# Patient Record
Sex: FEMALE | Race: BLACK OR AFRICAN AMERICAN | NOT HISPANIC OR LATINO | Employment: OTHER | URBAN - METROPOLITAN AREA
[De-identification: names, ages, dates, MRNs, and addresses within clinical notes are randomized per-mention and may not be internally consistent; named-entity substitution may affect disease eponyms.]

---

## 2017-01-03 ENCOUNTER — TELEPHONE (OUTPATIENT)
Dept: OBSTETRICS AND GYNECOLOGY | Facility: CLINIC | Age: 32
End: 2017-01-03

## 2017-01-03 NOTE — TELEPHONE ENCOUNTER
----- Message from Tamica Bledsoe sent at 1/3/2017  3:45 PM CST -----  Contact: Pt.  Was to have surgery on 12/30 and had to cancel because blood count was low.  Was told to call today to speak with Kortney to reschedule.  Needs to be admitted night before to have additional blood work done.  Please call her at [phone] (work). Please call before 5pm.  After 5pm call [phone] (cell).

## 2017-01-03 NOTE — TELEPHONE ENCOUNTER
----- Message from Candis Brown sent at 1/3/2017  9:38 AM CST -----  Would like to speak to nurse about rescheduling procedure. Please call back at [phone]. Thanks//cdb

## 2017-01-03 NOTE — TELEPHONE ENCOUNTER
Appt made for pre-op (to sign consent for blood transfusion), on 1/13/2017 at 8am at [facility] location.  Surgery to be scheduled on 1/17/17, (message sent to Alena).  Pt informed and voiced understanding.  DENNYS, STEPHENIE

## 2017-01-04 ENCOUNTER — TELEPHONE (OUTPATIENT)
Dept: OBSTETRICS AND GYNECOLOGY | Facility: CLINIC | Age: 32
End: 2017-01-04

## 2017-01-04 DIAGNOSIS — R10.2 PELVIC AND PERINEAL PAIN: ICD-10-CM

## 2017-01-04 DIAGNOSIS — N87.1 DYSPLASIA OF CERVIX, HIGH GRADE CIN 2: Primary | ICD-10-CM

## 2017-01-06 ENCOUNTER — HOSPITAL ENCOUNTER (EMERGENCY)
Facility: HOSPITAL | Age: 32
Discharge: HOME | End: 2017-01-06
Attending: EMERGENCY MEDICINE
Payer: MEDICARE

## 2017-01-06 VITALS
OXYGEN SATURATION: 98 % | TEMPERATURE: 98 F | SYSTOLIC BLOOD PRESSURE: 103 MMHG | WEIGHT: 120 LBS | HEART RATE: 82 BPM | DIASTOLIC BLOOD PRESSURE: 67 MMHG | BODY MASS INDEX: 23.56 KG/M2 | RESPIRATION RATE: 18 BRPM | HEIGHT: 60 IN

## 2017-01-06 DIAGNOSIS — D57.00 SICKLE CELL ANEMIA WITH PAIN: Primary | ICD-10-CM

## 2017-01-06 DIAGNOSIS — D64.9 CHRONIC ANEMIA: ICD-10-CM

## 2017-01-06 LAB
ALBUMIN SERPL BCP-MCNC: 3 G/DL
ALP SERPL-CCNC: 63 U/L
ALT SERPL W/O P-5'-P-CCNC: 10 U/L
ANION GAP SERPL CALC-SCNC: 8 MMOL/L
APTT BLDCRRT: 28.8 SEC
AST SERPL-CCNC: 23 U/L
B-HCG UR QL: NEGATIVE
BACTERIA #/AREA URNS HPF: ABNORMAL /HPF
BASOPHILS # BLD AUTO: 0.02 K/UL
BASOPHILS NFR BLD: 0.4 %
BILIRUB SERPL-MCNC: 0.2 MG/DL
BILIRUB UR QL STRIP: NEGATIVE
BUN SERPL-MCNC: 18 MG/DL
CALCIUM SERPL-MCNC: 8.5 MG/DL
CHLORIDE SERPL-SCNC: 109 MMOL/L
CLARITY UR: CLEAR
CO2 SERPL-SCNC: 22 MMOL/L
COLOR UR: YELLOW
CREAT SERPL-MCNC: 0.9 MG/DL
D DIMER PPP IA.FEU-MCNC: 2.42 MG/L FEU
DIFFERENTIAL METHOD: ABNORMAL
EOSINOPHIL # BLD AUTO: 0.1 K/UL
EOSINOPHIL NFR BLD: 1.5 %
ERYTHROCYTE [DISTWIDTH] IN BLOOD BY AUTOMATED COUNT: 13.6 %
EST. GFR  (AFRICAN AMERICAN): >60 ML/MIN/1.73 M^2
EST. GFR  (NON AFRICAN AMERICAN): >60 ML/MIN/1.73 M^2
GLUCOSE SERPL-MCNC: 86 MG/DL
GLUCOSE UR QL STRIP: NEGATIVE
HCT VFR BLD AUTO: 23 %
HGB BLD-MCNC: 7.5 G/DL
HGB UR QL STRIP: ABNORMAL
HYALINE CASTS #/AREA URNS LPF: 1 /LPF
INR PPP: 1
KETONES UR QL STRIP: NEGATIVE
LDH SERPL L TO P-CCNC: 180 U/L
LEUKOCYTE ESTERASE UR QL STRIP: NEGATIVE
LYMPHOCYTES # BLD AUTO: 1.4 K/UL
LYMPHOCYTES NFR BLD: 28.7 %
MAGNESIUM SERPL-MCNC: 2 MG/DL
MCH RBC QN AUTO: 31.6 PG
MCHC RBC AUTO-ENTMCNC: 32.6 %
MCV RBC AUTO: 97 FL
MICROSCOPIC COMMENT: ABNORMAL
MONOCYTES # BLD AUTO: 0.3 K/UL
MONOCYTES NFR BLD: 6.2 %
NEUTROPHILS # BLD AUTO: 3 K/UL
NEUTROPHILS NFR BLD: 63.2 %
NITRITE UR QL STRIP: NEGATIVE
PH UR STRIP: 7 [PH] (ref 5–8)
PLATELET # BLD AUTO: 146 K/UL
PMV BLD AUTO: 10.2 FL
POTASSIUM SERPL-SCNC: 3.6 MMOL/L
PROT SERPL-MCNC: 10.5 G/DL
PROT UR QL STRIP: ABNORMAL
PROTHROMBIN TIME: 10.3 SEC
RBC # BLD AUTO: 2.37 M/UL
RBC #/AREA URNS HPF: 5 /HPF (ref 0–4)
RETICS/RBC NFR AUTO: 1.6 %
SODIUM SERPL-SCNC: 139 MMOL/L
SP GR UR STRIP: 1.02 (ref 1–1.03)
TROPONIN I SERPL DL<=0.01 NG/ML-MCNC: <0.006 NG/ML
URN SPEC COLLECT METH UR: ABNORMAL
UROBILINOGEN UR STRIP-ACNC: NEGATIVE EU/DL
WBC # BLD AUTO: 4.81 K/UL
WBC #/AREA URNS HPF: 3 /HPF (ref 0–5)

## 2017-01-06 PROCEDURE — 63600175 PHARM REV CODE 636 W HCPCS: Performed by: EMERGENCY MEDICINE

## 2017-01-06 PROCEDURE — 81000 URINALYSIS NONAUTO W/SCOPE: CPT

## 2017-01-06 PROCEDURE — 85025 COMPLETE CBC W/AUTO DIFF WBC: CPT

## 2017-01-06 PROCEDURE — 96374 THER/PROPH/DIAG INJ IV PUSH: CPT

## 2017-01-06 PROCEDURE — 80053 COMPREHEN METABOLIC PANEL: CPT

## 2017-01-06 PROCEDURE — 84484 ASSAY OF TROPONIN QUANT: CPT

## 2017-01-06 PROCEDURE — 96361 HYDRATE IV INFUSION ADD-ON: CPT

## 2017-01-06 PROCEDURE — 85730 THROMBOPLASTIN TIME PARTIAL: CPT

## 2017-01-06 PROCEDURE — 85610 PROTHROMBIN TIME: CPT

## 2017-01-06 PROCEDURE — 93010 ELECTROCARDIOGRAM REPORT: CPT | Mod: ,,, | Performed by: INTERNAL MEDICINE

## 2017-01-06 PROCEDURE — 93005 ELECTROCARDIOGRAM TRACING: CPT

## 2017-01-06 PROCEDURE — 96376 TX/PRO/DX INJ SAME DRUG ADON: CPT

## 2017-01-06 PROCEDURE — 96375 TX/PRO/DX INJ NEW DRUG ADDON: CPT

## 2017-01-06 PROCEDURE — 99285 EMERGENCY DEPT VISIT HI MDM: CPT | Mod: 25

## 2017-01-06 PROCEDURE — 83735 ASSAY OF MAGNESIUM: CPT

## 2017-01-06 PROCEDURE — 25000003 PHARM REV CODE 250: Performed by: EMERGENCY MEDICINE

## 2017-01-06 PROCEDURE — 81025 URINE PREGNANCY TEST: CPT

## 2017-01-06 PROCEDURE — 83615 LACTATE (LD) (LDH) ENZYME: CPT

## 2017-01-06 PROCEDURE — 85379 FIBRIN DEGRADATION QUANT: CPT

## 2017-01-06 PROCEDURE — 85045 AUTOMATED RETICULOCYTE COUNT: CPT

## 2017-01-06 RX ORDER — MORPHINE SULFATE 4 MG/ML
4 INJECTION, SOLUTION INTRAMUSCULAR; INTRAVENOUS
Status: COMPLETED | OUTPATIENT
Start: 2017-01-06 | End: 2017-01-06

## 2017-01-06 RX ORDER — DIPHENHYDRAMINE HCL 25 MG
25 CAPSULE ORAL
Status: COMPLETED | OUTPATIENT
Start: 2017-01-06 | End: 2017-01-06

## 2017-01-06 RX ORDER — HYDROCODONE BITARTRATE AND ACETAMINOPHEN 10; 325 MG/1; MG/1
1 TABLET ORAL EVERY 4 HOURS PRN
Qty: 18 TABLET | Refills: 0 | Status: SHIPPED | OUTPATIENT
Start: 2017-01-06 | End: 2017-01-13

## 2017-01-06 RX ORDER — ONDANSETRON 2 MG/ML
4 INJECTION INTRAMUSCULAR; INTRAVENOUS
Status: COMPLETED | OUTPATIENT
Start: 2017-01-06 | End: 2017-01-06

## 2017-01-06 RX ADMIN — ONDANSETRON 4 MG: 2 INJECTION INTRAMUSCULAR; INTRAVENOUS at 03:01

## 2017-01-06 RX ADMIN — MORPHINE SULFATE 4 MG: 4 INJECTION, SOLUTION INTRAMUSCULAR; INTRAVENOUS at 03:01

## 2017-01-06 RX ADMIN — MORPHINE SULFATE 4 MG: 4 INJECTION, SOLUTION INTRAMUSCULAR; INTRAVENOUS at 04:01

## 2017-01-06 RX ADMIN — SODIUM CHLORIDE 1000 ML: 0.9 INJECTION, SOLUTION INTRAVENOUS at 03:01

## 2017-01-06 RX ADMIN — DIPHENHYDRAMINE HYDROCHLORIDE 25 MG: 25 CAPSULE ORAL at 03:01

## 2017-01-06 NOTE — ED NOTES
In bed resting,aaox3,skin warm dry to touch,equal bilateral chest rise and fall,side rails up x 2,bed locked and in low position,C-monitor on and recording,instructed to call with any needs.
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
## 2017-01-06 NOTE — ED AVS SNAPSHOT
[facility]  [address]  [facility] [address]               Wang Kebede   2017  2:22 PM   ED    Description:  Female : 1985   Department:  [facility]           Your Care was Coordinated By:     Provider Role From To    Genny Bryan MD Attending Provider 17 1422 --      Reason for Visit     Sickle Cell Pain Crisis           Diagnoses this Visit        Comments    Sickle cell anemia with pain    -  Primary       ED Disposition     None           To Do List           Follow-up Information     Follow up with Bronson Koenig MD. Schedule an appointment as soon as possible for a visit in 2 days.    Specialty:  Internal Medicine    Why:  Return to the Emergency Room, If symptoms worsen    Contact information:    [address] [facility] [id number] [phone]         These Medications        Disp Refills Start End    hydrocodone-acetaminophen 10-325mg (NORCO)  mg Tab 18 tablet 0 2017     Take 1 tablet by mouth every 4 (four) hours as needed for Pain. - Oral    Pharmacy: [facility] [id number] [place] [address] [id number] [facility] Ph #: [phone]         [facility]     [facility] Nurse Care [facility] -  Assistance  Registered nurses in the [facility] provide clinical advisement, health education, appointment booking, and other advisory services.  Call for this free service at [phone].             Medications           Message regarding Medications     Verify the changes and/or additions to your medication regime listed below are the same as discussed with your clinician today.  If any of these changes or additions are incorrect, please notify your healthcare provider.        START taking these NEW medications        Refills    hydrocodone-acetaminophen 10-325mg (NORCO)  mg Tab 0    Sig: Take 1 tablet by mouth every 4 (four)  hours as needed for Pain.    Class: Print    Route: Oral      These medications were administered today        Dose Freq    sodium chloride 0.9% bolus 1,000 mL 1,000 mL ED 1 Time    Sig: Inject 1,000 mLs into the vein ED 1 Time.    Class: Normal    Route: Intravenous    morphine injection 4 mg 4 mg ED 1 Time    Sig: Inject 1 mL (4 mg total) into the vein ED 1 Time.    Class: Normal    Route: Intravenous    ondansetron injection 4 mg 4 mg ED 1 Time    Sig: Inject 4 mg into the vein ED 1 Time.    Class: Normal    Route: Intravenous    diphenhydrAMINE capsule 25 mg 25 mg ED 1 Time    Sig: Take 1 each (25 mg total) by mouth ED 1 Time.    Class: Normal    Route: Oral    morphine injection 4 mg 4 mg ED 1 Time    Sig: Inject 1 mL (4 mg total) into the vein ED 1 Time.    Class: Normal    Route: Intravenous           Verify that the below list of medications is an accurate representation of the medications you are currently taking.  If none reported, the list may be blank. If incorrect, please contact your healthcare provider. Carry this list with you in case of emergency.           Current Medications     cyproheptadine (PERIACTIN) 4 mg tablet Take 4 mg by mouth 3 (three) times daily as needed.    darunavir-cobicistat (PREZCOBIX) 800-150 mg-mg Tab Take 1 tablet by mouth.    emtricitabine-tenofovir alafen (DESCOVY) 200-25 mg Tab Take by mouth once daily.    hydrocodone-acetaminophen 10-325mg (NORCO)  mg Tab Take 1 tablet by mouth every 4 (four) hours as needed for Pain.           Clinical Reference Information           Your Vitals Were     BP Pulse Temp Resp Height Weight    103/63 87 98.2 °F (36.8 °C) (Oral) 21 5' (1.524 m) 54.4 kg (120 lb)    Last Period SpO2 BMI          10/31/2016 (Approximate) 97% 23.44 kg/m2        Allergies as of 1/6/2017        Reactions    Iodine And Iodide Containing Products Anaphylaxis    Pt states she coded last time she was administered contrast    Bactrim [Sulfamethoxazole-trimethoprim]  Hives      Immunizations Administered on Date of Encounter - 1/6/2017     None      ED Micro, Lab, POCT     Start Ordered       Status Ordering Provider    01/06/17 1440 01/06/17 1439  D dimer, quantitative  STAT      Final result     01/06/17 1439 01/06/17 1438  CBC auto differential  STAT      Final result     01/06/17 1439 01/06/17 1438  Comprehensive metabolic panel  STAT      Final result     01/06/17 1439 01/06/17 1438  Protime-INR  STAT      Final result     01/06/17 1439 01/06/17 1438  APTT  STAT      Final result     01/06/17 1439 01/06/17 1438  Magnesium  STAT      Final result     01/06/17 1439 01/06/17 1438  Lactate dehydrogenase  Once      Final result     01/06/17 1439 01/06/17 1438  Reticulocytes  Once      Final result     01/06/17 1439 01/06/17 1438  Troponin I  STAT      Final result     01/06/17 1439 01/06/17 1438  Urinalysis Clean Catch  STAT      Final result     01/06/17 1439 01/06/17 1438  Pregnancy, urine rapid (UPT)  Once      Final result     01/06/17 1439 01/06/17 1439  Urinalysis Microscopic  Once      Final result       ED Imaging Orders     Start Ordered       Status Ordering Provider    01/06/17 1622 01/06/17 1622  NM Lung Ventilation Perfusion Imaging  1 time imaging      Final result     01/06/17 1600 01/06/17 1559    1 time imaging,   Status:  Canceled      Canceled     01/06/17 1439 01/06/17 1438  X-Ray Chest PA And Lateral  1 time imaging      Final result       Discharge References/Attachments     SICKLE CELL ANEMIA AND SICKLE CELL CRISIS, DISCHARGE INSTRUCTIONS (ENGLISH)      Your Scheduled Appointments     Jan 13, 2017  8:00 AM CST   Established Patient Visit with Emanuel Zamora MD   [facility]    [address] [facility] [address] [phone]              Your Future Surgeries/Procedures     Jan 17, 2017   Surgery with Emanuel Zamora MD   [facility])    [address] [facility]  [address]   [phone]               [facility] complies with applicable Federal civil rights laws and does not discriminate on the basis of race, color, national origin, age, disability, or sex.        Language Assistance Services     ATTENTION: Language assistance services are available, free of charge. Please call [phone].      ATENCIÓN: Si habla español, tiene a bazan disposición servicios gratuitos de asistencia lingüística. Llame al [phone].     CHÚ Ý: N?u b?n nói Ti?ng Vi?t, có các d?ch v? h? tr? ngôn ng? mi?n phí dành cho b?n. G?i s? [phone].

## 2017-01-11 ENCOUNTER — TELEPHONE (OUTPATIENT)
Dept: OBSTETRICS AND GYNECOLOGY | Facility: CLINIC | Age: 32
End: 2017-01-11

## 2017-01-11 NOTE — TELEPHONE ENCOUNTER
Left message informing pt we did not try to call her, may have been appt reminder (informed of 1/13/17 at 8am at [facility] appt).  DENNYS, LPN

## 2017-01-11 NOTE — TELEPHONE ENCOUNTER
----- Message from Mario Elizalde sent at 1/10/2017  1:42 PM CST -----  Contact: pt  She's calling in regard to a missed call please advise, [phone] (home)

## 2017-01-13 ENCOUNTER — OFFICE VISIT (OUTPATIENT)
Dept: OBSTETRICS AND GYNECOLOGY | Facility: CLINIC | Age: 32
End: 2017-01-13
Payer: MEDICARE

## 2017-01-13 VITALS
SYSTOLIC BLOOD PRESSURE: 100 MMHG | DIASTOLIC BLOOD PRESSURE: 62 MMHG | BODY MASS INDEX: 22.42 KG/M2 | WEIGHT: 114.19 LBS | HEIGHT: 60 IN

## 2017-01-13 DIAGNOSIS — D58.9 HEREDITARY HEMOLYTIC ANEMIA: ICD-10-CM

## 2017-01-13 DIAGNOSIS — N92.1 MENORRHAGIA WITH IRREGULAR CYCLE: Primary | ICD-10-CM

## 2017-01-13 DIAGNOSIS — N87.1 DYSPLASIA OF CERVIX, HIGH GRADE CIN 2: Primary | ICD-10-CM

## 2017-01-13 DIAGNOSIS — N87.1 DYSPLASIA OF CERVIX, HIGH GRADE CIN 2: ICD-10-CM

## 2017-01-13 PROCEDURE — 99499 UNLISTED E&M SERVICE: CPT | Mod: S$GLB,,, | Performed by: OBSTETRICS & GYNECOLOGY

## 2017-01-13 PROCEDURE — 99999 PR PBB SHADOW E&M-EST. PATIENT-LVL II: CPT | Mod: PBBFAC,,, | Performed by: OBSTETRICS & GYNECOLOGY

## 2017-01-13 RX ORDER — IBUPROFEN 200 MG
800 TABLET ORAL EVERY 8 HOURS
Status: CANCELLED | OUTPATIENT
Start: 2017-01-14

## 2017-01-13 RX ORDER — KETOROLAC TROMETHAMINE 30 MG/ML
30 INJECTION, SOLUTION INTRAMUSCULAR; INTRAVENOUS EVERY 6 HOURS
Status: CANCELLED | OUTPATIENT
Start: 2017-01-13 | End: 2017-01-14

## 2017-01-13 RX ORDER — ACETAMINOPHEN 10 MG/ML
1000 INJECTION, SOLUTION INTRAVENOUS
Status: CANCELLED | OUTPATIENT
Start: 2017-01-13 | End: 2017-01-13

## 2017-01-13 RX ORDER — SODIUM CHLORIDE, SODIUM LACTATE, POTASSIUM CHLORIDE, CALCIUM CHLORIDE 600; 310; 30; 20 MG/100ML; MG/100ML; MG/100ML; MG/100ML
INJECTION, SOLUTION INTRAVENOUS CONTINUOUS
Status: CANCELLED | OUTPATIENT
Start: 2017-01-13

## 2017-01-13 RX ORDER — ONDANSETRON 4 MG/1
8 TABLET, ORALLY DISINTEGRATING ORAL EVERY 8 HOURS PRN
Status: CANCELLED | OUTPATIENT
Start: 2017-01-13
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
## 2017-01-16 ENCOUNTER — ANESTHESIA EVENT (OUTPATIENT)
Dept: SURGERY | Facility: HOSPITAL | Age: 32
End: 2017-01-16
Payer: MEDICARE

## 2017-01-16 ENCOUNTER — INFUSION (OUTPATIENT)
Dept: INFUSION THERAPY | Facility: HOSPITAL | Age: 32
End: 2017-01-16
Attending: OBSTETRICS & GYNECOLOGY
Payer: MEDICARE

## 2017-01-16 ENCOUNTER — TELEPHONE (OUTPATIENT)
Dept: OBSTETRICS AND GYNECOLOGY | Facility: CLINIC | Age: 32
End: 2017-01-16

## 2017-01-16 DIAGNOSIS — D58.2 OTHER HEMOGLOBINOPATHIES: Primary | ICD-10-CM

## 2017-01-16 DIAGNOSIS — D64.9 ANEMIA: Primary | ICD-10-CM

## 2017-01-16 DIAGNOSIS — Z01.818 PRE-OPERATIVE CLEARANCE: ICD-10-CM

## 2017-01-16 LAB
BLD PROD TYP BPU: NORMAL
BLD PROD TYP BPU: NORMAL
BLOOD UNIT EXPIRATION DATE: NORMAL
BLOOD UNIT EXPIRATION DATE: NORMAL
BLOOD UNIT TYPE CODE: 5100
BLOOD UNIT TYPE CODE: NORMAL
BLOOD UNIT TYPE: NORMAL
BLOOD UNIT TYPE: NORMAL
CODING SYSTEM: NORMAL
CODING SYSTEM: NORMAL
DISPENSE STATUS: NORMAL
DISPENSE STATUS: NORMAL
NUM UNITS TRANS PACKED RBC: NORMAL
NUM UNITS TRANS PACKED RBC: NORMAL

## 2017-01-16 PROCEDURE — P9016 RBC LEUKOCYTES REDUCED: HCPCS

## 2017-01-16 PROCEDURE — 85660 RBC SICKLE CELL TEST: CPT | Mod: 91

## 2017-01-16 PROCEDURE — 36415 COLL VENOUS BLD VENIPUNCTURE: CPT

## 2017-01-16 PROCEDURE — 86920 COMPATIBILITY TEST SPIN: CPT

## 2017-01-16 PROCEDURE — 63600175 PHARM REV CODE 636 W HCPCS: Performed by: OBSTETRICS & GYNECOLOGY

## 2017-01-16 PROCEDURE — 36430 TRANSFUSION BLD/BLD COMPNT: CPT

## 2017-01-16 PROCEDURE — 96376 TX/PRO/DX INJ SAME DRUG ADON: CPT

## 2017-01-16 PROCEDURE — 96374 THER/PROPH/DIAG INJ IV PUSH: CPT

## 2017-01-16 RX ORDER — HYDROCODONE BITARTRATE AND ACETAMINOPHEN 500; 5 MG/1; MG/1
TABLET ORAL
Status: DISCONTINUED | OUTPATIENT
Start: 2017-01-16 | End: 2017-01-17 | Stop reason: HOSPADM

## 2017-01-16 RX ORDER — HYDROCODONE BITARTRATE AND ACETAMINOPHEN 500; 5 MG/1; MG/1
TABLET ORAL ONCE
Status: CANCELLED | OUTPATIENT
Start: 2017-01-16 | End: 2017-01-16

## 2017-01-16 RX ORDER — DIPHENHYDRAMINE HYDROCHLORIDE 50 MG/ML
25 INJECTION INTRAMUSCULAR; INTRAVENOUS
Status: COMPLETED | OUTPATIENT
Start: 2017-01-16 | End: 2017-01-16

## 2017-01-16 RX ORDER — HYDROCODONE BITARTRATE AND ACETAMINOPHEN 500; 5 MG/1; MG/1
TABLET ORAL ONCE
Status: DISCONTINUED | OUTPATIENT
Start: 2017-01-16 | End: 2017-01-17 | Stop reason: HOSPADM

## 2017-01-16 RX ADMIN — DIPHENHYDRAMINE HYDROCHLORIDE 25 MG: 50 INJECTION, SOLUTION INTRAMUSCULAR; INTRAVENOUS at 07:01

## 2017-01-16 RX ADMIN — DIPHENHYDRAMINE HYDROCHLORIDE 25 MG: 50 INJECTION, SOLUTION INTRAMUSCULAR; INTRAVENOUS at 04:01

## 2017-01-16 NOTE — PLAN OF CARE
"Problem: Patient Care Overview  Goal: Plan of Care Review  Outcome: Ongoing (interventions implemented as appropriate)  Patient states "My body just aches all over."      "

## 2017-01-16 NOTE — TELEPHONE ENCOUNTER
----- Message from Bisi Jerome sent at 1/16/2017 12:27 PM CST -----  Contact: pt  Pt is calling nurse staff regarding pt is at the hospital and there are no orders for patient for a blood transfusion. Pt is waiting at the [facility] now. Pt call back asap [phone] thanks

## 2017-01-17 ENCOUNTER — SURGERY (OUTPATIENT)
Age: 32
End: 2017-01-17

## 2017-01-17 ENCOUNTER — ANESTHESIA (OUTPATIENT)
Dept: SURGERY | Facility: HOSPITAL | Age: 32
End: 2017-01-17
Payer: MEDICARE

## 2017-01-17 ENCOUNTER — HOSPITAL ENCOUNTER (OUTPATIENT)
Facility: HOSPITAL | Age: 32
Discharge: HOME | End: 2017-01-17
Attending: OBSTETRICS & GYNECOLOGY | Admitting: OBSTETRICS & GYNECOLOGY
Payer: MEDICARE

## 2017-01-17 VITALS
DIASTOLIC BLOOD PRESSURE: 59 MMHG | RESPIRATION RATE: 19 BRPM | SYSTOLIC BLOOD PRESSURE: 111 MMHG | OXYGEN SATURATION: 96 % | TEMPERATURE: 98 F | HEIGHT: 60 IN | WEIGHT: 112.88 LBS | HEART RATE: 87 BPM | BODY MASS INDEX: 22.16 KG/M2

## 2017-01-17 VITALS
OXYGEN SATURATION: 97 % | DIASTOLIC BLOOD PRESSURE: 80 MMHG | TEMPERATURE: 98 F | RESPIRATION RATE: 16 BRPM | SYSTOLIC BLOOD PRESSURE: 117 MMHG | HEART RATE: 76 BPM

## 2017-01-17 DIAGNOSIS — N87.1 DYSPLASIA OF CERVIX, HIGH GRADE CIN 2: ICD-10-CM

## 2017-01-17 DIAGNOSIS — D58.9 HEREDITARY HEMOLYTIC ANEMIA: ICD-10-CM

## 2017-01-17 LAB
B-HCG UR QL: NEGATIVE
BASOPHILS # BLD AUTO: 0.01 K/UL
BASOPHILS NFR BLD: 0.3 %
CTP QC/QA: YES
DIFFERENTIAL METHOD: ABNORMAL
EOSINOPHIL # BLD AUTO: 0.1 K/UL
EOSINOPHIL NFR BLD: 2.2 %
ERYTHROCYTE [DISTWIDTH] IN BLOOD BY AUTOMATED COUNT: 15.2 %
HCT VFR BLD AUTO: 31.8 %
HGB BLD-MCNC: 10.8 G/DL
LYMPHOCYTES # BLD AUTO: 1.1 K/UL
LYMPHOCYTES NFR BLD: 31.3 %
MCH RBC QN AUTO: 31.3 PG
MCHC RBC AUTO-ENTMCNC: 34 %
MCV RBC AUTO: 92 FL
MONOCYTES # BLD AUTO: 0.3 K/UL
MONOCYTES NFR BLD: 8.4 %
NEUTROPHILS # BLD AUTO: 2.1 K/UL
NEUTROPHILS NFR BLD: 57.8 %
PLATELET # BLD AUTO: 136 K/UL
PMV BLD AUTO: 10.4 FL
RBC # BLD AUTO: 3.45 M/UL
WBC # BLD AUTO: 3.58 K/UL

## 2017-01-17 PROCEDURE — 88307 TISSUE EXAM BY PATHOLOGIST: CPT | Mod: 26,,, | Performed by: PATHOLOGY

## 2017-01-17 PROCEDURE — 25000003 PHARM REV CODE 250: Performed by: NURSE ANESTHETIST, CERTIFIED REGISTERED

## 2017-01-17 PROCEDURE — 37000008 HC ANESTHESIA 1ST 15 MINUTES: Performed by: OBSTETRICS & GYNECOLOGY

## 2017-01-17 PROCEDURE — 11423 EXC H-F-NK-SP B9+MARG 2.1-3: CPT | Mod: 51,,, | Performed by: OBSTETRICS & GYNECOLOGY

## 2017-01-17 PROCEDURE — 63600175 PHARM REV CODE 636 W HCPCS: Performed by: ANESTHESIOLOGY

## 2017-01-17 PROCEDURE — 85025 COMPLETE CBC W/AUTO DIFF WBC: CPT

## 2017-01-17 PROCEDURE — 37000009 HC ANESTHESIA EA ADD 15 MINS: Performed by: OBSTETRICS & GYNECOLOGY

## 2017-01-17 PROCEDURE — 27000221 HC OXYGEN, UP TO 24 HOURS

## 2017-01-17 PROCEDURE — 63600175 PHARM REV CODE 636 W HCPCS: Performed by: OBSTETRICS & GYNECOLOGY

## 2017-01-17 PROCEDURE — 25000242 PHARM REV CODE 250 ALT 637 W/ HCPCS: Performed by: ANESTHESIOLOGY

## 2017-01-17 PROCEDURE — 71000039 HC RECOVERY, EACH ADD'L HOUR: Performed by: OBSTETRICS & GYNECOLOGY

## 2017-01-17 PROCEDURE — 88305 TISSUE EXAM BY PATHOLOGIST: CPT | Performed by: PATHOLOGY

## 2017-01-17 PROCEDURE — 88305 TISSUE EXAM BY PATHOLOGIST: CPT | Mod: 26,,, | Performed by: PATHOLOGY

## 2017-01-17 PROCEDURE — 81025 URINE PREGNANCY TEST: CPT | Performed by: OBSTETRICS & GYNECOLOGY

## 2017-01-17 PROCEDURE — 57520 CONIZATION OF CERVIX: CPT | Mod: ,,, | Performed by: OBSTETRICS & GYNECOLOGY

## 2017-01-17 PROCEDURE — 94640 AIRWAY INHALATION TREATMENT: CPT

## 2017-01-17 PROCEDURE — 71000015 HC POSTOP RECOV 1ST HR: Performed by: OBSTETRICS & GYNECOLOGY

## 2017-01-17 PROCEDURE — 25000003 PHARM REV CODE 250: Performed by: OBSTETRICS & GYNECOLOGY

## 2017-01-17 PROCEDURE — 63600175 PHARM REV CODE 636 W HCPCS: Performed by: NURSE ANESTHETIST, CERTIFIED REGISTERED

## 2017-01-17 PROCEDURE — 71000033 HC RECOVERY, INTIAL HOUR: Performed by: OBSTETRICS & GYNECOLOGY

## 2017-01-17 PROCEDURE — 25000003 PHARM REV CODE 250: Performed by: ANESTHESIOLOGY

## 2017-01-17 PROCEDURE — 99900035 HC TECH TIME PER 15 MIN (STAT)

## 2017-01-17 PROCEDURE — 36000706: Performed by: OBSTETRICS & GYNECOLOGY

## 2017-01-17 PROCEDURE — 36000707: Performed by: OBSTETRICS & GYNECOLOGY

## 2017-01-17 RX ORDER — SODIUM CHLORIDE, SODIUM LACTATE, POTASSIUM CHLORIDE, CALCIUM CHLORIDE 600; 310; 30; 20 MG/100ML; MG/100ML; MG/100ML; MG/100ML
INJECTION, SOLUTION INTRAVENOUS CONTINUOUS
Status: DISCONTINUED | OUTPATIENT
Start: 2017-01-17 | End: 2017-01-17 | Stop reason: HOSPADM

## 2017-01-17 RX ORDER — MIDAZOLAM HYDROCHLORIDE 1 MG/ML
INJECTION, SOLUTION INTRAMUSCULAR; INTRAVENOUS
Status: DISCONTINUED | OUTPATIENT
Start: 2017-01-17 | End: 2017-01-17

## 2017-01-17 RX ORDER — DIPHENHYDRAMINE HYDROCHLORIDE 50 MG/ML
6.25 INJECTION INTRAMUSCULAR; INTRAVENOUS EVERY 4 HOURS PRN
Status: DISCONTINUED | OUTPATIENT
Start: 2017-01-17 | End: 2017-01-17 | Stop reason: HOSPADM

## 2017-01-17 RX ORDER — LIDOCAINE HYDROCHLORIDE 10 MG/ML
1 INJECTION, SOLUTION EPIDURAL; INFILTRATION; INTRACAUDAL; PERINEURAL ONCE
Status: DISCONTINUED | OUTPATIENT
Start: 2017-01-17 | End: 2017-01-17 | Stop reason: HOSPADM

## 2017-01-17 RX ORDER — SODIUM CHLORIDE, SODIUM LACTATE, POTASSIUM CHLORIDE, CALCIUM CHLORIDE 600; 310; 30; 20 MG/100ML; MG/100ML; MG/100ML; MG/100ML
INJECTION, SOLUTION INTRAVENOUS CONTINUOUS PRN
Status: DISCONTINUED | OUTPATIENT
Start: 2017-01-17 | End: 2017-01-17

## 2017-01-17 RX ORDER — HYDROMORPHONE HYDROCHLORIDE 2 MG/ML
0.2 INJECTION, SOLUTION INTRAMUSCULAR; INTRAVENOUS; SUBCUTANEOUS EVERY 5 MIN PRN
Status: DISCONTINUED | OUTPATIENT
Start: 2017-01-17 | End: 2017-01-17 | Stop reason: HOSPADM

## 2017-01-17 RX ORDER — ROCURONIUM BROMIDE 10 MG/ML
INJECTION, SOLUTION INTRAVENOUS
Status: DISCONTINUED | OUTPATIENT
Start: 2017-01-17 | End: 2017-01-17

## 2017-01-17 RX ORDER — MORPHINE SULFATE 10 MG/ML
2 INJECTION INTRAMUSCULAR; INTRAVENOUS; SUBCUTANEOUS EVERY 5 MIN PRN
Status: DISCONTINUED | OUTPATIENT
Start: 2017-01-17 | End: 2017-01-17 | Stop reason: HOSPADM

## 2017-01-17 RX ORDER — HYDROCODONE BITARTRATE AND ACETAMINOPHEN 5; 325 MG/1; MG/1
1 TABLET ORAL
Status: DISCONTINUED | OUTPATIENT
Start: 2017-01-17 | End: 2017-01-17 | Stop reason: HOSPADM

## 2017-01-17 RX ORDER — LIDOCAINE HYDROCHLORIDE 10 MG/ML
INJECTION INFILTRATION; PERINEURAL
Status: DISCONTINUED | OUTPATIENT
Start: 2017-01-17 | End: 2017-01-17

## 2017-01-17 RX ORDER — ACETAMINOPHEN 10 MG/ML
1000 INJECTION, SOLUTION INTRAVENOUS
Status: COMPLETED | OUTPATIENT
Start: 2017-01-17 | End: 2017-01-17

## 2017-01-17 RX ORDER — SUCCINYLCHOLINE CHLORIDE 20 MG/ML
INJECTION INTRAMUSCULAR; INTRAVENOUS
Status: DISCONTINUED | OUTPATIENT
Start: 2017-01-17 | End: 2017-01-17

## 2017-01-17 RX ORDER — SODIUM CHLORIDE 9 MG/ML
3 INJECTION, SOLUTION INTRAMUSCULAR; INTRAVENOUS; SUBCUTANEOUS EVERY 8 HOURS
Status: DISCONTINUED | OUTPATIENT
Start: 2017-01-17 | End: 2017-01-17 | Stop reason: HOSPADM

## 2017-01-17 RX ORDER — FENTANYL CITRATE 50 UG/ML
INJECTION, SOLUTION INTRAMUSCULAR; INTRAVENOUS
Status: DISCONTINUED | OUTPATIENT
Start: 2017-01-17 | End: 2017-01-17

## 2017-01-17 RX ORDER — ETOMIDATE 2 MG/ML
INJECTION INTRAVENOUS
Status: DISCONTINUED | OUTPATIENT
Start: 2017-01-17 | End: 2017-01-17

## 2017-01-17 RX ORDER — ALBUTEROL SULFATE 2.5 MG/.5ML
2.5 SOLUTION RESPIRATORY (INHALATION) EVERY 4 HOURS PRN
Status: DISCONTINUED | OUTPATIENT
Start: 2017-01-17 | End: 2017-01-17 | Stop reason: HOSPADM

## 2017-01-17 RX ORDER — NALBUPHINE HYDROCHLORIDE 10 MG/ML
10 INJECTION, SOLUTION INTRAMUSCULAR; INTRAVENOUS; SUBCUTANEOUS
Status: DISCONTINUED | OUTPATIENT
Start: 2017-01-17 | End: 2017-01-17

## 2017-01-17 RX ORDER — ONDANSETRON 2 MG/ML
INJECTION INTRAMUSCULAR; INTRAVENOUS
Status: DISCONTINUED | OUTPATIENT
Start: 2017-01-17 | End: 2017-01-17

## 2017-01-17 RX ORDER — MORPHINE SULFATE 10 MG/ML
INJECTION, SOLUTION INTRAMUSCULAR; INTRAVENOUS
Status: DISCONTINUED | OUTPATIENT
Start: 2017-01-17 | End: 2017-01-17

## 2017-01-17 RX ORDER — KETOROLAC TROMETHAMINE 30 MG/ML
30 INJECTION, SOLUTION INTRAMUSCULAR; INTRAVENOUS ONCE
Status: COMPLETED | OUTPATIENT
Start: 2017-01-17 | End: 2017-01-17

## 2017-01-17 RX ORDER — SODIUM CHLORIDE 9 MG/ML
3 INJECTION, SOLUTION INTRAMUSCULAR; INTRAVENOUS; SUBCUTANEOUS
Status: DISCONTINUED | OUTPATIENT
Start: 2017-01-17 | End: 2017-01-17 | Stop reason: HOSPADM

## 2017-01-17 RX ORDER — ONDANSETRON 2 MG/ML
4 INJECTION INTRAMUSCULAR; INTRAVENOUS DAILY PRN
Status: DISCONTINUED | OUTPATIENT
Start: 2017-01-17 | End: 2017-01-17 | Stop reason: HOSPADM

## 2017-01-17 RX ADMIN — ACETAMINOPHEN 1000 MG: 10 INJECTION, SOLUTION INTRAVENOUS at 09:01

## 2017-01-17 RX ADMIN — VASOPRESSIN 20 UNITS: 20 INJECTION, SOLUTION INTRAMUSCULAR; SUBCUTANEOUS at 11:01

## 2017-01-17 RX ADMIN — KETOROLAC TROMETHAMINE 30 MG: 30 INJECTION, SOLUTION INTRAMUSCULAR at 03:01

## 2017-01-17 RX ADMIN — MORPHINE SULFATE 5 MG: 10 INJECTION INTRAMUSCULAR; INTRAVENOUS; SUBCUTANEOUS at 11:01

## 2017-01-17 RX ADMIN — ONDANSETRON 4 MG: 2 INJECTION, SOLUTION INTRAMUSCULAR; INTRAVENOUS at 11:01

## 2017-01-17 RX ADMIN — SODIUM CHLORIDE, SODIUM LACTATE, POTASSIUM CHLORIDE, AND CALCIUM CHLORIDE: 600; 310; 30; 20 INJECTION, SOLUTION INTRAVENOUS at 10:01

## 2017-01-17 RX ADMIN — SODIUM CHLORIDE, SODIUM LACTATE, POTASSIUM CHLORIDE, AND CALCIUM CHLORIDE: .6; .31; .03; .02 INJECTION, SOLUTION INTRAVENOUS at 09:01

## 2017-01-17 RX ADMIN — SUCCINYLCHOLINE CHLORIDE 100 MG: 20 INJECTION, SOLUTION INTRAMUSCULAR; INTRAVENOUS at 10:01

## 2017-01-17 RX ADMIN — ETOMIDATE 15 MG: 2 INJECTION, SOLUTION INTRAVENOUS at 10:01

## 2017-01-17 RX ADMIN — ROCURONIUM BROMIDE 5 MG: 10 INJECTION, SOLUTION INTRAVENOUS at 10:01

## 2017-01-17 RX ADMIN — ALBUTEROL SULFATE 2.5 MG: 2.5 SOLUTION RESPIRATORY (INHALATION) at 02:01

## 2017-01-17 RX ADMIN — MIDAZOLAM HYDROCHLORIDE 2 MG: 1 INJECTION, SOLUTION INTRAMUSCULAR; INTRAVENOUS at 10:01

## 2017-01-17 RX ADMIN — FENTANYL CITRATE 50 MCG: 50 INJECTION, SOLUTION INTRAMUSCULAR; INTRAVENOUS at 11:01

## 2017-01-17 RX ADMIN — LIDOCAINE HYDROCHLORIDE 80 MG: 10 INJECTION, SOLUTION INFILTRATION; PERINEURAL at 10:01

## 2017-01-17 RX ADMIN — IODINE SOLUTION STRONG 5% (LUGOL'S) 14 ML: 5 SOLUTION at 11:01

## 2017-01-17 RX ADMIN — GELATIN ABSORBABLE SPONGE SIZE 100 1 APPLICATOR: MISC at 11:01

## 2017-01-17 RX ADMIN — DIPHENHYDRAMINE HYDROCHLORIDE 6.25 MG: 50 INJECTION, SOLUTION INTRAMUSCULAR; INTRAVENOUS at 03:01

## 2017-01-17 RX ADMIN — FENTANYL CITRATE 50 MCG: 50 INJECTION, SOLUTION INTRAMUSCULAR; INTRAVENOUS at 10:01

## 2017-01-17 RX ADMIN — LIDOCAINE HYDROCHLORIDE 20 ML: 10; .005 INJECTION, SOLUTION EPIDURAL; INFILTRATION; INTRACAUDAL; PERINEURAL at 11:01

## 2017-01-17 NOTE — ANESTHESIA PREPROCEDURE EVALUATION
01/17/2017  Wang Kebede is a 31 y.o., female.    [facility] Anesthesia Evaluation    I have reviewed the Patient Summary Reports.    I have reviewed the Nursing Notes.   I have reviewed the Medications.     Review of Systems  Anesthesia Hx:  History of prior surgery of interest to airway management or planning: Denies Family Hx of Anesthesia complications.   Denies Personal Hx of Anesthesia complications.   Social:  Non-Smoker    Hematology/Oncology:        Hematology Comments: Sickle Cell disease  HIV   Cardiovascular:   Pacemaker    Pulmonary:  Pulmonary Normal    Renal/:  Renal/ Normal     Hepatic/GI:  Hepatic/GI Normal    Neurological:  Neurology Normal    Endocrine:   Denies Diabetes.        Physical Exam  General:  Well nourished    Airway/Jaw/Neck:  Airway Findings: Mouth Opening: Normal General Airway Assessment: Adult  Mallampati: II  TM Distance: Normal, at least 6 cm       Chest/Lungs:  Chest/Lungs Findings: Normal Respiratory Rate, Clear to auscultation     Heart/Vascular:  Heart Findings: Rate: Normal  Rhythm: Regular Rhythm  Sounds: Normal             Anesthesia Plan  Type of Anesthesia, risks & benefits discussed:  Anesthesia Type:  general  Patient's Preference:   Intra-op Monitoring Plan:   Intra-op Monitoring Plan Comments:   Post Op Pain Control Plan:   Post Op Pain Control Plan Comments:   Induction:   IV  Beta Blocker:  Patient is not currently on a Beta-Blocker (No further documentation required).       Informed Consent: Patient understands risks and agrees with Anesthesia plan.  Questions answered. Anesthesia consent signed with patient.  ASA Score: 3     Day of Surgery Review of History & Physical:    H&P update referred to the surgeon.         Ready For Surgery From Anesthesia Perspective.

## 2017-01-17 NOTE — NURSING
1905 Second unit of blood started Unit number [id number]. Will monitor closely. No complaints at this time.

## 2017-01-17 NOTE — TRANSFER OF CARE
Anesthesia Transfer of Care Note    Patient: Wang Kebede    Procedure(s) Performed: Procedure(s) (LRB):  CONIZATION-CERVIX (CKC) EXCISION OF VULVA LESION (N/A)  EXCISION-LESION-VAGINA (N/A)    Patient location: PACU    Anesthesia Type: general    Transport from OR: Transported from OR on room air with adequate spontaneous ventilation    Post pain: adequate analgesia    Post assessment: no apparent anesthetic complications    Post vital signs: stable    Level of consciousness: awake    Nausea/Vomiting: no nausea/vomiting    Complications: none          Last vitals:   Visit Vitals    BP (!) 143/90    Pulse 93    Temp 36.8 °C (98.2 °F) (Temporal)    Resp 18    Ht 5' (1.524 m)    Wt 51.2 kg (112 lb 14 oz)    LMP 10/31/2016 (Approximate)    SpO2 100%    Breastfeeding No    BMI 22.04 kg/m2

## 2017-01-17 NOTE — NURSING
2015 Resting quietly at this time; patient aware of need for urine specimen. Specimen container provided.

## 2017-01-17 NOTE — NURSING
1925 Call placed to Dr Dickerson on call md for Dr Zamora; notified of above symptoms;  Orders to administer benadryl 25mg iv, discontinue this unit of blood and have blood bank to prepare another unit. If patient tolerates the second unit of blood, may be discharged home, disregard order for post cbc count. Patient notified of all of these new orders.

## 2017-01-17 NOTE — DISCHARGE SUMMARY
DISCHARGE SUMMARY    DATE OF ADMISSION: 2017    DATE OF DISCHARGE: 2017    ATTENDING PHYSICIAN: Emanuel Zamora MD    ADMISSION DIAGNOSIS: Pelvic and perineal pain [R10.2]  Dysplasia of cervix, high grade KEVON 2 [N87.1]  Dysplasia of cervix, high grade KEVON 2 [N87.1]   Perineal lesions    DISCHARGE DIAGNOSIS: Same    Active Hospital Problems    Diagnosis  POA    *Dysplasia of cervix, high grade KEVON 2 [N87.1]  Yes      Resolved Hospital Problems    Diagnosis Date Resolved POA   No resolved problems to display.         HOSPITAL COURSE:  Wang Kebede is a 31 y.o. female  with KEVON 2 and perineal pain and lesions admitted for conization and excision of vulvar lesions. Pt required early admission for transfusion because of her sickle cell disease and significant anemia.  Her surgery and postoperative course were unremarkable and as she is in stable condition, she is allowed to go home on a regular diet and pelvic rest with limited activity. She will continue any prior home medications. Follow-up visit in 4 weeks. She knows to call for any problem or concern.    Current Discharge Medication List      CONTINUE these medications which have NOT CHANGED    Details   cyproheptadine (PERIACTIN) 4 mg tablet Take 4 mg by mouth 3 (three) times daily as needed.      darunavir-cobicistat (PREZCOBIX) 800-150 mg-mg Tab Take 1 tablet by mouth.      emtricitabine-tenofovir alafen (DESCOVY) 200-25 mg Tab Take by mouth once daily.
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
## 2017-01-17 NOTE — INTERVAL H&P NOTE
The patient has been examined and the H&P has been reviewed:    I concur with the findings and no changes have occurred since H&P was written.    Anesthesia/Surgery risks, benefits and alternative options discussed and understood by patient/family.          Active Hospital Problems    Diagnosis  POA    Dysplasia of cervix, high grade KEVON 2 [N87.1]  Yes      Resolved Hospital Problems    Diagnosis Date Resolved POA   No resolved problems to display.

## 2017-01-17 NOTE — PROGRESS NOTES
Pt tolerated second unit of blood well. Pt requested to stay until procedure and to keep IV in place. Pt's current IV was place by ED, Pt's veins difficult to access. 20g IV site clean, dry and intact and will be ready for procedure. Charge nurse notified.

## 2017-01-17 NOTE — ANESTHESIA POSTPROCEDURE EVALUATION
Anesthesia Post Evaluation    Patient: Wang Kebede    Procedure(s) Performed: Procedure(s) (LRB):  CONIZATION-CERVIX (CKC) EXCISION OF VULVA LESION (N/A)  EXCISION-LESION-VAGINA (N/A)    Final Anesthesia Type: general  Patient location during evaluation: PACU  Patient participation: Yes- Able to Participate  Level of consciousness: awake and alert  Post-procedure vital signs: reviewed and stable  Pain management: adequate  Airway patency: patent  PONV status at discharge: No PONV  Anesthetic complications: no      Cardiovascular status: blood pressure returned to baseline  Respiratory status: unassisted  Hydration status: euvolemic  Follow-up not needed.        Visit Vitals    /74    Pulse 81    Temp 36.8 °C (98.2 °F) (Temporal)    Resp 18    Ht 5' (1.524 m)    Wt 51.2 kg (112 lb 14 oz)    LMP 10/31/2016 (Approximate)    SpO2 (!) 93%    Breastfeeding No    BMI 22.04 kg/m2       Pain/Tano Score: Presence of Pain: non-verbal indicators absent (1/17/2017  2:15 PM)  Pain Rating Prior to Med Admin: 7 (1/17/2017  9:22 AM)  Tano Score: 8 (1/17/2017  2:15 PM)

## 2017-01-17 NOTE — IP AVS SNAPSHOT
[facility]  [id number] [place] [address] [address] [place] [id number]           I have received a copy of my After Visit Summary and discharge instructions from [facility].    INSTRUCTIONS RECEIVED AND UNDERSTOOD BY:                     Patient/Patient Representative: ________________________________________________________________     Date/Time: ________________________________________________________________                     Instructions Given By: ________________________________________________________________     Date/Time: ________________________________________________________________

## 2017-01-17 NOTE — IP AVS SNAPSHOT
[address] [place] [address]           Patient Discharge Instructions     Our goal is to set you up for success. This packet includes information on your condition, medications, and your home care. It will help you to care for yourself so you don't get sicker and need to go back to the hospital.     Please ask your nurse if you have any questions.        There are many details to remember when preparing to leave the hospital. Here is what you will need to do:    1. Take your medicine. If you are prescribed medications, review your Medication List in the following pages. You may have new medications to  at the pharmacy and others that you'll need to stop taking. Review the instructions for how and when to take your medications. Talk with your doctor or nurses if you are unsure of what to do.     2. Go to your follow-up appointments. Specific follow-up information is listed in the following pages. Your may be contacted by a transition nurse or clinical provider about future appointments. Be sure we have all of the phone numbers to reach you, if needed. Please contact your provider's office if you are unable to make an appointment.     3. Watch for warning signs. Your doctor or nurse will give you detailed warning signs to watch for and when to call for assistance. These instructions may also include educational information about your condition. If you experience any of warning signs to your health, call your doctor.               ** Verify the list of medication(s) below is accurate and up to date. Carry this with you in case of emergency. If your medications have changed, please notify your healthcare provider.             Medication List      CONTINUE taking these medications        Additional Info                      cyproheptadine 4 mg tablet   Commonly known as:  PERIACTIN   Refills:  0   Dose:  4 mg    Instructions:  Take 4 mg by mouth 3 (three) times  daily as needed.     Begin Date    AM    Noon    PM    Bedtime       DESCOVY 200-25 mg Tab   Refills:  0   Generic drug:  emtricitabine-tenofovir alafen    Instructions:  Take by mouth once daily.     Begin Date    AM    Noon    PM    Bedtime       PREZCOBIX 800-150 mg-mg Tab   Refills:  0   Dose:  1 tablet   Generic drug:  darunavir-cobicistat    Instructions:  Take 1 tablet by mouth.     Begin Date    AM    Noon    PM    Bedtime                  Please bring to all follow up appointments:    1. A copy of your discharge instructions.  2. All medicines you are currently taking in their original bottles.  3. Identification and insurance card.    Please arrive 15 minutes ahead of scheduled appointment time.    Please call 24 hours in advance if you must reschedule your appointment and/or time.        Your Scheduled Appointments     Feb 15, 2017  9:00 AM CST   Post OP with Emanuel Zamora MD   [facility] ([facility])    [address] [facility] [place] [address]   [phone]                Discharge Instructions     Future Orders    Activity as tolerated     Call MD for:  persistent nausea and vomiting or diarrhea     Call MD for:  redness, tenderness, or signs of infection (pain, swelling, redness, odor or green/yellow discharge around incision site)     Call MD for:  severe uncontrolled pain     Call MD for:  temperature >100.4     Comments:    Call for persistent temp >100.4 twice over 4 hrs    Diet general     Questions:    Total calories:      Fat restriction, if any:      Protein restriction, if any:      Na restriction, if any:      Fluid restriction:      Additional restrictions:      No dressing needed         Discharge Instructions         After a Cone Biopsy     Make sure to keep any follow-up appointments with your healthcare provider.     A cone biopsy is a quick outpatient surgery used to find and treat a problem in the cervix. Your healthcare provider may do a cone biopsy if one or more Pap tests and  a microscope (colposcopy) exam showed abnormal cells on your cervix. A cone biopsy takes less than an hour, and youll be able to go home the same day.  During your recovery  After the surgery has been done, youll rest in the recovery area until youre awake and ready to go home. An adult friend or family member will need to drive you home.  · Plan to rest at home for a day or two.  · You may have some bleeding or discharge and mild cramping for a few days after surgery. Use sanitary pads, not tampons, for at least the first month.  · You may be given medicine to relieve any discomfort  · Do not have sexual intercourse or douche for 4 to 6 weeks after your biopsy. If the cervix has not fully healed, the tissue could be injured and then bleed.  · Follow any other instructions your healthcare provider gives you.  Getting your results  Your healthcare provider will get the biopsy results and discuss them with you in about a week. He or she will see you in 3 to 6 weeks to be sure the tissue is healing well.  Call your healthcare provider if you have any of the following after your cone biopsy:  · Heavy bleeding (more than a pad an hour) or blood clots  · Severe stomach pain  · Chills  · Fever over 100.4°F (38°C)   © 3609-2755 [employer]. [address] [place] [address], [place] [address]. All rights reserved. This information is not intended as a substitute for professional medical care. Always follow your healthcare professional's instructions.      General Information:    1.  Do not drink alcoholic beverages including beer for 24 hours or as long as you are on pain medication..  2.  Do not drive a motor vehicle, operate machinery or power tools, or signs legal papers for 24 hours or as long as you are on pain medication.   3.  You may experience light-headedness, dizziness, and sleepiness following surgery. Please do not stay alone. A responsible adult should be with you for this 24 hour period.  4.  Go home  and rest.    5. Progress slowly to a normal diet unless instructed.  Otherwise, begin with liquids such as soft drinks, then soup and crackers working up to solid foods. Drink plenty of nonalcoholic fluids.  6.  Certain anesthetics and pain medications produce nausea and vomiting in certain       individuals. If nausea becomes a problem at home, call you doctor.    7. A nurse will be calling you sometime after surgery. Do not be alarmed. This is our way of finding out how you are doing.    8. Several times every hour while you are awake, take 2-3 deep breaths and cough. If you had stomach surgery hold a pillow or rolled towel firmly against your stomach before you cough. This will help with any pain the cough might cause.  9. Several times every hour while you are awake, pump and flex your feet 5-6 times and do foot circles. This will help prevent blood clots.    10.Call your doctor for severe pain, bleeding, fever, or signs or symptoms of infection (pain, swelling, redness, foul odor, drainage).    11.You can contact your doctor anytime by [phone] for the [facility] (at [facility]) or [phone] for the [facility] on [facility].   [email or web address] is another way to contact your doctor if you are an active participant online with [facility].            Primary Diagnosis     Your primary diagnosis was:  Abnormal Cells Of Cervix      Admission Information     Date & Time Provider Department CSN    2017  7:59 AM Emanuel Zamora MD [facility] [id number]      Care Providers     Provider Role Specialty Primary office phone    Emanuel Zamora MD Attending Provider Obstetrics [phone]    Emanuel Zamora MD Surgeon  Obstetrics [phone]      Your Vitals Were     BP Pulse Temp Resp Height Weight    150/74 78 98.2 °F (36.8 °C) (Temporal) 17 5' (1.524 m) 51.2 kg (112 lb 14 oz)    Last Period SpO2 BMI          10/31/2016 (Approximate) 95% 22.04 kg/m2        Recent Lab  Values     No lab values to display.      Pending Labs     Order Current Status    Specimen to Pathology - Surgery In process      Allergies as of 1/17/2017        Reactions    Iodine And Iodide Containing Products Anaphylaxis    Pt states she coded last time she was administered contrast    Bactrim [Sulfamethoxazole-trimethoprim] Hives      [facility]     [facility] Nurse Care Line - 24/7 Assistance  Unless otherwise directed by your provider, please contact [facility], our nurse care line that is available for 24/7 assistance.     Registered nurses in the [facility] provide clinical advisement, health education, appointment booking, and other advisory services.  Call for this free service at [phone].        Advance Directives     An advance directive is a document which, in the event you are no longer able to make decisions for yourself, tells your healthcare team what kind of treatment you do or do not want to receive, or who you would like to make those decisions for you.  If you do not currently have an advance directive, [facility] encourages you to create one.  For more information call:  [phone]), [phone] ([phone]),  or log on to [email or web address].        Language Assistance Services     ATTENTION: Language assistance services are available, free of charge. Please call [phone].      ATENCIÓN: Si habla español, tiene a bazan disposición servicios gratuitos de asistencia lingüística. Llame al [phone].     CHÚ Ý: N?u b?n nói Ti?ng Vi?t, có các d?ch v? h? tr? ngôn ng? mi?n phí dành cho b?n. G?i s? [phone].         [facility] -  complies with applicable Federal civil rights laws and does not discriminate on the basis of race, color, national origin, age, disability, or sex.

## 2017-01-17 NOTE — DISCHARGE INSTRUCTIONS
After a Cone Biopsy     Make sure to keep any follow-up appointments with your healthcare provider.     A cone biopsy is a quick outpatient surgery used to find and treat a problem in the cervix. Your healthcare provider may do a cone biopsy if one or more Pap tests and a microscope (colposcopy) exam showed abnormal cells on your cervix. A cone biopsy takes less than an hour, and youll be able to go home the same day.  During your recovery  After the surgery has been done, youll rest in the recovery area until youre awake and ready to go home. An adult friend or family member will need to drive you home.  · Plan to rest at home for a day or two.  · You may have some bleeding or discharge and mild cramping for a few days after surgery. Use sanitary pads, not tampons, for at least the first month.  · You may be given medicine to relieve any discomfort  · Do not have sexual intercourse or douche for 4 to 6 weeks after your biopsy. If the cervix has not fully healed, the tissue could be injured and then bleed.  · Follow any other instructions your healthcare provider gives you.  Getting your results  Your healthcare provider will get the biopsy results and discuss them with you in about a week. He or she will see you in 3 to 6 weeks to be sure the tissue is healing well.  Call your healthcare provider if you have any of the following after your cone biopsy:  · Heavy bleeding (more than a pad an hour) or blood clots  · Severe stomach pain  · Chills  · Fever over 100.4°F (38°C)   © 4357-0271 [employer]. [address] [place] [address], [place] [address]. All rights reserved. This information is not intended as a substitute for professional medical care. Always follow your healthcare professional's instructions.      General Information:    1.  Do not drink alcoholic beverages including beer for 24 hours or as long as you are on pain medication..  2.  Do not drive a motor vehicle, operate machinery or power  tools, or signs legal papers for 24 hours or as long as you are on pain medication.   3.  You may experience light-headedness, dizziness, and sleepiness following surgery. Please do not stay alone. A responsible adult should be with you for this 24 hour period.  4.  Go home and rest.    5. Progress slowly to a normal diet unless instructed.  Otherwise, begin with liquids such as soft drinks, then soup and crackers working up to solid foods. Drink plenty of nonalcoholic fluids.  6.  Certain anesthetics and pain medications produce nausea and vomiting in certain       individuals. If nausea becomes a problem at home, call you doctor.    7. A nurse will be calling you sometime after surgery. Do not be alarmed. This is our way of finding out how you are doing.    8. Several times every hour while you are awake, take 2-3 deep breaths and cough. If you had stomach surgery hold a pillow or rolled towel firmly against your stomach before you cough. This will help with any pain the cough might cause.  9. Several times every hour while you are awake, pump and flex your feet 5-6 times and do foot circles. This will help prevent blood clots.    10.Call your doctor for severe pain, bleeding, fever, or signs or symptoms of infection (pain, swelling, redness, foul odor, drainage).    11.You can contact your doctor anytime by [phone] for the [facility] (at [facility]) or [phone] for the [facility] on [facility].   [email or web address] is another way to contact your doctor if you are an active participant online with [facility].

## 2017-01-17 NOTE — OP NOTE
DATE OF PROCEDURE:  01/17/2017    PREOPERATIVE DIAGNOSIS:  KEVON II, vulvar lesions.    POSTOPERATIVE DIAGNOSIS:  KEVON II, vulvar lesions.    PROCEDURE:  Cold-knife conization and excision of 2 left labial majora lesions.    PROCEDURE IN DETAIL:  The patient brought to the Operating Room, put on table in   supine position.  After achieving adequate level of general anesthesia, the   patient put in Yousuf stirrups.  Perineum and vagina prepped with Hibiclens scrub   and solution.  After a time-out and confirming adequacy of anesthesia, a   weighted speculum was placed in posterior vagina, right angle retractor in the   anterior vagina, tenaculum was placed high in the anterior lip of the cervix and   solution of 20 units of Pitressin and 100 mL of saline was then used to aid in   Hemostasis and 2 mL were injected throughout the cervix.  Then, using an   11-blade, conization was performed excising all areas of visible abnormality and   high into the endocervical canal.  The cone bed was made hemostatic via   electrocautery and a wad of Gelfoam was placed in the cone bed and held by tying   2 stay sutures that were previously placed at 3 and 9 o'clock over the Gelfoam.    Hemostasis was appreciated.  Two annular plaques on the left posterior vulva   were then elliptically excised with the scalpel.  Skin reapproximated with 3-0   chromic interrupteds.  Hemostasis was appreciated.  Procedure terminated.  The   patient awakened and brought to Recovery in stable condition.  No complications.    ESTIMATED BLOOD LOSS:  3 or 4 mL.      BREE/  dd: 01/17/2017 11:58:51 (CST)  td: 01/17/2017 13:14:49 (CST)  Doc ID   #[id number]  Job ID #[id number]    CC:

## 2017-01-17 NOTE — BRIEF OP NOTE
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         PREOP DIAGNOSIS: KEVON 2, Vulvar lesions    POSTOP DIAGNOSIS:  same    PROCEDURES: Cold Knife Conization, excision of 2 L labia majora lesions    ANESTHESIA: general    ESTIMATED BLOOD LOSS: 3 cc    COMPLICATIONS: none    SPECIMEN: cervical cone specimen, (2) L labial lesions

## 2017-01-17 NOTE — NURSING
"1920 Checked 15 minute vital signs; patient states "I am itching a little on my left arm." Blood pressure cuff was on that arm during the entire first unit of blood, so nurse removed it. Patient then starts complaining of itching on rt arm, and left leg, no rash, or whelps noted. Blood stopped, normal saline hung free flowing per clamp . No respiratory distress noted.  "

## 2017-01-17 NOTE — PLAN OF CARE
Patient prepared for surgery. Belongings locked in Locker #5. Boyfriend's cell phone number written in chart, will need to be called upon discharge. Patient states she was admitted yesterday to receive 2 units PRBCs. Blood band and saline lock in place. Patient states she finished her blood transfusion around 3 am and was discharged after that. States she hasn't left the hospital since admission yesterday. Wristband from previous admit removed.

## 2017-01-19 ENCOUNTER — TELEPHONE (OUTPATIENT)
Dept: OBSTETRICS AND GYNECOLOGY | Facility: CLINIC | Age: 32
End: 2017-01-19

## 2017-01-19 DIAGNOSIS — G89.18 POST-OP PAIN: Primary | ICD-10-CM

## 2017-01-19 RX ORDER — OXYCODONE AND ACETAMINOPHEN 5; 325 MG/1; MG/1
1 TABLET ORAL EVERY 6 HOURS PRN
Qty: 15 TABLET | Refills: 0 | Status: SHIPPED | OUTPATIENT
Start: 2017-01-19 | End: 2017-02-15

## 2017-01-19 NOTE — TELEPHONE ENCOUNTER
----- Message from Bisi Jerome sent at 1/19/2017 12:58 PM CST -----  Contact: pt  Pt is calling nurse staff regarding patient surgery and pt has no pain medication.  Pt is requesting a pain medication. Pt call back [phone] to be advise thanks

## 2017-01-19 NOTE — TELEPHONE ENCOUNTER
----- Message from Leighton Myers MA sent at 1/19/2017  4:10 PM CST -----  Contact: mother - Alyson Kebede   Patient had an CKC with Sera, called earlier and stated she was in pain was told to take Tylenol or Aleve, pt mom called back  States it is not working and wants something called in. Please Advise  ----- Message -----     From: Judy Oshea     Sent: 1/19/2017   1:37 PM       To: Sera Castellanos S Staff    Mom is calling because the pt is in horrible pain after her surgery and they were advised to give her tylenol but it is not working at all and they need a script for some pain med sent in.  Please call the pt at the above number to discuss this with her and what her options might be ASAP please.

## 2017-01-20 ENCOUNTER — TELEPHONE (OUTPATIENT)
Dept: OBSTETRICS AND GYNECOLOGY | Facility: CLINIC | Age: 32
End: 2017-01-20

## 2017-01-20 NOTE — TELEPHONE ENCOUNTER
----- Message from Emanuel Zamora MD sent at 1/19/2017  8:44 PM CST -----  Let her know her biopsies returned w precancerous changes and stress importance of keeping f/u visit to discuss tx options

## 2017-01-24 ENCOUNTER — TELEPHONE (OUTPATIENT)
Dept: OBSTETRICS AND GYNECOLOGY | Facility: CLINIC | Age: 32
End: 2017-01-24

## 2017-01-24 NOTE — TELEPHONE ENCOUNTER
----- Message from Cynthia Rooney sent at 1/23/2017  4:20 PM CST -----  Patient returned your call.  Call her at [phone].                                                noel

## 2017-01-24 NOTE — TELEPHONE ENCOUNTER
Patient was informed that bx returned showing precancerous changes and per ene make sure to come to follow-up appt to discuss treatment options

## 2017-02-15 ENCOUNTER — OFFICE VISIT (OUTPATIENT)
Dept: OBSTETRICS AND GYNECOLOGY | Facility: CLINIC | Age: 32
End: 2017-02-15
Payer: MEDICARE

## 2017-02-15 ENCOUNTER — TELEPHONE (OUTPATIENT)
Dept: OBSTETRICS AND GYNECOLOGY | Facility: CLINIC | Age: 32
End: 2017-02-15

## 2017-02-15 VITALS
WEIGHT: 110.69 LBS | DIASTOLIC BLOOD PRESSURE: 58 MMHG | BODY MASS INDEX: 21.73 KG/M2 | HEIGHT: 60 IN | SYSTOLIC BLOOD PRESSURE: 92 MMHG

## 2017-02-15 DIAGNOSIS — D06.9 SEVERE DYSPLASIA OF CERVIX (CIN III): Primary | ICD-10-CM

## 2017-02-15 PROCEDURE — 99999 PR PBB SHADOW E&M-EST. PATIENT-LVL III: CPT | Mod: PBBFAC,,, | Performed by: OBSTETRICS & GYNECOLOGY

## 2017-02-15 PROCEDURE — 99024 POSTOP FOLLOW-UP VISIT: CPT | Mod: S$GLB,,, | Performed by: OBSTETRICS & GYNECOLOGY

## 2017-02-15 NOTE — MR AVS SNAPSHOT
[facility]  [address] [facility] [place] [address]  Phone: [phone]  Fax: [phone]                  Wang Kebede   2/15/2017 10:30 AM   Office Visit    Description:  Female : 1985   Provider:  Emanuel Zamora MD   Department:  [facility]           Reason for Visit     Post-op Evaluation                To Do List           Future Appointments        Provider Department Dept Phone    2017 1:00 PM Emanuel Zamora MD [facility] OB/ [phone]    2017 1:30 PM PREOP, NURSE [place] [facility] -  [phone]    2017 2:00 PM LABORATORY, [address] [facility] [phone]    2017 2:10 PM SPECIMEN, [place] [facility] [phone]      Goals (5 Years of Data)     None      [facility] Call     [facility] Nurse Care Line -  Assistance  Registered nurses in the [facility] provide clinical advisement, health education, appointment booking, and other advisory services.  Call for this free service at [phone].             Medications           Message regarding Medications     Verify the changes and/or additions to your medication regime listed below are the same as discussed with your clinician today.  If any of these changes or additions are incorrect, please notify your healthcare provider.        STOP taking these medications     cyproheptadine (PERIACTIN) 4 mg tablet Take 4 mg by mouth 3 (three) times daily as needed.    oxycodone-acetaminophen (PERCOCET) 5-325 mg per tablet Take 1 tablet by mouth every 6 (six) hours as needed for Pain.           Verify that the below list of medications is an accurate representation of the medications you are currently taking.  If none reported, the list may be blank. If incorrect, please contact your healthcare provider. Carry this list with you in case of emergency.           Current Medications     darunavir-cobicistat (PREZCOBIX) 800-150  mg-mg Tab Take 1 tablet by mouth.    emtricitabine-tenofovir alafen (DESCOVY) 200-25 mg Tab Take by mouth once daily.           Clinical Reference Information           Your Vitals Were     BP Height Weight BMI       92/58 5' (1.524 m) 50.2 kg (110 lb 10.7 oz) 21.61 kg/m2       Blood Pressure          Most Recent Value    BP  (!)  92/58      Allergies as of 2/15/2017     Iodine And Iodide Containing Products    Bactrim [Sulfamethoxazole-trimethoprim]    Morphine      Immunizations Administered on Date of Encounter - 2/15/2017     None      Language Assistance Services     ATTENTION: Language assistance services are available, free of charge. Please call [phone].      ATENCIÓN: Si philla ben, tiene a bazan disposición servicios gratuitos de asistencia lingüística. Llame al [phone].     CHÚ Ý: N?u b?n nói Ti?ng Vi?t, có các d?ch v? h? tr? ngôn ng? mi?n phí dành cho b?n. G?i s? [phone].         [facility] complies with applicable Federal civil rights laws and does not discriminate on the basis of race, color, national origin, age, disability, or sex.

## 2017-02-15 NOTE — TELEPHONE ENCOUNTER
Patient was informed that per Sera she needs to see her cardiologist 3 weeks before her surgery for a surgery clearance, pt voiced understanding and stated that she will schedule an appt.
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
## 2017-03-06 ENCOUNTER — HOSPITAL ENCOUNTER (EMERGENCY)
Facility: HOSPITAL | Age: 32
Discharge: HOME | End: 2017-03-06
Attending: EMERGENCY MEDICINE
Payer: MEDICARE

## 2017-03-06 VITALS
SYSTOLIC BLOOD PRESSURE: 118 MMHG | TEMPERATURE: 98 F | DIASTOLIC BLOOD PRESSURE: 80 MMHG | WEIGHT: 110 LBS | HEIGHT: 60 IN | RESPIRATION RATE: 18 BRPM | BODY MASS INDEX: 21.6 KG/M2 | HEART RATE: 92 BPM | OXYGEN SATURATION: 99 %

## 2017-03-06 DIAGNOSIS — D64.9 MILD ANEMIA: ICD-10-CM

## 2017-03-06 DIAGNOSIS — R10.9 ABDOMINAL PAIN: ICD-10-CM

## 2017-03-06 DIAGNOSIS — R11.10 VOMITING, INTRACTABILITY OF VOMITING NOT SPECIFIED, PRESENCE OF NAUSEA NOT SPECIFIED, UNSPECIFIED VOMITING TYPE: Primary | ICD-10-CM

## 2017-03-06 DIAGNOSIS — R19.7 DIARRHEA, UNSPECIFIED TYPE: ICD-10-CM

## 2017-03-06 LAB
ALBUMIN SERPL BCP-MCNC: 3 G/DL
ALP SERPL-CCNC: 57 U/L
ALT SERPL W/O P-5'-P-CCNC: 8 U/L
ANION GAP SERPL CALC-SCNC: 9 MMOL/L
AST SERPL-CCNC: 22 U/L
B-HCG UR QL: NEGATIVE
BACTERIA #/AREA URNS HPF: ABNORMAL /HPF
BASOPHILS # BLD AUTO: 0.01 K/UL
BASOPHILS NFR BLD: 0.2 %
BILIRUB SERPL-MCNC: 0.3 MG/DL
BILIRUB UR QL STRIP: NEGATIVE
BUN SERPL-MCNC: 11 MG/DL
CALCIUM SERPL-MCNC: 8.6 MG/DL
CHLORIDE SERPL-SCNC: 108 MMOL/L
CLARITY UR: CLEAR
CO2 SERPL-SCNC: 22 MMOL/L
COLOR UR: YELLOW
CREAT SERPL-MCNC: 0.9 MG/DL
DIFFERENTIAL METHOD: ABNORMAL
EOSINOPHIL # BLD AUTO: 0.1 K/UL
EOSINOPHIL NFR BLD: 1.1 %
ERYTHROCYTE [DISTWIDTH] IN BLOOD BY AUTOMATED COUNT: 14.8 %
EST. GFR  (AFRICAN AMERICAN): >60 ML/MIN/1.73 M^2
EST. GFR  (NON AFRICAN AMERICAN): >60 ML/MIN/1.73 M^2
GLUCOSE SERPL-MCNC: 80 MG/DL
GLUCOSE UR QL STRIP: NEGATIVE
HCT VFR BLD AUTO: 26 %
HGB BLD-MCNC: 9 G/DL
HGB UR QL STRIP: ABNORMAL
HYALINE CASTS #/AREA URNS LPF: 2 /LPF
KETONES UR QL STRIP: NEGATIVE
LEUKOCYTE ESTERASE UR QL STRIP: NEGATIVE
LIPASE SERPL-CCNC: 50 U/L
LYMPHOCYTES # BLD AUTO: 1.1 K/UL
LYMPHOCYTES NFR BLD: 25.2 %
MCH RBC QN AUTO: 31.8 PG
MCHC RBC AUTO-ENTMCNC: 34.6 %
MCV RBC AUTO: 92 FL
MICROSCOPIC COMMENT: ABNORMAL
MONOCYTES # BLD AUTO: 0.2 K/UL
MONOCYTES NFR BLD: 5.2 %
NEUTROPHILS # BLD AUTO: 3 K/UL
NEUTROPHILS NFR BLD: 68.3 %
NITRITE UR QL STRIP: NEGATIVE
PH UR STRIP: 7 [PH] (ref 5–8)
PLATELET # BLD AUTO: 172 K/UL
PMV BLD AUTO: 10.2 FL
POTASSIUM SERPL-SCNC: 3.9 MMOL/L
PROT SERPL-MCNC: 11 G/DL
PROT UR QL STRIP: ABNORMAL
RBC # BLD AUTO: 2.83 M/UL
RBC #/AREA URNS HPF: 2 /HPF (ref 0–4)
RETICS/RBC NFR AUTO: 1.5 %
SODIUM SERPL-SCNC: 139 MMOL/L
SP GR UR STRIP: 1.02 (ref 1–1.03)
SQUAMOUS #/AREA URNS HPF: 2 /HPF
URN SPEC COLLECT METH UR: ABNORMAL
UROBILINOGEN UR STRIP-ACNC: NEGATIVE EU/DL
WBC # BLD AUTO: 4.44 K/UL
WBC #/AREA URNS HPF: 2 /HPF (ref 0–5)

## 2017-03-06 PROCEDURE — 81025 URINE PREGNANCY TEST: CPT

## 2017-03-06 PROCEDURE — 85025 COMPLETE CBC W/AUTO DIFF WBC: CPT

## 2017-03-06 PROCEDURE — 85045 AUTOMATED RETICULOCYTE COUNT: CPT

## 2017-03-06 PROCEDURE — 36415 COLL VENOUS BLD VENIPUNCTURE: CPT

## 2017-03-06 PROCEDURE — 99284 EMERGENCY DEPT VISIT MOD MDM: CPT | Mod: 25

## 2017-03-06 PROCEDURE — 81000 URINALYSIS NONAUTO W/SCOPE: CPT

## 2017-03-06 PROCEDURE — 83690 ASSAY OF LIPASE: CPT

## 2017-03-06 PROCEDURE — 96375 TX/PRO/DX INJ NEW DRUG ADDON: CPT

## 2017-03-06 PROCEDURE — 96361 HYDRATE IV INFUSION ADD-ON: CPT

## 2017-03-06 PROCEDURE — 63600175 PHARM REV CODE 636 W HCPCS: Performed by: PHYSICIAN ASSISTANT

## 2017-03-06 PROCEDURE — 80053 COMPREHEN METABOLIC PANEL: CPT

## 2017-03-06 PROCEDURE — 96374 THER/PROPH/DIAG INJ IV PUSH: CPT

## 2017-03-06 PROCEDURE — 25000003 PHARM REV CODE 250: Performed by: PHYSICIAN ASSISTANT

## 2017-03-06 RX ORDER — DIPHENHYDRAMINE HYDROCHLORIDE 50 MG/ML
25 INJECTION INTRAMUSCULAR; INTRAVENOUS
Status: COMPLETED | OUTPATIENT
Start: 2017-03-06 | End: 2017-03-06

## 2017-03-06 RX ORDER — MORPHINE SULFATE 4 MG/ML
4 INJECTION, SOLUTION INTRAMUSCULAR; INTRAVENOUS
Status: COMPLETED | OUTPATIENT
Start: 2017-03-06 | End: 2017-03-06

## 2017-03-06 RX ORDER — LOPERAMIDE HYDROCHLORIDE 2 MG/1
2 CAPSULE ORAL 4 TIMES DAILY PRN
Qty: 12 CAPSULE | Refills: 0 | Status: SHIPPED | OUTPATIENT
Start: 2017-03-06 | End: 2017-03-16

## 2017-03-06 RX ORDER — ONDANSETRON 2 MG/ML
4 INJECTION INTRAMUSCULAR; INTRAVENOUS
Status: COMPLETED | OUTPATIENT
Start: 2017-03-06 | End: 2017-03-06

## 2017-03-06 RX ADMIN — SODIUM CHLORIDE 1000 ML: 0.9 INJECTION, SOLUTION INTRAVENOUS at 09:03

## 2017-03-06 RX ADMIN — DIPHENHYDRAMINE HYDROCHLORIDE 25 MG: 50 INJECTION, SOLUTION INTRAMUSCULAR; INTRAVENOUS at 10:03

## 2017-03-06 RX ADMIN — DIPHENHYDRAMINE HYDROCHLORIDE 25 MG: 50 INJECTION, SOLUTION INTRAMUSCULAR; INTRAVENOUS at 09:03

## 2017-03-06 RX ADMIN — ONDANSETRON 4 MG: 2 INJECTION INTRAMUSCULAR; INTRAVENOUS at 09:03

## 2017-03-06 RX ADMIN — MORPHINE SULFATE 4 MG: 4 INJECTION, SOLUTION INTRAMUSCULAR; INTRAVENOUS at 09:03

## 2017-03-06 NOTE — ED AVS SNAPSHOT
[facility] [address] [place] [address]               Wang Leblanc Delio   3/6/2017  8:18 PM   ED    Description:  Female : 1985   Department:  [facility]           Your Care was Coordinated By:     Provider Role From To    Piero Kebede MD Attending Provider [phone] --    Priti Chavez PA-C Physician Assistant 17 --      Reason for Visit     Diarrhea           Diagnoses this Visit        Comments    Vomiting, intractability of vomiting not specified, presence of nausea not specified, unspecified vomiting type    -  Primary     Abdominal pain         Diarrhea, unspecified type         Mild anemia           ED Disposition     None           To Do List           Follow-up Information     Follow up with Bronson Koenig MD In 3 days.    Specialty:  Internal Medicine    Contact information:    [address] [place] [address] [phone]          Follow up with [facility].    Specialty:  Emergency Medicine    Why:  If symptoms worsen, Or worsening condition or any other major concern    Contact information:    [address] [place] [phone] [phone]       These Medications        Disp Refills Start End    loperamide (IMODIUM) 2 mg capsule 12 capsule 0 3/6/2017 3/16/2017    Take 1 capsule (2 mg total) by mouth 4 (four) times daily as needed for Diarrhea. - Oral    Pharmacy: [facility] [id number] [place] [address] [address] [facility] Ph #: [phone]         [facility]     [facility] -  Assistance  Registered nurses in the [facility] provide clinical advisement, health education, appointment booking, and other advisory services.  Call for this free service at [phone].             Medications           Message regarding Medications     Verify the changes and/or  additions to your medication regime listed below are the same as discussed with your clinician today.  If any of these changes or additions are incorrect, please notify your healthcare provider.        START taking these NEW medications        Refills    loperamide (IMODIUM) 2 mg capsule 0    Sig: Take 1 capsule (2 mg total) by mouth 4 (four) times daily as needed for Diarrhea.    Class: Print    Route: Oral      These medications were administered today        Dose Freq    sodium chloride 0.9% bolus 1,000 mL 1,000 mL ED 1 Time    Sig: Inject 1,000 mLs into the vein ED 1 Time.    Class: Normal    Route: Intravenous    Cosign for Ordering: Accepted by Elias Flor MD on 3/6/2017  8:44 PM    morphine injection 4 mg 4 mg ED 1 Time    Sig: Inject 1 mL (4 mg total) into the vein ED 1 Time.    Class: Normal    Route: Intravenous    Cosign for Ordering: Accepted by Elias Flor MD on 3/6/2017  8:44 PM    diphenhydrAMINE injection 25 mg 25 mg ED 1 Time    Sig: Inject 0.5 mLs (25 mg total) into the vein ED 1 Time.    Class: Normal    Route: Intravenous    Cosign for Ordering: Accepted by Elias Flor MD on 3/6/2017  8:44 PM    ondansetron injection 4 mg 4 mg ED 1 Time    Sig: Inject 4 mg into the vein ED 1 Time.    Class: Normal    Route: Intravenous    Cosign for Ordering: Accepted by Elias Flor MD on 3/6/2017  8:44 PM    diphenhydrAMINE injection 25 mg 25 mg ED 1 Time    Sig: Inject 0.5 mLs (25 mg total) into the vein ED 1 Time.    Class: Normal    Route: Intravenous    Cosign for Ordering: Required by [facility]     sodium chloride 0.9% bolus 500 mL 500 mL ED 1 Time    Sig: Inject 500 mLs into the vein ED 1 Time.    Class: Normal    Route: Intravenous           Verify that the below list of medications is an accurate representation of the medications you are currently taking.  If none reported, the list may be blank. If incorrect, please contact your healthcare provider. Carry this list with you  in case of emergency.           Current Medications     darunavir-cobicistat (PREZCOBIX) 800-150 mg-mg Tab Take 1 tablet by mouth.    emtricitabine-tenofovir alafen (DESCOVY) 200-25 mg Tab Take by mouth once daily.    loperamide (IMODIUM) 2 mg capsule Take 1 capsule (2 mg total) by mouth 4 (four) times daily as needed for Diarrhea.    sodium chloride 0.9% bolus 500 mL Inject 500 mLs into the vein ED 1 Time.           Clinical Reference Information           Your Vitals Were     BP Pulse Temp Resp Height Weight    121/79 (BP Location: Right arm, Patient Position: Sitting) 102 98.3 °F (36.8 °C) (Oral) 20 5' (1.524 m) 49.9 kg (110 lb)    SpO2 BMI             98% 21.48 kg/m2         Allergies as of 3/6/2017        Reactions    Iodine And Iodide Containing Products Anaphylaxis    Pt states she coded last time she was administered contrast    Bactrim [Sulfamethoxazole-trimethoprim] Hives    Morphine Itching      Immunizations Administered on Date of Encounter - 3/6/2017     None      ED Micro, Lab, POCT     Start Ordered       Status Ordering Provider    03/06/17 2030 03/06/17 2030  Reticulocytes  Once      Final result     03/06/17 2030 03/06/17 2030  Urinalysis Microscopic  Once      Final result     03/06/17 2026 03/06/17 2030  Lipase  STAT      Final result     03/06/17 2025 03/06/17 2030  Urinalysis  STAT      Final result     03/06/17 2025 03/06/17 2030  Pregnancy, urine rapid  STAT      Final result     03/06/17 2025 03/06/17 2030  CBC auto differential  STAT      Final result     03/06/17 2025 03/06/17 2030  Comprehensive metabolic panel  STAT      Final result       ED Imaging Orders     Start Ordered       Status Ordering Provider    03/06/17 2030 03/06/17 2030  CT Renal Stone Study ABD Pelvis WO  1 time imaging      Final result         Discharge Instructions         Anemia  Anemia is a condition that occurs when your body does not have enough healthy red blood cells (RBCs). Your RBCs are the parts of your  blood that carry oxygen throughout your body. A protein called hemoglobin allows your RBCs to absorb and release oxygen. Without enough RBCs or hemoglobin, your body doesn't get enough oxygen. Symptoms of anemia may then occur.    Symptoms of anemia  Some people with anemia have no symptoms. But most people have symptoms that range from mild to severe. These can include:  · Tiredness (fatigue)  · Weakness  · Pale skin  · Shortness of breath  · Dizziness or fainting  · Rapid heartbeat  · Trouble doing normal amounts of activity  · Jaundice (yellowing of your eyes, skin, or mouth; dark urine)  Causes of anemia  Anemia can occur when your body:  · Loses too much blood  · Does not make enough RBCs  · Destroys your RBCs at a faster rate than it can replace them  · Does not make a normal amount of hemoglobin in your RBCs  These problems can occur for many reasons, including:  · A condition that you are born with (congenital or inherited). This includes sickle cell disease or thalassemia.  · Heavy bleeding for any reason, including injury, surgery, childbirth, or even heavy menstrual periods.  · Being low in certain nutrients, such as iron, folate, or vitamin B12. This may be due to poor diet. Also, a condition like celiac disease or Crohn's disease can cause poor absorption of these nutrients  · Certain chronic conditions like diabetes, arthritis, or kidney disease.  · Certain chronic infections like tuberculosis or HIV.  · Exposure to certain medications, such as those used for chemotherapy.  There are different types of anemia. Your doctor can tell you more about the type of anemia you have and what may have caused it.  Diagnosing anemia  To diagnose anemia, your doctor gives you blood tests. These can include:  · Complete blood cell count (CBC). This test measures the amounts of the different types of blood cells.  · Blood smear. This test checks the size and shape of your blood cells. To perform the test, your doctor  views a drop of your blood under a microscope. Your doctor uses a stain to make the blood cells easier to see.  · Iron studies. These tests measure the amount of iron in your blood. Your body needs iron to make hemoglobin in your RBCs.  · Vitamin B12 and folate studies. These tests check for some of the components that help give RBCs a normal size and shape.  · Reticulocyte count. This test measures the amount of new RBCs that your bone marrow makes.  · Hemoglobin electrophoresis. This test checks for problems with your hemoglobin in RBCs.  Treating anemia  Treatment for anemia is based on the type of anemia, its cause, and the severity of your symptoms. Treatments may include:  · Diet changes. This involves increasing the amount of certain nutrients in your diet, such as iron, vitamin B12, or folate. Your doctor may also prescribe nutrient supplements.  · Medications. Certain medications treat the cause of your anemia. Others help build new RBCs or relieve symptoms. If a medication is the cause of your anemia, you may need to stop or change it.  · Blood transfusions. Replacing some of your blood can increase the number of healthy RBCs in your body.  · Surgery. In some cases, your doctor can do surgery to treat the underlying cause of anemia. If you need surgery, your doctor will explain the procedure and outline the risks and benefits for you.  Long-term concerns  If you have a certain type of anemia, you can expect a full recovery after treatment. If you have other types of anemia (especially a type you're born with), you will need to manage it for life. Your doctor can tell you more.  Date Last Reviewed: 4/27/2015 © 2000-2016 [employer]. [address] [place] [address], [place] [address]. All rights reserved. This information is not intended as a substitute for professional medical care. Always follow your healthcare professional's instructions.          Treating Diarrhea    Diarrhea happens when you have  loose, watery, or frequent bowel movements. It is a common problem with many causes. Most cases of diarrhea clear up on their own. But certain cases may need treatment. Be sure to see your healthcare provider if your symptoms do not improve within a few days.  Getting relief  Treatment of diarrhea depends on its cause. Diarrhea caused by bacterial or parasite infection is often treated with antibiotics. Diarrhea caused by other factors, such as a stomach virus, often improves with simple home treatment. The tips below may also help relieve your symptoms.  · Drink plenty of fluids. This helps prevent too much fluid loss (dehydration). Water, clear soups, and electrolyte solutions are good choices. Avoid alcohol, coffee, tea, and milk. These can irritate your intestines and make symptoms worse.  · Suck on ice chips if drinking makes you queasy.  · Return to your normal diet slowly. You may want to eat bland foods at first, such as rice and toast. Also, you may need to avoid certain foods for a while, such as dairy products. These can make symptoms worse. Ask your healthcare provider if there are any other foods you should avoid.  · If you were prescribed antibiotics, take them as directed.  · Do not take anti-diarrhea medicines without asking your healthcare provider first.  Call your healthcare provider   Call your healthcare provider if you have any of the following:   · A fever of 100.4°F (38.0°C) or higher, or as directed by your healthcare provider  · Severe pain  · Worsening diarrhea or diarrhea for more than 2 days  · Bloody vomit or stool  · Signs of dehydration (dizziness, dry mouth and tongue, rapid pulse, dark urine)  Date Last Reviewed: 7/1/2016  © 6025-3887 [employer]. [address] [place] [address], [place] [address]. All rights reserved. This information is not intended as a substitute for professional medical care. Always follow your healthcare professional's instructions.          Uncertain  Causes of Diarrhea (Adult)    Diarrhea is when stools are loose and watery. This can be caused by:  · Viral infections  · Bacterial infections  · Food poisoning  · Parasites  · Irritable bowel syndrome (IBS)  · Inflammatory bowel diseases such as ulcerative colitis, Crohn's disease, and celiac disease  · Food intolerance, such as to lactose, the sugar found in milk and milk products  · Reaction to medicines like antibiotics, laxatives, cancer drugs, and antacids  Along with diarrhea, you may also have:  · Abdominal pain and cramping  · Nausea and vomiting  · Loss of bowel control  · Fever and chills  · Bloody stools  In some cases, antibiotics may help to treat diarrhea. You may have a stool sample test. This is done to see what is causing your diarrhea, and if antibiotics will help treat it. The results of a stool sample test may take up to 2 days. The healthcare provider may not give you antibiotics until he or she has the stool test results.  Diarrhea can cause dehydration. This is the loss of too much water and other fluids from the body. When this occurs, body fluid must be replaced. This can be done with oral rehydration solutions. Oral rehydration solutions are available at drugstores and grocery stores without a prescription.  Home care  Follow all instructions given by your healthcare provider. Rest at home for the next 24 hours, or until you feel better. Avoid caffeine, tobacco, and alcohol. These can make diarrhea, cramping, and pain worse.  If taking medicines:  · Dont take over-the-counter diarrhea or nausea medicines unless your healthcare provider tells you to.  · You may use acetaminophen or NSAID medicines like ibuprofen or naproxen to reduce pain and fever. Dont use these if you have chronic liver or kidney disease, or ever had a stomach ulcer or gastrointestinal bleeding. Don't use NSAID medicines if you are already taking one for another condition (like arthritis) or are on daily aspirin  therapy (such as for heart disease or after a stroke). Talk with your healthcare provider first.  · If antibiotics were prescribed, be sure you take them until they are finished. Dont stop taking them even when you feel better. Antibiotics must be taken as a full course.  To prevent the spread of illness:  · Remember that washing with soap and water and using alcohol-based  is the best way to prevent the spread of infection.  · Clean the toilet after each use.  · Wash your hands before eating.  · Wash your hands before and after preparing food. Keep in mind that people with diarrhea or vomiting should not prepare food for others.  · Wash your hands after using cutting boards, countertops, and knives that have been in contact with raw foods.  · Wash and then peel fruits and vegetables.  · Keep uncooked meats away from cooked and ready-to-eat foods.  · Use a food thermometer when cooking. Cook poultry to at least 165°F (74°C). Cook ground meat (beef, veal, pork, lamb) to at least 160°F (71°C). Cook fresh beef, veal, lamb, and pork to at least 145°F (63°C).  · Dont eat raw or undercooked eggs (poached or hilda side up), poultry, meat, or unpasteurized milk and juices.  Food and drinks  The main goal while treating vomiting or diarrhea is to prevent dehydration. This is done by taking small amounts of liquids often.  · Keep in mind that liquids are more important than food right now.  · Drink only small amounts of liquids at a time.  · Dont force yourself to eat, especially if you are having cramping, vomiting, or diarrhea. Dont eat large amounts at a time, even if you are hungry.  · If you eat, avoid fatty, greasy, spicy, or fried foods.  · Dont eat dairy foods or drink milk if you have diarrhea. These can make diarrhea worse.  During the first 24 hours you can try:  · Oral rehydration solutions. Do not use sports drinks. They have too much sugar and not enough electrolytes.  · Soft drinks without  caffeine  · Ginger ale  · Water (plain or flavored)  · Decaf tea or coffee  · Clear broth, consommé, or bouillon  · Gelatin, popsicles, or frozen fruit juice bars  The second 24 hours, if you are feeling better, you can add:  · Hot cereal, plain toast, bread, rolls, or crackers  · Plain noodles, rice, mashed potatoes, chicken noodle soup, or rice soup  · Unsweetened canned fruit (no pineapple)  · Bananas  As you recover:  · Limit fat intake to less than 15 grams per day. Dont eat margarine, butter, oils, mayonnaise, sauces, gravies, fried foods, peanut butter, meat, poultry, or fish.  · Limit fiber. Dont eat raw or cooked vegetables, fresh fruits except bananas, or bran cereals.  · Limit caffeine and chocolate.  · Limit dairy.  · Dont use spices or seasonings except salt.  · Go back to your normal diet over time, as you feel better and your symptoms improve.  · If the symptoms come back, go back to a simple diet or clear liquids.  Follow-up care  Follow up with your healthcare provider, or as advised. If a stool sample was taken or cultures were done, call the healthcare provider for the results as instructed.  Call 911  Call 911 if you have any of these symptoms:  · Trouble breathing  · Confusion  · Extreme drowsiness or trouble walking  · Loss of consciousness  · Rapid heart rate  · Chest pain  · Stiff neck  · Seizure  When to seek medical advice  Call your healthcare provider right away if any of these occur:  · Abdominal pain that gets worse  · Constant lower right abdominal pain  · Continued vomiting and inability to keep liquids down  · Diarrhea more than 5 times a day  · Blood in vomit or stool  · Dark urine or no urine for 8 hours, dry mouth and tongue, tiredness, weakness, or dizziness  · Drowsiness  · New rash  · You dont get better in 2 to 3 days  · Fever of 100.4°F (38°C) or higher that doesnt get lower with medicine  Date Last Reviewed: 1/3/2016  © 3014-8645 [employer]. [address] [place] [address]  [place] [address]. All rights reserved. This information is not intended as a substitute for professional medical care. Always follow your healthcare professional's instructions.          Your Scheduled Appointments     Mar 21, 2017  8:00 AM CDT   Established Patient Visit with KIN Manzo   [facility] - [facility]iology ([place])    [address] [facility]  [facility] [address]   [phone]            Apr 04, 2017  1:00 PM CDT   Established Patient Visit with MD Rafaela Moreno - OB/ GYN ([facility])    [address] [place] [id number] [phone]            Apr 04, 2017  1:30 PM CDT   Pre-Admit Testing Visit with PREOP, NURSE [place] [facility] ([facility])    [address] [place] [phone]            Apr 04, 2017  2:00 PM CDT   Non-Fasting Lab with LABORATORY, RAFAELA BHANDARI   [facility]    [address] [place] [phone]            Apr 04, 2017  2:10 PM CDT   Urine with SPECIMEN, RAFAELA   [facility]    [address] [place] [address] [phone]              Your Future Surgeries/Procedures     Apr 11, 2017   Surgery with Emanuel Zamora MD   [facility] ([place] [facility])    [id number] [place] [phone]               [facility] complies with applicable Federal civil rights laws and does not discriminate on the basis of race, color, national origin, age, disability, or sex.        Language Assistance Services     ATTENTION: Language assistance services are available, free of charge. Please call [phone].      ATENCIÓN: Si habla español, tiene a bazan disposición servicios gratuitos de asistencia lingüística. Llame al [phone].     CHÚ Ý: N?u b?n nói Ti?ng Vi?t, có các d?ch v? h? tr? ngôn ng? mi?n phí dành  pio tsang?ameena JIMENEZ?i s? [phone].
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
## 2017-03-07 NOTE — DISCHARGE INSTRUCTIONS
Anemia  Anemia is a condition that occurs when your body does not have enough healthy red blood cells (RBCs). Your RBCs are the parts of your blood that carry oxygen throughout your body. A protein called hemoglobin allows your RBCs to absorb and release oxygen. Without enough RBCs or hemoglobin, your body doesn't get enough oxygen. Symptoms of anemia may then occur.    Symptoms of anemia  Some people with anemia have no symptoms. But most people have symptoms that range from mild to severe. These can include:  · Tiredness (fatigue)  · Weakness  · Pale skin  · Shortness of breath  · Dizziness or fainting  · Rapid heartbeat  · Trouble doing normal amounts of activity  · Jaundice (yellowing of your eyes, skin, or mouth; dark urine)  Causes of anemia  Anemia can occur when your body:  · Loses too much blood  · Does not make enough RBCs  · Destroys your RBCs at a faster rate than it can replace them  · Does not make a normal amount of hemoglobin in your RBCs  These problems can occur for many reasons, including:  · A condition that you are born with (congenital or inherited). This includes sickle cell disease or thalassemia.  · Heavy bleeding for any reason, including injury, surgery, childbirth, or even heavy menstrual periods.  · Being low in certain nutrients, such as iron, folate, or vitamin B12. This may be due to poor diet. Also, a condition like celiac disease or Crohn's disease can cause poor absorption of these nutrients  · Certain chronic conditions like diabetes, arthritis, or kidney disease.  · Certain chronic infections like tuberculosis or HIV.  · Exposure to certain medications, such as those used for chemotherapy.  There are different types of anemia. Your doctor can tell you more about the type of anemia you have and what may have caused it.  Diagnosing anemia  To diagnose anemia, your doctor gives you blood tests. These can include:  · Complete blood cell count (CBC). This test measures the amounts  of the different types of blood cells.  · Blood smear. This test checks the size and shape of your blood cells. To perform the test, your doctor views a drop of your blood under a microscope. Your doctor uses a stain to make the blood cells easier to see.  · Iron studies. These tests measure the amount of iron in your blood. Your body needs iron to make hemoglobin in your RBCs.  · Vitamin B12 and folate studies. These tests check for some of the components that help give RBCs a normal size and shape.  · Reticulocyte count. This test measures the amount of new RBCs that your bone marrow makes.  · Hemoglobin electrophoresis. This test checks for problems with your hemoglobin in RBCs.  Treating anemia  Treatment for anemia is based on the type of anemia, its cause, and the severity of your symptoms. Treatments may include:  · Diet changes. This involves increasing the amount of certain nutrients in your diet, such as iron, vitamin B12, or folate. Your doctor may also prescribe nutrient supplements.  · Medications. Certain medications treat the cause of your anemia. Others help build new RBCs or relieve symptoms. If a medication is the cause of your anemia, you may need to stop or change it.  · Blood transfusions. Replacing some of your blood can increase the number of healthy RBCs in your body.  · Surgery. In some cases, your doctor can do surgery to treat the underlying cause of anemia. If you need surgery, your doctor will explain the procedure and outline the risks and benefits for you.  Long-term concerns  If you have a certain type of anemia, you can expect a full recovery after treatment. If you have other types of anemia (especially a type you're born with), you will need to manage it for life. Your doctor can tell you more.  Date Last Reviewed: 4/27/2015  © 8381-6493 [employer]. [address] [place] [address], [place] [address]. All rights reserved. This information is not intended as a substitute for  professional medical care. Always follow your healthcare professional's instructions.          Treating Diarrhea    Diarrhea happens when you have loose, watery, or frequent bowel movements. It is a common problem with many causes. Most cases of diarrhea clear up on their own. But certain cases may need treatment. Be sure to see your healthcare provider if your symptoms do not improve within a few days.  Getting relief  Treatment of diarrhea depends on its cause. Diarrhea caused by bacterial or parasite infection is often treated with antibiotics. Diarrhea caused by other factors, such as a stomach virus, often improves with simple home treatment. The tips below may also help relieve your symptoms.  · Drink plenty of fluids. This helps prevent too much fluid loss (dehydration). Water, clear soups, and electrolyte solutions are good choices. Avoid alcohol, coffee, tea, and milk. These can irritate your intestines and make symptoms worse.  · Suck on ice chips if drinking makes you queasy.  · Return to your normal diet slowly. You may want to eat bland foods at first, such as rice and toast. Also, you may need to avoid certain foods for a while, such as dairy products. These can make symptoms worse. Ask your healthcare provider if there are any other foods you should avoid.  · If you were prescribed antibiotics, take them as directed.  · Do not take anti-diarrhea medicines without asking your healthcare provider first.  Call your healthcare provider   Call your healthcare provider if you have any of the following:   · A fever of 100.4°F (38.0°C) or higher, or as directed by your healthcare provider  · Severe pain  · Worsening diarrhea or diarrhea for more than 2 days  · Bloody vomit or stool  · Signs of dehydration (dizziness, dry mouth and tongue, rapid pulse, dark urine)  Date Last Reviewed: 7/1/2016  © 3314-2969 [employer]. [address] [place] [address], [place] [address]. All rights reserved. This  information is not intended as a substitute for professional medical care. Always follow your healthcare professional's instructions.          Uncertain Causes of Diarrhea (Adult)    Diarrhea is when stools are loose and watery. This can be caused by:  · Viral infections  · Bacterial infections  · Food poisoning  · Parasites  · Irritable bowel syndrome (IBS)  · Inflammatory bowel diseases such as ulcerative colitis, Crohn's disease, and celiac disease  · Food intolerance, such as to lactose, the sugar found in milk and milk products  · Reaction to medicines like antibiotics, laxatives, cancer drugs, and antacids  Along with diarrhea, you may also have:  · Abdominal pain and cramping  · Nausea and vomiting  · Loss of bowel control  · Fever and chills  · Bloody stools  In some cases, antibiotics may help to treat diarrhea. You may have a stool sample test. This is done to see what is causing your diarrhea, and if antibiotics will help treat it. The results of a stool sample test may take up to 2 days. The healthcare provider may not give you antibiotics until he or she has the stool test results.  Diarrhea can cause dehydration. This is the loss of too much water and other fluids from the body. When this occurs, body fluid must be replaced. This can be done with oral rehydration solutions. Oral rehydration solutions are available at drugstores and grocery stores without a prescription.  Home care  Follow all instructions given by your healthcare provider. Rest at home for the next 24 hours, or until you feel better. Avoid caffeine, tobacco, and alcohol. These can make diarrhea, cramping, and pain worse.  If taking medicines:  · Dont take over-the-counter diarrhea or nausea medicines unless your healthcare provider tells you to.  · You may use acetaminophen or NSAID medicines like ibuprofen or naproxen to reduce pain and fever. Dont use these if you have chronic liver or kidney disease, or ever had a stomach ulcer or  gastrointestinal bleeding. Don't use NSAID medicines if you are already taking one for another condition (like arthritis) or are on daily aspirin therapy (such as for heart disease or after a stroke). Talk with your healthcare provider first.  · If antibiotics were prescribed, be sure you take them until they are finished. Dont stop taking them even when you feel better. Antibiotics must be taken as a full course.  To prevent the spread of illness:  · Remember that washing with soap and water and using alcohol-based  is the best way to prevent the spread of infection.  · Clean the toilet after each use.  · Wash your hands before eating.  · Wash your hands before and after preparing food. Keep in mind that people with diarrhea or vomiting should not prepare food for others.  · Wash your hands after using cutting boards, countertops, and knives that have been in contact with raw foods.  · Wash and then peel fruits and vegetables.  · Keep uncooked meats away from cooked and ready-to-eat foods.  · Use a food thermometer when cooking. Cook poultry to at least 165°F (74°C). Cook ground meat (beef, veal, pork, lamb) to at least 160°F (71°C). Cook fresh beef, veal, lamb, and pork to at least 145°F (63°C).  · Dont eat raw or undercooked eggs (poached or hilda side up), poultry, meat, or unpasteurized milk and juices.  Food and drinks  The main goal while treating vomiting or diarrhea is to prevent dehydration. This is done by taking small amounts of liquids often.  · Keep in mind that liquids are more important than food right now.  · Drink only small amounts of liquids at a time.  · Dont force yourself to eat, especially if you are having cramping, vomiting, or diarrhea. Dont eat large amounts at a time, even if you are hungry.  · If you eat, avoid fatty, greasy, spicy, or fried foods.  · Dont eat dairy foods or drink milk if you have diarrhea. These can make diarrhea worse.  During the first 24 hours you can  try:  · Oral rehydration solutions. Do not use sports drinks. They have too much sugar and not enough electrolytes.  · Soft drinks without caffeine  · Ginger ale  · Water (plain or flavored)  · Decaf tea or coffee  · Clear broth, consommé, or bouillon  · Gelatin, popsicles, or frozen fruit juice bars  The second 24 hours, if you are feeling better, you can add:  · Hot cereal, plain toast, bread, rolls, or crackers  · Plain noodles, rice, mashed potatoes, chicken noodle soup, or rice soup  · Unsweetened canned fruit (no pineapple)  · Bananas  As you recover:  · Limit fat intake to less than 15 grams per day. Dont eat margarine, butter, oils, mayonnaise, sauces, gravies, fried foods, peanut butter, meat, poultry, or fish.  · Limit fiber. Dont eat raw or cooked vegetables, fresh fruits except bananas, or bran cereals.  · Limit caffeine and chocolate.  · Limit dairy.  · Dont use spices or seasonings except salt.  · Go back to your normal diet over time, as you feel better and your symptoms improve.  · If the symptoms come back, go back to a simple diet or clear liquids.  Follow-up care  Follow up with your healthcare provider, or as advised. If a stool sample was taken or cultures were done, call the healthcare provider for the results as instructed.  Call 911  Call 911 if you have any of these symptoms:  · Trouble breathing  · Confusion  · Extreme drowsiness or trouble walking  · Loss of consciousness  · Rapid heart rate  · Chest pain  · Stiff neck  · Seizure  When to seek medical advice  Call your healthcare provider right away if any of these occur:  · Abdominal pain that gets worse  · Constant lower right abdominal pain  · Continued vomiting and inability to keep liquids down  · Diarrhea more than 5 times a day  · Blood in vomit or stool  · Dark urine or no urine for 8 hours, dry mouth and tongue, tiredness, weakness, or dizziness  · Drowsiness  · New rash  · You dont get better in 2 to 3 days  · Fever of  100.4°F (38°C) or higher that doesnt get lower with medicine  Date Last Reviewed: 1/3/2016  © 1247-7534 [employer]. [address] [place] [address], [place] [address]. All rights reserved. This information is not intended as a substitute for professional medical care. Always follow your healthcare professional's instructions.

## 2017-03-13 ENCOUNTER — TELEPHONE (OUTPATIENT)
Dept: OBSTETRICS AND GYNECOLOGY | Facility: CLINIC | Age: 32
End: 2017-03-13

## 2017-03-13 NOTE — TELEPHONE ENCOUNTER
Patient called in stating that she recently went into [facility] ER and was told she has swollen lymph nodes pt was told she needs to call her pcp.

## 2017-03-13 NOTE — TELEPHONE ENCOUNTER
----- Message from Zaria Segura sent at 3/13/2017 10:05 AM CDT -----  Contact: Pt  Pt request call from nurse regarding having swollen lymph nodes in her stomach and just got out of the hospital and wants to discuss, please contact pt at [phone]

## 2017-03-20 ENCOUNTER — HOSPITAL ENCOUNTER (OUTPATIENT)
Dept: RADIOLOGY | Facility: HOSPITAL | Age: 32
Discharge: HOME | DRG: 811 | End: 2017-03-20
Attending: INTERNAL MEDICINE
Payer: MEDICARE

## 2017-03-20 ENCOUNTER — TELEPHONE (OUTPATIENT)
Dept: INTERNAL MEDICINE | Facility: CLINIC | Age: 32
End: 2017-03-20

## 2017-03-20 ENCOUNTER — OFFICE VISIT (OUTPATIENT)
Dept: INTERNAL MEDICINE | Facility: CLINIC | Age: 32
DRG: 811 | End: 2017-03-20
Payer: MEDICARE

## 2017-03-20 VITALS
HEART RATE: 111 BPM | TEMPERATURE: 98 F | WEIGHT: 104.5 LBS | BODY MASS INDEX: 20.52 KG/M2 | SYSTOLIC BLOOD PRESSURE: 96 MMHG | HEIGHT: 60 IN | OXYGEN SATURATION: 99 % | DIASTOLIC BLOOD PRESSURE: 62 MMHG

## 2017-03-20 DIAGNOSIS — R11.10 VOMITING, INTRACTABILITY OF VOMITING NOT SPECIFIED, PRESENCE OF NAUSEA NOT SPECIFIED, UNSPECIFIED VOMITING TYPE: Primary | ICD-10-CM

## 2017-03-20 DIAGNOSIS — R10.84 GENERALIZED ABDOMINAL PAIN: ICD-10-CM

## 2017-03-20 DIAGNOSIS — D07.1 VULVAR INTRAEPITHELIAL NEOPLASIA (VIN) GRADE 3: ICD-10-CM

## 2017-03-20 DIAGNOSIS — R59.1 LYMPHADENOPATHY: ICD-10-CM

## 2017-03-20 DIAGNOSIS — D06.9 SEVERE DYSPLASIA OF CERVIX (CIN III): ICD-10-CM

## 2017-03-20 DIAGNOSIS — R93.89 ABNORMAL CXR: ICD-10-CM

## 2017-03-20 DIAGNOSIS — I42.9 CARDIOMYOPATHY, UNSPECIFIED TYPE: ICD-10-CM

## 2017-03-20 DIAGNOSIS — D57.1 HB-SS DISEASE WITHOUT CRISIS: ICD-10-CM

## 2017-03-20 DIAGNOSIS — Z95.810 S/P IMPLANTATION OF AUTOMATIC CARDIOVERTER/DEFIBRILLATOR (AICD): ICD-10-CM

## 2017-03-20 DIAGNOSIS — B20 HIV (HUMAN IMMUNODEFICIENCY VIRUS INFECTION): ICD-10-CM

## 2017-03-20 PROCEDURE — 71020 XR CHEST PA AND LATERAL: CPT | Mod: 26,,, | Performed by: RADIOLOGY

## 2017-03-20 PROCEDURE — 1160F RVW MEDS BY RX/DR IN RCRD: CPT | Mod: S$GLB,,, | Performed by: PHYSICIAN ASSISTANT

## 2017-03-20 PROCEDURE — 99499 UNLISTED E&M SERVICE: CPT | Mod: S$GLB,,, | Performed by: PHYSICIAN ASSISTANT

## 2017-03-20 PROCEDURE — 99203 OFFICE O/P NEW LOW 30 MIN: CPT | Mod: S$GLB,,, | Performed by: PHYSICIAN ASSISTANT

## 2017-03-20 PROCEDURE — 99999 PR PBB SHADOW E&M-EST. PATIENT-LVL III: CPT | Mod: PBBFAC,,, | Performed by: PHYSICIAN ASSISTANT

## 2017-03-20 NOTE — MR AVS SNAPSHOT
[facility]  [address] [address]  Phone: [phone]  Fax: [phone]                  Wang Kebede   3/20/2017 8:00 AM   Office Visit    Description:  Female : 1985   Provider:  KIN Gonzalez   Department:  [facility] Medicine           Diagnoses this Visit        Comments    Hb-SS disease without crisis    -  Primary     Severe dysplasia of cervix (KEVON III)         Vulvar intraepithelial neoplasia (KARLY) grade 3         HIV (human immunodeficiency virus infection)         Lymphadenopathy         Abnormal CXR                To Do List           Future Appointments        Provider Department Dept Phone    3/20/2017 9:00 AM SUM[facility] XR2 [facility] [phone]    3/21/2017 8:00 AM KIN Manzo [facility] - Cardiology [phone]    3/23/2017 7:40 AM Jayro Yen MD [facility] - Hemotology Oncology [phone]    2017 1:00 PM Emanuel Zamora MD [facility] - OB/ [phone]    2017 1:30 PM PREOP, NURSE [place] [facility] - [phone]      Your Future Surgeries/Procedures     2017   Surgery with Emanuel Zamora MD   [facility]    [address] [address] [address] [phone]              Goals (5 Years of Data)     None      [facility]     [facility] Nurse Care Line - 24/7 Assistance  Registered nurses in the [facility] provide clinical advisement, health education, appointment booking, and other advisory services.  Call for this free service at [phone].             Medications           Message regarding Medications     Verify the changes and/or additions to your medication regime listed below are the same as discussed with your clinician today.  If any of these changes or additions are incorrect, please notify your healthcare provider.        STOP taking these medications     darunavir-cobicistat  (PREZCOBIX) 800-150 mg-mg Tab Take 1 tablet by mouth.    emtricitabine-tenofovir alafen (DESCOVY) 200-25 mg Tab Take by mouth once daily.           Verify that the below list of medications is an accurate representation of the medications you are currently taking.  If none reported, the list may be blank. If incorrect, please contact your healthcare provider. Carry this list with you in case of emergency.           Current Medications            Clinical Reference Information           Your Vitals Were     BP Pulse Temp Height Weight       96/62 (BP Location: Right arm, Patient Position: Sitting, BP Method: Manual) 111 98.4 °F (36.9 °C) (Tympanic) 5' (1.524 m) 47.4 kg (104 lb 8 oz)     SpO2 BMI             99% 20.41 kg/m2         Blood Pressure          Most Recent Value    BP  96/62      Allergies as of 3/20/2017     Iodine And Iodide Containing Products    Bactrim [Sulfamethoxazole-trimethoprim]    Morphine      Immunizations Administered on Date of Encounter - 3/20/2017     None      Orders Placed During Today's Visit      Normal Orders This Visit    Ambulatory referral to Hematology / Oncology     Future Labs/Procedures Expected by Expires    X-Ray Chest PA And Lateral  3/20/2017 3/20/2018      Language Assistance Services     ATTENTION: Language assistance services are available, free of charge. Please call [phone].      ATENCIÓN: Si minnie contreras, tiene a bazan disposición servicios gratuitos de asistencia lingüística. Llame al [phone].     [facility] Ý: N?u b?n nói Ti?ng Vi?t, có các d?ch v? h? tr? ngôn ng? mi?n phí dành cho b?n. G?i s? [phone].         [facility] complies with applicable Federal civil rights laws and does not discriminate on the basis of race, color, national origin, age, disability, or sex.
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
## 2017-03-21 ENCOUNTER — OFFICE VISIT (OUTPATIENT)
Dept: CARDIOLOGY | Facility: CLINIC | Age: 32
DRG: 811 | End: 2017-03-21
Payer: MEDICARE

## 2017-03-21 ENCOUNTER — CLINICAL SUPPORT (OUTPATIENT)
Dept: CARDIOLOGY | Facility: CLINIC | Age: 32
DRG: 811 | End: 2017-03-21
Payer: MEDICARE

## 2017-03-21 ENCOUNTER — HOSPITAL ENCOUNTER (INPATIENT)
Facility: HOSPITAL | Age: 32
LOS: 2 days | Discharge: HOME | DRG: 811 | End: 2017-03-23
Attending: EMERGENCY MEDICINE | Admitting: INTERNAL MEDICINE
Payer: MEDICARE

## 2017-03-21 VITALS
HEIGHT: 60 IN | HEART RATE: 91 BPM | WEIGHT: 105 LBS | BODY MASS INDEX: 20.62 KG/M2 | SYSTOLIC BLOOD PRESSURE: 100 MMHG | DIASTOLIC BLOOD PRESSURE: 64 MMHG

## 2017-03-21 DIAGNOSIS — I42.9 CARDIOMYOPATHY, UNSPECIFIED TYPE: ICD-10-CM

## 2017-03-21 DIAGNOSIS — R59.0 PELVIC LYMPHADENOPATHY: ICD-10-CM

## 2017-03-21 DIAGNOSIS — D64.9 SYMPTOMATIC ANEMIA: Primary | ICD-10-CM

## 2017-03-21 DIAGNOSIS — Z01.818 PRE-OP EVALUATION: Primary | ICD-10-CM

## 2017-03-21 DIAGNOSIS — R10.9 CHRONIC ABDOMINAL PAIN: ICD-10-CM

## 2017-03-21 DIAGNOSIS — Z01.818 PRE-OP EVALUATION: ICD-10-CM

## 2017-03-21 DIAGNOSIS — R11.2 NAUSEA AND VOMITING, INTRACTABILITY OF VOMITING NOT SPECIFIED, UNSPECIFIED VOMITING TYPE: ICD-10-CM

## 2017-03-21 DIAGNOSIS — B20 HIV (HUMAN IMMUNODEFICIENCY VIRUS INFECTION): ICD-10-CM

## 2017-03-21 DIAGNOSIS — Z95.810 S/P IMPLANTATION OF AUTOMATIC CARDIOVERTER/DEFIBRILLATOR (AICD): ICD-10-CM

## 2017-03-21 DIAGNOSIS — G89.29 CHRONIC ABDOMINAL PAIN: ICD-10-CM

## 2017-03-21 PROBLEM — Z91.199 H/O NONCOMPLIANCE WITH MEDICAL TREATMENT, PRESENTING HAZARDS TO HEALTH: Status: ACTIVE | Noted: 2017-03-21

## 2017-03-21 PROBLEM — E43 SEVERE PROTEIN-CALORIE MALNUTRITION: Status: ACTIVE | Noted: 2017-03-21

## 2017-03-21 LAB
ABO GROUP BLD: NORMAL
ALBUMIN SERPL BCP-MCNC: 2.3 G/DL
ALP SERPL-CCNC: 43 U/L
ALT SERPL W/O P-5'-P-CCNC: 5 U/L
AMYLASE SERPL-CCNC: 101 U/L
ANION GAP SERPL CALC-SCNC: 9 MMOL/L
AST SERPL-CCNC: 13 U/L
B-HCG UR QL: NEGATIVE
BACTERIA #/AREA URNS HPF: ABNORMAL /HPF
BASOPHILS # BLD AUTO: 0.01 K/UL
BASOPHILS NFR BLD: 0.2 %
BILIRUB SERPL-MCNC: 0.2 MG/DL
BILIRUB UR QL STRIP: NEGATIVE
BLD GP AB SCN CELLS X3 SERPL QL: NORMAL
BLD PROD TYP BPU: NORMAL
BLD PROD TYP BPU: NORMAL
BLOOD UNIT EXPIRATION DATE: NORMAL
BLOOD UNIT EXPIRATION DATE: NORMAL
BLOOD UNIT TYPE CODE: 5100
BLOOD UNIT TYPE CODE: 5100
BLOOD UNIT TYPE: NORMAL
BLOOD UNIT TYPE: NORMAL
BNP SERPL-MCNC: 62 PG/ML
BUN SERPL-MCNC: 11 MG/DL
CALCIUM SERPL-MCNC: 8.2 MG/DL
CHLORIDE SERPL-SCNC: 112 MMOL/L
CK SERPL-CCNC: 33 U/L
CLARITY UR: CLEAR
CO2 SERPL-SCNC: 20 MMOL/L
CODING SYSTEM: NORMAL
CODING SYSTEM: NORMAL
COLOR UR: YELLOW
CREAT SERPL-MCNC: 0.8 MG/DL
DIFFERENTIAL METHOD: ABNORMAL
DISPENSE STATUS: NORMAL
DISPENSE STATUS: NORMAL
EOSINOPHIL # BLD AUTO: 0.1 K/UL
EOSINOPHIL NFR BLD: 1.4 %
ERYTHROCYTE [DISTWIDTH] IN BLOOD BY AUTOMATED COUNT: 15.1 %
EST. GFR  (AFRICAN AMERICAN): >60 ML/MIN/1.73 M^2
EST. GFR  (NON AFRICAN AMERICAN): >60 ML/MIN/1.73 M^2
GLUCOSE SERPL-MCNC: 77 MG/DL
GLUCOSE UR QL STRIP: NEGATIVE
HCT VFR BLD AUTO: 15.9 %
HGB BLD-MCNC: 5.2 G/DL
HGB UR QL STRIP: ABNORMAL
HYALINE CASTS #/AREA URNS LPF: 2 /LPF
KETONES UR QL STRIP: NEGATIVE
LACTATE SERPL-SCNC: 0.6 MMOL/L
LEUKOCYTE ESTERASE UR QL STRIP: NEGATIVE
LIPASE SERPL-CCNC: 20 U/L
LYMPHOCYTES # BLD AUTO: 0.8 K/UL
LYMPHOCYTES NFR BLD: 15.9 %
MCH RBC QN AUTO: 30.4 PG
MCHC RBC AUTO-ENTMCNC: 32.7 %
MCV RBC AUTO: 93 FL
MICROSCOPIC COMMENT: ABNORMAL
MONOCYTES # BLD AUTO: 0.4 K/UL
MONOCYTES NFR BLD: 7.3 %
NEUTROPHILS # BLD AUTO: 3.8 K/UL
NEUTROPHILS NFR BLD: 75.2 %
NITRITE UR QL STRIP: NEGATIVE
NUM UNITS TRANS PACKED RBC: NORMAL
NUM UNITS TRANS PACKED RBC: NORMAL
PH UR STRIP: 7 [PH] (ref 5–8)
PLATELET # BLD AUTO: 287 K/UL
PMV BLD AUTO: 9.2 FL
POTASSIUM SERPL-SCNC: 3.5 MMOL/L
PROT SERPL-MCNC: 9.1 G/DL
PROT UR QL STRIP: ABNORMAL
RBC # BLD AUTO: 1.71 M/UL
RBC #/AREA URNS HPF: 2 /HPF (ref 0–4)
RETICS/RBC NFR AUTO: 3.5 %
RH BLD: NORMAL
SODIUM SERPL-SCNC: 141 MMOL/L
SP GR UR STRIP: 1.02 (ref 1–1.03)
SQUAMOUS #/AREA URNS HPF: 5 /HPF
TROPONIN I SERPL DL<=0.01 NG/ML-MCNC: 0.01 NG/ML
URN SPEC COLLECT METH UR: ABNORMAL
UROBILINOGEN UR STRIP-ACNC: NEGATIVE EU/DL
WBC # BLD AUTO: 5.08 K/UL
WBC #/AREA URNS HPF: 2 /HPF (ref 0–5)

## 2017-03-21 PROCEDURE — 96376 TX/PRO/DX INJ SAME DRUG ADON: CPT

## 2017-03-21 PROCEDURE — 96374 THER/PROPH/DIAG INJ IV PUSH: CPT

## 2017-03-21 PROCEDURE — 63600175 PHARM REV CODE 636 W HCPCS: Performed by: EMERGENCY MEDICINE

## 2017-03-21 PROCEDURE — 86900 BLOOD TYPING SEROLOGIC ABO: CPT

## 2017-03-21 PROCEDURE — C9113 INJ PANTOPRAZOLE SODIUM, VIA: HCPCS | Performed by: EMERGENCY MEDICINE

## 2017-03-21 PROCEDURE — 25000003 PHARM REV CODE 250: Performed by: INTERNAL MEDICINE

## 2017-03-21 PROCEDURE — 87536 HIV-1 QUANT&REVRSE TRNSCRPJ: CPT

## 2017-03-21 PROCEDURE — 85045 AUTOMATED RETICULOCYTE COUNT: CPT

## 2017-03-21 PROCEDURE — 81025 URINE PREGNANCY TEST: CPT

## 2017-03-21 PROCEDURE — 86901 BLOOD TYPING SEROLOGIC RH(D): CPT

## 2017-03-21 PROCEDURE — 82550 ASSAY OF CK (CPK): CPT

## 2017-03-21 PROCEDURE — 36410 VNPNXR 3YR/> PHY/QHP DX/THER: CPT

## 2017-03-21 PROCEDURE — 99285 EMERGENCY DEPT VISIT HI MDM: CPT | Mod: 25

## 2017-03-21 PROCEDURE — 36430 TRANSFUSION BLD/BLD COMPNT: CPT

## 2017-03-21 PROCEDURE — 80053 COMPREHEN METABOLIC PANEL: CPT

## 2017-03-21 PROCEDURE — 25000003 PHARM REV CODE 250: Performed by: EMERGENCY MEDICINE

## 2017-03-21 PROCEDURE — 36415 COLL VENOUS BLD VENIPUNCTURE: CPT

## 2017-03-21 PROCEDURE — P9016 RBC LEUKOCYTES REDUCED: HCPCS

## 2017-03-21 PROCEDURE — 83540 ASSAY OF IRON: CPT

## 2017-03-21 PROCEDURE — 83880 ASSAY OF NATRIURETIC PEPTIDE: CPT

## 2017-03-21 PROCEDURE — 81000 URINALYSIS NONAUTO W/SCOPE: CPT

## 2017-03-21 PROCEDURE — 83690 ASSAY OF LIPASE: CPT

## 2017-03-21 PROCEDURE — 99213 OFFICE O/P EST LOW 20 MIN: CPT | Mod: S$GLB,,, | Performed by: PHYSICIAN ASSISTANT

## 2017-03-21 PROCEDURE — 86361 T CELL ABSOLUTE COUNT: CPT

## 2017-03-21 PROCEDURE — 84466 ASSAY OF TRANSFERRIN: CPT

## 2017-03-21 PROCEDURE — 85025 COMPLETE CBC W/AUTO DIFF WBC: CPT

## 2017-03-21 PROCEDURE — 83605 ASSAY OF LACTIC ACID: CPT

## 2017-03-21 PROCEDURE — 99499 UNLISTED E&M SERVICE: CPT | Mod: S$GLB,,, | Performed by: PHYSICIAN ASSISTANT

## 2017-03-21 PROCEDURE — 84484 ASSAY OF TROPONIN QUANT: CPT

## 2017-03-21 PROCEDURE — 93005 ELECTROCARDIOGRAM TRACING: CPT

## 2017-03-21 PROCEDURE — 93000 ELECTROCARDIOGRAM COMPLETE: CPT | Mod: S$GLB,,, | Performed by: INTERNAL MEDICINE

## 2017-03-21 PROCEDURE — 86920 COMPATIBILITY TEST SPIN: CPT

## 2017-03-21 PROCEDURE — 96361 HYDRATE IV INFUSION ADD-ON: CPT

## 2017-03-21 PROCEDURE — 82150 ASSAY OF AMYLASE: CPT

## 2017-03-21 PROCEDURE — 21400001 HC TELEMETRY ROOM

## 2017-03-21 PROCEDURE — 96375 TX/PRO/DX INJ NEW DRUG ADDON: CPT

## 2017-03-21 PROCEDURE — 82746 ASSAY OF FOLIC ACID SERUM: CPT

## 2017-03-21 PROCEDURE — 86850 RBC ANTIBODY SCREEN: CPT

## 2017-03-21 PROCEDURE — 93010 ELECTROCARDIOGRAM REPORT: CPT | Mod: ,,, | Performed by: INTERNAL MEDICINE

## 2017-03-21 PROCEDURE — 1160F RVW MEDS BY RX/DR IN RCRD: CPT | Mod: S$GLB,,, | Performed by: PHYSICIAN ASSISTANT

## 2017-03-21 PROCEDURE — 99999 PR PBB SHADOW E&M-EST. PATIENT-LVL III: CPT | Mod: PBBFAC,,, | Performed by: PHYSICIAN ASSISTANT

## 2017-03-21 RX ORDER — SODIUM CHLORIDE 9 MG/ML
3 INJECTION, SOLUTION INTRAMUSCULAR; INTRAVENOUS; SUBCUTANEOUS
Status: DISCONTINUED | OUTPATIENT
Start: 2017-03-21 | End: 2017-03-23 | Stop reason: HOSPADM

## 2017-03-21 RX ORDER — MORPHINE SULFATE 10 MG/ML
2 INJECTION INTRAMUSCULAR; INTRAVENOUS; SUBCUTANEOUS EVERY 5 MIN PRN
Status: DISCONTINUED | OUTPATIENT
Start: 2017-03-21 | End: 2017-03-21

## 2017-03-21 RX ORDER — PANTOPRAZOLE SODIUM 40 MG/10ML
40 INJECTION, POWDER, LYOPHILIZED, FOR SOLUTION INTRAVENOUS DAILY
Status: DISCONTINUED | OUTPATIENT
Start: 2017-03-22 | End: 2017-03-21 | Stop reason: SDUPTHER

## 2017-03-21 RX ORDER — DIPHENHYDRAMINE HYDROCHLORIDE 50 MG/ML
25 INJECTION INTRAMUSCULAR; INTRAVENOUS EVERY 6 HOURS PRN
Status: DISCONTINUED | OUTPATIENT
Start: 2017-03-22 | End: 2017-03-21

## 2017-03-21 RX ORDER — HYDROCODONE BITARTRATE AND ACETAMINOPHEN 10; 325 MG/1; MG/1
1 TABLET ORAL EVERY 6 HOURS PRN
COMMUNITY
End: 2017-04-04

## 2017-03-21 RX ORDER — HYDROMORPHONE HYDROCHLORIDE 1 MG/ML
1 INJECTION, SOLUTION INTRAMUSCULAR; INTRAVENOUS; SUBCUTANEOUS EVERY 6 HOURS PRN
Status: DISCONTINUED | OUTPATIENT
Start: 2017-03-22 | End: 2017-03-21

## 2017-03-21 RX ORDER — ONDANSETRON 2 MG/ML
4 INJECTION INTRAMUSCULAR; INTRAVENOUS EVERY 8 HOURS PRN
Status: DISCONTINUED | OUTPATIENT
Start: 2017-03-21 | End: 2017-03-23 | Stop reason: HOSPADM

## 2017-03-21 RX ORDER — PROMETHAZINE HYDROCHLORIDE 25 MG/1
25 SUPPOSITORY RECTAL EVERY 6 HOURS PRN
COMMUNITY
End: 2017-04-04

## 2017-03-21 RX ORDER — HYDROMORPHONE HYDROCHLORIDE 1 MG/ML
0.5 INJECTION, SOLUTION INTRAMUSCULAR; INTRAVENOUS; SUBCUTANEOUS EVERY 6 HOURS PRN
Status: DISCONTINUED | OUTPATIENT
Start: 2017-03-22 | End: 2017-03-23 | Stop reason: HOSPADM

## 2017-03-21 RX ORDER — SODIUM CHLORIDE 9 MG/ML
1000 INJECTION, SOLUTION INTRAVENOUS
Status: DISCONTINUED | OUTPATIENT
Start: 2017-03-21 | End: 2017-03-21

## 2017-03-21 RX ORDER — ONDANSETRON 4 MG/1
8 TABLET, ORALLY DISINTEGRATING ORAL
COMMUNITY
End: 2017-04-04

## 2017-03-21 RX ORDER — SODIUM CHLORIDE 9 MG/ML
3 INJECTION, SOLUTION INTRAMUSCULAR; INTRAVENOUS; SUBCUTANEOUS EVERY 8 HOURS
Status: DISCONTINUED | OUTPATIENT
Start: 2017-03-21 | End: 2017-03-23 | Stop reason: HOSPADM

## 2017-03-21 RX ORDER — DIPHENHYDRAMINE HYDROCHLORIDE 50 MG/ML
25 INJECTION INTRAMUSCULAR; INTRAVENOUS
Status: COMPLETED | OUTPATIENT
Start: 2017-03-21 | End: 2017-03-21

## 2017-03-21 RX ORDER — ACETAMINOPHEN 325 MG/1
650 TABLET ORAL EVERY 4 HOURS PRN
Status: DISCONTINUED | OUTPATIENT
Start: 2017-03-21 | End: 2017-03-23 | Stop reason: HOSPADM

## 2017-03-21 RX ORDER — HYDROMORPHONE HYDROCHLORIDE 2 MG/ML
0.5 INJECTION, SOLUTION INTRAMUSCULAR; INTRAVENOUS; SUBCUTANEOUS
Status: COMPLETED | OUTPATIENT
Start: 2017-03-21 | End: 2017-03-21

## 2017-03-21 RX ORDER — HYDROCODONE BITARTRATE AND ACETAMINOPHEN 500; 5 MG/1; MG/1
TABLET ORAL
Status: DISCONTINUED | OUTPATIENT
Start: 2017-03-21 | End: 2017-03-23 | Stop reason: HOSPADM

## 2017-03-21 RX ORDER — MEPERIDINE HYDROCHLORIDE 50 MG/ML
12.5 INJECTION INTRAMUSCULAR; INTRAVENOUS; SUBCUTANEOUS ONCE AS NEEDED
Status: ACTIVE | OUTPATIENT
Start: 2017-03-21 | End: 2017-03-21

## 2017-03-21 RX ORDER — DIPHENHYDRAMINE HYDROCHLORIDE 50 MG/ML
25 INJECTION INTRAMUSCULAR; INTRAVENOUS EVERY 6 HOURS PRN
Status: DISCONTINUED | OUTPATIENT
Start: 2017-03-22 | End: 2017-03-23 | Stop reason: HOSPADM

## 2017-03-21 RX ORDER — PANTOPRAZOLE SODIUM 40 MG/10ML
40 INJECTION, POWDER, LYOPHILIZED, FOR SOLUTION INTRAVENOUS
Status: COMPLETED | OUTPATIENT
Start: 2017-03-21 | End: 2017-03-21

## 2017-03-21 RX ORDER — DIPHENHYDRAMINE HYDROCHLORIDE 50 MG/ML
12.5 INJECTION INTRAMUSCULAR; INTRAVENOUS EVERY 6 HOURS PRN
Status: DISCONTINUED | OUTPATIENT
Start: 2017-03-22 | End: 2017-03-21

## 2017-03-21 RX ORDER — ONDANSETRON 2 MG/ML
4 INJECTION INTRAMUSCULAR; INTRAVENOUS
Status: COMPLETED | OUTPATIENT
Start: 2017-03-21 | End: 2017-03-21

## 2017-03-21 RX ORDER — ACETAMINOPHEN 500 MG
500 TABLET ORAL
Status: COMPLETED | OUTPATIENT
Start: 2017-03-21 | End: 2017-03-21

## 2017-03-21 RX ORDER — PANTOPRAZOLE SODIUM 40 MG/1
40 TABLET, DELAYED RELEASE ORAL DAILY
Status: DISCONTINUED | OUTPATIENT
Start: 2017-03-22 | End: 2017-03-23 | Stop reason: HOSPADM

## 2017-03-21 RX ORDER — RAMELTEON 8 MG/1
8 TABLET ORAL NIGHTLY PRN
Status: DISCONTINUED | OUTPATIENT
Start: 2017-03-21 | End: 2017-03-23 | Stop reason: HOSPADM

## 2017-03-21 RX ADMIN — PANTOPRAZOLE SODIUM 40 MG: 40 INJECTION, POWDER, FOR SOLUTION INTRAVENOUS at 01:03

## 2017-03-21 RX ADMIN — ACETAMINOPHEN 500 MG: 500 TABLET ORAL at 05:03

## 2017-03-21 RX ADMIN — LIDOCAINE HYDROCHLORIDE 50 ML: 20 SOLUTION ORAL; TOPICAL at 01:03

## 2017-03-21 RX ADMIN — SODIUM CHLORIDE: 0.9 INJECTION, SOLUTION INTRAVENOUS at 05:03

## 2017-03-21 RX ADMIN — ONDANSETRON 4 MG: 2 INJECTION INTRAMUSCULAR; INTRAVENOUS at 01:03

## 2017-03-21 RX ADMIN — DIPHENHYDRAMINE HYDROCHLORIDE 25 MG: 50 INJECTION, SOLUTION INTRAMUSCULAR; INTRAVENOUS at 05:03

## 2017-03-21 RX ADMIN — HYDROMORPHONE HYDROCHLORIDE 0.5 MG: 2 INJECTION, SOLUTION INTRAMUSCULAR; INTRAVENOUS; SUBCUTANEOUS at 02:03

## 2017-03-21 RX ADMIN — DIPHENHYDRAMINE HYDROCHLORIDE 25 MG: 50 INJECTION, SOLUTION INTRAMUSCULAR; INTRAVENOUS at 02:03

## 2017-03-21 RX ADMIN — ACETAMINOPHEN 650 MG: 325 TABLET ORAL at 10:03

## 2017-03-21 NOTE — IP AVS SNAPSHOT
[address] [place] [address]           Patient Discharge Instructions     Our goal is to set you up for success. This packet includes information on your condition, medications, and your home care. It will help you to care for yourself so you don't get sicker and need to go back to the hospital.     Please ask your nurse if you have any questions.        There are many details to remember when preparing to leave the hospital. Here is what you will need to do:    1. Take your medicine. If you are prescribed medications, review your Medication List in the following pages. You may have new medications to  at the pharmacy and others that you'll need to stop taking. Review the instructions for how and when to take your medications. Talk with your doctor or nurses if you are unsure of what to do.     2. Go to your follow-up appointments. Specific follow-up information is listed in the following pages. Your may be contacted by a transition nurse or clinical provider about future appointments. Be sure we have all of the phone numbers to reach you, if needed. Please contact your provider's office if you are unable to make an appointment.     3. Watch for warning signs. Your doctor or nurse will give you detailed warning signs to watch for and when to call for assistance. These instructions may also include educational information about your condition. If you experience any of warning signs to your health, call your doctor.               ** Verify the list of medication(s) below is accurate and up to date. Carry this with you in case of emergency. If your medications have changed, please notify your healthcare provider.             Medication List      CONTINUE taking these medications        Additional Info                      DESCOVY 200-25 mg Tab   Refills:  0   Dose:  200 each   Generic drug:  emtricitabine-tenofovir alafen    Instructions:  Take 200 each by mouth  once daily.     Begin Date    AM    Noon    PM    Bedtime       hydrocodone-acetaminophen 10-325mg  mg Tab   Commonly known as:  NORCO   Refills:  0   Dose:  1 tablet    Instructions:  Take 1 tablet by mouth every 6 (six) hours as needed for Pain.     Begin Date    AM    Noon    PM    Bedtime       ondansetron 4 MG Tbdl   Commonly known as:  ZOFRAN-ODT   Refills:  0   Dose:  8 mg    Instructions:  Take 8 mg by mouth every 12 (twelve) hours.     Begin Date    AM    Noon    PM    Bedtime       PREZCOBIX 800-150 mg-mg Tab   Refills:  0   Dose:  800 mg   Generic drug:  darunavir-cobicistat    Instructions:  Take 800 mg by mouth once daily.     Begin Date    AM    Noon    PM    Bedtime       promethazine 25 MG suppository   Commonly known as:  PHENERGAN   Refills:  0   Dose:  25 mg    Instructions:  Place 25 mg rectally every 6 (six) hours as needed for Nausea.     Begin Date    AM    Noon    PM    Bedtime                  Please bring to all follow up appointments:    1. A copy of your discharge instructions.  2. All medicines you are currently taking in their original bottles.  3. Identification and insurance card.    Please arrive 15 minutes ahead of scheduled appointment time.    Please call 24 hours in advance if you must reschedule your appointment and/or time.        Your Scheduled Appointments     Apr 04, 2017  8:20 AM CDT   Established Patient Visit with Virginia Olmedo MD   [facility] - Hemotology Oncology ([facility])    [address] [facility] [address]   [phone]            Apr 04, 2017  1:00 PM CDT   Established Patient Visit with MD Rafaela Moreno - OB/ GYN ([facility])    [address] [place] [id number]   [phone]            Apr 04, 2017  1:30 PM CDT   Pre-Admit Testing Visit with ELIOOP, NURSE [place] [facility]  ([facility])    [address] [place] [address]   [phone]            Apr 04, 2017  2:00 PM  CDT   Non-Fasting Lab with LABORATORY, VAIBHAV THONY   [facility] ([facility])    [address] [facility] [address] [phone]            Apr 04, 2017  2:10 PM CDT   Urine with SPECIMEN, VAIBHAV   [facility] ([facility])    [address] [place] [address] [phone]              Your Future Surgeries/Procedures     Apr 11, 2017   Surgery with Emanuel Zamora MD   [facility]  ([facility])    [address] [place] [address] [phone]              Follow-up Information     Follow up with Bronson Koenig MD In 3 days.    Specialty:  Internal Medicine    Why:  hospital follow up- repeat CBC- may need    Contact information:    [address] [facility] [address] [phone]          Follow up with John Vides MD In 2 weeks.    Specialty:  Infectious Diseases    Contact information:    [address] [place] [address]  [phone]          Follow up with Virginia Olmedo MD In 2 weeks.    Specialty:  Hematology and Oncology    Why:  establish care for continued Sickle Cell Anemia    Contact information:    [address] [place] [address]   [place] [address]  [phone]          Follow up with Emanuel Zamora MD In 1 week.    Specialties:  Obstetrics, Obstetrics and Gynecology    Why:  follow up for hysterectomy    Contact information:    [address]  [place] [id number] [phone]          Discharge Instructions     Future Orders    Activity as tolerated     Call MD for:  difficulty breathing or increased cough     Call MD for:  increased confusion or weakness     Call MD for:  persistent dizziness, light-headedness, or visual disturbances     Call MD for:  persistent nausea and vomiting or diarrhea     Call MD for:  severe uncontrolled pain     Call MD for:  temperature >100.4     Diet general     Questions:    Total calories:      Fat restriction, if any:       Protein restriction, if any:      Na restriction, if any:      Fluid restriction:      Additional restrictions:          Primary Diagnosis     Your primary diagnosis was:  Secondary Anemia      Admission Information     Date & Time Provider Department CSN    3/21/2017 10:25 AM Marcus García MD [facility] [id number]      Care Providers     Provider Role Specialty Primary office phone    Marcus García MD Attending Provider Internal Medicine [phone]    Marcus García MD Team Attending  Internal Medicine [phone]      Your Vitals Were     BP Pulse Temp Resp Height Weight    135/86 94 98.5 °F (36.9 °C) (Oral) 18 5' (1.524 m) 47.6 kg (104 lb 15 oz)    Last Period SpO2 BMI          (LMP Unknown) 95% 20.49 kg/m2        Recent Lab Values     No lab values to display.      Pending Labs     Order Current Status    HIV RNA, quantitative, PCR In process    Prepare RBC 2 Units; symptomatic anemia Preliminary result    Prepare RBC 2 Units; symptomatic anemia Preliminary result      Allergies as of 3/23/2017        Reactions    Iodine And Iodide Containing Products Anaphylaxis    Pt states she coded last time she was administered contrast    Bactrim [Sulfamethoxazole-trimethoprim] Hives    Morphine Itching      [facility]     [facility] Nurse Care Line - 24/7 Assistance  Unless otherwise directed by your provider, please contact [facility], our nurse care line that is available for 24/7 assistance.     Registered nurses in the [facility] provide clinical advisement, health education, appointment booking, and other advisory services.  Call for this free service at [phone].        Advance Directives     An advance directive is a document which, in the event you are no longer able to make decisions for yourself, tells your healthcare team what kind of treatment you do or do not want to receive, or who you would like to make those decisions for you.  If you do not  currently have an advance directive, [facility] encourages you to create one.  For more information call:  [phone]), [phone] ([phone]),  or log on to [email or web address].        Language Assistance Services     ATTENTION: Language assistance services are available, free of charge. Please call [phone].      ATENCIÓN: Si habla ben, tiene a bazan disposición servicios gratuitos de asistencia lingüística. Llame al [phone].     [facility] Ý: N?u b?n nói Ti?ng Vi?t, có các d?ch v? h? tr? ngôn ng? mi?n phí dành cho b?n. G?i s? [phone].        Blood Transfusion Reaction Signs and Symptoms     The blood you have received has been matched for you as carefully as possible. Most patients who receive a blood transfusion do not experience problems. However, there can be a delayed reaction that happens a few weeks after your blood transfusion. Contact your physician immediately if you experience any NEW SYMPTOMS listed below:     Fever greater than 100.4 degrees    Chills   Yellow color to your skin or eyes(Jaundice)   Back pain, chest pain, or pain at the infusion site   Weakness (more than usual)   Discomfort or uneasiness more than usual (Malaise)   Nausea or vomiting   Shortness of breath, wheezing, or coughing   Higher or lower blood pressure than normal   Skin rash, itching, skin redness, or localized swelling (example: hands or feet)   Urinating less than normal   Urine appears reddish or orange and is darker than normal      Remember that some these signs may already exist for you--such as having chronic back pain or high blood pressure. You only need to look for and report to your doctor any new occurrences since your blood transfusion that are of concern.         [facility] complies with applicable Federal civil rights laws and does not discriminate on the basis of race, color, national origin, age, disability, or sex.

## 2017-03-21 NOTE — PROGRESS NOTES
"Subjective:    Patient ID:  Wang Kebede is a 31 y.o. female who presents for follow-up of pre-op clearance    HPI  Ms. Keebde is a 31 year old female patient with a PMHx of HIV, V-tach s/p AICD placement, (unsure of device company, patient states this happened after iodine was administered), ? Cardiomyopathy, and sickle cell anemia who presents today for preo-op clearance. Patient is scheduled to undergo hysterectomy by Dr. Zamora on 4/11/17 for severe cervical dysplasia. She returns today and states she is doing poorly. She complains of generalized abdominal pain that has been an ongoing issue since 3/10/17 with associated nausea and decreased appetite. Reports last time she ate a full meal was approximately 2 weeks. Other associated symptoms include fatigue, chills, weakness, dizziness, and nocturnal palpitations. Patient denies any rob chest pain or tightness. Recent labs and report reviewed from [facility]abdominal CT shows "enlarged inguinal, iliac chain, and retroperitoneal lymph nodes in this patient with history of vulvar cancer, suspicious for metastases. Splenomegaly." Patient states she has been so ill she has been unable to keep her medications down, states she has not been able to take her HIV medications in approximately one month. EKG today shows NSR, normal EKG. CXR ordered yesterday by PCP clear, no infiltrate noted.       Review of Systems   Constitution: Positive for chills, weakness and weight loss. Negative for decreased appetite, fever and malaise/fatigue.   HENT: Negative for congestion, headaches, hoarse voice and sore throat.    Eyes: Negative for blurred vision and discharge.   Cardiovascular: Positive for palpitations. Negative for chest pain, claudication, cyanosis, dyspnea on exertion, irregular heartbeat, leg swelling, near-syncope, orthopnea and paroxysmal nocturnal dyspnea.   Respiratory: Negative for cough, hemoptysis, shortness of breath, snoring, sputum production and " wheezing.    Endocrine: Negative for cold intolerance and heat intolerance.   Hematologic/Lymphatic: Negative for bleeding problem. Does not bruise/bleed easily.   Skin: Negative for rash.   Musculoskeletal: Negative for arthritis, back pain, joint pain, joint swelling, muscle cramps, muscle weakness and myalgias.   Gastrointestinal: Positive for bloating, abdominal pain, nausea and vomiting. Negative for constipation, diarrhea, heartburn and melena.   Genitourinary: Negative for hematuria.   Neurological: Negative for dizziness, focal weakness, light-headedness, loss of balance, numbness, paresthesias and seizures.   Psychiatric/Behavioral: Negative for memory loss. The patient does not have insomnia.    Allergic/Immunologic: Negative for hives.       /64  Pulse 91  Ht 5' (1.524 m)  Wt 47.6 kg (105 lb)  BMI 20.51 kg/m2    Objective:    Physical Exam   Constitutional: She is oriented to person, place, and time. She appears well-developed and well-nourished.   HENT:   Head: Normocephalic and atraumatic.   Eyes: Pupils are equal, round, and reactive to light. Right eye exhibits no discharge. Left eye exhibits no discharge.   Neck: Neck supple. No JVD present. No tracheal deviation present. No thyromegaly present.   Cardiovascular: Normal rate, regular rhythm, S1 normal, S2 normal and normal heart sounds.  PMI is not displaced.  Exam reveals no gallop, no S3, no S4 and no friction rub.    No murmur heard.  Pulmonary/Chest: Effort normal and breath sounds normal. No respiratory distress. She has no wheezes. She has no rales.   AICD site well-healed   Abdominal: Soft. She exhibits distension. There is tenderness (generalized).   Musculoskeletal: She exhibits no edema.   Neurological: She is alert and oriented to person, place, and time.   Skin: Skin is warm and dry. She is not diaphoretic. No erythema.   Scattered lesions BLE     Psychiatric: She has a normal mood and affect. Her behavior is normal. Thought  content normal.   Nursing note and vitals reviewed.        2D Echo CONCLUSIONS     1 - Concentric hypertrophy.     2 - Normal left ventricular systolic function (EF 55-60%).     3 - Normal right ventricular systolic function .     4 - The estimated PA systolic pressure is greater than 30 mmHg.     5 - Mild tricuspid regurgitation.     6 - Trivial pericardial effusion.   Assessment:       1. Pre-op evaluation    2. Cardiomyopathy, unspecified type    3. HIV (human immunodeficiency virus infection)    4. Pelvic lymphadenopathy    5. S/P implantation of automatic cardioverter/defibrillator (AICD)       Patient who presents for pre-op clearance, feels poorly, complains of severe abdominal pain and N/V since 3/10/17. No improvement s/p recent d/c from [facility]. Has been off of HIV medications for approximately one month. In light of symptoms, recommend ED evaluation/workup. ID consult would be beneficial.   Plan:     -To [facility] ED for further evaluation and treatment  -Dr. Bryan notified of patient's pending arrival  -Needs AICD checked, [employer] device    Chart reviewed. Dr. Victor agrees with plan as outlined above.
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
## 2017-03-21 NOTE — ED PROVIDER NOTES
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 19-64 Highest Risk (1 of 3 - PCV13) 10/06/2003      PAP SMEAR 10/06/2005      INFLUENZA VACCINE 09/01/2017      Implants    Implanted Type Area  Device Identifier Expiration Date Model / Serial / Lot  Na Pacemaker Chest Wall          Results  - from Last 3 Months    Table of Contents for Results  XR Chest 2 View PA and Lateral (03/10/2017 9:40 PM)  CT Abdomen Pelvis wo IV Contrast (03/10/2017 1:25 AM)  Lactic acid, venous, whole blood (03/09/2017 10:36 PM)  Influenza A and B Antigens (Differentiat (03/09/2017 8:34 PM)  UA C&S if needed (03/09/2017 5:03 PM)  CBC auto differential (03/09/2017 5:03 PM)  Pregnancy, urine (03/09/2017 5:03 PM)  CBC and differential (03/09/2017 5:03 PM)  Lipase (03/09/2017 5:03 PM)  Comprehensive metabolic panel (03/09/2017 5:03 PM)  XR Abdomen Flat and Erect (01/09/2017 12:50 PM)  Basic metabolic panel (01/09/2017 10:51 AM)       XR Chest 2 View PA and Lateral (03/10/2017 9:40 PM)  XR Chest 2 View PA and Lateral (03/10/2017 9:40 PM)  Impressions  Right lower lobe consolidation, presumably infectious.      XR Chest 2 View PA and Lateral (03/10/2017 9:40 PM)  Narrative  XR CHEST " 2 VIEW PA AND LATERAL        CLINICAL INDICATION:fever,VOMITING,ABDOMINAL PAIN,NAUSEA,DIARRHEA        COMPARISON:None         FINDINGS:Frontal and lateral views of the chest. Right lower lobe consolidation overlying the spine. Left lung is clear. No effusion or pneumothorax. Heart is normal. AICD from the left.          XR Chest 2 View PA and Lateral (03/10/2017 9:40 PM)  Procedure Note  Interface, Rad Results In - 03/10/2017 10:21 PM CST    XR CHEST 2 VIEW PA AND LATERAL    CLINICAL INDICATION:fever,VOMITING,ABDOMINAL PAIN,NAUSEA,DIARRHEA    COMPARISON:None     FINDINGS:Frontal and lateral views of the chest. Right lower lobe consolidation overlying the spine. Left lung is clear. No effusion or pneumothorax. Heart is normal. AICD from the left.    IMPRESSION:  Right lower lobe consolidation, presumably infectious.    Back to top of Results      CT Abdomen Pelvis wo IV Contrast (03/10/2017 1:25 AM)  CT Abdomen Pelvis wo IV Contrast (03/10/2017 1:25 AM)  Impressions  1.  Enlarged inguinal, iliac chain, and retroperitoneal lymph nodes in this patient with history of vulvar cancer, suspicious for metastases.    2.  Splenomegaly          CT Abdomen Pelvis wo IV Contrast (03/10/2017 1:25 AM)  Narrative  EXAM: CT ABDOMEN PELVIS WO IV CONTRAST, 3/10/2017 1:14 AM        COMPARISON: None        HISTORY: n/v/d, lower abd pain, h/o HIV, vulvar CA, sickle cell            TECHNIQUE: Helically acquired axial CT images were obtained from the lung bases through the proximal thighs without IV contrast.        Automated exposure control was used for dose reduction.        FINDINGS:    Consolidation both lung bases.        Spleen is enlarged measuring 13.5 cm in length. The liver, pancreas, adrenal glands, and kidneys are normal unenhanced appearance but are incompletely evaluated without IV contrast. There is an prior cholecystectomy. No dilatation of the large or small     bowel. No free fluid or free air. Urinary bladder, uterus,  and adnexa are unremarkable.        There are prominent retroperitoneal lymph nodes periaortic region. There are also bilateral iliac chain and inguinal lymph nodes, largest in the right inguinal region measures 17 x 15 mm.        No acute bony abnormality seen.        Patient has history of vulvar cancer and there is soft tissue asymmetry within the vulva with skin thickening.          CT Abdomen Pelvis wo IV Contrast (03/10/2017 1:25 AM)  Procedure Note  Interface, Rad Results In - 03/10/2017 1:49 AM CST    EXAM: CT ABDOMEN PELVIS WO IV CONTRAST, 3/10/2017 1:14 AM    COMPARISON: None    HISTORY: n/v/d, lower abd pain, h/o HIV, vulvar CA, sickle cell     TECHNIQUE: Helically acquired axial CT images were obtained from the lung bases through the proximal thighs without IV contrast.    Automated exposure control was used for dose reduction.    FINDINGS:  Consolidation both lung bases.    Spleen is enlarged measuring 13.5 cm in length. The liver, pancreas, adrenal glands, and kidneys are normal unenhanced appearance but are incompletely evaluated without IV contrast. There is an prior cholecystectomy. No dilatation of the large or small   bowel. No free fluid or free air. Urinary bladder, uterus, and adnexa are unremarkable.    There are prominent retroperitoneal lymph nodes periaortic region. There are also bilateral iliac chain and inguinal lymph nodes, largest in the right inguinal region measures 17 x 15 mm.    No acute bony abnormality seen.    Patient has history of vulvar cancer and there is soft tissue asymmetry within the vulva with skin thickening.    IMPRESSION:  1. Enlarged inguinal, iliac chain, and retroperitoneal lymph nodes in this patient with history of vulvar cancer, suspicious for metastases.  2. Splenomegaly      Back to top of Results      Lactic acid, venous, whole blood (03/09/2017 10:36 PM)  Lactic acid, venous, whole blood (03/09/2017 10:36 PM)  Component Value Ref Range  Lactic Acid  Plasma 0.5 0.5 - 2.2 mmol/L    Lactic acid, venous, whole blood (03/09/2017 10:36 PM)  Specimen Performing Laboratory  Blood - Vein [facility] [address] [place] [address]      Lactic acid, venous, whole blood (03/09/2017 10:36 PM)  Narrative      Patients undergoing treatment with N-Acetyl Cysteine (NAC) may have falsely depressed Lactic Acid results.    Back to top of Results      Influenza A and B Antigens (Differentiat (03/09/2017 8:34 PM)  Influenza A and B Antigens (Differentiat (03/09/2017 8:34 PM)  Component Value Ref Range  Influenza A Antigen Negative Negative  Influenza B Antigen Negative Negative    Influenza A and B Antigens (Differentiat (03/09/2017 8:34 PM)  Specimen Performing Laboratory  Nasal Swab - Nasal [facility] [address] [place] [address]      Influenza A and B Antigens (Differentiat (03/09/2017 8:34 PM)  Narrative      This test only detects Influenza A or B antigens.    A negative result does not exclude influenza virus infection nor is it intended to rule-out other non-influenza viral infections.  Testing specimens from adults will often yield lower sensitivity than testing specimens from children due to decreased shed of the Influenza virus.  The clinical performance of the [facility] Influenza A&B ZAMZAM for nasopharyngeal aspirate/wash samples has not been established in patients 22 years of age or older and may not be consistent with the clinical performance obtained with younger patients.        A diagnosis of influenza should be considered based on a patient's clinical presentation and emperic antiviral treatment should be considered, if indicated.        If more conclusive testing is desired, follow-up confirmatory testing with RT-PCR is warranted.        Back to top of Results      UA C&S if needed (03/09/2017 5:03 PM)  UA C&S if needed (03/09/2017 5:03 PM)  Component Value Ref Range  COLOR Yellow Colorless, Light Yellow,  Yellow, Dark Yellow, Straw, Denise  CLARITY Clear Clear  HGB URINE Negative Negative  MICROSCOPIC NEEDED? Yes    WBC UA 0-5 None, 0-5 /hpf  Blood UA 1-5 None Seen, 0-1, 1-5 /hpf  EPITHELIAL URINE 2-3 None Seen, 0-1, 2-3, 3-5  URINE CULTURE INDICATED? No No  MUCUS Present (A) NONE  URINE COLLECTION SOURCE Urine Clean Catch    GLUCOSE UA Negative Negative mg/dL  KETONES UA Negative Negative mg/dL  UROBILINOGEN URINE Negative Negative E.U./dL  PROTEIN   (A) Negative mg/dL  NITRITE UA Negative Negative  LEUKOCYTE ESTERASE UA Negative Negative  PH UA 7  5 - 8  SPECIFIC GRAVITY UA 1.018  1.001 - 1.035  BACTERIA None Seen None Seen /hpf  BILIRUBIN URINE Negative Negative    UA C&S if needed (03/09/2017 5:03 PM)  Specimen Performing Laboratory  Urine Clean Catch - Urine [facility] [address] [place] [address]    Back to top of Results      CBC auto differential (03/09/2017 5:03 PM)  CBC auto differential (03/09/2017 5:03 PM)  Component Value Ref Range  WHITE BLOOD CELL COUNT 5.9 4.0 - 11.0 1000/ul  RED BLOOD CELL COUNT 2.67 (L) 3.80 - 5.30 mill/uL  HEMOGLOBIN 8.5 (L) 12.0 - 16.0 gm/dl  HEMATOCRIT 23.9 (L) 37.0 - 47.0 %  MEAN CORPUSCULAR VOLUME 89 80 - 100 fl  MEAN CORPUSCULAR HEMOGLOBIN CONC 35.8 31.0 - 37.0 gm/dl  RED CELL DISTRIBUTION WIDTH 12.8 12.1 - 14.9 %  PLATELET COUNT 171 150 - 375 1000/ul  MEAN PLATELET VOLUME 8.7 6.5 - 12.0 fl  Neutrophils Abs 4.3 1.5 - 10.0 1000/ul  Lymphocytes Abs 1.3 1.3 - 2.9 1000/ul  Monocytes Abs 0.2 0.1 - 1.0 1000/ul  Eosinophils Abs 0.0 0.0 - 0.7 1000/ul  Basophils Abs 0.0 0.0 - 0.2 1000/ul  Neutrophils % 73 44 - 81 %  Lymphocytes % 22 21 - 47 %  Monocytes % 4 2 - 11 %  Eosinophils % 0 0 - 7 %  Basophils % 0 0 - 2 %    CBC auto differential (03/09/2017 5:03 PM)  Specimen Performing Laboratory  Blood - Vein [facility] [address] [place].    [place] [id number]    Back to top of Results      Pregnancy, urine (03/09/2017 5:03  PM)  Pregnancy, urine (03/09/2017 5:03 PM)  Component Value Ref Range  Pregnancy Test Urine Negative    Verify Pregnancy Test Urine Negative      Pregnancy, urine (03/09/2017 5:03 PM)  Specimen Performing Laboratory  Urine - Urine, Unspecified Source [facility] [place] [address] [facility].    [place] [id number]    Back to top of Results      CBC and differential (03/09/2017 5:03 PM)  Only the most recent of 2 results within the time period is included.    CBC and differential (03/09/2017 5:03 PM)  Specimen Performing Laboratory  Blood - Vein [facility]      CBC and differential (03/09/2017 5:03 PM)  Narrative  The following orders were created for panel order CBC and differential.    Procedure                               Abnormality         Status                       ---------                               -----------         ------                       CBC auto differential([id number])         Abnormal            Final result                     Please view results for these tests on the individual orders.    Back to top of Results      Lipase (03/09/2017 5:03 PM)  Lipase (03/09/2017 5:03 PM)  Component Value Ref Range  Lipase Level 32 8 - 78 U/L    Lipase (03/09/2017 5:03 PM)  Specimen Performing Laboratory  Blood - Vein [facility] [address] [facility].    [place] [id number]    Back to top of Results      Comprehensive metabolic panel (03/09/2017 5:03 PM)  Comprehensive metabolic panel (03/09/2017 5:03 PM)  Component Value Ref Range  Sodium Level 137 136 - 145 mmol/L  Potassium Level 4.3 3.5 - 5.1 mmol/L  Chloride Level 106 100 - 109 mmol/L  CO2 Level 20 (L) 22 - 33 mmol/L  Glucose Level 77 70 - 100 mg/dL  Blood Urea Nitrogen Level 11 5 - 25 mg/dL  Creatinine Level 0.82 0.57 - 1.25 mg/dL  GFR-AA 98 mL/min/1.73mSquared  GFR-KATHY 81 mL/min/1.73mSquared  Calcium Level 8.2 (L) 8.8 - 10.6 mg/dL  Protein Total 10.9 (H) 6.0 - 8.3 g/dL  Albumin Level 2.8 (L) 3.5 - 5.0  g/dL  Bilirubin Total 0.3 0.2 - 1.2 mg/dL  Alkaline Phosphatase Level 51 40 - 150 U/L  SGOT (AST) 19 10 - 58 U/L  SGPT (ALT) 5 5 - 50 U/L  Anion Gap 11 8 - 16 mmol/L    Comprehensive metabolic panel (03/09/2017 5:03 PM)  Specimen Performing Laboratory  Blood - Vein [facility] [address]    [place] [id number]      Comprehensive metabolic panel (03/09/2017 5:03 PM)  Narrative      MDRD Calculated GFR estimate resulted by System based on Creatinine Level.  Adjusted for gender and age.  Results calculated in ml/min/1.73mSquared.  Reference Range: >= 60 ml/min/1.73mSquared.    Back to top of Results      XR Abdomen Flat and Erect (01/09/2017 12:50 PM)  XR Abdomen Flat and Erect (01/09/2017 12:50 PM)  Specimen Performing Laboratory    EXTERNAL RADIOLOGY      XR Abdomen Flat and Erect (01/09/2017 12:50 PM)  Narrative  EXAM:  XR Abdomen Flat Erect including frontal view the chest        CLINICAL HISTORY:  Constipation        FINDINGS:        An AP portable view of the chest is submitted along with supine and upright, AP views of the abdomen.        There is a ICD in place via a left subclavian approach. Heart is normal in size. Lungs are clear. No suspicious mass or pleural effusion is seen.        There are surgical clips in right upper quadrant consistent with prior cholecystectomy. There is gas and feces throughout the large bowel and rectum in a normal pattern. Scattered small bowel gas collections are seen however no significant small bowel distention is appreciated. No obvious mass or free intraperitoneal air is identified.        IMPRESSION:    1. Nonspecific bowel gas pattern. No obvious mass noted.    Was Iodinated IV Contrast Used? (excluding gastroview) No                                                   Transcribed By:  KG    Transcribed Date/Time:  09-JAN-2017 13:26                                               Electronically Signed By:  Shadi Fuller MD                                            Proxied for:  Shadi Fuller MD                                                   Signed Date/Time:  09-JAN-2017 13:28    Back to top of Results      Basic metabolic panel (01/09/2017 10:51 AM)  Basic metabolic panel (01/09/2017 10:51 AM)  Component Value Ref Range  Creatinine, Ser 0.81 0.57 - 1.25 mg/dL    Basic metabolic panel (01/09/2017 10:51 AM)  Specimen Performing Laboratory  Blood [facility]    Back to top of Results  Advance Directives    Patient has advance directives. For more information, please contact:    [facility]    [address] [address]    [place] [phone]    Document Information  Primary Care Provider  John Vides MD (Mar. 10, 2017 - Present)  [phone] (Work)  [phone] (Fax)  [address] [facility] [place] [address]   Other Service Providers  Priti Roa MD  [phone] (Work)  [phone] (Fax)  [address] [facility]  [address]  [place] [address]   Nalini Rodriguez MD  [phone] (Work)  [phone] (Fax)  [address] [place] [address]   Viviana Berry MD  [phone] (Work)  [phone] (Fax)  [address] [facility]  [address]  [place] [address]   Guilherme Lou MD  [phone] (Work)  [phone] (Fax)  [facility]  [facility]  [address]  [place] [address]   Document Coverage Dates  Oct. 06, 1984 - Mar. 21, 2017   Organization  [facility]  [phone] (Work)  [address] [address]  [place] [id number]               Scribe Attestation:   Scribe #1: I performed the above scribed service and the documentation accurately describes the services I performed. I attest to the accuracy of the note.    Attending:   Physician Attestation Statement for Scribe #1: I, Lexii Ernst, , personally performed the services described in this documentation, as scribed by Marlen Crook, in my presence, and it  is both accurate and complete.          Clinical Impression       ICD-10-CM ICD-9-CM   1. Symptomatic anemia D64.9 285.9   2. Chronic abdominal pain R10.9 789.00    G89.29 338.29   3. Cardiomyopathy, unspecified type I42.9 425.4   4. Nausea and vomiting, intractability of vomiting not specified, unspecified vomiting type R11.2 787.01       Disposition:   Disposition: Placed in Observation  Condition: Janina Ernst,   03/21/17 3441

## 2017-03-22 LAB
ALBUMIN SERPL BCP-MCNC: 2.2 G/DL
ALP SERPL-CCNC: 40 U/L
ALT SERPL W/O P-5'-P-CCNC: 5 U/L
ANION GAP SERPL CALC-SCNC: 8 MMOL/L
AST SERPL-CCNC: 13 U/L
BASOPHILS # BLD AUTO: 0.01 K/UL
BASOPHILS # BLD AUTO: 0.01 K/UL
BASOPHILS NFR BLD: 0.2 %
BASOPHILS NFR BLD: 0.2 %
BILIRUB SERPL-MCNC: 0.3 MG/DL
BLD PROD TYP BPU: NORMAL
BLD PROD TYP BPU: NORMAL
BLOOD UNIT EXPIRATION DATE: NORMAL
BLOOD UNIT EXPIRATION DATE: NORMAL
BLOOD UNIT TYPE CODE: 5100
BLOOD UNIT TYPE CODE: 5100
BLOOD UNIT TYPE: NORMAL
BLOOD UNIT TYPE: NORMAL
BUN SERPL-MCNC: 9 MG/DL
CALCIUM SERPL-MCNC: 7.9 MG/DL
CD3+CD4+ CELLS # BLD: 53 CELLS/UL (ref 300–1400)
CD3+CD4+ CELLS NFR BLD: 8.6 % (ref 28–57)
CHLORIDE SERPL-SCNC: 112 MMOL/L
CO2 SERPL-SCNC: 19 MMOL/L
CODING SYSTEM: NORMAL
CODING SYSTEM: NORMAL
CREAT SERPL-MCNC: 0.8 MG/DL
DIFFERENTIAL METHOD: ABNORMAL
DIFFERENTIAL METHOD: ABNORMAL
DISPENSE STATUS: NORMAL
DISPENSE STATUS: NORMAL
EOSINOPHIL # BLD AUTO: 0.1 K/UL
EOSINOPHIL # BLD AUTO: 0.1 K/UL
EOSINOPHIL NFR BLD: 1.3 %
EOSINOPHIL NFR BLD: 1.3 %
ERYTHROCYTE [DISTWIDTH] IN BLOOD BY AUTOMATED COUNT: 19.1 %
ERYTHROCYTE [DISTWIDTH] IN BLOOD BY AUTOMATED COUNT: 19.1 %
EST. GFR  (AFRICAN AMERICAN): >60 ML/MIN/1.73 M^2
EST. GFR  (NON AFRICAN AMERICAN): >60 ML/MIN/1.73 M^2
FOLATE SERPL-MCNC: 9.4 NG/ML
GLUCOSE SERPL-MCNC: 75 MG/DL
HCT VFR BLD AUTO: 23.9 %
HCT VFR BLD AUTO: 23.9 %
HGB BLD-MCNC: 7.9 G/DL
HGB BLD-MCNC: 7.9 G/DL
INR PPP: 1
IRON SERPL-MCNC: 18 UG/DL
LYMPHOCYTES # BLD AUTO: 0.8 K/UL
LYMPHOCYTES # BLD AUTO: 0.8 K/UL
LYMPHOCYTES NFR BLD: 14.7 %
LYMPHOCYTES NFR BLD: 14.7 %
MAGNESIUM SERPL-MCNC: 1.7 MG/DL
MCH RBC QN AUTO: 28.1 PG
MCH RBC QN AUTO: 28.1 PG
MCHC RBC AUTO-ENTMCNC: 33.1 %
MCHC RBC AUTO-ENTMCNC: 33.1 %
MCV RBC AUTO: 85 FL
MCV RBC AUTO: 85 FL
MONOCYTES # BLD AUTO: 0.4 K/UL
MONOCYTES # BLD AUTO: 0.4 K/UL
MONOCYTES NFR BLD: 7.1 %
MONOCYTES NFR BLD: 7.1 %
NEUTROPHILS # BLD AUTO: 4.1 K/UL
NEUTROPHILS # BLD AUTO: 4.1 K/UL
NEUTROPHILS NFR BLD: 77.8 %
NEUTROPHILS NFR BLD: 77.8 %
NUM UNITS TRANS PACKED RBC: NORMAL
NUM UNITS TRANS PACKED RBC: NORMAL
PHOSPHATE SERPL-MCNC: 3.5 MG/DL
PLATELET # BLD AUTO: 225 K/UL
PLATELET # BLD AUTO: 225 K/UL
PMV BLD AUTO: 9.6 FL
PMV BLD AUTO: 9.6 FL
POTASSIUM SERPL-SCNC: 3.4 MMOL/L
PROT SERPL-MCNC: 8.7 G/DL
PROTHROMBIN TIME: 10.7 SEC
RBC # BLD AUTO: 2.81 M/UL
RBC # BLD AUTO: 2.81 M/UL
SATURATED IRON: 12 %
SODIUM SERPL-SCNC: 139 MMOL/L
TOTAL IRON BINDING CAPACITY: 152 UG/DL
TRANSFERRIN SERPL-MCNC: 103 MG/DL
WBC # BLD AUTO: 5.39 K/UL
WBC # BLD AUTO: 5.39 K/UL

## 2017-03-22 PROCEDURE — 25000003 PHARM REV CODE 250: Performed by: ANESTHESIOLOGY

## 2017-03-22 PROCEDURE — 21400001 HC TELEMETRY ROOM

## 2017-03-22 PROCEDURE — 36415 COLL VENOUS BLD VENIPUNCTURE: CPT

## 2017-03-22 PROCEDURE — 25000003 PHARM REV CODE 250: Performed by: INTERNAL MEDICINE

## 2017-03-22 PROCEDURE — 63600175 PHARM REV CODE 636 W HCPCS: Performed by: NURSE PRACTITIONER

## 2017-03-22 PROCEDURE — 80053 COMPREHEN METABOLIC PANEL: CPT

## 2017-03-22 PROCEDURE — 83735 ASSAY OF MAGNESIUM: CPT

## 2017-03-22 PROCEDURE — 84100 ASSAY OF PHOSPHORUS: CPT

## 2017-03-22 PROCEDURE — 85610 PROTHROMBIN TIME: CPT

## 2017-03-22 PROCEDURE — P9016 RBC LEUKOCYTES REDUCED: HCPCS

## 2017-03-22 PROCEDURE — 86920 COMPATIBILITY TEST SPIN: CPT

## 2017-03-22 PROCEDURE — 85025 COMPLETE CBC W/AUTO DIFF WBC: CPT

## 2017-03-22 RX ADMIN — DIPHENHYDRAMINE HYDROCHLORIDE 25 MG: 50 INJECTION, SOLUTION INTRAMUSCULAR; INTRAVENOUS at 06:03

## 2017-03-22 RX ADMIN — SODIUM CHLORIDE, PRESERVATIVE FREE 3 ML: 5 INJECTION INTRAVENOUS at 10:03

## 2017-03-22 RX ADMIN — DIPHENHYDRAMINE HYDROCHLORIDE 25 MG: 50 INJECTION, SOLUTION INTRAMUSCULAR; INTRAVENOUS at 09:03

## 2017-03-22 RX ADMIN — HYDROMORPHONE HYDROCHLORIDE 0.5 MG: 1 INJECTION, SOLUTION INTRAMUSCULAR; INTRAVENOUS; SUBCUTANEOUS at 02:03

## 2017-03-22 RX ADMIN — DIPHENHYDRAMINE HYDROCHLORIDE 25 MG: 50 INJECTION, SOLUTION INTRAMUSCULAR; INTRAVENOUS at 02:03

## 2017-03-22 RX ADMIN — PANTOPRAZOLE SODIUM 40 MG: 40 TABLET, DELAYED RELEASE ORAL at 08:03

## 2017-03-22 RX ADMIN — HYDROMORPHONE HYDROCHLORIDE 0.5 MG: 1 INJECTION, SOLUTION INTRAMUSCULAR; INTRAVENOUS; SUBCUTANEOUS at 09:03

## 2017-03-22 RX ADMIN — HYDROMORPHONE HYDROCHLORIDE 0.5 MG: 1 INJECTION, SOLUTION INTRAMUSCULAR; INTRAVENOUS; SUBCUTANEOUS at 06:03

## 2017-03-22 NOTE — PHYSICIAN QUERY
PT Name: Wang Kebede  MR #: [id number]     Physician Query Form - Documentation Clarification      CDS/: Kylie Foster               Contact information:[phone]    This form is a permanent document in the medical record.     Query Date: March 22, 2017  By submitting this query, we are merely seeking further clarification of documentation. Please utilize your independent clinical judgment when addressing the question(s) below.    (The Medical record reflects the following:)      Supporting Clinical Findings Location in Medical Record   Symptomatic anemia   Transfuse 2 u prbc's   Monitor H/H   Likely due to Heavy Menses     H&H=5.2/15.9 H&P          Lab 3-21                                                                                  Doctor, Please specify diagnosis or diagnoses associated with above clinical findings.    Provider Use Only      Please further specify the Type of Anemia.  Acute Blood Loss Anemia - POA                                                                                                                       [  ] Unable to determine

## 2017-03-22 NOTE — PLAN OF CARE
Problem: Patient Care Overview  Goal: Plan of Care Review  Outcome: Ongoing (interventions implemented as appropriate)  Patient received to units of blood this shift. Left EJ 18 gauge clean dry intact. Vital signs stable. Pain controlled with medication. No s/s of distress. Will continue to monitor.

## 2017-03-22 NOTE — PLAN OF CARE
Problem: Patient Care Overview  Goal: Plan of Care Review  Outcome: Ongoing (interventions implemented as appropriate)  Patient remains free from falls, fall precaution in place. Independent.   VS stable. C/o abdominal pain, cramping. 2 units blood infused. Tolerating diet well.  No other C/O at this time.Call bell and belongings within reach, reminded to call for assistance.

## 2017-03-22 NOTE — SUBJECTIVE & OBJECTIVE
Interval History: Pt better color Improving. States she is hungry.    Review of Systems   Constitutional: Positive for activity change and fatigue. Negative for unexpected weight change.   HENT: Negative.  Negative for trouble swallowing.    Eyes: Negative.    Respiratory: Negative.  Negative for cough, shortness of breath and wheezing.    Cardiovascular: Negative.  Negative for chest pain.   Gastrointestinal: Negative.    Endocrine: Negative.    Genitourinary: Negative.    Musculoskeletal: Negative.    Skin: Negative.    Allergic/Immunologic: Negative.    Neurological: Positive for weakness. Negative for dizziness.   Hematological: Negative.    Psychiatric/Behavioral: Negative.    All other systems reviewed and are negative.    Objective:     Vital Signs (Most Recent):  Temp: 99.5 °F (37.5 °C) (03/22/17 1148)  Pulse: 96 (03/22/17 1148)  Resp: 18 (03/22/17 1148)  BP: 128/76 (03/22/17 1148)  SpO2: 95 % (03/22/17 1148) Vital Signs (24h Range):  Temp:  [98.3 °F (36.8 °C)-100.4 °F (38 °C)] 99.5 °F (37.5 °C)  Pulse:  [] 96  Resp:  [16-18] 18  SpO2:  [92 %-99 %] 95 %  BP: ()/(66-86) 128/76     Weight: 47.6 kg (104 lb 15 oz)  Body mass index is 20.49 kg/(m^2).    Intake/Output Summary (Last 24 hours) at 03/22/17 1259  Last data filed at 03/21/17 1735   Gross per 24 hour   Intake              250 ml   Output                0 ml   Net              250 ml      Physical Exam   Constitutional: She is oriented to person, place, and time. She appears well-developed and well-nourished.   HENT:   Head: Normocephalic and atraumatic.   Eyes: EOM are normal. Pupils are equal, round, and reactive to light.   Neck: Normal range of motion. Neck supple. No JVD present.   Cardiovascular: Normal rate, regular rhythm, normal heart sounds and intact distal pulses.    No murmur heard.  Pulmonary/Chest: Effort normal and breath sounds normal. No respiratory distress. She has no wheezes.   Abdominal: Soft. Bowel sounds are normal.  She exhibits no distension and no mass. There is tenderness. There is no rebound and no guarding.   Musculoskeletal: Normal range of motion. She exhibits no edema or tenderness.   Neurological: She is alert and oriented to person, place, and time. She has normal reflexes. She displays normal reflexes. No cranial nerve deficit. She exhibits normal muscle tone. Coordination normal.   Skin: Skin is warm and dry. Rash noted. No erythema. There is pallor.   Psychiatric: She has a normal mood and affect. Her behavior is normal. Judgment and thought content normal.   Nursing note and vitals reviewed.      Significant Labs:   BMP:   Recent Labs  Lab 03/22/17  0517   GLU 75      K 3.4*   *   CO2 19*   BUN 9   CREATININE 0.8   CALCIUM 7.9*   MG 1.7     CBC:   Recent Labs  Lab 03/21/17  1310 03/22/17  0517   WBC 5.08 5.39  5.39   HGB 5.2* 7.9*  7.9*   HCT 15.9* 23.9*  23.9*    225  225     CMP:   Recent Labs  Lab 03/21/17  1310 03/22/17  0517    139   K 3.5 3.4*   * 112*   CO2 20* 19*   GLU 77 75   BUN 11 9   CREATININE 0.8 0.8   CALCIUM 8.2* 7.9*   PROT 9.1* 8.7*   ALBUMIN 2.3* 2.2*   BILITOT 0.2 0.3   ALKPHOS 43* 40*   AST 13 13   ALT 5* 5*   ANIONGAP 9 8   EGFRNONAA >60 >60     Cardiac Markers:   Recent Labs  Lab 03/21/17  1310   BNP 62     All pertinent labs within the past 24 hours have been reviewed.    Significant Imaging: I have reviewed all pertinent imaging results/findings within the past 24 hours.

## 2017-03-22 NOTE — PROGRESS NOTES
[facility] Hospital Medicine  Progress Note    Patient Name: Wang Kebede  MRN: [id number]  Patient Class: IP- Inpatient   Admission Date: 3/21/2017  Length of Stay: 1 days  Attending Physician: Marcus García MD  Primary Care Provider: Bronson Koenig MD        Subjective:     Principal Problem:Symptomatic anemia    HPI:  History of Present Illness: Wang Kebede is a 31 y.o. female patient with HIV, sickle cell anemia, chronic abdominal pain, narcotic bowel syndrome, who presents to the Emergency Department for generalized abdominal pain and N/V. The pain has been constant and moderate in severity. She c/o having sxs since the beginning of March. Patient has been unable to tolerate PO; she states that she took her HIV medications at 5 PM yesterday and vomited around 7:30 PM. Patient was last able to tolerate her HIV medications, Prezcobix and Descovy, last month. N/V was not relieved with Phenergan tablets. Pt evaluated in ED for diarrhea on 3/6, d/c with Imodium. No diarrhea currently. Pt was evaluated at [facility] 11 days ago but reports that she is still in pain. Pt reports losing 10 lbs within 2 months. No modifying factors reported. She also c/o SOB with exertion. Patient denies fever, chills, sore throat, CP, cough, extremity weakness/numbness, dizziness, syncope, diarrhea, hematemesis, and all other sxs at this time. She reports that she is on her menses most of the time but it is not on currently; she last bled 3 days ago. Patient is unsure of her CD4 count and viral load. She is scheduled for complete hysterectomy with Dr. Zamora (OB/GYN) and presented to Cardiology today for clearance for the procedure. Pt was sent to ED by Cardiology for further evaluation. No further complaints or concerns at this time.     Hospital Course:       Interval History: Pt better color Improving. States she is hungry.    Review of Systems   Constitutional: Positive for activity change  and fatigue. Negative for unexpected weight change.   HENT: Negative.  Negative for trouble swallowing.    Eyes: Negative.    Respiratory: Negative.  Negative for cough, shortness of breath and wheezing.    Cardiovascular: Negative.  Negative for chest pain.   Gastrointestinal: Negative.    Endocrine: Negative.    Genitourinary: Negative.    Musculoskeletal: Negative.    Skin: Negative.    Allergic/Immunologic: Negative.    Neurological: Positive for weakness. Negative for dizziness.   Hematological: Negative.    Psychiatric/Behavioral: Negative.    All other systems reviewed and are negative.    Objective:     Vital Signs (Most Recent):  Temp: 99.5 °F (37.5 °C) (03/22/17 1148)  Pulse: 96 (03/22/17 1148)  Resp: 18 (03/22/17 1148)  BP: 128/76 (03/22/17 1148)  SpO2: 95 % (03/22/17 1148) Vital Signs (24h Range):  Temp:  [98.3 °F (36.8 °C)-100.4 °F (38 °C)] 99.5 °F (37.5 °C)  Pulse:  [] 96  Resp:  [16-18] 18  SpO2:  [92 %-99 %] 95 %  BP: ()/(66-86) 128/76     Weight: 47.6 kg (104 lb 15 oz)  Body mass index is 20.49 kg/(m^2).    Intake/Output Summary (Last 24 hours) at 03/22/17 1259  Last data filed at 03/21/17 1735   Gross per 24 hour   Intake              250 ml   Output                0 ml   Net              250 ml      Physical Exam   Constitutional: She is oriented to person, place, and time. She appears well-developed and well-nourished.   HENT:   Head: Normocephalic and atraumatic.   Eyes: EOM are normal. Pupils are equal, round, and reactive to light.   Neck: Normal range of motion. Neck supple. No JVD present.   Cardiovascular: Normal rate, regular rhythm, normal heart sounds and intact distal pulses.    No murmur heard.  Pulmonary/Chest: Effort normal and breath sounds normal. No respiratory distress. She has no wheezes.   Abdominal: Soft. Bowel sounds are normal. She exhibits no distension and no mass. There is tenderness. There is no rebound and no guarding.   Musculoskeletal: Normal range of  motion. She exhibits no edema or tenderness.   Neurological: She is alert and oriented to person, place, and time. She has normal reflexes. She displays normal reflexes. No cranial nerve deficit. She exhibits normal muscle tone. Coordination normal.   Skin: Skin is warm and dry. Rash noted. No erythema. There is pallor.   Psychiatric: She has a normal mood and affect. Her behavior is normal. Judgment and thought content normal.   Nursing note and vitals reviewed.      Significant Labs:   BMP:   Recent Labs  Lab 03/22/17  0517   GLU 75      K 3.4*   *   CO2 19*   BUN 9   CREATININE 0.8   CALCIUM 7.9*   MG 1.7     CBC:   Recent Labs  Lab 03/21/17  1310 03/22/17  0517   WBC 5.08 5.39  5.39   HGB 5.2* 7.9*  7.9*   HCT 15.9* 23.9*  23.9*    225  225     CMP:   Recent Labs  Lab 03/21/17  1310 03/22/17  0517    139   K 3.5 3.4*   * 112*   CO2 20* 19*   GLU 77 75   BUN 11 9   CREATININE 0.8 0.8   CALCIUM 8.2* 7.9*   PROT 9.1* 8.7*   ALBUMIN 2.3* 2.2*   BILITOT 0.2 0.3   ALKPHOS 43* 40*   AST 13 13   ALT 5* 5*   ANIONGAP 9 8   EGFRNONAA >60 >60     Cardiac Markers:   Recent Labs  Lab 03/21/17  1310   BNP 62     All pertinent labs within the past 24 hours have been reviewed.    Significant Imaging: I have reviewed all pertinent imaging results/findings within the past 24 hours.    Assessment/Plan:      * Symptomatic anemia  Transfuse 2 u prbc's  Monitor H/H  Likely due to Heavy Menses  Plan for hysterectomy as O/P      Narcotic bowel syndrome  Avoid Narcotic Usage        HIV (human immunodeficiency virus infection)  ID on Consult  Follow Up with her ID doc Dr Pope on D/C  Check CD4  Viral Load  Resume HIV Meds      Menorrhagia with irregular cycle  Transfuse as indicated  Followup with Dr Zamora as scheduled      Cardiomyopathy  ICD Interrogated  Monitor on Tele      H/O noncompliance with medical treatment, presenting hazards to health  Counseled on Medical Compliance need      VTE Risk  Mitigation         Ordered     Medium Risk of VTE  Once      03/21/17 2022     Place KALA hose  Until discontinued      03/21/17 2022     Reason for No Pharmacological VTE Prophylaxis  Once      03/21/17 2022     Reason for No Pharmacological VTE Prophylaxis  Once      03/21/17 2022     Place sequential compression device  Until discontinued      03/21/17 2022          Marcus García MD  Department of Hospital Medicine   [facility]

## 2017-03-22 NOTE — PLAN OF CARE
Met with patient at the bedside to assess for discharge needs.  Patient responded minimally verbally, but would nod yes or no.  She also shrugged her shoulders when asked if she had transportation and refused to answer verbally.  CM left contact information for any needs/questions.     03/22/17 1315   Discharge Assessment   Assessment Type Discharge Planning Assessment   Confirmed/corrected address and phone number on facesheet? Yes   Assessment information obtained from? Patient;Medical Record   Expected Length of Stay (days) (TBD)   Communicated expected length of stay with patient/caregiver yes   Prior to hospitilization cognitive status: Alert/Oriented   Prior to hospitalization functional status: Independent   Current cognitive status: Alert/Oriented   Current Functional Status: Independent   Arrived From admitted as an inpatient;home or self-care   Lives With (patient stated she has support at home)   Able to Return to Prior Arrangements yes   Is patient able to care for self after discharge? Yes   How many people do you have in your home that can help with your care after discharge? (patient did not give information)   Who are your caregiver(s) and their phone number(s)? Lyssa Kebede, mother [phone]   Patient's perception of discharge disposition admitted as an inpatient;home or selfcare   Readmission Within The Last 30 Days no previous admission in last 30 days   Patient currently being followed by outpatient case management? No   Patient currently receives home health services? No   Does the patient currently use HME? No   Patient currently receives private duty nursing? No   Patient currently receives any other outside agency services? No   Equipment Currently Used at Home none   Do you have any problems affording any of your prescribed medications? No   Is the patient taking medications as prescribed? yes   Do you have any financial concerns preventing you from receiving the healthcare you need? No    Does the patient have transportation to healthcare appointments? Yes   Transportation Available (Patient shrugged shoulders and would not verbally respond to question)   On Dialysis? No   Does the patient receive services at the [facility]mad[facility] Clinic? No   Are there any open cases? No   Discharge Plan A Home with family   Discharge Plan B Home with family   Patient/Family In Agreement With Plan yes

## 2017-03-23 VITALS
HEIGHT: 60 IN | WEIGHT: 104.94 LBS | TEMPERATURE: 99 F | SYSTOLIC BLOOD PRESSURE: 136 MMHG | HEART RATE: 72 BPM | RESPIRATION RATE: 18 BRPM | OXYGEN SATURATION: 93 % | DIASTOLIC BLOOD PRESSURE: 48 MMHG | BODY MASS INDEX: 20.6 KG/M2

## 2017-03-23 LAB
HIV UQ DATE RECEIVED: ABNORMAL
HIV UQ DATE REPORTED: ABNORMAL
HIV1 RNA # SERPL NAA+PROBE: ABNORMAL COPIES/ML
HIV1 RNA SERPL NAA+PROBE-LOG#: 4.83 LOG (10) COPIES/ML
HIV1 RNA SERPL QL NAA+PROBE: DETECTED

## 2017-03-23 PROCEDURE — 25000003 PHARM REV CODE 250: Performed by: INTERNAL MEDICINE

## 2017-03-23 PROCEDURE — 97802 MEDICAL NUTRITION INDIV IN: CPT

## 2017-03-23 PROCEDURE — 63600175 PHARM REV CODE 636 W HCPCS: Performed by: NURSE PRACTITIONER

## 2017-03-23 RX ADMIN — DIPHENHYDRAMINE HYDROCHLORIDE 25 MG: 50 INJECTION, SOLUTION INTRAMUSCULAR; INTRAVENOUS at 12:03

## 2017-03-23 RX ADMIN — HYDROMORPHONE HYDROCHLORIDE 0.5 MG: 1 INJECTION, SOLUTION INTRAMUSCULAR; INTRAVENOUS; SUBCUTANEOUS at 12:03

## 2017-03-23 RX ADMIN — PANTOPRAZOLE SODIUM 40 MG: 40 TABLET, DELAYED RELEASE ORAL at 08:03

## 2017-03-23 RX ADMIN — DIPHENHYDRAMINE HYDROCHLORIDE 25 MG: 50 INJECTION, SOLUTION INTRAMUSCULAR; INTRAVENOUS at 06:03

## 2017-03-23 RX ADMIN — HYDROMORPHONE HYDROCHLORIDE 0.5 MG: 1 INJECTION, SOLUTION INTRAMUSCULAR; INTRAVENOUS; SUBCUTANEOUS at 06:03

## 2017-03-23 NOTE — PLAN OF CARE
Patient to discharge home self care     03/23/17 1327   Final Note   Assessment Type Final Discharge Note   Discharge Disposition Home   Hospital Follow Up  Appt(s) scheduled? Yes   Offered [facility] -- Bedside Delivery? n/a   Discharge/Hospital Encounter Summary to ([facility]) PCP n/a   Referral to Outpatient Case Management complete? n/a   Referral to / orders for Home Health Complete? n/a   30 day supply of medicines given at discharge, if documented non-compliance / non-adherence? n/a

## 2017-03-23 NOTE — CONSULTS
[facility]   Adult Nutrition  Consult Note    SUMMARY     Recommendations  Recommendation/Intervention: 1. Continue Regular diet with adequate fiber and fluid intake    2. Add Boost Plus Chocolate 1 can BID     3. Encourage smaller meals and more frequent snacks to promote adequate intake without consuming too much at one time.      4. Consider once daily multistrain probiotic      5. Consider once daily multivitamin  Goals: 1. Intake of 75% of meals, snacks, and supplements or better.    2. No further weight loss  Nutrition Goal Status: new    Nutrition Discharge Plan  Home on Regular diet with adequate fiber and fluid intake with oral nutrition supplement as needed and once daily probiotic and multivitamin    Reason for Assessment  Reason for Assessment: physician consult (PCM)  Diagnosis:  (abdominal Pain, Narctoic Bowel, Anemia, HIV)  Relevent Medical History: AICD, Anemia, Sickle Cell, Cardiac Arrest   General Information Comments: Patient reports weight loss over the last few weeks with inability to keep down any food. She was also trying to consume Boost for additional calories/protein but also vomiting this as well.     Nutrition Prescription Ordered  Current Diet Order: Regular     Nutrition Risk Screen  Nutrition Risk Screen: reduced oral intake over the last month    Nutrition/Diet History  Typical Food/Fluid Intake: Poor appetite and intake with N/V and weight loss x3-4 weeks prior to admission  Food Preferences: none reported  Supplemental Drinks or Food Habits:  (was consuming Boost Plus Ch)    Labs/Tests/Procedures/Meds  Pertinent Labs Reviewed: reviewed  Pertinent Labs Comments: K 3.4, Cl 112, Alb 2.2  Pertinent Medications Reviewed: reviewed    Physical Findings  Overall Physical Appearance:  (thin)  Oral/Mouth Cavity: WDL  Skin:  (Giuliano 20)    Anthropometrics  Height (inches): 60 in  Weight Method: Standard Scale  Weight (kg): 47.6 kg  Ideal Body Weight (IBW), Female: 100 lb  %  Ideal Body Weight, Female (lb): 104.94 lb  BMI (kg/m2): 20.49  BMI Grade: 18.5-24.9 - normal  Usual Body Weight (UBW), k.2 kg  % Usual Body Weight: 89.47  Weight Loss: unintentional (10.5% in 1 month)    Estimated/Assessed Needs  Weight Used For Calorie Calculations: 48 kg (105 lb 13.1 oz)   Height (cm): 152.4 cm  Energy Need Method: [place]-St Jeor (x1.3=1442)  30 kcal/kg (kcal): 1440 and 35 kcal/kg (kcal): 1680   Weight Used For Protein Calculations: 48 kg (105 lb 13.1 oz)  1.2 gm Protein (gm): 57.72 and 1.5 gm Protein (gm): 72.15  Fluid Need Method: RDA Method (1ml/caron or as needed)    Monitor and Evaluation  Food and Nutrient Intake: food and beverage intake  Food and Nutrient Adminstration:  (supplements)  Anthropometric Measurements: weight  Biochemical Data, Medical Tests and Procedures: electrolyte and renal panel, glucose/endocrine profile  Nutrition-Focused Physical Findings: overall appearance    Nutrition Risk  Level of Risk:  (F/up 2x weekly)    Nutrition Follow-Up  RD Follow-up?: Yes    Assessment and Plan  Severe protein-calorie malnutrition  Nutrition Problem:   Severe Malnutrition  .  % Intake of Estimated Energy Needs: 0 - 25 % Patient reports consuming less than 25% of her usual intake for greater than 3 weeks  Malnutrition Reason: Acute  Severe Weight Loss Acute Illness/Injury: Other: greater than 10% in 1 month    Etiology/Related to:   Altered GI tract function and decreased appetite    As evidenced by:  Patient reports N/V poor intake and weight loss of 10.5% in 3 weeks.    Treatment Strategy:   1. Regular diet, promote adequate fiber (fruits, vegetables, whole grains) and fluid intake, smaller meals, frequent snacks.  2. Boost Plus Chocolate BID  3. Multi-strain Probiotic  4. Multivitamin     Nutrition Diagnosis Status:   New

## 2017-03-23 NOTE — DISCHARGE SUMMARY
[facility] Hospital Medicine  Discharge Summary      Patient Name: Wang Kebede  MRN: [id number]  Admission Date: 3/21/2017  Hospital Length of Stay: 2 days  Discharge Date and Time: 3/23/2017  1:37 PM  Attending Physician: Dr. Marcus García   Discharging Provider: Renuka Hilton NP  Primary Care Provider: Bronson Koenig MD      HPI:   History of Present Illness: Wang Kebede is a 31 y.o. female patient with HIV, sickle cell anemia, chronic abdominal pain, narcotic bowel syndrome, who presents to the Emergency Department for generalized abdominal pain and N/V. The pain has been constant and moderate in severity. She c/o having sxs since the beginning of March. Patient has been unable to tolerate PO; she states that she took her HIV medications at 5 PM yesterday and vomited around 7:30 PM. Patient was last able to tolerate her HIV medications, Prezcobix and Descovy, last month. N/V was not relieved with Phenergan tablets. Pt evaluated in ED for diarrhea on 3/6, d/c with Imodium. No diarrhea currently. Pt was evaluated at [facility] 11 days ago but reports that she is still in pain. Pt reports losing 10 lbs within 2 months. No modifying factors reported. She also c/o SOB with exertion. Patient denies fever, chills, sore throat, CP, cough, extremity weakness/ numbness, dizziness, syncope, diarrhea, hematemesis, and all other sxs at this time. She reports that she is on her menses most of the time but it is not on currently; she last bled 3 days ago. Patient is unsure of her CD4 count and viral load. She is scheduled for complete hysterectomy with Dr. Zamora (OB/GYN) and presented to Cardiology today for clearance for the procedure. Pt was sent to ED by Cardiology for further evaluation. No further complaints or concerns at this time.     * No surgery found *      Indwelling Lines/Drains at time of discharge:   Lines/Drains/Airways          No matching active lines, drains, or airways         Hospital Course:   Pt admitted to Telemetry Unit with Symptomatic Anemia likely due to menorrhagia with irregular cycle.  H/H stable post transfusion of PRBCs x 4 units.  Nausea and vomiting improved with antiemetics prn.  Oral intake encouraged.  ID consulted and pt encouraged to remain compliant with current antiviral medication regime.  Pt seen and examined on the date of discharge and deemed suitable to discharge to home accompanied by significant other.  Current medications resumed.  Pt advised to remain hydrated and to use narcotics only when needed to decrease incidence of narcotic bowel syndrome.  Pt instructed to follow up with PCP, Dr. Zamora (OB/GYN), Dr. Vides (ID) on 4/26/17, and establish care with Heme/Onc for management of Sickle cell disease.       Consults:   Consults         Status Ordering Provider     IP consult to dietary  Once     Provider:  (Not yet assigned)    REJI Hanna          Significant Diagnostic Studies:   Imaging Results         CT Abdomen Pelvis  Without Contrast (Final result) Result time:  03/21/17 17:37:19    Final result by Ghanshyam Zapata MD (03/21/17 17:37:19)    Impression:             Moderate atelectasis the lung bases.  Remote cholecystectomy.  Possible prior appendectomy with anastomotic staples adjacent to the cecum.  No evidence of air-fluid levels or bowel obstruction.  No definite acute abdominal abnormality.  Splenomegaly.        All CT scans at this facility use dose modulation, iterative reconstruction, and/or weight based dosing when appropriate to reduce radiation dose to as low as reasonably achievable.       Electronically signed by: GHANSHYAM ZAPATA MD  Date:     03/21/17  Time:    17:37     Narrative:    Exam: CT abdomen and pelvis without intravenous contrast.    Technique: Axial CT images performed through the abdomen and pelvis without intravenous contrast. Multiplanar reformats were performed and interpreted.    Clinical History:  abdominal pain since March 10 - repeated vomiting.       Findings:         Mild dependent atelectasis at the lung bases.    No urolithiasis, hydronephrosis, or perinephric stranding.  The liver appears normal.  Splenomegaly.  The adrenal glands and pancreas are within normal limits.   Remote cholecystectomy.  Remote bowel surgery with a roll of anastomotic staples in the right lower quadrant may be related to prior appendiceal surgery.     No evidence of bowel obstruction or air-fluid levels.    Scattered atherosclerotic calcifications of the distal abdominal aorta and proximal iliac vessels.    The urinary bladder is unremarkable.       No significant osseous abnormality is identified.              Pending Diagnostic Studies:     None        Final Active Diagnoses:    Diagnosis Date Noted POA    PRINCIPAL PROBLEM:  Symptomatic anemia [D64.9] 03/21/2017 Yes    H/O noncompliance with medical treatment, presenting hazards to health [Z91.19] 03/21/2017 Not Applicable    Severe protein-calorie malnutrition [E43] 03/21/2017 Yes    Cardiomyopathy [I42.9] 12/29/2016 Yes    Menorrhagia with irregular cycle [N92.1] 11/02/2016 Yes    HIV (human immunodeficiency virus infection) [Z21]  Yes    Narcotic bowel syndrome [K63.89] 06/27/2016 Yes     Chronic      Problems Resolved During this Admission:    Diagnosis Date Noted Date Resolved POA      Discharged Condition: stable    Disposition: Home or Self Care    Follow Up:  Follow-up Information     Follow up with Bronson Koenig MD In 3 days.    Specialty:  Internal Medicine    Why:  hospital follow up- repeat CBC- may need    Contact information:    [address] [facility] [address] [phone]          Follow up with John Vides MD In 2 weeks.    Specialty:  Infectious Diseases    Contact information:    [address] [facility] [address] [phone]          Follow up with Virginia Olmedo MD In 2 weeks.    Specialty:  Hematology and Oncology    Why:   establish care for continued Sickle Cell Anemia    Contact information:    [address] [place] [address]  [phone]          Follow up with Emanuel Zamora MD In 1 week.    Specialties:  Obstetrics, Obstetrics and Gynecology    Why:  follow up for hysterectomy    Contact information:    [address]  [place] [address] [phone]          Patient Instructions:     Diet general     Activity as tolerated     Call MD for:  temperature >100.4     Call MD for:  persistent nausea and vomiting or diarrhea     Call MD for:  severe uncontrolled pain     Call MD for:  increased confusion or weakness     Call MD for:  persistent dizziness, light-headedness, or visual disturbances     Call MD for:  difficulty breathing or increased cough       Medications:  Reconciled Home Medications:   Discharge Medication List as of 3/23/2017 12:56 PM      CONTINUE these medications which have NOT CHANGED    Details   darunavir-cobicistat (PREZCOBIX) 800-150 mg-mg Tab Take 800 mg by mouth once daily., Until Discontinued, Historical Med      emtricitabine-tenofovir alafen (DESCOVY) 200-25 mg Tab Take 200 each by mouth once daily., Until Discontinued, Historical Med      hydrocodone-acetaminophen 10-325mg (NORCO)  mg Tab Take 1 tablet by mouth every 6 (six) hours as needed for Pain., Until Discontinued, Historical Med      ondansetron (ZOFRAN-ODT) 4 MG TbDL Take 8 mg by mouth every 12 (twelve) hours., Until Discontinued, Historical Med      promethazine (PHENERGAN) 25 MG suppository Place 25 mg rectally every 6 (six) hours as needed for Nausea., Until Discontinued, Historical Med           Time spent on the discharge of patient: 40 minutes    Renuka Hilton NP  Department of Hospital Medicine  [facility]

## 2017-03-23 NOTE — ASSESSMENT & PLAN NOTE
Nutrition Problem:   Severe Malnutrition  .  % Intake of Estimated Energy Needs: 0 - 25 % Patient reports consuming less than 25% of her usual intake for greater than 3 weeks  Malnutrition Reason: Acute  Severe Weight Loss Acute Illness/Injury: Other: greater than 10% in 1 month    Etiology/Related to:   Altered GI tract function and decreased appetite    As evidenced by:  Patient reports N/V poor intake and weight loss of 10.5% in 3 weeks.    Treatment Strategy:   1. Regular diet, promote adequate fiber (fruits, vegetables, whole grains) and fluid intake, smaller meals, frequent snacks.  2. Boost Plus Chocolate BID  3. Multi-strain Probiotic  4. Multivitamin     Nutrition Diagnosis Status:   New

## 2017-03-23 NOTE — PLAN OF CARE
Problem: Patient Care Overview  Goal: Plan of Care Review  Outcome: Ongoing (interventions implemented as appropriate)  Patient doing well this shift. Pain controlled with medication. VS stable. POC reviewed. Will continue to monitor.

## 2017-03-23 NOTE — PROGRESS NOTES
D/C IV, catheter in tact.D/C tele monitor.  Pt given prescription information and encouraged to follow up as scheduled.  Pt verbalize understanding of discharge instructions, both written and verbal.

## 2017-03-23 NOTE — PHYSICIAN QUERY
PT Name: Wang Kebede  MR #: [id number]    Physician Query Form - Nutrition Clarification     CDS/: Kylie Foster               Contact information: [phone]    This form is a permanent document in the medical record.     Query Date: March 23, 2017    By submitting this query, we are merely seeking further clarification of documentation.. Please utilize your independent clinical judgment when addressing the question(s) below.    The Medical record contains the following:   Indicators  Supporting Clinical Findings Location in Medical Record   x % of Estimated Energy Intake over a time frame from p.o., TF, or TPN % Intake of Estimated    Energy Needs: 0 - 25 % Patient reports consuming less than 25% of her usual intake for greater than 3 weeks  RD 3-23   x Weight Status over a time frame weight loss of 10.5% in 3 weeks RD 3-23    Subcutaneous Fat and/or Muscle Loss      Fluid Accumulation or Edema      Reduced  Strength     x Wt / BMI / Usual Body Weight Cw=997  BMI=20.5 Anthropometrics 3-21    Delayed Wound Healing / Failure to Thrive     x Acute or Chronic Illness Symptomatic Anemia,HIV,Cancer, Sickle Cell H&P    Medication     x Treatment Treatment Strategy:   1. Regular diet, promote adequate fiber (fruits, vegetables, whole grains) and fluid intake, smaller meals, frequent snacks.   2. Boost Plus Chocolate BID   3. Multi-strain Probiotic   4. Multivitamin  RD 3-23   x Other Severe Malnutrition    Typical Food/Fluid Intake: Poor appetite and intake with N/V and weight loss x3-4 weeks prior to admission  RD 3-23     AND / ASPEN Clinical Characteristics (October 2011)  A minimum of two characteristics is recommended for diagnosing either moderate or severe malnutrition   Mild Malnutrition Moderate Malnutrition Severe Malnutrition   Energy Intake from p.o., TF or TPN. < 75% intake of estimated energy needs for less than 7 days < 75% intake of estimated energy needs for greater than 7 days  < 50% intake of estimated energy needs for > 5 days   Weight Loss 1-2% in 1 month  5% in 3 months  7.5% in 6 months  10% in 1 year 1-2 % in 1 week  5% in 1 month  7.5% in 3 months  10% in 6 months  20% in 1 year > 2% in 1 week  > 5% in 1 month  > 7.5% in 3 months  > 10% in 6 months  > 20% in 1 year   Physical Findings     None *Mild subcutaneous fat and/or muscle loss  *Mild fluid accumulation  *Stage II decubitus  *Surgical wound or non-healing wound *Mod/severe subcutaneous fat and/or muscle loss  *Mod/severe fluid accumulation  *Stage III or IV decubitus  *Non-healing surgical wound     Provider, please specify diagnosis or diagnoses associated with above clinical findings.    [ ] Moderate Protein-Calorie Malnutrition  [x ] Severe Protein-Calorie Malnutrition  [ ] Other Nutritional Diagnosis (please specify): ____________________________________  [ ] Clinically Undetermined    Please document in your progress notes daily for the duration of treatment until resolved and include in your discharge summary.

## 2017-03-23 NOTE — PLAN OF CARE
Problem: Patient Care Overview  Goal: Plan of Care Review  Outcome: Ongoing (interventions implemented as appropriate)  Recommendation/Intervention: 1. Continue Regular diet with adequate fiber and fluid intake 2. Add Boost Plus Chocolate 1 can BID 3. Encourage smaller meals and more frequent snacks to promote adequate intake without consuming too much at one time. 4. Consider once daily multistrain probiotic 5. Consider once daily multivitamin  Goals: 1. Intake of 75% of meals, snacks, and supplements or better. 2. No further weight loss  Nutrition Goal Status: new
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
## 2017-03-24 NOTE — ASSESSMENT & PLAN NOTE
She has AIDs.  PEG tube can be considered to assist with HAART therapy if she cannot tolerate PO regime.  Will follow with her HIV physician -Dr Vides on discharge.

## 2017-03-27 ENCOUNTER — TELEPHONE (OUTPATIENT)
Dept: OBSTETRICS AND GYNECOLOGY | Facility: CLINIC | Age: 32
End: 2017-03-27

## 2017-03-27 NOTE — TELEPHONE ENCOUNTER
----- Message from Gifty Mckenzie sent at 3/27/2017 10:41 AM CDT -----  Contact: pt   Pt calling in reference to a surgery clearance,,, please call pt back at [phone]

## 2017-03-28 NOTE — TELEPHONE ENCOUNTER
Spoke prashant Piña (she is doing fine and no problems currently) re need for cardiac clearance before surgery. She will reschedule cardiology appt asap and call to schedule surgery once cleared.

## 2017-03-29 ENCOUNTER — OFFICE VISIT (OUTPATIENT)
Dept: CARDIOLOGY | Facility: CLINIC | Age: 32
End: 2017-03-29
Payer: MEDICARE

## 2017-03-29 VITALS
HEART RATE: 90 BPM | WEIGHT: 104.5 LBS | HEIGHT: 60 IN | SYSTOLIC BLOOD PRESSURE: 92 MMHG | DIASTOLIC BLOOD PRESSURE: 66 MMHG | BODY MASS INDEX: 20.52 KG/M2

## 2017-03-29 DIAGNOSIS — Z01.818 PRE-OP EVALUATION: Primary | ICD-10-CM

## 2017-03-29 DIAGNOSIS — D64.9 SYMPTOMATIC ANEMIA: ICD-10-CM

## 2017-03-29 DIAGNOSIS — Z95.810 S/P IMPLANTATION OF AUTOMATIC CARDIOVERTER/DEFIBRILLATOR (AICD): ICD-10-CM

## 2017-03-29 PROCEDURE — 99999 PR PBB SHADOW E&M-EST. PATIENT-LVL III: CPT | Mod: PBBFAC,,, | Performed by: PHYSICIAN ASSISTANT

## 2017-03-29 PROCEDURE — 99214 OFFICE O/P EST MOD 30 MIN: CPT | Mod: S$GLB,,, | Performed by: PHYSICIAN ASSISTANT

## 2017-03-29 PROCEDURE — 1160F RVW MEDS BY RX/DR IN RCRD: CPT | Mod: S$GLB,,, | Performed by: PHYSICIAN ASSISTANT

## 2017-03-29 NOTE — MR AVS SNAPSHOT
[facility]  [address]  [place] [address]  Phone: [phone]  Fax: [phone]                  Wang Kebede   3/29/2017 9:30 AM   Office Visit    Description:  Female : 1985   Provider:  KIN Manzo   Department:  [facility]           Diagnoses this Visit        Comments    Pre-op evaluation    -  Primary     Symptomatic anemia         S/P implantation of automatic cardioverter/defibrillator (AICD)                To Do List           Future Appointments        Provider Department Dept Phone    2017 8:20 AM Virginia Olmedo MD [facility] Hemotology Oncology [phone]    2017 1:00 PM Emanuel Zamora MD [facility] OB/ -[phone]    2017 1:30 PM PREOP, NURSE [place] [facility] - [phone]    2017 2:00 PM LABORATORY, [address] [facility] [phone]    2017 2:10 PM SPECIMEN, [address] [facility] [phone]      Your Future Surgeries/Procedures     2017   Surgery with Emanuel Zamora MD   [facility]    [address] [address] [phone]              Goals (5 Years of Data)     None      [facility]     [facility] Care Line - 24/ Assistance  Registered nurses in the [facility] provide clinical advisement, health education, appointment booking, and other advisory services.  Call for this free service at [phone].             Medications           Message regarding Medications     Verify the changes and/or additions to your medication regime listed below are the same as discussed with your clinician today.  If any of these changes or additions are incorrect, please notify your healthcare provider.             Verify that the below list of medications is an accurate representation of the medications you are currently taking.  If none reported, the list may  be blank. If incorrect, please contact your healthcare provider. Carry this list with you in case of emergency.           Current Medications     darunavir-cobicistat (PREZCOBIX) 800-150 mg-mg Tab Take 800 mg by mouth once daily.    emtricitabine-tenofovir alafen (DESCOVY) 200-25 mg Tab Take 200 each by mouth once daily.    hydrocodone-acetaminophen 10-325mg (NORCO)  mg Tab Take 1 tablet by mouth every 6 (six) hours as needed for Pain.    ondansetron (ZOFRAN-ODT) 4 MG TbDL Take 8 mg by mouth every 12 (twelve) hours.    promethazine (PHENERGAN) 25 MG suppository Place 25 mg rectally every 6 (six) hours as needed for Nausea.           Clinical Reference Information           Your Vitals Were     BP Pulse Height Weight Last Period BMI    92/66 90 5' (1.524 m) 47.4 kg (104 lb 8 oz) (LMP Unknown) 20.41 kg/m2      Blood Pressure          Most Recent Value    BP  92/66      Allergies as of 3/29/2017     Iodine And Iodide Containing Products    Bactrim [Sulfamethoxazole-trimethoprim]    Morphine      Immunizations Administered on Date of Encounter - 3/29/2017     None      Language Assistance Services     ATTENTION: Language assistance services are available, free of charge. Please call [phone].      ATENCIÓN: Si minnie contreras, tiene a bazan disposición servicios gratuitos de asistencia lingüística. Llame al [phone].     [facility] Ý: N?u b?n nói Ti?ng Vi?t, có các d?ch v? h? tr? ngôn ng? mi?n phí dành cho b?n. G?i s? [phone].         [facility] complies with applicable Federal civil rights laws and does not discriminate on the basis of race, color, national origin, age, disability, or sex.

## 2017-03-29 NOTE — TELEPHONE ENCOUNTER
Patient came into [facility] office stated, that she had been cleared for surgery on 4/11/2017. Also stated that cardiologist place and clearance note in her chart.

## 2017-03-29 NOTE — PROGRESS NOTES
Subjective:    Patient ID:  Wang Kebede is a 31 y.o. female who presents for follow-up of pre-op evaluation    HPI  Ms. Kebede is a 31 year old female patient with a PMHx of HIV, V-tach s/p AICD placement, ([employer]), ? Cardiomyopathy, and sickle cell anemia who presents today for preo-op clearance. Patient is scheduled to undergo hysterectomy by Dr. Zamora in near future for severe cervical dysplasia. She was previously seen by me last week and sent to ED for further evaluation due to malaise and decreased appetite with nausea/vomiting. She was found to be severely anemic and transfused and then discharged. She returns today and states she is doing ok. She is still complaining of abdominal pain, reports appetite has slightly increased. Cardiac wise, remains stable. Denies any chest pain, SOB, or other anginal signs or symptoms. No palpitations, lightheadedness, dizziness, near syncope, or syncope. She states she is trying to be compliant with her medications. She is scheduled to see hematology next week. EKG from last week reviewed, non-specific T wave abnl. AICD interrogation from hospital was negative for any events. 2D echo 12/16 showed normal EF.      Review of Systems   Constitution: Positive for decreased appetite. Negative for chills, fever, weakness and malaise/fatigue.   HENT: Negative for congestion, headaches, hoarse voice and sore throat.    Eyes: Negative for blurred vision and discharge.   Cardiovascular: Negative for chest pain, claudication, cyanosis, dyspnea on exertion, irregular heartbeat, leg swelling, near-syncope, orthopnea, palpitations and paroxysmal nocturnal dyspnea.   Respiratory: Negative for cough, hemoptysis, shortness of breath, snoring, sputum production and wheezing.    Endocrine: Negative for cold intolerance and heat intolerance.   Hematologic/Lymphatic: Negative for bleeding problem. Does not bruise/bleed easily.   Skin: Negative for rash.   Musculoskeletal:  Negative for arthritis, back pain, joint pain, joint swelling, muscle cramps, muscle weakness and myalgias.   Gastrointestinal: Positive for bloating. Negative for abdominal pain, constipation, diarrhea, heartburn, melena and nausea.   Genitourinary: Negative for hematuria.   Neurological: Negative for dizziness, focal weakness, light-headedness, loss of balance, numbness, paresthesias and seizures.   Psychiatric/Behavioral: Negative for memory loss. The patient does not have insomnia.    Allergic/Immunologic: Negative for hives.       BP 92/66  Pulse 90  Ht 5' (1.524 m)  Wt 47.4 kg (104 lb 8 oz)  LMP  (LMP Unknown)  BMI 20.41 kg/m2    Objective:    Physical Exam   Constitutional: She is oriented to person, place, and time. She appears well-developed and well-nourished. No distress.   HENT:   Head: Normocephalic and atraumatic.   Eyes: Pupils are equal, round, and reactive to light. Right eye exhibits no discharge. Left eye exhibits no discharge.   Neck: Neck supple. No JVD present. No tracheal deviation present. No thyromegaly present.   Cardiovascular: Normal rate, regular rhythm, S1 normal, S2 normal and normal heart sounds.  PMI is not displaced.  Exam reveals no gallop, no S3, no S4 and no friction rub.    No murmur heard.  Pulses:       Radial pulses are 2+ on the right side, and 2+ on the left side.   Pulmonary/Chest: Effort normal and breath sounds normal. No respiratory distress. She has no wheezes. She has no rales.   AICD site well-healed   Abdominal: Soft. She exhibits no distension. There is no tenderness. There is no rebound.   Musculoskeletal: She exhibits no edema.   Neurological: She is alert and oriented to person, place, and time.   Skin: Skin is warm and dry. She is not diaphoretic. No erythema.   Psychiatric: She has a normal mood and affect. Her behavior is normal. Thought content normal.   Nursing note and vitals reviewed.         2D Echo CONCLUSIONS     1 - Concentric hypertrophy.     2  - Normal left ventricular systolic function (EF 55-60%).     3 - Normal right ventricular systolic function .     4 - The estimated PA systolic pressure is greater than 30 mmHg.     5 - Mild tricuspid regurgitation.     6 - Trivial pericardial effusion.   Assessment:       1. Pre-op evaluation    2. Symptomatic anemia    3. S/P implantation of automatic cardioverter/defibrillator (AICD)       Patient doing clinically well. Stable CV wise. Still having some abdominal pain and decreased appetite, but somewhat improved. No contraindications from cardiac standpoint to proceed with surgery, patient at moderate risk of iveth and post op CV complications given multiple co-morbidities. Recommend close monitoring. Needs to be cleared by hematology as well. Will discuss whether or not AICD needs to be temporarily disabled given use of cautery.   Plan:   -Continue current meds  -Needs hematology clearance  -Increased risk of iveth and post op CV complications given multiple co-morbidities  -Will discuss AICD with Dr. Victor as well    Chart reviewed. Dr. Victor agrees with plan as outlined above.     Discussed with Dr. Victor, as cautery is at a distance from AICD site, no adjustments needed.

## 2017-04-04 ENCOUNTER — OFFICE VISIT (OUTPATIENT)
Dept: OBSTETRICS AND GYNECOLOGY | Facility: CLINIC | Age: 32
End: 2017-04-04
Payer: MEDICARE

## 2017-04-04 ENCOUNTER — INITIAL CONSULT (OUTPATIENT)
Dept: HEMATOLOGY/ONCOLOGY | Facility: CLINIC | Age: 32
End: 2017-04-04
Payer: MEDICARE

## 2017-04-04 ENCOUNTER — HOSPITAL ENCOUNTER (OUTPATIENT)
Dept: PREADMISSION TESTING | Facility: HOSPITAL | Age: 32
Discharge: HOME | End: 2017-04-04
Attending: OBSTETRICS & GYNECOLOGY
Payer: MEDICARE

## 2017-04-04 VITALS
BODY MASS INDEX: 20.42 KG/M2 | HEIGHT: 60 IN | SYSTOLIC BLOOD PRESSURE: 104 MMHG | WEIGHT: 104.25 LBS | HEIGHT: 60 IN | DIASTOLIC BLOOD PRESSURE: 66 MMHG | BODY MASS INDEX: 20.47 KG/M2 | WEIGHT: 104 LBS

## 2017-04-04 VITALS
SYSTOLIC BLOOD PRESSURE: 90 MMHG | HEART RATE: 113 BPM | HEIGHT: 60 IN | DIASTOLIC BLOOD PRESSURE: 60 MMHG | BODY MASS INDEX: 20.09 KG/M2 | TEMPERATURE: 97 F | OXYGEN SATURATION: 99 % | WEIGHT: 102.31 LBS | RESPIRATION RATE: 18 BRPM

## 2017-04-04 DIAGNOSIS — N92.1 MENORRHAGIA WITH IRREGULAR CYCLE: ICD-10-CM

## 2017-04-04 DIAGNOSIS — D57.1 HB-SS DISEASE WITHOUT CRISIS: Primary | ICD-10-CM

## 2017-04-04 DIAGNOSIS — D06.9 SEVERE DYSPLASIA OF CERVIX (CIN III): Primary | ICD-10-CM

## 2017-04-04 DIAGNOSIS — N87.1 DYSPLASIA OF CERVIX, HIGH GRADE CIN 2: Primary | ICD-10-CM

## 2017-04-04 DIAGNOSIS — N94.6 DYSMENORRHEA: ICD-10-CM

## 2017-04-04 DIAGNOSIS — D06.9 SEVERE DYSPLASIA OF CERVIX (CIN III): ICD-10-CM

## 2017-04-04 PROCEDURE — 99215 OFFICE O/P EST HI 40 MIN: CPT | Mod: S$GLB,,, | Performed by: INTERNAL MEDICINE

## 2017-04-04 PROCEDURE — 99499 UNLISTED E&M SERVICE: CPT | Mod: S$GLB,,, | Performed by: INTERNAL MEDICINE

## 2017-04-04 PROCEDURE — 1160F RVW MEDS BY RX/DR IN RCRD: CPT | Mod: S$GLB,,, | Performed by: INTERNAL MEDICINE

## 2017-04-04 PROCEDURE — 99213 OFFICE O/P EST LOW 20 MIN: CPT | Mod: 24,S$GLB,, | Performed by: OBSTETRICS & GYNECOLOGY

## 2017-04-04 PROCEDURE — 99999 PR PBB SHADOW E&M-EST. PATIENT-LVL III: CPT | Mod: PBBFAC,,, | Performed by: OBSTETRICS & GYNECOLOGY

## 2017-04-04 PROCEDURE — 1160F RVW MEDS BY RX/DR IN RCRD: CPT | Mod: S$GLB,,, | Performed by: OBSTETRICS & GYNECOLOGY

## 2017-04-04 PROCEDURE — 99999 PR PBB SHADOW E&M-EST. PATIENT-LVL III: CPT | Mod: PBBFAC,,, | Performed by: INTERNAL MEDICINE

## 2017-04-04 RX ORDER — ACETAMINOPHEN 10 MG/ML
1000 INJECTION, SOLUTION INTRAVENOUS
Status: CANCELLED | OUTPATIENT
Start: 2017-04-04 | End: 2017-04-04

## 2017-04-04 RX ORDER — SODIUM CHLORIDE, SODIUM LACTATE, POTASSIUM CHLORIDE, CALCIUM CHLORIDE 600; 310; 30; 20 MG/100ML; MG/100ML; MG/100ML; MG/100ML
INJECTION, SOLUTION INTRAVENOUS CONTINUOUS
Status: CANCELLED | OUTPATIENT
Start: 2017-04-04
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
## 2017-04-04 NOTE — LETTER
April 4, 2017      Sera Suggs MD  [address] [place] [address]           [facility]  [address] [place] [address]  Phone: [phone]  Fax: [phone]          Patient: Wang Kebede   MR Number: [id number]   YOB: 1985   Date of Visit: 4/4/2017       Dear Dr. Sera Suggs:    Thank you for referring Wang Kebede to me for evaluation. Attached you will find relevant portions of my assessment and plan of care.    If you have questions, please do not hesitate to call me. I look forward to following Wang Kebede along with you.    Sincerely,    Virginia Olmedo MD    Enclosure  CC:  No Recipients    If you would like to receive this communication electronically, please contact [email or web address] or [phone] to request more information on [employer] Link access.    For providers and/or their staff who would like to refer a patient to [facility], please contact us through our one-stop-shop provider referral line, [facility], at [phone].    If you feel you have received this communication in error or would no longer like to receive these types of communications, please e-mail [email or web address]

## 2017-04-04 NOTE — IP AVS SNAPSHOT
[facility]  [address] [place] [address] [address] [place] [address]           Patient Discharge Instructions  Our goal is to set you up for success. This packet includes information on your condition, medications, and your home care. It will help you care for yourself to prevent having to return to the hospital.     Please ask your nurse if you have any questions.      There are many details to remember when preparing for your surgery. Here is what you will need to do, please ask your nurse if there are more specific instructions and if you have any questions:    1. Before procedure Do not smoke or drink alcoholic beverages 24 hours prior to your procedure. Do not eat or drink anything 8 hours before your procedure - this includes gum, mints, and candy.     2. Day of procedure Please remove all jewelry for the procedure. If you wear contact lenses, dentures, hearing aids or glasses, bring a container to put them in during your surgery and give to a family member.  If your doctor has scheduled you for an overnight stay, bring a small overnight bag with any personal items that you need.      3. After procedure  Make arrangements in advance for transportation home by a responsible adult. It is not safe to drive a vehicle during the 24 hours following surgery.     PLEASE NOTE: You may be contacted the day before your surgery to confirm your surgery date and arrival time. The Surgery schedule has many variables which may affect the time of your surgery case. Family members should be available if your surgery time changes.           [facility]  Unless otherwise directed by your provider, please   contact [facility], our nurse care line   that is available for 24/7 assistance.     [phone] (toll-free)     Registered nurses in the [facility]   provide: appointment scheduling, clinical advisement, health education, and other advisory services.                  ** Verify the list of  medication(s) below is accurate and up to date. Carry this with you in case of emergency. If your medications have changed, please notify your healthcare provider.             Medication List      TAKE these medications        Additional Info                      DESCOVY 200-25 mg Tab   Refills:  0   Dose:  200 each   Generic drug:  emtricitabine-tenofovir alafen    Instructions:  Take 200 each by mouth every evening.     Begin Date    AM    Noon    PM    Bedtime       PREZCOBIX 800-150 mg-mg Tab   Refills:  0   Dose:  800 mg   Generic drug:  darunavir-cobicistat    Instructions:  Take 800 mg by mouth every evening.     Begin Date    AM    Noon    PM    Bedtime                  Please bring to all follow up appointments:    1. A copy of your discharge instructions.  2. All medicines you are currently taking in their original bottles.  3. Identification and insurance card.    Please arrive 15 minutes ahead of scheduled appointment time.    Please call 24 hours in advance if you must reschedule your appointment and/or time.        Your Scheduled Appointments     Apr 04, 2017  2:10 PM CDT   Urine with SPECIMEN, [facility]   [facility] ([facility]    [address] [address]   [phone]            May 08, 2017  7:40 AM CDT   Established Patient Visit with Virginia Olmedo MD   [facility] ([facility])    [address] [address] [phone]            May 08, 2017  7:50 AM CDT   Non-Fasting Lab with LABORATORY, [facility]    [address] [address] [phone]              Your Future Surgeries/Procedures     Apr 11, 2017   Surgery with Emanuel Zamora MD   [facility])    [address] [id number] [phone]                  Discharge Instructions       To confirm, Your doctor has  instructed you that surgery is scheduled for 4/11/17 at  07:00am.       Please report to [facility], 1st floor, main lobby by 05:30am Pre admit nurse will call day prior to surgery for final arrival time.      INSTRUCTIONS IMPORTANT!!!   Do not eat, drink, or smoke after 12 midnight-including water. OK to brush teeth, no gum, candy or mints!    ¨ Take only these medicines with a small swallow of water-morning of surgery.  None    Pre operative instructions:  Please review the Pre-Operative Instruction booklet that you were given.        Bathing Instructions--See page 6 in the Pre-operative booklet.      Prevention of surgical site infections:     -Keep incisions clean and dry.   -Do not soak/submerge incisions in water until completely healed.   -Do not apply lotions, powders, creams, or deodorants to site.   -Always make sure hands are cleaned with antibacterial soap/ alcohol-based                 prior to touching the surgical site.  (This includes doctors,                 nurses, staff, and yourself.)    Signs and symptoms:   -Redness and pain around the area where you had surgery   -Drainage of cloudy fluid from your surgical wound   -Fever over 100.4       I have read or had read and explained to me, and understand the above information.  Additional comments or instructions:  Received a copy of Pre-operative instructions booklet, FAQ surgical site infection sheet, and packets of hibiclens (if indicated).      Discharge References/Attachments     HYSTERECTOMY (LAPAROSCOPIC), DISCHARGE INSTRUCTIONS (ENGLISH)    HYSTERECTOMY, CARING FOR YOURSELF AFTER  (ENGLISH)    HYSTERECTOMY, RECOVERING (ENGLISH)    HYSTERECTOMY, UNDERSTANDING LAPAROSCOPIC  (ENGLISH)    ANESTHESIA: GENERAL ANESTHESIA (ENGLISH)    POSTSURGICAL DEEP BREATHING, DISCHARGE INSTRUCTIONS (ENGLISH)        Admission Information     Date & Time Provider Department CSN    4/4/2017  1:30 PM Emanuel Zamora MD [facility] [id number]      Care Providers     Provider Role Specialty Primary office phone    Emanuel Zamora MD Attending Provider Obstetrics [phone]      Your Vitals Were     Height Weight Last Period BMI       5' (1.524 m) 47.2 kg (104 lb) (LMP Unknown) 20.31 kg/m2       Recent Lab Values     No lab values to display.      Allergies as of 4/4/2017        Reactions    Iodine And Iodide Containing Products Anaphylaxis    Pt states she coded last time she was administered contrast    Bactrim [Sulfamethoxazole-trimethoprim] Hives    Morphine Itching      Advance Directives     An advance directive is a document which, in the event you are no longer able to make decisions for yourself, tells your healthcare team what kind of treatment you do or do not want to receive, or who you would like to make those decisions for you.  If you do not currently have an advance directive, [facility] encourages you to create one.  For more information call:  [phone]), [phone] ([phone]),  or log on to [email or web address].        Language Assistance Services     ATTENTION: Language assistance services are available, free of charge. Please call [phone].      ATENCIÓN: Si habla español, tiene a bazan disposición servicios gratuitos de asistencia lingüística. Llame al [phone].     CHÚ Ý: N?u b?n nói Ti?ng Vi?t, có các d?ch v? h? tr? ngôn ng? mi?n phí dành cho b?n. G?i s? [phone].         [facility] complies with applicable Federal civil rights laws and does not discriminate on the basis of race, color, national origin, age, disability, or sex.

## 2017-04-04 NOTE — DISCHARGE INSTRUCTIONS
To confirm, Your doctor has instructed you that surgery is scheduled for 4/11/17 at  07:00am.       Please report to [facility], 1st floor, main lobby by 05:30am Pre admit nurse will call day prior to surgery for final arrival time.      INSTRUCTIONS IMPORTANT!!!   Do not eat, drink, or smoke after 12 midnight-including water. OK to brush teeth, no gum, candy or mints!    ¨ Take only these medicines with a small swallow of water-morning of surgery.  None    Pre operative instructions:  Please review the Pre-Operative Instruction booklet that you were given.        Bathing Instructions--See page 6 in the Pre-operative booklet.      Prevention of surgical site infections:     -Keep incisions clean and dry.   -Do not soak/submerge incisions in water until completely healed.   -Do not apply lotions, powders, creams, or deodorants to site.   -Always make sure hands are cleaned with antibacterial soap/ alcohol-based                 prior to touching the surgical site.  (This includes doctors,                 nurses, staff, and yourself.)    Signs and symptoms:   -Redness and pain around the area where you had surgery   -Drainage of cloudy fluid from your surgical wound   -Fever over 100.4       I have read or had read and explained to me, and understand the above information.  Additional comments or instructions:  Received a copy of Pre-operative instructions booklet, FAQ surgical site infection sheet, and packets of hibiclens (if indicated).
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
## 2017-04-10 ENCOUNTER — ANESTHESIA EVENT (OUTPATIENT)
Dept: SURGERY | Facility: HOSPITAL | Age: 32
End: 2017-04-10
Payer: MEDICARE

## 2017-04-11 ENCOUNTER — ANESTHESIA (OUTPATIENT)
Dept: SURGERY | Facility: HOSPITAL | Age: 32
End: 2017-04-11
Payer: MEDICARE

## 2017-04-11 ENCOUNTER — SURGERY (OUTPATIENT)
Age: 32
End: 2017-04-11

## 2017-04-11 ENCOUNTER — HOSPITAL ENCOUNTER (OUTPATIENT)
Facility: HOSPITAL | Age: 32
Discharge: HOME | End: 2017-04-11
Attending: OBSTETRICS & GYNECOLOGY | Admitting: OBSTETRICS & GYNECOLOGY
Payer: MEDICARE

## 2017-04-11 VITALS
SYSTOLIC BLOOD PRESSURE: 104 MMHG | OXYGEN SATURATION: 94 % | DIASTOLIC BLOOD PRESSURE: 70 MMHG | RESPIRATION RATE: 18 BRPM | WEIGHT: 104 LBS | TEMPERATURE: 98 F | HEART RATE: 73 BPM | HEIGHT: 60 IN | BODY MASS INDEX: 20.42 KG/M2

## 2017-04-11 DIAGNOSIS — N92.1 MENORRHAGIA WITH IRREGULAR CYCLE: ICD-10-CM

## 2017-04-11 DIAGNOSIS — R10.2 PELVIC PAIN IN FEMALE: Primary | ICD-10-CM

## 2017-04-11 DIAGNOSIS — N87.1 DYSPLASIA OF CERVIX, HIGH GRADE CIN 2: ICD-10-CM

## 2017-04-11 DIAGNOSIS — N94.6 DYSMENORRHEA: ICD-10-CM

## 2017-04-11 LAB
ABO + RH BLD: NORMAL
B-HCG UR QL: NEGATIVE
BLD GP AB SCN CELLS X3 SERPL QL: NORMAL
CTP QC/QA: YES

## 2017-04-11 PROCEDURE — 27201423 OPTIME MED/SURG SUP & DEVICES STERILE SUPPLY: Performed by: OBSTETRICS & GYNECOLOGY

## 2017-04-11 PROCEDURE — 81025 URINE PREGNANCY TEST: CPT | Performed by: OBSTETRICS & GYNECOLOGY

## 2017-04-11 PROCEDURE — 63600175 PHARM REV CODE 636 W HCPCS: Performed by: OBSTETRICS & GYNECOLOGY

## 2017-04-11 PROCEDURE — 36000713 HC OR TIME LEV V EA ADD 15 MIN: Performed by: OBSTETRICS & GYNECOLOGY

## 2017-04-11 PROCEDURE — 37000008 HC ANESTHESIA 1ST 15 MINUTES: Performed by: OBSTETRICS & GYNECOLOGY

## 2017-04-11 PROCEDURE — 25000003 PHARM REV CODE 250: Performed by: OBSTETRICS & GYNECOLOGY

## 2017-04-11 PROCEDURE — 58570 TLH UTERUS 250 G OR LESS: CPT | Mod: ,,, | Performed by: OBSTETRICS & GYNECOLOGY

## 2017-04-11 PROCEDURE — 88307 TISSUE EXAM BY PATHOLOGIST: CPT | Performed by: PATHOLOGY

## 2017-04-11 PROCEDURE — 86901 BLOOD TYPING SEROLOGIC RH(D): CPT

## 2017-04-11 PROCEDURE — 25000003 PHARM REV CODE 250: Performed by: NURSE ANESTHETIST, CERTIFIED REGISTERED

## 2017-04-11 PROCEDURE — 63600175 PHARM REV CODE 636 W HCPCS: Performed by: NURSE ANESTHETIST, CERTIFIED REGISTERED

## 2017-04-11 PROCEDURE — 71000039 HC RECOVERY, EACH ADD'L HOUR: Performed by: OBSTETRICS & GYNECOLOGY

## 2017-04-11 PROCEDURE — 88307 TISSUE EXAM BY PATHOLOGIST: CPT | Mod: 26,,, | Performed by: PATHOLOGY

## 2017-04-11 PROCEDURE — 37000009 HC ANESTHESIA EA ADD 15 MINS: Performed by: OBSTETRICS & GYNECOLOGY

## 2017-04-11 PROCEDURE — 71000033 HC RECOVERY, INTIAL HOUR: Performed by: OBSTETRICS & GYNECOLOGY

## 2017-04-11 PROCEDURE — C2628 CATHETER, OCCLUSION: HCPCS | Performed by: OBSTETRICS & GYNECOLOGY

## 2017-04-11 PROCEDURE — 86900 BLOOD TYPING SEROLOGIC ABO: CPT

## 2017-04-11 PROCEDURE — 36000712 HC OR TIME LEV V 1ST 15 MIN: Performed by: OBSTETRICS & GYNECOLOGY

## 2017-04-11 PROCEDURE — 63600175 PHARM REV CODE 636 W HCPCS: Performed by: ANESTHESIOLOGY

## 2017-04-11 RX ORDER — IBUPROFEN 800 MG/1
800 TABLET ORAL EVERY 8 HOURS
Status: DISCONTINUED | OUTPATIENT
Start: 2017-04-12 | End: 2017-04-11 | Stop reason: HOSPADM

## 2017-04-11 RX ORDER — HYDROCODONE BITARTRATE AND ACETAMINOPHEN 5; 325 MG/1; MG/1
1 TABLET ORAL EVERY 4 HOURS PRN
Status: DISCONTINUED | OUTPATIENT
Start: 2017-04-11 | End: 2017-04-11 | Stop reason: HOSPADM

## 2017-04-11 RX ORDER — SODIUM CHLORIDE, SODIUM LACTATE, POTASSIUM CHLORIDE, CALCIUM CHLORIDE 600; 310; 30; 20 MG/100ML; MG/100ML; MG/100ML; MG/100ML
INJECTION, SOLUTION INTRAVENOUS CONTINUOUS
Status: DISCONTINUED | OUTPATIENT
Start: 2017-04-11 | End: 2017-04-11 | Stop reason: SDUPTHER

## 2017-04-11 RX ORDER — CEFAZOLIN SODIUM 1 G/3ML
INJECTION, POWDER, FOR SOLUTION INTRAMUSCULAR; INTRAVENOUS
Status: DISCONTINUED | OUTPATIENT
Start: 2017-04-11 | End: 2017-04-11

## 2017-04-11 RX ORDER — HYDROCODONE BITARTRATE AND ACETAMINOPHEN 5; 325 MG/1; MG/1
1 TABLET ORAL EVERY 4 HOURS PRN
Status: CANCELLED | OUTPATIENT
Start: 2017-04-11

## 2017-04-11 RX ORDER — GLYCOPYRROLATE 0.2 MG/ML
INJECTION INTRAMUSCULAR; INTRAVENOUS
Status: DISCONTINUED | OUTPATIENT
Start: 2017-04-11 | End: 2017-04-11

## 2017-04-11 RX ORDER — OXYCODONE HYDROCHLORIDE 5 MG/1
10 TABLET ORAL EVERY 4 HOURS PRN
Status: CANCELLED | OUTPATIENT
Start: 2017-04-11

## 2017-04-11 RX ORDER — SODIUM CHLORIDE, SODIUM LACTATE, POTASSIUM CHLORIDE, CALCIUM CHLORIDE 600; 310; 30; 20 MG/100ML; MG/100ML; MG/100ML; MG/100ML
INJECTION, SOLUTION INTRAVENOUS CONTINUOUS
Status: DISCONTINUED | OUTPATIENT
Start: 2017-04-11 | End: 2017-04-11 | Stop reason: HOSPADM

## 2017-04-11 RX ORDER — DIPHENHYDRAMINE HYDROCHLORIDE 50 MG/ML
25 INJECTION INTRAMUSCULAR; INTRAVENOUS EVERY 4 HOURS PRN
Status: CANCELLED | OUTPATIENT
Start: 2017-04-11

## 2017-04-11 RX ORDER — FENTANYL CITRATE 50 UG/ML
INJECTION, SOLUTION INTRAMUSCULAR; INTRAVENOUS
Status: DISCONTINUED | OUTPATIENT
Start: 2017-04-11 | End: 2017-04-11

## 2017-04-11 RX ORDER — ONDANSETRON 8 MG/1
8 TABLET, ORALLY DISINTEGRATING ORAL EVERY 8 HOURS PRN
Status: DISCONTINUED | OUTPATIENT
Start: 2017-04-11 | End: 2017-04-11 | Stop reason: SDUPTHER

## 2017-04-11 RX ORDER — DIPHENHYDRAMINE HCL 25 MG
25 CAPSULE ORAL EVERY 4 HOURS PRN
Status: CANCELLED | OUTPATIENT
Start: 2017-04-11

## 2017-04-11 RX ORDER — DIPHENHYDRAMINE HYDROCHLORIDE 50 MG/ML
25 INJECTION INTRAMUSCULAR; INTRAVENOUS EVERY 4 HOURS PRN
Status: DISCONTINUED | OUTPATIENT
Start: 2017-04-11 | End: 2017-04-11 | Stop reason: HOSPADM

## 2017-04-11 RX ORDER — KETOROLAC TROMETHAMINE 30 MG/ML
30 INJECTION, SOLUTION INTRAMUSCULAR; INTRAVENOUS EVERY 6 HOURS
Status: CANCELLED | OUTPATIENT
Start: 2017-04-11 | End: 2017-04-12

## 2017-04-11 RX ORDER — HYDROMORPHONE HYDROCHLORIDE 2 MG/ML
0.2 INJECTION, SOLUTION INTRAMUSCULAR; INTRAVENOUS; SUBCUTANEOUS EVERY 5 MIN PRN
Status: DISCONTINUED | OUTPATIENT
Start: 2017-04-11 | End: 2017-04-11 | Stop reason: HOSPADM

## 2017-04-11 RX ORDER — DIPHENHYDRAMINE HCL 25 MG
25 CAPSULE ORAL EVERY 4 HOURS PRN
Status: DISCONTINUED | OUTPATIENT
Start: 2017-04-11 | End: 2017-04-11 | Stop reason: HOSPADM

## 2017-04-11 RX ORDER — SODIUM CHLORIDE 9 MG/ML
3 INJECTION, SOLUTION INTRAMUSCULAR; INTRAVENOUS; SUBCUTANEOUS
Status: DISCONTINUED | OUTPATIENT
Start: 2017-04-11 | End: 2017-04-11 | Stop reason: HOSPADM

## 2017-04-11 RX ORDER — CEFAZOLIN SODIUM 2 G/50ML
2 SOLUTION INTRAVENOUS
Status: DISCONTINUED | OUTPATIENT
Start: 2017-04-11 | End: 2017-04-11 | Stop reason: HOSPADM

## 2017-04-11 RX ORDER — SUCCINYLCHOLINE CHLORIDE 20 MG/ML
INJECTION INTRAMUSCULAR; INTRAVENOUS
Status: DISCONTINUED | OUTPATIENT
Start: 2017-04-11 | End: 2017-04-11

## 2017-04-11 RX ORDER — MEPERIDINE HYDROCHLORIDE 50 MG/ML
12.5 INJECTION INTRAMUSCULAR; INTRAVENOUS; SUBCUTANEOUS ONCE AS NEEDED
Status: DISCONTINUED | OUTPATIENT
Start: 2017-04-11 | End: 2017-04-11 | Stop reason: HOSPADM

## 2017-04-11 RX ORDER — LIDOCAINE HYDROCHLORIDE 10 MG/ML
INJECTION INFILTRATION; PERINEURAL
Status: DISCONTINUED | OUTPATIENT
Start: 2017-04-11 | End: 2017-04-11

## 2017-04-11 RX ORDER — KETOROLAC TROMETHAMINE 30 MG/ML
30 INJECTION, SOLUTION INTRAMUSCULAR; INTRAVENOUS EVERY 6 HOURS
Status: DISCONTINUED | OUTPATIENT
Start: 2017-04-11 | End: 2017-04-11 | Stop reason: HOSPADM

## 2017-04-11 RX ORDER — PROPOFOL 10 MG/ML
VIAL (ML) INTRAVENOUS
Status: DISCONTINUED | OUTPATIENT
Start: 2017-04-11 | End: 2017-04-11

## 2017-04-11 RX ORDER — ONDANSETRON 2 MG/ML
INJECTION INTRAMUSCULAR; INTRAVENOUS
Status: DISCONTINUED | OUTPATIENT
Start: 2017-04-11 | End: 2017-04-11

## 2017-04-11 RX ORDER — DEXAMETHASONE SODIUM PHOSPHATE 4 MG/ML
INJECTION, SOLUTION INTRA-ARTICULAR; INTRALESIONAL; INTRAMUSCULAR; INTRAVENOUS; SOFT TISSUE
Status: DISCONTINUED | OUTPATIENT
Start: 2017-04-11 | End: 2017-04-11

## 2017-04-11 RX ORDER — DIPHENHYDRAMINE HYDROCHLORIDE 50 MG/ML
12.5 INJECTION INTRAMUSCULAR; INTRAVENOUS EVERY 6 HOURS PRN
Status: DISCONTINUED | OUTPATIENT
Start: 2017-04-11 | End: 2017-04-11 | Stop reason: HOSPADM

## 2017-04-11 RX ORDER — ACETAMINOPHEN 10 MG/ML
1000 INJECTION, SOLUTION INTRAVENOUS
Status: COMPLETED | OUTPATIENT
Start: 2017-04-11 | End: 2017-04-11

## 2017-04-11 RX ORDER — SODIUM CHLORIDE, SODIUM LACTATE, POTASSIUM CHLORIDE, CALCIUM CHLORIDE 600; 310; 30; 20 MG/100ML; MG/100ML; MG/100ML; MG/100ML
INJECTION, SOLUTION INTRAVENOUS CONTINUOUS
Status: CANCELLED | OUTPATIENT
Start: 2017-04-11

## 2017-04-11 RX ORDER — LIDOCAINE HYDROCHLORIDE 10 MG/ML
1 INJECTION, SOLUTION EPIDURAL; INFILTRATION; INTRACAUDAL; PERINEURAL ONCE
Status: DISCONTINUED | OUTPATIENT
Start: 2017-04-11 | End: 2017-04-11 | Stop reason: HOSPADM

## 2017-04-11 RX ORDER — ESMOLOL HYDROCHLORIDE 10 MG/ML
INJECTION INTRAVENOUS
Status: DISCONTINUED | OUTPATIENT
Start: 2017-04-11 | End: 2017-04-11

## 2017-04-11 RX ORDER — NEOSTIGMINE METHYLSULFATE 1 MG/ML
INJECTION, SOLUTION INTRAVENOUS
Status: DISCONTINUED | OUTPATIENT
Start: 2017-04-11 | End: 2017-04-11

## 2017-04-11 RX ORDER — ROCURONIUM BROMIDE 10 MG/ML
INJECTION, SOLUTION INTRAVENOUS
Status: DISCONTINUED | OUTPATIENT
Start: 2017-04-11 | End: 2017-04-11

## 2017-04-11 RX ORDER — MIDAZOLAM HYDROCHLORIDE 1 MG/ML
INJECTION, SOLUTION INTRAMUSCULAR; INTRAVENOUS
Status: DISCONTINUED | OUTPATIENT
Start: 2017-04-11 | End: 2017-04-11

## 2017-04-11 RX ORDER — IBUPROFEN 800 MG/1
800 TABLET ORAL EVERY 8 HOURS
Status: CANCELLED | OUTPATIENT
Start: 2017-04-12

## 2017-04-11 RX ORDER — OXYCODONE HYDROCHLORIDE 5 MG/1
10 TABLET ORAL EVERY 4 HOURS PRN
Status: DISCONTINUED | OUTPATIENT
Start: 2017-04-11 | End: 2017-04-11 | Stop reason: HOSPADM

## 2017-04-11 RX ORDER — ONDANSETRON 8 MG/1
8 TABLET, ORALLY DISINTEGRATING ORAL EVERY 8 HOURS PRN
Status: DISCONTINUED | OUTPATIENT
Start: 2017-04-11 | End: 2017-04-11 | Stop reason: HOSPADM

## 2017-04-11 RX ORDER — OXYCODONE AND ACETAMINOPHEN 5; 325 MG/1; MG/1
1 TABLET ORAL EVERY 8 HOURS PRN
Qty: 18 TABLET | Refills: 0 | Status: ON HOLD | OUTPATIENT
Start: 2017-04-11 | End: 2017-04-21

## 2017-04-11 RX ORDER — OXYCODONE HYDROCHLORIDE 5 MG/1
5 TABLET ORAL
Status: DISCONTINUED | OUTPATIENT
Start: 2017-04-11 | End: 2017-04-11 | Stop reason: HOSPADM

## 2017-04-11 RX ADMIN — FENTANYL CITRATE 50 MCG: 50 INJECTION, SOLUTION INTRAMUSCULAR; INTRAVENOUS at 07:04

## 2017-04-11 RX ADMIN — MIDAZOLAM HYDROCHLORIDE 2 MG: 1 INJECTION, SOLUTION INTRAMUSCULAR; INTRAVENOUS at 06:04

## 2017-04-11 RX ADMIN — KETOROLAC TROMETHAMINE 30 MG: 30 INJECTION, SOLUTION INTRAMUSCULAR at 04:04

## 2017-04-11 RX ADMIN — ESMOLOL HYDROCHLORIDE 10 MG: 10 INJECTION, SOLUTION INTRAVENOUS at 08:04

## 2017-04-11 RX ADMIN — FENTANYL CITRATE 25 MCG: 50 INJECTION, SOLUTION INTRAMUSCULAR; INTRAVENOUS at 08:04

## 2017-04-11 RX ADMIN — KETOROLAC TROMETHAMINE 30 MG: 30 INJECTION, SOLUTION INTRAMUSCULAR at 10:04

## 2017-04-11 RX ADMIN — OXYCODONE HYDROCHLORIDE 10 MG: 5 TABLET ORAL at 06:04

## 2017-04-11 RX ADMIN — SODIUM CHLORIDE, SODIUM LACTATE, POTASSIUM CHLORIDE, AND CALCIUM CHLORIDE: 600; 310; 30; 20 INJECTION, SOLUTION INTRAVENOUS at 08:04

## 2017-04-11 RX ADMIN — NEOSTIGMINE METHYLSULFATE 3 MG: 1 INJECTION INTRAVENOUS at 08:04

## 2017-04-11 RX ADMIN — ROCURONIUM BROMIDE 10 MG: 10 INJECTION, SOLUTION INTRAVENOUS at 07:04

## 2017-04-11 RX ADMIN — HYDROMORPHONE HYDROCHLORIDE 0.2 MG: 2 INJECTION, SOLUTION INTRAMUSCULAR; INTRAVENOUS; SUBCUTANEOUS at 10:04

## 2017-04-11 RX ADMIN — DIPHENHYDRAMINE HYDROCHLORIDE 12.5 MG: 50 INJECTION, SOLUTION INTRAMUSCULAR; INTRAVENOUS at 09:04

## 2017-04-11 RX ADMIN — HYDROMORPHONE HYDROCHLORIDE 0.2 MG: 2 INJECTION, SOLUTION INTRAMUSCULAR; INTRAVENOUS; SUBCUTANEOUS at 09:04

## 2017-04-11 RX ADMIN — PROPOFOL 120 MG: 10 INJECTION, EMULSION INTRAVENOUS at 07:04

## 2017-04-11 RX ADMIN — ROCURONIUM BROMIDE 5 MG: 10 INJECTION, SOLUTION INTRAVENOUS at 07:04

## 2017-04-11 RX ADMIN — ONDANSETRON 8 MG: 8 TABLET, ORALLY DISINTEGRATING ORAL at 06:04

## 2017-04-11 RX ADMIN — LIDOCAINE HYDROCHLORIDE 40 MG: 10 INJECTION, SOLUTION INFILTRATION; PERINEURAL at 07:04

## 2017-04-11 RX ADMIN — ESMOLOL HYDROCHLORIDE 10 MG: 10 INJECTION, SOLUTION INTRAVENOUS at 07:04

## 2017-04-11 RX ADMIN — CEFAZOLIN 2 G: 1 INJECTION, POWDER, FOR SOLUTION INTRAMUSCULAR; INTRAVENOUS at 07:04

## 2017-04-11 RX ADMIN — OXYCODONE HYDROCHLORIDE 10 MG: 5 TABLET ORAL at 01:04

## 2017-04-11 RX ADMIN — ONDANSETRON 4 MG: 2 INJECTION, SOLUTION INTRAMUSCULAR; INTRAVENOUS at 08:04

## 2017-04-11 RX ADMIN — SODIUM CHLORIDE, SODIUM LACTATE, POTASSIUM CHLORIDE, AND CALCIUM CHLORIDE: 600; 310; 30; 20 INJECTION, SOLUTION INTRAVENOUS at 10:04

## 2017-04-11 RX ADMIN — GLYCOPYRROLATE 0.4 MG: 0.2 INJECTION, SOLUTION INTRAMUSCULAR; INTRAVENOUS at 08:04

## 2017-04-11 RX ADMIN — DIPHENHYDRAMINE HYDROCHLORIDE 25 MG: 50 INJECTION, SOLUTION INTRAMUSCULAR; INTRAVENOUS at 01:04

## 2017-04-11 RX ADMIN — SUCCINYLCHOLINE CHLORIDE 100 MG: 20 INJECTION, SOLUTION INTRAMUSCULAR; INTRAVENOUS at 07:04

## 2017-04-11 RX ADMIN — FENTANYL CITRATE 25 MCG: 50 INJECTION, SOLUTION INTRAMUSCULAR; INTRAVENOUS at 09:04

## 2017-04-11 RX ADMIN — SODIUM CHLORIDE, SODIUM LACTATE, POTASSIUM CHLORIDE, AND CALCIUM CHLORIDE: 600; 310; 30; 20 INJECTION, SOLUTION INTRAVENOUS at 06:04

## 2017-04-11 RX ADMIN — DIPHENHYDRAMINE HYDROCHLORIDE 25 MG: 25 CAPSULE ORAL at 06:04

## 2017-04-11 RX ADMIN — ACETAMINOPHEN 1000 MG: 10 INJECTION, SOLUTION INTRAVENOUS at 07:04

## 2017-04-11 RX ADMIN — ROCURONIUM BROMIDE 20 MG: 10 INJECTION, SOLUTION INTRAVENOUS at 07:04

## 2017-04-11 RX ADMIN — DEXAMETHASONE SODIUM PHOSPHATE 4 MG: 4 INJECTION, SOLUTION INTRA-ARTICULAR; INTRALESIONAL; INTRAMUSCULAR; INTRAVENOUS; SOFT TISSUE at 07:04

## 2017-04-11 NOTE — ANESTHESIA POSTPROCEDURE EVALUATION
Anesthesia Post Evaluation    Patient: Wang Kebede    Procedure(s) Performed: Procedure(s) (LRB):  XI ROBOTIC ASSISTED LAPAROSCOPIC HYSTERECTOMY (N/A)  XI ROBOTIC ASSISTED LAPAROSCOPIC LYSIS OF ADHESIONS (N/A)    Final Anesthesia Type: general  Patient location during evaluation: PACU  Patient participation: Yes- Able to Participate  Level of consciousness: awake and alert and oriented  Post-procedure vital signs: reviewed and stable  Pain management: adequate  Airway patency: patent  PONV status at discharge: No PONV  Anesthetic complications: no      Cardiovascular status: blood pressure returned to baseline  Respiratory status: unassisted and room air  Hydration status: euvolemic  Follow-up not needed.        Visit Vitals    BP 95/61    Pulse 81    Temp 36.7 °C (98.1 °F) (Oral)    Resp 20    Ht 5' (1.524 m)    Wt 47.2 kg (104 lb)    LMP 04/11/2017    SpO2 (!) 94%    Breastfeeding No    BMI 20.31 kg/m2       Pain/Tano Score: Pain Assessment Performed: Yes (4/11/2017 10:35 AM)  Presence of Pain: complains of pain/discomfort (4/11/2017 10:35 AM)  Pain Rating Prior to Med Admin: 6 (4/11/2017  4:10 PM)  Pain Rating Post Med Admin: 6 (4/11/2017  2:30 PM)  Tano Score: 10 (4/11/2017 10:35 AM)

## 2017-04-11 NOTE — OP NOTE
DATE OF PROCEDURE:4/11/17    PREOPERATIVE DIAGNOSIS: KEVON 2, hypermenorrhea, dysmenorrhea, anemia, HIV, multiple prior surgery     POSTOPERATIVE DIAGNOSES: Same, extensive pelvic/peritoneal adhesions     SURGEON: Emanuel Zamora M.D.     ASSISTANT: Alena Oro     PROCEDURE: Robotically assisted laparoscopic adhesiolysis and hysterectomy    OPERATIVE FINDINGS: extensive omental adhesions to anterior abdominal wall and pelvis, filmy adhesions throughout the pelvis, enlarged myomatous uterus    PROCEDURE IN DETAIL: The patient brought to the Operating Room, put on table in supine position. After achieving adequate level of general anesthesia, the patient put in Yousuf stirrups. Perineum and vagina prepped with Betadine scrub and solution. Exam performed. Weighted speculum placed in the posterior vagina and a uterine manipulator placed. Tinsley catheter was sterilely inserted. Legs lowered, abdomen prepped and draped in the usual fashion.     After a timeout, towel clips were placed periumbilically and a Veress needle inserted through the umbilicus into the peritoneal cavity without difficulty. Correct placement was confirmed with a drop of water test. Peritoneal cavity was then insufflated with carbon dioxide and the Veress needle removed. An 8  mm incision was made in the umbilicus through which an 8 mm trochar was placed without difficulty. Inspection of the upper abdomin was unremarkable except for minimal filmy adhesions, normal liver.  Two 8 mm trocars were then placed lateral to the umbilicus under direct visualization without complication, as was a 5 mm trocar in the left upper quadrant. The patient was then put in Trendelenburg position and docked to the robot.The lower peritoneal cavity was inspected and remarkable for an enlarged myomatous uterus with extensive omental adhesions to anterior abdominal wall and throughout the pelvis. No tubes and ovaries were seen, but clear simple cystic adhesions were noted on  the R side of the CDS.    The L round ligament was coagulated and cut with scissors. The anterior leaf of the broad ligament was then taken down over the lower uterine segment beginning the bladder flap. The same procedure was done on the R side. Adhesions on the posterior aspect of the uterus laterally were taken down via sharp dissection. The uterine vessels were then skeletonized bilaterally.  With the uterus retroflexed the bladder flap was fully developed and an anterior colpotomy was made with unipolar cautery in the midline and extended bilaterally to the level of the vessels. The uterus was then anteflexed and a posterior colpotomy made. The left uterine vessels were then serially coagulated and cut. The remaining vaginal mucosa was incised and hemostasis appreciated. The same procedure performed on the right side, completing the hysterectomy. The specimen was then removed through the vagina.     After noting hemostasis the vaginal cuff was closed with 0 Vicryl in a running fashion,   beginning at each angle and meeting and tying in the midline. Once this was accomplished, both ureters were noted to peristalse normally. Gas was allowed to escape then slowly reinsuflated confirming complete hemostasis. 60 cc of sterile infant formula was then introduced into the bladder via the navarrete confirming bladder integrity and distance from the cuff.     The patient was then undocked from the robot. The skin of all incisions closed with 4-0 Vicryl subcuticular and Steri-Strips.     SPECIMEN: uterus, tubes     No operative complications.     Estimated blood loss 25 cc.

## 2017-04-11 NOTE — PLAN OF CARE
Problem: Patient Care Overview  Goal: Plan of Care Review  Outcome: Ongoing (interventions implemented as appropriate)  Pt progressing fairly at this time. Has not had an appetite and not wanting to eat when offered water,ice, or crackers and denies any n/v. Family at bs. Pt has voided since navarrete removal and walked to bathroom fairly well with stand by assistance. Vss. Working on pain control with po prn meds with benadryl (per pt preference due to itching) and iv toradol. SCDs in place, iv fluids LR at 125ml/hr.
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
## 2017-04-11 NOTE — TRANSFER OF CARE
Anesthesia Transfer of Care Note    Patient: Wang Kebede    Procedure(s) Performed: Procedure(s) (LRB):  XI ROBOTIC ASSISTED LAPAROSCOPIC HYSTERECTOMY (N/A)  XI ROBOTIC ASSISTED LAPAROSCOPIC LYSIS OF ADHESIONS (N/A)    Patient location: PACU    Anesthesia Type: general    Transport from OR: Transported from OR on room air with adequate spontaneous ventilation    Post pain: adequate analgesia    Post assessment: no apparent anesthetic complications    Post vital signs: stable    Level of consciousness: sedated    Nausea/Vomiting: no nausea/vomiting    Complications: none          Last vitals:   Visit Vitals    /73 (BP Location: Right arm, Patient Position: Lying, BP Method: Automatic)    Pulse (!) 125    Temp 37.2 °C (99 °F) (Temporal)    Resp (!) 21    Ht 5' (1.524 m)    Wt 47.2 kg (104 lb)    LMP 04/11/2017    SpO2 100%    Breastfeeding No    BMI 20.31 kg/m2

## 2017-04-11 NOTE — PROGRESS NOTES
Progress Note    Admit Date: 4/11/2017   LOS: 0 days     Active Hospital Problems    Diagnosis  POA    Dysplasia of cervix, high grade KEVON 2 [N87.1]  Yes      Resolved Hospital Problems    Diagnosis Date Resolved POA   No resolved problems to display.           SUBJECTIVE:     Patient has no complaints x some pain around inc's.  Has ambulated in room, voided, and tolerating po.      Scheduled Meds:   ketorolac  30 mg Intravenous Q6H    Followed by    [START ON 4/12/2017] ibuprofen  800 mg Oral Q8H     Continuous Infusions:   lactated ringers 125 mL/hr at 04/11/17 1058     PRN Meds:diphenhydrAMINE, diphenhydrAMINE, hydrocodone-acetaminophen 5-325mg, ondansetron, oxycodone, promethazine (PHENERGAN) IVPB, sodium chloride 0.9%    Review of patient's allergies indicates:   Allergen Reactions    Iodine and iodide containing products Anaphylaxis     Pt states she coded last time she was administered contrast    Bactrim [sulfamethoxazole-trimethoprim] Hives    Morphine Itching         OBJECTIVE:     Vital Signs (Most Recent)  Temp: 98.3 °F (36.8 °C) (04/11/17 1600)  Pulse: 69 (04/11/17 1600)  Resp: 18 (04/11/17 1600)  BP: 106/71 (04/11/17 1600)  SpO2: (!) 94 % (04/11/17 1035)    Vital Signs Range (Last 24H):  Temp:  [97.5 °F (36.4 °C)-99 °F (37.2 °C)]   Pulse:  []   Resp:  [16-24]   BP: ()/(7-85)   SpO2:  [94 %-100 %]     I & O (Last 24H):  Intake/Output Summary (Last 24 hours) at 04/11/17 1732  Last data filed at 04/11/17 1505   Gross per 24 hour   Intake             1500 ml   Output              170 ml   Net             1330 ml       Physical Exam:  General - no acute distress.  Comfortable looking sitting up watching TV  Lungs - normal respiratory effort  Abdomen - soft, non tender and non-distended.  Incisions all look fine  Extremities - non tender, no cords - SCD's on and functionig        ASSESSMENT/PLAN:     Patient stable.    Plan: Discharge home w instructions after dinner if still doing well

## 2017-04-11 NOTE — BRIEF OP NOTE
BRIEF OPERATIVE NOTE       SUMMARY      Surgery Date: 04/11/2017     SURGEON: Emanuel Zamora    ASSISTANT: Alena Oro     Active Hospital Problems    Diagnosis  POA    Dysplasia of cervix, high grade KEVON 2 [N87.1]  Yes      Resolved Hospital Problems    Diagnosis Date Resolved POA   No resolved problems to display.         PREOP DIAGNOSIS: KEVON 2, hypermenorrhea, dysmenorrhea, and anemia. HIV. Multiple prior surgery.    POSTOP DIAGNOSIS:  Same, extensive pelvic/peritoneal adhesions    PROCEDURES: RALS adhesiolysis, RALH    ANESTHESIA: general    FINDINGS/KEY COMPONENTS: extensive omental adhesions to anterior abd wall and pelvis, filmy adhesions throughout the pelvis, enlarged myomatous appearing uterus    ESTIMATED BLOOD LOSS: 25 cc    COMPLICATIONS: none    SPECIMEN: uterus

## 2017-04-11 NOTE — DISCHARGE SUMMARY
DISCHARGE SUMMARY    DATE OF ADMISSION: 2017    DATE OF DISCHARGE: 2017    ATTENDING PHYSICIAN: Emanuel Zamora MD    ADMISSION DIAGNOSIS: KEVON 2, hypermenorrhea, dysmenorrhea, anemia, HIV, multiple prior surgeries    DISCHARGE DIAGNOSIS: Same    Active Hospital Problems    Diagnosis  POA   No active problems to display.      Resolved Hospital Problems    Diagnosis Date Resolved POA    *Dysplasia of cervix, high grade KEVON 2 [N87.1] 2017 Yes         HOSPITAL COURSE:  Wang Kebede is a 31 y.o. female  with KEVON 2, hypermenorrhea, dysmenorrhea, anemia admitted for [facility]. Her surgery and postoperative course were unremarkable and as she is in stable condition, she is allowed to go home on a regular diet and pelvic rest with limited activity. She will continue any prior home medications. Follow-up visit in 4 weeks. She knows to call for any problem or concern.    Current Discharge Medication List      START taking these medications    Details   oxycodone-acetaminophen (ROXICET) 5-325 mg per tablet Take 1 tablet by mouth every 8 (eight) hours as needed for Pain (pain).  Qty: 18 tablet, Refills: 0         CONTINUE these medications which have NOT CHANGED    Details   darunavir-cobicistat (PREZCOBIX) 800-150 mg-mg Tab Take 800 mg by mouth every evening.       emtricitabine-tenofovir alafen (DESCOVY) 200-25 mg Tab Take 200 each by mouth every evening.

## 2017-04-11 NOTE — ANESTHESIA PREPROCEDURE EVALUATION
"                                                                                                             04/11/2017  Wang Kebede is a 31 y.o., female.    [facility] Anesthesia Evaluation    I have reviewed the Patient Summary Reports.    I have reviewed the Nursing Notes.   I have reviewed the Medications.     Review of Systems  Anesthesia Hx:  History of prior surgery of interest to airway management or planning: Denies Family Hx of Anesthesia complications.   Denies Personal Hx of Anesthesia complications.   Hematology/Oncology:         -- Denies Anemia: Hematology Comments: HIV and sickle cell disease. 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     2 - Normal left ventricular systolic function (EF 55-60%).     3 - Normal right ventricular systolic function .     4 - The estimated PA systolic pressure is greater than 30 mmHg.     5 - Mild tricuspid regurgitation.     6 - Trivial pericardial effusion.        OB/GYN/PEDS:  enlarged inguinal, iliac chain, and retroperitoneal lymph nodes in this patient with history of vulvar cancer, suspicious for metastases. Splenomegaly." Patient states she has been so ill she has been unable to keep her medications down.    Today, pt reports that she has been able to take her antivirals.        Physical Exam  General:  Cachexia    Airway/Jaw/Neck:  Airway Findings: Mallampati: I     Dental:  DENTAL FINDINGS: Normal   Chest/Lungs:  Chest/Lungs Findings: Normal Respiratory Rate     Heart/Vascular:  Heart Findings: Rate: Normal  Rhythm: Regular Rhythm             Anesthesia Plan  Type of Anesthesia, risks & benefits discussed:  Anesthesia Type:  general  Patient's Preference:   Intra-op Monitoring Plan:   Intra-op Monitoring Plan Comments:   Post Op Pain Control Plan:   Post Op Pain Control Plan Comments:   Induction:   IV  Beta Blocker:  Patient is not currently on a Beta-Blocker (No further documentation " required).       Informed Consent: Patient understands risks and agrees with Anesthesia plan.  Questions answered.   ASA Score: 3     Day of Surgery Review of History & Physical: I have interviewed and examined the patient. I have reviewed the patient's H&P dated:  There are no significant changes.          Ready For Surgery From Anesthesia Perspective.

## 2017-04-14 ENCOUNTER — HOSPITAL ENCOUNTER (INPATIENT)
Facility: HOSPITAL | Age: 32
LOS: 6 days | Discharge: HOME HEALTH | DRG: 907 | End: 2017-04-21
Attending: EMERGENCY MEDICINE | Admitting: INTERNAL MEDICINE
Payer: MEDICARE

## 2017-04-14 DIAGNOSIS — Z98.890 S/P EXPLORATORY LAPAROTOMY: ICD-10-CM

## 2017-04-14 DIAGNOSIS — A41.9 SEPTIC SHOCK: ICD-10-CM

## 2017-04-14 DIAGNOSIS — Z95.810 S/P IMPLANTATION OF AUTOMATIC CARDIOVERTER/DEFIBRILLATOR (AICD): ICD-10-CM

## 2017-04-14 DIAGNOSIS — T40.601A NARCOTIC BOWEL SYNDROME: Chronic | ICD-10-CM

## 2017-04-14 DIAGNOSIS — D64.9 SYMPTOMATIC ANEMIA: ICD-10-CM

## 2017-04-14 DIAGNOSIS — N17.9 AKI (ACUTE KIDNEY INJURY): ICD-10-CM

## 2017-04-14 DIAGNOSIS — B20: ICD-10-CM

## 2017-04-14 DIAGNOSIS — B20 AIDS (ACQUIRED IMMUNODEFICIENCY SYNDROME), CD4 <=200: ICD-10-CM

## 2017-04-14 DIAGNOSIS — A41.9 SEPSIS: Primary | ICD-10-CM

## 2017-04-14 DIAGNOSIS — R10.2 PELVIC PAIN IN FEMALE: ICD-10-CM

## 2017-04-14 DIAGNOSIS — E43 SEVERE PROTEIN-CALORIE MALNUTRITION: ICD-10-CM

## 2017-04-14 DIAGNOSIS — Z90.710 STATUS POST LAPAROSCOPIC HYSTERECTOMY: ICD-10-CM

## 2017-04-14 DIAGNOSIS — G89.18 POST-OPERATIVE PAIN: ICD-10-CM

## 2017-04-14 DIAGNOSIS — Z91.199 H/O NONCOMPLIANCE WITH MEDICAL TREATMENT, PRESENTING HAZARDS TO HEALTH: ICD-10-CM

## 2017-04-14 DIAGNOSIS — K63.89 NARCOTIC BOWEL SYNDROME: Chronic | ICD-10-CM

## 2017-04-14 DIAGNOSIS — R59.0 ABDOMINAL LYMPHADENOPATHY: ICD-10-CM

## 2017-04-14 DIAGNOSIS — A41.9: ICD-10-CM

## 2017-04-14 DIAGNOSIS — B20 HIV (HUMAN IMMUNODEFICIENCY VIRUS INFECTION): ICD-10-CM

## 2017-04-14 DIAGNOSIS — R65.21 SEPTIC SHOCK: ICD-10-CM

## 2017-04-14 DIAGNOSIS — R10.30 LOWER ABDOMINAL PAIN: Chronic | ICD-10-CM

## 2017-04-14 DIAGNOSIS — I42.9 CARDIOMYOPATHY, UNSPECIFIED TYPE: ICD-10-CM

## 2017-04-14 DIAGNOSIS — R50.9 FEVER, UNSPECIFIED FEVER CAUSE: ICD-10-CM

## 2017-04-14 DIAGNOSIS — E87.20 ACIDOSIS: ICD-10-CM

## 2017-04-14 DIAGNOSIS — A41.9 SEVERE SEPSIS: ICD-10-CM

## 2017-04-14 DIAGNOSIS — R79.89 ABNORMAL LFTS: ICD-10-CM

## 2017-04-14 DIAGNOSIS — D69.6 THROMBOCYTOPENIA: ICD-10-CM

## 2017-04-14 DIAGNOSIS — A41.9 SEPSIS, DUE TO UNSPECIFIED ORGANISM: ICD-10-CM

## 2017-04-14 DIAGNOSIS — R65.20 SEVERE SEPSIS: ICD-10-CM

## 2017-04-14 DIAGNOSIS — R59.0 PELVIC LYMPHADENOPATHY: ICD-10-CM

## 2017-04-14 DIAGNOSIS — D07.1 VULVAR INTRAEPITHELIAL NEOPLASIA (VIN) GRADE 3: ICD-10-CM

## 2017-04-14 DIAGNOSIS — K65.9 PERITONITIS: ICD-10-CM

## 2017-04-14 DIAGNOSIS — R06.02 SOB (SHORTNESS OF BREATH): ICD-10-CM

## 2017-04-14 LAB
ALBUMIN SERPL BCP-MCNC: 2.8 G/DL
ALP SERPL-CCNC: 87 U/L
ALT SERPL W/O P-5'-P-CCNC: 27 U/L
ANION GAP SERPL CALC-SCNC: 13 MMOL/L
AST SERPL-CCNC: 34 U/L
BASOPHILS # BLD AUTO: 0.01 K/UL
BASOPHILS NFR BLD: 0.2 %
BILIRUB SERPL-MCNC: 0.3 MG/DL
BUN SERPL-MCNC: 13 MG/DL
CALCIUM SERPL-MCNC: 8.4 MG/DL
CHLORIDE SERPL-SCNC: 106 MMOL/L
CO2 SERPL-SCNC: 18 MMOL/L
CREAT SERPL-MCNC: 1 MG/DL
DIFFERENTIAL METHOD: ABNORMAL
EOSINOPHIL # BLD AUTO: 0 K/UL
EOSINOPHIL NFR BLD: 0.7 %
ERYTHROCYTE [DISTWIDTH] IN BLOOD BY AUTOMATED COUNT: 17.1 %
EST. GFR  (AFRICAN AMERICAN): >60 ML/MIN/1.73 M^2
EST. GFR  (NON AFRICAN AMERICAN): >60 ML/MIN/1.73 M^2
GLUCOSE SERPL-MCNC: 74 MG/DL
HCT VFR BLD AUTO: 29.2 %
HGB BLD-MCNC: 10 G/DL
LACTATE SERPL-SCNC: 1 MMOL/L
LYMPHOCYTES # BLD AUTO: 1.4 K/UL
LYMPHOCYTES NFR BLD: 31.3 %
MCH RBC QN AUTO: 29.6 PG
MCHC RBC AUTO-ENTMCNC: 34.2 %
MCV RBC AUTO: 86 FL
MONOCYTES # BLD AUTO: 0.3 K/UL
MONOCYTES NFR BLD: 6.1 %
NEUTROPHILS # BLD AUTO: 2.8 K/UL
NEUTROPHILS NFR BLD: 62.1 %
PLATELET # BLD AUTO: 97 K/UL
PMV BLD AUTO: 10.5 FL
POTASSIUM SERPL-SCNC: 3.8 MMOL/L
PROT SERPL-MCNC: 10 G/DL
RBC # BLD AUTO: 3.38 M/UL
SODIUM SERPL-SCNC: 137 MMOL/L
TROPONIN I SERPL DL<=0.01 NG/ML-MCNC: 0.01 NG/ML
WBC # BLD AUTO: 4.6 K/UL

## 2017-04-14 PROCEDURE — 93010 ELECTROCARDIOGRAM REPORT: CPT | Mod: ,,, | Performed by: INTERNAL MEDICINE

## 2017-04-14 PROCEDURE — 25000003 PHARM REV CODE 250: Performed by: EMERGENCY MEDICINE

## 2017-04-14 PROCEDURE — 93005 ELECTROCARDIOGRAM TRACING: CPT

## 2017-04-14 PROCEDURE — 85025 COMPLETE CBC W/AUTO DIFF WBC: CPT

## 2017-04-14 PROCEDURE — 96367 TX/PROPH/DG ADDL SEQ IV INF: CPT

## 2017-04-14 PROCEDURE — 63600175 PHARM REV CODE 636 W HCPCS: Performed by: EMERGENCY MEDICINE

## 2017-04-14 PROCEDURE — 84484 ASSAY OF TROPONIN QUANT: CPT

## 2017-04-14 PROCEDURE — P9612 CATHETERIZE FOR URINE SPEC: HCPCS

## 2017-04-14 PROCEDURE — 36556 INSERT NON-TUNNEL CV CATH: CPT

## 2017-04-14 PROCEDURE — 87040 BLOOD CULTURE FOR BACTERIA: CPT | Mod: 59

## 2017-04-14 PROCEDURE — 96376 TX/PRO/DX INJ SAME DRUG ADON: CPT

## 2017-04-14 PROCEDURE — 80053 COMPREHEN METABOLIC PANEL: CPT

## 2017-04-14 PROCEDURE — 96375 TX/PRO/DX INJ NEW DRUG ADDON: CPT

## 2017-04-14 PROCEDURE — 83605 ASSAY OF LACTIC ACID: CPT

## 2017-04-14 PROCEDURE — 96361 HYDRATE IV INFUSION ADD-ON: CPT

## 2017-04-14 PROCEDURE — 96365 THER/PROPH/DIAG IV INF INIT: CPT

## 2017-04-14 PROCEDURE — 99285 EMERGENCY DEPT VISIT HI MDM: CPT

## 2017-04-14 RX ORDER — DIPHENHYDRAMINE HYDROCHLORIDE 50 MG/ML
25 INJECTION INTRAMUSCULAR; INTRAVENOUS
Status: COMPLETED | OUTPATIENT
Start: 2017-04-14 | End: 2017-04-14

## 2017-04-14 RX ORDER — HYDROMORPHONE HYDROCHLORIDE 2 MG/ML
0.25 INJECTION, SOLUTION INTRAMUSCULAR; INTRAVENOUS; SUBCUTANEOUS
Status: COMPLETED | OUTPATIENT
Start: 2017-04-14 | End: 2017-04-14

## 2017-04-14 RX ORDER — HYDROMORPHONE HYDROCHLORIDE 2 MG/ML
0.5 INJECTION, SOLUTION INTRAMUSCULAR; INTRAVENOUS; SUBCUTANEOUS
Status: COMPLETED | OUTPATIENT
Start: 2017-04-14 | End: 2017-04-14

## 2017-04-14 RX ADMIN — DIPHENHYDRAMINE HYDROCHLORIDE 25 MG: 50 INJECTION, SOLUTION INTRAMUSCULAR; INTRAVENOUS at 08:04

## 2017-04-14 RX ADMIN — HYDROMORPHONE HYDROCHLORIDE 0.5 MG: 2 INJECTION, SOLUTION INTRAMUSCULAR; INTRAVENOUS; SUBCUTANEOUS at 10:04

## 2017-04-14 RX ADMIN — HYDROMORPHONE HYDROCHLORIDE 0.25 MG: 2 INJECTION, SOLUTION INTRAMUSCULAR; INTRAVENOUS; SUBCUTANEOUS at 08:04

## 2017-04-14 RX ADMIN — PIPERACILLIN SODIUM AND TAZOBACTAM SODIUM 4.5 G: 4; .5 INJECTION, POWDER, FOR SOLUTION INTRAVENOUS at 07:04

## 2017-04-14 RX ADMIN — VANCOMYCIN HYDROCHLORIDE 750 MG: 750 INJECTION, POWDER, LYOPHILIZED, FOR SOLUTION INTRAVENOUS at 11:04
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
## 2017-04-14 NOTE — IP AVS SNAPSHOT
[address] [place] [address]           Patient Discharge Instructions   Our goal is to set you up for success. This packet includes information on your condition, medications, and your home care.  It will help you care for yourself to prevent having to return to the hospital.     Please ask your nurse if you have any questions.      There are many details to remember when preparing to leave the hospital. Here is what you will need to do:    1. Take your medicine. If you are prescribed medications, review your Medication List on the following pages. You may have new medications to  at the pharmacy and others that you'll need to stop taking. Review the instructions for how and when to take your medications. Talk with your doctor or nurses if you are unsure of what to do.     2. Go to your follow-up appointments. Specific follow-up information is listed in the following pages. Your may be contacted by a nurse or clinical provider about future appointments. Be sure we have all of the phone numbers to reach you. Please contact your provider's office if you are unable to make an appointment.     3. Watch for warning signs. Your doctor or nurse will give you detailed warning signs to watch for and when to call for assistance. These instructions may also include educational information about your condition. If you experience any of warning signs to your health, call your doctor.               ** Verify the list of medication(s) below is accurate and up to date. Carry this with you in case of emergency. If your medications have changed, please notify your healthcare provider.             Medication List      START taking these medications        Additional Info                      atovaquone 750 mg/5 mL Susp   Commonly known as:  MEPRON   Quantity:  210 mL   Refills:  0   Dose:  1500 mg    Instructions:  Take 10 mLs (1,500 mg total) by mouth daily as needed.      Begin Date    AM    Noon    PM    Bedtime       azithromycin 600 MG Tab   Commonly known as:  ZITHROMAX   Quantity:  10 tablet   Refills:  0   Dose:  1200 mg   Indications:  MAC PROPHYLAXIS    Last time this was given:  1,250 mg on 4/20/2017  5:21 PM   Instructions:  Take 2 tablets (1,200 mg total) by mouth once a week.     Begin Date    AM    Noon    PM    Bedtime       carvedilol 3.125 MG tablet   Commonly known as:  COREG   Quantity:  60 tablet   Refills:  0   Dose:  3.125 mg    Instructions:  Take 1 tablet (3.125 mg total) by mouth 2 (two) times daily.     Begin Date    AM    Noon    PM    Bedtime       levoFLOXacin 500 MG tablet   Commonly known as:  LEVAQUIN   Quantity:  7 tablet   Refills:  0   Dose:  500 mg    Instructions:  Take 1 tablet (500 mg total) by mouth once daily.     Begin Date    AM    Noon    PM    Bedtime         CONTINUE taking these medications        Additional Info                      DESCOVY 200-25 mg Tab   Refills:  0   Dose:  200 each   Generic drug:  emtricitabine-tenofovir alafen    Instructions:  Take 200 each by mouth every evening.     Begin Date    AM    Noon    PM    Bedtime       oxycodone-acetaminophen 5-325 mg per tablet   Commonly known as:  ROXICET   Quantity:  18 tablet   Refills:  0   Dose:  1 tablet    Instructions:  Take 1 tablet by mouth every 8 (eight) hours as needed for Pain (pain).     Begin Date    AM    Noon    PM    Bedtime       PREZCOBIX 800-150 mg-mg Tab   Refills:  0   Dose:  800 mg   Generic drug:  darunavir-cobicistat    Instructions:  Take 800 mg by mouth every evening.     Begin Date    AM    Noon    PM    Bedtime            Where to Get Your Medications      [facility] [address] [facility] [place] [facility] [address] [facility]  [address] [facility] [place] [address]     Phone:  [phone]     atovaquone 750 mg/5 mL Susp    azithromycin 600 MG Tab    carvedilol 3.125 MG tablet     levoFLOXacin 500 MG tablet                  Please bring to all follow up appointments:    1. A copy of your discharge instructions.  2. All medicines you are currently taking in their original bottles.  3. Identification and insurance card.    Please arrive 15 minutes ahead of scheduled appointment time.    Please call 24 hours in advance if you must reschedule your appointment and/or time.        Your Scheduled Appointments     May 08, 2017  7:40 AM CDT   Established Patient Visit with Virginia Olmedo MD   [facility] ([facility])    [address] [place] [id number] [phone]            May 08, 2017  7:50 AM CDT   Non-Fasting Lab with [facility]    [address] [place] [id number] [phone]            May 08, 2017  8:45 AM CDT   Post OP with Emanuel Zamora MD   [facility] OB/ [facility]YN ([facility])    [address]  [place] [id number] [phone]              Follow-up Information     Follow up with [facility].    Specialty:  Home Health Services    Why:  Home Health:  SN, PT, OT    Contact information:    [address]  [place] [address] [phone]          Follow up with [facility]    Specialty:  DME Provider    Why:  DME:  Mary Elizalde    Contact information:    [address]  [facility] [address] [phone]          Follow up with John Vides MD. Schedule an appointment as soon as possible for a visit in 1 week.    Specialty:  Infectious Diseases    Contact information:    [address]  [place] [address] [phone]        Referrals     Future Orders    Ambulatory referral to Home Health     Referral to Home health         Discharge Instructions     Future Orders    Activity as tolerated     Diet general     Questions:    Total calories:      Fat restriction, if any:      Protein restriction, if any:      Na restriction,  "if any:      Fluid restriction:      Additional restrictions:      WALKER FOR HOME USE     Questions:    Type of Walker:  Adult (5'4"-6'6")    With wheels?:  Yes    Height:  5' (1.524 m)    Weight:  49.9 kg (110 lb)    Length of need (1-99 months):  99    Platform attachment:      Accessories/Other:      Assistance needed:      Does patient have medical equipment at home?:  none    Please check all that apply:  Patient's condition impairs ambulation.    Patient is unable to safely ambulate without equipment.    Patient needs help to get in and out of chair.    Walker will be used for gait training.    WALKER FOR HOME USE     Questions:    Type of Walker:  Adult (5'4"-6'6")    With wheels?:  Yes    Height:  5' (1.524 m)    Weight:  49.9 kg (110 lb)    Length of need (1-99 months):  99    Platform attachment:      Accessories/Other:      Assistance needed:      Does patient have medical equipment at home?:  none    Please check all that apply:  Patient is unable to safely ambulate without equipment.      Discharge References/Attachments     CARVEDILOL TABLETS (ENGLISH)    LEVOFLOXACIN TABLETS (ENGLISH)    AZITHROMYCIN TABLETS (ENGLISH)    ATOVAQUONE; PROGUANIL TABLETS (ENGLISH)        Primary Diagnosis     Your primary diagnosis was:  Infection In Bloodstream      Admission Information     Date & Time Provider Department CSN    4/14/2017  6:14 PM Mara Cabello MD [facility] -  [id number]      Care Providers     Provider Role Specialty Primary office phone    Mara Cabello MD Attending Provider Family Medicine [phone]    TINA Mo MD Consulting Physician  Obstetrics [phone]    Hubert Mayo MD Consulting Physician  Infectious Diseases [phone]    Rio Ronquillo MD Consulting Physician  General Surgery  [phone]    Rio Ronquillo MD Surgeon  General Surgery [phone]    Sonam Yanez MD Consulting Physician  Nephrology [phone]    Armani Johnston MD Consulting " Physician  Intensive Care  [phone]    Mara Cabello MD Team Attending  Family Medicine [phone]    Marlon Monterroso MD Consulting Physician  Nephrology  [phone]      Your Vitals Were     BP Pulse Temp Resp Height Weight    141/97 (BP Location: Right arm, Patient Position: Lying, BP Method: Automatic) 83 97.7 °F (36.5 °C) (Oral) 17 5' (1.524 m) 54.4 kg (120 lb)    Last Period SpO2 BMI          (LMP Unknown) 99% 23.44 kg/m2        Recent Lab Values     No lab values to display.      Pending Labs     Order Current Status    AFB Culture & Smear In process    Stool Exam-Ova,Cysts,Parasites In process    Vancomycin, random In process    Vancomycin, random In process    Blood culture Preliminary result    Prepare RBC 2 Units; symptomatic anemia Preliminary result      Allergies as of 4/21/2017        Reactions    Iodine And Iodide Containing Products Anaphylaxis    Pt states she coded last time she was administered contrast    Bactrim [Sulfamethoxazole-trimethoprim] Hives    Morphine Itching      [facility]     [facility] Nurse Care Line - 24/7 Assistance  Unless otherwise directed by your provider, please contact [facility], our nurse care line that is available for 24/7 assistance.     Registered nurses in the [facility] provide clinical advisement, health education, appointment booking, and other advisory services.  Call for this free service at [phone].        Advance Directives     An advance directive is a document which, in the event you are no longer able to make decisions for yourself, tells your healthcare team what kind of treatment you do or do not want to receive, or who you would like to make those decisions for you.  If you do not currently have an advance directive, [facility] encourages you to create one.  For more information call:  [phone]), [phone] ([phone]),  or log on to [email or web address].        Language Assistance  Services     ATTENTION: Language assistance services are available, free of charge. Please call [phone].      ATENCIÓN: Si minnie contreras, tiene a bazan disposición servicios gratuitos de asistencia lingüística. Llcarlos al [phone].     [facility] Ý: N?u b?n nói Ti?ng Vi?t, có các d?ch v? h? tr? ngôn ng? mi?n phí dành cho b?n. G?i s? [phone].        Blood Transfusion Reaction Signs and Symptoms     The blood you have received has been matched for you as carefully as possible. Most patients who receive a blood transfusion do not experience problems. However, there can be a delayed reaction that happens a few weeks after your blood transfusion. Contact your physician immediately if you experience any NEW SYMPTOMS listed below:     Fever greater than 100.4 degrees    Chills   Yellow color to your skin or eyes(Jaundice)   Back pain, chest pain, or pain at the infusion site   Weakness (more than usual)   Discomfort or uneasiness more than usual (Malaise)   Nausea or vomiting   Shortness of breath, wheezing, or coughing   Higher or lower blood pressure than normal   Skin rash, itching, skin redness, or localized swelling (example: hands or feet)   Urinating less than normal   Urine appears reddish or orange and is darker than normal      Remember that some these signs may already exist for you--such as having chronic back pain or high blood pressure. You only need to look for and report to your doctor any new occurrences since your blood transfusion that are of concern.         [facility] complies with applicable Federal civil rights laws and does not discriminate on the basis of race, color, national origin, age, disability, or sex.

## 2017-04-15 ENCOUNTER — SURGERY (OUTPATIENT)
Age: 32
End: 2017-04-15

## 2017-04-15 ENCOUNTER — ANESTHESIA EVENT (OUTPATIENT)
Dept: SURGERY | Facility: HOSPITAL | Age: 32
DRG: 907 | End: 2017-04-15
Payer: MEDICARE

## 2017-04-15 PROBLEM — Z90.710 STATUS POST LAPAROSCOPIC HYSTERECTOMY: Status: ACTIVE | Noted: 2017-04-15

## 2017-04-15 PROBLEM — B20 AIDS (ACQUIRED IMMUNODEFICIENCY SYNDROME), CD4 <=200: Status: ACTIVE | Noted: 2017-04-15

## 2017-04-15 LAB
ALBUMIN SERPL BCP-MCNC: 1.9 G/DL
ALBUMIN SERPL BCP-MCNC: 2 G/DL
ALP SERPL-CCNC: 285 U/L
ALP SERPL-CCNC: 356 U/L
ALT SERPL W/O P-5'-P-CCNC: 39 U/L
ALT SERPL W/O P-5'-P-CCNC: 53 U/L
AMORPH CRY URNS QL MICRO: NORMAL
ANION GAP SERPL CALC-SCNC: 10 MMOL/L
ANION GAP SERPL CALC-SCNC: 9 MMOL/L
AST SERPL-CCNC: 138 U/L
AST SERPL-CCNC: 73 U/L
BACTERIA #/AREA URNS HPF: NORMAL /HPF
BASOPHILS # BLD AUTO: 0.01 K/UL
BASOPHILS # BLD AUTO: 0.01 K/UL
BASOPHILS NFR BLD: 0.1 %
BASOPHILS NFR BLD: 0.1 %
BILIRUB SERPL-MCNC: 0.5 MG/DL
BILIRUB SERPL-MCNC: 0.6 MG/DL
BILIRUB UR QL STRIP: ABNORMAL
BUN SERPL-MCNC: 15 MG/DL
BUN SERPL-MCNC: 18 MG/DL
CALCIUM SERPL-MCNC: 6.3 MG/DL
CALCIUM SERPL-MCNC: 6.8 MG/DL
CHLORIDE SERPL-SCNC: 111 MMOL/L
CHLORIDE SERPL-SCNC: 115 MMOL/L
CLARITY UR: CLEAR
CO2 SERPL-SCNC: 15 MMOL/L
CO2 SERPL-SCNC: 17 MMOL/L
COLOR UR: YELLOW
CREAT SERPL-MCNC: 1.5 MG/DL
CREAT SERPL-MCNC: 2 MG/DL
DIFFERENTIAL METHOD: ABNORMAL
DIFFERENTIAL METHOD: ABNORMAL
EOSINOPHIL # BLD AUTO: 0 K/UL
EOSINOPHIL # BLD AUTO: 0 K/UL
EOSINOPHIL NFR BLD: 0 %
EOSINOPHIL NFR BLD: 0.3 %
ERYTHROCYTE [DISTWIDTH] IN BLOOD BY AUTOMATED COUNT: 17.4 %
ERYTHROCYTE [DISTWIDTH] IN BLOOD BY AUTOMATED COUNT: 17.4 %
EST. GFR  (AFRICAN AMERICAN): 37 ML/MIN/1.73 M^2
EST. GFR  (AFRICAN AMERICAN): 53 ML/MIN/1.73 M^2
EST. GFR  (NON AFRICAN AMERICAN): 32 ML/MIN/1.73 M^2
EST. GFR  (NON AFRICAN AMERICAN): 46 ML/MIN/1.73 M^2
GLUCOSE SERPL-MCNC: 126 MG/DL
GLUCOSE SERPL-MCNC: 97 MG/DL
GLUCOSE UR QL STRIP: NEGATIVE
HCT VFR BLD AUTO: 25 %
HCT VFR BLD AUTO: 25.7 %
HGB BLD-MCNC: 8.2 G/DL
HGB BLD-MCNC: 8.3 G/DL
HGB UR QL STRIP: NEGATIVE
HYALINE CASTS #/AREA URNS LPF: 0 /LPF
INR PPP: 1.2
KETONES UR QL STRIP: ABNORMAL
LACTATE SERPL-SCNC: 0.5 MMOL/L
LACTATE SERPL-SCNC: 1 MMOL/L
LEUKOCYTE ESTERASE UR QL STRIP: NEGATIVE
LYMPHOCYTES # BLD AUTO: 0.3 K/UL
LYMPHOCYTES # BLD AUTO: 0.4 K/UL
LYMPHOCYTES NFR BLD: 3.5 %
LYMPHOCYTES NFR BLD: 5.2 %
MAGNESIUM SERPL-MCNC: 1 MG/DL
MCH RBC QN AUTO: 28.4 PG
MCH RBC QN AUTO: 28.5 PG
MCHC RBC AUTO-ENTMCNC: 32.3 %
MCHC RBC AUTO-ENTMCNC: 32.8 %
MCV RBC AUTO: 87 FL
MCV RBC AUTO: 88 FL
MICROSCOPIC COMMENT: NORMAL
MONOCYTES # BLD AUTO: 0.1 K/UL
MONOCYTES # BLD AUTO: 0.2 K/UL
MONOCYTES NFR BLD: 0.7 %
MONOCYTES NFR BLD: 2.4 %
NEUTROPHILS # BLD AUTO: 6.2 K/UL
NEUTROPHILS # BLD AUTO: 6.9 K/UL
NEUTROPHILS NFR BLD: 92.6 %
NEUTROPHILS NFR BLD: 96 %
NITRITE UR QL STRIP: NEGATIVE
OVALOCYTES BLD QL SMEAR: ABNORMAL
OVALOCYTES BLD QL SMEAR: ABNORMAL
PH UR STRIP: 7 [PH] (ref 5–8)
PLATELET # BLD AUTO: 88 K/UL
PLATELET # BLD AUTO: 98 K/UL
PLATELET BLD QL SMEAR: ABNORMAL
PLATELET BLD QL SMEAR: ABNORMAL
PMV BLD AUTO: 10 FL
PMV BLD AUTO: 9.7 FL
POIKILOCYTOSIS BLD QL SMEAR: SLIGHT
POIKILOCYTOSIS BLD QL SMEAR: SLIGHT
POTASSIUM SERPL-SCNC: 3.4 MMOL/L
POTASSIUM SERPL-SCNC: 3.4 MMOL/L
PROT SERPL-MCNC: 7.3 G/DL
PROT SERPL-MCNC: 7.6 G/DL
PROT UR QL STRIP: ABNORMAL
PROTHROMBIN TIME: 12.1 SEC
RBC # BLD AUTO: 2.88 M/UL
RBC # BLD AUTO: 2.92 M/UL
RBC #/AREA URNS HPF: 0 /HPF (ref 0–4)
SODIUM SERPL-SCNC: 138 MMOL/L
SODIUM SERPL-SCNC: 139 MMOL/L
SP GR UR STRIP: 1.01 (ref 1–1.03)
SQUAMOUS #/AREA URNS HPF: 3 /HPF
URN SPEC COLLECT METH UR: ABNORMAL
UROBILINOGEN UR STRIP-ACNC: 1 EU/DL
WBC # BLD AUTO: 6.76 K/UL
WBC # BLD AUTO: 7.18 K/UL
WBC #/AREA URNS HPF: 0 /HPF (ref 0–5)

## 2017-04-15 PROCEDURE — 25000003 PHARM REV CODE 250: Performed by: INTERNAL MEDICINE

## 2017-04-15 PROCEDURE — 85610 PROTHROMBIN TIME: CPT

## 2017-04-15 PROCEDURE — 63600175 PHARM REV CODE 636 W HCPCS: Performed by: EMERGENCY MEDICINE

## 2017-04-15 PROCEDURE — 99499 UNLISTED E&M SERVICE: CPT | Mod: ,,, | Performed by: OBSTETRICS & GYNECOLOGY

## 2017-04-15 PROCEDURE — 0DBE8ZX EXCISION OF LARGE INTESTINE, VIA NATURAL OR ARTIFICIAL OPENING ENDOSCOPIC, DIAGNOSTIC: ICD-10-PCS | Performed by: INTERNAL MEDICINE

## 2017-04-15 PROCEDURE — 83605 ASSAY OF LACTIC ACID: CPT

## 2017-04-15 PROCEDURE — 99223 1ST HOSP IP/OBS HIGH 75: CPT | Mod: ,,, | Performed by: SURGERY

## 2017-04-15 PROCEDURE — 99222 1ST HOSP IP/OBS MODERATE 55: CPT | Mod: ,,, | Performed by: INTERNAL MEDICINE

## 2017-04-15 PROCEDURE — 20000000 HC ICU ROOM

## 2017-04-15 PROCEDURE — 85025 COMPLETE CBC W/AUTO DIFF WBC: CPT

## 2017-04-15 PROCEDURE — 27201012 HC FORCEPS, HOT/COLD, DISP: Performed by: INTERNAL MEDICINE

## 2017-04-15 PROCEDURE — 88305 TISSUE EXAM BY PATHOLOGIST: CPT | Performed by: PATHOLOGY

## 2017-04-15 PROCEDURE — 63600175 PHARM REV CODE 636 W HCPCS

## 2017-04-15 PROCEDURE — 45380 COLONOSCOPY AND BIOPSY: CPT | Mod: ,,, | Performed by: INTERNAL MEDICINE

## 2017-04-15 PROCEDURE — 83605 ASSAY OF LACTIC ACID: CPT | Mod: 91

## 2017-04-15 PROCEDURE — 45380 COLONOSCOPY AND BIOPSY: CPT | Performed by: INTERNAL MEDICINE

## 2017-04-15 PROCEDURE — 88305 TISSUE EXAM BY PATHOLOGIST: CPT | Mod: 26,,, | Performed by: PATHOLOGY

## 2017-04-15 PROCEDURE — 25000003 PHARM REV CODE 250: Performed by: SURGERY

## 2017-04-15 PROCEDURE — 81000 URINALYSIS NONAUTO W/SCOPE: CPT

## 2017-04-15 PROCEDURE — 63600175 PHARM REV CODE 636 W HCPCS: Performed by: INTERNAL MEDICINE

## 2017-04-15 PROCEDURE — 87449 NOS EACH ORGANISM AG IA: CPT

## 2017-04-15 PROCEDURE — 25000003 PHARM REV CODE 250: Performed by: EMERGENCY MEDICINE

## 2017-04-15 PROCEDURE — 80053 COMPREHEN METABOLIC PANEL: CPT | Mod: 91

## 2017-04-15 PROCEDURE — 36415 COLL VENOUS BLD VENIPUNCTURE: CPT

## 2017-04-15 PROCEDURE — 0DBP8ZX EXCISION OF RECTUM, VIA NATURAL OR ARTIFICIAL OPENING ENDOSCOPIC, DIAGNOSTIC: ICD-10-PCS | Performed by: INTERNAL MEDICINE

## 2017-04-15 PROCEDURE — 83735 ASSAY OF MAGNESIUM: CPT

## 2017-04-15 PROCEDURE — 80053 COMPREHEN METABOLIC PANEL: CPT

## 2017-04-15 RX ORDER — HYDROMORPHONE HYDROCHLORIDE 1 MG/ML
1 INJECTION, SOLUTION INTRAMUSCULAR; INTRAVENOUS; SUBCUTANEOUS EVERY 4 HOURS PRN
Status: DISCONTINUED | OUTPATIENT
Start: 2017-04-15 | End: 2017-04-16

## 2017-04-15 RX ORDER — SODIUM,POTASSIUM PHOSPHATES 280-250MG
1 POWDER IN PACKET (EA) ORAL
Status: DISCONTINUED | OUTPATIENT
Start: 2017-04-15 | End: 2017-04-16

## 2017-04-15 RX ORDER — FENTANYL CITRATE 50 UG/ML
INJECTION, SOLUTION INTRAMUSCULAR; INTRAVENOUS
Status: DISPENSED
Start: 2017-04-15 | End: 2017-04-16

## 2017-04-15 RX ORDER — ONDANSETRON 2 MG/ML
4 INJECTION INTRAMUSCULAR; INTRAVENOUS EVERY 12 HOURS PRN
Status: DISCONTINUED | OUTPATIENT
Start: 2017-04-15 | End: 2017-04-15

## 2017-04-15 RX ORDER — IPRATROPIUM BROMIDE AND ALBUTEROL SULFATE 2.5; .5 MG/3ML; MG/3ML
3 SOLUTION RESPIRATORY (INHALATION) EVERY 4 HOURS PRN
Status: DISCONTINUED | OUTPATIENT
Start: 2017-04-15 | End: 2017-04-16

## 2017-04-15 RX ORDER — ONDANSETRON 2 MG/ML
4 INJECTION INTRAMUSCULAR; INTRAVENOUS EVERY 4 HOURS PRN
Status: DISCONTINUED | OUTPATIENT
Start: 2017-04-15 | End: 2017-04-16

## 2017-04-15 RX ORDER — MIDAZOLAM HYDROCHLORIDE 1 MG/ML
6 INJECTION INTRAMUSCULAR; INTRAVENOUS
Status: DISCONTINUED | OUTPATIENT
Start: 2017-04-15 | End: 2017-04-16

## 2017-04-15 RX ORDER — FENTANYL CITRATE 50 UG/ML
200 INJECTION, SOLUTION INTRAMUSCULAR; INTRAVENOUS
Status: DISCONTINUED | OUTPATIENT
Start: 2017-04-15 | End: 2017-04-16

## 2017-04-15 RX ORDER — METRONIDAZOLE 500 MG/100ML
500 INJECTION, SOLUTION INTRAVENOUS
Status: DISCONTINUED | OUTPATIENT
Start: 2017-04-15 | End: 2017-04-16

## 2017-04-15 RX ORDER — ONDANSETRON 2 MG/ML
4 INJECTION INTRAMUSCULAR; INTRAVENOUS EVERY 12 HOURS PRN
Status: DISCONTINUED | OUTPATIENT
Start: 2017-04-15 | End: 2017-04-16

## 2017-04-15 RX ORDER — DEXTROSE MONOHYDRATE AND SODIUM CHLORIDE 5; .9 G/100ML; G/100ML
INJECTION, SOLUTION INTRAVENOUS CONTINUOUS
Status: DISCONTINUED | OUTPATIENT
Start: 2017-04-15 | End: 2017-04-15

## 2017-04-15 RX ORDER — CIPROFLOXACIN 2 MG/ML
400 INJECTION, SOLUTION INTRAVENOUS
Status: DISCONTINUED | OUTPATIENT
Start: 2017-04-15 | End: 2017-04-16

## 2017-04-15 RX ORDER — SODIUM CHLORIDE 9 MG/ML
1000 INJECTION, SOLUTION INTRAVENOUS
Status: COMPLETED | OUTPATIENT
Start: 2017-04-15 | End: 2017-04-15

## 2017-04-15 RX ORDER — MIDAZOLAM HYDROCHLORIDE 1 MG/ML
INJECTION INTRAMUSCULAR; INTRAVENOUS
Status: COMPLETED
Start: 2017-04-15 | End: 2017-04-15

## 2017-04-15 RX ORDER — HYDROMORPHONE HYDROCHLORIDE 1 MG/ML
0.5 INJECTION, SOLUTION INTRAMUSCULAR; INTRAVENOUS; SUBCUTANEOUS EVERY 4 HOURS PRN
Status: DISCONTINUED | OUTPATIENT
Start: 2017-04-15 | End: 2017-04-16

## 2017-04-15 RX ORDER — SODIUM CHLORIDE 9 MG/ML
500 INJECTION, SOLUTION INTRAVENOUS
Status: COMPLETED | OUTPATIENT
Start: 2017-04-15 | End: 2017-04-15

## 2017-04-15 RX ORDER — MAGNESIUM SULFATE 1 G/100ML
1 INJECTION INTRAVENOUS
Status: COMPLETED | OUTPATIENT
Start: 2017-04-15 | End: 2017-04-16

## 2017-04-15 RX ORDER — SODIUM CHLORIDE 9 MG/ML
INJECTION, SOLUTION INTRAVENOUS CONTINUOUS
Status: DISCONTINUED | OUTPATIENT
Start: 2017-04-15 | End: 2017-04-15

## 2017-04-15 RX ORDER — ACETAMINOPHEN 325 MG/1
650 TABLET ORAL EVERY 6 HOURS PRN
Status: DISCONTINUED | OUTPATIENT
Start: 2017-04-15 | End: 2017-04-16

## 2017-04-15 RX ORDER — ACETAMINOPHEN 500 MG
1000 TABLET ORAL
Status: COMPLETED | OUTPATIENT
Start: 2017-04-15 | End: 2017-04-15

## 2017-04-15 RX ORDER — ACETAMINOPHEN 10 MG/ML
1000 INJECTION, SOLUTION INTRAVENOUS ONCE
Status: COMPLETED | OUTPATIENT
Start: 2017-04-15 | End: 2017-04-15

## 2017-04-15 RX ORDER — SODIUM CHLORIDE 9 MG/ML
INJECTION, SOLUTION INTRAVENOUS CONTINUOUS
Status: DISCONTINUED | OUTPATIENT
Start: 2017-04-15 | End: 2017-04-16

## 2017-04-15 RX ORDER — ONDANSETRON 2 MG/ML
4 INJECTION INTRAMUSCULAR; INTRAVENOUS EVERY 6 HOURS PRN
Status: DISCONTINUED | OUTPATIENT
Start: 2017-04-15 | End: 2017-04-15

## 2017-04-15 RX ORDER — MAGNESIUM SULFATE 1 G/100ML
3 INJECTION INTRAVENOUS ONCE
Status: DISCONTINUED | OUTPATIENT
Start: 2017-04-15 | End: 2017-04-15

## 2017-04-15 RX ORDER — POTASSIUM CHLORIDE 7.45 MG/ML
20 INJECTION INTRAVENOUS ONCE
Status: COMPLETED | OUTPATIENT
Start: 2017-04-15 | End: 2017-04-15

## 2017-04-15 RX ORDER — CEFAZOLIN SODIUM 2 G/50ML
2 SOLUTION INTRAVENOUS
Status: DISCONTINUED | OUTPATIENT
Start: 2017-04-15 | End: 2017-04-16

## 2017-04-15 RX ORDER — DIPHENHYDRAMINE HYDROCHLORIDE 50 MG/ML
12.5 INJECTION INTRAMUSCULAR; INTRAVENOUS EVERY 6 HOURS PRN
Status: DISCONTINUED | OUTPATIENT
Start: 2017-04-15 | End: 2017-04-16

## 2017-04-15 RX ORDER — FENTANYL CITRATE 50 UG/ML
INJECTION, SOLUTION INTRAMUSCULAR; INTRAVENOUS
Status: COMPLETED
Start: 2017-04-15 | End: 2017-04-15

## 2017-04-15 RX ADMIN — ONDANSETRON 4 MG: 2 INJECTION INTRAMUSCULAR; INTRAVENOUS at 05:04

## 2017-04-15 RX ADMIN — ACETAMINOPHEN 650 MG: 325 TABLET ORAL at 11:04

## 2017-04-15 RX ADMIN — ONDANSETRON 4 MG: 2 INJECTION INTRAMUSCULAR; INTRAVENOUS at 08:04

## 2017-04-15 RX ADMIN — MIDAZOLAM HYDROCHLORIDE 2 MG: 1 INJECTION, SOLUTION INTRAMUSCULAR; INTRAVENOUS at 11:04

## 2017-04-15 RX ADMIN — MAGNESIUM SULFATE IN DEXTROSE 1 G: 10 INJECTION, SOLUTION INTRAVENOUS at 07:04

## 2017-04-15 RX ADMIN — SODIUM CHLORIDE 500 ML: 0.9 INJECTION, SOLUTION INTRAVENOUS at 01:04

## 2017-04-15 RX ADMIN — ACETAMINOPHEN 1000 MG: 500 TABLET ORAL at 01:04

## 2017-04-15 RX ADMIN — FENTANYL CITRATE 100 MCG: 50 INJECTION, SOLUTION INTRAMUSCULAR; INTRAVENOUS at 11:04

## 2017-04-15 RX ADMIN — POTASSIUM & SODIUM PHOSPHATES POWDER PACK 280-160-250 MG 1 PACKET: 280-160-250 PACK at 09:04

## 2017-04-15 RX ADMIN — SODIUM CHLORIDE 1000 ML: 0.9 INJECTION, SOLUTION INTRAVENOUS at 02:04

## 2017-04-15 RX ADMIN — CIPROFLOXACIN 400 MG: 2 INJECTION, SOLUTION INTRAVENOUS at 10:04

## 2017-04-15 RX ADMIN — FENTANYL CITRATE 100 MCG: 50 INJECTION INTRAMUSCULAR; INTRAVENOUS at 11:04

## 2017-04-15 RX ADMIN — MAGNESIUM SULFATE IN DEXTROSE 1 G: 10 INJECTION, SOLUTION INTRAVENOUS at 09:04

## 2017-04-15 RX ADMIN — SODIUM CHLORIDE: 0.9 INJECTION, SOLUTION INTRAVENOUS at 08:04

## 2017-04-15 RX ADMIN — SODIUM CHLORIDE 1000 ML: 0.9 INJECTION, SOLUTION INTRAVENOUS at 08:04

## 2017-04-15 RX ADMIN — ACETAMINOPHEN 1000 MG: 10 INJECTION, SOLUTION INTRAVENOUS at 05:04

## 2017-04-15 RX ADMIN — SODIUM CHLORIDE 1000 ML: 0.9 INJECTION, SOLUTION INTRAVENOUS at 12:04

## 2017-04-15 RX ADMIN — SODIUM CHLORIDE: 0.9 INJECTION, SOLUTION INTRAVENOUS at 05:04

## 2017-04-15 RX ADMIN — SODIUM BICARBONATE 150 MEQ: 84 INJECTION, SOLUTION INTRAVENOUS at 10:04

## 2017-04-15 RX ADMIN — METRONIDAZOLE 500 MG: 500 INJECTION, SOLUTION INTRAVENOUS at 11:04

## 2017-04-15 RX ADMIN — PIPERACILLIN SODIUM AND TAZOBACTAM SODIUM 4.5 G: 4; .5 INJECTION, POWDER, LYOPHILIZED, FOR SOLUTION INTRAVENOUS at 03:04

## 2017-04-15 RX ADMIN — DEXTROSE AND SODIUM CHLORIDE 1 ML: 5; .9 INJECTION, SOLUTION INTRAVENOUS at 02:04

## 2017-04-15 RX ADMIN — PIPERACILLIN SODIUM AND TAZOBACTAM SODIUM 4.5 G: 4; .5 INJECTION, POWDER, LYOPHILIZED, FOR SOLUTION INTRAVENOUS at 09:04

## 2017-04-15 RX ADMIN — DIPHENHYDRAMINE HYDROCHLORIDE 12.5 MG: 50 INJECTION, SOLUTION INTRAMUSCULAR; INTRAVENOUS at 05:04

## 2017-04-15 RX ADMIN — HYDROMORPHONE HYDROCHLORIDE 0.5 MG: 1 INJECTION, SOLUTION INTRAMUSCULAR; INTRAVENOUS; SUBCUTANEOUS at 05:04

## 2017-04-15 RX ADMIN — HYDROMORPHONE HYDROCHLORIDE 1 MG: 1 INJECTION, SOLUTION INTRAMUSCULAR; INTRAVENOUS; SUBCUTANEOUS at 10:04

## 2017-04-15 RX ADMIN — VANCOMYCIN HYDROCHLORIDE 750 MG: 750 INJECTION, POWDER, LYOPHILIZED, FOR SOLUTION INTRAVENOUS at 06:04

## 2017-04-15 RX ADMIN — PIPERACILLIN SODIUM AND TAZOBACTAM SODIUM 4.5 G: 4; .5 INJECTION, POWDER, LYOPHILIZED, FOR SOLUTION INTRAVENOUS at 11:04

## 2017-04-15 RX ADMIN — MAGNESIUM SULFATE IN DEXTROSE 1 G: 10 INJECTION, SOLUTION INTRAVENOUS at 06:04

## 2017-04-15 RX ADMIN — POTASSIUM CHLORIDE 20 MEQ: 10 INJECTION, SOLUTION INTRAVENOUS at 11:04
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
## 2017-04-15 NOTE — PROGRESS NOTES
MD notified of pt BP up to 136/56 and .  Pt c/o 10/10 pain to abd and all over, MD okay with giving 0.5 mg dilaudid and monitoring.      Pt had one episode of diarrhea and vomiting at this time.  Zofran and dilaudid given.   MD updated. Will continue to monitor.

## 2017-04-15 NOTE — ED NOTES
Pt to ER bed with assistance per wc. Pt moaning and c/o hurting to abd from hysterectomy 3 days ago. States feels weak and in a lot of pain.Pt tachycardic, c/o feeling SOB - O2 sat 98%, nails pale.
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
## 2017-04-15 NOTE — PROGRESS NOTES
BP at this time 88/35 (MAP 57), MD notified and ordered for 1L bolus and monitor.  Bolus infusing now. Will continue to monitor.

## 2017-04-15 NOTE — ASSESSMENT & PLAN NOTE
- High fever Temp > 102, HR > 100  - Suspect intra-abdominal source from recent hysterectomy  - Unable to obtain CT abdomen/pelvis due to contrast allergy  - Will obtain CT abdomen without contrast  - Empirically started on IV Vanc, Zosyn  - IV fluids  - Follow up on blood cultures  - Consult OBGYN

## 2017-04-15 NOTE — PLAN OF CARE
Problem: Patient Care Overview  Goal: Plan of Care Review  Outcome: Ongoing (interventions implemented as appropriate)  Pt appears drowsy, follows commands and oriented X4. D5NS @ 75 ml/hr maintained, IV antibodies administered. Tmax 103.2, cool compresss to forehead and under arm, recheck 100.6. Voids to bedpan, repositioned self, remained free of falls during shift, bed alarm set, call light in reach, room free of clutter, side rails up X2, pt on telemetry monitor ST, will continue to monitor.

## 2017-04-15 NOTE — CONSULTS
Wang Kebede [id number] is a 31 y.o. female who has been consulted for vancomycin dosing.    The patient has the following labs:     Date Creatinine (mg/dl)    BUN WBC Count   4/15/2017 Estimated Creatinine Clearance: 58 mL/min (based on Cr of 1). Lab Results   Component Value Date    BUN 13 04/14/2017     Lab Results   Component Value Date    WBC 4.60 04/14/2017      which calculates to an Estimated Creatinine Clearance: 58 mL/min (based on Cr of 1)..       Current weight is 45.8 kg (101 lb)    The patient will be started on vancomycin at a dose of 750 mg every 18 hours. Patient will be followed by pharmacy for changes in renal function, toxicity, and efficacy.  The vancomycin trough has been ordered for 4/16 at 10:00.      Thank you for allowing us to participate in this patient's care.     Kleber Ornelas
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
## 2017-04-15 NOTE — PROGRESS NOTES
Pt still c/o nausea, MD notified and ordered to change zofran q4 and give early dose now.  Will give and continue to monitor.
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
## 2017-04-15 NOTE — ED NOTES
Pt states pain med makes her feel itchy.  No hives or rashes noted.  Dr Armaan henry and Glol ordered
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
## 2017-04-15 NOTE — ASSESSMENT & PLAN NOTE
- Suspect intra-abdominal abscess due to recent hysterectomy  - Check CT abdomen/pelvis without contrast

## 2017-04-16 ENCOUNTER — SURGERY (OUTPATIENT)
Age: 32
End: 2017-04-16

## 2017-04-16 ENCOUNTER — ANESTHESIA (OUTPATIENT)
Dept: SURGERY | Facility: HOSPITAL | Age: 32
DRG: 907 | End: 2017-04-16
Payer: MEDICARE

## 2017-04-16 PROBLEM — K62.89 PROCTITIS: Status: ACTIVE | Noted: 2017-04-16

## 2017-04-16 PROBLEM — E87.20 ACIDOSIS: Status: ACTIVE | Noted: 2017-04-16

## 2017-04-16 PROBLEM — R79.89 ABNORMAL LFTS: Status: ACTIVE | Noted: 2017-04-16

## 2017-04-16 PROBLEM — N17.9 AKI (ACUTE KIDNEY INJURY): Status: ACTIVE | Noted: 2017-04-16

## 2017-04-16 LAB
ABO + RH BLD: NORMAL
ALBUMIN SERPL BCP-MCNC: 2 G/DL
ALBUMIN SERPL BCP-MCNC: 2.1 G/DL
ALLENS TEST: ABNORMAL
ALLENS TEST: ABNORMAL
ALP SERPL-CCNC: 144 U/L
ALP SERPL-CCNC: 152 U/L
ALT SERPL W/O P-5'-P-CCNC: 22 U/L
ALT SERPL W/O P-5'-P-CCNC: 24 U/L
ANION GAP SERPL CALC-SCNC: 7 MMOL/L
ANION GAP SERPL CALC-SCNC: 8 MMOL/L
ANION GAP SERPL CALC-SCNC: 8 MMOL/L
AST SERPL-CCNC: 36 U/L
AST SERPL-CCNC: 41 U/L
BASOPHILS # BLD AUTO: 0.01 K/UL
BASOPHILS NFR BLD: 0.1 %
BASOPHILS NFR BLD: 0.1 %
BASOPHILS NFR BLD: 0.2 %
BILIRUB SERPL-MCNC: 0.3 MG/DL
BILIRUB SERPL-MCNC: 0.8 MG/DL
BLD GP AB SCN CELLS X3 SERPL QL: NORMAL
BUN SERPL-MCNC: 19 MG/DL
C DIFF GDH STL QL: NEGATIVE
C DIFF TOX A+B STL QL IA: NEGATIVE
CALCIUM SERPL-MCNC: 5.9 MG/DL
CALCIUM SERPL-MCNC: 5.9 MG/DL
CALCIUM SERPL-MCNC: 6.2 MG/DL
CHLORIDE SERPL-SCNC: 111 MMOL/L
CHLORIDE SERPL-SCNC: 112 MMOL/L
CHLORIDE SERPL-SCNC: 112 MMOL/L
CO2 SERPL-SCNC: 18 MMOL/L
CREAT SERPL-MCNC: 2 MG/DL
DELSYS: ABNORMAL
DELSYS: ABNORMAL
DIFFERENTIAL METHOD: ABNORMAL
EOSINOPHIL # BLD AUTO: 0 K/UL
EOSINOPHIL NFR BLD: 0 %
EOSINOPHIL NFR BLD: 0 %
EOSINOPHIL NFR BLD: 0.3 %
ERYTHROCYTE [DISTWIDTH] IN BLOOD BY AUTOMATED COUNT: 16.7 %
ERYTHROCYTE [DISTWIDTH] IN BLOOD BY AUTOMATED COUNT: 17.2 %
ERYTHROCYTE [DISTWIDTH] IN BLOOD BY AUTOMATED COUNT: 17.2 %
ERYTHROCYTE [SEDIMENTATION RATE] IN BLOOD BY WESTERGREN METHOD: 18 MM/H
EST. GFR  (AFRICAN AMERICAN): 37 ML/MIN/1.73 M^2
EST. GFR  (NON AFRICAN AMERICAN): 32 ML/MIN/1.73 M^2
FIO2: 100
FIO2: 40
GLUCOSE SERPL-MCNC: 139 MG/DL
GLUCOSE SERPL-MCNC: 171 MG/DL
GLUCOSE SERPL-MCNC: 171 MG/DL
HCO3 UR-SCNC: 17.6 MMOL/L (ref 24–28)
HCO3 UR-SCNC: 19.2 MMOL/L (ref 24–28)
HCT VFR BLD AUTO: 21 %
HCT VFR BLD AUTO: 21 %
HCT VFR BLD AUTO: 24.9 %
HGB BLD-MCNC: 7.1 G/DL
HGB BLD-MCNC: 7.1 G/DL
HGB BLD-MCNC: 8.3 G/DL
LYMPHOCYTES # BLD AUTO: 0.3 K/UL
LYMPHOCYTES NFR BLD: 4.1 %
LYMPHOCYTES NFR BLD: 4.1 %
LYMPHOCYTES NFR BLD: 5.1 %
MAGNESIUM SERPL-MCNC: 1.5 MG/DL
MAGNESIUM SERPL-MCNC: 3.3 MG/DL
MCH RBC QN AUTO: 28.3 PG
MCH RBC QN AUTO: 28.6 PG
MCH RBC QN AUTO: 28.6 PG
MCHC RBC AUTO-ENTMCNC: 33.3 %
MCHC RBC AUTO-ENTMCNC: 33.8 %
MCHC RBC AUTO-ENTMCNC: 33.8 %
MCV RBC AUTO: 85 FL
MODE: ABNORMAL
MODE: ABNORMAL
MONOCYTES # BLD AUTO: 0.1 K/UL
MONOCYTES NFR BLD: 0.7 %
MONOCYTES NFR BLD: 0.7 %
MONOCYTES NFR BLD: 1 %
NEUTROPHILS # BLD AUTO: 5.6 K/UL
NEUTROPHILS # BLD AUTO: 6.8 K/UL
NEUTROPHILS # BLD AUTO: 6.8 K/UL
NEUTROPHILS NFR BLD: 93.6 %
NEUTROPHILS NFR BLD: 95.5 %
NEUTROPHILS NFR BLD: 95.5 %
PCO2 BLDA: 29.4 MMHG (ref 35–45)
PCO2 BLDA: 32 MMHG (ref 35–45)
PEEP: 5
PH SMN: 7.35 [PH] (ref 7.35–7.45)
PH SMN: 7.42 [PH] (ref 7.35–7.45)
PHOSPHATE SERPL-MCNC: 2.8 MG/DL
PHOSPHATE SERPL-MCNC: 3.7 MG/DL
PLATELET # BLD AUTO: 62 K/UL
PLATELET # BLD AUTO: 62 K/UL
PLATELET # BLD AUTO: 68 K/UL
PMV BLD AUTO: 10.3 FL
PMV BLD AUTO: 9.2 FL
PMV BLD AUTO: 9.2 FL
PO2 BLDA: 166 MMHG (ref 80–100)
PO2 BLDA: 396 MMHG (ref 80–100)
POC BE: -5 MMOL/L
POC BE: -8 MMOL/L
POC SATURATED O2: 100 % (ref 95–100)
POC SATURATED O2: 100 % (ref 95–100)
POTASSIUM SERPL-SCNC: 2.8 MMOL/L
POTASSIUM SERPL-SCNC: 2.8 MMOL/L
POTASSIUM SERPL-SCNC: 4 MMOL/L
PROT SERPL-MCNC: 5.8 G/DL
PROT SERPL-MCNC: 6 G/DL
RBC # BLD AUTO: 2.48 M/UL
RBC # BLD AUTO: 2.48 M/UL
RBC # BLD AUTO: 2.93 M/UL
SAMPLE: ABNORMAL
SAMPLE: ABNORMAL
SITE: ABNORMAL
SITE: ABNORMAL
SODIUM SERPL-SCNC: 136 MMOL/L
SODIUM SERPL-SCNC: 138 MMOL/L
SODIUM SERPL-SCNC: 138 MMOL/L
VT: 400
WBC # BLD AUTO: 6.03 K/UL
WBC # BLD AUTO: 7.13 K/UL
WBC # BLD AUTO: 7.13 K/UL

## 2017-04-16 PROCEDURE — 27100108

## 2017-04-16 PROCEDURE — 27000221 HC OXYGEN, UP TO 24 HOURS

## 2017-04-16 PROCEDURE — 25000003 PHARM REV CODE 250: Performed by: NURSE PRACTITIONER

## 2017-04-16 PROCEDURE — 0UQG0ZZ REPAIR VAGINA, OPEN APPROACH: ICD-10-PCS | Performed by: SURGERY

## 2017-04-16 PROCEDURE — 80053 COMPREHEN METABOLIC PANEL: CPT | Mod: 91

## 2017-04-16 PROCEDURE — 58999 UNLISTED PX FML GENITAL SYS: CPT | Mod: ,,, | Performed by: SURGERY

## 2017-04-16 PROCEDURE — 86900 BLOOD TYPING SEROLOGIC ABO: CPT

## 2017-04-16 PROCEDURE — 85014 HEMATOCRIT: CPT

## 2017-04-16 PROCEDURE — 86920 COMPATIBILITY TEST SPIN: CPT

## 2017-04-16 PROCEDURE — 84100 ASSAY OF PHOSPHORUS: CPT

## 2017-04-16 PROCEDURE — 80053 COMPREHEN METABOLIC PANEL: CPT

## 2017-04-16 PROCEDURE — 25000003 PHARM REV CODE 250: Performed by: INTERNAL MEDICINE

## 2017-04-16 PROCEDURE — 25000003 PHARM REV CODE 250: Performed by: NURSE ANESTHETIST, CERTIFIED REGISTERED

## 2017-04-16 PROCEDURE — 99900035 HC TECH TIME PER 15 MIN (STAT)

## 2017-04-16 PROCEDURE — 85025 COMPLETE CBC W/AUTO DIFF WBC: CPT | Mod: 91

## 2017-04-16 PROCEDURE — 63600175 PHARM REV CODE 636 W HCPCS: Performed by: NURSE ANESTHETIST, CERTIFIED REGISTERED

## 2017-04-16 PROCEDURE — 99232 SBSQ HOSP IP/OBS MODERATE 35: CPT | Mod: ,,, | Performed by: INTERNAL MEDICINE

## 2017-04-16 PROCEDURE — 94150 VITAL CAPACITY TEST: CPT

## 2017-04-16 PROCEDURE — 94002 VENT MGMT INPAT INIT DAY: CPT

## 2017-04-16 PROCEDURE — 94799 UNLISTED PULMONARY SVC/PX: CPT

## 2017-04-16 PROCEDURE — 84295 ASSAY OF SERUM SODIUM: CPT

## 2017-04-16 PROCEDURE — 36600 WITHDRAWAL OF ARTERIAL BLOOD: CPT

## 2017-04-16 PROCEDURE — 87040 BLOOD CULTURE FOR BACTERIA: CPT

## 2017-04-16 PROCEDURE — 82330 ASSAY OF CALCIUM: CPT

## 2017-04-16 PROCEDURE — 87086 URINE CULTURE/COLONY COUNT: CPT

## 2017-04-16 PROCEDURE — 63600175 PHARM REV CODE 636 W HCPCS: Performed by: SURGERY

## 2017-04-16 PROCEDURE — 27201423 OPTIME MED/SURG SUP & DEVICES STERILE SUPPLY: Performed by: SURGERY

## 2017-04-16 PROCEDURE — 25000003 PHARM REV CODE 250: Performed by: SURGERY

## 2017-04-16 PROCEDURE — 86644 CMV ANTIBODY: CPT

## 2017-04-16 PROCEDURE — 51702 INSERT TEMP BLADDER CATH: CPT

## 2017-04-16 PROCEDURE — 20000000 HC ICU ROOM

## 2017-04-16 PROCEDURE — 82803 BLOOD GASES ANY COMBINATION: CPT

## 2017-04-16 PROCEDURE — 99900026 HC AIRWAY MAINTENANCE (STAT)

## 2017-04-16 PROCEDURE — 99222 1ST HOSP IP/OBS MODERATE 55: CPT | Mod: ,,, | Performed by: INTERNAL MEDICINE

## 2017-04-16 PROCEDURE — 63600175 PHARM REV CODE 636 W HCPCS: Performed by: NURSE PRACTITIONER

## 2017-04-16 PROCEDURE — 63600175 PHARM REV CODE 636 W HCPCS: Performed by: INTERNAL MEDICINE

## 2017-04-16 PROCEDURE — 84132 ASSAY OF SERUM POTASSIUM: CPT

## 2017-04-16 PROCEDURE — 36430 TRANSFUSION BLD/BLD COMPNT: CPT

## 2017-04-16 PROCEDURE — 02HV33Z INSERTION OF INFUSION DEVICE INTO SUPERIOR VENA CAVA, PERCUTANEOUS APPROACH: ICD-10-PCS | Performed by: EMERGENCY MEDICINE

## 2017-04-16 PROCEDURE — 36000706: Performed by: SURGERY

## 2017-04-16 PROCEDURE — 83735 ASSAY OF MAGNESIUM: CPT

## 2017-04-16 PROCEDURE — 99291 CRITICAL CARE FIRST HOUR: CPT | Mod: ,,, | Performed by: NURSE PRACTITIONER

## 2017-04-16 PROCEDURE — 36415 COLL VENOUS BLD VENIPUNCTURE: CPT

## 2017-04-16 PROCEDURE — P9016 RBC LEUKOCYTES REDUCED: HCPCS

## 2017-04-16 PROCEDURE — 37000008 HC ANESTHESIA 1ST 15 MINUTES: Performed by: SURGERY

## 2017-04-16 PROCEDURE — 37000009 HC ANESTHESIA EA ADD 15 MINS: Performed by: SURGERY

## 2017-04-16 PROCEDURE — 36000707: Performed by: SURGERY

## 2017-04-16 PROCEDURE — 87205 SMEAR GRAM STAIN: CPT

## 2017-04-16 PROCEDURE — 36556 INSERT NON-TUNNEL CV CATH: CPT

## 2017-04-16 PROCEDURE — 87070 CULTURE OTHR SPECIMN AEROBIC: CPT

## 2017-04-16 PROCEDURE — C9113 INJ PANTOPRAZOLE SODIUM, VIA: HCPCS | Performed by: SURGERY

## 2017-04-16 PROCEDURE — 49000 EXPLORATION OF ABDOMEN: CPT | Mod: ,,, | Performed by: SURGERY

## 2017-04-16 PROCEDURE — 37799 UNLISTED PX VASCULAR SURGERY: CPT

## 2017-04-16 PROCEDURE — P9045 ALBUMIN (HUMAN), 5%, 250 ML: HCPCS | Performed by: NURSE ANESTHETIST, CERTIFIED REGISTERED

## 2017-04-16 PROCEDURE — 27200966 HC CLOSED SUCTION SYSTEM

## 2017-04-16 PROCEDURE — 86901 BLOOD TYPING SEROLOGIC RH(D): CPT

## 2017-04-16 RX ORDER — BUPIVACAINE HYDROCHLORIDE 2.5 MG/ML
INJECTION, SOLUTION EPIDURAL; INFILTRATION; INTRACAUDAL
Status: DISCONTINUED | OUTPATIENT
Start: 2017-04-16 | End: 2017-04-16 | Stop reason: HOSPADM

## 2017-04-16 RX ORDER — CLONIDINE HYDROCHLORIDE 0.1 MG/1
0.1 TABLET ORAL EVERY 6 HOURS PRN
Status: DISCONTINUED | OUTPATIENT
Start: 2017-04-16 | End: 2017-04-21 | Stop reason: HOSPADM

## 2017-04-16 RX ORDER — DEXMEDETOMIDINE HYDROCHLORIDE 4 UG/ML
0.2 INJECTION, SOLUTION INTRAVENOUS CONTINUOUS
Status: DISCONTINUED | OUTPATIENT
Start: 2017-04-16 | End: 2017-04-16

## 2017-04-16 RX ORDER — ACETAMINOPHEN 325 MG/1
650 TABLET ORAL EVERY 6 HOURS PRN
Status: DISCONTINUED | OUTPATIENT
Start: 2017-04-16 | End: 2017-04-21 | Stop reason: HOSPADM

## 2017-04-16 RX ORDER — RAMELTEON 8 MG/1
8 TABLET ORAL NIGHTLY PRN
Status: DISCONTINUED | OUTPATIENT
Start: 2017-04-16 | End: 2017-04-21 | Stop reason: HOSPADM

## 2017-04-16 RX ORDER — SODIUM CHLORIDE, SODIUM LACTATE, POTASSIUM CHLORIDE, CALCIUM CHLORIDE 600; 310; 30; 20 MG/100ML; MG/100ML; MG/100ML; MG/100ML
INJECTION, SOLUTION INTRAVENOUS CONTINUOUS
Status: DISCONTINUED | OUTPATIENT
Start: 2017-04-16 | End: 2017-04-17

## 2017-04-16 RX ORDER — HYDROMORPHONE HYDROCHLORIDE 1 MG/ML
0.5 INJECTION, SOLUTION INTRAMUSCULAR; INTRAVENOUS; SUBCUTANEOUS
Status: DISCONTINUED | OUTPATIENT
Start: 2017-04-16 | End: 2017-04-19

## 2017-04-16 RX ORDER — POTASSIUM CHLORIDE 29.8 MG/ML
40 INJECTION INTRAVENOUS ONCE
Status: COMPLETED | OUTPATIENT
Start: 2017-04-16 | End: 2017-04-16

## 2017-04-16 RX ORDER — LIDOCAINE HYDROCHLORIDE 10 MG/ML
INJECTION INFILTRATION; PERINEURAL
Status: DISCONTINUED | OUTPATIENT
Start: 2017-04-16 | End: 2017-04-16

## 2017-04-16 RX ORDER — POTASSIUM CHLORIDE 20 MEQ/15ML
40 SOLUTION ORAL ONCE
Status: COMPLETED | OUTPATIENT
Start: 2017-04-16 | End: 2017-04-16

## 2017-04-16 RX ORDER — HYDROCODONE BITARTRATE AND ACETAMINOPHEN 5; 325 MG/1; MG/1
1 TABLET ORAL EVERY 4 HOURS PRN
Status: DISCONTINUED | OUTPATIENT
Start: 2017-04-16 | End: 2017-04-21 | Stop reason: HOSPADM

## 2017-04-16 RX ORDER — ETOMIDATE 2 MG/ML
INJECTION INTRAVENOUS
Status: DISCONTINUED | OUTPATIENT
Start: 2017-04-16 | End: 2017-04-16

## 2017-04-16 RX ORDER — MAGNESIUM SULFATE HEPTAHYDRATE 40 MG/ML
4 INJECTION, SOLUTION INTRAVENOUS ONCE
Status: COMPLETED | OUTPATIENT
Start: 2017-04-16 | End: 2017-04-16

## 2017-04-16 RX ORDER — DIPHENHYDRAMINE HYDROCHLORIDE 50 MG/ML
25 INJECTION INTRAMUSCULAR; INTRAVENOUS EVERY 4 HOURS PRN
Status: DISCONTINUED | OUTPATIENT
Start: 2017-04-16 | End: 2017-04-21 | Stop reason: HOSPADM

## 2017-04-16 RX ORDER — HYDROCODONE BITARTRATE AND ACETAMINOPHEN 500; 5 MG/1; MG/1
TABLET ORAL
Status: DISCONTINUED | OUTPATIENT
Start: 2017-04-16 | End: 2017-04-16

## 2017-04-16 RX ORDER — FLUCONAZOLE 2 MG/ML
400 INJECTION, SOLUTION INTRAVENOUS
Status: DISCONTINUED | OUTPATIENT
Start: 2017-04-16 | End: 2017-04-16

## 2017-04-16 RX ORDER — PANTOPRAZOLE SODIUM 40 MG/10ML
40 INJECTION, POWDER, LYOPHILIZED, FOR SOLUTION INTRAVENOUS DAILY
Status: DISCONTINUED | OUTPATIENT
Start: 2017-04-16 | End: 2017-04-21 | Stop reason: HOSPADM

## 2017-04-16 RX ORDER — AMOXICILLIN 250 MG
1 CAPSULE ORAL 2 TIMES DAILY
Status: DISCONTINUED | OUTPATIENT
Start: 2017-04-16 | End: 2017-04-21 | Stop reason: HOSPADM

## 2017-04-16 RX ORDER — MEROPENEM AND SODIUM CHLORIDE 500 MG/50ML
500 INJECTION, SOLUTION INTRAVENOUS
Status: DISCONTINUED | OUTPATIENT
Start: 2017-04-17 | End: 2017-04-19

## 2017-04-16 RX ORDER — FENTANYL CITRATE 50 UG/ML
INJECTION, SOLUTION INTRAMUSCULAR; INTRAVENOUS
Status: DISCONTINUED | OUTPATIENT
Start: 2017-04-16 | End: 2017-04-16

## 2017-04-16 RX ORDER — ALBUMIN HUMAN 50 G/1000ML
SOLUTION INTRAVENOUS CONTINUOUS PRN
Status: DISCONTINUED | OUTPATIENT
Start: 2017-04-16 | End: 2017-04-16

## 2017-04-16 RX ORDER — MEROPENEM AND SODIUM CHLORIDE 1 G/50ML
1 INJECTION, SOLUTION INTRAVENOUS
Status: DISCONTINUED | OUTPATIENT
Start: 2017-04-16 | End: 2017-04-16

## 2017-04-16 RX ORDER — VECURONIUM BROMIDE FOR INJECTION 1 MG/ML
INJECTION, POWDER, LYOPHILIZED, FOR SOLUTION INTRAVENOUS
Status: DISCONTINUED | OUTPATIENT
Start: 2017-04-16 | End: 2017-04-16

## 2017-04-16 RX ORDER — MIDAZOLAM HYDROCHLORIDE 1 MG/ML
INJECTION, SOLUTION INTRAMUSCULAR; INTRAVENOUS
Status: DISCONTINUED | OUTPATIENT
Start: 2017-04-16 | End: 2017-04-16

## 2017-04-16 RX ORDER — BISACODYL 10 MG
10 SUPPOSITORY, RECTAL RECTAL DAILY PRN
Status: DISCONTINUED | OUTPATIENT
Start: 2017-04-16 | End: 2017-04-21 | Stop reason: HOSPADM

## 2017-04-16 RX ORDER — PHENYLEPHRINE HCL IN 0.9% NACL 40MG/250ML
0.5 PLASTIC BAG, INJECTION (ML) INTRAVENOUS CONTINUOUS
Status: DISCONTINUED | OUTPATIENT
Start: 2017-04-17 | End: 2017-04-17

## 2017-04-16 RX ORDER — SUCCINYLCHOLINE CHLORIDE 20 MG/ML
INJECTION INTRAMUSCULAR; INTRAVENOUS
Status: DISCONTINUED | OUTPATIENT
Start: 2017-04-16 | End: 2017-04-16

## 2017-04-16 RX ORDER — LACTULOSE 10 G/15ML
20 SOLUTION ORAL EVERY 6 HOURS PRN
Status: DISCONTINUED | OUTPATIENT
Start: 2017-04-16 | End: 2017-04-21 | Stop reason: HOSPADM

## 2017-04-16 RX ORDER — ONDANSETRON 2 MG/ML
4 INJECTION INTRAMUSCULAR; INTRAVENOUS EVERY 12 HOURS PRN
Status: DISCONTINUED | OUTPATIENT
Start: 2017-04-16 | End: 2017-04-21 | Stop reason: HOSPADM

## 2017-04-16 RX ORDER — FENTANYL CITRAT/DEXTROSE 5%/PF 100 MCG/10
PATIENT CONTROLLED ANALGESIA SYRINGE INTRAVENOUS CONTINUOUS
Status: DISCONTINUED | OUTPATIENT
Start: 2017-04-16 | End: 2017-04-16

## 2017-04-16 RX ORDER — SODIUM CHLORIDE, SODIUM LACTATE, POTASSIUM CHLORIDE, CALCIUM CHLORIDE 600; 310; 30; 20 MG/100ML; MG/100ML; MG/100ML; MG/100ML
INJECTION, SOLUTION INTRAVENOUS CONTINUOUS PRN
Status: DISCONTINUED | OUTPATIENT
Start: 2017-04-16 | End: 2017-04-16

## 2017-04-16 RX ORDER — SODIUM BICARBONATE 42 MG/ML
INJECTION, SOLUTION INTRAVENOUS
Status: DISCONTINUED | OUTPATIENT
Start: 2017-04-16 | End: 2017-04-16

## 2017-04-16 RX ADMIN — FENTANYL CITRATE 50 MCG/HR: 50 INJECTION, SOLUTION INTRAMUSCULAR; INTRAVENOUS at 04:04

## 2017-04-16 RX ADMIN — FENTANYL CITRATE 150 MCG: 50 INJECTION, SOLUTION INTRAMUSCULAR; INTRAVENOUS at 01:04

## 2017-04-16 RX ADMIN — FENTANYL CITRATE 100 MCG: 50 INJECTION, SOLUTION INTRAMUSCULAR; INTRAVENOUS at 02:04

## 2017-04-16 RX ADMIN — METRONIDAZOLE 500 MG: 500 INJECTION, SOLUTION INTRAVENOUS at 09:04

## 2017-04-16 RX ADMIN — ALBUMIN (HUMAN): 12.5 SOLUTION INTRAVENOUS at 02:04

## 2017-04-16 RX ADMIN — METRONIDAZOLE 500 MG: 500 INJECTION, SOLUTION INTRAVENOUS at 06:04

## 2017-04-16 RX ADMIN — HYDROMORPHONE HYDROCHLORIDE 0.5 MG: 1 INJECTION, SOLUTION INTRAMUSCULAR; INTRAVENOUS; SUBCUTANEOUS at 04:04

## 2017-04-16 RX ADMIN — POTASSIUM CHLORIDE 40 MEQ: 400 INJECTION, SOLUTION INTRAVENOUS at 08:04

## 2017-04-16 RX ADMIN — DIPHENHYDRAMINE HYDROCHLORIDE 25 MG: 50 INJECTION, SOLUTION INTRAMUSCULAR; INTRAVENOUS at 05:04

## 2017-04-16 RX ADMIN — DIPHENHYDRAMINE HYDROCHLORIDE 25 MG: 50 INJECTION, SOLUTION INTRAMUSCULAR; INTRAVENOUS at 09:04

## 2017-04-16 RX ADMIN — MAGNESIUM SULFATE IN WATER 4 G: 40 INJECTION, SOLUTION INTRAVENOUS at 08:04

## 2017-04-16 RX ADMIN — FENTANYL CITRATE 100 MCG: 50 INJECTION, SOLUTION INTRAMUSCULAR; INTRAVENOUS at 01:04

## 2017-04-16 RX ADMIN — CALCIUM GLUCONATE 1000 MG: 94 INJECTION, SOLUTION INTRAVENOUS at 10:04

## 2017-04-16 RX ADMIN — DEXMEDETOMIDINE HYDROCHLORIDE 0.2 MCG/KG/HR: 4 INJECTION, SOLUTION INTRAVENOUS at 07:04

## 2017-04-16 RX ADMIN — MICAFUNGIN SODIUM 100 MG: 20 INJECTION, POWDER, LYOPHILIZED, FOR SOLUTION INTRAVENOUS at 12:04

## 2017-04-16 RX ADMIN — LIDOCAINE HYDROCHLORIDE 100 MG: 10 INJECTION, SOLUTION INFILTRATION; PERINEURAL at 01:04

## 2017-04-16 RX ADMIN — MIDAZOLAM HYDROCHLORIDE 2 MG: 1 INJECTION, SOLUTION INTRAMUSCULAR; INTRAVENOUS at 12:04

## 2017-04-16 RX ADMIN — METRONIDAZOLE 500 MG: 500 INJECTION, SOLUTION INTRAVENOUS at 02:04

## 2017-04-16 RX ADMIN — MIDAZOLAM HYDROCHLORIDE 2 MG: 1 INJECTION, SOLUTION INTRAMUSCULAR; INTRAVENOUS at 01:04

## 2017-04-16 RX ADMIN — ACETAMINOPHEN 650 MG: 325 TABLET ORAL at 05:04

## 2017-04-16 RX ADMIN — POTASSIUM CHLORIDE 40 MEQ: 20 SOLUTION ORAL at 08:04

## 2017-04-16 RX ADMIN — HYDROMORPHONE HYDROCHLORIDE 0.5 MG: 1 INJECTION, SOLUTION INTRAMUSCULAR; INTRAVENOUS; SUBCUTANEOUS at 09:04

## 2017-04-16 RX ADMIN — SODIUM CHLORIDE, SODIUM LACTATE, POTASSIUM CHLORIDE, AND CALCIUM CHLORIDE: 600; 310; 30; 20 INJECTION, SOLUTION INTRAVENOUS at 01:04

## 2017-04-16 RX ADMIN — STANDARDIZED SENNA CONCENTRATE AND DOCUSATE SODIUM 1 TABLET: 8.6; 5 TABLET, FILM COATED ORAL at 09:04

## 2017-04-16 RX ADMIN — SUCCINYLCHOLINE CHLORIDE 120 MG: 20 INJECTION, SOLUTION INTRAMUSCULAR; INTRAVENOUS at 01:04

## 2017-04-16 RX ADMIN — FLUCONAZOLE 400 MG: 2 INJECTION INTRAVENOUS at 04:04

## 2017-04-16 RX ADMIN — CIPROFLOXACIN 400 MG: 2 INJECTION, SOLUTION INTRAVENOUS at 11:04

## 2017-04-16 RX ADMIN — VANCOMYCIN HYDROCHLORIDE 750 MG: 1 INJECTION, POWDER, LYOPHILIZED, FOR SOLUTION INTRAVENOUS at 11:04

## 2017-04-16 RX ADMIN — SODIUM CHLORIDE, SODIUM LACTATE, POTASSIUM CHLORIDE, AND CALCIUM CHLORIDE 100 ML/HR: .6; .31; .03; .02 INJECTION, SOLUTION INTRAVENOUS at 01:04

## 2017-04-16 RX ADMIN — CIPROFLOXACIN 400 MG: 2 INJECTION, SOLUTION INTRAVENOUS at 09:04

## 2017-04-16 RX ADMIN — ETOMIDATE 20 MG: 2 INJECTION, SOLUTION INTRAVENOUS at 01:04

## 2017-04-16 RX ADMIN — VECURONIUM BROMIDE FOR INJECTION 10 MG: 1 INJECTION, POWDER, LYOPHILIZED, FOR SOLUTION INTRAVENOUS at 01:04

## 2017-04-16 RX ADMIN — SODIUM BICARBONATE 50 MEQ: 42 INJECTION, SOLUTION INTRAVENOUS at 01:04

## 2017-04-16 RX ADMIN — VASOPRESSIN 0.04 UNITS/MIN: 20 INJECTION INTRAVENOUS at 08:04

## 2017-04-16 RX ADMIN — SODIUM CHLORIDE, SODIUM LACTATE, POTASSIUM CHLORIDE, AND CALCIUM CHLORIDE: 600; 310; 30; 20 INJECTION, SOLUTION INTRAVENOUS at 02:04

## 2017-04-16 RX ADMIN — BUPIVACAINE 266 MG: 13.3 INJECTION, SUSPENSION, LIPOSOMAL INFILTRATION at 02:04

## 2017-04-16 RX ADMIN — MEROPENEM AND SODIUM CHLORIDE 500 MG: 500 INJECTION, SOLUTION INTRAVENOUS at 11:04

## 2017-04-16 RX ADMIN — PANTOPRAZOLE SODIUM 40 MG: 40 INJECTION, POWDER, FOR SOLUTION INTRAVENOUS at 04:04

## 2017-04-16 RX ADMIN — BUPIVACAINE HYDROCHLORIDE 30 ML: 2.5 INJECTION, SOLUTION EPIDURAL; INFILTRATION; INTRACAUDAL; PERINEURAL at 02:04

## 2017-04-16 NOTE — PROGRESS NOTES
Discussed with .    Labs reveal worsening metabolic acidosis, worsening renal failure (normal lactic acid), worsening anemia, thrombocytopenia, possible peritonitis, s/p hysterectomy 3 days ago. Chart reviewed. Per , will transfer to ICU for higher level of care.     Possible Acute Peritonitis - evaluated by , likely to OR in AM after diarrhea workup done.     Possible Colitis - Stool studies including C.Diff pending. GI consulted. Continue broad-spectrum IV antibiotics.  requesting EGD/Colonoscopy in AM.    HIV/AIDS.    - Fer Wills MD.

## 2017-04-16 NOTE — ANESTHESIA PREPROCEDURE EVALUATION
04/16/2017  Wang Kebede is a 31 y.o., female.    Pre-op Assessment    I have reviewed the Patient Summary Reports.     I have reviewed the Nursing Notes.   I have reviewed the Medications.     Review of Systems  Hematology/Oncology:     Oncology Normal   Hematology Comments: HIV (human immunodeficiency virus infection)   EENT/Dental:EENT/Dental Normal   Cardiovascular:   S/P implantation of automatic cardioverter/defibrillator (AICD)     Cardiomyopathy    EF normal last ECHO    Septic Shock   Pulmonary:  Pulmonary Normal    Renal/:  Renal/ Normal     Hepatic/GI:   Severe sepsis with AIDS    Lower abdominal pain   Narcotic bowel syndrome   Abdominal lymphadenopathy   Pelvic lymphadenopathy     Musculoskeletal:  Musculoskeletal Normal    Neurological:  Neurology Normal    Endocrine:  Endocrine Normal        Physical Exam  General:  Edematous in moderate distress    Airway/Jaw/Neck:  Airway Findings: Mallampati: II      Chest/Lungs:  Chest/Lungs Findings: Clear to auscultation     Heart/Vascular:  Heart Findings: Rate: Normal  Rhythm: Regular Rhythm             Anesthesia Plan  Type of Anesthesia, risks & benefits discussed:  Anesthesia Type:  general  Patient's Preference:   Intra-op Monitoring Plan: arterial line and central line  Intra-op Monitoring Plan Comments:   Post Op Pain Control Plan:   Post Op Pain Control Plan Comments:   Induction:   IV  Beta Blocker:  Patient is not currently on a Beta-Blocker (No further documentation required).       Informed Consent: Patient understands risks and agrees with Anesthesia plan.  Questions answered. Anesthesia consent signed with patient.  ASA Score: 4  emergent   Day of Surgery Review of History & Physical: I have interviewed and examined the patient. I have reviewed the patient's H&P dated:  There are no significant changes.      Anesthesia Plan  Notes: We are going to leave her intubated post op

## 2017-04-16 NOTE — PROGRESS NOTES
[facility]  General Surgery  Progress Note    Subjective:     History of Present Illness:  This is 31 year old  female who has AIDS, and had robotic assisted hysterectomy for cervical dysplasia four days ago. She began to notice worsening abdominal pain on the evening of the surgery. She became so sick and was brought to the ED for further evaluation.    Post-Op Info:  Procedure(s) (LRB):  EXPLORATORY-LAPAROTOMY (N/A)   * Surgery Date in Future *     No new subjective & objective note has been filed under this hospital service since the last note was generated.    Assessment/Plan:     Lower abdominal pain  Peritonitis: Exploration.    After the initial examination and review of all images and labs, I have decided to postpone the exploration until after full evaluation of colon for colitis, either C.diff or CMV. Abdomen is soft but tender instead of rigid and guarding. We will check with colonoscopy and EGD. Possible exploration still high.       Rio Ronquillo MD  General Surgery  [facility]

## 2017-04-16 NOTE — PROGRESS NOTES
[facility]  General Surgery  Progress Note    Subjective:     History of Present Illness:  This is 31 year old  female who has AIDS, and had robotic assisted hysterectomy for cervical dysplasia four days ago. She began to notice worsening abdominal pain on the evening of the surgery. She became so sick and was brought to the ED for further evaluation.    Post-Op Info:  Procedure(s) (LRB):  EXPLORATORY-LAPAROTOMY (N/A)  REPAIR-VAGINAL CUFF (N/A)   Day of Surgery     No new subjective & objective note has been filed under this hospital service since the last note was generated.    Assessment/Plan:     * Septic shock with AIDS  The plan is to continue with the current management with the vasopressor support and respiratory care.  The infectious disease recommendation is expected.      Rio Ronquillo MD  General Surgery  [facility]

## 2017-04-16 NOTE — PROGRESS NOTES
Received from telemetry due to c/o severe abdominal pain, N/V/D, hypotension and tachycardia.  Upon transfer, patient noted to have temperature of >105 orally.  Criticore catheter inserted and temperature noted to be 106F.  Incontinent of liquid stool, specimen sent to lab for C-Diff.  Flexiseal inserted without difficulty. Placed on hypothermia blanket due to being unable to administer acetaminophen (exceeding 3000mg) or IV ibuprofen due to creatine.  ED MD called for CVL placement after multiple unsuccessful attempts to start peripheral line.  Gastrology at bedside, planning to perform colonoscopy.  Refer to flow sheets for full head to toe assessment.

## 2017-04-16 NOTE — BRIEF OP NOTE
[facility] -   Brief Operative Note    SUMMARY     Surgery Date: 4/15/2017     Surgeon(s) and Role:     * Adama Nielsen MD - Primary    Assisting Surgeon: None    Pre-op Diagnosis:  Lower abdominal pain [R10.30]    Post-op Diagnosis:  Post-Op Diagnosis Codes:     * Lower abdominal pain [R10.30]    Procedure(s) (LRB):  COLONOSCOPY (N/A)    Anesthesia: RN IV Sedation    Description of Procedure: Colonoscopy    Description of the findings of the procedure:   Rectal inflammation - likely from rectal tube placement  Colon otherwise normal.   Biopsies were taken from the normal colon and the rectal inflammation.    Recs:  Await bxs  Discussed with Dr. Shima Santiago abx.  Await cx but no evidence of C. Diff or other colitis that would explain her clinical picture.    Estimated Blood Loss: * No values recorded between 4/15/2017 12:00 AM and 4/16/2017 12:00 AM *         Specimens:   Specimen (12h ago through future)    Start     Ordered    04/15/17 9826  Specimen to Pathology - Surgery  Once     Comments:  1.  Random colon biopsy -R/O microscopic colitis  2.  Rectal colon biopsy -Rectal inflammation    04/15/17 3129

## 2017-04-16 NOTE — OP NOTE
Operative Note       SURGERY DATE:  04/16/2017    PRE-OP DIAGNOSIS:  Peritonitis [K65.9]    POST-OP DIAGNOSIS:  Peritonitis [K65.9]   Dehiscence of Vaginal cuff, post hysterectomy    Procedure(s) (LRB):  EXPLORATORY-LAPAROTOMY (N/A)  REPAIR-VAGINAL CUFF (N/A)    Surgeon(s) and Role:     * iRo Ronquillo MD - Primary    ASSISTANTS: None  ANESTHESIA: General    FINDINGS: The small bowel and colon were found to be free of pathology. Moderate amount of reactive fluid was found in the pelvis near the vaginal cuff which was found to be dehisced at the right lateral end. Otherwise, it appeared to free.    ESTIMATED BLOOD LOSS: 30 mL              COMPLICATIONS:  None    SPECIMEN:  None    Implants: None    INDICATION:  Peritonitis    DESCRIPTION OF PROCEDURE:    The patient was taken to the operating room, and underwent general anesthesia with orotracheal intubation per anesthesia.  Once the patient was sleep, an arterial line as well as central line was placed for anesthesia service as well.  A timeout was taken to identify the patient and the cause for the procedure.  A low midline incision was made which was later further extended superiorly and entered the abdominal cavity without any difficulty.  Thorough and meticulous inspection of the peritoneal contents was completed and the above findings were noted.  After the irrigation, the decreased vaginal cuff was then repaired with interrupted 0 Vicryl sutures.  After the identification of integrity of the repair, and perfuse irrigation of the peritoneal cavity, the abdomen was closed in the usual fashion with 2 looped 0 PDS with blunt needles.  Prior to the closure, the incision was injected with 100 mL of a mixture of Exparel and 0.25% Marcaine plain.  The skin was approximated with a skin staple.  The patient tolerated the procedure without any difficulty.  Patient was kept intubated and later transferred to ICU for further management.           CONDITION:  Serious    DISPOSITION: ICU - intubated and critically ill.     Rio Ronquillo

## 2017-04-16 NOTE — SUBJECTIVE & OBJECTIVE
Subjective:     Interval History: After colonoscopy last night pt went to OR and found to have vaginal cuff wound dehiscence. Defect corrected. Returned to ICU intubated. Remains intubated. BP stable. HR improved but still tachycardic.     Review of Systems   Unable to perform ROS: Intubated     Objective:     Vital Signs (Most Recent):  Temp: 100.3 °F (37.9 °C) (04/16/17 0600)  Pulse: (!) 124 (04/16/17 0700)  Resp: 20 (04/16/17 0700)  BP: 99/71 (04/16/17 0700)  SpO2: 100 % (04/16/17 0700) Vital Signs (24h Range):  Temp:  [98.4 °F (36.9 °C)-105.8 °F (41 °C)] 100.3 °F (37.9 °C)  Pulse:  [] 124  Resp:  [19-33] 20  SpO2:  [70 %-100 %] 100 %  BP: ()/(35-98) 99/71     Weight: 45.8 kg (101 lb) (04/14/17 1752)  Body mass index is 19.73 kg/(m^2).      Intake/Output Summary (Last 24 hours) at 04/16/17 0711  Last data filed at 04/16/17 0607   Gross per 24 hour   Intake          7740.09 ml   Output             1402 ml   Net          6338.09 ml       Lines/Drains/Airways     Central Venous Catheter Line                 Percutaneous Central Line Insertion/Assessment - triple lumen  04/15/17 2225 right internal jugular less than 1 day          Drain                 NG/OG Tube 04/16/17 0400 Center mouth less than 1 day         Rectal Tube 04/16/17 0330 fecal management system less than 1 day         Urethral Catheter 04/15/17 2045 less than 1 day          Airway                 Airway - Non-Surgical 04/16/17 0127 Endotracheal Tube less than 1 day          Arterial Line                 Arterial Line 04/16/17 0131 Left Radial less than 1 day                Physical Exam   Constitutional:   Ill appearing   HENT:   ETT in place   Eyes: No scleral icterus.   Pallor noted   Cardiovascular: Regular rhythm and normal heart sounds.    No murmur heard.  Tachycardic   Pulmonary/Chest: Effort normal and breath sounds normal. She has no wheezes. She has no rales.   Abdominal: Soft. She exhibits distension. There is no  hepatosplenomegaly. There is tenderness.   Absent bowel sounds. Tenderness in lower abdomen.   Musculoskeletal: She exhibits no edema.   Skin: Skin is warm. There is erythema.   Psychiatric: She has a normal mood and affect. Her behavior is normal.   Nursing note and vitals reviewed.      Significant Labs:  Blood Culture:   Recent Labs  Lab 04/14/17  1856 04/14/17  1940   LABBLOO No Growth to date  No Growth to date No Growth to date  No Growth to date     CBC:   Recent Labs  Lab 04/15/17  0630 04/15/17  1636 04/16/17  0625   WBC 6.76 7.18 7.13  7.13   HGB 8.3* 8.2* 7.1*  7.1*   HCT 25.7* 25.0* 21.0*  21.0*   PLT 98* 88* 62*  62*     CMP:   Recent Labs  Lab 04/16/17  0625   *  171*   CALCIUM 5.9*  5.9*   ALBUMIN 2.1*   PROT 5.8*     138   K 2.8*  2.8*   CO2 18*  18*   *  112*   BUN 19  19   CREATININE 2.0*  2.0*   ALKPHOS 152*   ALT 24   AST 41*   BILITOT 0.8     Coagulation:   Recent Labs  Lab 04/15/17  1636   INR 1.2     Stool C. diff: No results for input(s): CDIFFICILEAN, CDIFFTOX in the last 48 hours.  Stool Culture: No results for input(s): STOOLCULTURE in the last 48 hours.  Stool Ova/Cysts/Parasites: No results for input(s): STLEXAMOCP in the last 48 hours.  Stool Giardia/Crypto: No results for input(s): GIARDIAANTIG, CRSPAG in the last 48 hours.  Stool WBCs: No results for input(s): STOOLWBC in the last 48 hours.      Significant Imaging:  No new films available

## 2017-04-16 NOTE — ASSESSMENT & PLAN NOTE
The plan is to continue with the current management with the vasopressor support and respiratory care.  The infectious disease recommendation is expected.

## 2017-04-16 NOTE — PROVIDER PROGRESS NOTES - EMERGENCY DEPT.
"Encounter Date: 4/14/2017    ED Physician Progress Notes        Physician Note:         Central Line  Date/Time: 4/15/2017 10:23 PM  Performed by: LOBO LEON JR  Consent Done: Yes  Time out: Immediately prior to procedure a "time out" was called to verify the correct patient, procedure, equipment, support staff and site/side marked as required.  Anesthesia: local infiltration    Anesthesia:  Anesthesia: local infiltration  Local Anesthetic: lidocaine 1% without epinephrine   Preparation: skin prepped with ChloraPrep  Skin prep agent dried: skin prep agent completely dried prior to procedure  Sterile barriers: all five maximum sterile barriers used - cap, mask, sterile gown, sterile gloves, and large sterile sheet  Hand hygiene: hand hygiene performed prior to central venous catheter insertion  Location details: right internal jugular  Catheter type: single lumen  Catheter size: 7 Fr  Ultrasound guidance: yes  Needle advanced into vessel with real time Ultrasound guidance.  Guidewire confirmed in vessel.  Image recorded and saved.  Sterile sheath used.  Number of attempts: 1  Assessment: placement verified by x-ray  Post-procedure: line sutured          "

## 2017-04-16 NOTE — TRANSFER OF CARE
Anesthesia Transfer of Care Note    Patient: Wang Kebede    Procedure(s) Performed: Procedure(s) (LRB):  EXPLORATORY-LAPAROTOMY (N/A)  REPAIR-VAGINAL CUFF (N/A)    Patient location: ICU    Anesthesia Type: general    Transport from OR: Transported from OR intubated on 100% O2 by AMBU with adequate controlled ventilation. Upon arrival to PACU/ICU, patient attached to ventilator and auscultated to confirm bilateral breath sounds and adequate TV. Continuous ECG monitoring in transport. Continuous SpO2 monitoring in transport    Post pain: adequate analgesia    Post assessment: no apparent anesthetic complications    Post vital signs: stable    Level of consciousness: sedated    Complications: none          Last vitals:   148/96; 132; 14; 38.3; 100%
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
## 2017-04-16 NOTE — PLAN OF CARE
Problem: Patient Care Overview  Goal: Plan of Care Review  Outcome: Ongoing (interventions implemented as appropriate)  Patient transferred to ICU this shift due to elevated temperature, N/V/D, and c/o abdominal pain with hypotension and tachycardia.  AAO x4, frightened but compliant with POC.  CVL inserted this shift, placed on cooling blanket due to temp of 106 per critcore and 105.8 temporally. Flexiseal placed due to watery stools and criticore catheter placed to closely monitor temperature.  Bedside colonoscopy done and patient ultimately taken to OR for exploratory laparotomy.  Diagnosed with vaginal cap dehiscence s/p hysterectomy.  Returned to ICU on ventilator, plan is to leave intubated x 48 hours.  Arterial line placed in surgery.  Continuous fentanyl drip started at 50mcg/hr upon return with orders for titratable Precedex to maintain RASS of -1. Will begin once patient more alert.  NaHCO3 infusion (x 1 liter only) infusing at 100mg/hr, will then change to LR per MD orders.  Post-op core temperature ranging 100-101 while on cooling blanket. POC reviewed with patient prior to surgery and with family pre & post op.  Safety maintained.

## 2017-04-16 NOTE — PLAN OF CARE
Problem: Patient Care Overview  Goal: Plan of Care Review  Outcome: Ongoing (interventions implemented as appropriate)  Pt stable. Pt is ST with HR range this shift 130s-150s on the heart monitor.  Pt febrile, max temp 103.1 oral.  Pt hypotensive requiring 2L boluses with improvement to BP.  Pt c/o pain to generalized body and increased pain to abd area (abd firm and tender).  Pt has been NPO all day and current plan is to gave exp lap later tonight, pt and mother aware.  Pt had one episode of vomiting and diarrhea, prn Zofran given.  Plan of care reviewed.  Pt verbalizes understanding.  Pt was free from falls or injuries during duration of shift.  Pt turned and repositioned self.  IJ intact with no redness, swelling or drainage.  Bed low, wheels locked, bed alarm on, call light in reach.  Pt instructed to call for assistance.  Will continue to monitor.

## 2017-04-16 NOTE — PROGRESS NOTES
ABG with Lytes did not cross over  Na 139  K 3.7  Ca 0.93  Glu 113  Hct 47    PH 7.320  PCO2 33.6  PO2 415  Be -9  HCO3 17.3  O2 100%

## 2017-04-16 NOTE — PLAN OF CARE
Patient adequately sedated, time out done and agreed by all staff.   See ICU nurse notes for vitals signs and medications.

## 2017-04-16 NOTE — ANESTHESIA POSTPROCEDURE EVALUATION
Anesthesia Post Evaluation    Patient: Wang Kebede    Procedure(s) Performed: Procedure(s) (LRB):  EXPLORATORY-LAPAROTOMY (N/A)  REPAIR-VAGINAL CUFF (N/A)    Final Anesthesia Type: general  Patient location during evaluation: ICU  Patient participation: No - Unable to Participate, Intubation  Level of consciousness: sedated  Post-procedure vital signs: reviewed and stable  Pain management: adequate  Airway patency: patent  PONV status at discharge: No PONV  Anesthetic complications: no      Cardiovascular status: hemodynamically stable  Respiratory status: ETT and ventilator  Hydration status: hypovolemic  Follow-up needed         148/96; 132; 14; 38.3; 100%    Pain/Tano Score: Pain Assessment Performed: Yes (4/16/2017  1:05 AM)  Presence of Pain: non-verbal indicators absent (remains sedated from colonoscopy) (4/16/2017  1:05 AM)  Pain Rating Prior to Med Admin: 7 (4/15/2017 11:30 PM)  Pain Rating Post Med Admin: 0 (4/16/2017 12:00 AM)

## 2017-04-16 NOTE — ASSESSMENT & PLAN NOTE
Improving. Likely due to sepsis/hypotension. Continue to monitor, but no further evaluation warranted at this time.

## 2017-04-16 NOTE — PROGRESS NOTES
precedex off at present.  pts sbp down to the 80s.  PETROS Espinal notified.  Orders for vasopressin obtained.  Awaiting verification from pharmacy.

## 2017-04-16 NOTE — ANESTHESIA RELEASE NOTE
Anesthesia Release from PACU Note    Patient: Wang Kebede    Procedure(s) Performed: Procedure(s) (LRB):  EXPLORATORY-LAPAROTOMY (N/A)  REPAIR-VAGINAL CUFF (N/A)    Anesthesia type: general    Post pain: Adequate analgesia    Post assessment: no apparent anesthetic complications    Last Vitals:   148/96; 132; 14; 38.3; 100%    Post vital signs: stable    Level of consciousness: sedated    Nausea/Vomiting: no nausea/no vomiting    Complications: none    Airway Patency: patent    Respiratory: ETT    Cardiovascular: stable and blood pressure at baseline    Hydration: hypovolemic

## 2017-04-16 NOTE — PROGRESS NOTES
Dr. Nielsen and GI team at bedside for colonoscopy.  Risks explained to patient per MD. Consent signed per patient.  Procedure initiated after medicated with Versed and Fentanyl per MD. Vital signs checked G6estgium during procedure.  B/P cuff and oxygen saturation probe had to be moved due to issues obtaining readings.

## 2017-04-16 NOTE — PROGRESS NOTES
Pt received 2L boluses this shift and NS at 125 for a rough total volume of 3500mL of NS.  Pt voided 400mL and had one episode of bowel (diarrhea) and bladder incontinence.  Will continue to monitor.

## 2017-04-16 NOTE — PROGRESS NOTES
[facility]  Gastroenterology  Progress Note    Patient Name: Wang Kebede  MRN: [id number]  Admission Date: 4/14/2017  Hospital Length of Stay: 1 days  Code Status: Full Code   Attending Provider: Gerardo Mccauley MD  Consulting Provider: Adama Nielsen MD  Primary Care Physician: Primary Doctor No  Principal Problem: Severe sepsis      Subjective:     Interval History: After colonoscopy last night pt went to OR and found to have vaginal cuff wound dehiscence. Defect corrected. Returned to ICU intubated. Remains intubated. BP stable. HR improved but still tachycardic.     Review of Systems   Unable to perform ROS: Intubated     Objective:     Vital Signs (Most Recent):  Temp: 100.3 °F (37.9 °C) (04/16/17 0600)  Pulse: (!) 124 (04/16/17 0700)  Resp: 20 (04/16/17 0700)  BP: 99/71 (04/16/17 0700)  SpO2: 100 % (04/16/17 0700) Vital Signs (24h Range):  Temp:  [98.4 °F (36.9 °C)-105.8 °F (41 °C)] 100.3 °F (37.9 °C)  Pulse:  [] 124  Resp:  [19-33] 20  SpO2:  [70 %-100 %] 100 %  BP: ()/(35-98) 99/71     Weight: 45.8 kg (101 lb) (04/14/17 1752)  Body mass index is 19.73 kg/(m^2).      Intake/Output Summary (Last 24 hours) at 04/16/17 0711  Last data filed at 04/16/17 0607   Gross per 24 hour   Intake          7740.09 ml   Output             1402 ml   Net          6338.09 ml       Lines/Drains/Airways     Central Venous Catheter Line                 Percutaneous Central Line Insertion/Assessment - triple lumen  04/15/17 2225 right internal jugular less than 1 day          Drain                 NG/OG Tube 04/16/17 0400 Center mouth less than 1 day         Rectal Tube 04/16/17 0330 fecal management system less than 1 day         Urethral Catheter 04/15/17 2045 less than 1 day          Airway                 Airway - Non-Surgical 04/16/17 0127 Endotracheal Tube less than 1 day          Arterial Line                 Arterial Line 04/16/17 0131 Left Radial less than 1 day                 Physical Exam   Constitutional:   Ill appearing   HENT:   ETT in place   Eyes: No scleral icterus.   Pallor noted   Cardiovascular: Regular rhythm and normal heart sounds.    No murmur heard.  Tachycardic   Pulmonary/Chest: Effort normal and breath sounds normal. She has no wheezes. She has no rales.   Abdominal: Soft. She exhibits distension. There is no hepatosplenomegaly. There is tenderness.   Absent bowel sounds. Tenderness in lower abdomen.   Musculoskeletal: She exhibits no edema.   Skin: Skin is warm. There is erythema.   Psychiatric: She has a normal mood and affect. Her behavior is normal.   Nursing note and vitals reviewed.      Significant Labs:  Blood Culture:   Recent Labs  Lab 04/14/17  1856 04/14/17  1940   LABBLOO No Growth to date  No Growth to date No Growth to date  No Growth to date     CBC:   Recent Labs  Lab 04/15/17  0630 04/15/17  1636 04/16/17  0625   WBC 6.76 7.18 7.13  7.13   HGB 8.3* 8.2* 7.1*  7.1*   HCT 25.7* 25.0* 21.0*  21.0*   PLT 98* 88* 62*  62*     CMP:   Recent Labs  Lab 04/16/17  0625   *  171*   CALCIUM 5.9*  5.9*   ALBUMIN 2.1*   PROT 5.8*     138   K 2.8*  2.8*   CO2 18*  18*   *  112*   BUN 19  19   CREATININE 2.0*  2.0*   ALKPHOS 152*   ALT 24   AST 41*   BILITOT 0.8     Coagulation:   Recent Labs  Lab 04/15/17  1636   INR 1.2     Stool C. diff: No results for input(s): CDIFFICILEAN, CDIFFTOX in the last 48 hours.  Stool Culture: No results for input(s): STOOLCULTURE in the last 48 hours.  Stool Ova/Cysts/Parasites: No results for input(s): STLEXAMOCP in the last 48 hours.  Stool Giardia/Crypto: No results for input(s): GIARDIAANTIG, CRSPAG in the last 48 hours.  Stool WBCs: No results for input(s): STOOLWBC in the last 48 hours.      Significant Imaging:  No new films available    Assessment/Plan:     * Severe sepsis with AIDS  Continue care per surgery and hospital medicine.    Lower abdominal pain  Due to peritonitis from wound  dehiscence. Continue management per surgery.    Proctitis  Found during colonoscopy. Suspect this is due to rectal tube. Bxs pending but not likely contributing to current clinical issues.    Abnormal LFTs  Improving. Likely due to sepsis/hypotension. Continue to monitor, but no further evaluation warranted at this time.      Thank you for your consult. I will sign off. Please contact us if you have any additional questions.    Adama Nielsen MD  Gastroenterology  [facility]

## 2017-04-16 NOTE — ASSESSMENT & PLAN NOTE
Pt looks severely ill with an acute abdomen following surgery. CT did not reveal any significant findings and her lactate is normal but she did have an elevation in her liver enzymes and has LELAND after hypotension earlier today. Concern for possible colitis per hospital medicine and surgery due to recent diarrhea. CMV colitis less likely given the association with surgery and CD4 count >50k, but C. Diff is possible. Will add metronidazole to abx regimen. Continue other abx to cover other post-op sources. Check stool studies. Plan for colonoscopy (likely limited) tonight. Notified surgery of plans. If not findings on colonoscopy pt likely to need surgical evaluation.

## 2017-04-16 NOTE — PROGRESS NOTES
Patient's ABG with electrolytes did not cross over...    Na 139  K 3.7  Ca 0.93  Glu 113  Hct 47    PH 7.320  PCO2 33.6  Po2 415  Be _9  HCO3 17.3  O2 100%
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
## 2017-04-16 NOTE — PLAN OF CARE
Problem: Patient Care Overview  Goal: Plan of Care Review  Outcome: Ongoing (interventions implemented as appropriate)  Pt remains orally intubated requiring mechanical ventilation. Pt remains able to maintain adequate VS with ordered/charted settings/oxygen through ventilator.  Pt has tolerated vent support being weaned as documented.  Will continue to collect and monitor blood gas value and pulse oximetry trends to facilitate weaning or adjusting vent support as appropriate.

## 2017-04-16 NOTE — ASSESSMENT & PLAN NOTE
Found during colonoscopy. Suspect this is due to rectal tube. Bxs pending but not likely contributing to current clinical issues.

## 2017-04-16 NOTE — PROGRESS NOTES
Received a call from Dr. Ronquillo.  Updated on pts condition.  Dr. Ronquillo notified of current vital signs, urine output, and condition of abdominal dressing.  Also notified of lab values including H/H.  No new orders at present.

## 2017-04-16 NOTE — PROGRESS NOTES
Returned to ICU 9 from OR. Diagnosis= vaginal cap dehiscence and mild peritinitis.  Intubated, vent settings per MD.  Unresponsive to verbal stimuli at this time.Left radial arterial line in place.  Core temp at this time 100.7. Flexiseal re-inserted due to large liquid bowel incontinence. Refer to flow sheet for full assessment.  Will monitor.
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
## 2017-04-16 NOTE — PROGRESS NOTES
Pt aware of surgery plan for later tonight, pt has no questions regarding surgery at this time.  Will continue to monitor status.

## 2017-04-17 PROBLEM — E87.20 ACIDOSIS: Status: RESOLVED | Noted: 2017-04-16 | Resolved: 2017-04-17

## 2017-04-17 LAB
ALBUMIN SERPL BCP-MCNC: 2.2 G/DL
ALP SERPL-CCNC: 153 U/L
ALT SERPL W/O P-5'-P-CCNC: 22 U/L
ANION GAP SERPL CALC-SCNC: 8 MMOL/L
AST SERPL-CCNC: 48 U/L
BACTERIA UR CULT: NO GROWTH
BASOPHILS # BLD AUTO: ABNORMAL K/UL
BASOPHILS NFR BLD: 0 %
BILIRUB SERPL-MCNC: 0.5 MG/DL
BUN SERPL-MCNC: 21 MG/DL
CALCIUM SERPL-MCNC: 6.8 MG/DL
CHLORIDE SERPL-SCNC: 107 MMOL/L
CO2 SERPL-SCNC: 21 MMOL/L
CREAT SERPL-MCNC: 2 MG/DL
DIFFERENTIAL METHOD: ABNORMAL
EOSINOPHIL # BLD AUTO: ABNORMAL K/UL
EOSINOPHIL NFR BLD: 0 %
ERYTHROCYTE [DISTWIDTH] IN BLOOD BY AUTOMATED COUNT: 16.8 %
EST. GFR  (AFRICAN AMERICAN): 37 ML/MIN/1.73 M^2
EST. GFR  (NON AFRICAN AMERICAN): 32 ML/MIN/1.73 M^2
GLUCOSE SERPL-MCNC: 113 MG/DL (ref 70–110)
GLUCOSE SERPL-MCNC: 90 MG/DL
HCO3 UR-SCNC: 17.3 MMOL/L (ref 24–28)
HCT VFR BLD AUTO: 27.6 %
HCT VFR BLD CALC: 47 %PCV (ref 36–54)
HGB BLD-MCNC: 9.3 G/DL
LYMPHOCYTES # BLD AUTO: ABNORMAL K/UL
LYMPHOCYTES NFR BLD: 1 %
MAGNESIUM SERPL-MCNC: 2.4 MG/DL
MCH RBC QN AUTO: 28.4 PG
MCHC RBC AUTO-ENTMCNC: 33.7 %
MCV RBC AUTO: 84 FL
METAMYELOCYTES NFR BLD MANUAL: 1 %
MONOCYTES # BLD AUTO: ABNORMAL K/UL
MONOCYTES NFR BLD: 1 %
NEUTROPHILS NFR BLD: 89 %
NEUTS BAND NFR BLD MANUAL: 8 %
OVALOCYTES BLD QL SMEAR: ABNORMAL
PCO2 BLDA: 33.6 MMHG (ref 35–45)
PH SMN: 7.32 [PH] (ref 7.35–7.45)
PHOSPHATE SERPL-MCNC: 3.3 MG/DL
PLATELET # BLD AUTO: 67 K/UL
PLATELET BLD QL SMEAR: ABNORMAL
PMV BLD AUTO: 10.2 FL
PO2 BLDA: 415 MMHG (ref 80–100)
POC BE: -9 MMOL/L
POC IONIZED CALCIUM: 0.93 MMOL/L (ref 1.06–1.42)
POC SATURATED O2: 100 % (ref 95–100)
POIKILOCYTOSIS BLD QL SMEAR: SLIGHT
POTASSIUM BLD-SCNC: 3.7 MMOL/L (ref 3.5–5.1)
POTASSIUM SERPL-SCNC: 4.1 MMOL/L
PROT SERPL-MCNC: 6.9 G/DL
RBC # BLD AUTO: 3.27 M/UL
SAMPLE: ABNORMAL
SODIUM BLD-SCNC: 139 MMOL/L (ref 136–145)
SODIUM SERPL-SCNC: 136 MMOL/L
SPHEROCYTES BLD QL SMEAR: ABNORMAL
TARGETS BLD QL SMEAR: ABNORMAL
WBC # BLD AUTO: 6.79 K/UL

## 2017-04-17 PROCEDURE — 63600175 PHARM REV CODE 636 W HCPCS: Performed by: INTERNAL MEDICINE

## 2017-04-17 PROCEDURE — 84100 ASSAY OF PHOSPHORUS: CPT

## 2017-04-17 PROCEDURE — 27000221 HC OXYGEN, UP TO 24 HOURS

## 2017-04-17 PROCEDURE — 83735 ASSAY OF MAGNESIUM: CPT

## 2017-04-17 PROCEDURE — 25000003 PHARM REV CODE 250: Performed by: SURGERY

## 2017-04-17 PROCEDURE — 25000003 PHARM REV CODE 250: Performed by: INTERNAL MEDICINE

## 2017-04-17 PROCEDURE — 85027 COMPLETE CBC AUTOMATED: CPT

## 2017-04-17 PROCEDURE — 25000003 PHARM REV CODE 250: Performed by: NURSE PRACTITIONER

## 2017-04-17 PROCEDURE — 63600175 PHARM REV CODE 636 W HCPCS: Performed by: SURGERY

## 2017-04-17 PROCEDURE — 94761 N-INVAS EAR/PLS OXIMETRY MLT: CPT

## 2017-04-17 PROCEDURE — 21400001 HC TELEMETRY ROOM

## 2017-04-17 PROCEDURE — 99233 SBSQ HOSP IP/OBS HIGH 50: CPT | Mod: ,,, | Performed by: INTERNAL MEDICINE

## 2017-04-17 PROCEDURE — C9113 INJ PANTOPRAZOLE SODIUM, VIA: HCPCS | Performed by: SURGERY

## 2017-04-17 PROCEDURE — 94664 DEMO&/EVAL PT USE INHALER: CPT

## 2017-04-17 PROCEDURE — 99291 CRITICAL CARE FIRST HOUR: CPT | Mod: ,,, | Performed by: INTERNAL MEDICINE

## 2017-04-17 PROCEDURE — 94799 UNLISTED PULMONARY SVC/PX: CPT

## 2017-04-17 PROCEDURE — 99900037 HC PT THERAPY SCREENING (STAT)

## 2017-04-17 PROCEDURE — 80053 COMPREHEN METABOLIC PANEL: CPT

## 2017-04-17 PROCEDURE — 85007 BL SMEAR W/DIFF WBC COUNT: CPT

## 2017-04-17 RX ORDER — FUROSEMIDE 10 MG/ML
40 INJECTION INTRAMUSCULAR; INTRAVENOUS ONCE
Status: COMPLETED | OUTPATIENT
Start: 2017-04-17 | End: 2017-04-17

## 2017-04-17 RX ORDER — LINEZOLID 2 MG/ML
600 INJECTION, SOLUTION INTRAVENOUS
Status: DISCONTINUED | OUTPATIENT
Start: 2017-04-17 | End: 2017-04-17

## 2017-04-17 RX ORDER — ACETAMINOPHEN 10 MG/ML
1000 INJECTION, SOLUTION INTRAVENOUS EVERY 8 HOURS
Status: COMPLETED | OUTPATIENT
Start: 2017-04-17 | End: 2017-04-18

## 2017-04-17 RX ADMIN — MICAFUNGIN SODIUM 100 MG: 20 INJECTION, POWDER, LYOPHILIZED, FOR SOLUTION INTRAVENOUS at 12:04

## 2017-04-17 RX ADMIN — ACETAMINOPHEN 1000 MG: 10 INJECTION, SOLUTION INTRAVENOUS at 11:04

## 2017-04-17 RX ADMIN — DIPHENHYDRAMINE HYDROCHLORIDE 25 MG: 50 INJECTION, SOLUTION INTRAMUSCULAR; INTRAVENOUS at 12:04

## 2017-04-17 RX ADMIN — MEROPENEM AND SODIUM CHLORIDE 500 MG: 500 INJECTION, SOLUTION INTRAVENOUS at 11:04

## 2017-04-17 RX ADMIN — FUROSEMIDE 40 MG: 10 INJECTION, SOLUTION INTRAMUSCULAR; INTRAVENOUS at 05:04

## 2017-04-17 RX ADMIN — HYDROMORPHONE HYDROCHLORIDE 0.5 MG: 1 INJECTION, SOLUTION INTRAMUSCULAR; INTRAVENOUS; SUBCUTANEOUS at 11:04

## 2017-04-17 RX ADMIN — ACETAMINOPHEN 650 MG: 325 TABLET ORAL at 08:04

## 2017-04-17 RX ADMIN — ACETAMINOPHEN 650 MG: 325 TABLET ORAL at 04:04

## 2017-04-17 RX ADMIN — PANTOPRAZOLE SODIUM 40 MG: 40 INJECTION, POWDER, FOR SOLUTION INTRAVENOUS at 09:04

## 2017-04-17 RX ADMIN — FUROSEMIDE 40 MG: 10 INJECTION, SOLUTION INTRAMUSCULAR; INTRAVENOUS at 01:04

## 2017-04-17 RX ADMIN — MEROPENEM AND SODIUM CHLORIDE 500 MG: 500 INJECTION, SOLUTION INTRAVENOUS at 02:04

## 2017-04-17 RX ADMIN — CALCIUM CHLORIDE 1 G: 100 INJECTION, SOLUTION INTRAVENOUS at 05:04

## 2017-04-17 RX ADMIN — STANDARDIZED SENNA CONCENTRATE AND DOCUSATE SODIUM 1 TABLET: 8.6; 5 TABLET, FILM COATED ORAL at 09:04

## 2017-04-17 NOTE — SUBJECTIVE & OBJECTIVE
Interval History:30 yo AAF with history for AIDS admitted for abdominal pain and developed septic shock which has resolved. Currently suspect intra-abdominal source but so for negative. She is c/o sore throat after exutabtion.    Review of Systems   Constitutional: Positive for fever.   HENT: Positive for sore throat.    Respiratory: Negative.    Cardiovascular: Negative.    Gastrointestinal: Negative.    Psychiatric/Behavioral: Negative.      Objective:     Vital Signs (Most Recent):  Temp: (!) 100.8 °F (38.2 °C) (04/17/17 0900)  Pulse: (!) 113 (04/17/17 1000)  Resp: 20 (04/17/17 1000)  BP: 110/71 (04/17/17 1000)  SpO2: 100 % (04/17/17 1000) Vital Signs (24h Range):  Temp:  [98 °F (36.7 °C)-102.9 °F (39.4 °C)] 100.8 °F (38.2 °C)  Pulse:  [] 113  Resp:  [18-31] 20  SpO2:  [70 %-100 %] 100 %  BP: ()/(45-96) 110/71     Weight: 45.8 kg (101 lb)  Body mass index is 19.73 kg/(m^2).    Intake/Output Summary (Last 24 hours) at 04/17/17 1122  Last data filed at 04/17/17 0900   Gross per 24 hour   Intake          2525.88 ml   Output             3530 ml   Net         -1004.12 ml      Physical Exam   Constitutional: She is oriented to person, place, and time.   HENT:   Head: Normocephalic and atraumatic.   Mouth/Throat: Oropharynx is clear and moist.   Thin cachetic female who is comfortable.   Eyes: EOM are normal. Pupils are equal, round, and reactive to light.   Neck: Neck supple.   Cardiovascular: Normal heart sounds.  Tachycardia present.    Pulmonary/Chest: Effort normal and breath sounds normal.   Abdominal: Soft. There is no guarding.   Musculoskeletal: Normal range of motion.   Neurological: She is alert and oriented to person, place, and time.   Skin: Skin is warm and dry.   Psychiatric: She has a normal mood and affect.       Significant Labs:   Blood Culture:   Recent Labs  Lab 04/16/17  1615   LABBLOO No Growth to date       Significant Imaging: I have reviewed and interpreted all pertinent imaging  results/findings within the past 24 hours.

## 2017-04-17 NOTE — EICU
Called to evaluate dyspnea and anxiety.  + volume status (~7 liters) with resolution of shock.  CXR ordered- shows bilateral airspace dz- progressive  Most likely pulmonary edema / volume overload in setting risk of lung injury.  Primary team had already discontinued salt containing infusions.    LELAND noted- will give furosemide 40 mg IV now and observe.    Armani Johnston MD

## 2017-04-17 NOTE — PROGRESS NOTES
[facility] Hospital Medicine  Progress Note    Patient Name: Wang Kebede  MRN: [id number]  Patient Class: IP- Inpatient   Admission Date: 4/14/2017  Length of Stay: 1 days  Attending Physician: Gerardo Mccauley MD  Primary Care Provider: Primary Doctor No        Subjective:     Principal Problem:Septic shock    HPI:  Ms. Kebede is a 32 y/o AA female with h/o HIV, cervical cancer, s/p hysterectomy last week by , presents to the ED last night c/o generalized weakness, abdominal pain and SOB. CXR and VQ scan were unremarkable. CT chest could not be done due to contrast allergy. Patient continues to have high fever upto 102.1, tachycardia. Admitted for sepsis of likely intra-abdominal etiology, however CT abdomen/pelvis not done due to contrast allergy. Patient is very tender in the abdomen to attempt ultrasound. Started empirically on IV antibiotics for sepsis.    Hospital Course:  Presented with acute abdomen approximately 3 days after robotic assisted laparoscopic hysterectomy.  Severe sepsis progressed to septic shock.  CT abdomen/pelvis had to be done without contrast.  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  CT was unrevealing.  OB/GYN and general surgery evaluated the patient and were concerned for post-op complication but the evidence was dubious.  They recommended a consult to gastroenterology for a diagnostic colonoscopy given the uncertainty of her source of abdominal pain and diarrhea.  She developed abdominal pain shortly after the procedure and had watery diarrhea the day before admission.  Colonoscopy did not show evidence of colitis so the surgical team took her to the operating room and found a small segment of dehiscence to the vaginal cuff.  There was very little evidence of inflammation or contamination in the abdomen or pelvis.  She continued to have high fevers but her blood pressure and heart rate  improved partially post-op.  She was successfully extubated 16 April but continued to require Vasopressin for blood pressure support and her labwork showed acute kidney injury.  She had evidence of a metabolic acidosis but did not have an elevated lactic acid on multiple measurements nor did she have a leukocytosis proportionate to the severity of her symptoms.  Optimized broad spectrum antibiotics and added anti-fungal.  Her CD4 count has been in steady decline over the last year while under treatment.  Three weeks ago it was 53.    Interval History:  Post op from repair of vaginal cuff dehiscence.  Continuing to run high fevers.    Review of Systems   Unable to perform ROS: Intubated     Objective:     Vital Signs (Most Recent):  Temp: 100.1 °F (37.8 °C) (04/16/17 2200)  Pulse: (!) 136 (04/16/17 2200)  Resp: (!) 24 (04/16/17 2200)  BP: 117/66 (04/16/17 2200)  SpO2: 100 % (04/16/17 2200) Vital Signs (24h Range):  Temp:  [99.5 °F (37.5 °C)-104.9 °F (40.5 °C)] 100.1 °F (37.8 °C)  Pulse:  [] 136  Resp:  [14-31] 24  SpO2:  [70 %-100 %] 100 %  BP: ()/(39-98) 117/66     Weight: 45.8 kg (101 lb)  Body mass index is 19.73 kg/(m^2).    Intake/Output Summary (Last 24 hours) at 04/16/17 2249  Last data filed at 04/16/17 1900   Gross per 24 hour   Intake          4758.77 ml   Output             1608 ml   Net          3150.77 ml      Physical Exam   Constitutional: No distress.   Thin but not cachectic   HENT:   Head: Normocephalic and atraumatic.   Mouth/Throat: Oropharynx is clear and moist.   Eyes: Conjunctivae and EOM are normal. Pupils are equal, round, and reactive to light.   Neck: Neck supple. No JVD present. No thyromegaly present.   Cardiovascular: Regular rhythm.  Exam reveals no gallop and no friction rub.    No murmur heard.  Tachycardia   Pulmonary/Chest: Effort normal and breath sounds normal. She has no wheezes. She has no rales.   Abdominal: Soft. Bowel sounds are normal. She exhibits no distension.  There is no tenderness. There is no rebound and no guarding.   Surgical sites clean and intact   Musculoskeletal: Normal range of motion. She exhibits no edema or deformity.   Lymphadenopathy:     She has no cervical adenopathy.   Neurological: She has normal reflexes.   Intubated and sedated but exhibits movement of all four extremities.   Skin: Skin is warm and dry. No rash noted.   Nursing note and vitals reviewed.      Significant Labs:   CBC:   Recent Labs  Lab 04/15/17  1636 04/16/17  0625 04/16/17  1500   WBC 7.18 7.13  7.13 6.03   HGB 8.2* 7.1*  7.1* 8.3*   HCT 25.0* 21.0*  21.0* 24.9*   PLT 88* 62*  62* 68*     CMP:   Recent Labs  Lab 04/15/17  1636 04/16/17  0625 04/16/17  1425    138  138 136   K 3.4* 2.8*  2.8* 4.0   * 112*  112* 111*   CO2 15* 18*  18* 18*   GLU 97 171*  171* 139*   BUN 18 19  19 19   CREATININE 2.0* 2.0*  2.0* 2.0*   CALCIUM 6.3* 5.9*  5.9* 6.2*   PROT 7.3 5.8* 6.0   ALBUMIN 1.9* 2.1* 2.0*   BILITOT 0.5 0.8 0.3   ALKPHOS 285* 152* 144*   AST 73* 41* 36   ALT 39 24 22   ANIONGAP 9 8  8 7*   EGFRNONAA 32* 32*  32* 32*       Significant Imaging: I have reviewed all pertinent imaging results/findings within the past 24 hours.    Assessment/Plan:      * Septic shock with AIDS  Tachycardia, hypotension, and fever.  Uncertain source but most likely intra-abdominal.  Probably secondary to vaginal cuff dehiscence but report from the surgical team has doubts.  Her hemodynamics improved partially post-op.  The actual area of dehiscence was very small and the peritoneum and bowels did not appear affected.  Colonoscopy did not show visual evidence of colitis and stool CD toxin was negative.  Biopsies of the distal colon/rectum mucosa were taken.  No suggestion of respiratory or urinary infection.  Discussed over the phone with Infectious Disease, Dr. Mayo, and he recommended changing antibiotics to Vancomycin and Meropenem and continuing Micafungin but stopping  Cipro/Flagyl.  He is familiar with her from taking care of her over the years.      HIV (human immunodeficiency virus infection)  Resume HIV medications when appropriate.  Infectious disease familiar with the patient and aware.    Severe protein-calorie malnutrition  Dietitian consult    Status post laparoscopic hysterectomy  OB/GYN and General surgery following.    LELAND (acute kidney injury)  Probably secondary to hypotension and sepsis.  Adjusting medication doses.  Nephrology following.    VTE Risk Mitigation         Ordered     Medium Risk of VTE  Once      04/16/17 0322     Place sequential compression device  Until discontinued      04/15/17 2052          Gerardo Mccauley MD  Department of Hospital Medicine   [facility]    Critical care time:  45 minutes

## 2017-04-17 NOTE — PROGRESS NOTES
Pt transferred to room 215 via bed by RAGHAV Agustin RN and KAY Sales, PCT.  Pt tolerated well.  Pt VSS.  Pt denies pain at present.

## 2017-04-17 NOTE — ASSESSMENT & PLAN NOTE
Tachycardia, hypotension, and fever.  Uncertain source but most likely intra-abdominal.  Probably secondary to vaginal cuff dehiscence but report from the surgical team has doubts.  Her hemodynamics improved partially post-op.  The actual area of dehiscence was very small and the peritoneum and bowels did not appear affected.  Colonoscopy did not show visual evidence of colitis and stool CD toxin was negative.  Biopsies of the distal colon/rectum mucosa were taken.  No suggestion of respiratory or urinary infection.  Discussed over the phone with Infectious Disease, Dr. Mayo, and he recommended changing antibiotics to Vancomycin and Meropenem and continuing Micafungin but stopping Cipro/Flagyl.  He is familiar with her from taking care of her over the years.      Today she has a bandemia but clinically looks better and now off pressors

## 2017-04-17 NOTE — PLAN OF CARE
Problem: Patient Care Overview  Goal: Plan of Care Review  Outcome: Ongoing (interventions implemented as appropriate)  Patient has been very restless throughout the night.  She had an increasing in shortness of breath during the night and I contacted Dr Johnston and he ordered a chest xray.  Patient received Lasix 40 mg IV twice and IV fluids discontinued, see MAR for times.  She reports this morning an ease in breathing.  Her temperature dropped to 98 last night and I turned the cooling blanket off.  Patients temp was noted to start climbing again and I administered tylenol and turned the cooling blanket back on when she reached 101.2.  Patient also received Calcium 1gm IV this morning for a calcium level of 6.2.

## 2017-04-17 NOTE — NURSING
Pt transferred from ICU to room 215, via hospital bed, in no apparent distress.   Bedside report given by [facility], RN; no further questions at this time.  Assessment and vitals documented.  Rectal tube in place and draining green fluid; navarrete cath in place and draining clear yellow fluid. Midline abd incision with dressing remains C/D/I.   Tele monitor placed in ICU and rhythm verified by tele monitor tech.  Bed in low position, side rails up x2, call light in reach.

## 2017-04-17 NOTE — PROGRESS NOTES
Spoke with Dr Vickie barbosa concerning patients increasing in shortness of breath.  Upon auscultation of lung fields, patient noted to have fine crackles that had developed since initial shift assessment. A CXR was ordered and evaluated.  Lasix 40 mg IV given.  Patient now reports a much more comfortable ease of breathing compared to earlier. See I/O for out put amounts.  Will continue to monitor patient for any changes.

## 2017-04-17 NOTE — PROGRESS NOTES
[facility]  Critical Care Medicine  Progress Note    Patient Name: Wang Kebede  MRN: [id number]  Admission Date: 4/14/2017  Hospital Length of Stay: 2 days  Code Status: Full Code  Attending Provider: Mara Cabello MD  Primary Care Provider: Primary Doctor No   Principal Problem: Sepsis associated with AIDS    SUBJECTIVE:     HPI: 31 year old who  has a past medical history of Abnormal Pap smear of cervix (2016); AICD (automatic cardioverter/defibrillator) present; Anemia; Chronic abdominal pain; Encounter for blood transfusion; History of cardiac arrest; HIV (human immunodeficiency virus infection); Narcotic bowel syndrome; Vulva cancer; and Vulvar cancer, carcinoma admitted with sepsis and peritonism approximately 3 days following robotic assisted laparoscopic hysterectomy for cervical dysplasia KEVON II.    Hospital/ICU Course: Unremarkable CT and colonoscopy evaluation. Subsequently underwent Ex Lap which revealed a dehiscence of the vaginal cuff which was repaired. There was minimal inflammation or fecal contamination of the abdomen or pelvis reported.  She was successfully extubated on 04/16/17. Currently hemodynamically stable. Broad spectrum antibiotic coverage. Fevers trending down.     Interval History/Significant Events: Seen and examined at bedside. Doing well. No acute detrimental interval events.     Review of Systems   Constitutional: Positive for fever.   Respiratory: Positive for shortness of breath. Negative for cough.    Cardiovascular: Negative for chest pain, palpitations and leg swelling.   Gastrointestinal: Positive for abdominal pain. Negative for diarrhea, nausea and vomiting.   Genitourinary: Negative for dysuria.   Skin: Negative for rash.   Neurological: Negative for dizziness, syncope, numbness and headaches.   Psychiatric/Behavioral: Negative for confusion.             OBJECTIVE:       Medications :  Scheduled Meds:   meropenem (MERREM) IVPB  500 mg  Intravenous Q12H    micafungin (MYCAMINE) IVPB  100 mg Intravenous Q24H    pantoprazole  40 mg Intravenous Daily    senna-docusate 8.6-50 mg  1 tablet Oral BID    [START ON 4/18/2017] vancomycin (VANCOCIN) IVPB  15 mg/kg Intravenous Q36H     Continuous Infusions:   PRN Meds:.acetaminophen, bisacodyl, cloNIDine, diphenhydrAMINE, hydrocodone-acetaminophen 5-325mg, HYDROmorphone, lactulose, ondansetron, promethazine (PHENERGAN) IVPB, ramelteon      Review of patient's allergies indicates:   Allergen Reactions    Iodine and iodide containing products Anaphylaxis     Pt states she coded last time she was administered contrast    Bactrim [sulfamethoxazole-trimethoprim] Hives    Morphine Itching       Vital Signs (Most Recent):  Temp: 98.9 °F (37.2 °C) (04/17/17 1134)  Pulse: (!) 121 (04/17/17 1134)  Resp: 20 (04/17/17 1134)  BP: 112/73 (04/17/17 1134)  SpO2: (!) 0 % (04/17/17 1134) Vital Signs (24h Range):  Temp:  [98 °F (36.7 °C)-102.9 °F (39.4 °C)] 98.9 °F (37.2 °C)  Pulse:  [] 121  Resp:  [19-31] 20  SpO2:  [0 %-100 %] 0 %  BP: ()/(55-96) 112/73     Weight: 45.8 kg (101 lb)  Body mass index is 19.73 kg/(m^2).      Intake/Output Summary (Last 24 hours) at 04/17/17 1311  Last data filed at 04/17/17 1000   Gross per 24 hour   Intake          2075.88 ml   Output             3675 ml   Net         -1599.12 ml       Physical Exam   Constitutional: She is oriented to person, place, and time. She appears well-developed and well-nourished. No distress.   HENT:   Head: Normocephalic and atraumatic.   Mouth/Throat: Oropharynx is clear and moist.   Eyes: EOM are normal. No scleral icterus.   Neck: Neck supple.   Cardiovascular: Normal rate and regular rhythm.    Pulmonary/Chest: Effort normal and breath sounds normal. No respiratory distress. She has no decreased breath sounds. She has no wheezes. She has no rhonchi. She has no rales.   Abdominal: Soft. She exhibits no distension. There is generalized tenderness.  There is no rigidity, no rebound, no guarding and no CVA tenderness.   Musculoskeletal: Normal range of motion. She exhibits no edema.   Neurological: She is alert and oriented to person, place, and time. No cranial nerve deficit.   Skin: Skin is warm and dry. No rash noted.   Psychiatric: She has a normal mood and affect. Her speech is normal and behavior is normal. Thought content normal.       Vents:  Vent Mode: Spont (04/16/17 1116)  Ventilator Initiated: Yes (04/16/17 0315)  Set Rate: 0 bmp (04/16/17 1116)  Vt Set: 325 mL (04/16/17 1116)  Pressure Support: 10 cmH20 (04/16/17 1116)  PEEP/CPAP: 5 cmH20 (04/16/17 1116)  Oxygen Concentration (%): 32 (04/17/17 0735)  Peak Airway Pressure: 15 cmH2O (04/16/17 1116)  Total Ve: 6.37 mL (04/16/17 1116)  F/VT Ratio<105 (RSBI): (!) 49.56 (04/16/17 1116)    Lines/Drains/Airways     Central Venous Catheter Line                 Percutaneous Central Line Insertion/Assessment - triple lumen  04/15/17 2225 right internal jugular 1 day          Drain                 Rectal Tube 04/16/17 0330 fecal management system 1 day         Urethral Catheter 04/15/17 2045 1 day                Significant Labs:    CBC/Anemia Profile:    Recent Labs  Lab 04/16/17  0625 04/16/17  1500 04/17/17  0350   WBC 7.13  7.13 6.03 6.79   HGB 7.1*  7.1* 8.3* 9.3*   HCT 21.0*  21.0* 24.9* 27.6*   PLT 62*  62* 68* 67*   MCV 85  85 85 84   RDW 17.2*  17.2* 16.7* 16.8*        Chemistries:    Recent Labs  Lab 04/16/17  0625 04/16/17  1425 04/17/17  0350     138 136 136   K 2.8*  2.8* 4.0 4.1   *  112* 111* 107   CO2 18*  18* 18* 21*   BUN 19  19 19 21*   CREATININE 2.0*  2.0* 2.0* 2.0*   CALCIUM 5.9*  5.9* 6.2* 6.8*   ALBUMIN 2.1* 2.0* 2.2*   PROT 5.8* 6.0 6.9   BILITOT 0.8 0.3 0.5   ALKPHOS 152* 144* 153*   ALT 24 22 22   AST 41* 36 48*   MG 1.5* 3.3* 2.4   PHOS 2.8 3.7 3.3       Microbiology Results (last 7 days)     Procedure Component Value Units Date/Time    Culture, Respiratory  with Gram Stain [[id number]] Collected:  04/16/17 0750    Order Status:  Completed Specimen:  Respiratory from Endotracheal Aspirate Updated:  04/17/17 1054     Respiratory Culture Further report to follow     Gram Stain (Respiratory) Few WBC's     Gram Stain (Respiratory) Rare Gram positive cocci    Blood culture [[id number]] Collected:  04/14/17 1856    Order Status:  Completed Specimen:  Blood from Peripheral, Upper Arm, Left Updated:  04/17/17 0612     Blood Culture, Routine No Growth to date     Blood Culture, Routine No Growth to date     Blood Culture, Routine No Growth to date    Blood Culture #2 **CANNOT BE ORDERED STAT** [[id number]] Collected:  04/14/17 1940    Order Status:  Completed Specimen:  Blood from Peripheral, Upper Arm, Right Updated:  04/17/17 0612     Blood Culture, Routine No Growth to date     Blood Culture, Routine No Growth to date     Blood Culture, Routine No Growth to date    Blood culture [[id number]] Collected:  04/16/17 1615    Order Status:  Completed Specimen:  Blood from Peripheral, Wrist, Right Updated:  04/17/17 0315     Blood Culture, Routine No Growth to date    Blood culture [[id number]]     Order Status:  Canceled Specimen:  Blood     Urine culture [[id number]] Collected:  04/16/17 0728    Order Status:  Sent Specimen:  Urine from Urine, Catheterized Updated:  04/16/17 1637    Clostridium difficile EIA [[id number]] Collected:  04/15/17 2130    Order Status:  Completed Specimen:  Stool from Stool Updated:  04/16/17 1251     C. diff Antigen Negative     C difficile Toxins A+B, EIA Negative      Testing not recommended for children <24 months old.               Significant Imaging:    X-Ray Chest 1 View:  There is a moderate amount of interstitial and alveolar opacities seen in both lungs with curly B-lines visualized bilaterally. This represents an interval worsening of the appearance of the lungs and is characteristic of pulmonary edema. 2. There has been interval removal of an  endotracheal tube. There has been interval removal of an NG tube.    ASSESSMENT/PLAN:       I have reviewed all labs and imaging studies and compared to previous results. I have also discussed labs with all the teams in the medical care of the patient and my plan is outlined below       Active Diagnoses:    Diagnosis Date Noted POA    PRINCIPAL PROBLEM:  Sepsis associated with AIDS [B20, A41.9] 06/26/2016 Yes    AIDS (acquired immunodeficiency syndrome), CD4 <=200 [B20] 04/15/2017 Yes    HIV (human immunodeficiency virus infection) [Z21]  Yes    Abnormal LFTs [R79.89] 04/16/2017 Yes    LELAND (acute kidney injury) [N17.9] 04/16/2017 No    Status post laparoscopic hysterectomy [Z90.710] 04/15/2017 Yes    Severe protein-calorie malnutrition [E43] 03/21/2017 Yes      Problems Resolved During this Admission:    Diagnosis Date Noted Date Resolved POA    Acidosis [E87.2] 04/16/2017 04/17/2017 No    Lower abdominal pain [R10.30] 06/27/2016 04/17/2017 Yes     Chronic        Critical Care Medicine Daily Checklist:    A: Awake: RASS Goal/Actual Goal: RASS Goal: -1-->drowsy  Actual:     B: Spontaneous Breathing Trial Performed? Spon. Breathing Trial Initiated?: Initiated (04/16/17 1131)   C: SAT & SBT Coordinated?  Yes                   D: Delirium: CAM-ICU Overall CAM-ICU: Negative   E: Early Mobility Performed? Yes   F: Feeding Goal:    Status:     Current Diet Order   Procedures    Diet clear liquid      AS: Analgesia/Sedation HYDROMORPHONE   T: Thromboembolic Prophylaxis SEQUENTIAL COMPRESSION DEVICE   H: HOB > 300 Yes   U: Stress Ulcer Prophylaxis (if needed) PANTOPRAZOLE   G: Glucose Control SSI   B: Bowel Function Stool Occurrence: 1   I: Indwelling Catheter (Lines & Tinsley) Necessity yes   D: De-escalation of Antimicrobials/Pharmacotherapies When appropriate    Plan for the day/ETD Continue current. Ok to transfer to telemetry    Code Status:  Family/Goals of Care: Full Code  Home self care         PLAN:    1.  Neuro:   Normal strength and tone. No focal numbness or weakness Sedation not required for patient comfort RASS 0  alert and calm ICU neuro monitoring.    2. Pulmonary:    Stable Oxygen: Supplementation. FIO2 titrate to > 90 - 92 %Bronchodilators per protocol.       3. Cardiac:    Stable  tachycardic . Regular rate and rhythm.    Monitor hemodynamics and  monitor for dysrhythmias MAP goal of  65 mmHg. Clonidine.      4. Renal:    Stable Volume status: Euvolemic. Stable creatinine. Avoid nephrotoxic medications.  daily weights and strict I/O's   Continue current treatment regimen. Tinsley in place.     5. Infectious Disease:   Improving   Monitor fever curve and sepsis surveillance  . Fever curve trending down.  Continue current antibiotic(s)  VANCOMYCIN, MEROPENEM, MICAFUNGIN. ID consulted. Resume HAART.      6. Hematology/Oncology:   Stable Conservative transfusion strategy and monitor for SS of occult or overt bleeding. Monitor for bleeding    7. Endocrine: Stable EUGLYCEMIC.  Blood glucose target 100 - 180 mg/dl    8. Fluids/Electrolytes/Nutrition/GI: Monitor and replete electrolytes. Maintain even fluid balance regular diet     9. Musculoskeletal: Stable  PT and OT     10. Pain Management: Pain control  adequate. Analgesia per ICU protocol.    11. Disposition: Unchanged.      VTE Risk Mitigation         Ordered     Medium Risk of VTE  Once      04/16/17 0322     Place sequential compression device  Until discontinued      04/15/17 2052        Counseling/Consultation: I have discussed the care of this patient in detail with Multidisciplinary team during rounds, bedside nursing staff and Dr. Cabello    Critical Care Time: 60 Minutes    Critical secondary to Patient has a condition that poses threat to life and bodily function: Sepsis secondary to AIDS, Fever of unknown origin.       Critical care was time spent personally by me on the following activities: development of treatment plan with patient or surrogate and  bedside caregivers, discussions with consultants, evaluation of patient's response to treatment, examination of patient, ordering and performing treatments and interventions, ordering and review of laboratory studies, ordering and review of radiographic studies, pulse oximetry, re-evaluation of patient's condition.  This critical care time did not overlap with that of any other provider or involve time for any procedures.     Armani Lopez MD  Critical Care Medicine  [facility]

## 2017-04-17 NOTE — PT/OT/SLP PROGRESS
Physical Therapy      Wang Deana Delio  MRN: [id number]    P.T. ILSAAL INITIATED VIA CHART REVIEW THIS AM, PT VERY LETHARGIC PER NURSE HOLDEN, PT REFUSED DUE TO C/O SEVERE PAIN, NOTIFIED NURSE, WILL ASSESS PT NEXT VISIT    Deana Cole, PT   4/17/2017  4134

## 2017-04-17 NOTE — PROGRESS NOTES
Dr. Ronquillo at bedside.  Dr. Ronquillo aware of pts tachycardia and fever.  Dr. Ronquillo states pt is ok to transfer to floor.  PETROS Hayden notified

## 2017-04-17 NOTE — SUBJECTIVE & OBJECTIVE
Interval History:  Post op from repair of vaginal cuff dehiscence.  Continuing to run high fevers.    Review of Systems   Unable to perform ROS: Intubated     Objective:     Vital Signs (Most Recent):  Temp: 100.1 °F (37.8 °C) (04/16/17 2200)  Pulse: (!) 136 (04/16/17 2200)  Resp: (!) 24 (04/16/17 2200)  BP: 117/66 (04/16/17 2200)  SpO2: 100 % (04/16/17 2200) Vital Signs (24h Range):  Temp:  [99.5 °F (37.5 °C)-104.9 °F (40.5 °C)] 100.1 °F (37.8 °C)  Pulse:  [] 136  Resp:  [14-31] 24  SpO2:  [70 %-100 %] 100 %  BP: ()/(39-98) 117/66     Weight: 45.8 kg (101 lb)  Body mass index is 19.73 kg/(m^2).    Intake/Output Summary (Last 24 hours) at 04/16/17 2249  Last data filed at 04/16/17 1900   Gross per 24 hour   Intake          4758.77 ml   Output             1608 ml   Net          3150.77 ml      Physical Exam   Constitutional: No distress.   Thin but not cachectic   HENT:   Head: Normocephalic and atraumatic.   Mouth/Throat: Oropharynx is clear and moist.   Eyes: Conjunctivae and EOM are normal. Pupils are equal, round, and reactive to light.   Neck: Neck supple. No JVD present. No thyromegaly present.   Cardiovascular: Regular rhythm.  Exam reveals no gallop and no friction rub.    No murmur heard.  Tachycardia   Pulmonary/Chest: Effort normal and breath sounds normal. She has no wheezes. She has no rales.   Abdominal: Soft. Bowel sounds are normal. She exhibits no distension. There is no tenderness. There is no rebound and no guarding.   Surgical sites clean and intact   Musculoskeletal: Normal range of motion. She exhibits no edema or deformity.   Lymphadenopathy:     She has no cervical adenopathy.   Neurological: She has normal reflexes.   Intubated and sedated but exhibits movement of all four extremities.   Skin: Skin is warm and dry. No rash noted.   Nursing note and vitals reviewed.      Significant Labs:   CBC:   Recent Labs  Lab 04/15/17  1636 04/16/17  0625 04/16/17  1500   WBC 7.18 7.13  7.13  6.03   HGB 8.2* 7.1*  7.1* 8.3*   HCT 25.0* 21.0*  21.0* 24.9*   PLT 88* 62*  62* 68*     CMP:   Recent Labs  Lab 04/15/17  1636 04/16/17  0625 04/16/17  1425    138  138 136   K 3.4* 2.8*  2.8* 4.0   * 112*  112* 111*   CO2 15* 18*  18* 18*   GLU 97 171*  171* 139*   BUN 18 19  19 19   CREATININE 2.0* 2.0*  2.0* 2.0*   CALCIUM 6.3* 5.9*  5.9* 6.2*   PROT 7.3 5.8* 6.0   ALBUMIN 1.9* 2.1* 2.0*   BILITOT 0.5 0.8 0.3   ALKPHOS 285* 152* 144*   AST 73* 41* 36   ALT 39 24 22   ANIONGAP 9 8  8 7*   EGFRNONAA 32* 32*  32* 32*       Significant Imaging: I have reviewed all pertinent imaging results/findings within the past 24 hours.

## 2017-04-17 NOTE — PROGRESS NOTES
[facility] Hospital Medicine  Progress Note    Patient Name: Wang Kebede  MRN: [id number]  Patient Class: IP- Inpatient   Admission Date: 4/14/2017  Length of Stay: 2 days  Attending Physician: Mara Cabello MD  Primary Care Provider: Primary Doctor No        Subjective:     Principal Problem:Sepsis associated with AIDS    HPI:  Ms. Kebede is a 30 y/o AA female with h/o HIV, cervical cancer, s/p hysterectomy last week by , presents to the ED last night c/o generalized weakness, abdominal pain and SOB. CXR and VQ scan were unremarkable. CT chest could not be done due to contrast allergy. Patient continues to have high fever upto 102.1, tachycardia. Admitted for sepsis of likely intra-abdominal etiology, however CT abdomen/pelvis not done due to contrast allergy. Patient is very tender in the abdomen to attempt ultrasound. Started empirically on IV antibiotics for sepsis.    Hospital Course:  Presented with acute abdomen approximately 3 days after robotic assisted laparoscopic hysterectomy.  Severe sepsis progressed to septic shock.  CT abdomen/pelvis had to be done without contrast.  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  CT was unrevealing.  OB/GYN and general surgery evaluated the patient and were concerned for post-op complication but the evidence was dubious.  They recommended a consult to gastroenterology for a diagnostic colonoscopy given the uncertainty of her source of abdominal pain and diarrhea.  She developed abdominal pain shortly after the procedure and had watery diarrhea the day before admission.  Colonoscopy did not show evidence of colitis so the surgical team took her to the operating room and found a small segment of dehiscence to the vaginal cuff.  There was very little evidence of inflammation or contamination in the abdomen or pelvis.  She continued to have high fevers but her blood pressure and heart  rate improved partially post-op.  She was successfully extubated 16 April but continued to require Vasopressin for blood pressure support and her labwork showed acute kidney injury.  She had evidence of a metabolic acidosis but did not have an elevated lactic acid on multiple measurements nor did she have a leukocytosis proportionate to the severity of her symptoms.  Optimized broad spectrum antibiotics and added anti-fungal.  Her CD4 count has been in steady decline over the last year while under treatment.  Three weeks ago it was 53.    Interval History:32 yo AAF with history for AIDS admitted for abdominal pain and developed septic shock which has resolved. Currently suspect intra-abdominal source but so for negative. She is c/o sore throat after exutabtion.    Review of Systems   Constitutional: Positive for fever.   HENT: Positive for sore throat.    Respiratory: Negative.    Cardiovascular: Negative.    Gastrointestinal: Negative.    Psychiatric/Behavioral: Negative.      Objective:     Vital Signs (Most Recent):  Temp: (!) 100.8 °F (38.2 °C) (04/17/17 0900)  Pulse: (!) 113 (04/17/17 1000)  Resp: 20 (04/17/17 1000)  BP: 110/71 (04/17/17 1000)  SpO2: 100 % (04/17/17 1000) Vital Signs (24h Range):  Temp:  [98 °F (36.7 °C)-102.9 °F (39.4 °C)] 100.8 °F (38.2 °C)  Pulse:  [] 113  Resp:  [18-31] 20  SpO2:  [70 %-100 %] 100 %  BP: ()/(45-96) 110/71     Weight: 45.8 kg (101 lb)  Body mass index is 19.73 kg/(m^2).    Intake/Output Summary (Last 24 hours) at 04/17/17 1122  Last data filed at 04/17/17 0900   Gross per 24 hour   Intake          2525.88 ml   Output             3530 ml   Net         -1004.12 ml      Physical Exam   Constitutional: She is oriented to person, place, and time.   HENT:   Head: Normocephalic and atraumatic.   Mouth/Throat: Oropharynx is clear and moist.   Thin cachetic female who is comfortable.   Eyes: EOM are normal. Pupils are equal, round, and reactive to light.   Neck: Neck  supple.   Cardiovascular: Normal heart sounds.  Tachycardia present.    Pulmonary/Chest: Effort normal and breath sounds normal.   Abdominal: Soft. There is no guarding.   Musculoskeletal: Normal range of motion.   Neurological: She is alert and oriented to person, place, and time.   Skin: Skin is warm and dry.   Psychiatric: She has a normal mood and affect.       Significant Labs:   Blood Culture:   Recent Labs  Lab 04/16/17  1615   LABBLOO No Growth to date       Significant Imaging: I have reviewed and interpreted all pertinent imaging results/findings within the past 24 hours.    Assessment/Plan:      * Sepsis associated with AIDS  Tachycardia, hypotension, and fever.  Uncertain source but most likely intra-abdominal.  Probably secondary to vaginal cuff dehiscence but report from the surgical team has doubts.  Her hemodynamics improved partially post-op.  The actual area of dehiscence was very small and the peritoneum and bowels did not appear affected.  Colonoscopy did not show visual evidence of colitis and stool CD toxin was negative.  Biopsies of the distal colon/rectum mucosa were taken.  No suggestion of respiratory or urinary infection.  Discussed over the phone with Infectious Disease, Dr. Mayo, and he recommended changing antibiotics to Vancomycin and Meropenem and continuing Micafungin but stopping Cipro/Flagyl.  He is familiar with her from taking care of her over the years.      Today she has a bandemia but clinically looks better and now off pressors    HIV (human immunodeficiency virus infection)  Resume HIV medications when appropriate.  Infectious disease familiar with the patient and aware.    Severe protein-calorie malnutrition  Dietitian consult    Abnormal LFTs  Resolved as this was likely from shock liver      LELAND (acute kidney injury)  Probably secondary to hypotension and sepsis.  Adjusting medication doses.  Nephrology following.    CR is stable today felt likely related to underlying  septic shock, medications have been adjusted.    VTE Risk Mitigation         Ordered     Medium Risk of VTE  Once      04/16/17 0322     Place sequential compression device  Until discontinued      04/15/17 2052          Mara Cabello MD  Department of Hospital Medicine   [facility]

## 2017-04-17 NOTE — ASSESSMENT & PLAN NOTE
Probably secondary to hypotension and sepsis.  Adjusting medication doses.  Nephrology following.    CR is stable today felt likely related to underlying septic shock, medications have been adjusted.

## 2017-04-17 NOTE — ASSESSMENT & PLAN NOTE
Tachycardia, hypotension, and fever.  Uncertain source but most likely intra-abdominal.  Probably secondary to vaginal cuff dehiscence but report from the surgical team has doubts.  Her hemodynamics improved partially post-op.  The actual area of dehiscence was very small and the peritoneum and bowels did not appear affected.  Colonoscopy did not show visual evidence of colitis and stool CD toxin was negative.  Biopsies of the distal colon/rectum mucosa were taken.  No suggestion of respiratory or urinary infection.  Discussed over the phone with Infectious Disease, Dr. Mayo, and he recommended changing antibiotics to Vancomycin and Meropenem and continuing Micafungin but stopping Cipro/Flagyl.  He is familiar with her from taking care of her over the years.

## 2017-04-17 NOTE — PROGRESS NOTES
Nephrology Progress Note    Admit Date: 4/14/2017   LOS: 2 days     SUBJECTIVE:     Follow-up For:  Pt seen and chart reviewed, no acute events, denies complaints ,     Scheduled Meds:   meropenem (MERREM) IVPB  500 mg Intravenous Q12H    micafungin (MYCAMINE) IVPB  100 mg Intravenous Q24H    pantoprazole  40 mg Intravenous Daily    senna-docusate 8.6-50 mg  1 tablet Oral BID    [START ON 4/18/2017] vancomycin (VANCOCIN) IVPB  15 mg/kg Intravenous Q36H     Continuous Infusions:   PRN Meds:acetaminophen, bisacodyl, cloNIDine, diphenhydrAMINE, hydrocodone-acetaminophen 5-325mg, HYDROmorphone, lactulose, ondansetron, promethazine (PHENERGAN) IVPB, ramelteon    Review of patient's allergies indicates:   Allergen Reactions    Iodine and iodide containing products Anaphylaxis     Pt states she coded last time she was administered contrast    Bactrim [sulfamethoxazole-trimethoprim] Hives    Morphine Itching       Review of Systems:    Constitutional: positive for fatigue  Eyes: negative  Ears, nose, mouth, throat, and face: negative  Respiratory: negative  Cardiovascular: negative  Gastrointestinal: negative  Genitourinary:negative  Hematologic/lymphatic: negative  Musculoskeletal:negative  Neurological: negative    OBJECTIVE:     Vital Signs (Most Recent)  Temp: (!) 100.8 °F (38.2 °C) (04/17/17 0900)  Pulse: (!) 113 (04/17/17 1000)  Resp: 20 (04/17/17 1000)  BP: 110/71 (04/17/17 1000)  SpO2: 100 % (04/17/17 1000)    Vital Signs Range (Last 24H):  Temp:  [98 °F (36.7 °C)-102.9 °F (39.4 °C)]   Pulse:  []   Resp:  [17-31]   BP: ()/(45-96)   SpO2:  [70 %-100 %]     I & O (Last 24H):  Intake/Output Summary (Last 24 hours) at 04/17/17 1112  Last data filed at 04/17/17 0900   Gross per 24 hour   Intake          2725.88 ml   Output             3530 ml   Net          -804.12 ml     Physical Exam:    General: well developed, well nourished, fatigued, no distress  Eyes: conjunctivae/corneas clear.   HENT:  Head:normocephalic, atraumatic. Ears:bilateral TM's and external ear canals normal. Nose: no discharge. Throat: no throat erythema.  Neck: supple, symmetrical, trachea midline, no JVD and thyroid not enlarged, symmetric, no tenderness/mass/nodules  Lungs:  clear to auscultation bilaterally and normal respiratory effort  Cardiovascular: Heart: regular rate and rhythm and tachycardic . Chest Wall: no tenderness. Extremities: no cyanosis or edema, or clubbing. Pulses: 2+ and symmetric.  Abdomen/Rectal: Abdomen: soft, non-tender non-distented; bowel sounds normal; no masses,  no organomegaly.  Skin: Skin color, texture, turgor normal. No rashes or lesions  Musculoskeletal:no clubbing, cyanosis  Neurologic: Normal strength and tone. No focal numbness or weakness    Laboratory:    CBC:   Recent Labs  Lab 04/17/17  0350   WBC 6.79   RBC 3.27*   HGB 9.3*   HCT 27.6*   PLT 67*   MCV 84   MCH 28.4   MCHC 33.7     BMP:   Recent Labs  Lab 04/17/17  0350   GLU 90      K 4.1      CO2 21*   BUN 21*   CREATININE 2.0*   CALCIUM 6.8*   MG 2.4     CMP:   Recent Labs  Lab 04/17/17  0350   GLU 90   CALCIUM 6.8*   ALBUMIN 2.2*   PROT 6.9      K 4.1   CO2 21*      BUN 21*   CREATININE 2.0*   ALKPHOS 153*   ALT 22   AST 48*   BILITOT 0.5     LFTs:   Recent Labs  Lab 04/17/17  0350   ALT 22   AST 48*   ALKPHOS 153*   BILITOT 0.5   PROT 6.9   ALBUMIN 2.2*     Lab Results   Component Value Date    CALCIUM 6.8 (LL) 04/17/2017    PHOS 3.3 04/17/2017       Lab Results   Component Value Date    ALBUMIN 2.2 (L) 04/17/2017         Diagnostic Results: reviewed     ASSESSMENT/PLAN:       31 Y Old AA woman seen in f/u for following medical problems,     1. LELAND : likely due to hemodynamic reasons from sepsis and anemia, good UO noted, will follow renal function,     Cont supportive care, avoid nephrotoxic medications,     2. Anemia : s/p pRBC transfusion , follow    3. Hypokalemia : repleted ,    4. Sepsis : unclear source , on  broad spectrum abx ,     5. HIV / AIDS : ID following ,     6. Corrected calcium normal ,     Will follow, Total time spent 40 minutes including time needed to review the records,  patient  evaluation, documentation, face-to-face discussion with the patient, CCM,   more than 50% of the time was spent on coordination of care and counseling.     Marlon Monterroso MD

## 2017-04-18 PROBLEM — R50.9 FEVER: Status: ACTIVE | Noted: 2017-04-18

## 2017-04-18 LAB
ALBUMIN SERPL BCP-MCNC: 2.1 G/DL
ALP SERPL-CCNC: 139 U/L
ALT SERPL W/O P-5'-P-CCNC: 23 U/L
ANION GAP SERPL CALC-SCNC: 10 MMOL/L
ANISOCYTOSIS BLD QL SMEAR: SLIGHT
AST SERPL-CCNC: 55 U/L
BACTERIA SPEC AEROBE CULT: NORMAL
BASOPHILS NFR BLD: 0 %
BILIRUB SERPL-MCNC: 0.5 MG/DL
BUN SERPL-MCNC: 29 MG/DL
CALCIUM SERPL-MCNC: 7.6 MG/DL
CHLORIDE SERPL-SCNC: 106 MMOL/L
CO2 SERPL-SCNC: 22 MMOL/L
CREAT SERPL-MCNC: 2.5 MG/DL
DIFFERENTIAL METHOD: ABNORMAL
EOSINOPHIL NFR BLD: 0 %
ERYTHROCYTE [DISTWIDTH] IN BLOOD BY AUTOMATED COUNT: 17.1 %
EST. GFR  (AFRICAN AMERICAN): 28 ML/MIN/1.73 M^2
EST. GFR  (NON AFRICAN AMERICAN): 25 ML/MIN/1.73 M^2
GLUCOSE SERPL-MCNC: 65 MG/DL
GRAM STN SPEC: NORMAL
GRAM STN SPEC: NORMAL
HCT VFR BLD AUTO: 24.8 %
HGB BLD-MCNC: 8.4 G/DL
HYPOCHROMIA BLD QL SMEAR: ABNORMAL
LYMPHOCYTES NFR BLD: 10 %
MCH RBC QN AUTO: 28.8 PG
MCHC RBC AUTO-ENTMCNC: 33.9 %
MCV RBC AUTO: 85 FL
MONOCYTES NFR BLD: 5 %
NEUTROPHILS NFR BLD: 83 %
NEUTS BAND NFR BLD MANUAL: 2 %
PLATELET # BLD AUTO: 52 K/UL
PLATELET BLD QL SMEAR: ABNORMAL
PMV BLD AUTO: 10.9 FL
POTASSIUM SERPL-SCNC: 3.7 MMOL/L
PROT SERPL-MCNC: 6.9 G/DL
RBC # BLD AUTO: 2.92 M/UL
SODIUM SERPL-SCNC: 138 MMOL/L
SODIUM UR-SCNC: 59 MMOL/L
VANCOMYCIN TROUGH SERPL-MCNC: 12.9 UG/ML
WBC # BLD AUTO: 3.12 K/UL

## 2017-04-18 PROCEDURE — G8978 MOBILITY CURRENT STATUS: HCPCS | Mod: CK

## 2017-04-18 PROCEDURE — 85007 BL SMEAR W/DIFF WBC COUNT: CPT

## 2017-04-18 PROCEDURE — 80053 COMPREHEN METABOLIC PANEL: CPT

## 2017-04-18 PROCEDURE — 94664 DEMO&/EVAL PT USE INHALER: CPT

## 2017-04-18 PROCEDURE — 21400001 HC TELEMETRY ROOM

## 2017-04-18 PROCEDURE — 25000003 PHARM REV CODE 250: Performed by: SURGERY

## 2017-04-18 PROCEDURE — 25000003 PHARM REV CODE 250: Performed by: INTERNAL MEDICINE

## 2017-04-18 PROCEDURE — 97162 PT EVAL MOD COMPLEX 30 MIN: CPT

## 2017-04-18 PROCEDURE — 84300 ASSAY OF URINE SODIUM: CPT

## 2017-04-18 PROCEDURE — 63600175 PHARM REV CODE 636 W HCPCS: Performed by: SURGERY

## 2017-04-18 PROCEDURE — 25000003 PHARM REV CODE 250: Performed by: NURSE PRACTITIONER

## 2017-04-18 PROCEDURE — 63600175 PHARM REV CODE 636 W HCPCS: Performed by: INTERNAL MEDICINE

## 2017-04-18 PROCEDURE — G8979 MOBILITY GOAL STATUS: HCPCS | Mod: CI

## 2017-04-18 PROCEDURE — 99233 SBSQ HOSP IP/OBS HIGH 50: CPT | Mod: ,,, | Performed by: INTERNAL MEDICINE

## 2017-04-18 PROCEDURE — 94799 UNLISTED PULMONARY SVC/PX: CPT

## 2017-04-18 PROCEDURE — 97116 GAIT TRAINING THERAPY: CPT

## 2017-04-18 PROCEDURE — 80202 ASSAY OF VANCOMYCIN: CPT

## 2017-04-18 PROCEDURE — 85027 COMPLETE CBC AUTOMATED: CPT

## 2017-04-18 PROCEDURE — 99232 SBSQ HOSP IP/OBS MODERATE 35: CPT | Mod: ,,, | Performed by: INTERNAL MEDICINE

## 2017-04-18 PROCEDURE — C9113 INJ PANTOPRAZOLE SODIUM, VIA: HCPCS | Performed by: SURGERY

## 2017-04-18 RX ADMIN — ACETAMINOPHEN 1000 MG: 10 INJECTION, SOLUTION INTRAVENOUS at 05:04

## 2017-04-18 RX ADMIN — VANCOMYCIN HYDROCHLORIDE 750 MG: 750 INJECTION, POWDER, LYOPHILIZED, FOR SOLUTION INTRAVENOUS at 02:04

## 2017-04-18 RX ADMIN — MEROPENEM AND SODIUM CHLORIDE 500 MG: 500 INJECTION, SOLUTION INTRAVENOUS at 12:04

## 2017-04-18 RX ADMIN — HYDROMORPHONE HYDROCHLORIDE 0.5 MG: 1 INJECTION, SOLUTION INTRAMUSCULAR; INTRAVENOUS; SUBCUTANEOUS at 09:04

## 2017-04-18 RX ADMIN — PANTOPRAZOLE SODIUM 40 MG: 40 INJECTION, POWDER, FOR SOLUTION INTRAVENOUS at 08:04

## 2017-04-18 RX ADMIN — DIPHENHYDRAMINE HYDROCHLORIDE 25 MG: 50 INJECTION, SOLUTION INTRAMUSCULAR; INTRAVENOUS at 10:04

## 2017-04-18 RX ADMIN — DIPHENHYDRAMINE HYDROCHLORIDE 25 MG: 50 INJECTION, SOLUTION INTRAMUSCULAR; INTRAVENOUS at 06:04

## 2017-04-18 RX ADMIN — STANDARDIZED SENNA CONCENTRATE AND DOCUSATE SODIUM 1 TABLET: 8.6; 5 TABLET, FILM COATED ORAL at 08:04

## 2017-04-18 RX ADMIN — HYDROMORPHONE HYDROCHLORIDE 0.5 MG: 1 INJECTION, SOLUTION INTRAMUSCULAR; INTRAVENOUS; SUBCUTANEOUS at 06:04

## 2017-04-18 RX ADMIN — MICAFUNGIN SODIUM 100 MG: 20 INJECTION, POWDER, LYOPHILIZED, FOR SOLUTION INTRAVENOUS at 12:04

## 2017-04-18 RX ADMIN — DIPHENHYDRAMINE HYDROCHLORIDE 25 MG: 50 INJECTION, SOLUTION INTRAMUSCULAR; INTRAVENOUS at 09:04

## 2017-04-18 RX ADMIN — HYDROMORPHONE HYDROCHLORIDE 0.5 MG: 1 INJECTION, SOLUTION INTRAMUSCULAR; INTRAVENOUS; SUBCUTANEOUS at 10:04

## 2017-04-18 NOTE — PROGRESS NOTES
[facility] Hospital Medicine  Progress Note    Patient Name: Wang Kebede  MRN: [id number]  Patient Class: IP- Inpatient   Admission Date: 4/14/2017  Length of Stay: 3 days  Attending Physician: Mara Cabello MD  Primary Care Provider: Primary Doctor No        Subjective:     Principal Problem:Sepsis associated with AIDS    HPI:  Ms. Kebede is a 32 y/o AA female with h/o HIV, cervical cancer, s/p hysterectomy last week by , presents to the ED last night c/o generalized weakness, abdominal pain and SOB. CXR and VQ scan were unremarkable. CT chest could not be done due to contrast allergy. Patient continues to have high fever upto 102.1, tachycardia. Admitted for sepsis of likely intra-abdominal etiology, however CT abdomen/pelvis not done due to contrast allergy. Patient is very tender in the abdomen to attempt ultrasound. Started empirically on IV antibiotics for sepsis.    Hospital Course:  Presented with acute abdomen approximately 3 days after robotic assisted laparoscopic hysterectomy.  Severe sepsis progressed to septic shock.  CT abdomen/pelvis had to be done without contrast.  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  CT was unrevealing.  OB/GYN and general surgery evaluated the patient and were concerned for post-op complication but the evidence was dubious.  They recommended a consult to gastroenterology for a diagnostic colonoscopy given the uncertainty of her source of abdominal pain and diarrhea.  She developed abdominal pain shortly after the procedure and had watery diarrhea the day before admission.  Colonoscopy did not show evidence of colitis so the surgical team took her to the operating room and found a small segment of dehiscence to the vaginal cuff.  There was very little evidence of inflammation or contamination in the abdomen or pelvis.  She continued to have high fevers but her blood pressure and heart  rate improved partially post-op.  She was successfully extubated 16 April but continued to require Vasopressin for blood pressure support and her labwork showed acute kidney injury.  She had evidence of a metabolic acidosis but did not have an elevated lactic acid on multiple measurements nor did she have a leukocytosis proportionate to the severity of her symptoms.  Optimized broad spectrum antibiotics and added anti-fungal.  Her CD4 count has been in steady decline over the last year while under treatment.  Three weeks ago it was 53.    Interval History: Patient states she is feeling better this morning and her last temp spike was overnight. Still with some abdominal tenderness/soreness.    Review of Systems   HENT: Negative.    Respiratory: Negative.    Cardiovascular: Negative.    Gastrointestinal: Negative.    Skin: Negative.    Neurological: Positive for weakness.     Objective:     Vital Signs (Most Recent):  Temp: 98.1 °F (36.7 °C) (04/18/17 0705)  Pulse: 107 (04/18/17 0705)  Resp: 18 (04/18/17 0705)  BP: 123/82 (04/18/17 0705)  SpO2: 95 % (04/18/17 0800) Vital Signs (24h Range):  Temp:  [98.1 °F (36.7 °C)-103.2 °F (39.6 °C)] 98.1 °F (36.7 °C)  Pulse:  [103-137] 107  Resp:  [18-24] 18  SpO2:  [0 %-100 %] 95 %  BP: (112-130)/(7-92) 123/82     Weight: 45.8 kg (101 lb)  Body mass index is 19.73 kg/(m^2).    Intake/Output Summary (Last 24 hours) at 04/18/17 1019  Last data filed at 04/18/17 0446   Gross per 24 hour   Intake              200 ml   Output             2080 ml   Net            -1880 ml      Physical Exam   Constitutional: She is oriented to person, place, and time. She appears well-developed.   HENT:   Head: Normocephalic and atraumatic.   Nose: Nose normal.   Mouth/Throat: Oropharynx is clear and moist.   Eyes: Conjunctivae and EOM are normal. Pupils are equal, round, and reactive to light.   Neck: Normal range of motion. Neck supple.   Cardiovascular: Normal rate, regular rhythm, normal heart  sounds and intact distal pulses.    Pulmonary/Chest: Effort normal and breath sounds normal.   Abdominal: Soft. There is tenderness.   Musculoskeletal: Normal range of motion.   Neurological: She is alert and oriented to person, place, and time.   Skin: Skin is warm and dry.   Psychiatric: She has a normal mood and affect.       Significant Labs:   BMP:   Recent Labs  Lab 04/17/17  0350 04/18/17  0520   GLU 90 65*    138   K 4.1 3.7    106   CO2 21* 22*   BUN 21* 29*   CREATININE 2.0* 2.5*   CALCIUM 6.8* 7.6*   MG 2.4  --      CBC:   Recent Labs  Lab 04/16/17  1500 04/17/17  0350 04/18/17  0520   WBC 6.03 6.79 3.12*   HGB 8.3* 9.3* 8.4*   HCT 24.9* 27.6* 24.8*   PLT 68* 67* 52*       Significant Imaging: I have reviewed and interpreted all pertinent imaging results/findings within the past 24 hours.    Assessment/Plan:      * Sepsis associated with AIDS  Tachycardia, hypotension, and fever.  Uncertain source but most likely intra-abdominal.  Probably secondary to vaginal cuff dehiscence but report from the surgical team has doubts.  Her hemodynamics improved partially post-op.  The actual area of dehiscence was very small and the peritoneum and bowels did not appear affected.  Colonoscopy did not show visual evidence of colitis and stool CD toxin was negative.  Biopsies of the distal colon/rectum mucosa were taken.  No suggestion of respiratory or urinary infection.  Discussed over the phone with Infectious Disease, Dr. Mayo, and he recommended changing antibiotics to Vancomycin and Meropenem and continuing Micafungin but stopping Cipro/Flagyl.  He is familiar with her from taking care of her over the years.      Today she has a bandemia but clinically looks better and now off pressors    She is improving as no temp since last night, continue with broad spectrum antibiotics    HIV (human immunodeficiency virus infection)  Resume HIV medications when appropriate.  Infectious disease familiar with the  patient and aware.    Status post laparoscopic hysterectomy  OB/GYN and General surgery following.    Remove navarrete    VTE Risk Mitigation         Ordered     Medium Risk of VTE  Once      04/16/17 0322     Place sequential compression device  Until discontinued      04/15/17 2052          Mara Cabello MD  Department of Hospital Medicine   [facility]

## 2017-04-18 NOTE — PROGRESS NOTES
[facility]   General Surgery  Progress Note    Subjective:     History of Present Illness:  This is 31 year old  female who has AIDS, and had robotic assisted hysterectomy for cervical dysplasia four days ago. She began to notice worsening abdominal pain on the evening of the surgery. She became so sick and was brought to the ED for further evaluation.    Post-Op Info:  Procedure(s) (LRB):  EXPLORATORY-LAPAROTOMY (N/A)  REPAIR-VAGINAL CUFF (N/A)   2 Days Post-Op     Interval History: no new complaints    Medications:  Continuous Infusions:   Scheduled Meds:   meropenem (MERREM) IVPB  500 mg Intravenous Q12H    micafungin (MYCAMINE) IVPB  100 mg Intravenous Q24H    pantoprazole  40 mg Intravenous Daily    senna-docusate 8.6-50 mg  1 tablet Oral BID    vancomycin (VANCOCIN) IVPB  15 mg/kg Intravenous Q36H     PRN Meds:acetaminophen, bisacodyl, cloNIDine, diphenhydrAMINE, hydrocodone-acetaminophen 5-325mg, HYDROmorphone, lactulose, ondansetron, promethazine (PHENERGAN) IVPB, ramelteon     Review of patient's allergies indicates:   Allergen Reactions    Iodine and iodide containing products Anaphylaxis     Pt states she coded last time she was administered contrast    Bactrim [sulfamethoxazole-trimethoprim] Hives    Morphine Itching     Objective:     Vital Signs (Most Recent):  Temp: 98.1 °F (36.7 °C) (04/18/17 0705)  Pulse: 107 (04/18/17 0705)  Resp: 18 (04/18/17 0705)  BP: 123/82 (04/18/17 0705)  SpO2: 95 % (04/18/17 0800) Vital Signs (24h Range):  Temp:  [98.1 °F (36.7 °C)-103.2 °F (39.6 °C)] 98.1 °F (36.7 °C)  Pulse:  [103-137] 107  Resp:  [18-21] 18  SpO2:  [0 %-96 %] 95 %  BP: (112-130)/(7-92) 123/82     Weight: 45.8 kg (101 lb)  Body mass index is 19.73 kg/(m^2).    Intake/Output - Last 3 Shifts       04/16 0700 - 04/17 0659 04/17 0700 - 04/18 0659 04/18 0700 - 04/19 0659    P.O.  200     I.V. (mL/kg) 1297.9 (28.3)      Blood 350      IV Piggyback 1200      Total  Intake(mL/kg) 2847.9 (62.2) 200 (4.4)     Urine (mL/kg/hr) 2736 (2.5) 2901 (2.6)     Stool 350 (0.3) 0 (0)     Blood       Total Output 3086 2901      Net -238.1 -2701             Urine Occurrence  2 x     Stool Occurrence  2 x           Physical Exam   Constitutional: She is oriented to person, place, and time. She appears well-developed and well-nourished.   HENT:   Head: Normocephalic and atraumatic.   Eyes: EOM are normal.   Cardiovascular: Normal rate and regular rhythm.    Pulmonary/Chest: Effort normal. No respiratory distress.   Abdominal: Soft. There is tenderness (incisional).   Musculoskeletal: Normal range of motion.   Neurological: She is alert and oriented to person, place, and time.   Drowsy but rousable    Skin: Skin is warm and dry.   Psychiatric: She has a normal mood and affect. Thought content normal.   Vitals reviewed.      Significant Labs:  CBC:   Recent Labs  Lab 04/18/17  0520   WBC 3.12*   RBC 2.92*   HGB 8.4*   HCT 24.8*   PLT 52*   MCV 85   MCH 28.8   MCHC 33.9     CMP:   Recent Labs  Lab 04/18/17  0520   GLU 65*   CALCIUM 7.6*   ALBUMIN 2.1*   PROT 6.9      K 3.7   CO2 22*      BUN 29*   CREATININE 2.5*   ALKPHOS 139*   ALT 23   AST 55*   BILITOT 0.5       Significant Diagnostics:  I have reviewed and interpreted all pertinent imaging results/findings within the past 24 hours.    Assessment/Plan:     * Sepsis associated with AIDS  continue with the current management      Kerry Stevens PA-C  General Surgery  [facility]
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
## 2017-04-18 NOTE — PROGRESS NOTES
Progress Note    Admit Date: 4/14/2017   LOS: 3 days     Active Hospital Problems    Diagnosis  POA    *Blood poisoning [A41.9]  Yes    Fever [R50.9]  Unknown    Abnormal LFTs [R79.89]  Yes    LELAND (acute kidney injury) [N17.9]  Yes    AIDS (acquired immunodeficiency syndrome), CD4 <=200 [B20]  Yes    Status post laparoscopic hysterectomy [Z90.710]  Yes    Severe protein-calorie malnutrition [E43]  Yes     Nutrition on Consult  Diet Supplements        HIV (human immunodeficiency virus infection) [Z21]  Yes      Resolved Hospital Problems    Diagnosis Date Resolved POA    Acidosis [E87.2] 04/17/2017 No    Lower abdominal pain [R10.30] 04/17/2017 Yes     Chronic           SUBJECTIVE:     Patient feeling better but weak and still w abd pain.        Scheduled Meds:   meropenem (MERREM) IVPB  500 mg Intravenous Q12H    micafungin (MYCAMINE) IVPB  100 mg Intravenous Q24H    pantoprazole  40 mg Intravenous Daily    senna-docusate 8.6-50 mg  1 tablet Oral BID    vancomycin (VANCOCIN) IVPB  15 mg/kg Intravenous Q48H     Continuous Infusions:   PRN Meds:acetaminophen, bisacodyl, cloNIDine, diphenhydrAMINE, hydrocodone-acetaminophen 5-325mg, HYDROmorphone, lactulose, ondansetron, promethazine (PHENERGAN) IVPB, ramelteon    Review of patient's allergies indicates:   Allergen Reactions    Iodine and iodide containing products Anaphylaxis     Pt states she coded last time she was administered contrast    Bactrim [sulfamethoxazole-trimethoprim] Hives    Morphine Itching         OBJECTIVE:     Vital Signs (Most Recent)  Temp: 98.1 °F (36.7 °C) (04/18/17 1216)  Pulse: 104 (04/18/17 1216)  Resp: 18 (04/18/17 1216)  BP: 123/77 (04/18/17 1216)  SpO2: 95 % (04/18/17 1216)    Vital Signs Range (Last 24H):  Temp:  [98.1 °F (36.7 °C)-103.2 °F (39.6 °C)]   Pulse:  [103-137]   Resp:  [18-21]   BP: (114-130)/(7-92)   SpO2:  [94 %-95 %]     I & O (Last 24H):  Intake/Output Summary (Last 24 hours) at 04/18/17 1307  Last data  filed at 04/18/17 1000   Gross per 24 hour   Intake              290 ml   Output             2080 ml   Net            -1790 ml       Physical Exam:  General - no acute distress, but appears uncomfortable sitting up in bedside chair  Lungs - normal respiratory effort        ASSESSMENT/PLAN:     Condition stable. Doubt typical post-op infection the primary issue    Plan: following along

## 2017-04-18 NOTE — PROGRESS NOTES
Clinical Pharmacy Progress Note: Vancomycin      Estimated CrCl: 23.2 mL/min   WBC: 3.12  SCr: 2.5, sharp increase from 2.0 yesterday. SCr was 1.0 upon admit on 4/15.      Cultures:   Blood on 4/14- NGTD x 4 days   Stool on 4/15- C diff antigen -, toxin -   Respiratory on 4/16- NGTD      Tmax/24h: 103.2 (!)   RR: 18-27      Trough: 12.9 mcg/mL, was due @ 1030 but drawn @ 1130.   Goal trough: 15-20 mcg/mL   Dx: Severe sepsis associated with AIDS     Spoke to Dr. Monterroso, and he is aware of her renal function.   Because of the sharp decline in renal function, pharmacy will pulse dose this patient's vancomycin per daily AM random levels.   We will continue to monitor renal function, clinical response, temperature curve, culture and susceptibility results and adjust dose according to protocol.     Vancomycin random level due 04/19/17 @ 0430 with AM labs.     Thank you for allowing us to participate in this patient's care.    Letha Thomas, PharmD 4/18/2017 12:54 PM

## 2017-04-18 NOTE — PLAN OF CARE
"Met with patient at bedside for Readmission Questionnaire.  Patient reports that following her recent hysterectomy, she has been feeling weak.  Patient states that her family has been helping her during her recovery.  Patient indicates that she was receiving no HH services or using any HME prior to this admission and reports that she follows up outpatient with Dr. Vides, Infectious Disease MD, for HIV diagnosis.  Patient reports having no needs or concerns at this time.    PLAN:  Will continue to follow        04/18/17 1112   Readmission Questionnaire   At the time of your discharge, did someone talk to you about what your health problems were? Yes   At the time of discharge, did someone talk to you about what to watch out for regarding worsening of your health problem? Yes   At the time of discharge, did someone talk to you about what to do if you experienced worsening of your health problem? Yes   At the time of discharge, did someone talk to you about which medication to take when you left the hospital and which ones to stop taking? Yes   At the time of discharge, did someone talk to you about when and where to follow up with a doctor after you left the hospital? Yes   What do you believe caused you to be sick enough to be re-admitted? "Weakness, short of breath, and stomach pain"    How often do you need to have someone help you when you read instructions, pamphlets, or other written material from your doctor or pharmacy? Sometimes   Do you have problems taking your medications as prescribed? No   Do you have any problems affording any of  your prescribed medications? No   Do you have problems obtaining/receiving your medications? No   Does the patient have transportation to healthcare appointments? Yes   Lives With significant other;child(nalini), dependent  (Resides with boyfriend and children (ages 18, 17, and 13))   Living Arrangements house   Does the patient have family/friends to help with healtcare needs " after discharge? yes   Who are your caregiver(s) and their phone number(s)? Lyssa Kebede, Mother:  [phone]   Does your caregiver provide all the help you need? Yes   Are you currently feeling confused? No   Are you currently having problems thinking? No   Are you currently having memory problems? No   Have you felt down, depressed, or hopeless? 0   Have you felt little interest or pleasure in doing things? 0   In the last 7 days, my sleep quality was: fair

## 2017-04-18 NOTE — PROGRESS NOTES
04/17/17 2565   Vital Signs   Temp (!) 103.2 °F (39.6 °C)   Temp src Oral   Pulse (!) 136   Heart Rate Source Monitor   Resp 20   SpO2 (!) 94 %   Pulse Oximetry Type Intermittent   O2 Device (Oxygen Therapy) room air   BP (!) 130/92     Dr. Wills notified, last dose of Tylenol 650 mg PO given at 2040.  Acetaminophen 1 gm IV given as ordered. Will continue to monitor.

## 2017-04-18 NOTE — PROGRESS NOTES
04/18/17 1000   Wound Care Screen Assessment   Mobility Requires Assistance   Continent of Bowel No  (flexiseal)   Continent of Bladder No  (navarrete)   Contractures No   Feeding Tube No   S/P Flap or Graft Surgery No   Skin Condition intact   Wound(s) Present No   Wound acquired during current hospitalization No   Evaluation by Wound Care Team required? Yes    Wound care assessment due to Giuliano Scale 14 overnight.  Primary nurse GILMAR Parker RN at bedside during assessment. Patient sitting up in chair at this time and states she is unable to stand for me to assess her sacral area. Back and upper buttock intact. Patient denies pain or burning to sacral area. Flexiseal and Navarrete catheter intact and draining to gravity.  Bilateral heels intact. Encouraged primary nurse to assess sacrum when patient transfers back to bed and consult wound care if skin concerns noted.

## 2017-04-18 NOTE — ASSESSMENT & PLAN NOTE
Tachycardia, hypotension, and fever.  Uncertain source but most likely intra-abdominal.  Probably secondary to vaginal cuff dehiscence but report from the surgical team has doubts.  Her hemodynamics improved partially post-op.  The actual area of dehiscence was very small and the peritoneum and bowels did not appear affected.  Colonoscopy did not show visual evidence of colitis and stool CD toxin was negative.  Biopsies of the distal colon/rectum mucosa were taken.  No suggestion of respiratory or urinary infection.  Discussed over the phone with Infectious Disease, Dr. Mayo, and he recommended changing antibiotics to Vancomycin and Meropenem and continuing Micafungin but stopping Cipro/Flagyl.  He is familiar with her from taking care of her over the years.      Today she has a bandemia but clinically looks better and now off pressors    She is improving as no temp since last night, continue with broad spectrum antibiotics

## 2017-04-18 NOTE — PROGRESS NOTES
Progress Note  Pulmonology    Admit Date: 4/14/2017   LOS: 3 days     SUBJECTIVE:     Follow-up For:  Sepsis associated with HIV.    Interval history: Seen and examined at bedside. She reports that she is doing better    Continuous Infusions:  n/a    Scheduled Meds:   meropenem (MERREM) IVPB  500 mg Intravenous Q12H    micafungin (MYCAMINE) IVPB  100 mg Intravenous Q24H    pantoprazole  40 mg Intravenous Daily    senna-docusate 8.6-50 mg  1 tablet Oral BID    vancomycin (VANCOCIN) IVPB  15 mg/kg Intravenous Q36H       Review of Systems:  Constitutional: no fever or chills  Respiratory: no cough or shortness of breath  Cardiovascular: no chest pain or palpitations    OBJECTIVE:     Vital Signs Range (Last 24H):  Temp:  [98.1 °F (36.7 °C)-103.2 °F (39.6 °C)]   Pulse:  [103-137]   Resp:  [18-21]   BP: (114-130)/(7-92)   SpO2:  [94 %-95 %]     I & O (Last 24H):  Intake/Output Summary (Last 24 hours) at 04/18/17 1234  Last data filed at 04/18/17 1000   Gross per 24 hour   Intake              290 ml   Output             2080 ml   Net            -1790 ml     Physical Exam:  General: no distress  Neck: no jugular venous distention  Lungs:  clear to auscultation bilaterally, normal respiratory effort and normal percussion bilaterally  Chest Wall: no tenderness  Heart: regular rate and rhythm and no murmur  Abdomen: soft, non-tender non-distended; bowel sounds normal  Extremities: no cyanosis or edema, or clubbing  Neurologic: alert, oriented, thought content appropriate    Laboratory:  CBC:   Recent Labs  Lab 04/18/17  0520   WBC 3.12*   RBC 2.92*   HGB 8.4*   HCT 24.8*   PLT 52*   MCV 85   MCH 28.8   MCHC 33.9     BMP:   Recent Labs  Lab 04/17/17  0350 04/18/17  0520   GLU 90 65*    138   K 4.1 3.7    106   CO2 21* 22*   BUN 21* 29*   CREATININE 2.0* 2.5*   CALCIUM 6.8* 7.6*   MG 2.4  --      CMP:   Recent Labs  Lab 04/18/17  0520   GLU 65*   CALCIUM 7.6*   ALBUMIN 2.1*   PROT 6.9      K 3.7   CO2 22*       BUN 29*   CREATININE 2.5*   ALKPHOS 139*   ALT 23   AST 55*   BILITOT 0.5     LFTs:   Recent Labs  Lab 04/18/17  0520   ALT 23   AST 55*   ALKPHOS 139*   BILITOT 0.5   PROT 6.9   ALBUMIN 2.1*     Coagulation:   Recent Labs  Lab 04/15/17  1636   INR 1.2     Cardiac markers:   Recent Labs  Lab 04/14/17  1800   TROPONINI 0.012     ABGs:   Recent Labs  Lab 04/16/17  0528   PH 7.421   PCO2 29.4*   PO2 166*   HCO3 19.2*   POCSATURATED 100   BE -5       Chest X-Ray: Comparison was made to a prior examination performed on 4/16/2017. A right internal jugular venous line remains in place. A cardiac pacemaker remains in place. The leads appear to be intact. There has been interval removal of an endotracheal tube. There has been interval removal of an NG tube. The size of the heart is normal. There is a moderate amount of interstitial and alveolar opacities seen in both lungs with curly B-lines visualized bilaterally. There is no pneumothorax.  The costophrenic angles are sharp.    ASSESSMENT/PLAN:     Active Hospital Problems    Diagnosis    *Sepsis associated with AIDS    AIDS (acquired immunodeficiency syndrome), CD4 <=200    HIV (human immunodeficiency virus infection)    Abnormal LFTs    LELAND (acute kidney injury)    Status post laparoscopic hysterectomy    Severe protein-calorie malnutrition     Nutrition on Consult  Diet Supplements           Plan: Continue Current.     Will sign off for now. Please feel free to re consult if further pulmonary issues arise.

## 2017-04-18 NOTE — SUBJECTIVE & OBJECTIVE
Interval History: no new complaints    Medications:  Continuous Infusions:   Scheduled Meds:   meropenem (MERREM) IVPB  500 mg Intravenous Q12H    micafungin (MYCAMINE) IVPB  100 mg Intravenous Q24H    pantoprazole  40 mg Intravenous Daily    senna-docusate 8.6-50 mg  1 tablet Oral BID    vancomycin (VANCOCIN) IVPB  15 mg/kg Intravenous Q36H     PRN Meds:acetaminophen, bisacodyl, cloNIDine, diphenhydrAMINE, hydrocodone-acetaminophen 5-325mg, HYDROmorphone, lactulose, ondansetron, promethazine (PHENERGAN) IVPB, ramelteon     Review of patient's allergies indicates:   Allergen Reactions    Iodine and iodide containing products Anaphylaxis     Pt states she coded last time she was administered contrast    Bactrim [sulfamethoxazole-trimethoprim] Hives    Morphine Itching     Objective:     Vital Signs (Most Recent):  Temp: 98.1 °F (36.7 °C) (04/18/17 0705)  Pulse: 107 (04/18/17 0705)  Resp: 18 (04/18/17 0705)  BP: 123/82 (04/18/17 0705)  SpO2: 95 % (04/18/17 0800) Vital Signs (24h Range):  Temp:  [98.1 °F (36.7 °C)-103.2 °F (39.6 °C)] 98.1 °F (36.7 °C)  Pulse:  [103-137] 107  Resp:  [18-21] 18  SpO2:  [0 %-96 %] 95 %  BP: (112-130)/(7-92) 123/82     Weight: 45.8 kg (101 lb)  Body mass index is 19.73 kg/(m^2).    Intake/Output - Last 3 Shifts       04/16 0700 - 04/17 0659 04/17 0700 - 04/18 0659 04/18 0700 - 04/19 0659    P.O.  200     I.V. (mL/kg) 1297.9 (28.3)      Blood 350      IV Piggyback 1200      Total Intake(mL/kg) 2847.9 (62.2) 200 (4.4)     Urine (mL/kg/hr) 2736 (2.5) 2901 (2.6)     Stool 350 (0.3) 0 (0)     Blood       Total Output 3086 2901      Net -238.1 -2701             Urine Occurrence  2 x     Stool Occurrence  2 x           Physical Exam   Constitutional: She is oriented to person, place, and time. She appears well-developed and well-nourished.   HENT:   Head: Normocephalic and atraumatic.   Eyes: EOM are normal.   Cardiovascular: Normal rate and regular rhythm.    Pulmonary/Chest: Effort  normal. No respiratory distress.   Abdominal: Soft. There is tenderness (incisional).   Musculoskeletal: Normal range of motion.   Neurological: She is alert and oriented to person, place, and time.   Drowsy but rousable    Skin: Skin is warm and dry.   Psychiatric: She has a normal mood and affect. Thought content normal.   Vitals reviewed.      Significant Labs:  CBC:   Recent Labs  Lab 04/18/17  0520   WBC 3.12*   RBC 2.92*   HGB 8.4*   HCT 24.8*   PLT 52*   MCV 85   MCH 28.8   MCHC 33.9     CMP:   Recent Labs  Lab 04/18/17  0520   GLU 65*   CALCIUM 7.6*   ALBUMIN 2.1*   PROT 6.9      K 3.7   CO2 22*      BUN 29*   CREATININE 2.5*   ALKPHOS 139*   ALT 23   AST 55*   BILITOT 0.5       Significant Diagnostics:  I have reviewed and interpreted all pertinent imaging results/findings within the past 24 hours.

## 2017-04-18 NOTE — SUBJECTIVE & OBJECTIVE
Interval History: Patient states she is feeling better this morning and her last temp spike was overnight. Still with some abdominal tenderness/soreness.    Review of Systems   HENT: Negative.    Respiratory: Negative.    Cardiovascular: Negative.    Gastrointestinal: Negative.    Skin: Negative.    Neurological: Positive for weakness.     Objective:     Vital Signs (Most Recent):  Temp: 98.1 °F (36.7 °C) (04/18/17 0705)  Pulse: 107 (04/18/17 0705)  Resp: 18 (04/18/17 0705)  BP: 123/82 (04/18/17 0705)  SpO2: 95 % (04/18/17 0800) Vital Signs (24h Range):  Temp:  [98.1 °F (36.7 °C)-103.2 °F (39.6 °C)] 98.1 °F (36.7 °C)  Pulse:  [103-137] 107  Resp:  [18-24] 18  SpO2:  [0 %-100 %] 95 %  BP: (112-130)/(7-92) 123/82     Weight: 45.8 kg (101 lb)  Body mass index is 19.73 kg/(m^2).    Intake/Output Summary (Last 24 hours) at 04/18/17 1019  Last data filed at 04/18/17 0446   Gross per 24 hour   Intake              200 ml   Output             2080 ml   Net            -1880 ml      Physical Exam   Constitutional: She is oriented to person, place, and time. She appears well-developed.   HENT:   Head: Normocephalic and atraumatic.   Nose: Nose normal.   Mouth/Throat: Oropharynx is clear and moist.   Eyes: Conjunctivae and EOM are normal. Pupils are equal, round, and reactive to light.   Neck: Normal range of motion. Neck supple.   Cardiovascular: Normal rate, regular rhythm, normal heart sounds and intact distal pulses.    Pulmonary/Chest: Effort normal and breath sounds normal.   Abdominal: Soft. There is tenderness.   Musculoskeletal: Normal range of motion.   Neurological: She is alert and oriented to person, place, and time.   Skin: Skin is warm and dry.   Psychiatric: She has a normal mood and affect.       Significant Labs:   BMP:   Recent Labs  Lab 04/17/17  0350 04/18/17  0520   GLU 90 65*    138   K 4.1 3.7    106   CO2 21* 22*   BUN 21* 29*   CREATININE 2.0* 2.5*   CALCIUM 6.8* 7.6*   MG 2.4  --      CBC:    Recent Labs  Lab 04/16/17  1500 04/17/17  0350 04/18/17  0520   WBC 6.03 6.79 3.12*   HGB 8.3* 9.3* 8.4*   HCT 24.9* 27.6* 24.8*   PLT 68* 67* 52*       Significant Imaging: I have reviewed and interpreted all pertinent imaging results/findings within the past 24 hours.

## 2017-04-18 NOTE — PROGRESS NOTES
Nephrology Progress Note    Admit Date: 4/14/2017   LOS: 3 days     SUBJECTIVE:     Follow-up For:  Pt seen and chart reviewed, no acute events, denies complaints ,     Scheduled Meds:   meropenem (MERREM) IVPB  500 mg Intravenous Q12H    micafungin (MYCAMINE) IVPB  100 mg Intravenous Q24H    pantoprazole  40 mg Intravenous Daily    senna-docusate 8.6-50 mg  1 tablet Oral BID    vancomycin (VANCOCIN) IVPB  15 mg/kg Intravenous Q48H     Continuous Infusions:   PRN Meds:acetaminophen, bisacodyl, cloNIDine, diphenhydrAMINE, hydrocodone-acetaminophen 5-325mg, HYDROmorphone, lactulose, ondansetron, promethazine (PHENERGAN) IVPB, ramelteon    Review of patient's allergies indicates:   Allergen Reactions    Iodine and iodide containing products Anaphylaxis     Pt states she coded last time she was administered contrast    Bactrim [sulfamethoxazole-trimethoprim] Hives    Morphine Itching       Review of Systems:    Constitutional: positive for fatigue  Eyes: negative  Ears, nose, mouth, throat, and face: negative  Respiratory: negative  Cardiovascular: negative  Gastrointestinal: negative  Genitourinary:negative  Hematologic/lymphatic: negative  Musculoskeletal:negative  Neurological: negative    OBJECTIVE:     Vital Signs (Most Recent)  Temp: 98.8 °F (37.1 °C) (04/18/17 1755)  Pulse: 100 (04/18/17 1755)  Resp: 18 (04/18/17 1755)  BP: (!) 123/91 (04/18/17 1755)  SpO2: 96 % (04/18/17 1755)    Vital Signs Range (Last 24H):  Temp:  [98.1 °F (36.7 °C)-103.2 °F (39.6 °C)]   Pulse:  [100-137]   Resp:  [18-20]   BP: (115-130)/(7-92)   SpO2:  [94 %-96 %]     I & O (Last 24H):    Intake/Output Summary (Last 24 hours) at 04/18/17 1758  Last data filed at 04/18/17 1700   Gross per 24 hour   Intake              870 ml   Output             2530 ml   Net            -1660 ml     Physical Exam:    General: well developed, well nourished, fatigued, no distress  Eyes: conjunctivae/corneas clear.   HENT: Head:normocephalic,  atraumatic. Ears:bilateral TM's and external ear canals normal. Nose: no discharge. Throat: no throat erythema.  Neck: supple, symmetrical, trachea midline, no JVD and thyroid not enlarged, symmetric, no tenderness/mass/nodules  Lungs:  clear to auscultation bilaterally and normal respiratory effort  Cardiovascular: Heart: regular rate and rhythm and tachycardic . Chest Wall: no tenderness. Extremities: no cyanosis or edema, or clubbing. Pulses: 2+ and symmetric.  Abdomen/Rectal: Abdomen: soft, non-tender non-distented; bowel sounds normal; no masses,  no organomegaly.  Skin: Skin color, texture, turgor normal. No rashes or lesions  Musculoskeletal:no clubbing, cyanosis  Neurologic: Normal strength and tone. No focal numbness or weakness    Laboratory:    CBC:     Recent Labs  Lab 04/18/17  0520   WBC 3.12*   RBC 2.92*   HGB 8.4*   HCT 24.8*   PLT 52*   MCV 85   MCH 28.8   MCHC 33.9     BMP:     Recent Labs  Lab 04/17/17  0350 04/18/17  0520   GLU 90 65*    138   K 4.1 3.7    106   CO2 21* 22*   BUN 21* 29*   CREATININE 2.0* 2.5*   CALCIUM 6.8* 7.6*   MG 2.4  --      CMP:     Recent Labs  Lab 04/18/17  0520   GLU 65*   CALCIUM 7.6*   ALBUMIN 2.1*   PROT 6.9      K 3.7   CO2 22*      BUN 29*   CREATININE 2.5*   ALKPHOS 139*   ALT 23   AST 55*   BILITOT 0.5     LFTs:     Recent Labs  Lab 04/18/17  0520   ALT 23   AST 55*   ALKPHOS 139*   BILITOT 0.5   PROT 6.9   ALBUMIN 2.1*     Lab Results   Component Value Date    CALCIUM 7.6 (L) 04/18/2017    PHOS 3.3 04/17/2017       Lab Results   Component Value Date    ALBUMIN 2.1 (L) 04/18/2017         Diagnostic Results: reviewed     ASSESSMENT/PLAN:       31 Y Old AA woman seen in f/u for following medical problems,     1. LELAND : likely due to hemodynamic reasons from sepsis and anemia, good UO noted, will follow renal function,     Cont supportive care, avoid nephrotoxic medications,     2. Anemia : s/p pRBC transfusion , follow    3. Hypokalemia :  repleted ,    4. Sepsis : unclear source , on broad spectrum abx ,     5. HIV / AIDS : ID following ,     6. Corrected calcium normal ,     Will follow, Total time spent 40 minutes including time needed to review the records,  patient  evaluation, documentation, face-to-face discussion with the patient, primary team ,   more than 50% of the time was spent on coordination of care and counseling.     Marlon Monterroso MD

## 2017-04-19 PROBLEM — D69.6 THROMBOCYTOPENIA: Status: ACTIVE | Noted: 2017-04-19

## 2017-04-19 PROBLEM — Z98.890 S/P EXPLORATORY LAPAROTOMY: Status: ACTIVE | Noted: 2017-04-19

## 2017-04-19 PROBLEM — A41.9 SEPSIS: Status: ACTIVE | Noted: 2017-04-19

## 2017-04-19 LAB
ALBUMIN SERPL BCP-MCNC: 2.1 G/DL
ALP SERPL-CCNC: 206 U/L
ALT SERPL W/O P-5'-P-CCNC: 23 U/L
ANION GAP SERPL CALC-SCNC: 8 MMOL/L
ANISOCYTOSIS BLD QL SMEAR: SLIGHT
AST SERPL-CCNC: 66 U/L
BASOPHILS # BLD AUTO: 0.04 K/UL
BASOPHILS NFR BLD: 1.8 %
BILIRUB SERPL-MCNC: 0.4 MG/DL
BLD PROD TYP BPU: NORMAL
BLD PROD TYP BPU: NORMAL
BLOOD UNIT EXPIRATION DATE: NORMAL
BLOOD UNIT EXPIRATION DATE: NORMAL
BLOOD UNIT TYPE CODE: 5100
BLOOD UNIT TYPE CODE: 5100
BLOOD UNIT TYPE: NORMAL
BLOOD UNIT TYPE: NORMAL
BUN SERPL-MCNC: 31 MG/DL
CALCIUM SERPL-MCNC: 7.6 MG/DL
CHLORIDE SERPL-SCNC: 107 MMOL/L
CO2 SERPL-SCNC: 21 MMOL/L
CODING SYSTEM: NORMAL
CODING SYSTEM: NORMAL
CREAT SERPL-MCNC: 2.3 MG/DL
DACRYOCYTES BLD QL SMEAR: ABNORMAL
DIFFERENTIAL METHOD: ABNORMAL
DISPENSE STATUS: NORMAL
DISPENSE STATUS: NORMAL
EOSINOPHIL # BLD AUTO: 0.1 K/UL
EOSINOPHIL NFR BLD: 3.2 %
ERYTHROCYTE [DISTWIDTH] IN BLOOD BY AUTOMATED COUNT: 17.5 %
EST. GFR  (AFRICAN AMERICAN): 31 ML/MIN/1.73 M^2
EST. GFR  (NON AFRICAN AMERICAN): 27 ML/MIN/1.73 M^2
GGT SERPL-CCNC: 156 U/L
GLUCOSE SERPL-MCNC: 63 MG/DL
HCT VFR BLD AUTO: 24.3 %
HGB BLD-MCNC: 8.1 G/DL
HYPOCHROMIA BLD QL SMEAR: ABNORMAL
LYMPHOCYTES # BLD AUTO: 0.9 K/UL
LYMPHOCYTES NFR BLD: 39 %
MCH RBC QN AUTO: 28.2 PG
MCHC RBC AUTO-ENTMCNC: 33.3 %
MCV RBC AUTO: 85 FL
MONOCYTES # BLD AUTO: 0.2 K/UL
MONOCYTES NFR BLD: 8.7 %
NEUTROPHILS # BLD AUTO: 1 K/UL
NEUTROPHILS NFR BLD: 49.1 %
NUM UNITS TRANS PACKED RBC: NORMAL
NUM UNITS TRANS PACKED RBC: NORMAL
OVALOCYTES BLD QL SMEAR: ABNORMAL
PLATELET # BLD AUTO: 54 K/UL
PLATELET BLD QL SMEAR: ABNORMAL
PMV BLD AUTO: 10.2 FL
POIKILOCYTOSIS BLD QL SMEAR: SLIGHT
POTASSIUM SERPL-SCNC: 3.6 MMOL/L
PROT SERPL-MCNC: 7.1 G/DL
RBC # BLD AUTO: 2.87 M/UL
SODIUM SERPL-SCNC: 136 MMOL/L
SPHEROCYTES BLD QL SMEAR: ABNORMAL
WBC # BLD AUTO: 2.18 K/UL

## 2017-04-19 PROCEDURE — 21400001 HC TELEMETRY ROOM

## 2017-04-19 PROCEDURE — 94799 UNLISTED PULMONARY SVC/PX: CPT

## 2017-04-19 PROCEDURE — C9113 INJ PANTOPRAZOLE SODIUM, VIA: HCPCS | Performed by: SURGERY

## 2017-04-19 PROCEDURE — 87116 MYCOBACTERIA CULTURE: CPT

## 2017-04-19 PROCEDURE — 63600175 PHARM REV CODE 636 W HCPCS: Performed by: INTERNAL MEDICINE

## 2017-04-19 PROCEDURE — 97110 THERAPEUTIC EXERCISES: CPT

## 2017-04-19 PROCEDURE — 25000003 PHARM REV CODE 250: Performed by: FAMILY MEDICINE

## 2017-04-19 PROCEDURE — 36415 COLL VENOUS BLD VENIPUNCTURE: CPT

## 2017-04-19 PROCEDURE — 25000003 PHARM REV CODE 250: Performed by: INTERNAL MEDICINE

## 2017-04-19 PROCEDURE — 87015 SPECIMEN INFECT AGNT CONCNTJ: CPT

## 2017-04-19 PROCEDURE — 80053 COMPREHEN METABOLIC PANEL: CPT

## 2017-04-19 PROCEDURE — 63600175 PHARM REV CODE 636 W HCPCS: Performed by: SURGERY

## 2017-04-19 PROCEDURE — 25000003 PHARM REV CODE 250: Performed by: SURGERY

## 2017-04-19 PROCEDURE — 94668 MNPJ CHEST WALL SBSQ: CPT

## 2017-04-19 PROCEDURE — 36430 TRANSFUSION BLD/BLD COMPNT: CPT

## 2017-04-19 PROCEDURE — 99233 SBSQ HOSP IP/OBS HIGH 50: CPT | Mod: ,,, | Performed by: INTERNAL MEDICINE

## 2017-04-19 PROCEDURE — P9016 RBC LEUKOCYTES REDUCED: HCPCS

## 2017-04-19 PROCEDURE — 82977 ASSAY OF GGT: CPT

## 2017-04-19 PROCEDURE — 85025 COMPLETE CBC W/AUTO DIFF WBC: CPT

## 2017-04-19 PROCEDURE — 97530 THERAPEUTIC ACTIVITIES: CPT

## 2017-04-19 PROCEDURE — 94664 DEMO&/EVAL PT USE INHALER: CPT

## 2017-04-19 RX ORDER — METOPROLOL SUCCINATE 25 MG/1
25 TABLET, EXTENDED RELEASE ORAL DAILY
Status: DISCONTINUED | OUTPATIENT
Start: 2017-04-19 | End: 2017-04-19

## 2017-04-19 RX ORDER — LORAZEPAM 0.5 MG/1
0.5 TABLET ORAL EVERY 6 HOURS PRN
Status: DISCONTINUED | OUTPATIENT
Start: 2017-04-19 | End: 2017-04-21 | Stop reason: HOSPADM

## 2017-04-19 RX ORDER — HYDROCODONE BITARTRATE AND ACETAMINOPHEN 500; 5 MG/1; MG/1
TABLET ORAL
Status: DISCONTINUED | OUTPATIENT
Start: 2017-04-19 | End: 2017-04-21 | Stop reason: HOSPADM

## 2017-04-19 RX ORDER — CYCLOBENZAPRINE HCL 5 MG
5 TABLET ORAL 3 TIMES DAILY PRN
Status: DISCONTINUED | OUTPATIENT
Start: 2017-04-19 | End: 2017-04-21 | Stop reason: HOSPADM

## 2017-04-19 RX ORDER — MOXIFLOXACIN HYDROCHLORIDE 400 MG/250ML
400 INJECTION, SOLUTION INTRAVENOUS
Status: DISCONTINUED | OUTPATIENT
Start: 2017-04-19 | End: 2017-04-20

## 2017-04-19 RX ORDER — HYDROMORPHONE HYDROCHLORIDE 1 MG/ML
0.5 INJECTION, SOLUTION INTRAMUSCULAR; INTRAVENOUS; SUBCUTANEOUS
Status: DISCONTINUED | OUTPATIENT
Start: 2017-04-19 | End: 2017-04-21 | Stop reason: HOSPADM

## 2017-04-19 RX ORDER — CARVEDILOL 3.12 MG/1
3.12 TABLET ORAL 2 TIMES DAILY
Status: DISCONTINUED | OUTPATIENT
Start: 2017-04-19 | End: 2017-04-21 | Stop reason: HOSPADM

## 2017-04-19 RX ADMIN — HYDROMORPHONE HYDROCHLORIDE 0.5 MG: 1 INJECTION, SOLUTION INTRAMUSCULAR; INTRAVENOUS; SUBCUTANEOUS at 04:04

## 2017-04-19 RX ADMIN — STANDARDIZED SENNA CONCENTRATE AND DOCUSATE SODIUM 1 TABLET: 8.6; 5 TABLET, FILM COATED ORAL at 08:04

## 2017-04-19 RX ADMIN — MEROPENEM AND SODIUM CHLORIDE 500 MG: 500 INJECTION, SOLUTION INTRAVENOUS at 12:04

## 2017-04-19 RX ADMIN — PANTOPRAZOLE SODIUM 40 MG: 40 INJECTION, POWDER, FOR SOLUTION INTRAVENOUS at 08:04

## 2017-04-19 RX ADMIN — LORAZEPAM 0.5 MG: 0.5 TABLET ORAL at 03:04

## 2017-04-19 RX ADMIN — DIPHENHYDRAMINE HYDROCHLORIDE 25 MG: 50 INJECTION, SOLUTION INTRAMUSCULAR; INTRAVENOUS at 05:04

## 2017-04-19 RX ADMIN — METOPROLOL SUCCINATE 25 MG: 25 TABLET, EXTENDED RELEASE ORAL at 12:04

## 2017-04-19 RX ADMIN — MOXIFLOXACIN HYDROCHLORIDE 400 MG: 400 INJECTION, SOLUTION INTRAVENOUS at 07:04

## 2017-04-19 RX ADMIN — CYCLOBENZAPRINE HYDROCHLORIDE 5 MG: 5 TABLET, FILM COATED ORAL at 09:04

## 2017-04-19 NOTE — PROGRESS NOTES
[facility] Hospital Medicine  Progress Note    Patient Name: Wang Kebede  MRN: [id number]  Patient Class: IP- Inpatient   Admission Date: 4/14/2017  Length of Stay: 4 days  Attending Physician: Mara Cabello MD  Primary Care Provider: Primary Doctor No        Subjective:     Principal Problem:Sepsis    HPI:  Ms. Kebede is a 30 y/o AA female with h/o HIV, cervical cancer, s/p hysterectomy last week by , presents to the ED last night c/o generalized weakness, abdominal pain and SOB. CXR and VQ scan were unremarkable. CT chest could not be done due to contrast allergy. Patient continues to have high fever upto 102.1, tachycardia. Admitted for sepsis of likely intra-abdominal etiology, however CT abdomen/pelvis not done due to contrast allergy. Patient is very tender in the abdomen to attempt ultrasound. Started empirically on IV antibiotics for sepsis.    Hospital Course:  Presented with acute abdomen approximately 3 days after robotic assisted laparoscopic hysterectomy.  Severe sepsis progressed to septic shock.  CT abdomen/pelvis had to be done without contrast.  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  CT was unrevealing.  OB/GYN and general surgery evaluated the patient and were concerned for post-op complication but the evidence was dubious.  They recommended a consult to gastroenterology for a diagnostic colonoscopy given the uncertainty of her source of abdominal pain and diarrhea.  She developed abdominal pain shortly after the procedure and had watery diarrhea the day before admission.  Colonoscopy did not show evidence of colitis so the surgical team took her to the operating room and found a small segment of dehiscence to the vaginal cuff.  There was very little evidence of inflammation or contamination in the abdomen or pelvis.  She continued to have high fevers but her blood pressure and heart rate improved  partially post-op.  She was successfully extubated 16 April but continued to require Vasopressin for blood pressure support and her labwork showed acute kidney injury.  She had evidence of a metabolic acidosis but did not have an elevated lactic acid on multiple measurements nor did she have a leukocytosis proportionate to the severity of her symptoms.  Optimized broad spectrum antibiotics and added anti-fungal.  Her CD4 count has been in steady decline over the last year while under treatment.  Three weeks ago it was 53.    Interval History: overall patient is feeling better. She is complaining of muscle spasms of her back.    Review of Systems   Constitutional: Positive for fatigue.   HENT: Negative.    Respiratory: Negative.    Gastrointestinal: Negative.    Genitourinary: Negative.    Musculoskeletal: Positive for back pain.   Skin: Negative.      Objective:     Vital Signs (Most Recent):  Temp: 98.7 °F (37.1 °C) (04/19/17 0730)  Pulse: (!) 114 (04/19/17 0730)  Resp: 20 (04/19/17 0730)  BP: (!) 129/94 (04/19/17 0730)  SpO2: (!) 94 % (04/19/17 0730) Vital Signs (24h Range):  Temp:  [98 °F (36.7 °C)-98.8 °F (37.1 °C)] 98.7 °F (37.1 °C)  Pulse:  [100-125] 114  Resp:  [18-20] 20  SpO2:  [94 %-100 %] 94 %  BP: (123-132)/(77-94) 129/94     Weight: 49.9 kg (110 lb)  Body mass index is 21.48 kg/(m^2).    Intake/Output Summary (Last 24 hours) at 04/19/17 0853  Last data filed at 04/19/17 0736   Gross per 24 hour   Intake             1930 ml   Output              750 ml   Net             1180 ml      Physical Exam   HENT:   Head: Normocephalic and atraumatic.   Nose: Nose normal.   Mouth/Throat: Oropharynx is clear and moist.   Eyes: Conjunctivae and EOM are normal. Pupils are equal, round, and reactive to light.   Neck: Normal range of motion. Neck supple.   Cardiovascular: Regular rhythm and normal heart sounds.  Tachycardia present.    Pulmonary/Chest: Effort normal and breath sounds normal.   Abdominal: Soft. Bowel  sounds are normal.   Musculoskeletal: Normal range of motion.   Neurological: She is alert.   Skin: Skin is warm and dry.       Significant Labs:   BMP:   Recent Labs  Lab 04/19/17  0511   GLU 63*      K 3.6      CO2 21*   BUN 31*   CREATININE 2.3*   CALCIUM 7.6*     CBC:   Recent Labs  Lab 04/18/17  0520 04/19/17  0511   WBC 3.12* 2.18*   HGB 8.4* 8.1*   HCT 24.8* 24.3*   PLT 52* 54*       Significant Imaging: I have reviewed and interpreted all pertinent imaging results/findings within the past 24 hours.    Assessment/Plan:      * Sepsis  Resolved, BC are negative, agree with stopping IV abx and oral Avelox      HIV (human immunodeficiency virus infection)  Resume HIV medications when appropriate.  Infectious disease familiar with the patient and aware.    Seen by ID and appreciate his recommendations. Resume her HAART medications    Symptomatic anemia  She is experiencing SANTOS and fatigue. Also she is tachycardia so I will transfuse her 1 unit of PRBC, repeat H/H      Thrombocytopenia  Likely related to her HIV      VTE Risk Mitigation         Ordered     Medium Risk of VTE  Once      04/16/17 0322     Place sequential compression device  Until discontinued      04/15/17 2052          Mara Cabello MD  Department of Hospital Medicine   [facility]

## 2017-04-19 NOTE — PROGRESS NOTES
[facility]   General Surgery  Progress Note    Subjective:     History of Present Illness:  This is 31 year old  female who has AIDS, and had robotic assisted hysterectomy for cervical dysplasia four days ago. She began to notice worsening abdominal pain on the evening of the surgery. She became so sick and was brought to the ED for further evaluation.    Post-Op Info:  Procedure(s) (LRB):  EXPLORATORY-LAPAROTOMY (N/A)  REPAIR-VAGINAL CUFF (N/A)   3 Days Post-Op     Interval History: c/o fatigue, SANTOS    Medications:  Continuous Infusions:   Scheduled Meds:   moxifloxacin  400 mg Intravenous Q24H    pantoprazole  40 mg Intravenous Daily    senna-docusate 8.6-50 mg  1 tablet Oral BID     PRN Meds:sodium chloride, acetaminophen, bisacodyl, cloNIDine, cyclobenzaprine, diphenhydrAMINE, hydrocodone-acetaminophen 5-325mg, HYDROmorphone, lactulose, ondansetron, promethazine (PHENERGAN) IVPB, ramelteon     Review of patient's allergies indicates:   Allergen Reactions    Iodine and iodide containing products Anaphylaxis     Pt states she coded last time she was administered contrast    Bactrim [sulfamethoxazole-trimethoprim] Hives    Morphine Itching     Objective:     Vital Signs (Most Recent):  Temp: 98.7 °F (37.1 °C) (04/19/17 0730)  Pulse: (!) 114 (04/19/17 0730)  Resp: 20 (04/19/17 0730)  BP: (!) 129/94 (04/19/17 0730)  SpO2: (!) 94 % (04/19/17 0730) Vital Signs (24h Range):  Temp:  [98 °F (36.7 °C)-98.8 °F (37.1 °C)] 98.7 °F (37.1 °C)  Pulse:  [100-125] 114  Resp:  [18-20] 20  SpO2:  [94 %-100 %] 94 %  BP: (123-132)/(77-94) 129/94     Weight: 49.9 kg (110 lb)  Body mass index is 21.48 kg/(m^2).    Intake/Output - Last 3 Shifts       04/17 0700 - 04/18 0659 04/18 0700 - 04/19 0659 04/19 0700 - 04/20 0659    P.O. 200 1230 480    I.V. (mL/kg)       Blood       IV Piggyback  450 250    Total Intake(mL/kg) 200 (4.4) 1680 (33.7) 730 (14.6)    Urine (mL/kg/hr) 2901 (2.6) 750 (0.6) 300  (1.6)    Stool 0 (0)  0 (0)    Total Output 2901 750 300    Net -2701 +930 +430           Urine Occurrence 2 x      Stool Occurrence 2 x  0 x          Physical Exam   Constitutional: She appears well-developed and well-nourished.   HENT:   Head: Normocephalic and atraumatic.   Eyes: EOM are normal.   Cardiovascular: Normal rate and regular rhythm.    Pulmonary/Chest: Effort normal. No respiratory distress.   Abdominal: Soft. Bowel sounds are normal. She exhibits distension (softly). There is tenderness (incisional). There is no rebound and no guarding.   Dressing is c/d/i   Musculoskeletal: Normal range of motion.   Skin: Skin is warm and dry.   Psychiatric: She has a normal mood and affect. Thought content normal.   Vitals reviewed.      Significant Labs:  CBC:   Recent Labs  Lab 04/19/17  0511   WBC 2.18*   RBC 2.87*   HGB 8.1*   HCT 24.3*   PLT 54*   MCV 85   MCH 28.2   MCHC 33.3     CMP:   Recent Labs  Lab 04/19/17  0511   GLU 63*   CALCIUM 7.6*   ALBUMIN 2.1*   PROT 7.1      K 3.6   CO2 21*      BUN 31*   CREATININE 2.3*   ALKPHOS 206*   ALT 23   AST 66*   BILITOT 0.4       Significant Diagnostics:  NA    Assessment/Plan:     S/P exploratory laparotomy  S/p ex lap with vaginal cuff repair for peritonitis. Pt is improving. Pain controlled. Continue care per primary team.       Kerry Stevens PA-C  General Surgery  [facility]

## 2017-04-19 NOTE — PLAN OF CARE
Problem: Patient Care Overview  Goal: Plan of Care Review  Outcome: Ongoing (interventions implemented as appropriate)  FAIR TOLERANCE TO P.T. THIS VISIT, VERY WEAK, LOW H/H, PT TO RECEIVE BLOOD TRANSFUSION PER NURSE JM, TF TO CHAIR ONLY PER NURSE

## 2017-04-19 NOTE — ASSESSMENT & PLAN NOTE
S/p ex lap with vaginal cuff repair for peritonitis. Pt is improving. Pain controlled. Continue care per primary team.

## 2017-04-19 NOTE — ASSESSMENT & PLAN NOTE
Now on micafungin, meropenem and vancomycin .  All cultures are negative-will switch to Avelox in am .

## 2017-04-19 NOTE — PROGRESS NOTES
Pharmacy recommended pt supply home HIV meds. Pt informed and stated will have to wait until mom has lunch break at 1400 to see if she can bring them.

## 2017-04-19 NOTE — PLAN OF CARE
Problem: Patient Care Overview  Goal: Plan of Care Review  Outcome: Ongoing (interventions implemented as appropriate)  Pt remains free of falls and free of injury this shift. Has remained calm and cooperative throughout shift. IV antibiotics and pain medications administered as directed. Family and friends at bedside off and on throughout shift - no medical information discussed in front of anyone. Will continue to monitor

## 2017-04-19 NOTE — SUBJECTIVE & OBJECTIVE
Interval History: c/o fatigue, SANTOS    Medications:  Continuous Infusions:   Scheduled Meds:   moxifloxacin  400 mg Intravenous Q24H    pantoprazole  40 mg Intravenous Daily    senna-docusate 8.6-50 mg  1 tablet Oral BID     PRN Meds:sodium chloride, acetaminophen, bisacodyl, cloNIDine, cyclobenzaprine, diphenhydrAMINE, hydrocodone-acetaminophen 5-325mg, HYDROmorphone, lactulose, ondansetron, promethazine (PHENERGAN) IVPB, ramelteon     Review of patient's allergies indicates:   Allergen Reactions    Iodine and iodide containing products Anaphylaxis     Pt states she coded last time she was administered contrast    Bactrim [sulfamethoxazole-trimethoprim] Hives    Morphine Itching     Objective:     Vital Signs (Most Recent):  Temp: 98.7 °F (37.1 °C) (04/19/17 0730)  Pulse: (!) 114 (04/19/17 0730)  Resp: 20 (04/19/17 0730)  BP: (!) 129/94 (04/19/17 0730)  SpO2: (!) 94 % (04/19/17 0730) Vital Signs (24h Range):  Temp:  [98 °F (36.7 °C)-98.8 °F (37.1 °C)] 98.7 °F (37.1 °C)  Pulse:  [100-125] 114  Resp:  [18-20] 20  SpO2:  [94 %-100 %] 94 %  BP: (123-132)/(77-94) 129/94     Weight: 49.9 kg (110 lb)  Body mass index is 21.48 kg/(m^2).    Intake/Output - Last 3 Shifts       04/17 0700 - 04/18 0659 04/18 0700 - 04/19 0659 04/19 0700 - 04/20 0659    P.O. 200 1230 480    I.V. (mL/kg)       Blood       IV Piggyback  450 250    Total Intake(mL/kg) 200 (4.4) 1680 (33.7) 730 (14.6)    Urine (mL/kg/hr) 2901 (2.6) 750 (0.6) 300 (1.6)    Stool 0 (0)  0 (0)    Total Output 2901 750 300    Net -2701 +930 +430           Urine Occurrence 2 x      Stool Occurrence 2 x  0 x          Physical Exam   Constitutional: She appears well-developed and well-nourished.   HENT:   Head: Normocephalic and atraumatic.   Eyes: EOM are normal.   Cardiovascular: Normal rate and regular rhythm.    Pulmonary/Chest: Effort normal. No respiratory distress.   Abdominal: Soft. Bowel sounds are normal. She exhibits distension (softly). There is tenderness  (incisional). There is no rebound and no guarding.   Dressing is c/d/i   Musculoskeletal: Normal range of motion.   Skin: Skin is warm and dry.   Psychiatric: She has a normal mood and affect. Thought content normal.   Vitals reviewed.      Significant Labs:  CBC:   Recent Labs  Lab 04/19/17  0511   WBC 2.18*   RBC 2.87*   HGB 8.1*   HCT 24.3*   PLT 54*   MCV 85   MCH 28.2   MCHC 33.3     CMP:   Recent Labs  Lab 04/19/17  0511   GLU 63*   CALCIUM 7.6*   ALBUMIN 2.1*   PROT 7.1      K 3.6   CO2 21*      BUN 31*   CREATININE 2.3*   ALKPHOS 206*   ALT 23   AST 66*   BILITOT 0.4       Significant Diagnostics:  NA
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
## 2017-04-19 NOTE — ASSESSMENT & PLAN NOTE
She is experiencing SANTOS and fatigue. Also she is tachycardia so I will transfuse her 1 unit of PRBC, repeat H/H

## 2017-04-19 NOTE — PLAN OF CARE
Reviewed PT's recommendation regarding next level of care/post-hospitalization and HME needs for patient.  PT recommending inpatient rehab vs. HH for patient, rolling walker, and transfer tub bench.     Met with patient and patient's cousin in room and discussed above.  Patient stating that she does not want to go inpatient for therapy and elects HH services to be arranged as well as any HME needs to be arranged.  Discussed with patient and family that a rolling walker is covered under insurance and can be arranged for patient; however, patient will need to purchase transfer tub bench as it is not an insurance-covered item.  Informed patient and family where ([facility] or [employer]) transfer tub bench may be obtained.  Patient Preference Form signed reflecting HH preference as [facility] and [facility]; and signed form placed in patient's blue folder.    Requested and obtained from NP for hospitalist order for HH SN, PT, OT and rolling walker.  Contacted Kelly with [facility] who indicates that rolling walker can be pulled from [facility].  Rolling walker to obtained from [facility] closet and delivered to patient's room.  Contacted Eleanor with [facility], informed her of HH services (SN, PT, OT) being ordered and that patient is not being discharged today.  Eleanor indicates that [facility] will be able to accept patient for HH services post-hospitalization.  Faxed patient information to Eleanor via [facility] to initiate HH referral.

## 2017-04-19 NOTE — PLAN OF CARE
"Problem: Patient Care Overview  Goal: Plan of Care Review  Outcome: Ongoing (interventions implemented as appropriate)  POC discussed w/patient, verbalized understanding. ST on monitor. VSS. Voids per BRP. Pt c/o of pain in her back Dilaudid administered by VAUGHN Luther. Pt refused stool softener.  Patient turns independently in bed. Pt states, "she feel weaker than she did before."  Will monitor. Fall precautions in place, bed alarm on.      "

## 2017-04-19 NOTE — ASSESSMENT & PLAN NOTE
Resume HIV medications when appropriate.  Infectious disease familiar with the patient and aware.    Seen by ID and appreciate his recommendations. Resume her HAART medications
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
## 2017-04-19 NOTE — PROGRESS NOTES
Nephrology Progress Note    Admit Date: 4/14/2017   LOS: 4 days     SUBJECTIVE:     Follow-up For:  Pt seen and chart reviewed, no acute events, denies complaints , feels better ,     Scheduled Meds:   moxifloxacin  400 mg Intravenous Q24H    pantoprazole  40 mg Intravenous Daily    senna-docusate 8.6-50 mg  1 tablet Oral BID     Continuous Infusions:   PRN Meds:sodium chloride, acetaminophen, bisacodyl, cloNIDine, cyclobenzaprine, diphenhydrAMINE, hydrocodone-acetaminophen 5-325mg, HYDROmorphone, lactulose, ondansetron, promethazine (PHENERGAN) IVPB, ramelteon    Review of patient's allergies indicates:   Allergen Reactions    Iodine and iodide containing products Anaphylaxis     Pt states she coded last time she was administered contrast    Bactrim [sulfamethoxazole-trimethoprim] Hives    Morphine Itching       Review of Systems:    Constitutional: positive for fatigue  Eyes: negative  Ears, nose, mouth, throat, and face: negative  Respiratory: negative  Cardiovascular: negative  Gastrointestinal: negative  Genitourinary:negative  Hematologic/lymphatic: negative  Musculoskeletal:negative  Neurological: negative    OBJECTIVE:     Vital Signs (Most Recent)  Temp: 98.3 °F (36.8 °C) (04/19/17 1130)  Pulse: (!) 114 (04/19/17 1130)  Resp: 20 (04/19/17 1130)  BP: (!) 129/95 (04/19/17 1130)  SpO2: 96 % (04/19/17 1130)    Vital Signs Range (Last 24H):  Temp:  [98 °F (36.7 °C)-98.8 °F (37.1 °C)]   Pulse:  [100-125]   Resp:  [18-20]   BP: (123-132)/(77-97)   SpO2:  [94 %-100 %]     I & O (Last 24H):    Intake/Output Summary (Last 24 hours) at 04/19/17 1210  Last data filed at 04/19/17 0900   Gross per 24 hour   Intake             2320 ml   Output             1050 ml   Net             1270 ml     Physical Exam:    General: well developed, well nourished, fatigued, no distress  Eyes: conjunctivae/corneas clear.   HENT: Head:normocephalic, atraumatic. Ears:bilateral TM's and external ear canals normal. Nose: no discharge.  Throat: no throat erythema.  Neck: supple, symmetrical, trachea midline, no JVD and thyroid not enlarged, symmetric, no tenderness/mass/nodules  Lungs:  clear to auscultation bilaterally and normal respiratory effort  Cardiovascular: Heart: regular, tachycardia  . Chest Wall: no tenderness. Extremities: no cyanosis or edema, or clubbing. Pulses: 2+ and symmetric.  Abdomen/Rectal: Abdomen: soft, non-tender non-distented; bowel sounds normal; no masses,  no organomegaly.  Skin: Skin color, texture, turgor normal. No rashes or lesions  Musculoskeletal:no clubbing, cyanosis  Neurologic: Normal strength and tone. No focal numbness or weakness    Laboratory:    CBC:     Recent Labs  Lab 04/19/17  0511   WBC 2.18*   RBC 2.87*   HGB 8.1*   HCT 24.3*   PLT 54*   MCV 85   MCH 28.2   MCHC 33.3     BMP:     Recent Labs  Lab 04/17/17  0350  04/19/17  0511   GLU 90  < > 63*     < > 136   K 4.1  < > 3.6     < > 107   CO2 21*  < > 21*   BUN 21*  < > 31*   CREATININE 2.0*  < > 2.3*   CALCIUM 6.8*  < > 7.6*   MG 2.4  --   --    < > = values in this interval not displayed.  CMP:     Recent Labs  Lab 04/19/17  0511   GLU 63*   CALCIUM 7.6*   ALBUMIN 2.1*   PROT 7.1      K 3.6   CO2 21*      BUN 31*   CREATININE 2.3*   ALKPHOS 206*   ALT 23   AST 66*   BILITOT 0.4     LFTs:     Recent Labs  Lab 04/19/17  0511   ALT 23   AST 66*   ALKPHOS 206*   BILITOT 0.4   PROT 7.1   ALBUMIN 2.1*     Lab Results   Component Value Date    CALCIUM 7.6 (L) 04/19/2017    PHOS 3.3 04/17/2017       Lab Results   Component Value Date    ALBUMIN 2.1 (L) 04/19/2017         Diagnostic Results: reviewed     ASSESSMENT/PLAN:       31 Y Old AA woman seen in f/u for following medical problems,     1. LELAND : likely due to hemodynamic reasons from sepsis and anemia, good UO noted, will follow renal function, Cr better at 2.3 today from 2.6 yesterday ( Cr normal a month ago )      Cont supportive care, avoid nephrotoxic medications,     2.  Anemia : s/p pRBC transfusion , follow Hb ,     3. Hypokalemia : repleted ,    4. Sepsis : unclear source , on broad spectrum abx ,     5. HIV / AIDS : ID following ,     6. Corrected calcium normal ,     7. HTN : add Low dose metoprolol ,     Will follow, Total time spent 40 minutes including time needed to review the records,  patient  evaluation, documentation, face-to-face discussion with the patient, primary team ,   more than 50% of the time was spent on coordination of care and counseling.     Marlon Monterroso MD

## 2017-04-19 NOTE — SUBJECTIVE & OBJECTIVE
Interval History: overall patient is feeling better. She is complaining of muscle spasms of her back.    Review of Systems   Constitutional: Positive for fatigue.   HENT: Negative.    Respiratory: Negative.    Gastrointestinal: Negative.    Genitourinary: Negative.    Musculoskeletal: Positive for back pain.   Skin: Negative.      Objective:     Vital Signs (Most Recent):  Temp: 98.7 °F (37.1 °C) (04/19/17 0730)  Pulse: (!) 114 (04/19/17 0730)  Resp: 20 (04/19/17 0730)  BP: (!) 129/94 (04/19/17 0730)  SpO2: (!) 94 % (04/19/17 0730) Vital Signs (24h Range):  Temp:  [98 °F (36.7 °C)-98.8 °F (37.1 °C)] 98.7 °F (37.1 °C)  Pulse:  [100-125] 114  Resp:  [18-20] 20  SpO2:  [94 %-100 %] 94 %  BP: (123-132)/(77-94) 129/94     Weight: 49.9 kg (110 lb)  Body mass index is 21.48 kg/(m^2).    Intake/Output Summary (Last 24 hours) at 04/19/17 0853  Last data filed at 04/19/17 0736   Gross per 24 hour   Intake             1930 ml   Output              750 ml   Net             1180 ml      Physical Exam   HENT:   Head: Normocephalic and atraumatic.   Nose: Nose normal.   Mouth/Throat: Oropharynx is clear and moist.   Eyes: Conjunctivae and EOM are normal. Pupils are equal, round, and reactive to light.   Neck: Normal range of motion. Neck supple.   Cardiovascular: Regular rhythm and normal heart sounds.  Tachycardia present.    Pulmonary/Chest: Effort normal and breath sounds normal.   Abdominal: Soft. Bowel sounds are normal.   Musculoskeletal: Normal range of motion.   Neurological: She is alert.   Skin: Skin is warm and dry.       Significant Labs:   BMP:   Recent Labs  Lab 04/19/17  0511   GLU 63*      K 3.6      CO2 21*   BUN 31*   CREATININE 2.3*   CALCIUM 7.6*     CBC:   Recent Labs  Lab 04/18/17  0520 04/19/17  0511   WBC 3.12* 2.18*   HGB 8.4* 8.1*   HCT 24.8* 24.3*   PLT 52* 54*       Significant Imaging: I have reviewed and interpreted all pertinent imaging results/findings within the past 24 hours.

## 2017-04-19 NOTE — ASSESSMENT & PLAN NOTE
She has poor immunologic and virologic control related to abdominal issues.  Will review her options with her including possible PEG tube placement .          Ref Range & Units 4wk ago   7mo ago   9mo ago      CD4 % [place] T Cell 28 - 57 % 8.6 (L) 12.1 (L) 11.3 (L)    Absolute CD4 300 - 1400 cells/ul 53 (L) 78 (L) (L)CM          Ref Range & Units 4wk ago   7mo ago   9mo ago       HIV-1 RNA Not detected DETECTED (A) DETECTED (A) DETECTED (A)    HIV 1 RNA Ultra <40 Copies/mL 66,896 (H) 3,205 (H) 11,632 (H)    Log HIV Copies/mL <1.60 Log (10) Copies/mL 4.83 (H) 3.51 (H) 4.07 (H)         Her trend of CD4 counts over the last 9 months has consistently been below 200.  Her ID physician is Dr Vides .Will restart meds when able to tolerate . She is on    darunavir-cobicistat (PREZCOBIX) 800-150 mg-mg Tab Take 800 mg by mouth every evening.     emtricitabine-tenofovir alafen (DESCOVY) 200-25 mg Tab Take 200 each by mouth every evening.    Will need HIV genotype testing .    Will need zithromax and bactrim for OI prophylaxis.

## 2017-04-19 NOTE — PT/OT/SLP PROGRESS
Physical Therapy  Treatment    Wang Kebede   MRN: [id number]   Admitting Diagnosis: Sepsis    PT Received On: 17  PT Start Time: 925     PT Stop Time: 50    PT Total Time (min): 25 min       Billable Minutes:  Therapeutic Activity 15 and Therapeutic Exercise 10    Treatment Type: Treatment  PT/PTA: PT         General Precautions: Standard, fall  Orthopedic Precautions: N/A   Braces: N/A    Subjective:  Communicated with NURSE FARRAND prior to session.  Pain Ratin/10  Location: abdomen    Objective:   Patient found with: bowel management system, navarrete catheter, peripheral IV, telemetry    Functional Mobility:  Bed Mobility:   Rolling/Turning to Left: Minimum assistance  Rolling/Turning Right: Minimum assistance  Scooting/Bridging: Minimum Assistance  Supine to Sit: Minimum Assistance    Transfers:  Sit <> Stand Assistance: Minimum Assistance  Sit <> Stand Assistive Device: No Assistive Device  Bed <> Chair Technique: Stand Pivot  Bed <> Chair Assistance: Minimum Assistance  Bed <> Chair Assistive Device: No Assistive Device  Toilet Transfer Technique: Stand Pivot  Toilet Transfer Assistance: Contact Guard Assistance  Toilet Transfer Assistive Device: No Assistive Device    Gait:   Gait Distance: PT AMB 8' X 2 TRIALS WITHOUT AD TO AND FROM BATHROOM  Assistance 1: Minimum assistance  Gait Assistive Device: No device, Hand held assist  Gait Pattern: swing-through gait  Gait Deviation(s): decreased alba, decreased step length    Balance:   Static Sit: FAIR  Dynamic Sit: POOR+  Static Stand: POOR+  Dynamic stand:  POOR+    Therapeutic Activities and Exercises:  PT REQUIRED NO ASSISTANCE FOR CLEANING DURING TOILETING, PT CLEANED HANDS WITH SET UP, PT SET UP FOR BREAKFAST    Patient left up in chair with all lines intact, call button in reach and NURSE notified.    Assessment:  Wang Kebede is a 31 y.o. female with a medical diagnosis of Sepsis   PT WILL BENEFIT FROM CONT. SKILLED  P.T. TO ADDRESS IMPAIRMENTS    Rehab identified problem list/impairments: Rehab identified problem list/impairments: weakness, impaired endurance, impaired functional mobilty, gait instability, impaired balance, decreased safety awareness, impaired coordination, pain    Rehab potential is good.    Activity tolerance: Good    Discharge recommendations: Discharge Facility/Level Of Care Needs: rehabilitation facility, home health PT (DEPENDING ON PROGRESS WITH POC)     Barriers to discharge: Barriers to Discharge: Decreased caregiver support    Equipment recommendations: Equipment Needed After Discharge: bath bench, walker, rolling     GOALS:   Physical Therapy Goals        Problem: Physical Therapy Goal    Goal Priority Disciplines Outcome Goal Variances Interventions   Physical Therapy Goal     PT/OT, PT      Description:  LTG'S TO BE MET IN 7 DAYS (4-25-17)  1. PT WILL BE ANKUR WITH BED MOBILITY  2. PT WILL REQUIRE SPV FOR TF'S  3. PT WILL ' WITH RW AND SPV  4. PT WILL DEMO G DYNAMIC BALANCE DURING GAIT  5. PT WILL TOLERATE BLE THEREX X 20 REPS            PLAN:    Patient to be seen 5 x/week  to address the above listed problems via gait training, therapeutic activities, therapeutic exercises  Plan of Care expires: 04/25/17  Plan of Care reviewed with: patient    PT ENCOURAGED TO CALL FOR ASSISTANCE WITH ALL NEEDS DUE TO FALL RISK STATUS, PT AGREEABLE.  PT ENCOURAGED TO INCREASE TIME OOB IN CHAIR, PT AGREEABLE    Deana Cole, PT  04/19/2017
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
## 2017-04-20 LAB
ALBUMIN SERPL BCP-MCNC: 2.1 G/DL
ALP SERPL-CCNC: 192 U/L
ALT SERPL W/O P-5'-P-CCNC: 19 U/L
ANION GAP SERPL CALC-SCNC: 6 MMOL/L
ARUP MISCELLANEOUS TEST 1: NORMAL
AST SERPL-CCNC: 53 U/L
BACTERIA BLD CULT: NORMAL
BACTERIA BLD CULT: NORMAL
BASOPHILS # BLD AUTO: 0.14 K/UL
BASOPHILS NFR BLD: 4.7 %
BILIRUB SERPL-MCNC: 0.4 MG/DL
BUN SERPL-MCNC: 27 MG/DL
CALCIUM SERPL-MCNC: 7.6 MG/DL
CHLORIDE SERPL-SCNC: 110 MMOL/L
CMV IGG SERPL QL IA: NORMAL
CO2 SERPL-SCNC: 24 MMOL/L
CREAT SERPL-MCNC: 1.9 MG/DL
DIFFERENTIAL METHOD: ABNORMAL
EOSINOPHIL # BLD AUTO: 0.1 K/UL
EOSINOPHIL NFR BLD: 3 %
ERYTHROCYTE [DISTWIDTH] IN BLOOD BY AUTOMATED COUNT: 17.3 %
EST. GFR  (AFRICAN AMERICAN): 40 ML/MIN/1.73 M^2
EST. GFR  (NON AFRICAN AMERICAN): 34 ML/MIN/1.73 M^2
GLUCOSE SERPL-MCNC: 65 MG/DL
HCT VFR BLD AUTO: 26.3 %
HGB BLD-MCNC: 8.8 G/DL
LYMPHOCYTES # BLD AUTO: 1.9 K/UL
LYMPHOCYTES NFR BLD: 64.9 %
MCH RBC QN AUTO: 28.5 PG
MCHC RBC AUTO-ENTMCNC: 33.5 %
MCV RBC AUTO: 85 FL
MONOCYTES # BLD AUTO: 0.3 K/UL
MONOCYTES NFR BLD: 10.8 %
NEUTROPHILS # BLD AUTO: 0.5 K/UL
NEUTROPHILS NFR BLD: 17.3 %
PLATELET # BLD AUTO: 47 K/UL
PLATELET BLD QL SMEAR: ABNORMAL
PMV BLD AUTO: 10.6 FL
POTASSIUM SERPL-SCNC: 3.4 MMOL/L
PROT SERPL-MCNC: 7.2 G/DL
RBC # BLD AUTO: 3.09 M/UL
SODIUM SERPL-SCNC: 140 MMOL/L
WBC # BLD AUTO: 2.96 K/UL

## 2017-04-20 PROCEDURE — 97110 THERAPEUTIC EXERCISES: CPT

## 2017-04-20 PROCEDURE — 99233 SBSQ HOSP IP/OBS HIGH 50: CPT | Mod: ,,, | Performed by: INTERNAL MEDICINE

## 2017-04-20 PROCEDURE — 80053 COMPREHEN METABOLIC PANEL: CPT

## 2017-04-20 PROCEDURE — C9113 INJ PANTOPRAZOLE SODIUM, VIA: HCPCS | Performed by: SURGERY

## 2017-04-20 PROCEDURE — 21400001 HC TELEMETRY ROOM

## 2017-04-20 PROCEDURE — 63600175 PHARM REV CODE 636 W HCPCS: Performed by: SURGERY

## 2017-04-20 PROCEDURE — 25000003 PHARM REV CODE 250: Performed by: SURGERY

## 2017-04-20 PROCEDURE — 25000003 PHARM REV CODE 250: Performed by: INTERNAL MEDICINE

## 2017-04-20 PROCEDURE — 94664 DEMO&/EVAL PT USE INHALER: CPT

## 2017-04-20 PROCEDURE — 63600175 PHARM REV CODE 636 W HCPCS: Performed by: INTERNAL MEDICINE

## 2017-04-20 PROCEDURE — 85025 COMPLETE CBC W/AUTO DIFF WBC: CPT

## 2017-04-20 RX ORDER — MOXIFLOXACIN HYDROCHLORIDE 400 MG/1
400 TABLET ORAL DAILY
Status: DISCONTINUED | OUTPATIENT
Start: 2017-04-21 | End: 2017-04-21 | Stop reason: HOSPADM

## 2017-04-20 RX ORDER — AZITHROMYCIN 250 MG/1
1200 TABLET, FILM COATED ORAL WEEKLY
Status: DISCONTINUED | OUTPATIENT
Start: 2017-04-20 | End: 2017-04-21 | Stop reason: HOSPADM

## 2017-04-20 RX ORDER — POTASSIUM CHLORIDE 20 MEQ/1
40 TABLET, EXTENDED RELEASE ORAL 2 TIMES DAILY
Status: DISCONTINUED | OUTPATIENT
Start: 2017-04-20 | End: 2017-04-21 | Stop reason: HOSPADM

## 2017-04-20 RX ADMIN — MOXIFLOXACIN HYDROCHLORIDE 400 MG: 400 INJECTION, SOLUTION INTRAVENOUS at 08:04

## 2017-04-20 RX ADMIN — PANTOPRAZOLE SODIUM 40 MG: 40 INJECTION, POWDER, FOR SOLUTION INTRAVENOUS at 08:04

## 2017-04-20 RX ADMIN — HYDROCODONE BITARTRATE AND ACETAMINOPHEN 1 TABLET: 5; 325 TABLET ORAL at 10:04

## 2017-04-20 RX ADMIN — AZITHROMYCIN 1250 MG: 250 TABLET, FILM COATED ORAL at 05:04

## 2017-04-20 NOTE — PROGRESS NOTES
Nephrology Progress Note    Admit Date: 4/14/2017   LOS: 5 days     SUBJECTIVE:     Follow-up For:  Pt seen and chart reviewed, no acute events, denies complaints , feels better , low back pain better ,     Scheduled Meds:   carvedilol  3.125 mg Oral BID    moxifloxacin  400 mg Intravenous Q24H    pantoprazole  40 mg Intravenous Daily    senna-docusate 8.6-50 mg  1 tablet Oral BID     Continuous Infusions:   PRN Meds:sodium chloride, acetaminophen, bisacodyl, cloNIDine, cyclobenzaprine, diphenhydrAMINE, hydrocodone-acetaminophen 5-325mg, HYDROmorphone, lactulose, lorazepam, ondansetron, promethazine (PHENERGAN) IVPB, ramelteon    Review of patient's allergies indicates:   Allergen Reactions    Iodine and iodide containing products Anaphylaxis     Pt states she coded last time she was administered contrast    Bactrim [sulfamethoxazole-trimethoprim] Hives    Morphine Itching       Review of Systems:    Constitutional: positive for fatigue  Eyes: negative  Ears, nose, mouth, throat, and face: negative  Respiratory: negative  Cardiovascular: negative  Gastrointestinal: negative  Genitourinary:negative  Hematologic/lymphatic: negative  Musculoskeletal:negative  Neurological: negative    OBJECTIVE:     Vital Signs (Most Recent)  Temp: 98 °F (36.7 °C) (04/20/17 1204)  Pulse: 88 (04/20/17 1204)  Resp: 18 (04/20/17 1204)  BP: (!) 138/97 (04/20/17 1204)  SpO2: 99 % (04/20/17 1204)    Vital Signs Range (Last 24H):  Temp:  [97.7 °F (36.5 °C)-98.6 °F (37 °C)]   Pulse:  []   Resp:  [17-20]   BP: (128-140)/()   SpO2:  [96 %-100 %]     I & O (Last 24H):    Intake/Output Summary (Last 24 hours) at 04/20/17 1608  Last data filed at 04/20/17 0600   Gross per 24 hour   Intake              240 ml   Output              350 ml   Net             -110 ml     Physical Exam:    General: well developed, well nourished, fatigued, no distress  Eyes: conjunctivae/corneas clear.   HENT: Head:normocephalic, atraumatic.  Ears:bilateral TM's and external ear canals normal. Nose: no discharge. Throat: no throat erythema.  Neck: supple, symmetrical, trachea midline, no JVD and thyroid not enlarged, symmetric, no tenderness/mass/nodules  Lungs:  clear to auscultation bilaterally and normal respiratory effort  Cardiovascular: Heart: regular, tachycardia  . Chest Wall: no tenderness. Extremities: no cyanosis or edema, or clubbing. Pulses: 2+ and symmetric.  Abdomen/Rectal: Abdomen: soft, non-tender non-distented; bowel sounds normal; no masses,  no organomegaly. Dressings in place, mid-line incision ,   Skin: Skin color, texture, turgor normal. No rashes or lesions  Musculoskeletal:no clubbing, cyanosis  Neurologic: Normal strength and tone. No focal numbness or weakness    Laboratory:    CBC:     Recent Labs  Lab 04/20/17 0522   WBC 2.96*   RBC 3.09*   HGB 8.8*   HCT 26.3*   PLT 47*   MCV 85   MCH 28.5   MCHC 33.5     BMP:     Recent Labs  Lab 04/17/17  0350  04/20/17 0522   GLU 90  < > 65*     < > 140   K 4.1  < > 3.4*     < > 110   CO2 21*  < > 24   BUN 21*  < > 27*   CREATININE 2.0*  < > 1.9*   CALCIUM 6.8*  < > 7.6*   MG 2.4  --   --    < > = values in this interval not displayed.  CMP:     Recent Labs  Lab 04/20/17 0522   GLU 65*   CALCIUM 7.6*   ALBUMIN 2.1*   PROT 7.2      K 3.4*   CO2 24      BUN 27*   CREATININE 1.9*   ALKPHOS 192*   ALT 19   AST 53*   BILITOT 0.4     LFTs:     Recent Labs  Lab 04/20/17 0522   ALT 19   AST 53*   ALKPHOS 192*   BILITOT 0.4   PROT 7.2   ALBUMIN 2.1*     Lab Results   Component Value Date    CALCIUM 7.6 (L) 04/20/2017    PHOS 3.3 04/17/2017       Lab Results   Component Value Date    ALBUMIN 2.1 (L) 04/20/2017         Diagnostic Results: reviewed     ASSESSMENT/PLAN:       31 Y Old AA woman seen in f/u for following medical problems,     1. LELAND : likely due to hemodynamic reasons from sepsis and anemia, good UO noted, will follow renal function, Cr better at 1.9 today  from 2.3 yesterday ( Cr normal a month ago )      Cont supportive care, avoid nephrotoxic medications,     2. Anemia : s/p pRBC transfusion , follow Hb ,     3. Hypokalemia : will replete,     4. Sepsis : unclear source , on broad spectrum abx ,     5. HIV / AIDS : ID following ,     6. Corrected calcium normal ,     7. HTN : on Coreg , follow,     Will follow, Total time spent 40 minutes including time needed to review the records,  patient  evaluation, documentation, face-to-face discussion with the patient, primary team ,   more than 50% of the time was spent on coordination of care and counseling.     Marlon Monterroso MD

## 2017-04-20 NOTE — PLAN OF CARE
Problem: Patient Care Overview  Goal: Plan of Care Review  Outcome: Ongoing (interventions implemented as appropriate)  Patient has been doing well today with no complaints of pain from abdominal incisions. She has had a poor appetite however states that she dislikes the food. Patient has been out of the bed and up in the chair. She is tolerating activity well. No signs of infection noted at the incision sites.

## 2017-04-20 NOTE — SUBJECTIVE & OBJECTIVE
Interval History: 31 year old woman with history of AIds, s/p exp lap and hysterectomy.  She is afebrile.      Review of Systems   Constitutional: Negative for chills, diaphoresis, fatigue and fever.   HENT: Negative.  Negative for congestion, nosebleeds and sinus pressure.    Eyes: Negative.  Negative for photophobia, redness and visual disturbance.   Respiratory: Negative for cough, chest tightness, shortness of breath and wheezing.    Cardiovascular: Negative.  Negative for chest pain, palpitations and leg swelling.   Gastrointestinal: Negative for abdominal distention, diarrhea, nausea and vomiting.   Endocrine: Negative.  Negative for polydipsia, polyphagia and polyuria.   Genitourinary: Negative.  Negative for dysuria, flank pain, frequency and urgency.   Musculoskeletal: Negative.  Negative for back pain, joint swelling and neck stiffness.   Skin: Negative.  Negative for color change, pallor and rash.   Allergic/Immunologic: Negative.  Negative for environmental allergies, food allergies and immunocompromised state.   Neurological: Negative.  Negative for dizziness, syncope, speech difficulty, numbness and headaches.   Hematological: Negative.  Negative for adenopathy. Does not bruise/bleed easily.   Psychiatric/Behavioral: Negative for confusion, decreased concentration and hallucinations. The patient is not nervous/anxious.    All other systems reviewed and are negative.    Objective:     Vital Signs (Most Recent):  Temp: 98 °F (36.7 °C) (04/20/17 1204)  Pulse: 88 (04/20/17 1204)  Resp: 18 (04/20/17 1204)  BP: (!) 138/97 (04/20/17 1204)  SpO2: 99 % (04/20/17 1204) Vital Signs (24h Range):  Temp:  [97.7 °F (36.5 °C)-98.6 °F (37 °C)] 98 °F (36.7 °C)  Pulse:  [] 88  Resp:  [17-20] 18  SpO2:  [96 %-100 %] 99 %  BP: (128-140)/() 138/97     Weight: 53.1 kg (117 lb)  Body mass index is 22.85 kg/(m^2).    Estimated Creatinine Clearance: 30.5 mL/min (based on Cr of 1.9).    Physical Exam    Constitutional: She is oriented to person, place, and time. Vital signs are normal. She appears well-developed.   HENT:   Head: Normocephalic and atraumatic.   Eyes: Conjunctivae and EOM are normal. Pupils are equal, round, and reactive to light.   Neck: Normal range of motion. Neck supple. No thyroid mass and no thyromegaly present.   Cardiovascular: Normal rate, normal heart sounds and intact distal pulses.    Pulmonary/Chest: Effort normal and breath sounds normal. No accessory muscle usage. No respiratory distress.   Abdominal: Soft. Bowel sounds are normal. She exhibits distension. She exhibits no mass. There is tenderness.   Musculoskeletal: Normal range of motion.   Neurological: She is alert and oriented to person, place, and time.   Skin: Skin is intact. No rash noted.   Psychiatric: She has a normal mood and affect.   Nursing note and vitals reviewed.      Significant Labs:   Blood Culture:   Recent Labs  Lab 11/26/16  1445 11/26/16  1500 04/14/17  1856 04/14/17  1940 04/16/17  1615   LABBLOO No growth after 5 days. No growth after 5 days. No growth after 5 days. No growth after 5 days. No Growth to date  No Growth to date  No Growth to date  No Growth to date     BMP:   Recent Labs  Lab 04/20/17  0522   GLU 65*      K 3.4*      CO2 24   BUN 27*   CREATININE 1.9*   CALCIUM 7.6*     CBC:   Recent Labs  Lab 04/19/17  0511 04/20/17  0522   WBC 2.18* 2.96*   HGB 8.1* 8.8*   HCT 24.3* 26.3*   PLT 54* 47*       Significant Imaging: I have reviewed all pertinent imaging results/findings within the past 24 hours.

## 2017-04-20 NOTE — PLAN OF CARE
Problem: Patient Care Overview  Goal: Plan of Care Review  Outcome: Ongoing (interventions implemented as appropriate)  Patient refused carvedilol, and docusate sodium; /99. Patient has had an uneventful night and is resting quietly. Will continue to monitor.

## 2017-04-20 NOTE — ASSESSMENT & PLAN NOTE
Resolved, BC are negative, agree with stopping IV abx and oral Avelox    Resolved may be able to stop antibiotics as cultures have been negative and afebrile

## 2017-04-20 NOTE — PT/OT/SLP PROGRESS
Physical Therapy      Wang Deana Delio  MRN: [id number]    ATTEMPTED P.T. TX THIS AM, PT ADAMANTLY REFUSED TO C/O NAUSEA, WILL ASSESS PT NEXT VISIT    Deana Cole, PT   4/20/2017  1039

## 2017-04-20 NOTE — ASSESSMENT & PLAN NOTE
She is experiencing SANTOS and fatigue. Also she is tachycardia so I will transfuse her 1 unit of PRBC, repeat H/H    Better after transfusion as she likely has anemia of chronic disease secondary to HIV

## 2017-04-20 NOTE — PROGRESS NOTES
[facility]  Infectious Disease  Progress Note    Patient Name: Wang Kebede  MRN: [id number]  Admission Date: 4/14/2017  Length of Stay: 5 days  Attending Physician: Mara Cabello MD  Primary Care Provider: Primary Doctor No    Isolation Status: No active isolations  Assessment/Plan:      AIDS (acquired immunodeficiency syndrome), CD4 <=200  She has poor immunologic and virologic control related to abdominal issues.        Her trend of CD4 counts over the last 9 months has consistently been below 200.  Her ID physician is Dr Vides .Will restart meds when able to tolerate . She is on    darunavir-cobicistat (PREZCOBIX) 800-150 mg-mg Tab Take 800 mg by mouth every evening.     emtricitabine-tenofovir alafen (DESCOVY) 200-25 mg Tab Take 200 each by mouth every evening.    Will need zithromax and bactrim for OI prophylaxis.    We had an extensive discussion about the need to be compliant with HAART .  She complains of nausea with HAART.    Fever  Fever curve has improved.  On empiric Avelox    Thrombocytopenia  Likely HIV associated -will monitor closely .  Therapy will be HAART      Anticipated Disposition:     Thank you for your consult. I will follow-up with patient. Please contact us if you have any additional questions.    Hubert Mayo MD  Infectious Disease  [facility]    Subjective:     Principal Problem:Sepsis    HPI: 31 year with history of AIDS-cd4-53, HIV viral load -69520 (on 03/21) ,cervical cancer, s/p hysterectomy by  on 04/11 who presented with septic shock. Patient underwent colonoscopy and then exploratory laparotomy -04/16/2017. She was initially managed in the ICU. She developed febrile episodes -with T max of 102.She was unable to have CT scan of the abdomen done due to her history of contrast allergy -she had cardiac arrest in the past after contrast administration.  Lab data showed wbc of 2.18,hemoglobin -8.1. CMP- serum creatinine is  2.3.  Blood cultures are negative till date.        Interval History: 31 year old woman with history of AIds, s/p exp lap and hysterectomy.  She is afebrile.      Review of Systems   Constitutional: Negative for chills, diaphoresis, fatigue and fever.   HENT: Negative.  Negative for congestion, nosebleeds and sinus pressure.    Eyes: Negative.  Negative for photophobia, redness and visual disturbance.   Respiratory: Negative for cough, chest tightness, shortness of breath and wheezing.    Cardiovascular: Negative.  Negative for chest pain, palpitations and leg swelling.   Gastrointestinal: Negative for abdominal distention, diarrhea, nausea and vomiting.   Endocrine: Negative.  Negative for polydipsia, polyphagia and polyuria.   Genitourinary: Negative.  Negative for dysuria, flank pain, frequency and urgency.   Musculoskeletal: Negative.  Negative for back pain, joint swelling and neck stiffness.   Skin: Negative.  Negative for color change, pallor and rash.   Allergic/Immunologic: Negative.  Negative for environmental allergies, food allergies and immunocompromised state.   Neurological: Negative.  Negative for dizziness, syncope, speech difficulty, numbness and headaches.   Hematological: Negative.  Negative for adenopathy. Does not bruise/bleed easily.   Psychiatric/Behavioral: Negative for confusion, decreased concentration and hallucinations. The patient is not nervous/anxious.    All other systems reviewed and are negative.    Objective:     Vital Signs (Most Recent):  Temp: 98 °F (36.7 °C) (04/20/17 1204)  Pulse: 88 (04/20/17 1204)  Resp: 18 (04/20/17 1204)  BP: (!) 138/97 (04/20/17 1204)  SpO2: 99 % (04/20/17 1204) Vital Signs (24h Range):  Temp:  [97.7 °F (36.5 °C)-98.6 °F (37 °C)] 98 °F (36.7 °C)  Pulse:  [] 88  Resp:  [17-20] 18  SpO2:  [96 %-100 %] 99 %  BP: (128-140)/() 138/97     Weight: 53.1 kg (117 lb)  Body mass index is 22.85 kg/(m^2).    Estimated Creatinine Clearance: 30.5 mL/min  (based on Cr of 1.9).    Physical Exam   Constitutional: She is oriented to person, place, and time. Vital signs are normal. She appears well-developed.   HENT:   Head: Normocephalic and atraumatic.   Eyes: Conjunctivae and EOM are normal. Pupils are equal, round, and reactive to light.   Neck: Normal range of motion. Neck supple. No thyroid mass and no thyromegaly present.   Cardiovascular: Normal rate, normal heart sounds and intact distal pulses.    Pulmonary/Chest: Effort normal and breath sounds normal. No accessory muscle usage. No respiratory distress.   Abdominal: Soft. Bowel sounds are normal. She exhibits distension. She exhibits no mass. There is tenderness.   Musculoskeletal: Normal range of motion.   Neurological: She is alert and oriented to person, place, and time.   Skin: Skin is intact. No rash noted.   Psychiatric: She has a normal mood and affect.   Nursing note and vitals reviewed.      Significant Labs:   Blood Culture:   Recent Labs  Lab 11/26/16  1445 11/26/16  1500 04/14/17  1856 04/14/17  1940 04/16/17  1615   LABBLOO No growth after 5 days. No growth after 5 days. No growth after 5 days. No growth after 5 days. No Growth to date  No Growth to date  No Growth to date  No Growth to date     BMP:   Recent Labs  Lab 04/20/17  0522   GLU 65*      K 3.4*      CO2 24   BUN 27*   CREATININE 1.9*   CALCIUM 7.6*     CBC:   Recent Labs  Lab 04/19/17  0511 04/20/17  0522   WBC 2.18* 2.96*   HGB 8.1* 8.8*   HCT 24.3* 26.3*   PLT 54* 47*       Significant Imaging: I have reviewed all pertinent imaging results/findings within the past 24 hours.

## 2017-04-20 NOTE — PT/OT/SLP PROGRESS
Physical Therapy      Wang Kebede  MRN: [id number]    S: PT AGREEABLE TO SEATED BLE THEREX  O: PT FOUND SEATED IN CHAIR UPON ARRIVAL, PT C/O 5/10 ABD. PAIN.  PT EDUCATED IN AND PERFORMED BLE THEREX X 20 REPS AROM WITH REST BREAKS.  PT LEFT SEATED IN CHAIR WITH ALL NEEDS MET. PT ENCOURAGED TO CALL FOR ASSISTANCE WITH ALL NEEDS DUE TO FALL RISK STATUS, PT AGREEABLE.  PT ENCOURAGED TO INCREASE TIME OOB IN CHAIR, ALL MEALS IN CHAIR OOB, PT AGREEABLE  A: FAIR TOLERANCE  P: CONT PER POC    Deana Cole, PT   4/20/2017  [id number]

## 2017-04-20 NOTE — ASSESSMENT & PLAN NOTE
She has poor immunologic and virologic control related to abdominal issues.        Her trend of CD4 counts over the last 9 months has consistently been below 200.  Her ID physician is Dr Vides .Will restart meds when able to tolerate . She is on    darunavir-cobicistat (PREZCOBIX) 800-150 mg-mg Tab Take 800 mg by mouth every evening.     emtricitabine-tenofovir alafen (DESCOVY) 200-25 mg Tab Take 200 each by mouth every evening.    Will need zithromax and bactrim for OI prophylaxis.    We had an extensive discussion about the need to be compliant with HAART .  She complains of nausea with HAART.

## 2017-04-20 NOTE — PLAN OF CARE
Problem: Patient Care Overview  Goal: Plan of Care Review  Outcome: Ongoing (interventions implemented as appropriate)  FAIR TOLERANCE TO P.T. TX TODAY, LIMITED BY NAUSEA AND WEAKNESS

## 2017-04-20 NOTE — SUBJECTIVE & OBJECTIVE
Interval History: patient is feeling better today and she didn't tolerate the metoprolol so I stopped and started low dose Coreg which she is tolerating. She is still weak and is asking about going home.    Review of Systems   HENT: Negative.    Cardiovascular: Negative.    Gastrointestinal: Negative.    Genitourinary: Negative.    Neurological: Positive for weakness.     Objective:     Vital Signs (Most Recent):  Temp: 97.7 °F (36.5 °C) (04/20/17 0737)  Pulse: 86 (04/20/17 0737)  Resp: 17 (04/20/17 0737)  BP: (!) 128/94 (04/20/17 0737)  SpO2: 99 % (04/20/17 0737) Vital Signs (24h Range):  Temp:  [97.4 °F (36.3 °C)-98.7 °F (37.1 °C)] 97.7 °F (36.5 °C)  Pulse:  [] 86  Resp:  [17-20] 17  SpO2:  [95 %-100 %] 99 %  BP: (128-140)/() 128/94     Weight: 53.1 kg (117 lb)  Body mass index is 22.85 kg/(m^2).    Intake/Output Summary (Last 24 hours) at 04/20/17 1008  Last data filed at 04/20/17 0600   Gross per 24 hour   Intake              720 ml   Output              350 ml   Net              370 ml      Physical Exam   Constitutional: Vital signs are normal. She has a sickly appearance. No distress.   HENT:   Head: Normocephalic and atraumatic.   Nose: Nose normal.   Eyes: EOM are normal. Pupils are equal, round, and reactive to light.   Neck: Normal range of motion. Neck supple.   Abdominal: Soft. There is tenderness.   Tenderness at incision site.   Musculoskeletal: Normal range of motion.   Neurological: She is alert.   Skin: Skin is warm and dry.   Psychiatric: She has a normal mood and affect.       Significant Labs:   CBC:   Recent Labs  Lab 04/19/17 0511 04/20/17 0522   WBC 2.18* 2.96*   HGB 8.1* 8.8*   HCT 24.3* 26.3*   PLT 54* 47*     CMP:   Recent Labs  Lab 04/19/17 0511 04/20/17 0522    140   K 3.6 3.4*    110   CO2 21* 24   GLU 63* 65*   BUN 31* 27*   CREATININE 2.3* 1.9*   CALCIUM 7.6* 7.6*   PROT 7.1 7.2   ALBUMIN 2.1* 2.1*   BILITOT 0.4 0.4   ALKPHOS 206* 192*   AST 66* 53*   ALT  23 19   ANIONGAP 8 6*   EGFRNONAA 27* 34*       Significant Imaging: I have reviewed and interpreted all pertinent imaging results/findings within the past 24 hours.

## 2017-04-21 VITALS
WEIGHT: 120 LBS | DIASTOLIC BLOOD PRESSURE: 97 MMHG | TEMPERATURE: 98 F | SYSTOLIC BLOOD PRESSURE: 141 MMHG | OXYGEN SATURATION: 99 % | HEART RATE: 83 BPM | BODY MASS INDEX: 23.56 KG/M2 | HEIGHT: 60 IN | RESPIRATION RATE: 17 BRPM

## 2017-04-21 PROBLEM — D64.9 SYMPTOMATIC ANEMIA: Status: RESOLVED | Noted: 2017-03-21 | Resolved: 2017-04-21

## 2017-04-21 PROBLEM — A41.9 SEPSIS: Status: RESOLVED | Noted: 2017-04-19 | Resolved: 2017-04-21

## 2017-04-21 PROBLEM — R79.89 ABNORMAL LFTS: Status: RESOLVED | Noted: 2017-04-16 | Resolved: 2017-04-21

## 2017-04-21 PROBLEM — R50.9 FEVER: Status: RESOLVED | Noted: 2017-04-18 | Resolved: 2017-04-21

## 2017-04-21 LAB
ALBUMIN SERPL BCP-MCNC: 2 G/DL
ALBUMIN SERPL BCP-MCNC: 2 G/DL
ALP SERPL-CCNC: 188 U/L
ALT SERPL W/O P-5'-P-CCNC: 17 U/L
ANION GAP SERPL CALC-SCNC: 4 MMOL/L
ANION GAP SERPL CALC-SCNC: 4 MMOL/L
AST SERPL-CCNC: 41 U/L
BACTERIA BLD CULT: NORMAL
BASOPHILS # BLD AUTO: 0.25 K/UL
BASOPHILS NFR BLD: 5.6 %
BILIRUB SERPL-MCNC: 0.4 MG/DL
BUN SERPL-MCNC: 20 MG/DL
BUN SERPL-MCNC: 20 MG/DL
CALCIUM SERPL-MCNC: 7.4 MG/DL
CALCIUM SERPL-MCNC: 7.4 MG/DL
CHLORIDE SERPL-SCNC: 112 MMOL/L
CHLORIDE SERPL-SCNC: 112 MMOL/L
CO2 SERPL-SCNC: 24 MMOL/L
CO2 SERPL-SCNC: 24 MMOL/L
CREAT SERPL-MCNC: 1.6 MG/DL
CREAT SERPL-MCNC: 1.6 MG/DL
DIFFERENTIAL METHOD: ABNORMAL
EOSINOPHIL # BLD AUTO: 0.1 K/UL
EOSINOPHIL NFR BLD: 2.4 %
ERYTHROCYTE [DISTWIDTH] IN BLOOD BY AUTOMATED COUNT: 17.3 %
EST. GFR  (AFRICAN AMERICAN): 49 ML/MIN/1.73 M^2
EST. GFR  (AFRICAN AMERICAN): 49 ML/MIN/1.73 M^2
EST. GFR  (NON AFRICAN AMERICAN): 42 ML/MIN/1.73 M^2
EST. GFR  (NON AFRICAN AMERICAN): 42 ML/MIN/1.73 M^2
GLUCOSE SERPL-MCNC: 73 MG/DL
GLUCOSE SERPL-MCNC: 73 MG/DL
HCT VFR BLD AUTO: 26.1 %
HGB BLD-MCNC: 8.6 G/DL
LYMPHOCYTES # BLD AUTO: 2.9 K/UL
LYMPHOCYTES NFR BLD: 64.4 %
MCH RBC QN AUTO: 28.5 PG
MCHC RBC AUTO-ENTMCNC: 33 %
MCV RBC AUTO: 86 FL
MONOCYTES # BLD AUTO: 0.7 K/UL
MONOCYTES NFR BLD: 15.1 %
NEUTROPHILS # BLD AUTO: 0.6 K/UL
NEUTROPHILS NFR BLD: 13.2 %
PHOSPHATE SERPL-MCNC: 1.7 MG/DL
PLATELET # BLD AUTO: 52 K/UL
PMV BLD AUTO: 10.1 FL
POTASSIUM SERPL-SCNC: 3.3 MMOL/L
POTASSIUM SERPL-SCNC: 3.3 MMOL/L
PROT SERPL-MCNC: 7.1 G/DL
RBC # BLD AUTO: 3.02 M/UL
SODIUM SERPL-SCNC: 140 MMOL/L
SODIUM SERPL-SCNC: 140 MMOL/L
VANCOMYCIN SERPL-MCNC: 5.7 UG/ML
WBC # BLD AUTO: 4.49 K/UL

## 2017-04-21 PROCEDURE — 80053 COMPREHEN METABOLIC PANEL: CPT

## 2017-04-21 PROCEDURE — 25000003 PHARM REV CODE 250: Performed by: INTERNAL MEDICINE

## 2017-04-21 PROCEDURE — 94761 N-INVAS EAR/PLS OXIMETRY MLT: CPT

## 2017-04-21 PROCEDURE — 94664 DEMO&/EVAL PT USE INHALER: CPT

## 2017-04-21 PROCEDURE — C9113 INJ PANTOPRAZOLE SODIUM, VIA: HCPCS | Performed by: SURGERY

## 2017-04-21 PROCEDURE — 80069 RENAL FUNCTION PANEL: CPT

## 2017-04-21 PROCEDURE — 63600175 PHARM REV CODE 636 W HCPCS: Performed by: SURGERY

## 2017-04-21 PROCEDURE — 80202 ASSAY OF VANCOMYCIN: CPT

## 2017-04-21 PROCEDURE — 85025 COMPLETE CBC W/AUTO DIFF WBC: CPT

## 2017-04-21 RX ORDER — OXYCODONE AND ACETAMINOPHEN 5; 325 MG/1; MG/1
1 TABLET ORAL EVERY 6 HOURS PRN
Qty: 12 TABLET | Refills: 0 | Status: SHIPPED | OUTPATIENT
Start: 2017-04-21 | End: 2017-08-03 | Stop reason: ALTCHOICE

## 2017-04-21 RX ORDER — LEVOFLOXACIN 500 MG/1
500 TABLET, FILM COATED ORAL DAILY
Qty: 7 TABLET | Refills: 0 | Status: SHIPPED | OUTPATIENT
Start: 2017-04-21 | End: 2017-04-28

## 2017-04-21 RX ORDER — AZITHROMYCIN 600 MG/1
1200 TABLET, FILM COATED ORAL WEEKLY
Qty: 10 TABLET | Refills: 0 | Status: SHIPPED | OUTPATIENT
Start: 2017-04-21 | End: 2017-05-08

## 2017-04-21 RX ORDER — CARVEDILOL 3.12 MG/1
3.12 TABLET ORAL 2 TIMES DAILY
Qty: 60 TABLET | Refills: 0 | Status: SHIPPED | OUTPATIENT
Start: 2017-04-21 | End: 2017-05-08

## 2017-04-21 RX ORDER — ATOVAQUONE 750 MG/5ML
1500 SUSPENSION ORAL DAILY PRN
Qty: 210 ML | Refills: 0 | Status: SHIPPED | OUTPATIENT
Start: 2017-04-21 | End: 2017-05-08

## 2017-04-21 RX ADMIN — POTASSIUM CHLORIDE 40 MEQ: 1500 TABLET, EXTENDED RELEASE ORAL at 09:04

## 2017-04-21 RX ADMIN — PANTOPRAZOLE SODIUM 40 MG: 40 INJECTION, POWDER, FOR SOLUTION INTRAVENOUS at 09:04

## 2017-04-21 RX ADMIN — MOXIFLOXACIN HYDROCHLORIDE 400 MG: 400 TABLET, FILM COATED ORAL at 09:04

## 2017-04-21 NOTE — PT/OT/SLP PROGRESS
"Physical Therapy      Wang Deana Delio  MRN: [id number]    ATTEMPTED P.T. TX. THIS AM, PT ADAMANTLY REFUSED DESPITE ENCOURAGEMENT, "KEEP GOING, I AIN'T DOING IN TODAY" WILL ASSESS PT NEXT VISIT    Deana Cole, PT   4/21/2017  0845    "

## 2017-04-21 NOTE — PROGRESS NOTES
Patient given discharge information, central line removed, and heart monitor removed. Patient educated regarding follow up appointments, signs and symptoms to watch for, new medications, and dressing care for central line insertion site. Patient also educated regarding activity, and diet. No distress noted during discharge. Patient has no questions or concerns regarding discharge information and education. Patient is currently getting ready to go and stated they will notify staff when they are ready to be accompanied to front door via w/c.

## 2017-04-21 NOTE — PLAN OF CARE
Problem: Patient Care Overview  Goal: Plan of Care Review  Outcome: Outcome(s) achieved Date Met:  04/21/17  Patient is resting well and is aware of discharge home.

## 2017-04-21 NOTE — PLAN OF CARE
Problem: Patient Care Overview  Goal: Plan of Care Review  Outcome: Ongoing (interventions implemented as appropriate)  Patient refused all scheduled medications tonight. BP--137/93. Patient states she will take her antivirals louise when feeling better. Pain being controlled with PRN pain medication. Patient is resting quietly, will continue to monitor.

## 2017-04-21 NOTE — DISCHARGE SUMMARY
[facility] Hospital Medicine  Discharge Summary      Patient Name: Wang Kebede  MRN: [id number]  Admission Date: 4/14/2017  Hospital Length of Stay: 6 days  Discharge Date and Time:  04/21/2017 2:23 PM  Attending Physician: Mara Cabello MD   Discharging Provider: Emanuel Ordoñez NP  Primary Care Provider: Primary Doctor No      HPI:   Ms. Kebede is a 32 y/o AA female with h/o HIV, cervical cancer, s/p hysterectomy last week by , presents to the ED last night c/o generalized weakness, abdominal pain and SOB. CXR and VQ scan were unremarkable. CT chest could not be done due to contrast allergy. Patient continues to have high fever upto 102.1, tachycardia. Admitted for sepsis of likely intra-abdominal etiology, however CT abdomen/pelvis not done due to contrast allergy. Patient is very tender in the abdomen to attempt ultrasound. Started empirically on IV antibiotics for sepsis.    Procedure(s) (LRB):  EXPLORATORY-LAPAROTOMY (N/A)  REPAIR-VAGINAL CUFF (N/A)      Indwelling Lines/Drains at time of discharge:   Lines/Drains/Airways          No matching active lines, drains, or airways        Hospital Course:   Presented with acute abdomen approximately 3 days after robotic assisted laparoscopic hysterectomy.  Severe sepsis progressed to septic shock.  CT abdomen/pelvis had to be done without contrast.  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  CT was unrevealing.  OB/GYN and general surgery evaluated the patient and were concerned for post-op complication but the evidence was dubious.  They recommended a consult to gastroenterology for a diagnostic colonoscopy given the uncertainty of her source of abdominal pain and diarrhea.  She developed abdominal pain shortly after the procedure and had watery diarrhea the day before admission.  Colonoscopy did not show evidence of colitis so the surgical team took her to the operating room  and found a small segment of dehiscence to the vaginal cuff.  There was very little evidence of inflammation or contamination in the abdomen or pelvis.  She continued to have high fevers but her blood pressure and heart rate improved partially post-op.  She was successfully extubated 16 April but continued to require Vasopressin for blood pressure support and her labwork showed acute kidney injury.  She had evidence of a metabolic acidosis but did not have an elevated lactic acid on multiple measurements nor did she have a leukocytosis proportionate to the severity of her symptoms.  Optimized broad spectrum antibiotics and added anti-fungal.  Her CD4 count has been in steady decline over the last year while under treatment.  Three weeks ago it was 53. Nephrology was consulted for LELAND and metabolic acidosis and followed the patient through admission. The patients symptoms, as well as her renal function improved with treatment. The patient was seen and examined today and deemed to be suitable for discharge. The patient will continue her HAART and was counseled on compliance. The patient will complete a coarse of Levaquin and will be started on Azithromycin weekly and Mepron daily for OI prophylaxis. The patient will follow up with Dr. Vides (infectious disease).          Consults:   Consults         Status Ordering Provider     Inpatient consult to Gastroenterology  Once     Provider:  Adama Nielsen MD    Completed BRIDGET KIMBLE     Inpatient consult to Infectious Diseases  Once     Provider:  Hubert Mayo MD    Acknowledged BRIDGET KIMBLE     Inpatient consult to Nephrology  Once     Provider:  Sonam Yanez MD    Completed MISSY VILLANUEVA     Inpatient consult to Obstetrics / Gynecology  Once     Provider:  TINA Mo MD    Completed TAMIKA STYLES JR     Inpatient consult to Pulmonology  Once     Provider:  Armani Lopez MD    Completed MISSY VILLANUEVA     Inpatient consult to  Pulmonology  Once     Provider:  Boris Childress MD    Acknowledged CAMERON ANDRES     Inpatient consult to Social Work  Once     Provider:  (Not yet assigned)    Completed URBANO PITTS     Pharmacy to dose Vancomycin consult  Once     Provider:  (Not yet assigned)    Acknowledged BRIDGET KIMBLE          Significant Diagnostic Studies:   Imaging Results         X-Ray Chest 1 View (Final result) Result time:  04/17/17 08:22:56    Final result by SIMÓN Copeland Sr., MD (04/17/17 08:22:56)    Impression:        1. There is a moderate amount of interstitial and alveolar opacities seen in both lungs with curly B-lines visualized bilaterally. This represents an interval worsening of the appearance of the lungs and is characteristic of pulmonary edema.  2. There has been interval removal of an endotracheal tube. There has been interval removal of an NG tube.      Electronically signed by: SIMÓN COPELAND MD  Date:     04/17/17  Time:    08:22     Narrative:    One-view chest x-ray    Clinical History: Dyspnea    Finding: Comparison was made to a prior examination performed on 4/16/2017. A right internal jugular venous line remains in place. A cardiac pacemaker remains in place. The leads appear to be intact. There has been interval removal of an endotracheal tube. There has been interval removal of an NG tube. The size of the heart is normal. There is a moderate amount of interstitial and alveolar opacities seen in both lungs with curly B-lines visualized bilaterally. There is no pneumothorax.  The costophrenic angles are sharp.            X-Ray Chest 1 View (Final result) Result time:  04/16/17 08:19:51    Final result by Lilian Blackman MD (Timothy) (04/16/17 08:19:51)    Impression:     Normal sized heart.Clear lungs. Endotracheal tube, nasogastric tube and right jugular line appear to be in good position.  ICD is in place.  Comparison 04/15/2017.      Electronically signed by: LILIAN BLACKMAN MD  Date:      04/16/17  Time:    08:19     Narrative:    Chest, 1 view.    Clinical History: Fever            X-Ray Chest AP Portable (Final result) Result time:  04/15/17 22:55:32    Final result by Ashutosh Saleem MD (04/15/17 22:55:32)    Impression:     See above            Electronically signed by: ASHUTOSH SALEEM MD  Date:     04/15/17  Time:    22:55     Narrative:    Exam: Portable chest radiograph    Clinical History:   Central line placement.     Comparison: Chest x-ray, 04/14/2017    Findings:.     Exam is limited due to grid artifact overlying the entire chest. There is a right IJ centimeters catheter with its tip at the caval atrial junction or perhaps just within the right atrium. No pneumothorax is seen.            CT Abdomen Pelvis  Without Contrast (Final result) Result time:  04/15/17 08:39:31    Final result by Tayler Irene MD (04/15/17 08:39:31)    Impression:      there is a chronic pattern splenomegaly.  Some atelectasis is present in the lung bases which is minimal.    Within the pelvis, the patient has had prior hysterectomy and there appears to be some fluid layering out in the cul-de-sac.  No discrete abscess is identified though the lack of IV and oral contrast decreases resolution.    All CT scans at this facility use dose modulation, iterative reconstruction and/or weight based dosing when appropriate to reduce radiation dose to as low as reasonably achievable.       Electronically signed by: TAYLER IRENE MD  Date:     04/15/17  Time:    08:39     Narrative:    CT ABDOMEN PELVIS WITHOUT CONTRAST,     Date:  04/15/17 08:04:30    Technique: Standard noncontrast CT scan of the abdomen and pelvis.Coronal and sagittal reformatted images were generated.  Comparison is made with the previous dated 03/21/2017    History:  Sepsis, Abdominal Pain, recent hysterectomy, suspect abscess,     Findings:  Some minimal atelectatic changes present in the left lung base.    A pacemaker is present the heart.      Within the abdomen, there surgical clips from previous cholecystectomy.  The liver appears free of focal abnormality.  The spleen is enlarged measuring 12.5 cm in length.  It appears free of focal abnormality.  No free air is seen within the peritoneal cavity.  The adrenal glands and pancreas are normal in appearance.  The kidneys appear free of calculus or obstruction.  The ureters are normal in configuration.  The bladder appears within normal limits in size with some gas within its lumen presumably from instrumentation.  There surgical clips in the pelvis and the patient has history of previous recent hysterectomy.  No discrete gas-filled abscess cavity is present.  Findings suggest a moderate amount of fluid which layers out dependently in the cul-de-sac surrounding the sigmoid colon.  The colon appears free of any acute obstructive change or inflammation.  Surgical clips are seen about the cecum.  The bones are intact.            NM Lung Ventilation Perfusion Imaging (Final result) Result time:  04/14/17 23:08:28    Final result by Ghanshyam Parada MD (04/14/17 23:08:28)    Impression:        Negative VQ scan. No evidence of pulmonary embolism.      Electronically signed by: GHANSHYAM PARADA MD  Date:     04/14/17  Time:    23:08     Narrative:    NM LUNG VENTILATION AND PERFUSION IMAGING or VQ scan, 04/14/17 21:40:13    History:  pt allergic to iodne here with sob post hysterectomy a few days ago, chest pain    Ventilation study performed with 22.5 mCi technetium 99 M DTPA and perfusion study performed with 4.72 mCi technetium 99 M MAA.    The initial breath is grossly normal.    The perfusion study is normal.            X-Ray Chest PA And Lateral (Final result) Result time:  04/14/17 19:16:10    Final result by Ashutosh Saleem MD (04/14/17 19:16:10)    Impression:         No acute cardiopulmonary process.            Electronically signed by: ASHUTOSH SALEEM MD  Date:     04/14/17  Time:    19:16     Narrative:    Exam:  Two-view chest radiograph    Clinical History: .  Shortness of breath    Comparison: Chest x-ray, 03/20/2017.    Findings:   The lungs are clear. Heart size is normal there is a left-sided dual-lead ICD. No pleural effusion or pneumothorax. The bones are intact.                 Pending Diagnostic Studies:     Procedure Component Value Units Date/Time    Stool Exam-Ova,Cysts,Parasites [[id number]] Collected:  04/15/17 2130    Order Status:  Sent Lab Status:  In process Updated:  04/15/17 2211    Specimen:  Stool from Stool     Vancomycin, random [[id number]] Collected:  04/20/17 0522    Order Status:  Sent Lab Status:  In process Updated:  04/20/17 0522    Specimen:  Blood from Blood     Vancomycin, random [[id number]] Collected:  04/19/17 0511    Order Status:  Sent Lab Status:  In process Updated:  04/19/17 0511    Specimen:  Blood from Blood         Final Active Diagnoses:    Diagnosis Date Noted POA    Thrombocytopenia [D69.6] 04/19/2017 Yes    S/P exploratory laparotomy [Z98.890] 04/19/2017 Not Applicable    LELAND (acute kidney injury) [N17.9] 04/16/2017 Yes    AIDS (acquired immunodeficiency syndrome), CD4 <=200 [B20] 04/15/2017 Yes    Status post laparoscopic hysterectomy [Z90.710] 04/15/2017 Yes    Severe protein-calorie malnutrition [E43] 03/21/2017 Yes    HIV (human immunodeficiency virus infection) [Z21]  Yes      Problems Resolved During this Admission:    Diagnosis Date Noted Date Resolved POA    PRINCIPAL PROBLEM:  Sepsis [A41.9] 04/19/2017 04/21/2017 Yes    Fever [R50.9] 04/18/2017 04/21/2017 Yes    Acidosis [E87.2] 04/16/2017 04/17/2017 No    Abnormal LFTs [R79.89] 04/16/2017 04/21/2017 Yes    Symptomatic anemia [D64.9] 03/21/2017 04/21/2017 Yes    Lower abdominal pain [R10.30] 06/27/2016 04/17/2017 Yes     Chronic    Blood poisoning [A41.9] 06/26/2016 04/21/2017 Yes      No new Assessment & Plan notes have been filed under this hospital service since the last note was generated.  Service:  "Hospital Medicine      Discharged Condition: stable    Disposition: Home-Health Care [facility]    Follow Up:  Follow-up Information     Follow up with [facility]    Specialty:  Home Health Services    Why:  Home Health:  SN, PT, OT    Contact information:    [address], [address]  [place] [address]  [phone]          Follow up with [facility]    Specialty:  DME Provider    Why:  DME:  Rolling Walker    Contact information:    [address]  [address]  [place] [address]  [phone]          Follow up with John Vides MD. Schedule an appointment as soon as possible for a visit in 1 week.    Specialty:  Infectious Diseases    Contact information:    [address]  [place] [id number] [phone]          Patient Instructions:     WALKER FOR HOME USE   Order Specific Question Answer Comments   Type of Walker: Adult (5'4"-6'6")    With wheels? Yes    Height: 5' (1.524 m)    Weight: 49.9 kg (110 lb)    Length of need (1-99 months): 99    Does patient have medical equipment at home? none    Please check all that apply: Patient's condition impairs ambulation.    Please check all that apply: Patient is unable to safely ambulate without equipment.    Please check all that apply: Patient needs help to get in and out of chair.    Please check all that apply: Walker will be used for gait training.      WALKER FOR HOME USE   Order Specific Question Answer Comments   Type of Walker: Adult (5'4"-6'6")    With wheels? Yes    Height: 5' (1.524 m)    Weight: 49.9 kg (110 lb)    Length of need (1-99 months): 99    Does patient have medical equipment at home? none    Please check all that apply: Patient is unable to safely ambulate without equipment.      Referral to Home health   Referral Priority: Routine Referral Type: Home Health   Referral Reason: Specialty Services Required    Requested Specialty: Home Health Services    Number of Visits Requested: 1      Ambulatory referral to " Home Health   Referral Priority: Routine Referral Type: Home Health   Referral Reason: Specialty Services Required    Requested Specialty: Home Health Services    Number of Visits Requested: 1      Diet general     Activity as tolerated       Medications:  Reconciled Home Medications:   Current Discharge Medication List      START taking these medications    Details   atovaquone (MEPRON) 750 mg/5 mL Susp Take 10 mLs (1,500 mg total) by mouth daily as needed.  Qty: 210 mL, Refills: 0      azithromycin (ZITHROMAX) 600 MG Tab Take 2 tablets (1,200 mg total) by mouth once a week.  Qty: 10 tablet, Refills: 0      carvedilol (COREG) 3.125 MG tablet Take 1 tablet (3.125 mg total) by mouth 2 (two) times daily.  Qty: 60 tablet, Refills: 0      levoFLOXacin (LEVAQUIN) 500 MG tablet Take 1 tablet (500 mg total) by mouth once daily.  Qty: 7 tablet, Refills: 0         CONTINUE these medications which have CHANGED    Details   oxycodone-acetaminophen (ROXICET) 5-325 mg per tablet Take 1 tablet by mouth every 6 (six) hours as needed for Pain (pain).  Qty: 12 tablet, Refills: 0         CONTINUE these medications which have NOT CHANGED    Details   darunavir-cobicistat (PREZCOBIX) 800-150 mg-mg Tab Take 800 mg by mouth every evening.       emtricitabine-tenofovir alafen (DESCOVY) 200-25 mg Tab Take 200 each by mouth every evening.            Time spent on the discharge of patient: > 30 minutes    Emanuel Ordoñez NP  Department of Hospital Medicine  [facility]

## 2017-04-25 NOTE — PHYSICIAN QUERY
PT Name: Wang Kebede  MR #: [id number]    Physician Query Form - Cause and Effect Relationship Clarification      CDS/: Machelle Bashir               Contact information: [email or web address]    This form is a permanent document in the medical record.     Query Date: April 25, 2017    By submitting this query, we are merely seeking further clarification of documentation. Please utilize your independent clinical judgment when addressing the question(s) below.    The Medical record contains the following:  Supporting Clinical Findings   Location in record   Sepsis  - High fever Temp > 102, HR > 100  - Suspect intra-abdominal source from recent hysterectomy                                                                        H&P   HIV/AIDS                                                                                                                  4/15/17 Hospital Medicine progress note   Sepsis associated with AIDS  Tachycardia, hypotension, and fever. Uncertain source but most likely intra-abdominal. Probably secondary to vaginal cuff dehiscence but report from the surgical team has doubts. Her hemodynamics improved partially post-op. The actual area of dehiscence was very small and the peritoneum and bowels did not appear affected. Colonoscopy did not show visual evidence of colitis and stool CD toxin was negative. Biopsies of the distal colon/rectum mucosa were taken. No suggestion of respiratory or urinary infection.   4/18/17 Hospital Medicine progress note   Condition stable. Doubt typical post-op infection the primary issue   4/18/17 Obstetrics & Gynecology progress note   S/p ex lap with vaginal cuff repair for peritonitis.   4/19/17 General Surgery progress note   Hospital Course:   Presented with acute abdomen approximately 3 days after robotic assisted laparoscopic hysterectomy. Severe sepsis progressed to septic shock. CT abdomen/pelvis had to be done without contrast. She has a  history of anaphylaxis to contrast dye with cardiac arrest and now has an AICD. CT was unrevealing. OB/GYN and general surgery evaluated the patient and were concerned for post-op complication but the evidence was dubious. They recommended a consult to gastroenterology for a diagnostic colonoscopy given the uncertainty of her source of abdominal pain and diarrhea. She developed abdominal pain shortly after the procedure and had watery diarrhea the day before admission. Colonoscopy did not show evidence of colitis so the surgical team took her to the operating room and found a small segment of dehiscence to the vaginal cuff. There was very little evidence of inflammation or contamination in the abdomen or pelvis. She continued to have high fevers but her blood pressure and heart rate improved partially post-op. She was successfully extubated 16 April but continued to require Vasopressin for blood pressure support and her labwork showed acute kidney injury. She had evidence of a metabolic acidosis but did not have an elevated lactic acid on multiple measurements nor did she have a leukocytosis proportionate to the severity of her symptoms. Optimized broad spectrum antibiotics and added anti-fungal. Her CD4 count has been in steady decline over the last year while under treatment. Three weeks ago it was 53. Nephrology was consulted for LELAND and metabolic acidosis and followed the patient through admission. The patients symptoms, as well as her renal function improved with treatment. The patient was seen and examined today and deemed to be suitable for discharge. The patient will continue her HAART and was counseled on compliance. The patient will complete a coarse of Levaquin and will be started on Azithromycin weekly and Mepron daily for OI prophylaxis. The patient will follow up with Dr. Vides (infectious disease).    D/C Summary         Provider, please clarify if there is any correlation between the HIV/AIDS and the  sepsis with vaginal cuff dehiscence.           Are the conditions:     [  ] Due to or associated with each other     [  ] Unrelated to each other     [  ] Other (Please Specify): _________________________     [  ] Clinically Undetermined

## 2017-04-28 ENCOUNTER — TELEPHONE (OUTPATIENT)
Dept: OBSTETRICS AND GYNECOLOGY | Facility: CLINIC | Age: 32
End: 2017-04-28

## 2017-04-28 NOTE — TELEPHONE ENCOUNTER
----- Message from Diana Corey sent at 4/28/2017  1:53 PM CDT -----  Contact: Gayathri/[facility]   States she had surgery 2 weeks ago and she's bleeding. Please call pt at [phone]. Thank you

## 2017-04-28 NOTE — TELEPHONE ENCOUNTER
Spoke with pt, states that she is having some spotting, notified pt that it is normal to have light bleeding after surgery as long as she does not have bleeding as heavy as a period or heavier. Pt verbalized understanding. Notified pt that if she has any bleeding heavy as cycle she will need to report to the ER. Pt verbalized understanding.

## 2017-05-05 ENCOUNTER — PATIENT MESSAGE (OUTPATIENT)
Dept: HEMATOLOGY/ONCOLOGY | Facility: CLINIC | Age: 32
End: 2017-05-05

## 2017-05-08 ENCOUNTER — OFFICE VISIT (OUTPATIENT)
Dept: HEMATOLOGY/ONCOLOGY | Facility: CLINIC | Age: 32
End: 2017-05-08
Payer: COMMERCIAL

## 2017-05-08 ENCOUNTER — LAB VISIT (OUTPATIENT)
Dept: LAB | Facility: HOSPITAL | Age: 32
End: 2017-05-08
Attending: INTERNAL MEDICINE
Payer: MEDICARE

## 2017-05-08 ENCOUNTER — OFFICE VISIT (OUTPATIENT)
Dept: OBSTETRICS AND GYNECOLOGY | Facility: CLINIC | Age: 32
End: 2017-05-08
Payer: COMMERCIAL

## 2017-05-08 VITALS
SYSTOLIC BLOOD PRESSURE: 100 MMHG | WEIGHT: 98.56 LBS | TEMPERATURE: 98 F | DIASTOLIC BLOOD PRESSURE: 70 MMHG | HEIGHT: 60 IN | RESPIRATION RATE: 18 BRPM | BODY MASS INDEX: 19.35 KG/M2 | OXYGEN SATURATION: 98 % | HEART RATE: 118 BPM

## 2017-05-08 VITALS
SYSTOLIC BLOOD PRESSURE: 96 MMHG | WEIGHT: 97.44 LBS | BODY MASS INDEX: 19.13 KG/M2 | HEIGHT: 60 IN | DIASTOLIC BLOOD PRESSURE: 60 MMHG

## 2017-05-08 DIAGNOSIS — R10.9 ABDOMINAL PAIN, UNSPECIFIED SITE: Primary | ICD-10-CM

## 2017-05-08 DIAGNOSIS — D57.1 HB-SS DISEASE WITHOUT CRISIS: Primary | ICD-10-CM

## 2017-05-08 DIAGNOSIS — D57.1 HB-SS DISEASE WITHOUT CRISIS: ICD-10-CM

## 2017-05-08 LAB
ALBUMIN SERPL BCP-MCNC: 3.1 G/DL
ALP SERPL-CCNC: 96 U/L
ALT SERPL W/O P-5'-P-CCNC: 43 U/L
ANION GAP SERPL CALC-SCNC: 5 MMOL/L
AST SERPL-CCNC: 69 U/L
BASOPHILS # BLD AUTO: 0.02 K/UL
BASOPHILS NFR BLD: 0.7 %
BILIRUB SERPL-MCNC: 0.3 MG/DL
BUN SERPL-MCNC: 14 MG/DL
CALCIUM SERPL-MCNC: 8.8 MG/DL
CHLORIDE SERPL-SCNC: 107 MMOL/L
CO2 SERPL-SCNC: 27 MMOL/L
CREAT SERPL-MCNC: 1.1 MG/DL
DIFFERENTIAL METHOD: ABNORMAL
EOSINOPHIL # BLD AUTO: 0 K/UL
EOSINOPHIL NFR BLD: 0.7 %
ERYTHROCYTE [DISTWIDTH] IN BLOOD BY AUTOMATED COUNT: 17.4 %
EST. GFR  (AFRICAN AMERICAN): >60 ML/MIN/1.73 M^2
EST. GFR  (NON AFRICAN AMERICAN): >60 ML/MIN/1.73 M^2
GLUCOSE SERPL-MCNC: 74 MG/DL
HCT VFR BLD AUTO: 31.3 %
HGB BLD-MCNC: 9.8 G/DL
LDH SERPL L TO P-CCNC: 250 U/L
LYMPHOCYTES # BLD AUTO: 1.2 K/UL
LYMPHOCYTES NFR BLD: 40.4 %
MCH RBC QN AUTO: 28.3 PG
MCHC RBC AUTO-ENTMCNC: 31.3 %
MCV RBC AUTO: 91 FL
MONOCYTES # BLD AUTO: 0.1 K/UL
MONOCYTES NFR BLD: 4.6 %
NEUTROPHILS # BLD AUTO: 1.6 K/UL
NEUTROPHILS NFR BLD: 53.6 %
PLATELET # BLD AUTO: 132 K/UL
PMV BLD AUTO: 11.3 FL
POTASSIUM SERPL-SCNC: 4.9 MMOL/L
PROT SERPL-MCNC: 11.2 G/DL
RBC # BLD AUTO: 3.46 M/UL
RETICS/RBC NFR AUTO: 1.8 %
SODIUM SERPL-SCNC: 139 MMOL/L
WBC # BLD AUTO: 3.02 K/UL

## 2017-05-08 PROCEDURE — 83020 HEMOGLOBIN ELECTROPHORESIS: CPT

## 2017-05-08 PROCEDURE — 83615 LACTATE (LD) (LDH) ENZYME: CPT | Mod: PO

## 2017-05-08 PROCEDURE — 85045 AUTOMATED RETICULOCYTE COUNT: CPT

## 2017-05-08 PROCEDURE — 99499 UNLISTED E&M SERVICE: CPT | Mod: S$PBB,,, | Performed by: INTERNAL MEDICINE

## 2017-05-08 PROCEDURE — 1160F RVW MEDS BY RX/DR IN RCRD: CPT | Mod: S$GLB,,, | Performed by: INTERNAL MEDICINE

## 2017-05-08 PROCEDURE — 80053 COMPREHEN METABOLIC PANEL: CPT | Mod: PO

## 2017-05-08 PROCEDURE — 99999 PR PBB SHADOW E&M-EST. PATIENT-LVL II: CPT | Mod: PBBFAC,,, | Performed by: OBSTETRICS & GYNECOLOGY

## 2017-05-08 PROCEDURE — 85025 COMPLETE CBC W/AUTO DIFF WBC: CPT | Mod: PO

## 2017-05-08 PROCEDURE — 99212 OFFICE O/P EST SF 10 MIN: CPT | Mod: 24,S$GLB,, | Performed by: OBSTETRICS & GYNECOLOGY

## 2017-05-08 PROCEDURE — 99213 OFFICE O/P EST LOW 20 MIN: CPT | Mod: S$GLB,,, | Performed by: INTERNAL MEDICINE

## 2017-05-08 PROCEDURE — 99999 PR PBB SHADOW E&M-EST. PATIENT-LVL III: CPT | Mod: 27,PBBFAC,, | Performed by: INTERNAL MEDICINE

## 2017-05-08 PROCEDURE — 1160F RVW MEDS BY RX/DR IN RCRD: CPT | Mod: S$GLB,,, | Performed by: OBSTETRICS & GYNECOLOGY

## 2017-05-08 PROCEDURE — 36415 COLL VENOUS BLD VENIPUNCTURE: CPT | Mod: PO
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
## 2017-05-11 LAB
HGB A2 MFR BLD HPLC: 2.6 %
HGB FRACT BLD ELPH-IMP: NORMAL
HGB FRACT BLD ELPH-IMP: NORMAL

## 2017-05-15 ENCOUNTER — TELEPHONE (OUTPATIENT)
Dept: OBSTETRICS AND GYNECOLOGY | Facility: CLINIC | Age: 32
End: 2017-05-15

## 2017-05-15 NOTE — TELEPHONE ENCOUNTER
----- Message from Mariam De León sent at 5/15/2017  8:05 AM CDT -----  Contact: Gayathri Ledesma/[facility]  Gayathri is requesting to speak to the nurse regarding pt. Pt is complaining of nausea and pain while urinating. Pls call Gayathri back at [phone].

## 2017-05-19 RX ORDER — CARVEDILOL 3.12 MG/1
TABLET ORAL
Qty: 60 TABLET | Refills: 0 | OUTPATIENT
Start: 2017-05-19

## 2017-05-21 ENCOUNTER — HOSPITAL ENCOUNTER (INPATIENT)
Facility: HOSPITAL | Age: 32
LOS: 7 days | Discharge: HOME HEALTH | DRG: 974 | End: 2017-05-28
Attending: EMERGENCY MEDICINE | Admitting: FAMILY MEDICINE
Payer: COMMERCIAL

## 2017-05-21 DIAGNOSIS — R00.0 TACHYCARDIA: ICD-10-CM

## 2017-05-21 DIAGNOSIS — R50.9 FEVER: ICD-10-CM

## 2017-05-21 DIAGNOSIS — D64.9 ANEMIA, UNSPECIFIED TYPE: ICD-10-CM

## 2017-05-21 DIAGNOSIS — R50.9 FEVER, UNSPECIFIED FEVER CAUSE: Primary | ICD-10-CM

## 2017-05-21 DIAGNOSIS — B20 AIDS (ACQUIRED IMMUNODEFICIENCY SYNDROME), CD4 <=200: ICD-10-CM

## 2017-05-21 DIAGNOSIS — B20 HIV (HUMAN IMMUNODEFICIENCY VIRUS INFECTION): ICD-10-CM

## 2017-05-21 DIAGNOSIS — N17.9 AKI (ACUTE KIDNEY INJURY): ICD-10-CM

## 2017-05-21 DIAGNOSIS — R13.12 DYSPHAGIA, OROPHARYNGEAL: ICD-10-CM

## 2017-05-21 LAB
ABO + RH BLD: NORMAL
ALBUMIN SERPL BCP-MCNC: 2.8 G/DL
ALP SERPL-CCNC: 66 U/L
ALT SERPL W/O P-5'-P-CCNC: 16 U/L
ANION GAP SERPL CALC-SCNC: 8 MMOL/L
APTT BLDCRRT: 36.9 SEC
AST SERPL-CCNC: 31 U/L
BACTERIA #/AREA URNS HPF: NORMAL /HPF
BASOPHILS # BLD AUTO: 0.01 K/UL
BASOPHILS NFR BLD: 0.2 %
BILIRUB SERPL-MCNC: 0.3 MG/DL
BILIRUB UR QL STRIP: NEGATIVE
BLD GP AB SCN CELLS X3 SERPL QL: NORMAL
BNP SERPL-MCNC: 72 PG/ML
BUN SERPL-MCNC: 29 MG/DL
CALCIUM SERPL-MCNC: 8.2 MG/DL
CHLORIDE SERPL-SCNC: 105 MMOL/L
CLARITY UR: CLEAR
CO2 SERPL-SCNC: 18 MMOL/L
COLOR UR: YELLOW
CREAT SERPL-MCNC: 1.9 MG/DL
DIFFERENTIAL METHOD: ABNORMAL
EOSINOPHIL # BLD AUTO: 0 K/UL
EOSINOPHIL NFR BLD: 0 %
ERYTHROCYTE [DISTWIDTH] IN BLOOD BY AUTOMATED COUNT: 17.2 %
EST. GFR  (AFRICAN AMERICAN): 40 ML/MIN/1.73 M^2
EST. GFR  (NON AFRICAN AMERICAN): 34 ML/MIN/1.73 M^2
FLUAV AG SPEC QL IA: NEGATIVE
FLUBV AG SPEC QL IA: NEGATIVE
GLUCOSE SERPL-MCNC: 103 MG/DL
GLUCOSE UR QL STRIP: NEGATIVE
HCT VFR BLD AUTO: 25 %
HGB BLD-MCNC: 8.3 G/DL
HGB UR QL STRIP: ABNORMAL
HYALINE CASTS #/AREA URNS LPF: 0 /LPF
INR PPP: 1.1
KETONES UR QL STRIP: NEGATIVE
LACTATE SERPL-SCNC: 0.6 MMOL/L
LACTATE SERPL-SCNC: 0.7 MMOL/L
LDH SERPL L TO P-CCNC: 319 U/L
LEUKOCYTE ESTERASE UR QL STRIP: NEGATIVE
LIPASE SERPL-CCNC: 89 U/L
LYMPHOCYTES # BLD AUTO: 0.6 K/UL
LYMPHOCYTES NFR BLD: 9.4 %
MAGNESIUM SERPL-MCNC: 1.4 MG/DL
MCH RBC QN AUTO: 28.5 PG
MCHC RBC AUTO-ENTMCNC: 33.2 %
MCV RBC AUTO: 86 FL
MICROSCOPIC COMMENT: NORMAL
MONOCYTES # BLD AUTO: 0.2 K/UL
MONOCYTES NFR BLD: 2.5 %
NEUTROPHILS # BLD AUTO: 5.7 K/UL
NEUTROPHILS NFR BLD: 88.2 %
NITRITE UR QL STRIP: NEGATIVE
PH UR STRIP: 7 [PH] (ref 5–8)
PHOSPHATE SERPL-MCNC: 2.7 MG/DL
PLATELET # BLD AUTO: 126 K/UL
PMV BLD AUTO: 10.7 FL
POTASSIUM SERPL-SCNC: 4.6 MMOL/L
PROT SERPL-MCNC: 10.6 G/DL
PROT UR QL STRIP: ABNORMAL
PROTHROMBIN TIME: 11.3 SEC
RBC # BLD AUTO: 2.91 M/UL
RBC #/AREA URNS HPF: 1 /HPF (ref 0–4)
SODIUM SERPL-SCNC: 131 MMOL/L
SP GR UR STRIP: 1.01 (ref 1–1.03)
SPECIMEN SOURCE: NORMAL
SQUAMOUS #/AREA URNS HPF: 3 /HPF
TROPONIN I SERPL DL<=0.01 NG/ML-MCNC: 0.04 NG/ML
TSH SERPL DL<=0.005 MIU/L-ACNC: 2.92 UIU/ML
URN SPEC COLLECT METH UR: ABNORMAL
UROBILINOGEN UR STRIP-ACNC: NEGATIVE EU/DL
WBC # BLD AUTO: 6.46 K/UL
WBC #/AREA URNS HPF: 1 /HPF (ref 0–5)

## 2017-05-21 PROCEDURE — 83690 ASSAY OF LIPASE: CPT

## 2017-05-21 PROCEDURE — 21400001 HC TELEMETRY ROOM

## 2017-05-21 PROCEDURE — 85730 THROMBOPLASTIN TIME PARTIAL: CPT

## 2017-05-21 PROCEDURE — 86901 BLOOD TYPING SEROLOGIC RH(D): CPT

## 2017-05-21 PROCEDURE — 83880 ASSAY OF NATRIURETIC PEPTIDE: CPT

## 2017-05-21 PROCEDURE — 86900 BLOOD TYPING SEROLOGIC ABO: CPT

## 2017-05-21 PROCEDURE — 86920 COMPATIBILITY TEST SPIN: CPT

## 2017-05-21 PROCEDURE — 99285 EMERGENCY DEPT VISIT HI MDM: CPT | Mod: 25

## 2017-05-21 PROCEDURE — 85610 PROTHROMBIN TIME: CPT

## 2017-05-21 PROCEDURE — 93010 ELECTROCARDIOGRAM REPORT: CPT | Mod: ,,, | Performed by: INTERNAL MEDICINE

## 2017-05-21 PROCEDURE — 96375 TX/PRO/DX INJ NEW DRUG ADDON: CPT

## 2017-05-21 PROCEDURE — 63600175 PHARM REV CODE 636 W HCPCS: Performed by: FAMILY MEDICINE

## 2017-05-21 PROCEDURE — 81000 URINALYSIS NONAUTO W/SCOPE: CPT

## 2017-05-21 PROCEDURE — 87086 URINE CULTURE/COLONY COUNT: CPT

## 2017-05-21 PROCEDURE — 25000003 PHARM REV CODE 250: Performed by: FAMILY MEDICINE

## 2017-05-21 PROCEDURE — 85025 COMPLETE CBC W/AUTO DIFF WBC: CPT

## 2017-05-21 PROCEDURE — 84145 PROCALCITONIN (PCT): CPT

## 2017-05-21 PROCEDURE — 96365 THER/PROPH/DIAG IV INF INIT: CPT

## 2017-05-21 PROCEDURE — 25000003 PHARM REV CODE 250: Performed by: NURSE PRACTITIONER

## 2017-05-21 PROCEDURE — 84443 ASSAY THYROID STIM HORMONE: CPT

## 2017-05-21 PROCEDURE — 25000003 PHARM REV CODE 250: Performed by: EMERGENCY MEDICINE

## 2017-05-21 PROCEDURE — 96361 HYDRATE IV INFUSION ADD-ON: CPT

## 2017-05-21 PROCEDURE — 84100 ASSAY OF PHOSPHORUS: CPT

## 2017-05-21 PROCEDURE — 87040 BLOOD CULTURE FOR BACTERIA: CPT | Mod: 59

## 2017-05-21 PROCEDURE — 83735 ASSAY OF MAGNESIUM: CPT

## 2017-05-21 PROCEDURE — 87400 INFLUENZA A/B EACH AG IA: CPT

## 2017-05-21 PROCEDURE — 63600175 PHARM REV CODE 636 W HCPCS: Performed by: NURSE PRACTITIONER

## 2017-05-21 PROCEDURE — 83605 ASSAY OF LACTIC ACID: CPT

## 2017-05-21 PROCEDURE — 36410 VNPNXR 3YR/> PHY/QHP DX/THER: CPT

## 2017-05-21 PROCEDURE — 80053 COMPREHEN METABOLIC PANEL: CPT

## 2017-05-21 PROCEDURE — 83615 LACTATE (LD) (LDH) ENZYME: CPT

## 2017-05-21 PROCEDURE — 84484 ASSAY OF TROPONIN QUANT: CPT

## 2017-05-21 RX ORDER — DARUNAVIR 800 MG/1
800 TABLET, FILM COATED ORAL
Status: DISCONTINUED | OUTPATIENT
Start: 2017-05-22 | End: 2017-05-24

## 2017-05-21 RX ORDER — SODIUM CHLORIDE 9 MG/ML
INJECTION, SOLUTION INTRAVENOUS CONTINUOUS
Status: DISCONTINUED | OUTPATIENT
Start: 2017-05-21 | End: 2017-05-21

## 2017-05-21 RX ORDER — CEFEPIME HYDROCHLORIDE 1 G/50ML
1 INJECTION, SOLUTION INTRAVENOUS
Status: COMPLETED | OUTPATIENT
Start: 2017-05-21 | End: 2017-05-21

## 2017-05-21 RX ORDER — ACETAMINOPHEN 325 MG/1
650 TABLET ORAL
Status: DISCONTINUED | OUTPATIENT
Start: 2017-05-21 | End: 2017-05-22

## 2017-05-21 RX ORDER — ONDANSETRON 2 MG/ML
4 INJECTION INTRAMUSCULAR; INTRAVENOUS EVERY 6 HOURS PRN
Status: DISCONTINUED | OUTPATIENT
Start: 2017-05-21 | End: 2017-05-28 | Stop reason: HOSPADM

## 2017-05-21 RX ORDER — SODIUM CHLORIDE 9 MG/ML
1000 INJECTION, SOLUTION INTRAVENOUS
Status: COMPLETED | OUTPATIENT
Start: 2017-05-21 | End: 2017-05-21

## 2017-05-21 RX ORDER — ACETAMINOPHEN 650 MG/20.3ML
650 LIQUID ORAL
Status: COMPLETED | OUTPATIENT
Start: 2017-05-21 | End: 2017-05-21

## 2017-05-21 RX ORDER — ACETAMINOPHEN 650 MG/20.3ML
650 LIQUID ORAL EVERY 6 HOURS PRN
Status: DISCONTINUED | OUTPATIENT
Start: 2017-05-21 | End: 2017-05-22

## 2017-05-21 RX ORDER — MEROPENEM AND SODIUM CHLORIDE 500 MG/50ML
500 INJECTION, SOLUTION INTRAVENOUS
Status: DISCONTINUED | OUTPATIENT
Start: 2017-05-21 | End: 2017-05-22

## 2017-05-21 RX ORDER — CEFEPIME HYDROCHLORIDE 1 G/50ML
1 INJECTION, SOLUTION INTRAVENOUS
Status: DISCONTINUED | OUTPATIENT
Start: 2017-05-22 | End: 2017-05-21

## 2017-05-21 RX ORDER — HEPARIN SODIUM 5000 [USP'U]/ML
5000 INJECTION, SOLUTION INTRAVENOUS; SUBCUTANEOUS EVERY 8 HOURS
Status: DISCONTINUED | OUTPATIENT
Start: 2017-05-21 | End: 2017-05-28 | Stop reason: HOSPADM

## 2017-05-21 RX ORDER — IBUPROFEN 400 MG/1
400 TABLET ORAL EVERY 6 HOURS PRN
Status: DISCONTINUED | OUTPATIENT
Start: 2017-05-21 | End: 2017-05-26

## 2017-05-21 RX ADMIN — MEROPENEM AND SODIUM CHLORIDE 500 MG: 500 INJECTION, SOLUTION INTRAVENOUS at 09:05

## 2017-05-21 RX ADMIN — SODIUM CHLORIDE 1362 ML: 0.9 INJECTION, SOLUTION INTRAVENOUS at 02:05

## 2017-05-21 RX ADMIN — CEFEPIME HYDROCHLORIDE 1 G: 1 INJECTION, POWDER, FOR SOLUTION INTRAMUSCULAR; INTRAVENOUS at 04:05

## 2017-05-21 RX ADMIN — HEPARIN SODIUM 5000 UNITS: 5000 INJECTION, SOLUTION INTRAVENOUS; SUBCUTANEOUS at 09:05

## 2017-05-21 RX ADMIN — SODIUM CHLORIDE 1000 ML: 0.9 INJECTION, SOLUTION INTRAVENOUS at 08:05

## 2017-05-21 RX ADMIN — SODIUM CHLORIDE 1000 ML: 0.9 INJECTION, SOLUTION INTRAVENOUS at 04:05

## 2017-05-21 RX ADMIN — SODIUM CHLORIDE 1000 ML: 0.9 INJECTION, SOLUTION INTRAVENOUS at 05:05

## 2017-05-21 RX ADMIN — IBUPROFEN 400 MG: 400 TABLET, FILM COATED ORAL at 06:05

## 2017-05-21 RX ADMIN — ONDANSETRON 4 MG: 2 INJECTION INTRAMUSCULAR; INTRAVENOUS at 06:05

## 2017-05-21 RX ADMIN — VANCOMYCIN HYDROCHLORIDE 1000 MG: 1 INJECTION, POWDER, LYOPHILIZED, FOR SOLUTION INTRAVENOUS at 06:05

## 2017-05-21 RX ADMIN — ACETAMINOPHEN 650 MG: 160 SOLUTION ORAL at 03:05

## 2017-05-21 NOTE — ASSESSMENT & PLAN NOTE
She is tachycardia and hypotensive but I think she tends to run on the lower side with her BP  Check lactic acid, continue with IV fluids will have to hold beta blocker as she was started on during last hospitalization.

## 2017-05-21 NOTE — ED NOTES
Pt resting comfortably.  NS infusing at [facility] for 1L bolus.  Waiting for inpatient bed assignment.Will continue to monitor.
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
## 2017-05-21 NOTE — HPI
Ms. Kebede is a 30 yo AAF with h/o AIDS presented complaining of fever with Tmax 103. She was seen in the ED on yesterday complaining of fever and right ear main. She was given Augmentin and discharged home. She was recently hospitalized for severe sepsis where she was in the ICU on pressors after abdominal procedure. She states she has been compliant with her medications since discharge. She denies any sick contacts. She denies any cough, SOB, abdominal pain or diarrhea.

## 2017-05-21 NOTE — ASSESSMENT & PLAN NOTE
Currently there is no obvious source as she does give history of otalgia but wax of ear prevented complete ear exam.  She has been on Augmentin after being seen in the ED on yesterday.  -Plan is to swab for flu, may need ENT to get good exam to rule otitis media but in the meantime will empirically treat with Cefepime and Vancomycin, follow up on blood cultures and ID consult

## 2017-05-21 NOTE — ASSESSMENT & PLAN NOTE
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 Again I am going to hydrate and monitor I/O's closely as her lactic acid was 0.7 for now but will repeat in a few hours.
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
## 2017-05-22 LAB
ALBUMIN SERPL BCP-MCNC: 2.2 G/DL
ALP SERPL-CCNC: 47 U/L
ALT SERPL W/O P-5'-P-CCNC: 10 U/L
ANION GAP SERPL CALC-SCNC: 7 MMOL/L
AST SERPL-CCNC: 26 U/L
BACTERIA UR CULT: NO GROWTH
BASOPHILS # BLD AUTO: ABNORMAL K/UL
BASOPHILS NFR BLD: 0 %
BILIRUB SERPL-MCNC: 0.3 MG/DL
BUN SERPL-MCNC: 29 MG/DL
CALCIUM SERPL-MCNC: 7 MG/DL
CHLORIDE SERPL-SCNC: 115 MMOL/L
CLARITY CSF: CLEAR
CO2 SERPL-SCNC: 14 MMOL/L
COLOR CSF: COLORLESS
CREAT SERPL-MCNC: 1.8 MG/DL
DIFFERENTIAL METHOD: ABNORMAL
EOSINOPHIL # BLD AUTO: ABNORMAL K/UL
EOSINOPHIL NFR BLD: 1 %
ERYTHROCYTE [DISTWIDTH] IN BLOOD BY AUTOMATED COUNT: 17.5 %
EST. GFR  (AFRICAN AMERICAN): 42 ML/MIN/1.73 M^2
EST. GFR  (NON AFRICAN AMERICAN): 37 ML/MIN/1.73 M^2
GLUCOSE CSF-MCNC: 52 MG/DL
GLUCOSE SERPL-MCNC: 109 MG/DL
HCT VFR BLD AUTO: 21.4 %
HGB BLD-MCNC: 7 G/DL
INDIA INK PREP CSF: NORMAL
LACTATE SERPL-SCNC: 0.6 MMOL/L
LYMPHOCYTES # BLD AUTO: ABNORMAL K/UL
LYMPHOCYTES NFR BLD: 12 %
LYMPHOCYTES NFR CSF MANUAL: 100 %
MAGNESIUM SERPL-MCNC: 1.1 MG/DL
MCH RBC QN AUTO: 28.2 PG
MCHC RBC AUTO-ENTMCNC: 32.7 %
MCV RBC AUTO: 86 FL
MONOCYTES # BLD AUTO: ABNORMAL K/UL
MONOCYTES NFR BLD: 3 %
NEUTROPHILS NFR BLD: 84 %
PHOSPHATE SERPL-MCNC: 2.8 MG/DL
PLATELET # BLD AUTO: 91 K/UL
PMV BLD AUTO: 10.1 FL
POTASSIUM SERPL-SCNC: 3.8 MMOL/L
PROCALCITONIN SERPL IA-MCNC: 13.72 NG/ML
PROT CSF-MCNC: 32 MG/DL
PROT SERPL-MCNC: 8.4 G/DL
RBC # BLD AUTO: 2.48 M/UL
RBC # CSF: 1 /CU MM
SODIUM SERPL-SCNC: 136 MMOL/L
SPECIMEN VOL CSF: 4 ML
WBC # BLD AUTO: 5.06 K/UL
WBC # CSF: 12 /CU MM

## 2017-05-22 PROCEDURE — 87210 SMEAR WET MOUNT SALINE/INK: CPT

## 2017-05-22 PROCEDURE — 83735 ASSAY OF MAGNESIUM: CPT

## 2017-05-22 PROCEDURE — 25000003 PHARM REV CODE 250: Performed by: INTERNAL MEDICINE

## 2017-05-22 PROCEDURE — 83605 ASSAY OF LACTIC ACID: CPT

## 2017-05-22 PROCEDURE — 63600175 PHARM REV CODE 636 W HCPCS: Performed by: INTERNAL MEDICINE

## 2017-05-22 PROCEDURE — 87205 SMEAR GRAM STAIN: CPT

## 2017-05-22 PROCEDURE — 63600175 PHARM REV CODE 636 W HCPCS: Performed by: FAMILY MEDICINE

## 2017-05-22 PROCEDURE — 87070 CULTURE OTHR SPECIMN AEROBIC: CPT

## 2017-05-22 PROCEDURE — 25000003 PHARM REV CODE 250: Performed by: FAMILY MEDICINE

## 2017-05-22 PROCEDURE — 84157 ASSAY OF PROTEIN OTHER: CPT

## 2017-05-22 PROCEDURE — 36569 INSJ PICC 5 YR+ W/O IMAGING: CPT

## 2017-05-22 PROCEDURE — 02HV33Z INSERTION OF INFUSION DEVICE INTO SUPERIOR VENA CAVA, PERCUTANEOUS APPROACH: ICD-10-PCS | Performed by: FAMILY MEDICINE

## 2017-05-22 PROCEDURE — 21400001 HC TELEMETRY ROOM

## 2017-05-22 PROCEDURE — 84100 ASSAY OF PHOSPHORUS: CPT

## 2017-05-22 PROCEDURE — 25000003 PHARM REV CODE 250: Performed by: EMERGENCY MEDICINE

## 2017-05-22 PROCEDURE — 85027 COMPLETE CBC AUTOMATED: CPT

## 2017-05-22 PROCEDURE — 87529 HSV DNA AMP PROBE: CPT

## 2017-05-22 PROCEDURE — 80053 COMPREHEN METABOLIC PANEL: CPT

## 2017-05-22 PROCEDURE — 85007 BL SMEAR W/DIFF WBC COUNT: CPT

## 2017-05-22 PROCEDURE — 009U3ZX DRAINAGE OF SPINAL CANAL, PERCUTANEOUS APPROACH, DIAGNOSTIC: ICD-10-PCS | Performed by: RADIOLOGY

## 2017-05-22 PROCEDURE — 89051 BODY FLUID CELL COUNT: CPT

## 2017-05-22 PROCEDURE — 82945 GLUCOSE OTHER FLUID: CPT

## 2017-05-22 PROCEDURE — 36415 COLL VENOUS BLD VENIPUNCTURE: CPT

## 2017-05-22 RX ORDER — ATOVAQUONE 750 MG/5ML
750 SUSPENSION ORAL EVERY 12 HOURS
Status: DISCONTINUED | OUTPATIENT
Start: 2017-05-22 | End: 2017-05-28 | Stop reason: HOSPADM

## 2017-05-22 RX ORDER — METOPROLOL TARTRATE 25 MG/1
25 TABLET, FILM COATED ORAL 2 TIMES DAILY
Status: DISCONTINUED | OUTPATIENT
Start: 2017-05-22 | End: 2017-05-23

## 2017-05-22 RX ORDER — METOPROLOL TARTRATE 1 MG/ML
5 INJECTION, SOLUTION INTRAVENOUS ONCE
Status: COMPLETED | OUTPATIENT
Start: 2017-05-22 | End: 2017-05-22

## 2017-05-22 RX ORDER — ACETAMINOPHEN 10 MG/ML
1000 INJECTION, SOLUTION INTRAVENOUS EVERY 8 HOURS
Status: COMPLETED | OUTPATIENT
Start: 2017-05-22 | End: 2017-05-22

## 2017-05-22 RX ORDER — DIPHENHYDRAMINE HYDROCHLORIDE 50 MG/ML
50 INJECTION INTRAMUSCULAR; INTRAVENOUS ONCE AS NEEDED
Status: ACTIVE | OUTPATIENT
Start: 2017-05-22 | End: 2017-05-22

## 2017-05-22 RX ORDER — SODIUM CHLORIDE 9 MG/ML
INJECTION, SOLUTION INTRAVENOUS CONTINUOUS
Status: DISCONTINUED | OUTPATIENT
Start: 2017-05-22 | End: 2017-05-24

## 2017-05-22 RX ORDER — MEROPENEM AND SODIUM CHLORIDE 500 MG/50ML
500 INJECTION, SOLUTION INTRAVENOUS
Status: DISCONTINUED | OUTPATIENT
Start: 2017-05-22 | End: 2017-05-24

## 2017-05-22 RX ORDER — AZITHROMYCIN 250 MG/1
1250 TABLET, FILM COATED ORAL WEEKLY
Status: DISCONTINUED | OUTPATIENT
Start: 2017-05-22 | End: 2017-05-28 | Stop reason: HOSPADM

## 2017-05-22 RX ORDER — MEROPENEM AND SODIUM CHLORIDE 500 MG/50ML
500 INJECTION, SOLUTION INTRAVENOUS
Status: DISCONTINUED | OUTPATIENT
Start: 2017-05-22 | End: 2017-05-22

## 2017-05-22 RX ADMIN — CEFTRIAXONE 2 G: 2 INJECTION, SOLUTION INTRAVENOUS at 03:05

## 2017-05-22 RX ADMIN — SODIUM CHLORIDE: 0.9 INJECTION, SOLUTION INTRAVENOUS at 03:05

## 2017-05-22 RX ADMIN — ACETAMINOPHEN 1000 MG: 10 INJECTION, SOLUTION INTRAVENOUS at 06:05

## 2017-05-22 RX ADMIN — METOPROLOL TARTRATE 25 MG: 25 TABLET ORAL at 08:05

## 2017-05-22 RX ADMIN — HEPARIN SODIUM 5000 UNITS: 5000 INJECTION, SOLUTION INTRAVENOUS; SUBCUTANEOUS at 01:05

## 2017-05-22 RX ADMIN — IBUPROFEN 400 MG: 400 TABLET, FILM COATED ORAL at 04:05

## 2017-05-22 RX ADMIN — AZITHROMYCIN 1250 MG: 250 TABLET, FILM COATED ORAL at 05:05

## 2017-05-22 RX ADMIN — MEROPENEM AND SODIUM CHLORIDE 500 MG: 500 INJECTION, SOLUTION INTRAVENOUS at 03:05

## 2017-05-22 RX ADMIN — ATOVAQUONE 750 MG: 750 SUSPENSION ORAL at 05:05

## 2017-05-22 RX ADMIN — HEPARIN SODIUM 5000 UNITS: 5000 INJECTION, SOLUTION INTRAVENOUS; SUBCUTANEOUS at 09:05

## 2017-05-22 RX ADMIN — HEPARIN SODIUM 5000 UNITS: 5000 INJECTION, SOLUTION INTRAVENOUS; SUBCUTANEOUS at 06:05

## 2017-05-22 RX ADMIN — IBUPROFEN 400 MG: 400 TABLET, FILM COATED ORAL at 12:05

## 2017-05-22 RX ADMIN — DARUNAVIR 800 MG: 800 TABLET, FILM COATED ORAL at 12:05

## 2017-05-22 RX ADMIN — MEROPENEM AND SODIUM CHLORIDE 500 MG: 500 INJECTION, SOLUTION INTRAVENOUS at 08:05

## 2017-05-22 RX ADMIN — METOPROLOL TARTRATE 5 MG: 5 INJECTION INTRAVENOUS at 01:05

## 2017-05-22 RX ADMIN — IBUPROFEN 400 MG: 400 TABLET, FILM COATED ORAL at 08:05

## 2017-05-22 RX ADMIN — DEXTROSE 270 MG: 50 INJECTION, SOLUTION INTRAVENOUS at 05:05

## 2017-05-22 RX ADMIN — MEROPENEM AND SODIUM CHLORIDE 500 MG: 500 INJECTION, SOLUTION INTRAVENOUS at 11:05

## 2017-05-22 RX ADMIN — METOPROLOL TARTRATE 25 MG: 25 TABLET ORAL at 11:05

## 2017-05-22 NOTE — OP NOTE
Procedure was performed by the radiologist.  Sterile technique was performed in the lower back, lidocaine was used as a local anesthetic.  OP 18cm of h2o and 11 ccs of clear csf to lab for eval.  Pt tolerated the procedure well without immediate complications.  Please see radiologist report for details. F/u with PCP and/or ordering physician.

## 2017-05-22 NOTE — PROGRESS NOTES
MD Cabello advised that is was okay to have PICC line inserted.  Both Eusebia SPRING And Myself witnessed the consent with the patient.

## 2017-05-22 NOTE — INTERVAL H&P NOTE
The patient has been examined and the H&P has been reviewed:    I concur with the findings and no changes have occurred since H&P was written.        Active Hospital Problems    Diagnosis  POA    Fever [R50.9]  Yes    LELAND (acute kidney injury) [N17.9]  Yes    S/P implantation of automatic cardioverter/defibrillator (AICD) [Z95.810]  Yes    HIV (human immunodeficiency virus infection) [Z21]  Yes      Resolved Hospital Problems    Diagnosis Date Resolved POA   No resolved problems to display.

## 2017-05-22 NOTE — ASSESSMENT & PLAN NOTE
She is tachycardia and hypotensive but I think she tends to run on the lower side with her BP  Check lactic acid, continue with IV fluids will have to hold beta blocker as she was started on during last hospitalization.    5/22/17: she is still tachycardic will attempt to restart her beta blocker if BP tolerates.

## 2017-05-22 NOTE — PLAN OF CARE
"   05/22/17 1320   Readmission Questionnaire   At the time of your discharge, did someone talk to you about what your health problems were? Yes   At the time of discharge, did someone talk to you about what to watch out for regarding worsening of your health problem? Yes   At the time of discharge, did someone talk to you about what to do if you experienced worsening of your health problem? Yes   At the time of discharge, did someone talk to you about which medication to take when you left the hospital and which ones to stop taking? Yes   At the time of discharge, did someone talk to you about when and where to follow up with a doctor after you left the hospital? Yes   What do you believe caused you to be sick enough to be re-admitted? "Fever and an earache"   How often do you need to have someone help you when you read instructions, pamphlets, or other written material from your doctor or pharmacy? Sometimes   Do you have problems taking your medications as prescribed? No   Do you have any problems affording any of  your prescribed medications? No   Do you have problems obtaining/receiving your medications? No   Does the patient have transportation to healthcare appointments? Yes   Lives With significant other;child(nalini), dependent  (Boyfriend and children (ages 18, 17, and 13))   Living Arrangements house   Does the patient have family/friends to help with healtcare needs after discharge? yes   Who are your caregiver(s) and their phone number(s)? Lyssa Kebede, Mother: [phone]   Does your caregiver provide all the help you need? Yes   Are you currently feeling confused? No   Are you currently having problems thinking? No   Are you currently having memory problems? No   Have you felt down, depressed, or hopeless? 1  ("Due to health problems")   Have you felt little interest or pleasure in doing things? 1   In the last 7 days, my sleep quality was: poor     "

## 2017-05-22 NOTE — NURSING
Patient is tachycardic in the 150's-160's. Notified Dr. Wills and he ordered one time dose of 5mg iv Metoprolol. Will administer and continue to monitor.

## 2017-05-22 NOTE — PLAN OF CARE
Met with patient at bedside for Readmission Questionnaire and Discharge Planning.  Patient known to this  from most recent/previous admission (04/14/17-04/21/17) at [facility].  Patient states that she began running a high fever and presented to ER with fever and earache, received an injection, and discharged home.  Patient indicates her fever and symptoms worsened; thus, reason for this admission.  Patient reports that HH services (SN, PT, OT) were working well for her at home and states that she wants them resumed via [facility] upon discharge.  Patient unable to sign Patient Preference Form at this time due to being on a warming blanket and feeling too poorly.      PLAN:  Will continue to follow and need to obtain Patient Preference Form indicating HH preference as [facility]       05/22/17 1321   Discharge Assessment   Assessment Type Discharge Planning Assessment   Confirmed/corrected address and phone number on facesheet? Yes   Assessment information obtained from? Patient;Medical Record   Expected Length of Stay (days) (TBD)   Communicated expected length of stay with patient/caregiver yes   Type of Healthcare Directive Received (Does not have one and not interested in information about AD at this time )   Prior to hospitilization cognitive status: Alert/Oriented   Prior to hospitalization functional status: Assistive Equipment   Current cognitive status: Alert/Oriented   Current Functional Status: Assistive Equipment   Arrived From admitted as an inpatient;home health   Lives With child(nalini), dependent;significant other  (Resides with boyfriend and children (ages 18, 17, and 13))   Able to Return to Prior Arrangements yes   Is patient able to care for self after discharge? Unable to determine at this time (comments)   Does the patient have family/friends to help with healtcare needs after discharge? yes   How many people do you have in your home that can help with your care after discharge?  2   Who are your caregiver(s) and their phone number(s)? Lyssa Kebede, Mother: [phone]   Patient's perception of discharge disposition home health   Readmission Within The Last 30 Days current reason for admission unrelated to previous admission   Patient currently being followed by outpatient case management? No   Patient currently receives home health services? Yes   Patient previously received home health services and would like to resume services if necessary? Yes   If yes, name of home health provider: [facility]   Does the patient currently use HME? Yes   Name and contact number for HME provider: [facility]: [phone]   Patient currently receives private duty nursing? No   Patient currently receives any other outside agency services? No   Equipment Currently Used at Home walker, rolling   Do you have any problems affording any of your prescribed medications? No   Is the patient taking medications as prescribed? yes   Do you have any financial concerns preventing you from receiving the healthcare you need? No   Does the patient have transportation to healthcare appointments? Yes   Transportation Available family or friend will provide;Medicaid transportation   On Dialysis? No   Does the patient receive services at the [facility]? No   Are there any open cases? No   Discharge Plan A Home with family;Home Health   Discharge Plan B Home with family   Patient/Family In Agreement With Plan yes

## 2017-05-22 NOTE — PROGRESS NOTES
Patient has been explained that a picc line will need to be placed.  Patient educated on the purpose of the PICC line.  She stated her understanding.  Both Eusebia SPRING And Myself signed consent.

## 2017-05-22 NOTE — NURSING
Patient's temp 105.0 after receiving Ibuprofen for temp of 101.8 oral. Notified charge nurse and Dr. Wills. Administered acetaminophen 1,000mg IV. Will continue to monitor.

## 2017-05-22 NOTE — PLAN OF CARE
Problem: Patient Care Overview  Goal: Plan of Care Review  Outcome: Ongoing (interventions implemented as appropriate)  Patient resting in bed. Temperature has decrease, sinus tach on the monitor. No s/s of distress. Will continue to monitor.

## 2017-05-22 NOTE — SUBJECTIVE & OBJECTIVE
Interval History: 30 yo AAF with AIDS admitted with febrile illness and currently no obvious source    Review of Systems   Constitutional: Positive for chills and fatigue.   Eyes: Negative.    Respiratory: Negative.    Cardiovascular: Negative.    Gastrointestinal: Negative.    Endocrine: Negative.    Genitourinary: Negative.    Allergic/Immunologic: Positive for immunocompromised state.   Neurological: Negative.    Hematological: Negative.    Psychiatric/Behavioral: Negative.      Objective:     Vital Signs (Most Recent):  Temp: 99 °F (37.2 °C) (05/22/17 0906)  Pulse: (!) 130 (05/22/17 0751)  Resp: 18 (05/22/17 0751)  BP: (!) 85/51 (05/22/17 0752)  SpO2: 95 % (05/22/17 0751) Vital Signs (24h Range):  Temp:  [99 °F (37.2 °C)-105 °F (40.6 °C)] 99 °F (37.2 °C)  Pulse:  [130-164] 130  Resp:  [18-29] 18  SpO2:  [95 %-100 %] 95 %  BP: ()/(40-78) 85/51     Weight: 45.4 kg (100 lb)  Body mass index is 18.89 kg/m².  No intake or output data in the 24 hours ending 05/22/17 1002   Physical Exam   Constitutional: She is oriented to person, place, and time. She appears cachectic. She is sleeping.   HENT:   Head: Normocephalic and atraumatic.   Nose: Nose normal.   Mouth/Throat: No oropharyngeal exudate.   Eyes: EOM are normal. Pupils are equal, round, and reactive to light.   Neck: Normal range of motion. Neck supple.   Cardiovascular: Regular rhythm, S1 normal and S2 normal.  Tachycardia present.    Pulmonary/Chest: Effort normal and breath sounds normal.   Abdominal: Soft. Bowel sounds are normal.   Musculoskeletal: Normal range of motion.   Neurological: She is alert and oriented to person, place, and time.   Skin: Skin is warm and dry.   Nursing note and vitals reviewed.      Significant Labs:   Recent Lab Results       05/22/17  0610 05/21/17  1812 05/21/17  1751 05/21/17  1604 05/21/17  1445      Albumin 2.2(L)         Alkaline Phosphatase 47(L)         ALT 10         Anion Gap 7(L)         Appearance, UA    Clear       aPTT          AST 26         Bacteria, UA    Rare      Baso # CANCELED  Comment:  Result canceled by the ancillary         Basophil% 0.0         Bilirubin (UA)    Negative      Total Bilirubin 0.3  Comment:  For infants and newborns, interpretation of results should be based  on gestational age, weight and in agreement with clinical  observations.  Premature Infant recommended reference ranges:  Up to 24 hours.............<8.0 mg/dL  Up to 48 hours............<12.0 mg/dL  3-5 days..................<15.0 mg/dL  6-29 days.................<15.0 mg/dL           Blood Culture, Routine     No Growth to date[P]     BNP          BUN, Bld 29(H)         Calcium 7.0(L)         Chloride 115(H)         CO2 14(L)         Color, UA    Yellow      Creatinine 1.8(H)         Differential Method Manual  Comment:  Corrected result; previously reported as Automated on 05/22/2017 at   06:49.  [C]         eGFR if  42(A)         eGFR if non  37  Comment:  Calculation used to obtain the estimated glomerular filtration  rate (eGFR) is the CKD-EPI equation. Since race is unknown   in our information system, the eGFR values for   -American and Non--American patients are given   for each creatinine result.  (A)         Eos # CANCELED  Comment:  Result canceled by the ancillary         Eosinophil% 1.0         Flu A & B Source  Nasal Swab        Glucose 109         Glucose, UA    Negative      Gran #          Gran% 84.0(H)         Group & Rh          Hematocrit 21.4(L)         Hemoglobin 7.0(L)         Hyaline Casts, UA    0      INDIRECT LYNDON          Influenza A Ag, EIA  Negative        Influenza B Ag, EIA  Negative        Coumadin Monitoring INR          Ketones, UA    Negative      Lactate, Atul 0.6  Comment:  Falsely low lactic acid results can be found in samples   containing >=13.0 mg/dL total bilirubin and/or >=3.5 mg/dL   direct bilirubin.    0.6  Comment:  Falsely low lactic acid results  can be found in samples   containing >=13.0 mg/dL total bilirubin and/or >=3.5 mg/dL   direct bilirubin.         LD          Leukocytes, UA    Negative      Lipase          Lymph # CANCELED  Comment:  Result canceled by the ancillary         Lymph% 12.0(L)         Magnesium 1.1(L)         MCH 28.2         MCHC 32.7         MCV 86         Microscopic Comment    SEE COMMENT  Comment:  Other formed elements not mentioned in the report are not   present in the microscopic examination.         Mono # CANCELED  Comment:  Result canceled by the ancillary         Mono% 3.0(L)         MPV 10.1         Nitrite, UA    Negative      Occult Blood UA    2+(A)      pH, UA    7.0      Phosphorus 2.8         Platelets 91(L)         Potassium 3.8         Total Protein 8.4         Protein, UA    3+  Comment:  Recommend a 24 hour urine protein or a urine   protein/creatinine ratio if globulin induced proteinuria is  clinically suspected.  (A)      Protime          RBC 2.48(L)         RBC, UA    1      RDW 17.5(H)         Sodium 136         Specific [place], UA    1.015      Specimen UA    Urine, Catheterized      Squam Epithel, UA    3      Troponin I          TSH          Urobilinogen, UA    Negative      WBC, UA    1      WBC 5.06                     05/21/17  1444 05/21/17  1440      Albumin 2.8(L)      Alkaline Phosphatase 66      ALT 16      Anion Gap 8      Appearance, UA       aPTT 36.9  Comment:  aPTT therapeutic range = 39-69 seconds(H)      AST 31      Bacteria, UA       Baso # 0.01      Basophil% 0.2      Bilirubin (UA)       Total Bilirubin 0.3  Comment:  For infants and newborns, interpretation of results should be based  on gestational age, weight and in agreement with clinical  observations.  Premature Infant recommended reference ranges:  Up to 24 hours.............<8.0 mg/dL  Up to 48 hours............<12.0 mg/dL  3-5 days..................<15.0 mg/dL  6-29 days.................<15.0 mg/dL        Blood Culture, Routine   No Growth to date[P]     BNP 72  Comment:  Values of less than 100 pg/ml are consistent with non-CHF populations.      BUN, Bld 29(H)      Calcium 8.2(L)      Chloride 105      CO2 18(L)      Color, UA       Creatinine 1.9(H)      Differential Method Automated      eGFR if  40(A)      eGFR if non  34  Comment:  Calculation used to obtain the estimated glomerular filtration  rate (eGFR) is the CKD-EPI equation. Since race is unknown   in our information system, the eGFR values for   -American and Non--American patients are given   for each creatinine result.  (A)      Eos # 0.0      Eosinophil% 0.0      Flu A & B Source       Glucose 103      Glucose, UA       Gran # 5.7      Gran% 88.2(H)      Group & Rh O POS      Hematocrit 25.0(L)      Hemoglobin 8.3(L)      Hyaline Casts, UA       INDIRECT LYNDON NEG      Influenza A Ag, EIA       Influenza B Ag, EIA       Coumadin Monitoring INR 1.1  Comment:  Coumadin Therapy:  2.0 - 3.0 for INR for all indicators except mechanical heart valves  and antiphospholipid syndromes which should use 2.5 - 3.5.        Ketones, UA       Lactate, Atul 0.7  Comment:  Falsely low lactic acid results can be found in samples   containing >=13.0 mg/dL total bilirubin and/or >=3.5 mg/dL   direct bilirubin.          Comment:  Results are increased in hemolyzed samples.(H)      Leukocytes, UA       Lipase 89(H)      Lymph # 0.6(L)      Lymph% 9.4(L)      Magnesium 1.4(L)      MCH 28.5      MCHC 33.2      MCV 86      Microscopic Comment       Mono # 0.2(L)      Mono% 2.5(L)      MPV 10.7      Nitrite, UA       Occult Blood UA       pH, UA       Phosphorus 2.7      Platelets 126(L)      Potassium 4.6      Total Protein 10.6(H)      Protein, UA       Protime 11.3      RBC 2.91(L)      RBC, UA       RDW 17.2(H)      Sodium 131(L)      Specific [place], UA       Specimen UA       Squam Epithel, UA       Troponin I 0.036  Comment:  The reference  interval for Troponin I represents the 99th percentile   cutoff   for our facility and is consistent with 3rd generation assay   performance.  (H)      TSH 2.920      Urobilinogen, UA       WBC, UA       WBC 6.46            Significant Imaging:   Imaging Results          CT Head Without Contrast (Final result)  Result time 05/22/17 07:59:23    Final result by Hector Mann MD (05/22/17 07:59:23)                 Impression:             No CT evidence of acute intracranial abnormality.        All CT scans at this facility use dose modulation, iterative reconstruction and/or weight based dosing when appropriate to reduce radiation dose to as low as reasonably achievable.       Electronically signed by: HECTOR MANN MD  Date:     05/22/17  Time:    07:59              Narrative:    Exam: CT HEAD WITHOUT CONTRAST    Clinical History:   Acute confusion and altered mental status. Initial encounter.Confusion       Technique: Axial CT imaging was performed through the head without intravenous contrast.     Findings:    No hydrocephalus, midline shift, mass effect, or acute intracranial hemorrhage. Cortical sulcal pattern and gray-white matter differentiation is normal. Basilar cisterns and posterior fossa are normal. The visualized paranasal sinuses and mastoid air cells are clear. The skull is intact.                             CT Chest Without Contrast (Final result)  Result time 05/21/17 17:29:14    Final result by Ghanshyam Haq III, MD (05/21/17 17:29:14)                 Impression:        1. Scattered small mediastinal lobes with a few scattered small axillary nodes with no mass or bulky adenopathy.    2. No consolidation or effusion. Minimal basilar atelectasis or scarring suggested. No prior CT chest scans for comparison.      Electronically signed by: GHANSHYAM HAQ MD  Date:     05/21/17  Time:    17:29              Narrative:    CT chest without contrast.    Clinical indication: Fever.    Multiplanar  imaging submitted. Heart size is normal. There small mediastinal nodes without bulky adenopathy. No definite hilar mass or gross enlargement and no axillary adenopathy. Small nodes in the axillary regions bilaterally.    The lung fields show no consolidation or effusion. Minimal basilar atelectasis or scarring.    Within the very upper portion of the abdomen, no acute abnormality is seen. Borderline splenic size, incompletely visualized.                             CT Renal Stone Study ABD Pelvis WO (Final result)  Result time 05/21/17 17:43:36    Final result by Ghanshyam Haq III, MD (05/21/17 17:43:36)                 Impression:        1 a. Postop changes in the pelvis. Probable ovarian cysts, greater on right. Detail is limited without contrast.    2. Splenomegaly, unchanged.    3. No additional significant findings. If further detail were needed, consider correlation with a repeat scan with oral and IV contrast.       Electronically signed by: GHANSHYAM HAQ MD  Date:     05/21/17  Time:    17:43              Narrative:    No CT of the abdomen and pelvis without contrast.    Clinical indication: Fever. Abdominal pain.    Lack of IV and oral contrast material grossly limited this examination.    The heart size is normal. The extreme lung bases and in show no infiltrate or effusion. There is no free intraperitoneal air. No bowel obstruction. Bony windows show no acute abnormality.    The liver and spleen show no focal abnormality. Spleen is again enlarged and lobular similar to the study from April.    Surgical clips again noted in the gallbladder fossa. There is no solid renal mass or obstruction. No acute abdominal abnormality is suggested.    Within the pelvis there are radiopaque densities in posterior related to patient's prior surgery. Scattered air-fluid levels noted presumably within the opacified bowel loops. Clinical contrast limits differentiation. Probable ovarian cysts, greater on the  right. Suspected right ovarian cyst measuring up to 4 cm in diameter although this is poorly defined.                             X-Ray Chest AP Portable (Final result)  Result time 05/21/17 14:02:42    Final result by Jim Berg MD (05/21/17 14:02:42)                 Impression:         No acute cardiopulmonary process noted.      Electronically signed by: JIM BERG MD  Date:     05/21/17  Time:    14:02              Narrative:    Exam: Chest X-ray, one view.    History: Sepsis    Findings: No infiltrate or effusion. No definite change from prior exam dated 04/17/2017. Infiltrate on the right has completely resolved. Right IJ central catheter has been removed. AICD remains in place.

## 2017-05-22 NOTE — ASSESSMENT & PLAN NOTE
Currently there is no obvious source as she does give history of otalgia but wax of ear prevented complete ear exam.  She has been on Augmentin after being seen in the ED on yesterday.  -Plan is to swab for flu, may need ENT to get good exam to rule otitis media but in the meantime will empirically treat with Cefepime and Vancomycin, follow up on blood cultures and ID consult    5/22/17: still no obvious source as she is still spiking a temp with Tmax 105. CT head done as per overnight staff she had some sluggish speech and didn't seen like herself but she was sleeping this am and a little sluggish. Plan is to do LP since CT was negative to rule out CNS infection with possible Toxoplasmosis and Cryptococcal given negative CXR, CT abdomen and blood cultures so far.

## 2017-05-22 NOTE — CONSULTS
Wang Kebede [id number] is a 31 y.o. female who has been consulted for vancomycin dosing.  Consult ordered by Dr Cabello    Dx: Fever, Empirical tx, Waiting on ID consult results  Possible Otitis Media   Initial Vancomycin trough goal = 10-15 mcg/mL    Current Antimicrobial Tx: Meropenem 500 mg every 8 hrs, Vancomycin 750 mg pulse dosing   Hx: LELAND, Sepsis,   Received Contrast Media today upon admission in addition to her current LELAND status    Tmax: 103.4 F  The patient has the following labs:     Date Creatinine (mg/dl)    BUN WBC Count   5/21/2017 Estimated Creatinine Clearance: 30.5 mL/min (based on Cr of 1.9). Lab Results   Component Value Date    BUN 29 (H) 05/21/2017     Lab Results   Component Value Date    WBC 6.46 05/21/2017      which calculates to an Estimated Creatinine Clearance: 30.5 mL/min (based on Cr of 1.9)..     T 1/2 = 23 hrs   Current weight is 45.4 kg (100 lb)    The patient received an initial dose of Vancomycin 1 G in ED.  The patient will be started on vancomycin at a dose of 750 mg every 48 hours as a placeholder.  Patient will be pulse dosed. The initial random level is scheduled for 24 hrs post 1st dose. @ 1900 on 5/22     Patient will be followed by pharmacy for changes in renal function, toxicity, and efficacy.      Thank you for allowing us to participate in this patient's care.     Ghanshyam Nayak

## 2017-05-22 NOTE — PROGRESS NOTES
[facility] Hospital Medicine  Progress Note    Patient Name: Wang Kebede  MRN: [id number]  Patient Class: IP- Inpatient   Admission Date: 5/21/2017  Length of Stay: 1 days  Attending Physician: Mara Cabello MD  Primary Care Provider: No primary care provider on file.        Subjective:     Principal Problem:<principal problem not specified>    HPI:  Ms. Kebede is a 30 yo AAF with h/o AIDS presented complaining of fever with Tmax 103. She was seen in the ED on yesterday complaining of fever and right ear main. She was given Augmentin and discharged home. She was recently hospitalized for severe sepsis where she was in the ICU on pressors after abdominal procedure. She states she has been compliant with her medications since discharge. She denies any sick contacts. She denies any cough, SOB, abdominal pain or diarrhea.      Hospital Course:  No notes on file    Interval History: 30 yo AAF with AIDS admitted with febrile illness and currently no obvious source    Review of Systems   Constitutional: Positive for chills and fatigue.   Eyes: Negative.    Respiratory: Negative.    Cardiovascular: Negative.    Gastrointestinal: Negative.    Endocrine: Negative.    Genitourinary: Negative.    Allergic/Immunologic: Positive for immunocompromised state.   Neurological: Negative.    Hematological: Negative.    Psychiatric/Behavioral: Negative.      Objective:     Vital Signs (Most Recent):  Temp: 99 °F (37.2 °C) (05/22/17 0906)  Pulse: (!) 130 (05/22/17 0751)  Resp: 18 (05/22/17 0751)  BP: (!) 85/51 (05/22/17 0752)  SpO2: 95 % (05/22/17 0751) Vital Signs (24h Range):  Temp:  [99 °F (37.2 °C)-105 °F (40.6 °C)] 99 °F (37.2 °C)  Pulse:  [130-164] 130  Resp:  [18-29] 18  SpO2:  [95 %-100 %] 95 %  BP: ()/(40-78) 85/51     Weight: 45.4 kg (100 lb)  Body mass index is 18.89 kg/m².  No intake or output data in the 24 hours ending 05/22/17 1002   Physical Exam   Constitutional: She is oriented  to person, place, and time. She appears cachectic. She is sleeping.   HENT:   Head: Normocephalic and atraumatic.   Nose: Nose normal.   Mouth/Throat: No oropharyngeal exudate.   Eyes: EOM are normal. Pupils are equal, round, and reactive to light.   Neck: Normal range of motion. Neck supple.   Cardiovascular: Regular rhythm, S1 normal and S2 normal.  Tachycardia present.    Pulmonary/Chest: Effort normal and breath sounds normal.   Abdominal: Soft. Bowel sounds are normal.   Musculoskeletal: Normal range of motion.   Neurological: She is alert and oriented to person, place, and time.   Skin: Skin is warm and dry.   Nursing note and vitals reviewed.      Significant Labs:   Recent Lab Results       05/22/17  0610 05/21/17  1812 05/21/17  1751 05/21/17  1604 05/21/17  1445      Albumin 2.2(L)         Alkaline Phosphatase 47(L)         ALT 10         Anion Gap 7(L)         Appearance, UA    Clear      aPTT          AST 26         Bacteria, UA    Rare      Baso # CANCELED  Comment:  Result canceled by the ancillary         Basophil% 0.0         Bilirubin (UA)    Negative      Total Bilirubin 0.3  Comment:  For infants and newborns, interpretation of results should be based  on gestational age, weight and in agreement with clinical  observations.  Premature Infant recommended reference ranges:  Up to 24 hours.............<8.0 mg/dL  Up to 48 hours............<12.0 mg/dL  3-5 days..................<15.0 mg/dL  6-29 days.................<15.0 mg/dL           Blood Culture, Routine     No Growth to date[P]     BNP          BUN, Bld 29(H)         Calcium 7.0(L)         Chloride 115(H)         CO2 14(L)         Color, UA    Yellow      Creatinine 1.8(H)         Differential Method Manual  Comment:  Corrected result; previously reported as Automated on 05/22/2017 at   06:49.  [C]         eGFR if  42(A)         eGFR if non  37  Comment:  Calculation used to obtain the estimated glomerular  filtration  rate (eGFR) is the CKD-EPI equation. Since race is unknown   in our information system, the eGFR values for   -American and Non--American patients are given   for each creatinine result.  (A)         Eos # CANCELED  Comment:  Result canceled by the ancillary         Eosinophil% 1.0         Flu A & B Source  Nasal Swab        Glucose 109         Glucose, UA    Negative      Gran #          Gran% 84.0(H)         Group & Rh          Hematocrit 21.4(L)         Hemoglobin 7.0(L)         Hyaline Casts, UA    0      INDIRECT LYNDON          Influenza A Ag, EIA  Negative        Influenza B Ag, EIA  Negative        Coumadin Monitoring INR          Ketones, UA    Negative      Lactate, Atul 0.6  Comment:  Falsely low lactic acid results can be found in samples   containing >=13.0 mg/dL total bilirubin and/or >=3.5 mg/dL   direct bilirubin.    0.6  Comment:  Falsely low lactic acid results can be found in samples   containing >=13.0 mg/dL total bilirubin and/or >=3.5 mg/dL   direct bilirubin.         LD          Leukocytes, UA    Negative      Lipase          Lymph # CANCELED  Comment:  Result canceled by the ancillary         Lymph% 12.0(L)         Magnesium 1.1(L)         MCH 28.2         MCHC 32.7         MCV 86         Microscopic Comment    SEE COMMENT  Comment:  Other formed elements not mentioned in the report are not   present in the microscopic examination.         Mono # CANCELED  Comment:  Result canceled by the ancillary         Mono% 3.0(L)         MPV 10.1         Nitrite, UA    Negative      Occult Blood UA    2+(A)      pH, UA    7.0      Phosphorus 2.8         Platelets 91(L)         Potassium 3.8         Total Protein 8.4         Protein, UA    3+  Comment:  Recommend a 24 hour urine protein or a urine   protein/creatinine ratio if globulin induced proteinuria is  clinically suspected.  (A)      Protime          RBC 2.48(L)         RBC, UA    1      RDW 17.5(H)         Sodium 136          Specific Pep, UA    1.015      Specimen UA    Urine, Catheterized      Squam Epithel, UA    3      Troponin I          TSH          Urobilinogen, UA    Negative      WBC, UA    1      WBC 5.06                     05/21/17  1444 05/21/17  1440      Albumin 2.8(L)      Alkaline Phosphatase 66      ALT 16      Anion Gap 8      Appearance, UA       aPTT 36.9  Comment:  aPTT therapeutic range = 39-69 seconds(H)      AST 31      Bacteria, UA       Baso # 0.01      Basophil% 0.2      Bilirubin (UA)       Total Bilirubin 0.3  Comment:  For infants and newborns, interpretation of results should be based  on gestational age, weight and in agreement with clinical  observations.  Premature Infant recommended reference ranges:  Up to 24 hours.............<8.0 mg/dL  Up to 48 hours............<12.0 mg/dL  3-5 days..................<15.0 mg/dL  6-29 days.................<15.0 mg/dL        Blood Culture, Routine  No Growth to date[P]     BNP 72  Comment:  Values of less than 100 pg/ml are consistent with non-CHF populations.      BUN, Bld 29(H)      Calcium 8.2(L)      Chloride 105      CO2 18(L)      Color, UA       Creatinine 1.9(H)      Differential Method Automated      eGFR if  40(A)      eGFR if non  34  Comment:  Calculation used to obtain the estimated glomerular filtration  rate (eGFR) is the CKD-EPI equation. Since race is unknown   in our information system, the eGFR values for   -American and Non--American patients are given   for each creatinine result.  (A)      Eos # 0.0      Eosinophil% 0.0      Flu A & B Source       Glucose 103      Glucose, UA       Gran # 5.7      Gran% 88.2(H)      Group & Rh O POS      Hematocrit 25.0(L)      Hemoglobin 8.3(L)      Hyaline Casts, UA       INDIRECT LYNDON NEG      Influenza A Ag, EIA       Influenza B Ag, EIA       Coumadin Monitoring INR 1.1  Comment:  Coumadin Therapy:  2.0 - 3.0 for INR for all indicators except mechanical  heart valves  and antiphospholipid syndromes which should use 2.5 - 3.5.        Ketones, UA       Lactate, Atul 0.7  Comment:  Falsely low lactic acid results can be found in samples   containing >=13.0 mg/dL total bilirubin and/or >=3.5 mg/dL   direct bilirubin.          Comment:  Results are increased in hemolyzed samples.(H)      Leukocytes, UA       Lipase 89(H)      Lymph # 0.6(L)      Lymph% 9.4(L)      Magnesium 1.4(L)      MCH 28.5      MCHC 33.2      MCV 86      Microscopic Comment       Mono # 0.2(L)      Mono% 2.5(L)      MPV 10.7      Nitrite, UA       Occult Blood UA       pH, UA       Phosphorus 2.7      Platelets 126(L)      Potassium 4.6      Total Protein 10.6(H)      Protein, UA       Protime 11.3      RBC 2.91(L)      RBC, UA       RDW 17.2(H)      Sodium 131(L)      Specific [place], UA       Specimen UA       Squam Epithel, UA       Troponin I 0.036  Comment:  The reference interval for Troponin I represents the 99th percentile   cutoff   for our facility and is consistent with 3rd generation assay   performance.  (H)      TSH 2.920      Urobilinogen, UA       WBC, UA       WBC 6.46            Significant Imaging:   Imaging Results          CT Head Without Contrast (Final result)  Result time 05/22/17 07:59:23    Final result by Hector Mann MD (05/22/17 07:59:23)                 Impression:             No CT evidence of acute intracranial abnormality.        All CT scans at this facility use dose modulation, iterative reconstruction and/or weight based dosing when appropriate to reduce radiation dose to as low as reasonably achievable.       Electronically signed by: HECTOR MANN MD  Date:     05/22/17  Time:    07:59              Narrative:    Exam: CT HEAD WITHOUT CONTRAST    Clinical History:   Acute confusion and altered mental status. Initial encounter.Confusion       Technique: Axial CT imaging was performed through the head without intravenous contrast.     Findings:    No  hydrocephalus, midline shift, mass effect, or acute intracranial hemorrhage. Cortical sulcal pattern and gray-white matter differentiation is normal. Basilar cisterns and posterior fossa are normal. The visualized paranasal sinuses and mastoid air cells are clear. The skull is intact.                             CT Chest Without Contrast (Final result)  Result time 05/21/17 17:29:14    Final result by Ghanshyam Haq III, MD (05/21/17 17:29:14)                 Impression:        1. Scattered small mediastinal lobes with a few scattered small axillary nodes with no mass or bulky adenopathy.    2. No consolidation or effusion. Minimal basilar atelectasis or scarring suggested. No prior CT chest scans for comparison.      Electronically signed by: GHANSHYAM HAQ MD  Date:     05/21/17  Time:    17:29              Narrative:    CT chest without contrast.    Clinical indication: Fever.    Multiplanar imaging submitted. Heart size is normal. There small mediastinal nodes without bulky adenopathy. No definite hilar mass or gross enlargement and no axillary adenopathy. Small nodes in the axillary regions bilaterally.    The lung fields show no consolidation or effusion. Minimal basilar atelectasis or scarring.    Within the very upper portion of the abdomen, no acute abnormality is seen. Borderline splenic size, incompletely visualized.                             CT Renal Stone Study ABD Pelvis WO (Final result)  Result time 05/21/17 17:43:36    Final result by Ghanshyam Haq III, MD (05/21/17 17:43:36)                 Impression:        1 a. Postop changes in the pelvis. Probable ovarian cysts, greater on right. Detail is limited without contrast.    2. Splenomegaly, unchanged.    3. No additional significant findings. If further detail were needed, consider correlation with a repeat scan with oral and IV contrast.       Electronically signed by: GHANSHYAM HAQ MD  Date:     05/21/17  Time:    17:43               Narrative:    No CT of the abdomen and pelvis without contrast.    Clinical indication: Fever. Abdominal pain.    Lack of IV and oral contrast material grossly limited this examination.    The heart size is normal. The extreme lung bases and in show no infiltrate or effusion. There is no free intraperitoneal air. No bowel obstruction. Bony windows show no acute abnormality.    The liver and spleen show no focal abnormality. Spleen is again enlarged and lobular similar to the study from April.    Surgical clips again noted in the gallbladder fossa. There is no solid renal mass or obstruction. No acute abdominal abnormality is suggested.    Within the pelvis there are radiopaque densities in posterior related to patient's prior surgery. Scattered air-fluid levels noted presumably within the opacified bowel loops. Clinical contrast limits differentiation. Probable ovarian cysts, greater on the right. Suspected right ovarian cyst measuring up to 4 cm in diameter although this is poorly defined.                             X-Ray Chest AP Portable (Final result)  Result time 05/21/17 14:02:42    Final result by Jim Berg MD (05/21/17 14:02:42)                 Impression:         No acute cardiopulmonary process noted.      Electronically signed by: JIM BERG MD  Date:     05/21/17  Time:    14:02              Narrative:    Exam: Chest X-ray, one view.    History: Sepsis    Findings: No infiltrate or effusion. No definite change from prior exam dated 04/17/2017. Infiltrate on the right has completely resolved. Right IJ central catheter has been removed. AICD remains in place.                                Assessment/Plan:      Fever    Currently there is no obvious source as she does give history of otalgia but wax of ear prevented complete ear exam.  She has been on Augmentin after being seen in the ED on yesterday.  -Plan is to swab for flu, may need ENT to get good exam to rule otitis media but in  the meantime will empirically treat with Cefepime and Vancomycin, follow up on blood cultures and ID consult    5/22/17: still no obvious source as she is still spiking a temp with Tmax 105. CT head done as per overnight staff she had some sluggish speech and didn't seen like herself but she was sleeping this am and a little sluggish. Plan is to do LP since CT was negative to rule out CNS infection with possible Toxoplasmosis and Cryptococcal given negative CXR, CT abdomen and blood cultures so far.        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 Again I am going to hydrate and monitor I/O's closely as her lactic acid was 0.7 for now but will repeat in a few hours.          AIDS (acquired immunodeficiency syndrome), CD4 <=200    Last known CD4 count was around 4          S/P implantation of automatic cardioverter/defibrillator (AICD)      She is tachycardia and hypotensive but I think she tends to run on the lower side with her BP  Check lactic acid, continue with IV fluids will have to hold beta blocker as she was started on during last hospitalization.    5/22/17: she is still tachycardic will attempt to restart her beta blocker if BP tolerates.        HIV (human immunodeficiency virus infection)    Resume her meds             VTE Risk Mitigation         Ordered     heparin (porcine) injection 5,000 Units  Every 8 hours     Route:  Subcutaneous        05/21/17 2004     Medium Risk of VTE  Once      05/21/17 2004          Mara Cabello MD  Department of Hospital Medicine   [facility]

## 2017-05-22 NOTE — PROGRESS NOTES
Pt is going down for LP. Pt wearing mask per radiologist tech. Cooling blanket off til pt is back.

## 2017-05-23 PROBLEM — E83.42 HYPOMAGNESEMIA: Status: ACTIVE | Noted: 2017-05-23

## 2017-05-23 LAB
ALBUMIN SERPL BCP-MCNC: 1.7 G/DL
ALP SERPL-CCNC: 37 U/L
ALT SERPL W/O P-5'-P-CCNC: 12 U/L
ANION GAP SERPL CALC-SCNC: 14 MMOL/L
AST SERPL-CCNC: 39 U/L
BASOPHILS # BLD AUTO: 0.01 K/UL
BASOPHILS NFR BLD: 0.2 %
BILIRUB SERPL-MCNC: 0.2 MG/DL
BUN SERPL-MCNC: 40 MG/DL
CALCIUM SERPL-MCNC: 6.2 MG/DL
CHLORIDE SERPL-SCNC: 111 MMOL/L
CO2 SERPL-SCNC: 12 MMOL/L
CREAT SERPL-MCNC: 2.2 MG/DL
DIFFERENTIAL METHOD: ABNORMAL
EOSINOPHIL # BLD AUTO: 0 K/UL
EOSINOPHIL NFR BLD: 0 %
ERYTHROCYTE [DISTWIDTH] IN BLOOD BY AUTOMATED COUNT: 17.6 %
EST. GFR  (AFRICAN AMERICAN): 33 ML/MIN/1.73 M^2
EST. GFR  (NON AFRICAN AMERICAN): 29 ML/MIN/1.73 M^2
GLUCOSE SERPL-MCNC: 221 MG/DL
HCT VFR BLD AUTO: 18.7 %
HGB BLD-MCNC: 6.5 G/DL
HSV1, PCR, CSF: NEGATIVE
HSV2, PCR, CSF: NEGATIVE
LACTATE SERPL-SCNC: 0.5 MMOL/L
LYMPHOCYTES # BLD AUTO: 0.2 K/UL
LYMPHOCYTES NFR BLD: 4.5 %
MCH RBC QN AUTO: 29.3 PG
MCHC RBC AUTO-ENTMCNC: 34.8 %
MCV RBC AUTO: 84 FL
MONOCYTES # BLD AUTO: 0.1 K/UL
MONOCYTES NFR BLD: 1.2 %
NEUTROPHILS # BLD AUTO: 3.9 K/UL
NEUTROPHILS NFR BLD: 94.1 %
OVALOCYTES BLD QL SMEAR: ABNORMAL
PLATELET # BLD AUTO: 81 K/UL
PMV BLD AUTO: 10 FL
POIKILOCYTOSIS BLD QL SMEAR: SLIGHT
POTASSIUM SERPL-SCNC: 3.2 MMOL/L
PROT SERPL-MCNC: 6.7 G/DL
RBC # BLD AUTO: 2.22 M/UL
SODIUM SERPL-SCNC: 137 MMOL/L
VANCOMYCIN SERPL-MCNC: 11.2 UG/ML
WBC # BLD AUTO: 4.19 K/UL

## 2017-05-23 PROCEDURE — 93005 ELECTROCARDIOGRAM TRACING: CPT

## 2017-05-23 PROCEDURE — 25000003 PHARM REV CODE 250: Performed by: FAMILY MEDICINE

## 2017-05-23 PROCEDURE — 63600175 PHARM REV CODE 636 W HCPCS: Performed by: INTERNAL MEDICINE

## 2017-05-23 PROCEDURE — 93010 ELECTROCARDIOGRAM REPORT: CPT | Mod: ,,, | Performed by: INTERNAL MEDICINE

## 2017-05-23 PROCEDURE — 25000003 PHARM REV CODE 250: Performed by: EMERGENCY MEDICINE

## 2017-05-23 PROCEDURE — 63600175 PHARM REV CODE 636 W HCPCS: Performed by: HOSPITALIST

## 2017-05-23 PROCEDURE — 21400001 HC TELEMETRY ROOM

## 2017-05-23 PROCEDURE — 80202 ASSAY OF VANCOMYCIN: CPT

## 2017-05-23 PROCEDURE — 25000003 PHARM REV CODE 250: Performed by: INTERNAL MEDICINE

## 2017-05-23 PROCEDURE — 83605 ASSAY OF LACTIC ACID: CPT

## 2017-05-23 PROCEDURE — 85025 COMPLETE CBC W/AUTO DIFF WBC: CPT

## 2017-05-23 PROCEDURE — 87536 HIV-1 QUANT&REVRSE TRNSCRPJ: CPT

## 2017-05-23 PROCEDURE — 87329 GIARDIA AG IA: CPT

## 2017-05-23 PROCEDURE — 80053 COMPREHEN METABOLIC PANEL: CPT

## 2017-05-23 PROCEDURE — 63700000 PHARM REV CODE 250 ALT 637 W/O HCPCS: Performed by: FAMILY MEDICINE

## 2017-05-23 PROCEDURE — 87103 BLOOD FUNGUS CULTURE: CPT

## 2017-05-23 PROCEDURE — 86361 T CELL ABSOLUTE COUNT: CPT

## 2017-05-23 PROCEDURE — 63600175 PHARM REV CODE 636 W HCPCS: Performed by: FAMILY MEDICINE

## 2017-05-23 RX ORDER — HYDROCODONE BITARTRATE AND ACETAMINOPHEN 500; 5 MG/1; MG/1
TABLET ORAL
Status: DISCONTINUED | OUTPATIENT
Start: 2017-05-23 | End: 2017-05-28 | Stop reason: HOSPADM

## 2017-05-23 RX ORDER — LINEZOLID 2 MG/ML
600 INJECTION, SOLUTION INTRAVENOUS
Status: DISCONTINUED | OUTPATIENT
Start: 2017-05-23 | End: 2017-05-24

## 2017-05-23 RX ORDER — SODIUM BICARBONATE 650 MG/1
650 TABLET ORAL 3 TIMES DAILY
Status: DISCONTINUED | OUTPATIENT
Start: 2017-05-24 | End: 2017-05-25

## 2017-05-23 RX ORDER — ACETAMINOPHEN 10 MG/ML
1000 INJECTION, SOLUTION INTRAVENOUS ONCE
Status: COMPLETED | OUTPATIENT
Start: 2017-05-23 | End: 2017-05-23

## 2017-05-23 RX ORDER — METOPROLOL TARTRATE 1 MG/ML
5 INJECTION, SOLUTION INTRAVENOUS EVERY 6 HOURS
Status: DISCONTINUED | OUTPATIENT
Start: 2017-05-23 | End: 2017-05-27

## 2017-05-23 RX ORDER — VITAMIN B COMPLEX
1 CAPSULE ORAL DAILY
Status: DISCONTINUED | OUTPATIENT
Start: 2017-05-23 | End: 2017-05-28 | Stop reason: HOSPADM

## 2017-05-23 RX ORDER — MAGNESIUM SULFATE HEPTAHYDRATE 40 MG/ML
2 INJECTION, SOLUTION INTRAVENOUS ONCE
Status: COMPLETED | OUTPATIENT
Start: 2017-05-23 | End: 2017-05-24

## 2017-05-23 RX ADMIN — DEXTROSE 180 MG: 50 INJECTION, SOLUTION INTRAVENOUS at 03:05

## 2017-05-23 RX ADMIN — HEPARIN SODIUM 5000 UNITS: 5000 INJECTION, SOLUTION INTRAVENOUS; SUBCUTANEOUS at 05:05

## 2017-05-23 RX ADMIN — SODIUM CHLORIDE: 0.9 INJECTION, SOLUTION INTRAVENOUS at 11:05

## 2017-05-23 RX ADMIN — DARUNAVIR 800 MG: 800 TABLET, FILM COATED ORAL at 07:05

## 2017-05-23 RX ADMIN — MEROPENEM AND SODIUM CHLORIDE 500 MG: 500 INJECTION, SOLUTION INTRAVENOUS at 07:05

## 2017-05-23 RX ADMIN — ATOVAQUONE 750 MG: 750 SUSPENSION ORAL at 09:05

## 2017-05-23 RX ADMIN — VANCOMYCIN HYDROCHLORIDE 750 MG: 1 INJECTION, POWDER, LYOPHILIZED, FOR SOLUTION INTRAVENOUS at 02:05

## 2017-05-23 RX ADMIN — MEROPENEM AND SODIUM CHLORIDE 500 MG: 500 INJECTION, SOLUTION INTRAVENOUS at 11:05

## 2017-05-23 RX ADMIN — DEXTROSE 270 MG: 50 INJECTION, SOLUTION INTRAVENOUS at 05:05

## 2017-05-23 RX ADMIN — LINEZOLID 600 MG: 600 INJECTION, SOLUTION INTRAVENOUS at 02:05

## 2017-05-23 RX ADMIN — ONDANSETRON 4 MG: 2 INJECTION INTRAMUSCULAR; INTRAVENOUS at 03:05

## 2017-05-23 RX ADMIN — SODIUM CHLORIDE 500 ML: 0.9 INJECTION, SOLUTION INTRAVENOUS at 09:05

## 2017-05-23 RX ADMIN — Medication 1 CAPSULE: at 12:05

## 2017-05-23 RX ADMIN — SODIUM CHLORIDE: 0.9 INJECTION, SOLUTION INTRAVENOUS at 02:05

## 2017-05-23 RX ADMIN — HEPARIN SODIUM 5000 UNITS: 5000 INJECTION, SOLUTION INTRAVENOUS; SUBCUTANEOUS at 09:05

## 2017-05-23 RX ADMIN — HEPARIN SODIUM 5000 UNITS: 5000 INJECTION, SOLUTION INTRAVENOUS; SUBCUTANEOUS at 02:05

## 2017-05-23 RX ADMIN — ACETAMINOPHEN 1000 MG: 10 INJECTION, SOLUTION INTRAVENOUS at 03:05

## 2017-05-23 RX ADMIN — MEROPENEM AND SODIUM CHLORIDE 500 MG: 500 INJECTION, SOLUTION INTRAVENOUS at 04:05

## 2017-05-23 NOTE — ASSESSMENT & PLAN NOTE
Resume her meds     5/23/17: we have resumed her medications but she is noncompliant because she has a difficult time swallowing her medications

## 2017-05-23 NOTE — ASSESSMENT & PLAN NOTE
Etiology is unclear.  Will plan to do CT scan of chest /abdomen with contrast when creatinine improves.  HIV in itself can cause Fever.  Will use meropenem, voriconazole , and zyxox  Follow cryptococcal antigen.  Send fungal , blood cultures  Urine histoplasma antigen

## 2017-05-23 NOTE — ASSESSMENT & PLAN NOTE
Currently there is no obvious source as she does give history of otalgia but wax of ear prevented complete ear exam.  She has been on Augmentin after being seen in the ED on yesterday.  -Plan is to swab for flu, may need ENT to get good exam to rule otitis media but in the meantime will empirically treat with Cefepime and Vancomycin, follow up on blood cultures and ID consult    5/22/17: still no obvious source as she is still spiking a temp with Tmax 105. CT head done as per overnight staff she had some sluggish speech and didn't seen like herself but she was sleeping this am and a little sluggish. Plan is to do LP since CT was negative to rule out CNS infection with possible Toxoplasmosis and Cryptococcal given negative CXR, CT abdomen and blood cultures so far.    5/23/17: still no obvious source but HIV can cause fever, continue with Vancomycin, Merrem, VFEND

## 2017-05-23 NOTE — PLAN OF CARE
Problem: Patient Care Overview  Goal: Plan of Care Review  Outcome: Ongoing (interventions implemented as appropriate)  Patient awake, alert and oriented x 4. Patient denies pain or SOB. Patient on telemetry, sinus tach on the monitor. Patient on bedrest. Fall precautions in place. Bed alarm active and audible. Patient free from fall/injury. Plan of care reviewed with patient. Patient has no questions at this time. Will continue to monitor.
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
## 2017-05-23 NOTE — PROGRESS NOTES
[facility] Hospital Medicine  Progress Note    Patient Name: Wang Kebede  MRN: [id number]  Patient Class: IP- Inpatient   Admission Date: 5/21/2017  Length of Stay: 2 days  Attending Physician: Mara Cabello MD  Primary Care Provider: KIN Mendoza        Subjective:     Principal Problem:Fever    HPI:  Ms. Kebede is a 30 yo AAF with h/o AIDS presented complaining of fever with Tmax 103. She was seen in the ED on yesterday complaining of fever and right ear main. She was given Augmentin and discharged home. She was recently hospitalized for severe sepsis where she was in the ICU on pressors after abdominal procedure. She states she has been compliant with her medications since discharge. She denies any sick contacts. She denies any cough, SOB, abdominal pain or diarrhea.      Hospital Course:  No notes on file    Interval History: Patient is feeling better today as her fever is better. She has no new complaints today.    Review of Systems   Constitutional: Positive for fever.   All other systems reviewed and are negative.    Objective:     Vital Signs (Most Recent):  Temp: 98.1 °F (36.7 °C) (05/23/17 0744)  Pulse: 103 (05/23/17 0744)  Resp: 16 (05/23/17 0744)  BP: (!) 80/43 (05/23/17 0744)  SpO2: 99 % (05/23/17 0744) Vital Signs (24h Range):  Temp:  [98.1 °F (36.7 °C)-103.3 °F (39.6 °C)] 98.1 °F (36.7 °C)  Pulse:  [103-182] 103  Resp:  [16-19] 16  SpO2:  [95 %-99 %] 99 %  BP: ()/(43-75) 80/43     Weight: 45.4 kg (100 lb)  Body mass index is 18.89 kg/m².    Intake/Output Summary (Last 24 hours) at 05/23/17 0920  Last data filed at 05/23/17 0411   Gross per 24 hour   Intake          2953.75 ml   Output                0 ml   Net          2953.75 ml      Physical Exam   Constitutional: She appears cachectic. She is cooperative.   HENT:   Head: Normocephalic and atraumatic.   Mouth/Throat: Mucous membranes are dry.   Eyes: Conjunctivae and EOM are normal. Pupils are equal,  round, and reactive to light.   Neck: Normal range of motion. Neck supple.   Cardiovascular: S1 normal, S2 normal, intact distal pulses and normal pulses.  Tachycardia present.    Pulmonary/Chest: Effort normal and breath sounds normal.   Abdominal: Soft. Bowel sounds are normal.   Musculoskeletal: Normal range of motion.   Neurological: She is alert.   Skin: Skin is warm and dry.   Psychiatric: She has a normal mood and affect.   Nursing note and vitals reviewed.      Significant Labs:   Recent Lab Results       05/23/17  0801 05/23/17  0018      Albumin 1.7(L)      Alkaline Phosphatase 37(L)      ALT 12      Anion Gap 14      AST 39      Baso # 0.01      Basophil% 0.2      Total Bilirubin 0.2  Comment:  For infants and newborns, interpretation of results should be based  on gestational age, weight and in agreement with clinical  observations.  Premature Infant recommended reference ranges:  Up to 24 hours.............<8.0 mg/dL  Up to 48 hours............<12.0 mg/dL  3-5 days..................<15.0 mg/dL  6-29 days.................<15.0 mg/dL        BUN, Bld 40(H)      Calcium 6.2  Comment:  CA   critical result(s) called and verbal readback obtained from   Stephani Coughlin RN, 05/23/2017 08:34  (LL)      Chloride 111(H)      CO2 12(L)      Creatinine 2.2(H)      Differential Method Automated      eGFR if  33(A)      eGFR if non  29  Comment:  Calculation used to obtain the estimated glomerular filtration  rate (eGFR) is the CKD-EPI equation. Since race is unknown   in our information system, the eGFR values for   -American and Non--American patients are given   for each creatinine result.  (A)      Eos # 0.0      Eosinophil% 0.0      Glucose 221(H)      Gran # 3.9      Gran% 94.1(H)      Hematocrit 18.7  Comment:  HCT critical result(s) called and verbal readback obtained from Stephani Coughlin, 05/23/2017 08:18  (LL)      Hemoglobin 6.5(L)      Lymph # 0.2(L)      Lymph%  4.5(L)      MCH 29.3      MCHC 34.8      MCV 84      Mono # 0.1(L)      Mono% 1.2(L)      MPV 10.0      Ovalocytes Occasional      Platelets 81(L)      Poik Slight      Potassium 3.2(L)      Total Protein 6.7      RBC 2.22(L)      RDW 17.6(H)      Sodium 137      Vancomycin, Random  11.2     WBC 4.19            Significant Imaging:   Imaging Results          X-Ray Chest 1 View for PICC_Central line (Final result)  Result time 05/22/17 22:52:32    Final result by Tayler Irene MD (05/22/17 22:52:32)                 Impression:      No acute findings.  Right-sided PICC line is positioned with its tip in the superior vena cava.      Electronically signed by: TAYLER IRENE MD  Date:     05/22/17  Time:    22:52              Narrative:    Exam: XR CHEST 1 VIEW,    Date:  05/22/17 22:47:43    History: Evaluate PICC line placement    Comparison:  05/21/2017    Findings: A right-sided PICC line is positioned with its tip in superior vena cava.  Left-sided pacemaker remains in place.  Heart is normal in size.  The lungs are clear.                             FL Lumbar Puncture (xpd) (Final result)  Result time 05/22/17 10:27:50    Final result by Hector Mann MD (05/22/17 10:27:50)                 Impression:      1. Successful fluoroscopically guided lumbar puncture.      Electronically signed by: HECTOR MANN MD  Date:     05/22/17  Time:    10:27              Narrative:    Procedure:  Lumbar puncture    Clinical History:     Meningitis.  Fever.    Technique/findings: After the risks, benefits and options of the planned procedure were explained to the patient in full detail, the patient expressed complete understanding and gave signed informed consent. The left paraspinous space was localized with fluoroscopy. The skin was marked with indelible ink.  The skin overlying this area was prepped and draped in the usual sterile fashion. A timeout was performed. 1% lidocaine was used as a local anesthetic, and a 22 gauge  needle were used to perform a lumbar puncture at L3-L4.  11 cc of clear CSF was obtained and sent to laboratory.  The opening pressure was 18 cm of water.  The needle was then removed.  A sterile dressing was applied.  The patient tolerated the procedure well without complication, departing the fluoro suite  in unchanged condition.     Fluoroscopic time was  0.2 minutes  and  1  image was obtained.                             CT Head Without Contrast (Final result)  Result time 05/22/17 07:59:23    Final result by Hector Mann MD (05/22/17 07:59:23)                 Impression:             No CT evidence of acute intracranial abnormality.        All CT scans at this facility use dose modulation, iterative reconstruction and/or weight based dosing when appropriate to reduce radiation dose to as low as reasonably achievable.       Electronically signed by: HECTOR MANN MD  Date:     05/22/17  Time:    07:59              Narrative:    Exam: CT HEAD WITHOUT CONTRAST    Clinical History:   Acute confusion and altered mental status. Initial encounter.Confusion       Technique: Axial CT imaging was performed through the head without intravenous contrast.     Findings:    No hydrocephalus, midline shift, mass effect, or acute intracranial hemorrhage. Cortical sulcal pattern and gray-white matter differentiation is normal. Basilar cisterns and posterior fossa are normal. The visualized paranasal sinuses and mastoid air cells are clear. The skull is intact.                             CT Chest Without Contrast (Final result)  Result time 05/21/17 17:29:14    Final result by Ghanshyam Haq III, MD (05/21/17 17:29:14)                 Impression:        1. Scattered small mediastinal lobes with a few scattered small axillary nodes with no mass or bulky adenopathy.    2. No consolidation or effusion. Minimal basilar atelectasis or scarring suggested. No prior CT chest scans for comparison.      Electronically signed  by: GHANSHYAM MEDRANO MD  Date:     05/21/17  Time:    17:29              Narrative:    CT chest without contrast.    Clinical indication: Fever.    Multiplanar imaging submitted. Heart size is normal. There small mediastinal nodes without bulky adenopathy. No definite hilar mass or gross enlargement and no axillary adenopathy. Small nodes in the axillary regions bilaterally.    The lung fields show no consolidation or effusion. Minimal basilar atelectasis or scarring.    Within the very upper portion of the abdomen, no acute abnormality is seen. Borderline splenic size, incompletely visualized.                             CT Renal Stone Study ABD Pelvis WO (Final result)  Result time 05/21/17 17:43:36    Final result by Ghanshyam Medrano III, MD (05/21/17 17:43:36)                 Impression:        1 a. Postop changes in the pelvis. Probable ovarian cysts, greater on right. Detail is limited without contrast.    2. Splenomegaly, unchanged.    3. No additional significant findings. If further detail were needed, consider correlation with a repeat scan with oral and IV contrast.       Electronically signed by: GHANSHYAM MEDRANO MD  Date:     05/21/17  Time:    17:43              Narrative:    No CT of the abdomen and pelvis without contrast.    Clinical indication: Fever. Abdominal pain.    Lack of IV and oral contrast material grossly limited this examination.    The heart size is normal. The extreme lung bases and in show no infiltrate or effusion. There is no free intraperitoneal air. No bowel obstruction. Bony windows show no acute abnormality.    The liver and spleen show no focal abnormality. Spleen is again enlarged and lobular similar to the study from April.    Surgical clips again noted in the gallbladder fossa. There is no solid renal mass or obstruction. No acute abdominal abnormality is suggested.    Within the pelvis there are radiopaque densities in posterior related to patient's prior surgery.  Scattered air-fluid levels noted presumably within the opacified bowel loops. Clinical contrast limits differentiation. Probable ovarian cysts, greater on the right. Suspected right ovarian cyst measuring up to 4 cm in diameter although this is poorly defined.                             X-Ray Chest AP Portable (Final result)  Result time 05/21/17 14:02:42    Final result by Jim Berg MD (05/21/17 14:02:42)                 Impression:         No acute cardiopulmonary process noted.      Electronically signed by: JIM BERG MD  Date:     05/21/17  Time:    14:02              Narrative:    Exam: Chest X-ray, one view.    History: Sepsis    Findings: No infiltrate or effusion. No definite change from prior exam dated 04/17/2017. Infiltrate on the right has completely resolved. Right IJ central catheter has been removed. AICD remains in place.                                Assessment/Plan:      LELAND (acute kidney injury)    Previous visit she had some LELAND which improved with IV hydration but I think it was felt as if she could have some HIV nephropathy. Again I am going to hydrate and monitor I/O's closely as her lactic acid was 0.7 for now but will repeat in a few hours.    5/23/17: she has been hypotensive, renal function was improving but Cr slightly up today. IV bolus now and increase her fluid rate.          AIDS (acquired immunodeficiency syndrome), CD4 <=200    Last known CD4 count was around 4          S/P implantation of automatic cardioverter/defibrillator (AICD)      She is tachycardia and hypotensive but I think she tends to run on the lower side with her BP  Check lactic acid, continue with IV fluids will have to hold beta blocker as she was started on during last hospitalization.    5/22/17: she is still tachycardic will attempt to restart her beta blocker if BP tolerates.        HIV (human immunodeficiency virus infection)    Resume her meds     5/23/17: we have resumed her medications but she  is noncompliant because she has a difficult time swallowing her medications          * Fever    Currently there is no obvious source as she does give history of otalgia but wax of ear prevented complete ear exam.  She has been on Augmentin after being seen in the ED on yesterday.  -Plan is to swab for flu, may need ENT to get good exam to rule otitis media but in the meantime will empirically treat with Cefepime and Vancomycin, follow up on blood cultures and ID consult    5/22/17: still no obvious source as she is still spiking a temp with Tmax 105. CT head done as per overnight staff she had some sluggish speech and didn't seen like herself but she was sleeping this am and a little sluggish. Plan is to do LP since CT was negative to rule out CNS infection with possible Toxoplasmosis and Cryptococcal given negative CXR, CT abdomen and blood cultures so far.    5/23/17: still no obvious source but HIV can cause fever, continue with Vancomycin, Merrem, VFEND          VTE Risk Mitigation         Ordered     heparin (porcine) injection 5,000 Units  Every 8 hours     Route:  Subcutaneous        05/21/17 2004     Medium Risk of VTE  Once      05/21/17 2004          Mara Cabello MD  Department of Hospital Medicine   [facility]
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
## 2017-05-23 NOTE — PLAN OF CARE
Problem: Patient Care Overview  Goal: Plan of Care Review  Outcome: Ongoing (interventions implemented as appropriate)  Patient remained stable this shift. Max temp was 102.7 ibuprofen given once and IV Tylenol given once. Pt remains incontinent X2. POC reviewed. Will continue to monitor.

## 2017-05-23 NOTE — PROCEDURES
"Wang Kebede is a 31 y.o. female patient.    Temp: (!) 101.4 °F (38.6 °C) (05/22/17 2151)  Pulse: (!) 135 (05/22/17 2024)  Resp: 19 (05/22/17 2024)  BP: 133/75 (05/22/17 2024)  SpO2: 96 % (05/22/17 2024)  Weight: 45.4 kg (100 lb) (05/21/17 1304)  Height: 5' 1" (154.9 cm) (05/21/17 1304)    PICC  Date/Time: 5/22/2017 10:10 PM  Performed by: ADIN SEO.  Consent Done: Yes  Time out: Immediately prior to procedure a time out was called to verify the correct patient, procedure, equipment, support staff and site/side marked as required  Indications: med administration and vascular access  Anesthesia: local infiltration  Local anesthetic: lidocaine 1% without epinephrine  Anesthetic Total (mL): 2  Preparation: skin prepped with ChloraPrep  Skin prep agent dried: skin prep agent completely dried prior to procedure  Sterile barriers: all five maximum sterile barriers used - cap, mask, sterile gown, sterile gloves, and large sterile sheet  Hand hygiene: hand hygiene performed prior to central venous catheter insertion  Location details: right brachial  Catheter type: double lumen  Catheter size: 5 Fr  Catheter Length: 34cm    Ultrasound guidance: yes  Vessel Caliber: medium, compressibility normal  Vascular Doppler: not done  Needle advanced into vessel with real time Ultrasound guidance.  Guidewire confirmed in vessel.  Sterile sheath used.  no esophageal manometryNumber of attempts: 1  Post-procedure: blood return through all ports, chlorhexidine patch and sterile dressing applied  Specimens: No  Implants: No  Assessment: placement verified by x-ray, free fluid flow, no pneumothorax on x-ray, tip termination and successful placement  Complications: none        Adin Seo  5/22/2017  "

## 2017-05-23 NOTE — HPI
31 year old woman with history of AIDs-last cd4 count 53 on 03/21. She is poorly compliant with HAART. She is well known to me from previous hospital admission. She was recently seen in the ED with fever and ear pain and was discharged on Augumentin . She presented again today with persistent high grade fever-T max 105. There is associated history of loose stools.  Since admission, she was started on cooling blanket, LP showed opening pressure of 18, CSF wbc of 12 .  CMP-bicarbonate 14,,creatinine 1.9,hemoglobin of 7.  Head ct scan did not show any acute lesion .  CT renal stone showed splenomegaly .without any acute lesion.

## 2017-05-23 NOTE — NURSING
Patient's temp 102.4 oral. Pulled ibuprofen from the pyxis and administered but patient said it was nasty and spit it out. Will administer IV tylenol and continue to monitor.

## 2017-05-23 NOTE — SUBJECTIVE & OBJECTIVE
Interval History: Patient is feeling better today as her fever is better. She has no new complaints today.    Review of Systems   Constitutional: Positive for fever.   All other systems reviewed and are negative.    Objective:     Vital Signs (Most Recent):  Temp: 98.1 °F (36.7 °C) (05/23/17 0744)  Pulse: 103 (05/23/17 0744)  Resp: 16 (05/23/17 0744)  BP: (!) 80/43 (05/23/17 0744)  SpO2: 99 % (05/23/17 0744) Vital Signs (24h Range):  Temp:  [98.1 °F (36.7 °C)-103.3 °F (39.6 °C)] 98.1 °F (36.7 °C)  Pulse:  [103-182] 103  Resp:  [16-19] 16  SpO2:  [95 %-99 %] 99 %  BP: ()/(43-75) 80/43     Weight: 45.4 kg (100 lb)  Body mass index is 18.89 kg/m².    Intake/Output Summary (Last 24 hours) at 05/23/17 0943  Last data filed at 05/23/17 0415   Gross per 24 hour   Intake          2953.75 ml   Output                0 ml   Net          2953.75 ml      Physical Exam   Constitutional: She appears cachectic. She is cooperative.   HENT:   Head: Normocephalic and atraumatic.   Mouth/Throat: Mucous membranes are dry.   Eyes: Conjunctivae and EOM are normal. Pupils are equal, round, and reactive to light.   Neck: Normal range of motion. Neck supple.   Cardiovascular: S1 normal, S2 normal, intact distal pulses and normal pulses.  Tachycardia present.    Pulmonary/Chest: Effort normal and breath sounds normal.   Abdominal: Soft. Bowel sounds are normal.   Musculoskeletal: Normal range of motion.   Neurological: She is alert.   Skin: Skin is warm and dry.   Psychiatric: She has a normal mood and affect.   Nursing note and vitals reviewed.      Significant Labs:   Recent Lab Results       05/23/17  0801 05/23/17  0018      Albumin 1.7(L)      Alkaline Phosphatase 37(L)      ALT 12      Anion Gap 14      AST 39      Baso # 0.01      Basophil% 0.2      Total Bilirubin 0.2  Comment:  For infants and newborns, interpretation of results should be based  on gestational age, weight and in agreement with clinical  observations.  Premature  Infant recommended reference ranges:  Up to 24 hours.............<8.0 mg/dL  Up to 48 hours............<12.0 mg/dL  3-5 days..................<15.0 mg/dL  6-29 days.................<15.0 mg/dL        BUN, Bld 40(H)      Calcium 6.2  Comment:  CA   critical result(s) called and verbal readback obtained from   Stephani Coughlin RN, 05/23/2017 08:34  (LL)      Chloride 111(H)      CO2 12(L)      Creatinine 2.2(H)      Differential Method Automated      eGFR if  33(A)      eGFR if non  29  Comment:  Calculation used to obtain the estimated glomerular filtration  rate (eGFR) is the CKD-EPI equation. Since race is unknown   in our information system, the eGFR values for   -American and Non--American patients are given   for each creatinine result.  (A)      Eos # 0.0      Eosinophil% 0.0      Glucose 221(H)      Gran # 3.9      Gran% 94.1(H)      Hematocrit 18.7  Comment:  HCT critical result(s) called and verbal readback obtained from Stephani Coughlin, 05/23/2017 08:18  (LL)      Hemoglobin 6.5(L)      Lymph # 0.2(L)      Lymph% 4.5(L)      MCH 29.3      MCHC 34.8      MCV 84      Mono # 0.1(L)      Mono% 1.2(L)      MPV 10.0      Ovalocytes Occasional      Platelets 81(L)      Poik Slight      Potassium 3.2(L)      Total Protein 6.7      RBC 2.22(L)      RDW 17.6(H)      Sodium 137      Vancomycin, Random  11.2     WBC 4.19            Significant Imaging:   Imaging Results          X-Ray Chest 1 View for PICC_Central line (Final result)  Result time 05/22/17 22:52:32    Final result by Tayler Irene MD (05/22/17 22:52:32)                 Impression:      No acute findings.  Right-sided PICC line is positioned with its tip in the superior vena cava.      Electronically signed by: TAYLER IRENE MD  Date:     05/22/17  Time:    22:52              Narrative:    Exam: XR CHEST 1 VIEW,    Date:  05/22/17 22:47:43    History: Evaluate PICC line placement    Comparison:   05/21/2017    Findings: A right-sided PICC line is positioned with its tip in superior vena cava.  Left-sided pacemaker remains in place.  Heart is normal in size.  The lungs are clear.                             FL Lumbar Puncture (xpd) (Final result)  Result time 05/22/17 10:27:50    Final result by Hector Mann MD (05/22/17 10:27:50)                 Impression:      1. Successful fluoroscopically guided lumbar puncture.      Electronically signed by: HECTOR MANN MD  Date:     05/22/17  Time:    10:27              Narrative:    Procedure:  Lumbar puncture    Clinical History:     Meningitis.  Fever.    Technique/findings: After the risks, benefits and options of the planned procedure were explained to the patient in full detail, the patient expressed complete understanding and gave signed informed consent. The left paraspinous space was localized with fluoroscopy. The skin was marked with indelible ink.  The skin overlying this area was prepped and draped in the usual sterile fashion. A timeout was performed. 1% lidocaine was used as a local anesthetic, and a 22 gauge needle were used to perform a lumbar puncture at L3-L4.  11 cc of clear CSF was obtained and sent to laboratory.  The opening pressure was 18 cm of water.  The needle was then removed.  A sterile dressing was applied.  The patient tolerated the procedure well without complication, departing the fluoro suite  in unchanged condition.     Fluoroscopic time was  0.2 minutes  and  1  image was obtained.                             CT Head Without Contrast (Final result)  Result time 05/22/17 07:59:23    Final result by Hector Mann MD (05/22/17 07:59:23)                 Impression:             No CT evidence of acute intracranial abnormality.        All CT scans at this facility use dose modulation, iterative reconstruction and/or weight based dosing when appropriate to reduce radiation dose to as low as reasonably achievable.       Electronically  signed by: HECTOR DIAZ MD  Date:     05/22/17  Time:    07:59              Narrative:    Exam: CT HEAD WITHOUT CONTRAST    Clinical History:   Acute confusion and altered mental status. Initial encounter.Confusion       Technique: Axial CT imaging was performed through the head without intravenous contrast.     Findings:    No hydrocephalus, midline shift, mass effect, or acute intracranial hemorrhage. Cortical sulcal pattern and gray-white matter differentiation is normal. Basilar cisterns and posterior fossa are normal. The visualized paranasal sinuses and mastoid air cells are clear. The skull is intact.                             CT Chest Without Contrast (Final result)  Result time 05/21/17 17:29:14    Final result by Ghanshyam Haq III, MD (05/21/17 17:29:14)                 Impression:        1. Scattered small mediastinal lobes with a few scattered small axillary nodes with no mass or bulky adenopathy.    2. No consolidation or effusion. Minimal basilar atelectasis or scarring suggested. No prior CT chest scans for comparison.      Electronically signed by: GHANSHYAM HAQ MD  Date:     05/21/17  Time:    17:29              Narrative:    CT chest without contrast.    Clinical indication: Fever.    Multiplanar imaging submitted. Heart size is normal. There small mediastinal nodes without bulky adenopathy. No definite hilar mass or gross enlargement and no axillary adenopathy. Small nodes in the axillary regions bilaterally.    The lung fields show no consolidation or effusion. Minimal basilar atelectasis or scarring.    Within the very upper portion of the abdomen, no acute abnormality is seen. Borderline splenic size, incompletely visualized.                             CT Renal Stone Study ABD Pelvis WO (Final result)  Result time 05/21/17 17:43:36    Final result by Ghanshyam Haq III, MD (05/21/17 17:43:36)                 Impression:        1 a. Postop changes in the pelvis. Probable  ovarian cysts, greater on right. Detail is limited without contrast.    2. Splenomegaly, unchanged.    3. No additional significant findings. If further detail were needed, consider correlation with a repeat scan with oral and IV contrast.       Electronically signed by: ENOC MEDRANO MD  Date:     05/21/17  Time:    17:43              Narrative:    No CT of the abdomen and pelvis without contrast.    Clinical indication: Fever. Abdominal pain.    Lack of IV and oral contrast material grossly limited this examination.    The heart size is normal. The extreme lung bases and in show no infiltrate or effusion. There is no free intraperitoneal air. No bowel obstruction. Bony windows show no acute abnormality.    The liver and spleen show no focal abnormality. Spleen is again enlarged and lobular similar to the study from April.    Surgical clips again noted in the gallbladder fossa. There is no solid renal mass or obstruction. No acute abdominal abnormality is suggested.    Within the pelvis there are radiopaque densities in posterior related to patient's prior surgery. Scattered air-fluid levels noted presumably within the opacified bowel loops. Clinical contrast limits differentiation. Probable ovarian cysts, greater on the right. Suspected right ovarian cyst measuring up to 4 cm in diameter although this is poorly defined.                             X-Ray Chest AP Portable (Final result)  Result time 05/21/17 14:02:42    Final result by Jim Berg MD (05/21/17 14:02:42)                 Impression:         No acute cardiopulmonary process noted.      Electronically signed by: JIM BERG MD  Date:     05/21/17  Time:    14:02              Narrative:    Exam: Chest X-ray, one view.    History: Sepsis    Findings: No infiltrate or effusion. No definite change from prior exam dated 04/17/2017. Infiltrate on the right has completely resolved. Right IJ central catheter has been removed. AICD remains in  place.

## 2017-05-23 NOTE — NURSING
Patient scheduled for CT abdomen/pelvis with contrast at 1030 on 5/23/17. Pt allergic to iodine but was ordered prophylactic therapy. Patient refused prednisone. Notified hospital medicine who went to go see pt and also explain the therapy. Pt still refuses prophylactic therapy. Will notify day nurse at shift change.

## 2017-05-23 NOTE — ASSESSMENT & PLAN NOTE
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 Again I am going to hydrate and monitor I/O's closely as her lactic acid was 0.7 for now but will repeat in a few hours.    5/23/17: she has been hypotensive, renal function was improving but Cr slightly up today. IV bolus now and increase her fluid rate.

## 2017-05-24 PROBLEM — R00.0 SINUS TACHYCARDIA: Status: ACTIVE | Noted: 2017-05-24

## 2017-05-24 LAB
ALBUMIN SERPL BCP-MCNC: 1.6 G/DL
ALLENS TEST: ABNORMAL
ALP SERPL-CCNC: 125 U/L
ALT SERPL W/O P-5'-P-CCNC: 21 U/L
ANION GAP SERPL CALC-SCNC: 9 MMOL/L
AORTIC VALVE REGURGITATION: NORMAL
AST SERPL-CCNC: 83 U/L
BASOPHILS # BLD AUTO: 0.02 K/UL
BASOPHILS NFR BLD: 0.9 %
BILIRUB SERPL-MCNC: 0.6 MG/DL
BLD PROD TYP BPU: NORMAL
BLD PROD TYP BPU: NORMAL
BLOOD UNIT EXPIRATION DATE: NORMAL
BLOOD UNIT EXPIRATION DATE: NORMAL
BLOOD UNIT TYPE CODE: 5100
BLOOD UNIT TYPE CODE: 5100
BLOOD UNIT TYPE: NORMAL
BLOOD UNIT TYPE: NORMAL
BUN SERPL-MCNC: 48 MG/DL
CALCIUM SERPL-MCNC: 6 MG/DL
CHLORIDE SERPL-SCNC: 118 MMOL/L
CO2 SERPL-SCNC: 10 MMOL/L
CODING SYSTEM: NORMAL
CODING SYSTEM: NORMAL
CREAT SERPL-MCNC: 3.4 MG/DL
CRYPTOSP AG STL QL IA: NEGATIVE
DELSYS: ABNORMAL
DIASTOLIC DYSFUNCTION: NO
DIFFERENTIAL METHOD: ABNORMAL
DISPENSE STATUS: NORMAL
DISPENSE STATUS: NORMAL
EOSINOPHIL # BLD AUTO: 0 K/UL
EOSINOPHIL NFR BLD: 0.5 %
ERYTHROCYTE [DISTWIDTH] IN BLOOD BY AUTOMATED COUNT: 18.8 %
EST. GFR  (AFRICAN AMERICAN): 20 ML/MIN/1.73 M^2
EST. GFR  (NON AFRICAN AMERICAN): 17 ML/MIN/1.73 M^2
ESTIMATED PA SYSTOLIC PRESSURE: 34.58
FIO2: 28
FLOW: 2
G LAMBLIA AG STL QL IA: NEGATIVE
GLUCOSE SERPL-MCNC: 85 MG/DL
HCO3 UR-SCNC: 9.5 MMOL/L (ref 24–28)
HCT VFR BLD AUTO: 30.1 %
HGB BLD-MCNC: 10.4 G/DL
LYMPHOCYTES # BLD AUTO: 0.6 K/UL
LYMPHOCYTES NFR BLD: 28.4 %
MAGNESIUM SERPL-MCNC: 1.6 MG/DL
MCH RBC QN AUTO: 27.1 PG
MCHC RBC AUTO-ENTMCNC: 34.6 %
MCV RBC AUTO: 78 FL
MITRAL VALVE REGURGITATION: NORMAL
MODE: ABNORMAL
MONOCYTES # BLD AUTO: 0.2 K/UL
MONOCYTES NFR BLD: 7.4 %
NEUTROPHILS # BLD AUTO: 1.4 K/UL
NEUTROPHILS NFR BLD: 64.2 %
NUM UNITS TRANS PACKED RBC: NORMAL
NUM UNITS TRANS PACKED RBC: NORMAL
PCO2 BLDA: 21.3 MMHG (ref 35–45)
PH SMN: 7.25 [PH] (ref 7.35–7.45)
PLATELET # BLD AUTO: 63 K/UL
PMV BLD AUTO: ABNORMAL FL
PO2 BLDA: 135 MMHG (ref 80–100)
POC BE: -18 MMOL/L
POC SATURATED O2: 99 % (ref 95–100)
POTASSIUM SERPL-SCNC: 3.7 MMOL/L
PROT SERPL-MCNC: 6.8 G/DL
RBC # BLD AUTO: 3.84 M/UL
RETIRED EF AND QEF - SEE NOTES: 55 (ref 55–65)
SAMPLE: ABNORMAL
SITE: ABNORMAL
SODIUM SERPL-SCNC: 137 MMOL/L
T4 FREE SERPL-MCNC: 1 NG/DL
TRICUSPID VALVE REGURGITATION: NORMAL
TSH SERPL DL<=0.005 MIU/L-ACNC: 6.87 UIU/ML
WBC # BLD AUTO: 2.15 K/UL

## 2017-05-24 PROCEDURE — 84439 ASSAY OF FREE THYROXINE: CPT

## 2017-05-24 PROCEDURE — 63600175 PHARM REV CODE 636 W HCPCS: Performed by: FAMILY MEDICINE

## 2017-05-24 PROCEDURE — 63600175 PHARM REV CODE 636 W HCPCS: Performed by: INTERNAL MEDICINE

## 2017-05-24 PROCEDURE — 85025 COMPLETE CBC W/AUTO DIFF WBC: CPT

## 2017-05-24 PROCEDURE — 84443 ASSAY THYROID STIM HORMONE: CPT

## 2017-05-24 PROCEDURE — 93010 ELECTROCARDIOGRAM REPORT: CPT | Mod: ,,, | Performed by: INTERNAL MEDICINE

## 2017-05-24 PROCEDURE — 82803 BLOOD GASES ANY COMBINATION: CPT

## 2017-05-24 PROCEDURE — 99900035 HC TECH TIME PER 15 MIN (STAT)

## 2017-05-24 PROCEDURE — 93005 ELECTROCARDIOGRAM TRACING: CPT

## 2017-05-24 PROCEDURE — 36600 WITHDRAWAL OF ARTERIAL BLOOD: CPT

## 2017-05-24 PROCEDURE — 99223 1ST HOSP IP/OBS HIGH 75: CPT | Mod: ,,, | Performed by: INTERNAL MEDICINE

## 2017-05-24 PROCEDURE — 21400001 HC TELEMETRY ROOM

## 2017-05-24 PROCEDURE — 27000221 HC OXYGEN, UP TO 24 HOURS

## 2017-05-24 PROCEDURE — 25000003 PHARM REV CODE 250: Performed by: INTERNAL MEDICINE

## 2017-05-24 PROCEDURE — 93306 TTE W/DOPPLER COMPLETE: CPT | Mod: 26,,, | Performed by: INTERNAL MEDICINE

## 2017-05-24 PROCEDURE — 36430 TRANSFUSION BLD/BLD COMPNT: CPT

## 2017-05-24 PROCEDURE — 25000003 PHARM REV CODE 250: Performed by: EMERGENCY MEDICINE

## 2017-05-24 PROCEDURE — 94761 N-INVAS EAR/PLS OXIMETRY MLT: CPT

## 2017-05-24 PROCEDURE — 83735 ASSAY OF MAGNESIUM: CPT

## 2017-05-24 PROCEDURE — 25000003 PHARM REV CODE 250: Performed by: FAMILY MEDICINE

## 2017-05-24 PROCEDURE — 93306 TTE W/DOPPLER COMPLETE: CPT

## 2017-05-24 PROCEDURE — 63700000 PHARM REV CODE 250 ALT 637 W/O HCPCS: Performed by: FAMILY MEDICINE

## 2017-05-24 PROCEDURE — P9016 RBC LEUKOCYTES REDUCED: HCPCS

## 2017-05-24 PROCEDURE — 80053 COMPREHEN METABOLIC PANEL: CPT

## 2017-05-24 RX ORDER — METOPROLOL TARTRATE 25 MG/1
12.5 TABLET ORAL 2 TIMES DAILY
Status: DISCONTINUED | OUTPATIENT
Start: 2017-05-24 | End: 2017-05-28 | Stop reason: HOSPADM

## 2017-05-24 RX ORDER — ALPRAZOLAM 0.25 MG/1
0.25 TABLET ORAL ONCE
Status: COMPLETED | OUTPATIENT
Start: 2017-05-24 | End: 2017-05-24

## 2017-05-24 RX ORDER — VORICONAZOLE 200 MG/1
200 TABLET, FILM COATED ORAL 2 TIMES DAILY
Status: COMPLETED | OUTPATIENT
Start: 2017-05-24 | End: 2017-05-27

## 2017-05-24 RX ORDER — RITONAVIR 100 MG/1
100 TABLET ORAL
Status: DISCONTINUED | OUTPATIENT
Start: 2017-05-25 | End: 2017-05-28 | Stop reason: HOSPADM

## 2017-05-24 RX ORDER — DIPHENHYDRAMINE HYDROCHLORIDE 50 MG/ML
25 INJECTION INTRAMUSCULAR; INTRAVENOUS ONCE
Status: COMPLETED | OUTPATIENT
Start: 2017-05-24 | End: 2017-05-24

## 2017-05-24 RX ORDER — MOXIFLOXACIN HYDROCHLORIDE 400 MG/1
400 TABLET ORAL DAILY
Status: DISCONTINUED | OUTPATIENT
Start: 2017-05-25 | End: 2017-05-28 | Stop reason: HOSPADM

## 2017-05-24 RX ORDER — DARUNAVIR 800 MG/1
800 TABLET, FILM COATED ORAL
Status: DISCONTINUED | OUTPATIENT
Start: 2017-05-25 | End: 2017-05-28 | Stop reason: HOSPADM

## 2017-05-24 RX ORDER — METOPROLOL TARTRATE 25 MG/1
25 TABLET, FILM COATED ORAL 2 TIMES DAILY
Status: DISCONTINUED | OUTPATIENT
Start: 2017-05-24 | End: 2017-05-24

## 2017-05-24 RX ORDER — INDOMETHACIN 25 MG/1
50 CAPSULE ORAL ONCE
Status: COMPLETED | OUTPATIENT
Start: 2017-05-24 | End: 2017-05-25

## 2017-05-24 RX ORDER — METOPROLOL TARTRATE 1 MG/ML
5 INJECTION, SOLUTION INTRAVENOUS ONCE
Status: COMPLETED | OUTPATIENT
Start: 2017-05-24 | End: 2017-05-24

## 2017-05-24 RX ORDER — DIGOXIN 0.25 MG/ML
250 INJECTION INTRAMUSCULAR; INTRAVENOUS ONCE
Status: COMPLETED | OUTPATIENT
Start: 2017-05-24 | End: 2017-05-24

## 2017-05-24 RX ADMIN — SODIUM BICARBONATE 650 MG TABLET 650 MG: at 09:05

## 2017-05-24 RX ADMIN — HEPARIN SODIUM 5000 UNITS: 5000 INJECTION, SOLUTION INTRAVENOUS; SUBCUTANEOUS at 09:05

## 2017-05-24 RX ADMIN — SODIUM BICARBONATE 650 MG TABLET 650 MG: at 01:05

## 2017-05-24 RX ADMIN — MAGNESIUM SULFATE IN WATER 2 G: 40 INJECTION, SOLUTION INTRAVENOUS at 12:05

## 2017-05-24 RX ADMIN — VORICONAZOLE 200 MG: 200 TABLET, FILM COATED ORAL at 08:05

## 2017-05-24 RX ADMIN — Medication 1 CAPSULE: at 09:05

## 2017-05-24 RX ADMIN — MEROPENEM AND SODIUM CHLORIDE 500 MG: 500 INJECTION, SOLUTION INTRAVENOUS at 04:05

## 2017-05-24 RX ADMIN — LINEZOLID 600 MG: 600 INJECTION, SOLUTION INTRAVENOUS at 02:05

## 2017-05-24 RX ADMIN — DIGOXIN 250 MCG: 0.25 INJECTION INTRAMUSCULAR; INTRAVENOUS at 10:05

## 2017-05-24 RX ADMIN — HEPARIN SODIUM 5000 UNITS: 5000 INJECTION, SOLUTION INTRAVENOUS; SUBCUTANEOUS at 05:05

## 2017-05-24 RX ADMIN — DEXTROSE 180 MG: 50 INJECTION, SOLUTION INTRAVENOUS at 05:05

## 2017-05-24 RX ADMIN — MEROPENEM AND SODIUM CHLORIDE 500 MG: 500 INJECTION, SOLUTION INTRAVENOUS at 12:05

## 2017-05-24 RX ADMIN — Medication 12.5 MG: at 09:05

## 2017-05-24 RX ADMIN — METOROPROLOL TARTRATE 5 MG: 5 INJECTION, SOLUTION INTRAVENOUS at 09:05

## 2017-05-24 RX ADMIN — ATOVAQUONE 750 MG: 750 SUSPENSION ORAL at 09:05

## 2017-05-24 RX ADMIN — ALPRAZOLAM 0.25 MG: 0.25 TABLET ORAL at 09:05

## 2017-05-24 RX ADMIN — HEPARIN SODIUM 5000 UNITS: 5000 INJECTION, SOLUTION INTRAVENOUS; SUBCUTANEOUS at 01:05

## 2017-05-24 RX ADMIN — MEROPENEM AND SODIUM CHLORIDE 500 MG: 500 INJECTION, SOLUTION INTRAVENOUS at 08:05

## 2017-05-24 RX ADMIN — Medication 12.5 MG: at 12:05

## 2017-05-24 RX ADMIN — ATOVAQUONE 750 MG: 750 SUSPENSION ORAL at 08:05

## 2017-05-24 RX ADMIN — DIPHENHYDRAMINE HYDROCHLORIDE 25 MG: 50 INJECTION, SOLUTION INTRAMUSCULAR; INTRAVENOUS at 08:05

## 2017-05-24 RX ADMIN — DARUNAVIR 800 MG: 800 TABLET, FILM COATED ORAL at 09:05

## 2017-05-24 RX ADMIN — SODIUM BICARBONATE 650 MG TABLET 650 MG: at 05:05

## 2017-05-24 NOTE — ASSESSMENT & PLAN NOTE
Her BP will not tolerate her metoprolol, I did give IV 5 mg of lopressor with some improvement but her BP did decrease. I will try a dose of digoxin.

## 2017-05-24 NOTE — ASSESSMENT & PLAN NOTE
Etiology is unclear.Will use meropenem, voriconazole , and zyxox  Follow cryptococcal antigen.  Send fungal , blood cultures  Urine histoplasma antigen     Will use cultures to adjust therapy

## 2017-05-24 NOTE — PLAN OF CARE
Problem: Patient Care Overview  Goal: Plan of Care Review  Outcome: Ongoing (interventions implemented as appropriate)  Pt alert and oriented. 2L NC initiated. 1 or 2 unit PRBC transfused during shift. BM X1, liquid green with no odor. AM labs not drawn due to blood transfusing, day shift notified of need to collect. LAKSHMI PICC CDI, IV antibodies administered, NS @ 250 ml/hr. Pt refused to turn, pillow placed under bony areas.- pt educated on risk. Pt unable to obtain home viral medication- reinforced pt family to bring. C/o 8/10 generalized pt- BP 90-80/50-40 during shift. Metoprolol held. Pt remained free of falls during shift, bed alarm set, family at bedside, call light in reach, room free of clutter, side rails up X2, pt on telemetry monitor ST, will continue to monitor.

## 2017-05-24 NOTE — SUBJECTIVE & OBJECTIVE
Interval History: 30 yo female with AIDS admitted with fever. Her fever has resolved. This morning she began to feel very anxious and her heart rate was >120's    Review of Systems   Constitutional: Negative.    Cardiovascular: Positive for palpitations.   Psychiatric/Behavioral: The patient is nervous/anxious.    All other systems reviewed and are negative.    Objective:     Vital Signs (Most Recent):  Temp: 98.2 °F (36.8 °C) (05/24/17 0935)  Pulse: (!) 120 (05/24/17 0935)  Resp: 20 (05/24/17 0935)  BP: 94/61 (05/24/17 0935)  SpO2: 100 % (05/24/17 0935) Vital Signs (24h Range):  Temp:  [98 °F (36.7 °C)-99.7 °F (37.6 °C)] 98.2 °F (36.8 °C)  Pulse:  [107-140] 120  Resp:  [16-24] 20  SpO2:  [93 %-100 %] 100 %  BP: ()/(37-69) 94/61     Weight: 45.4 kg (100 lb)  Body mass index is 18.89 kg/m².    Intake/Output Summary (Last 24 hours) at 05/24/17 0951  Last data filed at 05/24/17 0552   Gross per 24 hour   Intake             1785 ml   Output                0 ml   Net             1785 ml      Physical Exam   Constitutional: She is oriented to person, place, and time. She appears cachectic. She is active and cooperative.   HENT:   Head: Atraumatic.   Nose: Nose normal.   Mouth/Throat: Mucous membranes are dry.   Eyes: Conjunctivae and EOM are normal. Pupils are equal, round, and reactive to light.   Neck: Normal range of motion. Neck supple.   Cardiovascular: S2 normal, intact distal pulses and normal pulses.  Tachycardia present.    Pulmonary/Chest: Effort normal and breath sounds normal.   Abdominal: Soft. Bowel sounds are normal.   Neurological: She is alert and oriented to person, place, and time. She has normal reflexes.   Skin: Skin is dry.   Nursing note and vitals reviewed.      Significant Labs:   Recent Lab Results       05/23/17  0958      Lactate, Atul 0.5  Comment:  Falsely low lactic acid results can be found in samples   containing >=13.0 mg/dL total bilirubin and/or >=3.5 mg/dL   direct bilirubin.              Significant Imaging: I have reviewed and interpreted all pertinent imaging results/findings within the past 24 hours.

## 2017-05-24 NOTE — PROGRESS NOTES
Giuliano score accurate,Pt incontinent of stool while assessing. Difficulty in turning but does turn self. Wears diapers by choice barrier creams in use.
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
## 2017-05-24 NOTE — ASSESSMENT & PLAN NOTE
Currently there is no obvious source as she does give history of otalgia but wax of ear prevented complete ear exam.  She has been on Augmentin after being seen in the ED on yesterday.  -Plan is to swab for flu, may need ENT to get good exam to rule otitis media but in the meantime will empirically treat with Cefepime and Vancomycin, follow up on blood cultures and ID consult    5/22/17: still no obvious source as she is still spiking a temp with Tmax 105. CT head done as per overnight staff she had some sluggish speech and didn't seen like herself but she was sleeping this am and a little sluggish. Plan is to do LP since CT was negative to rule out CNS infection with possible Toxoplasmosis and Cryptococcal given negative CXR, CT abdomen and blood cultures so far.    5/24/17: resolved for past 24 hrs unknown if this was related to her underlying HIV, other infectious etiology like an acute viral or could she have an infection with a fungus as the fever seemed to improve once VFEND started. Other cultures that have to go out are still pending.    5/23/17: still no obvious source but HIV can cause fever, continue with Vancomycin, Merrem, VFEND

## 2017-05-24 NOTE — NURSING
"Pt call light answered in person. Pt stating "feels like she is going to die". Pt reports feeling jittery and having all over body pain. VS stable. Pt receiving 2nd unit of blood. Blood stopped, Mara Cabello MD notified, verbal order to hold blood and give benadryl 25mg IV now.   "

## 2017-05-24 NOTE — PROGRESS NOTES
[facility]  Infectious Disease  Progress Note    Patient Name: Wang Kebede  MRN: [id number]  Admission Date: 5/21/2017  Length of Stay: 2 days  Attending Physician: Mara Cabello MD  Primary Care Provider: KIN Mendoza    Isolation Status: Special Contact  Assessment/Plan:      Hypomagnesemia    Will replete and check level in am         Metabolic acidosis    Will add bicarbonate and will consider nephrology consult in am         Anemia    She will likely need transfusion .  Will plan to transfuse 2 units of packed cells         HIV (human immunodeficiency virus infection)    Continue HAART-descovy/darunavir.  Bactrim /zithromax added for OI prophylaxis.        * Fever    Etiology is unclear.Will use meropenem, voriconazole , and zyxox  Follow cryptococcal antigen.  Send fungal , blood cultures  Urine histoplasma antigen     Will use cultures to adjust therapy             Anticipated Disposition:     Thank you for your consult. I will follow-up with patient. Please contact us if you have any additional questions.    Hubert Mayo MD  Infectious Disease  [facility]    Subjective:     Principal Problem:Fever    HPI: 31 year old woman with history of AIDs-last cd4 count 53 on 03/21. She is poorly compliant with HAART. She is well known to me from previous hospital admission. She was recently seen in the ED with fever and ear pain and was discharged on Augumentin . She presented again today with persistent high grade fever-T max 105. There is associated history of loose stools.  Since admission, she was started on cooling blanket, LP showed opening pressure of 18, CSF wbc of 12 .  CMP-bicarbonate 14,,creatinine 1.9,hemoglobin of 7.  Head ct scan did not show any acute lesion .  CT renal stone showed splenomegaly .without any acute lesion.  Interval History: 31 year old woman with AIDS admitted with fever. All cultures  remain negative.  She remains febrile.    Review  of Systems   Constitutional: Positive for activity change, appetite change, chills, fatigue and fever.   HENT: Negative for congestion, ear pain, facial swelling, sinus pressure and sore throat.    Eyes: Negative for pain.   Respiratory: Negative for apnea, chest tightness, shortness of breath and stridor.    Cardiovascular: Negative for chest pain, palpitations and leg swelling.   Gastrointestinal: Negative for abdominal distention, abdominal pain, diarrhea and nausea.   Endocrine: Negative for polydipsia and polyphagia.   Genitourinary: Negative for decreased urine volume, difficulty urinating, frequency and genital sores.   Musculoskeletal: Negative for arthralgias and gait problem.   Neurological: Positive for headaches. Negative for light-headedness.   Hematological: Negative for adenopathy.   Psychiatric/Behavioral: Negative for agitation, confusion and decreased concentration.     Objective:     Vital Signs (Most Recent):  Temp: 98.5 °F (36.9 °C) (05/23/17 2053)  Pulse: (!) 117 (05/23/17 2051)  Resp: 16 (05/23/17 2051)  BP: (!) 95/37 (05/23/17 2051)  SpO2: (!) 93 % (05/23/17 1600) Vital Signs (24h Range):  Temp:  [98 °F (36.7 °C)-102.4 °F (39.1 °C)] 98.5 °F (36.9 °C)  Pulse:  [103-129] 117  Resp:  [16-18] 16  SpO2:  [93 %-99 %] 93 %  BP: ()/(37-59) 95/37     Weight: 45.4 kg (100 lb)  Body mass index is 18.89 kg/m².    Estimated Creatinine Clearance: 26.3 mL/min (based on Cr of 2.2).    Physical Exam   Constitutional: She is oriented to person, place, and time. She appears well-developed and well-nourished.   HENT:   Head: Normocephalic and atraumatic.   Right Ear: There is tenderness. No drainage.   Left Ear: External ear normal.   Nose: Nose normal.   Mouth/Throat: Oropharynx is clear and moist.   .   Eyes: Conjunctivae and EOM are normal. Pupils are equal, round, and reactive to light.   Neck: Normal range of motion. Neck supple.   Cardiovascular: S1 normal and S2 normal.  Tachycardia present.     Pulmonary/Chest: Effort normal and breath sounds normal.   Abdominal: Soft. Bowel sounds are normal. There is tenderness. There is no rebound and no guarding.   Genitourinary:   Genitourinary Comments: deferred   Musculoskeletal: Normal range of motion.   Neurological: She is alert and oriented to person, place, and time.   Skin: Skin is warm and dry. Capillary refill takes less than 2 seconds.   Psychiatric: She has a normal mood and affect.   Nursing note and vitals reviewed.      Significant Labs:   Blood Culture:   Recent Labs  Lab 04/14/17  1856 04/14/17  1940 04/16/17  1615 05/21/17  1440 05/21/17  1445   LABBLOO No growth after 5 days. No growth after 5 days. No growth after 5 days. No Growth to date  No Growth to date  No Growth to date No Growth to date  No Growth to date  No Growth to date     BMP:   Recent Labs  Lab 05/22/17  0610 05/23/17  0801    221*    137   K 3.8 3.2*   * 111*   CO2 14* 12*   BUN 29* 40*   CREATININE 1.8* 2.2*   CALCIUM 7.0* 6.2*   MG 1.1*  --      CBC:   Recent Labs  Lab 05/22/17  0610 05/23/17  0801   WBC 5.06 4.19   HGB 7.0* 6.5*   HCT 21.4* 18.7*   PLT 91* 81*     All pertinent labs within the past 24 hours have been reviewed.    Significant Imaging: I have reviewed all pertinent imaging results/findings within the past 24 hours.

## 2017-05-24 NOTE — PROGRESS NOTES
[facility] Hospital Medicine  Progress Note    Patient Name: Wang Kebede  MRN: [id number]  Patient Class: IP- Inpatient   Admission Date: 5/21/2017  Length of Stay: 3 days  Attending Physician: Mara Cabello MD  Primary Care Provider: KIN Mendoza        Subjective:     Principal Problem:Fever    HPI:  Ms. Kebede is a 32 yo AAF with h/o AIDS presented complaining of fever with Tmax 103. She was seen in the ED on yesterday complaining of fever and right ear main. She was given Augmentin and discharged home. She was recently hospitalized for severe sepsis where she was in the ICU on pressors after abdominal procedure. She states she has been compliant with her medications since discharge. She denies any sick contacts. She denies any cough, SOB, abdominal pain or diarrhea.      Hospital Course:  No notes on file    Interval History: 32 yo female with AIDS admitted with fever. Her fever has resolved. This morning she began to feel very anxious and her heart rate was >120's    Review of Systems   Constitutional: Negative.    Cardiovascular: Positive for palpitations.   Psychiatric/Behavioral: The patient is nervous/anxious.    All other systems reviewed and are negative.    Objective:     Vital Signs (Most Recent):  Temp: 98.2 °F (36.8 °C) (05/24/17 0935)  Pulse: (!) 120 (05/24/17 0935)  Resp: 20 (05/24/17 0935)  BP: 94/61 (05/24/17 0935)  SpO2: 100 % (05/24/17 0935) Vital Signs (24h Range):  Temp:  [98 °F (36.7 °C)-99.7 °F (37.6 °C)] 98.2 °F (36.8 °C)  Pulse:  [107-140] 120  Resp:  [16-24] 20  SpO2:  [93 %-100 %] 100 %  BP: ()/(37-69) 94/61     Weight: 45.4 kg (100 lb)  Body mass index is 18.89 kg/m².    Intake/Output Summary (Last 24 hours) at 05/24/17 0951  Last data filed at 05/24/17 0552   Gross per 24 hour   Intake             1785 ml   Output                0 ml   Net             1785 ml      Physical Exam   Constitutional: She is oriented to person, place, and time. She  appears cachectic. She is active and cooperative.   HENT:   Head: Atraumatic.   Nose: Nose normal.   Mouth/Throat: Mucous membranes are dry.   Eyes: Conjunctivae and EOM are normal. Pupils are equal, round, and reactive to light.   Neck: Normal range of motion. Neck supple.   Cardiovascular: S2 normal, intact distal pulses and normal pulses.  Tachycardia present.    Pulmonary/Chest: Effort normal and breath sounds normal.   Abdominal: Soft. Bowel sounds are normal.   Neurological: She is alert and oriented to person, place, and time. She has normal reflexes.   Skin: Skin is dry.   Nursing note and vitals reviewed.      Significant Labs:   Recent Lab Results       05/23/17  0958      Lactate, Atul 0.5  Comment:  Falsely low lactic acid results can be found in samples   containing >=13.0 mg/dL total bilirubin and/or >=3.5 mg/dL   direct bilirubin.             Significant Imaging: I have reviewed and interpreted all pertinent imaging results/findings within the past 24 hours.    Assessment/Plan:      Sinus tachycardia    Her BP will not tolerate her metoprolol, I did give IV 5 mg of lopressor with some improvement but her BP did decrease. I will try a dose of digoxin.          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 Again I am going to hydrate and monitor I/O's closely as her lactic acid was 0.7 for now but will repeat in a few hours.    5/23/17: she has been hypotensive, renal function was improving but Cr slightly up today. IV bolus now and increase her fluid rate.          AIDS (acquired immunodeficiency syndrome), CD4 <=200    Last known CD4 count was around 4    5/24/17: repeated her CD4 count, explained to her that she must take her medications to improve her immune system will decrease her risk of infections. Discussed option of PEG given she states she cannot tolerate taking pills. She wants  to think about it.          S/P implantation of automatic cardioverter/defibrillator (AICD)      She is tachycardia and hypotensive but I think she tends to run on the lower side with her BP  Check lactic acid, continue with IV fluids will have to hold beta blocker as she was started on during last hospitalization.    5/22/17: she is still tachycardic will attempt to restart her beta blocker if BP tolerates.        HIV (human immunodeficiency virus infection)    Resume her meds     5/23/17: we have resumed her medications but she is noncompliant because she has a difficult time swallowing her medications          * Fever    Currently there is no obvious source as she does give history of otalgia but wax of ear prevented complete ear exam.  She has been on Augmentin after being seen in the ED on yesterday.  -Plan is to swab for flu, may need ENT to get good exam to rule otitis media but in the meantime will empirically treat with Cefepime and Vancomycin, follow up on blood cultures and ID consult    5/22/17: still no obvious source as she is still spiking a temp with Tmax 105. CT head done as per overnight staff she had some sluggish speech and didn't seen like herself but she was sleeping this am and a little sluggish. Plan is to do LP since CT was negative to rule out CNS infection with possible Toxoplasmosis and Cryptococcal given negative CXR, CT abdomen and blood cultures so far.    5/24/17: resolved for past 24 hrs unknown if this was related to her underlying HIV, other infectious etiology like an acute viral or could she have an infection with a fungus as the fever seemed to improve once VFEND started. Other cultures that have to go out are still pending.    5/23/17: still no obvious source but HIV can cause fever, continue with Vancomycin, Merrem, VFEND          VTE Risk Mitigation         Ordered     heparin (porcine) injection 5,000 Units  Every 8 hours     Route:  Subcutaneous        05/21/17 2004      Medium Risk of VTE  Once      05/21/17 2004          Mara Cabello MD  Department of Hospital Medicine   [facility]

## 2017-05-24 NOTE — SUBJECTIVE & OBJECTIVE
Interval History: 31 year old woman with AIDS admitted with fever. All cultures  remain negative.  She remains febrile.    Review of Systems   Constitutional: Positive for activity change, appetite change, chills, fatigue and fever.   HENT: Negative for congestion, ear pain, facial swelling, sinus pressure and sore throat.    Eyes: Negative for pain.   Respiratory: Negative for apnea, chest tightness, shortness of breath and stridor.    Cardiovascular: Negative for chest pain, palpitations and leg swelling.   Gastrointestinal: Negative for abdominal distention, abdominal pain, diarrhea and nausea.   Endocrine: Negative for polydipsia and polyphagia.   Genitourinary: Negative for decreased urine volume, difficulty urinating, frequency and genital sores.   Musculoskeletal: Negative for arthralgias and gait problem.   Neurological: Positive for headaches. Negative for light-headedness.   Hematological: Negative for adenopathy.   Psychiatric/Behavioral: Negative for agitation, confusion and decreased concentration.     Objective:     Vital Signs (Most Recent):  Temp: 98.5 °F (36.9 °C) (05/23/17 2053)  Pulse: (!) 117 (05/23/17 2051)  Resp: 16 (05/23/17 2051)  BP: (!) 95/37 (05/23/17 2051)  SpO2: (!) 93 % (05/23/17 1600) Vital Signs (24h Range):  Temp:  [98 °F (36.7 °C)-102.4 °F (39.1 °C)] 98.5 °F (36.9 °C)  Pulse:  [103-129] 117  Resp:  [16-18] 16  SpO2:  [93 %-99 %] 93 %  BP: ()/(37-59) 95/37     Weight: 45.4 kg (100 lb)  Body mass index is 18.89 kg/m².    Estimated Creatinine Clearance: 26.3 mL/min (based on Cr of 2.2).    Physical Exam   Constitutional: She is oriented to person, place, and time. She appears well-developed and well-nourished.   HENT:   Head: Normocephalic and atraumatic.   Right Ear: There is tenderness. No drainage.   Left Ear: External ear normal.   Nose: Nose normal.   Mouth/Throat: Oropharynx is clear and moist.   .   Eyes: Conjunctivae and EOM are normal. Pupils are equal, round, and  reactive to light.   Neck: Normal range of motion. Neck supple.   Cardiovascular: S1 normal and S2 normal.  Tachycardia present.    Pulmonary/Chest: Effort normal and breath sounds normal.   Abdominal: Soft. Bowel sounds are normal. There is tenderness. There is no rebound and no guarding.   Genitourinary:   Genitourinary Comments: deferred   Musculoskeletal: Normal range of motion.   Neurological: She is alert and oriented to person, place, and time.   Skin: Skin is warm and dry. Capillary refill takes less than 2 seconds.   Psychiatric: She has a normal mood and affect.   Nursing note and vitals reviewed.      Significant Labs:   Blood Culture:   Recent Labs  Lab 04/14/17  1856 04/14/17  1940 04/16/17  1615 05/21/17  1440 05/21/17  1445   LABBLOO No growth after 5 days. No growth after 5 days. No growth after 5 days. No Growth to date  No Growth to date  No Growth to date No Growth to date  No Growth to date  No Growth to date     BMP:   Recent Labs  Lab 05/22/17  0610 05/23/17  0801    221*    137   K 3.8 3.2*   * 111*   CO2 14* 12*   BUN 29* 40*   CREATININE 1.8* 2.2*   CALCIUM 7.0* 6.2*   MG 1.1*  --      CBC:   Recent Labs  Lab 05/22/17  0610 05/23/17  0801   WBC 5.06 4.19   HGB 7.0* 6.5*   HCT 21.4* 18.7*   PLT 91* 81*     All pertinent labs within the past 24 hours have been reviewed.    Significant Imaging: I have reviewed all pertinent imaging results/findings within the past 24 hours.

## 2017-05-24 NOTE — ASSESSMENT & PLAN NOTE
Last known CD4 count was around 4    5/24/17: repeated her CD4 count, explained to her that she must take her medications to improve her immune system will decrease her risk of infections. Discussed option of PEG given she states she cannot tolerate taking pills. She wants to think about it.

## 2017-05-25 PROBLEM — B37.0 ORAL THRUSH: Status: ACTIVE | Noted: 2017-05-25

## 2017-05-25 PROBLEM — E87.6 HYPOKALEMIA: Status: ACTIVE | Noted: 2017-05-25

## 2017-05-25 PROBLEM — F32.A DEPRESSION: Status: ACTIVE | Noted: 2017-05-25

## 2017-05-25 LAB
ANION GAP SERPL CALC-SCNC: 11 MMOL/L
ANISOCYTOSIS BLD QL SMEAR: SLIGHT
BASOPHILS NFR BLD: 0 %
BUN SERPL-MCNC: 47 MG/DL
CALCIUM SERPL-MCNC: 5.9 MG/DL
CD3+CD4+ CELLS # BLD: 4 CELLS/UL (ref 300–1400)
CD3+CD4+ CELLS NFR BLD: 2.4 % (ref 28–57)
CHLORIDE SERPL-SCNC: 114 MMOL/L
CO2 SERPL-SCNC: 13 MMOL/L
CREAT SERPL-MCNC: 3.2 MG/DL
D DIMER PPP IA.FEU-MCNC: 5.37 MG/L FEU
DIFFERENTIAL METHOD: ABNORMAL
EOSINOPHIL NFR BLD: 0 %
ERYTHROCYTE [DISTWIDTH] IN BLOOD BY AUTOMATED COUNT: 18.6 %
EST. GFR  (AFRICAN AMERICAN): 21 ML/MIN/1.73 M^2
EST. GFR  (NON AFRICAN AMERICAN): 18 ML/MIN/1.73 M^2
GLUCOSE SERPL-MCNC: 80 MG/DL
HCT VFR BLD AUTO: 23.1 %
HGB BLD-MCNC: 8 G/DL
LYMPHOCYTES NFR BLD: 57 %
MCH RBC QN AUTO: 27 PG
MCHC RBC AUTO-ENTMCNC: 34.6 %
MCV RBC AUTO: 78 FL
MONOCYTES NFR BLD: 7 %
NEUTROPHILS NFR BLD: 36 %
OVALOCYTES BLD QL SMEAR: ABNORMAL
PLATELET # BLD AUTO: 50 K/UL
PLATELET BLD QL SMEAR: ABNORMAL
PMV BLD AUTO: 10.7 FL
POIKILOCYTOSIS BLD QL SMEAR: SLIGHT
POTASSIUM SERPL-SCNC: 3.1 MMOL/L
RBC # BLD AUTO: 2.96 M/UL
SODIUM SERPL-SCNC: 138 MMOL/L
WBC # BLD AUTO: 1.2 K/UL

## 2017-05-25 PROCEDURE — 25000003 PHARM REV CODE 250: Performed by: INTERNAL MEDICINE

## 2017-05-25 PROCEDURE — 94761 N-INVAS EAR/PLS OXIMETRY MLT: CPT

## 2017-05-25 PROCEDURE — 63700000 PHARM REV CODE 250 ALT 637 W/O HCPCS: Performed by: FAMILY MEDICINE

## 2017-05-25 PROCEDURE — 85007 BL SMEAR W/DIFF WBC COUNT: CPT

## 2017-05-25 PROCEDURE — 99231 SBSQ HOSP IP/OBS SF/LOW 25: CPT | Mod: ,,, | Performed by: INTERNAL MEDICINE

## 2017-05-25 PROCEDURE — 94799 UNLISTED PULMONARY SVC/PX: CPT

## 2017-05-25 PROCEDURE — 99900035 HC TECH TIME PER 15 MIN (STAT)

## 2017-05-25 PROCEDURE — 25000003 PHARM REV CODE 250: Performed by: FAMILY MEDICINE

## 2017-05-25 PROCEDURE — 80048 BASIC METABOLIC PNL TOTAL CA: CPT

## 2017-05-25 PROCEDURE — 21400001 HC TELEMETRY ROOM

## 2017-05-25 PROCEDURE — 85379 FIBRIN DEGRADATION QUANT: CPT

## 2017-05-25 PROCEDURE — 25000003 PHARM REV CODE 250: Performed by: EMERGENCY MEDICINE

## 2017-05-25 PROCEDURE — 99223 1ST HOSP IP/OBS HIGH 75: CPT | Mod: ,,, | Performed by: INTERNAL MEDICINE

## 2017-05-25 PROCEDURE — 85027 COMPLETE CBC AUTOMATED: CPT

## 2017-05-25 PROCEDURE — 27000221 HC OXYGEN, UP TO 24 HOURS

## 2017-05-25 PROCEDURE — 25000003 PHARM REV CODE 250: Performed by: NURSE PRACTITIONER

## 2017-05-25 PROCEDURE — 63600175 PHARM REV CODE 636 W HCPCS: Performed by: FAMILY MEDICINE

## 2017-05-25 RX ORDER — CITALOPRAM 20 MG/1
20 TABLET, FILM COATED ORAL DAILY
Status: DISCONTINUED | OUTPATIENT
Start: 2017-05-25 | End: 2017-05-28 | Stop reason: HOSPADM

## 2017-05-25 RX ORDER — POTASSIUM CHLORIDE 20 MEQ/1
40 TABLET, EXTENDED RELEASE ORAL ONCE
Status: DISCONTINUED | OUTPATIENT
Start: 2017-05-25 | End: 2017-05-26

## 2017-05-25 RX ORDER — SODIUM CHLORIDE 9 MG/ML
INJECTION, SOLUTION INTRAVENOUS CONTINUOUS
Status: DISCONTINUED | OUTPATIENT
Start: 2017-05-25 | End: 2017-05-25

## 2017-05-25 RX ORDER — NYSTATIN 100000 [USP'U]/ML
500000 SUSPENSION ORAL
Status: DISCONTINUED | OUTPATIENT
Start: 2017-05-25 | End: 2017-05-28 | Stop reason: HOSPADM

## 2017-05-25 RX ADMIN — ATOVAQUONE 750 MG: 750 SUSPENSION ORAL at 08:05

## 2017-05-25 RX ADMIN — SODIUM BICARBONATE 650 MG TABLET 650 MG: at 05:05

## 2017-05-25 RX ADMIN — NYSTATIN 500000 UNITS: 500000 SUSPENSION ORAL at 08:05

## 2017-05-25 RX ADMIN — METOPROLOL TARTRATE 5 MG: 5 INJECTION INTRAVENOUS at 11:05

## 2017-05-25 RX ADMIN — SODIUM BICARBONATE 150 MEQ: 84 INJECTION, SOLUTION INTRAVENOUS at 01:05

## 2017-05-25 RX ADMIN — Medication 12.5 MG: at 08:05

## 2017-05-25 RX ADMIN — SODIUM BICARBONATE 50 MEQ: 84 INJECTION, SOLUTION INTRAVENOUS at 12:05

## 2017-05-25 RX ADMIN — ONDANSETRON 4 MG: 2 INJECTION INTRAMUSCULAR; INTRAVENOUS at 08:05

## 2017-05-25 RX ADMIN — HEPARIN SODIUM 5000 UNITS: 5000 INJECTION, SOLUTION INTRAVENOUS; SUBCUTANEOUS at 11:05

## 2017-05-25 RX ADMIN — DARUNAVIR 800 MG: 800 TABLET, FILM COATED ORAL at 08:05

## 2017-05-25 RX ADMIN — NYSTATIN 500000 UNITS: 500000 SUSPENSION ORAL at 04:05

## 2017-05-25 RX ADMIN — Medication 1 CAPSULE: at 08:05

## 2017-05-25 RX ADMIN — RITONAVIR 100 MG: 100 TABLET, FILM COATED ORAL at 08:05

## 2017-05-25 RX ADMIN — SODIUM CHLORIDE: 0.9 INJECTION, SOLUTION INTRAVENOUS at 11:05

## 2017-05-25 RX ADMIN — VORICONAZOLE 200 MG: 200 TABLET, FILM COATED ORAL at 08:05

## 2017-05-25 RX ADMIN — MOXIFLOXACIN HYDROCHLORIDE 400 MG: 400 TABLET, FILM COATED ORAL at 08:05

## 2017-05-25 RX ADMIN — CITALOPRAM HYDROBROMIDE 20 MG: 20 TABLET ORAL at 10:05

## 2017-05-25 RX ADMIN — METOPROLOL TARTRATE 5 MG: 5 INJECTION INTRAVENOUS at 05:05

## 2017-05-25 RX ADMIN — NYSTATIN 500000 UNITS: 500000 SUSPENSION ORAL at 10:05

## 2017-05-25 RX ADMIN — SODIUM BICARBONATE: 84 INJECTION, SOLUTION INTRAVENOUS at 03:05

## 2017-05-25 RX ADMIN — HEPARIN SODIUM 5000 UNITS: 5000 INJECTION, SOLUTION INTRAVENOUS; SUBCUTANEOUS at 05:05

## 2017-05-25 NOTE — PLAN OF CARE
Pt remains free from injury/falls. Fall precautions in place. Bed alarm on and functioning properly. IV fluids infusing as ordered. Meds given as scheduled. Pt on regular diet. Appetite poor. Denies pain. Afebrile at this time.  Remains sinus tach on tele monitor.  Able to verbalize needs/wants. Bed in low, locked position. Call light and personal items within reach pf pt. No acute distress noted. Will cont to monitor.

## 2017-05-25 NOTE — ASSESSMENT & PLAN NOTE
repeat cd4 count  Is 4 -she has profound immunosuppression .Will add ritonavir to daurunavir. Hold descovy due to renal failure  Will do HIV genotype , will consider PEG tube placement to get HAART  Continue zithromax and mepron for OI prophylasix.

## 2017-05-25 NOTE — ASSESSMENT & PLAN NOTE
Her BP will not tolerate her metoprolol, I did give IV 5 mg of lopressor with some improvement but her BP did decrease. I will try a dose of digoxin.    5/25/17: better after her transfusion. TSH elevated but normal free T4, elevation could be related to acute illness and medications as VFEND can cause tachycardia. Cardiology agreed with correcting her anemia and okay to be slightly tachycardia but she is at risk for high output failure. Echo is normal with EF 50-60%.

## 2017-05-25 NOTE — SUBJECTIVE & OBJECTIVE
Interval History: 31 year old woman with AIDS admitted with fever. All cultures  remain negative.  She now has worsening renal failure and acidosis .  Serum creatinine is 3.2 and bicarbonate is 10.    She says she feels she might die if she closed her eyes.  Review of Systems   Constitutional: Positive for activity change, appetite change, chills, fatigue and fever.   HENT: Negative for congestion, ear pain, facial swelling, sinus pressure and sore throat.    Eyes: Negative for pain.   Respiratory: Positive for chest tightness and shortness of breath. Negative for apnea and stridor.    Cardiovascular: Negative for chest pain, palpitations and leg swelling.   Gastrointestinal: Positive for diarrhea. Negative for abdominal distention, abdominal pain and nausea.   Endocrine: Negative for polydipsia and polyphagia.   Genitourinary: Negative for decreased urine volume, difficulty urinating, frequency and genital sores.   Musculoskeletal: Negative for arthralgias and gait problem.   Neurological: Positive for headaches. Negative for light-headedness.   Hematological: Negative for adenopathy.   Psychiatric/Behavioral: Negative for agitation, confusion and decreased concentration.     Objective:     Vital Signs (Most Recent):  Temp: 99.8 °F (37.7 °C) (05/24/17 2024)  Pulse: (!) 121 (05/24/17 2024)  Resp: 18 (05/24/17 2024)  BP: (!) 106/58 (05/24/17 2024)  SpO2: 98 % (05/24/17 2024) Vital Signs (24h Range):  Temp:  [98 °F (36.7 °C)-99.8 °F (37.7 °C)] 99.8 °F (37.7 °C)  Pulse:  [115-140] 121  Resp:  [16-24] 18  SpO2:  [95 %-100 %] 98 %  BP: ()/(40-69) 106/58     Weight: 45.4 kg (100 lb)  Body mass index is 18.89 kg/m².    Estimated Creatinine Clearance: 17 mL/min (based on Cr of 3.4).    Physical Exam   Constitutional: She is oriented to person, place, and time. She appears well-developed and well-nourished.   HENT:   Head: Normocephalic and atraumatic.   Right Ear: There is tenderness. No drainage.   Left Ear: External  ear normal.   Nose: Nose normal.   Mouth/Throat: Oropharynx is clear and moist.   .   Eyes: Conjunctivae and EOM are normal. Pupils are equal, round, and reactive to light.   Neck: Normal range of motion. Neck supple.   Cardiovascular: S1 normal and S2 normal.  Tachycardia present.    Pulmonary/Chest: Effort normal and breath sounds normal.   Abdominal: Soft. Bowel sounds are normal. There is tenderness. There is no rebound and no guarding.   Genitourinary:   Genitourinary Comments: deferred   Musculoskeletal: Normal range of motion.   Neurological: She is alert and oriented to person, place, and time.   Skin: Skin is warm and dry. Capillary refill takes less than 2 seconds.   Psychiatric: She has a normal mood and affect.   Nursing note and vitals reviewed.      Significant Labs:   Blood Culture:     Recent Labs  Lab 04/14/17  1856 04/14/17  1940 04/16/17  1615 05/21/17  1440 05/21/17  1445   LABBLOO No growth after 5 days. No growth after 5 days. No growth after 5 days. No Growth to date  No Growth to date  No Growth to date  No Growth to date No Growth to date  No Growth to date  No Growth to date  No Growth to date     BMP:     Recent Labs  Lab 05/24/17  0519   GLU 85      K 3.7   *   CO2 10*   BUN 48*   CREATININE 3.4*   CALCIUM 6.0*   MG 1.6     CBC:     Recent Labs  Lab 05/23/17  0801 05/24/17  1735   WBC 4.19 2.15*   HGB 6.5* 10.4*   HCT 18.7* 30.1*   PLT 81* 63*     All pertinent labs within the past 24 hours have been reviewed.    Significant Imaging: I have reviewed all pertinent imaging results/findings within the past 24 hours.

## 2017-05-25 NOTE — PLAN OF CARE
Problem: Patient Care Overview  Goal: Plan of Care Review  Patient doing well this shift. Free from falls and injury. Vital signs stable. No s/s of distress. POC reviewed. Patient verbalized understanding. Will continue to monitor.

## 2017-05-25 NOTE — PROGRESS NOTES
[facility]  Infectious Disease  Progress Note    Patient Name: Wang Kebede  MRN: [id number]  Admission Date: 5/21/2017  Length of Stay: 4 days  Attending Physician: Mara Cabello MD  Primary Care Provider: KIN Mendoza    Isolation Status: Special Contact  Assessment/Plan:      Sinus tachycardia    Will do VQ scan to rule out PE        Thrombocytopenia    ,monitor closely off zyvox        Metabolic acidosis    Will add bicarbonate and will consult nephrology  Now on bicarbonate drip.        LELAND (acute kidney injury)    IVF ,closely monitor renal function  nephrology follow up         AIDS (acquired immunodeficiency syndrome), CD4 <=200     repeat cd4 count  Is 4 -she has profound immunosuppression .Will add ritonavir to daurunavir. Hold descovy due to renal failure  Will do HIV genotype , will consider PEG tube placement to get HAART  Continue zithromax and mepron for OI prophylasix.        Depression -add celexa    Anticipated Disposition:     Thank you for your consult. I will follow-up with patient. Please contact us if you have any additional questions.    Hubert Mayo MD  Infectious Disease  [facility]    Subjective:     Principal Problem:Fever    HPI: 31 year old woman with history of AIDs-last cd4 count 53 on 03/21. She is poorly compliant with HAART. She is well known to me from previous hospital admission. She was recently seen in the ED with fever and ear pain and was discharged on Augumentin . She presented again today with persistent high grade fever-T max 105. There is associated history of loose stools.  Since admission, she was started on cooling blanket, LP showed opening pressure of 18, CSF wbc of 12 .  CMP-bicarbonate 14,,creatinine 1.9,hemoglobin of 7.  Head ct scan did not show any acute lesion .  CT renal stone showed splenomegaly .without any acute lesion.  Interval History: 31 year old woman with metabolic acidosis , AIDs, fever ,   Chest  x-ray showed development of bibasilar atelectasis .  She says she feels she is going to die.      Review of Systems   Constitutional: Positive for activity change, appetite change, chills, fatigue and fever.   HENT: Negative for congestion, ear pain, facial swelling, sinus pressure and sore throat.    Eyes: Negative for pain.   Respiratory: Positive for chest tightness and shortness of breath. Negative for apnea and stridor.    Cardiovascular: Negative for chest pain, palpitations and leg swelling.   Gastrointestinal: Positive for diarrhea. Negative for abdominal distention, abdominal pain and nausea.   Endocrine: Negative for polydipsia and polyphagia.   Genitourinary: Negative for decreased urine volume, difficulty urinating, frequency and genital sores.   Musculoskeletal: Negative for arthralgias and gait problem.   Neurological: Positive for headaches. Negative for light-headedness.   Hematological: Negative for adenopathy.   Psychiatric/Behavioral: Negative for agitation, confusion and decreased concentration.     Objective:     Vital Signs (Most Recent):  Temp: 97.8 °F (36.6 °C) (05/25/17 1716)  Pulse: 104 (05/25/17 1802)  Resp: 18 (05/25/17 1802)  BP: 99/64 (05/25/17 1802)  SpO2: 99 % (05/25/17 1802) Vital Signs (24h Range):  Temp:  [97.8 °F (36.6 °C)-100.3 °F (37.9 °C)] 97.8 °F (36.6 °C)  Pulse:  [104-127] 104  Resp:  [18-20] 18  SpO2:  [96 %-100 %] 99 %  BP: ()/(37-83) 99/64     Weight: 45.4 kg (100 lb)  Body mass index is 18.89 kg/m².    Estimated Creatinine Clearance: 18.1 mL/min (based on Cr of 3.2).    Physical Exam   Constitutional: She is oriented to person, place, and time. She appears well-developed and well-nourished.   HENT:   Head: Normocephalic and atraumatic.   Right Ear: There is tenderness. No drainage.   Left Ear: External ear normal.   Nose: Nose normal.   Mouth/Throat: Oropharynx -oral thrush  .   Eyes: Conjunctivae and EOM are normal. Pupils are equal, round, and reactive to light.    Neck: Normal range of motion. Neck supple.   Cardiovascular: S1 normal and S2 normal.  Tachycardia present.    Pulmonary/Chest: Effort normal and breath sounds normal.   Abdominal: Soft. Bowel sounds are normal. There is tenderness. There is no rebound and no guarding.   Genitourinary:   Genitourinary Comments: deferred   Musculoskeletal: Normal range of motion.   Neurological: She is alert and oriented to person, place, and time.   Skin: Skin is warm and dry. Capillary refill takes less than 2 seconds.   Psychiatric: She has a normal mood and affect.   Nursing note and vitals reviewed.      Significant Labs:   Blood Culture:   Recent Labs  Lab 04/14/17  1856 04/14/17  1940 04/16/17  1615 05/21/17  1440 05/21/17  1445   LABBLOO No growth after 5 days. No growth after 5 days. No growth after 5 days. No Growth to date  No Growth to date  No Growth to date  No Growth to date No Growth to date  No Growth to date  No Growth to date  No Growth to date     CBC:   Recent Labs  Lab 05/24/17  1735 05/25/17  0634   WBC 2.15* 1.20*   HGB 10.4* 8.0*   HCT 30.1* 23.1*   PLT 63* 50*     All pertinent labs within the past 24 hours have been reviewed.    Significant Imaging: I have reviewed all pertinent imaging results/findings within the past 24 hours.
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
## 2017-05-25 NOTE — PROGRESS NOTES
[facility] Hospital Medicine  Progress Note    Patient Name: Wang Kebede  MRN: [id number]  Patient Class: IP- Inpatient   Admission Date: 5/21/2017  Length of Stay: 4 days  Attending Physician: Mara Cabello MD  Primary Care Provider: KIN Mendoza        Subjective:     Principal Problem:Fever    HPI:  Ms. Kebede is a 32 yo AAF with h/o AIDS presented complaining of fever with Tmax 103. She was seen in the ED on yesterday complaining of fever and right ear main. She was given Augmentin and discharged home. She was recently hospitalized for severe sepsis where she was in the ICU on pressors after abdominal procedure. She states she has been compliant with her medications since discharge. She denies any sick contacts. She denies any cough, SOB, abdominal pain or diarrhea.      Hospital Course:  No notes on file    Interval History: 32 yo female with AIDS. She appears to be dong better medically as she has still been afebrile and her heart rate is better controlled after get blood. She is very anxious and depressed about her health and quality of life.     Review of Systems   Constitutional: Negative.    HENT: Negative.    Eyes: Negative.    Respiratory: Negative.    Cardiovascular: Negative.    Gastrointestinal: Negative.    Psychiatric/Behavioral: The patient is nervous/anxious.    All other systems reviewed and are negative.    Objective:     Vital Signs (Most Recent):  Temp: 100.3 °F (37.9 °C) (05/25/17 0821)  Pulse: (!) 114 (05/25/17 0821)  Resp: 18 (05/25/17 0821)  BP: (!) 106/57 (05/25/17 0821)  SpO2: 100 % (05/25/17 0821) Vital Signs (24h Range):  Temp:  [98 °F (36.7 °C)-100.3 °F (37.9 °C)] 100.3 °F (37.9 °C)  Pulse:  [110-124] 114  Resp:  [18-22] 18  SpO2:  [98 %-100 %] 100 %  BP: ()/(37-66) 106/57     Weight: 45.4 kg (100 lb)  Body mass index is 18.89 kg/m².    Intake/Output Summary (Last 24 hours) at 05/25/17 1002  Last data filed at 05/24/17 1730   Gross per 24  hour   Intake              520 ml   Output                0 ml   Net              520 ml      Physical Exam   Constitutional: She is oriented to person, place, and time.   malnourshied   HENT:   Head: Normocephalic and atraumatic.   Nose: Nose normal.   Mouth/Throat: Mucous membranes are dry.   Eyes: Conjunctivae and EOM are normal. Pupils are equal, round, and reactive to light.   Neck: Normal range of motion. Neck supple.   Cardiovascular: Intact distal pulses and normal pulses.  Tachycardia present.    Pulmonary/Chest: Effort normal and breath sounds normal.   Abdominal: Soft. Bowel sounds are normal.   Neurological: She is alert and oriented to person, place, and time.   Skin: Skin is warm and dry.   Psychiatric: Judgment normal. Her mood appears anxious. Cognition and memory are normal. She exhibits a depressed mood.   Nursing note and vitals reviewed.      Significant Labs:   Recent Lab Results       05/25/17  0809 05/25/17  0634 05/25/17  0131 05/24/17  2329 05/24/17  1735      Allens Test    N/A      Anion Gap 11         Aniso  Slight        Aortic Valve Regurgitation          Baso #     0.02     Basophil%  0.0   0.9     Site    LB      BUN, Bld 47(H)         Calcium 5.9  Comment:  CALCIUM  critical result(s) called and verbal readback obtained   from ROSSY HINKLE RN, 05/25/2017 09:12  (LL)         Chloride 114(H)         CO2 13(L)         Creatinine 3.2(H)         D-Dimer   5.37  Comment:  The quantitative D-dimer assay should be used as an aid in   the diagnosis of deep vein thrombosis and pulmonary embolism  in patients with the appropriate presentation and clinical  history. Causes of a positive (>0.50 mg/L FEU) D-Dimer test  include, but are not limited to: DVT, PE, DIC, thrombolytic   therapy, anticoagulant therapy, recent surgery, trauma, or   pregnancy, disseminated malignancy, aortic aneurysm, cirrhosis,  and severe infection. False negative results may occur in   patients with distal DVT.  (H)        Diastolic Dysfunction          DelSys    Nasal Can      Differential Method  Manual   Automated     EF          eGFR if  21(A)         eGFR if non  18  Comment:  Calculation used to obtain the estimated glomerular filtration  rate (eGFR) is the CKD-EPI equation. Since race is unknown   in our information system, the eGFR values for   -American and Non--American patients are given   for each creatinine result.  (A)         Eos #     0.0     Eosinophil%  0.0   0.5     FiO2    28      Flow    2      Free T4          Glucose 80         Gran #     1.4(L)     Gran%  36.0(L)   64.2     Hematocrit  23.1(L)   30.1(L)     Hemoglobin  8.0(L)   10.4(L)     Lymph #     0.6(L)     Lymph%  57.0(H)   28.4     MCH  27.0   27.1     MCHC  34.6   34.6     MCV  78(L)   78(L)     Mode    SPONT      Mono #     0.2(L)     Mono%  7.0   7.4     MPV  10.7   SEE COMMENT  Comment:  Result not available.     Mitral Valve Regurgitation          Ovalocytes  Occasional        Est. PA Systolic Pressure          Platelet Estimate  Clumped(A)        Platelets  50(L)   63(L)     POC BE    -18      POC HCO3    9.5(L)      POC PCO2    21.3(LL)      POC PH    7.255(LL)      POC PO2    135(H)      POC SATURATED O2    99      Poik  Slight        Potassium 3.1(L)         RBC  2.96(L)   3.84(L)     RDW  18.6(H)   18.8(H)     Sample    ARTERIAL      Sodium 138         TSH          Tricuspid Valve Regurgitation          WBC  1.20  Comment:  WBC critical result(s) called and verbal readback obtained from   Helene , 05/25/2017 07:47  (LL)   2.15(L)                 05/24/17  1242 05/24/17  1158      Allens Test       Anion Gap       Aniso       Aortic Valve Regurgitation  MILD     Baso #       Basophil%       Site       BUN, Bld       Calcium       Chloride       CO2       Creatinine       D-Dimer       Diastolic Dysfunction  No     DelSys       Differential Method       EF  55     eGFR if         eGFR if non        Eos #       Eosinophil%       FiO2       Flow       Free T4 1.00      Glucose       Gran #       Gran%       Hematocrit       Hemoglobin       Lymph #       Lymph%       MCH       MCHC       MCV       Mode       Mono #       Mono%       MPV       Mitral Valve Regurgitation  TRIVIAL     Ovalocytes       Est. PA Systolic Pressure  34.58     Platelet Estimate       Platelets       POC BE       POC HCO3       POC PCO2       POC PH       POC PO2       POC SATURATED O2       Poik       Potassium       RBC       RDW       Sample       Sodium       TSH 6.865(H)      Tricuspid Valve Regurgitation  TRIVIAL     WBC             Significant Imaging: I have reviewed and interpreted all pertinent imaging results/findings within the past 24 hours.    Assessment/Plan:      Sinus tachycardia    Her BP will not tolerate her metoprolol, I did give IV 5 mg of lopressor with some improvement but her BP did decrease. I will try a dose of digoxin.    5/25/17: better after her transfusion. TSH elevated but normal free T4, elevation could be related to acute illness and medications as VFEND can cause tachycardia. Cardiology agreed with correcting her anemia and okay to be slightly tachycardia but she is at risk for high output failure. Echo is normal with EF 50-60%.        Metabolic acidosis    She is positive fluid balance and worsening renal function. I suspect it is a combination of medications and renal function which has caused symptoms. Medications have been adjusted and given sodium bicabonate.          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 Again I am going to hydrate and monitor I/O's closely as her lactic acid was 0.7 for now but will repeat in a few hours.    5/23/17: she has been hypotensive, renal function was improving but Cr slightly up today. IV bolus now and increase  her fluid rate.    5/25/17: Despite positive fluid she has declining function with an metabolic acidosis which could be from some of her medications and the HIV itself. ID has stopped some medications and nephrology has been consulted.          AIDS (acquired immunodeficiency syndrome), CD4 <=200    Last known CD4 count was around 4    5/24/17: repeated her CD4 count, explained to her that she must take her medications to improve her immune system will decrease her risk of infections. Discussed option of PEG given she states she cannot tolerate taking pills. She wants to think about it.    5/25/17: I had a long discussion with her about being compliant with her medications. If she wants to feel better she will need the medications in order to build her immune system and decrease her risk of infections. I have explained that this will take time but she has to be consistent with her HAART medications. The alternative is to not take the medications and eventually she will become so ill that we will not be able to do anything else.        Anemia    5/25/17: she was symptomatic but this is now better after getting 2 units of PRBC          S/P implantation of automatic cardioverter/defibrillator (AICD)      She is tachycardia and hypotensive but I think she tends to run on the lower side with her BP  Check lactic acid, continue with IV fluids will have to hold beta blocker as she was started on during last hospitalization.    5/22/17: she is still tachycardic will attempt to restart her beta blocker if BP tolerates.        HIV (human immunodeficiency virus infection)    Resume her meds     5/23/17: we have resumed her medications but she is noncompliant because she has a difficult time swallowing her medications          * Fever    Currently there is no obvious source as she does give history of otalgia but wax of ear prevented complete ear exam.  She has been on Augmentin after being seen in the ED on yesterday.  -Plan  is to swab for flu, may need ENT to get good exam to rule otitis media but in the meantime will empirically treat with Cefepime and Vancomycin, follow up on blood cultures and ID consult    5/22/17: still no obvious source as she is still spiking a temp with Tmax 105. CT head done as per overnight staff she had some sluggish speech and didn't seen like herself but she was sleeping this am and a little sluggish. Plan is to do LP since CT was negative to rule out CNS infection with possible Toxoplasmosis and Cryptococcal given negative CXR, CT abdomen and blood cultures so far.    5/24/17: resolved for past 24 hrs unknown if this was related to her underlying HIV, other infectious etiology like an acute viral or could she have an infection with a fungus as the fever seemed to improve once VFEND started. Other cultures that have to go out are still pending.    5/23/17: still no obvious source but HIV can cause fever, continue with Vancomycin, Merrem, VFEND          VTE Risk Mitigation         Ordered     heparin (porcine) injection 5,000 Units  Every 8 hours     Route:  Subcutaneous        05/21/17 2004     Medium Risk of VTE  Once      05/21/17 2004          Mara Cabello MD  Department of Hospital Medicine   [facility]

## 2017-05-25 NOTE — PLAN OF CARE
Problem: Patient Care Overview  Goal: Plan of Care Review  Outcome: Ongoing (interventions implemented as appropriate)  Pt remains free from injury/falls. Fall precautions in place. Bed alarm on and functioning properly. IV fluids infusing as ordered. Meds given as scheduled. Pt on regular diet. Appetite poor. Denies pain. Afebrile at this time.  Remains sinus tach on tele monitor.  Able to verbalize needs/wants. Bed in low, locked position. Call light and personal items within reach pf pt. Family at bedside. No acute distress noted. VSS. Will cont to monitor.

## 2017-05-25 NOTE — ASSESSMENT & PLAN NOTE
She is positive fluid balance and worsening renal function. I suspect it is a combination of medications and renal function which has caused symptoms. Medications have been adjusted and given sodium bicabonate.

## 2017-05-25 NOTE — HPI
31 year old female with h/o HIV/AIDS, anemia, vulvar cancer, s/p AICD presented to [facility] on 5/21/17 with fevers. Patient is followed by ID and source of fever is currently not clear. While in hospital, patient's renal function worsened and her creatinine increased from 1.9 on 5/21/17 to 3.4 on 5/24/17. Nephrology was consulted to assist with patient's renal care while [place] [facility] Chart review revealed multiple episodes of hypotension during 5/22-5/24/17 period with SBP in 80s. Patient has not received any IV contrast during current admission. Medication review showed that patient was on Tenofovir and Vancomycin; these medications have now been discontinued.  I saw and examined patient in her hospital room. Patient reports nausea/vomiting, productive cough, diarrhea. She denies any fever, SOB, pain, dysuria, LE edema, rashes, rectal or gum bleeding. Patient also reports generalized joint pain and generalized weakness.

## 2017-05-25 NOTE — ASSESSMENT & PLAN NOTE
Follow repeat cd4 count .  Will hold descovy due to worsening renal failure.  Will add ritonavir to daurunavir.  Continue zithromax and mepron for OI prophylasix.

## 2017-05-25 NOTE — ASSESSMENT & PLAN NOTE
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 Again I am going to hydrate and monitor I/O's closely as her lactic acid was 0.7 for now but will repeat in a few hours.    5/23/17: she has been hypotensive, renal function was improving but Cr slightly up today. IV bolus now and increase her fluid rate.    5/25/17: Despite positive fluid she has declining function with an metabolic acidosis which could be from some of her medications and the HIV itself. ID has stopped some medications and nephrology has been consulted.

## 2017-05-25 NOTE — PLAN OF CARE
Pt remains free from injury/falls. Fall precautions in place. Bed alarm on and functioning properly. IV fluids infusing as ordered. Meds given as scheduled. Pt on regular diet. Appetite poor. Denies pain. Afebrile at this time.  Remains sinus tach on tele monitor.  Able to verbalize needs/wants. Bed in low, locked position. Call light and personal items within reach pf pt. No acute distress noted. VSS. Will cont to monitor.

## 2017-05-25 NOTE — ASSESSMENT & PLAN NOTE
Last known CD4 count was around 4    5/24/17: repeated her CD4 count, explained to her that she must take her medications to improve her immune system will decrease her risk of infections. Discussed option of PEG given she states she cannot tolerate taking pills. She wants to think about it.    5/25/17: I had a long discussion with her about being compliant with her medications. If she wants to feel better she will need the medications in order to build her immune system and decrease her risk of infections. I have explained that this will take time but she has to be consistent with her HAART medications. The alternative is to not take the medications and eventually she will become so ill that we will not be able to do anything else.

## 2017-05-25 NOTE — SUBJECTIVE & OBJECTIVE
Interval History: 31 year old woman with metabolic acidosis , AIDs, fever ,   Chest x-ray showed development of bibasilar atelectasis .  She says she feels she is going to die.      Review of Systems   Constitutional: Positive for activity change, appetite change, chills, fatigue and fever.   HENT: Negative for congestion, ear pain, facial swelling, sinus pressure and sore throat.         Has oral thrush    Eyes: Negative for pain.   Respiratory: Positive for chest tightness and shortness of breath. Negative for apnea and stridor.    Cardiovascular: Negative for chest pain, palpitations and leg swelling.   Gastrointestinal: Positive for diarrhea. Negative for abdominal distention, abdominal pain and nausea.   Endocrine: Negative for polydipsia and polyphagia.   Genitourinary: Negative for decreased urine volume, difficulty urinating, frequency and genital sores.   Musculoskeletal: Negative for arthralgias and gait problem.   Neurological: Positive for headaches. Negative for light-headedness.   Hematological: Negative for adenopathy.   Psychiatric/Behavioral: Negative for agitation, confusion and decreased concentration.     Objective:     Vital Signs (Most Recent):  Temp: 97.8 °F (36.6 °C) (05/25/17 1716)  Pulse: 104 (05/25/17 1802)  Resp: 18 (05/25/17 1802)  BP: 99/64 (05/25/17 1802)  SpO2: 99 % (05/25/17 1802) Vital Signs (24h Range):  Temp:  [97.8 °F (36.6 °C)-100.3 °F (37.9 °C)] 97.8 °F (36.6 °C)  Pulse:  [104-127] 104  Resp:  [18-20] 18  SpO2:  [96 %-100 %] 99 %  BP: ()/(37-83) 99/64     Weight: 45.4 kg (100 lb)  Body mass index is 18.89 kg/m².    Estimated Creatinine Clearance: 18.1 mL/min (based on Cr of 3.2).    Physical Exam   Constitutional: She is oriented to person, place, and time. She appears well-developed and well-nourished.   HENT:   Head: Normocephalic and atraumatic.   Right Ear: There is tenderness. No drainage.   Left Ear: External ear normal.   Nose: Nose normal.   Mouth/Throat:  Oropharynx is clear and moist.   .   Eyes: Conjunctivae and EOM are normal. Pupils are equal, round, and reactive to light.   Neck: Normal range of motion. Neck supple.   Cardiovascular: S1 normal and S2 normal.  Tachycardia present.    Pulmonary/Chest: Effort normal and breath sounds normal.   Abdominal: Soft. Bowel sounds are normal. There is tenderness. There is no rebound and no guarding.   Genitourinary:   Genitourinary Comments: deferred   Musculoskeletal: Normal range of motion.   Neurological: She is alert and oriented to person, place, and time.   Skin: Skin is warm and dry. Capillary refill takes less than 2 seconds.   Psychiatric: She has a normal mood and affect.   Nursing note and vitals reviewed.      Significant Labs:   Blood Culture:     Recent Labs  Lab 04/14/17  1856 04/14/17  1940 04/16/17  1615 05/21/17  1440 05/21/17  1445   LABBLOO No growth after 5 days. No growth after 5 days. No growth after 5 days. No Growth to date  No Growth to date  No Growth to date  No Growth to date No Growth to date  No Growth to date  No Growth to date  No Growth to date     CBC:     Recent Labs  Lab 05/24/17  1735 05/25/17  0634   WBC 2.15* 1.20*   HGB 10.4* 8.0*   HCT 30.1* 23.1*   PLT 63* 50*     All pertinent labs within the past 24 hours have been reviewed.    Significant Imaging: I have reviewed all pertinent imaging results/findings within the past 24 hours.

## 2017-05-25 NOTE — NURSING
Pt HR in 140s, pt anxious and reports still feeling jittery. VS stable. Dr. Cabello notified. Physician to order metoprolol and xanax for pt. Will continue to monitor.

## 2017-05-25 NOTE — SUBJECTIVE & OBJECTIVE
Interval History: 31 year old woman with metabolic acidosis , AIDs, fever ,   Chest x-ray showed development of bibasilar atelectasis .  She says she feels she is going to die.      Review of Systems   Constitutional: Positive for activity change, appetite change, chills, fatigue and fever.   HENT: Negative for congestion, ear pain, facial swelling, sinus pressure and sore throat.    Eyes: Negative for pain.   Respiratory: Positive for chest tightness and shortness of breath. Negative for apnea and stridor.    Cardiovascular: Negative for chest pain, palpitations and leg swelling.   Gastrointestinal: Positive for diarrhea. Negative for abdominal distention, abdominal pain and nausea.   Endocrine: Negative for polydipsia and polyphagia.   Genitourinary: Negative for decreased urine volume, difficulty urinating, frequency and genital sores.   Musculoskeletal: Negative for arthralgias and gait problem.   Neurological: Positive for headaches. Negative for light-headedness.   Hematological: Negative for adenopathy.   Psychiatric/Behavioral: Negative for agitation, confusion and decreased concentration.     Objective:     Vital Signs (Most Recent):  Temp: 97.8 °F (36.6 °C) (05/25/17 1716)  Pulse: 104 (05/25/17 1802)  Resp: 18 (05/25/17 1802)  BP: 99/64 (05/25/17 1802)  SpO2: 99 % (05/25/17 1802) Vital Signs (24h Range):  Temp:  [97.8 °F (36.6 °C)-100.3 °F (37.9 °C)] 97.8 °F (36.6 °C)  Pulse:  [104-127] 104  Resp:  [18-20] 18  SpO2:  [96 %-100 %] 99 %  BP: ()/(37-83) 99/64     Weight: 45.4 kg (100 lb)  Body mass index is 18.89 kg/m².    Estimated Creatinine Clearance: 18.1 mL/min (based on Cr of 3.2).    Physical Exam   Constitutional: She is oriented to person, place, and time. She appears well-developed and well-nourished.   HENT:   Head: Normocephalic and atraumatic.   Right Ear: There is tenderness. No drainage.   Left Ear: External ear normal.   Nose: Nose normal.   Mouth/Throat: Oropharynx is clear and moist.    .   Eyes: Conjunctivae and EOM are normal. Pupils are equal, round, and reactive to light.   Neck: Normal range of motion. Neck supple.   Cardiovascular: S1 normal and S2 normal.  Tachycardia present.    Pulmonary/Chest: Effort normal and breath sounds normal.   Abdominal: Soft. Bowel sounds are normal. There is tenderness. There is no rebound and no guarding.   Genitourinary:   Genitourinary Comments: deferred   Musculoskeletal: Normal range of motion.   Neurological: She is alert and oriented to person, place, and time.   Skin: Skin is warm and dry. Capillary refill takes less than 2 seconds.   Psychiatric: She has a normal mood and affect.   Nursing note and vitals reviewed.      Significant Labs:   Blood Culture:   Recent Labs  Lab 04/14/17  1856 04/14/17  1940 04/16/17  1615 05/21/17  1440 05/21/17  1445   LABBLOO No growth after 5 days. No growth after 5 days. No growth after 5 days. No Growth to date  No Growth to date  No Growth to date  No Growth to date No Growth to date  No Growth to date  No Growth to date  No Growth to date     CBC:   Recent Labs  Lab 05/24/17  1735 05/25/17  0634   WBC 2.15* 1.20*   HGB 10.4* 8.0*   HCT 30.1* 23.1*   PLT 63* 50*     All pertinent labs within the past 24 hours have been reviewed.    Significant Imaging: I have reviewed all pertinent imaging results/findings within the past 24 hours.

## 2017-05-25 NOTE — ASSESSMENT & PLAN NOTE
Etiology is unclear.could be from advanced HIV   Continue po voriconazole, use po avelox   Follow cultures to guide therapy .

## 2017-05-25 NOTE — ASSESSMENT & PLAN NOTE
Will add bicarbonate and will consult nephrology  Will do ABG-serum bicarbonate is 10  Give one amp of bicarbonate

## 2017-05-25 NOTE — PLAN OF CARE
Problem: Patient Care Overview  Goal: Plan of Care Review  Outcome: Ongoing (interventions implemented as appropriate)  Patient awake, alert and oriented x 4. VS stable. Patient denies pain or SOB. Patient on telemetry, sinus tach on the monitor. Patient ambulates with assistance. Fall precautions in place. Bed alarm active and audible. Patient free from fall/injury. Plan of care reviewed with patient. Patient has no questions at this time. Will continue to monitor.

## 2017-05-25 NOTE — NURSING
Pt call light answered in person. Pt states she is feeling better after receiving the xanax. Will continue to monitor.

## 2017-05-25 NOTE — PROGRESS NOTES
[facility]  Infectious Disease  Progress Note    Patient Name: Wang Kebede  MRN: [id number]  Admission Date: 5/21/2017  Length of Stay: 3 days  Attending Physician: Mara Cabello MD  Primary Care Provider: KIN Mendoza    Isolation Status: Special Contact  Assessment/Plan:      Sinus tachycardia    Cardiology consulted.  Will rule out PE  Do VQ scan  Check D dimer        Hypomagnesemia    Replete -closely monitor level in am         Thrombocytopenia    Will stop zyvox,monitor closely         Metabolic acidosis    Will add bicarbonate and will consult nephrology  Will do ABG-serum bicarbonate is 10  Give one amp of bicarbonate         LELAND (acute kidney injury)    IVF ,closely monitor renal function  Consult nephrology         AIDS (acquired immunodeficiency syndrome), CD4 <=200    Follow repeat cd4 count .  Will hold descovy due to worsening renal failure.  Will add ritonavir to daurunavir.  Continue zithromax and mepron for OI prophylasix.        Anemia    Stable post transfusion   Closely monitor cbc         Vulvar intraepithelial neoplasia (KARLY) grade 3    Out patient follow up with Gynae        * Fever    Etiology is unclear.  Due to worsening renal failure-will change to po voriconazole, use po avelox   Follow cultures to guide therapy .            Anticipated Disposition:     Thank you for your consult. I will follow-up with patient. Please contact us if you have any additional questions.    Hubert Mayo MD  Infectious Disease  [facility]    Subjective:     Principal Problem:Fever    HPI: 31 year old woman with history of AIDs-last cd4 count 53 on 03/21. She is poorly compliant with HAART. She is well known to me from previous hospital admission. She was recently seen in the ED with fever and ear pain and was discharged on Augumentin . She presented again today with persistent high grade fever-T max 105. There is associated history of loose stools.  Since  admission, she was started on cooling blanket, LP showed opening pressure of 18, CSF wbc of 12 .  CMP-bicarbonate 14,,creatinine 1.9,hemoglobin of 7.  Head ct scan did not show any acute lesion .  CT renal stone showed splenomegaly .without any acute lesion.  Interval History: 31 year old woman with AIDS admitted with fever. All cultures  remain negative.  She now has worsening renal failure and acidosis .  Serum creatinine is 3.2 and bicarbonate is 10.    She says she feels she might die if she closed her eyes.  Review of Systems   Constitutional: Positive for activity change, appetite change, chills, fatigue and fever.   HENT: Negative for congestion, ear pain, facial swelling, sinus pressure and sore throat.    Eyes: Negative for pain.   Respiratory: Positive for chest tightness and shortness of breath. Negative for apnea and stridor.    Cardiovascular: Negative for chest pain, palpitations and leg swelling.   Gastrointestinal: Positive for diarrhea. Negative for abdominal distention, abdominal pain and nausea.   Endocrine: Negative for polydipsia and polyphagia.   Genitourinary: Negative for decreased urine volume, difficulty urinating, frequency and genital sores.   Musculoskeletal: Negative for arthralgias and gait problem.   Neurological: Positive for headaches. Negative for light-headedness.   Hematological: Negative for adenopathy.   Psychiatric/Behavioral: Negative for agitation, confusion and decreased concentration.     Objective:     Vital Signs (Most Recent):  Temp: 99.8 °F (37.7 °C) (05/24/17 2024)  Pulse: (!) 121 (05/24/17 2024)  Resp: 18 (05/24/17 2024)  BP: (!) 106/58 (05/24/17 2024)  SpO2: 98 % (05/24/17 2024) Vital Signs (24h Range):  Temp:  [98 °F (36.7 °C)-99.8 °F (37.7 °C)] 99.8 °F (37.7 °C)  Pulse:  [115-140] 121  Resp:  [16-24] 18  SpO2:  [95 %-100 %] 98 %  BP: ()/(40-69) 106/58     Weight: 45.4 kg (100 lb)  Body mass index is 18.89 kg/m².    Estimated Creatinine Clearance: 17 mL/min  (based on Cr of 3.4).    Physical Exam   Constitutional: She is oriented to person, place, and time. She appears well-developed and well-nourished.   HENT:   Head: Normocephalic and atraumatic.   Right Ear: There is tenderness. No drainage.   Left Ear: External ear normal.   Nose: Nose normal.   Mouth/Throat: Oropharynx is clear and moist.   .   Eyes: Conjunctivae and EOM are normal. Pupils are equal, round, and reactive to light.   Neck: Normal range of motion. Neck supple.   Cardiovascular: S1 normal and S2 normal.  Tachycardia present.    Pulmonary/Chest: Effort normal and breath sounds normal.   Abdominal: Soft. Bowel sounds are normal. There is tenderness. There is no rebound and no guarding.   Genitourinary:   Genitourinary Comments: deferred   Musculoskeletal: Normal range of motion.   Neurological: She is alert and oriented to person, place, and time.   Skin: Skin is warm and dry. Capillary refill takes less than 2 seconds.   Psychiatric: She has a normal mood and affect.   Nursing note and vitals reviewed.      Significant Labs:   Blood Culture:     Recent Labs  Lab 04/14/17  1856 04/14/17  1940 04/16/17  1615 05/21/17  1440 05/21/17  1445   LABBLOO No growth after 5 days. No growth after 5 days. No growth after 5 days. No Growth to date  No Growth to date  No Growth to date  No Growth to date No Growth to date  No Growth to date  No Growth to date  No Growth to date     BMP:     Recent Labs  Lab 05/24/17  0519   GLU 85      K 3.7   *   CO2 10*   BUN 48*   CREATININE 3.4*   CALCIUM 6.0*   MG 1.6     CBC:     Recent Labs  Lab 05/23/17  0801 05/24/17  1735   WBC 4.19 2.15*   HGB 6.5* 10.4*   HCT 18.7* 30.1*   PLT 81* 63*     All pertinent labs within the past 24 hours have been reviewed.    Significant Imaging: I have reviewed all pertinent imaging results/findings within the past 24 hours.

## 2017-05-25 NOTE — PROGRESS NOTES
[facility]  Infectious Disease  Progress Note    Patient Name: Wang Kebede  MRN: [id number]  Admission Date: 5/21/2017  Length of Stay: 4 days  Attending Physician: Mara Cabello MD  Primary Care Provider: KIN Mendoza    Isolation Status: Special Contact  Assessment/Plan:      Oral thrush    On voriconazole  Add nystatin         Depression    Add celexa        Sinus tachycardia    Will do VQ scan to rule out PE        Thrombocytopenia    ,monitor closely off zyvox        Metabolic acidosis    Will add bicarbonate and will consult nephrology  Now on bicarbonate drip.        AIDS (acquired immunodeficiency syndrome), CD4 <=200     repeat cd4 count  Is 4 -she has profound immunosuppression .Will add ritonavir to daurunavir. Hold descovy due to renal failure  Will do HIV genotype , will consider PEG tube placement to get HAART  Continue zithromax and mepron for OI prophylasix.        * Fever    Etiology is unclear.could be from advanced HIV   Continue po voriconazole, use po avelox   Follow cultures to guide therapy .            Anticipated Disposition:     Thank you for your consult. I will follow-up with patient. Please contact us if you have any additional questions.    Hubert Mayo MD  Infectious Disease  [facility]    Subjective:     Principal Problem:Fever    HPI: 31 year old woman with history of AIDs-last cd4 count 53 on 03/21. She is poorly compliant with HAART. She is well known to me from previous hospital admission. She was recently seen in the ED with fever and ear pain and was discharged on Augumentin . She presented again today with persistent high grade fever-T max 105. There is associated history of loose stools.  Since admission, she was started on cooling blanket, LP showed opening pressure of 18, CSF wbc of 12 .  CMP-bicarbonate 14,,creatinine 1.9,hemoglobin of 7.  Head ct scan did not show any acute lesion .  CT renal stone showed splenomegaly  .without any acute lesion.  Interval History: 31 year old woman with metabolic acidosis , AIDs, fever ,   Chest x-ray showed development of bibasilar atelectasis .  She says she feels she is going to die.      Review of Systems   Constitutional: Positive for activity change, appetite change, chills, fatigue and fever.   HENT: Negative for congestion, ear pain, facial swelling, sinus pressure and sore throat.         Has oral thrush    Eyes: Negative for pain.   Respiratory: Positive for chest tightness and shortness of breath. Negative for apnea and stridor.    Cardiovascular: Negative for chest pain, palpitations and leg swelling.   Gastrointestinal: Positive for diarrhea. Negative for abdominal distention, abdominal pain and nausea.   Endocrine: Negative for polydipsia and polyphagia.   Genitourinary: Negative for decreased urine volume, difficulty urinating, frequency and genital sores.   Musculoskeletal: Negative for arthralgias and gait problem.   Neurological: Positive for headaches. Negative for light-headedness.   Hematological: Negative for adenopathy.   Psychiatric/Behavioral: Negative for agitation, confusion and decreased concentration.     Objective:     Vital Signs (Most Recent):  Temp: 97.8 °F (36.6 °C) (05/25/17 1716)  Pulse: 104 (05/25/17 1802)  Resp: 18 (05/25/17 1802)  BP: 99/64 (05/25/17 1802)  SpO2: 99 % (05/25/17 1802) Vital Signs (24h Range):  Temp:  [97.8 °F (36.6 °C)-100.3 °F (37.9 °C)] 97.8 °F (36.6 °C)  Pulse:  [104-127] 104  Resp:  [18-20] 18  SpO2:  [96 %-100 %] 99 %  BP: ()/(37-83) 99/64     Weight: 45.4 kg (100 lb)  Body mass index is 18.89 kg/m².    Estimated Creatinine Clearance: 18.1 mL/min (based on Cr of 3.2).    Physical Exam   Constitutional: She is oriented to person, place, and time. She appears well-developed and well-nourished.   HENT:   Head: Normocephalic and atraumatic.   Right Ear: There is tenderness. No drainage.   Left Ear: External ear normal.   Nose: Nose  normal.   Mouth/Throat: Oropharynx is clear and moist.   .   Eyes: Conjunctivae and EOM are normal. Pupils are equal, round, and reactive to light.   Neck: Normal range of motion. Neck supple.   Cardiovascular: S1 normal and S2 normal.  Tachycardia present.    Pulmonary/Chest: Effort normal and breath sounds normal.   Abdominal: Soft. Bowel sounds are normal. There is tenderness. There is no rebound and no guarding.   Genitourinary:   Genitourinary Comments: deferred   Musculoskeletal: Normal range of motion.   Neurological: She is alert and oriented to person, place, and time.   Skin: Skin is warm and dry. Capillary refill takes less than 2 seconds.   Psychiatric: She has a normal mood and affect.   Nursing note and vitals reviewed.      Significant Labs:   Blood Culture:     Recent Labs  Lab 04/14/17  1856 04/14/17  1940 04/16/17  1615 05/21/17  1440 05/21/17  1445   LABBLOO No growth after 5 days. No growth after 5 days. No growth after 5 days. No Growth to date  No Growth to date  No Growth to date  No Growth to date No Growth to date  No Growth to date  No Growth to date  No Growth to date     CBC:     Recent Labs  Lab 05/24/17  1735 05/25/17  0634   WBC 2.15* 1.20*   HGB 10.4* 8.0*   HCT 30.1* 23.1*   PLT 63* 50*     All pertinent labs within the past 24 hours have been reviewed.    Significant Imaging: I have reviewed all pertinent imaging results/findings within the past 24 hours.

## 2017-05-25 NOTE — ASSESSMENT & PLAN NOTE
Etiology is unclear.  Due to worsening renal failure-will change to po voriconazole, use po avelox   Follow cultures to guide therapy .

## 2017-05-25 NOTE — SUBJECTIVE & OBJECTIVE
Interval History: 32 yo female with AIDS. She appears to be dong better medically as she has still been afebrile and her heart rate is better controlled after get blood. She is very anxious and depressed about her health and quality of life.     Review of Systems   Constitutional: Negative.    HENT: Negative.    Eyes: Negative.    Respiratory: Negative.    Cardiovascular: Negative.    Gastrointestinal: Negative.    Psychiatric/Behavioral: The patient is nervous/anxious.    All other systems reviewed and are negative.    Objective:     Vital Signs (Most Recent):  Temp: 100.3 °F (37.9 °C) (05/25/17 0821)  Pulse: (!) 114 (05/25/17 0821)  Resp: 18 (05/25/17 0821)  BP: (!) 106/57 (05/25/17 0821)  SpO2: 100 % (05/25/17 0821) Vital Signs (24h Range):  Temp:  [98 °F (36.7 °C)-100.3 °F (37.9 °C)] 100.3 °F (37.9 °C)  Pulse:  [110-124] 114  Resp:  [18-22] 18  SpO2:  [98 %-100 %] 100 %  BP: ()/(37-66) 106/57     Weight: 45.4 kg (100 lb)  Body mass index is 18.89 kg/m².    Intake/Output Summary (Last 24 hours) at 05/25/17 1002  Last data filed at 05/24/17 1730   Gross per 24 hour   Intake              520 ml   Output                0 ml   Net              520 ml      Physical Exam   Constitutional: She is oriented to person, place, and time.   malnourshied   HENT:   Head: Normocephalic and atraumatic.   Nose: Nose normal.   Mouth/Throat: Mucous membranes are dry.   Eyes: Conjunctivae and EOM are normal. Pupils are equal, round, and reactive to light.   Neck: Normal range of motion. Neck supple.   Cardiovascular: Intact distal pulses and normal pulses.  Tachycardia present.    Pulmonary/Chest: Effort normal and breath sounds normal.   Abdominal: Soft. Bowel sounds are normal.   Neurological: She is alert and oriented to person, place, and time.   Skin: Skin is warm and dry.   Psychiatric: Judgment normal. Her mood appears anxious. Cognition and memory are normal. She exhibits a depressed mood.   Nursing note and vitals  reviewed.      Significant Labs:   Recent Lab Results       05/25/17  0809 05/25/17  0634 05/25/17  0131 05/24/17  2329 05/24/17  1735      Allens Test    N/A      Anion Gap 11         Aniso  Slight        Aortic Valve Regurgitation          Baso #     0.02     Basophil%  0.0   0.9     Site    LB      BUN, Bld 47(H)         Calcium 5.9  Comment:  CALCIUM  critical result(s) called and verbal readback obtained   from ROSSY HINKLE RN, 05/25/2017 09:12  (LL)         Chloride 114(H)         CO2 13(L)         Creatinine 3.2(H)         D-Dimer   5.37  Comment:  The quantitative D-dimer assay should be used as an aid in   the diagnosis of deep vein thrombosis and pulmonary embolism  in patients with the appropriate presentation and clinical  history. Causes of a positive (>0.50 mg/L FEU) D-Dimer test  include, but are not limited to: DVT, PE, DIC, thrombolytic   therapy, anticoagulant therapy, recent surgery, trauma, or   pregnancy, disseminated malignancy, aortic aneurysm, cirrhosis,  and severe infection. False negative results may occur in   patients with distal DVT.  (H)       Diastolic Dysfunction          DelSys    Nasal Can      Differential Method  Manual   Automated     EF          eGFR if  21(A)         eGFR if non  18  Comment:  Calculation used to obtain the estimated glomerular filtration  rate (eGFR) is the CKD-EPI equation. Since race is unknown   in our information system, the eGFR values for   -American and Non--American patients are given   for each creatinine result.  (A)         Eos #     0.0     Eosinophil%  0.0   0.5     FiO2    28      Flow    2      Free T4          Glucose 80         Gran #     1.4(L)     Gran%  36.0(L)   64.2     Hematocrit  23.1(L)   30.1(L)     Hemoglobin  8.0(L)   10.4(L)     Lymph #     0.6(L)     Lymph%  57.0(H)   28.4     MCH  27.0   27.1     MCHC  34.6   34.6     MCV  78(L)   78(L)     Mode    SPONT      Mono #     0.2(L)      Mono%  7.0   7.4     MPV  10.7   SEE COMMENT  Comment:  Result not available.     Mitral Valve Regurgitation          Ovalocytes  Occasional        Est. PA Systolic Pressure          Platelet Estimate  Clumped(A)        Platelets  50(L)   63(L)     POC BE    -18      POC HCO3    9.5(L)      POC PCO2    21.3(LL)      POC PH    7.255(LL)      POC PO2    135(H)      POC SATURATED O2    99      Poik  Slight        Potassium 3.1(L)         RBC  2.96(L)   3.84(L)     RDW  18.6(H)   18.8(H)     Sample    ARTERIAL      Sodium 138         TSH          Tricuspid Valve Regurgitation          WBC  1.20  Comment:  WBC critical result(s) called and verbal readback obtained from   Helene , 05/25/2017 07:47  (LL)   2.15(L)                 05/24/17  1242 05/24/17  1158      Allens Test       Anion Gap       Aniso       Aortic Valve Regurgitation  MILD     Baso #       Basophil%       Site       BUN, Bld       Calcium       Chloride       CO2       Creatinine       D-Dimer       Diastolic Dysfunction  No     DelSys       Differential Method       EF  55     eGFR if        eGFR if non        Eos #       Eosinophil%       FiO2       Flow       Free T4 1.00      Glucose       Gran #       Gran%       Hematocrit       Hemoglobin       Lymph #       Lymph%       MCH       MCHC       MCV       Mode       Mono #       Mono%       MPV       Mitral Valve Regurgitation  TRIVIAL     Ovalocytes       Est. PA Systolic Pressure  34.58     Platelet Estimate       Platelets       POC BE       POC HCO3       POC PCO2       POC PH       POC PO2       POC SATURATED O2       Poik       Potassium       RBC       RDW       Sample       Sodium       TSH 6.865(H)      Tricuspid Valve Regurgitation  TRIVIAL     WBC             Significant Imaging: I have reviewed and interpreted all pertinent imaging results/findings within the past 24 hours.

## 2017-05-25 NOTE — ASSESSMENT & PLAN NOTE
Patient's creatinine has increased from 1.9 on 5/21/17 to 3.4 on 5/24/17. Cause of LELAND is likely multifactorial and related to hypotension (SBP in 80s ion multiple occasions), medications (patient was on tenofovir and vancomycin) and progression of HIV nephropathy. Continue IV fluids. Avoid tenofovir, Vancomycin, NSAIDs, ACE-I/ARBs. No need for HD at present. Will monitor with repeat renal panel.
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
## 2017-05-26 ENCOUNTER — ANESTHESIA EVENT (OUTPATIENT)
Dept: ENDOSCOPY | Facility: HOSPITAL | Age: 32
DRG: 974 | End: 2017-05-26
Payer: COMMERCIAL

## 2017-05-26 ENCOUNTER — ANESTHESIA (OUTPATIENT)
Dept: ENDOSCOPY | Facility: HOSPITAL | Age: 32
DRG: 974 | End: 2017-05-26
Payer: COMMERCIAL

## 2017-05-26 VITALS — RESPIRATION RATE: 24 BRPM

## 2017-05-26 PROBLEM — F32.A DEPRESSION: Status: ACTIVE | Noted: 2017-05-26

## 2017-05-26 PROBLEM — B37.0 ORAL THRUSH: Status: ACTIVE | Noted: 2017-05-26

## 2017-05-26 LAB
ALBUMIN SERPL BCP-MCNC: 1.4 G/DL
ANION GAP SERPL CALC-SCNC: 10 MMOL/L
ANION GAP SERPL CALC-SCNC: 10 MMOL/L
ANION GAP SERPL CALC-SCNC: 11 MMOL/L
BACTERIA BLD CULT: NORMAL
BACTERIA BLD CULT: NORMAL
BASOPHILS # BLD AUTO: 0.11 K/UL
BASOPHILS NFR BLD: 4.6 %
BUN SERPL-MCNC: 48 MG/DL
BUN SERPL-MCNC: 49 MG/DL
BUN SERPL-MCNC: 49 MG/DL
CALCIUM SERPL-MCNC: 6 MG/DL
CALCIUM SERPL-MCNC: 6 MG/DL
CALCIUM SERPL-MCNC: 6.1 MG/DL
CHLORIDE SERPL-SCNC: 104 MMOL/L
CHLORIDE SERPL-SCNC: 108 MMOL/L
CHLORIDE SERPL-SCNC: 108 MMOL/L
CO2 SERPL-SCNC: 21 MMOL/L
CO2 SERPL-SCNC: 21 MMOL/L
CO2 SERPL-SCNC: 23 MMOL/L
CREAT SERPL-MCNC: 2.8 MG/DL
CREAT SERPL-MCNC: 3 MG/DL
CREAT SERPL-MCNC: 3 MG/DL
DIFFERENTIAL METHOD: ABNORMAL
EOSINOPHIL # BLD AUTO: 0 K/UL
EOSINOPHIL NFR BLD: 1.7 %
ERYTHROCYTE [DISTWIDTH] IN BLOOD BY AUTOMATED COUNT: 18.7 %
EST. GFR  (AFRICAN AMERICAN): 23 ML/MIN/1.73 M^2
EST. GFR  (AFRICAN AMERICAN): 23 ML/MIN/1.73 M^2
EST. GFR  (AFRICAN AMERICAN): 25 ML/MIN/1.73 M^2
EST. GFR  (NON AFRICAN AMERICAN): 20 ML/MIN/1.73 M^2
EST. GFR  (NON AFRICAN AMERICAN): 20 ML/MIN/1.73 M^2
EST. GFR  (NON AFRICAN AMERICAN): 22 ML/MIN/1.73 M^2
GLUCOSE SERPL-MCNC: 60 MG/DL
HCT VFR BLD AUTO: 21.6 %
HGB BLD-MCNC: 7.4 G/DL
HIV UQ DATE RECEIVED: ABNORMAL
HIV UQ DATE REPORTED: ABNORMAL
HIV1 RNA # SERPL NAA+PROBE: ABNORMAL COPIES/ML
HIV1 RNA SERPL NAA+PROBE-LOG#: 5.26 LOG (10) COPIES/ML
HIV1 RNA SERPL QL NAA+PROBE: DETECTED
LYMPHOCYTES # BLD AUTO: 0.9 K/UL
LYMPHOCYTES NFR BLD: 38.6 %
MCH RBC QN AUTO: 26.7 PG
MCHC RBC AUTO-ENTMCNC: 34.3 %
MCV RBC AUTO: 78 FL
MONOCYTES # BLD AUTO: 0.3 K/UL
MONOCYTES NFR BLD: 10.8 %
NEUTROPHILS # BLD AUTO: 1.1 K/UL
NEUTROPHILS NFR BLD: 45.1 %
PHOSPHATE SERPL-MCNC: 4.6 MG/DL
PLATELET # BLD AUTO: 54 K/UL
PMV BLD AUTO: 10.5 FL
POTASSIUM SERPL-SCNC: 2.8 MMOL/L
POTASSIUM SERPL-SCNC: 2.8 MMOL/L
POTASSIUM SERPL-SCNC: 3.1 MMOL/L
POTASSIUM SERPL-SCNC: 3.2 MMOL/L
RBC # BLD AUTO: 2.77 M/UL
SODIUM SERPL-SCNC: 138 MMOL/L
SODIUM SERPL-SCNC: 139 MMOL/L
SODIUM SERPL-SCNC: 139 MMOL/L
WBC # BLD AUTO: 2.41 K/UL

## 2017-05-26 PROCEDURE — 43246 EGD PLACE GASTROSTOMY TUBE: CPT | Performed by: INTERNAL MEDICINE

## 2017-05-26 PROCEDURE — 84132 ASSAY OF SERUM POTASSIUM: CPT

## 2017-05-26 PROCEDURE — 87901 NFCT AGT GNTYP ALYS HIV1 REV: CPT

## 2017-05-26 PROCEDURE — 37000009 HC ANESTHESIA EA ADD 15 MINS: Performed by: INTERNAL MEDICINE

## 2017-05-26 PROCEDURE — 88305 TISSUE EXAM BY PATHOLOGIST: CPT | Performed by: PATHOLOGY

## 2017-05-26 PROCEDURE — 63600175 PHARM REV CODE 636 W HCPCS: Performed by: NURSE ANESTHETIST, CERTIFIED REGISTERED

## 2017-05-26 PROCEDURE — 99222 1ST HOSP IP/OBS MODERATE 55: CPT | Mod: 25,ICN,, | Performed by: INTERNAL MEDICINE

## 2017-05-26 PROCEDURE — 0DB98ZX EXCISION OF DUODENUM, VIA NATURAL OR ARTIFICIAL OPENING ENDOSCOPIC, DIAGNOSTIC: ICD-10-PCS | Performed by: INTERNAL MEDICINE

## 2017-05-26 PROCEDURE — 80069 RENAL FUNCTION PANEL: CPT

## 2017-05-26 PROCEDURE — 99233 SBSQ HOSP IP/OBS HIGH 50: CPT | Mod: ,,, | Performed by: INTERNAL MEDICINE

## 2017-05-26 PROCEDURE — 63700000 PHARM REV CODE 250 ALT 637 W/O HCPCS: Performed by: FAMILY MEDICINE

## 2017-05-26 PROCEDURE — 27201012 HC FORCEPS, HOT/COLD, DISP: Performed by: INTERNAL MEDICINE

## 2017-05-26 PROCEDURE — 25000003 PHARM REV CODE 250: Performed by: EMERGENCY MEDICINE

## 2017-05-26 PROCEDURE — 27201018 HC KIT, PEG (ANY): Performed by: INTERNAL MEDICINE

## 2017-05-26 PROCEDURE — 63600175 PHARM REV CODE 636 W HCPCS: Performed by: FAMILY MEDICINE

## 2017-05-26 PROCEDURE — 63600175 PHARM REV CODE 636 W HCPCS: Performed by: INTERNAL MEDICINE

## 2017-05-26 PROCEDURE — 43239 EGD BIOPSY SINGLE/MULTIPLE: CPT | Mod: ,,, | Performed by: INTERNAL MEDICINE

## 2017-05-26 PROCEDURE — 43239 EGD BIOPSY SINGLE/MULTIPLE: CPT | Performed by: INTERNAL MEDICINE

## 2017-05-26 PROCEDURE — 25000003 PHARM REV CODE 250: Performed by: FAMILY MEDICINE

## 2017-05-26 PROCEDURE — 25000003 PHARM REV CODE 250: Performed by: NURSE ANESTHETIST, CERTIFIED REGISTERED

## 2017-05-26 PROCEDURE — 37000008 HC ANESTHESIA 1ST 15 MINUTES: Performed by: INTERNAL MEDICINE

## 2017-05-26 PROCEDURE — 25000003 PHARM REV CODE 250: Performed by: INTERNAL MEDICINE

## 2017-05-26 PROCEDURE — 80048 BASIC METABOLIC PNL TOTAL CA: CPT

## 2017-05-26 PROCEDURE — 43246 EGD PLACE GASTROSTOMY TUBE: CPT | Mod: ,,, | Performed by: INTERNAL MEDICINE

## 2017-05-26 PROCEDURE — 63600175 PHARM REV CODE 636 W HCPCS: Performed by: EMERGENCY MEDICINE

## 2017-05-26 PROCEDURE — 88305 TISSUE EXAM BY PATHOLOGIST: CPT | Mod: 26,,, | Performed by: PATHOLOGY

## 2017-05-26 PROCEDURE — 85025 COMPLETE CBC W/AUTO DIFF WBC: CPT

## 2017-05-26 PROCEDURE — 0DH63UZ INSERTION OF FEEDING DEVICE INTO STOMACH, PERCUTANEOUS APPROACH: ICD-10-PCS | Performed by: INTERNAL MEDICINE

## 2017-05-26 PROCEDURE — 21400001 HC TELEMETRY ROOM

## 2017-05-26 RX ORDER — GLYCOPYRROLATE 0.2 MG/ML
INJECTION INTRAMUSCULAR; INTRAVENOUS
Status: DISCONTINUED | OUTPATIENT
Start: 2017-05-26 | End: 2017-05-26

## 2017-05-26 RX ORDER — TENOFOVIR DISOPROXIL FUMARATE 300 MG/1
300 TABLET, FILM COATED ORAL
Status: DISCONTINUED | OUTPATIENT
Start: 2017-05-26 | End: 2017-05-28 | Stop reason: HOSPADM

## 2017-05-26 RX ORDER — PANTOPRAZOLE SODIUM 40 MG/10ML
40 INJECTION, POWDER, LYOPHILIZED, FOR SOLUTION INTRAVENOUS DAILY
Status: DISCONTINUED | OUTPATIENT
Start: 2017-05-27 | End: 2017-05-28 | Stop reason: HOSPADM

## 2017-05-26 RX ORDER — LORAZEPAM 2 MG/ML
1 INJECTION INTRAMUSCULAR
Status: DISCONTINUED | OUTPATIENT
Start: 2017-05-26 | End: 2017-05-28 | Stop reason: HOSPADM

## 2017-05-26 RX ORDER — SODIUM CHLORIDE, SODIUM LACTATE, POTASSIUM CHLORIDE, CALCIUM CHLORIDE 600; 310; 30; 20 MG/100ML; MG/100ML; MG/100ML; MG/100ML
INJECTION, SOLUTION INTRAVENOUS CONTINUOUS PRN
Status: DISCONTINUED | OUTPATIENT
Start: 2017-05-26 | End: 2017-05-26

## 2017-05-26 RX ORDER — PROPOFOL 10 MG/ML
INJECTION, EMULSION INTRAVENOUS
Status: DISCONTINUED | OUTPATIENT
Start: 2017-05-26 | End: 2017-05-26

## 2017-05-26 RX ORDER — LIDOCAINE HCL/PF 100 MG/5ML
SYRINGE (ML) INTRAVENOUS
Status: DISCONTINUED | OUTPATIENT
Start: 2017-05-26 | End: 2017-05-26

## 2017-05-26 RX ORDER — SODIUM CHLORIDE, SODIUM LACTATE, POTASSIUM CHLORIDE, CALCIUM CHLORIDE 600; 310; 30; 20 MG/100ML; MG/100ML; MG/100ML; MG/100ML
INJECTION, SOLUTION INTRAVENOUS CONTINUOUS
Status: DISCONTINUED | OUTPATIENT
Start: 2017-05-26 | End: 2017-05-26

## 2017-05-26 RX ORDER — LORAZEPAM 2 MG/ML
1 INJECTION INTRAMUSCULAR
Status: DISCONTINUED | OUTPATIENT
Start: 2017-05-27 | End: 2017-05-26

## 2017-05-26 RX ORDER — CEFAZOLIN SODIUM 1 G/50ML
1 SOLUTION INTRAVENOUS ONCE
Status: COMPLETED | OUTPATIENT
Start: 2017-05-26 | End: 2017-05-26

## 2017-05-26 RX ORDER — POTASSIUM CHLORIDE 20 MEQ/15ML
40 SOLUTION ORAL ONCE
Status: COMPLETED | OUTPATIENT
Start: 2017-05-27 | End: 2017-05-27

## 2017-05-26 RX ORDER — CALCIUM CARBONATE 200(500)MG
500 TABLET,CHEWABLE ORAL 3 TIMES DAILY
Status: DISCONTINUED | OUTPATIENT
Start: 2017-05-26 | End: 2017-05-28 | Stop reason: HOSPADM

## 2017-05-26 RX ORDER — EMTRICITABINE 200 MG/1
200 CAPSULE ORAL
Status: DISCONTINUED | OUTPATIENT
Start: 2017-05-26 | End: 2017-05-28 | Stop reason: HOSPADM

## 2017-05-26 RX ADMIN — PROPOFOL 30 MG: 10 INJECTION, EMULSION INTRAVENOUS at 03:05

## 2017-05-26 RX ADMIN — Medication 12.5 MG: at 09:05

## 2017-05-26 RX ADMIN — NYSTATIN 500000 UNITS: 500000 SUSPENSION ORAL at 01:05

## 2017-05-26 RX ADMIN — ONDANSETRON 4 MG: 2 INJECTION INTRAMUSCULAR; INTRAVENOUS at 09:05

## 2017-05-26 RX ADMIN — CEFAZOLIN SODIUM 1 G: 1 SOLUTION INTRAVENOUS at 03:05

## 2017-05-26 RX ADMIN — SODIUM CHLORIDE, SODIUM LACTATE, POTASSIUM CHLORIDE, AND CALCIUM CHLORIDE: .6; .31; .03; .02 INJECTION, SOLUTION INTRAVENOUS at 03:05

## 2017-05-26 RX ADMIN — SODIUM CHLORIDE, SODIUM LACTATE, POTASSIUM CHLORIDE, AND CALCIUM CHLORIDE: 600; 310; 30; 20 INJECTION, SOLUTION INTRAVENOUS at 03:05

## 2017-05-26 RX ADMIN — HEPARIN SODIUM 5000 UNITS: 5000 INJECTION, SOLUTION INTRAVENOUS; SUBCUTANEOUS at 06:05

## 2017-05-26 RX ADMIN — DARUNAVIR 800 MG: 800 TABLET, FILM COATED ORAL at 07:05

## 2017-05-26 RX ADMIN — NYSTATIN 500000 UNITS: 500000 SUSPENSION ORAL at 07:05

## 2017-05-26 RX ADMIN — POTASSIUM CHLORIDE 50 MEQ: 1.88 POWDER, FOR SOLUTION ORAL at 11:05

## 2017-05-26 RX ADMIN — GLYCOPYRROLATE 0.2 MG: 0.2 INJECTION INTRAMUSCULAR; INTRAVENOUS at 03:05

## 2017-05-26 RX ADMIN — POTASSIUM CHLORIDE 50 MEQ: 1.88 POWDER, FOR SOLUTION ORAL at 01:05

## 2017-05-26 RX ADMIN — LORAZEPAM 1 MG: 2 INJECTION INTRAMUSCULAR; INTRAVENOUS at 10:05

## 2017-05-26 RX ADMIN — VORICONAZOLE 200 MG: 200 TABLET, FILM COATED ORAL at 09:05

## 2017-05-26 RX ADMIN — PROPOFOL 60 MG: 10 INJECTION, EMULSION INTRAVENOUS at 03:05

## 2017-05-26 RX ADMIN — METOPROLOL TARTRATE 5 MG: 5 INJECTION INTRAVENOUS at 05:05

## 2017-05-26 RX ADMIN — RITONAVIR 100 MG: 100 TABLET, FILM COATED ORAL at 07:05

## 2017-05-26 RX ADMIN — METOPROLOL TARTRATE 5 MG: 5 INJECTION INTRAVENOUS at 01:05

## 2017-05-26 RX ADMIN — TENOFOVIR DISOPROXIL FUMARATE 300 MG: 300 TABLET, COATED ORAL at 06:05

## 2017-05-26 RX ADMIN — MOXIFLOXACIN HYDROCHLORIDE 400 MG: 400 TABLET, FILM COATED ORAL at 09:05

## 2017-05-26 RX ADMIN — METOPROLOL TARTRATE 5 MG: 5 INJECTION INTRAVENOUS at 06:05

## 2017-05-26 RX ADMIN — EMTRICITABINE 200 MG: 200 CAPSULE ORAL at 06:05

## 2017-05-26 RX ADMIN — Medication 1 CAPSULE: at 09:05

## 2017-05-26 RX ADMIN — CITALOPRAM HYDROBROMIDE 20 MG: 20 TABLET ORAL at 09:05

## 2017-05-26 RX ADMIN — LIDOCAINE HYDROCHLORIDE 50 MG: 20 INJECTION, SOLUTION INTRAVENOUS at 03:05

## 2017-05-26 RX ADMIN — ATOVAQUONE 750 MG: 750 SUSPENSION ORAL at 09:05

## 2017-05-26 NOTE — PROGRESS NOTES
Cardiology Progress Note        SUBJECTIVE:     History of Present Illness:  Sinus rhythm, tachy resolved.  Pain controlled better.       OBJECTIVE:     Vital Signs (Most Recent)  Temp: 97.8 °F (36.6 °C) (05/25/17 1716)  Pulse: 104 (05/25/17 1802)  Resp: 18 (05/25/17 1802)  BP: 99/64 (05/25/17 1802)  SpO2: 99 % (05/25/17 1802)    Vital Signs Range (Last 24H):  Temp:  [97.8 °F (36.6 °C)-100.3 °F (37.9 °C)]   Pulse:  [104-127]   Resp:  [18-20]   BP: ()/(37-83)   SpO2:  [96 %-100 %]     Intake/Output last 3 shifts:  I/O last 3 completed shifts:  In: 1639.6 [P.O.:480; I.V.:431.3; Blood:328.3; IV Piggyback:400]  Out: -     Intake/Output this shift:  No intake/output data recorded.    Review of patient's allergies indicates:   Allergen Reactions    Iodine and iodide containing products Anaphylaxis     Pt states she coded last time she was administered contrast    Bactrim [sulfamethoxazole-trimethoprim] Hives    Morphine Itching       Current Facility-Administered Medications   Medication    0.9%  NaCl infusion (for blood administration)    atovaquone suspension 750 mg    azithromycin tablet 1,250 mg    b complex vitamins capsule 1 capsule    citalopram tablet 20 mg    darunavir tablet 800 mg    And    ritonavir tablet 100 mg    heparin (porcine) injection 5,000 Units    ibuprofen tablet 400 mg    metoprolol injection 5 mg    metoprolol tartrate (LOPRESSOR) split tablet 12.5 mg    moxifloxacin tablet 400 mg    nystatin 100,000 unit/mL suspension 500,000 Units    ondansetron injection 4 mg    potassium chloride SA CR tablet 40 mEq    sterile water 1,000 mL with sodium bicarbonate 1 mEq/mL (8.4 %) 150 mEq infusion    voriconazole tablet 200 mg     No current facility-administered medications on file prior to encounter.      Current Outpatient Prescriptions on File Prior to Encounter   Medication Sig    darunavir-cobicistat (PREZCOBIX) 800-150 mg-mg Tab Take 800 mg by mouth every evening.      emtricitabine-tenofovir alafen (DESCOVY) 200-25 mg Tab Take 200 each by mouth every evening.     oxycodone-acetaminophen (ROXICET) 5-325 mg per tablet Take 1 tablet by mouth every 6 (six) hours as needed for Pain (pain).       Physical Exam:  General appearance: alert, appears stated age and cooperative  Head: Normocephalic, without obvious abnormality, atraumatic  Eyes:  conjunctivae/corneas clear. PERRL, EOM's intact. Fundi benign.  Nose: no discharge  Throat: normal findings: tongue midline and normal  Neck: no adenopathy, no carotid bruit, no JVD, supple, symmetrical, trachea midline and thyroid not enlarged, symmetric, no tenderness/mass/nodules  Back:  no skin lesions, erythema, or scars  Lungs:  clear to auscultation bilaterally  Chest wall: no tenderness  Heart: regular rate and rhythm, S1, S2 normal, no murmur, click, rub or gallop  Abdomen: soft, non-tender; bowel sounds normal; no masses,  no organomegaly  Extremities: extremities normal, atraumatic, no cyanosis or edema  Pulses: 2+ and symmetric  Skin: Skin color, texture, turgor normal. No rashes or lesions  Neurologic: Grossly normal    Laboratory:  Chemistry:   Lab Results   Component Value Date     05/25/2017    K 3.1 (L) 05/25/2017     (H) 05/25/2017    CO2 13 (L) 05/25/2017    BUN 47 (H) 05/25/2017    CREATININE 3.2 (H) 05/25/2017    CALCIUM 5.9 (LL) 05/25/2017     Cardiac Markers:   Lab Results   Component Value Date    TROPONINI 0.036 (H) 05/21/2017     Cardiac Markers (Last 3):   Lab Results   Component Value Date    TROPONINI 0.036 (H) 05/21/2017    TROPONINI 0.012 04/14/2017    TROPONINI 0.010 03/21/2017     CBC:   Lab Results   Component Value Date    WBC 1.20 (LL) 05/25/2017    HGB 8.0 (L) 05/25/2017    HCT 23.1 (L) 05/25/2017    HCT 47 04/16/2017    MCV 78 (L) 05/25/2017    PLT 50 (L) 05/25/2017     Lipids:   Lab Results   Component Value Date    CHOL 106 (L) 09/14/2016    TRIG 178 (H) 09/14/2016    HDL 16 (L) 09/14/2016      Coagulation:   Lab Results   Component Value Date    INR 1.1 05/21/2017    APTT 36.9 (H) 05/21/2017       Diagnostic Results:  ECG: Reviewed  X-Ray: Reviewed  US: Reviewed  CT: Reviewed  Echo: Reviewed      ASSESSMENT/PLAN:     Patient Active Problem List   Diagnosis    Narcotic bowel syndrome    Abdominal lymphadenopathy    Pelvic lymphadenopathy    HIV (human immunodeficiency virus infection)    Pelvic pain in female    Vulvar intraepithelial neoplasia (KARLY) grade 3    S/P implantation of automatic cardioverter/defibrillator (AICD)    Cardiomyopathy    Anemia    H/O noncompliance with medical treatment, presenting hazards to health    Severe protein-calorie malnutrition    Status post laparoscopic hysterectomy    AIDS (acquired immunodeficiency syndrome), CD4 <=200    LELAND (acute kidney injury)    Metabolic acidosis    Thrombocytopenia    S/P exploratory laparotomy    Fever    Hypomagnesemia    Sinus tachycardia    Hypokalemia    Depression    Oral thrush    sinus tachy resolved    Plan: ok to continue BB   Continue supportive care

## 2017-05-26 NOTE — PROGRESS NOTES
[facility]  Infectious Disease  Progress Note    Patient Name: Wang Kebede  MRN: [id number]  Admission Date: 5/21/2017  Length of Stay: 5 days  Attending Physician: Marcus García MD  Primary Care Provider: KIN Mendoza    Isolation Status: Special Contact  Assessment/Plan:      LELAND (acute kidney injury)    IVF ,closely monitor renal function  nephrology follow up         AIDS (acquired immunodeficiency syndrome), CD4 <=200     repeat cd4 count  Is 4 -she has profound immunosuppression . ritonavir to daurunavir.  Will continue  emtriva  and restart tenofovir,Will follow HIV genotype , will consult GI  PEG tube placement to get HAART as she says she cannot tolerate oral HIv meds.  I carefully explained to her the natural history of untreated HIV -Death from severe and untreatable OI. With this degree of profound immunosuppression, she can get KAT virus infection or other infections that modern medicine cannot treat .   Will plan to use PEG tube to give meds for at least 6 months   Continue zithromax and mepron for OI prophylasix.        * Fever    Etiology is unclear.could be from advanced HIV   Continue po voriconazole, use po avelox   Follow cultures to guide therapy .            Anticipated Disposition:   Thank you for your consult. I will follow-up with patient. Please contact us if you have any additional questions.    Hubert Mayo MD  Infectious Disease  [facility]    Subjective:     Principal Problem:Fever    HPI: 31 year old woman with history of AIDs-last cd4 count 53 on 03/21. She is poorly compliant with HAART. She is well known to me from previous hospital admission. She was recently seen in the ED with fever and ear pain and was discharged on Augumentin . She presented again today with persistent high grade fever-T max 105. There is associated history of loose stools.  Since admission, she was started on cooling blanket, LP showed opening pressure of  18, CSF wbc of 12 .  CMP-bicarbonate 14,,creatinine 1.9,hemoglobin of 7.  Head ct scan did not show any acute lesion .  CT renal stone showed splenomegaly .without any acute lesion.  Interval History: 31 year old woman with metabolic acidosis , AIDs, fever ,   Chest x-ray showed development of bibasilar atelectasis .CD4 count is 4    Labs are pending -fever curve has improved   Review of Systems   Constitutional: Positive for activity change, appetite change, chills, fatigue and fever.   HENT: Negative for congestion, ear pain, facial swelling, sinus pressure and sore throat.         Has oral thrush    Eyes: Negative for pain.   Respiratory: Positive for chest tightness and shortness of breath. Negative for apnea and stridor.    Cardiovascular: Negative for chest pain, palpitations and leg swelling.   Gastrointestinal: Positive for diarrhea. Negative for abdominal distention, abdominal pain and nausea.   Endocrine: Negative for polydipsia and polyphagia.   Genitourinary: Negative for decreased urine volume, difficulty urinating, frequency and genital sores.   Musculoskeletal: Negative for arthralgias and gait problem.   Neurological: Positive for headaches. Negative for light-headedness.   Hematological: Negative for adenopathy.   Psychiatric/Behavioral: Negative for agitation, confusion and decreased concentration.     Objective:     Vital Signs (Most Recent):  Temp: 98.2 °F (36.8 °C) (05/25/17 2321)  Pulse: 97 (05/25/17 2321)  Resp: 18 (05/25/17 2321)  BP: 104/67 (05/25/17 2321)  SpO2: 99 % (05/25/17 2321) Vital Signs (24h Range):  Temp:  [97.6 °F (36.4 °C)-100.3 °F (37.9 °C)] 98.2 °F (36.8 °C)  Pulse:  [] 97  Resp:  [18-20] 18  SpO2:  [96 %-100 %] 99 %  BP: ()/(57-83) 104/67     Weight: 45.4 kg (100 lb)  Body mass index is 18.89 kg/m².    Estimated Creatinine Clearance: 18.1 mL/min (based on Cr of 3.2).    Physical Exam   Constitutional: She is oriented to person, place, and time. She appears  well-developed and well-nourished.   HENT:   Head: Normocephalic and atraumatic.   Right Ear: There is tenderness. No drainage.   Left Ear: External ear normal.   Nose: Nose normal.   Mouth/Throat: Oropharynx is clear and moist.   .   Eyes: Conjunctivae and EOM are normal. Pupils are equal, round, and reactive to light.   Neck: Normal range of motion. Neck supple.   Cardiovascular: S1 normal and S2 normal.  Tachycardia present.    Pulmonary/Chest: Effort normal and breath sounds normal.   Abdominal: Soft. Bowel sounds are normal. There is tenderness. There is no rebound and no guarding.   Genitourinary:   Genitourinary Comments: deferred   Musculoskeletal: Normal range of motion.   Neurological: She is alert and oriented to person, place, and time.   Skin: Skin is warm and dry. Capillary refill takes less than 2 seconds.   Psychiatric: She has a normal mood and affect.   Nursing note and vitals reviewed.      Significant Labs:   Blood Culture:     Recent Labs  Lab 04/14/17  1856 04/14/17  1940 04/16/17  1615 05/21/17  1440 05/21/17  1445   LABBLOO No growth after 5 days. No growth after 5 days. No growth after 5 days. No Growth to date  No Growth to date  No Growth to date  No Growth to date  No Growth to date No Growth to date  No Growth to date  No Growth to date  No Growth to date  No Growth to date     CBC:     Recent Labs  Lab 05/24/17  1735 05/25/17  0634   WBC 2.15* 1.20*   HGB 10.4* 8.0*   HCT 30.1* 23.1*   PLT 63* 50*     All pertinent labs within the past 24 hours have been reviewed.    Significant Imaging: I have reviewed all pertinent imaging results/findings within the past 24 hours.

## 2017-05-26 NOTE — HPI
Patient is a 31 year old AA female who has a history of AIDS with a CD4 count of 4. She was admitted on 5/21/17 for fever of 103. Dr. Mayo (ID) following patient and recommends peg tube placement to get HAART. She is unable to tolerate her HIV medications. She has hypokalemia with a potassium of 2.8 this AM. She has received one dose of Potassium 50 mEq. She also complains of diarrhea. This started after hospitalization. She denies any hematochezia or melena. C. Diff pending.

## 2017-05-26 NOTE — ANESTHESIA PREPROCEDURE EVALUATION
05/26/2017  Wang Kebede is a 31 y.o., female.    Anesthesia Evaluation    I have reviewed the Patient Summary Reports.    I have reviewed the Nursing Notes.   I have reviewed the Medications.     Review of Systems  Anesthesia Hx:  No problems with previous Anesthesia  Denies Family Hx of Anesthesia complications.   Denies Personal Hx of Anesthesia complications.   Social:  Non-Smoker    Hematology/Oncology:         -- Anemia: Hematology Comments: See labs  HIV Oncology Comments: Vulva cancer     EENT/Dental:EENT/Dental Normal   Cardiovascular:   Exercise tolerance: poor Pacemaker ECG has been reviewed. History of cardiac arrest   Pulmonary:  Pulmonary Normal    Renal/:   Chronic Renal Disease, CRI    Hepatic/GI:   Narcotic bowel syndrome  Chronic abdominal pain   Musculoskeletal:  Musculoskeletal Normal    Neurological:  Neurology Normal    Endocrine:  Endocrine Normal    Dermatological:  Skin Normal    Psych:   Psychiatric History          Physical Exam  General:  Malnutrition    Airway/Jaw/Neck:  Airway Findings: Mouth Opening: Normal Tongue: Normal  General Airway Assessment: Adult, Average  Mallampati: II  TM Distance: Normal, at least 6 cm      Dental:  Dental Findings: In tact   Chest/Lungs:  Chest/Lungs Findings: Normal Respiratory Rate     Heart/Vascular:  Heart Findings: Rate: Normal  Rhythm: Regular Rhythm  Sounds: Normal        Mental Status:  Mental Status Findings:  Cooperative, Alert and Oriented         Anesthesia Plan  Type of Anesthesia, risks & benefits discussed:  Anesthesia Type:  MAC  Patient's Preference:   Intra-op Monitoring Plan:   Intra-op Monitoring Plan Comments:   Post Op Pain Control Plan:   Post Op Pain Control Plan Comments:   Induction:   IV  Beta Blocker:  Patient is on a Beta-Blocker and has received one dose within the past 24 hours (No further documentation  required).       Informed Consent: Patient understands risks and agrees with Anesthesia plan.  Questions answered. Anesthesia consent signed with patient.  ASA Score: 3     Day of Surgery Review of History & Physical: I have interviewed and examined the patient. I have reviewed the patient's H&P dated: 5/26/17. There are no significant changes.  H&P update referred to the surgeon.         Ready For Surgery From Anesthesia Perspective.

## 2017-05-26 NOTE — ASSESSMENT & PLAN NOTE
repeat cd4 count  Is 4 -she has profound immunosuppression . ritonavir to daurunavir.  Will continue  emtriva  and restart tenofovir,Will follow HIV genotype , will consult GI  PEG tube placement to get HAART as she says she cannot tolerate oral HIv meds.  I carefully explained to her the natural history of untreated HIV -Death from severe and untreatable OI. With this degree of profound immunosuppression, she can get KAT virus infection or other infections that modern medicine cannot treat .   Will plan to use PEG tube to give meds for at least 6 months   Continue zithromax and mepron for OI prophylasix.
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
## 2017-05-26 NOTE — ASSESSMENT & PLAN NOTE
Patient receiving potassium replacement. Will proceed with peg tube placement if potassium improved.

## 2017-05-26 NOTE — PLAN OF CARE
Problem: Patient Care Overview  Goal: Plan of Care Review  Outcome: Ongoing (interventions implemented as appropriate)   05/26/17 0805   Coping/Psychosocial   Plan Of Care Reviewed With patient;family     Pt AAOx4 with flat effect. Is SR/ST . Contact precautions maintained. Pt needs lots of encouragement to remain compliant with medication regiment. C/O nausea with HIV meds. PRN Zofran given. Fall precautions in place. Pt remains incontinent. Encouraged with q2 turns. Perineum to perianal area excoriated but no further breakdown during shift. Able to call for assistance. Call bell and personal items within reach, IVF infusing as ordered. Pt with very poor appetite.

## 2017-05-26 NOTE — ASSESSMENT & PLAN NOTE
Patient is a 31 year old female with HIV that was admitted to the hospital for fever. Consulted for peg tube placement to get HAART. She is unable to tolerate oral HIV medications. Patient with hypokalemia of 2.8. Patient has received 1 dose of Potassium 50 mEq. Will check a STAT K level. Will proceed with peg tube placement today if potassium improves.
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
## 2017-05-26 NOTE — PLAN OF CARE
Spoke with hospitalist who indicates that PEG Tube will be placed today; and it is anticipated that patient will be ready to discharge tomorrow (05/27/17).      Met with patient at bedside and discussed discharge planning options (SNF vs. HH with home infusion services).  Patient opposed to SNF option, stating that she will return home with HH and Home Infusion services.  Patient indicating HH preference as [facility]fusion company preference as [employer] [facility].  Patient Preference Form signed reflecting this and signed form placed in patient's blue folder.      Contacted Akila with [employer] [phone] and notified her that patient is having PEG Tube placed today and patient wanting representative with their company to come to [facility] tomorrow (05/27/17) morning at 10:00 AM to educate her and family member regarding PEG Tube and supplies.  Akila stating that representative with their company will come to [facility] tomorrow to educate patient and family member.     Contacted Eleanor with [facility] and notified her of patient's anticipated discharge tomorrow and of infusion needs and company ([employer]) to provide those needs to patient.  Informed Eleanor that representative with [employer] will be coming to meet with patient and family member here at [facility] tomorrow morning for education of PEG Tube.  Eleanor to notify weekend [facility] regarding patient's discharge tomorrow and indicates they will accept patient for resumption of HH services.  Also, due to patient's depression related to coping with illness, obtained HH SW to be added to HH order.  Eleanor with [facility]  made aware.    NP for hospitalist and [facility] weekend  notified of above as well.    D/C PLAN:  Home with family, with HH SN, PT, OT services via [facility] and home infusion services via [employer]          05/26/17 8201   Discharge Reassessment    Assessment Type Discharge Planning Reassessment   Can the patient answer the patient profile reliably? Yes, cognitively intact   How does the patient rate their overall health at the present time? Fair   Describe the patient's ability to walk at the present time. Minor restrictions or changes   How often would a person be available to care for the patient? Often   During the past month, has the patient often been bothered by feeling down, depressed or hopeless? Yes   During the past month, has the patient often been bothered by little interest or pleasure in doing things? Yes   Discharge plan remains the same: No   Provided patient/caregiver education on the expected discharge date and the discharge plan Yes   Discharge Plan A Home with family;Home Health  (Home infusion services needed for patient post-hospitalization)   Discharge Plan B Home with family;Home Health;Other   Change in patient condition or support system No   Patient choice form signed by patient/caregiver Yes   Explained to the the patient/caregiver why the discharge planned changed: Yes   Involved the patient/caregiver in establishing a new discharge plan: Yes

## 2017-05-26 NOTE — SUBJECTIVE & OBJECTIVE
Interval History:  s/p PEG tube placement        Review of patient's allergies indicates:   Allergen Reactions    Iodine and iodide containing products Anaphylaxis     Pt states she coded last time she was administered contrast    Bactrim [sulfamethoxazole-trimethoprim] Hives    Morphine Itching     Current Facility-Administered Medications   Medication Frequency    0.9%  NaCl infusion (for blood administration) Q24H PRN    atovaquone suspension 750 mg Q12H    azithromycin tablet 1,250 mg Weekly    b complex vitamins capsule 1 capsule Daily    calcium carbonate 200 mg calcium (500 mg) chewable tablet 500 mg TID    citalopram tablet 20 mg Daily    darunavir tablet 800 mg Daily with breakfast    And    ritonavir tablet 100 mg Daily with breakfast    emtricitabine capsule 200 mg Q36H    heparin (porcine) injection 5,000 Units Q8H    metoprolol injection 5 mg Q6H    metoprolol tartrate (LOPRESSOR) split tablet 12.5 mg BID    moxifloxacin tablet 400 mg Daily    nystatin 100,000 unit/mL suspension 500,000 Units QID (WM & HS)    ondansetron injection 4 mg Q6H PRN    [START ON 5/27/2017] pantoprazole injection 40 mg Daily    sterile water 1,000 mL with sodium bicarbonate 1 mEq/mL (8.4 %) 150 mEq infusion Continuous    tenofovir tablet 300 mg Q72H    voriconazole tablet 200 mg BID       Objective:     Vital Signs (Most Recent):  Temp: 97.7 °F (36.5 °C) (05/26/17 1647)  Pulse: 101 (05/26/17 1647)  Resp: 18 (05/26/17 1647)  BP: 117/86 (05/26/17 1647)  SpO2: (!) 94 % (05/26/17 1647)  O2 Device (Oxygen Therapy): room air (05/26/17 1647) Vital Signs (24h Range):  Temp:  [97.5 °F (36.4 °C)-98.5 °F (36.9 °C)] 97.7 °F (36.5 °C)  Pulse:  [] 101  Resp:  [0-35] 18  SpO2:  [88 %-99 %] 94 %  BP: (104-134)/(65-86) 117/86     Weight: 45.4 kg (100 lb) (05/21/17 1304)  Body mass index is 18.89 kg/m².  Body surface area is 1.4 meters squared.    I/O last 3 completed shifts:  In: 1949.6 [P.O.:360; I.V.:1589.6]  Out:  0     Physical Exam   Constitutional: She is oriented to person, place, and time. She appears well-developed and well-nourished. No distress.   HENT:   Head: Normocephalic and atraumatic.   Mouth/Throat: Oropharynx is clear and moist. No oropharyngeal exudate.   Eyes: Conjunctivae and EOM are normal. Pupils are equal, round, and reactive to light.   Neck: Normal range of motion. Neck supple. No JVD present. Carotid bruit is not present. No tracheal deviation present. No thyroid mass and no thyromegaly present.   Cardiovascular: Normal rate, regular rhythm, normal heart sounds and intact distal pulses.  Exam reveals no gallop and no friction rub.    No murmur heard.  Pulmonary/Chest: Effort normal and breath sounds normal. No respiratory distress. She has no wheezes. She has no rales. She exhibits no tenderness.   Abdominal: Soft. Bowel sounds are normal. She exhibits no distension, no abdominal bruit, no ascites and no mass. There is no hepatosplenomegaly. There is no tenderness. There is no rebound, no guarding and no CVA tenderness.   Musculoskeletal: Normal range of motion. She exhibits edema. She exhibits no tenderness.   trace dependant    Lymphadenopathy:     She has no cervical adenopathy.   Neurological: She is alert and oriented to person, place, and time. She has normal reflexes. She displays normal reflexes. No cranial nerve deficit. She exhibits normal muscle tone. Coordination normal.   Skin: Skin is warm and intact. No rash noted. No erythema. No pallor.   Psychiatric: She has a normal mood and affect. Judgment normal.       Significant Labs:    BMP:   Recent Labs  Lab 05/24/17  0519  05/26/17  0628   GLU 85  < > 60*  60*   *  < > 108  108   CO2 10*  < > 21*  21*   BUN 48*  < > 49*  49*   CREATININE 3.4*  < > 3.0*  3.0*   CALCIUM 6.0*  < > 6.0*  6.0*   MG 1.6  --   --    < > = values in this interval not displayed.  CBC:   Recent Labs  Lab 05/26/17  0628   WBC 2.41*   RBC 2.77*   HGB 7.4*    HCT 21.6*   PLT 54*   MCV 78*   MCH 26.7*   MCHC 34.3     CMP:   Recent Labs  Lab 05/24/17  0519  05/26/17  0628 05/26/17  1301   GLU 85  < > 60*  60*  --    CALCIUM 6.0*  < > 6.0*  6.0*  --    ALBUMIN 1.6*  --  1.4*  --    PROT 6.8  --   --   --      < > 139  139  --    K 3.7  < > 2.8*  2.8* 3.1*   CO2 10*  < > 21*  21*  --    *  < > 108  108  --    BUN 48*  < > 49*  49*  --    CREATININE 3.4*  < > 3.0*  3.0*  --    ALKPHOS 125  --   --   --    ALT 21  --   --   --    AST 83*  --   --   --    BILITOT 0.6  --   --   --    < > = values in this interval not displayed.  All labs within the past 24 hours have been reviewed.       Lab Results   Component Value Date    ALBUMIN 1.4 (L) 05/26/2017         Significant Imaging: reviewed

## 2017-05-26 NOTE — ASSESSMENT & PLAN NOTE
Patient's creatinine has increased from 1.9 on 5/21/17 to 3.4 on 5/24/17. 3.0 today , cont gentle hydration ,  Cause of LELAND is likely multifactorial and related to hypotension (SBP in 80s ion multiple occasions), medications (patient was on tenofovir and vancomycin) and progression of HIV nephropathy. Continue IV fluids. Avoid tenofovir, Vancomycin, NSAIDs, ACE-I/ARBs. Will monitor with repeat renal panel.

## 2017-05-26 NOTE — BRIEF OP NOTE
[facility] -   Brief Operative Note    SUMMARY     Surgery Date: 5/26/2017     Surgeon(s) and Role:     * Adama Nielsen MD - Primary    Assisting Surgeon: None    Pre-op Diagnosis:  Dysphagia, oropharyngeal [R13.12]    Post-op Diagnosis:  Post-Op Diagnosis Codes:     * Dysphagia, oropharyngeal [R13.12]    Procedure(s) (LRB):  PEG TUBE PLACEMENT/REPLACEMENT (N/A)    Anesthesia: Monitor Anesthesia Care    Description of Procedure:   EGD with PEG tube placement    Description of the findings of the procedure:   Esophagus normal   Stomach with signs of NG tube trauma in the fundus/cardia/body  Duodenal ulcers in the bulb - biopsied    PEG tube placed without difficulty.    A nasal trumpet was placed during the procedure due to poor ventilation. There was significant nasal bleeding due to this. It appeared to be nearly stopped by the end of the procedure.     Estimated Blood Loss: ~40cc       Specimens:   Duodenal biopsies

## 2017-05-26 NOTE — ANESTHESIA RELEASE NOTE
"Anesthesia Release from PACU Note    Patient: Wang Kebede    Procedure(s) Performed: Procedure(s) (LRB):  PEG TUBE PLACEMENT/REPLACEMENT (N/A)    Anesthesia type: MAC    Post pain: Adequate analgesia    Post assessment: no apparent anesthetic complications, tolerated procedure well and no evidence of recall    Last Vitals:   Visit Vitals  /76 (BP Location: Right arm, Patient Position: Lying, BP Method: Automatic)   Pulse 102   Temp 36.4 °C (97.5 °F) (Oral)   Resp 16   Ht 5' 1" (1.549 m)   Wt 45.4 kg (100 lb)   LMP  (LMP Unknown)   SpO2 95%   Breastfeeding? No   BMI 18.89 kg/m²       Post vital signs: stable    Level of consciousness: responds to stimulation    Nausea/Vomiting: no nausea/no vomiting    Complications: none    Airway Patency: patent    Respiratory: unassisted    Cardiovascular: stable and blood pressure at baseline    Hydration: euvolemic  "

## 2017-05-26 NOTE — PROGRESS NOTES
Culture for C. Diff is still awaiting a clean stool sample. Pt is currently incontinent and unable to provide a sample for lab.

## 2017-05-26 NOTE — SUBJECTIVE & OBJECTIVE
Interval History: 31 year old woman with metabolic acidosis , AIDs, fever ,   Chest x-ray showed development of bibasilar atelectasis .CD4 count is 4    Labs are pending -fever curve has improved   Review of Systems   Constitutional: Positive for activity change, appetite change, chills, fatigue and fever.   HENT: Negative for congestion, ear pain, facial swelling, sinus pressure and sore throat.         Has oral thrush    Eyes: Negative for pain.   Respiratory: Positive for chest tightness and shortness of breath. Negative for apnea and stridor.    Cardiovascular: Negative for chest pain, palpitations and leg swelling.   Gastrointestinal: Positive for diarrhea. Negative for abdominal distention, abdominal pain and nausea.   Endocrine: Negative for polydipsia and polyphagia.   Genitourinary: Negative for decreased urine volume, difficulty urinating, frequency and genital sores.   Musculoskeletal: Negative for arthralgias and gait problem.   Neurological: Positive for headaches. Negative for light-headedness.   Hematological: Negative for adenopathy.   Psychiatric/Behavioral: Negative for agitation, confusion and decreased concentration.     Objective:     Vital Signs (Most Recent):  Temp: 98.2 °F (36.8 °C) (05/25/17 2321)  Pulse: 97 (05/25/17 2321)  Resp: 18 (05/25/17 2321)  BP: 104/67 (05/25/17 2321)  SpO2: 99 % (05/25/17 2321) Vital Signs (24h Range):  Temp:  [97.6 °F (36.4 °C)-100.3 °F (37.9 °C)] 98.2 °F (36.8 °C)  Pulse:  [] 97  Resp:  [18-20] 18  SpO2:  [96 %-100 %] 99 %  BP: ()/(57-83) 104/67     Weight: 45.4 kg (100 lb)  Body mass index is 18.89 kg/m².    Estimated Creatinine Clearance: 18.1 mL/min (based on Cr of 3.2).    Physical Exam   Constitutional: She is oriented to person, place, and time. She appears well-developed and well-nourished.   HENT:   Head: Normocephalic and atraumatic.   Right Ear: There is tenderness. No drainage.   Left Ear: External ear normal.   Nose: Nose normal.    Mouth/Throat: Oropharynx is clear and moist.   .   Eyes: Conjunctivae and EOM are normal. Pupils are equal, round, and reactive to light.   Neck: Normal range of motion. Neck supple.   Cardiovascular: S1 normal and S2 normal.  Tachycardia present.    Pulmonary/Chest: Effort normal and breath sounds normal.   Abdominal: Soft. Bowel sounds are normal. There is tenderness. There is no rebound and no guarding.   Genitourinary:   Genitourinary Comments: deferred   Musculoskeletal: Normal range of motion.   Neurological: She is alert and oriented to person, place, and time.   Skin: Skin is warm and dry. Capillary refill takes less than 2 seconds.   Psychiatric: She has a normal mood and affect.   Nursing note and vitals reviewed.      Significant Labs:   Blood Culture:     Recent Labs  Lab 04/14/17  1856 04/14/17  1940 04/16/17  1615 05/21/17  1440 05/21/17  1445   LABBLOO No growth after 5 days. No growth after 5 days. No growth after 5 days. No Growth to date  No Growth to date  No Growth to date  No Growth to date  No Growth to date No Growth to date  No Growth to date  No Growth to date  No Growth to date  No Growth to date     CBC:     Recent Labs  Lab 05/24/17  1735 05/25/17  0634   WBC 2.15* 1.20*   HGB 10.4* 8.0*   HCT 30.1* 23.1*   PLT 63* 50*     All pertinent labs within the past 24 hours have been reviewed.    Significant Imaging: I have reviewed all pertinent imaging results/findings within the past 24 hours.

## 2017-05-26 NOTE — PLAN OF CARE
05/26/17 1655   Medicare Message   Important Message from Medicare regarding Discharge Appeal Rights (Attempted to meet with patient x 3 and patient not in room; in a procedure)

## 2017-05-26 NOTE — INTERVAL H&P NOTE
The patient has been examined and the H&P has been reviewed:    I concur with the findings and no changes have occurred since H&P was written.    Anesthesia/Surgery risks, benefits and alternative options discussed and understood by patient/family.          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

## 2017-05-26 NOTE — TRANSFER OF CARE
"Anesthesia Transfer of Care Note    Patient: Wang Kebede    Procedure(s) Performed: Procedure(s) (LRB):  PEG TUBE PLACEMENT/REPLACEMENT (N/A)    Patient location: PACU    Anesthesia Type: MAC    Transport from OR: Transported from OR on room air with adequate spontaneous ventilation    Post pain: adequate analgesia    Post assessment: no apparent anesthetic complications    Post vital signs: stable    Level of consciousness: responds to stimulation    Nausea/Vomiting: no nausea/vomiting    Complications: none    Transfer of care protocol was followed      Last vitals:   Visit Vitals  /76 (BP Location: Right arm, Patient Position: Lying, BP Method: Automatic)   Pulse 102   Temp 36.4 °C (97.5 °F) (Oral)   Resp 16   Ht 5' 1" (1.549 m)   Wt 45.4 kg (100 lb)   LMP  (LMP Unknown)   SpO2 95%   Breastfeeding? No   BMI 18.89 kg/m²     "

## 2017-05-27 PROBLEM — E83.42 HYPOMAGNESEMIA: Status: RESOLVED | Noted: 2017-05-23 | Resolved: 2017-05-27

## 2017-05-27 PROBLEM — E87.20 METABOLIC ACIDOSIS: Status: RESOLVED | Noted: 2017-04-16 | Resolved: 2017-05-27

## 2017-05-27 PROBLEM — R50.9 FEVER: Status: RESOLVED | Noted: 2017-05-21 | Resolved: 2017-05-27

## 2017-05-27 PROBLEM — D69.6 THROMBOCYTOPENIA: Status: RESOLVED | Noted: 2017-04-19 | Resolved: 2017-05-27

## 2017-05-27 PROBLEM — B37.0 ORAL THRUSH: Status: RESOLVED | Noted: 2017-05-26 | Resolved: 2017-05-27

## 2017-05-27 PROBLEM — Z93.1 GASTROSTOMY IN PLACE: Status: ACTIVE | Noted: 2017-05-27

## 2017-05-27 PROBLEM — R00.0 SINUS TACHYCARDIA: Status: RESOLVED | Noted: 2017-05-24 | Resolved: 2017-05-27

## 2017-05-27 PROBLEM — E87.6 HYPOKALEMIA: Status: RESOLVED | Noted: 2017-05-25 | Resolved: 2017-05-27

## 2017-05-27 LAB
ALBUMIN SERPL BCP-MCNC: 1.4 G/DL
ANION GAP SERPL CALC-SCNC: 12 MMOL/L
BASOPHILS # BLD AUTO: 0.28 K/UL
BASOPHILS NFR BLD: 4.7 %
BUN SERPL-MCNC: 50 MG/DL
CALCIUM SERPL-MCNC: 6.5 MG/DL
CHLORIDE SERPL-SCNC: 106 MMOL/L
CMV SPEC QL SHELL VIAL CULT: NO GROWTH
CMV SPEC QL SHELL VIAL CULT: NO GROWTH
CO2 SERPL-SCNC: 21 MMOL/L
CREAT SERPL-MCNC: 3 MG/DL
DIFFERENTIAL METHOD: ABNORMAL
EOSINOPHIL # BLD AUTO: 0 K/UL
EOSINOPHIL NFR BLD: 0.7 %
ERYTHROCYTE [DISTWIDTH] IN BLOOD BY AUTOMATED COUNT: 19 %
EST. GFR  (AFRICAN AMERICAN): 23 ML/MIN/1.73 M^2
EST. GFR  (NON AFRICAN AMERICAN): 20 ML/MIN/1.73 M^2
GLUCOSE SERPL-MCNC: 57 MG/DL
GRAM STN SPEC: NORMAL
HCT VFR BLD AUTO: 24.6 %
HGB BLD-MCNC: 8.3 G/DL
LYMPHOCYTES # BLD AUTO: 2.5 K/UL
LYMPHOCYTES NFR BLD: 42.5 %
MAGNESIUM SERPL-MCNC: 1.6 MG/DL
MCH RBC QN AUTO: 26.9 PG
MCHC RBC AUTO-ENTMCNC: 33.7 %
MCV RBC AUTO: 80 FL
MONOCYTES # BLD AUTO: 0.8 K/UL
MONOCYTES NFR BLD: 12.7 %
NEUTROPHILS # BLD AUTO: 2.4 K/UL
NEUTROPHILS NFR BLD: 39.7 %
PHOSPHATE SERPL-MCNC: 4.4 MG/DL
PLATELET # BLD AUTO: 36 K/UL
PLATELET BLD QL SMEAR: ABNORMAL
PMV BLD AUTO: ABNORMAL FL
POCT GLUCOSE: 136 MG/DL (ref 70–110)
POCT GLUCOSE: 58 MG/DL (ref 70–110)
POCT GLUCOSE: 62 MG/DL (ref 70–110)
POCT GLUCOSE: 66 MG/DL (ref 70–110)
POCT GLUCOSE: 88 MG/DL (ref 70–110)
POTASSIUM SERPL-SCNC: 3.9 MMOL/L
RBC # BLD AUTO: 3.09 M/UL
SODIUM SERPL-SCNC: 139 MMOL/L
WBC # BLD AUTO: 5.98 K/UL

## 2017-05-27 PROCEDURE — 25000003 PHARM REV CODE 250: Performed by: FAMILY MEDICINE

## 2017-05-27 PROCEDURE — 27000221 HC OXYGEN, UP TO 24 HOURS

## 2017-05-27 PROCEDURE — C9113 INJ PANTOPRAZOLE SODIUM, VIA: HCPCS | Performed by: INTERNAL MEDICINE

## 2017-05-27 PROCEDURE — 25000003 PHARM REV CODE 250: Performed by: EMERGENCY MEDICINE

## 2017-05-27 PROCEDURE — 99232 SBSQ HOSP IP/OBS MODERATE 35: CPT | Mod: ,,, | Performed by: INTERNAL MEDICINE

## 2017-05-27 PROCEDURE — 25000003 PHARM REV CODE 250: Performed by: INTERNAL MEDICINE

## 2017-05-27 PROCEDURE — 83735 ASSAY OF MAGNESIUM: CPT

## 2017-05-27 PROCEDURE — 80069 RENAL FUNCTION PANEL: CPT

## 2017-05-27 PROCEDURE — 63600175 PHARM REV CODE 636 W HCPCS: Performed by: INTERNAL MEDICINE

## 2017-05-27 PROCEDURE — 63700000 PHARM REV CODE 250 ALT 637 W/O HCPCS: Performed by: FAMILY MEDICINE

## 2017-05-27 PROCEDURE — 63600175 PHARM REV CODE 636 W HCPCS: Performed by: EMERGENCY MEDICINE

## 2017-05-27 PROCEDURE — 85025 COMPLETE CBC W/AUTO DIFF WBC: CPT

## 2017-05-27 PROCEDURE — 21400001 HC TELEMETRY ROOM

## 2017-05-27 PROCEDURE — 99232 SBSQ HOSP IP/OBS MODERATE 35: CPT | Mod: ICN,,, | Performed by: INTERNAL MEDICINE

## 2017-05-27 RX ORDER — VITAMIN B COMPLEX
1 CAPSULE ORAL DAILY
COMMUNITY
Start: 2017-05-27

## 2017-05-27 RX ORDER — AZITHROMYCIN 250 MG/1
1250 TABLET, FILM COATED ORAL WEEKLY
Qty: 20 TABLET | Refills: 1 | Status: SHIPPED | OUTPATIENT
Start: 2017-05-27 | End: 2017-06-14 | Stop reason: ALTCHOICE

## 2017-05-27 RX ORDER — LACOSAMIDE 50 MG/1
100 TABLET ORAL 2 TIMES DAILY
Status: DISCONTINUED | OUTPATIENT
Start: 2017-05-27 | End: 2017-05-28 | Stop reason: HOSPADM

## 2017-05-27 RX ORDER — METOPROLOL TARTRATE 25 MG/1
12.5 TABLET, FILM COATED ORAL 2 TIMES DAILY
Qty: 30 TABLET | Refills: 0 | Status: SHIPPED | OUTPATIENT
Start: 2017-05-27 | End: 2018-03-07

## 2017-05-27 RX ORDER — TENOFOVIR DISOPROXIL FUMARATE 300 MG/1
300 TABLET, FILM COATED ORAL
Qty: 10 TABLET | Refills: 0 | Status: SHIPPED | OUTPATIENT
Start: 2017-05-27 | End: 2017-06-14 | Stop reason: SDUPTHER

## 2017-05-27 RX ORDER — LACOSAMIDE 50 MG/1
100 TABLET ORAL 2 TIMES DAILY
Qty: 120 TABLET | Refills: 0 | Status: SHIPPED | OUTPATIENT
Start: 2017-05-27 | End: 2017-06-14 | Stop reason: SDUPTHER

## 2017-05-27 RX ORDER — ATOVAQUONE 750 MG/5ML
750 SUSPENSION ORAL EVERY 12 HOURS
Qty: 100 ML | Refills: 0 | Status: SHIPPED | OUTPATIENT
Start: 2017-05-27 | End: 2017-06-06

## 2017-05-27 RX ORDER — CALCIUM CARBONATE 200(500)MG
500 TABLET,CHEWABLE ORAL 3 TIMES DAILY
COMMUNITY
Start: 2017-05-27 | End: 2018-06-05

## 2017-05-27 RX ORDER — LEVOFLOXACIN 500 MG/1
500 TABLET, FILM COATED ORAL DAILY
Qty: 7 TABLET | Refills: 0 | Status: SHIPPED | OUTPATIENT
Start: 2017-05-27 | End: 2017-06-03

## 2017-05-27 RX ORDER — CITALOPRAM 20 MG/1
20 TABLET, FILM COATED ORAL DAILY
Qty: 30 TABLET | Refills: 0 | Status: SHIPPED | OUTPATIENT
Start: 2017-05-27 | End: 2017-07-05 | Stop reason: SDUPTHER

## 2017-05-27 RX ORDER — NYSTATIN 100000 [USP'U]/ML
500000 SUSPENSION ORAL
Qty: 200 ML | Refills: 0 | Status: SHIPPED | OUTPATIENT
Start: 2017-05-27 | End: 2017-06-06

## 2017-05-27 RX ADMIN — CALCIUM CARBONATE (ANTACID) CHEW TAB 500 MG 500 MG: 500 CHEW TAB at 01:05

## 2017-05-27 RX ADMIN — METOPROLOL TARTRATE 5 MG: 5 INJECTION INTRAVENOUS at 01:05

## 2017-05-27 RX ADMIN — NYSTATIN 500000 UNITS: 500000 SUSPENSION ORAL at 11:05

## 2017-05-27 RX ADMIN — MOXIFLOXACIN HYDROCHLORIDE 400 MG: 400 TABLET, FILM COATED ORAL at 09:05

## 2017-05-27 RX ADMIN — CALCIUM CARBONATE (ANTACID) CHEW TAB 500 MG 500 MG: 500 CHEW TAB at 05:05

## 2017-05-27 RX ADMIN — Medication 12.5 MG: at 11:05

## 2017-05-27 RX ADMIN — VORICONAZOLE 200 MG: 200 TABLET, FILM COATED ORAL at 01:05

## 2017-05-27 RX ADMIN — LACOSAMIDE 100 MG: 50 TABLET, FILM COATED ORAL at 09:05

## 2017-05-27 RX ADMIN — PANTOPRAZOLE SODIUM 40 MG: 40 INJECTION, POWDER, FOR SOLUTION INTRAVENOUS at 09:05

## 2017-05-27 RX ADMIN — ATOVAQUONE 750 MG: 750 SUSPENSION ORAL at 11:05

## 2017-05-27 RX ADMIN — HEPARIN SODIUM 5000 UNITS: 5000 INJECTION, SOLUTION INTRAVENOUS; SUBCUTANEOUS at 11:05

## 2017-05-27 RX ADMIN — Medication 12.5 MG: at 09:05

## 2017-05-27 RX ADMIN — HEPARIN SODIUM 5000 UNITS: 5000 INJECTION, SOLUTION INTRAVENOUS; SUBCUTANEOUS at 06:05

## 2017-05-27 RX ADMIN — VORICONAZOLE 200 MG: 200 TABLET, FILM COATED ORAL at 09:05

## 2017-05-27 RX ADMIN — HEPARIN SODIUM 5000 UNITS: 5000 INJECTION, SOLUTION INTRAVENOUS; SUBCUTANEOUS at 01:05

## 2017-05-27 RX ADMIN — CITALOPRAM HYDROBROMIDE 20 MG: 20 TABLET ORAL at 09:05

## 2017-05-27 RX ADMIN — LACOSAMIDE 100 MG: 50 TABLET, FILM COATED ORAL at 11:05

## 2017-05-27 RX ADMIN — CALCIUM CARBONATE (ANTACID) CHEW TAB 500 MG 500 MG: 500 CHEW TAB at 11:05

## 2017-05-27 RX ADMIN — RITONAVIR 100 MG: 100 TABLET, FILM COATED ORAL at 09:05

## 2017-05-27 RX ADMIN — ATOVAQUONE 750 MG: 750 SUSPENSION ORAL at 09:05

## 2017-05-27 RX ADMIN — POTASSIUM CHLORIDE 40 MEQ: 20 SOLUTION ORAL at 01:05

## 2017-05-27 RX ADMIN — METOPROLOL TARTRATE 5 MG: 5 INJECTION INTRAVENOUS at 06:05

## 2017-05-27 RX ADMIN — Medication 12.5 MG: at 01:05

## 2017-05-27 RX ADMIN — CALCIUM CARBONATE (ANTACID) CHEW TAB 500 MG 500 MG: 500 CHEW TAB at 06:05

## 2017-05-27 RX ADMIN — LORAZEPAM 1 MG: 2 INJECTION INTRAMUSCULAR; INTRAVENOUS at 09:05

## 2017-05-27 RX ADMIN — ATOVAQUONE 750 MG: 750 SUSPENSION ORAL at 01:05

## 2017-05-27 RX ADMIN — DARUNAVIR 800 MG: 800 TABLET, FILM COATED ORAL at 09:05

## 2017-05-27 RX ADMIN — DEXTROSE MONOHYDRATE 25 G: 25 INJECTION, SOLUTION INTRAVENOUS at 12:05

## 2017-05-27 RX ADMIN — EMTRICITABINE 200 MG: 200 CAPSULE ORAL at 05:05

## 2017-05-27 NOTE — PROGRESS NOTES
"PETROS Castellanos notified of pt blood sugars 66 and waiting on home health to be set up; pt may not be able to go home today.  NP stated "okay and I'll order boost".  Will give boost and monitor blood sugar.  Pt sat up in bed and encouraged to eat dinner.  "

## 2017-05-27 NOTE — PROGRESS NOTES
Call placed to [facility] to advise on- of new order for home O2.  ( Voice message left.   ).  SW f/u with patient with mother at the bedside and nursing present as well.  SW inquired if [facility] had come to complete teaching and if they were going to provide her PEG feedings.  Patient was not verbal and patient's mother stated that she didn't know.  Patient's mother wanted to know if the home health agency could come out more frequently, currently coming out 2x per week.   SW advised mother that we could ask but 2x per week in average.  SW f/u to contact [facility] to advise of referral to resume HH.    SANJEEV f/u with Leighton at [facility] to advise of resumption of Care.  Leighton inquired if family had been trained/education on PEG care. Inquired if SW had orders indicating if feedings were to be bolous or continuous and what type of care needed for PEG.  SW advise Leighton that  orders would be requested .    SANJEEV f/u with Ghanshyam at [facility] to advise of order for O2.  Ghanshyam advised SW that he would deliver O2 to patient's room today.  Asked that SW have patient's paperwork at the  for him. (6:49 p.m.)      Myrna Miller, TAMIKO, ACM-SW, CCM

## 2017-05-27 NOTE — PROGRESS NOTES
[facility] Hospital Medicine  Progress Note    Patient Name: Wang Kebede  MRN: [id number]  Patient Class: IP- Inpatient   Admission Date: 5/21/2017  Length of Stay: 6 days  Attending Physician: Marcus García MD  Primary Care Provider: KIN Mendoza        Subjective:     Principal Problem:Fever    HPI:  Ms. Keebde is a 30 yo AAF with h/o AIDS presented complaining of fever with Tmax 103. She was seen in the ED on yesterday complaining of fever and right ear main. She was given Augmentin and discharged home. She was recently hospitalized for severe sepsis where she was in the ICU on pressors after abdominal procedure. She states she has been compliant with her medications since discharge. She denies any sick contacts. She denies any cough, SOB, abdominal pain or diarrhea.      Hospital Course:  No notes on file    Interval History: Pt stable o/n Plan for PEG placement    Review of Systems   Constitutional: Positive for fatigue. Negative for unexpected weight change.   HENT: Negative.  Negative for trouble swallowing.    Eyes: Negative.    Respiratory: Negative.  Negative for cough, shortness of breath and wheezing.    Cardiovascular: Negative.  Negative for chest pain.   Gastrointestinal: Negative.    Endocrine: Negative.    Genitourinary: Negative.    Musculoskeletal: Negative.    Skin: Negative.    Allergic/Immunologic: Negative.    Neurological: Positive for weakness. Negative for dizziness.   Hematological: Negative.    Psychiatric/Behavioral: The patient is nervous/anxious.    All other systems reviewed and are negative.    Objective:     Vital Signs (Most Recent):  Temp: 99.1 °F (37.3 °C) (05/27/17 0800)  Pulse: 100 (05/27/17 0800)  Resp: 20 (05/27/17 0933)  BP: 127/82 (05/27/17 0933)  SpO2: 95 % (05/27/17 1015) Vital Signs (24h Range):  Temp:  [97.5 °F (36.4 °C)-99.2 °F (37.3 °C)] 99.1 °F (37.3 °C)  Pulse:  [] 100  Resp:  [0-35] 20  SpO2:  [87 %-98 %] 95 %  BP:  (107-155)/() 127/82     Weight: 45.4 kg (100 lb)  Body mass index is 18.89 kg/m².    Intake/Output Summary (Last 24 hours) at 05/27/17 1058  Last data filed at 05/27/17 0600   Gross per 24 hour   Intake              600 ml   Output                0 ml   Net              600 ml      Physical Exam   Constitutional: She is oriented to person, place, and time. She appears well-developed and well-nourished. No distress.   HENT:   Head: Normocephalic and atraumatic.   Eyes: EOM are normal. Pupils are equal, round, and reactive to light.   Neck: Normal range of motion. Neck supple. No JVD present.   Cardiovascular: Normal rate, regular rhythm, normal heart sounds and intact distal pulses.    No murmur heard.  Pulmonary/Chest: Effort normal and breath sounds normal. No respiratory distress. She has no wheezes.   Abdominal: Soft. Bowel sounds are normal. She exhibits no distension and no mass.   Musculoskeletal: Normal range of motion. She exhibits no edema or tenderness.   Neurological: She is alert and oriented to person, place, and time. She has normal reflexes. No cranial nerve deficit.   Skin: Skin is warm and dry. Capillary refill takes less than 2 seconds. She is not diaphoretic. No erythema.   Psychiatric: She has a normal mood and affect. Her behavior is normal. Judgment and thought content normal.   Nursing note and vitals reviewed.      Significant Labs: All pertinent labs within the past 24 hours have been reviewed.    Significant Imaging: I have reviewed all pertinent imaging results/findings within the past 24 hours.    Assessment/Plan:      Anemia    5/25/17: she was symptomatic but this is now better after getting 2 units of PRBC          Sinus tachycardia    Her BP will not tolerate her metoprolol, I did give IV 5 mg of lopressor with some improvement but her BP did decrease. I will try a dose of digoxin.    5/25/17: better after her transfusion. TSH elevated but normal free T4, elevation could be  related to acute illness and medications as VFEND can cause tachycardia. Cardiology agreed with correcting her anemia and okay to be slightly tachycardia but she is at risk for high output failure. Echo is normal with EF 50-60%.        Metabolic acidosis    She is positive fluid balance and worsening renal function. I suspect it is a combination of medications and renal function which has caused symptoms. Medications have been adjusted and given sodium bicabonate.          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 Again I am going to hydrate and monitor I/O's closely as her lactic acid was 0.7 for now but will repeat in a few hours.    5/23/17: she has been hypotensive, renal function was improving but Cr slightly up today. IV bolus now and increase her fluid rate.    5/25/17: Despite positive fluid she has declining function with an metabolic acidosis which could be from some of her medications and the HIV itself. ID has stopped some medications and nephrology has been consulted.          AIDS (acquired immunodeficiency syndrome), CD4 <=200    Last known CD4 count was around 4    5/24/17: repeated her CD4 count, explained to her that she must take her medications to improve her immune system will decrease her risk of infections. Discussed option of PEG given she states she cannot tolerate taking pills. She wants to think about it.    5/25/17: I had a long discussion with her about being compliant with her medications. If she wants to feel better she will need the medications in order to build her immune system and decrease her risk of infections. I have explained that this will take time but she has to be consistent with her HAART medications. The alternative is to not take the medications and eventually she will become so ill that we will not be able to do anything else.        S/P implantation of  automatic cardioverter/defibrillator (AICD)      She is tachycardia and hypotensive but I think she tends to run on the lower side with her BP  Check lactic acid, continue with IV fluids will have to hold beta blocker as she was started on during last hospitalization.    5/22/17: she is still tachycardic will attempt to restart her beta blocker if BP tolerates.        HIV (human immunodeficiency virus infection)    Resume her meds     5/23/17: we have resumed her medications but she is noncompliant because she has a difficult time swallowing her medications          * Fever    Currently there is no obvious source as she does give history of otalgia but wax of ear prevented complete ear exam.  She has been on Augmentin after being seen in the ED on yesterday.  -Plan is to swab for flu, may need ENT to get good exam to rule otitis media but in the meantime will empirically treat with Cefepime and Vancomycin, follow up on blood cultures and ID consult    5/22/17: still no obvious source as she is still spiking a temp with Tmax 105. CT head done as per overnight staff she had some sluggish speech and didn't seen like herself but she was sleeping this am and a little sluggish. Plan is to do LP since CT was negative to rule out CNS infection with possible Toxoplasmosis and Cryptococcal given negative CXR, CT abdomen and blood cultures so far.    5/24/17: resolved for past 24 hrs unknown if this was related to her underlying HIV, other infectious etiology like an acute viral or could she have an infection with a fungus as the fever seemed to improve once VFEND started. Other cultures that have to go out are still pending.    5/23/17: still no obvious source but HIV can cause fever, continue with Vancomycin, Merrem, VFEND          VTE Risk Mitigation         Ordered     heparin (porcine) injection 5,000 Units  Every 8 hours     Route:  Subcutaneous        05/21/17 2004     Medium Risk of VTE  Once      05/21/17 2004         Note placed to record the visit from 5/26/2017. I saw and examined the patient with the above findings.  Marcus García MD  Department of Hospital Medicine   [facility]

## 2017-05-27 NOTE — PROGRESS NOTES
2300: Pt down to CT accompanied by Homa MENDES and Maria M CASIANO at this time.    2314: Pt back to room at this time. Family at bedside. Will continue to monitor.

## 2017-05-27 NOTE — PROGRESS NOTES
Pt significant other educated on how to use PEG tube, attempted to educate pt but pt lethargic did not want to open eyes to be educated.  Pt significant other understands how to use PEG and does not have any further questions at this time.

## 2017-05-27 NOTE — PROGRESS NOTES
2230: Pt family member called to report pt seizing again. This nurse entered the room to the end of seizure-like activity, which seemed to be as before but with shorter duration.   VS at 2232:  98.8F   120bpm   22RR   O2 87%   /103  Pt unresponsive to sternal rub.    VS at 2236: 83bpm   20RR   O2 93%   /80  Pt becoming responsive to tactile stimuli, making eye contact when aroused.    PRN ativan given as ordered.    Dr. Chiu notified, see new orders for STAT head CT. Will continue to monitor.

## 2017-05-27 NOTE — SUBJECTIVE & OBJECTIVE
Interval History: Pt stable o/n Plan for PEG placement    Review of Systems   Constitutional: Positive for fatigue. Negative for unexpected weight change.   HENT: Negative.  Negative for trouble swallowing.    Eyes: Negative.    Respiratory: Negative.  Negative for cough, shortness of breath and wheezing.    Cardiovascular: Negative.  Negative for chest pain.   Gastrointestinal: Negative.    Endocrine: Negative.    Genitourinary: Negative.    Musculoskeletal: Negative.    Skin: Negative.    Allergic/Immunologic: Negative.    Neurological: Positive for weakness. Negative for dizziness.   Hematological: Negative.    Psychiatric/Behavioral: The patient is nervous/anxious.    All other systems reviewed and are negative.    Objective:     Vital Signs (Most Recent):  Temp: 99.1 °F (37.3 °C) (05/27/17 0800)  Pulse: 100 (05/27/17 0800)  Resp: 20 (05/27/17 0933)  BP: 127/82 (05/27/17 0933)  SpO2: 95 % (05/27/17 1015) Vital Signs (24h Range):  Temp:  [97.5 °F (36.4 °C)-99.2 °F (37.3 °C)] 99.1 °F (37.3 °C)  Pulse:  [] 100  Resp:  [0-35] 20  SpO2:  [87 %-98 %] 95 %  BP: (107-155)/() 127/82     Weight: 45.4 kg (100 lb)  Body mass index is 18.89 kg/m².    Intake/Output Summary (Last 24 hours) at 05/27/17 1058  Last data filed at 05/27/17 0600   Gross per 24 hour   Intake              600 ml   Output                0 ml   Net              600 ml      Physical Exam   Constitutional: She is oriented to person, place, and time. She appears well-developed and well-nourished. No distress.   HENT:   Head: Normocephalic and atraumatic.   Eyes: EOM are normal. Pupils are equal, round, and reactive to light.   Neck: Normal range of motion. Neck supple. No JVD present.   Cardiovascular: Normal rate, regular rhythm, normal heart sounds and intact distal pulses.    No murmur heard.  Pulmonary/Chest: Effort normal and breath sounds normal. No respiratory distress. She has no wheezes.   Abdominal: Soft. Bowel sounds are normal. She  exhibits no distension and no mass.   Musculoskeletal: Normal range of motion. She exhibits no edema or tenderness.   Neurological: She is alert and oriented to person, place, and time. She has normal reflexes. No cranial nerve deficit.   Skin: Skin is warm and dry. Capillary refill takes less than 2 seconds. She is not diaphoretic. No erythema.   Psychiatric: She has a normal mood and affect. Her behavior is normal. Judgment and thought content normal.   Nursing note and vitals reviewed.      Significant Labs: All pertinent labs within the past 24 hours have been reviewed.    Significant Imaging: I have reviewed all pertinent imaging results/findings within the past 24 hours.

## 2017-05-27 NOTE — PROGRESS NOTES
[facility] -   Nephrology  Progress Note    Patient Name: Wang Kebede  MRN: [id number]  Admission Date: 5/21/2017  Hospital Length of Stay: 5 days  Attending Provider: Marcus García MD   Primary Care Physician: KIN Mendoza  Principal Problem:Fever    Subjective:     HPI: 31 year old female with h/o HIV/AIDS, anemia, vulvar cancer, s/p AICD presented to [facility] on 5/21/17 with fevers. Patient is followed by ID and source of fever is currently not clear. While in hospital, patient's renal function worsened and her creatinine increased from 1.9 on 5/21/17 to 3.4 on 5/24/17. Nephrology was consulted to assist with patient's renal care while she is admitted at [facility]. Chart review revealed multiple episodes of hypotension during 5/22-5/24/17 period with SBP in 80s. Patient has not received any IV contrast during current admission. Medication review showed that patient was on Tenofovir and Vancomycin; these medications have now been discontinued.  I saw and examined patient in her hospital room. Patient reports nausea/vomiting, productive cough, diarrhea. She denies any fever, SOB, pain, dysuria, LE edema, rashes, rectal or gum bleeding. Patient also reports generalized joint pain and generalized weakness.     Interval History:  s/p PEG tube placement        Review of patient's allergies indicates:   Allergen Reactions    Iodine and iodide containing products Anaphylaxis     Pt states she coded last time she was administered contrast    Bactrim [sulfamethoxazole-trimethoprim] Hives    Morphine Itching     Current Facility-Administered Medications   Medication Frequency    0.9%  NaCl infusion (for blood administration) Q24H PRN    atovaquone suspension 750 mg Q12H    azithromycin tablet 1,250 mg Weekly    b complex vitamins capsule 1 capsule Daily    calcium carbonate 200 mg calcium (500 mg) chewable tablet 500 mg TID    citalopram tablet 20 mg Daily    darunavir tablet 800  mg Daily with breakfast    And    ritonavir tablet 100 mg Daily with breakfast    emtricitabine capsule 200 mg Q36H    heparin (porcine) injection 5,000 Units Q8H    metoprolol injection 5 mg Q6H    metoprolol tartrate (LOPRESSOR) split tablet 12.5 mg BID    moxifloxacin tablet 400 mg Daily    nystatin 100,000 unit/mL suspension 500,000 Units QID (WM & HS)    ondansetron injection 4 mg Q6H PRN    [START ON 5/27/2017] pantoprazole injection 40 mg Daily    sterile water 1,000 mL with sodium bicarbonate 1 mEq/mL (8.4 %) 150 mEq infusion Continuous    tenofovir tablet 300 mg Q72H    voriconazole tablet 200 mg BID       Objective:     Vital Signs (Most Recent):  Temp: 97.7 °F (36.5 °C) (05/26/17 1647)  Pulse: 101 (05/26/17 1647)  Resp: 18 (05/26/17 1647)  BP: 117/86 (05/26/17 1647)  SpO2: (!) 94 % (05/26/17 1647)  O2 Device (Oxygen Therapy): room air (05/26/17 1647) Vital Signs (24h Range):  Temp:  [97.5 °F (36.4 °C)-98.5 °F (36.9 °C)] 97.7 °F (36.5 °C)  Pulse:  [] 101  Resp:  [0-35] 18  SpO2:  [88 %-99 %] 94 %  BP: (104-134)/(65-86) 117/86     Weight: 45.4 kg (100 lb) (05/21/17 1304)  Body mass index is 18.89 kg/m².  Body surface area is 1.4 meters squared.    I/O last 3 completed shifts:  In: 1949.6 [P.O.:360; I.V.:1589.6]  Out: 0     Physical Exam   Constitutional: She is oriented to person, place, and time. She appears well-developed and well-nourished. No distress.   HENT:   Head: Normocephalic and atraumatic.   Mouth/Throat: Oropharynx is clear and moist. No oropharyngeal exudate.   Eyes: Conjunctivae and EOM are normal. Pupils are equal, round, and reactive to light.   Neck: Normal range of motion. Neck supple. No JVD present. Carotid bruit is not present. No tracheal deviation present. No thyroid mass and no thyromegaly present.   Cardiovascular: Normal rate, regular rhythm, normal heart sounds and intact distal pulses.  Exam reveals no gallop and no friction rub.    No murmur  heard.  Pulmonary/Chest: Effort normal and breath sounds normal. No respiratory distress. She has no wheezes. She has no rales. She exhibits no tenderness.   Abdominal: Soft. Bowel sounds are normal. She exhibits no distension, no abdominal bruit, no ascites and no mass. There is no hepatosplenomegaly. There is no tenderness. There is no rebound, no guarding and no CVA tenderness.   Musculoskeletal: Normal range of motion. She exhibits edema. She exhibits no tenderness.   trace dependant    Lymphadenopathy:     She has no cervical adenopathy.   Neurological: She is alert and oriented to person, place, and time. She has normal reflexes. She displays normal reflexes. No cranial nerve deficit. She exhibits normal muscle tone. Coordination normal.   Skin: Skin is warm and intact. No rash noted. No erythema. No pallor.   Psychiatric: She has a normal mood and affect. Judgment normal.       Significant Labs:    BMP:   Recent Labs  Lab 05/24/17 0519 05/26/17  0628   GLU 85  < > 60*  60*   *  < > 108  108   CO2 10*  < > 21*  21*   BUN 48*  < > 49*  49*   CREATININE 3.4*  < > 3.0*  3.0*   CALCIUM 6.0*  < > 6.0*  6.0*   MG 1.6  --   --    < > = values in this interval not displayed.  CBC:   Recent Labs  Lab 05/26/17 0628   WBC 2.41*   RBC 2.77*   HGB 7.4*   HCT 21.6*   PLT 54*   MCV 78*   MCH 26.7*   MCHC 34.3     CMP:   Recent Labs  Lab 05/24/17 0519 05/26/17 0628 05/26/17  1301   GLU 85  < > 60*  60*  --    CALCIUM 6.0*  < > 6.0*  6.0*  --    ALBUMIN 1.6*  --  1.4*  --    PROT 6.8  --   --   --      < > 139  139  --    K 3.7  < > 2.8*  2.8* 3.1*   CO2 10*  < > 21*  21*  --    *  < > 108  108  --    BUN 48*  < > 49*  49*  --    CREATININE 3.4*  < > 3.0*  3.0*  --    ALKPHOS 125  --   --   --    ALT 21  --   --   --    AST 83*  --   --   --    BILITOT 0.6  --   --   --    < > = values in this interval not displayed.  All labs within the past 24 hours have been reviewed.       Lab Results    Component Value Date    ALBUMIN 1.4 (L) 05/26/2017         Significant Imaging: reviewed     Assessment/Plan:     LELAND (acute kidney injury)    Patient's creatinine has increased from 1.9 on 5/21/17 to 3.4 on 5/24/17. 3.0 today , cont gentle hydration ,  Cause of LELAND is likely multifactorial and related to hypotension (SBP in 80s ion multiple occasions), medications (patient was on tenofovir and vancomycin) and progression of HIV nephropathy. Continue IV fluids. Avoid tenofovir, Vancomycin, NSAIDs, ACE-I/ARBs. Will monitor with repeat renal panel.         Metabolic acidosis    Cont  sodium bicarbonate with IV fluids. Can stop when bicarb stable         AIDS (acquired immunodeficiency syndrome), CD4 <=200    ID following         Hypokalemia    On K supplements         Anemia    Multifactorial, pRBC tx when indicated             Thank you for your consult. I will follow-up with patient. Please contact us if you have any additional questions.    Marlon Monterroso MD  Nephrology  [facility] -

## 2017-05-27 NOTE — PROGRESS NOTES
Home Oxygen Evaluation    Date Performed: 5/27/2017    1) Patient's Home O2 Sat on room air, while at rest: 83%        If O2 sats on room air at rest are 88% or below, patient qualifies. No additional testing needed. Document N/A in steps 2 and 3. If 89% or above, complete steps 2.      2) Patient's O2 Sat on room air while exercising:         If O2 sats on room air while exercising remain 89% or above patient does not qualify, no further testing needed Document N/A in step 3. If O2 sats on room air while exercising are 88% or below, continue to step 3.      3) Patient's O2 Sat while exercising on O2:  at  LPM         (Must show improvement from #2 for patients to qualify)    If O2 sats improve on oxygen, patient qualifies for portable oxygen. If not, the patient does not qualify.

## 2017-05-27 NOTE — PLAN OF CARE
Problem: Patient Care Overview  Goal: Plan of Care Review  Outcome: Ongoing (interventions implemented as appropriate)  Pt with 2 seizures this shift. Currently is not cognitive to date, time, or situation. Currently lethargic with occasional responses when questioned. POC discussed w/patient and family regarding sustaining medication regiment and maintaining active, hands on methods for observing patient for S/S of potential seizure. IVF Dc'd per MD orders.Non compliance to q2 turns. Retaught family. No further breakdown noted. NSR on monitor. VSS  Afebrile all shift. Fall precautions in place, Bed locked and in lowest position, bed alarm on and call light within in reach. Cell phone in bed with pt.

## 2017-05-27 NOTE — ASSESSMENT & PLAN NOTE
Patient's creatinine has increased from 1.9 on 5/21/17 to 3.4 on 5/24/17. 3.0 today ,   Cause of LELAND is likely multifactorial and related to hypotension (SBP in 80s ion multiple occasions), medications (patient was on tenofovir and vancomycin) and progression of HIV nephropathy.  Avoid tenofovir, Vancomycin, NSAIDs, ACE-I/ARBs.

## 2017-05-27 NOTE — PROGRESS NOTES
This nurse, pt's mother, and pt's aunt at the bedside when pt had seizure-like activity. Pt was AAOx4 when her body became rigid and she seemed to demonstrate decorticate posturing with body-wide, slow, sharp jerks at this time. Seizure-like activity lasted approximately 20 seconds. As seizure-like activity ended, pt unresponsive and began agonal breathing. Positive for pulse. Beside pulse ox showing O2 sats in 70%s.    Charge nurse VAUGHN Rhodes arrived at beside at this time. Pt's breathing now normal pattern and rhythm. O2 sat slowly climbing. Unresponsive to sternal rub.  VS @ 2115:  98.4F   88bpm   20RR   O2 94%  /106 117  CBG is 62    2118: Pt becoming responsive to pain.    2125: Pt responding to tactile stimuli at this time.     VS @ 2130:   20RR   O2 96%   /87  Dr. Chiu at bedside at this time. Pt more alert, but still lethargic. Able to answer some questions and able to identify family members at bedside. Oriented x 4.    New orders for PRN Ativan. Will continue to monitor.

## 2017-05-27 NOTE — PROGRESS NOTES
[facility] -   Gastroenterology  Progress Note    Patient Name: Wang Kebede  MRN: [id number]  Admission Date: 5/21/2017  Hospital Length of Stay: 6 days  Code Status: Full Code   Attending Provider: Marcus García MD  Consulting Provider: Adama Nielsen MD  Primary Care Physician: KIN Mendoza  Principal Problem: Fever      Subjective:     Interval History: Pt had some seizures overnight. No issues after PEG tube placement. No F/C. Continues to have diarrhea.     Review of Systems   Constitutional: Positive for fatigue. Negative for chills and fever.   Respiratory: Negative for cough, chest tightness and shortness of breath.    Cardiovascular: Negative for chest pain, palpitations and leg swelling.   Gastrointestinal: Positive for diarrhea. Negative for abdominal distention, abdominal pain, blood in stool, constipation, nausea and vomiting.   Genitourinary: Negative for difficulty urinating.   Neurological: Negative for dizziness and headaches.     Objective:     Vital Signs (Most Recent):  Temp: 99.1 °F (37.3 °C) (05/27/17 0800)  Pulse: 100 (05/27/17 0800)  Resp: 20 (05/27/17 0933)  BP: 127/82 (05/27/17 0933)  SpO2: 95 % (05/27/17 1015) Vital Signs (24h Range):  Temp:  [97.5 °F (36.4 °C)-99.2 °F (37.3 °C)] 99.1 °F (37.3 °C)  Pulse:  [] 100  Resp:  [0-35] 20  SpO2:  [87 %-98 %] 95 %  BP: (107-155)/() 127/82     Weight: 45.4 kg (100 lb) (05/21/17 1304)  Body mass index is 18.89 kg/m².      Intake/Output Summary (Last 24 hours) at 05/27/17 1044  Last data filed at 05/27/17 0600   Gross per 24 hour   Intake              600 ml   Output                0 ml   Net              600 ml       Lines/Drains/Airways     Peripherally Inserted Central Catheter Line                 PICC Double Lumen 05/22/17 2210 right brachial 4 days          Drain                 Gastrostomy/Enterostomy 05/26/17 1540 Percutaneous endoscopic gastrostomy (PEG) LUQ less than 1 day                 Physical Exam   Constitutional: She is oriented to person, place, and time.   Ill appearing   Eyes: No scleral icterus.   Cardiovascular: Regular rhythm and normal heart sounds.  Tachycardia present.    No murmur heard.  Pulmonary/Chest: Effort normal and breath sounds normal. She has no wheezes. She has no rales.   Abdominal: Soft. Bowel sounds are normal. She exhibits no distension. There is no hepatosplenomegaly. There is no tenderness.   PEG site with no evidence of active bleeding. PEG turned without difficulty. No tenderness at PEG site.   Musculoskeletal: She exhibits no edema.   Neurological: She is alert and oriented to person, place, and time.   Psychiatric: She has a normal mood and affect. Her behavior is normal.       Significant Labs:  CBC:   Recent Labs  Lab 05/26/17  0628 05/27/17  0633   WBC 2.41* 5.98   HGB 7.4* 8.3*   HCT 21.6* 24.6*   PLT 54* 36*     BMP:   Recent Labs  Lab 05/27/17  0633   GLU 57*      K 3.9      CO2 21*   BUN 50*   CREATININE 3.0*   CALCIUM 6.5*   MG 1.6     Stool C. diff: No results for input(s): CDIFFICILEAN, CDIFFTOX in the last 48 hours.  Stool Culture: No results for input(s): STOOLCULTURE in the last 48 hours.  Stool Ova/Cysts/Parasites: No results for input(s): STLEXAMOCP in the last 48 hours.  Stool Giardia/Crypto: No results for input(s): GIARDIAANTIG, CRSPAG in the last 48 hours.  Stool WBCs: No results for input(s): STOOLWBC in the last 48 hours.      Significant Imaging:  CT: I have reviewed all results within the past 24 hours and my personal findings are:  Head CT negative.     Scheduled Meds:   atovaquone  750 mg Oral Q12H    azithromycin  1,250 mg Oral Weekly    b complex vitamins  1 capsule Oral Daily    calcium carbonate  500 mg Oral TID    citalopram  20 mg Oral Daily    darunavir  800 mg Oral Daily with breakfast    And    ritonavir  100 mg Oral Daily with breakfast    emtricitabine  200 mg Oral Q36H    heparin (porcine)  5,000  Units Subcutaneous Q8H    lacosamide  100 mg Oral BID    metoprolol  5 mg Intravenous Q6H    metoprolol tartrate  12.5 mg Oral BID    moxifloxacin  400 mg Oral Daily    nystatin  500,000 Units Oral QID (WM & HS)    pantoprazole  40 mg Intravenous Daily    tenofovir  300 mg Oral Q72H     Continuous Infusions:   PRN Meds:.sodium chloride, lorazepam, ondansetron      Assessment/Plan:     Gastrostomy in place    No complications after placement. OK to use PEG tube today for meds/water/feedings.        Diarrhea    Stool studies pending. Likely due to HIV, severe immunosuppression. ID is seeing the pt so will defer to their recommendations. If further recommendation/evaluation from GI is needed please contact us.        AIDS (acquired immunodeficiency syndrome), CD4 <=200    Restart ART through PEG.        HIV (human immunodeficiency virus infection)    OK to receive ART through PEG.        Thrombocytopenia    If bleeding occurs recommend plt transfusion.            Thank you for your consult. I will sign off. Please contact us if you have any additional questions.    Adama Nielsen MD  Gastroenterology  [facility] [place]

## 2017-05-27 NOTE — SUBJECTIVE & OBJECTIVE
Subjective:     Interval History: Pt had some seizures overnight. No issues after PEG tube placement. No F/C. Continues to have diarrhea.     Review of Systems   Constitutional: Positive for fatigue. Negative for chills and fever.   Respiratory: Negative for cough, chest tightness and shortness of breath.    Cardiovascular: Negative for chest pain, palpitations and leg swelling.   Gastrointestinal: Positive for diarrhea. Negative for abdominal distention, abdominal pain, blood in stool, constipation, nausea and vomiting.   Genitourinary: Negative for difficulty urinating.   Neurological: Negative for dizziness and headaches.     Objective:     Vital Signs (Most Recent):  Temp: 99.1 °F (37.3 °C) (05/27/17 0800)  Pulse: 100 (05/27/17 0800)  Resp: 20 (05/27/17 0933)  BP: 127/82 (05/27/17 0933)  SpO2: 95 % (05/27/17 1015) Vital Signs (24h Range):  Temp:  [97.5 °F (36.4 °C)-99.2 °F (37.3 °C)] 99.1 °F (37.3 °C)  Pulse:  [] 100  Resp:  [0-35] 20  SpO2:  [87 %-98 %] 95 %  BP: (107-155)/() 127/82     Weight: 45.4 kg (100 lb) (05/21/17 1304)  Body mass index is 18.89 kg/m².      Intake/Output Summary (Last 24 hours) at 05/27/17 1044  Last data filed at 05/27/17 0600   Gross per 24 hour   Intake              600 ml   Output                0 ml   Net              600 ml       Lines/Drains/Airways     Peripherally Inserted Central Catheter Line                 PICC Double Lumen 05/22/17 2210 right brachial 4 days          Drain                 Gastrostomy/Enterostomy 05/26/17 1540 Percutaneous endoscopic gastrostomy (PEG) LUQ less than 1 day                Physical Exam   Constitutional: She is oriented to person, place, and time.   Ill appearing   Eyes: No scleral icterus.   Cardiovascular: Regular rhythm and normal heart sounds.  Tachycardia present.    No murmur heard.  Pulmonary/Chest: Effort normal and breath sounds normal. She has no wheezes. She has no rales.   Abdominal: Soft. Bowel sounds are normal. She  exhibits no distension. There is no hepatosplenomegaly. There is no tenderness.   PEG site with no evidence of active bleeding. PEG turned without difficulty. No tenderness at PEG site.   Musculoskeletal: She exhibits no edema.   Neurological: She is alert and oriented to person, place, and time.   Psychiatric: She has a normal mood and affect. Her behavior is normal.       Significant Labs:  CBC:   Recent Labs  Lab 05/26/17  0628 05/27/17  0633   WBC 2.41* 5.98   HGB 7.4* 8.3*   HCT 21.6* 24.6*   PLT 54* 36*     BMP:   Recent Labs  Lab 05/27/17  0633   GLU 57*      K 3.9      CO2 21*   BUN 50*   CREATININE 3.0*   CALCIUM 6.5*   MG 1.6     Stool C. diff: No results for input(s): CDIFFICILEAN, CDIFFTOX in the last 48 hours.  Stool Culture: No results for input(s): STOOLCULTURE in the last 48 hours.  Stool Ova/Cysts/Parasites: No results for input(s): STLEXAMOCP in the last 48 hours.  Stool Giardia/Crypto: No results for input(s): GIARDIAANTIG, CRSPAG in the last 48 hours.  Stool WBCs: No results for input(s): STOOLWBC in the last 48 hours.      Significant Imaging:  CT: I have reviewed all results within the past 24 hours and my personal findings are:  Head CT negative.     Scheduled Meds:   atovaquone  750 mg Oral Q12H    azithromycin  1,250 mg Oral Weekly    b complex vitamins  1 capsule Oral Daily    calcium carbonate  500 mg Oral TID    citalopram  20 mg Oral Daily    darunavir  800 mg Oral Daily with breakfast    And    ritonavir  100 mg Oral Daily with breakfast    emtricitabine  200 mg Oral Q36H    heparin (porcine)  5,000 Units Subcutaneous Q8H    lacosamide  100 mg Oral BID    metoprolol  5 mg Intravenous Q6H    metoprolol tartrate  12.5 mg Oral BID    moxifloxacin  400 mg Oral Daily    nystatin  500,000 Units Oral QID (WM & HS)    pantoprazole  40 mg Intravenous Daily    tenofovir  300 mg Oral Q72H     Continuous Infusions:   PRN Meds:.sodium chloride, lorazepam, ondansetron

## 2017-05-27 NOTE — SUBJECTIVE & OBJECTIVE
Interval History: no acute events     Review of patient's allergies indicates:   Allergen Reactions    Iodine and iodide containing products Anaphylaxis     Pt states she coded last time she was administered contrast    Bactrim [sulfamethoxazole-trimethoprim] Hives    Morphine Itching     Current Facility-Administered Medications   Medication Frequency    0.9%  NaCl infusion (for blood administration) Q24H PRN    atovaquone suspension 750 mg Q12H    azithromycin tablet 1,250 mg Weekly    b complex vitamins capsule 1 capsule Daily    calcium carbonate 200 mg calcium (500 mg) chewable tablet 500 mg TID    citalopram tablet 20 mg Daily    darunavir tablet 800 mg Daily with breakfast    And    ritonavir tablet 100 mg Daily with breakfast    emtricitabine capsule 200 mg Q36H    heparin (porcine) injection 5,000 Units Q8H    lacosamide tablet 100 mg BID    lorazepam injection 1 mg Q2H PRN    metoprolol tartrate (LOPRESSOR) split tablet 12.5 mg BID    moxifloxacin tablet 400 mg Daily    nystatin 100,000 unit/mL suspension 500,000 Units QID (WM & HS)    ondansetron injection 4 mg Q6H PRN    pantoprazole injection 40 mg Daily    tenofovir tablet 300 mg Q72H       Objective:     Vital Signs (Most Recent):  Temp: 99.4 °F (37.4 °C) (05/27/17 1200)  Pulse: 105 (05/27/17 1300)  Resp: 20 (05/27/17 1200)  BP: 121/68 (05/27/17 1200)  SpO2: 96 % (05/27/17 1200)  O2 Device (Oxygen Therapy): nasal cannula (05/27/17 1015) Vital Signs (24h Range):  Temp:  [98.1 °F (36.7 °C)-99.4 °F (37.4 °C)] 99.4 °F (37.4 °C)  Pulse:  [] 105  Resp:  [18-22] 20  SpO2:  [87 %-98 %] 96 %  BP: (121-155)/() 121/68     Weight: 45.4 kg (100 lb) (05/21/17 1304)  Body mass index is 18.89 kg/m².  Body surface area is 1.4 meters squared.    I/O last 3 completed shifts:  In: 1758.3 [P.O.:200; I.V.:1358.3; NG/GT:200]  Out: 0     Physical Exam   Constitutional: She appears well-developed. No distress.   HENT:   Head: Normocephalic and  atraumatic.   Mouth/Throat: Oropharynx is clear and moist. No oropharyngeal exudate.   Eyes: Conjunctivae and EOM are normal. Pupils are equal, round, and reactive to light.   Neck: Normal range of motion. Neck supple. No JVD present. Carotid bruit is not present. No tracheal deviation present. No thyroid mass and no thyromegaly present.   Cardiovascular: Normal rate, regular rhythm, normal heart sounds and intact distal pulses.  Exam reveals no gallop and no friction rub.    No murmur heard.  Pulmonary/Chest: Effort normal and breath sounds normal. No respiratory distress. She has no wheezes. She has no rales. She exhibits no tenderness.   Abdominal: Soft. Bowel sounds are normal. She exhibits no distension, no abdominal bruit, no ascites and no mass. There is no hepatosplenomegaly. There is no tenderness. There is no rebound, no guarding and no CVA tenderness.   PEG tube in place    Musculoskeletal: She exhibits no edema or tenderness.   Lymphadenopathy:     She has no cervical adenopathy.   Neurological: She is alert. She displays normal reflexes. No cranial nerve deficit. She exhibits normal muscle tone. Coordination normal.   Skin: Skin is warm and intact. No rash noted. No erythema. No pallor.   Psychiatric: Judgment normal.       Significant Labs:    CBC:   Recent Labs  Lab 05/27/17  0633   WBC 5.98   RBC 3.09*   HGB 8.3*   HCT 24.6*   PLT 36*   MCV 80*   MCH 26.9*   MCHC 33.7     CMP:   Recent Labs  Lab 05/24/17  0519  05/27/17  0633   GLU 85  < > 57*   CALCIUM 6.0*  < > 6.5*   ALBUMIN 1.6*  < > 1.4*   PROT 6.8  --   --      < > 139   K 3.7  < > 3.9   CO2 10*  < > 21*   *  < > 106   BUN 48*  < > 50*   CREATININE 3.4*  < > 3.0*   ALKPHOS 125  --   --    ALT 21  --   --    AST 83*  --   --    BILITOT 0.6  --   --    < > = values in this interval not displayed.  All labs within the past 24 hours have been reviewed.       Lab Results   Component Value Date    CALCIUM 6.5 (LL) 05/27/2017    PHOS 4.4  05/27/2017       Lab Results   Component Value Date    ALBUMIN 1.4 (L) 05/27/2017         Significant Imaging: reviewed

## 2017-05-27 NOTE — HOSPITAL COURSE
The patient has a history of HIV ( non-compliant with HAART) admitted for fever, tachycardia, acute renal failure. Infectious disease, Nephrology and Cardiology were consulted. The patient was restarted on HAART per Dr. Mayo. The patient was started on a BB for HR control. The patients fever resolved and her HR improved. GI was consulted for evaluation for PEG placement as the patient reported that she did not take her HAART medications because the pills were too large. The patient had a PEG placed on 5/26/17. The importance of medication compliance was extensively discussed with the patient. Discharge was delayed due to family stating that they were not comfortable taking her home. Pt will be discharged in am. 5/28/17- During her admission the patient had some seizure-like activity, the case was discussed with neurology who felt this was 2/2 her disease process and the patient was started on Vimpat. The patient was seen and examined today and deemed stable for discharge. Discharged was delayed overnight due to family members not being feeling comfortable with home O2 and with PEG tube. The patient and family were re-educated overnight and feel ready for discharged. The patient was again counseled on medication compliance and the importance of proper nutrition. The patient is being discharged home on zithromax and mepron for OI prophylaxis. The patient is being discharged home with . The patient will follow up with her primary infectious disease provider Dr. Vides.

## 2017-05-27 NOTE — PROGRESS NOTES
[facility] -   Nephrology  Progress Note    Patient Name: Wang Kebede  MRN: [id number]  Admission Date: 5/21/2017  Hospital Length of Stay: 6 days  Attending Provider: Marcus García MD   Primary Care Physician: KIN Mendoza  Principal Problem:Fever    Subjective:     HPI: 31 year old female with h/o HIV/AIDS, anemia, vulvar cancer, s/p AICD presented to [facility] on 5/21/17 with fevers. Patient is followed by ID and source of fever is currently not clear. While in hospital, patient's renal function worsened and her creatinine increased from 1.9 on 5/21/17 to 3.4 on 5/24/17. Nephrology was consulted to assist with patient's renal care while she is admitted at [facility]. Chart review revealed multiple episodes of hypotension during 5/22-5/24/17 period with SBP in 80s. Patient has not received any IV contrast during current admission. Medication review showed that patient was on Tenofovir and Vancomycin; these medications have now been discontinued.  I saw and examined patient in her hospital room. Patient reports nausea/vomiting, productive cough, diarrhea. She denies any fever, SOB, pain, dysuria, LE edema, rashes, rectal or gum bleeding. Patient also reports generalized joint pain and generalized weakness.     Interval History: no acute events     Review of patient's allergies indicates:   Allergen Reactions    Iodine and iodide containing products Anaphylaxis     Pt states she coded last time she was administered contrast    Bactrim [sulfamethoxazole-trimethoprim] Hives    Morphine Itching     Current Facility-Administered Medications   Medication Frequency    0.9%  NaCl infusion (for blood administration) Q24H PRN    atovaquone suspension 750 mg Q12H    azithromycin tablet 1,250 mg Weekly    b complex vitamins capsule 1 capsule Daily    calcium carbonate 200 mg calcium (500 mg) chewable tablet 500 mg TID    citalopram tablet 20 mg Daily    darunavir tablet 800 mg Daily  with breakfast    And    ritonavir tablet 100 mg Daily with breakfast    emtricitabine capsule 200 mg Q36H    heparin (porcine) injection 5,000 Units Q8H    lacosamide tablet 100 mg BID    lorazepam injection 1 mg Q2H PRN    metoprolol tartrate (LOPRESSOR) split tablet 12.5 mg BID    moxifloxacin tablet 400 mg Daily    nystatin 100,000 unit/mL suspension 500,000 Units QID (WM & HS)    ondansetron injection 4 mg Q6H PRN    pantoprazole injection 40 mg Daily    tenofovir tablet 300 mg Q72H       Objective:     Vital Signs (Most Recent):  Temp: 99.4 °F (37.4 °C) (05/27/17 1200)  Pulse: 105 (05/27/17 1300)  Resp: 20 (05/27/17 1200)  BP: 121/68 (05/27/17 1200)  SpO2: 96 % (05/27/17 1200)  O2 Device (Oxygen Therapy): nasal cannula (05/27/17 1015) Vital Signs (24h Range):  Temp:  [98.1 °F (36.7 °C)-99.4 °F (37.4 °C)] 99.4 °F (37.4 °C)  Pulse:  [] 105  Resp:  [18-22] 20  SpO2:  [87 %-98 %] 96 %  BP: (121-155)/() 121/68     Weight: 45.4 kg (100 lb) (05/21/17 1304)  Body mass index is 18.89 kg/m².  Body surface area is 1.4 meters squared.    I/O last 3 completed shifts:  In: 1758.3 [P.O.:200; I.V.:1358.3; NG/GT:200]  Out: 0     Physical Exam   Constitutional: She appears well-developed. No distress.   HENT:   Head: Normocephalic and atraumatic.   Mouth/Throat: Oropharynx is clear and moist. No oropharyngeal exudate.   Eyes: Conjunctivae and EOM are normal. Pupils are equal, round, and reactive to light.   Neck: Normal range of motion. Neck supple. No JVD present. Carotid bruit is not present. No tracheal deviation present. No thyroid mass and no thyromegaly present.   Cardiovascular: Normal rate, regular rhythm, normal heart sounds and intact distal pulses.  Exam reveals no gallop and no friction rub.    No murmur heard.  Pulmonary/Chest: Effort normal and breath sounds normal. No respiratory distress. She has no wheezes. She has no rales. She exhibits no tenderness.   Abdominal: Soft. Bowel sounds are  normal. She exhibits no distension, no abdominal bruit, no ascites and no mass. There is no hepatosplenomegaly. There is no tenderness. There is no rebound, no guarding and no CVA tenderness.   PEG tube in place    Musculoskeletal: She exhibits no edema or tenderness.   Lymphadenopathy:     She has no cervical adenopathy.   Neurological: She is alert. She displays normal reflexes. No cranial nerve deficit. She exhibits normal muscle tone. Coordination normal.   Skin: Skin is warm and intact. No rash noted. No erythema. No pallor.   Psychiatric: Judgment normal.       Significant Labs:    CBC:   Recent Labs  Lab 05/27/17  0633   WBC 5.98   RBC 3.09*   HGB 8.3*   HCT 24.6*   PLT 36*   MCV 80*   MCH 26.9*   MCHC 33.7     CMP:   Recent Labs  Lab 05/24/17  0519  05/27/17  0633   GLU 85  < > 57*   CALCIUM 6.0*  < > 6.5*   ALBUMIN 1.6*  < > 1.4*   PROT 6.8  --   --      < > 139   K 3.7  < > 3.9   CO2 10*  < > 21*   *  < > 106   BUN 48*  < > 50*   CREATININE 3.4*  < > 3.0*   ALKPHOS 125  --   --    ALT 21  --   --    AST 83*  --   --    BILITOT 0.6  --   --    < > = values in this interval not displayed.  All labs within the past 24 hours have been reviewed.       Lab Results   Component Value Date    CALCIUM 6.5 (LL) 05/27/2017    PHOS 4.4 05/27/2017       Lab Results   Component Value Date    ALBUMIN 1.4 (L) 05/27/2017         Significant Imaging: reviewed     Assessment/Plan:     LELAND (acute kidney injury)    Patient's creatinine has increased from 1.9 on 5/21/17 to 3.4 on 5/24/17. 3.0 today ,   Cause of LELAND is likely multifactorial and related to hypotension (SBP in 80s ion multiple occasions), medications (patient was on tenofovir and vancomycin) and progression of HIV nephropathy.  Avoid tenofovir, Vancomycin, NSAIDs, ACE-I/ARBs.         AIDS (acquired immunodeficiency syndrome), CD4 <=200    ID following , had PEG tube placed to facilitate meds         Severe protein-calorie malnutrition    Due to AIDS          Anemia    Multifactorial, needs out pt hematology f/u             Thank you for your consult. I will follow-up with patient. Please contact us if you have any additional questions.     Total time spent 30 minutes including time needed to review the records,  patient  evaluation, documentation, face-to-face discussion with the patient,    more than 50% of the time was spent on coordination of care and counseling.       Marlon Monterroso MD  Nephrology  [facility]

## 2017-05-27 NOTE — PROGRESS NOTES
BS checked 1hr after D50 given and POCT result was 88.  Pt encouraged to eat something for lunch.  Pt and family at bedside verbalized understanding.

## 2017-05-27 NOTE — PROGRESS NOTES
MD notified of pt blood sugar <53 per POCT check.  Pt lethargic.  MD ordered to give 1 amp of D50 IVP. Will give and monitor.

## 2017-05-28 VITALS
SYSTOLIC BLOOD PRESSURE: 122 MMHG | HEART RATE: 110 BPM | BODY MASS INDEX: 18.88 KG/M2 | RESPIRATION RATE: 20 BRPM | HEIGHT: 61 IN | DIASTOLIC BLOOD PRESSURE: 84 MMHG | OXYGEN SATURATION: 99 % | TEMPERATURE: 98 F | WEIGHT: 100 LBS

## 2017-05-28 LAB
BASOPHILS # BLD AUTO: 0.26 K/UL
BASOPHILS NFR BLD: 3.1 %
DIFFERENTIAL METHOD: ABNORMAL
EOSINOPHIL # BLD AUTO: 0 K/UL
EOSINOPHIL NFR BLD: 0.4 %
ERYTHROCYTE [DISTWIDTH] IN BLOOD BY AUTOMATED COUNT: 19 %
HCT VFR BLD AUTO: 24.8 %
HGB BLD-MCNC: 8.2 G/DL
LYMPHOCYTES # BLD AUTO: 4 K/UL
LYMPHOCYTES NFR BLD: 48.2 %
MCH RBC QN AUTO: 26.9 PG
MCHC RBC AUTO-ENTMCNC: 33.1 %
MCV RBC AUTO: 81 FL
MONOCYTES # BLD AUTO: 0.8 K/UL
MONOCYTES NFR BLD: 9.5 %
NEUTROPHILS # BLD AUTO: 3.2 K/UL
NEUTROPHILS NFR BLD: 39.4 %
PLATELET # BLD AUTO: 33 K/UL
PMV BLD AUTO: ABNORMAL FL
POCT GLUCOSE: 112 MG/DL (ref 70–110)
RBC # BLD AUTO: 3.05 M/UL
WBC # BLD AUTO: 8.34 K/UL

## 2017-05-28 PROCEDURE — 25000003 PHARM REV CODE 250: Performed by: FAMILY MEDICINE

## 2017-05-28 PROCEDURE — 63700000 PHARM REV CODE 250 ALT 637 W/O HCPCS: Performed by: FAMILY MEDICINE

## 2017-05-28 PROCEDURE — 25000003 PHARM REV CODE 250: Performed by: INTERNAL MEDICINE

## 2017-05-28 PROCEDURE — 27000221 HC OXYGEN, UP TO 24 HOURS

## 2017-05-28 PROCEDURE — 85025 COMPLETE CBC W/AUTO DIFF WBC: CPT

## 2017-05-28 PROCEDURE — 25000003 PHARM REV CODE 250: Performed by: EMERGENCY MEDICINE

## 2017-05-28 RX ADMIN — MOXIFLOXACIN HYDROCHLORIDE 400 MG: 400 TABLET, FILM COATED ORAL at 11:05

## 2017-05-28 RX ADMIN — HEPARIN SODIUM 5000 UNITS: 5000 INJECTION, SOLUTION INTRAVENOUS; SUBCUTANEOUS at 05:05

## 2017-05-28 RX ADMIN — Medication 12.5 MG: at 11:05

## 2017-05-28 RX ADMIN — ATOVAQUONE 750 MG: 750 SUSPENSION ORAL at 11:05

## 2017-05-28 RX ADMIN — Medication 1 CAPSULE: at 11:05

## 2017-05-28 RX ADMIN — CITALOPRAM HYDROBROMIDE 20 MG: 20 TABLET ORAL at 11:05

## 2017-05-28 RX ADMIN — RITONAVIR 100 MG: 100 TABLET, FILM COATED ORAL at 11:05

## 2017-05-28 RX ADMIN — LACOSAMIDE 100 MG: 50 TABLET, FILM COATED ORAL at 11:05

## 2017-05-28 RX ADMIN — CALCIUM CARBONATE (ANTACID) CHEW TAB 500 MG 500 MG: 500 CHEW TAB at 05:05

## 2017-05-28 RX ADMIN — DARUNAVIR 800 MG: 800 TABLET, FILM COATED ORAL at 11:05

## 2017-05-28 NOTE — ASSESSMENT & PLAN NOTE
Resume her meds     5/23/17: we have resumed her medications but she is noncompliant because she has a difficult time swallowing her medications  5/27/17 PEG tube placed yesterday for administering medications and supplemental feedings.

## 2017-05-28 NOTE — DISCHARGE SUMMARY
[facility] Hospital Medicine  Discharge Summary      Patient Name: Wang Kebede  MRN: [id number]  Admission Date: 5/21/2017  Hospital Length of Stay: 7 days  Discharge Date and Time: 5/28/2017 12:39 PM  Attending Physician: No att. providers found   Discharging Provider: Emanuel Ordoñez NP  Primary Care Provider: KIN Mendoza      HPI:   Ms. Kebede is a 30 yo AAF with h/o AIDS presented complaining of fever with Tmax 103. She was seen in the ED on yesterday complaining of fever and right ear main. She was given Augmentin and discharged home. She was recently hospitalized for severe sepsis where she was in the ICU on pressors after abdominal procedure. She states she has been compliant with her medications since discharge. She denies any sick contacts. She denies any cough, SOB, abdominal pain or diarrhea.      Procedure(s) (LRB):  PEG TUBE PLACEMENT/REPLACEMENT (N/A)      Indwelling Lines/Drains at time of discharge:   Lines/Drains/Airways     Peripherally Inserted Central Catheter Line                 PICC Double Lumen 05/22/17 2210 right brachial 5 days          Drain                 Gastrostomy/Enterostomy 05/26/17 1540 Percutaneous endoscopic gastrostomy (PEG) LUQ 1 day              Hospital Course:   The patient has a history of HIV ( non-compliant with HAART) admitted for fever, tachycardia, acute renal failure. Infectious disease, Nephrology and Cardiology were consulted. The patient was restarted on HAART per Dr. Mayo. The patient was started on a BB for HR control. The patients fever resolved and her HR improved. GI was consulted for evaluation for PEG placement as the patient reported that she did not take her HAART medications because the pills were too large. The patient had a PEG placed on 5/26/17. The importance of medication compliance was extensively discussed with the patient. Discharge was delayed due to family stating that they were not comfortable taking her  home. Pt will be discharged in am. 5/28/17- During her admission the patient had some seizure-like activity, the case was discussed with neurology who felt this was 2/2 her disease process and the patient was started on Vimpat. The patient was seen and examined today and deemed stable for discharge. Discharged was delayed overnight due to family members not being feeling comfortable with home O2 and with PEG tube. The patient and family were re-educated overnight and feel ready for discharged. The patient was again counseled on medication compliance and the importance of proper nutrition. The patient is being discharged home on zithromax and mepron for OI prophylaxis. The patient is being discharged home with . The patient will follow up with her primary infectious disease provider Dr. Vides.      Consults:   Consults         Status Ordering Provider     Inpatient consult to Cardiology  Once     Provider:  Nicho Victor MD    Completed GALO ROMAN     Inpatient consult to Gastroenterology  Once     Provider:  Adama Nielsen MD    Completed JEWELL MAYO     Inpatient consult to Hospitalist  Once     Provider:  Lori Davis NP    Acknowledged DONALD HAYDEN     Inpatient consult to Infectious Diseases  Once     Provider:  Jewell Mayo MD    Acknowledged GALO ROMAN     Inpatient consult to Nephrology  Once     Provider:  Jim Alonso MD    Acknowledged JEWELL MAYO     Inpatient consult to PICC team ([facility])  Once     Provider:  (Not yet assigned)    Acknowledged GALO ROMAN     Pharmacy to dose Vancomycin consult  Once     Provider:  (Not yet assigned)    LORI Beebe          Significant Diagnostic Studies:   Imaging Results          CT Head Without Contrast (Final result)  Result time 05/26/17 23:35:03    Final result by Gurpreet Phillips MD (05/26/17 23:35:03)                 Impression:         Negative noncontrast CT scan of the brain.   All CT scans at this facility use  dose modulation, iterative reconstruction, and/or weight based dosing when appropriate to reduce radiation dose to as low as reasonable achievable.      Electronically signed by: TAYLER IRENE MD  Date:     05/26/17  Time:    23:35              Narrative:    CT HEAD WITHOUT CONTRAST    Date: 05/26/17 23:24:43    Clinical indication:  Seizures     Technique:  Standard noncontrast CT scan of the brain. Comparison is made with the previous study of 05/22/2017     Findings:  The ventricles are nondilated. Gray and white matter structures reveal normal attenuation. No hemorrhage, mass effect or edema is identified.     The skull and skull base are unremarkable.                             NM Lung Ventilation Perfusion Imaging (Final result)  Result time 05/26/17 13:15:09    Final result by Larry Ortiz MD (05/26/17 13:15:09)                 Impression:        Negative VQ scan. No evidence of pulmonary embolism.      Electronically signed by: LARRY ROTIZ MD  Date:     05/26/17  Time:    13:15              Narrative:    NM LUNG VENTILATION AND PERFUSION IMAGING or VQ scan, 05/26/17 11:48:10    History:  rule out PE, chest pain    Ventilation study performed with 1 mCi technetium 99 M DTPA and perfusion study performed with 6 mCi technetium 99 M MAA.    The initial breath is grossly normal.    The perfusion study is normal.                             X-Ray Chest AP Portable (Final result)  Result time 05/25/17 08:13:37    Final result by Kimberly Parmar MD (05/25/17 08:13:37)                 Impression:     Development of bibasilar opacity consistent with infiltrate/atelectasis.      Electronically signed by: KIMBERLY PARMAR MD  Date:     05/25/17  Time:    08:13              Narrative:    History: Dyspnea    Normal heart size. Dual chamber cardiac leads. Right arm PICC in the SVC. Since 5/22/17 there is development of bibasilar opacity consistent with infiltrates or atelectasis.                              X-Ray Chest 1 View for PICC_Central line (Final result)  Result time 05/22/17 22:52:32    Final result by Tayler Irene MD (05/22/17 22:52:32)                 Impression:      No acute findings.  Right-sided PICC line is positioned with its tip in the superior vena cava.      Electronically signed by: TAYLER IRENE MD  Date:     05/22/17  Time:    22:52              Narrative:    Exam: XR CHEST 1 VIEW,    Date:  05/22/17 22:47:43    History: Evaluate PICC line placement    Comparison:  05/21/2017    Findings: A right-sided PICC line is positioned with its tip in superior vena cava.  Left-sided pacemaker remains in place.  Heart is normal in size.  The lungs are clear.                             FL Lumbar Puncture (xpd) (Final result)  Result time 05/22/17 10:27:50    Final result by Hector Mann MD (05/22/17 10:27:50)                 Impression:      1. Successful fluoroscopically guided lumbar puncture.      Electronically signed by: HECTOR MANN MD  Date:     05/22/17  Time:    10:27              Narrative:    Procedure:  Lumbar puncture    Clinical History:     Meningitis.  Fever.    Technique/findings: After the risks, benefits and options of the planned procedure were explained to the patient in full detail, the patient expressed complete understanding and gave signed informed consent. The left paraspinous space was localized with fluoroscopy. The skin was marked with indelible ink.  The skin overlying this area was prepped and draped in the usual sterile fashion. A timeout was performed. 1% lidocaine was used as a local anesthetic, and a 22 gauge needle were used to perform a lumbar puncture at L3-L4.  11 cc of clear CSF was obtained and sent to laboratory.  The opening pressure was 18 cm of water.  The needle was then removed.  A sterile dressing was applied.  The patient tolerated the procedure well without complication, departing the fluoro suite  in unchanged condition.     Fluoroscopic time was   0.2 minutes  and  1  image was obtained.                             CT Head Without Contrast (Final result)  Result time 05/22/17 07:59:23    Final result by Hector Mann MD (05/22/17 07:59:23)                 Impression:             No CT evidence of acute intracranial abnormality.        All CT scans at this facility use dose modulation, iterative reconstruction and/or weight based dosing when appropriate to reduce radiation dose to as low as reasonably achievable.       Electronically signed by: HECTOR MANN MD  Date:     05/22/17  Time:    07:59              Narrative:    Exam: CT HEAD WITHOUT CONTRAST    Clinical History:   Acute confusion and altered mental status. Initial encounter.Confusion       Technique: Axial CT imaging was performed through the head without intravenous contrast.     Findings:    No hydrocephalus, midline shift, mass effect, or acute intracranial hemorrhage. Cortical sulcal pattern and gray-white matter differentiation is normal. Basilar cisterns and posterior fossa are normal. The visualized paranasal sinuses and mastoid air cells are clear. The skull is intact.                             CT Chest Without Contrast (Final result)  Result time 05/21/17 17:29:14    Final result by Ghanshyam Haq III, MD (05/21/17 17:29:14)                 Impression:        1. Scattered small mediastinal lobes with a few scattered small axillary nodes with no mass or bulky adenopathy.    2. No consolidation or effusion. Minimal basilar atelectasis or scarring suggested. No prior CT chest scans for comparison.      Electronically signed by: GHANSHYAM HAQ MD  Date:     05/21/17  Time:    17:29              Narrative:    CT chest without contrast.    Clinical indication: Fever.    Multiplanar imaging submitted. Heart size is normal. There small mediastinal nodes without bulky adenopathy. No definite hilar mass or gross enlargement and no axillary adenopathy. Small nodes in the axillary regions  bilaterally.    The lung fields show no consolidation or effusion. Minimal basilar atelectasis or scarring.    Within the very upper portion of the abdomen, no acute abnormality is seen. Borderline splenic size, incompletely visualized.                             CT Renal Stone Study ABD Pelvis WO (Final result)  Result time 05/21/17 17:43:36    Final result by Ghanshyam Haq III, MD (05/21/17 17:43:36)                 Impression:        1 a. Postop changes in the pelvis. Probable ovarian cysts, greater on right. Detail is limited without contrast.    2. Splenomegaly, unchanged.    3. No additional significant findings. If further detail were needed, consider correlation with a repeat scan with oral and IV contrast.       Electronically signed by: GHANSHYAM HAQ MD  Date:     05/21/17  Time:    17:43              Narrative:    No CT of the abdomen and pelvis without contrast.    Clinical indication: Fever. Abdominal pain.    Lack of IV and oral contrast material grossly limited this examination.    The heart size is normal. The extreme lung bases and in show no infiltrate or effusion. There is no free intraperitoneal air. No bowel obstruction. Bony windows show no acute abnormality.    The liver and spleen show no focal abnormality. Spleen is again enlarged and lobular similar to the study from April.    Surgical clips again noted in the gallbladder fossa. There is no solid renal mass or obstruction. No acute abdominal abnormality is suggested.    Within the pelvis there are radiopaque densities in posterior related to patient's prior surgery. Scattered air-fluid levels noted presumably within the opacified bowel loops. Clinical contrast limits differentiation. Probable ovarian cysts, greater on the right. Suspected right ovarian cyst measuring up to 4 cm in diameter although this is poorly defined.                             X-Ray Chest AP Portable (Final result)  Result time 05/21/17 14:02:42    Final  result by Jim Berg MD (05/21/17 14:02:42)                 Impression:         No acute cardiopulmonary process noted.      Electronically signed by: JIM BERG MD  Date:     05/21/17  Time:    14:02              Narrative:    Exam: Chest X-ray, one view.    History: Sepsis    Findings: No infiltrate or effusion. No definite change from prior exam dated 04/17/2017. Infiltrate on the right has completely resolved. Right IJ central catheter has been removed. AICD remains in place.                                 Pending Diagnostic Studies:     Procedure Component Value Units Date/Time    Cortisol [[id number]] Collected:  05/21/17 1444    Order Status:  Sent Lab Status:  In process Updated:  05/21/17 1445    Specimen:  Blood from Blood     HIV-1 GenotypR PLUS [[id number]] Collected:  05/26/17 0710    Order Status:  Sent Lab Status:  In process Updated:  05/26/17 1434    Specimen:  Blood from Blood     Stool Exam-Ova,Cysts,Parasites [[id number]] Collected:  05/23/17 0344    Order Status:  Sent Lab Status:  In process Updated:  05/23/17 0345    Specimen:  Stool from Stool         Final Active Diagnoses:    Diagnosis Date Noted POA    Gastrostomy in place [Z93.1] 05/27/2017 Not Applicable    Depression [F32.9] 05/26/2017 Yes    LELAND (acute kidney injury) [N17.9] 04/16/2017 Yes    AIDS (acquired immunodeficiency syndrome), CD4 <=200 [B20] 04/15/2017 Yes    Anemia [D64.9] 03/21/2017 Yes    Severe protein-calorie malnutrition [E43] 03/21/2017 Yes    S/P implantation of automatic cardioverter/defibrillator (AICD) [Z95.810] 12/29/2016 Yes    HIV (human immunodeficiency virus infection) [Z21]  Yes      Problems Resolved During this Admission:    Diagnosis Date Noted Date Resolved POA    PRINCIPAL PROBLEM:  Fever [R50.9] 05/21/2017 05/27/2017 Yes    Oral thrush [B37.0] 05/26/2017 05/27/2017 Yes    Hypokalemia [E87.6] 05/25/2017 05/27/2017 Yes    Sinus tachycardia [R00.0] 05/24/2017 05/27/2017 Yes     Hypomagnesemia [E83.42] 05/23/2017 05/27/2017 Yes    Thrombocytopenia [D69.6] 04/19/2017 05/27/2017 Yes    Metabolic acidosis [E87.2] 04/16/2017 05/27/2017 Yes    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  Service: Hospital Medicine      Discharged Condition: stable    Disposition: Home or Self Care    Follow Up:  Follow-up Information     [facility] [place].    Specialties:  Pharmacist, DME Provider, IV Infusion  Why:  Infusion:  Home Infusion services for PEG Tube feedings and supplies  Contact information:  [address]  [address]  [place] [address]  [phone]             [facility].    Specialty:  Home Health Services  Why:  Home Health:  SN, PT, OT, SW   Contact information:  [address]  [place] [address]  [phone]             KIN Mendoza. Schedule an appointment as soon as possible for a visit in 3 days.    Specialty:  Internal Medicine  Contact information:  [address]  [place] [address]  [phone]             John Vides MD. Schedule an appointment as soon as possible for a visit in 1 week.    Specialty:  Infectious Diseases  Contact information:  [address]  [place] [address]  [phone]             Marlon Monterroso MD. Schedule an appointment as soon as possible for a visit in 1 week.    Specialty:  Nephrology  Contact information:  [address]  [place] [id number] [phone]                 Patient Instructions:     OXYGEN FOR HOME USE   Order Specific Question Answer Comments   Liter Flow 2    Duration Continuous    Qualifying SpO2: 83    Testing done at: Rest    Route nasal cannula    Portable mode: pulse dose acceptable    Device home concentrator with portable unit    Length of need  "(in months): 99 mos    Patient condition with qualifying saturation other chronic pulmonary condition    Height: 5' 1" (1.549 m)    Weight: 45.4 kg (100 lb)    Does patient have medical equipment at home? walker, rolling    Alternative treatment measures have been tried or considered and deemed clinically ineffective. Yes      Ambulatory referral to Home Health   Referral Priority: Routine Referral Type: Home Health   Referral Reason: Specialty Services Required    Requested Specialty: Home Health Services    Number of Visits Requested: 1      Diet general       Medications:  Reconciled Home Medications:   Discharge Medication List as of 5/28/2017 10:02 AM      START taking these medications    Details   atovaquone (MEPRON) 750 mg/5 mL Susp Take 5 mLs (750 mg total) by mouth every 12 (twelve) hours., Starting Sat 5/27/2017, Until Tue 6/6/2017, Normal      azithromycin (Z-THADDEUS) 250 MG tablet Take 5 tablets (1,250 mg total) by mouth once a week., Starting Sat 5/27/2017, Normal      b complex vitamins capsule Take 1 capsule by mouth once daily., Starting Sat 5/27/2017, OTC      calcium carbonate (TUMS) 200 mg calcium (500 mg) chewable tablet Take 1 tablet (500 mg total) by mouth 3 (three) times daily., Starting Sat 5/27/2017, Until Sun 5/27/2018, OTC      citalopram (CELEXA) 20 MG tablet Take 1 tablet (20 mg total) by mouth once daily., Starting Sat 5/27/2017, Until Sun 5/27/2018, Normal      emtricitabine (EMTRIVA) 10 mg/mL solution Take 20 mLs (200 mg total) by mouth every 72 hours., Starting Sat 5/27/2017, Until Sun 5/27/2018, Normal      lacosamide (VIMPAT) 50 mg Tab Take 2 tablets (100 mg total) by mouth 2 (two) times daily., Starting Sat 5/27/2017, Until Sun 5/27/2018, Print      levoFLOXacin (LEVAQUIN) 500 MG tablet Take 1 tablet (500 mg total) by mouth once daily., Starting Sat 5/27/2017, Until Sat 6/3/2017, Normal      metoprolol tartrate (LOPRESSOR) 25 MG tablet Take 0.5 tablets (12.5 mg total) by mouth 2 " (two) times daily., Starting Sat 5/27/2017, Until Sun 5/27/2018, Normal      nystatin (MYCOSTATIN) 100,000 unit/mL suspension Take 5 mLs (500,000 Units total) by mouth 4 (four) times daily with meals and nightly., Starting Sat 5/27/2017, Until Tue 6/6/2017, Normal      tenofovir (VIREAD) 300 mg Tab Take 1 tablet (300 mg total) by mouth every 72 hours., Starting Sat 5/27/2017, Until Sun 5/27/2018, Normal         CONTINUE these medications which have NOT CHANGED    Details   darunavir-cobicistat (PREZCOBIX) 800-150 mg-mg Tab Take 800 mg by mouth every evening. , Until Discontinued, Historical Med      oxycodone-acetaminophen (ROXICET) 5-325 mg per tablet Take 1 tablet by mouth every 6 (six) hours as needed for Pain (pain)., Starting 4/21/2017, Until Discontinued, Print         STOP taking these medications       amoxicillin-clavulanate 875-125mg (AUGMENTIN) 875-125 mg per tablet Comments:   Reason for Stopping:         emtricitabine-tenofovir alafen (DESCOVY) 200-25 mg Tab Comments:   Reason for Stopping:             Time spent on the discharge of patient: > 30 minutes    Emanuel Ordoñez NP  Department of Hospital Medicine  [facility]

## 2017-05-28 NOTE — PROGRESS NOTES
PICC line discontinued per Bobby Pacheco RN. Pt tolerated well. Catheter intact. Dressing applied.

## 2017-05-28 NOTE — PROGRESS NOTES
[facility] Hospital Medicine  Progress Note    Patient Name: Wang Kebede  MRN: [id number]  Patient Class: IP- Inpatient   Admission Date: 5/21/2017  Length of Stay: 7 days  Attending Physician: Marcus García MD  Primary Care Provider: KIN Mendoza        Subjective:     Principal Problem:Fever    HPI:  Ms. Kebede is a 30 yo AAF with h/o AIDS presented complaining of fever with Tmax 103. She was seen in the ED on yesterday complaining of fever and right ear main. She was given Augmentin and discharged home. She was recently hospitalized for severe sepsis where she was in the ICU on pressors after abdominal procedure. She states she has been compliant with her medications since discharge. She denies any sick contacts. She denies any cough, SOB, abdominal pain or diarrhea.      Hospital Course:  The patient has a history of HIV ( non-compliant with HAART) admitted for fever, tachycardia, acute renal failure. Infectious disease, Nephrology and Cardiology were consulted. The patient was restarted on HAART per Dr. Mayo. The patient was started on a BB for HR control. The patients fever resolved and her HR improved. GI was consulted for evaluation for PEG placement as the patient reported that she did not take her HAART medications because the pills were too large. The patient had a PEG placed on 5/26/17. The importance of medication compliance was extensively discussed with the patient. Discharge was delayed due to family stating that they were not comfortable taking her home. Pt will be discharged in am.    Interval History: Pt     Review of Systems   Constitutional: Positive for fatigue. Negative for unexpected weight change.   HENT: Negative.  Negative for trouble swallowing.    Eyes: Negative.    Respiratory: Negative.  Negative for cough, shortness of breath and wheezing.    Cardiovascular: Negative.  Negative for chest pain.   Gastrointestinal: Negative.    Endocrine:  Negative.    Genitourinary: Negative.    Musculoskeletal: Negative.    Skin: Negative.    Allergic/Immunologic: Negative.    Neurological: Positive for weakness. Negative for dizziness.   Hematological: Negative.    Psychiatric/Behavioral: The patient is nervous/anxious.    All other systems reviewed and are negative.    Objective:     Vital Signs (Most Recent):  Temp: 98.3 °F (36.8 °C) (05/28/17 0338)  Pulse: 103 (05/28/17 0338)  Resp: (!) 22 (05/28/17 0338)  BP: (!) 122/98 (05/28/17 0338)  SpO2: 100 % (05/28/17 0338) Vital Signs (24h Range):  Temp:  [97.8 °F (36.6 °C)-99.4 °F (37.4 °C)] 98.3 °F (36.8 °C)  Pulse:  [] 103  Resp:  [18-22] 22  SpO2:  [95 %-100 %] 100 %  BP: (121-127)/(68-98) 122/98     Weight: 45.4 kg (100 lb)  Body mass index is 18.89 kg/m².    Intake/Output Summary (Last 24 hours) at 05/28/17 0816  Last data filed at 05/28/17 0338   Gross per 24 hour   Intake              660 ml   Output                0 ml   Net              660 ml      Physical Exam   Constitutional: She is oriented to person, place, and time. She appears well-developed and well-nourished. No distress.   HENT:   Head: Normocephalic and atraumatic.   Eyes: EOM are normal. Pupils are equal, round, and reactive to light.   Neck: Normal range of motion. Neck supple. No JVD present.   Cardiovascular: Normal rate, regular rhythm, normal heart sounds and intact distal pulses.    No murmur heard.  Pulmonary/Chest: Effort normal and breath sounds normal. No respiratory distress. She has no wheezes.   Abdominal: Soft. Bowel sounds are normal. She exhibits no distension and no mass.   Musculoskeletal: Normal range of motion. She exhibits no edema or tenderness.   Neurological: She is alert and oriented to person, place, and time. She has normal reflexes. No cranial nerve deficit.   Lethargic     Skin: Skin is warm and dry. Capillary refill takes less than 2 seconds. She is not diaphoretic. No erythema.   Psychiatric: She has a normal  mood and affect. Her behavior is normal. Judgment and thought content normal.   Nursing note and vitals reviewed.      Significant Labs: All pertinent labs within the past 24 hours have been reviewed.    Significant Imaging:   Imaging Results          CT Head Without Contrast (Final result)  Result time 05/26/17 23:35:03    Final result by Tayler Irene MD (05/26/17 23:35:03)                 Impression:         Negative noncontrast CT scan of the brain.   All CT scans at this facility use dose modulation, iterative reconstruction, and/or weight based dosing when appropriate to reduce radiation dose to as low as reasonable achievable.      Electronically signed by: TAYLER IRENE MD  Date:     05/26/17  Time:    23:35              Narrative:    CT HEAD WITHOUT CONTRAST    Date: 05/26/17 23:24:43    Clinical indication:  Seizures     Technique:  Standard noncontrast CT scan of the brain. Comparison is made with the previous study of 05/22/2017     Findings:  The ventricles are nondilated. Gray and white matter structures reveal normal attenuation. No hemorrhage, mass effect or edema is identified.     The skull and skull base are unremarkable.                             NM Lung Ventilation Perfusion Imaging (Final result)  Result time 05/26/17 13:15:09    Final result by Madonna Ortiz MD (05/26/17 13:15:09)                 Impression:        Negative VQ scan. No evidence of pulmonary embolism.      Electronically signed by: MADONNA ORTIZ MD  Date:     05/26/17  Time:    13:15              Narrative:    NM LUNG VENTILATION AND PERFUSION IMAGING or VQ scan, 05/26/17 11:48:10    History:  rule out PE, chest pain    Ventilation study performed with 1 mCi technetium 99 M DTPA and perfusion study performed with 6 mCi technetium 99 M MAA.    The initial breath is grossly normal.    The perfusion study is normal.                             X-Ray Chest AP Portable (Final result)  Result time 05/25/17 08:13:37     Final result by Kimberly Parmar MD (05/25/17 08:13:37)                 Impression:     Development of bibasilar opacity consistent with infiltrate/atelectasis.      Electronically signed by: KIMBERLY PARMAR MD  Date:     05/25/17  Time:    08:13              Narrative:    History: Dyspnea    Normal heart size. Dual chamber cardiac leads. Right arm PICC in the SVC. Since 5/22/17 there is development of bibasilar opacity consistent with infiltrates or atelectasis.                             X-Ray Chest 1 View for PICC_Central line (Final result)  Result time 05/22/17 22:52:32    Final result by Tayler Irene MD (05/22/17 22:52:32)                 Impression:      No acute findings.  Right-sided PICC line is positioned with its tip in the superior vena cava.      Electronically signed by: TAYLER IRENE MD  Date:     05/22/17  Time:    22:52              Narrative:    Exam: XR CHEST 1 VIEW,    Date:  05/22/17 22:47:43    History: Evaluate PICC line placement    Comparison:  05/21/2017    Findings: A right-sided PICC line is positioned with its tip in superior vena cava.  Left-sided pacemaker remains in place.  Heart is normal in size.  The lungs are clear.                             FL Lumbar Puncture (xpd) (Final result)  Result time 05/22/17 10:27:50    Final result by eHctor Diaz MD (05/22/17 10:27:50)                 Impression:      1. Successful fluoroscopically guided lumbar puncture.      Electronically signed by: HECTOR DIAZ MD  Date:     05/22/17  Time:    10:27              Narrative:    Procedure:  Lumbar puncture    Clinical History:     Meningitis.  Fever.    Technique/findings: After the risks, benefits and options of the planned procedure were explained to the patient in full detail, the patient expressed complete understanding and gave signed informed consent. The left paraspinous space was localized with fluoroscopy. The skin was marked with indelible ink.  The skin overlying this area  was prepped and draped in the usual sterile fashion. A timeout was performed. 1% lidocaine was used as a local anesthetic, and a 22 gauge needle were used to perform a lumbar puncture at L3-L4.  11 cc of clear CSF was obtained and sent to laboratory.  The opening pressure was 18 cm of water.  The needle was then removed.  A sterile dressing was applied.  The patient tolerated the procedure well without complication, departing the fluoro suite  in unchanged condition.     Fluoroscopic time was  0.2 minutes  and  1  image was obtained.                             CT Head Without Contrast (Final result)  Result time 05/22/17 07:59:23    Final result by Hector Mann MD (05/22/17 07:59:23)                 Impression:             No CT evidence of acute intracranial abnormality.        All CT scans at this facility use dose modulation, iterative reconstruction and/or weight based dosing when appropriate to reduce radiation dose to as low as reasonably achievable.       Electronically signed by: HECTOR MANN MD  Date:     05/22/17  Time:    07:59              Narrative:    Exam: CT HEAD WITHOUT CONTRAST    Clinical History:   Acute confusion and altered mental status. Initial encounter.Confusion       Technique: Axial CT imaging was performed through the head without intravenous contrast.     Findings:    No hydrocephalus, midline shift, mass effect, or acute intracranial hemorrhage. Cortical sulcal pattern and gray-white matter differentiation is normal. Basilar cisterns and posterior fossa are normal. The visualized paranasal sinuses and mastoid air cells are clear. The skull is intact.                             CT Chest Without Contrast (Final result)  Result time 05/21/17 17:29:14    Final result by Ghanshyam Haq III, MD (05/21/17 17:29:14)                 Impression:        1. Scattered small mediastinal lobes with a few scattered small axillary nodes with no mass or bulky adenopathy.    2. No consolidation  or effusion. Minimal basilar atelectasis or scarring suggested. No prior CT chest scans for comparison.      Electronically signed by: GHANSHYAM MEDRANO MD  Date:     05/21/17  Time:    17:29              Narrative:    CT chest without contrast.    Clinical indication: Fever.    Multiplanar imaging submitted. Heart size is normal. There small mediastinal nodes without bulky adenopathy. No definite hilar mass or gross enlargement and no axillary adenopathy. Small nodes in the axillary regions bilaterally.    The lung fields show no consolidation or effusion. Minimal basilar atelectasis or scarring.    Within the very upper portion of the abdomen, no acute abnormality is seen. Borderline splenic size, incompletely visualized.                             CT Renal Stone Study ABD Pelvis WO (Final result)  Result time 05/21/17 17:43:36    Final result by Ghanshyam Medrano III, MD (05/21/17 17:43:36)                 Impression:        1 a. Postop changes in the pelvis. Probable ovarian cysts, greater on right. Detail is limited without contrast.    2. Splenomegaly, unchanged.    3. No additional significant findings. If further detail were needed, consider correlation with a repeat scan with oral and IV contrast.       Electronically signed by: GHANSHYAM MEDRANO MD  Date:     05/21/17  Time:    17:43              Narrative:    No CT of the abdomen and pelvis without contrast.    Clinical indication: Fever. Abdominal pain.    Lack of IV and oral contrast material grossly limited this examination.    The heart size is normal. The extreme lung bases and in show no infiltrate or effusion. There is no free intraperitoneal air. No bowel obstruction. Bony windows show no acute abnormality.    The liver and spleen show no focal abnormality. Spleen is again enlarged and lobular similar to the study from April.    Surgical clips again noted in the gallbladder fossa. There is no solid renal mass or obstruction. No acute  abdominal abnormality is suggested.    Within the pelvis there are radiopaque densities in posterior related to patient's prior surgery. Scattered air-fluid levels noted presumably within the opacified bowel loops. Clinical contrast limits differentiation. Probable ovarian cysts, greater on the right. Suspected right ovarian cyst measuring up to 4 cm in diameter although this is poorly defined.                             X-Ray Chest AP Portable (Final result)  Result time 05/21/17 14:02:42    Final result by Jim Berg MD (05/21/17 14:02:42)                 Impression:         No acute cardiopulmonary process noted.      Electronically signed by: JIM BERG MD  Date:     05/21/17  Time:    14:02              Narrative:    Exam: Chest X-ray, one view.    History: Sepsis    Findings: No infiltrate or effusion. No definite change from prior exam dated 04/17/2017. Infiltrate on the right has completely resolved. Right IJ central catheter has been removed. AICD remains in place.                                 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 Again I am going to hydrate and monitor I/O's closely as her lactic acid was 0.7 for now but will repeat in a few hours.    5/23/17: she has been hypotensive, renal function was improving but Cr slightly up today. IV bolus now and increase her fluid rate.    5/25/17: Despite positive fluid she has declining function with an metabolic acidosis which could be from some of her medications and the HIV itself. ID has stopped some medications and nephrology has been consulted.          AIDS (acquired immunodeficiency syndrome), CD4 <=200    Last known CD4 count was around 4    5/24/17: repeated her CD4 count, explained to her that she  must take her medications to improve her immune system will decrease her risk of infections. Discussed option of PEG given she states she cannot tolerate taking pills. She wants to think about it.    5/25/17: I had a long discussion with her about being compliant with her medications. If she wants to feel better she will need the medications in order to build her immune system and decrease her risk of infections. I have explained that this will take time but she has to be consistent with her HAART medications. The alternative is to not take the medications and eventually she will become so ill that we will not be able to do anything else.    5/27/17 Thoroughly discussed medication compliance with patient. Will need to follow up with ID after discharge.         Severe protein-calorie malnutrition    - Boost TID          Anemia    5/25/17: she was symptomatic but this is now better after getting 2 units of PRBC  5/27/17 H/H stable           S/P implantation of automatic cardioverter/defibrillator (AICD)      She is tachycardia and hypotensive but I think she tends to run on the lower side with her BP  Check lactic acid, continue with IV fluids will have to hold beta blocker as she was started on during last hospitalization.    5/22/17: she is still tachycardic will attempt to restart her beta blocker if BP tolerates.  5/27/17 HR controlled        HIV (human immunodeficiency virus infection)    Resume her meds     5/23/17: we have resumed her medications but she is noncompliant because she has a difficult time swallowing her medications  5/27/17 PEG tube placed yesterday for administering medications and supplemental feedings.             VTE Risk Mitigation         Ordered     heparin (porcine) injection 5,000 Units  Every 8 hours     Route:  Subcutaneous        05/21/17 2004     Medium Risk of VTE  Once      05/21/17 2004          Emanuel Ordoñez NP  Department of Hospital Medicine   [facility]

## 2017-05-28 NOTE — ASSESSMENT & PLAN NOTE
Last known CD4 count was around 4    5/24/17: repeated her CD4 count, explained to her that she must take her medications to improve her immune system will decrease her risk of infections. Discussed option of PEG given she states she cannot tolerate taking pills. She wants to think about it.    5/25/17: I had a long discussion with her about being compliant with her medications. If she wants to feel better she will need the medications in order to build her immune system and decrease her risk of infections. I have explained that this will take time but she has to be consistent with her HAART medications. The alternative is to not take the medications and eventually she will become so ill that we will not be able to do anything else.    5/27/17 Thoroughly discussed medication compliance with patient. Will need to follow up with ID after discharge.

## 2017-05-28 NOTE — ASSESSMENT & PLAN NOTE
repeat cd4 count  Is 4 -she has profound immunosuppression .Continue  ritonavir / daurunavir.  Will continue  emtriva  and restart tenofovir,Will follow HIV genotype ..    She now has a PEG tube-will follow patient in the clinic and will adjust medications with renal failure.

## 2017-05-28 NOTE — NURSING
Patient home o2 delivered, family given instruction my oxygen delivery serviceman. Upon going to discharge patient, family stated that they did not feel comfortable taking patient home because they can't adequately take care of her in present state. Dr. Alejandre notified and asked to come speak with family at bedside, MD stated to hold discharge for tonight and we will re access in the AM.

## 2017-05-28 NOTE — SUBJECTIVE & OBJECTIVE
Interval History: 31 year old woman with metabolic acidosis , AIDs, fever ,   Chest x-ray showed development of bibasilar atelectasis .CD4 count is 4    Labs are pending -fever curve has improved   Review of Systems   Constitutional: Positive for activity change and appetite change. Negative for fatigue and fever.   HENT: Negative for congestion, ear pain, facial swelling, sinus pressure and sore throat.         Has oral thrush    Eyes: Negative for pain.   Respiratory: Positive for chest tightness and shortness of breath. Negative for apnea and stridor.    Cardiovascular: Negative for chest pain, palpitations and leg swelling.   Gastrointestinal: Negative for abdominal distention, abdominal pain, diarrhea and nausea.   Endocrine: Negative for polydipsia and polyphagia.   Genitourinary: Negative for decreased urine volume, difficulty urinating, frequency and genital sores.   Musculoskeletal: Negative for arthralgias and gait problem.   Neurological: Positive for headaches. Negative for light-headedness.   Hematological: Negative for adenopathy.   Psychiatric/Behavioral: Negative for agitation, confusion and decreased concentration.     Objective:     Vital Signs (Most Recent):  Temp: 98.3 °F (36.8 °C) (05/28/17 0338)  Pulse: 103 (05/28/17 0338)  Resp: (!) 22 (05/28/17 0338)  BP: (!) 122/98 (05/28/17 0338)  SpO2: 100 % (05/28/17 0338) Vital Signs (24h Range):  Temp:  [97.8 °F (36.6 °C)-99.4 °F (37.4 °C)] 98.3 °F (36.8 °C)  Pulse:  [] 103  Resp:  [18-22] 22  SpO2:  [95 %-100 %] 100 %  BP: (121-129)/(68-98) 122/98     Weight: 45.4 kg (100 lb)  Body mass index is 18.89 kg/m².    Estimated Creatinine Clearance: 19.3 mL/min (based on Cr of 3).    Physical Exam   Constitutional: She is oriented to person, place, and time. She appears well-developed and well-nourished.   HENT:   Head: Normocephalic and atraumatic.   Right Ear: There is tenderness. No drainage.   Left Ear: External ear normal.   Nose: Nose normal.    Mouth/Throat: Oropharynx is clear and moist.   .   Eyes: Conjunctivae and EOM are normal. Pupils are equal, round, and reactive to light.   Neck: Normal range of motion. Neck supple.   Cardiovascular: S1 normal and S2 normal.  Tachycardia present.    Pulmonary/Chest: Effort normal and breath sounds normal.   Abdominal: Soft. Bowel sounds are normal. There is tenderness. There is no rebound and no guarding.   Genitourinary:   Genitourinary Comments: deferred   Musculoskeletal: Normal range of motion.   Neurological: She is alert and oriented to person, place, and time.   Skin: Skin is warm and dry. Capillary refill takes less than 2 seconds.   Psychiatric: She has a normal mood and affect.   Nursing note and vitals reviewed.      Significant Labs:   Blood Culture:     Recent Labs  Lab 04/14/17  1856 04/14/17  1940 04/16/17  1615 05/21/17  1440 05/21/17  1445   LABBLOO No growth after 5 days. No growth after 5 days. No growth after 5 days. No growth after 5 days. No growth after 5 days.     CBC:     Recent Labs  Lab 05/26/17  0628 05/27/17  0633   WBC 2.41* 5.98   HGB 7.4* 8.3*   HCT 21.6* 24.6*   PLT 54* 36*     All pertinent labs within the past 24 hours have been reviewed.    Significant Imaging: I have reviewed all pertinent imaging results/findings within the past 24 hours.

## 2017-05-28 NOTE — ASSESSMENT & PLAN NOTE
5/25/17: she was symptomatic but this is now better after getting 2 units of PRBC  5/27/17 H/H stable

## 2017-05-28 NOTE — PROGRESS NOTES
Pt and significant other at bedside informed of needed urine sample; both verbalized understanding. Will wait for specimen.

## 2017-05-28 NOTE — PLAN OF CARE
Problem: Patient Care Overview  Goal: Plan of Care Review  Patient stable. Plan of care reviewed. Family verbalizes understanding. Patient lethargic most of shift.  Bed low, wheels locked, bed alarm on, call light in reach. Patient instructed to call for assistance. Will continue to monitor.

## 2017-05-28 NOTE — SUBJECTIVE & OBJECTIVE
Interval History: Pt     Review of Systems   Constitutional: Positive for fatigue. Negative for unexpected weight change.   HENT: Negative.  Negative for trouble swallowing.    Eyes: Negative.    Respiratory: Negative.  Negative for cough, shortness of breath and wheezing.    Cardiovascular: Negative.  Negative for chest pain.   Gastrointestinal: Negative.    Endocrine: Negative.    Genitourinary: Negative.    Musculoskeletal: Negative.    Skin: Negative.    Allergic/Immunologic: Negative.    Neurological: Positive for weakness. Negative for dizziness.   Hematological: Negative.    Psychiatric/Behavioral: The patient is nervous/anxious.    All other systems reviewed and are negative.    Objective:     Vital Signs (Most Recent):  Temp: 98.3 °F (36.8 °C) (05/28/17 0338)  Pulse: 103 (05/28/17 0338)  Resp: (!) 22 (05/28/17 0338)  BP: (!) 122/98 (05/28/17 0338)  SpO2: 100 % (05/28/17 0338) Vital Signs (24h Range):  Temp:  [97.8 °F (36.6 °C)-99.4 °F (37.4 °C)] 98.3 °F (36.8 °C)  Pulse:  [] 103  Resp:  [18-22] 22  SpO2:  [95 %-100 %] 100 %  BP: (121-127)/(68-98) 122/98     Weight: 45.4 kg (100 lb)  Body mass index is 18.89 kg/m².    Intake/Output Summary (Last 24 hours) at 05/28/17 0816  Last data filed at 05/28/17 0338   Gross per 24 hour   Intake              660 ml   Output                0 ml   Net              660 ml      Physical Exam   Constitutional: She is oriented to person, place, and time. She appears well-developed and well-nourished. No distress.   HENT:   Head: Normocephalic and atraumatic.   Eyes: EOM are normal. Pupils are equal, round, and reactive to light.   Neck: Normal range of motion. Neck supple. No JVD present.   Cardiovascular: Normal rate, regular rhythm, normal heart sounds and intact distal pulses.    No murmur heard.  Pulmonary/Chest: Effort normal and breath sounds normal. No respiratory distress. She has no wheezes.   Abdominal: Soft. Bowel sounds are normal. She exhibits no distension  and no mass.   Musculoskeletal: Normal range of motion. She exhibits no edema or tenderness.   Neurological: She is alert and oriented to person, place, and time. She has normal reflexes. No cranial nerve deficit.   Lethargic     Skin: Skin is warm and dry. Capillary refill takes less than 2 seconds. She is not diaphoretic. No erythema.   Psychiatric: She has a normal mood and affect. Her behavior is normal. Judgment and thought content normal.   Nursing note and vitals reviewed.      Significant Labs: All pertinent labs within the past 24 hours have been reviewed.    Significant Imaging:   Imaging Results          CT Head Without Contrast (Final result)  Result time 05/26/17 23:35:03    Final result by Tayler Irene MD (05/26/17 23:35:03)                 Impression:         Negative noncontrast CT scan of the brain.   All CT scans at this facility use dose modulation, iterative reconstruction, and/or weight based dosing when appropriate to reduce radiation dose to as low as reasonable achievable.      Electronically signed by: TAYLER IRENE MD  Date:     05/26/17  Time:    23:35              Narrative:    CT HEAD WITHOUT CONTRAST    Date: 05/26/17 23:24:43    Clinical indication:  Seizures     Technique:  Standard noncontrast CT scan of the brain. Comparison is made with the previous study of 05/22/2017     Findings:  The ventricles are nondilated. Gray and white matter structures reveal normal attenuation. No hemorrhage, mass effect or edema is identified.     The skull and skull base are unremarkable.                             NM Lung Ventilation Perfusion Imaging (Final result)  Result time 05/26/17 13:15:09    Final result by Madonna Ortiz MD (05/26/17 13:15:09)                 Impression:        Negative VQ scan. No evidence of pulmonary embolism.      Electronically signed by: MADONNA ROTIZ MD  Date:     05/26/17  Time:    13:15              Narrative:    NM LUNG VENTILATION AND PERFUSION  IMAGING or VQ scan, 05/26/17 11:48:10    History:  rule out PE, chest pain    Ventilation study performed with 1 mCi technetium 99 M DTPA and perfusion study performed with 6 mCi technetium 99 M MAA.    The initial breath is grossly normal.    The perfusion study is normal.                             X-Ray Chest AP Portable (Final result)  Result time 05/25/17 08:13:37    Final result by Kimberly Parmar MD (05/25/17 08:13:37)                 Impression:     Development of bibasilar opacity consistent with infiltrate/atelectasis.      Electronically signed by: KIMBERLY PARMAR MD  Date:     05/25/17  Time:    08:13              Narrative:    History: Dyspnea    Normal heart size. Dual chamber cardiac leads. Right arm PICC in the SVC. Since 5/22/17 there is development of bibasilar opacity consistent with infiltrates or atelectasis.                             X-Ray Chest 1 View for PICC_Central line (Final result)  Result time 05/22/17 22:52:32    Final result by Tayler Irene MD (05/22/17 22:52:32)                 Impression:      No acute findings.  Right-sided PICC line is positioned with its tip in the superior vena cava.      Electronically signed by: TAYLER IRENE MD  Date:     05/22/17  Time:    22:52              Narrative:    Exam: XR CHEST 1 VIEW,    Date:  05/22/17 22:47:43    History: Evaluate PICC line placement    Comparison:  05/21/2017    Findings: A right-sided PICC line is positioned with its tip in superior vena cava.  Left-sided pacemaker remains in place.  Heart is normal in size.  The lungs are clear.                             FL Lumbar Puncture (xpd) (Final result)  Result time 05/22/17 10:27:50    Final result by Hector Diaz MD (05/22/17 10:27:50)                 Impression:      1. Successful fluoroscopically guided lumbar puncture.      Electronically signed by: HECTOR DIAZ MD  Date:     05/22/17  Time:    10:27              Narrative:    Procedure:  Lumbar puncture    Clinical  History:     Meningitis.  Fever.    Technique/findings: After the risks, benefits and options of the planned procedure were explained to the patient in full detail, the patient expressed complete understanding and gave signed informed consent. The left paraspinous space was localized with fluoroscopy. The skin was marked with indelible ink.  The skin overlying this area was prepped and draped in the usual sterile fashion. A timeout was performed. 1% lidocaine was used as a local anesthetic, and a 22 gauge needle were used to perform a lumbar puncture at L3-L4.  11 cc of clear CSF was obtained and sent to laboratory.  The opening pressure was 18 cm of water.  The needle was then removed.  A sterile dressing was applied.  The patient tolerated the procedure well without complication, departing the fluoro suite  in unchanged condition.     Fluoroscopic time was  0.2 minutes  and  1  image was obtained.                             CT Head Without Contrast (Final result)  Result time 05/22/17 07:59:23    Final result by Hector Mann MD (05/22/17 07:59:23)                 Impression:             No CT evidence of acute intracranial abnormality.        All CT scans at this facility use dose modulation, iterative reconstruction and/or weight based dosing when appropriate to reduce radiation dose to as low as reasonably achievable.       Electronically signed by: HECTOR MANN MD  Date:     05/22/17  Time:    07:59              Narrative:    Exam: CT HEAD WITHOUT CONTRAST    Clinical History:   Acute confusion and altered mental status. Initial encounter.Confusion       Technique: Axial CT imaging was performed through the head without intravenous contrast.     Findings:    No hydrocephalus, midline shift, mass effect, or acute intracranial hemorrhage. Cortical sulcal pattern and gray-white matter differentiation is normal. Basilar cisterns and posterior fossa are normal. The visualized paranasal sinuses and mastoid air  cells are clear. The skull is intact.                             CT Chest Without Contrast (Final result)  Result time 05/21/17 17:29:14    Final result by Ghanshyam Haq III, MD (05/21/17 17:29:14)                 Impression:        1. Scattered small mediastinal lobes with a few scattered small axillary nodes with no mass or bulky adenopathy.    2. No consolidation or effusion. Minimal basilar atelectasis or scarring suggested. No prior CT chest scans for comparison.      Electronically signed by: GHANSHYAM HAQ MD  Date:     05/21/17  Time:    17:29              Narrative:    CT chest without contrast.    Clinical indication: Fever.    Multiplanar imaging submitted. Heart size is normal. There small mediastinal nodes without bulky adenopathy. No definite hilar mass or gross enlargement and no axillary adenopathy. Small nodes in the axillary regions bilaterally.    The lung fields show no consolidation or effusion. Minimal basilar atelectasis or scarring.    Within the very upper portion of the abdomen, no acute abnormality is seen. Borderline splenic size, incompletely visualized.                             CT Renal Stone Study ABD Pelvis WO (Final result)  Result time 05/21/17 17:43:36    Final result by Ghanshyam Haq III, MD (05/21/17 17:43:36)                 Impression:        1 a. Postop changes in the pelvis. Probable ovarian cysts, greater on right. Detail is limited without contrast.    2. Splenomegaly, unchanged.    3. No additional significant findings. If further detail were needed, consider correlation with a repeat scan with oral and IV contrast.       Electronically signed by: GHANSHYAM HAQ MD  Date:     05/21/17  Time:    17:43              Narrative:    No CT of the abdomen and pelvis without contrast.    Clinical indication: Fever. Abdominal pain.    Lack of IV and oral contrast material grossly limited this examination.    The heart size is normal. The extreme lung bases  and in show no infiltrate or effusion. There is no free intraperitoneal air. No bowel obstruction. Bony windows show no acute abnormality.    The liver and spleen show no focal abnormality. Spleen is again enlarged and lobular similar to the study from April.    Surgical clips again noted in the gallbladder fossa. There is no solid renal mass or obstruction. No acute abdominal abnormality is suggested.    Within the pelvis there are radiopaque densities in posterior related to patient's prior surgery. Scattered air-fluid levels noted presumably within the opacified bowel loops. Clinical contrast limits differentiation. Probable ovarian cysts, greater on the right. Suspected right ovarian cyst measuring up to 4 cm in diameter although this is poorly defined.                             X-Ray Chest AP Portable (Final result)  Result time 05/21/17 14:02:42    Final result by Jim Berg MD (05/21/17 14:02:42)                 Impression:         No acute cardiopulmonary process noted.      Electronically signed by: JIM BERG MD  Date:     05/21/17  Time:    14:02              Narrative:    Exam: Chest X-ray, one view.    History: Sepsis    Findings: No infiltrate or effusion. No definite change from prior exam dated 04/17/2017. Infiltrate on the right has completely resolved. Right IJ central catheter has been removed. AICD remains in place.

## 2017-05-28 NOTE — PLAN OF CARE
"Problem: Patient Care Overview  Goal: Plan of Care Review  Outcome: Ongoing (interventions implemented as appropriate)  Pt stable. Pt is NSR/ST on the heart monitor.  Pt had no complaints of pain this shift.  Pt had no seizure like activity this shift, prn ativan given once at start of shift for pt anxious and stating she "felt like she was about to have a seizure".  Pt blood sugar low d/t pt poor oral intake, pt encouraged to eat throughout the shift by this RN and by family.  Pt given D50 IVP once, pt also started on boost via PEG for added nutrition and glucose support.  Pt to be discharged today, pt currently waiting on home oxygen to be delivered.  PEG tube education provided to pt and significant other, pt not engaged in education; significant other verbalized understanding.  Plan of care reviewed, pt did not want to participate in review.  Pt did not verbalizes understanding but family at bedside did.  Pt was free from falls or injuries during duration of shift.  Pt turned and repositioned self at times, pt refused to be turned by staff.  PICC intact with no redness, swelling or drainage.  Bed low, wheels locked, bed alarm on, call light in reach.  Pt instructed to call for assistance.  Will continue to monitor.        "

## 2017-05-28 NOTE — ASSESSMENT & PLAN NOTE
She is tachycardia and hypotensive but I think she tends to run on the lower side with her BP  Check lactic acid, continue with IV fluids will have to hold beta blocker as she was started on during last hospitalization.    5/22/17: she is still tachycardic will attempt to restart her beta blocker if BP tolerates.  5/27/17 HR controlled

## 2017-05-28 NOTE — PROGRESS NOTES
Discharge instructions given to patient. Reviewed with patient and her boyfriend when to take next medication dose. Pt's boyfriend is able to verbalize understanding of written and verbal instructions given.  Pt's boyfriend  instructed to follow up with PCP within three days. Pt's boyfriend verbalized understanding. Pt given one paper  Prescription for Vimpat, and 8 prescriptions sent electronically to her pharmacy. Instructed to also purchase OTC Calcium carbonate and Vitamin B complex. He states that he understands. Demonstrated to patient's boyfriend how to administer medications via PEG tube, using 60 cc syringe. Telemetry box removed. Pt discharged to home.

## 2017-05-28 NOTE — PROGRESS NOTES
[facility]  Infectious Disease  Progress Note    Patient Name: Wang Kebede  MRN: [id number]  Admission Date: 5/21/2017  Length of Stay: 7 days  Attending Physician: Marcus García MD  Primary Care Provider: KIN Mendoza    Isolation Status: Special Contact  Assessment/Plan:      LELAND (acute kidney injury)    IVF ,closely monitor renal function  nephrology follow up         AIDS (acquired immunodeficiency syndrome), CD4 <=200     repeat cd4 count  Is 4 -she has profound immunosuppression .Continue  ritonavir / daurunavir.  Will continue  emtriva  and restart tenofovir,Will follow HIV genotype ..    She now has a PEG tube-will follow patient in the clinic and will adjust medications with renal failure.        Anemia    Stable post transfusion   Closely monitor cbc             Anticipated Disposition:     Thank you for your consult. I will follow-up with patient. Please contact us if you have any additional questions.    Hubert Mayo MD  Infectious Disease  [facility]    Subjective:     Principal Problem:Fever    HPI: 31 year old woman with history of AIDs-last cd4 count 53 on 03/21. She is poorly compliant with HAART. She is well known to me from previous hospital admission. She was recently seen in the ED with fever and ear pain and was discharged on Augumentin . She presented again today with persistent high grade fever-T max 105. There is associated history of loose stools.  Since admission, she was started on cooling blanket, LP showed opening pressure of 18, CSF wbc of 12 .  CMP-bicarbonate 14,,creatinine 1.9,hemoglobin of 7.  Head ct scan did not show any acute lesion .  CT renal stone showed splenomegaly .without any acute lesion.  Interval History: 31 year old woman with metabolic acidosis , AIDs, fever ,   Chest x-ray showed development of bibasilar atelectasis .CD4 count is 4    Labs are pending -fever curve has improved   Review of Systems    Constitutional: Positive for activity change and appetite change. Negative for fatigue and fever.   HENT: Negative for congestion, ear pain, facial swelling, sinus pressure and sore throat.         Has oral thrush    Eyes: Negative for pain.   Respiratory: Positive for chest tightness and shortness of breath. Negative for apnea and stridor.    Cardiovascular: Negative for chest pain, palpitations and leg swelling.   Gastrointestinal: Negative for abdominal distention, abdominal pain, diarrhea and nausea.   Endocrine: Negative for polydipsia and polyphagia.   Genitourinary: Negative for decreased urine volume, difficulty urinating, frequency and genital sores.   Musculoskeletal: Negative for arthralgias and gait problem.   Neurological: Positive for headaches. Negative for light-headedness.   Hematological: Negative for adenopathy.   Psychiatric/Behavioral: Negative for agitation, confusion and decreased concentration.     Objective:     Vital Signs (Most Recent):  Temp: 98.3 °F (36.8 °C) (05/28/17 0338)  Pulse: 103 (05/28/17 0338)  Resp: (!) 22 (05/28/17 0338)  BP: (!) 122/98 (05/28/17 0338)  SpO2: 100 % (05/28/17 0338) Vital Signs (24h Range):  Temp:  [97.8 °F (36.6 °C)-99.4 °F (37.4 °C)] 98.3 °F (36.8 °C)  Pulse:  [] 103  Resp:  [18-22] 22  SpO2:  [95 %-100 %] 100 %  BP: (121-129)/(68-98) 122/98     Weight: 45.4 kg (100 lb)  Body mass index is 18.89 kg/m².    Estimated Creatinine Clearance: 19.3 mL/min (based on Cr of 3).    Physical Exam   Constitutional: She is oriented to person, place, and time. She appears well-developed and well-nourished.   HENT:   Head: Normocephalic and atraumatic.   Right Ear: There is tenderness. No drainage.   Left Ear: External ear normal.   Nose: Nose normal.   Mouth/Throat: Oropharynx is clear and moist.   .   Eyes: Conjunctivae and EOM are normal. Pupils are equal, round, and reactive to light.   Neck: Normal range of motion. Neck supple.   Cardiovascular: S1 normal and S2  normal.  Tachycardia present.    Pulmonary/Chest: Effort normal and breath sounds normal.   Abdominal: Soft. Bowel sounds are normal. There is tenderness. There is no rebound and no guarding.   Genitourinary:   Genitourinary Comments: deferred   Musculoskeletal: Normal range of motion.   Neurological: She is alert and oriented to person, place, and time.   Skin: Skin is warm and dry. Capillary refill takes less than 2 seconds.   Psychiatric: She has a normal mood and affect.   Nursing note and vitals reviewed.      Significant Labs:   Blood Culture:     Recent Labs  Lab 04/14/17  1856 04/14/17  1940 04/16/17  1615 05/21/17  1440 05/21/17  1445   LABBLOO No growth after 5 days. No growth after 5 days. No growth after 5 days. No growth after 5 days. No growth after 5 days.     CBC:     Recent Labs  Lab 05/26/17  0628 05/27/17  0633   WBC 2.41* 5.98   HGB 7.4* 8.3*   HCT 21.6* 24.6*   PLT 54* 36*     All pertinent labs within the past 24 hours have been reviewed.    Significant Imaging: I have reviewed all pertinent imaging results/findings within the past 24 hours.

## 2017-05-29 LAB — SYMPTOMS P TRANSF RX PATIENT-IMP: NORMAL

## 2017-05-30 NOTE — PLAN OF CARE
12:30: On 05/29/17 was notified by [facility]  that one of patient's medications, Vimpat, was not covered under insurance and is requiring a prior authorization (PA).  Attempted to contact patient's preferred pharmacy, [facility] ([phone]), and patient's insurance company, with both being closed because of it being a holiday.    On 05/30/17 at 8:30am: Contacted patient's preferred pharmacy, [facility], and obtained insurance phone number for PA.  Contacted insurance company for PA ([phone]) and placed hospitalist on phone with insurance rep.  Hospitalist indicates that request for this medication will be submitted for PA and insurance has 24-72 hours for PA.  Contacted patient's boyfriend, Ashutosh Stevens ([phone]) and updated regarding above.      1800:  Contacted [facility] and determined that Vimpat medication had been authorized by insurance and patient's co-pay will be $0.  Contacted patient's boyfriend, Ashutosh Stevens, and notified him.  Mr. Stevens stating that he is going to pharmacy now to  Rx for patient.

## 2017-06-01 NOTE — PHYSICIAN QUERY
PT Name: Wang Kebede  MR #: [id number]     Physician Query Form - Etiology of Condition Clarification      CDS/: Machelle Bashir               Contact information: [email or web address]  This form is a permanent document in the medical record.     Query Date: June 1, 2017    By submitting this query, we are merely seeking further clarification of documentation.  Please utilize your independent clinical judgment when addressing the question(s) below.     The medical record contains the following:    Findings Supporting Clinical Information Location in Medical Record   Ms. Kebede is a 30 yo AAF with h/o AIDS presented complaining of fever with Tmax 103. She was seen in the ED on yesterday complaining of fever and right ear main. She was given Augmentin and discharged home. She was recently hospitalized for severe sepsis where she was in the ICU on pressors after abdominal procedure. She states she has been compliant with her medications since discharge. She denies any sick contacts. She denies any cough, SOB, abdominal pain or diarrhea.    The patient has a history of HIV ( non-compliant with HAART) admitted for fever, tachycardia, acute renal failure. Infectious disease, Nephrology and Cardiology were consulted. The patient was restarted on HAART per Dr. Mayo.    The patient is being discharged home on zithromax and mepron for OI prophylaxis. h/o AIDS presented complaining of fever with Tmax 103 D/C Summary     Please document your best medical opinion regarding the etiology of the fever for which the primary focus of treatment is/was directed.       Patient fevers due to an opportunistic infection in an immunosuppressed AIDS patient. Patient likely Strep Pneumonia poa.                  [    ]  Clinically Undetermined    Please document in your progress notes daily for the duration of treatment, until resolved, and include in your discharge summary.

## 2017-06-02 ENCOUNTER — TELEPHONE (OUTPATIENT)
Dept: HEMATOLOGY/ONCOLOGY | Facility: CLINIC | Age: 32
End: 2017-06-02

## 2017-06-02 NOTE — TELEPHONE ENCOUNTER
Advised Mitzy that Dr Olmedo follows patient for SS. Mitzy requested message sent to madina nicole, which appears to be her PCP.

## 2017-06-02 NOTE — TELEPHONE ENCOUNTER
----- Message from Mitzy Pineda sent at 6/2/2017 11:11 AM CDT -----  [phone]  Requesting order for a suction machine and wheelchair. Please fax order to [phone]. Please call back at [phone]//thank you acc

## 2017-06-05 ENCOUNTER — TELEPHONE (OUTPATIENT)
Dept: INTERNAL MEDICINE | Facility: CLINIC | Age: 32
End: 2017-06-05

## 2017-06-05 ENCOUNTER — TELEPHONE (OUTPATIENT)
Dept: NEPHROLOGY | Facility: CLINIC | Age: 32
End: 2017-06-05

## 2017-06-05 DIAGNOSIS — Z95.810 ICD (IMPLANTABLE CARDIOVERTER-DEFIBRILLATOR), SINGLE, IN SITU: Primary | ICD-10-CM

## 2017-06-05 DIAGNOSIS — I47.20 VT (VENTRICULAR TACHYCARDIA): ICD-10-CM

## 2017-06-05 DIAGNOSIS — I42.9 CARDIOMYOPATHY, UNSPECIFIED TYPE: ICD-10-CM

## 2017-06-05 DIAGNOSIS — N17.9 AKI (ACUTE KIDNEY INJURY): Primary | ICD-10-CM

## 2017-06-05 NOTE — TELEPHONE ENCOUNTER
Called patient multiple times to come in for labs before she see Dr. Alonso. No answer and not able to leave a voicemail

## 2017-06-05 NOTE — TELEPHONE ENCOUNTER
----- Message from Mary Piedra sent at 6/5/2017  1:44 PM CDT -----  Contact: Tawny/[place]  Please call Angélica @ [phone] regarding pt feeding tube.

## 2017-06-05 NOTE — TELEPHONE ENCOUNTER
Angélica notified this is not our patient and information given to contact Dr. Bronson Koenig as listed on medical card

## 2017-06-05 NOTE — TELEPHONE ENCOUNTER
----- Message from Judy Oshea sent at 6/5/2017 11:19 AM CDT -----  Contact: mother - Lyssa  [phone]  Iniki - Ms Omalley is calling back to continue her earlier conversation with you and additional questions she has.  Please call her ASAP [phone]

## 2017-06-05 NOTE — TELEPHONE ENCOUNTER
Returned call to patient's mother. Informed her that I was not the one who called and spoke to her and told her I was calling to get patient scheduled for labs. Scheduled patient. Mother expressed understanding.

## 2017-06-05 NOTE — TELEPHONE ENCOUNTER
----- Message from Ira Orteag LPN sent at 6/2/2017 11:30 AM CDT -----  Contact: mitzy/sven [facility] [phone]      ----- Message -----  From: Mitzy Pineda  Sent: 6/2/2017  11:11 AM  To: [facility] Staff    Requesting order for a suction machine and wheelchair. Please fax order to [phone]. Please call back at [phone]//thank you acc

## 2017-06-06 ENCOUNTER — CLINICAL SUPPORT (OUTPATIENT)
Dept: CARDIOLOGY | Facility: CLINIC | Age: 32
End: 2017-06-06
Payer: MEDICARE

## 2017-06-06 ENCOUNTER — OFFICE VISIT (OUTPATIENT)
Dept: NEPHROLOGY | Facility: CLINIC | Age: 32
End: 2017-06-06
Payer: MEDICARE

## 2017-06-06 ENCOUNTER — LAB VISIT (OUTPATIENT)
Dept: LAB | Facility: HOSPITAL | Age: 32
End: 2017-06-06
Attending: INTERNAL MEDICINE
Payer: MEDICARE

## 2017-06-06 VITALS
HEART RATE: 88 BPM | SYSTOLIC BLOOD PRESSURE: 120 MMHG | HEIGHT: 61 IN | WEIGHT: 87.31 LBS | BODY MASS INDEX: 16.48 KG/M2 | DIASTOLIC BLOOD PRESSURE: 84 MMHG

## 2017-06-06 DIAGNOSIS — N17.9 AKI (ACUTE KIDNEY INJURY): Primary | ICD-10-CM

## 2017-06-06 DIAGNOSIS — Z95.810 ICD (IMPLANTABLE CARDIOVERTER-DEFIBRILLATOR), SINGLE, IN SITU: ICD-10-CM

## 2017-06-06 DIAGNOSIS — N17.9 AKI (ACUTE KIDNEY INJURY): ICD-10-CM

## 2017-06-06 DIAGNOSIS — I42.9 CARDIOMYOPATHY, UNSPECIFIED TYPE: ICD-10-CM

## 2017-06-06 DIAGNOSIS — I47.20 VT (VENTRICULAR TACHYCARDIA): ICD-10-CM

## 2017-06-06 PROCEDURE — 93283 PRGRMG EVAL IMPLANTABLE DFB: CPT | Mod: 26,,, | Performed by: INTERNAL MEDICINE

## 2017-06-06 PROCEDURE — 99999 PR PBB SHADOW E&M-EST. PATIENT-LVL IV: CPT | Mod: PBBFAC,,, | Performed by: INTERNAL MEDICINE

## 2017-06-06 PROCEDURE — 99499 UNLISTED E&M SERVICE: CPT | Mod: S$GLB,,, | Performed by: INTERNAL MEDICINE

## 2017-06-06 PROCEDURE — 99214 OFFICE O/P EST MOD 30 MIN: CPT | Mod: S$GLB,,, | Performed by: INTERNAL MEDICINE
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
## 2017-06-07 LAB — HIV RT+PR MUT TESTED ISLT: NORMAL

## 2017-06-13 ENCOUNTER — TELEPHONE (OUTPATIENT)
Dept: NEPHROLOGY | Facility: CLINIC | Age: 32
End: 2017-06-13

## 2017-06-13 NOTE — TELEPHONE ENCOUNTER
Called Dr. Mayo office @ [phone] to schedule appointment, unable to reach or leave message. Will call again later.

## 2017-06-13 NOTE — TELEPHONE ENCOUNTER
----- Message from Evangelina Owens sent at 6/13/2017 12:22 PM CDT -----  Contact: Patienst boyfriend, Ashutosh  Mr Boykin needs to speak to nurse about an appointment with Dr Gramajo, please call him back at [phone]. Thank you

## 2017-06-14 ENCOUNTER — OFFICE VISIT (OUTPATIENT)
Dept: INTERNAL MEDICINE | Facility: CLINIC | Age: 32
End: 2017-06-14
Payer: MEDICARE

## 2017-06-14 ENCOUNTER — OFFICE VISIT (OUTPATIENT)
Dept: HEMATOLOGY/ONCOLOGY | Facility: CLINIC | Age: 32
End: 2017-06-14
Payer: MEDICARE

## 2017-06-14 ENCOUNTER — LAB VISIT (OUTPATIENT)
Dept: LAB | Facility: HOSPITAL | Age: 32
End: 2017-06-14
Attending: INTERNAL MEDICINE
Payer: MEDICARE

## 2017-06-14 VITALS
HEART RATE: 101 BPM | HEIGHT: 63 IN | RESPIRATION RATE: 18 BRPM | TEMPERATURE: 98 F | WEIGHT: 88 LBS | SYSTOLIC BLOOD PRESSURE: 80 MMHG | OXYGEN SATURATION: 94 % | BODY MASS INDEX: 15.59 KG/M2 | DIASTOLIC BLOOD PRESSURE: 70 MMHG

## 2017-06-14 VITALS
SYSTOLIC BLOOD PRESSURE: 100 MMHG | TEMPERATURE: 97 F | HEIGHT: 63 IN | BODY MASS INDEX: 15.66 KG/M2 | HEART RATE: 88 BPM | DIASTOLIC BLOOD PRESSURE: 70 MMHG | WEIGHT: 88.38 LBS

## 2017-06-14 DIAGNOSIS — N17.9 AKI (ACUTE KIDNEY INJURY): ICD-10-CM

## 2017-06-14 DIAGNOSIS — B20 AIDS (ACQUIRED IMMUNODEFICIENCY SYNDROME), CD4 <=200: ICD-10-CM

## 2017-06-14 DIAGNOSIS — B20 HIV (HUMAN IMMUNODEFICIENCY VIRUS INFECTION): Primary | ICD-10-CM

## 2017-06-14 DIAGNOSIS — D57.1 HB-SS DISEASE WITHOUT CRISIS: ICD-10-CM

## 2017-06-14 DIAGNOSIS — Z91.199 H/O NONCOMPLIANCE WITH MEDICAL TREATMENT, PRESENTING HAZARDS TO HEALTH: ICD-10-CM

## 2017-06-14 DIAGNOSIS — D64.9 ANEMIA, UNSPECIFIED TYPE: ICD-10-CM

## 2017-06-14 DIAGNOSIS — D64.9 NORMOCYTIC ANEMIA, NOT DUE TO BLOOD LOSS: ICD-10-CM

## 2017-06-14 DIAGNOSIS — D64.9 NORMOCYTIC ANEMIA, NOT DUE TO BLOOD LOSS: Primary | ICD-10-CM

## 2017-06-14 LAB
ALBUMIN SERPL BCP-MCNC: 2.7 G/DL
ALP SERPL-CCNC: 65 U/L
ALT SERPL W/O P-5'-P-CCNC: 8 U/L
ANION GAP SERPL CALC-SCNC: 6 MMOL/L
AST SERPL-CCNC: 19 U/L
BASOPHILS # BLD AUTO: 0.05 K/UL
BASOPHILS NFR BLD: 1.2 %
BILIRUB SERPL-MCNC: 0.2 MG/DL
BUN SERPL-MCNC: 16 MG/DL
CALCIUM SERPL-MCNC: 9.3 MG/DL
CHLORIDE SERPL-SCNC: 102 MMOL/L
CO2 SERPL-SCNC: 23 MMOL/L
CREAT SERPL-MCNC: 1.1 MG/DL
DIFFERENTIAL METHOD: ABNORMAL
EOSINOPHIL # BLD AUTO: 0 K/UL
EOSINOPHIL NFR BLD: 0 %
ERYTHROCYTE [DISTWIDTH] IN BLOOD BY AUTOMATED COUNT: 19.4 %
EST. GFR  (AFRICAN AMERICAN): >60 ML/MIN/1.73 M^2
EST. GFR  (NON AFRICAN AMERICAN): >60 ML/MIN/1.73 M^2
GLUCOSE SERPL-MCNC: 82 MG/DL
HCT VFR BLD AUTO: 26.2 %
HGB BLD-MCNC: 8.2 G/DL
LDH SERPL L TO P-CCNC: 341 U/L
LYMPHOCYTES # BLD AUTO: 2.1 K/UL
LYMPHOCYTES NFR BLD: 51.3 %
MCH RBC QN AUTO: 28.2 PG
MCHC RBC AUTO-ENTMCNC: 31.3 %
MCV RBC AUTO: 90 FL
MONOCYTES # BLD AUTO: 0.3 K/UL
MONOCYTES NFR BLD: 6.7 %
NEUTROPHILS # BLD AUTO: 1.7 K/UL
NEUTROPHILS NFR BLD: 40.8 %
PLATELET # BLD AUTO: 241 K/UL
PMV BLD AUTO: 10.5 FL
POTASSIUM SERPL-SCNC: 4.8 MMOL/L
PROT SERPL-MCNC: 12.6 G/DL
RBC # BLD AUTO: 2.91 M/UL
RETICS/RBC NFR AUTO: 2 %
SODIUM SERPL-SCNC: 131 MMOL/L
WBC # BLD AUTO: 4.15 K/UL

## 2017-06-14 PROCEDURE — 99214 OFFICE O/P EST MOD 30 MIN: CPT | Mod: S$GLB,,, | Performed by: INTERNAL MEDICINE

## 2017-06-14 PROCEDURE — 99204 OFFICE O/P NEW MOD 45 MIN: CPT | Mod: S$GLB,,, | Performed by: FAMILY MEDICINE

## 2017-06-14 PROCEDURE — 99499 UNLISTED E&M SERVICE: CPT | Mod: S$GLB,,, | Performed by: FAMILY MEDICINE

## 2017-06-14 PROCEDURE — 99999 PR PBB SHADOW E&M-EST. PATIENT-LVL III: CPT | Mod: PBBFAC,,, | Performed by: FAMILY MEDICINE

## 2017-06-14 PROCEDURE — 99499 UNLISTED E&M SERVICE: CPT | Mod: S$GLB,,, | Performed by: INTERNAL MEDICINE

## 2017-06-14 PROCEDURE — 99999 PR PBB SHADOW E&M-EST. PATIENT-LVL IV: CPT | Mod: PBBFAC,,, | Performed by: INTERNAL MEDICINE

## 2017-06-14 RX ORDER — TENOFOVIR DISOPROXIL FUMARATE 300 MG/1
300 TABLET, FILM COATED ORAL
Qty: 10 TABLET | Refills: 1 | Status: ON HOLD | OUTPATIENT
Start: 2017-06-14 | End: 2017-07-31 | Stop reason: HOSPADM

## 2017-06-14 RX ORDER — ATOVAQUONE 750 MG/5ML
SUSPENSION ORAL EVERY 12 HOURS
COMMUNITY
End: 2017-06-14 | Stop reason: SDUPTHER

## 2017-06-14 RX ORDER — NYSTATIN 100000 [USP'U]/ML
4 SUSPENSION ORAL 4 TIMES DAILY
Qty: 160 ML | Refills: 0 | Status: ON HOLD | OUTPATIENT
Start: 2017-06-14 | End: 2017-07-31

## 2017-06-14 RX ORDER — ATOVAQUONE 750 MG/5ML
750 SUSPENSION ORAL EVERY 12 HOURS
Qty: 140 ML | Refills: 0 | Status: SHIPPED | OUTPATIENT
Start: 2017-06-14 | End: 2017-08-15 | Stop reason: ALTCHOICE

## 2017-06-14 RX ORDER — LACOSAMIDE 50 MG/1
100 TABLET ORAL 2 TIMES DAILY
Qty: 120 TABLET | Refills: 0 | Status: SHIPPED | OUTPATIENT
Start: 2017-06-14 | End: 2017-07-31 | Stop reason: SDUPTHER

## 2017-06-14 RX ORDER — NYSTATIN 100000 [USP'U]/ML
SUSPENSION ORAL 4 TIMES DAILY
COMMUNITY
End: 2017-06-14 | Stop reason: SDUPTHER

## 2017-06-14 NOTE — PROGRESS NOTES
Subjective:       Patient ID: Wang Kebede is a 31 y.o. female.    Chief Complaint: Establish Care and Medication Refill    F/U:       Pt is a 31 year old with HIV/AIDS. Recently hospitalized for fever and acute renal failure. Unclear at this time when she was first diagnosed with HIV. Review of current labs carroll complete normalization of kidney. Per her boyfriend, she has not been taking her antivirals regularly as she should have and is followed up by Dr. holliday (ID). She is being treated for Depression and is on Celexa 20 mg. Pt also has Sickle Cell anemia and is followed by Hematology.      Review of Systems   Constitutional: Positive for fatigue and unexpected weight change. Negative for fever.   HENT: Negative.    Respiratory: Negative.    Cardiovascular: Negative.    Gastrointestinal: Negative.    Genitourinary: Negative.    Musculoskeletal: Positive for arthralgias.   Neurological: Positive for weakness.   Hematological: Negative.    Psychiatric/Behavioral: Negative.        Objective:      Physical Exam   Constitutional: She is oriented to person, place, and time. Vital signs are normal. She appears cachectic. She has a sickly appearance.   HENT:   Head: Normocephalic and atraumatic.   Eyes: Conjunctivae are normal. Pupils are equal, round, and reactive to light.   Neck: Normal range of motion. Neck supple.   Cardiovascular: Normal rate and regular rhythm.    No murmur heard.  Pulmonary/Chest: Effort normal and breath sounds normal. She has no wheezes.   Abdominal: Soft. Bowel sounds are normal. There is no tenderness. There is no guarding.   Neurological: She is alert and oriented to person, place, and time.   Skin: There is pallor.   Psychiatric: She exhibits a depressed mood. She expresses no homicidal and no suicidal ideation. She expresses no suicidal plans and no homicidal plans.       Assessment:       1. HIV (human immunodeficiency virus infection)    2. AIDS (acquired immunodeficiency  syndrome), CD4 <=200    3. LELAND (acute kidney injury)    4. Anemia, unspecified type    5. H/O noncompliance with medical treatment, presenting hazards to health        Plan:       HIV (human immunodeficiency virus infection)  Comments:  Attempting to contact Dr Gramajo for continue treatment of Immune status.   Orders:  -     Ambulatory referral to Infectious Disease    AIDS (acquired immunodeficiency syndrome), CD4 <=200  Comments:  Do not no specifically where her CD4 count  Orders:  -     Ambulatory referral to Infectious Disease    LELAND (acute kidney injury)  Comments:  Creatinine has corrected to normal. Emphasized adequate water and nutrition    Anemia, unspecified type  Comments:  Pt has sickle cell. Continue to see hematology    H/O noncompliance with medical treatment, presenting hazards to health  Comments:  Discussed importance of medication compliance    Other orders  -     nystatin (MYCOSTATIN) 100,000 unit/mL suspension; Take 4 mLs (400,000 Units total) by mouth 4 (four) times daily.  Dispense: 160 mL; Refill: 0  -     atovaquone (MEPRON) 750 mg/5 mL Susp; Take 5 mLs (750 mg total) by mouth every 12 (twelve) hours.  Dispense: 140 mL; Refill: 0  -     tenofovir (VIREAD) 300 mg Tab; Take 1 tablet (300 mg total) by mouth every 72 hours.  Dispense: 10 tablet; Refill: 1  -     lacosamide (VIMPAT) 50 mg Tab; Take 2 tablets (100 mg total) by mouth 2 (two) times daily.  Dispense: 120 tablet; Refill: 0  -     darunavir-cobicistat (PREZCOBIX) 800-150 mg-mg Tab; Take 800 mg by mouth every evening.  Dispense: 30 tablet; Refill: 1
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
## 2017-06-15 LAB
FERRITIN SERPL-MCNC: 3242 NG/ML
IRON SERPL-MCNC: 47 UG/DL
SATURATED IRON: 36 %
TOTAL IRON BINDING CAPACITY: 130 UG/DL
TRANSFERRIN SERPL-MCNC: 88 MG/DL

## 2017-06-22 LAB
ACID FAST MOD KINY STN SPEC: NORMAL
MYCOBACTERIUM SPEC QL CULT: NORMAL

## 2017-06-24 ENCOUNTER — LAB VISIT (OUTPATIENT)
Dept: LAB | Facility: HOSPITAL | Age: 32
End: 2017-06-24
Attending: INTERNAL MEDICINE
Payer: MEDICARE

## 2017-06-24 ENCOUNTER — NURSE TRIAGE (OUTPATIENT)
Dept: ADMINISTRATIVE | Facility: CLINIC | Age: 32
End: 2017-06-24

## 2017-06-24 DIAGNOSIS — N17.9 AKI (ACUTE KIDNEY INJURY): ICD-10-CM

## 2017-06-24 LAB
ALBUMIN SERPL BCP-MCNC: 2.6 G/DL
ANION GAP SERPL CALC-SCNC: 9 MMOL/L
BUN SERPL-MCNC: 23 MG/DL
CALCIUM SERPL-MCNC: 9.5 MG/DL
CHLORIDE SERPL-SCNC: 104 MMOL/L
CO2 SERPL-SCNC: 24 MMOL/L
CREAT SERPL-MCNC: 1.1 MG/DL
EST. GFR  (AFRICAN AMERICAN): >60 ML/MIN/1.73 M^2
EST. GFR  (NON AFRICAN AMERICAN): >60 ML/MIN/1.73 M^2
GLUCOSE SERPL-MCNC: 55 MG/DL
PHOSPHATE SERPL-MCNC: 4 MG/DL
POTASSIUM SERPL-SCNC: 4.2 MMOL/L
SODIUM SERPL-SCNC: 137 MMOL/L

## 2017-06-24 PROCEDURE — 36415 COLL VENOUS BLD VENIPUNCTURE: CPT | Mod: PO

## 2017-06-24 PROCEDURE — 80069 RENAL FUNCTION PANEL: CPT

## 2017-06-25 ENCOUNTER — HOSPITAL ENCOUNTER (EMERGENCY)
Facility: HOSPITAL | Age: 32
Discharge: HOME | End: 2017-06-25
Attending: EMERGENCY MEDICINE
Payer: MEDICARE

## 2017-06-25 VITALS
BODY MASS INDEX: 19.44 KG/M2 | HEIGHT: 60 IN | RESPIRATION RATE: 19 BRPM | HEART RATE: 95 BPM | OXYGEN SATURATION: 100 % | DIASTOLIC BLOOD PRESSURE: 95 MMHG | TEMPERATURE: 99 F | SYSTOLIC BLOOD PRESSURE: 136 MMHG | WEIGHT: 99 LBS

## 2017-06-25 DIAGNOSIS — R07.9 CHEST PAIN, UNSPECIFIED TYPE: Primary | ICD-10-CM

## 2017-06-25 DIAGNOSIS — R07.9 CHEST PAIN: ICD-10-CM

## 2017-06-25 LAB
ALBUMIN SERPL BCP-MCNC: 2.6 G/DL
ALP SERPL-CCNC: 68 U/L
ALT SERPL W/O P-5'-P-CCNC: 9 U/L
ANION GAP SERPL CALC-SCNC: 10 MMOL/L
AST SERPL-CCNC: 23 U/L
BASOPHILS # BLD AUTO: 0.02 K/UL
BASOPHILS NFR BLD: 0.5 %
BILIRUB SERPL-MCNC: 0.3 MG/DL
BUN SERPL-MCNC: 19 MG/DL
CALCIUM SERPL-MCNC: 8.8 MG/DL
CHLORIDE SERPL-SCNC: 105 MMOL/L
CK MB SERPL-MCNC: 0.6 NG/ML
CK MB SERPL-RTO: 3.8 %
CK SERPL-CCNC: 16 U/L
CK SERPL-CCNC: 16 U/L
CO2 SERPL-SCNC: 23 MMOL/L
CREAT SERPL-MCNC: 1 MG/DL
DIFFERENTIAL METHOD: ABNORMAL
EOSINOPHIL # BLD AUTO: 0 K/UL
EOSINOPHIL NFR BLD: 0 %
ERYTHROCYTE [DISTWIDTH] IN BLOOD BY AUTOMATED COUNT: 23.1 %
EST. GFR  (AFRICAN AMERICAN): >60 ML/MIN/1.73 M^2
EST. GFR  (NON AFRICAN AMERICAN): >60 ML/MIN/1.73 M^2
GLUCOSE SERPL-MCNC: 88 MG/DL
HCT VFR BLD AUTO: 22.1 %
HGB BLD-MCNC: 7.1 G/DL
INR PPP: 1
LYMPHOCYTES # BLD AUTO: 1.8 K/UL
LYMPHOCYTES NFR BLD: 44.6 %
MCH RBC QN AUTO: 29.6 PG
MCHC RBC AUTO-ENTMCNC: 32.1 %
MCV RBC AUTO: 92 FL
MONOCYTES # BLD AUTO: 0.5 K/UL
MONOCYTES NFR BLD: 12.5 %
NEUTROPHILS # BLD AUTO: 1.7 K/UL
NEUTROPHILS NFR BLD: 42.4 %
PLATELET # BLD AUTO: 202 K/UL
PMV BLD AUTO: 9.7 FL
POTASSIUM SERPL-SCNC: 4.5 MMOL/L
PROT SERPL-MCNC: 10.4 G/DL
PROTHROMBIN TIME: 10 SEC
RBC # BLD AUTO: 2.4 M/UL
SODIUM SERPL-SCNC: 138 MMOL/L
TROPONIN I SERPL DL<=0.01 NG/ML-MCNC: 0.01 NG/ML
WBC # BLD AUTO: 4.01 K/UL

## 2017-06-25 PROCEDURE — 85610 PROTHROMBIN TIME: CPT

## 2017-06-25 PROCEDURE — 80053 COMPREHEN METABOLIC PANEL: CPT

## 2017-06-25 PROCEDURE — 84484 ASSAY OF TROPONIN QUANT: CPT

## 2017-06-25 PROCEDURE — 99284 EMERGENCY DEPT VISIT MOD MDM: CPT

## 2017-06-25 PROCEDURE — 96360 HYDRATION IV INFUSION INIT: CPT

## 2017-06-25 PROCEDURE — 93005 ELECTROCARDIOGRAM TRACING: CPT

## 2017-06-25 PROCEDURE — 82553 CREATINE MB FRACTION: CPT

## 2017-06-25 PROCEDURE — 25000003 PHARM REV CODE 250: Performed by: EMERGENCY MEDICINE

## 2017-06-25 PROCEDURE — 85025 COMPLETE CBC W/AUTO DIFF WBC: CPT

## 2017-06-25 PROCEDURE — 93010 ELECTROCARDIOGRAM REPORT: CPT | Mod: S$GLB,,, | Performed by: INTERNAL MEDICINE

## 2017-06-25 RX ORDER — CEFUROXIME AXETIL 250 MG/1
250 TABLET ORAL
Status: ON HOLD | COMMUNITY
End: 2017-07-31 | Stop reason: HOSPADM

## 2017-06-25 RX ORDER — PANTOPRAZOLE SODIUM 20 MG/1
20 TABLET, DELAYED RELEASE ORAL DAILY
Qty: 12 TABLET | Refills: 0 | Status: SHIPPED | OUTPATIENT
Start: 2017-06-25 | End: 2017-07-05 | Stop reason: SDUPTHER

## 2017-06-25 RX ORDER — SODIUM CHLORIDE 9 MG/ML
500 INJECTION, SOLUTION INTRAVENOUS
Status: COMPLETED | OUTPATIENT
Start: 2017-06-25 | End: 2017-06-25

## 2017-06-25 RX ORDER — PANTOPRAZOLE SODIUM 20 MG/1
20 TABLET, DELAYED RELEASE ORAL DAILY
Qty: 12 TABLET | Refills: 0 | Status: SHIPPED | OUTPATIENT
Start: 2017-06-25 | End: 2017-06-25

## 2017-06-25 RX ADMIN — SODIUM CHLORIDE 500 ML: 0.9 INJECTION, SOLUTION INTRAVENOUS at 12:06

## 2017-06-25 NOTE — TELEPHONE ENCOUNTER
"    Reason for Disposition   [1] Patient claims chest pain is same as previously diagnosed "heartburn" AND [2] describes burning in chest AND [3] accompanying sour taste in mouth    Answer Assessment - Initial Assessment Questions  Patient calling with heartburn and sour taste in the back of her mouth.  Denies any chest pain or SOB currently.  States she ate spicy sandwich earlier.  Advised any chest pain, SOB would need to be seen in the ER.    Protocols used: ST CHEST PAIN-A-      "
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
## 2017-06-25 NOTE — ED NOTES
Pt denies CP or SOB. NAD noted. AAO x 3. RR e/u, airway open and patent. Will continue with discharge.

## 2017-06-26 ENCOUNTER — NURSE TRIAGE (OUTPATIENT)
Dept: ADMINISTRATIVE | Facility: CLINIC | Age: 32
End: 2017-06-26

## 2017-06-26 ENCOUNTER — HOSPITAL ENCOUNTER (EMERGENCY)
Facility: HOSPITAL | Age: 32
Discharge: HOME | End: 2017-06-27
Attending: EMERGENCY MEDICINE
Payer: MEDICARE

## 2017-06-26 DIAGNOSIS — R51.9 NONINTRACTABLE HEADACHE, UNSPECIFIED CHRONICITY PATTERN, UNSPECIFIED HEADACHE TYPE: Primary | ICD-10-CM

## 2017-06-26 PROCEDURE — 99283 EMERGENCY DEPT VISIT LOW MDM: CPT

## 2017-06-26 NOTE — TELEPHONE ENCOUNTER
"Having a headache but it is not normal.     Reason for Disposition   [1] SEVERE headache (e.g., excruciating) AND [2] "worst headache" of life    Answer Assessment - Initial Assessment Questions  1. LOCATION: "Where does it hurt?"       Onside and back over whole head  2. ONSET: "When did the headache start?" (Minutes, hours or days)       yesterday  3. PATTERN: "Does the pain come and go, or has it been constant since it started?"      Comes and goes  4. SEVERITY: "How bad is the pain?" and "What does it keep you from doing?"  (e.g., Scale 1-10; mild, moderate, or severe)    - MILD (1-3): doesn't interfere with normal activities     - MODERATE (4-7): interferes with normal activities or awakens from sleep     - SEVERE (8-10): excruciating pain, unable to do any normal activities         9  5. RECURRENT SYMPTOM: "Have you ever had headaches before?" If so, ask: "When was the last time?" and "What happened that time?"       no  6. CAUSE: "What do you think is causing the headache?"      No idea  7. MIGRAINE: "Have you been diagnosed with migraine headaches?" If so, ask: "Is this headache similar?"       no  8. HEAD INJURY: "Has there been any recent injury to the head?"       no  9. OTHER SYMPTOMS: "Do you have any other symptoms?" (fever, stiff neck, eye pain, sore throat, cold symptoms)      Vision is blurry with headache and eyes want to close  10. PREGNANCY: "Is there any chance you are pregnant?" "When was your last menstrual period?"        no    Protocols used: ST HEADACHE-A-AH      "

## 2017-06-27 VITALS
TEMPERATURE: 99 F | RESPIRATION RATE: 20 BRPM | HEART RATE: 93 BPM | BODY MASS INDEX: 19.63 KG/M2 | DIASTOLIC BLOOD PRESSURE: 77 MMHG | HEIGHT: 60 IN | SYSTOLIC BLOOD PRESSURE: 118 MMHG | WEIGHT: 100 LBS | OXYGEN SATURATION: 100 %
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
## 2017-06-28 LAB — FUNGUS BLD CULT: NORMAL

## 2017-07-05 ENCOUNTER — OFFICE VISIT (OUTPATIENT)
Dept: NEPHROLOGY | Facility: CLINIC | Age: 32
End: 2017-07-05
Payer: MEDICARE

## 2017-07-05 ENCOUNTER — TELEPHONE (OUTPATIENT)
Dept: HEMATOLOGY/ONCOLOGY | Facility: CLINIC | Age: 32
End: 2017-07-05

## 2017-07-05 ENCOUNTER — LAB VISIT (OUTPATIENT)
Dept: LAB | Facility: HOSPITAL | Age: 32
End: 2017-07-05
Attending: INTERNAL MEDICINE
Payer: MEDICARE

## 2017-07-05 ENCOUNTER — OFFICE VISIT (OUTPATIENT)
Dept: HEMATOLOGY/ONCOLOGY | Facility: CLINIC | Age: 32
End: 2017-07-05
Payer: MEDICARE

## 2017-07-05 VITALS
WEIGHT: 101.44 LBS | SYSTOLIC BLOOD PRESSURE: 102 MMHG | HEART RATE: 88 BPM | DIASTOLIC BLOOD PRESSURE: 80 MMHG | HEIGHT: 63 IN | BODY MASS INDEX: 17.97 KG/M2

## 2017-07-05 VITALS
HEART RATE: 92 BPM | HEIGHT: 60 IN | TEMPERATURE: 98 F | RESPIRATION RATE: 18 BRPM | SYSTOLIC BLOOD PRESSURE: 100 MMHG | DIASTOLIC BLOOD PRESSURE: 60 MMHG | WEIGHT: 100.5 LBS | BODY MASS INDEX: 19.73 KG/M2 | OXYGEN SATURATION: 100 %

## 2017-07-05 DIAGNOSIS — D64.9 NORMOCYTIC ANEMIA, NOT DUE TO BLOOD LOSS: ICD-10-CM

## 2017-07-05 DIAGNOSIS — D64.9 NORMOCYTIC ANEMIA, NOT DUE TO BLOOD LOSS: Primary | ICD-10-CM

## 2017-07-05 DIAGNOSIS — E46 PROTEIN-CALORIE MALNUTRITION: ICD-10-CM

## 2017-07-05 DIAGNOSIS — B20 AIDS (ACQUIRED IMMUNODEFICIENCY SYNDROME), CD4 <=200: ICD-10-CM

## 2017-07-05 DIAGNOSIS — N17.9 AKI (ACUTE KIDNEY INJURY): Primary | ICD-10-CM

## 2017-07-05 LAB
BASOPHILS # BLD AUTO: 0.03 K/UL
BASOPHILS NFR BLD: 0.6 %
DAT IGG-SP REAG RBC-IMP: NORMAL
DIFFERENTIAL METHOD: ABNORMAL
EOSINOPHIL # BLD AUTO: 0 K/UL
EOSINOPHIL NFR BLD: 0 %
ERYTHROCYTE [DISTWIDTH] IN BLOOD BY AUTOMATED COUNT: 23.4 %
FERRITIN SERPL-MCNC: 2026 NG/ML
FOLATE SERPL-MCNC: 3.9 NG/ML
HAPTOGLOB SERPL-MCNC: 174 MG/DL
HCT VFR BLD AUTO: 27 %
HGB BLD-MCNC: 8.5 G/DL
IRON SERPL-MCNC: 57 UG/DL
LDH SERPL L TO P-CCNC: 301 U/L
LYMPHOCYTES # BLD AUTO: 2 K/UL
LYMPHOCYTES NFR BLD: 40 %
MCH RBC QN AUTO: 31 PG
MCHC RBC AUTO-ENTMCNC: 31.5 %
MCV RBC AUTO: 99 FL
MONOCYTES # BLD AUTO: 0.4 K/UL
MONOCYTES NFR BLD: 7.7 %
NEUTROPHILS # BLD AUTO: 2.6 K/UL
NEUTROPHILS NFR BLD: 51.7 %
PLATELET # BLD AUTO: 267 K/UL
PMV BLD AUTO: 9.7 FL
RBC # BLD AUTO: 2.74 M/UL
RETICS/RBC NFR AUTO: 2.5 %
SATURATED IRON: 23 %
TOTAL IRON BINDING CAPACITY: 253 UG/DL
TRANSFERRIN SERPL-MCNC: 171 MG/DL
VIT B12 SERPL-MCNC: 419 PG/ML
WBC # BLD AUTO: 5.07 K/UL

## 2017-07-05 PROCEDURE — 99213 OFFICE O/P EST LOW 20 MIN: CPT | Mod: S$GLB,,, | Performed by: INTERNAL MEDICINE

## 2017-07-05 PROCEDURE — 99499 UNLISTED E&M SERVICE: CPT | Mod: S$GLB,,, | Performed by: INTERNAL MEDICINE

## 2017-07-05 PROCEDURE — 86880 COOMBS TEST DIRECT: CPT

## 2017-07-05 PROCEDURE — 83615 LACTATE (LD) (LDH) ENZYME: CPT | Mod: PO

## 2017-07-05 PROCEDURE — 85045 AUTOMATED RETICULOCYTE COUNT: CPT

## 2017-07-05 PROCEDURE — 83010 ASSAY OF HAPTOGLOBIN QUANT: CPT

## 2017-07-05 PROCEDURE — 82607 VITAMIN B-12: CPT

## 2017-07-05 PROCEDURE — 99999 PR PBB SHADOW E&M-EST. PATIENT-LVL IV: CPT | Mod: PBBFAC,,, | Performed by: INTERNAL MEDICINE

## 2017-07-05 PROCEDURE — 82746 ASSAY OF FOLIC ACID SERUM: CPT

## 2017-07-05 PROCEDURE — 99999 PR PBB SHADOW E&M-EST. PATIENT-LVL III: CPT | Mod: PBBFAC,,, | Performed by: INTERNAL MEDICINE

## 2017-07-05 PROCEDURE — 82728 ASSAY OF FERRITIN: CPT

## 2017-07-05 PROCEDURE — 36415 COLL VENOUS BLD VENIPUNCTURE: CPT | Mod: PO

## 2017-07-05 PROCEDURE — 85025 COMPLETE CBC W/AUTO DIFF WBC: CPT | Mod: PO

## 2017-07-05 PROCEDURE — 83540 ASSAY OF IRON: CPT

## 2017-07-05 PROCEDURE — 99214 OFFICE O/P EST MOD 30 MIN: CPT | Mod: S$GLB,,, | Performed by: INTERNAL MEDICINE

## 2017-07-05 RX ORDER — CITALOPRAM 20 MG/1
20 TABLET, FILM COATED ORAL DAILY
Qty: 30 TABLET | Refills: 0 | Status: SHIPPED | OUTPATIENT
Start: 2017-07-05 | End: 2017-07-31 | Stop reason: SDUPTHER

## 2017-07-05 RX ORDER — PANTOPRAZOLE SODIUM 20 MG/1
20 TABLET, DELAYED RELEASE ORAL DAILY
Qty: 12 TABLET | Refills: 0 | Status: SHIPPED | OUTPATIENT
Start: 2017-07-05 | End: 2017-07-19 | Stop reason: SDUPTHER

## 2017-07-05 NOTE — TELEPHONE ENCOUNTER
----- Message from Jovana Jimenez sent at 7/5/2017 11:56 AM CDT -----  Contact: Pt   Pt is calling in regards to lab results please, pt can be reached at [phone]
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
## 2017-07-06 ENCOUNTER — TELEPHONE (OUTPATIENT)
Dept: HEMATOLOGY/ONCOLOGY | Facility: CLINIC | Age: 32
End: 2017-07-06

## 2017-07-06 DIAGNOSIS — D52.1 DRUG-INDUCED FOLATE DEFICIENCY ANEMIA: Primary | ICD-10-CM

## 2017-07-06 RX ORDER — FOLIC ACID 1 MG/1
1 TABLET ORAL DAILY
Qty: 90 TABLET | Refills: 3 | Status: SHIPPED | OUTPATIENT
Start: 2017-07-06 | End: 2019-12-16

## 2017-07-06 NOTE — TELEPHONE ENCOUNTER
----- Message from Gerri Wolf sent at 7/6/2017  9:09 AM CDT -----  Contact: [phone]  Pt would like to consult with nurse about results. . Please call back at [phone]. Thx. lj

## 2017-07-10 ENCOUNTER — LAB VISIT (OUTPATIENT)
Dept: LAB | Facility: HOSPITAL | Age: 32
End: 2017-07-10
Attending: INTERNAL MEDICINE
Payer: MEDICARE

## 2017-07-10 DIAGNOSIS — B20 HIV (HUMAN IMMUNODEFICIENCY VIRUS INFECTION): Primary | ICD-10-CM

## 2017-07-10 DIAGNOSIS — B20 HIV (HUMAN IMMUNODEFICIENCY VIRUS INFECTION): ICD-10-CM

## 2017-07-10 LAB
BASOPHILS # BLD AUTO: 0.04 K/UL
BASOPHILS NFR BLD: 0.7 %
DIFFERENTIAL METHOD: ABNORMAL
EOSINOPHIL # BLD AUTO: 0 K/UL
EOSINOPHIL NFR BLD: 0.2 %
ERYTHROCYTE [DISTWIDTH] IN BLOOD BY AUTOMATED COUNT: 22.2 %
HCT VFR BLD AUTO: 27 %
HGB BLD-MCNC: 8.7 G/DL
LYMPHOCYTES # BLD AUTO: 2.5 K/UL
LYMPHOCYTES NFR BLD: 44.3 %
MCH RBC QN AUTO: 32 PG
MCHC RBC AUTO-ENTMCNC: 32.2 %
MCV RBC AUTO: 99 FL
MONOCYTES # BLD AUTO: 0.6 K/UL
MONOCYTES NFR BLD: 10 %
NEUTROPHILS # BLD AUTO: 2.5 K/UL
NEUTROPHILS NFR BLD: 44.3 %
PLATELET # BLD AUTO: 290 K/UL
PMV BLD AUTO: 11.5 FL
RBC # BLD AUTO: 2.72 M/UL
WBC # BLD AUTO: 5.62 K/UL

## 2017-07-10 PROCEDURE — 87536 HIV-1 QUANT&REVRSE TRNSCRPJ: CPT

## 2017-07-10 PROCEDURE — 86361 T CELL ABSOLUTE COUNT: CPT

## 2017-07-10 PROCEDURE — 85025 COMPLETE CBC W/AUTO DIFF WBC: CPT

## 2017-07-11 ENCOUNTER — HOSPITAL ENCOUNTER (EMERGENCY)
Facility: HOSPITAL | Age: 32
Discharge: HOME | End: 2017-07-11
Attending: EMERGENCY MEDICINE
Payer: MEDICARE

## 2017-07-11 ENCOUNTER — TREATMENT PLANNING (OUTPATIENT)
Dept: INFECTIOUS DISEASES | Facility: HOSPITAL | Age: 32
End: 2017-07-11

## 2017-07-11 VITALS
TEMPERATURE: 98 F | SYSTOLIC BLOOD PRESSURE: 104 MMHG | HEIGHT: 60 IN | WEIGHT: 100 LBS | RESPIRATION RATE: 16 BRPM | OXYGEN SATURATION: 100 % | DIASTOLIC BLOOD PRESSURE: 62 MMHG | BODY MASS INDEX: 19.63 KG/M2 | HEART RATE: 65 BPM

## 2017-07-11 DIAGNOSIS — B20 AIDS-RELATED COMPLEX: Primary | ICD-10-CM

## 2017-07-11 DIAGNOSIS — E87.5 HYPERKALEMIA: Primary | ICD-10-CM

## 2017-07-11 DIAGNOSIS — R55 NEAR SYNCOPE: ICD-10-CM

## 2017-07-11 LAB
ALBUMIN SERPL BCP-MCNC: 3.2 G/DL
ALP SERPL-CCNC: 75 U/L
ALT SERPL W/O P-5'-P-CCNC: 24 U/L
ANION GAP SERPL CALC-SCNC: 9 MMOL/L
AST SERPL-CCNC: 31 U/L
BASOPHILS # BLD AUTO: 0.02 K/UL
BASOPHILS NFR BLD: 0.4 %
BILIRUB SERPL-MCNC: 0.2 MG/DL
BUN SERPL-MCNC: 33 MG/DL
CALCIUM SERPL-MCNC: 9.6 MG/DL
CD3+CD4+ CELLS # BLD: 245 CELLS/UL (ref 300–1400)
CD3+CD4+ CELLS NFR BLD: 10.6 % (ref 28–57)
CHLORIDE SERPL-SCNC: 105 MMOL/L
CK MB SERPL-MCNC: 0.6 NG/ML
CK MB SERPL-RTO: 3.2 %
CK SERPL-CCNC: 19 U/L
CK SERPL-CCNC: 19 U/L
CO2 SERPL-SCNC: 21 MMOL/L
CREAT SERPL-MCNC: 1.2 MG/DL
DIFFERENTIAL METHOD: ABNORMAL
EOSINOPHIL # BLD AUTO: 0 K/UL
EOSINOPHIL NFR BLD: 0 %
ERYTHROCYTE [DISTWIDTH] IN BLOOD BY AUTOMATED COUNT: 22.4 %
EST. GFR  (AFRICAN AMERICAN): >60 ML/MIN/1.73 M^2
EST. GFR  (NON AFRICAN AMERICAN): 60 ML/MIN/1.73 M^2
GLUCOSE SERPL-MCNC: 79 MG/DL
HCT VFR BLD AUTO: 26.3 %
HGB BLD-MCNC: 8.6 G/DL
INR PPP: 1
LYMPHOCYTES # BLD AUTO: 1.8 K/UL
LYMPHOCYTES NFR BLD: 35.3 %
MCH RBC QN AUTO: 32.1 PG
MCHC RBC AUTO-ENTMCNC: 32.7 %
MCV RBC AUTO: 98 FL
MONOCYTES # BLD AUTO: 0.5 K/UL
MONOCYTES NFR BLD: 9.8 %
NEUTROPHILS # BLD AUTO: 2.7 K/UL
NEUTROPHILS NFR BLD: 55.3 %
PLATELET # BLD AUTO: 253 K/UL
PMV BLD AUTO: 9.6 FL
POTASSIUM SERPL-SCNC: 5.4 MMOL/L
POTASSIUM SERPL-SCNC: 5.5 MMOL/L
POTASSIUM SERPL-SCNC: 6 MMOL/L
PROT SERPL-MCNC: 11.6 G/DL
PROTHROMBIN TIME: 10.7 SEC
RBC # BLD AUTO: 2.68 M/UL
SODIUM SERPL-SCNC: 135 MMOL/L
TROPONIN I SERPL DL<=0.01 NG/ML-MCNC: 0.01 NG/ML
WBC # BLD AUTO: 5.02 K/UL

## 2017-07-11 PROCEDURE — 93010 ELECTROCARDIOGRAM REPORT: CPT | Mod: ,,, | Performed by: INTERNAL MEDICINE

## 2017-07-11 PROCEDURE — 96360 HYDRATION IV INFUSION INIT: CPT

## 2017-07-11 PROCEDURE — 82553 CREATINE MB FRACTION: CPT

## 2017-07-11 PROCEDURE — 93005 ELECTROCARDIOGRAM TRACING: CPT

## 2017-07-11 PROCEDURE — 84132 ASSAY OF SERUM POTASSIUM: CPT

## 2017-07-11 PROCEDURE — 80053 COMPREHEN METABOLIC PANEL: CPT

## 2017-07-11 PROCEDURE — 84484 ASSAY OF TROPONIN QUANT: CPT

## 2017-07-11 PROCEDURE — 25000003 PHARM REV CODE 250: Performed by: EMERGENCY MEDICINE

## 2017-07-11 PROCEDURE — 85610 PROTHROMBIN TIME: CPT

## 2017-07-11 PROCEDURE — 96361 HYDRATE IV INFUSION ADD-ON: CPT

## 2017-07-11 PROCEDURE — 99285 EMERGENCY DEPT VISIT HI MDM: CPT | Mod: 25

## 2017-07-11 PROCEDURE — 85025 COMPLETE CBC W/AUTO DIFF WBC: CPT

## 2017-07-11 RX ORDER — SODIUM CHLORIDE 9 MG/ML
1000 INJECTION, SOLUTION INTRAVENOUS ONCE
Status: COMPLETED | OUTPATIENT
Start: 2017-07-11 | End: 2017-07-11

## 2017-07-11 RX ADMIN — SODIUM CHLORIDE 1000 ML: 0.9 INJECTION, SOLUTION INTRAVENOUS at 01:07

## 2017-07-11 NOTE — PROGRESS NOTES
Patient was advised to come today for us to cross check all her medications to reduce her pill burden.    While I was reviewing her medication-she became dizzy and almost passed out .      Stat glucose-98.    Vitals-109/80, pulse 81.    I called and discussed case with the ED physician-  She will be sent to the ED for evaluation of presyncope.
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
## 2017-07-12 LAB
HIV UQ DATE RECEIVED: ABNORMAL
HIV UQ DATE REPORTED: ABNORMAL
HIV1 RNA # SERPL NAA+PROBE: ABNORMAL COPIES/ML
HIV1 RNA SERPL NAA+PROBE-LOG#: 3.1 LOG (10) COPIES/ML
HIV1 RNA SERPL QL NAA+PROBE: DETECTED

## 2017-07-19 RX ORDER — PANTOPRAZOLE SODIUM 20 MG/1
TABLET, DELAYED RELEASE ORAL
Qty: 12 TABLET | Refills: 0 | Status: SHIPPED | OUTPATIENT
Start: 2017-07-19 | End: 2017-08-15 | Stop reason: ALTCHOICE

## 2017-07-20 ENCOUNTER — HOSPITAL ENCOUNTER (INPATIENT)
Facility: HOSPITAL | Age: 32
LOS: 10 days | Discharge: HOME HEALTH | DRG: 974 | End: 2017-07-31
Attending: EMERGENCY MEDICINE | Admitting: INTERNAL MEDICINE
Payer: MEDICARE

## 2017-07-20 DIAGNOSIS — B20 THROMBOCYTOPENIA ASSOCIATED WITH AIDS: ICD-10-CM

## 2017-07-20 DIAGNOSIS — E87.6 HYPOKALEMIA: ICD-10-CM

## 2017-07-20 DIAGNOSIS — B20 HIV (HUMAN IMMUNODEFICIENCY VIRUS INFECTION): ICD-10-CM

## 2017-07-20 DIAGNOSIS — A41.9 SEPTIC SHOCK: ICD-10-CM

## 2017-07-20 DIAGNOSIS — R00.0 TACHYCARDIA: ICD-10-CM

## 2017-07-20 DIAGNOSIS — R65.20 SEVERE SEPSIS: ICD-10-CM

## 2017-07-20 DIAGNOSIS — N17.9 ACUTE RENAL FAILURE, UNSPECIFIED ACUTE RENAL FAILURE TYPE: ICD-10-CM

## 2017-07-20 DIAGNOSIS — E87.1 HYPONATREMIA: ICD-10-CM

## 2017-07-20 DIAGNOSIS — R65.21 SEPTIC SHOCK: ICD-10-CM

## 2017-07-20 DIAGNOSIS — N17.9 AKI (ACUTE KIDNEY INJURY): ICD-10-CM

## 2017-07-20 DIAGNOSIS — D69.59 THROMBOCYTOPENIA ASSOCIATED WITH AIDS: ICD-10-CM

## 2017-07-20 DIAGNOSIS — I42.9 CARDIOMYOPATHY, UNSPECIFIED TYPE: ICD-10-CM

## 2017-07-20 DIAGNOSIS — J96.01 ACUTE RESPIRATORY FAILURE WITH HYPOXIA AND HYPERCARBIA: ICD-10-CM

## 2017-07-20 DIAGNOSIS — J81.1 PULMONARY EDEMA: ICD-10-CM

## 2017-07-20 DIAGNOSIS — A41.9 SEVERE SEPSIS: ICD-10-CM

## 2017-07-20 DIAGNOSIS — R50.81 FEVER IN OTHER DISEASES: ICD-10-CM

## 2017-07-20 DIAGNOSIS — R06.03 RESPIRATORY DISTRESS: ICD-10-CM

## 2017-07-20 DIAGNOSIS — J96.00 ACUTE RESPIRATORY FAILURE, UNSPECIFIED WHETHER WITH HYPOXIA OR HYPERCAPNIA: ICD-10-CM

## 2017-07-20 DIAGNOSIS — J96.02 ACUTE RESPIRATORY FAILURE WITH HYPOXIA AND HYPERCARBIA: ICD-10-CM

## 2017-07-20 DIAGNOSIS — D64.9 ANEMIA, UNSPECIFIED TYPE: ICD-10-CM

## 2017-07-20 DIAGNOSIS — J96.00 ACUTE RESPIRATORY FAILURE: ICD-10-CM

## 2017-07-20 DIAGNOSIS — R50.9 FEVER, UNSPECIFIED FEVER CAUSE: Primary | ICD-10-CM

## 2017-07-20 DIAGNOSIS — R50.9 FEVER: ICD-10-CM

## 2017-07-20 LAB
ABO + RH BLD: NORMAL
ALBUMIN SERPL BCP-MCNC: 2.8 G/DL
ALP SERPL-CCNC: 76 U/L
ALT SERPL W/O P-5'-P-CCNC: 20 U/L
ANION GAP SERPL CALC-SCNC: 7 MMOL/L
APTT BLDCRRT: 38 SEC
AST SERPL-CCNC: 36 U/L
BACTERIA #/AREA URNS HPF: NORMAL /HPF
BASOPHILS # BLD AUTO: 0.02 K/UL
BASOPHILS NFR BLD: 0.5 %
BILIRUB SERPL-MCNC: 0.3 MG/DL
BILIRUB UR QL STRIP: NEGATIVE
BLD GP AB SCN CELLS X3 SERPL QL: NORMAL
BNP SERPL-MCNC: 12 PG/ML
BUN SERPL-MCNC: 31 MG/DL
CALCIUM SERPL-MCNC: 9.3 MG/DL
CHLORIDE SERPL-SCNC: 101 MMOL/L
CLARITY UR: CLEAR
CO2 SERPL-SCNC: 19 MMOL/L
COLOR UR: YELLOW
CREAT SERPL-MCNC: 1.4 MG/DL
DIFFERENTIAL METHOD: ABNORMAL
EOSINOPHIL # BLD AUTO: 0 K/UL
EOSINOPHIL NFR BLD: 0 %
ERYTHROCYTE [DISTWIDTH] IN BLOOD BY AUTOMATED COUNT: 20.2 %
EST. GFR  (AFRICAN AMERICAN): 57 ML/MIN/1.73 M^2
EST. GFR  (NON AFRICAN AMERICAN): 50 ML/MIN/1.73 M^2
FLUAV AG SPEC QL IA: NEGATIVE
FLUBV AG SPEC QL IA: NEGATIVE
GLUCOSE SERPL-MCNC: 94 MG/DL
GLUCOSE UR QL STRIP: NEGATIVE
HCT VFR BLD AUTO: 26.4 %
HGB BLD-MCNC: 8.8 G/DL
HGB UR QL STRIP: ABNORMAL
HYALINE CASTS #/AREA URNS LPF: 0 /LPF
INR PPP: 1.1
KETONES UR QL STRIP: NEGATIVE
LACTATE SERPL-SCNC: 1 MMOL/L
LACTATE SERPL-SCNC: 1.4 MMOL/L
LEUKOCYTE ESTERASE UR QL STRIP: NEGATIVE
LIPASE SERPL-CCNC: 113 U/L
LYMPHOCYTES # BLD AUTO: 1.5 K/UL
LYMPHOCYTES NFR BLD: 38.2 %
MAGNESIUM SERPL-MCNC: 1.7 MG/DL
MCH RBC QN AUTO: 32.6 PG
MCHC RBC AUTO-ENTMCNC: 33.3 G/DL
MCV RBC AUTO: 98 FL
MICROSCOPIC COMMENT: NORMAL
MONOCYTES # BLD AUTO: 0.3 K/UL
MONOCYTES NFR BLD: 8.4 %
NEUTROPHILS # BLD AUTO: 2 K/UL
NEUTROPHILS NFR BLD: 52.9 %
NITRITE UR QL STRIP: NEGATIVE
PH UR STRIP: 6 [PH] (ref 5–8)
PHOSPHATE SERPL-MCNC: 3.4 MG/DL
PLATELET # BLD AUTO: 204 K/UL
PMV BLD AUTO: 9.8 FL
POTASSIUM SERPL-SCNC: 4.7 MMOL/L
PROT SERPL-MCNC: 11.7 G/DL
PROT UR QL STRIP: ABNORMAL
PROTHROMBIN TIME: 11.2 SEC
RBC # BLD AUTO: 2.7 M/UL
RBC #/AREA URNS HPF: 2 /HPF (ref 0–4)
SODIUM SERPL-SCNC: 127 MMOL/L
SP GR UR STRIP: 1.01 (ref 1–1.03)
SPECIMEN SOURCE: NORMAL
SQUAMOUS #/AREA URNS HPF: 5 /HPF
TROPONIN I SERPL DL<=0.01 NG/ML-MCNC: <0.006 NG/ML
TSH SERPL DL<=0.005 MIU/L-ACNC: 0.94 UIU/ML
URN SPEC COLLECT METH UR: ABNORMAL
UROBILINOGEN UR STRIP-ACNC: NEGATIVE EU/DL
WBC # BLD AUTO: 3.82 K/UL
WBC #/AREA URNS HPF: 1 /HPF (ref 0–5)

## 2017-07-20 PROCEDURE — G0378 HOSPITAL OBSERVATION PER HR: HCPCS

## 2017-07-20 PROCEDURE — 86920 COMPATIBILITY TEST SPIN: CPT

## 2017-07-20 PROCEDURE — 83605 ASSAY OF LACTIC ACID: CPT

## 2017-07-20 PROCEDURE — 83880 ASSAY OF NATRIURETIC PEPTIDE: CPT

## 2017-07-20 PROCEDURE — 86900 BLOOD TYPING SEROLOGIC ABO: CPT

## 2017-07-20 PROCEDURE — 84484 ASSAY OF TROPONIN QUANT: CPT

## 2017-07-20 PROCEDURE — 63600175 PHARM REV CODE 636 W HCPCS: Performed by: EMERGENCY MEDICINE

## 2017-07-20 PROCEDURE — 96365 THER/PROPH/DIAG IV INF INIT: CPT

## 2017-07-20 PROCEDURE — 85610 PROTHROMBIN TIME: CPT

## 2017-07-20 PROCEDURE — 80053 COMPREHEN METABOLIC PANEL: CPT

## 2017-07-20 PROCEDURE — 96361 HYDRATE IV INFUSION ADD-ON: CPT

## 2017-07-20 PROCEDURE — 93010 ELECTROCARDIOGRAM REPORT: CPT | Mod: ,,, | Performed by: INTERNAL MEDICINE

## 2017-07-20 PROCEDURE — 81000 URINALYSIS NONAUTO W/SCOPE: CPT

## 2017-07-20 PROCEDURE — 84443 ASSAY THYROID STIM HORMONE: CPT

## 2017-07-20 PROCEDURE — 84100 ASSAY OF PHOSPHORUS: CPT

## 2017-07-20 PROCEDURE — 87040 BLOOD CULTURE FOR BACTERIA: CPT

## 2017-07-20 PROCEDURE — 85730 THROMBOPLASTIN TIME PARTIAL: CPT

## 2017-07-20 PROCEDURE — 83690 ASSAY OF LIPASE: CPT

## 2017-07-20 PROCEDURE — 87400 INFLUENZA A/B EACH AG IA: CPT | Mod: 59

## 2017-07-20 PROCEDURE — 83605 ASSAY OF LACTIC ACID: CPT | Mod: 91

## 2017-07-20 PROCEDURE — 25000003 PHARM REV CODE 250: Performed by: EMERGENCY MEDICINE

## 2017-07-20 PROCEDURE — 93005 ELECTROCARDIOGRAM TRACING: CPT

## 2017-07-20 PROCEDURE — 85025 COMPLETE CBC W/AUTO DIFF WBC: CPT

## 2017-07-20 PROCEDURE — 96367 TX/PROPH/DG ADDL SEQ IV INF: CPT

## 2017-07-20 PROCEDURE — 86850 RBC ANTIBODY SCREEN: CPT

## 2017-07-20 PROCEDURE — 83735 ASSAY OF MAGNESIUM: CPT

## 2017-07-20 PROCEDURE — 99285 EMERGENCY DEPT VISIT HI MDM: CPT | Mod: 25

## 2017-07-20 PROCEDURE — 87086 URINE CULTURE/COLONY COUNT: CPT

## 2017-07-20 PROCEDURE — 36415 COLL VENOUS BLD VENIPUNCTURE: CPT

## 2017-07-20 RX ORDER — FOLIC ACID 1 MG/1
1 TABLET ORAL DAILY
Status: DISCONTINUED | OUTPATIENT
Start: 2017-07-21 | End: 2017-07-31 | Stop reason: HOSPADM

## 2017-07-20 RX ORDER — ACETAMINOPHEN 325 MG/1
650 TABLET ORAL EVERY 8 HOURS PRN
Status: DISCONTINUED | OUTPATIENT
Start: 2017-07-20 | End: 2017-07-21

## 2017-07-20 RX ORDER — ATOVAQUONE 750 MG/5ML
750 SUSPENSION ORAL EVERY 12 HOURS
Status: DISCONTINUED | OUTPATIENT
Start: 2017-07-21 | End: 2017-07-22

## 2017-07-20 RX ORDER — ACETAMINOPHEN 10 MG/ML
1000 INJECTION, SOLUTION INTRAVENOUS ONCE
Status: COMPLETED | OUTPATIENT
Start: 2017-07-21 | End: 2017-07-21

## 2017-07-20 RX ORDER — CALCIUM CARBONATE 200(500)MG
500 TABLET,CHEWABLE ORAL 3 TIMES DAILY
Status: DISCONTINUED | OUTPATIENT
Start: 2017-07-21 | End: 2017-07-21

## 2017-07-20 RX ORDER — LACOSAMIDE 50 MG/1
100 TABLET ORAL 2 TIMES DAILY
Status: DISCONTINUED | OUTPATIENT
Start: 2017-07-21 | End: 2017-07-22

## 2017-07-20 RX ORDER — OXYCODONE AND ACETAMINOPHEN 5; 325 MG/1; MG/1
1 TABLET ORAL EVERY 6 HOURS PRN
Status: DISCONTINUED | OUTPATIENT
Start: 2017-07-21 | End: 2017-07-23

## 2017-07-20 RX ORDER — TENOFOVIR DISOPROXIL FUMARATE 300 MG/1
300 TABLET, FILM COATED ORAL
Status: DISCONTINUED | OUTPATIENT
Start: 2017-07-21 | End: 2017-07-21

## 2017-07-20 RX ORDER — ACETAMINOPHEN 650 MG/20.3ML
650 LIQUID ORAL
Status: COMPLETED | OUTPATIENT
Start: 2017-07-20 | End: 2017-07-20

## 2017-07-20 RX ORDER — ACETAMINOPHEN 325 MG/1
650 TABLET ORAL
Status: DISPENSED | OUTPATIENT
Start: 2017-07-20 | End: 2017-07-21

## 2017-07-20 RX ORDER — PANTOPRAZOLE SODIUM 40 MG/1
40 TABLET, DELAYED RELEASE ORAL DAILY
Status: DISCONTINUED | OUTPATIENT
Start: 2017-07-21 | End: 2017-07-23

## 2017-07-20 RX ORDER — CITALOPRAM 20 MG/1
20 TABLET, FILM COATED ORAL DAILY
Status: DISCONTINUED | OUTPATIENT
Start: 2017-07-21 | End: 2017-07-22

## 2017-07-20 RX ORDER — ACETAMINOPHEN 10 MG/ML
1000 INJECTION, SOLUTION INTRAVENOUS ONCE
Status: DISCONTINUED | OUTPATIENT
Start: 2017-07-21 | End: 2017-07-20

## 2017-07-20 RX ORDER — SODIUM CHLORIDE 9 MG/ML
INJECTION, SOLUTION INTRAVENOUS CONTINUOUS
Status: DISCONTINUED | OUTPATIENT
Start: 2017-07-20 | End: 2017-07-23

## 2017-07-20 RX ORDER — ONDANSETRON 2 MG/ML
4 INJECTION INTRAMUSCULAR; INTRAVENOUS EVERY 12 HOURS PRN
Status: DISCONTINUED | OUTPATIENT
Start: 2017-07-20 | End: 2017-07-31 | Stop reason: HOSPADM

## 2017-07-20 RX ORDER — METOPROLOL TARTRATE 25 MG/1
12.5 TABLET ORAL 2 TIMES DAILY
Status: DISCONTINUED | OUTPATIENT
Start: 2017-07-21 | End: 2017-07-22

## 2017-07-20 RX ADMIN — ACETAMINOPHEN 650 MG: 650 SOLUTION ORAL at 09:07

## 2017-07-20 RX ADMIN — VANCOMYCIN HYDROCHLORIDE 1000 MG: 1 INJECTION, POWDER, LYOPHILIZED, FOR SOLUTION INTRAVENOUS at 10:07

## 2017-07-20 RX ADMIN — ONDANSETRON 4 MG: 2 INJECTION INTRAMUSCULAR; INTRAVENOUS at 11:07

## 2017-07-20 RX ADMIN — SODIUM CHLORIDE 1443 ML: 0.9 INJECTION, SOLUTION INTRAVENOUS at 08:07

## 2017-07-20 RX ADMIN — SODIUM CHLORIDE: 0.9 INJECTION, SOLUTION INTRAVENOUS at 11:07

## 2017-07-20 RX ADMIN — PIPERACILLIN SODIUM AND TAZOBACTAM SODIUM 4.5 G: 4; .5 INJECTION, POWDER, LYOPHILIZED, FOR SOLUTION INTRAVENOUS at 09:07

## 2017-07-21 PROBLEM — Z93.1 S/P PERCUTANEOUS ENDOSCOPIC GASTROSTOMY (PEG) TUBE PLACEMENT: Status: ACTIVE | Noted: 2017-07-21

## 2017-07-21 PROBLEM — N17.9 ACUTE RENAL FAILURE: Status: ACTIVE | Noted: 2017-07-21

## 2017-07-21 LAB
ANION GAP SERPL CALC-SCNC: 5 MMOL/L
ANISOCYTOSIS BLD QL SMEAR: SLIGHT
BASOPHILS # BLD AUTO: ABNORMAL K/UL
BASOPHILS NFR BLD: 0 %
BASOPHILS NFR BLD: 0 %
BUN SERPL-MCNC: 29 MG/DL
C DIFF GDH STL QL: NEGATIVE
C DIFF TOX A+B STL QL IA: NEGATIVE
CALCIUM SERPL-MCNC: 7.8 MG/DL
CHLORIDE SERPL-SCNC: 108 MMOL/L
CO2 SERPL-SCNC: 17 MMOL/L
CREAT SERPL-MCNC: 1.7 MG/DL
DIFFERENTIAL METHOD: ABNORMAL
DIFFERENTIAL METHOD: ABNORMAL
EOSINOPHIL # BLD AUTO: ABNORMAL K/UL
EOSINOPHIL NFR BLD: 0 %
EOSINOPHIL NFR BLD: 0 %
ERYTHROCYTE [DISTWIDTH] IN BLOOD BY AUTOMATED COUNT: 20 %
ERYTHROCYTE [DISTWIDTH] IN BLOOD BY AUTOMATED COUNT: 20.5 %
EST. GFR  (AFRICAN AMERICAN): 45 ML/MIN/1.73 M^2
EST. GFR  (NON AFRICAN AMERICAN): 39 ML/MIN/1.73 M^2
GLUCOSE SERPL-MCNC: 79 MG/DL
HCT VFR BLD AUTO: 23.1 %
HCT VFR BLD AUTO: 23.7 %
HGB BLD-MCNC: 7.6 G/DL
HGB BLD-MCNC: 7.8 G/DL
LACTATE SERPL-SCNC: 1.2 MMOL/L
LYMPHOCYTES # BLD AUTO: ABNORMAL K/UL
LYMPHOCYTES NFR BLD: 10 %
LYMPHOCYTES NFR BLD: 5 %
MCH RBC QN AUTO: 32.2 PG
MCH RBC QN AUTO: 32.8 PG
MCHC RBC AUTO-ENTMCNC: 32.9 G/DL
MCHC RBC AUTO-ENTMCNC: 32.9 G/DL
MCV RBC AUTO: 100 FL
MCV RBC AUTO: 98 FL
METAMYELOCYTES NFR BLD MANUAL: 1 %
MONOCYTES # BLD AUTO: ABNORMAL K/UL
MONOCYTES NFR BLD: 0 %
MONOCYTES NFR BLD: 3 %
NEUTROPHILS # BLD AUTO: ABNORMAL K/UL
NEUTROPHILS NFR BLD: 17 %
NEUTROPHILS NFR BLD: 76 %
NEUTS BAND NFR BLD MANUAL: 11 %
NEUTS BAND NFR BLD MANUAL: 77 %
OB PNL STL: NEGATIVE
PLATELET # BLD AUTO: 135 K/UL
PLATELET # BLD AUTO: 159 K/UL
PLATELET BLD QL SMEAR: ABNORMAL
PMV BLD AUTO: 10.1 FL
PMV BLD AUTO: 10.5 FL
POTASSIUM SERPL-SCNC: 3.5 MMOL/L
PROCALCITONIN SERPL IA-MCNC: 81.3 NG/ML
RBC # BLD AUTO: 2.32 M/UL
RBC # BLD AUTO: 2.42 M/UL
SODIUM SERPL-SCNC: 130 MMOL/L
WBC # BLD AUTO: 2.93 K/UL
WBC # BLD AUTO: 3.97 K/UL

## 2017-07-21 PROCEDURE — 25000003 PHARM REV CODE 250: Performed by: INTERNAL MEDICINE

## 2017-07-21 PROCEDURE — 85027 COMPLETE CBC AUTOMATED: CPT

## 2017-07-21 PROCEDURE — 21400001 HC TELEMETRY ROOM

## 2017-07-21 PROCEDURE — 80048 BASIC METABOLIC PNL TOTAL CA: CPT

## 2017-07-21 PROCEDURE — 63600175 PHARM REV CODE 636 W HCPCS: Performed by: INTERNAL MEDICINE

## 2017-07-21 PROCEDURE — 85007 BL SMEAR W/DIFF WBC COUNT: CPT

## 2017-07-21 PROCEDURE — 63600175 PHARM REV CODE 636 W HCPCS: Performed by: NURSE PRACTITIONER

## 2017-07-21 PROCEDURE — 84145 PROCALCITONIN (PCT): CPT

## 2017-07-21 PROCEDURE — 25000003 PHARM REV CODE 250: Performed by: NURSE PRACTITIONER

## 2017-07-21 PROCEDURE — 63600175 PHARM REV CODE 636 W HCPCS: Performed by: EMERGENCY MEDICINE

## 2017-07-21 PROCEDURE — 87449 NOS EACH ORGANISM AG IA: CPT

## 2017-07-21 PROCEDURE — 83605 ASSAY OF LACTIC ACID: CPT

## 2017-07-21 PROCEDURE — 94760 N-INVAS EAR/PLS OXIMETRY 1: CPT

## 2017-07-21 PROCEDURE — 82272 OCCULT BLD FECES 1-3 TESTS: CPT

## 2017-07-21 PROCEDURE — 25000003 PHARM REV CODE 250: Performed by: EMERGENCY MEDICINE

## 2017-07-21 PROCEDURE — 36415 COLL VENOUS BLD VENIPUNCTURE: CPT

## 2017-07-21 RX ORDER — CALCIUM CARBONATE 200(500)MG
500 TABLET,CHEWABLE ORAL 3 TIMES DAILY
Status: DISCONTINUED | OUTPATIENT
Start: 2017-07-21 | End: 2017-07-22

## 2017-07-21 RX ORDER — ENOXAPARIN SODIUM 100 MG/ML
40 INJECTION SUBCUTANEOUS EVERY 24 HOURS
Status: DISCONTINUED | OUTPATIENT
Start: 2017-07-21 | End: 2017-07-22

## 2017-07-21 RX ORDER — IBUPROFEN 600 MG/1
600 TABLET ORAL ONCE
Status: COMPLETED | OUTPATIENT
Start: 2017-07-21 | End: 2017-07-21

## 2017-07-21 RX ORDER — ACETAMINOPHEN 325 MG/1
650 TABLET ORAL EVERY 6 HOURS PRN
Status: DISCONTINUED | OUTPATIENT
Start: 2017-07-21 | End: 2017-07-27

## 2017-07-21 RX ADMIN — CALCIUM CARBONATE (ANTACID) CHEW TAB 500 MG 500 MG: 500 CHEW TAB at 02:07

## 2017-07-21 RX ADMIN — IBUPROFEN 600 MG: 600 TABLET ORAL at 04:07

## 2017-07-21 RX ADMIN — ACETAMINOPHEN 650 MG: 325 TABLET ORAL at 02:07

## 2017-07-21 RX ADMIN — FOLIC ACID 1 MG: 1 TABLET ORAL at 09:07

## 2017-07-21 RX ADMIN — CALCIUM CARBONATE (ANTACID) CHEW TAB 500 MG 500 MG: 500 CHEW TAB at 09:07

## 2017-07-21 RX ADMIN — PIPERACILLIN SODIUM AND TAZOBACTAM SODIUM 4.5 G: 4; .5 INJECTION, POWDER, LYOPHILIZED, FOR SOLUTION INTRAVENOUS at 05:07

## 2017-07-21 RX ADMIN — LACOSAMIDE 100 MG: 50 TABLET, FILM COATED ORAL at 10:07

## 2017-07-21 RX ADMIN — ATOVAQUONE 750 MG: 750 SUSPENSION ORAL at 12:07

## 2017-07-21 RX ADMIN — SODIUM CHLORIDE 1000 ML: 0.9 INJECTION, SOLUTION INTRAVENOUS at 04:07

## 2017-07-21 RX ADMIN — SODIUM CHLORIDE 1000 ML: 0.9 INJECTION, SOLUTION INTRAVENOUS at 12:07

## 2017-07-21 RX ADMIN — CITALOPRAM HYDROBROMIDE 20 MG: 20 TABLET ORAL at 09:07

## 2017-07-21 RX ADMIN — Medication 12.5 MG: at 12:07

## 2017-07-21 RX ADMIN — CALCIUM CARBONATE (ANTACID) CHEW TAB 500 MG 500 MG: 500 CHEW TAB at 10:07

## 2017-07-21 RX ADMIN — ACETAMINOPHEN 650 MG: 325 TABLET ORAL at 10:07

## 2017-07-21 RX ADMIN — PIPERACILLIN SODIUM AND TAZOBACTAM SODIUM 4.5 G: 4; .5 INJECTION, POWDER, LYOPHILIZED, FOR SOLUTION INTRAVENOUS at 02:07

## 2017-07-21 RX ADMIN — ATOVAQUONE 750 MG: 750 SUSPENSION ORAL at 10:07

## 2017-07-21 RX ADMIN — SODIUM CHLORIDE 1000 ML: 0.9 INJECTION, SOLUTION INTRAVENOUS at 05:07

## 2017-07-21 RX ADMIN — SODIUM CHLORIDE: 0.9 INJECTION, SOLUTION INTRAVENOUS at 02:07

## 2017-07-21 RX ADMIN — ONDANSETRON 4 MG: 2 INJECTION INTRAMUSCULAR; INTRAVENOUS at 02:07

## 2017-07-21 RX ADMIN — ACETAMINOPHEN 1000 MG: 10 INJECTION, SOLUTION INTRAVENOUS at 12:07

## 2017-07-21 RX ADMIN — Medication 12.5 MG: at 09:07

## 2017-07-21 RX ADMIN — VANCOMYCIN HYDROCHLORIDE 750 MG: 750 INJECTION, POWDER, LYOPHILIZED, FOR SOLUTION INTRAVENOUS at 10:07

## 2017-07-21 RX ADMIN — Medication 12.5 MG: at 10:07

## 2017-07-21 RX ADMIN — ENOXAPARIN SODIUM 40 MG: 100 INJECTION SUBCUTANEOUS at 04:07

## 2017-07-21 RX ADMIN — ACETAMINOPHEN 650 MG: 325 TABLET ORAL at 09:07

## 2017-07-21 RX ADMIN — PANTOPRAZOLE SODIUM 40 MG: 40 TABLET, DELAYED RELEASE ORAL at 09:07

## 2017-07-21 RX ADMIN — LACOSAMIDE 100 MG: 50 TABLET, FILM COATED ORAL at 12:07

## 2017-07-21 NOTE — ASSESSMENT & PLAN NOTE
- Fever of unknown etiology  - CXR and UA negative  - Blood cultures with NGTD  - Urine culture pending  - ID following -- pt has AIDS but her immune system is recovering since she is now compliant with HAART. Her fever may be secondary to immune reconstitution inflammatory syndrome but still need to rule out infectious process  - Continue IV Vancomycin and Zosyn  - Antipyretics prn

## 2017-07-21 NOTE — ASSESSMENT & PLAN NOTE
- ID following  - Per ID has AIDS  - Last CD4 count on 07/10/17 -- 245  - Resume home medications

## 2017-07-21 NOTE — ASSESSMENT & PLAN NOTE
- PEG placed on 05/26/17 for purpose of HAART therapy -- has difficulty swallowing HIV/AIDS medications  - PEG tube site care
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
## 2017-07-21 NOTE — HOSPITAL COURSE
Wang Kebede is a 31 year old female admitted for Fever of unknown etiology. Empiric antibiotics given. Blood cultures upon admission with NGTD, repeat blood cultures with NGTD, and urine culture NGTD. ID consulted for hx of HIV. Per ID, pt has AIDS but her immune system is recovering since she is now compliant with HAART. 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 CXR  and UA negative as source of infection. Indium scan negative. Received PRBCs during hospitalization secondary to anemia, responded appropriately. Subsequently transferred to ICU due to respiratory distress and intubated on 07/23/17. Extubated on 07/26/17. Pt currently sating well on room air, % -- with no c/o chest pain, SOB. PT/OT recommending home health. ARF resolved. Creatinine peaked to 4.0. Creatinine currently 1.2. Nephrology contributed ARF as multifactorial - hypotension, sepsis. Afebrile > 48 hours. PT/OT recommend home health. Pt will follow up with PCP within 3 days after discharge for hospital follow up. Pt will also follow up with Dr. Mayo (ID) within 1 week after discharge for hospital follow up. Pt seen and examined on the date of discharge and determined suitable for discharge.

## 2017-07-21 NOTE — HPI
32 y/o female presents to ED with c/o fever that onset gradually 6 days ago. PMH includes HIV. She went to her PCP on Monday and given abx. Symptoms have been intermediate and moderate. No exacerbating or mitigating factors. She denies chills, N/V/D, Cp, palpitations, hematuria, congestion, cough, dysuria, and all other symptoms. She will be observed for symptom control and ID consult under the care of hospital medicine.

## 2017-07-21 NOTE — PLAN OF CARE
Problem: Patient Care Overview  Goal: Plan of Care Review  Outcome: Ongoing (interventions implemented as appropriate)  Free of falls/injury during shift  Pain managed effectively w/ PRN meds  Temperature trending down  Hypotension treated w/ 2L Bolus  Continuous IVFs infusing  Abx therapy initiated  ST on telemetry  Safety interventions in place

## 2017-07-21 NOTE — ASSESSMENT & PLAN NOTE
Etiology is unclear , will follow blood cultures .  Will do abdominal CT scan .  Send serum procalcitonin .  She is on empiric antibiotics with Vancomycin/zosyn.  Will also send  Urine for histoplasma antigen and send blood AFB cultures .    The fever could be due to IRIS-Immune reconstitution inflammatory syndrome (IRIS).
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
## 2017-07-21 NOTE — ASSESSMENT & PLAN NOTE
- May be secondary to dehydration  - IVF  - Will continue to monitor -- if worsens, will need to discontinue Vancomycin

## 2017-07-21 NOTE — SUBJECTIVE & OBJECTIVE
Interval History: Pt seen and examined. Pt has no complaints at this time. Will continue to monitor.     Review of Systems   Constitutional: Positive for fever.   HENT: Negative.    Eyes: Negative.    Respiratory: Negative for apnea, cough, choking, chest tightness, shortness of breath, wheezing and stridor.    Cardiovascular: Negative for chest pain, palpitations and leg swelling.   Gastrointestinal: Negative for abdominal pain, blood in stool, constipation, diarrhea, nausea and vomiting.   Endocrine: Negative.    Genitourinary: Negative.    Musculoskeletal: Negative.    Skin: Negative.    Allergic/Immunologic: Negative.    Neurological: Positive for weakness. Negative for dizziness, tremors, seizures, syncope, facial asymmetry, speech difficulty, light-headedness, numbness and headaches.   Hematological: Negative.    Psychiatric/Behavioral: Negative.      Objective:     Vital Signs (Most Recent):  Temp: (!) 100.4 °F (38 °C) (07/21/17 1200)  Pulse: (!) 132 (07/21/17 1317)  Resp: 20 (07/21/17 1200)  BP: (!) 96/49 (07/21/17 1200)  SpO2: 100 % (07/21/17 1200) Vital Signs (24h Range):  Temp:  [99.1 °F (37.3 °C)-103 °F (39.4 °C)] 100.4 °F (38 °C)  Pulse:  [110-132] 132  Resp:  [18-24] 20  SpO2:  [97 %-100 %] 100 %  BP: ()/(42-71) 96/49     Weight: 46.9 kg (103 lb 4.8 oz)  Body mass index is 20.17 kg/m².    Intake/Output Summary (Last 24 hours) at 07/21/17 1438  Last data filed at 07/21/17 0933   Gross per 24 hour   Intake                0 ml   Output                0 ml   Net                0 ml      Physical Exam   Constitutional: She is oriented to person, place, and time. She appears well-developed.   HENT:   Head: Normocephalic and atraumatic.   Eyes: Conjunctivae and EOM are normal.   Neck: Normal range of motion. Neck supple.   Cardiovascular: Intact distal pulses.  Tachycardia present.    Defibrillator noted   Pulmonary/Chest: Effort normal and breath sounds normal. No respiratory distress.   Abdominal:  Soft. Bowel sounds are normal. She exhibits no distension. There is no tenderness. There is no rebound and no guarding.   PEG tube noted   Neurological: She is alert and oriented to person, place, and time.   Skin: Skin is warm and dry. Capillary refill takes less than 2 seconds.   Psychiatric: She has a normal mood and affect. Her behavior is normal. Judgment and thought content normal.   Nursing note and vitals reviewed.      Significant Labs:   BMP:   Recent Labs  Lab 07/20/17  0800 07/21/17  0351   GLU 94 79   * 130*   K 4.7 3.5    108   CO2 19* 17*   BUN 31* 29*   CREATININE 1.4 1.7*   CALCIUM 9.3 7.8*   MG 1.7  --      CBC:   Recent Labs  Lab 07/20/17  0800 07/21/17  0351 07/21/17  0942   WBC 3.82* 2.93* 3.97   HGB 8.8* 7.8* 7.6*   HCT 26.4* 23.7* 23.1*    159 135*     Lactic Acid:   Recent Labs  Lab 07/20/17  0800 07/20/17  2333 07/21/17  0351   LACTATE 1.0 1.4 1.2       Significant Imaging:   Imaging Results          CT Abdomen Pelvis  Without Contrast (Final result)  Result time 07/21/17 14:40:49    Final result by Ghanshyam Vieyra MD (07/21/17 14:40:49)                 Impression:        Enlarged edematous left kidney could reflect pyelonephritis. Correlate with urinalysis.    Minimal consolidation or atelectasis is seen at the left lung base.     No evidence of intra-abdominal abscess formation    PEG tube appears satisfactory.     Fluid-filled bowel loops are seen throughout the abdomen which could reflect the patient's diarrhea and be related to infectious process.     Findings discussed with JEWELL PEDERSEN at 14:40:42        Electronically signed by: GHANSHYAM VIEYRA MD  Date:     07/21/17  Time:    14:40              Narrative:    CT of the abdomen and pelvis without contrast.    Clinical indication: Fever. Abdominal pain.    Findings: Lack of IV and oral contrast material grossly limited this examination.    The heart size is normal with pacing wires. Minimal consolidation or atelectasis  is seen at the left lung base. There is no free intraperitoneal air. No bowel obstruction. Bony windows show no acute abnormality.    The liver is unremarkable. Splenomegaly noted adrenal glands and pancreas appear unremarkable on this noncontrast study. Gallbladder surgically absent.    . The right kidney is enlarged and appears edematous with perinephric stranding; pyelonephritis is a differential consideration. No definite hydronephrosis or ureteral calculus is identified.    Within the pelvis there are radiopaque densities in posterior related to patient's prior surgery. Scattered fluid-filled loops of bowel are seen throughout the abdomen which could reflect diarrhea.    Extensive retroperitoneal adenopathy is seen which is similar to the prior study.    Bones demonstrate degenerative changes                             X-Ray Chest AP Portable (Final result)  Result time 07/20/17 20:50:48    Final result by Ghanshyam Haq III, MD (07/20/17 20:50:48)                 Impression:     No acute cardio pulmonary abnormality suggested.      Electronically signed by: GHANSHYAM HAQ MD  Date:     07/20/17  Time:    20:50              Narrative:    One view chest x-ray.    Clinical indication: sepsis. Shortness of breath.    Heart size is normal. The lung fields are clear with minimal chronic changes suggested.. No acute pulmonary infiltrate. Permanent pacing device/defibrillator again noted in position on the left.

## 2017-07-21 NOTE — ED NOTES
BLOOD SPECIMENS COLLECTED AT 20:00 ON 07/20/17.  THE 0800 COLLECTION TIME IS INCORRECT.  Incorrect collection time input by Luana Fox RN erroneously.

## 2017-07-21 NOTE — PROGRESS NOTES
Per manual BP pressure @ 72/42; pt resting quietly in bed; asymptomatic; notified MD Bauman; new orders 1L NS bolus x 2; will admin as ordered

## 2017-07-21 NOTE — ED NOTES
Patient's PEG tube flushed without difficulty with 50 ml water.  Medication given as ordered.  Patient tolerated well.  PEG tube clamped.
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
## 2017-07-21 NOTE — PLAN OF CARE
CM met with patient regarding discharge planning.  The patient plans to return home where she lives with boyfriend and children upon discharge.  The patient reports she has walker at home however she does not use the device.  Patient reports she is receiving home health services but could not recall the name of the agency providing services.  The patient with complaints of being hot.  Thermostat turned down to 60 degrees, ice and ice water provided per tech.  The patient denies any other needs at this time.       07/21/17 1027   Discharge Assessment   Assessment Type Discharge Planning Assessment   Confirmed/corrected address and phone number on facesheet? Yes   Assessment information obtained from? Patient   Expected Length of Stay (days) 3   Communicated expected length of stay with patient/caregiver yes   Prior to hospitilization cognitive status: Alert/Oriented   Prior to hospitalization functional status: Independent   Current cognitive status: Alert/Oriented   Current Functional Status: Independent   Arrived From home or self-care   Lives With child(nalini), adult;significant other   Able to Return to Prior Arrangements yes   Is patient able to care for self after discharge? Yes   How many people do you have in your home that can help with your care after discharge? 1   Who are your caregiver(s) and their phone number(s)? Ashutosh Stevens [phone]   Patient's perception of discharge disposition admitted as an inpatient;home or selfcare   Readmission Within The Last 30 Days no previous admission in last 30 days   Patient currently being followed by outpatient case management? No   Patient currently receives home health services? Yes   Patient previously received home health services and would like to resume services if necessary? Yes   If yes, name of home health provider: Patient cannot recall the name   Does the patient currently use HME? No   Patient currently receives private duty nursing? No   Patient currently  receives any other outside agency services? No   Equipment Currently Used at Home none   Do you have any problems affording any of your prescribed medications? No   Is the patient taking medications as prescribed? yes   Do you have any financial concerns preventing you from receiving the healthcare you need? No   Does the patient have transportation to healthcare appointments? Yes   Transportation Available family or friend will provide   On Dialysis? No   Does the patient receive services at the [facility]mad[facility] Clinic? No   Are there any open cases? No   Discharge Plan A Home   Discharge Plan B Home   Patient/Family In Agreement With Plan yes

## 2017-07-21 NOTE — ASSESSMENT & PLAN NOTE
- Pt sees Dr. Olmedo (hematology/oncology), last clinic visit 07/05/17  - Per Hematology/Oncology on office visit -- she has normocytic anemia and possibly due to HIV/AIDS (hx of noncompliance but now compliant). Pt reports hx of sickle cell disease but hemoglobin electrophoresis was normal on 2 separate occassions. If her anemia does not improve after 2 months of continued HAART therapy, refer back to Hematology  - Will continue to monitor and transfuse as needed

## 2017-07-21 NOTE — ED NOTES
Patient resting quietly in bed.  No further needs voiced at this time.  Patient is stable, denies any further needs at this time.  Will continue to monitor.

## 2017-07-21 NOTE — HPI
31 year old woman with AIDs well known to me . She has long standing history of poor compliance with medications .However during he rlast admission, after much discussion, she agreed to get a PEG tube inserted and to get her HAART therapy through the PEG tube. She had recent lab work done on 07/10 and cd4-245 ,cd%-10.6 ,  Previous lab data-a month ago-cd4 -4, cd4% 2.4  HIV viral load -1271 -decreased from 180,773 a month ago .  She now presented with fever -T max 103 . She denies any history of cough or headache. No abdominal pain .  Since admission, cultures are still pending . Chest x-ray did not show any acute abnormality. UA is normal .
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
## 2017-07-21 NOTE — CONSULTS
Wang Kebede [id number] is a 31 y.o. female who has been consulted for vancomycin dosing.  Dx:Fever, unspecified fever cause  The patient has the following labs:     Date Creatinine (mg/dl)    BUN WBC Count   7/20/2017 Estimated Creatinine Clearance: 41.4 mL/min (based on Cr of 1.4). Lab Results   Component Value Date    BUN 31 (H) 07/20/2017     Lab Results   Component Value Date    WBC 3.82 (L) 07/20/2017      which calculates to an Estimated Creatinine Clearance: 41.4 mL/min (based on Cr of 1.4)..       Current weight is 46.9 kg (103 lb 4.8 oz)  One time dose in ER of 1000mg  The patient will be started on vancomycin at a dose of 750 mg every 24 hours. Patient will be followed by pharmacy for changes in renal function, toxicity, and efficacy.  The vancomycin trough has been ordered for 7/22 at 21:45.      Thank you for allowing us to participate in this patient's care.     Kleber Ornelas

## 2017-07-21 NOTE — PROGRESS NOTES
[facility] Hospital Medicine  Progress Note    Patient Name: Wang Kebede  MRN: [id number]  Patient Class: IP- Inpatient   Admission Date: 7/20/2017  Length of Stay: 0 days  Attending Physician: Ilene Parker MD  Primary Care Provider: Levi Moncada MD        Subjective:     Principal Problem:Fever    HPI:  30 y/o female presents to ED with c/o fever that onset gradually 6 days ago. PMH includes HIV. She went to her PCP on Monday and given abx. Symptoms have been intermediate and moderate. No exacerbating or mitigating factors. She denies chills, N/V/D, Cp, palpitations, hematuria, congestion, cough, dysuria, and all other symptoms. She will be observed for symptom control and ID consult under the care of hospital medicine.    Hospital Course:  Wang Kebede is a 31 year old female admitted for Fever. Pt currently receiving IV Vancomycin and Zosyn. Blood cultures with NGTD, urine culture pending. ID consulted for hx of HIV. Per ID, pt has AIDS but her immune system is recovering since she in now compliant with HAART. Her fever could be secondary to immune reconstitution inflammatory syndrome (IRIS). CXR and UA negative as source of infection. AM labs revealed anemia and ARF. Pt has hx of sickle cell anemia -- pt denies pain/crisis at this time. Will continue to monitor. ARF may be secondary to dehydration, will continue to hydrate and monitor.     Interval History: Pt seen and examined. Pt has no complaints at this time. Will continue to monitor.     Review of Systems   Constitutional: Positive for fever.   HENT: Negative.    Eyes: Negative.    Respiratory: Negative for apnea, cough, choking, chest tightness, shortness of breath, wheezing and stridor.    Cardiovascular: Negative for chest pain, palpitations and leg swelling.   Gastrointestinal: Negative for abdominal pain, blood in stool, constipation, diarrhea, nausea and vomiting.   Endocrine: Negative.     Genitourinary: Negative.    Musculoskeletal: Negative.    Skin: Negative.    Allergic/Immunologic: Negative.    Neurological: Positive for weakness. Negative for dizziness, tremors, seizures, syncope, facial asymmetry, speech difficulty, light-headedness, numbness and headaches.   Hematological: Negative.    Psychiatric/Behavioral: Negative.      Objective:     Vital Signs (Most Recent):  Temp: (!) 100.4 °F (38 °C) (07/21/17 1200)  Pulse: (!) 132 (07/21/17 1317)  Resp: 20 (07/21/17 1200)  BP: (!) 96/49 (07/21/17 1200)  SpO2: 100 % (07/21/17 1200) Vital Signs (24h Range):  Temp:  [99.1 °F (37.3 °C)-103 °F (39.4 °C)] 100.4 °F (38 °C)  Pulse:  [110-132] 132  Resp:  [18-24] 20  SpO2:  [97 %-100 %] 100 %  BP: ()/(42-71) 96/49     Weight: 46.9 kg (103 lb 4.8 oz)  Body mass index is 20.17 kg/m².    Intake/Output Summary (Last 24 hours) at 07/21/17 1438  Last data filed at 07/21/17 0933   Gross per 24 hour   Intake                0 ml   Output                0 ml   Net                0 ml      Physical Exam   Constitutional: She is oriented to person, place, and time. She appears well-developed.   HENT:   Head: Normocephalic and atraumatic.   Eyes: Conjunctivae and EOM are normal.   Neck: Normal range of motion. Neck supple.   Cardiovascular: Intact distal pulses.  Tachycardia present.    Defibrillator noted   Pulmonary/Chest: Effort normal and breath sounds normal. No respiratory distress.   Abdominal: Soft. Bowel sounds are normal. She exhibits no distension. There is no tenderness. There is no rebound and no guarding.   PEG tube noted   Neurological: She is alert and oriented to person, place, and time.   Skin: Skin is warm and dry. Capillary refill takes less than 2 seconds.   Psychiatric: She has a normal mood and affect. Her behavior is normal. Judgment and thought content normal.   Nursing note and vitals reviewed.      Significant Labs:   BMP:   Recent Labs  Lab 07/20/17  0800 07/21/17  0351   GLU 94 79   NA  127* 130*   K 4.7 3.5    108   CO2 19* 17*   BUN 31* 29*   CREATININE 1.4 1.7*   CALCIUM 9.3 7.8*   MG 1.7  --      CBC:   Recent Labs  Lab 07/20/17  0800 07/21/17  0351 07/21/17  0942   WBC 3.82* 2.93* 3.97   HGB 8.8* 7.8* 7.6*   HCT 26.4* 23.7* 23.1*    159 135*     Lactic Acid:   Recent Labs  Lab 07/20/17  0800 07/20/17  2333 07/21/17  0351   LACTATE 1.0 1.4 1.2       Significant Imaging:   Imaging Results          CT Abdomen Pelvis  Without Contrast (Final result)  Result time 07/21/17 14:40:49    Final result by Ghanshyam Vieyra MD (07/21/17 14:40:49)                 Impression:        Enlarged edematous left kidney could reflect pyelonephritis. Correlate with urinalysis.    Minimal consolidation or atelectasis is seen at the left lung base.     No evidence of intra-abdominal abscess formation    PEG tube appears satisfactory.     Fluid-filled bowel loops are seen throughout the abdomen which could reflect the patient's diarrhea and be related to infectious process.     Findings discussed with JEWELL PEDERSEN at 14:40:42        Electronically signed by: GHANSHYAM VIEYRA MD  Date:     07/21/17  Time:    14:40              Narrative:    CT of the abdomen and pelvis without contrast.    Clinical indication: Fever. Abdominal pain.    Findings: Lack of IV and oral contrast material grossly limited this examination.    The heart size is normal with pacing wires. Minimal consolidation or atelectasis is seen at the left lung base. There is no free intraperitoneal air. No bowel obstruction. Bony windows show no acute abnormality.    The liver is unremarkable. Splenomegaly noted adrenal glands and pancreas appear unremarkable on this noncontrast study. Gallbladder surgically absent.    . The right kidney is enlarged and appears edematous with perinephric stranding; pyelonephritis is a differential consideration. No definite hydronephrosis or ureteral calculus is identified.    Within the pelvis there are radiopaque  densities in posterior related to patient's prior surgery. Scattered fluid-filled loops of bowel are seen throughout the abdomen which could reflect diarrhea.    Extensive retroperitoneal adenopathy is seen which is similar to the prior study.    Bones demonstrate degenerative changes                             X-Ray Chest AP Portable (Final result)  Result time 07/20/17 20:50:48    Final result by Ghanshyam Haq III, MD (07/20/17 20:50:48)                 Impression:     No acute cardio pulmonary abnormality suggested.      Electronically signed by: GHANSHYAM HAQ MD  Date:     07/20/17  Time:    20:50              Narrative:    One view chest x-ray.    Clinical indication: sepsis. Shortness of breath.    Heart size is normal. The lung fields are clear with minimal chronic changes suggested.. No acute pulmonary infiltrate. Permanent pacing device/defibrillator again noted in position on the left.                            Assessment/Plan:      * Fever    - Fever of unknown etiology  - CXR and UA negative  - Blood cultures with NGTD  - Urine culture pending  - ID following -- pt has AIDS but her immune system is recovering since she is now compliant with HAART. Her fever may be secondary to immune reconstitution inflammatory syndrome but still need to rule out infectious process  - Continue IV Vancomycin and Zosyn  - Antipyretics prn          HIV (human immunodeficiency virus infection)    - ID following  - Per ID has AIDS  - Last CD4 count on 07/10/17 -- 245  - Resume home medications          Hyponatremia    - Improving -- currently 130 from 127  - Will continue to monitor          Acute renal failure    - May be secondary to dehydration  - IVF  - Will continue to monitor -- if worsens, will need to discontinue Vancomycin          Anemia    - Pt sees Dr. Olmedo (hematology/oncology), last clinic visit 07/05/17  - Per Hematology/Oncology on office visit -- she has normocytic anemia and possibly due to  HIV/AIDS (hx of noncompliance but now compliant). Pt reports hx of sickle cell disease but hemoglobin electrophoresis was normal on 2 separate occassions. If her anemia does not improve after 2 months of continued HAART therapy, refer back to Hematology  - Will continue to monitor and transfuse as needed            S/P percutaneous endoscopic gastrostomy (PEG) tube placement    - PEG placed on 05/26/17 for purpose of HAART therapy -- has difficulty swallowing HIV/AIDS medications  - PEG tube site care          S/P implantation of automatic cardioverter/defibrillator (AICD)              VTE Risk Mitigation         Ordered     enoxaparin injection 40 mg  Daily     Route:  Subcutaneous        07/21/17 0005     Medium Risk of VTE  Once      07/20/17 8937          MICHELLE Ortega  Department of Hospital Medicine   [facility]

## 2017-07-21 NOTE — PLAN OF CARE
Problem: Patient Care Overview  Goal: Plan of Care Review  Outcome: Ongoing (interventions implemented as appropriate)  AAO x 4.   Pt has had temp most of shift. Tylenol and Ibuprofen given per order. Cooling blanket applied.  IV fluids and abx administered per order.   Pt remained free of falls this shift.   Plan of care reviewed. Patient verbalizes understanding.   No complaints of pain this shift.  Abdominal CT done this shift.  Meds and Boost Supplement given through PEG tube per patient request.  Pt moving/turing independently.   Patient ST on monitor.   Bed low, side rails up x 2, wheels locked, call light in reach.   Patient instructed to call for assistance.   Will continue to monitor.

## 2017-07-21 NOTE — ASSESSMENT & PLAN NOTE
She has AIDS but her immune system is recovering since she now started to be compliant with HAART .  This fever could be due to IRIS .Immune reconstitution inflammatory syndrome (IRIS).  However , will need to rule out other acute infectious processes.  She is on darunavir -cobicistat and descovy.    Will closely monitor renal function .

## 2017-07-22 LAB
ALBUMIN SERPL BCP-MCNC: 1.6 G/DL
ALP SERPL-CCNC: 54 U/L
ALT SERPL W/O P-5'-P-CCNC: 27 U/L
ANION GAP SERPL CALC-SCNC: 9 MMOL/L
ANISOCYTOSIS BLD QL SMEAR: ABNORMAL
AST SERPL-CCNC: 47 U/L
BACTERIA UR CULT: NORMAL
BACTERIA UR CULT: NORMAL
BASOPHILS NFR BLD: 1 %
BILIRUB SERPL-MCNC: 0.3 MG/DL
BLD PROD TYP BPU: NORMAL
BLOOD UNIT EXPIRATION DATE: NORMAL
BLOOD UNIT TYPE CODE: 5100
BLOOD UNIT TYPE: NORMAL
BUN SERPL-MCNC: 41 MG/DL
CALCIUM SERPL-MCNC: 7.4 MG/DL
CHLORIDE SERPL-SCNC: 115 MMOL/L
CO2 SERPL-SCNC: 10 MMOL/L
CODING SYSTEM: NORMAL
CREAT SERPL-MCNC: 2.6 MG/DL
DIFFERENTIAL METHOD: ABNORMAL
DISPENSE STATUS: NORMAL
DOHLE BOD BLD QL SMEAR: PRESENT
EOSINOPHIL NFR BLD: 0 %
ERYTHROCYTE [DISTWIDTH] IN BLOOD BY AUTOMATED COUNT: 20.3 %
EST. GFR  (AFRICAN AMERICAN): 27 ML/MIN/1.73 M^2
EST. GFR  (NON AFRICAN AMERICAN): 24 ML/MIN/1.73 M^2
GLUCOSE SERPL-MCNC: 85 MG/DL
HCT VFR BLD AUTO: 19.2 %
HGB BLD-MCNC: 6.6 G/DL
LYMPHOCYTES NFR BLD: 16 %
MCH RBC QN AUTO: 33 PG
MCHC RBC AUTO-ENTMCNC: 34.4 G/DL
MCV RBC AUTO: 96 FL
MONOCYTES NFR BLD: 2 %
NEUTROPHILS NFR BLD: 45 %
NEUTS BAND NFR BLD MANUAL: 36 %
NUM UNITS TRANS PACKED RBC: NORMAL
PLATELET # BLD AUTO: 98 K/UL
PLATELET BLD QL SMEAR: ABNORMAL
PMV BLD AUTO: 9.6 FL
POCT GLUCOSE: 138 MG/DL (ref 70–110)
POTASSIUM SERPL-SCNC: 3.6 MMOL/L
PROT SERPL-MCNC: 7.2 G/DL
RBC # BLD AUTO: 2 M/UL
SODIUM SERPL-SCNC: 134 MMOL/L
SPHEROCYTES BLD QL SMEAR: ABNORMAL
TOXIC GRANULES BLD QL SMEAR: PRESENT
WBC # BLD AUTO: 2.01 K/UL

## 2017-07-22 PROCEDURE — 80053 COMPREHEN METABOLIC PANEL: CPT

## 2017-07-22 PROCEDURE — P9016 RBC LEUKOCYTES REDUCED: HCPCS

## 2017-07-22 PROCEDURE — 85007 BL SMEAR W/DIFF WBC COUNT: CPT

## 2017-07-22 PROCEDURE — 25000003 PHARM REV CODE 250: Performed by: INTERNAL MEDICINE

## 2017-07-22 PROCEDURE — 87385 HISTOPLASMA CAPSUL AG IA: CPT

## 2017-07-22 PROCEDURE — 25000003 PHARM REV CODE 250: Performed by: NURSE PRACTITIONER

## 2017-07-22 PROCEDURE — 87103 BLOOD FUNGUS CULTURE: CPT

## 2017-07-22 PROCEDURE — 25000003 PHARM REV CODE 250: Performed by: EMERGENCY MEDICINE

## 2017-07-22 PROCEDURE — 36415 COLL VENOUS BLD VENIPUNCTURE: CPT

## 2017-07-22 PROCEDURE — 63600175 PHARM REV CODE 636 W HCPCS: Performed by: NURSE PRACTITIONER

## 2017-07-22 PROCEDURE — 63600175 PHARM REV CODE 636 W HCPCS: Performed by: EMERGENCY MEDICINE

## 2017-07-22 PROCEDURE — 63600175 PHARM REV CODE 636 W HCPCS: Performed by: INTERNAL MEDICINE

## 2017-07-22 PROCEDURE — 93010 ELECTROCARDIOGRAM REPORT: CPT | Mod: ,,, | Performed by: INTERNAL MEDICINE

## 2017-07-22 PROCEDURE — 21400001 HC TELEMETRY ROOM

## 2017-07-22 PROCEDURE — 85027 COMPLETE CBC AUTOMATED: CPT

## 2017-07-22 PROCEDURE — 93005 ELECTROCARDIOGRAM TRACING: CPT

## 2017-07-22 RX ORDER — HYDROCODONE BITARTRATE AND ACETAMINOPHEN 500; 5 MG/1; MG/1
TABLET ORAL
Status: DISCONTINUED | OUTPATIENT
Start: 2017-07-22 | End: 2017-07-24

## 2017-07-22 RX ORDER — VORICONAZOLE 200 MG/1
200 TABLET, FILM COATED ORAL 2 TIMES DAILY
Status: DISCONTINUED | OUTPATIENT
Start: 2017-07-22 | End: 2017-07-27

## 2017-07-22 RX ORDER — DIPHENHYDRAMINE HYDROCHLORIDE 50 MG/ML
12.5 INJECTION INTRAMUSCULAR; INTRAVENOUS EVERY 6 HOURS PRN
Status: DISCONTINUED | OUTPATIENT
Start: 2017-07-22 | End: 2017-07-31 | Stop reason: HOSPADM

## 2017-07-22 RX ORDER — LINEZOLID 2 MG/ML
600 INJECTION, SOLUTION INTRAVENOUS
Status: DISCONTINUED | OUTPATIENT
Start: 2017-07-22 | End: 2017-07-23

## 2017-07-22 RX ORDER — CLARITHROMYCIN 500 MG/1
500 TABLET, FILM COATED ORAL EVERY 12 HOURS
Status: DISCONTINUED | OUTPATIENT
Start: 2017-07-22 | End: 2017-07-22

## 2017-07-22 RX ORDER — ATOVAQUONE 750 MG/5ML
750 SUSPENSION ORAL EVERY 12 HOURS
Status: DISCONTINUED | OUTPATIENT
Start: 2017-07-22 | End: 2017-07-31 | Stop reason: HOSPADM

## 2017-07-22 RX ORDER — CLARITHROMYCIN 500 MG/1
500 TABLET, FILM COATED ORAL EVERY 12 HOURS
Status: DISCONTINUED | OUTPATIENT
Start: 2017-07-23 | End: 2017-07-22

## 2017-07-22 RX ORDER — METOPROLOL TARTRATE 25 MG/1
12.5 TABLET ORAL 2 TIMES DAILY
Status: DISCONTINUED | OUTPATIENT
Start: 2017-07-22 | End: 2017-07-31 | Stop reason: HOSPADM

## 2017-07-22 RX ORDER — CALCIUM CARBONATE 200(500)MG
500 TABLET,CHEWABLE ORAL 3 TIMES DAILY
Status: DISCONTINUED | OUTPATIENT
Start: 2017-07-23 | End: 2017-07-22

## 2017-07-22 RX ORDER — TENOFOVIR DISOPROXIL FUMARATE 300 MG/1
300 TABLET, FILM COATED ORAL
Status: DISCONTINUED | OUTPATIENT
Start: 2017-07-22 | End: 2017-07-22

## 2017-07-22 RX ORDER — VORICONAZOLE 40 MG/ML
200 POWDER, FOR SUSPENSION ORAL EVERY 12 HOURS
Status: DISCONTINUED | OUTPATIENT
Start: 2017-07-22 | End: 2017-07-22

## 2017-07-22 RX ORDER — LACOSAMIDE 50 MG/1
100 TABLET ORAL 2 TIMES DAILY
Status: DISCONTINUED | OUTPATIENT
Start: 2017-07-22 | End: 2017-07-31 | Stop reason: HOSPADM

## 2017-07-22 RX ORDER — ALPRAZOLAM 0.25 MG/1
0.25 TABLET ORAL 2 TIMES DAILY PRN
Status: DISCONTINUED | OUTPATIENT
Start: 2017-07-22 | End: 2017-07-23

## 2017-07-22 RX ORDER — ENOXAPARIN SODIUM 100 MG/ML
30 INJECTION SUBCUTANEOUS EVERY 24 HOURS
Status: DISCONTINUED | OUTPATIENT
Start: 2017-07-22 | End: 2017-07-22

## 2017-07-22 RX ORDER — TENOFOVIR DISOPROXIL FUMARATE 300 MG/1
300 TABLET, FILM COATED ORAL
Status: DISCONTINUED | OUTPATIENT
Start: 2017-07-25 | End: 2017-07-23

## 2017-07-22 RX ORDER — ATOVAQUONE 750 MG/5ML
750 SUSPENSION ORAL EVERY 12 HOURS
Status: DISCONTINUED | OUTPATIENT
Start: 2017-07-23 | End: 2017-07-22

## 2017-07-22 RX ORDER — VORICONAZOLE 200 MG/1
200 TABLET, FILM COATED ORAL 2 TIMES DAILY
Status: DISCONTINUED | OUTPATIENT
Start: 2017-07-22 | End: 2017-07-22

## 2017-07-22 RX ORDER — METOPROLOL TARTRATE 25 MG/1
12.5 TABLET ORAL 2 TIMES DAILY
Status: DISCONTINUED | OUTPATIENT
Start: 2017-07-23 | End: 2017-07-22

## 2017-07-22 RX ORDER — ETHAMBUTOL HYDROCHLORIDE 400 MG/1
800 TABLET, FILM COATED ORAL DAILY
Status: DISCONTINUED | OUTPATIENT
Start: 2017-07-22 | End: 2017-07-31 | Stop reason: HOSPADM

## 2017-07-22 RX ORDER — CLARITHROMYCIN 500 MG/1
500 TABLET, FILM COATED ORAL EVERY 12 HOURS
Status: DISCONTINUED | OUTPATIENT
Start: 2017-07-22 | End: 2017-07-31 | Stop reason: HOSPADM

## 2017-07-22 RX ORDER — EMTRICITABINE 200 MG/1
200 CAPSULE ORAL
Status: DISCONTINUED | OUTPATIENT
Start: 2017-07-22 | End: 2017-07-22

## 2017-07-22 RX ORDER — LACOSAMIDE 50 MG/1
100 TABLET ORAL 2 TIMES DAILY
Status: DISCONTINUED | OUTPATIENT
Start: 2017-07-23 | End: 2017-07-22

## 2017-07-22 RX ORDER — METOPROLOL TARTRATE 1 MG/ML
5 INJECTION, SOLUTION INTRAVENOUS ONCE
Status: COMPLETED | OUTPATIENT
Start: 2017-07-22 | End: 2017-07-22

## 2017-07-22 RX ORDER — EMTRICITABINE 200 MG/1
200 CAPSULE ORAL
Status: DISCONTINUED | OUTPATIENT
Start: 2017-07-25 | End: 2017-07-27

## 2017-07-22 RX ORDER — KETOROLAC TROMETHAMINE 30 MG/ML
30 INJECTION, SOLUTION INTRAMUSCULAR; INTRAVENOUS ONCE
Status: COMPLETED | OUTPATIENT
Start: 2017-07-22 | End: 2017-07-22

## 2017-07-22 RX ORDER — MEROPENEM AND SODIUM CHLORIDE 500 MG/50ML
500 INJECTION, SOLUTION INTRAVENOUS
Status: DISCONTINUED | OUTPATIENT
Start: 2017-07-22 | End: 2017-07-23

## 2017-07-22 RX ORDER — CALCIUM CARBONATE 200(500)MG
500 TABLET,CHEWABLE ORAL 3 TIMES DAILY
Status: DISCONTINUED | OUTPATIENT
Start: 2017-07-22 | End: 2017-07-23

## 2017-07-22 RX ORDER — VORICONAZOLE 200 MG/1
200 TABLET, FILM COATED ORAL 2 TIMES DAILY
Status: DISCONTINUED | OUTPATIENT
Start: 2017-07-23 | End: 2017-07-22

## 2017-07-22 RX ADMIN — CALCIUM CARBONATE (ANTACID) CHEW TAB 500 MG 500 MG: 500 CHEW TAB at 06:07

## 2017-07-22 RX ADMIN — METOPROLOL TARTRATE 5 MG: 5 INJECTION INTRAVENOUS at 06:07

## 2017-07-22 RX ADMIN — CITALOPRAM HYDROBROMIDE 20 MG: 20 TABLET ORAL at 08:07

## 2017-07-22 RX ADMIN — LINEZOLID 600 MG: 600 INJECTION, SOLUTION INTRAVENOUS at 03:07

## 2017-07-22 RX ADMIN — CALCIUM CARBONATE (ANTACID) CHEW TAB 500 MG 500 MG: 500 CHEW TAB at 01:07

## 2017-07-22 RX ADMIN — CALCIUM CARBONATE (ANTACID) CHEW TAB 500 MG 500 MG: 500 CHEW TAB at 10:07

## 2017-07-22 RX ADMIN — TENOFOVIR DISOPROXIL FUMARATE 300 MG: 300 TABLET, COATED ORAL at 11:07

## 2017-07-22 RX ADMIN — CLARITHROMYCIN 500 MG: 500 TABLET, FILM COATED ORAL at 10:07

## 2017-07-22 RX ADMIN — LACOSAMIDE 100 MG: 50 TABLET, FILM COATED ORAL at 04:07

## 2017-07-22 RX ADMIN — VORICONAZOLE 200 MG: 200 TABLET, FILM COATED ORAL at 11:07

## 2017-07-22 RX ADMIN — KETOROLAC TROMETHAMINE 30 MG: 30 INJECTION, SOLUTION INTRAMUSCULAR at 02:07

## 2017-07-22 RX ADMIN — DIPHENHYDRAMINE HYDROCHLORIDE 12.5 MG: 50 INJECTION, SOLUTION INTRAMUSCULAR; INTRAVENOUS at 09:07

## 2017-07-22 RX ADMIN — FOLIC ACID 1 MG: 1 TABLET ORAL at 08:07

## 2017-07-22 RX ADMIN — VORICONAZOLE 200 MG: 200 TABLET, FILM COATED ORAL at 10:07

## 2017-07-22 RX ADMIN — LINEZOLID 600 MG: 600 INJECTION, SOLUTION INTRAVENOUS at 10:07

## 2017-07-22 RX ADMIN — OXYCODONE HYDROCHLORIDE AND ACETAMINOPHEN 1 TABLET: 5; 325 TABLET ORAL at 04:07

## 2017-07-22 RX ADMIN — CLARITHROMYCIN 500 MG: 500 TABLET ORAL at 11:07

## 2017-07-22 RX ADMIN — EMTRICITABINE 200 MG: 200 CAPSULE ORAL at 11:07

## 2017-07-22 RX ADMIN — PROMETHAZINE HYDROCHLORIDE 6.25 MG: 25 INJECTION INTRAMUSCULAR; INTRAVENOUS at 05:07

## 2017-07-22 RX ADMIN — ONDANSETRON 4 MG: 2 INJECTION INTRAMUSCULAR; INTRAVENOUS at 06:07

## 2017-07-22 RX ADMIN — PIPERACILLIN AND TAZOBACTAM 4.5 G: 4; .5 INJECTION, POWDER, FOR SOLUTION INTRAVENOUS at 12:07

## 2017-07-22 RX ADMIN — SODIUM CHLORIDE 250 ML: 0.9 INJECTION, SOLUTION INTRAVENOUS at 06:07

## 2017-07-22 RX ADMIN — Medication 12.5 MG: at 10:07

## 2017-07-22 RX ADMIN — PANTOPRAZOLE SODIUM 40 MG: 40 TABLET, DELAYED RELEASE ORAL at 08:07

## 2017-07-22 RX ADMIN — ATOVAQUONE 750 MG: 750 SUSPENSION ORAL at 08:07

## 2017-07-22 RX ADMIN — MEROPENEM AND SODIUM CHLORIDE 500 MG: 500 INJECTION, SOLUTION INTRAVENOUS at 01:07

## 2017-07-22 RX ADMIN — ETHAMBUTOL HYDROCHLORIDE 800 MG: 400 TABLET, FILM COATED ORAL at 11:07

## 2017-07-22 NOTE — ASSESSMENT & PLAN NOTE
- May be secondary to dehydration, hypotension, and/or Vancomycin  - Vancomycin discontinued  - Avoid Nephrotoxic medications include NSAIDS  - Zyvox started  - Continue IVF  - Will continue to monitor -- if creatinine continues to worsen, will consult Nephrology

## 2017-07-22 NOTE — ASSESSMENT & PLAN NOTE
- Hgb/Hct on AM labs 6.6/19.2  - Pt currently being transfused with 1 units of PRBCs  - Supplemental oxygen prn, keep O2 sats > 92%  - Pt sees Dr. Olmedo (hematology/oncology), last clinic visit 07/05/17  - Per Hematology/Oncology on that clinic visit -- she has normocytic anemia and possibly due to HIV/AIDS (hx of noncompliance but now compliant). Pt reports hx of sickle cell disease but hemoglobin electrophoresis was normal on 2 separate occassions. If her anemia does not improve after 2 months of continued HAART therapy, refer back to Hematology  - Daily CBC

## 2017-07-22 NOTE — NURSING
Patient temp 102.6, patient has cooling blanket on. Dr. Bauman notified, order received for Toradol 30 mg once. Will continue to monitor.

## 2017-07-22 NOTE — PROGRESS NOTES
Pt transferred to rm [id number]. Report given to and care assumed by VAUGHN Terry and VAUGHN Balderas at this time.

## 2017-07-22 NOTE — SUBJECTIVE & OBJECTIVE
"Interval History: Pt seen and examined. Pt continues to have high grade fevers. Max temp of 103.1 overnight. Pt currently c/o nausea but denies abdominal pain and diarrhea. Pt also reports "burning" sensation after PEG tube use. Pt states she told her  nurse prior to admission of the PEG tube issue. CT abdomen/pelvis -- PEG appears satisfactory. Will continue to monitor and give PEG tube site care.     Review of Systems   Constitutional: Positive for fever.   HENT: Negative.    Eyes: Negative.    Respiratory: Negative for apnea, cough, choking, chest tightness, shortness of breath, wheezing and stridor.    Cardiovascular: Negative for chest pain, palpitations and leg swelling.   Gastrointestinal: Positive for diarrhea and nausea. Negative for abdominal pain, blood in stool, constipation and vomiting.   Endocrine: Negative.    Genitourinary: Negative.    Musculoskeletal: Negative.    Skin: Negative.    Allergic/Immunologic: Negative.    Neurological: Positive for weakness. Negative for dizziness, tremors, seizures, syncope, facial asymmetry, speech difficulty, light-headedness, numbness and headaches.   Hematological: Negative.    Psychiatric/Behavioral: Negative.      Objective:     Vital Signs (Most Recent):  Temp: 98.1 °F (36.7 °C) (07/22/17 1208)  Pulse: (!) 112 (07/22/17 1208)  Resp: 18 (07/22/17 1208)  BP: (!) 105/55 (07/22/17 1208)  SpO2: 97 % (07/22/17 1208) Vital Signs (24h Range):  Temp:  [97.1 °F (36.2 °C)-104 °F (40 °C)] 98.1 °F (36.7 °C)  Pulse:  [102-141] 112  Resp:  [18-22] 18  SpO2:  [96 %-99 %] 97 %  BP: ()/(44-59) 105/55     Weight: 46.9 kg (103 lb 4.8 oz)  Body mass index is 20.17 kg/m².    Intake/Output Summary (Last 24 hours) at 07/22/17 1223  Last data filed at 07/22/17 0800   Gross per 24 hour   Intake          1293.67 ml   Output                5 ml   Net          1288.67 ml      Physical Exam   Constitutional: She is oriented to person, place, and time. She appears well-developed. "   HENT:   Head: Normocephalic and atraumatic.   Eyes: Conjunctivae and EOM are normal.   Neck: Normal range of motion. Neck supple.   Cardiovascular: Intact distal pulses.  Tachycardia present.    Defibrillator noted   Pulmonary/Chest: Effort normal and breath sounds normal. No respiratory distress.   Abdominal: Soft. Bowel sounds are normal. She exhibits no distension. There is no tenderness. There is no rebound and no guarding.   PEG tube noted   Neurological: She is alert and oriented to person, place, and time.   Skin: Skin is warm and dry. Capillary refill takes less than 2 seconds.   Psychiatric: She has a normal mood and affect. Her behavior is normal. Judgment and thought content normal.   Nursing note and vitals reviewed.      Significant Labs:   CBC:   Recent Labs  Lab 07/21/17 0351 07/21/17  0942 07/22/17  0528   WBC 2.93* 3.97 2.01*   HGB 7.8* 7.6* 6.6*   HCT 23.7* 23.1* 19.2*    135* 98*     CMP:   Recent Labs  Lab 07/21/17 0351 07/22/17  0528   * 134*   K 3.5 3.6    115*   CO2 17* 10*   GLU 79 85   BUN 29* 41*   CREATININE 1.7* 2.6*   CALCIUM 7.8* 7.4*   PROT  --  7.2   ALBUMIN  --  1.6*   BILITOT  --  0.3   ALKPHOS  --  54*   AST  --  47*   ALT  --  27   ANIONGAP 5* 9   EGFRNONAA 39* 24*       Significant Imaging:   Imaging Results          CT Abdomen Pelvis  Without Contrast (Final result)  Result time 07/21/17 14:40:49    Final result by Ghanshyam William MD (07/21/17 14:40:49)                 Impression:        Enlarged edematous left kidney could reflect pyelonephritis. Correlate with urinalysis.    Minimal consolidation or atelectasis is seen at the left lung base.     No evidence of intra-abdominal abscess formation    PEG tube appears satisfactory.     Fluid-filled bowel loops are seen throughout the abdomen which could reflect the patient's diarrhea and be related to infectious process.     Findings discussed with JEWELL PEDERSEN at 14:40:42        Electronically signed  by: GHANSHYAM VIEYRA MD  Date:     07/21/17  Time:    14:40              Narrative:    CT of the abdomen and pelvis without contrast.    Clinical indication: Fever. Abdominal pain.    Findings: Lack of IV and oral contrast material grossly limited this examination.    The heart size is normal with pacing wires. Minimal consolidation or atelectasis is seen at the left lung base. There is no free intraperitoneal air. No bowel obstruction. Bony windows show no acute abnormality.    The liver is unremarkable. Splenomegaly noted adrenal glands and pancreas appear unremarkable on this noncontrast study. Gallbladder surgically absent.    . The right kidney is enlarged and appears edematous with perinephric stranding; pyelonephritis is a differential consideration. No definite hydronephrosis or ureteral calculus is identified.    Within the pelvis there are radiopaque densities in posterior related to patient's prior surgery. Scattered fluid-filled loops of bowel are seen throughout the abdomen which could reflect diarrhea.    Extensive retroperitoneal adenopathy is seen which is similar to the prior study.    Bones demonstrate degenerative changes                             X-Ray Chest AP Portable (Final result)  Result time 07/20/17 20:50:48    Final result by Ghanshyam Haq III, MD (07/20/17 20:50:48)                 Impression:     No acute cardio pulmonary abnormality suggested.      Electronically signed by: GHANSHYAM HAQ MD  Date:     07/20/17  Time:    20:50              Narrative:    One view chest x-ray.    Clinical indication: sepsis. Shortness of breath.    Heart size is normal. The lung fields are clear with minimal chronic changes suggested.. No acute pulmonary infiltrate. Permanent pacing device/defibrillator again noted in position on the left.

## 2017-07-22 NOTE — PLAN OF CARE
Problem: Patient Care Overview  Goal: Plan of Care Review  Outcome: Ongoing (interventions implemented as appropriate)  Pt has had temp most of shift. Tylenol and Toradol given per order. Cooling blanket applied.  IV fluids and abx administered per order. Pt remained free of falls this shift. Plan of care reviewed. Patient verbalizes understanding.   Complaints of pain this shift. Pain was resolved with medications. Meds given through PEG tube per patient request.  Pt moving/turning independently. Patient ST on monitor. Bed low, side rails up x 2, wheels locked, call light in reach.   Patient instructed to call for assistance. Patient had several bouts of diarrhea. Will continue to monitor.

## 2017-07-22 NOTE — NURSING
Pt heart rate is 136. After pain medication and oxygen. Np Kylie and Dr Parker notified. EKG ordered and indicated tachycardia. Notified and showed NP Kylie. Bolus ordered and started.

## 2017-07-22 NOTE — PROGRESS NOTES
"[facility] Hospital Medicine  Progress Note    Patient Name: Wang Kebede  MRN: [id number]  Patient Class: IP- Inpatient   Admission Date: 7/20/2017  Length of Stay: 1 days  Attending Physician: Ilene Parker MD  Primary Care Provider: Levi Moncada MD        Subjective:     Principal Problem:Fever    HPI:  32 y/o female presents to ED with c/o fever that onset gradually 6 days ago. PMH includes HIV. She went to her PCP on Monday and given abx. Symptoms have been intermediate and moderate. No exacerbating or mitigating factors. She denies chills, N/V/D, Cp, palpitations, hematuria, congestion, cough, dysuria, and all other symptoms. She will be observed for symptom control and ID consult under the care of hospital medicine.    Hospital Course:  Wang Kebede is a 31 year old female admitted for Fever. Upon admission pt started on IV Vancomycin and Zosyn. Blood cultures with NGTD, urine culture with multiple isolates. CT abdomen/pelvis -- enlarged edematous left kidney could reflect pyelonephritis and fluid filled bowel loops seen throughout colon, which may infectious process which pt is experiencing diarrhea. Will repeat UA due to continued fever. C-Diff negative. ID consulted for hx of HIV. Per ID, pt has AIDS but her immune system is recovering since she is now compliant with HAART. Her fever could be secondary to immune reconstitution inflammatory syndrome (IRIS). CXR and UA negative as source of infection. AM labs revealed anemia (Hgb/Hct 6.6/19.2) and ARF. Nocturnist ordered to transfuse 1 unit of PRBCs, currently receiving. Nephrotoxic medications have been discontinued including Vancomycin and Zosyn. Pt currently receiving Meropenum and Zyvox.      Interval History: Pt seen and examined. Pt continues to have high grade fevers. Max temp of 103.1 overnight. Pt currently c/o nausea but denies abdominal pain and diarrhea. Pt also reports "burning" sensation after " PEG tube use. Pt states she told her HH nurse prior to admission of the PEG tube issue. CT abdomen/pelvis -- PEG appears satisfactory. Will continue to monitor and give PEG tube site care.     Review of Systems   Constitutional: Positive for fever.   HENT: Negative.    Eyes: Negative.    Respiratory: Negative for apnea, cough, choking, chest tightness, shortness of breath, wheezing and stridor.    Cardiovascular: Negative for chest pain, palpitations and leg swelling.   Gastrointestinal: Positive for diarrhea and nausea. Negative for abdominal pain, blood in stool, constipation and vomiting.   Endocrine: Negative.    Genitourinary: Negative.    Musculoskeletal: Negative.    Skin: Negative.    Allergic/Immunologic: Negative.    Neurological: Positive for weakness. Negative for dizziness, tremors, seizures, syncope, facial asymmetry, speech difficulty, light-headedness, numbness and headaches.   Hematological: Negative.    Psychiatric/Behavioral: Negative.      Objective:     Vital Signs (Most Recent):  Temp: 98.1 °F (36.7 °C) (07/22/17 1208)  Pulse: (!) 112 (07/22/17 1208)  Resp: 18 (07/22/17 1208)  BP: (!) 105/55 (07/22/17 1208)  SpO2: 97 % (07/22/17 1208) Vital Signs (24h Range):  Temp:  [97.1 °F (36.2 °C)-104 °F (40 °C)] 98.1 °F (36.7 °C)  Pulse:  [102-141] 112  Resp:  [18-22] 18  SpO2:  [96 %-99 %] 97 %  BP: ()/(44-59) 105/55     Weight: 46.9 kg (103 lb 4.8 oz)  Body mass index is 20.17 kg/m².    Intake/Output Summary (Last 24 hours) at 07/22/17 1223  Last data filed at 07/22/17 0800   Gross per 24 hour   Intake          1293.67 ml   Output                5 ml   Net          1288.67 ml      Physical Exam   Constitutional: She is oriented to person, place, and time. She appears well-developed.   HENT:   Head: Normocephalic and atraumatic.   Eyes: Conjunctivae and EOM are normal.   Neck: Normal range of motion. Neck supple.   Cardiovascular: Intact distal pulses.  Tachycardia present.    Defibrillator noted    Pulmonary/Chest: Effort normal and breath sounds normal. No respiratory distress.   Abdominal: Soft. Bowel sounds are normal. She exhibits no distension. There is no tenderness. There is no rebound and no guarding.   PEG tube noted   Neurological: She is alert and oriented to person, place, and time.   Skin: Skin is warm and dry. Capillary refill takes less than 2 seconds.   Psychiatric: She has a normal mood and affect. Her behavior is normal. Judgment and thought content normal.   Nursing note and vitals reviewed.      Significant Labs:   CBC:   Recent Labs  Lab 07/21/17 0351 07/21/17  0942 07/22/17  0528   WBC 2.93* 3.97 2.01*   HGB 7.8* 7.6* 6.6*   HCT 23.7* 23.1* 19.2*    135* 98*     CMP:   Recent Labs  Lab 07/21/17 0351 07/22/17 0528   * 134*   K 3.5 3.6    115*   CO2 17* 10*   GLU 79 85   BUN 29* 41*   CREATININE 1.7* 2.6*   CALCIUM 7.8* 7.4*   PROT  --  7.2   ALBUMIN  --  1.6*   BILITOT  --  0.3   ALKPHOS  --  54*   AST  --  47*   ALT  --  27   ANIONGAP 5* 9   EGFRNONAA 39* 24*       Significant Imaging:   Imaging Results          CT Abdomen Pelvis  Without Contrast (Final result)  Result time 07/21/17 14:40:49    Final result by Ghanshyam Vieyra MD (07/21/17 14:40:49)                 Impression:        Enlarged edematous left kidney could reflect pyelonephritis. Correlate with urinalysis.    Minimal consolidation or atelectasis is seen at the left lung base.     No evidence of intra-abdominal abscess formation    PEG tube appears satisfactory.     Fluid-filled bowel loops are seen throughout the abdomen which could reflect the patient's diarrhea and be related to infectious process.     Findings discussed with JEWELL PEDERSEN at 14:40:42        Electronically signed by: GHANSHYAM VIEYRA MD  Date:     07/21/17  Time:    14:40              Narrative:    CT of the abdomen and pelvis without contrast.    Clinical indication: Fever. Abdominal pain.    Findings: Lack of IV and oral contrast  material grossly limited this examination.    The heart size is normal with pacing wires. Minimal consolidation or atelectasis is seen at the left lung base. There is no free intraperitoneal air. No bowel obstruction. Bony windows show no acute abnormality.    The liver is unremarkable. Splenomegaly noted adrenal glands and pancreas appear unremarkable on this noncontrast study. Gallbladder surgically absent.    . The right kidney is enlarged and appears edematous with perinephric stranding; pyelonephritis is a differential consideration. No definite hydronephrosis or ureteral calculus is identified.    Within the pelvis there are radiopaque densities in posterior related to patient's prior surgery. Scattered fluid-filled loops of bowel are seen throughout the abdomen which could reflect diarrhea.    Extensive retroperitoneal adenopathy is seen which is similar to the prior study.    Bones demonstrate degenerative changes                             X-Ray Chest AP Portable (Final result)  Result time 07/20/17 20:50:48    Final result by Ghanshyam Haq III, MD (07/20/17 20:50:48)                 Impression:     No acute cardio pulmonary abnormality suggested.      Electronically signed by: GHANSHYAM HAQ MD  Date:     07/20/17  Time:    20:50              Narrative:    One view chest x-ray.    Clinical indication: sepsis. Shortness of breath.    Heart size is normal. The lung fields are clear with minimal chronic changes suggested.. No acute pulmonary infiltrate. Permanent pacing device/defibrillator again noted in position on the left.                            Assessment/Plan:      * Fever    - Fever of unknown etiology  - CXR and UA negative  - Blood cultures with NGTD  - Repeat UA pending -- CT abdomen/pelvis -- enlarged edematous left kidney could reflect pyelonephritis and fluid filled bowel loops seen throughout colon, which may infectious process which pt is experiencing diarrhea  - C-diff  negative  - ID following -- pt has AIDS but her immune system is recovering since she is now compliant with HAART. Her fever may be secondary to immune reconstitution inflammatory syndrome but still need to rule out infectious process  - Continue IV Meropenum and Zyvox  - Antipyretics prn          HIV (human immunodeficiency virus infection)    - ID following  - Per ID has AIDS  - Last CD4 count on 07/10/17 -- 245  - Resume home medications          Hyponatremia    - Improving -- currently 134   - Will continue to monitor          Acute renal failure    - May be secondary to dehydration, hypotension, and/or Vancomycin  - Vancomycin discontinued  - Avoid Nephrotoxic medications include NSAIDS  - Zyvox started  - Continue IVF  - Will continue to monitor -- if creatinine continues to worsen, will consult Nephrology          Anemia    - Hgb/Hct on AM labs 6.6/19.2  - Pt currently being transfused with 1 units of PRBCs  - Supplemental oxygen prn, keep O2 sats > 92%  - Pt sees Dr. Olmedo (hematology/oncology), last clinic visit 07/05/17  - Per Hematology/Oncology on that clinic visit -- she has normocytic anemia and possibly due to HIV/AIDS (hx of noncompliance but now compliant). Pt reports hx of sickle cell disease but hemoglobin electrophoresis was normal on 2 separate occassions. If her anemia does not improve after 2 months of continued HAART therapy, refer back to Hematology  - Daily CBC        S/P percutaneous endoscopic gastrostomy (PEG) tube placement    - PEG placed on 05/26/17 for purpose of HAART therapy -- has difficulty swallowing HIV/AIDS medications  - PEG tube site care          S/P implantation of automatic cardioverter/defibrillator (AICD)              VTE Risk Mitigation         Ordered     enoxaparin injection 40 mg  Daily     Route:  Subcutaneous        07/21/17 0005     Medium Risk of VTE  Once      07/20/17 7953          MICHELLE Ortega  Department of Hospital Medicine   [facility]

## 2017-07-22 NOTE — PLAN OF CARE
Problem: Patient Care Overview  Goal: Plan of Care Review  Outcome: Ongoing (interventions implemented as appropriate)  Plan of care reviewed with patient. Patient stable. Aaox3. Patient free from falls fall precautions in place. Assist x 1 to the bathroom. Call bell and belongings with in reach reminded to call for assistance. Received on unit of blood per doctors orders. Free of any signs of infection at this time. Meds given through p[eg tube per patients request. Given Antibiotics and iv fluids during shift per doctors orders. Will continue to monitor

## 2017-07-22 NOTE — ASSESSMENT & PLAN NOTE
- Fever of unknown etiology  - CXR and UA negative  - Blood cultures with NGTD  - Repeat UA pending -- CT abdomen/pelvis -- enlarged edematous left kidney could reflect pyelonephritis and fluid filled bowel loops seen throughout colon, which may infectious process which pt is experiencing diarrhea  - C-diff negative  - ID following -- pt has AIDS but her immune system is recovering since she is now compliant with HAART. Her fever may be secondary to immune reconstitution inflammatory syndrome but still need to rule out infectious process  - Continue IV Meropenum and Zyvox  - Antipyretics prn

## 2017-07-23 PROBLEM — R65.20 SEVERE SEPSIS: Status: ACTIVE | Noted: 2017-04-19

## 2017-07-23 PROBLEM — J96.02 ACUTE HYPERCAPNIC RESPIRATORY FAILURE: Status: ACTIVE | Noted: 2017-07-23

## 2017-07-23 PROBLEM — B20 THROMBOCYTOPENIA ASSOCIATED WITH AIDS: Status: ACTIVE | Noted: 2017-07-23

## 2017-07-23 PROBLEM — D69.59 THROMBOCYTOPENIA ASSOCIATED WITH AIDS: Status: ACTIVE | Noted: 2017-07-23

## 2017-07-23 PROBLEM — R06.03 RESPIRATORY DISTRESS: Status: ACTIVE | Noted: 2017-07-23

## 2017-07-23 PROBLEM — J96.01 ACUTE RESPIRATORY FAILURE WITH HYPOXIA AND HYPERCARBIA: Status: ACTIVE | Noted: 2017-07-23

## 2017-07-23 PROBLEM — J18.9 PNEUMONIA DUE TO INFECTIOUS ORGANISM: Status: ACTIVE | Noted: 2017-07-23

## 2017-07-23 PROBLEM — J96.02 ACUTE RESPIRATORY FAILURE WITH HYPOXIA AND HYPERCARBIA: Status: ACTIVE | Noted: 2017-07-23

## 2017-07-23 LAB
ALBUMIN SERPL BCP-MCNC: 1.7 G/DL
ALBUMIN SERPL BCP-MCNC: 1.8 G/DL
ALLENS TEST: ABNORMAL
ALLENS TEST: ABNORMAL
ALP SERPL-CCNC: 103 U/L
ALP SERPL-CCNC: 116 U/L
ALT SERPL W/O P-5'-P-CCNC: 36 U/L
ALT SERPL W/O P-5'-P-CCNC: 37 U/L
ANION GAP SERPL CALC-SCNC: 10 MMOL/L
ANION GAP SERPL CALC-SCNC: 12 MMOL/L
ANISOCYTOSIS BLD QL SMEAR: SLIGHT
AST SERPL-CCNC: 56 U/L
AST SERPL-CCNC: 56 U/L
BACTERIA #/AREA URNS HPF: ABNORMAL /HPF
BASOPHILS # BLD AUTO: ABNORMAL K/UL
BASOPHILS NFR BLD: 0 %
BASOPHILS NFR BLD: 0 %
BILIRUB SERPL-MCNC: 0.6 MG/DL
BILIRUB SERPL-MCNC: 0.7 MG/DL
BILIRUB UR QL STRIP: NEGATIVE
BNP SERPL-MCNC: 934 PG/ML
BUN SERPL-MCNC: 47 MG/DL
BUN SERPL-MCNC: 49 MG/DL
CALCIUM SERPL-MCNC: 7.3 MG/DL
CALCIUM SERPL-MCNC: 7.8 MG/DL
CHLORIDE SERPL-SCNC: 112 MMOL/L
CHLORIDE SERPL-SCNC: 115 MMOL/L
CLARITY UR: CLEAR
CO2 SERPL-SCNC: 7 MMOL/L
CO2 SERPL-SCNC: 9 MMOL/L
COLOR UR: YELLOW
CORTIS SERPL-MCNC: 36.8 UG/DL
CREAT SERPL-MCNC: 3 MG/DL
CREAT SERPL-MCNC: 3.1 MG/DL
DELSYS: ABNORMAL
DELSYS: ABNORMAL
DIFFERENTIAL METHOD: ABNORMAL
DIFFERENTIAL METHOD: ABNORMAL
EOSINOPHIL # BLD AUTO: ABNORMAL K/UL
EOSINOPHIL NFR BLD: 0 %
EOSINOPHIL NFR BLD: 2 %
ERYTHROCYTE [DISTWIDTH] IN BLOOD BY AUTOMATED COUNT: 20.1 %
ERYTHROCYTE [DISTWIDTH] IN BLOOD BY AUTOMATED COUNT: 20.5 %
ERYTHROCYTE [SEDIMENTATION RATE] IN BLOOD BY WESTERGREN METHOD: 24 MM/H
EST. GFR  (AFRICAN AMERICAN): 22 ML/MIN/1.73 M^2
EST. GFR  (AFRICAN AMERICAN): 23 ML/MIN/1.73 M^2
EST. GFR  (NON AFRICAN AMERICAN): 19 ML/MIN/1.73 M^2
EST. GFR  (NON AFRICAN AMERICAN): 20 ML/MIN/1.73 M^2
FIO2: 50
FLOW: 3
GLUCOSE SERPL-MCNC: 77 MG/DL
GLUCOSE SERPL-MCNC: 84 MG/DL
GLUCOSE UR QL STRIP: NEGATIVE
HCO3 UR-SCNC: 10 MMOL/L (ref 24–28)
HCO3 UR-SCNC: 8.8 MMOL/L (ref 24–28)
HCT VFR BLD AUTO: 25.7 %
HCT VFR BLD AUTO: 26.6 %
HGB BLD-MCNC: 8.8 G/DL
HGB BLD-MCNC: 9.1 G/DL
HGB UR QL STRIP: ABNORMAL
HYALINE CASTS #/AREA URNS LPF: 0 /LPF
KETONES UR QL STRIP: NEGATIVE
LACTATE SERPL-SCNC: 1.1 MMOL/L
LEUKOCYTE ESTERASE UR QL STRIP: NEGATIVE
LYMPHOCYTES # BLD AUTO: ABNORMAL K/UL
LYMPHOCYTES NFR BLD: 18 %
LYMPHOCYTES NFR BLD: 30 %
MCH RBC QN AUTO: 31.9 PG
MCH RBC QN AUTO: 32.5 PG
MCHC RBC AUTO-ENTMCNC: 34.2 G/DL
MCHC RBC AUTO-ENTMCNC: 34.2 G/DL
MCV RBC AUTO: 93 FL
MCV RBC AUTO: 95 FL
MICROSCOPIC COMMENT: ABNORMAL
MODE: ABNORMAL
MODE: ABNORMAL
MONOCYTES # BLD AUTO: ABNORMAL K/UL
MONOCYTES NFR BLD: 3 %
MONOCYTES NFR BLD: 9 %
MYELOCYTES NFR BLD MANUAL: 1 %
NEUTROPHILS # BLD AUTO: ABNORMAL K/UL
NEUTROPHILS NFR BLD: 27 %
NEUTROPHILS NFR BLD: 61 %
NEUTS BAND NFR BLD MANUAL: 49 %
NITRITE UR QL STRIP: NEGATIVE
NON-SQ EPI CELLS #/AREA URNS HPF: 12 /HPF
PCO2 BLDA: 19.6 MMHG (ref 35–45)
PCO2 BLDA: 23.1 MMHG (ref 35–45)
PEEP: 5
PH SMN: 7.25 [PH] (ref 7.35–7.45)
PH SMN: 7.26 [PH] (ref 7.35–7.45)
PH UR STRIP: 6 [PH] (ref 5–8)
PLATELET # BLD AUTO: 85 K/UL
PLATELET # BLD AUTO: 89 K/UL
PLATELET BLD QL SMEAR: ABNORMAL
PMV BLD AUTO: 10.6 FL
PMV BLD AUTO: 10.7 FL
PO2 BLDA: 131 MMHG (ref 80–100)
PO2 BLDA: 87 MMHG (ref 80–100)
POC BE: -17 MMOL/L
POC BE: -18 MMOL/L
POC SATURATED O2: 95 % (ref 95–100)
POC SATURATED O2: 99 % (ref 95–100)
POTASSIUM SERPL-SCNC: 4.2 MMOL/L
POTASSIUM SERPL-SCNC: 4.2 MMOL/L
PROT SERPL-MCNC: 7.7 G/DL
PROT SERPL-MCNC: 8.1 G/DL
PROT UR QL STRIP: ABNORMAL
RBC # BLD AUTO: 2.71 M/UL
RBC # BLD AUTO: 2.85 M/UL
RBC #/AREA URNS HPF: 1 /HPF (ref 0–4)
SAMPLE: ABNORMAL
SAMPLE: ABNORMAL
SITE: ABNORMAL
SITE: ABNORMAL
SODIUM SERPL-SCNC: 131 MMOL/L
SODIUM SERPL-SCNC: 134 MMOL/L
SODIUM UR-SCNC: 83 MMOL/L
SP GR UR STRIP: 1.01 (ref 1–1.03)
URN SPEC COLLECT METH UR: ABNORMAL
UROBILINOGEN UR STRIP-ACNC: NEGATIVE EU/DL
VT: 350
WBC # BLD AUTO: 1.73 K/UL
WBC # BLD AUTO: 3.05 K/UL
WBC #/AREA URNS HPF: 3 /HPF (ref 0–5)
YEAST URNS QL MICRO: ABNORMAL

## 2017-07-23 PROCEDURE — 85007 BL SMEAR W/DIFF WBC COUNT: CPT

## 2017-07-23 PROCEDURE — 27000221 HC OXYGEN, UP TO 24 HOURS

## 2017-07-23 PROCEDURE — 88312 SPECIAL STAINS GROUP 1: CPT | Mod: 26,,, | Performed by: PATHOLOGY

## 2017-07-23 PROCEDURE — 02HV33Z INSERTION OF INFUSION DEVICE INTO SUPERIOR VENA CAVA, PERCUTANEOUS APPROACH: ICD-10-PCS | Performed by: INTERNAL MEDICINE

## 2017-07-23 PROCEDURE — 25000003 PHARM REV CODE 250: Performed by: NURSE PRACTITIONER

## 2017-07-23 PROCEDURE — 82803 BLOOD GASES ANY COMBINATION: CPT

## 2017-07-23 PROCEDURE — 63600175 PHARM REV CODE 636 W HCPCS: Performed by: NURSE PRACTITIONER

## 2017-07-23 PROCEDURE — 31500 INSERT EMERGENCY AIRWAY: CPT | Mod: ,,, | Performed by: NURSE PRACTITIONER

## 2017-07-23 PROCEDURE — 20000000 HC ICU ROOM

## 2017-07-23 PROCEDURE — 0BH17EZ INSERTION OF ENDOTRACHEAL AIRWAY INTO TRACHEA, VIA NATURAL OR ARTIFICIAL OPENING: ICD-10-PCS | Performed by: INTERNAL MEDICINE

## 2017-07-23 PROCEDURE — 63600175 PHARM REV CODE 636 W HCPCS: Performed by: INTERNAL MEDICINE

## 2017-07-23 PROCEDURE — 88112 CYTOPATH CELL ENHANCE TECH: CPT | Performed by: PATHOLOGY

## 2017-07-23 PROCEDURE — 63600175 PHARM REV CODE 636 W HCPCS

## 2017-07-23 PROCEDURE — 25000003 PHARM REV CODE 250: Performed by: INTERNAL MEDICINE

## 2017-07-23 PROCEDURE — 94002 VENT MGMT INPAT INIT DAY: CPT

## 2017-07-23 PROCEDURE — C9113 INJ PANTOPRAZOLE SODIUM, VIA: HCPCS | Performed by: EMERGENCY MEDICINE

## 2017-07-23 PROCEDURE — 94660 CPAP INITIATION&MGMT: CPT

## 2017-07-23 PROCEDURE — 99900035 HC TECH TIME PER 15 MIN (STAT)

## 2017-07-23 PROCEDURE — 84300 ASSAY OF URINE SODIUM: CPT

## 2017-07-23 PROCEDURE — 36569 INSJ PICC 5 YR+ W/O IMAGING: CPT

## 2017-07-23 PROCEDURE — 5A1945Z RESPIRATORY VENTILATION, 24-96 CONSECUTIVE HOURS: ICD-10-PCS | Performed by: INTERNAL MEDICINE

## 2017-07-23 PROCEDURE — 87086 URINE CULTURE/COLONY COUNT: CPT

## 2017-07-23 PROCEDURE — 25000003 PHARM REV CODE 250

## 2017-07-23 PROCEDURE — C1751 CATH, INF, PER/CENT/MIDLINE: HCPCS

## 2017-07-23 PROCEDURE — 99223 1ST HOSP IP/OBS HIGH 75: CPT | Mod: ,,, | Performed by: INTERNAL MEDICINE

## 2017-07-23 PROCEDURE — 93306 TTE W/DOPPLER COMPLETE: CPT

## 2017-07-23 PROCEDURE — 93306 TTE W/DOPPLER COMPLETE: CPT | Mod: 26,,, | Performed by: INTERNAL MEDICINE

## 2017-07-23 PROCEDURE — 27000190 HC CPAP FULL FACE MASK W/VALVE

## 2017-07-23 PROCEDURE — 81000 URINALYSIS NONAUTO W/SCOPE: CPT

## 2017-07-23 PROCEDURE — 94761 N-INVAS EAR/PLS OXIMETRY MLT: CPT

## 2017-07-23 PROCEDURE — 36415 COLL VENOUS BLD VENIPUNCTURE: CPT

## 2017-07-23 PROCEDURE — 80053 COMPREHEN METABOLIC PANEL: CPT | Mod: 91

## 2017-07-23 PROCEDURE — 36569 INSJ PICC 5 YR+ W/O IMAGING: CPT | Mod: ,,, | Performed by: NURSE PRACTITIONER

## 2017-07-23 PROCEDURE — 99291 CRITICAL CARE FIRST HOUR: CPT | Mod: ,,, | Performed by: INTERNAL MEDICINE

## 2017-07-23 PROCEDURE — 85027 COMPLETE CBC AUTOMATED: CPT

## 2017-07-23 PROCEDURE — 88112 CYTOPATH CELL ENHANCE TECH: CPT | Mod: 26,,, | Performed by: PATHOLOGY

## 2017-07-23 PROCEDURE — 63600175 PHARM REV CODE 636 W HCPCS: Performed by: EMERGENCY MEDICINE

## 2017-07-23 PROCEDURE — 83605 ASSAY OF LACTIC ACID: CPT

## 2017-07-23 PROCEDURE — 83880 ASSAY OF NATRIURETIC PEPTIDE: CPT

## 2017-07-23 PROCEDURE — 87040 BLOOD CULTURE FOR BACTERIA: CPT | Mod: 59

## 2017-07-23 PROCEDURE — 80053 COMPREHEN METABOLIC PANEL: CPT

## 2017-07-23 PROCEDURE — 82533 TOTAL CORTISOL: CPT

## 2017-07-23 PROCEDURE — 87385 HISTOPLASMA CAPSUL AG IA: CPT | Mod: 91

## 2017-07-23 PROCEDURE — 87385 HISTOPLASMA CAPSUL AG IA: CPT

## 2017-07-23 PROCEDURE — 99291 CRITICAL CARE FIRST HOUR: CPT | Mod: 25,,, | Performed by: INTERNAL MEDICINE

## 2017-07-23 PROCEDURE — 36600 WITHDRAWAL OF ARTERIAL BLOOD: CPT

## 2017-07-23 RX ORDER — NOREPINEPHRINE BITARTRATE/D5W 4MG/250ML
0.02 PLASTIC BAG, INJECTION (ML) INTRAVENOUS CONTINUOUS
Status: DISCONTINUED | OUTPATIENT
Start: 2017-07-24 | End: 2017-07-24

## 2017-07-23 RX ORDER — LORAZEPAM 2 MG/ML
2 INJECTION INTRAMUSCULAR EVERY 4 HOURS PRN
Status: DISCONTINUED | OUTPATIENT
Start: 2017-07-23 | End: 2017-07-26

## 2017-07-23 RX ORDER — MOXIFLOXACIN HYDROCHLORIDE 400 MG/1
400 TABLET ORAL DAILY
Status: DISCONTINUED | OUTPATIENT
Start: 2017-07-23 | End: 2017-07-23

## 2017-07-23 RX ORDER — FUROSEMIDE 10 MG/ML
20 INJECTION INTRAMUSCULAR; INTRAVENOUS ONCE
Status: COMPLETED | OUTPATIENT
Start: 2017-07-23 | End: 2017-07-23

## 2017-07-23 RX ORDER — FENTANYL CITRAT/DEXTROSE 5%/PF 100 MCG/10
PATIENT CONTROLLED ANALGESIA SYRINGE INTRAVENOUS CONTINUOUS
Status: DISCONTINUED | OUTPATIENT
Start: 2017-07-23 | End: 2017-07-26

## 2017-07-23 RX ORDER — FUROSEMIDE 10 MG/ML
40 INJECTION INTRAMUSCULAR; INTRAVENOUS 2 TIMES DAILY
Status: DISCONTINUED | OUTPATIENT
Start: 2017-07-23 | End: 2017-07-23

## 2017-07-23 RX ORDER — FUROSEMIDE 10 MG/ML
80 INJECTION INTRAMUSCULAR; INTRAVENOUS ONCE
Status: DISCONTINUED | OUTPATIENT
Start: 2017-07-23 | End: 2017-07-23

## 2017-07-23 RX ORDER — MEROPENEM AND SODIUM CHLORIDE 500 MG/50ML
500 INJECTION, SOLUTION INTRAVENOUS
Status: DISCONTINUED | OUTPATIENT
Start: 2017-07-23 | End: 2017-07-26

## 2017-07-23 RX ORDER — CHLORHEXIDINE GLUCONATE ORAL RINSE 1.2 MG/ML
15 SOLUTION DENTAL 2 TIMES DAILY
Status: DISCONTINUED | OUTPATIENT
Start: 2017-07-23 | End: 2017-07-26

## 2017-07-23 RX ORDER — ETOMIDATE 2 MG/ML
20 INJECTION INTRAVENOUS ONCE
Status: DISCONTINUED | OUTPATIENT
Start: 2017-07-23 | End: 2017-07-24

## 2017-07-23 RX ORDER — PANTOPRAZOLE SODIUM 40 MG/10ML
40 INJECTION, POWDER, LYOPHILIZED, FOR SOLUTION INTRAVENOUS DAILY
Status: DISCONTINUED | OUTPATIENT
Start: 2017-07-23 | End: 2017-07-27

## 2017-07-23 RX ORDER — LORAZEPAM 2 MG/ML
2 INJECTION INTRAMUSCULAR ONCE
Status: COMPLETED | OUTPATIENT
Start: 2017-07-23 | End: 2017-07-23

## 2017-07-23 RX ORDER — SODIUM BICARBONATE 650 MG/1
1300 TABLET ORAL ONCE
Status: COMPLETED | OUTPATIENT
Start: 2017-07-23 | End: 2017-07-23

## 2017-07-23 RX ORDER — NYSTATIN 100000 [USP'U]/ML
500000 SUSPENSION ORAL 4 TIMES DAILY
Status: DISCONTINUED | OUTPATIENT
Start: 2017-07-23 | End: 2017-07-25

## 2017-07-23 RX ORDER — FUROSEMIDE 10 MG/ML
40 INJECTION INTRAMUSCULAR; INTRAVENOUS ONCE
Status: DISCONTINUED | OUTPATIENT
Start: 2017-07-23 | End: 2017-07-23

## 2017-07-23 RX ORDER — DEXMEDETOMIDINE HYDROCHLORIDE 4 UG/ML
0.2 INJECTION, SOLUTION INTRAVENOUS CONTINUOUS
Status: DISCONTINUED | OUTPATIENT
Start: 2017-07-23 | End: 2017-07-23

## 2017-07-23 RX ORDER — SODIUM BICARBONATE 650 MG/1
1300 TABLET ORAL 3 TIMES DAILY
Status: DISCONTINUED | OUTPATIENT
Start: 2017-07-23 | End: 2017-07-27

## 2017-07-23 RX ORDER — LORAZEPAM 2 MG/ML
0.05 INJECTION INTRAMUSCULAR ONCE
Status: DISCONTINUED | OUTPATIENT
Start: 2017-07-23 | End: 2017-07-23

## 2017-07-23 RX ORDER — FUROSEMIDE 10 MG/ML
40 INJECTION INTRAMUSCULAR; INTRAVENOUS DAILY
Status: DISCONTINUED | OUTPATIENT
Start: 2017-07-23 | End: 2017-07-23

## 2017-07-23 RX ORDER — PROPOFOL 10 MG/ML
40 VIAL (ML) INTRAVENOUS ONCE
Status: DISCONTINUED | OUTPATIENT
Start: 2017-07-23 | End: 2017-07-24

## 2017-07-23 RX ORDER — LORAZEPAM 2 MG/ML
INJECTION INTRAMUSCULAR
Status: DISPENSED
Start: 2017-07-23 | End: 2017-07-24

## 2017-07-23 RX ADMIN — ETHAMBUTOL HYDROCHLORIDE 800 MG: 400 TABLET, FILM COATED ORAL at 09:07

## 2017-07-23 RX ADMIN — ALPRAZOLAM 0.25 MG: 0.25 TABLET ORAL at 12:07

## 2017-07-23 RX ADMIN — LORAZEPAM 2 MG: 2 INJECTION INTRAMUSCULAR; INTRAVENOUS at 05:07

## 2017-07-23 RX ADMIN — VORICONAZOLE 200 MG: 200 TABLET, FILM COATED ORAL at 09:07

## 2017-07-23 RX ADMIN — VANCOMYCIN HYDROCHLORIDE 750 MG: 750 INJECTION, POWDER, LYOPHILIZED, FOR SOLUTION INTRAVENOUS at 10:07

## 2017-07-23 RX ADMIN — SODIUM BICARBONATE 650 MG TABLET 1300 MG: at 10:07

## 2017-07-23 RX ADMIN — DEXMEDETOMIDINE HYDROCHLORIDE 0.2 MCG/KG/HR: 4 INJECTION, SOLUTION INTRAVENOUS at 03:07

## 2017-07-23 RX ADMIN — ACETAMINOPHEN 650 MG: 325 TABLET ORAL at 07:07

## 2017-07-23 RX ADMIN — PANTOPRAZOLE SODIUM 40 MG: 40 INJECTION, POWDER, FOR SOLUTION INTRAVENOUS at 02:07

## 2017-07-23 RX ADMIN — LACOSAMIDE 100 MG: 50 TABLET, FILM COATED ORAL at 12:07

## 2017-07-23 RX ADMIN — FUROSEMIDE 20 MG: 10 INJECTION, SOLUTION INTRAMUSCULAR; INTRAVENOUS at 09:07

## 2017-07-23 RX ADMIN — FOLIC ACID 1 MG: 1 TABLET ORAL at 09:07

## 2017-07-23 RX ADMIN — CLARITHROMYCIN 500 MG: 500 TABLET, FILM COATED ORAL at 09:07

## 2017-07-23 RX ADMIN — SODIUM BICARBONATE 650 MG TABLET 1300 MG: at 12:07

## 2017-07-23 RX ADMIN — PANTOPRAZOLE SODIUM 40 MG: 40 INJECTION, POWDER, FOR SOLUTION INTRAVENOUS at 09:07

## 2017-07-23 RX ADMIN — LACOSAMIDE 100 MG: 50 TABLET, FILM COATED ORAL at 08:07

## 2017-07-23 RX ADMIN — SODIUM BICARBONATE: 84 INJECTION, SOLUTION INTRAVENOUS at 02:07

## 2017-07-23 RX ADMIN — CHLORHEXIDINE GLUCONATE 15 ML: 1.2 RINSE ORAL at 08:07

## 2017-07-23 RX ADMIN — ATOVAQUONE 750 MG: 750 SUSPENSION ORAL at 08:07

## 2017-07-23 RX ADMIN — Medication 12.5 MG: at 09:07

## 2017-07-23 RX ADMIN — MEROPENEM AND SODIUM CHLORIDE 500 MG: 500 INJECTION, SOLUTION INTRAVENOUS at 06:07

## 2017-07-23 RX ADMIN — ATOVAQUONE 750 MG: 750 SUSPENSION ORAL at 09:07

## 2017-07-23 RX ADMIN — SODIUM BICARBONATE 650 MG TABLET 1300 MG: at 08:07

## 2017-07-23 RX ADMIN — FUROSEMIDE 80 MG: 10 INJECTION, SOLUTION INTRAMUSCULAR; INTRAVENOUS at 12:07

## 2017-07-23 RX ADMIN — MEROPENEM AND SODIUM CHLORIDE 500 MG: 500 INJECTION, SOLUTION INTRAVENOUS at 02:07

## 2017-07-23 RX ADMIN — LINEZOLID 600 MG: 600 INJECTION, SOLUTION INTRAVENOUS at 09:07

## 2017-07-23 RX ADMIN — CLARITHROMYCIN 500 MG: 500 TABLET, FILM COATED ORAL at 08:07

## 2017-07-23 RX ADMIN — FUROSEMIDE 40 MG: 10 INJECTION, SOLUTION INTRAMUSCULAR; INTRAVENOUS at 02:07

## 2017-07-23 RX ADMIN — Medication 75 MCG/HR: at 04:07

## 2017-07-23 RX ADMIN — LACOSAMIDE 100 MG: 50 TABLET, FILM COATED ORAL at 09:07

## 2017-07-23 RX ADMIN — MOXIFLOXACIN HYDROCHLORIDE 400 MG: 400 TABLET, FILM COATED ORAL at 10:07

## 2017-07-23 RX ADMIN — NYSTATIN 500000 UNITS: 100000 SUSPENSION ORAL at 11:07

## 2017-07-23 RX ADMIN — VORICONAZOLE 200 MG: 200 TABLET, FILM COATED ORAL at 08:07

## 2017-07-23 RX ADMIN — Medication 12.5 MG: at 08:07

## 2017-07-23 RX ADMIN — ATOVAQUONE 750 MG: 750 SUSPENSION ORAL at 12:07

## 2017-07-23 NOTE — SUBJECTIVE & OBJECTIVE
Review of Systems   Constitutional: Positive for activity change and fever.   HENT: Negative.    Eyes: Negative.    Respiratory: Positive for shortness of breath. Negative for apnea, cough, choking, chest tightness, wheezing and stridor.    Cardiovascular: Negative for chest pain, palpitations and leg swelling.   Gastrointestinal: Positive for nausea. Negative for abdominal pain, blood in stool, constipation, diarrhea and vomiting.   Endocrine: Negative.    Genitourinary: Negative.    Musculoskeletal: Negative.    Skin: Negative.    Allergic/Immunologic: Positive for immunocompromised state.   Neurological: Positive for weakness. Negative for dizziness, tremors, seizures, syncope, facial asymmetry, speech difficulty, light-headedness, numbness and headaches.   Hematological: Negative.    Psychiatric/Behavioral: Negative.      Objective:     Vital Signs (Most Recent):  Temp: 99 °F (37.2 °C) (07/23/17 1500)  Pulse: (!) 112 (07/23/17 1657)  Resp: (!) 51 (07/23/17 1545)  BP: (!) 84/31 (07/23/17 1545)  SpO2: 100 % (07/23/17 1657) Vital Signs (24h Range):  Temp:  [98.3 °F (36.8 °C)-99.9 °F (37.7 °C)] 99 °F (37.2 °C)  Pulse:  [107-135] 112  Resp:  [18-60] 51  SpO2:  [94 %-100 %] 100 %  BP: ()/() 84/31     Weight: 46.9 kg (103 lb 4.8 oz)  Body mass index is 20.17 kg/m².    Intake/Output Summary (Last 24 hours) at 07/23/17 1729  Last data filed at 07/23/17 1500   Gross per 24 hour   Intake             1430 ml   Output              260 ml   Net             1170 ml      Physical Exam   Constitutional: She is oriented to person, place, and time. She appears well-developed.   HENT:   Head: Normocephalic and atraumatic.   Eyes: Conjunctivae and EOM are normal.   Neck: Normal range of motion. Neck supple.   Cardiovascular: Intact distal pulses.  Tachycardia present.    Defibrillator noted   Pulmonary/Chest: Tachypnea noted. She is in respiratory distress. She has rales in the right lower field and the left lower  field.   Abdominal: Soft. Bowel sounds are normal. She exhibits distension. There is no tenderness. There is no rebound and no guarding.   PEG tube noted   Neurological: She is alert and oriented to person, place, and time.   Skin: Skin is warm and dry. Capillary refill takes less than 2 seconds.   Psychiatric: She has a normal mood and affect. Her behavior is normal. Judgment and thought content normal.   Nursing note and vitals reviewed.      Significant Labs:   ABGs:   Recent Labs  Lab 07/23/17  1717   PH 7.246*   PCO2 23.1*   HCO3 10.0*   POCSATURATED 99   BE -17     CBC:   Recent Labs  Lab 07/22/17  0528 07/23/17  0031 07/23/17  0838   WBC 2.01* 1.73* 3.05*   HGB 6.6* 8.8* 9.1*   HCT 19.2* 25.7* 26.6*   PLT 98* 85* 89*     CMP:   Recent Labs  Lab 07/22/17  0528 07/23/17  0031 07/23/17  0534   * 131* 134*   K 3.6 4.2 4.2   * 112* 115*   CO2 10* 9* 7*   GLU 85 84 77   BUN 41* 47* 49*   CREATININE 2.6* 3.0* 3.1*   CALCIUM 7.4* 7.3* 7.8*   PROT 7.2 7.7 8.1   ALBUMIN 1.6* 1.7* 1.8*   BILITOT 0.3 0.6 0.7   ALKPHOS 54* 103 116   AST 47* 56* 56*   ALT 27 36 37   ANIONGAP 9 10 12   EGFRNONAA 24* 20* 19*     Lactic Acid:   Recent Labs  Lab 07/23/17  0838   LACTATE 1.1       Significant Imaging:   Imaging Results          X-Ray Chest 1 View (Final result)  Result time 07/23/17 17:31:14    Final result by Tayler Irene MD (07/23/17 17:31:14)                 Impression:      A PICC line is present via right sided approach with its tip in superior vena cava.  Persistent patchy infiltrates in the right lung.      Electronically signed by: TAYLER IRENE MD  Date:     07/23/17  Time:    17:31              Narrative:    Exam: XR CHEST 1 VIEW,    Date:  07/23/17 17:02:10    History: picc line    Comparison:  07/23/1970    Findings: An endotracheal tube is present its tip level of T3.  A nasogastric tube extends into the stomach.  A right PICC line is positioned with its tip in superior vena cava.  A pacemaker  remains in place.  The heart is at the upper limits of normal in size.  There is patchy infiltrate in the right upper lobe as well as congestion of pulmonary vascularity.                             X-Ray Chest AP Portable (Final result)  Result time 07/23/17 16:08:40    Final result by Jewell Jang MD (07/23/17 16:08:40)                 Impression:     See above.      Electronically signed by: JEWELL JANG MD  Date:     07/23/17  Time:    16:08              Narrative:    Exam: Portable chest xray    History:    Respiratory failure.  Endotracheal tube placement.    Findings:     The endotracheal tip projects 4 cm above the reanna.  NG tube tip below diaphragm.  Patchy increased markings in the lung bases right greater than left.  Comparison made to exam performed earlier today.                             X-Ray Chest 1 View (No Result on File)                X-Ray Chest 1 View (Final result)  Result time 07/23/17 08:23:12    Final result by Tayler Irene MD (07/23/17 08:23:12)                 Impression:      Findings chest changes of developing congestive heart failure and pulmonary edema.      Electronically signed by: TAYLER IRENE MD  Date:     07/23/17  Time:    08:23              Narrative:    Exam: XR CHEST 1 VIEW,    Date:  07/23/17 00:51:38    History: pneumonia vs pulm edema    Comparison:  07/20/2017    Findings: The heart is at the upper limits of normal in size.  A pacemaker remains in place.  There is a new pattern congestion and pulmonary vascularity with small pleural effusions and patchy perihilar and interstitial markings in the lung bases.                             CT Abdomen Pelvis  Without Contrast (Final result)  Result time 07/21/17 14:40:49    Final result by Ghanshyam William MD (07/21/17 14:40:49)                 Impression:        Enlarged edematous left kidney could reflect pyelonephritis. Correlate with urinalysis.    Minimal consolidation or atelectasis is seen at the left lung  base.     No evidence of intra-abdominal abscess formation    PEG tube appears satisfactory.     Fluid-filled bowel loops are seen throughout the abdomen which could reflect the patient's diarrhea and be related to infectious process.     Findings discussed with JEWELL PEDERSEN at 14:40:42        Electronically signed by: GHANSHYAM VIEYRA MD  Date:     07/21/17  Time:    14:40              Narrative:    CT of the abdomen and pelvis without contrast.    Clinical indication: Fever. Abdominal pain.    Findings: Lack of IV and oral contrast material grossly limited this examination.    The heart size is normal with pacing wires. Minimal consolidation or atelectasis is seen at the left lung base. There is no free intraperitoneal air. No bowel obstruction. Bony windows show no acute abnormality.    The liver is unremarkable. Splenomegaly noted adrenal glands and pancreas appear unremarkable on this noncontrast study. Gallbladder surgically absent.    . The right kidney is enlarged and appears edematous with perinephric stranding; pyelonephritis is a differential consideration. No definite hydronephrosis or ureteral calculus is identified.    Within the pelvis there are radiopaque densities in posterior related to patient's prior surgery. Scattered fluid-filled loops of bowel are seen throughout the abdomen which could reflect diarrhea.    Extensive retroperitoneal adenopathy is seen which is similar to the prior study.    Bones demonstrate degenerative changes                             X-Ray Chest AP Portable (Final result)  Result time 07/20/17 20:50:48    Final result by Ghanshyam Haq III, MD (07/20/17 20:50:48)                 Impression:     No acute cardio pulmonary abnormality suggested.      Electronically signed by: GHANSHYAM HAQ MD  Date:     07/20/17  Time:    20:50              Narrative:    One view chest x-ray.    Clinical indication: sepsis. Shortness of breath.    Heart size is normal. The lung fields  are clear with minimal chronic changes suggested.. No acute pulmonary infiltrate. Permanent pacing device/defibrillator again noted in position on the left.
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
## 2017-07-23 NOTE — PROGRESS NOTES
NP at bedside attempting to place PICC post intubation. RT to obtain abg and sputum when pt available.

## 2017-07-23 NOTE — PROGRESS NOTES
ALISSA Hilton, NP notified of patients resp rate being 60 per minute, complaining of sore throat with an oxygen saturation of 94 on 2lnc; orders to change PO medications to per peg

## 2017-07-23 NOTE — ASSESSMENT & PLAN NOTE
1. LELAND : multifactorial, BP dropped in to 70's couple of days ago , can be intravascularly volume depleted , she has been tachypnic to compensate for metabolic acidosis, start gentle hydration,     2. Metabolic acidosis : start bicarb gtt , cont sod bicarb tablets     3. HTN : BP stable, follow    4. Anemia : hematology note reviewed, s/p pRBC tx    5. HIV/AIDs : on HAART    6. Neutropenic fever : on broad spectrum antibiotics     7.  Stable Lytes

## 2017-07-23 NOTE — PROCEDURES
"Wang Kebede is a 31 y.o. female patient.    Temp: 99 °F (37.2 °C) (07/23/17 1500)  Pulse: (!) 112 (07/23/17 1657)  Resp: (!) 51 (07/23/17 1545)  BP: (!) 84/31 (07/23/17 1545)  SpO2: 100 % (07/23/17 1657)  Weight: 46.9 kg (103 lb 4.8 oz) (07/20/17 2324)  Height: 5' (152.4 cm) (07/20/17 2324)       PICC Line Insertion  Date/Time: 7/23/2017 4:00 PM  Location procedure was performed: [facility] INTENSIVE CARE UNIT  Performed by: NOLVIA FLOWERS  Pre-operative Diagnosis: severe sepsis  Post-operative diagnosis: severe sepsis  Consent Done: Yes  Time out: Immediately prior to procedure a "time out" was called to verify the correct patient, procedure, equipment, support staff and site/side marked as required.  Indications: med administration  Anesthesia: local infiltration    Anesthesia:  Local Anesthetic: lidocaine 1% without epinephrine  Anesthetic total: 2 mL  Preparation: skin prepped with chlorhexidine (without alcohol)  Skin prep agent dried: skin prep agent completely dried prior to procedure  Sterile barriers: all five maximum sterile barriers used - cap, mask, sterile gown, sterile gloves, and large sterile sheet  Hand hygiene: hand hygiene performed prior to central venous catheter insertion  Location details: right brachial  Catheter type: double lumen  Catheter size: 5 Fr  Catheter Length: 32cm    Ultrasound guidance: yes  Vessel Caliber: large, compressibility normal  Needle advanced into vessel with real time Ultrasound guidance.  Guidewire confirmed in vessel.  Sterile sheath used.  Manometry: No   Number of attempts: 1  Assessment: placement verified by x-ray and successful placement  Complications: none  Estimated blood loss (mL): 2  Specimens: No  Implants: No  Post-procedure: chlorhexidine patch,  sterile dressing applied and blood return through all ports (secured with stat lock device)  Complications: No          Nolvia Flowers  7/23/2017  "

## 2017-07-23 NOTE — PROCEDURES
Wang Kebede is a 31 y.o. female patient.    Temp: 99 °F (37.2 °C) (07/23/17 1500)  Pulse: (!) 112 (07/23/17 1657)  Resp: (!) 51 (07/23/17 1545)  BP: (!) 84/31 (07/23/17 1545)  SpO2: 100 % (07/23/17 1657)  Weight: 46.9 kg (103 lb 4.8 oz) (07/20/17 2324)  Height: 5' (152.4 cm) (07/20/17 2324)       Intubation  Date/Time: 7/23/2017 3:50 PM  Location procedure was performed: [facility] INTENSIVE CARE UNIT  Performed by: NOLVIA FLOWERS  Authorized by: NOLVIA FLOWERS   Pre-operative diagnosis: respiratory distress  Post-operative diagnosis: respiratory distress  Consent Done: Emergent Situation  Indications: respiratory distress  Intubation method: video-assisted  Patient status: paralyzed (RSI)  Preoxygenation: BVM  Sedatives: etomidate, lorazepam and propofol  Paralytic: succinylcholine  Laryngoscope size: mike storz.  Tube size: 7.5 mm  Tube type: cuffed  Number of attempts: 2  Ventilation between attempts: BVM  Cricoid pressure: no  Cords visualized: yes  Post-procedure assessment: CO2 detector  Breath sounds: equal and absent over the epigastrium  Cuff inflated: yes  ETT to lip: 23 cm  Tube secured with: umbilical tape  Chest x-ray interpreted by me and radiologist.  Chest x-ray findings: endotracheal tube in appropriate position  Patient tolerance: Patient tolerated the procedure well with no immediate complications  Complications: No  Specimens: No  Implants: No  Comments: Propofol 40 mg; ativan 4mg; succinylcholine 100mg; and etomidate 20mg all given during intubation by myself and Dr Riggs for RSI          Nolvia Flowers  7/23/2017
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
## 2017-07-23 NOTE — PROGRESS NOTES
[facility] Hospital Medicine  Progress Note    Patient Name: Wang Kebede  MRN: [id number]  Patient Class: IP- Inpatient   Admission Date: 7/20/2017  Length of Stay: 2 days  Attending Physician: Ilene Parker MD  Primary Care Provider: Levi Moncada MD        Subjective:     Principal Problem:Fever    HPI:  30 y/o female presents to ED with c/o fever that onset gradually 6 days ago. PMH includes HIV. She went to her PCP on Monday and given abx. Symptoms have been intermediate and moderate. No exacerbating or mitigating factors. She denies chills, N/V/D, Cp, palpitations, hematuria, congestion, cough, dysuria, and all other symptoms. She will be observed for symptom control and ID consult under the care of hospital medicine.    Hospital Course:  Wang Kebede is a 31 year old female admitted for Fever. Upon admission pt started on IV Vancomycin and Zosyn. Blood cultures with NGTD, urine culture with multiple isolates. CT abdomen/pelvis -- enlarged edematous left kidney could reflect pyelonephritis and fluid filled bowel loops seen throughout colon, which may infectious process which the diarrhea she was experiencing has resolved. Will repeat UA due to continued fever. C-Diff negative. ID consulted for hx of HIV. Per ID, pt has AIDS but her immune system is recovering since she is now compliant with HAART. Her fever could be secondary to immune reconstitution inflammatory syndrome (IRIS). CXR and UA upon admission negative as source of infection. S/p 1 unit of PRBCs on 07/22/17, responded appropriately. Nephrotoxic medications have been discontinued including Vancomycin and Zosyn. Pt currently receiving IV Meropenum and Zyvox. Nephrology consulted, awaiting input. Overnight, pt tachypneic and tachycardiac. CXR with developing CHF and pulmonary edema. . Pt received diuretics. Discussed case with ID -- recommended discontinuing IV antibiotics and starting PO Avelox  to assist with fluid restriction. Respiratory/Metobolic acidosis noted. Due to increased work of breathing, pt transferred to ICU for closer monitoring.         Intubated due to respiratory distress      Review of Systems   Constitutional: Positive for activity change and fever.   HENT: Negative.    Eyes: Negative.    Respiratory: Positive for shortness of breath. Negative for apnea, cough, choking, chest tightness, wheezing and stridor.    Cardiovascular: Negative for chest pain, palpitations and leg swelling.   Gastrointestinal: Positive for nausea. Negative for abdominal pain, blood in stool, constipation, diarrhea and vomiting.   Endocrine: Negative.    Genitourinary: Negative.    Musculoskeletal: Negative.    Skin: Negative.    Allergic/Immunologic: Positive for immunocompromised state.   Neurological: Positive for weakness. Negative for dizziness, tremors, seizures, syncope, facial asymmetry, speech difficulty, light-headedness, numbness and headaches.   Hematological: Negative.    Psychiatric/Behavioral: Negative.      Objective:     Vital Signs (Most Recent):  Temp: 99 °F (37.2 °C) (07/23/17 1500)  Pulse: (!) 112 (07/23/17 1657)  Resp: (!) 51 (07/23/17 1545)  BP: (!) 84/31 (07/23/17 1545)  SpO2: 100 % (07/23/17 1657) Vital Signs (24h Range):  Temp:  [98.3 °F (36.8 °C)-99.9 °F (37.7 °C)] 99 °F (37.2 °C)  Pulse:  [107-135] 112  Resp:  [18-60] 51  SpO2:  [94 %-100 %] 100 %  BP: ()/() 84/31     Weight: 46.9 kg (103 lb 4.8 oz)  Body mass index is 20.17 kg/m².    Intake/Output Summary (Last 24 hours) at 07/23/17 1729  Last data filed at 07/23/17 1500   Gross per 24 hour   Intake             1430 ml   Output              260 ml   Net             1170 ml      Physical Exam   Constitutional: She is oriented to person, place, and time. She appears well-developed.   HENT:   Head: Normocephalic and atraumatic.   Eyes: Conjunctivae and EOM are normal.   Neck: Normal range of motion. Neck supple.    Cardiovascular: Intact distal pulses.  Tachycardia present.    Defibrillator noted   Pulmonary/Chest: Tachypnea noted. She is in respiratory distress. She has rales in the right lower field and the left lower field.   Abdominal: Soft. Bowel sounds are normal. She exhibits distension. There is no tenderness. There is no rebound and no guarding.   PEG tube noted   Neurological: She is alert and oriented to person, place, and time.   Skin: Skin is warm and dry. Capillary refill takes less than 2 seconds.   Psychiatric: She has a normal mood and affect. Her behavior is normal. Judgment and thought content normal.   Nursing note and vitals reviewed.      Significant Labs:   ABGs:   Recent Labs  Lab 07/23/17  1717   PH 7.246*   PCO2 23.1*   HCO3 10.0*   POCSATURATED 99   BE -17     CBC:   Recent Labs  Lab 07/22/17  0528 07/23/17  0031 07/23/17  0838   WBC 2.01* 1.73* 3.05*   HGB 6.6* 8.8* 9.1*   HCT 19.2* 25.7* 26.6*   PLT 98* 85* 89*     CMP:   Recent Labs  Lab 07/22/17  0528 07/23/17  0031 07/23/17  0534   * 131* 134*   K 3.6 4.2 4.2   * 112* 115*   CO2 10* 9* 7*   GLU 85 84 77   BUN 41* 47* 49*   CREATININE 2.6* 3.0* 3.1*   CALCIUM 7.4* 7.3* 7.8*   PROT 7.2 7.7 8.1   ALBUMIN 1.6* 1.7* 1.8*   BILITOT 0.3 0.6 0.7   ALKPHOS 54* 103 116   AST 47* 56* 56*   ALT 27 36 37   ANIONGAP 9 10 12   EGFRNONAA 24* 20* 19*     Lactic Acid:   Recent Labs  Lab 07/23/17  0838   LACTATE 1.1       Significant Imaging:   Imaging Results          X-Ray Chest 1 View (Final result)  Result time 07/23/17 17:31:14    Final result by Tayler Irene MD (07/23/17 17:31:14)                 Impression:      A PICC line is present via right sided approach with its tip in superior vena cava.  Persistent patchy infiltrates in the right lung.      Electronically signed by: TAYLER IRENE MD  Date:     07/23/17  Time:    17:31              Narrative:    Exam: XR CHEST 1 VIEW,    Date:  07/23/17 17:02:10    History: picc  line    Comparison:  07/23/1970    Findings: An endotracheal tube is present its tip level of T3.  A nasogastric tube extends into the stomach.  A right PICC line is positioned with its tip in superior vena cava.  A pacemaker remains in place.  The heart is at the upper limits of normal in size.  There is patchy infiltrate in the right upper lobe as well as congestion of pulmonary vascularity.                             X-Ray Chest AP Portable (Final result)  Result time 07/23/17 16:08:40    Final result by Jewell Kothari MD (07/23/17 16:08:40)                 Impression:     See above.      Electronically signed by: JEWELL KOTHARI MD  Date:     07/23/17  Time:    16:08              Narrative:    Exam: Portable chest xray    History:    Respiratory failure.  Endotracheal tube placement.    Findings:     The endotracheal tip projects 4 cm above the reanna.  NG tube tip below diaphragm.  Patchy increased markings in the lung bases right greater than left.  Comparison made to exam performed earlier today.                             X-Ray Chest 1 View (No Result on File)                X-Ray Chest 1 View (Final result)  Result time 07/23/17 08:23:12    Final result by Tayler Irene MD (07/23/17 08:23:12)                 Impression:      Findings chest changes of developing congestive heart failure and pulmonary edema.      Electronically signed by: TAYLER IRENE MD  Date:     07/23/17  Time:    08:23              Narrative:    Exam: XR CHEST 1 VIEW,    Date:  07/23/17 00:51:38    History: pneumonia vs pulm edema    Comparison:  07/20/2017    Findings: The heart is at the upper limits of normal in size.  A pacemaker remains in place.  There is a new pattern congestion and pulmonary vascularity with small pleural effusions and patchy perihilar and interstitial markings in the lung bases.                             CT Abdomen Pelvis  Without Contrast (Final result)  Result time 07/21/17 14:40:49    Final result by  Ghanshyam Vieyra MD (07/21/17 14:40:49)                 Impression:        Enlarged edematous left kidney could reflect pyelonephritis. Correlate with urinalysis.    Minimal consolidation or atelectasis is seen at the left lung base.     No evidence of intra-abdominal abscess formation    PEG tube appears satisfactory.     Fluid-filled bowel loops are seen throughout the abdomen which could reflect the patient's diarrhea and be related to infectious process.     Findings discussed with JEWELL PEDERSEN at 14:40:42        Electronically signed by: GHANSHYAM VIEYRA MD  Date:     07/21/17  Time:    14:40              Narrative:    CT of the abdomen and pelvis without contrast.    Clinical indication: Fever. Abdominal pain.    Findings: Lack of IV and oral contrast material grossly limited this examination.    The heart size is normal with pacing wires. Minimal consolidation or atelectasis is seen at the left lung base. There is no free intraperitoneal air. No bowel obstruction. Bony windows show no acute abnormality.    The liver is unremarkable. Splenomegaly noted adrenal glands and pancreas appear unremarkable on this noncontrast study. Gallbladder surgically absent.    . The right kidney is enlarged and appears edematous with perinephric stranding; pyelonephritis is a differential consideration. No definite hydronephrosis or ureteral calculus is identified.    Within the pelvis there are radiopaque densities in posterior related to patient's prior surgery. Scattered fluid-filled loops of bowel are seen throughout the abdomen which could reflect diarrhea.    Extensive retroperitoneal adenopathy is seen which is similar to the prior study.    Bones demonstrate degenerative changes                             X-Ray Chest AP Portable (Final result)  Result time 07/20/17 20:50:48    Final result by Ghanshyam Haq III, MD (07/20/17 20:50:48)                 Impression:     No acute cardio pulmonary abnormality  suggested.      Electronically signed by: ENOC MEDRANO MD  Date:     07/20/17  Time:    20:50              Narrative:    One view chest x-ray.    Clinical indication: sepsis. Shortness of breath.    Heart size is normal. The lung fields are clear with minimal chronic changes suggested.. No acute pulmonary infiltrate. Permanent pacing device/defibrillator again noted in position on the left.                            Assessment/Plan:      Respiratory distress    - Transfer to ICU for closer monitoring  - Tachypneic 40/min -- likely secondary from the metabolic acidosis  - CXR this morning -- findings consistent with CHF and pulmonary edema  - pt received diuretics with no improvement of symptoms  - NIPPV            * Fever    - Fever of unknown etiology  - ID following -- pt has AIDS but her immune system is recovering since she is now compliant with HAART. Her fever may be secondary to immune reconstitution inflammatory syndrome but still need to rule out infectious process  - Differential diagnosis -- 1.  Disseminated MAC  2.  Histoplasmosis  - Blood cultures with NGTD  - Repeat UA pending -- CT abdomen/pelvis -- enlarged edematous left kidney could reflect pyelonephritis and fluid filled bowel loops seen throughout colon, which may infectious process which at the time pt was experiencing diarrhea  - C-diff negative  - Antipyretics prn        HIV (human immunodeficiency virus infection)    - ID following  - Per ID has AIDS  - Last CD4 count on 07/10/17 -- 245  - Neutropenic on AM labs -- Hematology/Oncology consulted          Hyponatremia    - Improving -- currently 134   - Will continue to monitor          Acute renal failure    - May be secondary to dehydration, hypotension, and/or Vancomycin  - Nephrology consulted due to worsening kidney function  - Avoid Nephrotoxic medications         Anemia    - S/p 1 unit of PRBCs, responded appropriately  - Supplemental oxygen prn, keep O2 sats > 92%  - Pt sees   Shara (hematology/oncology), last clinic visit 07/05/17  - Per Hematology/Oncology on that clinic visit -- she has normocytic anemia and possibly due to HIV/AIDS (hx of noncompliance but now compliant). Pt reports hx of sickle cell disease but hemoglobin electrophoresis was normal on 2 separate occassions. If her anemia does not improve after 2 months of continued HAART therapy, refer back to Hematology  - Daily CBC        S/P percutaneous endoscopic gastrostomy (PEG) tube placement    - PEG placed on 05/26/17 for purpose of HAART therapy -- has difficulty swallowing HIV/AIDS medications  - PEG tube site care          S/P implantation of automatic cardioverter/defibrillator (AICD)              VTE Risk Mitigation         Ordered     Medium Risk of VTE  Once      07/20/17 7008          MICHELLE Ortega  Department of Hospital Medicine   [facility]

## 2017-07-23 NOTE — PLAN OF CARE
Problem: Patient Care Overview  Goal: Plan of Care Review  Outcome: Ongoing (interventions implemented as appropriate)  Pts care has transferred to ICU this shift. Increased WOB throughout the day. Pt currently on mechanical ventilator. Pt will be weaned as tolerated.

## 2017-07-23 NOTE — PROCEDURES
"Wang Kebede is a 31 y.o. female patient.    Temp: 99 °F (37.2 °C) (07/23/17 1500)  Pulse: (!) 112 (07/23/17 1657)  Resp: (!) 51 (07/23/17 1545)  BP: (!) 84/31 (07/23/17 1545)  SpO2: 100 % (07/23/17 1657)  Weight: 46.9 kg (103 lb 4.8 oz) (07/20/17 2324)  Height: 5' (152.4 cm) (07/20/17 2324)       PICC Line Insertion  Date/Time: 7/23/2017 3:15 PM  Location procedure was performed: [facility] INTENSIVE CARE UNIT  Performed by: NOLVIA FLOWERS  Pre-operative Diagnosis: sepsis, respiratory failure  Post-operative diagnosis: sepsis, respiratory failure  Consent Done: Yes  Time out: Immediately prior to procedure a "time out" was called to verify the correct patient, procedure, equipment, support staff and site/side marked as required.  Indications: med administration  Anesthesia: local infiltration    Anesthesia:  Local Anesthetic: lidocaine 1% without epinephrine  Anesthetic total: 2 mL  Preparation: skin prepped with chlorhexidine (without alcohol)  Skin prep agent dried: skin prep agent completely dried prior to procedure  Sterile barriers: all five maximum sterile barriers used - cap, mask, sterile gown, sterile gloves, and large sterile sheet  Hand hygiene: hand hygiene performed prior to central venous catheter insertion  Location details: left brachial  Catheter type: double lumen  Catheter size: 5 Fr  Catheter Length: 32cm    Ultrasound guidance: yes  Vessel Caliber: smallNeedle advanced into vessel with real time Ultrasound guidance.  Guidewire confirmed in vessel.  Sterile sheath used.  Manometry: No   Number of attempts: 1  Specimens: No  Implants: No  Post-procedure: blood return through all ports  Complications: No  Comments: Post procedure xray shows catheter looping at AICD; unable to manipulate past AICD; secured at mid line length with biopatch and occlusive dressing for use until new central access obtained          Nolvia Flowers  7/23/2017  "

## 2017-07-23 NOTE — ASSESSMENT & PLAN NOTE
- ID following  - Per ID has AIDS  - Last CD4 count on 07/10/17 -- 245  - Neutropenic on AM labs -- Hematology/Oncology consulted

## 2017-07-23 NOTE — PLAN OF CARE
Problem: Patient Care Overview  Goal: Plan of Care Review  Outcome: Ongoing (interventions implemented as appropriate)  Patient updated on POC, demonstrated teach back  Pain managed with analgesic  Monitor st  VSS not stable tachypenia  Patient turns self;overloaded;diuresing  Fall precautions in place,bed locked,lowest position;call bell within reach;skidproof socks  Bed alarm  Pt remained free from falls; resp.distress noted;pt hyperventelating;criticals called to Dr. Chiu

## 2017-07-23 NOTE — HPI
Wang Kebede is a 31 y.o. AA woman  with history of HIV/AIDS, vulvar cancer, anemia, sickle cell disease, cervical cancer, chronic abdominal pain was admitted to [facility] about 2 days ago for fever , she was started on broad spectrum abx , her serum bicarb dropped from 19 on admission to 7 today , lactate level normal, serum Cr was normal about 2 weeks ago, creatinine increased to 3 today, we were consulted for LELAND , Metabolic acidosis,

## 2017-07-23 NOTE — ASSESSMENT & PLAN NOTE
- May be secondary to dehydration, hypotension, and/or Vancomycin  - Nephrology consulted due to worsening kidney function  - Avoid Nephrotoxic medications

## 2017-07-23 NOTE — ASSESSMENT & PLAN NOTE
- Fever of unknown etiology  - ID following -- pt has AIDS but her immune system is recovering since she is now compliant with HAART. Her fever may be secondary to immune reconstitution inflammatory syndrome but still need to rule out infectious process  - Differential diagnosis -- 1.  Disseminated MAC  2.  Histoplasmosis  - Blood cultures with NGTD  - Repeat UA pending -- CT abdomen/pelvis -- enlarged edematous left kidney could reflect pyelonephritis and fluid filled bowel loops seen throughout colon, which may infectious process which at the time pt was experiencing diarrhea  - C-diff negative  - Antipyretics prn

## 2017-07-23 NOTE — ASSESSMENT & PLAN NOTE
- Transfer to ICU for closer monitoring  - Tachypneic 40/min -- likely secondary from the metabolic acidosis  - CXR this morning -- findings consistent with CHF and pulmonary edema  - pt received diuretics with no improvement of symptoms  - NIPPV

## 2017-07-23 NOTE — PROGRESS NOTES
Patient instructed that a urine sample is requested and accurate input and output; hat provided for measuring as well as specimen cup;pt voiced understanding; patient observed nodding mid conversation;Dr. Parker aware

## 2017-07-23 NOTE — PROGRESS NOTES
Pt showing signs of increasing distress trending over the last 2 checks. NP notified of findings and concerns. Pt then intubated for increased wob 45-50 RR abdominal/ accessory muscle usage. Oneil GIRALDO/ Kylie To Np at bedside. RT at [facility] to assist start to finish endotracheal intubation. RN s at bedside now to better sedate pt to help with resp muscle usage.

## 2017-07-23 NOTE — ASSESSMENT & PLAN NOTE
- S/p 1 unit of PRBCs, responded appropriately  - Supplemental oxygen prn, keep O2 sats > 92%  - Pt sees Dr. Olmedo (hematology/oncology), last clinic visit 07/05/17  - Per Hematology/Oncology on that clinic visit -- she has normocytic anemia and possibly due to HIV/AIDS (hx of noncompliance but now compliant). Pt reports hx of sickle cell disease but hemoglobin electrophoresis was normal on 2 separate occassions. If her anemia does not improve after 2 months of continued HAART therapy, refer back to Hematology  - Daily CBC

## 2017-07-24 PROBLEM — J96.00 ACUTE RESPIRATORY FAILURE: Status: ACTIVE | Noted: 2017-07-23

## 2017-07-24 LAB
ALBUMIN SERPL BCP-MCNC: 1.5 G/DL
ALLENS TEST: ABNORMAL
ALLENS TEST: ABNORMAL
ALP SERPL-CCNC: 142 U/L
ALT SERPL W/O P-5'-P-CCNC: 26 U/L
AMYLASE SERPL-CCNC: 97 U/L
ANION GAP SERPL CALC-SCNC: 10 MMOL/L
ANION GAP SERPL CALC-SCNC: 13 MMOL/L
AST SERPL-CCNC: 47 U/L
BACTERIA UR CULT: NO GROWTH
BASOPHILS # BLD AUTO: ABNORMAL K/UL
BASOPHILS NFR BLD: 0 %
BILIRUB SERPL-MCNC: 0.8 MG/DL
BUN SERPL-MCNC: 65 MG/DL
BUN SERPL-MCNC: 69 MG/DL
CALCIUM SERPL-MCNC: 6.7 MG/DL
CALCIUM SERPL-MCNC: 6.7 MG/DL
CHLORIDE SERPL-SCNC: 107 MMOL/L
CHLORIDE SERPL-SCNC: 107 MMOL/L
CO2 SERPL-SCNC: 14 MMOL/L
CO2 SERPL-SCNC: 14 MMOL/L
CREAT SERPL-MCNC: 3.9 MG/DL
CREAT SERPL-MCNC: 4 MG/DL
DELSYS: ABNORMAL
DELSYS: ABNORMAL
DIASTOLIC DYSFUNCTION: NO
DIFFERENTIAL METHOD: ABNORMAL
EOSINOPHIL # BLD AUTO: ABNORMAL K/UL
EOSINOPHIL NFR BLD: 0 %
ERYTHROCYTE [DISTWIDTH] IN BLOOD BY AUTOMATED COUNT: 20 %
ERYTHROCYTE [SEDIMENTATION RATE] IN BLOOD BY WESTERGREN METHOD: 24 MM/H
ERYTHROCYTE [SEDIMENTATION RATE] IN BLOOD BY WESTERGREN METHOD: 24 MM/H
EST. GFR  (AFRICAN AMERICAN): 16 ML/MIN/1.73 M^2
EST. GFR  (AFRICAN AMERICAN): 17 ML/MIN/1.73 M^2
EST. GFR  (NON AFRICAN AMERICAN): 14 ML/MIN/1.73 M^2
EST. GFR  (NON AFRICAN AMERICAN): 14 ML/MIN/1.73 M^2
ESTIMATED PA SYSTOLIC PRESSURE: 30.25
FIO2: 40
FIO2: 40
GLUCOSE SERPL-MCNC: 100 MG/DL
GLUCOSE SERPL-MCNC: 104 MG/DL (ref 70–110)
GLUCOSE SERPL-MCNC: 205 MG/DL
HCO3 UR-SCNC: 11.6 MMOL/L (ref 24–28)
HCO3 UR-SCNC: 14.4 MMOL/L (ref 24–28)
HCT VFR BLD AUTO: 22.6 %
HCT VFR BLD CALC: 23 %PCV (ref 36–54)
HGB BLD-MCNC: 7.9 G/DL
INR PPP: 1.1
LACTATE SERPL-SCNC: 1.6 MMOL/L
LIPASE SERPL-CCNC: 26 U/L
LYMPHOCYTES # BLD AUTO: ABNORMAL K/UL
LYMPHOCYTES NFR BLD: 33 %
MAGNESIUM SERPL-MCNC: 1.3 MG/DL
MAGNESIUM SERPL-MCNC: 2.4 MG/DL
MCH RBC QN AUTO: 31.6 PG
MCHC RBC AUTO-ENTMCNC: 35 G/DL
MCV RBC AUTO: 90 FL
MODE: ABNORMAL
MODE: ABNORMAL
MONOCYTES # BLD AUTO: ABNORMAL K/UL
MONOCYTES NFR BLD: 11 %
NEUTROPHILS # BLD AUTO: ABNORMAL K/UL
NEUTROPHILS NFR BLD: 21 %
NEUTS BAND NFR BLD MANUAL: 35 %
PCO2 BLDA: 26.2 MMHG (ref 35–45)
PCO2 BLDA: 31.8 MMHG (ref 35–45)
PEEP: 5
PEEP: 5
PH SMN: 7.25 [PH] (ref 7.35–7.45)
PH SMN: 7.26 [PH] (ref 7.35–7.45)
PHOSPHATE SERPL-MCNC: 7.4 MG/DL
PLATELET # BLD AUTO: 103 K/UL
PMV BLD AUTO: 10.2 FL
PO2 BLDA: 87 MMHG (ref 80–100)
PO2 BLDA: 98 MMHG (ref 80–100)
POC BE: -13 MMOL/L
POC BE: -16 MMOL/L
POC IONIZED CALCIUM: 1.01 MMOL/L (ref 1.06–1.42)
POC SATURATED O2: 95 % (ref 95–100)
POC SATURATED O2: 97 % (ref 95–100)
POCT GLUCOSE: 103 MG/DL (ref 70–110)
POCT GLUCOSE: 106 MG/DL (ref 70–110)
POCT GLUCOSE: 110 MG/DL (ref 70–110)
POCT GLUCOSE: 99 MG/DL (ref 70–110)
POTASSIUM BLD-SCNC: 3.9 MMOL/L (ref 3.5–5.1)
POTASSIUM SERPL-SCNC: 3.5 MMOL/L
POTASSIUM SERPL-SCNC: 4 MMOL/L
PROT SERPL-MCNC: 7.2 G/DL
PROTHROMBIN TIME: 11.4 SEC
RBC # BLD AUTO: 2.5 M/UL
RETIRED EF AND QEF - SEE NOTES: 60 (ref 55–65)
SAMPLE: ABNORMAL
SAMPLE: ABNORMAL
SITE: ABNORMAL
SITE: ABNORMAL
SODIUM BLD-SCNC: 133 MMOL/L (ref 136–145)
SODIUM SERPL-SCNC: 131 MMOL/L
SODIUM SERPL-SCNC: 134 MMOL/L
TRICUSPID VALVE REGURGITATION: NORMAL
VT: 350
VT: 350
WBC # BLD AUTO: 1.59 K/UL

## 2017-07-24 PROCEDURE — 85610 PROTHROMBIN TIME: CPT

## 2017-07-24 PROCEDURE — 25000003 PHARM REV CODE 250: Performed by: INTERNAL MEDICINE

## 2017-07-24 PROCEDURE — 84295 ASSAY OF SERUM SODIUM: CPT

## 2017-07-24 PROCEDURE — C9113 INJ PANTOPRAZOLE SODIUM, VIA: HCPCS | Performed by: EMERGENCY MEDICINE

## 2017-07-24 PROCEDURE — 85027 COMPLETE CBC AUTOMATED: CPT

## 2017-07-24 PROCEDURE — 63600175 PHARM REV CODE 636 W HCPCS: Performed by: INTERNAL MEDICINE

## 2017-07-24 PROCEDURE — 63600175 PHARM REV CODE 636 W HCPCS: Performed by: EMERGENCY MEDICINE

## 2017-07-24 PROCEDURE — 82330 ASSAY OF CALCIUM: CPT

## 2017-07-24 PROCEDURE — 83605 ASSAY OF LACTIC ACID: CPT

## 2017-07-24 PROCEDURE — 80053 COMPREHEN METABOLIC PANEL: CPT

## 2017-07-24 PROCEDURE — 25000003 PHARM REV CODE 250: Performed by: NURSE PRACTITIONER

## 2017-07-24 PROCEDURE — 84132 ASSAY OF SERUM POTASSIUM: CPT

## 2017-07-24 PROCEDURE — 20000000 HC ICU ROOM

## 2017-07-24 PROCEDURE — 99900035 HC TECH TIME PER 15 MIN (STAT)

## 2017-07-24 PROCEDURE — 82150 ASSAY OF AMYLASE: CPT

## 2017-07-24 PROCEDURE — 63600175 PHARM REV CODE 636 W HCPCS: Performed by: NURSE PRACTITIONER

## 2017-07-24 PROCEDURE — 80048 BASIC METABOLIC PNL TOTAL CA: CPT

## 2017-07-24 PROCEDURE — 84100 ASSAY OF PHOSPHORUS: CPT

## 2017-07-24 PROCEDURE — 85007 BL SMEAR W/DIFF WBC COUNT: CPT

## 2017-07-24 PROCEDURE — 27200966 HC CLOSED SUCTION SYSTEM

## 2017-07-24 PROCEDURE — 99291 CRITICAL CARE FIRST HOUR: CPT | Mod: ,,, | Performed by: INTERNAL MEDICINE

## 2017-07-24 PROCEDURE — 36415 COLL VENOUS BLD VENIPUNCTURE: CPT

## 2017-07-24 PROCEDURE — 99291 CRITICAL CARE FIRST HOUR: CPT | Mod: ,,, | Performed by: NURSE PRACTITIONER

## 2017-07-24 PROCEDURE — 36600 WITHDRAWAL OF ARTERIAL BLOOD: CPT

## 2017-07-24 PROCEDURE — 83690 ASSAY OF LIPASE: CPT

## 2017-07-24 PROCEDURE — 99233 SBSQ HOSP IP/OBS HIGH 50: CPT | Mod: ,,, | Performed by: INTERNAL MEDICINE

## 2017-07-24 PROCEDURE — 85014 HEMATOCRIT: CPT

## 2017-07-24 PROCEDURE — 99900026 HC AIRWAY MAINTENANCE (STAT)

## 2017-07-24 PROCEDURE — 82803 BLOOD GASES ANY COMBINATION: CPT

## 2017-07-24 PROCEDURE — 97802 MEDICAL NUTRITION INDIV IN: CPT

## 2017-07-24 PROCEDURE — 83735 ASSAY OF MAGNESIUM: CPT | Mod: 91

## 2017-07-24 PROCEDURE — 94003 VENT MGMT INPAT SUBQ DAY: CPT

## 2017-07-24 RX ORDER — NOREPINEPHRINE BITARTRATE/D5W 4MG/250ML
0.02 PLASTIC BAG, INJECTION (ML) INTRAVENOUS CONTINUOUS
Status: DISCONTINUED | OUTPATIENT
Start: 2017-07-24 | End: 2017-07-24

## 2017-07-24 RX ORDER — GLUCAGON 1 MG
1 KIT INJECTION
Status: DISCONTINUED | OUTPATIENT
Start: 2017-07-24 | End: 2017-07-27

## 2017-07-24 RX ORDER — POTASSIUM CHLORIDE 14.9 MG/ML
20 INJECTION INTRAVENOUS ONCE
Status: COMPLETED | OUTPATIENT
Start: 2017-07-24 | End: 2017-07-24

## 2017-07-24 RX ORDER — DOCUSATE SODIUM 50 MG/5ML
100 LIQUID ORAL DAILY
Status: DISCONTINUED | OUTPATIENT
Start: 2017-07-24 | End: 2017-07-31 | Stop reason: HOSPADM

## 2017-07-24 RX ORDER — INSULIN ASPART 100 [IU]/ML
1-10 INJECTION, SOLUTION INTRAVENOUS; SUBCUTANEOUS EVERY 4 HOURS PRN
Status: DISCONTINUED | OUTPATIENT
Start: 2017-07-24 | End: 2017-07-27

## 2017-07-24 RX ORDER — MAGNESIUM SULFATE HEPTAHYDRATE 40 MG/ML
2 INJECTION, SOLUTION INTRAVENOUS ONCE
Status: DISCONTINUED | OUTPATIENT
Start: 2017-07-24 | End: 2017-07-24

## 2017-07-24 RX ORDER — MAGNESIUM SULFATE HEPTAHYDRATE 40 MG/ML
4 INJECTION, SOLUTION INTRAVENOUS ONCE
Status: COMPLETED | OUTPATIENT
Start: 2017-07-24 | End: 2017-07-24

## 2017-07-24 RX ADMIN — Medication 0.3 MCG/KG/MIN: at 05:07

## 2017-07-24 RX ADMIN — SODIUM BICARBONATE 650 MG TABLET 1300 MG: at 01:07

## 2017-07-24 RX ADMIN — DOCUSATE SODIUM 100 MG: 50 LIQUID ORAL at 09:07

## 2017-07-24 RX ADMIN — MEROPENEM AND SODIUM CHLORIDE 500 MG: 500 INJECTION, SOLUTION INTRAVENOUS at 07:07

## 2017-07-24 RX ADMIN — Medication 0.36 MCG/KG/MIN: at 01:07

## 2017-07-24 RX ADMIN — NYSTATIN 500000 UNITS: 100000 SUSPENSION ORAL at 05:07

## 2017-07-24 RX ADMIN — Medication 125 MCG/HR: at 01:07

## 2017-07-24 RX ADMIN — ATOVAQUONE 750 MG: 750 SUSPENSION ORAL at 08:07

## 2017-07-24 RX ADMIN — VORICONAZOLE 200 MG: 200 TABLET, FILM COATED ORAL at 09:07

## 2017-07-24 RX ADMIN — LORAZEPAM 2 MG: 2 INJECTION INTRAMUSCULAR; INTRAVENOUS at 02:07

## 2017-07-24 RX ADMIN — CHLORHEXIDINE GLUCONATE 15 ML: 1.2 RINSE ORAL at 09:07

## 2017-07-24 RX ADMIN — Medication 12.5 MG: at 08:07

## 2017-07-24 RX ADMIN — ACETAMINOPHEN 650 MG: 325 TABLET ORAL at 11:07

## 2017-07-24 RX ADMIN — Medication 0.02 MCG/KG/MIN: at 12:07

## 2017-07-24 RX ADMIN — CLARITHROMYCIN 500 MG: 500 TABLET, FILM COATED ORAL at 09:07

## 2017-07-24 RX ADMIN — MEROPENEM AND SODIUM CHLORIDE 500 MG: 500 INJECTION, SOLUTION INTRAVENOUS at 06:07

## 2017-07-24 RX ADMIN — ETHAMBUTOL HYDROCHLORIDE 800 MG: 400 TABLET, FILM COATED ORAL at 09:07

## 2017-07-24 RX ADMIN — SODIUM BICARBONATE 650 MG TABLET 1300 MG: at 09:07

## 2017-07-24 RX ADMIN — VORICONAZOLE 200 MG: 200 TABLET, FILM COATED ORAL at 08:07

## 2017-07-24 RX ADMIN — FOLIC ACID 1 MG: 1 TABLET ORAL at 09:07

## 2017-07-24 RX ADMIN — NYSTATIN 500000 UNITS: 100000 SUSPENSION ORAL at 11:07

## 2017-07-24 RX ADMIN — Medication 12.5 MG: at 09:07

## 2017-07-24 RX ADMIN — POTASSIUM CHLORIDE 20 MEQ: 200 INJECTION, SOLUTION INTRAVENOUS at 10:07

## 2017-07-24 RX ADMIN — NOREPINEPHRINE BITARTRATE 0.5 MCG/KG/MIN: 1 INJECTION INTRAVENOUS at 11:07

## 2017-07-24 RX ADMIN — CALCIUM GLUCONATE 1 G: 94 INJECTION, SOLUTION INTRAVENOUS at 02:07

## 2017-07-24 RX ADMIN — Medication 0.36 MCG/KG/MIN: at 09:07

## 2017-07-24 RX ADMIN — MAGNESIUM SULFATE IN WATER 4 G: 40 INJECTION, SOLUTION INTRAVENOUS at 10:07

## 2017-07-24 RX ADMIN — CHLORHEXIDINE GLUCONATE 15 ML: 1.2 RINSE ORAL at 08:07

## 2017-07-24 RX ADMIN — DOLUTEGRAVIR SODIUM 50 MG: 50 TABLET, FILM COATED ORAL at 11:07

## 2017-07-24 RX ADMIN — SODIUM ACETATE: 3.28 INJECTION, SOLUTION, CONCENTRATE INTRAVENOUS at 04:07

## 2017-07-24 RX ADMIN — LACOSAMIDE 100 MG: 50 TABLET, FILM COATED ORAL at 09:07

## 2017-07-24 RX ADMIN — LACOSAMIDE 100 MG: 50 TABLET, FILM COATED ORAL at 08:07

## 2017-07-24 RX ADMIN — ATOVAQUONE 750 MG: 750 SUSPENSION ORAL at 09:07

## 2017-07-24 RX ADMIN — Medication 0.32 MCG/KG/MIN: at 06:07

## 2017-07-24 RX ADMIN — SODIUM BICARBONATE 650 MG TABLET 650 MG: at 05:07

## 2017-07-24 RX ADMIN — PANTOPRAZOLE SODIUM 40 MG: 40 INJECTION, POWDER, FOR SOLUTION INTRAVENOUS at 09:07

## 2017-07-24 NOTE — PROGRESS NOTES
[facility] -   Infectious Disease  Progress Note    Patient Name: Wang Kebede  MRN: [id number]  Admission Date: 7/20/2017  Length of Stay: 2 days  Attending Physician: Ilene Parker MD  Primary Care Provider: Levi Moncada MD  Late entry note .  Isolation Status: No active isolations  Assessment/Plan:      Thrombocytopenia associated with AIDS    Continue HAART         Acute renal failure    Nephrology follow up .  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        Anemia    Will monitor closely and transfuse as needed .            HIV (human immunodeficiency virus infection)    She has AIDS but her immune system is recovering since she now started to be compliant with HAART .  This fever could be due to IRIS .Immune reconstitution inflammatory syndrome (IRIS).  However , will need to rule out other acute infectious processes.  She is on darunavir -cobicistat and descovy.      Will stop tenofovir due to renal failure and continue darunavir/cobicistat, FTC .    Will continue Mepron for PCP prophylaxis.        * Fever    Abdominal CT scan showed left kidney edema but initial UA was not markedly abnormal -will continue empiric therapy with vanco/meropenem  Disseminated MAC , and histoplasmosis are in the differentials   Will  follow   urine for histoplasma antigen and blood AFB cultures .    The fever could be due to IRIS-Immune reconstitution inflammatory syndrome (IRIS).  Will use po voriconazole for suspected histoplasmosis   PO biaxim/ethambutol for suspected MAC .    This is the third episode that she has these recurrent fevers and it usually resolves. During her last admit , after extensive workup , AIDS itself was suspected as possible source and the decision was made to use a PEG tube to administer HAART  .She is responding to HAART therapy .            Anticipated Disposition:     Thank you for your consult. I will follow-up with patient. Please contact us if you have any additional questions.    Hubert Mayo MD  Infectious Disease  [facility]    Subjective:     Principal Problem:Fever    HPI:   31 year old woman with AIDs well known to me . She has long standing history of poor compliance with medications .However during he rlast admission, after much discussion, she agreed to get a PEG tube inserted and to get her HAART therapy through the PEG tube. She had recent lab work done on 07/10 and cd4-245 ,cd%-10.6 ,  Previous lab data-a month ago-cd4 -4, cd4% 2.4  HIV viral load -1271 -decreased from 180,773 a month ago .  She now presented with fever -T max 103 . She denies any history of cough or headache. No abdominal pain .  Since admission, cultures are still pending . Chest x-ray did not show any acute abnormality. UA is normal .    Interval History: 31 year old woman with AIDS ,admitted with fever .  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  . She had an episode of dyspnea and now has worsening renal function.  She was seen in the room with her boyfriend.    Review of Systems   Constitutional: Positive for chills and fever. Negative for fatigue.   HENT: Negative for congestion, ear pain, facial swelling, sinus pressure and sore throat.    Eyes: Negative for pain.   Respiratory: Negative for apnea, chest tightness, shortness of breath and stridor.    Cardiovascular: Negative for chest pain, palpitations and leg swelling.   Gastrointestinal: Negative for abdominal distention, abdominal pain, diarrhea and nausea.   Endocrine: Negative for polydipsia and polyphagia.   Genitourinary: Negative for decreased urine volume, difficulty urinating, frequency and genital sores.   Musculoskeletal: Negative for arthralgias and gait problem.   Neurological: Negative for  light-headedness and headaches.   Hematological: Negative for adenopathy.   Psychiatric/Behavioral: Negative for agitation, confusion and decreased concentration.     Objective:     Vital Signs (Most Recent):  Temp: (!) 103 °F (39.4 °C) (07/23/17 1919)  Pulse: 110 (07/23/17 2100)  Resp: (!) 42 (07/23/17 2100)  BP: (!) 84/35 (07/23/17 2100)  SpO2: 100 % (07/23/17 2100) Vital Signs (24h Range):  Temp:  [98.3 °F (36.8 °C)-103 °F (39.4 °C)] 103 °F (39.4 °C)  Pulse:  [107-132] 110  Resp:  [38-60] 42  SpO2:  [94 %-100 %] 100 %  BP: ()/() 84/35     Weight: 46.9 kg (103 lb 4.8 oz)  Body mass index is 20.17 kg/m².    Estimated Creatinine Clearance: 18.7 mL/min (based on Cr of 3.1).    Physical Exam   Constitutional: She is oriented to person, place, and time. Vital signs are normal. She appears well-developed.   HENT:   Head: Normocephalic and atraumatic.   Eyes: Conjunctivae and EOM are normal. Pupils are equal, round, and reactive to light.   Neck: Normal range of motion. Neck supple. No thyroid mass and no thyromegaly present.   Cardiovascular: Normal rate, normal heart sounds and intact distal pulses.    Pulmonary/Chest: Effort normal and breath sounds normal. No accessory muscle usage. No respiratory distress.   Abdominal: Soft. Bowel sounds are normal. She exhibits no mass. There is no tenderness.   Peg TUBE NOTED   Musculoskeletal: Normal range of motion.   Neurological: She is alert and oriented to person, place, and time.   Skin: Skin is intact. No rash noted.   Psychiatric: She has a normal mood and affect.   Nursing note and vitals reviewed.      Significant Labs:   Blood Culture:   Recent Labs  Lab 04/16/17  1615 05/21/17  1440 05/21/17  1445 07/20/17  2000 07/20/17 2027   LABBLOO No growth after 5 days. No growth after 5 days. No growth after 5 days. No Growth to date  No Growth to date  No Growth to date No Growth to date  No Growth to date  No Growth to date     BMP:   Recent Labs  Lab  07/23/17  0534   GLU 77   *   K 4.2   *   CO2 7*   BUN 49*   CREATININE 3.1*   CALCIUM 7.8*     Lactic Acid:   Recent Labs  Lab 07/23/17  0838   LACTATE 1.1     All pertinent labs within the past 24 hours have been reviewed.    Significant Imaging: I have reviewed all pertinent imaging results/findings within the past 24 hours.

## 2017-07-24 NOTE — SUBJECTIVE & OBJECTIVE
Interval History: remains critically ill, on vent support ,     Review of patient's allergies indicates:   Allergen Reactions    Iodine and iodide containing products Anaphylaxis     Pt states she coded last time she was administered contrast    Pneumococcal 23-chitra ps vaccine Anaphylaxis     Potential iodine containing    Bactrim [sulfamethoxazole-trimethoprim] Hives    Morphine Itching     Current Facility-Administered Medications   Medication Frequency    0.9%  NaCl infusion (for blood administration) Q24H PRN    acetaminophen tablet 650 mg Q6H PRN    atovaquone suspension 750 mg Q12H    chlorhexidine 0.12 % solution 15 mL BID    clarithromycin tablet 500 mg Q12H    darunavir-cobicistat 800-150 mg-mg Tab 800 mg QHS    dextrose 5 % 1,000 mL with sodium acetate 150 mEq infusion Continuous    dextrose 5 % 1,000 mL with sodium bicarbonate 1 mEq/mL (8.4 %) 150 mEq infusion Continuous    dextrose 50% injection 12.5 g PRN    diphenhydrAMINE injection 12.5 mg Q6H PRN    docusate 50 mg/5 mL liquid 100 mg Daily    dolutegravir Tab 50 mg Daily    [START ON 7/25/2017] emtricitabine capsule 200 mg Q72H    ethambutol tablet 800 mg Daily    fentaNYL 2500 mcg in D5W 250 mL infusion premix (titrating) (conc: 10 mcg/mL) Continuous    folic acid tablet 1 mg Daily    glucagon (human recombinant) injection 1 mg PRN    insulin aspart pen 1-10 Units Q4H PRN    lacosamide tablet 100 mg BID    lorazepam injection 2 mg Q4H PRN    meropenem-0.9% sodium chloride 500 mg/50 mL IVPB Q12H    metoprolol tartrate (LOPRESSOR) split tablet 12.5 mg BID    norepinephrine 4 mg in dextrose 5% 250 mL infusion (premix) (titrating) Continuous    nystatin 100,000 unit/mL suspension 500,000 Units QID    ondansetron injection 4 mg Q12H PRN    pantoprazole injection 40 mg Daily    promethazine (PHENERGAN) 6.25 mg in dextrose 5 % 50 mL IVPB Q6H PRN    sodium bicarbonate tablet 1,300 mg TID    vancomycin 750 mg in dextrose 5 %  250 mL IVPB (ready to mix system) Q48H    voriconazole tablet 200 mg BID       Objective:     Vital Signs (Most Recent):  Temp: 99.6 °F (37.6 °C) (07/24/17 1102)  Pulse: 108 (07/24/17 1245)  Resp: (!) 27 (07/24/17 1245)  BP: (!) 85/35 (07/24/17 1245)  SpO2: 99 % (07/24/17 1245)  O2 Device (Oxygen Therapy): ventilator (07/24/17 1106) Vital Signs (24h Range):  Temp:  [99 °F (37.2 °C)-103 °F (39.4 °C)] 99.6 °F (37.6 °C)  Pulse:  [106-140] 108  Resp:  [26-51] 27  SpO2:  [99 %-100 %] 99 %  BP: ()/(12-67) 85/35     Weight: 53 kg (116 lb 13.5 oz) (07/24/17 1102)  Body mass index is 22.82 kg/m².  Body surface area is 1.5 meters squared.    I/O last 3 completed shifts:  In: 3111.7 [P.O.:240; I.V.:1121.7; NG/GT:690; IV Piggyback:1060]  Out: 1700 [Urine:1690; Drains:10]    Physical Exam   Constitutional: She appears well-developed and well-nourished. No distress.   HENT:   Head: Normocephalic and atraumatic.   Mouth/Throat: Oropharynx is clear and moist. No oropharyngeal exudate.   ett in place    Eyes: Conjunctivae and EOM are normal.   Neck: Normal range of motion. Neck supple. No JVD present. Carotid bruit is not present. No tracheal deviation present. No thyroid mass and no thyromegaly present.   Cardiovascular: Normal rate, regular rhythm, normal heart sounds and intact distal pulses.  Exam reveals no gallop and no friction rub.    No murmur heard.  Pulmonary/Chest: No respiratory distress. She has no wheezes. She has rales. She exhibits no tenderness.   Abdominal: Soft. Bowel sounds are normal. She exhibits no distension, no abdominal bruit, no ascites and no mass. There is no hepatosplenomegaly. There is no tenderness. There is no rebound, no guarding and no CVA tenderness.   Musculoskeletal: Normal range of motion. She exhibits no edema or tenderness.   Lymphadenopathy:     She has no cervical adenopathy.   Neurological: She displays normal reflexes. No cranial nerve deficit. She exhibits normal muscle tone.  Coordination normal.   Skin: Skin is warm and intact. No rash noted. No erythema. No pallor.   Psychiatric: Judgment normal.       Significant Labs:  CBC:   Recent Labs  Lab 07/24/17  0240   WBC 1.59*   RBC 2.50*   HGB 7.9*   HCT 22.6*   *   MCV 90   MCH 31.6*   MCHC 35.0     CMP:   Recent Labs  Lab 07/24/17  0240   *   CALCIUM 6.7*   ALBUMIN 1.5*   PROT 7.2   *   K 3.5   CO2 14*      BUN 65*   CREATININE 3.9*   ALKPHOS 142*   ALT 26   AST 47*   BILITOT 0.8     LFTs:   Recent Labs  Lab 07/24/17  0240   ALT 26   AST 47*   ALKPHOS 142*   BILITOT 0.8   PROT 7.2   ALBUMIN 1.5*     Lab Results   Component Value Date    CALCIUM 6.7 (LL) 07/24/2017    PHOS 7.4 (H) 07/24/2017       All labs within the past 24 hours have been reviewed.     Significant Imaging: reviewed

## 2017-07-24 NOTE — PROGRESS NOTES
[facility] Hospital Medicine  Progress Note    Patient Name: Wang Kebede  MRN: [id number]  Patient Class: IP- Inpatient   Admission Date: 7/20/2017  Length of Stay: 3 days  Attending Physician: Marcus Robertson MD  Primary Care Provider: Levi Moncada MD        Subjective:     Principal Problem:Fever    HPI:  30 y/o female presents to ED with c/o fever that onset gradually 6 days ago. PMH includes HIV. She went to her PCP on Monday and given abx. Symptoms have been intermediate and moderate. No exacerbating or mitigating factors. She denies chills, N/V/D, Cp, palpitations, hematuria, congestion, cough, dysuria, and all other symptoms. She will be observed for symptom control and ID consult under the care of hospital medicine.    Hospital Course:  Wang Kebede is a 31 year old female admitted for Fever. Upon admission pt started on IV Vancomycin and Zosyn. Blood cultures with NGTD, urine culture with multiple isolates. CT abdomen/pelvis -- enlarged edematous left kidney could reflect pyelonephritis and fluid filled bowel loops seen throughout colon, which may infectious process which the diarrhea she was experiencing has resolved. Will repeat UA due to continued fever. C-Diff negative. ID consulted for hx of HIV. Per ID, pt has AIDS but her immune system is recovering since she is now compliant with HAART. Her fever could be secondary to immune reconstitution inflammatory syndrome (IRIS). CXR and UA upon admission negative as source of infection. S/p 1 unit of PRBCs on 07/22/17, responded appropriately. Nephrotoxic medications have been discontinued including Vancomycin and Zosyn. Pt currently receiving IV Meropenum and Zyvox. Nephrology consulted, awaiting input. Overnight, pt tachypneic and tachycardiac. CXR with developing CHF and pulmonary edema. . Pt received diuretics. Discussed case with ID -- recommended discontinuing IV antibiotics and starting PO Avelox to  assist with fluid restriction. Respiratory/Metobolic acidosis noted. Due to increased work of breathing, pt transferred to ICU for closer monitoring.        No new subjective & objective note has been filed under this hospital service since the last note was generated.    Assessment/Plan:      Thrombocytopenia associated with AIDS      Platelets rebounding somewhat; up to 103K from 89        Acute hypercapnic respiratory failure              Respiratory distress    - Transfer to ICU for closer monitoring  - Tachypneic 40/min -- likely secondary from the metabolic acidosis  - CXR this morning -- findings consistent with CHF and pulmonary edema  - pt received diuretics with no improvement of symptoms  - NIPPV    Remains on the Vent;  Continue to monitor cxr; ABG's            Pneumonia due to infectious organism      Continue on the vent   Setting A/C 24; ; peep 0f 5; FIO2 40%  RR 32   sats at 100%  Coarse breath sounds        Acute renal failure    - May be secondary to dehydration, hypotension, and/or Vancomycin  - Nephrology consulted due to worsening kidney function  - Avoid Nephrotoxic medications (brian dose modifications as appropriate to degree of renal impairment.  -Continue to monitor labs; urine outputs etc        S/P percutaneous endoscopic gastrostomy (PEG) tube placement    - PEG placed on 05/26/17 for purpose of HAART therapy -- has difficulty swallowing HIV/AIDS medications  - PEG tube site care  To continue peg tube care  Not receiving tube feds;  abdomen distended and bowel sounds are absent          Hyponatremia    - Improving -- currently 134   - Will continue to monitor labs on daily basis        Anemia    - S/p 1 unit of PRBCs, responded appropriately  - Supplemental oxygen prn, keep O2 sats > 92%  - Pt sees Dr. Olmedo (hematology/oncology), last clinic visit 07/05/17  - Per Hematology/Oncology on that clinic visit -- she has normocytic anemia and possibly due to HIV/AIDS (hx of noncompliance  but now compliant). Pt reports hx of sickle cell disease but hemoglobin electrophoresis was normal on 2 separate occassions. If her anemia does not improve after 2 months of continued HAART therapy, refer back to Hematology  - Daily CBC   Continue to monitor labs; transfuse as per heme-onc        S/P implantation of automatic cardioverter/defibrillator (AICD)    Tachycardia and hypotensive (at her baseline); continues on pressor support with levophed;  BP 99/38  Serial lactic acids; continue to monitor labs  IVF's with bicarbonate presently at 40cc/hr  Continue metoprolol for heart rate; continues tachy at 127        HIV (human immunodeficiency virus infection)    - ID following; continued ID presence appreciated; adjutsting antibiotic regimen  - Per ID has AIDS  - Last CD4 count on 07/10/17 -- 245  - Neutropenic on AM labs -- Hematology/Oncology consulted          * Fever    - Fever of unknown etiology  - ID following -- pt has AIDS but her immune system is recovering since she is now compliant with HAART. Her fever may be secondary to immune reconstitution inflammatory syndrome but still need to rule out infectious process  - Differential diagnosis -- 1.  Disseminated MAC  2.  Histoplasmosis  - Blood cultures with NGTD  - Repeat UA pending -- CT abdomen/pelvis -- enlarged edematous left kidney could reflect pyelonephritis and fluid filled bowel loops seen throughout colon, which may infectious process which at the time pt was experiencing diarrhea  - C-diff negative  - Antipyretics prn  Continue to monitor labs;  Wbc down to 1.59          VTE Risk Mitigation         Ordered     Medium Risk of VTE  Once      07/20/17 0851          Marcus Kearney MD  Department of Hospital Medicine   [facility]

## 2017-07-24 NOTE — CONSULTS
[facility] -   Adult Nutrition  Consult Note    SUMMARY     Recommendations  Recommendation/Intervention: 1. COnsider tubefeed for nutrition support Novasource Renal at 30ml/hr (1440 calories, 65g protein, 516ml water) flushes per MD or NP for hydration as needed  Goals: Tubefeed for nutrition support until extubated  Nutrition Goal Status: new  Communication of RD Recs: discussed on rounds    Reason for Assessment  Reason for Assessment: nurse/nurse practitioner consult (malnutrition on ventilator)  Diagnosis:  (HIV, Fever)   Past Medical History:   Abnormal Pap smear of cervix    AICD (automatic cardioverter/defibrillator) present    Anemia    Chronic abdominal pain    Encounter for blood transfusion    History of cardiac arrest    HIV (human immunodeficiency virus infection)    Narcotic bowel syndrome    Vulva cancer    Vulvar cancer, carcinoma     Interdisciplinary Rounds: attended  General Information Comments: Patient on vent. No propofol. Distended abdomen ocurring during intubation, patient with OG and PEG.  Nutrition Discharge Planning: too soon to determine    Nutrition Prescription Ordered  Current Diet Order: NPO    Evaluation of Received Nutrients/Fluid Intake  % Intake of Estimated Energy Needs: 0 - 25 %  % Meal Intake: NPO     Nutrition Risk Screen  Nutrition Risk Screen: no indicators present    Nutrition/Diet History    Typical Food/Fluid Intake: unable to obtain  Food Preferences: unable to obtain    Labs/Tests/Procedures/Meds  Pertinent Labs Reviewed: reviewed  Pertinent Labs Comments: Na 134, BUN 65, Cr 3.9, GFR 17, Glouc 205, Phos 7.4, Alb 1.5, AST 47  Pertinent Medications Reviewed: reviewed    Physical Findings  Overall Physical Appearance: on ventilator support  Tubes: orogastric tube, gastrostomy tube  Oral/Mouth Cavity: tooth/teeth missing  Skin:  (Giuliano 14)    Anthropometrics  Temp: 99.6 °F (37.6 °C)  Height: 5' (152.4 cm)  Weight Method: Bed Scale  Weight: 53 kg  (116 lb 13.5 oz)  Ideal Body Weight (IBW), Female: 100 lb  % Ideal Body Weight, Female (lb): 116.84 lb  BMI (Calculated): 22.9  BMI Grade: 18.5-24.9 - normal    Estimated/Assessed Needs  Weight Used For Calorie Calculations: 53 kg (116 lb 13.5 oz)   Energy Need Method: [facility] [address] [facility] (7084-0978 calories (used Temp range 36.6-39.4))  Weight Used For Protein Calculations: 53 kg (116 lb 13.5 oz)  1.2 gm Protein (gm): 63.73  Fluid Need Method: RDA Method (1ml/caron or as needed)  CHO Requirement: 175-215g (41-50% calories from CHO)     Assessment and Plan  Acute renal failure    Nutrition Diagnosis  Altered nutrition related laboratory values    Related to (etiology):   Acute renal failure    Signs and Symptoms (as evidenced by):   Elevated BUN, Cr, and Phos with decreased GFR.     Interventions/Recommendations (treatment strategy):  Therapeutic Nutrition Therapy:   1. Novasource Renal for enteral nutrition  2. Renal diet once extubated    Nutrition Diagnosis Status:   New          * Acute respiratory failure    Nutrition Diagnosis:  Inadequate energy intake    Related to (etiology):   Inability to consume oral diet    Signs and Symptoms (as evidenced by):   Intubated currently NPO with no enteral nutrition     Interventions/Recommendations (treatment strategy):  Enteral nutrition support until extubated    Nutrition Diagnosis Status:   New          Monitor and Evaluation  Food and Nutrient Intake: energy intake  Food and Nutrient Adminstration: diet order, enteral and parenteral nutrition administration  Anthropometric Measurements: weight  Biochemical Data, Medical Tests and Procedures: electrolyte and renal panel, glucose/endocrine profile  Nutrition-Focused Physical Findings: overall appearance    Nutrition Follow-Up  RD Follow-up?: Yes (2x weekly)

## 2017-07-24 NOTE — PROGRESS NOTES
Clinical Pharmacy Progress Note: Vancomycin Dosing     Vancomycin Day #4      Estimated Creatinine Clearance: 14.9 mL/min (based on Cr of 3.9).   WBC: 1.59       SCr: 2-fold increase in 3days from 1.7 on 7/21.   Tmax/24h: 103 (!)      Goal trough: 15-20 mcg/mL  Dx: fever in HIV patient   Cultures: Blood on 7/20 NGTD     Next level due: Random vancomycin level due 07/25/17 @ 0430.  Because of instability of patient's renal function, we will continue to pulse dose her per protocol based on random AM levels.      Thank you for allowing us to participate in this patient's care.      Letha Thomas, PharmD 7/24/2017 8:41 AM

## 2017-07-24 NOTE — ASSESSMENT & PLAN NOTE
- Fever of unknown etiology  - ID following -- pt has AIDS but her immune system is recovering since she is now compliant with HAART. Her fever may be secondary to immune reconstitution inflammatory syndrome but still need to rule out infectious process  - Differential diagnosis -- 1.  Disseminated MAC  2.  Histoplasmosis  - Blood cultures with NGTD  - Repeat UA pending -- CT abdomen/pelvis -- enlarged edematous left kidney could reflect pyelonephritis and fluid filled bowel loops seen throughout colon, which may infectious process which at the time pt was experiencing diarrhea  - C-diff negative  - Antipyretics prn  Continue to monitor labs;  Wbc down to 1.59
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
## 2017-07-24 NOTE — ASSESSMENT & PLAN NOTE
Abdominal CT scan showed left kidney edema but initial UA was not markedly abnormal -will continue empiric therapy with vanco/meropenem  Disseminated MAC , and histoplasmosis are in the differentials   Will  follow   urine for histoplasma antigen and blood AFB cultures .    The fever could be due to IRIS-Immune reconstitution inflammatory syndrome (IRIS).  Will use po voriconazole for suspected histoplasmosis   PO biaxim/ethambutol for suspected MAC .    This is the third episode that she has these recurrent fevers and it usually resolves. During her last admit , after extensive workup , AIDS itself was suspected as possible source and the decision was made to use a PEG tube to administer HAART .She is responding to HAART therapy .

## 2017-07-24 NOTE — ASSESSMENT & PLAN NOTE
Nutrition Diagnosis  Altered nutrition related laboratory values    Related to (etiology):   Acute renal failure    Signs and Symptoms (as evidenced by):   Elevated BUN, Cr, and Phos with decreased GFR.     Interventions/Recommendations (treatment strategy):  Therapeutic Nutrition Therapy:   1. Novasource Renal for enteral nutrition  2. Renal diet once extubated    Nutrition Diagnosis Status:   Improving

## 2017-07-24 NOTE — ASSESSMENT & PLAN NOTE
- ID following; continued ID presence appreciated; adjutsting antibiotic regimen  - Per ID has AIDS  - Last CD4 count on 07/10/17 -- 245  - Neutropenic on AM labs -- Hematology/Oncology consulted

## 2017-07-24 NOTE — ASSESSMENT & PLAN NOTE
Nephrology follow up .  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

## 2017-07-24 NOTE — PLAN OF CARE
Problem: Patient Care Overview  Goal: Plan of Care Review  Outcome: Ongoing (interventions implemented as appropriate)  POC and interventions discussed with SO - VU.  Intubated and sedated. Remains with severe metabolic acidosis - is tachypnic on vent.  HR tachycardic.  Febrile.  On vasopressor for hemodynamic stability.  On NaHCO3 gtt.  Fentanyl gtt.

## 2017-07-24 NOTE — ASSESSMENT & PLAN NOTE
1. LELAND : multifactorial, BP dropped in to 70's couple of days ago , on broad spectrum abx , non-oliguric, no indication for RRT  At this time, will follow,     2. Metabolic acidosis :  cont sod bicarb ,     3. HTN : BP stable, follow    4. Anemia : stable Hb     5. HIV/AIDs : on HAART    6. Corrected calcium normal     6. Neutropenic fever : on broad spectrum antibiotics     7.  Stable Lytes

## 2017-07-24 NOTE — PLAN OF CARE
D/C assessment initiated. Presently the patient is intubated and CM is unabble to plan an optimal d/c at this time. CM to f/u for furthe     07/24/17 1225   Discharge Reassessment   Assessment Type Discharge Planning Reassessment   Can the patient answer the patient profile reliably? (Patient is intubated)   Describe the patient's ability to walk at the present time. Major restrictions/daily assistance from another person  (Patient intubated)   How often would a person be available to care for the patient? Often   Number of comorbid conditions (as recorded on the chart) Four   Discharge Plan A Home with family   Discharge Plan B Home with family;Home Health   Patient choice form signed by patient/caregiver N/A   r assistance.

## 2017-07-24 NOTE — ASSESSMENT & PLAN NOTE
- S/p 1 unit of PRBCs, responded appropriately  - Supplemental oxygen prn, keep O2 sats > 92%  - Pt sees Dr. Olmedo (hematology/oncology), last clinic visit 07/05/17  - Per Hematology/Oncology on that clinic visit -- she has normocytic anemia and possibly due to HIV/AIDS (hx of noncompliance but now compliant). Pt reports hx of sickle cell disease but hemoglobin electrophoresis was normal on 2 separate occassions. If her anemia does not improve after 2 months of continued HAART therapy, refer back to Hematology  - Daily CBC   Continue to monitor labs; transfuse as per heme-onc

## 2017-07-24 NOTE — ASSESSMENT & PLAN NOTE
Tachycardia and hypotensive (at her baseline); continues on pressor support with levophed;  BP 99/38  Serial lactic acids; continue to monitor labs  IVF's with bicarbonate presently at 40cc/hr  Continue metoprolol for heart rate; continues tachy at 127

## 2017-07-24 NOTE — PROGRESS NOTES
Progress Note  Critical Care    Admit Date: 7/20/2017   LOS: 3 days     Follow-up For: Acute Respiratory failure    Interval History: remains intubated with mechanical vent support and continued tachypnea; acidosis mildly improved today with bicarb infusion + enteral bicarb continued   Requiring pressor support after sedation for mech ventilation   t max 103     Source of sepsis remains unclear; discussed with ID, has presented similar in past and eventually resolved although no definitive source ever identified; prior to intubation denied congestion, headache, neck pain, chest pain, nausea    SUBJECTIVE:   ROS:  Unable s/t OETT on vent    Continuous Infusions:   custom IV infusion builder 40 mL/hr at 07/24/17 0600    fentanyl 12.5 mL/hr at 07/24/17 0600    norepinephrine bitartrate-D5W 0.3 mcg/kg/min (07/24/17 0600)     Review of patient's allergies indicates:   Allergen Reactions    Iodine and iodide containing products Anaphylaxis     Pt states she coded last time she was administered contrast    Pneumococcal 23-chitra ps vaccine Anaphylaxis     Potential iodine containing    Bactrim [sulfamethoxazole-trimethoprim] Hives    Morphine Itching       OBJECTIVE:     Vital Signs (Most Recent)  Temp: (!) 100.8 °F (38.2 °C) (07/24/17 0702)  Pulse: (!) 136 (07/24/17 0707)  Resp: (!) 36 (07/24/17 0707)  BP: (!) 102/44 (07/24/17 0702)  SpO2: 100 % (07/24/17 0707)    Vital Signs Range (Last 24H):  Temp:  [99 °F (37.2 °C)-103 °F (39.4 °C)]   Pulse:  [107-140]   Resp:  [32-51]   BP: ()/()   SpO2:  [98 %-100 %]     I & O (Last 24H):  Intake/Output Summary (Last 24 hours) at 07/24/17 0856  Last data filed at 07/24/17 0800   Gross per 24 hour   Intake             1230 ml   Output             1825 ml   Net             -595 ml       Ventilator Data (Last 24H):     Vent Mode: A/C  Oxygen Concentration (%):  [32-50] 40  Resp Rate Total:  [31 br/min-52 br/min] 36 br/min  Vt Set:  [350 mL] 350 mL  PEEP/CPAP:  [5 cmH20]  5 cmH20  Pressure Support:  [0 cmH20] 0 cmH20  Mean Airway Pressure:  [6.2 htC80-25 cmH20] 8.9 cmH20       Physical Exam   Constitutional: She appears ill. She is sedated, intubated and restrained.   HENT:   Head: Normocephalic.   Eyes: Conjunctivae are normal. Pupils are equal, round, and reactive to light.   Neck: No JVD present. No tracheal deviation present.   Cardiovascular: Regular rhythm.   No extrasystoles are present. Tachycardia present.    No murmur heard.  Pulses:       Radial pulses are 2+ on the right side, and 2+ on the left side.        Dorsalis pedis pulses are 2+ on the right side, and 2+ on the left side.   Pulmonary/Chest: She is intubated. She has decreased breath sounds. She has no wheezes. She has rales in the right lower field and the left lower field.   Tachypnea despite full vent support   Abdominal: Soft. Bowel sounds are normal. She exhibits distension (mild). There is no tenderness.       Musculoskeletal: She exhibits no edema.   Neurological: GCS eye subscore is 2. GCS verbal subscore is 1.   Skin: Skin is warm, dry and intact. Capillary refill takes less than 2 seconds. She is not diaphoretic.         Laboratory (Last 24H):  CBC:    Recent Labs  Lab 07/24/17  0240   WBC 1.59*   HGB 7.9*   HCT 22.6*   *     CMP:    Recent Labs  Lab 07/24/17  0240   CALCIUM 6.7*   ALBUMIN 1.5*   PROT 7.2   *   K 3.5   CO2 14*      BUN 65*   CREATININE 3.9*   ALKPHOS 142*   ALT 26   AST 47*   BILITOT 0.8       Labs within the past 24 hours have been reviewed.  ABG    Recent Labs  Lab 07/24/17  0442   PH 7.253*   PO2 98   PCO2 26.2*   HCO3 11.6*   BE -16         Chest X-Ray:      Film and report reviewed    ASSESSMENT/PLAN:     Problem   Acute Respiratory Failure   Acute Renal Failure   Severe Sepsis   HIV (Human Immunodeficiency Virus Infection)   Hyponatremia   Thrombocytopenia Associated With Aids        1. Neuro: sedation RASS goal -2  2. Pulmonary: continue full vent support, not  candidate for SBT s/t severe acidosis; vent settings reviewed and appropriate  3. Cardiac: pressor to keep MAP 60 or greater; hold metoprolol while on pressor  4. Renal: accurate I/O; monitor creatinine; nephrology following  5. Infectious Disease: ID following, continue HAART meds; continue broad spectrum antimicrobials; consider tagged wbc scan  6. Hematology/Oncology: monitor h/h/platelets; resume pharmacologic DVT prophylaxis if platelets continued trend up  7. Endocrine: add glucose monitoring with SSI prn for control and prevention of insulin toxicity  8. Fluids/Electrolytes/Nutrition/GI: continue IV bicarb, enteral bicarb; replace mag and potassium  9. Musculoskeletal: ROM  10. Pain Management: fentanyl infusion for analgesia/sedation  11. Discharge and Palliative Care: anticipate return home to self care on discharge    Preventive Measures:   Nutrition: Goal: Tolerate oral diet and meet caloric needs  Stress Ulcer: PPI  DVT: SCDs; holding pharmacologic anticoagulation s/t thrombocytopenia  BB: metoprolol  Bowel Prophylaxis:   Head of Bed/Reposition: Elevate HOB and turn Q1-2 hours    Physical Therapy: consult once stable.     Sedation Interruption: Q AM   Vent Day: 2  OG Day: 2  PEG present on admission  PICC Line Day: 2  Tinsley Day: 2  IVAB Day: 5  Code Status: full    Counseling/Consultation: I have discussed the care of this patient in detail with nursing staff and Dr. Childress and Dr Mayo. Status and plan of care discussed with team on multidisciplinary rounds.     Critical Care Time 67 minutes  Critical secondary to severe sepsis, respiratory failure, hypotension requiring pressor support  Critical care was time spent personally by me on the following activities: development of treatment plan with patient or surrogate and bedside caregivers, discussions with consultants, evaluation of patient's response to treatment, examination of patient, ordering and performing treatments and interventions, ordering  and review of laboratory studies, ordering and review of radiographic studies, pulse oximetry, re-evaluation of patient's condition.  This critical care time did not overlap with that of any other provider.    Kylie To [facility]-BC  [facility]

## 2017-07-24 NOTE — ASSESSMENT & PLAN NOTE
- Transfer to ICU for closer monitoring  - Tachypneic 40/min -- likely secondary from the metabolic acidosis  - CXR this morning -- findings consistent with CHF and pulmonary edema  - pt received diuretics with no improvement of symptoms  - NIPPV    Remains on the Vent;  Continue to monitor cxr; ABG's

## 2017-07-24 NOTE — SUBJECTIVE & OBJECTIVE
Interval History:     Review of Systems   Constitutional: Positive for fever. Negative for activity change.        Remains on the ventr; more alert; following commands;  Low grade temps; resting tachycardia   HENT: Positive for trouble swallowing.         Gareth tracheal and gareth gastric tubes in place   Eyes: Negative.    Respiratory: Negative.    Cardiovascular: Negative.    Gastrointestinal: Positive for abdominal distention.        Peg tube in place;  Diminished bowel sounds   Endocrine: Negative.    Genitourinary:        Tinsley catheter in place   Musculoskeletal: Negative.    Allergic/Immunologic: Negative.    Neurological: Positive for weakness.   Hematological: Negative.    Psychiatric/Behavioral:        Sedate and on the vent     Objective:     Vital Signs (Most Recent):  Temp: (!) 100.8 °F (38.2 °C) (07/24/17 0702)  Pulse: (!) 125 (07/24/17 0856)  Resp: (!) 30 (07/24/17 0856)  BP: (!) 98/39 (07/24/17 0856)  SpO2: 100 % (07/24/17 0856) Vital Signs (24h Range):  Temp:  [99 °F (37.2 °C)-103 °F (39.4 °C)] 100.8 °F (38.2 °C)  Pulse:  [107-140] 125  Resp:  [30-51] 30  SpO2:  [98 %-100 %] 100 %  BP: ()/() 98/39     Weight: 53 kg (116 lb 13.5 oz)  Body mass index is 22.82 kg/m².    Intake/Output Summary (Last 24 hours) at 07/24/17 0856  Last data filed at 07/24/17 0800   Gross per 24 hour   Intake             1230 ml   Output             1825 ml   Net             -595 ml      Physical Exam   Constitutional: She appears well-developed and well-nourished.   HENT:   Head: Normocephalic and atraumatic.   Eyes: Pupils are equal, round, and reactive to light.   Neck: Normal range of motion.   Cardiovascular:   sinue tach by monitor   Pulmonary/Chest:   Coarse breath sounds   Abdominal: She exhibits distension.   Hypo-active bowel sounds; diminished   Musculoskeletal: Normal range of motion.   Neurological:   Sedated and on the vent   Skin: Skin is warm and dry.   Some desqaumation on sole of right foot        Significant Labs:   BMP:   Recent Labs  Lab 07/24/17  0240   *   *   K 3.5      CO2 14*   BUN 65*   CREATININE 3.9*   CALCIUM 6.7*   MG 1.3*     CBC:   Recent Labs  Lab 07/23/17  0031 07/23/17  0838 07/24/17  0240   WBC 1.73* 3.05* 1.59*   HGB 8.8* 9.1* 7.9*   HCT 25.7* 26.6* 22.6*   PLT 85* 89* 103*     CMP:   Recent Labs  Lab 07/23/17  0031 07/23/17  0534 07/24/17  0240   * 134* 134*   K 4.2 4.2 3.5   * 115* 107   CO2 9* 7* 14*   GLU 84 77 205*   BUN 47* 49* 65*   CREATININE 3.0* 3.1* 3.9*   CALCIUM 7.3* 7.8* 6.7*   PROT 7.7 8.1 7.2   ALBUMIN 1.7* 1.8* 1.5*   BILITOT 0.6 0.7 0.8   ALKPHOS 103 116 142*   AST 56* 56* 47*   ALT 36 37 26   ANIONGAP 10 12 13   EGFRNONAA 20* 19* 14*     Coagulation: No results for input(s): INR, APTT in the last 48 hours.    Invalid input(s): PT  Lactic Acid:   Recent Labs  Lab 07/23/17  0838 07/24/17  0240   LACTATE 1.1 1.6     Magnesium:   Recent Labs  Lab 07/24/17  0240   MG 1.3*

## 2017-07-24 NOTE — ASSESSMENT & PLAN NOTE
Nutrition Diagnosis:  Inadequate energy intake    Related to (etiology):   Inability to consume oral diet    Signs and Symptoms (as evidenced by):   Intubated currently NPO with no enteral nutrition     Interventions/Recommendations (treatment strategy):  Enteral nutrition support until extubated    Nutrition Diagnosis Status:   New

## 2017-07-24 NOTE — ASSESSMENT & PLAN NOTE
- May be secondary to dehydration, hypotension, and/or Vancomycin  - Nephrology consulted due to worsening kidney function  - Avoid Nephrotoxic medications (brian dose modifications as appropriate to degree of renal impairment.  -Continue to monitor labs; urine outputs etc

## 2017-07-24 NOTE — PROGRESS NOTES
Lab called and spoke to RN. There wasn't enough sputum collected from last night to run all of the labs that needs to be done.   Just collected another sputum on patient and sent it to lab. Specimen was collected, labeled, initialed, and sent to lab with requisition sheet. Sputum a small amount of creamy tan like color.

## 2017-07-24 NOTE — PLAN OF CARE
Problem: Patient Care Overview  Goal: Plan of Care Review  Outcome: Ongoing (interventions implemented as appropriate)  Recommendation/Intervention: 1. COnsider tubefeed for nutrition support Novasource Renal at 30ml/hr (1440 calories, 65g protein, 516ml water) flushes per MD or NP for hydration as needed  Goals: Tubefeed for nutrition support until extubated  Nutrition Goal Status: new  Communication of RD Recs: discussed on rounds

## 2017-07-24 NOTE — ASSESSMENT & PLAN NOTE
Continue on the vent   Setting A/C 24; ; peep 0f 5; FIO2 40%  RR 32   sats at 100%  Coarse breath sounds

## 2017-07-24 NOTE — ASSESSMENT & PLAN NOTE
- PEG placed on 05/26/17 for purpose of HAART therapy -- has difficulty swallowing HIV/AIDS medications  - PEG tube site care  To continue peg tube care  Not receiving tube feds;  abdomen distended and bowel sounds are absent

## 2017-07-24 NOTE — PLAN OF CARE
Problem: Patient Care Overview  Goal: Plan of Care Review  Outcome: Ongoing (interventions implemented as appropriate)  Patient sedated with a Rass goal of -2 on Fentanyl drip while on the ventalator. BP stable on Levo drip to maintain a MAP greater than 60. RR range from 28-33 BPM. Tmax is 100.8.  Patient has remained ST on the monitor.  IV abx administered on shift.  Continuous Bicarb infusing.  Potassium, Calcium gluconate, and Magnesium administered on shift. Peg tube and OG tube in place.  OG connected to low intermittent suctioning.  Patients abdomen is distended with hypoactive bowel sounds. Adequate urine output noted hourly. Patient has two DTI to BLE heels.  Keeping  patients heels elevated and repositioning patient Q2H to prevent further skin breakdown.  POC reviewed with family.

## 2017-07-24 NOTE — PLAN OF CARE
Problem: Patient Care Overview  Goal: Plan of Care Review  Outcome: Ongoing (interventions implemented as appropriate)  Pt continues to be on mechanical ventilation. No significant changes to note at this time.

## 2017-07-24 NOTE — PROGRESS NOTES
[facility] -   Nephrology  Progress Note    Patient Name: Wang Kebede  MRN: [id number]  Admission Date: 7/20/2017  Hospital Length of Stay: 3 days  Attending Provider: Marcus Robertson MD   Primary Care Physician: Levi Moncada MD  Principal Problem:Acute respiratory failure    Subjective:     HPI: Wang Kebede is a 31 y.o.  AA woman  with history of HIV/AIDS, vulvar cancer, anemia, sickle cell disease, cervical cancer, chronic abdominal pain was admitted to [facility] about 2 days ago for fever , she was started on broad spectrum abx , her serum bicarb dropped from 19 on admission to 7 today , lactate level normal, serum Cr was normal about 2 weeks ago, creatinine increased to 3 today, we were consulted for LELAND , Metabolic acidosis,         Interval History: remains critically ill, on vent support ,     Review of patient's allergies indicates:   Allergen Reactions    Iodine and iodide containing products Anaphylaxis     Pt states she coded last time she was administered contrast    Pneumococcal 23-chitra ps vaccine Anaphylaxis     Potential iodine containing    Bactrim [sulfamethoxazole-trimethoprim] Hives    Morphine Itching     Current Facility-Administered Medications   Medication Frequency    0.9%  NaCl infusion (for blood administration) Q24H PRN    acetaminophen tablet 650 mg Q6H PRN    atovaquone suspension 750 mg Q12H    chlorhexidine 0.12 % solution 15 mL BID    clarithromycin tablet 500 mg Q12H    darunavir-cobicistat 800-150 mg-mg Tab 800 mg QHS    dextrose 5 % 1,000 mL with sodium acetate 150 mEq infusion Continuous    dextrose 5 % 1,000 mL with sodium bicarbonate 1 mEq/mL (8.4 %) 150 mEq infusion Continuous    dextrose 50% injection 12.5 g PRN    diphenhydrAMINE injection 12.5 mg Q6H PRN    docusate 50 mg/5 mL liquid 100 mg Daily    dolutegravir Tab 50 mg Daily    [START ON 7/25/2017] emtricitabine capsule 200 mg Q72H    ethambutol tablet 800 mg  Daily    fentaNYL 2500 mcg in D5W 250 mL infusion premix (titrating) (conc: 10 mcg/mL) Continuous    folic acid tablet 1 mg Daily    glucagon (human recombinant) injection 1 mg PRN    insulin aspart pen 1-10 Units Q4H PRN    lacosamide tablet 100 mg BID    lorazepam injection 2 mg Q4H PRN    meropenem-0.9% sodium chloride 500 mg/50 mL IVPB Q12H    metoprolol tartrate (LOPRESSOR) split tablet 12.5 mg BID    norepinephrine 4 mg in dextrose 5% 250 mL infusion (premix) (titrating) Continuous    nystatin 100,000 unit/mL suspension 500,000 Units QID    ondansetron injection 4 mg Q12H PRN    pantoprazole injection 40 mg Daily    promethazine (PHENERGAN) 6.25 mg in dextrose 5 % 50 mL IVPB Q6H PRN    sodium bicarbonate tablet 1,300 mg TID    vancomycin 750 mg in dextrose 5 % 250 mL IVPB (ready to mix system) Q48H    voriconazole tablet 200 mg BID       Objective:     Vital Signs (Most Recent):  Temp: 99.6 °F (37.6 °C) (07/24/17 1102)  Pulse: 108 (07/24/17 1245)  Resp: (!) 27 (07/24/17 1245)  BP: (!) 85/35 (07/24/17 1245)  SpO2: 99 % (07/24/17 1245)  O2 Device (Oxygen Therapy): ventilator (07/24/17 1106) Vital Signs (24h Range):  Temp:  [99 °F (37.2 °C)-103 °F (39.4 °C)] 99.6 °F (37.6 °C)  Pulse:  [106-140] 108  Resp:  [26-51] 27  SpO2:  [99 %-100 %] 99 %  BP: ()/(12-67) 85/35     Weight: 53 kg (116 lb 13.5 oz) (07/24/17 1102)  Body mass index is 22.82 kg/m².  Body surface area is 1.5 meters squared.    I/O last 3 completed shifts:  In: 3111.7 [P.O.:240; I.V.:1121.7; NG/GT:690; IV Piggyback:1060]  Out: 1700 [Urine:1690; Drains:10]    Physical Exam   Constitutional: She appears well-developed and well-nourished. No distress.   HENT:   Head: Normocephalic and atraumatic.   Mouth/Throat: Oropharynx is clear and moist. No oropharyngeal exudate.   ett in place    Eyes: Conjunctivae and EOM are normal.   Neck: Normal range of motion. Neck supple. No JVD present. Carotid bruit is not present. No tracheal  deviation present. No thyroid mass and no thyromegaly present.   Cardiovascular: Normal rate, regular rhythm, normal heart sounds and intact distal pulses.  Exam reveals no gallop and no friction rub.    No murmur heard.  Pulmonary/Chest: No respiratory distress. She has no wheezes. She has rales. She exhibits no tenderness.   Abdominal: Soft. Bowel sounds are normal. She exhibits no distension, no abdominal bruit, no ascites and no mass. There is no hepatosplenomegaly. There is no tenderness. There is no rebound, no guarding and no CVA tenderness.   Musculoskeletal: Normal range of motion. She exhibits no edema or tenderness.   Lymphadenopathy:     She has no cervical adenopathy.   Neurological: She displays normal reflexes. No cranial nerve deficit. She exhibits normal muscle tone. Coordination normal.   Skin: Skin is warm and intact. No rash noted. No erythema. No pallor.   Psychiatric: Judgment normal.       Significant Labs:  CBC:   Recent Labs  Lab 07/24/17  0240   WBC 1.59*   RBC 2.50*   HGB 7.9*   HCT 22.6*   *   MCV 90   MCH 31.6*   MCHC 35.0     CMP:   Recent Labs  Lab 07/24/17  0240   *   CALCIUM 6.7*   ALBUMIN 1.5*   PROT 7.2   *   K 3.5   CO2 14*      BUN 65*   CREATININE 3.9*   ALKPHOS 142*   ALT 26   AST 47*   BILITOT 0.8     LFTs:   Recent Labs  Lab 07/24/17  0240   ALT 26   AST 47*   ALKPHOS 142*   BILITOT 0.8   PROT 7.2   ALBUMIN 1.5*     Lab Results   Component Value Date    CALCIUM 6.7 (LL) 07/24/2017    PHOS 7.4 (H) 07/24/2017       All labs within the past 24 hours have been reviewed.     Significant Imaging: reviewed     Assessment/Plan:     Acute renal failure    1. LELAND : multifactorial, BP dropped in to 70's couple of days ago , on broad spectrum abx , non-oliguric, no indication for RRT  At this time, will follow,     2. Metabolic acidosis :  cont sod bicarb ,     3. HTN : BP stable, follow    4. Anemia : stable Hb     5. HIV/AIDs : on HAART    6. Corrected calcium  normal     6. Neutropenic fever : on broad spectrum antibiotics     7.  Stable Lytes                 Thank you for your consult. I will follow-up with patient. Please contact us if you have any additional questions.     Total CC time spent 40 minutes including time needed to review the records,  patient  evaluation, documentation, face-to-face discussion with the CCM ,   more than 50% of the time was spent on coordination of care and counseling.       Marlon Monterroso MD  Nephrology  [facility] [place]

## 2017-07-24 NOTE — ASSESSMENT & PLAN NOTE
She has AIDS but her immune system is recovering since she now started to be compliant with HAART .  This fever could be due to IRIS .Immune reconstitution inflammatory syndrome (IRIS).  However , will need to rule out other acute infectious processes.  She is on darunavir -cobicistat and descovy.      Will stop tenofovir due to renal failure and continue darunavir/cobicistat, FTC .    Will continue Mepron for PCP prophylaxis.

## 2017-07-25 LAB
ALLENS TEST: ABNORMAL
ANION GAP SERPL CALC-SCNC: 9 MMOL/L
ANION GAP SERPL CALC-SCNC: 9 MMOL/L
BASOPHILS # BLD AUTO: ABNORMAL K/UL
BASOPHILS NFR BLD: 1 %
BUN SERPL-MCNC: 68 MG/DL
BUN SERPL-MCNC: 68 MG/DL
CALCIUM SERPL-MCNC: 7.2 MG/DL
CALCIUM SERPL-MCNC: 7.2 MG/DL
CHLORIDE SERPL-SCNC: 104 MMOL/L
CHLORIDE SERPL-SCNC: 104 MMOL/L
CO2 SERPL-SCNC: 18 MMOL/L
CO2 SERPL-SCNC: 18 MMOL/L
CREAT SERPL-MCNC: 3.9 MG/DL
CREAT SERPL-MCNC: 3.9 MG/DL
DELSYS: ABNORMAL
DIFFERENTIAL METHOD: ABNORMAL
EOSINOPHIL # BLD AUTO: ABNORMAL K/UL
EOSINOPHIL NFR BLD: 4 %
ERYTHROCYTE [DISTWIDTH] IN BLOOD BY AUTOMATED COUNT: 20.1 %
ERYTHROCYTE [SEDIMENTATION RATE] IN BLOOD BY WESTERGREN METHOD: 24 MM/H
EST. GFR  (AFRICAN AMERICAN): 17 ML/MIN/1.73 M^2
EST. GFR  (AFRICAN AMERICAN): 17 ML/MIN/1.73 M^2
EST. GFR  (NON AFRICAN AMERICAN): 14 ML/MIN/1.73 M^2
EST. GFR  (NON AFRICAN AMERICAN): 14 ML/MIN/1.73 M^2
FIO2: 40
GLUCOSE SERPL-MCNC: 104 MG/DL (ref 70–110)
GLUCOSE SERPL-MCNC: 110 MG/DL
GLUCOSE SERPL-MCNC: 110 MG/DL
HCO3 UR-SCNC: 18.8 MMOL/L (ref 24–28)
HCT VFR BLD AUTO: 24.6 %
HCT VFR BLD CALC: 24 %PCV (ref 36–54)
HGB BLD-MCNC: 8.7 G/DL
LACTATE SERPL-SCNC: 1 MMOL/L
LYMPHOCYTES # BLD AUTO: ABNORMAL K/UL
LYMPHOCYTES NFR BLD: 55 %
MAGNESIUM SERPL-MCNC: 2.1 MG/DL
MCH RBC QN AUTO: 32 PG
MCHC RBC AUTO-ENTMCNC: 35.4 G/DL
MCV RBC AUTO: 90 FL
MODE: ABNORMAL
MONOCYTES # BLD AUTO: ABNORMAL K/UL
MONOCYTES NFR BLD: 11 %
NEUTROPHILS NFR BLD: 19 %
NEUTS BAND NFR BLD MANUAL: 10 %
PCO2 BLDA: 40.1 MMHG (ref 35–45)
PEEP: 5
PH SMN: 7.28 [PH] (ref 7.35–7.45)
PHOSPHATE SERPL-MCNC: 5.6 MG/DL
PLATELET # BLD AUTO: 77 K/UL
PMV BLD AUTO: 10 FL
PO2 BLDA: 164 MMHG (ref 80–100)
POC BE: -8 MMOL/L
POC IONIZED CALCIUM: 1.08 MMOL/L (ref 1.06–1.42)
POC SATURATED O2: 99 % (ref 95–100)
POCT GLUCOSE: 114 MG/DL (ref 70–110)
POCT GLUCOSE: 118 MG/DL (ref 70–110)
POCT GLUCOSE: 133 MG/DL (ref 70–110)
POCT GLUCOSE: 84 MG/DL (ref 70–110)
POTASSIUM BLD-SCNC: 3.4 MMOL/L (ref 3.5–5.1)
POTASSIUM SERPL-SCNC: 3.4 MMOL/L
POTASSIUM SERPL-SCNC: 3.4 MMOL/L
RBC # BLD AUTO: 2.72 M/UL
SAMPLE: ABNORMAL
SITE: ABNORMAL
SODIUM BLD-SCNC: 135 MMOL/L (ref 136–145)
SODIUM SERPL-SCNC: 131 MMOL/L
SODIUM SERPL-SCNC: 131 MMOL/L
VANCOMYCIN SERPL-MCNC: 20.9 UG/ML
VT: 350
WBC # BLD AUTO: 4.16 K/UL

## 2017-07-25 PROCEDURE — 25000003 PHARM REV CODE 250: Performed by: INTERNAL MEDICINE

## 2017-07-25 PROCEDURE — 99900026 HC AIRWAY MAINTENANCE (STAT)

## 2017-07-25 PROCEDURE — 85027 COMPLETE CBC AUTOMATED: CPT

## 2017-07-25 PROCEDURE — 25000003 PHARM REV CODE 250: Performed by: NURSE PRACTITIONER

## 2017-07-25 PROCEDURE — 82330 ASSAY OF CALCIUM: CPT

## 2017-07-25 PROCEDURE — 82803 BLOOD GASES ANY COMBINATION: CPT

## 2017-07-25 PROCEDURE — 99233 SBSQ HOSP IP/OBS HIGH 50: CPT | Mod: ,,, | Performed by: INTERNAL MEDICINE

## 2017-07-25 PROCEDURE — C9113 INJ PANTOPRAZOLE SODIUM, VIA: HCPCS | Performed by: EMERGENCY MEDICINE

## 2017-07-25 PROCEDURE — 83735 ASSAY OF MAGNESIUM: CPT

## 2017-07-25 PROCEDURE — 63600175 PHARM REV CODE 636 W HCPCS: Performed by: EMERGENCY MEDICINE

## 2017-07-25 PROCEDURE — 80202 ASSAY OF VANCOMYCIN: CPT

## 2017-07-25 PROCEDURE — 87901 NFCT AGT GNTYP ALYS HIV1 REV: CPT

## 2017-07-25 PROCEDURE — 83605 ASSAY OF LACTIC ACID: CPT

## 2017-07-25 PROCEDURE — 80048 BASIC METABOLIC PNL TOTAL CA: CPT

## 2017-07-25 PROCEDURE — 85014 HEMATOCRIT: CPT

## 2017-07-25 PROCEDURE — 99291 CRITICAL CARE FIRST HOUR: CPT | Mod: ,,, | Performed by: NURSE PRACTITIONER

## 2017-07-25 PROCEDURE — 84100 ASSAY OF PHOSPHORUS: CPT

## 2017-07-25 PROCEDURE — 86645 CMV ANTIBODY IGM: CPT

## 2017-07-25 PROCEDURE — 85007 BL SMEAR W/DIFF WBC COUNT: CPT

## 2017-07-25 PROCEDURE — 36600 WITHDRAWAL OF ARTERIAL BLOOD: CPT

## 2017-07-25 PROCEDURE — 99900035 HC TECH TIME PER 15 MIN (STAT)

## 2017-07-25 PROCEDURE — 86644 CMV ANTIBODY: CPT

## 2017-07-25 PROCEDURE — 20000000 HC ICU ROOM

## 2017-07-25 PROCEDURE — 99221 1ST HOSP IP/OBS SF/LOW 40: CPT | Mod: ,,, | Performed by: OPTOMETRIST

## 2017-07-25 PROCEDURE — 84295 ASSAY OF SERUM SODIUM: CPT

## 2017-07-25 PROCEDURE — 84132 ASSAY OF SERUM POTASSIUM: CPT

## 2017-07-25 PROCEDURE — 63600175 PHARM REV CODE 636 W HCPCS: Performed by: NURSE PRACTITIONER

## 2017-07-25 RX ORDER — POTASSIUM CHLORIDE 20 MEQ/15ML
30 SOLUTION ORAL ONCE
Status: COMPLETED | OUTPATIENT
Start: 2017-07-25 | End: 2017-07-25

## 2017-07-25 RX ADMIN — SODIUM BICARBONATE 650 MG TABLET 1300 MG: at 09:07

## 2017-07-25 RX ADMIN — VORICONAZOLE 200 MG: 200 TABLET, FILM COATED ORAL at 08:07

## 2017-07-25 RX ADMIN — Medication 12.5 MG: at 08:07

## 2017-07-25 RX ADMIN — NYSTATIN 500000 UNITS: 100000 SUSPENSION ORAL at 11:07

## 2017-07-25 RX ADMIN — DOLUTEGRAVIR SODIUM 50 MG: 50 TABLET, FILM COATED ORAL at 09:07

## 2017-07-25 RX ADMIN — NYSTATIN 500000 UNITS: 100000 SUSPENSION ORAL at 05:07

## 2017-07-25 RX ADMIN — LACOSAMIDE 100 MG: 50 TABLET, FILM COATED ORAL at 09:07

## 2017-07-25 RX ADMIN — FOLIC ACID 1 MG: 1 TABLET ORAL at 09:07

## 2017-07-25 RX ADMIN — ATOVAQUONE 750 MG: 750 SUSPENSION ORAL at 09:07

## 2017-07-25 RX ADMIN — NOREPINEPHRINE BITARTRATE 0.4 MCG/KG/MIN: 1 INJECTION INTRAVENOUS at 03:07

## 2017-07-25 RX ADMIN — CHLORHEXIDINE GLUCONATE 15 ML: 1.2 RINSE ORAL at 08:07

## 2017-07-25 RX ADMIN — EMTRICITABINE 200 MG: 200 CAPSULE ORAL at 08:07

## 2017-07-25 RX ADMIN — DOCUSATE SODIUM 100 MG: 50 LIQUID ORAL at 09:07

## 2017-07-25 RX ADMIN — MEROPENEM AND SODIUM CHLORIDE 500 MG: 500 INJECTION, SOLUTION INTRAVENOUS at 06:07

## 2017-07-25 RX ADMIN — CLARITHROMYCIN 500 MG: 500 TABLET, FILM COATED ORAL at 09:07

## 2017-07-25 RX ADMIN — Medication 100 MCG: at 08:07

## 2017-07-25 RX ADMIN — CLARITHROMYCIN 500 MG: 500 TABLET, FILM COATED ORAL at 08:07

## 2017-07-25 RX ADMIN — ETHAMBUTOL HYDROCHLORIDE 800 MG: 400 TABLET, FILM COATED ORAL at 09:07

## 2017-07-25 RX ADMIN — VORICONAZOLE 200 MG: 200 TABLET, FILM COATED ORAL at 09:07

## 2017-07-25 RX ADMIN — SODIUM ACETATE: 3.28 INJECTION, SOLUTION, CONCENTRATE INTRAVENOUS at 06:07

## 2017-07-25 RX ADMIN — Medication 12.5 MG: at 09:07

## 2017-07-25 RX ADMIN — NOREPINEPHRINE BITARTRATE 0.36 MCG/KG/MIN: 1 INJECTION INTRAVENOUS at 11:07

## 2017-07-25 RX ADMIN — POTASSIUM CHLORIDE 30 MEQ: 20 SOLUTION ORAL at 11:07

## 2017-07-25 RX ADMIN — NOREPINEPHRINE BITARTRATE 0.3 MCG/KG/MIN: 1 INJECTION INTRAVENOUS at 05:07

## 2017-07-25 RX ADMIN — SODIUM BICARBONATE 650 MG TABLET 1300 MG: at 02:07

## 2017-07-25 RX ADMIN — ATOVAQUONE 750 MG: 750 SUSPENSION ORAL at 08:07

## 2017-07-25 RX ADMIN — CHLORHEXIDINE GLUCONATE 15 ML: 1.2 RINSE ORAL at 09:07

## 2017-07-25 RX ADMIN — SODIUM BICARBONATE 650 MG TABLET 1300 MG: at 05:07

## 2017-07-25 RX ADMIN — PANTOPRAZOLE SODIUM 40 MG: 40 INJECTION, POWDER, FOR SOLUTION INTRAVENOUS at 09:07

## 2017-07-25 NOTE — ASSESSMENT & PLAN NOTE
Abdominal CT scan showed left kidney edema but initial UA was not markedly abnormal -will continue empiric therapy with vanco/meropenem  Disseminated MAC , and histoplasmosis are in the differentials   Will  follow   urine for histoplasma antigen and blood AFB cultures .    Will use po voriconazole for suspected histoplasmosis   PO biaxim/ethambutol for suspected MAC .    This is the third episode that she has these recurrent fevers and it usually resolves. During her last admit , after extensive workup , AIDS itself was suspected as possible source and the decision was made to use a PEG tube to administer HAART .She is responding to HAART therapy .  However ,serum procalcitonin is very high .  Will rule out CMV retinitis and check CMV titers.  CMV is a cause of FUO

## 2017-07-25 NOTE — SUBJECTIVE & OBJECTIVE
Interval History:  Remains on Vent support     Review of patient's allergies indicates:   Allergen Reactions    Iodine and iodide containing products Anaphylaxis     Pt states she coded last time she was administered contrast    Pneumococcal 23-chitra ps vaccine Anaphylaxis     Potential iodine containing    Bactrim [sulfamethoxazole-trimethoprim] Hives    Morphine Itching     Current Facility-Administered Medications   Medication Frequency    acetaminophen tablet 650 mg Q6H PRN    atovaquone suspension 750 mg Q12H    chlorhexidine 0.12 % solution 15 mL BID    clarithromycin tablet 500 mg Q12H    darunavir-cobicistat 800-150 mg-mg Tab 800 mg QHS    dextrose 5 % 1,000 mL with sodium acetate 150 mEq infusion Continuous    dextrose 50% injection 12.5 g PRN    diphenhydrAMINE injection 12.5 mg Q6H PRN    docusate 50 mg/5 mL liquid 100 mg Daily    dolutegravir Tab 50 mg Daily    emtricitabine capsule 200 mg Q72H    ethambutol tablet 800 mg Daily    fentaNYL 2500 mcg in D5W 250 mL infusion premix (titrating) (conc: 10 mcg/mL) Continuous    folic acid tablet 1 mg Daily    glucagon (human recombinant) injection 1 mg PRN    insulin aspart pen 1-10 Units Q4H PRN    lacosamide tablet 100 mg BID    lorazepam injection 2 mg Q4H PRN    meropenem-0.9% sodium chloride 500 mg/50 mL IVPB Q12H    metoprolol tartrate (LOPRESSOR) split tablet 12.5 mg BID    norepinephrine 8 mg in dextrose 5 % 250 mL infusion Continuous    nystatin 100,000 unit/mL suspension 500,000 Units QID    ondansetron injection 4 mg Q12H PRN    pantoprazole injection 40 mg Daily    promethazine (PHENERGAN) 6.25 mg in dextrose 5 % 50 mL IVPB Q6H PRN    sodium bicarbonate tablet 1,300 mg TID    [START ON 7/26/2017] vancomycin 750 mg in dextrose 5 % 250 mL IVPB (ready to mix system) Q48H    voriconazole tablet 200 mg BID       Objective:     Vital Signs (Most Recent):  Temp: 99.7 °F (37.6 °C) (07/25/17 1102)  Pulse: 97 (07/25/17  1115)  Resp: (!) 24 (07/25/17 1115)  BP: (!) 99/56 (07/25/17 1115)  SpO2: 100 % (07/25/17 1115)  O2 Device (Oxygen Therapy): ventilator (07/25/17 1102) Vital Signs (24h Range):  Temp:  [98.6 °F (37 °C)-102 °F (38.9 °C)] 99.7 °F (37.6 °C)  Pulse:  [] 97  Resp:  [23-30] 24  SpO2:  [99 %-100 %] 100 %  BP: ()/(30-65) 99/56     Weight: 53.5 kg (117 lb 15.1 oz) (07/25/17 0325)  Body mass index is 23.03 kg/m².  Body surface area is 1.5 meters squared.    I/O last 3 completed shifts:  In: 4669.4 [I.V.:3689.4; NG/GT:380; IV Piggyback:600]  Out: 3800 [Urine:3390; Drains:410]    Physical Exam   Constitutional: She appears well-developed and well-nourished. No distress.   HENT:   Head: Normocephalic and atraumatic.   Mouth/Throat: Oropharynx is clear and moist. No oropharyngeal exudate.   ett in place    Eyes: Conjunctivae and EOM are normal.   Neck: Normal range of motion. Neck supple. No JVD present. Carotid bruit is not present. No tracheal deviation present. No thyroid mass and no thyromegaly present.   Cardiovascular: Normal rate, regular rhythm, normal heart sounds and intact distal pulses.  Exam reveals no gallop and no friction rub.    No murmur heard.  Pulmonary/Chest: No respiratory distress. She has no wheezes. She has rales. She exhibits no tenderness.   Abdominal: Soft. Bowel sounds are normal. She exhibits no distension, no abdominal bruit, no ascites and no mass. There is no hepatosplenomegaly. There is no tenderness. There is no rebound, no guarding and no CVA tenderness.   Musculoskeletal: Normal range of motion. She exhibits no edema or tenderness.   Lymphadenopathy:     She has no cervical adenopathy.   Neurological: She displays normal reflexes. No cranial nerve deficit. She exhibits normal muscle tone. Coordination normal.   Skin: Skin is warm and intact. No rash noted. No erythema. No pallor.   Psychiatric: Judgment normal.       Significant Labs:  CBC:   Recent Labs  Lab 07/25/17  0300  07/25/17  0414   WBC 4.16  --    RBC 2.72*  --    HGB 8.7*  --    HCT 24.6* 24*   PLT 77*  --    MCV 90  --    MCH 32.0*  --    MCHC 35.4  --      CMP:   Recent Labs  Lab 07/24/17  0240  07/25/17  0300   *  < > 110  110   CALCIUM 6.7*  < > 7.2*  7.2*   ALBUMIN 1.5*  --   --    PROT 7.2  --   --    *  < > 131*  131*   K 3.5  < > 3.4*  3.4*   CO2 14*  < > 18*  18*     < > 104  104   BUN 65*  < > 68*  68*   CREATININE 3.9*  < > 3.9*  3.9*   ALKPHOS 142*  --   --    ALT 26  --   --    AST 47*  --   --    BILITOT 0.8  --   --    < > = values in this interval not displayed.  LFTs:   Recent Labs  Lab 07/24/17  0240   ALT 26   AST 47*   ALKPHOS 142*   BILITOT 0.8   PROT 7.2   ALBUMIN 1.5*     Lab Results   Component Value Date    CALCIUM 7.2 (L) 07/25/2017    CALCIUM 7.2 (L) 07/25/2017    PHOS 5.6 (H) 07/25/2017       All labs within the past 24 hours have been reviewed.     Significant Imaging: reviewed

## 2017-07-25 NOTE — PLAN OF CARE
Problem: Patient Care Overview  Goal: Plan of Care Review  Outcome: Ongoing (interventions implemented as appropriate)  POC and interventions discussed with SO Ashutosh and Mother at visitation - VU.  Discussed Ventilator for breathing, Antibx. Treatment, Sedation, Analgesics, Antivirals, and IVF's for hemodynamic stability.  Arousable, follows commands, GORDILLO. Tylenol for fever - see vitals.  Urine output improving from last night. Heels elevated bilaterally.  Turn q2h

## 2017-07-25 NOTE — PROGRESS NOTES
[facility] -   Nephrology  Progress Note    Patient Name: Wang Kebede  MRN: [id number]  Admission Date: 7/20/2017  Hospital Length of Stay: 4 days  Attending Provider: Marcus Robertson MD   Primary Care Physician: Levi Moncada MD  Principal Problem:Acute respiratory failure    Subjective:     HPI: Wang Kebede is a 31 y.o.  AA woman  with history of HIV/AIDS, vulvar cancer, anemia, sickle cell disease, cervical cancer, chronic abdominal pain was admitted to \[facility]\"" about 2 days ago for fever , she was started on broad spectrum abx , her serum bicarb dropped from 19 on admission to 7 today , lactate level normal, serum Cr was normal about 2 weeks ago, creatinine increased to 3 today, we were consulted for LELAND , Metabolic acidosis,         Interval History:  Remains on Vent support     Review of patient's allergies indicates:   Allergen Reactions    Iodine and iodide containing products Anaphylaxis     Pt states she coded last time she was administered contrast    Pneumococcal 23-chitra ps vaccine Anaphylaxis     Potential iodine containing    Bactrim [sulfamethoxazole-trimethoprim] Hives    Morphine Itching     Current Facility-Administered Medications   Medication Frequency    acetaminophen tablet 650 mg Q6H PRN    atovaquone suspension 750 mg Q12H    chlorhexidine 0.12 % solution 15 mL BID    clarithromycin tablet 500 mg Q12H    darunavir-cobicistat 800-150 mg-mg Tab 800 mg QHS    dextrose 5 % 1,000 mL with sodium acetate 150 mEq infusion Continuous    dextrose 50% injection 12.5 g PRN    diphenhydrAMINE injection 12.5 mg Q6H PRN    docusate 50 mg/5 mL liquid 100 mg Daily    dolutegravir Tab 50 mg Daily    emtricitabine capsule 200 mg Q72H    ethambutol tablet 800 mg Daily    fentaNYL 2500 mcg in D5W 250 mL infusion premix (titrating) (conc: 10 mcg/mL) Continuous    folic acid tablet 1 mg Daily    glucagon (human recombinant) injection 1 mg PRN    insulin  aspart pen 1-10 Units Q4H PRN    lacosamide tablet 100 mg BID    lorazepam injection 2 mg Q4H PRN    meropenem-0.9% sodium chloride 500 mg/50 mL IVPB Q12H    metoprolol tartrate (LOPRESSOR) split tablet 12.5 mg BID    norepinephrine 8 mg in dextrose 5 % 250 mL infusion Continuous    nystatin 100,000 unit/mL suspension 500,000 Units QID    ondansetron injection 4 mg Q12H PRN    pantoprazole injection 40 mg Daily    promethazine (PHENERGAN) 6.25 mg in dextrose 5 % 50 mL IVPB Q6H PRN    sodium bicarbonate tablet 1,300 mg TID    [START ON 7/26/2017] vancomycin 750 mg in dextrose 5 % 250 mL IVPB (ready to mix system) Q48H    voriconazole tablet 200 mg BID       Objective:     Vital Signs (Most Recent):  Temp: 99.7 °F (37.6 °C) (07/25/17 1102)  Pulse: 97 (07/25/17 1115)  Resp: (!) 24 (07/25/17 1115)  BP: (!) 99/56 (07/25/17 1115)  SpO2: 100 % (07/25/17 1115)  O2 Device (Oxygen Therapy): ventilator (07/25/17 1102) Vital Signs (24h Range):  Temp:  [98.6 °F (37 °C)-102 °F (38.9 °C)] 99.7 °F (37.6 °C)  Pulse:  [] 97  Resp:  [23-30] 24  SpO2:  [99 %-100 %] 100 %  BP: ()/(30-65) 99/56     Weight: 53.5 kg (117 lb 15.1 oz) (07/25/17 0325)  Body mass index is 23.03 kg/m².  Body surface area is 1.5 meters squared.    I/O last 3 completed shifts:  In: 4669.4 [I.V.:3689.4; NG/GT:380; IV Piggyback:600]  Out: 3800 [Urine:3390; Drains:410]    Physical Exam   Constitutional: She appears well-developed and well-nourished. No distress.   HENT:   Head: Normocephalic and atraumatic.   Mouth/Throat: Oropharynx is clear and moist. No oropharyngeal exudate.   ett in place    Eyes: Conjunctivae and EOM are normal.   Neck: Normal range of motion. Neck supple. No JVD present. Carotid bruit is not present. No tracheal deviation present. No thyroid mass and no thyromegaly present.   Cardiovascular: Normal rate, regular rhythm, normal heart sounds and intact distal pulses.  Exam reveals no gallop and no friction rub.    No  murmur heard.  Pulmonary/Chest: No respiratory distress. She has no wheezes. She has rales. She exhibits no tenderness.   Abdominal: Soft. Bowel sounds are normal. She exhibits no distension, no abdominal bruit, no ascites and no mass. There is no hepatosplenomegaly. There is no tenderness. There is no rebound, no guarding and no CVA tenderness.   Musculoskeletal: Normal range of motion. She exhibits no edema or tenderness.   Lymphadenopathy:     She has no cervical adenopathy.   Neurological: She displays normal reflexes. No cranial nerve deficit. She exhibits normal muscle tone. Coordination normal.   Skin: Skin is warm and intact. No rash noted. No erythema. No pallor.   Psychiatric: Judgment normal.       Significant Labs:  CBC:   Recent Labs  Lab 07/25/17  0300 07/25/17  0414   WBC 4.16  --    RBC 2.72*  --    HGB 8.7*  --    HCT 24.6* 24*   PLT 77*  --    MCV 90  --    MCH 32.0*  --    MCHC 35.4  --      CMP:   Recent Labs  Lab 07/24/17  0240  07/25/17  0300   *  < > 110  110   CALCIUM 6.7*  < > 7.2*  7.2*   ALBUMIN 1.5*  --   --    PROT 7.2  --   --    *  < > 131*  131*   K 3.5  < > 3.4*  3.4*   CO2 14*  < > 18*  18*     < > 104  104   BUN 65*  < > 68*  68*   CREATININE 3.9*  < > 3.9*  3.9*   ALKPHOS 142*  --   --    ALT 26  --   --    AST 47*  --   --    BILITOT 0.8  --   --    < > = values in this interval not displayed.  LFTs:   Recent Labs  Lab 07/24/17  0240   ALT 26   AST 47*   ALKPHOS 142*   BILITOT 0.8   PROT 7.2   ALBUMIN 1.5*     Lab Results   Component Value Date    CALCIUM 7.2 (L) 07/25/2017    CALCIUM 7.2 (L) 07/25/2017    PHOS 5.6 (H) 07/25/2017       All labs within the past 24 hours have been reviewed.     Significant Imaging: reviewed     Assessment/Plan:     Acute renal failure    1. LELAND : multifactorial, BP dropped in to 70's couple of days ago , on broad spectrum abx , non-oliguric, no indication for RRT  at this time, will follow,     2. Metabolic acidosis :   cont sod bicarb , improving ,     3. HTN : BP stable, follow    4. Anemia : stable Hb     5. HIV/AIDs : on HAART    6. Corrected calcium normal     6. Neutropenic fever : on broad spectrum antibiotics     7.  Stable Lytes     8. Respiratory failure : on Vent support                  I will follow-up with patient. Please contact us if you have any additional questions.     Total time spent 40 minutes including time needed to review the records,  patient  evaluation, documentation, face-to-face discussion with the patient,    more than 50% of the time was spent on coordination of care and counseling.       Marlon Monterroso MD  Nephrology  [facility]

## 2017-07-25 NOTE — PROGRESS NOTES
Clinical Pharmacy Progress Note: Vancomycin Dosing     Vancomycin Day #5       Estimated Creatinine Clearance: 14.9 mL/min (based on Cr of 3.9).   WBC: 4.16 (increase from 1.59 yesterday)         Tmax/24h: 102 (!)      Level:   Vancomycin, random [[id number]] Collected: 07/25/17 0300   Specimen: Blood from Blood Updated: 07/25/17 0337    Vancomycin, Random 20.9 ug/mL      Goal trough: 15-20 mcg/mL   Dx: sepsis/ fever in HIV patient      Cultures:   Blood on 7/23- NGTD x 2 days   Urine on 7/23- NGTD x 2 days   Blood on 7/20- NGTD x 5 days     Plan: Random level drawn reflects approximately 30 hours post-dose. Patient's estimated half-life elimination is 40 hours. Therefore the level of 20.6 is appropriate.     Patient began CRRT yesterday and is being followed by nephrology. Will consult with nephrology to determine whether CRRT is continuing today.   Per protocol, patient will receive 750 mg dose once random level reaches <20 mcg/mL.    Next level due: Vancomycin random level due daily at 0430 while patient is receiving CRRT.      Thank you for allowing us to participate in this patient's care.      Letha Thomas, PharmD 7/25/2017 7:10 AM

## 2017-07-25 NOTE — PROGRESS NOTES
Hematology/Oncology Consult F/U    Reason for consultation: Neutropenia    Chief Complaint:  Acute respiratory failure, fever    HPI:    aWng Kebede is a 31 y.o. female with HIV/AIDS admitted with fevers and developed neutropenia. She was admitted on 7/20/17 with fever 103 and had been having fevers 3 days prior to admission. WBC on admission was 3.82, Hgb 8.8, . The patient has been reportedly compliant with her HIV medications, which she receives through her PEG tube. Upon admission, blood cultures were drawn. She was continued on her home HAART medications and started on Vanc, Zosyn. Since admission her counts have declined on 7/22 to Hgb 6.6. She received 1 Unit of PRBCs. Labs on 7/23/17 showed WBC 1.73, ANC ANC 0.4. Repeat CBC shows WBC 3.05. Last fever was 110.9 at 3am on 7/22.    Interval History:  Patient remains in ICU, intubated. She continues to spike fevers with Tmax 102 at midnight. Her Creatinine has worsened to 3.9 and nephrology has evaluated patient. ID discontinued Tenofovir.     Review of patient's allergies indicates:   Allergen Reactions    Iodine and iodide containing products Anaphylaxis     Pt states she coded last time she was administered contrast    Pneumococcal 23-chitra ps vaccine Anaphylaxis     Potential iodine containing    Bactrim [sulfamethoxazole-trimethoprim] Hives    Morphine Itching       Medications:      Current Facility-Administered Medications   Medication    acetaminophen tablet 650 mg    atovaquone suspension 750 mg    chlorhexidine 0.12 % solution 15 mL    clarithromycin tablet 500 mg    darunavir-cobicistat 800-150 mg-mg Tab 800 mg    dextrose 5 % 1,000 mL with sodium acetate 150 mEq infusion    dextrose 50% injection 12.5 g    diphenhydrAMINE injection 12.5 mg    docusate 50 mg/5 mL liquid 100 mg    dolutegravir Tab 50 mg    emtricitabine capsule 200 mg    ethambutol tablet 800 mg    fentaNYL 2500 mcg in D5W 250 mL infusion premix  (titrating) (conc: 10 mcg/mL)    folic acid tablet 1 mg    glucagon (human recombinant) injection 1 mg    insulin aspart pen 1-10 Units    lacosamide tablet 100 mg    lorazepam injection 2 mg    meropenem-0.9% sodium chloride 500 mg/50 mL IVPB    metoprolol tartrate (LOPRESSOR) split tablet 12.5 mg    norepinephrine 8 mg in dextrose 5 % 250 mL infusion    nystatin 100,000 unit/mL suspension 500,000 Units    ondansetron injection 4 mg    pantoprazole injection 40 mg    potassium chloride 10% solution 30 mEq    promethazine (PHENERGAN) 6.25 mg in dextrose 5 % 50 mL IVPB    sodium bicarbonate tablet 1,300 mg    [START ON 7/26/2017] vancomycin 750 mg in dextrose 5 % 250 mL IVPB (ready to mix system)    voriconazole tablet 200 mg       VITALS (Last 24H):  Temp:  [98.6 °F (37 °C)-102 °F (38.9 °C)]   Pulse:  []   Resp:  [23-34]   BP: ()/(30-67)   SpO2:  [99 %-100 %]     INTAKE & OUTPUT (Last 24H):    Intake/Output Summary (Last 24 hours) at 07/25/17 1043  Last data filed at 07/25/17 1000   Gross per 24 hour   Intake           2932.2 ml   Output             3590 ml   Net           -657.8 ml       REVIEW OF SYSTEMS:        Constitutional: Negative for  chills, weight loss, malaise/fatigue and diaphoresis. +fevers    Unable to obtain remainder of ROS due to sedation/intubation.    PHYSICAL EXAM:    Constitutional: Appears well-developed, cachectic, arouses easily to voice.   HEENT: Normocephalic and atraumatic. No maxillary sinus tenderness. External auditory canals clear and TMs intact without lesions. Nasal and oral mucosal membranes moist. Normal dentition and gums.   Neck: Neck supple no mass.   Cardiovascular: Normal rate and regular rhythm.  S1 S2 appreciated by ascultation  Pulmonary/Chest: Intubated, sedated. Course BS bilaterally. No respiratory distress.   Abdomen: Soft. Non-tender. Distended, PEG in RLQ. Bowel sounds are normal.   Neurological: Moves all extremities.  : Tinsley in place  draining yellow urine  Skin: Warm and dry. No lesions.  Extremities: No clubbing cyanosis or edema.  PICC: Located in R upper arm - Clean, dry, intact, with no signs of infection    Laboratory Data:    Lab Results   Component Value Date    WBC 4.16 07/25/2017    HGB 8.7 (L) 07/25/2017    HCT 24 (L) 07/25/2017    MCV 90 07/25/2017    PLT 77 (L) 07/25/2017         Recent Labs  Lab 07/25/17  0300     110   *  131*   K 3.4*  3.4*     104   CO2 18*  18*   BUN 68*  68*   CREATININE 3.9*  3.9*   CALCIUM 7.2*  7.2*   MG 2.1       Lab Results   Component Value Date    ALT 26 07/24/2017    AST 47 (H) 07/24/2017     (H) 04/19/2017    ALKPHOS 142 (H) 07/24/2017    BILITOT 0.8 07/24/2017       Lab Results   Component Value Date    IRON 57 07/05/2017    TIBC 253 07/05/2017    FERRITIN 2,026 (H) 07/05/2017     Lab Results   Component Value Date    MZHMOPEH70 419 07/05/2017     Lab Results   Component Value Date    FOLATE 3.9 (L) 07/05/2017     Lab Results   Component Value Date    TSH 0.941 07/20/2017     Lab Results   Component Value Date    APTT 38.0 (H) 07/20/2017       Radiology:  Reviewed and noted in plan where applicable. Please see chart for full radiology data.      ASSESSMENT/PLAN:   32 y/o female with HIV/AIDS admitted with fevers and pancytopenia.     1. Pancytopenia: Most likely due to sepsis. No suspicion for malignancy at this time.   -- Will continue to monitor.  -- Will need to ensure patient was compliant with her folic acid at home given recent low folate level.     2. Fevers/sepsis: Negative blood cultures, but abd/pelvis CT showed possible LLL infiltrate. Pt may have aspirated as well. Another reason for fevers could be IRIS / immune reconstitution. CT shows possible pyelonephritis, but UA negative. Urine culture showed multiple organisms. Repeat urine culture negative.   -- Continue broad spectrum antibiotics with Vanc, Meropenem, Voriconazole.     3. Respiratory failure:  Likely due to sepsis, possible pneumonia with or without aspiration as well as pulmonary edema.   -- Intubated in ICU.      4. Pulmonary edema: Possibly due to aggressive IV hydration. Last echo showed EF 55% without diastolic dysfunction.  -- Consider repeat Echo     5. Acute renal failure: Due to intravascular volume depletion, possible ATN from medications and possibly worsened with Lasix.  -- Nephrology following     6. HIV/AIDS: Compliant with HAART and prophylactic antibiotics for the past few months.   -- ID following.     Thank you for allowing us to participate in the care of this patent.      Virginia Olmedo M.D.  Hematology Oncology

## 2017-07-25 NOTE — PROGRESS NOTES
[facility] -   Infectious Disease  Progress Note    Patient Name: Wang Kebede  MRN: [id number]  Admission Date: 7/20/2017  Length of Stay: 3 days  Attending Physician: Marcus Robertson MD  Primary Care Provider: Levi Moncada MD    Isolation Status: No active isolations  Assessment/Plan:      Pneumonia due to infectious organism    Will continue meropenem and vanco         Acute renal failure    Nephrology follow up .  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  Closely monitor serum creatinine.        Fever    Abdominal CT scan showed left kidney edema but initial UA was not markedly abnormal -will continue empiric therapy with vanco/meropenem  Disseminated MAC , and histoplasmosis are in the differentials   Will  follow   urine for histoplasma antigen and blood AFB cultures .    Will use po voriconazole for suspected histoplasmosis   PO biaxim/ethambutol for suspected MAC .    This is the third episode that she has these recurrent fevers and it usually resolves. During her last admit , after extensive workup , AIDS itself was suspected as possible source and the decision was made to use a PEG tube to administer HAART .She is responding to HAART therapy .  However ,serum procalcitonin is very high .  Will rule out CMV retinitis and check CMV titers.  CMV is a cause of FUO        Anemia    Will monitor closely and transfuse as needed .            HIV (human immunodeficiency virus infection)    She has AIDS but her immune system is recovering since she now started to be compliant with HAART .  She is on darunavir -cobicistat and tivicay was added todayb         Will continue Mepron for PCP prophylaxis.        * Acute respiratory failure    Ventilatory support  Will wean off ventilator as tolerated.             Anticipated Disposition:     Thank you for your consult. I will follow-up with patient. Please contact us if you have any additional questions.    Hubert Mayo MD  Infectious Disease  [facility] -     Subjective:     Principal Problem:Acute respiratory failure    HPI:   31 year old woman with AIDs well known to me . She has long standing history of poor compliance with medications .However during he rlast admission, after much discussion, she agreed to get a PEG tube inserted and to get her HAART therapy through the PEG tube. She had recent lab work done on 07/10 and cd4-245 ,cd%-10.6 ,  Previous lab data-a month ago-cd4 -4, cd4% 2.4  HIV viral load -1271 -decreased from 180,773 a month ago .  She now presented with fever -T max 103 . She denies any history of cough or headache. No abdominal pain .  Since admission, cultures are still pending . Chest x-ray did not show any acute abnormality. UA is normal .    Interval History: 31 year old woman with AIDs,persistent fever ,respirtaory failure on ventilatory support.  Serum procalcitonin is 81.  Cultures are pending .  Serum creatinine is 4.0  Review of Systems   Unable to perform ROS: Intubated     Objective:     Vital Signs (Most Recent):  Temp: 98.6 °F (37 °C) (07/24/17 1502)  Pulse: (!) 122 (07/24/17 1929)  Resp: (!) 29 (07/24/17 1929)  BP: (!) 120/52 (07/24/17 1800)  SpO2: 100 % (07/24/17 1929) Vital Signs (24h Range):  Temp:  [98.6 °F (37 °C)-101.3 °F (38.5 °C)] 98.6 °F (37 °C)  Pulse:  [106-140] 122  Resp:  [25-44] 29  SpO2:  [99 %-100 %] 100 %  BP: ()/(17-67) 120/52     Weight: 53 kg (116 lb 13.5 oz)  Body mass index is 22.82 kg/m².    Estimated Creatinine Clearance: 14.5 mL/min (based on Cr of 4).    Physical Exam   Constitutional:   Intubated    HENT:   Head: Normocephalic and atraumatic.   Eyes: Pupils are equal, round, and reactive to light.   Neck: Neck supple.   Cardiovascular: Normal rate, regular rhythm and normal heart  sounds.    Pulmonary/Chest:   Harsh breath sounds bilaterally    Abdominal: Soft. Bowel sounds are normal.   PEG tube insitu   Neurological:   Intubated/sedated   Skin: Skin is warm and dry.   Psychiatric:   Unable to obtain as she is intubated    Nursing note and vitals reviewed.      Significant Labs:   Blood Culture:   Recent Labs  Lab 05/21/17  1445 07/20/17  2000 07/20/17  2027 07/23/17  1600 07/23/17  2238   LABBLOO No growth after 5 days. No Growth to date  No Growth to date  No Growth to date  No Growth to date No Growth to date  No Growth to date  No Growth to date  No Growth to date No Growth to date  No Growth to date No Growth to date     BMP:   Recent Labs  Lab 07/24/17  1342      *   K 4.0      CO2 14*   BUN 69*   CREATININE 4.0*   CALCIUM 6.7*   MG 2.4     CBC:   Recent Labs  Lab 07/23/17  0031 07/23/17  0838 07/24/17  0240 07/24/17  1442   WBC 1.73* 3.05* 1.59*  --    HGB 8.8* 9.1* 7.9*  --    HCT 25.7* 26.6* 22.6* 23*   PLT 85* 89* 103*  --      Procalcitonin: No results for input(s): PROCAL in the last 48 hours.  All pertinent labs within the past 24 hours have been reviewed.    Significant Imaging: I have reviewed all pertinent imaging results/findings within the past 24 hours.

## 2017-07-25 NOTE — ASSESSMENT & PLAN NOTE
7/25 WBC better at 4.16 from 1.59; repeat CXR on for today  Will continue present antibiotics:

## 2017-07-25 NOTE — ASSESSMENT & PLAN NOTE
- PEG placed on 05/26/17 for purpose of HAART therapy -- has difficulty swallowing HIV/AIDS medications  - PEG tube site care  To continue peg tube care  Not receiving tube feds;  abdomen distended and bowel sounds are absent    7/25: continues with peg in place; functioning wwell

## 2017-07-25 NOTE — ASSESSMENT & PLAN NOTE
Nephrology follow up .  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  Closely monitor serum creatinine.

## 2017-07-25 NOTE — PLAN OF CARE
Problem: Patient Care Overview  Goal: Plan of Care Review  Outcome: Ongoing (interventions implemented as appropriate)  Patient remained on vent support with no changes made to settings at this time.

## 2017-07-25 NOTE — ASSESSMENT & PLAN NOTE
She has AIDS but her immune system is recovering since she now started to be compliant with HAART .  She is on darunavir -cobicistat and tivicay was added todayb         Will continue Mepron for PCP prophylaxis.

## 2017-07-25 NOTE — ASSESSMENT & PLAN NOTE
Tachycardia and hypotensive (at her baseline); continues on pressor support with levophed;  BP 99/38  Serial lactic acids; continue to monitor labs  IVF's with bicarbonate presently at 40cc/hr  Continue metoprolol for heart rate; continues tachy at 127    7/25 HR now ~105   BP stable ;  coming down off levophed at present

## 2017-07-25 NOTE — PLAN OF CARE
Problem: Patient Care Overview  Goal: Plan of Care Review  Outcome: Ongoing (interventions implemented as appropriate)  Patient still sedated/intubated.  Levo drip and fentanyl drips infusing. BP has remained stable on levo trying to keep the MAP greater than 60. Patient has been running NSR on the monitor since this afternoon.  Vent settings have remained the same. No signs of respiratory distress.  Good urine output on shift. Tube feeding have been started with a goal of 30cc/hr.  Patient is currently at 20. WBC scan to be done on 7/27 due to injection needed to be ordered.  POC reveiwed with Family

## 2017-07-25 NOTE — ASSESSMENT & PLAN NOTE
- Improving -- currently 134   - Will continue to monitor labs on daily basis    7/25:Wang Kebede #[id number] (CSN: [id number])  (31 y.o. F)  (Adm: 07/20/17)   [facility] ICU-A 11-A 11   Results    Basic metabolic panel (Order [id number])   Basic metabolic panel   Order: [id number]   Status:  Final result   Visible to patient:  No (Not Released) Next appt:  08/15/2017 at 10:20 AM in Lab (LABORATORY, [facility])     Ref Range & Units 03:00  (7/25/17) 03:00  (7/25/17) 1d ago  (7/24/17) 1d ago  (7/24/17) 2d ago  (7/23/17) 2d ago  (7/23/17) 3d ago  (7/22/17)    Sodium 136 - 145 mmol/L 131  131  131  134  134  131  134     Potassium 3.5 - 5.1 mmol/L 3.4  3.4  4.0 3.5 4.2 4.2 3.6    Chloride 95 - 110 mmol/L 104 104 107 107 115  112  115     CO2 23 - 29 mmol/L 18  18  14  14  7CM  9CM  10     Glucose 70 - 110 mg/dL 110 110 100 205  77 84 85    BUN, Bld 6 - 20 mg/dL 68  68  69  65  49  47  41     Creatinine 0.5 - 1.4 mg/dL 3.9  3.9  4.0  3.9  3.1  3.0  2.6     Calcium 8.7 - 10.5 mg/dL 7.2  7.2  6.7CM  6.7CM  7.8  7.3  7.4     Anion Gap 8 - 16 mmol/L 9 9 10 13 12 10 9    eGFR if African American >60 mL/min/1.73 m^2 17  17  16  17  22  23  27     eGFR if non African American >60 mL/min/1.73 m^2 14  14CM  14CM  14CM  19CM  20CM  24CM    Comments: Calculation used to obtain the estimated glomerular filtration   rate (eGFR) is the CKD-EPI equation. Since race is unknown   in our information system, the eGFR values for   -American and Non--American patients are given   for each creatinine result.    Resulting Agency  [facility]   Narrative     Modified to change to unit collect      Specimen Collected: 07/25/17 03:00 Last Resulted: 07/25/17 03:46 Lab Flowsheet Order Details View Encounter Lab and Collection Details Routing Result History      CM=Additional comments              Lab Collection Information     Specimen Type: Blood   Specimen Source: Blood    Collected: 7/25/2017  3:00 AM          Collected By:  [id number]  LEVI CARRERO  [id number]          Encounter     View Encounter        Result Information     Flag: Abnormal Status: Final result (Collected: 7/25/2017 03:00) Provider Status: Ordered      Basic metabolic panel (Order [id number])   Results   Abnormal   Status: Final result (Collected: 7/25/2017 03:00)   View [employer]     Basic metabolic panel (Order #[id number]) on 7/25/17   Basic metabolic panel [LAB15] (Order [id number])   Lab   Date and Time: 7/25/2017  2:42 AM Department: [facility] Rel By: Levi Carrero RN (auto-released) Authorizing: Hubert Mayo MD   Dept Order Information     Date Department   7/25/2017 [facility]   Order Information     Order Date/Time Release Date/Time Start Date/Time End Date/Time   07/25/17 02:42 AM 07/25/17 02:42 AM 07/25/17 06:00 AM 07/25/17 06:00 AM   Order Details     Frequency Duration Priority Order Class   Daily 5  days Routine Unit Collect    Order Details     Parent Order ID Child Order ID   [id number]      Basic metabolic panel [[id number]]     Electronically signed by: Marcus Robertson MD on 07/25/17 0823 Status: Completed   Mode: Ordering in Telephone with readback mode Communicated by: Levi Carrero RN   Comment: Modified to change to unit collect   Ordering user: Levi Carrero RN Ordering provider: Hubert Mayo MD   Authorized by: Hubert Mayo MD Ordering mode: Telephone with readback   Order comments: Modified to change to unit collect   Specimen Information     Source Collected By   Blood Levi Carrero RN 07/25/17 0300   Reprint Order Requisition     Basic metabolic panel (Order #[id number]) on 7/25/17   Lab Collection Information     Specimen Type: Blood   Specimen Source: Blood    Collected: 7/25/2017  3:00 AM      Lab Exception Handling Use Only - Not for Clinical Use     Basic metabolic panel (Order #[id number]) on 7/25/17    Resulting Agency Information      Resulting Agency   [facility]   [address] [place] [id number]                 Epic Interface Status     ORDER SENT    Process Instructions     Please collect in a Green, Lithium Heparin with Inert Gel tube. Physician may require patient to be fasting prior to collection.         Order-Level Documents - 07/20/2017:     Scan on 7/24/2017 8:41 AM          Order Provider Info         Office phone Pager E-mail   Wang Almaraz #[id number] (CSN: [id number])  (31 y.o. F)  (Adm: 07/20/17)   [facility] ICU-A 11-A 11   Results    Basic metabolic panel (Order [id number])   Basic metabolic panel   Order: [id number]   Status:  Final result   Visible to patient:  No (Not Released) Next appt:  08/15/2017 at 10:20 AM in Lab (LABORATORY, [facility])     Ref Range & Units 03:00  (7/25/17) 03:00  (7/25/17) 1d ago  (7/24/17) 1d ago  (7/24/17) 2d ago  (7/23/17) 2d ago  (7/23/17) 3d ago  (7/22/17)    Sodium 136 - 145 mmol/L 131  131  131  134  134  131  134     Potassium 3.5 - 5.1 mmol/L 3.4  3.4  4.0 3.5 4.2 4.2 3.6    Chloride 95 - 110 mmol/L 104 104 107 107 115  112  115     CO2 23 - 29 mmol/L 18  18  14  14  7CM  9CM  10     Glucose 70 - 110 mg/dL 110 110 100 205  77 84 85    BUN, Bld 6 - 20 mg/dL 68  68  69  65  49  47  41     Creatinine 0.5 - 1.4 mg/dL 3.9  3.9  4.0  3.9  3.1  3.0  2.6     Calcium 8.7 - 10.5 mg/dL 7.2  7.2  6.7CM  6.7CM  7.8  7.3  7.4     Anion Gap 8 - 16 mmol/L 9 9 10 13 12 10 9    eGFR if African American >60 mL/min/1.73 m^2 17  17  16  17  22  23  27     eGFR if non African American >60 mL/min/1.73 m^2 14  14CM  14CM  14CM  19CM  20CM  24CM    Comments: Calculation used to obtain the estimated glomerular filtration   rate (eGFR) is the CKD-EPI equation. Since race is unknown   in our information system, the eGFR values for   -American and Non--American patients are given   for each creatinine result.    Resulting Agency  [facility]  [facility]   Narrative     Modified to change to unit collect      Specimen Collected: 07/25/17 03:00 Last Resulted: 07/25/17 03:46 Lab Flowsheet Order Details View Encounter Lab and Collection Details Routing Result History      CM=Additional comments              Lab Collection Information     Specimen Type: Blood   Specimen Source: Blood    Collected: 7/25/2017  3:00 AM       Collected By:  [id number]  LEVI CARRERO  [id number]          Encounter     View Encounter        Result Information     Flag: Abnormal Status: Final result (Collected: 7/25/2017 03:00) Provider Status: Ordered      Basic metabolic panel (Order [id number])   Results   Abnormal   Status: Final result (Collected: 7/25/2017 03:00)   View [employer] [facility]     Basic metabolic panel (Order #[id number]) on 7/25/17   Basic metabolic panel [LAB15] (Order [id number])   Lab   Date and Time: 7/25/2017  2:42 AM Department: [facility] Rel By: Levi Carrero RN (auto-released) Authorizing: Hubert Mayo MD   Dept Order Information     Date Department   7/25/2017 [facility]   Order Information     Order Date/Time Release Date/Time Start Date/Time End Date/Time   07/25/17 02:42 AM 07/25/17 02:42 AM 07/25/17 06:00 AM 07/25/17 06:00 AM   Order Details     Frequency Duration Priority Order Class   Daily 5  days Routine Unit Collect    Order Details     Parent Order ID Child Order ID   [id number]      Basic metabolic panel [[id number]]     Electronically signed by: Marucs Robertson MD on 07/25/17 0823 Status: Completed   Mode: Ordering in Telephone with readback mode Communicated by: Levi Carrero RN   Comment: Modified to change to unit collect   Ordering user: Levi Carrero RN Ordering provider: Hubert Mayo MD   Authorized by: Hubert Mayo MD Ordering mode: Telephone with readback   Order comments: Modified to change to unit collect   Specimen Information     Source Collected By   Blood Levi  SAW Woodruff RN 07/25/17 0300   Reprint Order Requisition     Basic metabolic panel (Order #[id number]) on 7/25/17   Lab Collection Information     Specimen Type: Blood   Specimen Source: Blood    Collected: 7/25/2017  3:00 AM      Lab Exception Handling Use Only - Not for Clinical Use     Basic metabolic panel (Order #[id number]) on 7/25/17    Resulting Agency Information     Resulting Agency   [facility] [address] [place] [address]                 Epic Interface Status     ORDER SENT    Process Instructions     Please collect in a Green, Lithium Heparin with Inert Gel tube. Physician may require patient to be fasting prior to collection.         Order-Level Documents - 07/20/2017:     Scan on 7/24/2017 8:41 AM          Order Provider Info         Office phone Pager E-mail   Ordering User Levi Woodruff RN -- -- [email or web address]   Authorizing Provider Hubert Mayo [phone] -- --   Attending Provider Marcus Robertson [phone] -- --     ng User Levi Woodruff RN -- -- [email or web address]   Authorizing Provider Hubert Mayo [phone] -- --   Attending Provider Marcus Robertson [phone] -- --

## 2017-07-25 NOTE — PLAN OF CARE
CM contacted patient's mother Lyssa Kebede. CM was told she came up here last night. CM asked about family support and mother stated she work and gets off at 6p. She has been coming at the last visitation times. Mother has 7 y/o son with special needs. She just cannot leave him with anyone. She stated she feels she can home home post hospitalization . If there is anything else the doctors suggest , she stated she will agree to whatever they think is best.

## 2017-07-25 NOTE — SUBJECTIVE & OBJECTIVE
Interval History: 31 year old woman with AIDs,persistent fever ,respirtaory failure on ventilatory support.  Serum procalcitonin is 81.  Cultures are pending .  Serum creatinine is 4.0  Review of Systems   Unable to perform ROS: Intubated     Objective:     Vital Signs (Most Recent):  Temp: 98.6 °F (37 °C) (07/24/17 1502)  Pulse: (!) 122 (07/24/17 1929)  Resp: (!) 29 (07/24/17 1929)  BP: (!) 120/52 (07/24/17 1800)  SpO2: 100 % (07/24/17 1929) Vital Signs (24h Range):  Temp:  [98.6 °F (37 °C)-101.3 °F (38.5 °C)] 98.6 °F (37 °C)  Pulse:  [106-140] 122  Resp:  [25-44] 29  SpO2:  [99 %-100 %] 100 %  BP: ()/(17-67) 120/52     Weight: 53 kg (116 lb 13.5 oz)  Body mass index is 22.82 kg/m².    Estimated Creatinine Clearance: 14.5 mL/min (based on Cr of 4).    Physical Exam   Constitutional:   Intubated    HENT:   Head: Normocephalic and atraumatic.   Eyes: Pupils are equal, round, and reactive to light.   Neck: Neck supple.   Cardiovascular: Normal rate, regular rhythm and normal heart sounds.    Pulmonary/Chest:   Harsh breath sounds bilaterally    Abdominal: Soft. Bowel sounds are normal.   PEG tube insitu   Neurological:   Intubated/sedated   Skin: Skin is warm and dry.   Psychiatric:   Unable to obtain as she is intubated    Nursing note and vitals reviewed.      Significant Labs:   Blood Culture:   Recent Labs  Lab 05/21/17  1445 07/20/17  2000 07/20/17  2027 07/23/17  1600 07/23/17  2238   LABBLOO No growth after 5 days. No Growth to date  No Growth to date  No Growth to date  No Growth to date No Growth to date  No Growth to date  No Growth to date  No Growth to date No Growth to date  No Growth to date No Growth to date     BMP:   Recent Labs  Lab 07/24/17  1342      *   K 4.0      CO2 14*   BUN 69*   CREATININE 4.0*   CALCIUM 6.7*   MG 2.4     CBC:   Recent Labs  Lab 07/23/17  0031 07/23/17  0838 07/24/17  0240 07/24/17  1442   WBC 1.73* 3.05* 1.59*  --    HGB 8.8* 9.1* 7.9*  --     HCT 25.7* 26.6* 22.6* 23*   PLT 85* 89* 103*  --      Procalcitonin: No results for input(s): PROCAL in the last 48 hours.  All pertinent labs within the past 24 hours have been reviewed.    Significant Imaging: I have reviewed all pertinent imaging results/findings within the past 24 hours.

## 2017-07-25 NOTE — ASSESSMENT & PLAN NOTE
- ID following; continued ID presence appreciated; adjutsting antibiotic regimen  - Per ID has AIDS  - Last CD4 count on 07/10/17 -- 245  - Neutropenic on AM labs -- Hematology/Oncology consulted    7/25 Continue as per ID consultant  Continue to monitor labs

## 2017-07-25 NOTE — PROGRESS NOTES
[facility] Hospital Medicine  Progress Note    Patient Name: Wang Kebede  MRN: [id number]  Patient Class: IP- Inpatient   Admission Date: 7/20/2017  Length of Stay: 4 days  Attending Physician: Marcus Robertson MD  Primary Care Provider: Levi Moncada MD        Subjective:     Principal Problem:Acute respiratory failure    HPI:  32 y/o female presents to ED with c/o fever that onset gradually 6 days ago. PMH includes HIV. She went to her PCP on Monday and given abx. Symptoms have been intermediate and moderate. No exacerbating or mitigating factors. She denies chills, N/V/D, Cp, palpitations, hematuria, congestion, cough, dysuria, and all other symptoms. She will be observed for symptom control and ID consult under the care of hospital medicine.    Hospital Course:  Wang Kebede is a 31 year old female admitted for Fever. Upon admission pt started on IV Vancomycin and Zosyn. Blood cultures with NGTD, urine culture with multiple isolates. CT abdomen/pelvis -- enlarged edematous left kidney could reflect pyelonephritis and fluid filled bowel loops seen throughout colon, which may infectious process which the diarrhea she was experiencing has resolved. Will repeat UA due to continued fever. C-Diff negative. ID consulted for hx of HIV. Per ID, pt has AIDS but her immune system is recovering since she is now compliant with HAART. Her fever could be secondary to immune reconstitution inflammatory syndrome (IRIS). CXR and UA upon admission negative as source of infection. S/p 1 unit of PRBCs on 07/22/17, responded appropriately. Nephrotoxic medications have been discontinued including Vancomycin and Zosyn. Pt currently receiving IV Meropenum and Zyvox. Nephrology consulted, awaiting input. Overnight, pt tachypneic and tachycardiac. CXR with developing CHF and pulmonary edema. . Pt received diuretics. Discussed case with ID -- recommended discontinuing IV antibiotics and  starting PO Avelox to assist with fluid restriction. Respiratory/Metobolic acidosis noted. Due to increased work of breathing, pt transferred to ICU for closer monitoring.        Interval History:     Review of Systems   Constitutional: Positive for fever. Negative for activity change.        Remains on the ventr; more alert; following commands;  Low grade temps; resting tachycardia   HENT: Positive for trouble swallowing.         Gareth tracheal and gareth gastric tubes in place   Eyes: Negative.    Respiratory: Negative.    Cardiovascular: Negative.    Gastrointestinal: Positive for abdominal distention.        Peg tube in place;  Diminished bowel sounds   Endocrine: Negative.    Genitourinary:        Tinsley catheter in place   Musculoskeletal: Negative.    Allergic/Immunologic: Negative.    Neurological: Positive for weakness.   Hematological: Negative.    Psychiatric/Behavioral:        Sedate and on the vent     Objective:     Vital Signs (Most Recent):  Temp: (!) 100.8 °F (38.2 °C) (07/24/17 0702)  Pulse: (!) 125 (07/24/17 0856)  Resp: (!) 30 (07/24/17 0856)  BP: (!) 98/39 (07/24/17 0856)  SpO2: 100 % (07/24/17 0856) Vital Signs (24h Range):  Temp:  [99 °F (37.2 °C)-103 °F (39.4 °C)] 100.8 °F (38.2 °C)  Pulse:  [107-140] 125  Resp:  [30-51] 30  SpO2:  [98 %-100 %] 100 %  BP: ()/() 98/39     Weight: 53 kg (116 lb 13.5 oz)  Body mass index is 22.82 kg/m².    Intake/Output Summary (Last 24 hours) at 07/24/17 0856  Last data filed at 07/24/17 0800   Gross per 24 hour   Intake             1230 ml   Output             1825 ml   Net             -595 ml      Physical Exam   Constitutional: She appears well-developed and well-nourished.   HENT:   Head: Normocephalic and atraumatic.   Eyes: Pupils are equal, round, and reactive to light.   Neck: Normal range of motion.   Cardiovascular:   sinue tach by monitor   Pulmonary/Chest:   Coarse breath sounds   Abdominal: She exhibits distension.   Hypo-active bowel sounds;  diminished   Musculoskeletal: Normal range of motion.   Neurological:   Sedated and on the vent   Skin: Skin is warm and dry.   Some desqaumation on sole of right foot       Significant Labs:   BMP:   Recent Labs  Lab 07/24/17  0240   *   *   K 3.5      CO2 14*   BUN 65*   CREATININE 3.9*   CALCIUM 6.7*   MG 1.3*     CBC:   Recent Labs  Lab 07/23/17  0031 07/23/17  0838 07/24/17  0240   WBC 1.73* 3.05* 1.59*   HGB 8.8* 9.1* 7.9*   HCT 25.7* 26.6* 22.6*   PLT 85* 89* 103*     CMP:   Recent Labs  Lab 07/23/17  0031 07/23/17  0534 07/24/17  0240   * 134* 134*   K 4.2 4.2 3.5   * 115* 107   CO2 9* 7* 14*   GLU 84 77 205*   BUN 47* 49* 65*   CREATININE 3.0* 3.1* 3.9*   CALCIUM 7.3* 7.8* 6.7*   PROT 7.7 8.1 7.2   ALBUMIN 1.7* 1.8* 1.5*   BILITOT 0.6 0.7 0.8   ALKPHOS 103 116 142*   AST 56* 56* 47*   ALT 36 37 26   ANIONGAP 10 12 13   EGFRNONAA 20* 19* 14*     Coagulation: No results for input(s): INR, APTT in the last 48 hours.    Invalid input(s): PT  Lactic Acid:   Recent Labs  Lab 07/23/17  0838 07/24/17  0240   LACTATE 1.1 1.6     Magnesium:   Recent Labs  Lab 07/24/17  0240   MG 1.3*        Assessment/Plan:      Thrombocytopenia associated with AIDS      Platelets rebounding somewhat; up to 103K from 89    7/25 couns down slightlty        Acute hypercapnic respiratory failure              Respiratory distress    - Transfer to ICU for closer monitoring  - Tachypneic 40/min -- likely secondary from the metabolic acidosis  - CXR this morning -- findings consistent with CHF and pulmonary edema  - pt received diuretics with no improvement of symptoms  - NIPPV    Remains on the Vent;  Continue to monitor cxr; ABG's    7/25 remains on the vent; more responsive; following commands            Pneumonia due to infectious organism      Continue on the vent   Setting A/C 24; ; peep 0f 5; FIO2 40%  RR 32   sats at 100%  Coarse breath sounds        Acute renal failure    - May be secondary to  dehydration, hypotension, and/or Vancomycin  - Nephrology consulted due to worsening kidney function  - Avoid Nephrotoxic medications (brian dose modifications as appropriate to degree of renal impairment.  -Continue to monitor labs; urine outputs etc    7/25 renal function levelling off at 3.9 now;         S/P percutaneous endoscopic gastrostomy (PEG) tube placement    - PEG placed on 05/26/17 for purpose of HAART therapy -- has difficulty swallowing HIV/AIDS medications  - PEG tube site care  To continue peg tube care  Not receiving tube feds;  abdomen distended and bowel sounds are absent    7/25: continues with peg in place; functioning wwell          Fever    - Fever of unknown etiology  - ID following -- pt has AIDS but her immune system is recovering since she is now compliant with HAART. Her fever may be secondary to immune reconstitution inflammatory syndrome but still need to rule out infectious process  - Differential diagnosis -- 1.  Disseminated MAC  2.  Histoplasmosis  - Blood cultures with NGTD  - Repeat UA pending -- CT abdomen/pelvis -- enlarged edematous left kidney could reflect pyelonephritis and fluid filled bowel loops seen throughout colon, which may infectious process which at the time pt was experiencing diarrhea  - C-diff negative  - Antipyretics prn  Continue to monitor labs;  Wbc down to 1.59    7/25 wbc improved; continue to monitor cultures; cultures of blood and urine negative  Continuing to monitor labs        Severe sepsis    7/25 WBC better at 4.16 from 1.59; repeat CXR on for today  Will continue present antibiotics:            Anemia    - S/p 1 unit of PRBCs, responded appropriately  - Supplemental oxygen prn, keep O2 sats > 92%  - Pt sees Dr. Olmedo (hematology/oncology), last clinic visit 07/05/17  - Per Hematology/Oncology on that clinic visit -- she has normocytic anemia and possibly due to HIV/AIDS (hx of noncompliance but now compliant). Pt reports hx of sickle cell disease but  hemoglobin electrophoresis was normal on 2 separate occassions. If her anemia does not improve after 2 months of continued HAART therapy, refer back to Hematology  - Daily CBC   Continue to monitor labs; transfuse as per heme-onc    7/25 Hgb remaining brenna post transfusion        S/P implantation of automatic cardioverter/defibrillator (AICD)    Tachycardia and hypotensive (at her baseline); continues on pressor support with levophed;  BP 99/38  Serial lactic acids; continue to monitor labs  IVF's with bicarbonate presently at 40cc/hr  Continue metoprolol for heart rate; continues tachy at 127    7/25 HR now ~105   BP stable ;  coming down off levophed at present        HIV (human immunodeficiency virus infection)    - ID following; continued ID presence appreciated; adjutsting antibiotic regimen  - Per ID has AIDS  - Last CD4 count on 07/10/17 -- 245  - Neutropenic on AM labs -- Hematology/Oncology consulted    7/25 Continue as per ID consultant  Continue to monitor labs        * Acute respiratory failure      7/25 remains on vent; not over breathing the vent; sats are at 100% on present settings          VTE Risk Mitigation         Ordered     Medium Risk of VTE  Once      07/20/17 3322          Marcus Kearney MD  Department of Hospital Medicine   [facility]

## 2017-07-25 NOTE — ASSESSMENT & PLAN NOTE
- S/p 1 unit of PRBCs, responded appropriately  - Supplemental oxygen prn, keep O2 sats > 92%  - Pt sees Dr. Olmedo (hematology/oncology), last clinic visit 07/05/17  - Per Hematology/Oncology on that clinic visit -- she has normocytic anemia and possibly due to HIV/AIDS (hx of noncompliance but now compliant). Pt reports hx of sickle cell disease but hemoglobin electrophoresis was normal on 2 separate occassions. If her anemia does not improve after 2 months of continued HAART therapy, refer back to Hematology  - Daily CBC   Continue to monitor labs; transfuse as per heme-onc    7/25 Hgb remaining brenna post transfusion

## 2017-07-25 NOTE — ASSESSMENT & PLAN NOTE
- Fever of unknown etiology  - ID following -- pt has AIDS but her immune system is recovering since she is now compliant with HAART. Her fever may be secondary to immune reconstitution inflammatory syndrome but still need to rule out infectious process  - Differential diagnosis -- 1.  Disseminated MAC  2.  Histoplasmosis  - Blood cultures with NGTD  - Repeat UA pending -- CT abdomen/pelvis -- enlarged edematous left kidney could reflect pyelonephritis and fluid filled bowel loops seen throughout colon, which may infectious process which at the time pt was experiencing diarrhea  - C-diff negative  - Antipyretics prn  Continue to monitor labs;  Wbc down to 1.59    7/25 wbc improved; continue to monitor cultures; cultures of blood and urine negative  Continuing to monitor labs

## 2017-07-25 NOTE — ASSESSMENT & PLAN NOTE
- May be secondary to dehydration, hypotension, and/or Vancomycin  - Nephrology consulted due to worsening kidney function  - Avoid Nephrotoxic medications (brian dose modifications as appropriate to degree of renal impairment.  -Continue to monitor labs; urine outputs etc    7/25 renal function levelling off at 3.9 now;

## 2017-07-25 NOTE — ASSESSMENT & PLAN NOTE
1. LELAND : multifactorial, BP dropped in to 70's couple of days ago , on broad spectrum abx , non-oliguric, no indication for RRT  at this time, will follow,     2. Metabolic acidosis :  cont sod bicarb , improving ,     3. HTN : BP stable, follow    4. Anemia : stable Hb     5. HIV/AIDs : on HAART    6. Corrected calcium normal     6. Neutropenic fever : on broad spectrum antibiotics     7.  Stable Lytes     8. Respiratory failure : on Vent support

## 2017-07-25 NOTE — PROGRESS NOTES
Progress Note  Critical Care    Admit Date: 7/20/2017   LOS: 4 days     Follow-up For: Acute Respiratory failure    Interval History: remains intubated with full mechanical vent support; acidosis continues very slow improvement   Remains on enteral bicarb and sodium acetate infusion   Easily aroused on low dose sedation, continues to require low dose pressor support   t max 102   Septic source remains unclear; discussed in rounds, will proceed with tagged wbc scan which could be inconclusive in setting of HIV but given lack of source with evidence of infection a positive scan could be beneficial in de-escalation of broad spectrum abx     SUBJECTIVE:   ROS:  Unable s/t OETT on vent    Continuous Infusions:   custom IV infusion builder (for pharmacist use only) 40 mL/hr at 07/25/17 0700    fentanyl 100 mcg (07/25/17 0858)    norepinephrine bitartrate-D5W 0.3 mcg/kg/min (07/25/17 0700)     Review of patient's allergies indicates:   Allergen Reactions    Iodine and iodide containing products Anaphylaxis     Pt states she coded last time she was administered contrast    Pneumococcal 23-chitra ps vaccine Anaphylaxis     Potential iodine containing    Bactrim [sulfamethoxazole-trimethoprim] Hives    Morphine Itching       OBJECTIVE:     Vital Signs (Most Recent)  Temp: 99.8 °F (37.7 °C) (07/25/17 0702)  Pulse: 105 (07/25/17 0805)  Resp: (!) 23 (07/25/17 0805)  BP: (!) 100/46 (07/25/17 0805)  SpO2: 100 % (07/25/17 0805)    Vital Signs Range (Last 24H):  Temp:  [98.6 °F (37 °C)-102 °F (38.9 °C)]   Pulse:  []   Resp:  [23-34]   BP: ()/(30-67)   SpO2:  [99 %-100 %]     I & O (Last 24H):    Intake/Output Summary (Last 24 hours) at 07/25/17 0959  Last data filed at 07/25/17 0900   Gross per 24 hour   Intake          2896.11 ml   Output             3590 ml   Net          -693.89 ml       Ventilator Data (Last 24H):     Vent Mode: A/C  Oxygen Concentration (%):  [40] 40  Resp Rate Total:  [24 br/min-38 br/min] 26  br/min  Vt Set:  [350 mL] 350 mL  PEEP/CPAP:  [5 cmH20] 5 cmH20  Pressure Support:  [0 cmH20] 0 cmH20  Mean Airway Pressure:  [11 ekU20-02 cmH20] 11 cmH20       Physical Exam   Constitutional: She appears ill. She is sedated, intubated and restrained.   HENT:   Head: Normocephalic.   Eyes: Conjunctivae are normal. Pupils are equal, round, and reactive to light.   Neck: No JVD present. No tracheal deviation present.   Cardiovascular: Regular rhythm.   No extrasystoles are present. Tachycardia present.    No murmur heard.  Pulses:       Radial pulses are 2+ on the right side, and 2+ on the left side.        Dorsalis pedis pulses are 2+ on the right side, and 2+ on the left side.   Pulmonary/Chest: She is intubated. She has decreased breath sounds. She has no wheezes. She has rales in the right lower field and the left lower field.   Abdominal: Soft. Bowel sounds are normal. She exhibits distension (mild). There is no tenderness.       Musculoskeletal: She exhibits no edema.   Neurological: GCS eye subscore is 2. GCS verbal subscore is 1.   Skin: Skin is warm, dry and intact. Capillary refill takes less than 2 seconds. She is not diaphoretic.         Laboratory (Last 24H):  CBC:    Recent Labs  Lab 07/25/17  0300 07/25/17  0414   WBC 4.16  --    HGB 8.7*  --    HCT 24.6* 24*   PLT 77*  --      CMP:    Recent Labs  Lab 07/25/17  0300   CALCIUM 7.2*  7.2*   *  131*   K 3.4*  3.4*   CO2 18*  18*     104   BUN 68*  68*   CREATININE 3.9*  3.9*       Labs within the past 24 hours have been reviewed.  ABG    Recent Labs  Lab 07/25/17  0414   PH 7.279*   PO2 164*   PCO2 40.1   HCO3 18.8*   BE -8         Chest X-Ray:      Film and report reviewed    ASSESSMENT/PLAN:     Problem   Acute Respiratory Failure   Severe Sepsis   Acute Renal Failure   HIV (Human Immunodeficiency Virus Infection)   Thrombocytopenia Associated With Aids     1. Neuro: sedation RASS goal -2 with daily SAT  2. Pulmonary: continue full  vent support, SBT once acidosis resolved; vent settings reviewed and appropriate  3. Cardiac: pressor to keep MAP 60 or greater  4. Renal: accurate I/O; monitor creatinine; nephrology following  5. Infectious Disease: ID following, continue HAART meds; continue broad spectrum antimicrobials; will get tagged wbc scan  6. Hematology/Oncology: monitor h/h/platelets  7. Endocrine: glucose monitoring with SSI prn for control and prevention of insulin toxicity  8. Fluids/Electrolytes/Nutrition/GI: continue acetate infusion, enteral bicarb; replace potassium  9. Musculoskeletal: ROM  10. Pain Management: fentanyl infusion for analgesia/sedation  11. Discharge and Palliative Care: anticipate return home to self care on discharge    Preventive Measures:   Nutrition: Goal: Tolerate oral diet and meet caloric needs  Stress Ulcer: PPI  DVT: SCDs; holding pharmacologic anticoagulation s/t thrombocytopenia  BB: metoprolol  Bowel Prophylaxis: docusate  Head of Bed/Reposition: Elevate HOB and turn Q1-2 hours    Physical Therapy: consult once stable.     Sedation Interruption: Q AM   Vent Day: 3  OG Day: 3  PEG present on admission  PICC Line Day: 3  Tinsley Day: 3  IVAB Day: 6  Code Status: full    Counseling/Consultation: I have discussed the care of this patient in detail with nursing staff and Dr. Childress. Status and plan of care discussed with team on multidisciplinary rounds.     Critical Care Time 61 minutes  Critical secondary to severe sepsis, respiratory failure, hypotension requiring pressor support  Critical care was time spent personally by me on the following activities: development of treatment plan with patient or surrogate and bedside caregivers, discussions with consultants, evaluation of patient's response to treatment, examination of patient, ordering and performing treatments and interventions, ordering and review of laboratory studies, ordering and review of radiographic studies, pulse oximetry, re-evaluation of  patient's condition.  This critical care time did not overlap with that of any other provider.    [facility]ylie To [facility]-BC  [facility]

## 2017-07-25 NOTE — CONSULTS
Eye Consult to rule out CMV Retinitis.    Ms Kebede is intubated and unable to verbally respond. She was able to fix and follow my pen light.     Pupils reacted normally, intraocular pressures were normal.     Dilated fundus exam revealed no CMV Retinitis nor Sickle Cell Retinopathy.    A) Normal ocular exam.    P) Please call Ophthalmology if any concerns.

## 2017-07-25 NOTE — ASSESSMENT & PLAN NOTE
- Transfer to ICU for closer monitoring  - Tachypneic 40/min -- likely secondary from the metabolic acidosis  - CXR this morning -- findings consistent with CHF and pulmonary edema  - pt received diuretics with no improvement of symptoms  - NIPPV    Remains on the Vent;  Continue to monitor cxr; ABG's    7/25 remains on the vent; more responsive; following commands

## 2017-07-26 LAB
ALLENS TEST: ABNORMAL
ANION GAP SERPL CALC-SCNC: 7 MMOL/L
BACTERIA BLD CULT: NORMAL
BACTERIA BLD CULT: NORMAL
BASOPHILS # BLD AUTO: 0.17 K/UL
BASOPHILS NFR BLD: 3.3 %
BUN SERPL-MCNC: 65 MG/DL
CALCIUM SERPL-MCNC: 7.3 MG/DL
CHLORIDE SERPL-SCNC: 104 MMOL/L
CMV DNA SERPL NAA+PROBE-ACNC: NORMAL IU/ML
CO2 SERPL-SCNC: 25 MMOL/L
CREAT SERPL-MCNC: 3.2 MG/DL
DELSYS: ABNORMAL
DIFFERENTIAL METHOD: ABNORMAL
EOSINOPHIL # BLD AUTO: 0.2 K/UL
EOSINOPHIL NFR BLD: 4.2 %
ERYTHROCYTE [DISTWIDTH] IN BLOOD BY AUTOMATED COUNT: 19.7 %
ERYTHROCYTE [SEDIMENTATION RATE] IN BLOOD BY WESTERGREN METHOD: 24 MM/H
EST. GFR  (AFRICAN AMERICAN): 21 ML/MIN/1.73 M^2
EST. GFR  (NON AFRICAN AMERICAN): 18 ML/MIN/1.73 M^2
FIO2: 40
GLUCOSE SERPL-MCNC: 120 MG/DL
HCO3 UR-SCNC: 24.2 MMOL/L (ref 24–28)
HCT VFR BLD AUTO: 24.7 %
HGB BLD-MCNC: 8.5 G/DL
HISTOPLASMA AG VALUE: 0 NG/ML
HISTOPLASMA AG VALUE: 0 NG/ML
HISTOPLASMA ANTIGEN URINE: NEGATIVE
HISTOPLASMA ANTIGEN URINE: NEGATIVE
LACTATE SERPL-SCNC: 1.5 MMOL/L
LYMPHOCYTES # BLD AUTO: 3.1 K/UL
LYMPHOCYTES NFR BLD: 60.2 %
MAGNESIUM SERPL-MCNC: 1.9 MG/DL
MCH RBC QN AUTO: 31.7 PG
MCHC RBC AUTO-ENTMCNC: 34.4 G/DL
MCV RBC AUTO: 92 FL
MODE: ABNORMAL
MONOCYTES # BLD AUTO: 0.8 K/UL
MONOCYTES NFR BLD: 15.7 %
NEUTROPHILS # BLD AUTO: 0.9 K/UL
NEUTROPHILS NFR BLD: 17.9 %
PCO2 BLDA: 37.8 MMHG (ref 35–45)
PEEP: 5
PH SMN: 7.41 [PH] (ref 7.35–7.45)
PHOSPHATE SERPL-MCNC: 3.3 MG/DL
PLATELET # BLD AUTO: 50 K/UL
PMV BLD AUTO: 10.2 FL
PO2 BLDA: 213 MMHG (ref 80–100)
POC BE: 0 MMOL/L
POC SATURATED O2: 100 % (ref 95–100)
POCT GLUCOSE: 113 MG/DL (ref 70–110)
POCT GLUCOSE: 116 MG/DL (ref 70–110)
POCT GLUCOSE: 117 MG/DL (ref 70–110)
POCT GLUCOSE: 122 MG/DL (ref 70–110)
POCT GLUCOSE: 128 MG/DL (ref 70–110)
POTASSIUM SERPL-SCNC: 3.3 MMOL/L
RBC # BLD AUTO: 2.68 M/UL
SAMPLE: ABNORMAL
SITE: ABNORMAL
SODIUM SERPL-SCNC: 136 MMOL/L
VANCOMYCIN SERPL-MCNC: 13.9 UG/ML
VT: 350
WBC # BLD AUTO: 5.22 K/UL

## 2017-07-26 PROCEDURE — 25000003 PHARM REV CODE 250: Performed by: INTERNAL MEDICINE

## 2017-07-26 PROCEDURE — 99233 SBSQ HOSP IP/OBS HIGH 50: CPT | Mod: ,,, | Performed by: INTERNAL MEDICINE

## 2017-07-26 PROCEDURE — 20000000 HC ICU ROOM

## 2017-07-26 PROCEDURE — 80048 BASIC METABOLIC PNL TOTAL CA: CPT

## 2017-07-26 PROCEDURE — 25000003 PHARM REV CODE 250: Performed by: NURSE PRACTITIONER

## 2017-07-26 PROCEDURE — 63600175 PHARM REV CODE 636 W HCPCS: Performed by: EMERGENCY MEDICINE

## 2017-07-26 PROCEDURE — 36600 WITHDRAWAL OF ARTERIAL BLOOD: CPT

## 2017-07-26 PROCEDURE — 80202 ASSAY OF VANCOMYCIN: CPT

## 2017-07-26 PROCEDURE — 83605 ASSAY OF LACTIC ACID: CPT

## 2017-07-26 PROCEDURE — C9113 INJ PANTOPRAZOLE SODIUM, VIA: HCPCS | Performed by: EMERGENCY MEDICINE

## 2017-07-26 PROCEDURE — 85025 COMPLETE CBC W/AUTO DIFF WBC: CPT

## 2017-07-26 PROCEDURE — 63600175 PHARM REV CODE 636 W HCPCS: Performed by: NURSE PRACTITIONER

## 2017-07-26 PROCEDURE — 84100 ASSAY OF PHOSPHORUS: CPT

## 2017-07-26 PROCEDURE — 27000221 HC OXYGEN, UP TO 24 HOURS

## 2017-07-26 PROCEDURE — 83735 ASSAY OF MAGNESIUM: CPT

## 2017-07-26 PROCEDURE — 63600175 PHARM REV CODE 636 W HCPCS: Performed by: INTERNAL MEDICINE

## 2017-07-26 PROCEDURE — 82803 BLOOD GASES ANY COMBINATION: CPT

## 2017-07-26 PROCEDURE — 94003 VENT MGMT INPAT SUBQ DAY: CPT

## 2017-07-26 PROCEDURE — 99900035 HC TECH TIME PER 15 MIN (STAT)

## 2017-07-26 PROCEDURE — 99291 CRITICAL CARE FIRST HOUR: CPT | Mod: ,,, | Performed by: NURSE PRACTITIONER

## 2017-07-26 RX ORDER — MOXIFLOXACIN HYDROCHLORIDE 400 MG/250ML
400 INJECTION, SOLUTION INTRAVENOUS
Status: DISCONTINUED | OUTPATIENT
Start: 2017-07-26 | End: 2017-07-31 | Stop reason: HOSPADM

## 2017-07-26 RX ORDER — POTASSIUM CHLORIDE 20 MEQ/15ML
30 SOLUTION ORAL ONCE
Status: DISCONTINUED | OUTPATIENT
Start: 2017-07-26 | End: 2017-07-26

## 2017-07-26 RX ORDER — POTASSIUM CHLORIDE 20 MEQ/15ML
60 SOLUTION ORAL ONCE
Status: COMPLETED | OUTPATIENT
Start: 2017-07-26 | End: 2017-07-26

## 2017-07-26 RX ORDER — MAGNESIUM SULFATE HEPTAHYDRATE 40 MG/ML
2 INJECTION, SOLUTION INTRAVENOUS ONCE
Status: COMPLETED | OUTPATIENT
Start: 2017-07-26 | End: 2017-07-26

## 2017-07-26 RX ADMIN — MOXIFLOXACIN HYDROCHLORIDE 400 MG: 400 INJECTION, SOLUTION INTRAVENOUS at 03:07

## 2017-07-26 RX ADMIN — VANCOMYCIN HYDROCHLORIDE 750 MG: 750 INJECTION, POWDER, LYOPHILIZED, FOR SOLUTION INTRAVENOUS at 08:07

## 2017-07-26 RX ADMIN — NOREPINEPHRINE BITARTRATE 0.23 MCG/KG/MIN: 1 INJECTION INTRAVENOUS at 03:07

## 2017-07-26 RX ADMIN — SODIUM BICARBONATE 650 MG TABLET 1300 MG: at 10:07

## 2017-07-26 RX ADMIN — LACOSAMIDE 100 MG: 50 TABLET, FILM COATED ORAL at 10:07

## 2017-07-26 RX ADMIN — SODIUM BICARBONATE 650 MG TABLET 1300 MG: at 05:07

## 2017-07-26 RX ADMIN — MEROPENEM AND SODIUM CHLORIDE 500 MG: 500 INJECTION, SOLUTION INTRAVENOUS at 06:07

## 2017-07-26 RX ADMIN — DOLUTEGRAVIR SODIUM 50 MG: 50 TABLET, FILM COATED ORAL at 09:07

## 2017-07-26 RX ADMIN — ATOVAQUONE 750 MG: 750 SUSPENSION ORAL at 09:07

## 2017-07-26 RX ADMIN — CLARITHROMYCIN 500 MG: 500 TABLET, FILM COATED ORAL at 09:07

## 2017-07-26 RX ADMIN — VORICONAZOLE 200 MG: 200 TABLET, FILM COATED ORAL at 09:07

## 2017-07-26 RX ADMIN — Medication 12.5 MG: at 09:07

## 2017-07-26 RX ADMIN — MAGNESIUM SULFATE IN WATER 2 G: 40 INJECTION, SOLUTION INTRAVENOUS at 09:07

## 2017-07-26 RX ADMIN — DOCUSATE SODIUM 100 MG: 50 LIQUID ORAL at 09:07

## 2017-07-26 RX ADMIN — POTASSIUM CHLORIDE 60 MEQ: 20 SOLUTION ORAL at 09:07

## 2017-07-26 RX ADMIN — NOREPINEPHRINE BITARTRATE 0.02 MCG/KG/MIN: 1 INJECTION INTRAVENOUS at 11:07

## 2017-07-26 RX ADMIN — ETHAMBUTOL HYDROCHLORIDE 800 MG: 400 TABLET, FILM COATED ORAL at 09:07

## 2017-07-26 RX ADMIN — ACETAMINOPHEN 650 MG: 325 TABLET ORAL at 02:07

## 2017-07-26 RX ADMIN — SODIUM BICARBONATE 650 MG TABLET 1300 MG: at 02:07

## 2017-07-26 RX ADMIN — FOLIC ACID 1 MG: 1 TABLET ORAL at 09:07

## 2017-07-26 RX ADMIN — PANTOPRAZOLE SODIUM 40 MG: 40 INJECTION, POWDER, FOR SOLUTION INTRAVENOUS at 09:07

## 2017-07-26 NOTE — ASSESSMENT & PLAN NOTE
1. LELAND : multifactorial, BP dropped in to 70's couple of days ago , on broad spectrum abx , non-oliguric, no indication for RRT  at this time, will follow,     2. Metabolic acidosis :  Improved     3. HTN : BP stable,     4. Anemia : stable Hb     5. HIV/AIDs : on HAART    6. Corrected calcium normal     6. Neutropenic fever : on broad spectrum antibiotics     7.  Replete K ,     8. Respiratory failure : on Vent support

## 2017-07-26 NOTE — ASSESSMENT & PLAN NOTE
Ventilatory support  Will wean off ventilator as tolerated.  Imaging tests showed continued improvement

## 2017-07-26 NOTE — ASSESSMENT & PLAN NOTE
7/25 WBC better at 4.16 from 1.59; repeat CXR on for today  Will continue present antibiotics:    7/26:  Continues with temp spikes;  tmax 101.2 overnight; conyinue broad spectrum antibiotics with surveillance of cultures; awaiting viral serologies;  continued input from ID appreciated

## 2017-07-26 NOTE — SUBJECTIVE & OBJECTIVE
Interval History: 31 year old woman with AIDS,metabolic acidosis , respiratory  Failure, .  She has persistent fever.She is scheduled for indium scan today .  Review of Systems   Unable to perform ROS: Intubated     Objective:     Vital Signs (Most Recent):  Temp: 98.9 °F (37.2 °C) (07/26/17 1102)  Pulse: 98 (07/26/17 1415)  Resp: (!) 24 (07/26/17 1415)  BP: (!) 95/47 (07/26/17 1415)  SpO2: 100 % (07/26/17 1415) Vital Signs (24h Range):  Temp:  [98.9 °F (37.2 °C)-101.2 °F (38.4 °C)] 98.9 °F (37.2 °C)  Pulse:  [] 98  Resp:  [18-27] 24  SpO2:  [98 %-100 %] 100 %  BP: ()/(35-78) 95/47     Weight: 53.6 kg (118 lb 2.7 oz)  Body mass index is 23.08 kg/m².    Estimated Creatinine Clearance: 18.1 mL/min (based on Cr of 3.2).    Physical Exam   Constitutional:   Intubated    HENT:   Head: Normocephalic and atraumatic.   Eyes: Pupils are equal, round, and reactive to light.   Neck: Neck supple.   Cardiovascular: Normal rate, regular rhythm and normal heart sounds.    Pulmonary/Chest:   Harsh breath sounds bilaterally    Abdominal: Soft. Bowel sounds are normal.   PEG tube insitu   Neurological:   Intubated/sedated   Skin: Skin is warm and dry.   Psychiatric:   Unable to obtain as she is intubated    Nursing note and vitals reviewed.      Significant Labs:   Blood Culture:   Recent Labs  Lab 05/21/17  1445 07/20/17  2000 07/20/17  2027 07/23/17  1600 07/23/17  2238   LABBLOO No growth after 5 days. No growth after 5 days. No growth after 5 days. No Growth to date  No Growth to date  No Growth to date No Growth to date  No Growth to date  No Growth to date     BMP:   Recent Labs  Lab 07/26/17  0244   *      K 3.3*      CO2 25   BUN 65*   CREATININE 3.2*   CALCIUM 7.3*   MG 1.9     CBC:   Recent Labs  Lab 07/25/17  0300 07/25/17  0414 07/26/17  0244   WBC 4.16  --  5.22   HGB 8.7*  --  8.5*   HCT 24.6* 24* 24.7*   PLT 77*  --  50*     All pertinent labs within the past 24 hours have been  reviewed.    Significant Imaging: I have reviewed all pertinent imaging results/findings within the past 24 hours.

## 2017-07-26 NOTE — PLAN OF CARE
Patient extubated today. Patient throat is very sore and she is still very drowsy from the meds and is unable to effectively communicate with CM Family comes by q PM. CM unable to determine an optimal d/c plan at this time due to medical condition. CM to f/u for effective d/c plan     07/26/17 2115   Discharge Reassessment   Assessment Type Discharge Planning Reassessment   Can the patient answer the patient profile reliably? No, cognitively impaired   How does the patient rate their overall health at the present time? Poor   Describe the patient's ability to walk at the present time. Major restrictions/daily assistance from another person   How often would a person be available to care for the patient? Often   Number of comorbid conditions (as recorded on the chart) Four   Provided patient/caregiver education on the expected discharge date and the discharge plan No   Discharge Plan A Home with family   Discharge Plan B Home with family;Home Health   Change in patient condition or support system No   Patient choice form signed by patient/caregiver N/A

## 2017-07-26 NOTE — ASSESSMENT & PLAN NOTE
- Transfer to ICU for closer monitoring  - Tachypneic 40/min -- likely secondary from the metabolic acidosis  - CXR this morning -- findings consistent with CHF and pulmonary edema  - pt received diuretics with no improvement of symptoms  - NIPPV    Remains on the Vent;  Continue to monitor cxr; ABG's    7/25 remains on the vent; more responsive; following commands    7/26: overall improved;  Will attempt weaning later today

## 2017-07-26 NOTE — ASSESSMENT & PLAN NOTE
- PEG placed on 05/26/17 for purpose of HAART therapy -- has difficulty swallowing HIV/AIDS medications  - PEG tube site care  To continue peg tube care  Not receiving tube feds;  abdomen distended and bowel sounds are absent    7/25: continues with peg in place; functioning well    7/26: no appreciable change;  Functioning well;     RN Break Coverage: Alert and oriented x 4. Pt received from RN Melba in no distress. VSS. Family at bedside.  Will continue to monitor.

## 2017-07-26 NOTE — ASSESSMENT & PLAN NOTE
- May be secondary to dehydration, hypotension, and/or Vancomycin  - Nephrology consulted due to worsening kidney function  - Avoid Nephrotoxic medications (brian dose modifications as appropriate to degree of renal impairment.  -Continue to monitor labs; urine outputs etc    7/25 renal function levelling off at 3.9 now;     7/26:  Urine output remain good;  Creatinine 3.9 to 3.2 today;  Will continue as per nephrology consultant.

## 2017-07-26 NOTE — PROGRESS NOTES
[facility] Hospital Medicine  Progress Note    Patient Name: Wang Kebede  MRN: [id number]  Patient Class: IP- Inpatient   Admission Date: 7/20/2017  Length of Stay: 5 days  Attending Physician: Marcus Robertson MD  Primary Care Provider: Levi Moncada MD        Subjective:     Principal Problem:Acute respiratory failure    HPI:  30 y/o female presents to ED with c/o fever that onset gradually 6 days ago. PMH includes HIV. She went to her PCP on Monday and given abx. Symptoms have been intermediate and moderate. No exacerbating or mitigating factors. She denies chills, N/V/D, Cp, palpitations, hematuria, congestion, cough, dysuria, and all other symptoms. She will be observed for symptom control and ID consult under the care of hospital medicine.    Hospital Course:  Wang Kebede is a 31 year old female admitted for Fever. Upon admission pt started on IV Vancomycin and Zosyn. Blood cultures with NGTD, urine culture with multiple isolates. CT abdomen/pelvis -- enlarged edematous left kidney could reflect pyelonephritis and fluid filled bowel loops seen throughout colon, which may infectious process which the diarrhea she was experiencing has resolved. Will repeat UA due to continued fever. C-Diff negative. ID consulted for hx of HIV. Per ID, pt has AIDS but her immune system is recovering since she is now compliant with HAART. Her fever could be secondary to immune reconstitution inflammatory syndrome (IRIS). CXR and UA upon admission negative as source of infection. S/p 1 unit of PRBCs on 07/22/17, responded appropriately. Nephrotoxic medications have been discontinued including Vancomycin and Zosyn. Pt currently receiving IV Meropenum and Zyvox. Nephrology consulted, awaiting input. Overnight, pt tachypneic and tachycardiac. CXR with developing CHF and pulmonary edema. . Pt received diuretics. Discussed case with ID -- recommended discontinuing IV antibiotics and  starting PO Avelox to assist with fluid restriction. Respiratory/Metobolic acidosis noted. Due to increased work of breathing, pt transferred to ICU for closer monitoring.    7/26:  Continues with temp spikes with Tmaax 101.2 overnight.  Renal function continues to improve and we are dialing down on the vent setting; currently  A/C 24,  FIO2 40%; PEEP of 5; sats at aoo%  Planning on a tagged WBC study today;  bllod cultures continue negative;  Will continue present antibiotic regimen; viral serologies awaited.  Continued input from ID and pulmonary are appreciated.     Interval History: Continues with temp spikes;  T.max 101.2 overnight    Review of Systems   Constitutional: Positive for fever.   HENT:        OT AND ET REMAINS IN PLACE   Eyes: Negative.    Respiratory:        Intubated and on vent   Cardiovascular: Negative.    Gastrointestinal:        Soft; peg tube in place; functional;  Hypoactive bowel sounds   Genitourinary:        Tinsley catheter in place   Skin:        Multiple tatoos, unchanged   Allergic/Immunologic: Negative.    Neurological:        Sedated and on the vent   Hematological: Negative.    Psychiatric/Behavioral:        On vent.     Objective:     Vital Signs (Most Recent):  Temp: 99.9 °F (37.7 °C) (07/26/17 0702)  Pulse: 103 (07/26/17 0745)  Resp: (!) 24 (07/26/17 0745)  BP: 115/70 (07/26/17 0745)  SpO2: 100 % (07/26/17 0745) Vital Signs (24h Range):  Temp:  [99.6 °F (37.6 °C)-101.2 °F (38.4 °C)] 99.9 °F (37.7 °C)  Pulse:  [] 103  Resp:  [23-26] 24  SpO2:  [98 %-100 %] 100 %  BP: ()/(40-78) 115/70     Weight: 53.6 kg (118 lb 2.7 oz)  Body mass index is 23.08 kg/m².    Intake/Output Summary (Last 24 hours) at 07/26/17 0756  Last data filed at 07/26/17 0700   Gross per 24 hour   Intake          2424.33 ml   Output             2710 ml   Net          -285.67 ml      Physical Exam   Constitutional: She appears well-developed and well-nourished.   Chronically ill  appearing;  Follows commands   HENT:   Head: Normocephalic and atraumatic.   Eyes: Pupils are equal, round, and reactive to light.   Pulmonary/Chest: Effort normal and breath sounds normal.   Abdominal: Soft.   Hypoactive bowel sounds   Skin: Skin is warm.   multiple tatoos, unchanged       Significant Labs:   Blood Culture: No results for input(s): LABBLOO in the last 48 hours.  BMP:   Recent Labs  Lab 07/26/17  0244   *      K 3.3*      CO2 25   BUN 65*   CREATININE 3.2*   CALCIUM 7.3*   MG 1.9     CBC:   Recent Labs  Lab 07/25/17  0300 07/25/17  0414 07/26/17  0244   WBC 4.16  --  5.22   HGB 8.7*  --  8.5*   HCT 24.6* 24* 24.7*   PLT 77*  --  50*     Lactic Acid:   Recent Labs  Lab 07/25/17  0300 07/26/17  0244   LACTATE 1.0 1.5     Prealbumin: No results for input(s): PREALBUMIN in the last 48 hours.  Urine Culture: No results for input(s): LABURIN in the last 48 hours.    Significant Imaging: I have reviewed all pertinent imaging results/findings within the past 24 hours.    Assessment/Plan:      Thrombocytopenia associated with AIDS      Platelets rebounding somewhat; up to 103K from 89    7/25 counts down slightlty    7/26: remains down; continue to monitor; no active bleeding noted        Acute hypercapnic respiratory failure              Respiratory distress    - Transfer to ICU for closer monitoring  - Tachypneic 40/min -- likely secondary from the metabolic acidosis  - CXR this morning -- findings consistent with CHF and pulmonary edema  - pt received diuretics with no improvement of symptoms  - NIPPV    Remains on the Vent;  Continue to monitor cxr; ABG's    7/25 remains on the vent; more responsive; following commands    7/26: overall improved;  Will attempt weaning later today          Pneumonia due to infectious organism      Continue on the vent   Setting A/C 24; ; peep 0f 5; FIO2 40%  RR 32   sats at 100%  Coarse breath sounds    7/26: continue antibiotics; monitor cxr         Acute renal failure    - May be secondary to dehydration, hypotension, and/or Vancomycin  - Nephrology consulted due to worsening kidney function  - Avoid Nephrotoxic medications (brian dose modifications as appropriate to degree of renal impairment.  -Continue to monitor labs; urine outputs etc    7/25 renal function levelling off at 3.9 now;     7/26:  Urine output remain good;  Creatinine 3.9 to 3.2 today;  Will continue as per nephrology consultant.        S/P percutaneous endoscopic gastrostomy (PEG) tube placement    - PEG placed on 05/26/17 for purpose of HAART therapy -- has difficulty swallowing HIV/AIDS medications  - PEG tube site care  To continue peg tube care  Not receiving tube feds;  abdomen distended and bowel sounds are absent    7/25: continues with peg in place; functioning well    7/26: no appreciable change;  Functioning well;          Fever    - Fever of unknown etiology  - ID following -- pt has AIDS but her immune system is recovering since she is now compliant with HAART. Her fever may be secondary to immune reconstitution inflammatory syndrome but still need to rule out infectious process  - Differential diagnosis -- 1.  Disseminated MAC  2.  Histoplasmosis  - Blood cultures with NGTD  - Repeat UA pending -- CT abdomen/pelvis -- enlarged edematous left kidney could reflect pyelonephritis and fluid filled bowel loops seen throughout colon, which may infectious process which at the time pt was experiencing diarrhea  - C-diff negative  - Antipyretics prn  Continue to monitor labs;  Wbc down to 1.59    7/25 wbc improved; continue to monitor cultures; cultures of blood and urine negative  Continuing to monitor labs;  7/26;  For tagged WBC study today;  Monitor cultures        Severe sepsis    7/25 WBC better at 4.16 from 1.59; repeat CXR on for today  Will continue present antibiotics:    7/26:  Continues with temp spikes;  tmax 101.2 overnight; conyinue broad spectrum antibiotics with  surveillance of cultures; awaiting viral serologies;  continued input from ID appreciated        Anemia    - S/p 1 unit of PRBCs, responded appropriately  - Supplemental oxygen prn, keep O2 sats > 92%  - Pt sees Dr. Olmedo (hematology/oncology), last clinic visit 07/05/17  - Per Hematology/Oncology on that clinic visit -- she has normocytic anemia and possibly due to HIV/AIDS (hx of noncompliance but now compliant). Pt reports hx of sickle cell disease but hemoglobin electrophoresis was normal on 2 separate occassions. If her anemia does not improve after 2 months of continued HAART therapy, refer back to Hematology  - Daily CBC   Continue to monitor labs; transfuse as per heme-onc    7/25 Hgb remaining brenna post transfusion    7/26:  Hgb remaining stable; continue to monitor labs.            S/P implantation of automatic cardioverter/defibrillator (AICD)    Tachycardia and hypotensive (at her baseline); continues on pressor support with levophed;  BP 99/38  Serial lactic acids; continue to monitor labs  IVF's with bicarbonate presently at 40cc/hr  Continue metoprolol for heart rate; continues tachy at 127    7/25 HR now ~105   BP stable ;  coming down off levophed at present  7/26: No change; rate has improved low 100's now        HIV (human immunodeficiency virus infection)    - ID following; continued ID presence appreciated; adjutsting antibiotic regimen  - Per ID has AIDS  - Last CD4 count on 07/10/17 -- 245  - Neutropenic on AM labs -- Hematology/Oncology consulted    7/25 Continue as per ID consultant  Continue to monitor labs    7/26: WBC count rebounding; continues with temp spikes; cultures remain negative. Viral serologies awaited;  For tagged wbc study today        * Acute respiratory failure      7/25 remains on vent; not over breathing the vent; sats are at 100% on present settings    7/26:  Reducing level of sedation at present;  Will have her assessed for weaning;; can dial down FIO2; continue as  per pulmonologist.          VTE Risk Mitigation         Ordered     Medium Risk of VTE  Once      07/20/17 4543          Marcus Kearney MD  Department of Hospital Medicine   [facility]

## 2017-07-26 NOTE — SUBJECTIVE & OBJECTIVE
Interval History: Continues with temp spikes;  T.max 101.2 overnight    Review of Systems   Constitutional: Positive for fever.   HENT:        OT AND ET REMAINS IN PLACE   Eyes: Negative.    Respiratory:        Intubated and on vent   Cardiovascular: Negative.    Gastrointestinal:        Soft; peg tube in place; functional;  Hypoactive bowel sounds   Genitourinary:        Tinsley catheter in place   Skin:        Multiple tatoos, unchanged   Allergic/Immunologic: Negative.    Neurological:        Sedated and on the vent   Hematological: Negative.    Psychiatric/Behavioral:        On vent.     Objective:     Vital Signs (Most Recent):  Temp: 99.9 °F (37.7 °C) (07/26/17 0702)  Pulse: 103 (07/26/17 0745)  Resp: (!) 24 (07/26/17 0745)  BP: 115/70 (07/26/17 0745)  SpO2: 100 % (07/26/17 0745) Vital Signs (24h Range):  Temp:  [99.6 °F (37.6 °C)-101.2 °F (38.4 °C)] 99.9 °F (37.7 °C)  Pulse:  [] 103  Resp:  [23-26] 24  SpO2:  [98 %-100 %] 100 %  BP: ()/(40-78) 115/70     Weight: 53.6 kg (118 lb 2.7 oz)  Body mass index is 23.08 kg/m².    Intake/Output Summary (Last 24 hours) at 07/26/17 0756  Last data filed at 07/26/17 0700   Gross per 24 hour   Intake          2424.33 ml   Output             2710 ml   Net          -285.67 ml      Physical Exam   Constitutional: She appears well-developed and well-nourished.   Chronically ill appearing;  Follows commands   HENT:   Head: Normocephalic and atraumatic.   Eyes: Pupils are equal, round, and reactive to light.   Pulmonary/Chest: Effort normal and breath sounds normal.   Abdominal: Soft.   Hypoactive bowel sounds   Skin: Skin is warm.   multiple tatoos, unchanged       Significant Labs:   Blood Culture: No results for input(s): LABBLOO in the last 48 hours.  BMP:   Recent Labs  Lab 07/26/17  0244   *      K 3.3*      CO2 25   BUN 65*   CREATININE 3.2*   CALCIUM 7.3*   MG 1.9     CBC:   Recent Labs  Lab 07/25/17  0300 07/25/17  0414 07/26/17  0244   WBC  4.16  --  5.22   HGB 8.7*  --  8.5*   HCT 24.6* 24* 24.7*   PLT 77*  --  50*     Lactic Acid:   Recent Labs  Lab 07/25/17  0300 07/26/17  0244   LACTATE 1.0 1.5     Prealbumin: No results for input(s): PREALBUMIN in the last 48 hours.  Urine Culture: No results for input(s): LABURIN in the last 48 hours.    Significant Imaging: I have reviewed all pertinent imaging results/findings within the past 24 hours.

## 2017-07-26 NOTE — SUBJECTIVE & OBJECTIVE
Interval History:  31 year old woman with history of AIDs, admitted with fever . Hospital course   Complicated by worsening resp failure and acidosis .  She is now intubated .  She continues to have intermittent fevers ,  Review of Systems   Unable to perform ROS: Intubated     Objective:     Vital Signs (Most Recent):  Temp: (!) 100.5 °F (38.1 °C) (07/25/17 1930)  Pulse: 101 (07/25/17 2300)  Resp: (!) 23 (07/25/17 2300)  BP: (!) 100/58 (07/25/17 2300)  SpO2: 98 % (07/25/17 2300) Vital Signs (24h Range):  Temp:  [99.6 °F (37.6 °C)-102 °F (38.9 °C)] 100.5 °F (38.1 °C)  Pulse:  [] 101  Resp:  [23-26] 23  SpO2:  [98 %-100 %] 98 %  BP: ()/(32-78) 100/58     Weight: 53.5 kg (117 lb 15.1 oz)  Body mass index is 23.03 kg/m².    Estimated Creatinine Clearance: 14.9 mL/min (based on Cr of 3.9).    Physical Exam   Constitutional:   Intubated    HENT:   Head: Normocephalic and atraumatic.   Eyes: Pupils are equal, round, and reactive to light.   Neck: Neck supple.   Cardiovascular: Normal rate, regular rhythm and normal heart sounds.    Pulmonary/Chest:   Harsh breath sounds bilaterally    Abdominal: Soft. Bowel sounds are normal.   PEG tube insitu   Neurological:   Intubated/sedated   Skin: Skin is warm and dry.   Psychiatric:   Unable to obtain as she is intubated    Nursing note and vitals reviewed.      Significant Labs:   Blood Culture:   Recent Labs  Lab 05/21/17  1445 07/20/17  2000 07/20/17  2027 07/23/17  1600 07/23/17  2238   LABBLOO No growth after 5 days. No Growth to date  No Growth to date  No Growth to date  No Growth to date  No Growth to date No Growth to date  No Growth to date  No Growth to date  No Growth to date  No Growth to date No Growth to date  No Growth to date  No Growth to date No Growth to date  No Growth to date     BMP:   Recent Labs  Lab 07/25/17  0300     110   *  131*   K 3.4*  3.4*     104   CO2 18*  18*   BUN 68*  68*   CREATININE 3.9*  3.9*    CALCIUM 7.2*  7.2*   MG 2.1     CBC:   Recent Labs  Lab 07/24/17  0240 07/24/17  1442 07/25/17  0300 07/25/17  0414   WBC 1.59*  --  4.16  --    HGB 7.9*  --  8.7*  --    HCT 22.6* 23* 24.6* 24*   *  --  77*  --        Significant Imaging: I have reviewed all pertinent imaging results/findings within the past 24 hours.

## 2017-07-26 NOTE — ASSESSMENT & PLAN NOTE
- Fever of unknown etiology  - ID following -- pt has AIDS but her immune system is recovering since she is now compliant with HAART. Her fever may be secondary to immune reconstitution inflammatory syndrome but still need to rule out infectious process  - Differential diagnosis -- 1.  Disseminated MAC  2.  Histoplasmosis  - Blood cultures with NGTD  - Repeat UA pending -- CT abdomen/pelvis -- enlarged edematous left kidney could reflect pyelonephritis and fluid filled bowel loops seen throughout colon, which may infectious process which at the time pt was experiencing diarrhea  - C-diff negative  - Antipyretics prn  Continue to monitor labs;  Wbc down to 1.59    7/25 wbc improved; continue to monitor cultures; cultures of blood and urine negative  Continuing to monitor labs;  7/26;  For tagged WBC study today;  Monitor cultures

## 2017-07-26 NOTE — PROGRESS NOTES
Hematology/Oncology Consult F/U    Reason for consultation: Neutropenia    Chief Complaint:  Acute respiratory failure, fever    HPI:    Wang Kebede is a 31 y.o. female with HIV/AIDS admitted with fevers and developed neutropenia. She was admitted on 7/20/17 with fever 103 and had been having fevers 3 days prior to admission. WBC on admission was 3.82, Hgb 8.8, . The patient has been reportedly compliant with her HIV medications, which she receives through her PEG tube. Upon admission, blood cultures were drawn. She was continued on her home HAART medications and started on Vanc, Zosyn. Since admission her counts have declined on 7/22 to Hgb 6.6. She received 1 Unit of PRBCs. Labs on 7/23/17 showed WBC 1.73, ANC ANC 0.4. Repeat CBC shows WBC 3.05. Last fever was 110.9 at 3am on 7/22.    Interval History:  Patient was looking better this morning during interview. She got extubated later in the morning. Her Tmax was 101.2 at 2:40am.       Review of patient's allergies indicates:   Allergen Reactions    Iodine and iodide containing products Anaphylaxis     Pt states she coded last time she was administered contrast    Pneumococcal 23-chitra ps vaccine Anaphylaxis     Potential iodine containing    Bactrim [sulfamethoxazole-trimethoprim] Hives    Morphine Itching       Medications:      Current Facility-Administered Medications   Medication    acetaminophen tablet 650 mg    atovaquone suspension 750 mg    clarithromycin tablet 500 mg    darunavir-cobicistat 800-150 mg-mg Tab 800 mg    dextrose 50% injection 12.5 g    diphenhydrAMINE injection 12.5 mg    docusate 50 mg/5 mL liquid 100 mg    dolutegravir Tab 50 mg    emtricitabine capsule 200 mg    ethambutol tablet 800 mg    folic acid tablet 1 mg    glucagon (human recombinant) injection 1 mg    insulin aspart pen 1-10 Units    lacosamide tablet 100 mg    magnesium sulfate 2g in water 50mL IVPB (premix)    meropenem-0.9% sodium  chloride 500 mg/50 mL IVPB    metoprolol tartrate (LOPRESSOR) split tablet 12.5 mg    ondansetron injection 4 mg    pantoprazole injection 40 mg    promethazine (PHENERGAN) 6.25 mg in dextrose 5 % 50 mL IVPB    sodium bicarbonate tablet 1,300 mg    vancomycin 750 mg in dextrose 5 % 250 mL IVPB (ready to mix system)    voriconazole tablet 200 mg       VITALS (Last 24H):  Temp:  [99.6 °F (37.6 °C)-101.2 °F (38.4 °C)]   Pulse:  []   Resp:  [19-26]   BP: ()/(40-78)   SpO2:  [98 %-100 %]     INTAKE & OUTPUT (Last 24H):    Intake/Output Summary (Last 24 hours) at 07/26/17 1018  Last data filed at 07/26/17 0900   Gross per 24 hour   Intake          2448.84 ml   Output             2445 ml   Net             3.84 ml       REVIEW OF SYSTEMS:        Constitutional: Negative for  chills, weight loss, malaise/fatigue and diaphoresis. +fevers    Unable to obtain remainder of ROS due to intubation.    PHYSICAL EXAM:    Constitutional: Appears well-developed, cachectic, arouses easily to voice.   HEENT: Normocephalic and atraumatic. No maxillary sinus tenderness. External auditory canals clear and TMs intact without lesions. Nasal and oral mucosal membranes moist. Normal dentition and gums.   Neck: Neck supple no mass.   Cardiovascular: Normal rate and regular rhythm.  S1 S2 appreciated by ascultation  Pulmonary/Chest: Intubated at time of interview. Course BS bilaterally. No respiratory distress.   Abdomen: Soft. Non-tender. Distended, PEG in RLQ. Bowel sounds are normal.   Neurological: Moves all extremities.  : Tinsley in place draining yellow urine  Skin: Warm and dry. No lesions.  Extremities: No clubbing cyanosis or edema.  PICC: Located in R upper arm - Clean, dry, intact, with no signs of infection    Laboratory Data:    Lab Results   Component Value Date    WBC 5.22 07/26/2017    HGB 8.5 (L) 07/26/2017    HCT 24.7 (L) 07/26/2017    MCV 92 07/26/2017    PLT 50 (L) 07/26/2017         Recent Labs  Lab  07/26/17  0244   *      K 3.3*      CO2 25   BUN 65*   CREATININE 3.2*   CALCIUM 7.3*   MG 1.9       Lab Results   Component Value Date    ALT 26 07/24/2017    AST 47 (H) 07/24/2017     (H) 04/19/2017    ALKPHOS 142 (H) 07/24/2017    BILITOT 0.8 07/24/2017       Lab Results   Component Value Date    IRON 57 07/05/2017    TIBC 253 07/05/2017    FERRITIN 2,026 (H) 07/05/2017     Lab Results   Component Value Date    ADIKTVGK76 419 07/05/2017     Lab Results   Component Value Date    FOLATE 3.9 (L) 07/05/2017     Lab Results   Component Value Date    TSH 0.941 07/20/2017     Lab Results   Component Value Date    APTT 38.0 (H) 07/20/2017       Radiology:  Reviewed and noted in plan where applicable. Please see chart for full radiology data.      ASSESSMENT/PLAN:   30 y/o female with HIV/AIDS admitted with fevers and pancytopenia.     1. Pancytopenia: Most likely due to sepsis. No suspicion for malignancy at this time.   -- No transfusions needed at this time. Will continue to monitor.  -- Will need to ensure patient was compliant with her folic acid at home given recent low folate level.     2. Fevers/sepsis: Negative blood cultures, but abd/pelvis CT showed possible LLL infiltrate. Pt may have aspirated as well. Another reason for fevers could be IRIS / immune reconstitution. CT shows possible pyelonephritis, but UA negative. Urine culture showed multiple organisms. Repeat urine culture negative.   -- Continue broad spectrum antibiotics with Vanc, Meropenem, Voriconazole.     3. Respiratory failure: Likely due to sepsis, possible pneumonia with or without aspiration as well as pulmonary edema.   -- Intubated in ICU.      4. Pulmonary edema: Possibly due to aggressive IV hydration. Last echo showed EF 55% without diastolic dysfunction.  -- Consider repeat Echo     5. Acute renal failure: Due to intravascular volume depletion, possible ATN from medications and possibly worsened with Lasix. Cr  improved to 3.2 today.   -- Nephrology following     6. HIV/AIDS: Compliant with HAART and prophylactic antibiotics for the past few months.   -- ID following.     Thank you for allowing us to participate in the care of this patent.      Virginia Olmedo M.D.  Hematology Oncology

## 2017-07-26 NOTE — ASSESSMENT & PLAN NOTE
Platelets rebounding somewhat; up to 103K from 89    7/25 counts down slightlty    7/26: remains down; continue to monitor; no active bleeding noted

## 2017-07-26 NOTE — ASSESSMENT & PLAN NOTE
Will  follow   urine for histoplasma antigen and blood AFB cultures .    Will use po voriconazole for suspected histoplasmosis   PO biaxim/ethambutol for suspected MAC .    Eye exam was neg for CMV   Will follow indium scan results   Continue empiric vanco and add Avelox

## 2017-07-26 NOTE — PROGRESS NOTES
Notified NP Yousuf about patient BP being 74/35.  She stated to get patient up into the chair and see if it would come up.  Placed patient into the chair.  No change in BP.  She stated that we will restart levophed. Will administer and continue to monitor

## 2017-07-26 NOTE — PLAN OF CARE
Problem: Patient Care Overview  Goal: Plan of Care Review  Outcome: Ongoing (interventions implemented as appropriate)  Patient extubated on shift.  No signs of Respiratory distress.  Patient now on 2L nasal canula Sating 100%.  BP still low.  Levo drip restarted.  Fentanyl drip d/misael. Tube feedings continued.  Patient is able to answer appropriately and follow commands.  She has been very lethargic but sitting up in chair since extubation. No complaints of chest pain.  No signs of nausea or vomiting.  IV abx administered on shift.  Potassium and magnesium replaced.  Adequate urine output noted.  Family updated on plan of care.

## 2017-07-26 NOTE — ASSESSMENT & PLAN NOTE
Tachycardia and hypotensive (at her baseline); continues on pressor support with levophed;  BP 99/38  Serial lactic acids; continue to monitor labs  IVF's with bicarbonate presently at 40cc/hr  Continue metoprolol for heart rate; continues tachy at 127    7/25 HR now ~105   BP stable ;  coming down off levophed at present  7/26: No change; rate has improved low 100's now

## 2017-07-26 NOTE — PROGRESS NOTES
[facility] -   Nephrology  Progress Note    Patient Name: Wang Kebede  MRN: [id number]  Admission Date: 7/20/2017  Hospital Length of Stay: 5 days  Attending Provider: Marcus Robertson MD   Primary Care Physician: Levi Moncada MD  Principal Problem:Acute respiratory failure    Subjective:     HPI: Wang Kebede is a 31 y.o.  AA woman  with history of HIV/AIDS, vulvar cancer, anemia, sickle cell disease, cervical cancer, chronic abdominal pain was admitted to [facility] about 2 days ago for fever , she was started on broad spectrum abx , her serum bicarb dropped from 19 on admission to 7 today , lactate level normal, serum Cr was normal about 2 weeks ago, creatinine increased to 3 today, we were consulted for LELAND , Metabolic acidosis,         Interval History: No acute events , clinically better     Review of patient's allergies indicates:   Allergen Reactions    Iodine and iodide containing products Anaphylaxis     Pt states she coded last time she was administered contrast    Pneumococcal 23-chitra ps vaccine Anaphylaxis     Potential iodine containing    Bactrim [sulfamethoxazole-trimethoprim] Hives    Morphine Itching     Current Facility-Administered Medications   Medication Frequency    acetaminophen tablet 650 mg Q6H PRN    atovaquone suspension 750 mg Q12H    clarithromycin tablet 500 mg Q12H    darunavir-cobicistat 800-150 mg-mg Tab 800 mg QHS    dextrose 50% injection 12.5 g PRN    diphenhydrAMINE injection 12.5 mg Q6H PRN    docusate 50 mg/5 mL liquid 100 mg Daily    dolutegravir Tab 50 mg Daily    emtricitabine capsule 200 mg Q72H    ethambutol tablet 800 mg Daily    folic acid tablet 1 mg Daily    glucagon (human recombinant) injection 1 mg PRN    insulin aspart pen 1-10 Units Q4H PRN    lacosamide tablet 100 mg BID    meropenem-0.9% sodium chloride 500 mg/50 mL IVPB Q12H    metoprolol tartrate (LOPRESSOR) split tablet 12.5 mg BID    ondansetron  injection 4 mg Q12H PRN    pantoprazole injection 40 mg Daily    promethazine (PHENERGAN) 6.25 mg in dextrose 5 % 50 mL IVPB Q6H PRN    sodium bicarbonate tablet 1,300 mg TID    vancomycin 750 mg in dextrose 5 % 250 mL IVPB (ready to mix system) Q48H    voriconazole tablet 200 mg BID       Objective:     Vital Signs (Most Recent):  Temp: 98.9 °F (37.2 °C) (07/26/17 1102)  Pulse: 109 (07/26/17 1045)  Resp: (!) 23 (07/26/17 1045)  BP: (!) 82/42 (07/26/17 1045)  SpO2: 100 % (07/26/17 1045)  O2 Device (Oxygen Therapy): nasal cannula (07/26/17 1045) Vital Signs (24h Range):  Temp:  [98.9 °F (37.2 °C)-101.2 °F (38.4 °C)] 98.9 °F (37.2 °C)  Pulse:  [] 109  Resp:  [19-26] 23  SpO2:  [98 %-100 %] 100 %  BP: ()/(41-78) 82/42     Weight: 53.6 kg (118 lb 2.7 oz) (07/26/17 0507)  Body mass index is 23.08 kg/m².  Body surface area is 1.51 meters squared.    I/O last 3 completed shifts:  In: 3929.6 [I.V.:3019.6; NG/GT:760; IV Piggyback:150]  Out: 4835 [Urine:4535; Drains:300]    Physical Exam   Constitutional: She appears well-developed and well-nourished. No distress.   HENT:   Head: Normocephalic and atraumatic.   Mouth/Throat: Oropharynx is clear and moist. No oropharyngeal exudate.   ett in place    Eyes: Conjunctivae and EOM are normal.   Neck: Normal range of motion. Neck supple. No JVD present. Carotid bruit is not present. No tracheal deviation present. No thyroid mass and no thyromegaly present.   Cardiovascular: Normal rate, regular rhythm, normal heart sounds and intact distal pulses.  Exam reveals no gallop and no friction rub.    No murmur heard.  Pulmonary/Chest: No respiratory distress. She has no wheezes. She has rales. She exhibits no tenderness.   Abdominal: Soft. Bowel sounds are normal. She exhibits no distension, no abdominal bruit, no ascites and no mass. There is no hepatosplenomegaly. There is no tenderness. There is no rebound, no guarding and no CVA tenderness.   Musculoskeletal: Normal  range of motion. She exhibits no edema or tenderness.   Lymphadenopathy:     She has no cervical adenopathy.   Neurological: She displays normal reflexes. No cranial nerve deficit. She exhibits normal muscle tone. Coordination normal.   Skin: Skin is warm and intact. No rash noted. No erythema. No pallor.   Psychiatric: Judgment normal.       Significant Labs:  CBC:   Recent Labs  Lab 07/26/17 0244   WBC 5.22   RBC 2.68*   HGB 8.5*   HCT 24.7*   PLT 50*   MCV 92   MCH 31.7*   MCHC 34.4     CMP:   Recent Labs  Lab 07/24/17  0240 07/26/17  0244   *  < > 120*   CALCIUM 6.7*  < > 7.3*   ALBUMIN 1.5*  --   --    PROT 7.2  --   --    *  < > 136   K 3.5  < > 3.3*   CO2 14*  < > 25     < > 104   BUN 65*  < > 65*   CREATININE 3.9*  < > 3.2*   ALKPHOS 142*  --   --    ALT 26  --   --    AST 47*  --   --    BILITOT 0.8  --   --    < > = values in this interval not displayed.  LFTs:   Recent Labs  Lab 07/24/17  0240   ALT 26   AST 47*   ALKPHOS 142*   BILITOT 0.8   PROT 7.2   ALBUMIN 1.5*     All labs within the past 24 hours have been reviewed.     Significant Imaging: reviewed     Assessment/Plan:     Acute renal failure    1. LELAND : multifactorial, BP dropped in to 70's couple of days ago , on broad spectrum abx , non-oliguric, no indication for RRT  at this time, will follow,     2. Metabolic acidosis :  Improved     3. HTN : BP stable,     4. Anemia : stable Hb     5. HIV/AIDs : on HAART    6. Corrected calcium normal     6. Neutropenic fever : on broad spectrum antibiotics     7.  Replete K ,     8. Respiratory failure : on Vent support                  I will follow-up with patient. Please contact us if you have any additional questions.     Total time spent 40 minutes including time needed to review the records,  patient  evaluation, documentation, face-to-face discussion with the patient,  primary team, CCM , more than 50% of the time was spent on coordination of care and counseling.       Marlon BURTON  MD Loc  Nephrology  [facility]

## 2017-07-26 NOTE — ASSESSMENT & PLAN NOTE
Continue on the vent   Setting A/C 24; ; peep 0f 5; FIO2 40%  RR 32   sats at 100%  Coarse breath sounds    7/26: continue antibiotics; monitor cxr

## 2017-07-26 NOTE — ASSESSMENT & PLAN NOTE
7/25 remains on vent; not over breathing the vent; sats are at 100% on present settings    7/26:  Reducing level of sedation at present;  Will have her assessed for weaning;; can dial down FIO2; continue as per pulmonologist.

## 2017-07-26 NOTE — PROGRESS NOTES
[facility]  Infectious Disease  Progress Note    Patient Name: Wang Kebede  MRN: [id number]  Admission Date: 7/20/2017  Length of Stay: 5 days  Attending Physician: Marcus Robertson MD  Primary Care Provider: Levi Moncada MD    Isolation Status: No active isolations  Assessment/Plan:      Acute renal failure    Serum creatinine is on a downward trend , will closely monitor .  Level is 3.2 today         Fever    Will  follow   urine for histoplasma antigen and blood AFB cultures .    Will use po voriconazole for suspected histoplasmosis   PO biaxim/ethambutol for suspected MAC .    Eye exam was neg for CMV   Will follow indium scan results   Continue empiric vanco and add Avelox        HIV (human immunodeficiency virus infection)    Continue Darunavir -cobicistat and tivicay    Will continue Mepron for PCP prophylaxis.        * Acute respiratory failure    Ventilatory support  Will wean off ventilator as tolerated.  Imaging tests showed continued improvement            Anticipated Disposition:     Thank you for your consult. I will follow-up with patient. Please contact us if you have any additional questions.    Hubert Mayo MD  Infectious Disease  [facility]    Subjective:     Principal Problem:Acute respiratory failure    HPI:   31 year old woman with AIDs well known to me . She has long standing history of poor compliance with medications .However during he rlast admission, after much discussion, she agreed to get a PEG tube inserted and to get her HAART therapy through the PEG tube. She had recent lab work done on 07/10 and cd4-245 ,cd%-10.6 ,  Previous lab data-a month ago-cd4 -4, cd4% 2.4  HIV viral load -1271 -decreased from 180,773 a month ago .  She now presented with fever -T max 103 . She denies any history of cough or headache. No abdominal pain .  Since admission, cultures are still pending . Chest x-ray did not show any acute abnormality. UA is normal  .    Interval History: 31 year old woman with AIDS,metabolic acidosis , respiratory  Failure, .  She has persistent fever.She is scheduled for indium scan today .  Review of Systems   Unable to perform ROS: Intubated     Objective:     Vital Signs (Most Recent):  Temp: 98.9 °F (37.2 °C) (07/26/17 1102)  Pulse: 98 (07/26/17 1415)  Resp: (!) 24 (07/26/17 1415)  BP: (!) 95/47 (07/26/17 1415)  SpO2: 100 % (07/26/17 1415) Vital Signs (24h Range):  Temp:  [98.9 °F (37.2 °C)-101.2 °F (38.4 °C)] 98.9 °F (37.2 °C)  Pulse:  [] 98  Resp:  [18-27] 24  SpO2:  [98 %-100 %] 100 %  BP: ()/(35-78) 95/47     Weight: 53.6 kg (118 lb 2.7 oz)  Body mass index is 23.08 kg/m².    Estimated Creatinine Clearance: 18.1 mL/min (based on Cr of 3.2).    Physical Exam   Constitutional:   Intubated    HENT:   Head: Normocephalic and atraumatic.   Eyes: Pupils are equal, round, and reactive to light.   Neck: Neck supple.   Cardiovascular: Normal rate, regular rhythm and normal heart sounds.    Pulmonary/Chest:   Harsh breath sounds bilaterally    Abdominal: Soft. Bowel sounds are normal.   PEG tube insitu   Neurological:   Intubated/sedated   Skin: Skin is warm and dry.   Psychiatric:   Unable to obtain as she is intubated    Nursing note and vitals reviewed.      Significant Labs:   Blood Culture:   Recent Labs  Lab 05/21/17  1445 07/20/17  2000 07/20/17  2027 07/23/17  1600 07/23/17  2238   LABBLOO No growth after 5 days. No growth after 5 days. No growth after 5 days. No Growth to date  No Growth to date  No Growth to date No Growth to date  No Growth to date  No Growth to date     BMP:   Recent Labs  Lab 07/26/17  0244   *      K 3.3*      CO2 25   BUN 65*   CREATININE 3.2*   CALCIUM 7.3*   MG 1.9     CBC:   Recent Labs  Lab 07/25/17  0300 07/25/17  0414 07/26/17  0244   WBC 4.16  --  5.22   HGB 8.7*  --  8.5*   HCT 24.6* 24* 24.7*   PLT 77*  --  50*     All pertinent labs within the past 24 hours have been  reviewed.    Significant Imaging: I have reviewed all pertinent imaging results/findings within the past 24 hours.

## 2017-07-26 NOTE — ASSESSMENT & PLAN NOTE
Ventilatory support  Will wean off ventilator as tolerated.  Chest x-ray showed interval improvement

## 2017-07-26 NOTE — ASSESSMENT & PLAN NOTE
- ID following; continued ID presence appreciated; adjutsting antibiotic regimen  - Per ID has AIDS  - Last CD4 count on 07/10/17 -- 245  - Neutropenic on AM labs -- Hematology/Oncology consulted    7/25 Continue as per ID consultant  Continue to monitor labs    7/26: WBC count rebounding; continues with temp spikes; cultures remain negative. Viral serologies awaited;  For tagged [email or web address] study today

## 2017-07-26 NOTE — PROGRESS NOTES
[facility]  Infectious Disease  Progress Note    Patient Name: Wang Kebede  MRN: [id number]  Admission Date: 7/20/2017  Length of Stay: 4 days  Attending Physician: Marcus Robertson MD  Primary Care Provider: Levi Moncada MD    Isolation Status: No active isolations  Assessment/Plan:      Pneumonia due to infectious organism    Will continue meropenem and vanco but will switch to zosyn in am          Acute renal failure    Nephrology follow up .Closely monitor serum creatinine.  Serum creatinine is 3.9 today .        Fever    Will  follow   urine for histoplasma antigen and blood AFB cultures .    Will use po voriconazole for suspected histoplasmosis   PO biaxim/ethambutol for suspected MAC .    Eye exam was neg for CMV   Will follow indium scan  Continue empiric vanco and will switch to zosyn in am         Anemia    Will monitor closely and transfuse as needed .            HIV (human immunodeficiency virus infection)    She has AIDS but her immune system is recovering since she now started to be compliant with HAART .  She is on darunavir -cobicistat and tivicay    Will continue Mepron for PCP prophylaxis.        * Acute respiratory failure    Ventilatory support  Will wean off ventilator as tolerated.  Chest x-ray showed interval improvement            Anticipated Disposition:     Thank you for your consult. I will follow-up with patient. Please contact us if you have any additional questions.    Hubert Mayo MD  Infectious Disease  [facility]    Subjective:     Principal Problem:Acute respiratory failure    HPI:   31 year old woman with AIDs well known to me . She has long standing history of poor compliance with medications .However during he rlast admission, after much discussion, she agreed to get a PEG tube inserted and to get her HAART therapy through the PEG tube. She had recent lab work done on 07/10 and cd4-245 ,cd%-10.6 ,  Previous lab data-a month ago-cd4  -4, cd4% 2.4  HIV viral load -1271 -decreased from 180,773 a month ago .  She now presented with fever -T max 103 . She denies any history of cough or headache. No abdominal pain .  Since admission, cultures are still pending . Chest x-ray did not show any acute abnormality. UA is normal .    Interval History:  31 year old woman with history of AIDs, admitted with fever . Hospital course   Complicated by worsening resp failure and acidosis .  She is now intubated .  She continues to have intermittent fevers ,  Review of Systems   Unable to perform ROS: Intubated     Objective:     Vital Signs (Most Recent):  Temp: (!) 100.5 °F (38.1 °C) (07/25/17 1930)  Pulse: 101 (07/25/17 2300)  Resp: (!) 23 (07/25/17 2300)  BP: (!) 100/58 (07/25/17 2300)  SpO2: 98 % (07/25/17 2300) Vital Signs (24h Range):  Temp:  [99.6 °F (37.6 °C)-102 °F (38.9 °C)] 100.5 °F (38.1 °C)  Pulse:  [] 101  Resp:  [23-26] 23  SpO2:  [98 %-100 %] 98 %  BP: ()/(32-78) 100/58     Weight: 53.5 kg (117 lb 15.1 oz)  Body mass index is 23.03 kg/m².    Estimated Creatinine Clearance: 14.9 mL/min (based on Cr of 3.9).    Physical Exam   Constitutional:   Intubated    HENT:   Head: Normocephalic and atraumatic.   Eyes: Pupils are equal, round, and reactive to light.   Neck: Neck supple.   Cardiovascular: Normal rate, regular rhythm and normal heart sounds.    Pulmonary/Chest:   Harsh breath sounds bilaterally    Abdominal: Soft. Bowel sounds are normal.   PEG tube insitu   Neurological:   Intubated/sedated   Skin: Skin is warm and dry.   Psychiatric:   Unable to obtain as she is intubated    Nursing note and vitals reviewed.      Significant Labs:   Blood Culture:   Recent Labs  Lab 05/21/17  1445 07/20/17 2000 07/20/17 2027 07/23/17  1600 07/23/17  2238   LABBLOO No growth after 5 days. No Growth to date  No Growth to date  No Growth to date  No Growth to date  No Growth to date No Growth to date  No Growth to date  No Growth to date  No  Growth to date  No Growth to date No Growth to date  No Growth to date  No Growth to date No Growth to date  No Growth to date     BMP:   Recent Labs  Lab 07/25/17  0300     110   *  131*   K 3.4*  3.4*     104   CO2 18*  18*   BUN 68*  68*   CREATININE 3.9*  3.9*   CALCIUM 7.2*  7.2*   MG 2.1     CBC:   Recent Labs  Lab 07/24/17  0240 07/24/17  1442 07/25/17  0300 07/25/17  0414   WBC 1.59*  --  4.16  --    HGB 7.9*  --  8.7*  --    HCT 22.6* 23* 24.6* 24*   *  --  77*  --        Significant Imaging: I have reviewed all pertinent imaging results/findings within the past 24 hours.

## 2017-07-26 NOTE — ASSESSMENT & PLAN NOTE
- S/p 1 unit of PRBCs, responded appropriately  - Supplemental oxygen prn, keep O2 sats > 92%  - Pt sees Dr. Olmdeo (hematology/oncology), last clinic visit 07/05/17  - Per Hematology/Oncology on that clinic visit -- she has normocytic anemia and possibly due to HIV/AIDS (hx of noncompliance but now compliant). Pt reports hx of sickle cell disease but hemoglobin electrophoresis was normal on 2 separate occassions. If her anemia does not improve after 2 months of continued HAART therapy, refer back to Hematology  - Daily CBC   Continue to monitor labs; transfuse as per heme-onc    7/25 Hgb remaining brenna post transfusion    7/26:  Hgb remaining stable; continue to monitor labs.

## 2017-07-26 NOTE — PROGRESS NOTES
Clinical Pharmacy Progress Note: Vancomycin Dosing     Vancomycin Day #6      Estimated Creatinine Clearance: 18.1 mL/min (based on Cr of 3.2).   WBC: 5.22       SCr: decrease x 1 day     Tmax/24h: 101.2 (!)       Level:   Vancomycin, random [[id number]] Collected: 07/26/17 0244   Specimen: Blood from Blood Updated: 07/26/17 0348    Vancomycin, Random 13.9 ug/mL      Goal trough: 15-20 mcg/mL   Dx: sepsis/ fever in HIV patient     Cultures:   Blood on 7/23- NGTD   Stool: C diff -   Urine: NGTD x 1 day       Plan: Pharmacy will continue current vancomycin pulse dosing until patient's renal function stabilizes. Patient is no longer receiving CRRT and so will be pulse dosed per protocol. Patient received 750 mg IV dose 07/26/2017. Random vancomycin level due 07/27/17 @ 0430.      Next level due: Random vancomycin level due 07/27/17 @ 0430.      Thank you for allowing us to participate in this patient's care.      Letha Thomas, PharmD 7/26/2017 10:43 AM

## 2017-07-26 NOTE — ASSESSMENT & PLAN NOTE
Will  follow   urine for histoplasma antigen and blood AFB cultures .    Will use po voriconazole for suspected histoplasmosis   PO biaxim/ethambutol for suspected MAC .    Eye exam was neg for CMV   Will follow indium scan  Continue empiric vanco and will switch to zosyn in am

## 2017-07-26 NOTE — PROGRESS NOTES
07/26/17 1115   Handoff Report   Given To VAUGHN Castellanos   Pain/Comfort Assessments   Pain Assessment Performed Yes       Number Scale   Presence of Pain non-verbal indicators absent   Skin   Skin WDL ex   Skin Color/Characteristics redness nonblanchable   Skin Temperature warm   Skin Moisture dry   Skin Elasticity quick return to original state   Giuliano Risk Assessment   Sensory Perception 2-->very limited   Moisture 3-->occasionally moist   Activity 2-->chairfast   Mobility 2-->very limited   Nutrition 3-->adequate   Friction and Shear 2-->potential problem   Giuliano Score 14       Pressure Ulcer 07/24/17 0702 Right lower heel suspected deep tissue injury   Date First Assessed/Time First Assessed: 07/24/17 0702   Side: Right  Orientation: lower  Location: heel  Staging: suspected deep tissue injury   Wound Image    Staging suspected deep tissue injury   Healing Pressure Ulcer no   Pressure Ulcer Risk Factors activity;mobility;nutrition;shear/friction;moisture   Dressing Appearance no dressing   Drainage Amount none   Appearance purple   Periwound Area normal skin tone   Wound Edges intact   Wound Length (cm) 2   Wound Width (cm) 1   Interventions barrier applied       Pressure Ulcer 07/24/17 0702 Left lower heel suspected deep tissue injury   Date First Assessed/Time First Assessed: 07/24/17 0702   Side: Left  Orientation: lower  Location: heel  Staging: suspected deep tissue injury   Wound Image    Staging suspected deep tissue injury   Healing Pressure Ulcer no   Pressure Ulcer Risk Factors activity;mobility;nutrition;shear/friction;moisture   Dressing Appearance no dressing   Drainage Amount none   Appearance purple;maroon   Periwound Area normal skin tone   Wound Edges intact   Wound Length (cm) 3   Wound Width (cm) 2   Interventions barrier applied   Skin Interventions   Device Skin Pressure Protection absorbent pad utilized/changed;adhesive use limited;pressure points protected;positioning supports utilized  "  Pressure Reduction Devices foam padding utilized;positioning supports utilized;pressure-redistributing mattress utilized   Pressure Reduction Techniques frequent weight shift encouraged;heels elevated off bed;positioned off wounds;pressure points protected;weight shift assistance provided   Skin Protection incontinence pads utilized;adhesive use limited     Wound Care Screen completed due to documented Giuliano Scale score <=14.  Skin assessment completed at this time. Bilateral heels with sDTI noted. Left heel maroon and purple discoloration measuring 3x2 cm. Painted with cavilon and floated off mattress with pillow. Right heel purple discoloration measuring 2x1 cm. Painted with cavilon and floated off mattress with pillow.   Spoke with primary nurse TAMMIE Aguayo and discussed importance of documenting the indicated / performed skin interventions in EPIC flow sheet (ie: turning q 2 hours with actual position documented, heels floated, skin moisturizer utilized, all incontinence care, mattress surface, incontinence pad utilized, moisture barriers utilized, preventive dressings, etc.). Nurse verbalizes understanding.  Wound care recommends the following for Pressure Injury Prevention:  Skin Care Precautions / Pressure Injury Prevention:  1. Follow "Guidelines for Prevention of Pressure Ulcers in at Risk Patients"  2. Document wound assessment in [facility] using guidelines in Bobby's "Assessment : Wound" procedure  3. Limit the amount of linen/underpad between patient and mattress surface to ONE fitted sheet and ONE covidien underpad - NO draw sheet/briefs.  4. Obtain Easi Cleans Foam Wipes for providing iveth care - avoid the use of wash cloths to areas affected by IAD.  5. Apply Clear Barrier Ointment to perineal / perirectal areas in a thin even layer to clean dry skin BID and after each episode of pericare  6. Apply sween 24 moisturizer cream to all dry skin after daily bath and prn  7. Obtain foam wedge from materials " management to assist with maintaining proper position changes at least q 2hours and document actual position in EPIC q 2hours  8. Please elevate heels off mattress and document in EPIC Flow Sheets every 2 hours.  9. Do NOT elevate HOB greater than 30 degrees unless contraindicated.    Bilateral Heel sDTI:  1. Paint every shift with cavilon  2. Strict pressure offloading

## 2017-07-26 NOTE — PLAN OF CARE
Problem: Patient Care Overview  Goal: Plan of Care Review  Outcome: Ongoing (interventions implemented as appropriate)  POC and interventions discussed with patient and mother, mom VU.  Intubated and sedated.  RR and work of breathing decreased, HR decreased.  Febrile - responded to tylenol.  Tolerating tube feeds.  Good urine output.  Remains on levophed, able to wean slightly.  Denies pain.  Sedated adequately with fentanyl

## 2017-07-26 NOTE — PROGRESS NOTES
Progress Note  Critical Care    Admit Date: 7/20/2017   LOS: 5 days     Follow-up For: Acute Respiratory failure    Interval History: remains intubated with full mechanical vent support; ; tolerating SAT   acidosis resolved overnight on enteral bicarb and sodium acetate infusion   Remains on pressor support to keep MAP > 60   t max 101.2     SUBJECTIVE:   ROS:  Unable s/t OETT on vent    Continuous Infusions:     Review of patient's allergies indicates:   Allergen Reactions    Iodine and iodide containing products Anaphylaxis     Pt states she coded last time she was administered contrast    Pneumococcal 23-chitra ps vaccine Anaphylaxis     Potential iodine containing    Bactrim [sulfamethoxazole-trimethoprim] Hives    Morphine Itching       OBJECTIVE:     Vital Signs (Most Recent)  Temp: 98.9 °F (37.2 °C) (07/26/17 1102)  Pulse: 109 (07/26/17 1045)  Resp: (!) 23 (07/26/17 1045)  BP: (!) 82/42 (07/26/17 1045)  SpO2: 100 % (07/26/17 1045)    Vital Signs Range (Last 24H):  Temp:  [98.9 °F (37.2 °C)-101.2 °F (38.4 °C)]   Pulse:  []   Resp:  [19-26]   BP: ()/(41-78)   SpO2:  [98 %-100 %]     I & O (Last 24H):    Intake/Output Summary (Last 24 hours) at 07/26/17 1109  Last data filed at 07/26/17 1100   Gross per 24 hour   Intake          2391.84 ml   Output             2450 ml   Net           -58.16 ml       Ventilator Data (Last 24H):     Vent Mode: Spont  Oxygen Concentration (%):  [40] 40  Resp Rate Total:  [18 br/min-27 br/min] 21 br/min  Vt Set:  [350 mL] 350 mL  PEEP/CPAP:  [5 cmH20] 5 cmH20  Pressure Support:  [0 cmH20-10 cmH20] 10 cmH20  Mean Airway Pressure:  [8.4 jyE31-07 cmH20] 10 cmH20       Physical Exam   Constitutional: She is easily aroused. She appears ill. She is intubated and restrained.   Follows simple commands appropriately   HENT:   Head: Normocephalic.   Eyes: Conjunctivae are normal. Pupils are equal, round, and reactive to light.   Neck: No JVD present. No tracheal deviation  present.   Cardiovascular: Regular rhythm.   No extrasystoles are present. Tachycardia present.    No murmur heard.  Pulses:       Radial pulses are 2+ on the right side, and 2+ on the left side.        Dorsalis pedis pulses are 2+ on the right side, and 2+ on the left side.   Pulmonary/Chest: She is intubated. She has decreased breath sounds. She has no wheezes. She has rales in the right lower field and the left lower field.   Abdominal: Soft. Bowel sounds are normal. She exhibits distension (mild). There is no tenderness.       Musculoskeletal: She exhibits no edema.   Neurological: She is easily aroused. GCS eye subscore is 4. GCS verbal subscore is 1. GCS motor subscore is 6.   Skin: Skin is warm, dry and intact. Capillary refill takes less than 2 seconds. She is not diaphoretic.         Laboratory (Last 24H):  CBC:    Recent Labs  Lab 07/26/17  0244   WBC 5.22   HGB 8.5*   HCT 24.7*   PLT 50*     CMP:    Recent Labs  Lab 07/26/17  0244   CALCIUM 7.3*      K 3.3*   CO2 25      BUN 65*   CREATININE 3.2*       Labs within the past 24 hours have been reviewed.  ABG    Recent Labs  Lab 07/26/17  0435   PH 7.414   PO2 213*   PCO2 37.8   HCO3 24.2   BE 0         Chest X-Ray:      Film and report reviewed    ASSESSMENT/PLAN:     Problem   Acute Respiratory Failure   Severe Sepsis   Acute Renal Failure   HIV (Human Immunodeficiency Virus Infection)   Thrombocytopenia Associated With Aids       1. Neuro: monitor  2. Pulmonary: extubate; supplemental oxygen to keep sat > 92; mobilize and encourage TCDB and IS  3. Cardiac: wean pressor to keep MAP 60 or greater  4. Renal: accurate I/O; monitor creatinine; nephrology following  5. Infectious Disease: ID following, continue HAART meds; continue broad spectrum antimicrobials; await tagged wbc scan  6. Hematology/Oncology: monitor h/h/platelets  7. Endocrine: glucose monitoring with SSI prn for control and prevention of insulin toxicity  8.  Fluids/Electrolytes/Nutrition/GI: stop acetate infusion; continue enteral nutrition; replace potassium and mag  9. Musculoskeletal: OOB to chair  10. Pain Management: stop fentanyl for extubation  11. Discharge and Palliative Care: anticipate return home to self care on discharge    Preventive Measures:   Nutrition: Goal: Tolerate oral diet and meet caloric needs  Stress Ulcer: PPI  DVT: SCDs; holding pharmacologic anticoagulation s/t thrombocytopenia  BB: metoprolol  Bowel Prophylaxis: docusate  Head of Bed/Reposition: Elevate HOB and turn Q1-2 hours    Physical Therapy: consult once stable.     Sedation Interruption: Q AM   Vent Day: 4  OG Day: 4  PEG present on admission  PICC Line Day: 4  Tinsley Day: 4  IVAB Day: 7  Code Status: full    Counseling/Consultation: I have discussed the care of this patient in detail with nursing staff and Dr. Childress. Status and plan of care discussed with team on multidisciplinary rounds.     Critical Care Time 65 minutes minutes  Critical secondary to severe sepsis, respiratory failure, hypotension requiring pressor support  Critical care was time spent personally by me on the following activities: development of treatment plan with patient or surrogate and bedside caregivers, discussions with consultants, evaluation of patient's response to treatment, examination of patient, ordering and performing treatments and interventions, ordering and review of laboratory studies, ordering and review of radiographic studies, pulse oximetry, re-evaluation of patient's condition.  This critical care time did not overlap with that of any other provider.    Kylie To [facility]  [facility]

## 2017-07-26 NOTE — SUBJECTIVE & OBJECTIVE
Interval History: No acute events , clinically better     Review of patient's allergies indicates:   Allergen Reactions    Iodine and iodide containing products Anaphylaxis     Pt states she coded last time she was administered contrast    Pneumococcal 23-chitra ps vaccine Anaphylaxis     Potential iodine containing    Bactrim [sulfamethoxazole-trimethoprim] Hives    Morphine Itching     Current Facility-Administered Medications   Medication Frequency    acetaminophen tablet 650 mg Q6H PRN    atovaquone suspension 750 mg Q12H    clarithromycin tablet 500 mg Q12H    darunavir-cobicistat 800-150 mg-mg Tab 800 mg QHS    dextrose 50% injection 12.5 g PRN    diphenhydrAMINE injection 12.5 mg Q6H PRN    docusate 50 mg/5 mL liquid 100 mg Daily    dolutegravir Tab 50 mg Daily    emtricitabine capsule 200 mg Q72H    ethambutol tablet 800 mg Daily    folic acid tablet 1 mg Daily    glucagon (human recombinant) injection 1 mg PRN    insulin aspart pen 1-10 Units Q4H PRN    lacosamide tablet 100 mg BID    meropenem-0.9% sodium chloride 500 mg/50 mL IVPB Q12H    metoprolol tartrate (LOPRESSOR) split tablet 12.5 mg BID    ondansetron injection 4 mg Q12H PRN    pantoprazole injection 40 mg Daily    promethazine (PHENERGAN) 6.25 mg in dextrose 5 % 50 mL IVPB Q6H PRN    sodium bicarbonate tablet 1,300 mg TID    vancomycin 750 mg in dextrose 5 % 250 mL IVPB (ready to mix system) Q48H    voriconazole tablet 200 mg BID       Objective:     Vital Signs (Most Recent):  Temp: 98.9 °F (37.2 °C) (07/26/17 1102)  Pulse: 109 (07/26/17 1045)  Resp: (!) 23 (07/26/17 1045)  BP: (!) 82/42 (07/26/17 1045)  SpO2: 100 % (07/26/17 1045)  O2 Device (Oxygen Therapy): nasal cannula (07/26/17 1045) Vital Signs (24h Range):  Temp:  [98.9 °F (37.2 °C)-101.2 °F (38.4 °C)] 98.9 °F (37.2 °C)  Pulse:  [] 109  Resp:  [19-26] 23  SpO2:  [98 %-100 %] 100 %  BP: ()/(41-78) 82/42     Weight: 53.6 kg (118 lb 2.7 oz) (07/26/17  0507)  Body mass index is 23.08 kg/m².  Body surface area is 1.51 meters squared.    I/O last 3 completed shifts:  In: 3929.6 [I.V.:3019.6; NG/GT:760; IV Piggyback:150]  Out: 4835 [Urine:4535; Drains:300]    Physical Exam   Constitutional: She appears well-developed and well-nourished. No distress.   HENT:   Head: Normocephalic and atraumatic.   Mouth/Throat: Oropharynx is clear and moist. No oropharyngeal exudate.   ett in place    Eyes: Conjunctivae and EOM are normal.   Neck: Normal range of motion. Neck supple. No JVD present. Carotid bruit is not present. No tracheal deviation present. No thyroid mass and no thyromegaly present.   Cardiovascular: Normal rate, regular rhythm, normal heart sounds and intact distal pulses.  Exam reveals no gallop and no friction rub.    No murmur heard.  Pulmonary/Chest: No respiratory distress. She has no wheezes. She has rales. She exhibits no tenderness.   Abdominal: Soft. Bowel sounds are normal. She exhibits no distension, no abdominal bruit, no ascites and no mass. There is no hepatosplenomegaly. There is no tenderness. There is no rebound, no guarding and no CVA tenderness.   Musculoskeletal: Normal range of motion. She exhibits no edema or tenderness.   Lymphadenopathy:     She has no cervical adenopathy.   Neurological: She displays normal reflexes. No cranial nerve deficit. She exhibits normal muscle tone. Coordination normal.   Skin: Skin is warm and intact. No rash noted. No erythema. No pallor.   Psychiatric: Judgment normal.       Significant Labs:  CBC:   Recent Labs  Lab 07/26/17 0244   WBC 5.22   RBC 2.68*   HGB 8.5*   HCT 24.7*   PLT 50*   MCV 92   MCH 31.7*   MCHC 34.4     CMP:   Recent Labs  Lab 07/24/17  0240 07/26/17 0244   *  < > 120*   CALCIUM 6.7*  < > 7.3*   ALBUMIN 1.5*  --   --    PROT 7.2  --   --    *  < > 136   K 3.5  < > 3.3*   CO2 14*  < > 25     < > 104   BUN 65*  < > 65*   CREATININE 3.9*  < > 3.2*   ALKPHOS 142*  --   --     ALT 26  --   --    AST 47*  --   --    BILITOT 0.8  --   --    < > = values in this interval not displayed.  LFTs:   Recent Labs  Lab 07/24/17  0240   ALT 26   AST 47*   ALKPHOS 142*   BILITOT 0.8   PROT 7.2   ALBUMIN 1.5*     All labs within the past 24 hours have been reviewed.     Significant Imaging: reviewed

## 2017-07-26 NOTE — ASSESSMENT & PLAN NOTE
She has AIDS but her immune system is recovering since she now started to be compliant with HAART .  She is on darunavir -cobicistat and tivicay    Will continue Mepron for PCP prophylaxis.

## 2017-07-27 PROBLEM — R06.03 RESPIRATORY DISTRESS: Status: RESOLVED | Noted: 2017-07-23 | Resolved: 2017-07-27

## 2017-07-27 PROBLEM — R65.21 SEPTIC SHOCK: Status: ACTIVE | Noted: 2017-04-19

## 2017-07-27 PROBLEM — J96.02 ACUTE HYPERCAPNIC RESPIRATORY FAILURE: Status: RESOLVED | Noted: 2017-07-23 | Resolved: 2017-07-27

## 2017-07-27 PROBLEM — J96.00 ACUTE RESPIRATORY FAILURE: Status: RESOLVED | Noted: 2017-07-23 | Resolved: 2017-07-27

## 2017-07-27 PROBLEM — J18.9 PNEUMONIA DUE TO INFECTIOUS ORGANISM: Status: RESOLVED | Noted: 2017-07-23 | Resolved: 2017-07-27

## 2017-07-27 LAB
ABO + RH BLD: NORMAL
ANION GAP SERPL CALC-SCNC: 6 MMOL/L
BASOPHILS # BLD AUTO: 0.06 K/UL
BASOPHILS NFR BLD: 1 %
BLD GP AB SCN CELLS X3 SERPL QL: NORMAL
BLD PROD TYP BPU: NORMAL
BLD PROD TYP BPU: NORMAL
BLOOD UNIT EXPIRATION DATE: NORMAL
BLOOD UNIT EXPIRATION DATE: NORMAL
BLOOD UNIT TYPE CODE: 5100
BLOOD UNIT TYPE CODE: 5100
BLOOD UNIT TYPE: NORMAL
BLOOD UNIT TYPE: NORMAL
BUN SERPL-MCNC: 59 MG/DL
CALCIUM SERPL-MCNC: 8.2 MG/DL
CHLORIDE SERPL-SCNC: 107 MMOL/L
CMV IGM TITR SERPL: 15.8 U/ML
CO2 SERPL-SCNC: 29 MMOL/L
CODING SYSTEM: NORMAL
CODING SYSTEM: NORMAL
CREAT SERPL-MCNC: 2.1 MG/DL
DIFFERENTIAL METHOD: ABNORMAL
DISPENSE STATUS: NORMAL
DISPENSE STATUS: NORMAL
EOSINOPHIL # BLD AUTO: 0.2 K/UL
EOSINOPHIL NFR BLD: 3.8 %
ERYTHROCYTE [DISTWIDTH] IN BLOOD BY AUTOMATED COUNT: 20 %
EST. GFR  (AFRICAN AMERICAN): 35 ML/MIN/1.73 M^2
EST. GFR  (NON AFRICAN AMERICAN): 30 ML/MIN/1.73 M^2
GLUCOSE SERPL-MCNC: 121 MG/DL
HCT VFR BLD AUTO: 20.1 %
HGB BLD-MCNC: 6.6 G/DL
HISTOPLASMA AG INTERP.: NEGATIVE
HISTOPLASMA RESULT: NORMAL NG/ML
LACTATE SERPL-SCNC: 1.1 MMOL/L
LYMPHOCYTES # BLD AUTO: 4.6 K/UL
LYMPHOCYTES NFR BLD: 75.5 %
MAGNESIUM SERPL-MCNC: 2.1 MG/DL
MCH RBC QN AUTO: 31 PG
MCHC RBC AUTO-ENTMCNC: 32.8 G/DL
MCV RBC AUTO: 94 FL
MONOCYTES # BLD AUTO: 0.4 K/UL
MONOCYTES NFR BLD: 6.1 %
NEUTROPHILS # BLD AUTO: 0.8 K/UL
NEUTROPHILS NFR BLD: 13.8 %
NUM UNITS TRANS PACKED RBC: NORMAL
NUM UNITS TRANS PACKED RBC: NORMAL
PHOSPHATE SERPL-MCNC: 2.2 MG/DL
PLATELET # BLD AUTO: 50 K/UL
PMV BLD AUTO: 10.9 FL
POCT GLUCOSE: 116 MG/DL (ref 70–110)
POCT GLUCOSE: 127 MG/DL (ref 70–110)
POCT GLUCOSE: 138 MG/DL (ref 70–110)
POTASSIUM SERPL-SCNC: 3.5 MMOL/L
RBC # BLD AUTO: 2.13 M/UL
SODIUM SERPL-SCNC: 142 MMOL/L
VANCOMYCIN SERPL-MCNC: 17.5 UG/ML
WBC # BLD AUTO: 6.09 K/UL

## 2017-07-27 PROCEDURE — 86900 BLOOD TYPING SEROLOGIC ABO: CPT

## 2017-07-27 PROCEDURE — 20000000 HC ICU ROOM

## 2017-07-27 PROCEDURE — 86920 COMPATIBILITY TEST SPIN: CPT

## 2017-07-27 PROCEDURE — 97163 PT EVAL HIGH COMPLEX 45 MIN: CPT

## 2017-07-27 PROCEDURE — 97530 THERAPEUTIC ACTIVITIES: CPT

## 2017-07-27 PROCEDURE — 25000003 PHARM REV CODE 250: Performed by: NURSE PRACTITIONER

## 2017-07-27 PROCEDURE — 25000003 PHARM REV CODE 250: Performed by: INTERNAL MEDICINE

## 2017-07-27 PROCEDURE — 80048 BASIC METABOLIC PNL TOTAL CA: CPT

## 2017-07-27 PROCEDURE — 83735 ASSAY OF MAGNESIUM: CPT

## 2017-07-27 PROCEDURE — C9113 INJ PANTOPRAZOLE SODIUM, VIA: HCPCS | Performed by: EMERGENCY MEDICINE

## 2017-07-27 PROCEDURE — 99291 CRITICAL CARE FIRST HOUR: CPT | Mod: ,,, | Performed by: INTERNAL MEDICINE

## 2017-07-27 PROCEDURE — 63600175 PHARM REV CODE 636 W HCPCS: Performed by: INTERNAL MEDICINE

## 2017-07-27 PROCEDURE — 99233 SBSQ HOSP IP/OBS HIGH 50: CPT | Mod: ,,, | Performed by: INTERNAL MEDICINE

## 2017-07-27 PROCEDURE — 87116 MYCOBACTERIA CULTURE: CPT

## 2017-07-27 PROCEDURE — 86850 RBC ANTIBODY SCREEN: CPT

## 2017-07-27 PROCEDURE — 36430 TRANSFUSION BLD/BLD COMPNT: CPT

## 2017-07-27 PROCEDURE — 63600175 PHARM REV CODE 636 W HCPCS: Performed by: EMERGENCY MEDICINE

## 2017-07-27 PROCEDURE — G8978 MOBILITY CURRENT STATUS: HCPCS | Mod: CM

## 2017-07-27 PROCEDURE — 80202 ASSAY OF VANCOMYCIN: CPT

## 2017-07-27 PROCEDURE — 97803 MED NUTRITION INDIV SUBSEQ: CPT

## 2017-07-27 PROCEDURE — 92610 EVALUATE SWALLOWING FUNCTION: CPT

## 2017-07-27 PROCEDURE — 84100 ASSAY OF PHOSPHORUS: CPT

## 2017-07-27 PROCEDURE — P9016 RBC LEUKOCYTES REDUCED: HCPCS

## 2017-07-27 PROCEDURE — 99900038 HC OT GENERIC THERAPY SCREENING (STAT)

## 2017-07-27 PROCEDURE — 85660 RBC SICKLE CELL TEST: CPT

## 2017-07-27 PROCEDURE — 85025 COMPLETE CBC W/AUTO DIFF WBC: CPT

## 2017-07-27 PROCEDURE — 83605 ASSAY OF LACTIC ACID: CPT

## 2017-07-27 PROCEDURE — G8979 MOBILITY GOAL STATUS: HCPCS | Mod: CL

## 2017-07-27 RX ORDER — BISACODYL 10 MG
10 SUPPOSITORY, RECTAL RECTAL DAILY PRN
Status: DISCONTINUED | OUTPATIENT
Start: 2017-07-27 | End: 2017-07-31 | Stop reason: HOSPADM

## 2017-07-27 RX ORDER — NOREPINEPHRINE BITARTRATE/D5W 4MG/250ML
0.02 PLASTIC BAG, INJECTION (ML) INTRAVENOUS CONTINUOUS
Status: DISCONTINUED | OUTPATIENT
Start: 2017-07-27 | End: 2017-07-28

## 2017-07-27 RX ORDER — HYDROCODONE BITARTRATE AND ACETAMINOPHEN 500; 5 MG/1; MG/1
TABLET ORAL
Status: DISCONTINUED | OUTPATIENT
Start: 2017-07-27 | End: 2017-07-28

## 2017-07-27 RX ORDER — POTASSIUM CHLORIDE 20 MEQ/15ML
40 SOLUTION ORAL ONCE
Status: COMPLETED | OUTPATIENT
Start: 2017-07-27 | End: 2017-07-27

## 2017-07-27 RX ORDER — FAMOTIDINE 20 MG/1
20 TABLET, FILM COATED ORAL DAILY
Status: DISCONTINUED | OUTPATIENT
Start: 2017-07-28 | End: 2017-07-31 | Stop reason: HOSPADM

## 2017-07-27 RX ORDER — ACETAMINOPHEN 325 MG/1
325 TABLET ORAL EVERY 6 HOURS PRN
Status: DISCONTINUED | OUTPATIENT
Start: 2017-07-27 | End: 2017-07-31 | Stop reason: HOSPADM

## 2017-07-27 RX ADMIN — DIPHENHYDRAMINE HYDROCHLORIDE 12.5 MG: 50 INJECTION, SOLUTION INTRAMUSCULAR; INTRAVENOUS at 10:07

## 2017-07-27 RX ADMIN — ETHAMBUTOL HYDROCHLORIDE 800 MG: 400 TABLET, FILM COATED ORAL at 08:07

## 2017-07-27 RX ADMIN — PANTOPRAZOLE SODIUM 40 MG: 40 INJECTION, POWDER, FOR SOLUTION INTRAVENOUS at 08:07

## 2017-07-27 RX ADMIN — ATOVAQUONE 750 MG: 750 SUSPENSION ORAL at 09:07

## 2017-07-27 RX ADMIN — Medication 0.02 MCG/KG/MIN: at 11:07

## 2017-07-27 RX ADMIN — LACOSAMIDE 100 MG: 50 TABLET, FILM COATED ORAL at 09:07

## 2017-07-27 RX ADMIN — MOXIFLOXACIN HYDROCHLORIDE 400 MG: 400 INJECTION, SOLUTION INTRAVENOUS at 02:07

## 2017-07-27 RX ADMIN — POTASSIUM CHLORIDE 40 MEQ: 20 SOLUTION ORAL at 08:07

## 2017-07-27 RX ADMIN — DOLUTEGRAVIR SODIUM 50 MG: 50 TABLET, FILM COATED ORAL at 08:07

## 2017-07-27 RX ADMIN — DOCUSATE SODIUM 100 MG: 50 LIQUID ORAL at 08:07

## 2017-07-27 RX ADMIN — Medication 12.5 MG: at 08:07

## 2017-07-27 RX ADMIN — FOLIC ACID 1 MG: 1 TABLET ORAL at 08:07

## 2017-07-27 RX ADMIN — CLARITHROMYCIN 500 MG: 500 TABLET, FILM COATED ORAL at 08:07

## 2017-07-27 RX ADMIN — Medication 12.5 MG: at 09:07

## 2017-07-27 RX ADMIN — SODIUM BICARBONATE 650 MG TABLET 1300 MG: at 05:07

## 2017-07-27 RX ADMIN — LACOSAMIDE 100 MG: 50 TABLET, FILM COATED ORAL at 08:07

## 2017-07-27 RX ADMIN — ATOVAQUONE 750 MG: 750 SUSPENSION ORAL at 08:07

## 2017-07-27 RX ADMIN — CLARITHROMYCIN 500 MG: 500 TABLET, FILM COATED ORAL at 09:07

## 2017-07-27 NOTE — ASSESSMENT & PLAN NOTE
- ID following; continued ID presence appreciated; adjutsting antibiotic regimen  - Per ID has AIDS  - Last CD4 count on 07/10/17 -- 245  - Neutropenic on AM labs -- Hematology/Oncology consulted    7/25 Continue as per ID consultant  Continue to monitor labs    7/26: WBC count rebounding; continues with temp spikes; cultures remain negative. Viral serologies awaited;  For tagged wbc study today  7/27:  WBC improving steadily; HGB  Down , will transfuse 2 units today

## 2017-07-27 NOTE — ASSESSMENT & PLAN NOTE
- May be secondary to dehydration, hypotension, and/or Vancomycin  - Nephrology consulted due to worsening kidney function  - Avoid Nephrotoxic medications (brian dose modifications as appropriate to degree of renal impairment.  -Continue to monitor labs; urine outputs etc    7/25 renal function levelling off at 3.9 now;     7/26:  Urine output remain good;  Creatinine 3.9 to 3.2 today;  Will continue as per nephrology consultant.  7/27: BUN/CR remaining stable; today cr. At 2.1; consider timed urine for clearance once creatinine level

## 2017-07-27 NOTE — PLAN OF CARE
Problem: Patient Care Overview  Goal: Plan of Care Review  PT REQUIRES MAX A X 2 FOR BED MOBILITY AND T/F  TO CHAIR.    Outcome: Ongoing (interventions implemented as appropriate)  Recommendation/Intervention: 1. Advance diet when medically able to Renal with consistency per SLP recommendations    2. Discontinue tubefeed.    3. If intake is less than 50% of meals consider adding Novasource Renal 1 can BID by mouth or PEG.  Goals: Intake of % of 3 meals daily  Nutrition Goal Status: new  Communication of RD Recs: discussed on rounds

## 2017-07-27 NOTE — PLAN OF CARE
Problem: Patient Care Overview  Goal: Plan of Care Review  Outcome: Ongoing (interventions implemented as appropriate)  Patient AOOX4. On room air over night , tolerated well,Spo2-100%, rr -20's  .Patient continues on levophed drip, titratable for SBP> 90mmHg.No complaints of pain voiced over night Passing urine well, no episodes of diarrhea over night. She continues on tube feeds,  Novasource @ 30ml/hr , tolerating wee, minimal residuals seen.Is being observed.

## 2017-07-27 NOTE — ASSESSMENT & PLAN NOTE
1. LELAND : multifactorial, low BP ,  Renal function improved , follow, Cr down to 2.1 today ,     2. Metabolic acidosis :  Improved , stop bicarb supplements ,     3. Hypotension : on pressor support ,     4. Anemia : drop in Hb noticed , receiving pRBC transfusion ,     5. HIV/AIDs : on HAART    6. Corrected calcium normal     6. Neutropenic fever : on broad spectrum antibiotics     7.  Replete K ,

## 2017-07-27 NOTE — ASSESSMENT & PLAN NOTE
7/25 remains on vent; not over breathing the vent; sats are at 100% on present settings    7/26:  Reducing level of sedation at present;  Will have her assessed for weaning;; can dial down FIO2; continue as per pulmonologist.    7/27:  Extubated yesterday; now off the vent and maintaning sats at 100%; RR 28-30. Will observe

## 2017-07-27 NOTE — PROGRESS NOTES
Progress Note  Critical Care    Admit Date: 7/20/2017   LOS: 6 days     Follow-up For: Resp Failure and Shock     SUBJECTIVE:     New over last 24 hours: Barb extubation yesterday.  Awake alert and up in chair this AM.  Still on very low dose Levophed unable to wean off therefore transfusing PRBCs.     ROS:  Patient still mildly confused  Review of Systems   Constitutional: Positive for malaise/fatigue. Negative for chills and fever.   HENT: Negative for congestion.    Eyes: Negative for blurred vision.   Respiratory: Negative for cough, sputum production and shortness of breath.    Cardiovascular: Negative for chest pain and leg swelling.   Gastrointestinal: Negative for abdominal pain, nausea and vomiting.   Genitourinary: Negative for dysuria.   Musculoskeletal: Negative for myalgias.   Skin: Negative for rash.   Neurological: Positive for weakness. Negative for dizziness, focal weakness and headaches.   Endo/Heme/Allergies: Does not bruise/bleed easily.   Psychiatric/Behavioral: The patient is not nervous/anxious.        Continuous Infusions:   norepinephrine bitartrate-D5W 0.02 mcg/kg/min (07/27/17 1107)     Review of patient's allergies indicates:   Allergen Reactions    Iodine and iodide containing products Anaphylaxis     Pt states she coded last time she was administered contrast    Pneumococcal 23-chitra ps vaccine Anaphylaxis     Potential iodine containing    Bactrim [sulfamethoxazole-trimethoprim] Hives    Morphine Itching       OBJECTIVE:     Vital Signs (Most Recent)  Temp: 97.9 °F (36.6 °C) (07/27/17 1035)  Pulse: 97 (07/27/17 1100)  Resp: (!) 28 (07/27/17 1100)  BP: 96/60 (07/27/17 1045)  SpO2: 100 % (07/27/17 1100)    Vital Signs Range (Last 24H):  Temp:  [97.9 °F (36.6 °C)-98.8 °F (37.1 °C)]   Pulse:  []   Resp:  [21-34]   BP: ()/()   SpO2:  [98 %-100 %]     I & O (Last 24H):  Intake/Output Summary (Last 24 hours) at 07/27/17 1116  Last data filed at 07/27/17 1000   Gross per 24  hour   Intake          1224.63 ml   Output             1850 ml   Net          -625.37 ml       Ventilator Data (Last 24H):          Physical Exam:    Physical Exam   Constitutional: She is oriented to person, place, and time. She appears malnourished (mild). She appears not lethargic (mild) and to not be writhing in pain. She appears unhealthy. She appears not cachectic.  Non-toxic appearance. She does not have a sickly appearance. No distress. She is not intubated.   HENT:   Head: Normocephalic and atraumatic.   Mouth/Throat: Oropharynx is clear and moist.   Eyes: EOM are normal. Pupils are equal, round, and reactive to light.   Neck: Normal range of motion. Carotid bruit is not present.   Cardiovascular: Normal rate and regular rhythm.    Pulses:       Radial pulses are 2+ on the right side, and 2+ on the left side.        Dorsalis pedis pulses are 1+ on the right side, and 1+ on the left side.   Pulmonary/Chest: No accessory muscle usage. She is not intubated. No respiratory distress. She has decreased breath sounds.       Abdominal: Soft. Normal appearance. She exhibits no distension. Bowel sounds are hypoactive. There is no tenderness.       Genitourinary:   Genitourinary Comments: Tinsley    Musculoskeletal: Normal range of motion.   No edema.  Generalized weakness   Lymphadenopathy:     She has no cervical adenopathy.   Neurological: She is alert and oriented to person, place, and time. She appears not lethargic (mild).   Skin: Skin is warm, dry and intact. She is not diaphoretic. No cyanosis.        Psychiatric: Memory normal. She exhibits a depressed mood. She exhibits disordered thought content. She has a flat affect.       Laboratory (Last 24H):  CBC:    Recent Labs  Lab 07/27/17  0345   WBC 6.09   HGB 6.6*   HCT 20.1*   PLT 50*     CMP:    Recent Labs  Lab 07/27/17  0345   CALCIUM 8.2*      K 3.5   CO2 29      BUN 59*   CREATININE 2.1*       Microbiology Results (last 7 days)     Procedure  Component Value Units Date/Time    Fungus Culture, Blood or Bone Marrow [[id number]] Collected:  07/22/17 0932    Order Status:  Completed Specimen:  Blood from Blood Updated:  07/27/17 0950     Fungus Cult, blood or BM Culture in progress    AFB Culture & Smear [[id number]] Collected:  07/27/17 0719    Order Status:  Sent Specimen:  Blood from Blood Updated:  07/27/17 0719    Blood culture [[id number]] Collected:  07/23/17 2238    Order Status:  Completed Specimen:  Blood Updated:  07/27/17 0612     Blood Culture, Routine No Growth to date     Blood Culture, Routine No Growth to date     Blood Culture, Routine No Growth to date     Blood Culture, Routine No Growth to date    Narrative:       Collection has been rescheduled by HEW at 7/23/2017 19:38 Reason:   Nurse Draw  Collection has been rescheduled by HEW at 7/23/2017 19:38 Reason:   Nurse Draw    Blood culture [[id number]] Collected:  07/23/17 1600    Order Status:  Completed Specimen:  Blood Updated:  07/26/17 2012     Blood Culture, Routine No Growth to date     Blood Culture, Routine No Growth to date     Blood Culture, Routine No Growth to date     Blood Culture, Routine No Growth to date    Blood culture x two cultures. Draw prior to antibiotics. [[id number]] Collected:  07/20/17 2000    Order Status:  Completed Specimen:  Blood from Peripheral, Antecubital, Right Updated:  07/26/17 0622     Blood Culture, Routine No growth after 5 days.    Narrative:       Aerobic and anaerobic    Blood culture x two cultures. Draw prior to antibiotics. [[id number]] Collected:  07/20/17 2027    Order Status:  Completed Specimen:  Blood from Peripheral, Upper Arm, Right Updated:  07/26/17 0622     Blood Culture, Routine No growth after 5 days.    Narrative:       Aerobic and anaerobic    Urine culture [[id number]] Collected:  07/23/17 1021    Order Status:  Completed Specimen:  Urine from Urine, Clean Catch Updated:  07/24/17 1933     Urine Culture, Routine No growth    Culture,  Respiratory with Gram Stain [[id number]]     Order Status:  Canceled Specimen:  Respiratory     Fungus culture [[id number]] Collected:  07/23/17 1703    Order Status:  Canceled Specimen:  Respiratory from Endotracheal Aspirate Updated:  07/23/17 1703    AFB Culture & Smear [[id number]] Collected:  07/23/17 1702    Order Status:  Canceled Specimen:  Respiratory from Sputum, Expectorated Updated:  07/23/17 1702    Culture, Respiratory with Gram Stain [[id number]] Collected:  07/23/17 1702    Order Status:  Canceled Specimen:  Respiratory from Sputum, Expectorated Updated:  07/23/17 1702    Urine culture [[id number]] Collected:  07/20/17 2045    Order Status:  Completed Specimen:  Urine from Urine, Clean Catch Updated:  07/22/17 0915     Urine Culture, Routine Multiple organisms isolated. None in predominance.  Repeat if     Urine Culture, Routine clinically necessary.    AFB Culture & Smear [[id number]]     Order Status:  Sent Specimen:  Blood from Blood     Clostridium difficile EIA [[id number]] Collected:  07/21/17 1050    Order Status:  Completed Specimen:  Stool from Stool Updated:  07/21/17 1355     C. diff Antigen Negative     C difficile Toxins A+B, EIA Negative     Comment: Testing not recommended for children <24 months old.       AFB Culture & Smear [[id number]]     Order Status:  Sent Specimen:  Blood from Blood           Chest X-Ray:  Film and report reviewed:  No acute pulm process noted      ASSESSMENT/PLAN:     Problem   Septic Shock   Anemia   Thrombocytopenia Associated With Aids   Acute Renal Failure   HIV (Human Immunodeficiency Virus Infection)   Hypokalemia   Severe Protein-Calorie Malnutrition   Acute Respiratory Failure (Resolved)   Pneumonia Due to Infectious Organism (Resolved)   Respiratory Distress (Resolved)   Acute Hypercapnic Respiratory Failure (Resolved)       PLAN:    1. Neuro: neuro monitoring and cont Vimpat    2. Pulmonary: Encourage C&DB and OOB.      3. Cardiac: Cardiac monitoring and  wean Levophed post transfusion of PRBCs.      4. Renal: Tinsley in place, monitor I/Os     5. Infectious Disease: Follow fever curve.  ID following.  Cont Clinda, Avelox and Vanc.  Blood and Urine cultures Neg.  Repeat blood and AFB pending.  Afeb and normal WBC.  ID managing HIV meds cont Atovaquone, Darunavir, Dolutegravir, Etb.  NM Inflamm scan pending.     6. Hematology/Oncology: monitor for bleeding.  Tranfuse 2 units PRBCs given Hct 20 and still on pressor support. Cont Folic Acid.      7. Endocrine:  glucose stable stop SSI    8. Fluids/Electrolytes/Nutrition/GI: cont TF and replace K+    9. Musculoskeletal:  ROM and PT/OT consulted    10. Pain Management: no issues, has prn Tylenol    11. Discharge and Palliative Care: Plan home with family    Preventive Measures and Monitoring:   Stress Ulcer: Pepcid  Nutrition: TF  Glucose control: SSI  Bowel prophylaxis: On Colace and has Dulcolax prn  DVT prophylaxis: SCDs  Hx CAD on B-Blocker: Lopressor  Head of Bed/Reposition: Elevate HOB and turn Q1-2 hours    Early Mobility: OOB  Central Line RUE PICC Day: #5  Tinsley Day: #5  IVAB Day: #8  Code Status: Full    Counseling/Consultation: I have discussed the care of this patient in detail with Multidisciplinary team during rounds, bedside nursing staff and Dr. Childress and Dr. Robertson    Critical Care Time greater than: 55 minutes    Critical care was time spent personally by me on the following activities: development of treatment plan with patient or surrogate and bedside caregivers, discussions with consultants, evaluation of patient's response to treatment, examination of patient, ordering and performing treatments and interventions, ordering and review of laboratory studies, ordering and review of radiographic studies, pulse oximetry, re-evaluation of patient's condition.  This critical care time did not overlap with that of any other provider or involve time for any procedures.    RAGHAV Ramirez [facility]  [facility]  Critical Care / Pulmonary

## 2017-07-27 NOTE — PT/OT/SLP PROGRESS
Occupational Therapy      Wang Kebede  MRN: [id number]  Evaluation initiated via chart review. Pt is currently receiving blood transfusion this pm per Gerard. Will follow-up at next visit. Attempted: 14:00    June Hood OT  7/27/2017

## 2017-07-27 NOTE — SUBJECTIVE & OBJECTIVE
Interval History: Extubated yesterday; in no distress.    Review of Systems   Constitutional: Negative for activity change and fever.        Out of bed to bedside chair yesterday   HENT: Positive for sore throat.         Likely related to OT tube   Eyes: Negative.    Respiratory: Negative.    Cardiovascular:        Resting tachycardia   Gastrointestinal: Negative.    Endocrine: Negative.    Genitourinary: Negative.         Tinsley in place   Musculoskeletal: Negative.    Skin: Negative.    Allergic/Immunologic: Negative.    Neurological:        Somnolent, but arouses easily; appropriate responses   Hematological: Negative.      Objective:     Vital Signs (Most Recent):  Temp: 98.2 °F (36.8 °C) (07/27/17 0730)  Pulse: (!) 115 (07/27/17 0730)  Resp: (!) 31 (07/27/17 0730)  BP: (!) 104/51 (07/27/17 0730)  SpO2: 100 % (07/27/17 0730) Vital Signs (24h Range):  Temp:  [98.2 °F (36.8 °C)-98.9 °F (37.2 °C)] 98.2 °F (36.8 °C)  Pulse:  [] 115  Resp:  [18-31] 31  SpO2:  [98 %-100 %] 100 %  BP: ()/() 104/51     Weight: 51.9 kg (114 lb 6.7 oz)  Body mass index is 22.35 kg/m².    Intake/Output Summary (Last 24 hours) at 07/27/17 0752  Last data filed at 07/27/17 0700   Gross per 24 hour   Intake          1505.98 ml   Output             1940 ml   Net          -434.02 ml      Physical Exam   Constitutional: She is oriented to person, place, and time. She appears well-developed and well-nourished.   HENT:   Head: Normocephalic and atraumatic.   Eyes: EOM are normal. Pupils are equal, round, and reactive to light.   Neck: Normal range of motion. Neck supple.   Cardiovascular:   Resting tachycardia   Pulmonary/Chest: Effort normal.   Diminished at both bases; no accessory muscle usage   Neurological: She is alert and oriented to person, place, and time.   Skin: Skin is warm and dry.   Psychiatric: Her behavior is normal.       Significant Labs:   Blood Culture: No results for input(s): LABBLOO in the last 48  hours.  BMP:   Recent Labs  Lab 07/27/17 0345   *      K 3.5      CO2 29   BUN 59*   CREATININE 2.1*   CALCIUM 8.2*   MG 2.1     CBC:   Recent Labs  Lab 07/26/17 0244 07/27/17 0345   WBC 5.22 6.09   HGB 8.5* 6.6*   HCT 24.7* 20.1*   PLT 50* 50*     CMP:   Recent Labs  Lab 07/26/17 0244 07/27/17 0345    142   K 3.3* 3.5    107   CO2 25 29   * 121*   BUN 65* 59*   CREATININE 3.2* 2.1*   CALCIUM 7.3* 8.2*   ANIONGAP 7* 6*   EGFRNONAA 18* 30*     Magnesium:   Recent Labs  Lab 07/26/17 0244 07/27/17 0345   MG 1.9 2.1     Respiratory Culture: No results for input(s): GSRESP, RESPIRATORYC in the last 48 hours.    Significant Imaging: I have reviewed all pertinent imaging results/findings within the past 24 hours.

## 2017-07-27 NOTE — CONSULTS
[facility] -   Adult Nutrition  Consult Note    SUMMARY     Recommendations  Recommendation/Intervention: 1. Advance diet when medically able to Renal with consistency per SLP recommendations    2. Discontinue tubefeed.    3. If intake is less than 50% of meals consider adding Novasource Renal 1 can BID by mouth or PEG.  Goals: Intake of % of 3 meals daily  Nutrition Goal Status: new  Communication of RD Recs: discussed on rounds    Reason for Assessment  Reason for Assessment: RD follow-up  Diagnosis:  (HIV, Fever)  Interdisciplinary Rounds: attended  General Information Comments: Patient on vent. No propofol. Distended abdomen ocurring during intubation, patient with OG and PEG.  Nutrition Discharge Planning: Home on oral diet with consistency per SLP. Use of PEG to continue for meds and nutrition supplements as needed    Nutrition Prescription Ordered  Current Diet Order: NPO  Current Nutrition Support Formula Ordered: Novasource Renal  Current Nutrition Support Rate Ordered: 30 (ml)  Current Nutrition Support Frequency Ordered: ml/hr    Evaluation of Received Nutrients/Fluid Intake  Enteral Calories (kcal): 1440  Enteral Protein (gm): 65  Enteral (Free Water) Fluid (mL): 516  % Intake of Estimated Energy Needs: 75 - 100 %  % Meal Intake: NPO     Nutrition Risk Screen  Nutrition Risk Screen: no indicators present    Nutrition/Diet History  Typical Food/Fluid Intake: unable to obtain  Food Preferences: unable to obtain    Labs/Tests/Procedures/Meds  Pertinent Labs Reviewed: reviewed  Pertinent Labs Comments: BUN 59, Cr 2.1, GFR 35, Gluc 121, Phos 2.2  Pertinent Medications Reviewed: reviewed    Physical Findings  Overall Physical Appearance:  (extubated)  Tubes: gastrostomy tube  Oral/Mouth Cavity: tooth/teeth missing  Skin:  (DTI to Bilateral heels, Giuliano 14)    Anthropometrics  Height: 5' (152.4 cm)  Weight Method: Bed Scale  Weight: 51.9 kg (114 lb 6.7 oz)  Ideal Body Weight (IBW),  Female: 100 lb  % Ideal Body Weight, Female (lb): 114.42 lb  BMI (Calculated): 22.4  BMI Grade: 18.5-24.9 - normal    Estimated/Assessed Needs  Weight Used For Calorie Calculations: 52 kg (114 lb 10.2 oz)   Energy Need Method: Baldwin-St Jeor (x1.2-1.3 = 1342-8115 calories)  Weight Used For Protein Calculations: 52 kg (114 lb 10.2 oz)  1.2 gm Protein (gm): 62.53  Fluid Need Method: RDA Method (1ml/caron or as needed)    CHO Requirement: 175-215g (41-50% calories from CHO)     Assessment and Plan  Acute renal failure    Nutrition Diagnosis  Altered nutrition related laboratory values    Related to (etiology):   Acute renal failure    Signs and Symptoms (as evidenced by):   Elevated BUN, Cr, and Phos with decreased GFR.     Interventions/Recommendations (treatment strategy):  Therapeutic Nutrition Therapy:   1. Novasource Renal for enteral nutrition  2. Renal diet once extubated    Nutrition Diagnosis Status:   New          Acute respiratory failure-resolved as of 7/27/2017    Nutrition Diagnosis:  Inadequate energy intake    Related to (etiology):   Inability to consume oral diet    Signs and Symptoms (as evidenced by):   Intubated currently NPO with no enteral nutrition     Interventions/Recommendations (treatment strategy):  Enteral nutrition support until extubated    Nutrition Diagnosis Status:   New          Monitor and Evaluation  Food and Nutrient Intake: food and beverage intake  Food and Nutrient Adminstration: diet order  Anthropometric Measurements: weight  Biochemical Data, Medical Tests and Procedures: electrolyte and renal panel, glucose/endocrine profile  Nutrition-Focused Physical Findings: overall appearance    Nutrition Follow-Up  RD Follow-up?: Yes (2x weekly)

## 2017-07-27 NOTE — PLAN OF CARE
Problem: Patient Care Overview  Goal: Plan of Care Review  PT REQUIRES MAX A X 2 FOR BED MOBILITY AND T/F  TO CHAIR.    Outcome: Ongoing (interventions implemented as appropriate)  Pt has flat affect through the day- physicians that know her states this is an improvement from her baseline though.  Pt denies pain or discomfort this shift.  Pt given 2 units PRBC and Levophed titrated off.  Pt sits up in chair for about 2 hours today, works with ST- see note.  Pt taken for indium scan this afternoon- tolerated well.  Mother and father updated on POC at bedside.
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
## 2017-07-27 NOTE — ASSESSMENT & PLAN NOTE
- S/p 1 unit of PRBCs, responded appropriately  - Supplemental oxygen prn, keep O2 sats > 92%  - Pt sees Dr. Olmedo (hematology/oncology), last clinic visit 07/05/17  - Per Hematology/Oncology on that clinic visit -- she has normocytic anemia and possibly due to HIV/AIDS (hx of noncompliance but now compliant). Pt reports hx of sickle cell disease but hemoglobin electrophoresis was normal on 2 separate occassions. If her anemia does not improve after 2 months of continued HAART therapy, refer back to Hematology  - Daily CBC   Continue to monitor labs; transfuse as per heme-onc    7/25 Hgb remaining brenna post transfusion    7/26:  Hgb remaining stable; continue to monitor labs.    7/27 : Hgb 6.6; to receive 2 units of packed cells today

## 2017-07-27 NOTE — ASSESSMENT & PLAN NOTE
Continue on the vent   Setting A/C 24; ; peep 0f 5; FIO2 40%  RR 32   sats at 100%  Coarse breath sounds    7/26: continue antibiotics; monitor cxr    7/27:  Continue present regimen. CXR from earlier today, awaited

## 2017-07-27 NOTE — PROGRESS NOTES
07/27/17 0945   Handoff Report   Given To VAUGHN Zheng   Pain/Comfort Assessments   Pain Assessment Performed Yes       Number Scale   Presence of Pain denies   Skin   Skin WDL ex   Skin Color/Characteristics redness nonblanchable   Skin Temperature warm   Skin Moisture dry   Skin Elasticity quick return to original state   Skin Integrity pressure ulcer(s)   Giuliano Risk Assessment   Sensory Perception 2-->very limited   Moisture 3-->occasionally moist   Activity 2-->chairfast   Mobility 3-->slightly limited   Nutrition 2-->probably inadequate   Friction and Shear 2-->potential problem   Giuliano Score 14       Pressure Ulcer Right lateral other (see comments) suspected deep tissue injury   No Date First Assessed or Time First Assessed found.   Side: Right  Orientation: lateral  Location: (c) other (see comments)  Staging: suspected deep tissue injury   Wound Image    Staging suspected deep tissue injury   Healing Pressure Ulcer no   Pressure Ulcer Risk Factors shear/friction;nutrition   Dressing Appearance no dressing   Drainage Amount none   Appearance purple   Periwound Area normal skin tone   Wound Edges intact   Wound Length (cm) 3   Wound Width (cm) 1   Cleansed W/ sterile normal saline   Interventions barrier applied   Dressing none   Skin Interventions   Device Skin Pressure Protection absorbent pad utilized/changed;adhesive use limited;positioning supports utilized;pressure points protected;skin-to-device areas padded   Pressure Reduction Devices foam padding utilized;positioning supports utilized;pressure-redistributing mattress utilized   Pressure Reduction Techniques frequent weight shift encouraged;heels elevated off bed;positioned off wounds;pressure points protected;weight shift assistance provided;sit time limited to 1 hour   Skin Protection adhesive use limited;incontinence pads utilized;skin sealant/moisture barrier applied       Follow up visit requested by primary nurse GILMAR Molina RN. Patient  has been wearing a spandex skull cap hat from home.  Upon removal, sDTI noted behind right ear.  Wound measures 3x1 cm with purple discoloration, soft tissue indentation also noted to head under elastic edge of cap.  Cleansed with sterile normal saline and patted dry.  Painted with cavilon skin barrier.  Cap removed, and at patient's request, blue surgical bonnet placed on head.  Wound care recommends strict pressure offloading of this area, no tight caps to head, and make sure any headwear is loose and not covering this area.  Recommend painting every shift with cavilon and monitoring area closely.

## 2017-07-27 NOTE — PROGRESS NOTES
[facility] Hospital Medicine  Progress Note    Patient Name: Wang Kebede  MRN: [id number]  Patient Class: IP- Inpatient   Admission Date: 7/20/2017  Length of Stay: 6 days  Attending Physician: Marcus Robertson MD  Primary Care Provider: Levi Moncada MD        Subjective:     Principal Problem:Acute respiratory failure    HPI:  32 y/o female presents to ED with c/o fever that onset gradually 6 days ago. PMH includes HIV. She went to her PCP on Monday and given abx. Symptoms have been intermediate and moderate. No exacerbating or mitigating factors. She denies chills, N/V/D, Cp, palpitations, hematuria, congestion, cough, dysuria, and all other symptoms. She will be observed for symptom control and ID consult under the care of hospital medicine.    Hospital Course:  Wang Kebede is a 31 year old female admitted for Fever. Upon admission pt started on IV Vancomycin and Zosyn. Blood cultures with NGTD, urine culture with multiple isolates. CT abdomen/pelvis -- enlarged edematous left kidney could reflect pyelonephritis and fluid filled bowel loops seen throughout colon, which may infectious process which the diarrhea she was experiencing has resolved. Will repeat UA due to continued fever. C-Diff negative. ID consulted for hx of HIV. Per ID, pt has AIDS but her immune system is recovering since she is now compliant with HAART. Her fever could be secondary to immune reconstitution inflammatory syndrome (IRIS). CXR and UA upon admission negative as source of infection. S/p 1 unit of PRBCs on 07/22/17, responded appropriately. Nephrotoxic medications have been discontinued including Vancomycin and Zosyn. Pt currently receiving IV Meropenum and Zyvox. Nephrology consulted, awaiting input. Overnight, pt tachypneic and tachycardiac. CXR with developing CHF and pulmonary edema. . Pt received diuretics. Discussed case with ID -- recommended discontinuing IV antibiotics and  starting PO Avelox to assist with fluid restriction. Respiratory/Metobolic acidosis noted. Due to increased work of breathing, pt transferred to ICU for closer monitoring.    7/26:  Continues with temp spikes with Tmaax 101.2 overnight.  Renal function continues to improve and we are dialing down on the vent setting; currently  A/C 24,  FIO2 40%; PEEP of 5; sats at aoo%  Planning on a tagged WBC study today;  bllod cultures continue negative;  Will continue present antibiotic regimen; viral serologies awaited.  Continued input from ID and pulmonary are appreciated.     7/27 Successfully extubated yesterday;  Doing well off oxygen at present. RR 28; Sats 100% on RA HR sinus tach at 115; Remains on low dose levophed;  No temp spikes overnight.  Indium scan previously ordered for today by pulmonary    Interval History: Extubated yesterday; in no distress.    Review of Systems   Constitutional: Negative for activity change and fever.        Out of bed to bedside chair yesterday   HENT: Positive for sore throat.         Likely related to OT tube   Eyes: Negative.    Respiratory: Negative.    Cardiovascular:        Resting tachycardia   Gastrointestinal: Negative.    Endocrine: Negative.    Genitourinary: Negative.         Tinsley in place   Musculoskeletal: Negative.    Skin: Negative.    Allergic/Immunologic: Negative.    Neurological:        Somnolent, but arouses easily; appropriate responses   Hematological: Negative.      Objective:     Vital Signs (Most Recent):  Temp: 98.2 °F (36.8 °C) (07/27/17 0730)  Pulse: (!) 115 (07/27/17 0730)  Resp: (!) 31 (07/27/17 0730)  BP: (!) 104/51 (07/27/17 0730)  SpO2: 100 % (07/27/17 0730) Vital Signs (24h Range):  Temp:  [98.2 °F (36.8 °C)-98.9 °F (37.2 °C)] 98.2 °F (36.8 °C)  Pulse:  [] 115  Resp:  [18-31] 31  SpO2:  [98 %-100 %] 100 %  BP: ()/() 104/51     Weight: 51.9 kg (114 lb 6.7 oz)  Body mass index is 22.35 kg/m².    Intake/Output Summary (Last 24  hours) at 07/27/17 0752  Last data filed at 07/27/17 0700   Gross per 24 hour   Intake          1505.98 ml   Output             1940 ml   Net          -434.02 ml      Physical Exam   Constitutional: She is oriented to person, place, and time. She appears well-developed and well-nourished.   HENT:   Head: Normocephalic and atraumatic.   Eyes: EOM are normal. Pupils are equal, round, and reactive to light.   Neck: Normal range of motion. Neck supple.   Cardiovascular:   Resting tachycardia   Pulmonary/Chest: Effort normal.   Diminished at both bases; no accessory muscle usage   Neurological: She is alert and oriented to person, place, and time.   Skin: Skin is warm and dry.   Psychiatric: Her behavior is normal.       Significant Labs:   Blood Culture: No results for input(s): LABBLOO in the last 48 hours.  BMP:   Recent Labs  Lab 07/27/17 0345   *      K 3.5      CO2 29   BUN 59*   CREATININE 2.1*   CALCIUM 8.2*   MG 2.1     CBC:   Recent Labs  Lab 07/26/17 0244 07/27/17 0345   WBC 5.22 6.09   HGB 8.5* 6.6*   HCT 24.7* 20.1*   PLT 50* 50*     CMP:   Recent Labs  Lab 07/26/17  0244 07/27/17  0345    142   K 3.3* 3.5    107   CO2 25 29   * 121*   BUN 65* 59*   CREATININE 3.2* 2.1*   CALCIUM 7.3* 8.2*   ANIONGAP 7* 6*   EGFRNONAA 18* 30*     Magnesium:   Recent Labs  Lab 07/26/17  0244 07/27/17  0345   MG 1.9 2.1     Respiratory Culture: No results for input(s): GSRESP, RESPIRATORYC in the last 48 hours.    Significant Imaging: I have reviewed all pertinent imaging results/findings within the past 24 hours.    Assessment/Plan:      Thrombocytopenia associated with AIDS      Platelets rebounding somewhat; up to 103K from 89    7/25 counts down slightlty    7/26: remains down; continue to monitor; no active bleeding noted        Acute hypercapnic respiratory failure              Respiratory distress    - Transfer to ICU for closer monitoring  - Tachypneic 40/min -- likely secondary  from the metabolic acidosis  - CXR this morning -- findings consistent with CHF and pulmonary edema  - pt received diuretics with no improvement of symptoms  - NIPPV    Remains on the Vent;  Continue to monitor cxr; ABG's    7/25 remains on the vent; more responsive; following commands    7/26: overall improved;  Will attempt weaning later today          Pneumonia due to infectious organism      Continue on the vent   Setting A/C 24; ; peep 0f 5; FIO2 40%  RR 32   sats at 100%  Coarse breath sounds    7/26: continue antibiotics; monitor cxr    7/27:  Continue present regimen. CXR from earlier today, awaited        Acute renal failure    - May be secondary to dehydration, hypotension, and/or Vancomycin  - Nephrology consulted due to worsening kidney function  - Avoid Nephrotoxic medications (brian dose modifications as appropriate to degree of renal impairment.  -Continue to monitor labs; urine outputs etc    7/25 renal function levelling off at 3.9 now;     7/26:  Urine output remain good;  Creatinine 3.9 to 3.2 today;  Will continue as per nephrology consultant.  7/27: BUN/CR remaining stable; today cr. At 2.1; consider timed urine for clearance once creatinine level        S/P percutaneous endoscopic gastrostomy (PEG) tube placement    - PEG placed on 05/26/17 for purpose of HAART therapy -- has difficulty swallowing HIV/AIDS medications  - PEG tube site care  To continue peg tube care  Not receiving tube feds;  abdomen distended and bowel sounds are absent    7/25: continues with peg in place; functioning well    7/26: no appreciable change;  Functioning well;          Fever    - Fever of unknown etiology  - ID following -- pt has AIDS but her immune system is recovering since she is now compliant with HAART. Her fever may be secondary to immune reconstitution inflammatory syndrome but still need to rule out infectious process  - Differential diagnosis -- 1.  Disseminated MAC  2.  Histoplasmosis  - Blood  cultures with NGTD  - Repeat UA pending -- CT abdomen/pelvis -- enlarged edematous left kidney could reflect pyelonephritis and fluid filled bowel loops seen throughout colon, which may infectious process which at the time pt was experiencing diarrhea  - C-diff negative  - Antipyretics prn  Continue to monitor labs;  Wbc down to 1.59    7/25 wbc improved; continue to monitor cultures; cultures of blood and urine negative  Continuing to monitor labs;  7/26;  For tagged WBC study today;  Monitor cultures        Severe sepsis    7/25 WBC better at 4.16 from 1.59; repeat CXR on for today  Will continue present antibiotics:    7/26:  Continues with temp spikes;  tmax 101.2 overnight; conyinue broad spectrum antibiotics with surveillance of cultures; awaiting viral serologies;  continued input from ID appreciated    7/27: Nop temp spikes overnight;  Continue to monitor culture results;  Indium scan planned for today.  Will defer to my consultants        Anemia    - S/p 1 unit of PRBCs, responded appropriately  - Supplemental oxygen prn, keep O2 sats > 92%  - Pt sees Dr. Olmedo (hematology/oncology), last clinic visit 07/05/17  - Per Hematology/Oncology on that clinic visit -- she has normocytic anemia and possibly due to HIV/AIDS (hx of noncompliance but now compliant). Pt reports hx of sickle cell disease but hemoglobin electrophoresis was normal on 2 separate occassions. If her anemia does not improve after 2 months of continued HAART therapy, refer back to Hematology  - Daily CBC   Continue to monitor labs; transfuse as per heme-onc    7/25 Hgb remaining brenna post transfusion    7/26:  Hgb remaining stable; continue to monitor labs.    7/27 : Hgb 6.6; to receive 2 units of packed cells today          S/P implantation of automatic cardioverter/defibrillator (AICD)    - ID following; continued ID presence appreciated; adjutsting antibiotic regimen  - Per ID has AIDS  - Last CD4 count on 07/10/17 -- 245  - Neutropenic  on AM labs -- Hematology/Oncology consulted    7/25 Continue as per ID consultant  Continue to monitor labs    7/26: WBC count rebounding; continues with temp spikes; cultures remain negative. Viral serologies awaited;  For tagged wbc study today  7/27:  WBC improving steadily; HGB  Down , will transfuse 2 units todayTachycardia and hypotensive (at her baseline); continues on pressor support with levophed;  BP 99/38  Serial lactic acids; continue to monitor labs  IVF's with bicarbonate presently at 40cc/hr  Continue metoprolol for heart rate; continues tachy at 127    7/25 HR now ~105   BP stable ;  coming down off levophed at present  7/26: No change; rate has improved low 100's now    7/27: Continues in low 100's;  Weaning down from levophed        HIV (human immunodeficiency virus infection)    - ID following; continued ID presence appreciated; adjutsting antibiotic regimen  - Per ID has AIDS  - Last CD4 count on 07/10/17 -- 245  - Neutropenic on AM labs -- Hematology/Oncology consulted    7/25 Continue as per ID consultant  Continue to monitor labs    7/26: WBC count rebounding; continues with temp spikes; cultures remain negative. Viral serologies awaited;  For tagged wbc study today  7/27:  WBC improving steadily; HGB  Down , will transfuse 2 units today        * Acute respiratory failure      7/25 remains on vent; not over breathing the vent; sats are at 100% on present settings    7/26:  Reducing level of sedation at present;  Will have her assessed for weaning;; can dial down FIO2; continue as per pulmonologist.    7/27:  Extubated yesterday; now off the vent and maintaning sats at 100%; RR 28-30. Will observe          VTE Risk Mitigation         Ordered     Medium Risk of VTE  Once      07/20/17 5312          Marcus Kearney MD  Department of Hospital Medicine   [facility]

## 2017-07-27 NOTE — ASSESSMENT & PLAN NOTE
7/25 WBC better at 4.16 from 1.59; repeat CXR on for today  Will continue present antibiotics:    7/26:  Continues with temp spikes;  tmax 101.2 overnight; conyinue broad spectrum antibiotics with surveillance of cultures; awaiting viral serologies;  continued input from ID appreciated    7/27: Nop temp spikes overnight;  Continue to monitor culture results;  Indium scan planned for today.  Will defer to my consultants

## 2017-07-27 NOTE — PROGRESS NOTES
[facility]   Nephrology  Progress Note    Patient Name: Wang Kebede  MRN: [id number]  Admission Date: 7/20/2017  Hospital Length of Stay: 6 days  Attending Provider: Marcus Robertson MD   Primary Care Physician: Levi Moncada MD  Principal Problem:Acute respiratory failure    Subjective:     HPI: Wang Kebede is a 31 y.o.  AA woman  with history of HIV/AIDS, vulvar cancer, anemia, sickle cell disease, cervical cancer, chronic abdominal pain was admitted to [facility] about 2 days ago for fever , she was started on broad spectrum abx , her serum bicarb dropped from 19 on admission to 7 today , lactate level normal, serum Cr was normal about 2 weeks ago, creatinine increased to 3 today, we were consulted for LELAND , Metabolic acidosis,         Interval History: Pt seen and chart reviewed , no acute events ,     Review of patient's allergies indicates:   Allergen Reactions    Iodine and iodide containing products Anaphylaxis     Pt states she coded last time she was administered contrast    Pneumococcal 23-chitra ps vaccine Anaphylaxis     Potential iodine containing    Bactrim [sulfamethoxazole-trimethoprim] Hives    Morphine Itching     Current Facility-Administered Medications   Medication Frequency    0.9%  NaCl infusion (for blood administration) Q24H PRN    acetaminophen tablet 325 mg Q6H PRN    atovaquone suspension 750 mg Q12H    clarithromycin tablet 500 mg Q12H    darunavir-cobicistat 800-150 mg-mg Tab 800 mg QHS    dextrose 50% injection 12.5 g PRN    diphenhydrAMINE injection 12.5 mg Q6H PRN    docusate 50 mg/5 mL liquid 100 mg Daily    dolutegravir Tab 50 mg Daily    ethambutol tablet 800 mg Daily    [START ON 7/28/2017] famotidine tablet 20 mg Daily    folic acid tablet 1 mg Daily    glucagon (human recombinant) injection 1 mg PRN    insulin aspart pen 1-10 Units Q4H PRN    lacosamide tablet 100 mg BID    metoprolol tartrate (LOPRESSOR) split tablet  12.5 mg BID    moxifloxacin 400 mg/250 mL IVPB 400 mg Q24H    ondansetron injection 4 mg Q12H PRN    promethazine (PHENERGAN) 6.25 mg in dextrose 5 % 50 mL IVPB Q6H PRN    vancomycin 750 mg in dextrose 5 % 250 mL IVPB (ready to mix system) Q48H       Objective:     Vital Signs (Most Recent):  Temp: 98.2 °F (36.8 °C) (07/27/17 1020)  Pulse: 95 (07/27/17 1020)  Resp: (!) 27 (07/27/17 1020)  BP: (!) 91/50 (07/27/17 1020)  SpO2: 100 % (07/27/17 1020)  O2 Device (Oxygen Therapy): room air (07/27/17 0715) Vital Signs (24h Range):  Temp:  [98.2 °F (36.8 °C)-98.9 °F (37.2 °C)] 98.2 °F (36.8 °C)  Pulse:  [] 95  Resp:  [21-34] 27  SpO2:  [98 %-100 %] 100 %  BP: ()/() 91/50     Weight: 51.9 kg (114 lb 6.7 oz) (07/27/17 0527)  Body mass index is 22.35 kg/m².  Body surface area is 1.48 meters squared.    I/O last 3 completed shifts:  In: 2753.9 [I.V.:1113.9; NG/GT:1090; IV Piggyback:550]  Out: 3150 [Urine:3150]    Physical Exam   Constitutional: She appears well-developed and well-nourished. No distress.   HENT:   Head: Normocephalic and atraumatic.   Mouth/Throat: Oropharynx is clear and moist. No oropharyngeal exudate.   Eyes: Conjunctivae and EOM are normal.   Neck: Normal range of motion. Neck supple. No JVD present. Carotid bruit is not present. No tracheal deviation present. No thyroid mass and no thyromegaly present.   Cardiovascular: Normal rate, regular rhythm, normal heart sounds and intact distal pulses.  Exam reveals no gallop and no friction rub.    No murmur heard.  Pulmonary/Chest: No respiratory distress. She has no wheezes. She exhibits no tenderness.   Abdominal: Soft. Bowel sounds are normal. She exhibits no distension, no abdominal bruit, no ascites and no mass. There is no hepatosplenomegaly. There is no tenderness. There is no rebound, no guarding and no CVA tenderness.   Musculoskeletal: Normal range of motion. She exhibits no edema or tenderness.   Lymphadenopathy:     She has no  cervical adenopathy.   Neurological: No cranial nerve deficit.   Skin: Skin is warm and intact. No rash noted. No erythema. No pallor.   Psychiatric: Judgment normal.       Significant Labs:  BMP:   Recent Labs  Lab 07/27/17  0345   *      CO2 29   BUN 59*   CREATININE 2.1*   CALCIUM 8.2*   MG 2.1     CBC:   Recent Labs  Lab 07/27/17 0345   WBC 6.09   RBC 2.13*   HGB 6.6*   HCT 20.1*   PLT 50*   MCV 94   MCH 31.0   MCHC 32.8     CMP:   Recent Labs  Lab 07/24/17  0240  07/27/17  0345   *  < > 121*   CALCIUM 6.7*  < > 8.2*   ALBUMIN 1.5*  --   --    PROT 7.2  --   --    *  < > 142   K 3.5  < > 3.5   CO2 14*  < > 29     < > 107   BUN 65*  < > 59*   CREATININE 3.9*  < > 2.1*   ALKPHOS 142*  --   --    ALT 26  --   --    AST 47*  --   --    BILITOT 0.8  --   --    < > = values in this interval not displayed.  LFTs:   Recent Labs  Lab 07/24/17  0240   ALT 26   AST 47*   ALKPHOS 142*   BILITOT 0.8   PROT 7.2   ALBUMIN 1.5*       Lab Results   Component Value Date    CALCIUM 8.2 (L) 07/27/2017    PHOS 2.2 (L) 07/27/2017     Lab Results   Component Value Date    ALBUMIN 1.5 (L) 07/24/2017         All labs within the past 24 hours have been reviewed.     Significant Imaging: reviewed     Assessment/Plan:     Acute renal failure    1. LELAND : multifactorial, low BP ,  Renal function improved , follow, Cr down to 2.1 today ,     2. Metabolic acidosis :  Improved , stop bicarb supplements ,     3. Hypotension : on pressor support ,     4. Anemia : drop in Hb noticed , receiving pRBC transfusion ,     5. HIV/AIDs : on HAART    6. Corrected calcium normal     6. Neutropenic fever : on broad spectrum antibiotics     7.  Replete K ,                      I will follow-up with patient. Please contact us if you have any additional questions.     Total time spent 40 minutes including time needed to review the records,  patient  evaluation, documentation, face-to-face discussion with the patient, primary  team, CCM,   more than 50% of the time was spent on coordination of care and counseling.       Marlon Monterroso MD  Nephrology  [facility]

## 2017-07-27 NOTE — PROGRESS NOTES
Clinical Pharmacy Progress Note: Vancomycin Dosing     Vancomycin Day #7      Estimated Creatinine Clearance: 27.6 mL/min (based on Cr of 2.1).   WBC: 6.09    SCr: (decrease x 2 days)    Tmax/24h: 98.9      Level:   Vancomycin, random [[id number]] Collected: 07/27/17 0345   Specimen: Blood from Blood Updated: 07/27/17 0500    Vancomycin, Random 17.5 ug/mL      Goal trough: 15-20 mcg/mL   Dx: sepsis/ fever in HIV patient       Cultures:   Blood on 7/23- NGTD   Stool: C diff -   Urine: NGTD x 2 days     Plan: Pharmacy will continue current vancomycin pulse dosing per protocol due to continuous change in renal function. Patient will receive another vancomycin dose once her trough < 15 mcg/mL.     Next level due: Random vancomycin level due 07/28/17 @ 0430.      Thank you for allowing us to participate in this patient's care.      Letha Thomas, PharmD 7/27/2017 8:23 AM

## 2017-07-27 NOTE — PROGRESS NOTES
Hematology/Oncology Consult F/U    Reason for consultation: Neutropenia    Chief Complaint:  Acute respiratory failure, fever    HPI:    Wang Kebede is a 31 y.o. female with HIV/AIDS admitted with fevers and developed neutropenia. She was admitted on 7/20/17 with fever 103 and had been having fevers 3 days prior to admission. WBC on admission was 3.82, Hgb 8.8, . The patient has been reportedly compliant with her HIV medications, which she receives through her PEG tube. Upon admission, blood cultures were drawn. She was continued on her home HAART medications and started on Vanc, Zosyn. Since admission her counts have declined on 7/22 to Hgb 6.6. She received 1 Unit of PRBCs. Labs on 7/23/17 showed WBC 1.73, ANC ANC 0.4. Repeat CBC shows WBC 3.05. Last fever was 110.9 at 3am on 7/22.    Interval History:  Patient is more awake and alert today. She is afebrile. She is receiving blood transfusion.    Review of patient's allergies indicates:   Allergen Reactions    Iodine and iodide containing products Anaphylaxis     Pt states she coded last time she was administered contrast    Pneumococcal 23-chitra ps vaccine Anaphylaxis     Potential iodine containing    Bactrim [sulfamethoxazole-trimethoprim] Hives    Morphine Itching       Medications:      Current Facility-Administered Medications   Medication    0.9%  NaCl infusion (for blood administration)    acetaminophen tablet 650 mg    atovaquone suspension 750 mg    clarithromycin tablet 500 mg    darunavir-cobicistat 800-150 mg-mg Tab 800 mg    dextrose 50% injection 12.5 g    diphenhydrAMINE injection 12.5 mg    docusate 50 mg/5 mL liquid 100 mg    dolutegravir Tab 50 mg    ethambutol tablet 800 mg    folic acid tablet 1 mg    glucagon (human recombinant) injection 1 mg    insulin aspart pen 1-10 Units    lacosamide tablet 100 mg    metoprolol tartrate (LOPRESSOR) split tablet 12.5 mg    moxifloxacin 400 mg/250 mL IVPB 400 mg     norepinephrine 16 mg in dextrose 5 % 250 mL infusion    ondansetron injection 4 mg    pantoprazole injection 40 mg    promethazine (PHENERGAN) 6.25 mg in dextrose 5 % 50 mL IVPB    sodium bicarbonate tablet 1,300 mg    vancomycin 750 mg in dextrose 5 % 250 mL IVPB (ready to mix system)       VITALS (Last 24H):  Temp:  [98.2 °F (36.8 °C)-98.9 °F (37.2 °C)]   Pulse:  []   Resp:  [18-31]   BP: ()/()   SpO2:  [98 %-100 %]     INTAKE & OUTPUT (Last 24H):    Intake/Output Summary (Last 24 hours) at 07/27/17 0820  Last data filed at 07/27/17 0700   Gross per 24 hour   Intake          1197.08 ml   Output             1790 ml   Net          -592.92 ml       REVIEW OF SYSTEMS:        Review of Systems   Constitutional: Negative.    HENT: Negative.    Eyes: Negative.    Respiratory: Negative.    Cardiovascular: Negative.    Gastrointestinal: Negative.    Genitourinary: Negative.    Musculoskeletal: Negative.    Skin: Negative.    Neurological: Negative.    Endo/Heme/Allergies: Negative.    Psychiatric/Behavioral: Negative.      +generalized weakness    PHYSICAL EXAM:    Constitutional: Appears well-developed, cachectic, more alert today.   HEENT: Normocephalic and atraumatic. No maxillary sinus tenderness. External auditory canals clear and TMs intact without lesions. Nasal and oral mucosal membranes moist. Normal dentition and gums.   Neck: Neck supple no mass.   Cardiovascular: Normal rate and regular rhythm.  S1 S2 appreciated by ascultation  Pulmonary/Chest: BCTA. No respiratory distress.   Abdomen: Soft. Non-tender. Distended, PEG in RLQ. Bowel sounds are normal.   Neurological: Moves all extremities.  : Tinsley in place draining yellow urine  Skin: Warm and dry. No lesions.  Extremities: No clubbing cyanosis or edema.  PICC: Located in R upper arm - Clean, dry, intact, with no signs of infection    Laboratory Data:    Lab Results   Component Value Date    WBC 6.09 07/27/2017    HGB 6.6 (L)  07/27/2017    HCT 20.1 (L) 07/27/2017    MCV 94 07/27/2017    PLT 50 (L) 07/27/2017         Recent Labs  Lab 07/27/17  0345   *      K 3.5      CO2 29   BUN 59*   CREATININE 2.1*   CALCIUM 8.2*   MG 2.1       Lab Results   Component Value Date    ALT 26 07/24/2017    AST 47 (H) 07/24/2017     (H) 04/19/2017    ALKPHOS 142 (H) 07/24/2017    BILITOT 0.8 07/24/2017       Lab Results   Component Value Date    IRON 57 07/05/2017    TIBC 253 07/05/2017    FERRITIN 2,026 (H) 07/05/2017     Lab Results   Component Value Date    [id number] 419 07/05/2017     Lab Results   Component Value Date    FOLATE 3.9 (L) 07/05/2017     Lab Results   Component Value Date    TSH 0.941 07/20/2017     Lab Results   Component Value Date    APTT 38.0 (H) 07/20/2017       Radiology:  Reviewed and noted in plan where applicable. Please see chart for full radiology data.      ASSESSMENT/PLAN:   30 y/o female with HIV/AIDS admitted with fevers and pancytopenia.     1. Pancytopenia: Most likely due to sepsis. No suspicion for malignancy at this time.   -- No transfusions needed at this time. Will continue to monitor.  -- Will need to ensure patient was compliant with her folic acid at home given recent low folate level.     2. Fevers/sepsis: Negative blood cultures, but abd/pelvis CT showed possible LLL infiltrate. Pt may have aspirated as well. Another reason for fevers could be IRIS / immune reconstitution. CT shows possible pyelonephritis, but UA negative. Urine culture showed multiple organisms. Repeat urine culture negative.   -- Antibiotics have been narrowed to Vanc and Moxifloxacin.      3. Respiratory failure: Was due to sepsis, possible pneumonia with or without aspiration as well as pulmonary edema. Extubated and clinically improving.     4. Acute renal failure: Due to intravascular volume depletion, possible ATN from medications and possibly worsened with Lasix. Cr improved to 2.1 today.   -- Nephrology  following     5. HIV/AIDS: Compliant with HAART and prophylactic antibiotics for the past few months.   -- ID following.     Thank you for allowing us to participate in the care of this patent.      Virginia Olmedo M.D.  Hematology Oncology
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
## 2017-07-27 NOTE — ASSESSMENT & PLAN NOTE
- ID following; continued ID presence appreciated; adjutsting antibiotic regimen  - Per ID has AIDS  - Last CD4 count on 07/10/17 -- 245  - Neutropenic on AM labs -- Hematology/Oncology consulted    7/25 Continue as per ID consultant  Continue to monitor labs    7/26: WBC count rebounding; continues with temp spikes; cultures remain negative. Viral serologies awaited;  For tagged wbc study today  7/27:  WBC improving steadily; HGB  Down , will transfuse 2 units todayTachycardia and hypotensive (at her baseline); continues on pressor support with levophed;  BP 99/38  Serial lactic acids; continue to monitor labs  IVF's with bicarbonate presently at 40cc/hr  Continue metoprolol for heart rate; continues tachy at 127    7/25 HR now ~105   BP stable ;  coming down off levophed at present  7/26: No change; rate has improved low 100's now    7/27: Continues in low 100's;  Weaning down from levophed

## 2017-07-27 NOTE — SUBJECTIVE & OBJECTIVE
Interval History: Pt seen and chart reviewed , no acute events ,     Review of patient's allergies indicates:   Allergen Reactions    Iodine and iodide containing products Anaphylaxis     Pt states she coded last time she was administered contrast    Pneumococcal 23-chitra ps vaccine Anaphylaxis     Potential iodine containing    Bactrim [sulfamethoxazole-trimethoprim] Hives    Morphine Itching     Current Facility-Administered Medications   Medication Frequency    0.9%  NaCl infusion (for blood administration) Q24H PRN    acetaminophen tablet 325 mg Q6H PRN    atovaquone suspension 750 mg Q12H    clarithromycin tablet 500 mg Q12H    darunavir-cobicistat 800-150 mg-mg Tab 800 mg QHS    dextrose 50% injection 12.5 g PRN    diphenhydrAMINE injection 12.5 mg Q6H PRN    docusate 50 mg/5 mL liquid 100 mg Daily    dolutegravir Tab 50 mg Daily    ethambutol tablet 800 mg Daily    [START ON 7/28/2017] famotidine tablet 20 mg Daily    folic acid tablet 1 mg Daily    glucagon (human recombinant) injection 1 mg PRN    insulin aspart pen 1-10 Units Q4H PRN    lacosamide tablet 100 mg BID    metoprolol tartrate (LOPRESSOR) split tablet 12.5 mg BID    moxifloxacin 400 mg/250 mL IVPB 400 mg Q24H    ondansetron injection 4 mg Q12H PRN    promethazine (PHENERGAN) 6.25 mg in dextrose 5 % 50 mL IVPB Q6H PRN    vancomycin 750 mg in dextrose 5 % 250 mL IVPB (ready to mix system) Q48H       Objective:     Vital Signs (Most Recent):  Temp: 98.2 °F (36.8 °C) (07/27/17 1020)  Pulse: 95 (07/27/17 1020)  Resp: (!) 27 (07/27/17 1020)  BP: (!) 91/50 (07/27/17 1020)  SpO2: 100 % (07/27/17 1020)  O2 Device (Oxygen Therapy): room air (07/27/17 0715) Vital Signs (24h Range):  Temp:  [98.2 °F (36.8 °C)-98.9 °F (37.2 °C)] 98.2 °F (36.8 °C)  Pulse:  [] 95  Resp:  [21-34] 27  SpO2:  [98 %-100 %] 100 %  BP: ()/() 91/50     Weight: 51.9 kg (114 lb 6.7 oz) (07/27/17 0527)  Body mass index is 22.35 kg/m².  Body  surface area is 1.48 meters squared.    I/O last 3 completed shifts:  In: 2753.9 [I.V.:1113.9; NG/GT:1090; IV Piggyback:550]  Out: 3150 [Urine:3150]    Physical Exam   Constitutional: She appears well-developed and well-nourished. No distress.   HENT:   Head: Normocephalic and atraumatic.   Mouth/Throat: Oropharynx is clear and moist. No oropharyngeal exudate.   Eyes: Conjunctivae and EOM are normal.   Neck: Normal range of motion. Neck supple. No JVD present. Carotid bruit is not present. No tracheal deviation present. No thyroid mass and no thyromegaly present.   Cardiovascular: Normal rate, regular rhythm, normal heart sounds and intact distal pulses.  Exam reveals no gallop and no friction rub.    No murmur heard.  Pulmonary/Chest: No respiratory distress. She has no wheezes. She exhibits no tenderness.   Abdominal: Soft. Bowel sounds are normal. She exhibits no distension, no abdominal bruit, no ascites and no mass. There is no hepatosplenomegaly. There is no tenderness. There is no rebound, no guarding and no CVA tenderness.   Musculoskeletal: Normal range of motion. She exhibits no edema or tenderness.   Lymphadenopathy:     She has no cervical adenopathy.   Neurological: No cranial nerve deficit.   Skin: Skin is warm and intact. No rash noted. No erythema. No pallor.   Psychiatric: Judgment normal.       Significant Labs:  BMP:   Recent Labs  Lab 07/27/17  0345   *      CO2 29   BUN 59*   CREATININE 2.1*   CALCIUM 8.2*   MG 2.1     CBC:   Recent Labs  Lab 07/27/17 0345   WBC 6.09   RBC 2.13*   HGB 6.6*   HCT 20.1*   PLT 50*   MCV 94   MCH 31.0   MCHC 32.8     CMP:   Recent Labs  Lab 07/24/17  0240  07/27/17  0345   *  < > 121*   CALCIUM 6.7*  < > 8.2*   ALBUMIN 1.5*  --   --    PROT 7.2  --   --    *  < > 142   K 3.5  < > 3.5   CO2 14*  < > 29     < > 107   BUN 65*  < > 59*   CREATININE 3.9*  < > 2.1*   ALKPHOS 142*  --   --    ALT 26  --   --    AST 47*  --   --    BILITOT  0.8  --   --    < > = values in this interval not displayed.  LFTs:   Recent Labs  Lab 07/24/17  0240   ALT 26   AST 47*   ALKPHOS 142*   BILITOT 0.8   PROT 7.2   ALBUMIN 1.5*       Lab Results   Component Value Date    CALCIUM 8.2 (L) 07/27/2017    PHOS 2.2 (L) 07/27/2017     Lab Results   Component Value Date    ALBUMIN 1.5 (L) 07/24/2017         All labs within the past 24 hours have been reviewed.     Significant Imaging: reviewed

## 2017-07-28 PROBLEM — R65.21 SEPTIC SHOCK: Status: RESOLVED | Noted: 2017-04-19 | Resolved: 2017-07-28

## 2017-07-28 PROBLEM — E87.6 HYPOKALEMIA: Status: RESOLVED | Noted: 2017-05-25 | Resolved: 2017-07-28

## 2017-07-28 PROBLEM — R50.9 FEVER: Status: ACTIVE | Noted: 2017-07-28

## 2017-07-28 PROBLEM — A41.9 SEPTIC SHOCK: Status: RESOLVED | Noted: 2017-04-19 | Resolved: 2017-07-28

## 2017-07-28 PROBLEM — R50.9 FEVER: Status: RESOLVED | Noted: 2017-07-20 | Resolved: 2017-07-28

## 2017-07-28 PROBLEM — E87.1 HYPONATREMIA: Status: ACTIVE | Noted: 2017-07-28

## 2017-07-28 LAB
ANION GAP SERPL CALC-SCNC: 4 MMOL/L
BACTERIA BLD CULT: NORMAL
BASOPHILS # BLD AUTO: ABNORMAL K/UL
BASOPHILS NFR BLD: 0 %
BUN SERPL-MCNC: 44 MG/DL
CALCIUM SERPL-MCNC: 8.4 MG/DL
CHLORIDE SERPL-SCNC: 111 MMOL/L
CMV IGG SERPL QL IA: REACTIVE
CO2 SERPL-SCNC: 28 MMOL/L
CREAT SERPL-MCNC: 1.4 MG/DL
DIFFERENTIAL METHOD: ABNORMAL
EOSINOPHIL # BLD AUTO: ABNORMAL K/UL
EOSINOPHIL NFR BLD: 3 %
ERYTHROCYTE [DISTWIDTH] IN BLOOD BY AUTOMATED COUNT: 20.5 %
EST. GFR  (AFRICAN AMERICAN): 57 ML/MIN/1.73 M^2
EST. GFR  (NON AFRICAN AMERICAN): 50 ML/MIN/1.73 M^2
GLUCOSE SERPL-MCNC: 97 MG/DL
HCT VFR BLD AUTO: 30 %
HGB BLD-MCNC: 10.1 G/DL
LYMPHOCYTES # BLD AUTO: ABNORMAL K/UL
LYMPHOCYTES NFR BLD: 75 %
MAGNESIUM SERPL-MCNC: 2 MG/DL
MCH RBC QN AUTO: 30.8 PG
MCHC RBC AUTO-ENTMCNC: 33.7 G/DL
MCV RBC AUTO: 92 FL
MONOCYTES # BLD AUTO: ABNORMAL K/UL
MONOCYTES NFR BLD: 11 %
NEUTROPHILS # BLD AUTO: ABNORMAL K/UL
NEUTROPHILS NFR BLD: 10 %
NEUTS BAND NFR BLD MANUAL: 1 %
PLATELET # BLD AUTO: 70 K/UL
PLATELET BLD QL SMEAR: ABNORMAL
PMV BLD AUTO: 11 FL
POTASSIUM SERPL-SCNC: 3.8 MMOL/L
RBC # BLD AUTO: 3.28 M/UL
SMUDGE CELLS BLD QL SMEAR: PRESENT
SODIUM SERPL-SCNC: 143 MMOL/L
WBC # BLD AUTO: 6.7 K/UL

## 2017-07-28 PROCEDURE — 25000003 PHARM REV CODE 250: Performed by: NURSE PRACTITIONER

## 2017-07-28 PROCEDURE — 25000003 PHARM REV CODE 250: Performed by: INTERNAL MEDICINE

## 2017-07-28 PROCEDURE — 80048 BASIC METABOLIC PNL TOTAL CA: CPT

## 2017-07-28 PROCEDURE — 83735 ASSAY OF MAGNESIUM: CPT

## 2017-07-28 PROCEDURE — 99233 SBSQ HOSP IP/OBS HIGH 50: CPT | Mod: ,,, | Performed by: INTERNAL MEDICINE

## 2017-07-28 PROCEDURE — 97116 GAIT TRAINING THERAPY: CPT

## 2017-07-28 PROCEDURE — 97530 THERAPEUTIC ACTIVITIES: CPT

## 2017-07-28 PROCEDURE — 99291 CRITICAL CARE FIRST HOUR: CPT | Mod: ,,, | Performed by: INTERNAL MEDICINE

## 2017-07-28 PROCEDURE — 85027 COMPLETE CBC AUTOMATED: CPT

## 2017-07-28 PROCEDURE — 92526 ORAL FUNCTION THERAPY: CPT

## 2017-07-28 PROCEDURE — 21400001 HC TELEMETRY ROOM

## 2017-07-28 PROCEDURE — 25000003 PHARM REV CODE 250: Performed by: PHYSICIAN ASSISTANT

## 2017-07-28 PROCEDURE — 63600175 PHARM REV CODE 636 W HCPCS: Performed by: INTERNAL MEDICINE

## 2017-07-28 PROCEDURE — 97165 OT EVAL LOW COMPLEX 30 MIN: CPT

## 2017-07-28 PROCEDURE — 97110 THERAPEUTIC EXERCISES: CPT

## 2017-07-28 PROCEDURE — 85007 BL SMEAR W/DIFF WBC COUNT: CPT

## 2017-07-28 RX ORDER — OXYCODONE AND ACETAMINOPHEN 5; 325 MG/1; MG/1
1 TABLET ORAL EVERY 6 HOURS PRN
Status: DISCONTINUED | OUTPATIENT
Start: 2017-07-28 | End: 2017-07-31 | Stop reason: HOSPADM

## 2017-07-28 RX ADMIN — MOXIFLOXACIN HYDROCHLORIDE 400 MG: 400 INJECTION, SOLUTION INTRAVENOUS at 02:07

## 2017-07-28 RX ADMIN — CLARITHROMYCIN 500 MG: 500 TABLET, FILM COATED ORAL at 09:07

## 2017-07-28 RX ADMIN — DOLUTEGRAVIR SODIUM 50 MG: 50 TABLET, FILM COATED ORAL at 09:07

## 2017-07-28 RX ADMIN — LACOSAMIDE 100 MG: 50 TABLET, FILM COATED ORAL at 09:07

## 2017-07-28 RX ADMIN — ETHAMBUTOL HYDROCHLORIDE 800 MG: 400 TABLET, FILM COATED ORAL at 09:07

## 2017-07-28 RX ADMIN — FOLIC ACID 1 MG: 1 TABLET ORAL at 09:07

## 2017-07-28 RX ADMIN — FAMOTIDINE 20 MG: 20 TABLET ORAL at 09:07

## 2017-07-28 RX ADMIN — Medication 12.5 MG: at 09:07

## 2017-07-28 RX ADMIN — VANCOMYCIN HYDROCHLORIDE 750 MG: 750 INJECTION, POWDER, LYOPHILIZED, FOR SOLUTION INTRAVENOUS at 04:07

## 2017-07-28 RX ADMIN — ATOVAQUONE 750 MG: 750 SUSPENSION ORAL at 09:07

## 2017-07-28 RX ADMIN — OXYCODONE HYDROCHLORIDE AND ACETAMINOPHEN 1 TABLET: 5; 325 TABLET ORAL at 09:07

## 2017-07-28 RX ADMIN — DOCUSATE SODIUM 100 MG: 50 LIQUID ORAL at 09:07

## 2017-07-28 NOTE — ASSESSMENT & PLAN NOTE
- ID following; continued ID presence appreciated; adjutsting antibiotic regimen  - Per ID has AIDS  - Last CD4 count on 07/10/17 -- 245  - Neutropenic on AM labs -- Hematology/Oncology consulted    7/25 Continue as per ID consultant  Continue to monitor labs    7/26: WBC count rebounding; continues with temp spikes; cultures remain negative. Viral serologies awaited;  For tagged wbc study today  7/27:  WBC improving steadily; HGB  Down , will transfuse 2 units today  7/28:  Received 2 units PRBC's yesterday; will continue to monitor  Continue anti-retroviral therapy as per ID consultant.  Possible move to med-surg floor today.

## 2017-07-28 NOTE — PLAN OF CARE
Patient has been up in the [facility]     07/28/17 1220   Discharge Reassessment   Assessment Type Discharge Planning Reassessment   Can the patient answer the patient profile reliably? Yes, cognitively intact   How does the patient rate their overall health at the present time? Fair   Describe the patient's ability to walk at the present time. Walks with the help of equipment   How often would a person be available to care for the patient? Often   Number of comorbid conditions (as recorded on the chart) Four   Provided patient/caregiver education on the expected discharge date and the discharge plan No   Discharge Plan A Home with family;Home Health   Discharge Plan B Home with family;Long-term acute care facility (LTAC)   Change in patient condition or support system No   Patient choice form signed by patient/caregiver N/A   Involved the patient/caregiver in establishing a new discharge plan: Yes   r . Extubated x 2 days. She is very alert and has been exercising and walking with PT. Patient able to speak today. She stated she wants to go home. She does not want to go anywhere else. She stated she has 30 hr per week with the [employer] program and the company is [employer]. She has [facility] for  . Patient expressed she want to resume those services. Patient signed choice form for [employer] . CM place original in the blue folder and copy given to the patient. CM spoke with [employer]. She verified the patient being with the agency an updated on care was given. D./C dated still unknown but patient is being transferred to the floor today.

## 2017-07-28 NOTE — SUBJECTIVE & OBJECTIVE
Interval History: stable clinically; desires home.    Review of Systems   Constitutional: Positive for fatigue. Negative for fever.   HENT: Positive for sore throat.    Eyes: Negative.    Respiratory: Negative.    Cardiovascular: Negative.    Gastrointestinal: Negative.    Endocrine: Negative.    Genitourinary:        Tinsley in place   Musculoskeletal: Negative.    Skin: Positive for color change.        Macular rash arms; palmar redness; some desquamation rt. Foot.   Allergic/Immunologic: Negative.    Neurological: Positive for weakness.   Hematological: Negative.    Psychiatric/Behavioral: Positive for agitation.     Objective:     Vital Signs (Most Recent):  Temp: 98.1 °F (36.7 °C) (07/28/17 0715)  Pulse: 99 (07/28/17 0700)  Resp: (!) 25 (07/28/17 0700)  BP: 131/86 (07/28/17 0700)  SpO2: 98 % (07/28/17 0700) Vital Signs (24h Range):  Temp:  [97.7 °F (36.5 °C)-98.6 °F (37 °C)] 98.1 °F (36.7 °C)  Pulse:  [] 99  Resp:  [22-37] 25  SpO2:  [93 %-100 %] 98 %  BP: ()/(36-97) 131/86     Weight: 51.9 kg (114 lb 6.7 oz)  Body mass index is 22.35 kg/m².    Intake/Output Summary (Last 24 hours) at 07/28/17 0804  Last data filed at 07/28/17 0715   Gross per 24 hour   Intake          2150.09 ml   Output             1995 ml   Net           155.09 ml      Physical Exam   Constitutional: She is oriented to person, place, and time. She appears well-developed and well-nourished.   HENT:   Head: Normocephalic and atraumatic.   Eyes: EOM are normal. Pupils are equal, round, and reactive to light.   Neck: Normal range of motion. Neck supple.   Cardiovascular: Normal rate, regular rhythm and intact distal pulses.    Pulmonary/Chest: Effort normal and breath sounds normal.   Abdominal: Soft. Bowel sounds are normal.   Genitourinary:   Genitourinary Comments: Deferred.   Musculoskeletal: Normal range of motion.   Neurological: She is alert and oriented to person, place, and time.   Skin: There is erythema.   Macular rash,arms;  palmar erythema, desquamation sole of right foot.   Psychiatric:   Depressed mood       Significant Labs:   Blood Culture: No results for input(s): LABBLOO in the last 48 hours.  BMP:   Recent Labs  Lab 07/28/17  0405   GLU 97      K 3.8   *   CO2 28   BUN 44*   CREATININE 1.4   CALCIUM 8.4*   MG 2.0     CBC:   Recent Labs  Lab 07/27/17  0345 07/28/17  0405   WBC 6.09 6.70   HGB 6.6* 10.1*   HCT 20.1* 30.0*   PLT 50* 70*     Lactic Acid:   Recent Labs  Lab 07/27/17  0345   LACTATE 1.1     Magnesium:   Recent Labs  Lab 07/27/17  0345 07/28/17  0405   MG 2.1 2.0     Respiratory Culture: No results for input(s): GSRESP, RESPIRATORYC in the last 48 hours.  Significant Imaging:   Imaging Results          NM Inflammitory Localization WB Indium (Final result)  Result time 07/27/17 15:52:32    Final result by Madonna Ortiz MD (07/27/17 15:52:32)                 Impression:      Negative whole-body white blood cell scan.      Electronically signed by: MADONNA ORTIZ MD  Date:     07/27/17  Time:    15:52              Narrative:    Exam: NM INFLAMMATORY WHOLE BODY INDIUM,    Date:  07/26/17 08:23:41    History: severe sepsis with unidentified source    Comparison:  No prior relevant studies available    Findings: Whole body indium-111 labeled white blood cell scan performed with 360 uCi indium-111 labeled white blood cells.  24 hour planar images were obtained.    Normal hepatosplenic uptake.  Normal bone marrow uptake.  Otherwise negative.                             X-Ray Chest 1 View (Final result)  Result time 07/27/17 08:11:03    Final result by Madonna Ortiz MD (07/27/17 08:11:03)                 Impression:      Stable exam.      Electronically signed by: MADONNA ORTIZ MD  Date:     07/27/17  Time:    08:11              Narrative:    Exam: XR CHEST 1 VIEW,    Date:  07/27/17 05:10:00    History: respiratory failure    Comparison:  07/26/2017    Findings: Left AICD.  Right PICC line.   Heart size is normal.  Overall lung fields appear stable with slightly increased bibasilar interstitial markings.                             X-Ray Chest 1 View (Final result)  Result time 07/26/17 08:13:58    Final result by Larry Ortiz MD (07/26/17 08:13:58)                 Impression:      Continued interval improvement.      Electronically signed by: LARRY ORTIZ MD  Date:     07/26/17  Time:    08:13              Narrative:    Exam: XR CHEST 1 VIEW,    Date:  07/26/17 05:16:00    History: respiratory failure    Comparison:  07/25/2017.    Findings: Multiple wire leads overlie the chest.  A left pacemaker/defibrillator is present.    Endotracheal tube, nasogastric tube and right PICC line are present.    The heart size is normal.  Lung fields appear improved with probable improving or resolving edema/CHF.                             X-Ray Chest 1 View (Final result)  Result time 07/25/17 09:38:06    Final result by Enoc Medrano III, MD (07/25/17 09:38:06)                 Impression:     Improving chest x-ray.      Electronically signed by: ENOC MEDRANO MD  Date:     07/25/17  Time:    09:38              Narrative:    Chest x-ray, single view.    Clinical indication: Respiratory failure.    Compared to July 24.    ET tube, NG tube, right-sided PICC line, and pacing device/defibrillator again noted with normal heart size. The lungs show improvement with better aeration bilaterally. Decreasing bilateral infiltrates. No new abnormalities.                             US Abdomen Limited (Final result)  Result time 07/24/17 13:20:32    Final result by Reji Parra III, MD (07/24/17 13:20:32)                 Impression:        Small right pleural effusion.  Prior cholecystectomy.  Chronic right renal cortical thinning and increased echogenicity suggesting chronic medical renal disease.  Otherwise unremarkable exam.         Electronically signed by: REJI PARRA MD  Date:      07/24/17  Time:    13:20              Narrative:    US ABDOMEN LIMITED    History: elevated lipase.  Fever of unknown origin.    Findings: The liver measures 17.4 cm in length. The liver is normal in appearance without focal hepatic mass identified. The gallbladder is surgically absent. There is no biliary ductal dilation.  The right kidney measures 10.9 cm in length. There is cortical thinning and increased cortical echogenicity of the right kidney suggesting chronic medical renal disease. The right kidney is otherwise unremarkable without focal mass, hydronephrosis, or shadowing stones. The visualized portions of the pancreas are normal.  The visualized aspects of the aorta and IVC are normal in caliber.  Color and spectral Doppler evaluation demonstrated normal antegrade flow and normal waveforms in the portal vein. No ascites identified.  A small right pleural effusion is noted.                             X-Ray Chest 1 View (Final result)  Result time 07/24/17 07:29:34    Final result by Ghanshyam Vieyra MD (07/24/17 07:29:34)                 Impression:     No adverse interval change.      Electronically signed by: GHANSHYAM VIEYRA MD  Date:     07/24/17  Time:    07:29              Narrative:    History: Respiratory failure    Comparison:  7/23/17    Result: Single view of the chest.      Tubes and lines are stable.  In comparison to the prior study, there is no adverse interval changes.                             X-Ray Chest 1 View (Final result)  Result time 07/23/17 17:31:14    Final result by Tayler Phillips MD (07/23/17 17:31:14)                 Impression:      A PICC line is present via right sided approach with its tip in superior vena cava.  Persistent patchy infiltrates in the right lung.      Electronically signed by: TAYLER PHILLIPS MD  Date:     07/23/17  Time:    17:31              Narrative:    Exam: XR CHEST 1 VIEW,    Date:  07/23/17 17:02:10    History: picc line    Comparison:   07/23/1970    Findings: An endotracheal tube is present its tip level of T3.  A nasogastric tube extends into the stomach.  A right PICC line is positioned with its tip in superior vena cava.  A pacemaker remains in place.  The heart is at the upper limits of normal in size.  There is patchy infiltrate in the right upper lobe as well as congestion of pulmonary vascularity.                             X-Ray Chest AP Portable (Final result)  Result time 07/23/17 16:08:40    Final result by Jewell Jang MD (07/23/17 16:08:40)                 Impression:     See above.      Electronically signed by: JEWELL JANG MD  Date:     07/23/17  Time:    16:08              Narrative:    Exam: Portable chest xray    History:    Respiratory failure.  Endotracheal tube placement.    Findings:     The endotracheal tip projects 4 cm above the reanna.  NG tube tip below diaphragm.  Patchy increased markings in the lung bases right greater than left.  Comparison made to exam performed earlier today.                             X-Ray Chest 1 View (Final result)  Result time 07/23/17 08:23:12    Final result by Tayler Irene MD (07/23/17 08:23:12)                 Impression:      Findings chest changes of developing congestive heart failure and pulmonary edema.      Electronically signed by: TAYLER IRENE MD  Date:     07/23/17  Time:    08:23              Narrative:    Exam: XR CHEST 1 VIEW,    Date:  07/23/17 00:51:38    History: pneumonia vs pulm edema    Comparison:  07/20/2017    Findings: The heart is at the upper limits of normal in size.  A pacemaker remains in place.  There is a new pattern congestion and pulmonary vascularity with small pleural effusions and patchy perihilar and interstitial markings in the lung bases.                             CT Abdomen Pelvis  Without Contrast (Final result)  Result time 07/21/17 14:40:49    Final result by Ghanshyam William MD (07/21/17 14:40:49)                 Impression:         Enlarged edematous left kidney could reflect pyelonephritis. Correlate with urinalysis.    Minimal consolidation or atelectasis is seen at the left lung base.     No evidence of intra-abdominal abscess formation    PEG tube appears satisfactory.     Fluid-filled bowel loops are seen throughout the abdomen which could reflect the patient's diarrhea and be related to infectious process.     Findings discussed with JEWELL PEDERSEN at 14:40:42        Electronically signed by: GHANSHYAM VIEYRA MD  Date:     07/21/17  Time:    14:40              Narrative:    CT of the abdomen and pelvis without contrast.    Clinical indication: Fever. Abdominal pain.    Findings: Lack of IV and oral contrast material grossly limited this examination.    The heart size is normal with pacing wires. Minimal consolidation or atelectasis is seen at the left lung base. There is no free intraperitoneal air. No bowel obstruction. Bony windows show no acute abnormality.    The liver is unremarkable. Splenomegaly noted adrenal glands and pancreas appear unremarkable on this noncontrast study. Gallbladder surgically absent.    . The right kidney is enlarged and appears edematous with perinephric stranding; pyelonephritis is a differential consideration. No definite hydronephrosis or ureteral calculus is identified.    Within the pelvis there are radiopaque densities in posterior related to patient's prior surgery. Scattered fluid-filled loops of bowel are seen throughout the abdomen which could reflect diarrhea.    Extensive retroperitoneal adenopathy is seen which is similar to the prior study.    Bones demonstrate degenerative changes                             X-Ray Chest AP Portable (Final result)  Result time 07/20/17 20:50:48    Final result by Ghanshyam Haq III, MD (07/20/17 20:50:48)                 Impression:     No acute cardio pulmonary abnormality suggested.      Electronically signed by: GHANSHYAM HAQ MD  Date:      07/20/17  Time:    20:50              Narrative:    One view chest x-ray.    Clinical indication: sepsis. Shortness of breath.    Heart size is normal. The lung fields are clear with minimal chronic changes suggested.. No acute pulmonary infiltrate. Permanent pacing device/defibrillator again noted in position on the left.                             RADIOLOGY REPORT (Final result)  Result time 07/26/17 14:15:34

## 2017-07-28 NOTE — PLAN OF CARE
Problem: Patient Care Overview  Goal: Plan of Care Review  PT REQUIRES MAX A X 2 FOR BED MOBILITY AND T/F  TO CHAIR.    Outcome: Ongoing (interventions implemented as appropriate)  Pt ate 90% of dinner tray. Up to bathroom with assist. Pt stating improvement. NSR on monitor. FPP in place. NADN. VSS. Plan of care reviewed with pt. Will continue to monitor until report is given.

## 2017-07-28 NOTE — PLAN OF CARE
Problem: Patient Care Overview  Goal: Plan of Care Review  PT REQUIRES MAX A X 2 FOR BED MOBILITY AND T/F  TO CHAIR.    Outcome: Ongoing (interventions implemented as appropriate)  PT PROGRESSING WITH GT X 25' WITH RW AND MOD A WITH CHAIR IN TOW.

## 2017-07-28 NOTE — ASSESSMENT & PLAN NOTE
ID: h/o of HIV  Presented with fever, leukopenia  Sepsis suspected  Blood and urine cx's negative  On empiric abx  Agree with current mgmt

## 2017-07-28 NOTE — ASSESSMENT & PLAN NOTE
- S/p 1 unit of PRBCs, responded appropriately  - Supplemental oxygen prn, keep O2 sats > 92%  - Pt sees Dr. Olmedo (hematology/oncology), last clinic visit 07/05/17  - Per Hematology/Oncology on that clinic visit -- she has normocytic anemia and possibly due to HIV/AIDS (hx of noncompliance but now compliant). Pt reports hx of sickle cell disease but hemoglobin electrophoresis was normal on 2 separate occassions. If her anemia does not improve after 2 months of continued HAART therapy, refer back to Hematology  - Daily CBC   Continue to monitor labs; transfuse as per heme-onc    7/25 Hgb remaining brenna post transfusion    7/26:  Hgb remaining stable; continue to monitor labs.    7/27 : Hgb 6.6; to receive 2 units of packed cells today    7/28;  Tolerated transfusion yesterday: repeat Hgb 10.1 today; will continue to monitor

## 2017-07-28 NOTE — SUBJECTIVE & OBJECTIVE
Interval History: Pt was seen and examined in ICU today. Discussed with ICU team.  H/o was reviewed. Pt had no new c/o's this am, remained stable last pm, no new issues. No fever, no SOB, no discomfort. Mgmt, meds, labs reviewed.    Review of patient's allergies indicates:   Allergen Reactions    Iodine and iodide containing products Anaphylaxis     Pt states she coded last time she was administered contrast    Pneumococcal 23-chitra ps vaccine Anaphylaxis     Potential iodine containing    Bactrim [sulfamethoxazole-trimethoprim] Hives    Morphine Itching     Current Facility-Administered Medications   Medication Frequency    acetaminophen tablet 325 mg Q6H PRN    atovaquone suspension 750 mg Q12H    bisacodyl suppository 10 mg Daily PRN    clarithromycin tablet 500 mg Q12H    darunavir-cobicistat 800-150 mg-mg Tab 800 mg QHS    diphenhydrAMINE injection 12.5 mg Q6H PRN    docusate 50 mg/5 mL liquid 100 mg Daily    dolutegravir Tab 50 mg Daily    ethambutol tablet 800 mg Daily    famotidine tablet 20 mg Daily    folic acid tablet 1 mg Daily    lacosamide tablet 100 mg BID    metoprolol tartrate (LOPRESSOR) split tablet 12.5 mg BID    moxifloxacin 400 mg/250 mL IVPB 400 mg Q24H    ondansetron injection 4 mg Q12H PRN    promethazine (PHENERGAN) 6.25 mg in dextrose 5 % 50 mL IVPB Q6H PRN       Objective:     Vital Signs (Most Recent):  Temp: 98.5 °F (36.9 °C) (07/28/17 1145)  Pulse: 87 (07/28/17 1100)  Resp: (!) 26 (07/28/17 1100)  BP: 122/84 (07/28/17 1100)  SpO2: 99 % (07/28/17 0900)  O2 Device (Oxygen Therapy): room air (07/28/17 0715) Vital Signs (24h Range):  Temp:  [97.7 °F (36.5 °C)-98.6 °F (37 °C)] 98.5 °F (36.9 °C)  Pulse:  [] 87  Resp:  [22-37] 26  SpO2:  [93 %-100 %] 99 %  BP: ()/(36-97) 122/84     Weight: 51.9 kg (114 lb 6.7 oz) (07/27/17 1200)  Body mass index is 22.35 kg/m².  Body surface area is 1.48 meters squared.    I/O last 3 completed shifts:  In: 2647.1 [I.V.:66.3;  Blood:720.8; NG/GT:1360; IV Piggyback:500]  Out: 3275 [Urine:3275]    Physical Exam   Constitutional: She appears well-developed and well-nourished. No distress.   HENT:   Head: Normocephalic and atraumatic.   Mouth/Throat: Oropharynx is clear and moist. No oropharyngeal exudate.   Eyes: Conjunctivae and EOM are normal.   Neck: Normal range of motion. Neck supple. No JVD present. Carotid bruit is not present. No tracheal deviation present. No thyroid mass and no thyromegaly present.   Cardiovascular: Normal rate, regular rhythm, normal heart sounds and intact distal pulses.  Exam reveals no gallop and no friction rub.    No murmur heard.  Pulmonary/Chest: No respiratory distress. She has no wheezes. She exhibits no tenderness.   Abdominal: Soft. Bowel sounds are normal. She exhibits no distension, no abdominal bruit, no ascites and no mass. There is no hepatosplenomegaly. There is no tenderness. There is no rebound, no guarding and no CVA tenderness.   Musculoskeletal: Normal range of motion. She exhibits no edema or tenderness.   Lymphadenopathy:     She has no cervical adenopathy.   Neurological: No cranial nerve deficit.   Skin: Skin is warm and intact. No rash noted. No erythema. No pallor.   Psychiatric: Judgment normal.       Significant Labs:  BMP  Lab Results   Component Value Date     07/28/2017    K 3.8 07/28/2017     (H) 07/28/2017    CO2 28 07/28/2017    BUN 44 (H) 07/28/2017    CREATININE 1.4 07/28/2017    CALCIUM 8.4 (L) 07/28/2017    ANIONGAP 4 (L) 07/28/2017    ESTGFRAFRICA 57 (A) 07/28/2017    EGFRNONAA 50 (A) 07/28/2017     Lab Results   Component Value Date    WBC 6.70 07/28/2017    HGB 10.1 (L) 07/28/2017    HCT 30.0 (L) 07/28/2017    MCV 92 07/28/2017    PLT 70 (L) 07/28/2017     Significant Imaging:  CXR's reviewed

## 2017-07-28 NOTE — PROGRESS NOTES
[facility]  Infectious Disease  Progress Note    Patient Name: Wang Kebede  MRN: [id number]  Admission Date: 7/20/2017  Length of Stay: 7 days  Attending Physician: Marcus Robertson MD  Primary Care Provider: Levi Moncada MD    Isolation Status: No active isolations  Assessment/Plan:      Thrombocytopenia associated with AIDS    Continue HAART ,will continue to monitor         Acute renal failure    Serum creatinine continues to improve. Will continue to monitor closely .        HIV (human immunodeficiency virus infection)    Continue Darunavir -cobicistat and tivicay    Will continue Mepron for PCP prophylaxis.    She is tolerating medication         * Septic shock    Now off vasopressors, will continue Avelox, stop vancomycin in am             Anticipated Disposition:     Thank you for your consult. I will follow-up with patient. Please contact us if you have any additional questions.    Hubert Mayo MD  Infectious Disease  [facility]    Subjective:     Principal Problem:Septic shock    HPI:   31 year old woman with AIDs well known to me . She has long standing history of poor compliance with medications .However during he rlast admission, after much discussion, she agreed to get a PEG tube inserted and to get her HAART therapy through the PEG tube. She had recent lab work done on 07/10 and cd4-245 ,cd%-10.6 ,  Previous lab data-a month ago-cd4 -4, cd4% 2.4  HIV viral load -1271 -decreased from 180,773 a month ago .  She now presented with fever -T max 103 . She denies any history of cough or headache. No abdominal pain .  Since admission, cultures are still pending . Chest x-ray did not show any acute abnormality. UA is normal .    Interval History: 31 year old woman with AIDS, respiratory failure and metabolic acidosis. She is now extubated.  She feels better and wants to drink water .    Review of Systems   Constitutional: Negative for chills, fatigue and fever.    HENT: Negative for congestion, ear pain, facial swelling, sinus pressure and sore throat.    Eyes: Negative for pain.   Respiratory: Negative for apnea, chest tightness, shortness of breath and stridor.    Cardiovascular: Negative for chest pain, palpitations and leg swelling.   Gastrointestinal: Negative for abdominal distention, abdominal pain, diarrhea and nausea.   Endocrine: Negative for polydipsia and polyphagia.   Genitourinary: Negative for decreased urine volume, difficulty urinating, frequency and genital sores.   Musculoskeletal: Negative for arthralgias and gait problem.   Neurological: Negative for light-headedness and headaches.   Hematological: Negative for adenopathy.   Psychiatric/Behavioral: Negative for agitation, confusion and decreased concentration.     Objective:     Vital Signs (Most Recent):  Temp: 98.5 °F (36.9 °C) (07/27/17 2315)  Pulse: 107 (07/28/17 0130)  Resp: (!) 26 (07/28/17 0130)  BP: 104/61 (07/28/17 0130)  SpO2: 96 % (07/28/17 0130) Vital Signs (24h Range):  Temp:  [97.8 °F (36.6 °C)-98.6 °F (37 °C)] 98.5 °F (36.9 °C)  Pulse:  [] 107  Resp:  [23-37] 26  SpO2:  [95 %-100 %] 96 %  BP: ()/() 104/61     Weight: 51.9 kg (114 lb 6.7 oz)  Body mass index is 22.35 kg/m².    Estimated Creatinine Clearance: 27.6 mL/min (based on Cr of 2.1).    Physical Exam   Constitutional: She is oriented to person, place, and time. Vital signs are normal. She appears well-developed.   HENT:   Head: Normocephalic and atraumatic.   Eyes: Conjunctivae and EOM are normal. Pupils are equal, round, and reactive to light.   Neck: Normal range of motion. Neck supple. No thyroid mass and no thyromegaly present.   Cardiovascular: Normal rate, normal heart sounds and intact distal pulses.    Pulmonary/Chest: Effort normal and breath sounds normal. No accessory muscle usage. No respiratory distress.   Abdominal: Soft. Bowel sounds are normal. She exhibits no mass. There is no tenderness.   Peg  TUBE NOTED   Musculoskeletal: Normal range of motion.   Neurological: She is alert and oriented to person, place, and time.   Skin: Skin is intact. No rash noted.   Psychiatric: She has a normal mood and affect.   Nursing note and vitals reviewed.      Significant Labs:   Blood Culture:   Recent Labs  Lab 05/21/17  1445 07/20/17  2000 07/20/17 2027 07/23/17  1600 07/23/17  2238   LABBLOO No growth after 5 days. No growth after 5 days. No growth after 5 days. No Growth to date  No Growth to date  No Growth to date  No Growth to date  No Growth to date No Growth to date  No Growth to date  No Growth to date  No Growth to date     BMP:   Recent Labs  Lab 07/27/17  0345   *      K 3.5      CO2 29   BUN 59*   CREATININE 2.1*   CALCIUM 8.2*   MG 2.1     CBC:   Recent Labs  Lab 07/27/17  0345   WBC 6.09   HGB 6.6*   HCT 20.1*   PLT 50*     All pertinent labs within the past 24 hours have been reviewed.    Significant Imaging: I have reviewed all pertinent imaging results/findings within the past 24 hours.

## 2017-07-28 NOTE — PT/OT/SLP PROGRESS
Physical Therapy  Treatment    Wang Kebede   MRN: [id number]   Admitting Diagnosis: Severe sepsis    PT Received On: 07/28/17  PT Start Time: 0921     PT Stop Time: 0945    PT Total Time (min): 24 min       Billable Minutes:  Gait [id number] and Therapeutic Exercise 10    Treatment Type: Treatment  PT/PTA: PT             General Precautions: Standard, fall  Orthopedic Precautions:     Braces:           Subjective:  Communicated with NURSE LISBETH  AND EPIC CHART REVIEW  prior to session.   PT AGREED TO TX     Pain/Comfort  Pain Rating 1: 0/10  Pain Rating Post-Intervention 1: 0/10    Objective:   Patient found with: telemetry, peripheral IV, blood pressure cuff    Functional Mobility:  Bed Mobility:        Transfers:  Sit <> Stand Assistance: Moderate Assistance  Sit <> Stand Assistive Device: Rolling Walker  Bed <> Chair Technique: Stand Pivot  Bed <> Chair Assistance: Moderate Assistance  Bed <> Chair Assistive Device: Rolling Walker    Gait:   Gait Distance: PT GT TRAINED X 25' WITH RW AND MOD A WITH CHAIR IN TOW  Assistance 1: Moderate assistance  Gait Assistive Device: Rolling walker  Gait Pattern: swing-to gait  Gait Deviation(s): decreased alba, decreased weight-shifting ability      Therapeutic Activities and Exercises:  PT GT TRAINED AND RETURNED SEATED IN CHAIR . PT SEATED FOR B LE TE 2X10 REPS FOR STRENGTHENING OF AP, TKE, AND MIP. PT LEFT SEATED WITH ALL NEEDS MET      AM-PAC 6 CLICK MOBILITY  How much help from another person does this patient currently need?   1 = Unable, Total/Dependent Assistance  2 = A lot, Maximum/Moderate Assistance  3 = A little, Minimum/Contact Guard/Supervision  4 = None, Modified [place]/Independent    Turning over in bed (including adjusting bedclothes, sheets and blankets)?: 1  Sitting down on and standing up from a chair with arms (e.g., wheelchair, bedside commode, etc.): 2  Moving from lying on back to sitting on the side of the bed?: 1  Moving to and  from a bed to a chair (including a wheelchair)?: 2  Need to walk in hospital room?: 2  Climbing 3-5 steps with a railing?: 1  Total Score: 9    AM-PAC Raw Score CMS G-Code Modifier Level of Impairment Assistance   6 % Total / Unable   7 - 9 CM 80 - 100% Maximal Assist   10 - 14 CL 60 - 80% Moderate Assist   15 - 19 CK 40 - 60% Moderate Assist   20 - 22 CJ 20 - 40% Minimal Assist   23 CI 1-20% SBA / CGA   24 CH 0% Independent/ Mod I     Patient left up in chair with call button in reach.    Assessment:  PT PROGRESSING WITH GT TRAINING TODAY AND INC ENDURANCE.     Rehab identified problem list/impairments: Rehab identified problem list/impairments: weakness, impaired endurance, impaired functional mobilty, gait instability, impaired balance, decreased ROM, decreased lower extremity function, decreased upper extremity function, decreased safety awareness    Rehab potential is good.    Activity tolerance: Fair    Discharge recommendations: Discharge Facility/Level Of Care Needs: nursing facility, skilled     Barriers to discharge: Barriers to Discharge: None    Equipment recommendations: Equipment Needed After Discharge: none     GOALS:    Physical Therapy Goals        Problem: Physical Therapy Goal    Goal Priority Disciplines Outcome Goal Variances Interventions   Physical Therapy Goal     PT/OT, PT      Description:  PT WILL BE SEEN FOR P.T. FOR A MIN OF 5 OUT OF 7 DAYS A WEEK  LT/3/17  1. PT WILL COMPLETE BED MOBILITY WITH MIN A.   2. PT WILL T/F TO CHAIR WITH MOD A.  3. PT WILL STAND WITH RW WITH MOD A.  4. PT WILL GT TRAIN X 20' WITH RW AND MAX A  5. PT WILL COMPLETE TE X 20 REPS FOR STRENGTHENING.                    PLAN:    Patient to be seen    to address the above listed problems via gait training, therapeutic activities, therapeutic exercises  Plan of Care expires: 17  Plan of Care reviewed with: patient         Criss Alonso, PT  2017

## 2017-07-28 NOTE — SUBJECTIVE & OBJECTIVE
Interval History: 31 year old woman with AIDS, respiratory failure and metabolic acidosis. She is now extubated.  She feels better and wants to drink water .    Review of Systems   Constitutional: Negative for chills, fatigue and fever.   HENT: Negative for congestion, ear pain, facial swelling, sinus pressure and sore throat.    Eyes: Negative for pain.   Respiratory: Negative for apnea, chest tightness, shortness of breath and stridor.    Cardiovascular: Negative for chest pain, palpitations and leg swelling.   Gastrointestinal: Negative for abdominal distention, abdominal pain, diarrhea and nausea.   Endocrine: Negative for polydipsia and polyphagia.   Genitourinary: Negative for decreased urine volume, difficulty urinating, frequency and genital sores.   Musculoskeletal: Negative for arthralgias and gait problem.   Neurological: Negative for light-headedness and headaches.   Hematological: Negative for adenopathy.   Psychiatric/Behavioral: Negative for agitation, confusion and decreased concentration.     Objective:     Vital Signs (Most Recent):  Temp: 98.5 °F (36.9 °C) (07/27/17 2315)  Pulse: 107 (07/28/17 0130)  Resp: (!) 26 (07/28/17 0130)  BP: 104/61 (07/28/17 0130)  SpO2: 96 % (07/28/17 0130) Vital Signs (24h Range):  Temp:  [97.8 °F (36.6 °C)-98.6 °F (37 °C)] 98.5 °F (36.9 °C)  Pulse:  [] 107  Resp:  [23-37] 26  SpO2:  [95 %-100 %] 96 %  BP: ()/() 104/61     Weight: 51.9 kg (114 lb 6.7 oz)  Body mass index is 22.35 kg/m².    Estimated Creatinine Clearance: 27.6 mL/min (based on Cr of 2.1).    Physical Exam   Constitutional: She is oriented to person, place, and time. Vital signs are normal. She appears well-developed.   HENT:   Head: Normocephalic and atraumatic.   Eyes: Conjunctivae and EOM are normal. Pupils are equal, round, and reactive to light.   Neck: Normal range of motion. Neck supple. No thyroid mass and no thyromegaly present.   Cardiovascular: Normal rate, normal heart sounds  and intact distal pulses.    Pulmonary/Chest: Effort normal and breath sounds normal. No accessory muscle usage. No respiratory distress.   Abdominal: Soft. Bowel sounds are normal. She exhibits no mass. There is no tenderness.   Peg TUBE NOTED   Musculoskeletal: Normal range of motion.   Neurological: She is alert and oriented to person, place, and time.   Skin: Skin is intact. No rash noted.   Psychiatric: She has a normal mood and affect.   Nursing note and vitals reviewed.      Significant Labs:   Blood Culture:   Recent Labs  Lab 05/21/17  1445 07/20/17  2000 07/20/17  2027 07/23/17  1600 07/23/17  2238   LABBLOO No growth after 5 days. No growth after 5 days. No growth after 5 days. No Growth to date  No Growth to date  No Growth to date  No Growth to date  No Growth to date No Growth to date  No Growth to date  No Growth to date  No Growth to date     BMP:   Recent Labs  Lab 07/27/17  0345   *      K 3.5      CO2 29   BUN 59*   CREATININE 2.1*   CALCIUM 8.2*   MG 2.1     CBC:   Recent Labs  Lab 07/27/17  0345   WBC 6.09   HGB 6.6*   HCT 20.1*   PLT 50*     All pertinent labs within the past 24 hours have been reviewed.    Significant Imaging: I have reviewed all pertinent imaging results/findings within the past 24 hours.

## 2017-07-28 NOTE — PT/OT/SLP PROGRESS
Speech Language Pathology  Treatment    Wang Kebede   MRN: [id number]   Admitting Diagnosis: Severe sepsis    Diet recommendations: Solid Diet Level: Crushed meat (mech soft, meats in gravy, crushed)  Liquid Diet Level: Thin Feed only when awake/alert, HOB to 90 degrees, Small bites/sips, Alternating bites/sips and 1 bite/sip at a time    SLP Treatment Date: 07/28/17  Speech Start Time: 1010     Speech Stop Time: 1033     Speech Total (min): 23 min       TREATMENT BILLABLE MINUTES:  Treatment Swallowing Dysfunction 23    Has the patient been evaluated by SLP for swallowing? : Yes  Keep patient NPO?: No   General Precautions: Standard, fall          Subjective:  Pt seen sitting up in chair. She stated that she had not received dinner or breakfast since swallow eval. Nsg reported that diet order had not been changed yet and that she would follow up.     Pain/Comfort  Pain Rating 1: 0/10  Pain Rating Post-Intervention 2: 0/10    Objective:   Patient found with: telemetry  Pt c/o of dry,sticky mouth and lips. Water was provided and she drank it without difficulty. She refused cracker to re-assess swallow safety. Pt educated on dysphagia and purpose of exercises. She completed lingual ex x 10 each with mod cues to improve swallow function.   FIM:                                 Assessment:  Wang Kebede is a 31 y.o. female with a medical diagnosis of Severe sepsis and presents with dysphagia.    Discharge recommendations:       Goals:    SLP Goals        Problem: SLP Goal    Goal Priority Disciplines Outcome   SLP Goal     SLP Ongoing (interventions implemented as appropriate)   Description:  1. Pt will tolerate least restrictive diet without overt s/s of aspiration or difficulty.  2. Pt will complete oral motor ex x 15 each with mod cues to improve swallow function.                     Plan:   Patient to be seen Therapy Frequency: 3 x/week   Plan of Care expires: 08/03/17  Plan of Care reviewed  with: patient  SLP Follow-up?: Yes              Sari Sylvester CCC-SLP  07/28/2017

## 2017-07-28 NOTE — ASSESSMENT & PLAN NOTE
- PEG placed on 05/26/17 for purpose of HAART therapy -- has difficulty swallowing HIV/AIDS medications  - PEG tube site care  To continue peg tube care  Not receiving tube feds;  abdomen distended and bowel sounds are absent    7/25: continues with peg in place; functioning well    7/26: no appreciable change;  Functioning well;    7/28: facilitates her anti-retroviral therapy;
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
## 2017-07-28 NOTE — PLAN OF CARE
Problem: Patient Care Overview  Goal: Plan of Care Review  PT REQUIRES MAX A X 2 FOR BED MOBILITY AND T/F  TO CHAIR.    Outcome: Ongoing (interventions implemented as appropriate)  Patient remains AOOX4, she continues on room air SPO- 97% and rr-27bpm.Nil complaints of pain voiced over night.SBP- 109 - 119.Is being observd.

## 2017-07-28 NOTE — ASSESSMENT & PLAN NOTE
- ID following; continued ID presence appreciated; adjutsting antibiotic regimen  - Per ID has AIDS  - Last CD4 count on 07/10/17 -- 245  - Neutropenic on AM labs -- Hematology/Oncology consulted    7/25 Continue as per ID consultant  Continue to monitor labs    7/26: WBC count rebounding; continues with temp spikes; cultures remain negative. Viral serologies awaited;  For tagged wbc study today  7/27:  WBC improving steadily; HGB  Down , will transfuse 2 units todayTachycardia and hypotensive (at her baseline); continues on pressor support with levophed;  BP 99/38  Serial lactic acids; continue to monitor labs  IVF's with bicarbonate presently at 40cc/hr  Continue metoprolol for heart rate; continues tachy at 127    7/25 HR now ~105   BP stable ;  coming down off levophed at present  7/26: No change; rate has improved low 100's now    7/27: Continues in low 100's;  Weaning down from levophed    7/28;  Off levophed today; to move to med-surg today.

## 2017-07-28 NOTE — ASSESSMENT & PLAN NOTE
Continue Darunavir -cobicistat and tivicay    Will continue Mepron for PCP prophylaxis.    She is tolerating medication

## 2017-07-28 NOTE — PROGRESS NOTES
[facility] Hospital Medicine  Progress Note    Patient Name: Wang Kebede  MRN: [id number]  Patient Class: IP- Inpatient   Admission Date: 7/20/2017  Length of Stay: 7 days  Attending Physician: Marcus Robertson MD  Primary Care Provider: Levi Moncada MD        Subjective:     Principal Problem:Septic shock    HPI:  30 y/o female presents to ED with c/o fever that onset gradually 6 days ago. PMH includes HIV. She went to her PCP on Monday and given abx. Symptoms have been intermediate and moderate. No exacerbating or mitigating factors. She denies chills, N/V/D, Cp, palpitations, hematuria, congestion, cough, dysuria, and all other symptoms. She will be observed for symptom control and ID consult under the care of hospital medicine.    Hospital Course:  Wang Kebede is a 31 year old female admitted for Fever. Upon admission pt started on IV Vancomycin and Zosyn. Blood cultures with NGTD, urine culture with multiple isolates. CT abdomen/pelvis -- enlarged edematous left kidney could reflect pyelonephritis and fluid filled bowel loops seen throughout colon, which may infectious process which the diarrhea she was experiencing has resolved. Will repeat UA due to continued fever. C-Diff negative. ID consulted for hx of HIV. Per ID, pt has AIDS but her immune system is recovering since she is now compliant with HAART. Her fever could be secondary to immune reconstitution inflammatory syndrome (IRIS). CXR and UA upon admission negative as source of infection. S/p 1 unit of PRBCs on 07/22/17, responded appropriately. Nephrotoxic medications have been discontinued including Vancomycin and Zosyn. Pt currently receiving IV Meropenum and Zyvox. Nephrology consulted, awaiting input. Overnight, pt tachypneic and tachycardiac. CXR with developing CHF and pulmonary edema. . Pt received diuretics. Discussed case with ID -- recommended discontinuing IV antibiotics and starting PO  Avelox to assist with fluid restriction. Respiratory/Metobolic acidosis noted. Due to increased work of breathing, pt transferred to ICU for closer monitoring.    7/26:  Continues with temp spikes with Tmaax 101.2 overnight.  Renal function continues to improve and we are dialing down on the vent setting; currently  A/C 24,  FIO2 40%; PEEP of 5; sats at aoo%  Planning on a tagged WBC study today;  bllod cultures continue negative;  Will continue present antibiotic regimen; viral serologies awaited.  Continued input from ID and pulmonary are appreciated.     7/27 Successfully extubated yesterday;  Doing well off oxygen at present. RR 28; Sats 100% on RA HR sinus tach at 115; Remains on low dose levophed;  No temp spikes overnight.  Indium scan previously ordered for today by pulmonary    7/28 Looks and feels much better; desirous of going home; remains afebrile;  No respiratory difficulty off the vent.  Continues with navarrete in place with good urine output and apparent recovery in renal function with creatinine  Of 1.4 today;  Indium scan done yesterday was negative. Stable lung exam by chest XRAY    Interval History: stable clinically; desires home.    Review of Systems   Constitutional: Positive for fatigue. Negative for fever.   HENT: Positive for sore throat.    Eyes: Negative.    Respiratory: Negative.    Cardiovascular: Negative.    Gastrointestinal: Negative.    Endocrine: Negative.    Genitourinary:        Navarrete in place   Musculoskeletal: Negative.    Skin: Positive for color change.        Macular rash arms; palmar redness; some desquamation rt. Foot.   Allergic/Immunologic: Negative.    Neurological: Positive for weakness.   Hematological: Negative.    Psychiatric/Behavioral: Positive for agitation.     Objective:     Vital Signs (Most Recent):  Temp: 98.1 °F (36.7 °C) (07/28/17 0715)  Pulse: 99 (07/28/17 0700)  Resp: (!) 25 (07/28/17 0700)  BP: 131/86 (07/28/17 0700)  SpO2: 98 % (07/28/17 0700) Vital  Signs (24h Range):  Temp:  [97.7 °F (36.5 °C)-98.6 °F (37 °C)] 98.1 °F (36.7 °C)  Pulse:  [] 99  Resp:  [22-37] 25  SpO2:  [93 %-100 %] 98 %  BP: ()/(36-97) 131/86     Weight: 51.9 kg (114 lb 6.7 oz)  Body mass index is 22.35 kg/m².    Intake/Output Summary (Last 24 hours) at 07/28/17 0804  Last data filed at 07/28/17 0715   Gross per 24 hour   Intake          2150.09 ml   Output             1995 ml   Net           155.09 ml      Physical Exam   Constitutional: She is oriented to person, place, and time. She appears well-developed and well-nourished.   HENT:   Head: Normocephalic and atraumatic.   Eyes: EOM are normal. Pupils are equal, round, and reactive to light.   Neck: Normal range of motion. Neck supple.   Cardiovascular: Normal rate, regular rhythm and intact distal pulses.    Pulmonary/Chest: Effort normal and breath sounds normal.   Abdominal: Soft. Bowel sounds are normal.   Genitourinary:   Genitourinary Comments: Deferred.   Musculoskeletal: Normal range of motion.   Neurological: She is alert and oriented to person, place, and time.   Skin: There is erythema.   Macular rash,arms; palmar erythema, desquamation sole of right foot.   Psychiatric:   Depressed mood       Significant Labs:   Blood Culture: No results for input(s): LABBLOO in the last 48 hours.  BMP:   Recent Labs  Lab 07/28/17  0405   GLU 97      K 3.8   *   CO2 28   BUN 44*   CREATININE 1.4   CALCIUM 8.4*   MG 2.0     CBC:   Recent Labs  Lab 07/27/17  0345 07/28/17  0405   WBC 6.09 6.70   HGB 6.6* 10.1*   HCT 20.1* 30.0*   PLT 50* 70*     Lactic Acid:   Recent Labs  Lab 07/27/17  0345   LACTATE 1.1     Magnesium:   Recent Labs  Lab 07/27/17  0345 07/28/17  0405   MG 2.1 2.0     Respiratory Culture: No results for input(s): GSRESP, RESPIRATORYC in the last 48 hours.  Significant Imaging:   Imaging Results          NM Inflammitory Localization WB Indium (Final result)  Result time 07/27/17 15:52:32    Final result by  Madonna Ortiz MD (07/27/17 15:52:32)                 Impression:      Negative whole-body white blood cell scan.      Electronically signed by: MADONNA ORTIZ MD  Date:     07/27/17  Time:    15:52              Narrative:    Exam: NM INFLAMMATORY WHOLE BODY INDIUM,    Date:  07/26/17 08:23:41    History: severe sepsis with unidentified source    Comparison:  No prior relevant studies available    Findings: Whole body indium-111 labeled white blood cell scan performed with 360 uCi indium-111 labeled white blood cells.  24 hour planar images were obtained.    Normal hepatosplenic uptake.  Normal bone marrow uptake.  Otherwise negative.                             X-Ray Chest 1 View (Final result)  Result time 07/27/17 08:11:03    Final result by Madonna Ortiz MD (07/27/17 08:11:03)                 Impression:      Stable exam.      Electronically signed by: MADONNA ORTIZ MD  Date:     07/27/17  Time:    08:11              Narrative:    Exam: XR CHEST 1 VIEW,    Date:  07/27/17 05:10:00    History: respiratory failure    Comparison:  07/26/2017    Findings: Left AICD.  Right PICC line.  Heart size is normal.  Overall lung fields appear stable with slightly increased bibasilar interstitial markings.                             X-Ray Chest 1 View (Final result)  Result time 07/26/17 08:13:58    Final result by Madonna Ortiz MD (07/26/17 08:13:58)                 Impression:      Continued interval improvement.      Electronically signed by: MADONNA ORTIZ MD  Date:     07/26/17  Time:    08:13              Narrative:    Exam: XR CHEST 1 VIEW,    Date:  07/26/17 05:16:00    History: respiratory failure    Comparison:  07/25/2017.    Findings: Multiple wire leads overlie the chest.  A left pacemaker/defibrillator is present.    Endotracheal tube, nasogastric tube and right PICC line are present.    The heart size is normal.  Lung fields appear improved with probable improving or resolving  edema/CHF.                             X-Ray Chest 1 View (Final result)  Result time 07/25/17 09:38:06    Final result by Ghanshyam Haq III, MD (07/25/17 09:38:06)                 Impression:     Improving chest x-ray.      Electronically signed by: GHANSHYAM HAQ MD  Date:     07/25/17  Time:    09:38              Narrative:    Chest x-ray, single view.    Clinical indication: Respiratory failure.    Compared to July 24.    ET tube, NG tube, right-sided PICC line, and pacing device/defibrillator again noted with normal heart size. The lungs show improvement with better aeration bilaterally. Decreasing bilateral infiltrates. No new abnormalities.                             US Abdomen Limited (Final result)  Result time 07/24/17 13:20:32    Final result by Reji Brown III, MD (07/24/17 13:20:32)                 Impression:        Small right pleural effusion.  Prior cholecystectomy.  Chronic right renal cortical thinning and increased echogenicity suggesting chronic medical renal disease.  Otherwise unremarkable exam.         Electronically signed by: REJI BROWN MD  Date:     07/24/17  Time:    13:20              Narrative:    US ABDOMEN LIMITED    History: elevated lipase.  Fever of unknown origin.    Findings: The liver measures 17.4 cm in length. The liver is normal in appearance without focal hepatic mass identified. The gallbladder is surgically absent. There is no biliary ductal dilation.  The right kidney measures 10.9 cm in length. There is cortical thinning and increased cortical echogenicity of the right kidney suggesting chronic medical renal disease. The right kidney is otherwise unremarkable without focal mass, hydronephrosis, or shadowing stones. The visualized portions of the pancreas are normal.  The visualized aspects of the aorta and IVC are normal in caliber.  Color and spectral Doppler evaluation demonstrated normal antegrade flow and normal waveforms in the portal vein. No  ascites identified.  A small right pleural effusion is noted.                             X-Ray Chest 1 View (Final result)  Result time 07/24/17 07:29:34    Final result by Enoc William MD (07/24/17 07:29:34)                 Impression:     No adverse interval change.      Electronically signed by: ENOC WILLIAM MD  Date:     07/24/17  Time:    07:29              Narrative:    History: Respiratory failure    Comparison:  7/23/17    Result: Single view of the chest.      Tubes and lines are stable.  In comparison to the prior study, there is no adverse interval changes.                             X-Ray Chest 1 View (Final result)  Result time 07/23/17 17:31:14    Final result by Tayler Irene MD (07/23/17 17:31:14)                 Impression:      A PICC line is present via right sided approach with its tip in superior vena cava.  Persistent patchy infiltrates in the right lung.      Electronically signed by: TAYLER IRENE MD  Date:     07/23/17  Time:    17:31              Narrative:    Exam: XR CHEST 1 VIEW,    Date:  07/23/17 17:02:10    History: picc line    Comparison:  07/23/1970    Findings: An endotracheal tube is present its tip level of T3.  A nasogastric tube extends into the stomach.  A right PICC line is positioned with its tip in superior vena cava.  A pacemaker remains in place.  The heart is at the upper limits of normal in size.  There is patchy infiltrate in the right upper lobe as well as congestion of pulmonary vascularity.                             X-Ray Chest AP Portable (Final result)  Result time 07/23/17 16:08:40    Final result by Jewell Jang MD (07/23/17 16:08:40)                 Impression:     See above.      Electronically signed by: JEWELL JANG MD  Date:     07/23/17  Time:    16:08              Narrative:    Exam: Portable chest xray    History:    Respiratory failure.  Endotracheal tube placement.    Findings:     The endotracheal tip projects 4 cm above the reanna.  NG  tube tip below diaphragm.  Patchy increased markings in the lung bases right greater than left.  Comparison made to exam performed earlier today.                             X-Ray Chest 1 View (Final result)  Result time 07/23/17 08:23:12    Final result by Tayler Irene MD (07/23/17 08:23:12)                 Impression:      Findings chest changes of developing congestive heart failure and pulmonary edema.      Electronically signed by: TAYLER IRENE MD  Date:     07/23/17  Time:    08:23              Narrative:    Exam: XR CHEST 1 VIEW,    Date:  07/23/17 00:51:38    History: pneumonia vs pulm edema    Comparison:  07/20/2017    Findings: The heart is at the upper limits of normal in size.  A pacemaker remains in place.  There is a new pattern congestion and pulmonary vascularity with small pleural effusions and patchy perihilar and interstitial markings in the lung bases.                             CT Abdomen Pelvis  Without Contrast (Final result)  Result time 07/21/17 14:40:49    Final result by Enoc William MD (07/21/17 14:40:49)                 Impression:        Enlarged edematous left kidney could reflect pyelonephritis. Correlate with urinalysis.    Minimal consolidation or atelectasis is seen at the left lung base.     No evidence of intra-abdominal abscess formation    PEG tube appears satisfactory.     Fluid-filled bowel loops are seen throughout the abdomen which could reflect the patient's diarrhea and be related to infectious process.     Findings discussed with JEWELL PEDERSEN at 14:40:42        Electronically signed by: ENOC WILLIAM MD  Date:     07/21/17  Time:    14:40              Narrative:    CT of the abdomen and pelvis without contrast.    Clinical indication: Fever. Abdominal pain.    Findings: Lack of IV and oral contrast material grossly limited this examination.    The heart size is normal with pacing wires. Minimal consolidation or atelectasis is seen at the left lung base. There  is no free intraperitoneal air. No bowel obstruction. Bony windows show no acute abnormality.    The liver is unremarkable. Splenomegaly noted adrenal glands and pancreas appear unremarkable on this noncontrast study. Gallbladder surgically absent.    . The right kidney is enlarged and appears edematous with perinephric stranding; pyelonephritis is a differential consideration. No definite hydronephrosis or ureteral calculus is identified.    Within the pelvis there are radiopaque densities in posterior related to patient's prior surgery. Scattered fluid-filled loops of bowel are seen throughout the abdomen which could reflect diarrhea.    Extensive retroperitoneal adenopathy is seen which is similar to the prior study.    Bones demonstrate degenerative changes                             X-Ray Chest AP Portable (Final result)  Result time 07/20/17 20:50:48    Final result by Ghanshyam Haq III, MD (07/20/17 20:50:48)                 Impression:     No acute cardio pulmonary abnormality suggested.      Electronically signed by: GHANSHYAM HAQ MD  Date:     07/20/17  Time:    20:50              Narrative:    One view chest x-ray.    Clinical indication: sepsis. Shortness of breath.    Heart size is normal. The lung fields are clear with minimal chronic changes suggested.. No acute pulmonary infiltrate. Permanent pacing device/defibrillator again noted in position on the left.                             RADIOLOGY REPORT (Final result)  Result time 07/26/17 14:15:34              Assessment/Plan:      Thrombocytopenia associated with AIDS      Platelets rebounding somewhat; up to 103K from 89    7/25 counts down slightlty    7/26: remains down; continue to monitor; no active bleeding noted    7/28:  Continues low at 70 K ; will trend.        Acute renal failure    - May be secondary to dehydration, hypotension, and/or Vancomycin  - Nephrology consulted due to worsening kidney function  - Avoid Nephrotoxic  medications (brian dose modifications as appropriate to degree of renal impairment.  -Continue to monitor labs; urine outputs etc    7/25 renal function levelling off at 3.9 now;     7/26:  Urine output remain good;  Creatinine 3.9 to 3.2 today;  Will continue as per nephrology consultant.  7/27: BUN/CR remaining stable; today cr. At 2.1; consider timed urine for clearance once creatinine level    7/28: recovery of renal function with cr. 1.4        S/P percutaneous endoscopic gastrostomy (PEG) tube placement    - PEG placed on 05/26/17 for purpose of HAART therapy -- has difficulty swallowing HIV/AIDS medications  - PEG tube site care  To continue peg tube care  Not receiving tube feds;  abdomen distended and bowel sounds are absent    7/25: continues with peg in place; functioning well    7/26: no appreciable change;  Functioning well;    7/28: facilitates her anti-retroviral therapy;         Fever    - Fever of unknown etiology  - ID following -- pt has AIDS but her immune system is recovering since she is now compliant with HAART. Her fever may be secondary to immune reconstitution inflammatory syndrome but still need to rule out infectious process  - Differential diagnosis -- 1.  Disseminated MAC  2.  Histoplasmosis  - Blood cultures with NGTD  - Repeat UA pending -- CT abdomen/pelvis -- enlarged edematous left kidney could reflect pyelonephritis and fluid filled bowel loops seen throughout colon, which may infectious process which at the time pt was experiencing diarrhea  - C-diff negative  - Antipyretics prn  Continue to monitor labs;  Wbc down to 1.59    7/25 wbc improved; continue to monitor cultures; cultures of blood and urine negative  Continuing to monitor labs;  7/27;  For tagged WBC study today;  Monitor cultures  7/28; Tagged study done yesterday was negative; afebrilr; remains on antibiotics            Anemia    - S/p 1 unit of PRBCs, responded appropriately  - Supplemental oxygen prn, keep O2 sats >  92%  - Pt sees Dr. Olmedo (hematology/oncology), last clinic visit 07/05/17  - Per Hematology/Oncology on that clinic visit -- she has normocytic anemia and possibly due to HIV/AIDS (hx of noncompliance but now compliant). Pt reports hx of sickle cell disease but hemoglobin electrophoresis was normal on 2 separate occassions. If her anemia does not improve after 2 months of continued HAART therapy, refer back to Hematology  - Daily CBC   Continue to monitor labs; transfuse as per heme-onc    7/25 Hgb remaining brenna post transfusion    7/26:  Hgb remaining stable; continue to monitor labs.    7/27 : Hgb 6.6; to receive 2 units of packed cells today    7/28;  Tolerated transfusion yesterday: repeat Hgb 10.1 today; will continue to monitor          S/P implantation of automatic cardioverter/defibrillator (AICD)    - ID following; continued ID presence appreciated; adjutsting antibiotic regimen  - Per ID has AIDS  - Last CD4 count on 07/10/17 -- 245  - Neutropenic on AM labs -- Hematology/Oncology consulted    7/25 Continue as per ID consultant  Continue to monitor labs    7/26: WBC count rebounding; continues with temp spikes; cultures remain negative. Viral serologies awaited;  For tagged wbc study today  7/27:  WBC improving steadily; HGB  Down , will transfuse 2 units todayTachycardia and hypotensive (at her baseline); continues on pressor support with levophed;  BP 99/38  Serial lactic acids; continue to monitor labs  IVF's with bicarbonate presently at 40cc/hr  Continue metoprolol for heart rate; continues tachy at 127    7/25 HR now ~105   BP stable ;  coming down off levophed at present  7/26: No change; rate has improved low 100's now    7/27: Continues in low 100's;  Weaning down from levophed    7/28;  Off levophed today; to move to med-surg today.        HIV (human immunodeficiency virus infection)    - ID following; continued ID presence appreciated; adjutsting antibiotic regimen  - Per ID has AIDS  -  Last CD4 count on 07/10/17 -- 245  - Neutropenic on AM labs -- Hematology/Oncology consulted    7/25 Continue as per ID consultant  Continue to monitor labs    7/26: WBC count rebounding; continues with temp spikes; cultures remain negative. Viral serologies awaited;  For tagged wbc study today  7/27:  WBC improving steadily; HGB  Down , will transfuse 2 units today  7/28:  Received 2 units PRBC's yesterday; will continue to monitor  Continue anti-retroviral therapy as per ID consultant.  Possible move to med-surg floor today.        * Septic shock    7/25 WBC better at 4.16 from 1.59; repeat CXR on for today  Will continue present antibiotics:    7/26:  Continues with temp spikes;  tmax 101.2 overnight; conyinue broad spectrum antibiotics with surveillance of cultures; awaiting viral serologies;  continued input from ID appreciated    7/27: Nop temp spikes overnight;  Continue to monitor culture results;  Indium scan planned for today.  Will defer to my consultants  7/28 Stable and improved; continue to monitor cultures; Continue antibiotics as per ID consultant          VTE Risk Mitigation         Ordered     Place sequential compression device  Until discontinued      07/27/17 1125     Medium Risk of VTE  Once      07/20/17 2322          Marcus Kearney MD  Department of Hospital Medicine   [facility]

## 2017-07-28 NOTE — PLAN OF CARE
Problem: SLP Goal  Goal: SLP Goal  1. Pt will tolerate least restrictive diet without overt s/s of aspiration or difficulty.  2. Pt will complete oral motor ex x 15 each with mod cues to improve swallow function.   Outcome: Ongoing (interventions implemented as appropriate)  Pt tolerating thin liquids without difficulty.

## 2017-07-28 NOTE — PROGRESS NOTES
Progress Note  Critical Care    Admit Date: 7/20/2017   LOS: 7 days     Follow-up For: Shock     SUBJECTIVE:   Change over last 24 hours: Transfused 2 units PRBCs yesterday and since has been off Levophed infusion.  OOB in chair this AM improved strength.      ROS:  Review of Systems   Constitutional: Positive for malaise/fatigue. Negative for chills and fever.   HENT: Negative for congestion.    Eyes: Negative for blurred vision.   Respiratory: Negative for cough, sputum production and shortness of breath.    Cardiovascular: Negative for chest pain and leg swelling.   Gastrointestinal: Negative for abdominal pain, nausea and vomiting.   Genitourinary: Negative for dysuria.   Musculoskeletal: Negative for myalgias.   Skin: Negative for rash.   Neurological: Positive for weakness. Negative for dizziness, focal weakness and headaches.   Endo/Heme/Allergies: Does not bruise/bleed easily.   Psychiatric/Behavioral: The patient is not nervous/anxious.        Continuous Infusions:   norepinephrine bitartrate-D5W Stopped (07/27/17 1322)     Review of patient's allergies indicates:   Allergen Reactions    Iodine and iodide containing products Anaphylaxis     Pt states she coded last time she was administered contrast    Pneumococcal 23-chitra ps vaccine Anaphylaxis     Potential iodine containing    Bactrim [sulfamethoxazole-trimethoprim] Hives    Morphine Itching       OBJECTIVE:     Vital Signs (Most Recent)  Temp: 98.1 °F (36.7 °C) (07/28/17 0715)  Pulse: 99 (07/28/17 0700)  Resp: (!) 25 (07/28/17 0700)  BP: 131/86 (07/28/17 0700)  SpO2: 98 % (07/28/17 0700)    Vital Signs Range (Last 24H):  Temp:  [97.7 °F (36.5 °C)-98.6 °F (37 °C)]   Pulse:  []   Resp:  [22-37]   BP: ()/(36-97)   SpO2:  [93 %-100 %]     I & O (Last 24H):  Intake/Output Summary (Last 24 hours) at 07/28/17 0848  Last data filed at 07/28/17 0715   Gross per 24 hour   Intake          1999.61 ml   Output             1995 ml   Net              4.61 ml       Ventilator Data (Last 24H):          Physical Exam:  Physical Exam   Constitutional: She is oriented to person, place, and time. Vital signs are normal. She appears malnourished (mild). She appears not lethargic (mild) and to not be writhing in pain. She appears unhealthy. She appears not cachectic.  Non-toxic appearance. She does not have a sickly appearance. No distress. She is not intubated.   HENT:   Head: Normocephalic and atraumatic.   Mouth/Throat: Oropharynx is clear and moist.   Eyes: EOM and lids are normal. Pupils are equal, round, and reactive to light.   Neck: Normal range of motion. Carotid bruit is not present.   Cardiovascular: Normal rate and regular rhythm.    Pulses:       Radial pulses are 2+ on the right side, and 2+ on the left side.        Dorsalis pedis pulses are 2+ on the right side, and 2+ on the left side.   Pulmonary/Chest: No accessory muscle usage. She is not intubated. No respiratory distress. She has decreased breath sounds in the right lower field and the left lower field.       Abdominal: Soft. Normal appearance and bowel sounds are normal. She exhibits no distension. There is no tenderness.       Genitourinary:   Genitourinary Comments: Tinsley    Musculoskeletal: Normal range of motion.   No edema.  Generalized weakness   Lymphadenopathy:     She has no cervical adenopathy.   Neurological: She is alert and oriented to person, place, and time. She appears not lethargic (mild).   Skin: Skin is warm, dry and intact. She is not diaphoretic. No cyanosis.        Psychiatric: Memory normal. She exhibits a depressed mood. She exhibits disordered thought content. She has a flat affect.       Laboratory (Last 24H):  CBC:    Recent Labs  Lab 07/28/17  0405   WBC 6.70   HGB 10.1*   HCT 30.0*   PLT 70*     CMP:    Recent Labs  Lab 07/28/17  0405   CALCIUM 8.4*      K 3.8   CO2 28   *   BUN 44*   CREATININE 1.4       Microbiology Results (last 7 days)     Procedure  Component Value Units Date/Time    Blood culture [[id number]] Collected:  07/23/17 2238    Order Status:  Completed Specimen:  Blood Updated:  07/28/17 0612     Blood Culture, Routine No Growth to date     Blood Culture, Routine No Growth to date     Blood Culture, Routine No Growth to date     Blood Culture, Routine No Growth to date     Blood Culture, Routine No Growth to date    Narrative:       Collection has been rescheduled by HEW at 7/23/2017 19:38 Reason:   Nurse Draw  Collection has been rescheduled by HEW at 7/23/2017 19:38 Reason:   Nurse Draw    Blood culture [[id number]] Collected:  07/23/17 1600    Order Status:  Completed Specimen:  Blood Updated:  07/27/17 2012     Blood Culture, Routine No Growth to date     Blood Culture, Routine No Growth to date     Blood Culture, Routine No Growth to date     Blood Culture, Routine No Growth to date     Blood Culture, Routine No Growth to date    AFB Culture & Smear [[id number]] Collected:  07/27/17 0719    Order Status:  Sent Specimen:  Blood from Blood Updated:  07/27/17 1444    Fungus Culture, Blood or Bone Marrow [[id number]] Collected:  07/22/17 0932    Order Status:  Completed Specimen:  Blood from Blood Updated:  07/27/17 0950     Fungus Cult, blood or BM Culture in progress    Blood culture x two cultures. Draw prior to antibiotics. [[id number]] Collected:  07/20/17 2000    Order Status:  Completed Specimen:  Blood from Peripheral, Antecubital, Right Updated:  07/26/17 0622     Blood Culture, Routine No growth after 5 days.    Narrative:       Aerobic and anaerobic    Blood culture x two cultures. Draw prior to antibiotics. [[id number]] Collected:  07/20/17 2027    Order Status:  Completed Specimen:  Blood from Peripheral, Upper Arm, Right Updated:  07/26/17 0622     Blood Culture, Routine No growth after 5 days.    Narrative:       Aerobic and anaerobic    Urine culture [[id number]] Collected:  07/23/17 1021    Order Status:  Completed Specimen:  Urine from  Urine, Clean Catch Updated:  07/24/17 1933     Urine Culture, Routine No growth    Culture, Respiratory with Gram Stain [[id number]]     Order Status:  Canceled Specimen:  Respiratory     Fungus culture [[id number]] Collected:  07/23/17 1703    Order Status:  Canceled Specimen:  Respiratory from Endotracheal Aspirate Updated:  07/23/17 1703    AFB Culture & Smear [[id number]] Collected:  07/23/17 1702    Order Status:  Canceled Specimen:  Respiratory from Sputum, Expectorated Updated:  07/23/17 1702    Culture, Respiratory with Gram Stain [[id number]] Collected:  07/23/17 1702    Order Status:  Canceled Specimen:  Respiratory from Sputum, Expectorated Updated:  07/23/17 1702    Urine culture [[id number]] Collected:  07/20/17 2045    Order Status:  Completed Specimen:  Urine from Urine, Clean Catch Updated:  07/22/17 0915     Urine Culture, Routine Multiple organisms isolated. None in predominance.  Repeat if     Urine Culture, Routine clinically necessary.    AFB Culture & Smear [[id number]]     Order Status:  Sent Specimen:  Blood from Blood     Clostridium difficile EIA [[id number]] Collected:  07/21/17 1050    Order Status:  Completed Specimen:  Stool from Stool Updated:  07/21/17 1355     C. diff Antigen Negative     C difficile Toxins A+B, EIA Negative     Comment: Testing not recommended for children <24 months old.       AFB Culture & Smear [[id number]]     Order Status:  Sent Specimen:  Blood from Blood           Chest X-Ray:         No recent film last 24 hours     ASSESSMENT/PLAN:     Problem   Severe Sepsis   Thrombocytopenia Associated With Aids   Anemia   HIV (Human Immunodeficiency Virus Infection)   Acute Renal Failure   Severe Protein-Calorie Malnutrition   Septic Shock (Resolved)       PLAN:    1. Neuro: neuro monitoring and cont Vimpat     2. Pulmonary: Encourage C&DB and OOB.       3. Cardiac: Cardiac monitoring.  Off Levophed infusion > 12 hours     4. Renal: Tinsley in place will remove, monitor I/Os       5. Infectious Disease: Follow fever curve.  ID following.  Cont Clinda, Avelox and Vanc.  Blood and Urine cultures Neg.  Repeat blood and AFB pending.  Afeb and normal WBC.  ID managing HIV meds cont Atovaquone, Darunavir, Dolutegravir, Etb.  NM Inflamm scan unremarkable.  Recommend de-escalating Antb but defer to ID.      6. Hematology/Oncology: monitor for bleeding.  Tranfused 2 units PRBCs yesterday. Cont Folic Acid.       7. Endocrine:  glucose stable      8. Fluids/Electrolytes/Nutrition/GI: cont TF and start diet if pass ST eval.  ST still following.      9. Musculoskeletal:  ROM and PT/OT consulted     10. Pain Management: no issues, has prn Tylenol     11. Discharge and Palliative Care: Plan home with family     Preventive Measures and Monitoring:   Stress Ulcer: Pepcid  Nutrition: TF  Glucose control: SSI  Bowel prophylaxis: On Colace and has Dulcolax prn  DVT prophylaxis: SCDs  Hx CAD on B-Blocker: Lopressor  Head of Bed/Reposition: Elevate HOB and turn Q1-2 hours    Early Mobility: OOB  Central Line RUE PICC Day: #6  Tinsley Day: #6  IVAB Day: #9  Code Status: Full    Counseling/Consultation: I have discussed the care of this patient in detail with multidisciplinary team during rounds, bedside nursing staff and Dr. Childress and Dr. Robertson    Will transfer to Tele and Dr. Robertson agrees.  Will sign off.     RAGHAV Ramirez ACNP-BC [facility]

## 2017-07-28 NOTE — ASSESSMENT & PLAN NOTE
7/25 WBC better at 4.16 from 1.59; repeat CXR on for today  Will continue present antibiotics:    7/26:  Continues with temp spikes;  tmax 101.2 overnight; conyinue broad spectrum antibiotics with surveillance of cultures; awaiting viral serologies;  continued input from ID appreciated    7/27: Nop temp spikes overnight;  Continue to monitor culture results;  Indium scan planned for today.  Will defer to my consultants  7/28 Stable and improved; continue to monitor cultures; Continue antibiotics as per ID consultant

## 2017-07-28 NOTE — ASSESSMENT & PLAN NOTE
- Fever of unknown etiology  - ID following -- pt has AIDS but her immune system is recovering since she is now compliant with HAART. Her fever may be secondary to immune reconstitution inflammatory syndrome but still need to rule out infectious process  - Differential diagnosis -- 1.  Disseminated MAC  2.  Histoplasmosis  - Blood cultures with NGTD  - Repeat UA pending -- CT abdomen/pelvis -- enlarged edematous left kidney could reflect pyelonephritis and fluid filled bowel loops seen throughout colon, which may infectious process which at the time pt was experiencing diarrhea  - C-diff negative  - Antipyretics prn  Continue to monitor labs;  Wbc down to 1.59    7/25 wbc improved; continue to monitor cultures; cultures of blood and urine negative  Continuing to monitor labs;  7/27;  For tagged WBC study today;  Monitor cultures  7/28; Tagged study done yesterday was negative; afebrilr; remains on antibiotics

## 2017-07-28 NOTE — ASSESSMENT & PLAN NOTE
- May be secondary to dehydration, hypotension, and/or Vancomycin  - Nephrology consulted due to worsening kidney function  - Avoid Nephrotoxic medications (brian dose modifications as appropriate to degree of renal impairment.  -Continue to monitor labs; urine outputs etc    7/25 renal function levelling off at 3.9 now;     7/26:  Urine output remain good;  Creatinine 3.9 to 3.2 today;  Will continue as per nephrology consultant.  7/27: BUN/CR remaining stable; today cr. At 2.1; consider timed urine for clearance once creatinine level    7/28: recovery of renal function with cr. 1.4

## 2017-07-28 NOTE — PROGRESS NOTES
[facility] -   Nephrology  Progress Note    Patient Name: Wang Kebede  MRN: [id number]  Admission Date: 7/20/2017  Hospital Length of Stay: 7 days  Attending Provider: Marcus Robertson MD   Primary Care Physician: Levi Moncada MD  Principal Problem:Severe sepsis    Subjective:     HPI: Wang Kebede is a 31 y.o.  AA woman  with history of HIV/AIDS, vulvar cancer, anemia, sickle cell disease, cervical cancer, chronic abdominal pain was admitted to [facility] about 2 days ago for fever , she was started on broad spectrum abx , her serum bicarb dropped from 19 on admission to 7 today , lactate level normal, serum Cr was normal about 2 weeks ago, creatinine increased to 3 today, we were consulted for LELAND , Metabolic acidosis,         Interval History: Pt was seen and examined in ICU today. Discussed with ICU team.  H/o was reviewed. Pt had no new c/o's this am, remained stable last pm, no new issues. No fever, no SOB, no discomfort. Mgmt, meds, labs reviewed.    Review of patient's allergies indicates:   Allergen Reactions    Iodine and iodide containing products Anaphylaxis     Pt states she coded last time she was administered contrast    Pneumococcal 23-chitra ps vaccine Anaphylaxis     Potential iodine containing    Bactrim [sulfamethoxazole-trimethoprim] Hives    Morphine Itching     Current Facility-Administered Medications   Medication Frequency    acetaminophen tablet 325 mg Q6H PRN    atovaquone suspension 750 mg Q12H    bisacodyl suppository 10 mg Daily PRN    clarithromycin tablet 500 mg Q12H    darunavir-cobicistat 800-150 mg-mg Tab 800 mg QHS    diphenhydrAMINE injection 12.5 mg Q6H PRN    docusate 50 mg/5 mL liquid 100 mg Daily    dolutegravir Tab 50 mg Daily    ethambutol tablet 800 mg Daily    famotidine tablet 20 mg Daily    folic acid tablet 1 mg Daily    lacosamide tablet 100 mg BID    metoprolol tartrate (LOPRESSOR) split tablet 12.5 mg BID     moxifloxacin 400 mg/250 mL IVPB 400 mg Q24H    ondansetron injection 4 mg Q12H PRN    promethazine (PHENERGAN) 6.25 mg in dextrose 5 % 50 mL IVPB Q6H PRN        Objective:     Vital Signs (Most Recent):  Temp: 98.5 °F (36.9 °C) (07/28/17 1145)  Pulse: 87 (07/28/17 1100)  Resp: (!) 26 (07/28/17 1100)  BP: 122/84 (07/28/17 1100)  SpO2: 99 % (07/28/17 0900)  O2 Device (Oxygen Therapy): room air (07/28/17 0715) Vital Signs (24h Range):  Temp:  [97.7 °F (36.5 °C)-98.6 °F (37 °C)] 98.5 °F (36.9 °C)  Pulse:  [] 87  Resp:  [22-37] 26  SpO2:  [93 %-100 %] 99 %  BP: ()/(36-97) 122/84     Weight: 51.9 kg (114 lb 6.7 oz) (07/27/17 1200)  Body mass index is 22.35 kg/m².  Body surface area is 1.48 meters squared.    I/O last 3 completed shifts:  In: 2647.1 [I.V.:66.3; Blood:720.8; NG/GT:1360; IV Piggyback:500]  Out: 3275 [Urine:3275]    Physical Exam   Constitutional: She appears well-developed and well-nourished. No distress.   HENT:   Head: Normocephalic and atraumatic.   Mouth/Throat: Oropharynx is clear and moist. No oropharyngeal exudate.   Eyes: Conjunctivae and EOM are normal.   Neck: Normal range of motion. Neck supple. No JVD present. Carotid bruit is not present. No tracheal deviation present. No thyroid mass and no thyromegaly present.   Cardiovascular: Normal rate, regular rhythm, normal heart sounds and intact distal pulses.  Exam reveals no gallop and no friction rub.    No murmur heard.  Pulmonary/Chest: No respiratory distress. She has no wheezes. She exhibits no tenderness.   Abdominal: Soft. Bowel sounds are normal. She exhibits no distension, no abdominal bruit, no ascites and no mass. There is no hepatosplenomegaly. There is no tenderness. There is no rebound, no guarding and no CVA tenderness.   Musculoskeletal: Normal range of motion. She exhibits no edema or tenderness.   Lymphadenopathy:     She has no cervical adenopathy.   Neurological: No cranial nerve deficit.   Skin: Skin is warm and  intact. No rash noted. No erythema. No pallor.   Psychiatric: Judgment normal.       Significant Labs:  BMP  Lab Results   Component Value Date     07/28/2017    K 3.8 07/28/2017     (H) 07/28/2017    CO2 28 07/28/2017    BUN 44 (H) 07/28/2017    CREATININE 1.4 07/28/2017    CALCIUM 8.4 (L) 07/28/2017    ANIONGAP 4 (L) 07/28/2017    ESTGFRAFRICA 57 (A) 07/28/2017    EGFRNONAA 50 (A) 07/28/2017     Lab Results   Component Value Date    WBC 6.70 07/28/2017    HGB 10.1 (L) 07/28/2017    HCT 30.0 (L) 07/28/2017    MCV 92 07/28/2017    PLT 70 (L) 07/28/2017     Significant Imaging:  CXR's reviewed    Assessment/Plan:     32 y/o female with LELAND:    Acute renal failure      1. LELAND : Noted s Cr has increased to 4.0  LELAND now resolving  LELAND likely was multifactorial: due to hypotension, sepsis     2. Metabolic acidosis :  Improved , stopped bicarb supplements ,      3. Hypotension : was on pressor support , stopped  Doing remarkable better clinically     4. Anemia : drop in Hb noticed , received pRBC transfusion ,    h/o of sickle cell disease    5. Corrected calcium normal: no need to replace      6.  Replete K , when low        HIV (human immunodeficiency virus infection)    ID: h/o of HIV  Presented with fever, leukopenia  Sepsis suspected  Blood and urine cx's negative  On empiric abx  Agree with current mgmt     Anemia: due to sickle cell disease  S/p PRBC     Plans and recommendations:  As detailed above  Will f/u closely discussed with ICU team  Total critical care time spent 45 minutes including time needed to review the records, the   patient evaluation, documentation, face-to-face discussion with the patient,   more than 50% of the time was spent on coordination of care and counseling.      Allyson Brady MD  Nephrology  [facility] -

## 2017-07-28 NOTE — ASSESSMENT & PLAN NOTE
Platelets rebounding somewhat; up to 103K from 89    7/25 counts down slightlty    7/26: remains down; continue to monitor; no active bleeding noted    7/28:  Continues low at 70 K ; will trend.

## 2017-07-29 PROBLEM — E87.1 HYPONATREMIA: Status: RESOLVED | Noted: 2017-07-28 | Resolved: 2017-07-29

## 2017-07-29 PROBLEM — R50.9 FEVER: Status: RESOLVED | Noted: 2017-07-28 | Resolved: 2017-07-29

## 2017-07-29 PROBLEM — N17.9 ACUTE RENAL FAILURE: Status: RESOLVED | Noted: 2017-07-21 | Resolved: 2017-07-29

## 2017-07-29 LAB
ALBUMIN SERPL BCP-MCNC: 1.8 G/DL
ALP SERPL-CCNC: 615 U/L
ALT SERPL W/O P-5'-P-CCNC: 70 U/L
ANION GAP SERPL CALC-SCNC: 6 MMOL/L
ANISOCYTOSIS BLD QL SMEAR: ABNORMAL
AST SERPL-CCNC: 131 U/L
BACTERIA BLD CULT: NORMAL
BASOPHILS NFR BLD: 0 %
BILIRUB SERPL-MCNC: 1 MG/DL
BUN SERPL-MCNC: 29 MG/DL
CALCIUM SERPL-MCNC: 8.5 MG/DL
CHLORIDE SERPL-SCNC: 107 MMOL/L
CO2 SERPL-SCNC: 26 MMOL/L
CREAT SERPL-MCNC: 1.2 MG/DL
DIFFERENTIAL METHOD: ABNORMAL
EOSINOPHIL NFR BLD: 0 %
ERYTHROCYTE [DISTWIDTH] IN BLOOD BY AUTOMATED COUNT: 20.3 %
EST. GFR  (AFRICAN AMERICAN): >60 ML/MIN/1.73 M^2
EST. GFR  (NON AFRICAN AMERICAN): 60 ML/MIN/1.73 M^2
GLUCOSE SERPL-MCNC: 81 MG/DL
HCT VFR BLD AUTO: 30.5 %
HGB BLD-MCNC: 10.3 G/DL
HYPOCHROMIA BLD QL SMEAR: ABNORMAL
LYMPHOCYTES NFR BLD: 82 %
MAGNESIUM SERPL-MCNC: 1.6 MG/DL
MCH RBC QN AUTO: 30.8 PG
MCHC RBC AUTO-ENTMCNC: 33.8 G/DL
MCV RBC AUTO: 91 FL
MONOCYTES NFR BLD: 6 %
NEUTROPHILS NFR BLD: 12 %
PLATELET # BLD AUTO: 118 K/UL
PLATELET BLD QL SMEAR: ABNORMAL
PMV BLD AUTO: 10.8 FL
POTASSIUM SERPL-SCNC: 4 MMOL/L
PROT SERPL-MCNC: 8.4 G/DL
RBC # BLD AUTO: 3.34 M/UL
SODIUM SERPL-SCNC: 139 MMOL/L
WBC # BLD AUTO: 6.57 K/UL

## 2017-07-29 PROCEDURE — 80053 COMPREHEN METABOLIC PANEL: CPT

## 2017-07-29 PROCEDURE — 21400001 HC TELEMETRY ROOM

## 2017-07-29 PROCEDURE — 25000003 PHARM REV CODE 250: Performed by: NURSE PRACTITIONER

## 2017-07-29 PROCEDURE — 83735 ASSAY OF MAGNESIUM: CPT

## 2017-07-29 PROCEDURE — 99233 SBSQ HOSP IP/OBS HIGH 50: CPT | Mod: ,,, | Performed by: INTERNAL MEDICINE

## 2017-07-29 PROCEDURE — 97116 GAIT TRAINING THERAPY: CPT

## 2017-07-29 PROCEDURE — 94761 N-INVAS EAR/PLS OXIMETRY MLT: CPT

## 2017-07-29 PROCEDURE — 85027 COMPLETE CBC AUTOMATED: CPT

## 2017-07-29 PROCEDURE — 25000003 PHARM REV CODE 250: Performed by: INTERNAL MEDICINE

## 2017-07-29 PROCEDURE — 97110 THERAPEUTIC EXERCISES: CPT

## 2017-07-29 PROCEDURE — 85007 BL SMEAR W/DIFF WBC COUNT: CPT

## 2017-07-29 PROCEDURE — 63600175 PHARM REV CODE 636 W HCPCS: Performed by: EMERGENCY MEDICINE

## 2017-07-29 PROCEDURE — 63600175 PHARM REV CODE 636 W HCPCS: Performed by: INTERNAL MEDICINE

## 2017-07-29 RX ADMIN — DOCUSATE SODIUM 100 MG: 50 LIQUID ORAL at 09:07

## 2017-07-29 RX ADMIN — ONDANSETRON 4 MG: 2 INJECTION INTRAMUSCULAR; INTRAVENOUS at 09:07

## 2017-07-29 RX ADMIN — ACETAMINOPHEN 325 MG: 325 TABLET ORAL at 09:07

## 2017-07-29 RX ADMIN — MOXIFLOXACIN HYDROCHLORIDE 400 MG: 400 INJECTION, SOLUTION INTRAVENOUS at 03:07

## 2017-07-29 RX ADMIN — Medication 12.5 MG: at 09:07

## 2017-07-29 RX ADMIN — CLARITHROMYCIN 500 MG: 500 TABLET, FILM COATED ORAL at 09:07

## 2017-07-29 RX ADMIN — ATOVAQUONE 750 MG: 750 SUSPENSION ORAL at 09:07

## 2017-07-29 RX ADMIN — FAMOTIDINE 20 MG: 20 TABLET ORAL at 09:07

## 2017-07-29 RX ADMIN — ETHAMBUTOL HYDROCHLORIDE 800 MG: 400 TABLET, FILM COATED ORAL at 09:07

## 2017-07-29 RX ADMIN — LACOSAMIDE 100 MG: 50 TABLET, FILM COATED ORAL at 09:07

## 2017-07-29 RX ADMIN — FOLIC ACID 1 MG: 1 TABLET ORAL at 09:07

## 2017-07-29 RX ADMIN — DOLUTEGRAVIR SODIUM 50 MG: 50 TABLET, FILM COATED ORAL at 09:07

## 2017-07-29 NOTE — SUBJECTIVE & OBJECTIVE
"Interval History: Pt was seen and examined this am. Was transferred out of the ICU yesterday. Pt feels "OK", no new c/o's, no fever, no SOB. Reviewed PCP note.    Review of patient's allergies indicates:   Allergen Reactions    Iodine and iodide containing products Anaphylaxis     Pt states she coded last time she was administered contrast    Pneumococcal 23-chitra ps vaccine Anaphylaxis     Potential iodine containing    Bactrim [sulfamethoxazole-trimethoprim] Hives    Morphine Itching     Current Facility-Administered Medications   Medication Frequency    acetaminophen tablet 325 mg Q6H PRN    atovaquone suspension 750 mg Q12H    bisacodyl suppository 10 mg Daily PRN    clarithromycin tablet 500 mg Q12H    darunavir-cobicistat 800-150 mg-mg Tab 800 mg QHS    diphenhydrAMINE injection 12.5 mg Q6H PRN    docusate 50 mg/5 mL liquid 100 mg Daily    dolutegravir Tab 50 mg Daily    ethambutol tablet 800 mg Daily    famotidine tablet 20 mg Daily    folic acid tablet 1 mg Daily    lacosamide tablet 100 mg BID    metoprolol tartrate (LOPRESSOR) split tablet 12.5 mg BID    moxifloxacin 400 mg/250 mL IVPB 400 mg Q24H    ondansetron injection 4 mg Q12H PRN    oxycodone-acetaminophen 5-325 mg per tablet 1 tablet Q6H PRN    promethazine (PHENERGAN) 6.25 mg in dextrose 5 % 50 mL IVPB Q6H PRN       Objective:     Vital Signs (Most Recent):  Temp: 98 °F (36.7 °C) (07/29/17 1200)  Pulse: (!) 113 (07/29/17 1200)  Resp: 16 (07/29/17 1200)  BP: (!) 148/90 (07/29/17 1200)  SpO2: 98 % (07/29/17 1200)  O2 Device (Oxygen Therapy): room air (07/29/17 1200) Vital Signs (24h Range):  Temp:  [97.1 °F (36.2 °C)-98.1 °F (36.7 °C)] 98 °F (36.7 °C)  Pulse:  [] 113  Resp:  [16-18] 16  SpO2:  [96 %-98 %] 98 %  BP: (127-148)/(75-90) 148/90     Weight: 51.9 kg (114 lb 6.7 oz) (07/27/17 1200)  Body mass index is 22.35 kg/m².  Body surface area is 1.48 meters squared.    I/O last 3 completed shifts:  In: 1535 [P.O.:240; " NG/GT:795; IV Piggyback:500]  Out: 1485 [Urine:1485]    Physical Exam   Constitutional: She appears well-developed and well-nourished. No distress.   HENT:   Head: Normocephalic and atraumatic.   Mouth/Throat: Oropharynx is clear and moist. No oropharyngeal exudate.   Eyes: Conjunctivae and EOM are normal.   Neck: Normal range of motion. Neck supple. No JVD present. Carotid bruit is not present. No tracheal deviation present. No thyroid mass and no thyromegaly present.   Cardiovascular: Normal rate, regular rhythm, normal heart sounds and intact distal pulses.  Exam reveals no gallop and no friction rub.    No murmur heard.  Pulmonary/Chest: No respiratory distress. She has no wheezes. She exhibits no tenderness.   Abdominal: Soft. Bowel sounds are normal. She exhibits no distension, no abdominal bruit, no ascites and no mass. There is no hepatosplenomegaly. There is no tenderness. There is no rebound, no guarding and no CVA tenderness.   Musculoskeletal: Normal range of motion. She exhibits no edema or tenderness.   Lymphadenopathy:     She has no cervical adenopathy.   Neurological: No cranial nerve deficit.   Skin: Skin is warm and intact. No rash noted. No erythema. No pallor.   Psychiatric: Judgment normal.       Significant Labs:  BMP  Lab Results   Component Value Date     07/29/2017    K 4.0 07/29/2017     07/29/2017    CO2 26 07/29/2017    BUN 29 (H) 07/29/2017    CREATININE 1.2 07/29/2017    CALCIUM 8.5 (L) 07/29/2017    ANIONGAP 6 (L) 07/29/2017    ESTGFRAFRICA >60 07/29/2017    EGFRNONAA 60 07/29/2017     Lab Results   Component Value Date    WBC 6.57 07/29/2017    HGB 10.3 (L) 07/29/2017    HCT 30.5 (L) 07/29/2017    MCV 91 07/29/2017     (L) 07/29/2017       Significant Imaging: reviewed

## 2017-07-29 NOTE — PT/OT/SLP PROGRESS
Physical Therapy  Treatment    Wang Kebede   MRN: [id number]   Admitting Diagnosis: Severe sepsis    PT Received On: 07/29/17  PT Start Time: 0815     PT Stop Time: 0839    PT Total Time (min): 24 min       Billable Minutes:  Gait [id number], Therapeutic Activity 5 and Therapeutic Exercise 9    Treatment Type: Treatment  PT/PTA: PTA     PTA Visit Number: 1       General Precautions: Standard, fall  Orthopedic Precautions:     Braces:           Subjective:  Communicated with guerrero and Ricarda prior to session.      Pain/Comfort  Pain Rating 1: 0/10  Pain Rating Post-Intervention 1: 0/10    Objective:   Patient found with: peripheral IV, telemetry    Functional Mobility:  Bed Mobility:   Rolling/Turning to Left: Contact guard assistance  Rolling/Turning Right: Contact guard assistance  Scooting/Bridging: Contact Guard Assistance  Supine to Sit: Contact Guard Assistance  Sit to Supine: Contact Guard Assistance    Transfers:  Sit <> Stand Assistance: Contact Guard Assistance  Sit <> Stand Assistive Device: Rolling Walker  Toilet Transfer Technique: Stand Pivot  Toilet Transfer Assistance: Contact Guard Assistance  Toilet Transfer Assistive Device: Rolling Walker    Gait:   Gait Distance: Pt amb 80' x2 trials with RW  Assistance 1: Contact Guard Assistance  Gait Assistive Device: Rolling walker  Gait Pattern: swing-to gait  Gait Deviation(s): decreased alba, decreased step length, decreased stride length    Stairs:      Balance:   Static Sit: FAIR-: Maintains without assist but inconsistent   Dynamic Sit: FAIR+: Maintains balance through MINIMAL excursions of active trunk motion  Static Stand: FAIR: Maintains without assist but unable to take challenges  Dynamic stand: FAIR: Needs CONTACT GUARD during gait     Therapeutic Activities and Exercises:  Pt performed seated exercises. Pt received gait training on level surfaces for 80 feet x2 trials.      AM-PAC 6 CLICK MOBILITY  How much help from another person  does this patient currently need?   1 = Unable, Total/Dependent Assistance  2 = A lot, Maximum/Moderate Assistance  3 = A little, Minimum/Contact Guard/Supervision  4 = None, Modified Virgil/Independent    Turning over in bed (including adjusting bedclothes, sheets and blankets)?: 4  Sitting down on and standing up from a chair with arms (e.g., wheelchair, bedside commode, etc.): 4  Moving from lying on back to sitting on the side of the bed?: 3  Moving to and from a bed to a chair (including a wheelchair)?: 3  Need to walk in hospital room?: 3  Climbing 3-5 steps with a railing?: 1 (NT)  Total Score: 18    AM-PAC Raw Score CMS G-Code Modifier Level of Impairment Assistance   6 % Total / Unable   7 - 9 CM 80 - 100% Maximal Assist   10 - 14 CL 60 - 80% Moderate Assist   15 - 19 CK 40 - 60% Moderate Assist   20 - 22 CJ 20 - 40% Minimal Assist   23 CI 1-20% SBA / CGA   24 CH 0% Independent/ Mod I     Patient left supine with all lines intact, call button in reach and bed alarm on.        Rehab identified problem list/impairments: Rehab identified problem list/impairments: impaired endurance, weakness, impaired functional mobilty, gait instability, impaired balance, decreased safety awareness    Rehab potential is good.    Activity tolerance: Good    Discharge recommendations: Discharge Facility/Level Of Care Needs: nursing facility, skilled     Barriers to discharge:      Equipment recommendations:       GOALS:    Physical Therapy Goals        Problem: Physical Therapy Goal    Goal Priority Disciplines Outcome Goal Variances Interventions   Physical Therapy Goal     PT/OT, PT Ongoing (interventions implemented as appropriate)     Description:  PT WILL BE SEEN FOR P.T. FOR A MIN OF 5 OUT OF 7 DAYS A WEEK  LT/3/17  1. PT WILL COMPLETE BED MOBILITY WITH MIN A.   2. PT WILL T/F TO CHAIR WITH MOD A.  3. PT WILL STAND WITH RW WITH MOD A.  4. PT WILL GT TRAIN X 20' WITH RW AND MAX A  5. PT WILL COMPLETE TE X  20 REPS FOR STRENGTHENING.                    PLAN:    Patient to be seen 5 x/week  to address the above listed problems via therapeutic exercises, therapeutic activities, gait training  Plan of Care expires: 08/03/17  Plan of Care reviewed with: patient         Ghanshyam Navarro, PT  07/29/2017

## 2017-07-29 NOTE — PLAN OF CARE
Problem: Patient Care Overview  Goal: Plan of Care Review  PT REQUIRES MAX A X 2 FOR BED MOBILITY AND T/F  TO CHAIR.    Outcome: Ongoing (interventions implemented as appropriate)  Pt awake, alert, no distress noted, medications given via PEG tube, patent, intact, no drainage noted, no complications, assist x one with ADLs, IV antibiotics, normal sinus rhythm, PICC line remain in place to right brachial, standard precautions continued, vomiting episodes x 1 this a.m. After med administration, Zofran given for episodes, no episode since, pt tolerating diet well, pt and family readily participating in care.

## 2017-07-29 NOTE — SUBJECTIVE & OBJECTIVE
Interval History: stable and improved    Review of Systems   Constitutional:        Poor appetite;    HENT: Positive for sore throat.         Getting better post orotracheal tube.   Eyes: Negative.    Respiratory: Negative.    Cardiovascular: Negative.    Gastrointestinal: Negative.         Peg in place.   Endocrine: Negative.    Genitourinary: Negative.         Tinsley catheter out; no voiding difficulty.   Musculoskeletal: Negative.    Skin: Negative.         Redness of palms faint macular eruption to forearms; no itching reported   Allergic/Immunologic: Negative.    Neurological: Positive for weakness.   Hematological: Negative.    Psychiatric/Behavioral: Negative.      Objective:     Vital Signs (Most Recent):  Temp: 97.1 °F (36.2 °C) (07/29/17 0805)  Pulse: 93 (07/29/17 0805)  Resp: 16 (07/29/17 0805)  BP: 127/75 (07/29/17 0805)  SpO2: 98 % (07/29/17 0908) Vital Signs (24h Range):  Temp:  [97.1 °F (36.2 °C)-98.6 °F (37 °C)] 97.1 °F (36.2 °C)  Pulse:  [84-94] 93  Resp:  [16-25] 16  SpO2:  [96 %-99 %] 98 %  BP: (125-138)/(75-92) 127/75     Weight: 51.9 kg (114 lb 6.7 oz)  Body mass index is 22.35 kg/m².    Intake/Output Summary (Last 24 hours) at 07/29/17 1140  Last data filed at 07/29/17 0951   Gross per 24 hour   Intake              458 ml   Output                0 ml   Net              458 ml      Physical Exam    Significant Labs:   Blood Culture: No results for input(s): LABBLOO in the last 48 hours.  BMP:   Recent Labs  Lab 07/29/17  0808   GLU 81      K 4.0      CO2 26   BUN 29*   CREATININE 1.2   CALCIUM 8.5*   MG 1.6     CBC:   Recent Labs  Lab 07/28/17  0405 07/29/17  0808   WBC 6.70 6.57   HGB 10.1* 10.3*   HCT 30.0* 30.5*   PLT 70* 118*     Urine Culture: No results for input(s): LABURIN in the last 48 hours.    Significant Imaging: I have reviewed all pertinent imaging results/findings within the past 24 hours.

## 2017-07-29 NOTE — ASSESSMENT & PLAN NOTE
- PEG placed on 05/26/17 for purpose of HAART therapy -- has difficulty swallowing HIV/AIDS medications  - PEG tube site care  To continue peg tube care  Not receiving tube feds;  abdomen distended and bowel sounds are absent    7/25: continues with peg in place; functioning well    7/26: no appreciable change;  Functioning well;      7/28: facilitates her anti-retroviral therapy;     7/29 : No change in regimen;

## 2017-07-29 NOTE — ASSESSMENT & PLAN NOTE
Platelets rebounding somewhat; up to 103K from 89    7/25 counts down slightlty    7/26: remains down; continue to monitor; no active bleeding noted    7/28:  Continues low at 70 K ; will trend.    7/29: up to 118K!

## 2017-07-29 NOTE — ASSESSMENT & PLAN NOTE
30 y/o female with LELAND:         Acute renal failure        1. LELAND : Noted s Cr has increased to 4.0  LELAND now resolving  LELAND likely was multifactorial: due to hypotension, sepsis     2. Metabolic acidosis :  Improved , stopped bicarb supplements ,      3. Hypotension : was on pressor support , stopped  Doing remarkable better clinically     4. Anemia : drop in Hb noticed , received pRBC transfusion ,    h/o of sickle cell disease     5. Corrected calcium normal: no need to replace      6.  Replete K , when low       HIV (human immunodeficiency virus infection)     ID: h/o of HIV  Presented with fever, leukopenia  Sepsis suspected  Blood and urine cx's negative  On empiric abx  Agree with current mgmt   Anemia: due to sickle cell disease  S/p PRBC      Plans and recommendations:  As detailed above  Will f/u closely discussed with ICU team  Total critical care time spent 45 minutes

## 2017-07-29 NOTE — PROGRESS NOTES
[facility] Hospital Medicine  Progress Note    Patient Name: Wang Kebede  MRN: [id number]  Patient Class: IP- Inpatient   Admission Date: 7/20/2017  Length of Stay: 8 days  Attending Physician: Marcus Robertson MD  Primary Care Provider: Levi Moncada MD        Subjective:     Principal Problem:Severe sepsis    HPI:  32 y/o female presents to ED with c/o fever that onset gradually 6 days ago. PMH includes HIV. She went to her PCP on Monday and given abx. Symptoms have been intermediate and moderate. No exacerbating or mitigating factors. She denies chills, N/V/D, Cp, palpitations, hematuria, congestion, cough, dysuria, and all other symptoms. She will be observed for symptom control and ID consult under the care of hospital medicine.    Hospital Course:  Wang Kebede is a 31 year old female admitted for Fever. Upon admission pt started on IV Vancomycin and Zosyn. Blood cultures with NGTD, urine culture with multiple isolates. CT abdomen/pelvis -- enlarged edematous left kidney could reflect pyelonephritis and fluid filled bowel loops seen throughout colon, which may infectious process which the diarrhea she was experiencing has resolved. Will repeat UA due to continued fever. C-Diff negative. ID consulted for hx of HIV. Per ID, pt has AIDS but her immune system is recovering since she is now compliant with HAART. Her fever could be secondary to immune reconstitution inflammatory syndrome (IRIS). CXR and UA upon admission negative as source of infection. S/p 1 unit of PRBCs on 07/22/17, responded appropriately. Nephrotoxic medications have been discontinued including Vancomycin and Zosyn. Pt currently receiving IV Meropenum and Zyvox. Nephrology consulted, awaiting input. Overnight, pt tachypneic and tachycardiac. CXR with developing CHF and pulmonary edema. . Pt received diuretics. Discussed case with ID -- recommended discontinuing IV antibiotics and starting PO  Avelox to assist with fluid restriction. Respiratory/Metobolic acidosis noted. Due to increased work of breathing, pt transferred to ICU for closer monitoring.    7/26:  Continues with temp spikes with Tmaax 101.2 overnight.  Renal function continues to improve and we are dialing down on the vent setting; currently  A/C 24,  FIO2 40%; PEEP of 5; sats at aoo%  Planning on a tagged WBC study today;  bllod cultures continue negative;  Will continue present antibiotic regimen; viral serologies awaited.  Continued input from ID and pulmonary are appreciated.     7/27 Successfully extubated yesterday;  Doing well off oxygen at present. RR 28; Sats 100% on RA HR sinus tach at 115; Remains on low dose levophed;  No temp spikes overnight.  Indium scan previously ordered for today by pulmonary    7/28 Looks and feels much better; desirous of going home; remains afebrile;  No respiratory difficulty off the vent.  Continues with navarrete in place with good urine output and apparent recovery in renal function with creatinine  Of 1.4 today;  Indium scan done yesterday was negative. Stable lung exam by chest XRAY;    7/29 Successful move to monitored bed; ambulating length of hallway with assistance of Pt; not eating all of tray; States trays are just dropped off with no assistance offered by nursing staff; also night nurse seemed harsh to her.Remaining afebrile; no respiratory difficulty;  Labs are improved as well with recovery of renal function; hemogram and platelets.    Interval History: stable and improved    Review of Systems   Constitutional:        Poor appetite;    HENT: Positive for sore throat.         Getting better post orotracheal tube.   Eyes: Negative.    Respiratory: Negative.    Cardiovascular: Negative.    Gastrointestinal: Negative.         Peg in place.   Endocrine: Negative.    Genitourinary: Negative.         Navarrete catheter out; no voiding difficulty.   Musculoskeletal: Negative.    Skin: Negative.          Redness of palms faint macular eruption to forearms; no itching reported   Allergic/Immunologic: Negative.    Neurological: Positive for weakness.   Hematological: Negative.    Psychiatric/Behavioral: Negative.      Objective:     Vital Signs (Most Recent):  Temp: 97.1 °F (36.2 °C) (07/29/17 0805)  Pulse: 93 (07/29/17 0805)  Resp: 16 (07/29/17 0805)  BP: 127/75 (07/29/17 0805)  SpO2: 98 % (07/29/17 0908) Vital Signs (24h Range):  Temp:  [97.1 °F (36.2 °C)-98.6 °F (37 °C)] 97.1 °F (36.2 °C)  Pulse:  [84-94] 93  Resp:  [16-25] 16  SpO2:  [96 %-99 %] 98 %  BP: (125-138)/(75-92) 127/75     Weight: 51.9 kg (114 lb 6.7 oz)  Body mass index is 22.35 kg/m².    Intake/Output Summary (Last 24 hours) at 07/29/17 1140  Last data filed at 07/29/17 0951   Gross per 24 hour   Intake              458 ml   Output                0 ml   Net              458 ml      Physical Exam    Significant Labs:   Blood Culture: No results for input(s): LABBLOO in the last 48 hours.  BMP:   Recent Labs  Lab 07/29/17  0808   GLU 81      K 4.0      CO2 26   BUN 29*   CREATININE 1.2   CALCIUM 8.5*   MG 1.6     CBC:   Recent Labs  Lab 07/28/17  0405 07/29/17  0808   WBC 6.70 6.57   HGB 10.1* 10.3*   HCT 30.0* 30.5*   PLT 70* 118*     Urine Culture: No results for input(s): LABURIN in the last 48 hours.    Significant Imaging: I have reviewed all pertinent imaging results/findings within the past 24 hours.    Assessment/Plan:      Thrombocytopenia associated with AIDS      Platelets rebounding somewhat; up to 103K from 89    7/25 counts down slightlty    7/26: remains down; continue to monitor; no active bleeding noted    7/28:  Continues low at 70 K ; will trend.    7/29: up to 118K!        S/P percutaneous endoscopic gastrostomy (PEG) tube placement    - PEG placed on 05/26/17 for purpose of HAART therapy -- has difficulty swallowing HIV/AIDS medications  - PEG tube site care  To continue peg tube care  Not receiving tube feds;  abdomen  distended and bowel sounds are absent    7/25: continues with peg in place; functioning well    7/26: no appreciable change;  Functioning well;      7/28: facilitates her anti-retroviral therapy;     7/29 : No change in regimen;        Anemia    - S/p 1 unit of PRBCs, responded appropriately  - Supplemental oxygen prn, keep O2 sats > 92%  - Pt sees Dr. Olmedo (hematology/oncology), last clinic visit 07/05/17  - Per Hematology/Oncology on that clinic visit -- she has normocytic anemia and possibly due to HIV/AIDS (hx of noncompliance but now compliant). Pt reports hx of sickle cell disease but hemoglobin electrophoresis was normal on 2 separate occassions. If her anemia does not improve after 2 months of continued HAART therapy, refer back to Hematology  - Daily CBC   Continue to monitor labs; transfuse as per heme-onc    7/25 Hgb remaining brenna post transfusion    7/26:  Hgb remaining stable; continue to monitor labs.    7/27 : Hgb 6.6; to receive 2 units of packed cells today    7/28;  Tolerated transfusion yesterday: repeat Hgb 10.1 today; will continue to monitor    7/29 Hgb remains stable post transfusion; continue to monitor        S/P implantation of automatic cardioverter/defibrillator (AICD)    - ID following; continued ID presence appreciated; adjutsting antibiotic regimen  - Per ID has AIDS  - Last CD4 count on 07/10/17 -- 245  - Neutropenic on AM labs -- Hematology/Oncology consulted    7/25 Continue as per ID consultant  Continue to monitor labs    7/26: WBC count rebounding; continues with temp spikes; cultures remain negative. Viral serologies awaited;  For tagged wbc study today  7/27:  WBC improving steadily; HGB  Down , will transfuse 2 units todayTachycardia and hypotensive (at her baseline); continues on pressor support with levophed;  BP 99/38  Serial lactic acids; continue to monitor labs  IVF's with bicarbonate presently at 40cc/hr  Continue metoprolol for heart rate; continues tachy at  127    7/25 HR now ~105   BP stable ;  coming down off levophed at present  7/26: No change; rate has improved low 100's now    7/27: Continues in low 100's;  Weaning down from levophed    7/28;  Off levophed today; to move to med-surg today.  7/29 Continue to monitor; HR in the 90's now;        HIV (human immunodeficiency virus infection)    - ID following; continued ID presence appreciated; adjutsting antibiotic regimen  - Per ID has AIDS  - Last CD4 count on 07/10/17 -- 245  - Neutropenic on AM labs -- Hematology/Oncology consulted    7/25 Continue as per ID consultant  Continue to monitor labs    7/26: WBC count rebounding; continues with temp spikes; cultures remain negative. Viral serologies awaited;  For tagged wbc study today  7/27:  WBC improving steadily; HGB  Down , will transfuse 2 units today  7/28:  Received 2 units PRBC's yesterday; will continue to monitor  Continue anti-retroviral therapy as per ID consultant.  Possible move to med-surg floor today.  7/29 Continue HAART therapy and observe as per ID consultant.        * Severe sepsis    7/29 Signs of recovery; afebrile now,  Continue to monitor hemogram; cultures etc.            VTE Risk Mitigation         Ordered     Place sequential compression device  Until discontinued      07/27/17 1125     Medium Risk of VTE  Once      07/20/17 1989          Marcus Kearney MD  Department of Hospital Medicine   [facility]

## 2017-07-29 NOTE — PLAN OF CARE
Problem: Physical Therapy Goal  Goal: Physical Therapy Goal  PT WILL BE SEEN FOR P.T. FOR A MIN OF 5 OUT OF 7 DAYS A WEEK  LT/3/17  1. PT WILL COMPLETE BED MOBILITY WITH MIN A.   2. PT WILL T/F TO CHAIR WITH MOD A.  3. PT WILL STAND WITH RW WITH MOD A.  4. PT WILL GT TRAIN X 20' WITH RW AND MAX A  5. PT WILL COMPLETE TE X 20 REPS FOR STRENGTHENING.   Outcome: Ongoing (interventions implemented as appropriate)  Pt ambulated in garza for 80 feet x2 trials with RW and CGA. Pt performed seated exercises then transferred to bathroom commode. Pt was left in bed with all needs with in reach.

## 2017-07-29 NOTE — PROGRESS NOTES
"[facility]   Nephrology  Progress Note    Patient Name: Wang Kebede  MRN: [id number]  Admission Date: 7/20/2017  Hospital Length of Stay: 8 days  Attending Provider: Marcus Robertson MD   Primary Care Physician: Levi Moncada MD  Principal Problem:Severe sepsis and LELAND    Subjective:     HPI: Wang Kebede is a 31 y.o.  AA woman  with history of HIV/AIDS, vulvar cancer, anemia, sickle cell disease, cervical cancer, chronic abdominal pain was admitted to [facility] about 2 days ago for fever , she was started on broad spectrum abx , her serum bicarb dropped from 19 on admission to 7 today , lactate level normal, serum Cr was normal about 2 weeks ago, creatinine increased to 3 today, we were consulted for LELAND , Metabolic acidosis,       Interval History: Pt was seen and examined this am. Was transferred out of the ICU yesterday. Pt feels "OK", no new c/o's, no fever, no SOB. Reviewed PCP note.    Review of patient's allergies indicates:   Allergen Reactions    Iodine and iodide containing products Anaphylaxis     Pt states she coded last time she was administered contrast    Pneumococcal 23-chitra ps vaccine Anaphylaxis     Potential iodine containing    Bactrim [sulfamethoxazole-trimethoprim] Hives    Morphine Itching     Current Facility-Administered Medications   Medication Frequency    acetaminophen tablet 325 mg Q6H PRN    atovaquone suspension 750 mg Q12H    bisacodyl suppository 10 mg Daily PRN    clarithromycin tablet 500 mg Q12H    darunavir-cobicistat 800-150 mg-mg Tab 800 mg QHS    diphenhydrAMINE injection 12.5 mg Q6H PRN    docusate 50 mg/5 mL liquid 100 mg Daily    dolutegravir Tab 50 mg Daily    ethambutol tablet 800 mg Daily    famotidine tablet 20 mg Daily    folic acid tablet 1 mg Daily    lacosamide tablet 100 mg BID    metoprolol tartrate (LOPRESSOR) split tablet 12.5 mg BID    moxifloxacin 400 mg/250 mL IVPB 400 mg Q24H    ondansetron " injection 4 mg Q12H PRN    oxycodone-acetaminophen 5-325 mg per tablet 1 tablet Q6H PRN    promethazine (PHENERGAN) 6.25 mg in dextrose 5 % 50 mL IVPB Q6H PRN       Objective:     Vital Signs (Most Recent):  Temp: 98 °F (36.7 °C) (07/29/17 1200)  Pulse: (!) 113 (07/29/17 1200)  Resp: 16 (07/29/17 1200)  BP: (!) 148/90 (07/29/17 1200)  SpO2: 98 % (07/29/17 1200)  O2 Device (Oxygen Therapy): room air (07/29/17 1200) Vital Signs (24h Range):  Temp:  [97.1 °F (36.2 °C)-98.1 °F (36.7 °C)] 98 °F (36.7 °C)  Pulse:  [] 113  Resp:  [16-18] 16  SpO2:  [96 %-98 %] 98 %  BP: (127-148)/(75-90) 148/90     Weight: 51.9 kg (114 lb 6.7 oz) (07/27/17 1200)  Body mass index is 22.35 kg/m².  Body surface area is 1.48 meters squared.    I/O last 3 completed shifts:  In: 1535 [P.O.:240; NG/GT:795; IV Piggyback:500]  Out: 1485 [Urine:1485]    Physical Exam   Constitutional: She appears well-developed and well-nourished. No distress.   HENT:   Head: Normocephalic and atraumatic.   Mouth/Throat: Oropharynx is clear and moist. No oropharyngeal exudate.   Eyes: Conjunctivae and EOM are normal.   Neck: Normal range of motion. Neck supple. No JVD present. Carotid bruit is not present. No tracheal deviation present. No thyroid mass and no thyromegaly present.   Cardiovascular: Normal rate, regular rhythm, normal heart sounds and intact distal pulses.  Exam reveals no gallop and no friction rub.    No murmur heard.  Pulmonary/Chest: No respiratory distress. She has no wheezes. She exhibits no tenderness.   Abdominal: Soft. Bowel sounds are normal. She exhibits no distension, no abdominal bruit, no ascites and no mass. There is no hepatosplenomegaly. There is no tenderness. There is no rebound, no guarding and no CVA tenderness.   Musculoskeletal: Normal range of motion. She exhibits no edema or tenderness.   Lymphadenopathy:     She has no cervical adenopathy.   Neurological: No cranial nerve deficit.   Skin: Skin is warm and intact. No  rash noted. No erythema. No pallor.   Psychiatric: Judgment normal.       Significant Labs:  BMP  Lab Results   Component Value Date     07/29/2017    K 4.0 07/29/2017     07/29/2017    CO2 26 07/29/2017    BUN 29 (H) 07/29/2017    CREATININE 1.2 07/29/2017    CALCIUM 8.5 (L) 07/29/2017    ANIONGAP 6 (L) 07/29/2017    ESTGFRAFRICA >60 07/29/2017    EGFRNONAA 60 07/29/2017     Lab Results   Component Value Date    WBC 6.57 07/29/2017    HGB 10.3 (L) 07/29/2017    HCT 30.5 (L) 07/29/2017    MCV 91 07/29/2017     (L) 07/29/2017       Significant Imaging: reviewed    Assessment/Plan:         30 y/o female with LELAND and sepsis      Acute renal failure        1. LELAND : marked improvement in renal function.  LELAND now resolving (Cr 4.0 to 1.2)  LELAND likely was multifactorial: due to hypotension, sepsis  Doing much better clinically, stable     2. Metabolic acidosis :  Improved , stopped bicarb supplements ,   s Bicarbonate now normal range     3. Hypotension : resolved. BP not low at this point, stable  Was on pressor support in ICU  Doing better clinically     4. Anemia : s/p PRBC transfusion ,    h/o of sickle cell disease  H/H is stable     5. Corrected calcium normal: no need to replace      6.  Replete K , when low       HIV (human immunodeficiency virus infection)     ID: h/o of HIV  Presented with fever, leukopenia  Sepsis suspected  Blood and urine cx's negative  On empiric abx  Agree with current mgmt     Anemia: due to sickle cell disease  S/p PRBC  H/H stable      Plans and recommendations:  As detailed above  Will f/u closely discussed.        Allyson Brady MD  Nephrology  [facility]

## 2017-07-29 NOTE — ASSESSMENT & PLAN NOTE
- ID following; continued ID presence appreciated; adjutsting antibiotic regimen  - Per ID has AIDS  - Last CD4 count on 07/10/17 -- 245  - Neutropenic on AM labs -- Hematology/Oncology consulted    7/25 Continue as per ID consultant  Continue to monitor labs    7/26: WBC count rebounding; continues with temp spikes; cultures remain negative. Viral serologies awaited;  For tagged wbc study today  7/27:  WBC improving steadily; HGB  Down , will transfuse 2 units todayTachycardia and hypotensive (at her baseline); continues on pressor support with levophed;  BP 99/38  Serial lactic acids; continue to monitor labs  IVF's with bicarbonate presently at 40cc/hr  Continue metoprolol for heart rate; continues tachy at 127    7/25 HR now ~105   BP stable ;  coming down off levophed at present  7/26: No change; rate has improved low 100's now    7/27: Continues in low 100's;  Weaning down from levophed    7/28;  Off levophed today; to move to med-surg today.  7/29 Continue to monitor; HR in the 90's now;

## 2017-07-29 NOTE — PLAN OF CARE
Problem: Patient Care Overview  Goal: Plan of Care Review  PT REQUIRES MAX A X 2 FOR BED MOBILITY AND T/F  TO CHAIR.    Outcome: Ongoing (interventions implemented as appropriate)  The patient is sinus rhythm on the monitor. Pt has PICC to R brachial. Pt refused medications. Pt tolerating soft renal diet. Pt has had an uneventful night and is resting quietly, will continue to monitor.

## 2017-07-29 NOTE — ASSESSMENT & PLAN NOTE
- ID following; continued ID presence appreciated; adjutsting antibiotic regimen  - Per ID has AIDS  - Last CD4 count on 07/10/17 -- 245  - Neutropenic on AM labs -- Hematology/Oncology consulted    7/25 Continue as per ID consultant  Continue to monitor labs    7/26: WBC count rebounding; continues with temp spikes; cultures remain negative. Viral serologies awaited;  For tagged wbc study today  7/27:  WBC improving steadily; HGB  Down , will transfuse 2 units today  7/28:  Received 2 units PRBC's yesterday; will continue to monitor  Continue anti-retroviral therapy as per ID consultant.  Possible move to med-surg floor today.  7/29 Continue HAART therapy and observe as per ID consultant.

## 2017-07-30 LAB
ANISOCYTOSIS BLD QL SMEAR: ABNORMAL
BASOPHILS # BLD AUTO: 0.02 K/UL
BASOPHILS NFR BLD: 0.4 %
DIFFERENTIAL METHOD: ABNORMAL
EOSINOPHIL # BLD AUTO: 0.1 K/UL
EOSINOPHIL NFR BLD: 1 %
ERYTHROCYTE [DISTWIDTH] IN BLOOD BY AUTOMATED COUNT: 19.9 %
HCT VFR BLD AUTO: 28.7 %
HGB BLD-MCNC: 9.6 G/DL
HYPOCHROMIA BLD QL SMEAR: ABNORMAL
LYMPHOCYTES # BLD AUTO: 4.2 K/UL
LYMPHOCYTES NFR BLD: 79.1 %
MAGNESIUM SERPL-MCNC: 1.8 MG/DL
MCH RBC QN AUTO: 30.6 PG
MCHC RBC AUTO-ENTMCNC: 33.4 G/DL
MCV RBC AUTO: 91 FL
MONOCYTES # BLD AUTO: 0.5 K/UL
MONOCYTES NFR BLD: 8.6 %
NEUTROPHILS # BLD AUTO: 0.6 K/UL
NEUTROPHILS NFR BLD: 11.1 %
PLATELET # BLD AUTO: 162 K/UL
PLATELET BLD QL SMEAR: ABNORMAL
PMV BLD AUTO: 10 FL
RBC # BLD AUTO: 3.14 M/UL
SPHEROCYTES BLD QL SMEAR: ABNORMAL
WBC # BLD AUTO: 5.26 K/UL

## 2017-07-30 PROCEDURE — 63600175 PHARM REV CODE 636 W HCPCS: Performed by: EMERGENCY MEDICINE

## 2017-07-30 PROCEDURE — 25000003 PHARM REV CODE 250: Performed by: PHYSICIAN ASSISTANT

## 2017-07-30 PROCEDURE — 97116 GAIT TRAINING THERAPY: CPT

## 2017-07-30 PROCEDURE — 63600175 PHARM REV CODE 636 W HCPCS: Performed by: INTERNAL MEDICINE

## 2017-07-30 PROCEDURE — 25000003 PHARM REV CODE 250: Performed by: NURSE PRACTITIONER

## 2017-07-30 PROCEDURE — 94761 N-INVAS EAR/PLS OXIMETRY MLT: CPT

## 2017-07-30 PROCEDURE — 83735 ASSAY OF MAGNESIUM: CPT

## 2017-07-30 PROCEDURE — 25000003 PHARM REV CODE 250: Performed by: INTERNAL MEDICINE

## 2017-07-30 PROCEDURE — 85025 COMPLETE CBC W/AUTO DIFF WBC: CPT

## 2017-07-30 PROCEDURE — 99233 SBSQ HOSP IP/OBS HIGH 50: CPT | Mod: ,,, | Performed by: INTERNAL MEDICINE

## 2017-07-30 PROCEDURE — 21400001 HC TELEMETRY ROOM

## 2017-07-30 RX ADMIN — LACOSAMIDE 100 MG: 50 TABLET, FILM COATED ORAL at 08:07

## 2017-07-30 RX ADMIN — Medication 12.5 MG: at 11:07

## 2017-07-30 RX ADMIN — OXYCODONE HYDROCHLORIDE AND ACETAMINOPHEN 1 TABLET: 5; 325 TABLET ORAL at 07:07

## 2017-07-30 RX ADMIN — ETHAMBUTOL HYDROCHLORIDE 800 MG: 400 TABLET, FILM COATED ORAL at 11:07

## 2017-07-30 RX ADMIN — CLARITHROMYCIN 500 MG: 500 TABLET, FILM COATED ORAL at 08:07

## 2017-07-30 RX ADMIN — MOXIFLOXACIN HYDROCHLORIDE 400 MG: 400 INJECTION, SOLUTION INTRAVENOUS at 02:07

## 2017-07-30 RX ADMIN — Medication 12.5 MG: at 08:07

## 2017-07-30 RX ADMIN — ONDANSETRON 4 MG: 2 INJECTION INTRAMUSCULAR; INTRAVENOUS at 10:07

## 2017-07-30 RX ADMIN — FOLIC ACID 1 MG: 1 TABLET ORAL at 11:07

## 2017-07-30 RX ADMIN — LACOSAMIDE 100 MG: 50 TABLET, FILM COATED ORAL at 11:07

## 2017-07-30 RX ADMIN — ATOVAQUONE 750 MG: 750 SUSPENSION ORAL at 11:07

## 2017-07-30 RX ADMIN — DOLUTEGRAVIR SODIUM 50 MG: 50 TABLET, FILM COATED ORAL at 11:07

## 2017-07-30 RX ADMIN — ONDANSETRON 4 MG: 2 INJECTION INTRAMUSCULAR; INTRAVENOUS at 09:07

## 2017-07-30 RX ADMIN — CLARITHROMYCIN 500 MG: 500 TABLET, FILM COATED ORAL at 11:07

## 2017-07-30 RX ADMIN — FAMOTIDINE 20 MG: 20 TABLET ORAL at 11:07

## 2017-07-30 RX ADMIN — ATOVAQUONE 750 MG: 750 SUSPENSION ORAL at 08:07

## 2017-07-30 NOTE — ASSESSMENT & PLAN NOTE
32 y/o female with LELAND and sepsis        Acute renal failure        1. LELAND : marked improvement in renal function.  LELAND now resolving (Cr 4.0 to 1.2)  LELAND likely was multifactorial: due to hypotension, sepsis  Doing much better clinically, stable     2. Metabolic acidosis :  Improved , stopped bicarb supplements ,   s Bicarbonate now normal range     3. Hypotension : resolved. BP not low at this point, stable  Was on pressor support in ICU  Doing better clinically     4. Anemia : s/p PRBC transfusion ,    h/o of sickle cell disease  H/H is stable     5. Corrected calcium normal: no need to replace      6.  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

## 2017-07-30 NOTE — PT/OT/SLP PROGRESS
Physical Therapy  Treatment    Wang Kebede   MRN: [id number]   Admitting Diagnosis: Severe sepsis       PT Start Time: 1130     PT Stop Time: 1155    PT Total Time (min): 25 min       Billable Minutes:  Gait [id number]    Treatment Type: Treatment  PT/PTA: PTA     PTA Visit Number: 2       General Precautions: Standard, fall  Orthopedic Precautions: N/A   Braces:           Subjective:  Communicated with nsg prior to session.      Pain/Comfort  Pain Rating 1: 0/10  Pain Rating Post-Intervention 1: 0/10    Objective:   Patient found with: peripheral IV, telemetry    Functional Mobility:  Bed Mobility:   Rolling/Turning to Left: Supervision  Rolling/Turning Right: Supervision  Scooting/Bridging: Supervision    Transfers:  Sit <> Stand Assistance: Stand By Assistance  Sit <> Stand Assistive Device: No Assistive Device    Gait:   Gait Distance: 100'x2  Assistance 1: Contact Guard Assistance  Gait Assistive Device: Rolling walker  Gait Deviation(s): decreased alba    Stairs:      Balance:   Static Sit: GOOD+: Takes MAXIMAL challenges from all directions.    Dynamic Sit: GOOD+: Maintains balance through MAXIMAL excursions of active trunk motion  Static Stand: FAIR+: Takes MINIMAL challenges from all directions  Dynamic stand: FAIR+: Needs CLOSE SUPERVISION during gait and is able to right self with minor LOB     Therapeutic Activities and Exercises:       AM-PAC 6 CLICK MOBILITY  How much help from another person does this patient currently need?   1 = Unable, Total/Dependent Assistance  2 = A lot, Maximum/Moderate Assistance  3 = A little, Minimum/Contact Guard/Supervision  4 = None, Modified [place]/Independent         AM-PAC Raw Score CMS G-Code Modifier Level of Impairment Assistance   6 % Total / Unable   7 - 9 CM 80 - 100% Maximal Assist   10 - 14 CL 60 - 80% Moderate Assist   15 - 19 CK 40 - 60% Moderate Assist   20 - 22 CJ 20 - 40% Minimal Assist   23 CI 1-20% SBA / CGA   24 CH 0%  Independent/ Mod I     Patient left up in chair with all lines intact and call button in reach.    Assessment:  Wang Kebede is a 31 y.o. female with a medical diagnosis of Severe sepsis and presents with .    Rehab identified problem list/impairments: Rehab identified problem list/impairments: weakness    Rehab potential is good.    Activity tolerance: Good    Discharge recommendations: Discharge Facility/Level Of Care Needs: home health PT     Barriers to discharge: Barriers to Discharge: None    Equipment recommendations: Equipment Needed After Discharge: walker, rolling     GOALS:    Physical Therapy Goals        Problem: Physical Therapy Goal    Goal Priority Disciplines Outcome Goal Variances Interventions   Physical Therapy Goal     PT/OT, PT Ongoing (interventions implemented as appropriate)     Description:  PT WILL BE SEEN FOR P.T. FOR A MIN OF 5 OUT OF 7 DAYS A WEEK  LT/3/17  1. PT WILL COMPLETE BED MOBILITY WITH MIN A.   2. PT WILL T/F TO CHAIR WITH MOD A.  3. PT WILL STAND WITH RW WITH MOD A.  4. PT WILL GT TRAIN X 20' WITH RW AND MAX A  5. PT WILL COMPLETE TE X 20 REPS FOR STRENGTHENING.                    PLAN:    Patient to be seen 5 x/week  to address the above listed problems via gait training, therapeutic activities, therapeutic exercises  Plan of Care expires: 17  Plan of Care reviewed with: patient, spouse         Emanuel Steinberg, PTA  2017

## 2017-07-30 NOTE — PROGRESS NOTES
[facility] Hospital Medicine  Progress Note    Patient Name: Wang Kebede  MRN: [id number]  Patient Class: IP- Inpatient   Admission Date: 7/20/2017  Length of Stay: 9 days  Attending Physician: Marcus Robertson MD  Primary Care Provider: Levi Moncada MD        Subjective:     Principal Problem:Severe sepsis    HPI:  30 y/o female presents to ED with c/o fever that onset gradually 6 days ago. PMH includes HIV. She went to her PCP on Monday and given abx. Symptoms have been intermediate and moderate. No exacerbating or mitigating factors. She denies chills, N/V/D, Cp, palpitations, hematuria, congestion, cough, dysuria, and all other symptoms. She will be observed for symptom control and ID consult under the care of hospital medicine.    Hospital Course:  Wang Kebede is a 31 year old female admitted for Fever. Upon admission pt started on IV Vancomycin and Zosyn. Blood cultures with NGTD, urine culture with multiple isolates. CT abdomen/pelvis -- enlarged edematous left kidney could reflect pyelonephritis and fluid filled bowel loops seen throughout colon, which may infectious process which the diarrhea she was experiencing has resolved. Will repeat UA due to continued fever. C-Diff negative. ID consulted for hx of HIV. Per ID, pt has AIDS but her immune system is recovering since she is now compliant with HAART. Her fever could be secondary to immune reconstitution inflammatory syndrome (IRIS). CXR and UA upon admission negative as source of infection. S/p 1 unit of PRBCs on 07/22/17, responded appropriately. Nephrotoxic medications have been discontinued including Vancomycin and Zosyn. Pt currently receiving IV Meropenum and Zyvox. Nephrology consulted, awaiting input. Overnight, pt tachypneic and tachycardiac. CXR with developing CHF and pulmonary edema. . Pt received diuretics. Discussed case with ID -- recommended discontinuing IV antibiotics and starting PO  Avelox to assist with fluid restriction. Respiratory/Metobolic acidosis noted. Due to increased work of breathing, pt transferred to ICU for closer monitoring.    7/26:  Continues with temp spikes with Tmaax 101.2 overnight.  Renal function continues to improve and we are dialing down on the vent setting; currently  A/C 24,  FIO2 40%; PEEP of 5; sats at aoo%  Planning on a tagged WBC study today;  bllod cultures continue negative;  Will continue present antibiotic regimen; viral serologies awaited.  Continued input from ID and pulmonary are appreciated.     7/27 Successfully extubated yesterday;  Doing well off oxygen at present. RR 28; Sats 100% on RA HR sinus tach at 115; Remains on low dose levophed;  No temp spikes overnight.  Indium scan previously ordered for today by pulmonary    7/28 Looks and feels much better; desirous of going home; remains afebrile;  No respiratory difficulty off the vent.  Continues with navarrete in place with good urine output and apparent recovery in renal function with creatinine  Of 1.4 today;  Indium scan done yesterday was negative. Stable lung exam by chest XRAY;    7/29 Successful move to monitored bed; ambulating length of hallway with assistance of Pt; not eating all of tray; States trays are just dropped off with no assistance offered by nursing staff; also night nurse seemed harsh to her.Remaining afebrile; no respiratory difficulty;  Labs are improved as well with recovery of renal function; hemogram and platelets.    7/20 had episode of vomiting this morning; however no nausea or abdominal pain reported; exam benign as no pain or tenderness on palpation;  Peg tube looks fine. Navarrete remains in place.    Interval History: Reports episode of vomiting this morning; no abdominal pain or nausea reported;  Tolerating her diet; complains of feeling hungry.    Review of Systems   Constitutional: Positive for fatigue.   HENT: Negative for sore throat.    Eyes: Negative.     Respiratory: Negative.    Cardiovascular: Negative.    Gastrointestinal: Positive for vomiting.   Endocrine: Negative.    Genitourinary:        To remove navarrete today and observe for voiding difficulty.   Musculoskeletal: Negative.    Skin: Negative for rash.   Allergic/Immunologic: Negative.    Neurological: Positive for weakness.   Hematological: Negative.    Psychiatric/Behavioral: Negative.      Objective:     Vital Signs (Most Recent):  Temp: 97.6 °F (36.4 °C) (07/30/17 1200)  Pulse: 76 (07/30/17 1200)  Resp: 18 (07/30/17 1200)  BP: 131/82 (07/30/17 1200)  SpO2: 97 % (07/30/17 1334) Vital Signs (24h Range):  Temp:  [96.7 °F (35.9 °C)-98.7 °F (37.1 °C)] 97.6 °F (36.4 °C)  Pulse:  [74-78] 76  Resp:  [16-18] 18  SpO2:  [93 %-100 %] 97 %  BP: (131-160)/(82-95) 131/82     Weight: 51.9 kg (114 lb 6.7 oz)  Body mass index is 22.35 kg/m².    Intake/Output Summary (Last 24 hours) at 07/30/17 1415  Last data filed at 07/30/17 0000   Gross per 24 hour   Intake              850 ml   Output                0 ml   Net              850 ml      Physical Exam    Significant Labs:   Blood Culture: No results for input(s): LABBLOO in the last 48 hours.  BMP:   Recent Labs  Lab 07/29/17  0808 07/30/17  0804   GLU 81  --      --    K 4.0  --      --    CO2 26  --    BUN 29*  --    CREATININE 1.2  --    CALCIUM 8.5*  --    MG 1.6 1.8     CBC:   Recent Labs  Lab 07/29/17  0808 07/30/17  0804   WBC 6.57 5.26   HGB 10.3* 9.6*   HCT 30.5* 28.7*   * 162     Urine Culture: No results for input(s): LABURIN in the last 48 hours.    Significant Imaging: I have reviewed all pertinent imaging results/findings within the past 24 hours.    Assessment/Plan:      Thrombocytopenia associated with AIDS      Platelets rebounding somewhat; up to 103K from 89    7/25 counts down slightlty    7/26: remains down; continue to monitor; no active bleeding noted    7/28:  Continues low at 70 K ; will trend.    7/29: up to 118K!    7/30:  Resolved.        S/P percutaneous endoscopic gastrostomy (PEG) tube placement    - PEG placed on 05/26/17 for purpose of HAART therapy -- has difficulty swallowing HIV/AIDS medications  - PEG tube site care  To continue peg tube care  Not receiving tube feds;  abdomen distended and bowel sounds are absent    7/25: continues with peg in place; functioning well    7/26: no appreciable change;  Functioning well;      7/28: facilitates her anti-retroviral therapy;     7/29 : No change in regimen;  7/30; continue present regimen.  May need a swallow study; will defer to Dr. Gramajo of ID service.  Peg has facilitated compliance with her HAART therapy.        LELAND (acute kidney injury)    Resolved.          Anemia    - S/p 1 unit of PRBCs, responded appropriately  - Supplemental oxygen prn, keep O2 sats > 92%  - Pt sees Dr. Olmedo (hematology/oncology), last clinic visit 07/05/17  - Per Hematology/Oncology on that clinic visit -- she has normocytic anemia and possibly due to HIV/AIDS (hx of noncompliance but now compliant). Pt reports hx of sickle cell disease but hemoglobin electrophoresis was normal on 2 separate occassions. If her anemia does not improve after 2 months of continued HAART therapy, refer back to Hematology  - Daily CBC   Continue to monitor labs; transfuse as per heme-onc    7/25 Hgb remaining brenna post transfusion    7/26:  Hgb remaining stable; continue to monitor labs.    7/27 : Hgb 6.6; to receive 2 units of packed cells today    7/28;  Tolerated transfusion yesterday: repeat Hgb 10.1 today; will continue to monitor    7/29 Hgb remains stable post transfusion; continue to monitor    7/30 Hgb remain stable. Continue to monitor        S/P implantation of automatic cardioverter/defibrillator (AICD)    - ID following; continued ID presence appreciated; adjutsting antibiotic regimen  - Per ID has AIDS  - Last CD4 count on 07/10/17 -- 245  - Neutropenic on AM labs -- Hematology/Oncology consulted    7/25  Continue as per ID consultant  Continue to monitor labs    7/26: WBC count rebounding; continues with temp spikes; cultures remain negative. Viral serologies awaited;  For tagged wbc study today  7/27:  WBC improving steadily; HGB  Down , will transfuse 2 units todayTachycardia and hypotensive (at her baseline); continues on pressor support with levophed;  BP 99/38  Serial lactic acids; continue to monitor labs  IVF's with bicarbonate presently at 40cc/hr  Continue metoprolol for heart rate; continues tachy at 127    7/25 HR now ~105   BP stable ;  coming down off levophed at present  7/26: No change; rate has improved low 100's now    7/27: Continues in low 100's;  Weaning down from levophed    7/28;  Off levophed today; to move to med-surg today.  7/29 Continue to monitor; HR in the 90's now;  7/30 stable clinically.        HIV (human immunodeficiency virus infection)    - ID following; continued ID presence appreciated; adjutsting antibiotic regimen  - Per ID has AIDS  - Last CD4 count on 07/10/17 -- 245  - Neutropenic on AM labs -- Hematology/Oncology consulted    7/25 Continue as per ID consultant  Continue to monitor labs    7/26: WBC count rebounding; continues with temp spikes; cultures remain negative. Viral serologies awaited;  For tagged wbc study today  7/27:  WBC improving steadily; HGB  Down , will transfuse 2 units today  7/28:  Received 2 units PRBC's yesterday; will continue to monitor  Continue anti-retroviral therapy as per ID consultant.  Possible move to med-surg floor today.  7/29 Continue HAART therapy and observe as per ID consultant.  7/30 To continue present regimen; continue as per ID consultant.        * Severe sepsis    7/29 Signs of recovery; afebrile now,  Continue to monitor hemogram; cultures etc.    7/30 afebrile now; WBC have rebounded, has recovered renal function;  and cultures are negative.          VTE Risk Mitigation         Ordered     Place sequential compression device   Until discontinued      07/27/17 1125     Medium Risk of VTE  Once      07/20/17 5134          Marcus Kearney MD  Department of Hospital Medicine   [facility]

## 2017-07-30 NOTE — ASSESSMENT & PLAN NOTE
- PEG placed on 05/26/17 for purpose of HAART therapy -- has difficulty swallowing HIV/AIDS medications  - PEG tube site care  To continue peg tube care  Not receiving tube feds;  abdomen distended and bowel sounds are absent    7/25: continues with peg in place; functioning well    7/26: no appreciable change;  Functioning well;      7/28: facilitates her anti-retroviral therapy;     7/29 : No change in regimen;  7/30; continue present regimen.  May need a swallow study; will defer to Dr. Gramajo of ID service.  Peg has facilitated compliance with her HAART therapy.

## 2017-07-30 NOTE — SUBJECTIVE & OBJECTIVE
Interval History: Reports episode of vomiting this morning; no abdominal pain or nausea reported;  Tolerating her diet; complains of feeling hungry.    Review of Systems   Constitutional: Positive for fatigue.   HENT: Negative for sore throat.    Eyes: Negative.    Respiratory: Negative.    Cardiovascular: Negative.    Gastrointestinal: Positive for vomiting.   Endocrine: Negative.    Genitourinary:        To remove navarrete today and observe for voiding difficulty.   Musculoskeletal: Negative.    Skin: Negative for rash.   Allergic/Immunologic: Negative.    Neurological: Positive for weakness.   Hematological: Negative.    Psychiatric/Behavioral: Negative.      Objective:     Vital Signs (Most Recent):  Temp: 97.6 °F (36.4 °C) (07/30/17 1200)  Pulse: 76 (07/30/17 1200)  Resp: 18 (07/30/17 1200)  BP: 131/82 (07/30/17 1200)  SpO2: 97 % (07/30/17 1334) Vital Signs (24h Range):  Temp:  [96.7 °F (35.9 °C)-98.7 °F (37.1 °C)] 97.6 °F (36.4 °C)  Pulse:  [74-78] 76  Resp:  [16-18] 18  SpO2:  [93 %-100 %] 97 %  BP: (131-160)/(82-95) 131/82     Weight: 51.9 kg (114 lb 6.7 oz)  Body mass index is 22.35 kg/m².    Intake/Output Summary (Last 24 hours) at 07/30/17 1415  Last data filed at 07/30/17 0000   Gross per 24 hour   Intake              850 ml   Output                0 ml   Net              850 ml      Physical Exam    Significant Labs:   Blood Culture: No results for input(s): LABBLOO in the last 48 hours.  BMP:   Recent Labs  Lab 07/29/17  0808 07/30/17  0804   GLU 81  --      --    K 4.0  --      --    CO2 26  --    BUN 29*  --    CREATININE 1.2  --    CALCIUM 8.5*  --    MG 1.6 1.8     CBC:   Recent Labs  Lab 07/29/17  0808 07/30/17  0804   WBC 6.57 5.26   HGB 10.3* 9.6*   HCT 30.5* 28.7*   * 162     Urine Culture: No results for input(s): LABURIN in the last 48 hours.    Significant Imaging: I have reviewed all pertinent imaging results/findings within the past 24 hours.

## 2017-07-30 NOTE — PROGRESS NOTES
[facility] -   Nephrology  Progress Note    Patient Name: Wang Kebede  MRN: [id number]  Admission Date: 7/20/2017  Hospital Length of Stay: 9 days  Attending Provider: Marcus Robertson MD   Primary Care Physician: Levi Moncada MD  Principal Problem:Severe sepsis and LELAND    Subjective:     HPI: Wang Kebede is a 31 y.o.  AA woman  with history of HIV/AIDS, vulvar cancer, anemia, sickle cell disease, cervical cancer, chronic abdominal pain was admitted to [facility] [place] about 2 days ago for fever , she was started on broad spectrum abx , her serum bicarb dropped from 19 on admission to 7 today , lactate level normal, serum Cr was normal about 2 weeks ago, creatinine increased to 3 today, we were consulted for LELAND , Metabolic acidosis,         Interval History: Pt was seen and examined this am. No new c/o's, stable last pm.     Review of patient's allergies indicates:   Allergen Reactions    Iodine and iodide containing products Anaphylaxis     Pt states she coded last time she was administered contrast    Pneumococcal 23-chitra ps vaccine Anaphylaxis     Potential iodine containing    Bactrim [sulfamethoxazole-trimethoprim] Hives    Morphine Itching     Current Facility-Administered Medications   Medication Frequency    acetaminophen tablet 325 mg Q6H PRN    atovaquone suspension 750 mg Q12H    bisacodyl suppository 10 mg Daily PRN    clarithromycin tablet 500 mg Q12H    darunavir-cobicistat 800-150 mg-mg Tab 800 mg QHS    diphenhydrAMINE injection 12.5 mg Q6H PRN    docusate 50 mg/5 mL liquid 100 mg Daily    dolutegravir Tab 50 mg Daily    ethambutol tablet 800 mg Daily    famotidine tablet 20 mg Daily    folic acid tablet 1 mg Daily    lacosamide tablet 100 mg BID    metoprolol tartrate (LOPRESSOR) split tablet 12.5 mg BID    moxifloxacin 400 mg/250 mL IVPB 400 mg Q24H    ondansetron injection 4 mg Q12H PRN    oxycodone-acetaminophen 5-325 mg per tablet 1 tablet  Q6H PRN    promethazine (PHENERGAN) 6.25 mg in dextrose 5 % 50 mL IVPB Q6H PRN       Objective:     Vital Signs (Most Recent):  Temp: 97.6 °F (36.4 °C) (07/30/17 1200)  Pulse: 76 (07/30/17 1200)  Resp: 18 (07/30/17 1200)  BP: 131/82 (07/30/17 1200)  SpO2: 97 % (07/30/17 1334)  O2 Device (Oxygen Therapy): room air (07/30/17 1334) Vital Signs (24h Range):  Temp:  [96.7 °F (35.9 °C)-98.7 °F (37.1 °C)] 97.6 °F (36.4 °C)  Pulse:  [75-78] 76  Resp:  [18] 18  SpO2:  [93 %-100 %] 97 %  BP: (131-150)/(82-91) 131/82     Weight: 51.9 kg (114 lb 6.7 oz) (07/27/17 1200)  Body mass index is 22.35 kg/m².  Body surface area is 1.48 meters squared.    I/O last 3 completed shifts:  In: 1208 [P.O.:958; IV Piggyback:250]  Out: -     Physical Exam   Constitutional: She appears well-developed and well-nourished. No distress.   HENT:   Head: Normocephalic and atraumatic.   Mouth/Throat: Oropharynx is clear and moist. No oropharyngeal exudate.   Eyes: Conjunctivae and EOM are normal.   Neck: Normal range of motion. Neck supple. No JVD present. Carotid bruit is not present. No tracheal deviation present. No thyroid mass and no thyromegaly present.   Cardiovascular: Normal rate, regular rhythm, normal heart sounds and intact distal pulses.  Exam reveals no gallop and no friction rub.    No murmur heard.  Pulmonary/Chest: No respiratory distress. She has no wheezes. She exhibits no tenderness.   Abdominal: Soft. Bowel sounds are normal. She exhibits no distension, no abdominal bruit, no ascites and no mass. There is no hepatosplenomegaly. There is no tenderness. There is no rebound, no guarding and no CVA tenderness.   Musculoskeletal: Normal range of motion. She exhibits no edema or tenderness.   Lymphadenopathy:     She has no cervical adenopathy.   Neurological: No cranial nerve deficit.   Skin: Skin is warm and intact. No rash noted. No erythema. No pallor.   Psychiatric: Judgment normal.       Significant Labs:  BMP  Lab Results    Component Value Date     07/29/2017    K 4.0 07/29/2017     07/29/2017    CO2 26 07/29/2017    BUN 29 (H) 07/29/2017    CREATININE 1.2 07/29/2017    CALCIUM 8.5 (L) 07/29/2017    ANIONGAP 6 (L) 07/29/2017    ESTGFRAFRICA >60 07/29/2017    EGFRNONAA 60 07/29/2017         Assessment/Plan:         30 y/o female with LELAND and sepsis        Acute renal failure        1. LELAND : stable s Cr, improved renal function.  LELAND resolving  LELAND likely was multifactorial: due to hypotension, sepsis  Doing much better clinically, stable     2. Metabolic acidosis : resolved, off bicarbonate     3. Hypotension : resolved. BP normal, no longer low  Was on pressor support when in ICU previously  Doing better clinically     4. Anemia : s/p PRBC transfusion ,    h/o of sickle cell disease  H/H is stable     5. Corrected calcium normal: no need to replace      6.  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            Allyson Brady MD  Nephrology  [facility] -

## 2017-07-30 NOTE — SUBJECTIVE & OBJECTIVE
Interval History: Pt was seen and examined this am. No new c/o's, stable last pm.     Review of patient's allergies indicates:   Allergen Reactions    Iodine and iodide containing products Anaphylaxis     Pt states she coded last time she was administered contrast    Pneumococcal 23-chitra ps vaccine Anaphylaxis     Potential iodine containing    Bactrim [sulfamethoxazole-trimethoprim] Hives    Morphine Itching     Current Facility-Administered Medications   Medication Frequency    acetaminophen tablet 325 mg Q6H PRN    atovaquone suspension 750 mg Q12H    bisacodyl suppository 10 mg Daily PRN    clarithromycin tablet 500 mg Q12H    darunavir-cobicistat 800-150 mg-mg Tab 800 mg QHS    diphenhydrAMINE injection 12.5 mg Q6H PRN    docusate 50 mg/5 mL liquid 100 mg Daily    dolutegravir Tab 50 mg Daily    ethambutol tablet 800 mg Daily    famotidine tablet 20 mg Daily    folic acid tablet 1 mg Daily    lacosamide tablet 100 mg BID    metoprolol tartrate (LOPRESSOR) split tablet 12.5 mg BID    moxifloxacin 400 mg/250 mL IVPB 400 mg Q24H    ondansetron injection 4 mg Q12H PRN    oxycodone-acetaminophen 5-325 mg per tablet 1 tablet Q6H PRN    promethazine (PHENERGAN) 6.25 mg in dextrose 5 % 50 mL IVPB Q6H PRN       Objective:     Vital Signs (Most Recent):  Temp: 97.6 °F (36.4 °C) (07/30/17 1200)  Pulse: 76 (07/30/17 1200)  Resp: 18 (07/30/17 1200)  BP: 131/82 (07/30/17 1200)  SpO2: 97 % (07/30/17 1334)  O2 Device (Oxygen Therapy): room air (07/30/17 1334) Vital Signs (24h Range):  Temp:  [96.7 °F (35.9 °C)-98.7 °F (37.1 °C)] 97.6 °F (36.4 °C)  Pulse:  [75-78] 76  Resp:  [18] 18  SpO2:  [93 %-100 %] 97 %  BP: (131-150)/(82-91) 131/82     Weight: 51.9 kg (114 lb 6.7 oz) (07/27/17 1200)  Body mass index is 22.35 kg/m².  Body surface area is 1.48 meters squared.    I/O last 3 completed shifts:  In: 1208 [P.O.:958; IV Piggyback:250]  Out: -     Physical Exam   Constitutional: She appears well-developed and  well-nourished. No distress.   HENT:   Head: Normocephalic and atraumatic.   Mouth/Throat: Oropharynx is clear and moist. No oropharyngeal exudate.   Eyes: Conjunctivae and EOM are normal.   Neck: Normal range of motion. Neck supple. No JVD present. Carotid bruit is not present. No tracheal deviation present. No thyroid mass and no thyromegaly present.   Cardiovascular: Normal rate, regular rhythm, normal heart sounds and intact distal pulses.  Exam reveals no gallop and no friction rub.    No murmur heard.  Pulmonary/Chest: No respiratory distress. She has no wheezes. She exhibits no tenderness.   Abdominal: Soft. Bowel sounds are normal. She exhibits no distension, no abdominal bruit, no ascites and no mass. There is no hepatosplenomegaly. There is no tenderness. There is no rebound, no guarding and no CVA tenderness.   Musculoskeletal: Normal range of motion. She exhibits no edema or tenderness.   Lymphadenopathy:     She has no cervical adenopathy.   Neurological: No cranial nerve deficit.   Skin: Skin is warm and intact. No rash noted. No erythema. No pallor.   Psychiatric: Judgment normal.       Significant Labs:  BMP  Lab Results   Component Value Date     07/29/2017    K 4.0 07/29/2017     07/29/2017    CO2 26 07/29/2017    BUN 29 (H) 07/29/2017    CREATININE 1.2 07/29/2017    CALCIUM 8.5 (L) 07/29/2017    ANIONGAP 6 (L) 07/29/2017    ESTGFRAFRICA >60 07/29/2017    EGFRNONAA 60 07/29/2017

## 2017-07-30 NOTE — PLAN OF CARE
Problem: Patient Care Overview  Goal: Plan of Care Review  PT REQUIRES MAX A X 2 FOR BED MOBILITY AND T/F  TO CHAIR.    Outcome: Ongoing (interventions implemented as appropriate)  Pt awake, alert, oriented, no distress noted, no c/o pain, nausea intermittent, Zofran given for s/s, relief noted after administering, pt encouraged to intake food, fluids as tolerated, normal sinus rhythm 70s-80s, pt and family participating in POC.

## 2017-07-30 NOTE — ASSESSMENT & PLAN NOTE
Platelets rebounding somewhat; up to 103K from 89    7/25 counts down slightlty    7/26: remains down; continue to monitor; no active bleeding noted    7/28:  Continues low at 70 K ; will trend.    7/29: up to 118K!    7/30: Resolved.

## 2017-07-30 NOTE — ASSESSMENT & PLAN NOTE
- ID following; continued ID presence appreciated; adjutsting antibiotic regimen  - Per ID has AIDS  - Last CD4 count on 07/10/17 -- 245  - Neutropenic on AM labs -- Hematology/Oncology consulted    7/25 Continue as per ID consultant  Continue to monitor labs    7/26: WBC count rebounding; continues with temp spikes; cultures remain negative. Viral serologies awaited;  For tagged wbc study today  7/27:  WBC improving steadily; HGB  Down , will transfuse 2 units today  7/28:  Received 2 units PRBC's yesterday; will continue to monitor  Continue anti-retroviral therapy as per ID consultant.  Possible move to med-surg floor today.  7/29 Continue HAART therapy and observe as per ID consultant.  7/30 To continue present regimen; continue as per ID consultant.

## 2017-07-30 NOTE — PLAN OF CARE
Problem: Physical Therapy Goal  Goal: Physical Therapy Goal  PT WILL BE SEEN FOR P.T. FOR A MIN OF 5 OUT OF 7 DAYS A WEEK  LT/3/17  1. PT WILL COMPLETE BED MOBILITY WITH MIN A.   2. PT WILL T/F TO CHAIR WITH MOD A.  3. PT WILL STAND WITH RW WITH MOD A.  4. PT WILL GT TRAIN X 20' WITH RW AND MAX A  5. PT WILL COMPLETE TE X 20 REPS FOR STRENGTHENING.   Outcome: Ongoing (interventions implemented as appropriate)  Making progress with ambulation endurance but unwilling to ambulate without ad. Supportive family.

## 2017-07-30 NOTE — ASSESSMENT & PLAN NOTE
7/29 Signs of recovery; afebrile now,  Continue to monitor hemogram; cultures etc.    7/30 afebrile now; WBC have rebounded, has recovered renal function;  and cultures are negative.

## 2017-07-30 NOTE — PT/OT/SLP PROGRESS
Occupational Therapy      Wang Deana Kebede  MRN: [id number]    Patient not seen today secondary to RECEIVING BATH FROM STAFF  . Will follow-up NEXT VISIT.    Sierra Slater OT  7/30/2017

## 2017-07-30 NOTE — ASSESSMENT & PLAN NOTE
- ID following; continued ID presence appreciated; adjutsting antibiotic regimen  - Per ID has AIDS  - Last CD4 count on 07/10/17 -- 245  - Neutropenic on AM labs -- Hematology/Oncology consulted    7/25 Continue as per ID consultant  Continue to monitor labs    7/26: WBC count rebounding; continues with temp spikes; cultures remain negative. Viral serologies awaited;  For tagged wbc study today  7/27:  WBC improving steadily; HGB  Down , will transfuse 2 units todayTachycardia and hypotensive (at her baseline); continues on pressor support with levophed;  BP 99/38  Serial lactic acids; continue to monitor labs  IVF's with bicarbonate presently at 40cc/hr  Continue metoprolol for heart rate; continues tachy at 127    7/25 HR now ~105   BP stable ;  coming down off levophed at present  7/26: No change; rate has improved low 100's now    7/27: Continues in low 100's;  Weaning down from levophed    7/28;  Off levophed today; to move to med-surg today.  7/29 Continue to monitor; HR in the 90's now;  7/30 stable clinically.

## 2017-07-30 NOTE — ASSESSMENT & PLAN NOTE
- S/p 1 unit of PRBCs, responded appropriately  - Supplemental oxygen prn, keep O2 sats > 92%  - Pt sees Dr. Olmedo (hematology/oncology), last clinic visit 07/05/17  - Per Hematology/Oncology on that clinic visit -- she has normocytic anemia and possibly due to HIV/AIDS (hx of noncompliance but now compliant). Pt reports hx of sickle cell disease but hemoglobin electrophoresis was normal on 2 separate occassions. If her anemia does not improve after 2 months of continued HAART therapy, refer back to Hematology  - Daily CBC   Continue to monitor labs; transfuse as per heme-onc    7/25 Hgb remaining brenna post transfusion    7/26:  Hgb remaining stable; continue to monitor labs.    7/27 : Hgb 6.6; to receive 2 units of packed cells today    7/28;  Tolerated transfusion yesterday: repeat Hgb 10.1 today; will continue to monitor    7/29 Hgb remains stable post transfusion; continue to monitor    7/30 Hgb remain stable. Continue to monitor

## 2017-07-31 ENCOUNTER — OUTPATIENT CASE MANAGEMENT (OUTPATIENT)
Dept: ADMINISTRATIVE | Facility: OTHER | Age: 32
End: 2017-07-31

## 2017-07-31 VITALS
SYSTOLIC BLOOD PRESSURE: 155 MMHG | RESPIRATION RATE: 18 BRPM | HEART RATE: 53 BPM | TEMPERATURE: 97 F | DIASTOLIC BLOOD PRESSURE: 94 MMHG | OXYGEN SATURATION: 92 % | WEIGHT: 111.81 LBS | HEIGHT: 60 IN | BODY MASS INDEX: 21.95 KG/M2

## 2017-07-31 LAB
BASOPHILS # BLD AUTO: 0.02 K/UL
BASOPHILS NFR BLD: 0.4 %
DIFFERENTIAL METHOD: ABNORMAL
EOSINOPHIL # BLD AUTO: 0 K/UL
EOSINOPHIL NFR BLD: 0.2 %
ERYTHROCYTE [DISTWIDTH] IN BLOOD BY AUTOMATED COUNT: 19.6 %
HCT VFR BLD AUTO: 29 %
HGB BLD-MCNC: 9.6 G/DL
LYMPHOCYTES # BLD AUTO: 3.4 K/UL
LYMPHOCYTES NFR BLD: 73 %
MAGNESIUM SERPL-MCNC: 1.5 MG/DL
MCH RBC QN AUTO: 30.4 PG
MCHC RBC AUTO-ENTMCNC: 33.1 G/DL
MCV RBC AUTO: 92 FL
MONOCYTES # BLD AUTO: 0.5 K/UL
MONOCYTES NFR BLD: 10.5 %
NEUTROPHILS # BLD AUTO: 0.7 K/UL
NEUTROPHILS NFR BLD: 15.9 %
PLATELET # BLD AUTO: 186 K/UL
PLATELET BLD QL SMEAR: ABNORMAL
PMV BLD AUTO: 10.1 FL
POCT GLUCOSE: 88 MG/DL (ref 70–110)
RBC # BLD AUTO: 3.16 M/UL
WBC # BLD AUTO: 4.66 K/UL

## 2017-07-31 PROCEDURE — 25000003 PHARM REV CODE 250: Performed by: NURSE PRACTITIONER

## 2017-07-31 PROCEDURE — 63600175 PHARM REV CODE 636 W HCPCS: Performed by: EMERGENCY MEDICINE

## 2017-07-31 PROCEDURE — 99233 SBSQ HOSP IP/OBS HIGH 50: CPT | Mod: ,,, | Performed by: INTERNAL MEDICINE

## 2017-07-31 PROCEDURE — 83735 ASSAY OF MAGNESIUM: CPT

## 2017-07-31 PROCEDURE — 25000003 PHARM REV CODE 250: Performed by: INTERNAL MEDICINE

## 2017-07-31 PROCEDURE — 85025 COMPLETE CBC W/AUTO DIFF WBC: CPT

## 2017-07-31 PROCEDURE — 97110 THERAPEUTIC EXERCISES: CPT

## 2017-07-31 PROCEDURE — 94761 N-INVAS EAR/PLS OXIMETRY MLT: CPT

## 2017-07-31 PROCEDURE — 97116 GAIT TRAINING THERAPY: CPT

## 2017-07-31 RX ORDER — LEVOFLOXACIN 500 MG/1
500 TABLET, FILM COATED ORAL DAILY
Qty: 5 TABLET | Refills: 0 | Status: SHIPPED | OUTPATIENT
Start: 2017-07-31 | End: 2017-08-15 | Stop reason: ALTCHOICE

## 2017-07-31 RX ORDER — NYSTATIN 100000 [USP'U]/ML
4 SUSPENSION ORAL 4 TIMES DAILY PRN
Qty: 160 ML | Refills: 0 | Status: SHIPPED | OUTPATIENT
Start: 2017-07-31 | End: 2017-08-15 | Stop reason: ALTCHOICE

## 2017-07-31 RX ADMIN — LACOSAMIDE 100 MG: 50 TABLET, FILM COATED ORAL at 09:07

## 2017-07-31 RX ADMIN — ONDANSETRON 4 MG: 2 INJECTION INTRAMUSCULAR; INTRAVENOUS at 10:07

## 2017-07-31 RX ADMIN — CLARITHROMYCIN 500 MG: 500 TABLET, FILM COATED ORAL at 09:07

## 2017-07-31 RX ADMIN — ATOVAQUONE 750 MG: 750 SUSPENSION ORAL at 09:07

## 2017-07-31 RX ADMIN — FOLIC ACID 1 MG: 1 TABLET ORAL at 09:07

## 2017-07-31 RX ADMIN — Medication 12.5 MG: at 09:07

## 2017-07-31 RX ADMIN — DOCUSATE SODIUM 100 MG: 50 LIQUID ORAL at 09:07

## 2017-07-31 RX ADMIN — ETHAMBUTOL HYDROCHLORIDE 800 MG: 400 TABLET, FILM COATED ORAL at 09:07

## 2017-07-31 RX ADMIN — FAMOTIDINE 20 MG: 20 TABLET ORAL at 09:07

## 2017-07-31 RX ADMIN — DOLUTEGRAVIR SODIUM 50 MG: 50 TABLET, FILM COATED ORAL at 09:07

## 2017-07-31 NOTE — PROGRESS NOTES
[facility] -   Nephrology  Progress Note    Patient Name: Wang Kebede  MRN: [id number]  Admission Date: 7/20/2017  Hospital Length of Stay: 10 days  Attending Provider: No att. providers found   Primary Care Physician: Levi Moncada MD  Principal Problem:Severe sepsis and LELAND    Subjective:     HPI: Wang Kebede is a 31 y.o.  AA woman  with history of HIV/AIDS, vulvar cancer, anemia, sickle cell disease, cervical cancer, chronic abdominal pain was admitted to [facility] about 2 days ago for fever , she was started on broad spectrum abx , her serum bicarb dropped from 19 on admission to 7 today , lactate level normal, serum Cr was normal about 2 weeks ago, creatinine increased to 3 today, we were consulted for LELAND , Metabolic acidosis,         Interval History: Pt was seen and examined before her d/c home today. Pt has no new c/o's, no discomfort, no fever, no CP, no SOB, felt ready to be d/misael.    Review of patient's allergies indicates:   Allergen Reactions    Iodine and iodide containing products Anaphylaxis     Pt states she coded last time she was administered contrast    Pneumococcal 23-chitra ps vaccine Anaphylaxis     Potential iodine containing    Bactrim [sulfamethoxazole-trimethoprim] Hives    Morphine Itching     No current facility-administered medications for this encounter.      Current Outpatient Prescriptions   Medication    atovaquone (MEPRON) 750 mg/5 mL Susp    b complex vitamins capsule    calcium carbonate (TUMS) 200 mg calcium (500 mg) chewable tablet    citalopram (CELEXA) 20 MG tablet    darunavir-cobicistat (PREZCOBIX) 800-150 mg-mg Tab    dolutegravir 50 mg Tab    folic acid (FOLVITE) 1 MG tablet    lacosamide (VIMPAT) 50 mg Tab    levoFLOXacin (LEVAQUIN) 500 MG tablet    metoprolol tartrate (LOPRESSOR) 25 MG tablet    nystatin (MYCOSTATIN) 100,000 unit/mL suspension    oxycodone-acetaminophen (ROXICET) 5-325 mg per tablet    pantoprazole  (PROTONIX) 20 MG tablet       Objective:     Vital Signs (Most Recent):  Temp: 97.4 °F (36.3 °C) (07/31/17 1200)  Pulse: (!) 53 (07/31/17 1330)  Resp: 18 (07/31/17 1200)  BP: (!) 155/94 (07/31/17 1200)  SpO2: (!) 92 % (07/31/17 1330)  O2 Device (Oxygen Therapy): room air (07/31/17 1330) Vital Signs (24h Range):  Temp:  [97.4 °F (36.3 °C)-98.3 °F (36.8 °C)] 97.4 °F (36.3 °C)  Pulse:  [53-84] 53  Resp:  [18-20] 18  SpO2:  [92 %-99 %] 92 %  BP: (122-155)/(75-94) 155/94     Weight: 50.7 kg (111 lb 12.8 oz) (07/31/17 0118)  Body mass index is 21.83 kg/m².  Body surface area is 1.47 meters squared.    I/O last 3 completed shifts:  In: 520 [P.O.:520]  Out: -     Physical Exam   Constitutional: She appears well-developed and well-nourished. No distress.   HENT:   Head: Normocephalic and atraumatic.   Mouth/Throat: Oropharynx is clear and moist. No oropharyngeal exudate.   Eyes: Conjunctivae and EOM are normal.   Neck: Normal range of motion. Neck supple. No JVD present. Carotid bruit is not present. No tracheal deviation present. No thyroid mass and no thyromegaly present.   Cardiovascular: Normal rate, regular rhythm, normal heart sounds and intact distal pulses.  Exam reveals no gallop and no friction rub.    No murmur heard.  Pulmonary/Chest: No respiratory distress. She has no wheezes. She exhibits no tenderness.   Abdominal: Soft. Bowel sounds are normal. She exhibits no distension, no abdominal bruit, no ascites and no mass. There is no hepatosplenomegaly. There is no tenderness. There is no rebound, no guarding and no CVA tenderness.   Musculoskeletal: Normal range of motion. She exhibits no edema or tenderness.   Lymphadenopathy:     She has no cervical adenopathy.   Neurological: No cranial nerve deficit.   Skin: Skin is warm and intact. No rash noted. No erythema. No pallor.   Psychiatric: Judgment normal.       Significant Labs:   BMP  Lab Results   Component Value Date     07/29/2017    K 4.0 07/29/2017      07/29/2017    CO2 26 07/29/2017    BUN 29 (H) 07/29/2017    CREATININE 1.2 07/29/2017    CALCIUM 8.5 (L) 07/29/2017    ANIONGAP 6 (L) 07/29/2017    ESTGFRAFRICA >60 07/29/2017    EGFRNONAA 60 07/29/2017     Lab Results   Component Value Date    WBC 4.66 07/31/2017    HGB 9.6 (L) 07/31/2017    HCT 29.0 (L) 07/31/2017    MCV 92 07/31/2017     07/31/2017         Assessment/Plan:         30 y/o female with LELAND and sepsis        Acute renal failure        1. LELAND : stable s Cr, improved renal function.  LELAND resolved  Doing well clinically  K normal  Acid base stable, normal  OK to be d/misael.  LELAND was likely multifactorial: due to hypotension, sepsis  Doing much better clinically, stable     2. Metabolic acidosis : resolved, off bicarbonate     3. Hypotension : resolved.      4. Anemia : s/p PRBC transfusion ,    h/o of sickle cell disease  H/H is stable     5. Corrected calcium normal: no need to replace      6.  Replete K , when low       HIV (human immunodeficiency virus infection)     ID: h/o of HIV  Presented with fever, leukopenia  Sepsis suspected  Blood and urine cx's negative  On empiric abx  Agree with current mgmt      Anemia: due to sickle cell disease  S/p PRBC  H/H stable      Plans and recommendations:  As detailed above  Agree with current mgmt            Allyson Brady MD  Nephrology  [facility] -

## 2017-07-31 NOTE — PROGRESS NOTES
Pt d/c'd, discharge instructions provided, PICC line removed, pt and family present at bedside, pt wheeled with no distress noted, no c/o pain, heart monitor removed and returned.

## 2017-07-31 NOTE — PLAN OF CARE
Problem: Patient Care Overview  Goal: Plan of Care Review  PT IS MODIFIED INDEPENDENT WITH BED MOBILITY/  CGA FOR TRANSFERS AND AMBULATION APPROX 200 FEET WITHOUT AD.  UNSTEADY/IMPAIRED STANDING BALANCE.     Outcome: Ongoing (interventions implemented as appropriate)  Pt on room air tolerating well. No distress noted at this time.

## 2017-07-31 NOTE — PLAN OF CARE
07/31/17 1510   Discharge Reassessment   Assessment Type Discharge Planning Reassessment   Can the patient answer the patient profile reliably? Yes, cognitively intact   How does the patient rate their overall health at the present time? Fair   Describe the patient's ability to walk at the present time. Walks with the help of equipment   How often would a person be available to care for the patient? Often   Number of comorbid conditions (as recorded on the chart) Four   Discharge plan remains the same: Yes   Provided patient/caregiver education on the expected discharge date and the discharge plan Yes   Discharge Plan A Home;Home with family;Home Health   Change in patient condition or support system No   Patient choice form signed by patient/caregiver N/A   Explained to the the patient/caregiver why the discharge planned changed: Yes   Involved the patient/caregiver in establishing a new discharge plan: Yes

## 2017-07-31 NOTE — PT/OT/SLP PROGRESS
"Occupational Therapy  Treatment    Wang Kebede   MRN: [id number]   Admitting Diagnosis: Severe sepsis    OT Date of Treatment: 07/31/17   OT Start Time: 1153  OT Stop Time: 1213  OT Total Time (min): 20 min    Billable Minutes:  Therapeutic Exercise  x 20 min    General Precautions: Standard, fall  Orthopedic Precautions: N/A  Braces: N/A         Subjective:  Communicated with pt, and sharron Chowdary prior to session.         Objective:  Patient found with: telemetry, peripheral IV     Functional Mobility:  Bed Mobility:       Transfers:        Functional Ambulation:     Activities of Daily Living:     Feeding adaptive equipment:      UE adaptive equipment:      LE adaptive equipment:                     Bathing adaptive equipment:     Balance:   Static Sit: NORMAL: No deviations seen in posture held statically  Dynamic Sit: GOOD: Maintains balance through MODERATE excursions of active trunk movement  Static Stand:   Dynamic stand:     Therapeutic Activities and Exercises:  Pt agreed to perform therapeutic ex to BUE's via red theraband to all available planes to increase fx strength to impact self care performance. Pt tolerated tx well, cooperative.     AM-PAC 6 CLICK ADL   How much help from another person does this patient currently need?   1 = Unable, Total/Dependent Assistance  2 = A lot, Maximum/Moderate Assistance  3 = A little, Minimum/Contact Guard/Supervision  4 = None, Modified [place]/Independent    Putting on and taking off regular lower body clothing? : 3  Bathing (including washing, rinsing, drying)?: 1 (not tested)  Toileting, which includes using toilet, bedpan, or urinal? : 3  Putting on and taking off regular upper body clothing?: 3  Taking care of personal grooming such as brushing teeth?: 4  Eating meals?: 4  Total Score: 18     AM-PAC Raw Score CMS "G-Code Modifier Level of Impairment Assistance   6 % Total / Unable   7 - 8 CM 80 - 100% Maximal Assist   9-13 CL 60 - 80% " Moderate Assist   14 - 19 CK 40 - 60% Moderate Assist   20 - 22 CJ 20 - 40% Minimal Assist   23 CI 1-20% SBA / CGA   24 CH 0% Independent/ Mod I       Patient left supine with all lines intact and call button in reach    ASSESSMENT:  Wang Kebede is a 31 y.o. female with a medical diagnosis of Severe sepsis and presents with impaired self care skills and fx mobility.    Rehab identified problem list/impairments: Rehab identified problem list/impairments: weakness, impaired endurance, impaired functional mobilty, gait instability, impaired self care skills, impaired balance, decreased coordination    Rehab potential is good.    Activity tolerance: Good    Discharge recommendations: Discharge Facility/Level Of Care Needs: home health OT     Barriers to discharge: Barriers to Discharge: None    Equipment recommendations: walker, rolling     GOALS:    Occupational Therapy Goals        Problem: Occupational Therapy Goal    Goal Priority Disciplines Outcome Interventions   Occupational Therapy Goal     OT, PT/OT Ongoing (interventions implemented as appropriate)    Description:  Goals to be met by: 8/4/17     Patient will increase functional independence with ADLs by performing:    UE Dressing with Minimal Assistance.  LE Dressing with Moderate Assistance.  Toileting from toilet with Moderate Assistance for hygiene and clothing management.   Toilet transfer to toilet with Minimal Assistance.  Upper extremity exercise program x10 reps per handout, with supervision for minimal resistive ex to BUE.                      Plan:  Patient to be seen 3 x/week to address the above listed problems via self-care/home management, therapeutic activities, therapeutic exercises  Plan of Care expires: 08/04/17  Plan of Care reviewed with: patient         June Sana, OT  07/31/2017

## 2017-07-31 NOTE — PLAN OF CARE
07/31/17 1511   Final Note   Assessment Type Final Discharge Note   Discharge Disposition Home-Health   Referral to / orders for Home Health Complete? Yes   30 day supply of medicines given at discharge, if documented non-compliance / non-adherence? No   Any social issues identified prior to discharge? Yes   Did you assess the readiness or willingness of the family or caregiver to support self management of care? Yes   Right Care Referral Info   Post Acute Recommendation Home-care   Facility Name [facility] and pca waivers

## 2017-07-31 NOTE — ASSESSMENT & PLAN NOTE
30 y/o female with LELAND and sepsis        Acute renal failure        1. LELAND : stable s Cr, improved renal function.  LELAND resolving  LELAND likely was multifactorial: due to hypotension, sepsis  Doing much better clinically, stable     2. Metabolic acidosis : resolved, off bicarbonate     3. Hypotension : resolved. BP normal, no longer low  Was on pressor support when in ICU previously  Doing better clinically     4. Anemia : s/p PRBC transfusion ,    h/o of sickle cell disease  H/H is stable     5. Corrected calcium normal: no need to replace      6.  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

## 2017-07-31 NOTE — SUBJECTIVE & OBJECTIVE
Interval History: 31 year old woman with AIDS, respiratory failure and metabolic acidosis. She feels better and wants to know about discharge.  Review of Systems   Constitutional: Negative for chills, fatigue and fever.   HENT: Negative for congestion, ear pain, facial swelling, sinus pressure and sore throat.    Eyes: Negative for pain.   Respiratory: Negative for apnea, chest tightness, shortness of breath and stridor.    Cardiovascular: Negative for chest pain, palpitations and leg swelling.   Gastrointestinal: Negative for abdominal distention, abdominal pain, diarrhea and nausea.   Endocrine: Negative for polydipsia and polyphagia.   Genitourinary: Negative for decreased urine volume, difficulty urinating, frequency and genital sores.   Musculoskeletal: Negative for arthralgias and gait problem.   Neurological: Negative for light-headedness and headaches.   Hematological: Negative for adenopathy.   Psychiatric/Behavioral: Negative for agitation, confusion and decreased concentration.     Objective:     Vital Signs (Most Recent):  Temp: 98.3 °F (36.8 °C) (07/31/17 0745)  Pulse: 78 (07/31/17 0745)  Resp: 18 (07/31/17 0745)  BP: (!) 145/93 (07/31/17 0745)  SpO2: 99 % (07/31/17 0745) Vital Signs (24h Range):  Temp:  [97.6 °F (36.4 °C)-98.3 °F (36.8 °C)] 98.3 °F (36.8 °C)  Pulse:  [72-84] 78  Resp:  [18-20] 18  SpO2:  [97 %-100 %] 99 %  BP: (122-152)/(75-93) 145/93     Weight: 50.7 kg (111 lb 12.8 oz)  Body mass index is 21.83 kg/m².    Estimated Creatinine Clearance: 48.3 mL/min (based on Cr of 1.2).    Physical Exam   Constitutional: She is oriented to person, place, and time. Vital signs are normal. She appears well-developed.   HENT:   Head: Normocephalic and atraumatic.   Eyes: Conjunctivae and EOM are normal. Pupils are equal, round, and reactive to light.   Neck: Normal range of motion. Neck supple. No thyroid mass and no thyromegaly present.   Cardiovascular: Normal rate, normal heart sounds and intact  distal pulses.    Pulmonary/Chest: Effort normal and breath sounds normal. No accessory muscle usage. No respiratory distress.   Abdominal: Soft. Bowel sounds are normal. She exhibits no mass. There is no tenderness.   Peg TUBE NOTED   Musculoskeletal: Normal range of motion.   Neurological: She is alert and oriented to person, place, and time.   Skin: Skin is intact. No rash noted.   Psychiatric: She has a normal mood and affect.   Nursing note and vitals reviewed.      Significant Labs:   Blood Culture:     Recent Labs  Lab 05/21/17  1445 07/20/17  2000 07/20/17  2027 07/23/17  1600 07/23/17  2238   LABBLOO No growth after 5 days. No growth after 5 days. No growth after 5 days. No growth after 5 days. No growth after 5 days.     BMP:     Recent Labs  Lab 07/31/17  0720   MG 1.5*     CBC:     Recent Labs  Lab 07/30/17  0804 07/31/17  0720   WBC 5.26 4.66   HGB 9.6* 9.6*   HCT 28.7* 29.0*    186     All pertinent labs within the past 24 hours have been reviewed.    Significant Imaging: I have reviewed all pertinent imaging results/findings within the past 24 hours.

## 2017-07-31 NOTE — DISCHARGE SUMMARY
[facility] Hospital Medicine  Discharge Summary      Patient Name: Wang Kebede  MRN: [id number]  Admission Date: 7/20/2017  Hospital Length of Stay: 10 days  Discharge Date and Time:  07/31/2017 12:31 PM  Attending Physician: Marcus García MD   Discharging Provider: MICHELLE Ortega  Primary Care Provider: Levi Moncada MD      HPI:   30 y/o female presents to ED with c/o fever that onset gradually 6 days ago. PMH includes HIV. She went to her PCP on Monday and given abx. Symptoms have been intermediate and moderate. No exacerbating or mitigating factors. She denies chills, N/V/D, Cp, palpitations, hematuria, congestion, cough, dysuria, and all other symptoms. She will be observed for symptom control and ID consult under the care of [facility] medicine.    * No surgery found *      Indwelling Lines/Drains at time of discharge:   Lines/Drains/Airways     Peripherally Inserted Central Catheter Line                 PICC Double Lumen 07/23/17 1645 right brachial 7 days          Drain                 Gastrostomy/Enterostomy 05/26/17 1540 Percutaneous endoscopic gastrostomy (PEG) LUQ 65 days          Pressure Ulcer                 Pressure Ulcer Right lateral other (see comments) suspected deep tissue injury 80464 days         Pressure Ulcer 07/24/17 0702 Left lower heel suspected deep tissue injury 7 days         Pressure Ulcer 07/24/17 0702 Right lower heel suspected deep tissue injury 7 days              Hospital Course:   Wang Kebede is a 31 year old female admitted for Fever of unknown etiology. Empiric antibiotics given. Blood cultures upon admission with NGTD, repeat blood cultures with NGTD, and urine culture NGTD. ID consulted for hx of HIV. Per ID, pt has AIDS but her immune system is recovering since she is now compliant with HAART. 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 CXR  and UA negative as source of infection. Indium scan negative.  Received PRBCs during hospitalization secondary to anemia, responded appropriately. Subsequently transferred to ICU due to respiratory distress and intubated on 07/23/17. Extubated on 07/26/17. Pt currently sating well on room air, % -- with no c/o chest pain, SOB. PT/OT recommending home health. ARF resolved. Creatinine peaked to 4.0. Creatinine currently 1.2. Nephrology contributed ARF as multifactorial - hypotension, sepsis. Afebrile > 48 hours. PT/OT recommend home health. Pt will follow up with PCP within 3 days after discharge for hospital follow up. Pt will also follow up with Dr. Mayo (ID) within 1 week after discharge for hospital follow up. Pt seen and examined on the date of discharge and determined suitable for discharge.      Consults:   Consults         Status Ordering Provider     Inpatient consult to Hematology/Oncology  Once     Provider:  Virginia Olmedo MD    Completed NOLVIA AUGUSTE     Inpatient consult to Infectious Diseases  Once     Provider:  Jewell Mayo MD    Acknowledged SHAINA GUILLEN     Inpatient consult to Nephrology  Once     Provider:  Marlon Monterroso MD    Completed YVETTE ROCK     Inpatient consult to Ophthalmology  Once     Provider:  Armaan Andrews OD    Acknowledged JEWELL MAYO     Inpatient consult to Pulmonology  Once     Provider:  Nolvia To NP    Completed PRABHJOT COOPER     IP consult to dietary  Once     Provider:  (Not yet assigned)    Completed GEORGE FARMER     Pharmacy to dose Vancomycin consult  Once     Provider:  (Not yet assigned)    Acknowledged JEWELL MAYO          Significant Diagnostic Studies: Labs:   CMP No results for input(s): NA, K, CL, CO2, GLU, BUN, CREATININE, CALCIUM, PROT, ALBUMIN, BILITOT, ALKPHOS, AST, ALT, ANIONGAP, ESTGFRAFRICA, EGFRNONAA in the last 48 hours. and CBC     Recent Labs  Lab 07/30/17  0804 07/31/17  0720   WBC 5.26 4.66   HGB 9.6* 9.6*   HCT 28.7* 29.0*    186     Microbiology:   Blood  Culture   Lab Results   Component Value Date    LABBLOO No growth after 5 days. 07/23/2017    and Urine Culture    Lab Results   Component Value Date    LABURIN No growth 07/23/2017     Radiology:   Imaging Results          NM Inflammitory Localization WB Indium (Final result)  Result time 07/27/17 15:52:32    Final result by Madonna Ortiz MD (07/27/17 15:52:32)                 Impression:      Negative whole-body white blood cell scan.      Electronically signed by: MADONNA ORTIZ MD  Date:     07/27/17  Time:    15:52              Narrative:    Exam: NM INFLAMMATORY WHOLE BODY INDIUM,    Date:  07/26/17 08:23:41    History: severe sepsis with unidentified source    Comparison:  No prior relevant studies available    Findings: Whole body indium-111 labeled white blood cell scan performed with 360 uCi indium-111 labeled white blood cells.  24 hour planar images were obtained.    Normal hepatosplenic uptake.  Normal bone marrow uptake.  Otherwise negative.                             X-Ray Chest 1 View (Final result)  Result time 07/27/17 08:11:03    Final result by Madonna Ortiz MD (07/27/17 08:11:03)                 Impression:      Stable exam.      Electronically signed by: MADONNA ORTIZ MD  Date:     07/27/17  Time:    08:11              Narrative:    Exam: XR CHEST 1 VIEW,    Date:  07/27/17 05:10:00    History: respiratory failure    Comparison:  07/26/2017    Findings: Left AICD.  Right PICC line.  Heart size is normal.  Overall lung fields appear stable with slightly increased bibasilar interstitial markings.                             X-Ray Chest 1 View (Final result)  Result time 07/26/17 08:13:58    Final result by Madonna Ortiz MD (07/26/17 08:13:58)                 Impression:      Continued interval improvement.      Electronically signed by: MADONNA ORTIZ MD  Date:     07/26/17  Time:    08:13              Narrative:    Exam: XR CHEST 1 VIEW,    Date:  07/26/17  05:16:00    History: respiratory failure    Comparison:  07/25/2017.    Findings: Multiple wire leads overlie the chest.  A left pacemaker/defibrillator is present.    Endotracheal tube, nasogastric tube and right PICC line are present.    The heart size is normal.  Lung fields appear improved with probable improving or resolving edema/CHF.                             X-Ray Chest 1 View (Final result)  Result time 07/25/17 09:38:06    Final result by Ghanshyam Haq III, MD (07/25/17 09:38:06)                 Impression:     Improving chest x-ray.      Electronically signed by: GHANSHYAM HAQ MD  Date:     07/25/17  Time:    09:38              Narrative:    Chest x-ray, single view.    Clinical indication: Respiratory failure.    Compared to July 24.    ET tube, NG tube, right-sided PICC line, and pacing device/defibrillator again noted with normal heart size. The lungs show improvement with better aeration bilaterally. Decreasing bilateral infiltrates. No new abnormalities.                             US Abdomen Limited (Final result)  Result time 07/24/17 13:20:32    Final result by Reji Brown III, MD (07/24/17 13:20:32)                 Impression:        Small right pleural effusion.  Prior cholecystectomy.  Chronic right renal cortical thinning and increased echogenicity suggesting chronic medical renal disease.  Otherwise unremarkable exam.         Electronically signed by: REJI BROWN MD  Date:     07/24/17  Time:    13:20              Narrative:    US ABDOMEN LIMITED    History: elevated lipase.  Fever of unknown origin.    Findings: The liver measures 17.4 cm in length. The liver is normal in appearance without focal hepatic mass identified. The gallbladder is surgically absent. There is no biliary ductal dilation.  The right kidney measures 10.9 cm in length. There is cortical thinning and increased cortical echogenicity of the right kidney suggesting chronic medical renal disease. The right  kidney is otherwise unremarkable without focal mass, hydronephrosis, or shadowing stones. The visualized portions of the pancreas are normal.  The visualized aspects of the aorta and IVC are normal in caliber.  Color and spectral Doppler evaluation demonstrated normal antegrade flow and normal waveforms in the portal vein. No ascites identified.  A small right pleural effusion is noted.                             X-Ray Chest 1 View (Final result)  Result time 07/24/17 07:29:34    Final result by Enoc William MD (07/24/17 07:29:34)                 Impression:     No adverse interval change.      Electronically signed by: ENOC WILLIAM MD  Date:     07/24/17  Time:    07:29              Narrative:    History: Respiratory failure    Comparison:  7/23/17    Result: Single view of the chest.      Tubes and lines are stable.  In comparison to the prior study, there is no adverse interval changes.                             X-Ray Chest 1 View (Final result)  Result time 07/23/17 17:31:14    Final result by Tayler Irene MD (07/23/17 17:31:14)                 Impression:      A PICC line is present via right sided approach with its tip in superior vena cava.  Persistent patchy infiltrates in the right lung.      Electronically signed by: TAYLER IRENE MD  Date:     07/23/17  Time:    17:31              Narrative:    Exam: XR CHEST 1 VIEW,    Date:  07/23/17 17:02:10    History: picc line    Comparison:  07/23/1970    Findings: An endotracheal tube is present its tip level of T3.  A nasogastric tube extends into the stomach.  A right PICC line is positioned with its tip in superior vena cava.  A pacemaker remains in place.  The heart is at the upper limits of normal in size.  There is patchy infiltrate in the right upper lobe as well as congestion of pulmonary vascularity.                             X-Ray Chest AP Portable (Final result)  Result time 07/23/17 16:08:40    Final result by Hubert Jang MD (07/23/17  16:08:40)                 Impression:     See above.      Electronically signed by: JEWELL KOTHARI MD  Date:     07/23/17  Time:    16:08              Narrative:    Exam: Portable chest xray    History:    Respiratory failure.  Endotracheal tube placement.    Findings:     The endotracheal tip projects 4 cm above the reanna.  NG tube tip below diaphragm.  Patchy increased markings in the lung bases right greater than left.  Comparison made to exam performed earlier today.                             X-Ray Chest 1 View (Final result)  Result time 07/23/17 08:23:12    Final result by Tayler Irene MD (07/23/17 08:23:12)                 Impression:      Findings chest changes of developing congestive heart failure and pulmonary edema.      Electronically signed by: TAYLER IRENE MD  Date:     07/23/17  Time:    08:23              Narrative:    Exam: XR CHEST 1 VIEW,    Date:  07/23/17 00:51:38    History: pneumonia vs pulm edema    Comparison:  07/20/2017    Findings: The heart is at the upper limits of normal in size.  A pacemaker remains in place.  There is a new pattern congestion and pulmonary vascularity with small pleural effusions and patchy perihilar and interstitial markings in the lung bases.                             CT Abdomen Pelvis  Without Contrast (Final result)  Result time 07/21/17 14:40:49    Final result by Enoc William MD (07/21/17 14:40:49)                 Impression:        Enlarged edematous left kidney could reflect pyelonephritis. Correlate with urinalysis.    Minimal consolidation or atelectasis is seen at the left lung base.     No evidence of intra-abdominal abscess formation    PEG tube appears satisfactory.     Fluid-filled bowel loops are seen throughout the abdomen which could reflect the patient's diarrhea and be related to infectious process.     Findings discussed with JEWELL PEDERSEN at 14:40:42        Electronically signed by: ENOC WILLIAM MD  Date:     07/21/17  Time:    14:40               Narrative:    CT of the abdomen and pelvis without contrast.    Clinical indication: Fever. Abdominal pain.    Findings: Lack of IV and oral contrast material grossly limited this examination.    The heart size is normal with pacing wires. Minimal consolidation or atelectasis is seen at the left lung base. There is no free intraperitoneal air. No bowel obstruction. Bony windows show no acute abnormality.    The liver is unremarkable. Splenomegaly noted adrenal glands and pancreas appear unremarkable on this noncontrast study. Gallbladder surgically absent.    . The right kidney is enlarged and appears edematous with perinephric stranding; pyelonephritis is a differential consideration. No definite hydronephrosis or ureteral calculus is identified.    Within the pelvis there are radiopaque densities in posterior related to patient's prior surgery. Scattered fluid-filled loops of bowel are seen throughout the abdomen which could reflect diarrhea.    Extensive retroperitoneal adenopathy is seen which is similar to the prior study.    Bones demonstrate degenerative changes                             X-Ray Chest AP Portable (Final result)  Result time 07/20/17 20:50:48    Final result by Ghanshyam Haq III, MD (07/20/17 20:50:48)                 Impression:     No acute cardio pulmonary abnormality suggested.      Electronically signed by: GHANSHYAM HAQ MD  Date:     07/20/17  Time:    20:50              Narrative:    One view chest x-ray.    Clinical indication: sepsis. Shortness of breath.    Heart size is normal. The lung fields are clear with minimal chronic changes suggested.. No acute pulmonary infiltrate. Permanent pacing device/defibrillator again noted in position on the left.                             RADIOLOGY REPORT (Final result)  Result time 07/26/17 14:15:34                Pending Diagnostic Studies:     Procedure Component Value Units Date/Time    HIV-1 GenotypR PLUS [[id number]]  Collected:  07/25/17 0300    Order Status:  Sent Lab Status:  In process Updated:  07/25/17 1747    Specimen:  Blood from Blood     Vancomycin, random [[id number]] Collected:  07/28/17 0400    Order Status:  Sent Lab Status:  No result     Specimen:  Blood from Blood     Vancomycin, random [[id number]] Collected:  07/26/17 0244    Order Status:  Sent Lab Status:  In process Updated:  07/26/17 0244    Specimen:  Blood from Blood         Final Active Diagnoses:    Diagnosis Date Noted POA    PRINCIPAL PROBLEM:  Severe sepsis [A41.9, R65.20] 06/26/2016 Yes    HIV (human immunodeficiency virus infection) [Z21]  Yes     Chronic    Anemia [D64.9] 03/21/2017 Yes    S/P percutaneous endoscopic gastrostomy (PEG) tube placement [Z93.1] 07/21/2017 Not Applicable    S/P implantation of automatic cardioverter/defibrillator (AICD) [Z95.810] 12/29/2016 Yes    Thrombocytopenia associated with AIDS [B20, D69.59] 07/23/2017 Yes    LELAND (acute kidney injury) [N17.9] 04/16/2017 Yes      Problems Resolved During this Admission:    Diagnosis Date Noted Date Resolved POA    Respiratory distress [R06.00] 07/23/2017 07/27/2017 No    Fever [R50.9] 07/20/2017 07/28/2017 Yes    Acute renal failure [N17.9] 07/21/2017 07/29/2017 Yes    Fever [R50.9] 07/28/2017 07/29/2017 Yes    Hyponatremia [E87.1] 07/28/2017 07/29/2017 Yes    Acute respiratory failure [J96.00] 07/23/2017 07/27/2017 Yes    Pneumonia due to infectious organism [J18.9] 07/23/2017 07/27/2017 Yes    Hypokalemia [E87.6] 05/25/2017 07/28/2017 No    Septic shock [A41.9, R65.21] 04/19/2017 07/28/2017 Yes        Discharged Condition: stable    Disposition: Home-Health Care [facility]    Follow Up:  Follow-up Information     Levi Moncada MD. Schedule an appointment as soon as possible for a visit in 3 days.    Specialty:  Family Medicine  Why:  hospital follow up  Contact information:  [address]  [place] [address]  [phone]             Hubert Mayo MD. Schedule an  appointment as soon as possible for a visit in 1 week.    Specialty:  Infectious Diseases  Why:  hospital follow up  Contact information:  [address] [facility] [address] [place] [address]  [phone]             Virginia Olmedo MD. Schedule an appointment as soon as possible for a visit in 1 week.    Specialty:  Hematology and Oncology  Why:  hospital follow up  Contact information:  [address] [place] [address]  [phone]                 Patient Instructions:     Ambulatory referral to Outpatient Case Management   Referral Priority: Routine Referral Type: Consultation   Referral Reason: Specialty Services Required    Number of Visits Requested: 1      Ambulatory referral to Home Health   Referral Priority: Routine Referral Type: Home Health   Referral Reason: Specialty Services Required    Requested Specialty: Home Health Services    Number of Visits Requested: 1      Diet general     Activity as tolerated     Call MD for:  increased confusion or weakness     Call MD for:  persistent dizziness, light-headedness, or visual disturbances     Call MD for:  difficulty breathing or increased cough       Medications:  Reconciled Home Medications:   Current Discharge Medication List      START taking these medications    Details   dolutegravir 50 mg Tab Take 1 tablet (50 mg total) by mouth once daily.  Qty: 30 tablet, Refills: 4      levoFLOXacin (LEVAQUIN) 500 MG tablet Take 1 tablet (500 mg total) by mouth once daily.  Qty: 5 tablet, Refills: 0         CONTINUE these medications which have CHANGED    Details   nystatin (MYCOSTATIN) 100,000 unit/mL suspension Take 4 mLs (400,000 Units total) by mouth 4 (four) times daily as needed.  Qty: 160 mL, Refills: 0         CONTINUE these medications which have NOT CHANGED    Details   atovaquone (MEPRON) 750 mg/5 mL Susp Take 5 mLs (750 mg total) by mouth every 12 (twelve) hours.  Qty: 140 mL, Refills: 0      b complex vitamins capsule Take 1 capsule by  mouth once daily.      calcium carbonate (TUMS) 200 mg calcium (500 mg) chewable tablet Take 1 tablet (500 mg total) by mouth 3 (three) times daily.      citalopram (CELEXA) 20 MG tablet Take 1 tablet (20 mg total) by mouth once daily.  Qty: 30 tablet, Refills: 0      darunavir-cobicistat (PREZCOBIX) 800-150 mg-mg Tab Take 800 mg by mouth every evening.  Qty: 30 tablet, Refills: 1      folic acid (FOLVITE) 1 MG tablet Take 1 tablet (1 mg total) by mouth once daily.  Qty: 90 tablet, Refills: 3    Associated Diagnoses: Drug-induced folate deficiency anemia      lacosamide (VIMPAT) 50 mg Tab Take 2 tablets (100 mg total) by mouth 2 (two) times daily.  Qty: 120 tablet, Refills: 0      metoprolol tartrate (LOPRESSOR) 25 MG tablet Take 0.5 tablets (12.5 mg total) by mouth 2 (two) times daily.  Qty: 30 tablet, Refills: 0      oxycodone-acetaminophen (ROXICET) 5-325 mg per tablet Take 1 tablet by mouth every 6 (six) hours as needed for Pain (pain).  Qty: 12 tablet, Refills: 0      pantoprazole (PROTONIX) 20 MG tablet TAKE 1 TABLET(20 MG) BY MOUTH EVERY DAY  Qty: 12 tablet, Refills: 0         STOP taking these medications       cefUROXime (CEFTIN) 250 MG tablet Comments:   Reason for Stopping:         emtricitabine (EMTRIVA) 10 mg/mL solution Comments:   Reason for Stopping:         sodium polystyrene (KAYEXALATE) 15 gram/60 mL Susp Comments:   Reason for Stopping:         tenofovir (VIREAD) 300 mg Tab Comments:   Reason for Stopping:             Time spent on the discharge of patient: 35 minutes    MICHELLE Ortega  Department of Hospital Medicine  [facility]

## 2017-07-31 NOTE — PROGRESS NOTES
Thank you for the referral.   For your information:    The following patient has been assigned to Lexii Bryan RN with Outpatient Complex Care Management for high risk screening.    Reason:   HIV (human immunodeficiency virus infection)  Severe sepsis  Anemia, unspecified type  Cardiomyopathy, unspecified type  LELAND (acute kidney injury)    Please contact [facility] at [phone] with any questions.    Thank you,  Stephani Costa

## 2017-07-31 NOTE — PROGRESS NOTES
1830 Pt significant other called requesting medication refill, per Kylie Banegas NP medication called in to pt pharmacy, significant other notified of refill of medication and medication already sent over to pharmacy.

## 2017-07-31 NOTE — SUBJECTIVE & OBJECTIVE
Interval History: Pt was seen and examined before her d/c home today. Pt has no new c/o's, no discomfort, no fever, no CP, no SOB, felt ready to be d/misael.    Review of patient's allergies indicates:   Allergen Reactions    Iodine and iodide containing products Anaphylaxis     Pt states she coded last time she was administered contrast    Pneumococcal 23-chitra ps vaccine Anaphylaxis     Potential iodine containing    Bactrim [sulfamethoxazole-trimethoprim] Hives    Morphine Itching     No current facility-administered medications for this encounter.      Current Outpatient Prescriptions   Medication    atovaquone (MEPRON) 750 mg/5 mL Susp    b complex vitamins capsule    calcium carbonate (TUMS) 200 mg calcium (500 mg) chewable tablet    citalopram (CELEXA) 20 MG tablet    darunavir-cobicistat (PREZCOBIX) 800-150 mg-mg Tab    dolutegravir 50 mg Tab    folic acid (FOLVITE) 1 MG tablet    lacosamide (VIMPAT) 50 mg Tab    levoFLOXacin (LEVAQUIN) 500 MG tablet    metoprolol tartrate (LOPRESSOR) 25 MG tablet    nystatin (MYCOSTATIN) 100,000 unit/mL suspension    oxycodone-acetaminophen (ROXICET) 5-325 mg per tablet    pantoprazole (PROTONIX) 20 MG tablet       Objective:     Vital Signs (Most Recent):  Temp: 97.4 °F (36.3 °C) (07/31/17 1200)  Pulse: (!) 53 (07/31/17 1330)  Resp: 18 (07/31/17 1200)  BP: (!) 155/94 (07/31/17 1200)  SpO2: (!) 92 % (07/31/17 1330)  O2 Device (Oxygen Therapy): room air (07/31/17 1330) Vital Signs (24h Range):  Temp:  [97.4 °F (36.3 °C)-98.3 °F (36.8 °C)] 97.4 °F (36.3 °C)  Pulse:  [53-84] 53  Resp:  [18-20] 18  SpO2:  [92 %-99 %] 92 %  BP: (122-155)/(75-94) 155/94     Weight: 50.7 kg (111 lb 12.8 oz) (07/31/17 0118)  Body mass index is 21.83 kg/m².  Body surface area is 1.47 meters squared.    I/O last 3 completed shifts:  In: 520 [P.O.:520]  Out: -     Physical Exam   Constitutional: She appears well-developed and well-nourished. No distress.   HENT:   Head: Normocephalic and  atraumatic.   Mouth/Throat: Oropharynx is clear and moist. No oropharyngeal exudate.   Eyes: Conjunctivae and EOM are normal.   Neck: Normal range of motion. Neck supple. No JVD present. Carotid bruit is not present. No tracheal deviation present. No thyroid mass and no thyromegaly present.   Cardiovascular: Normal rate, regular rhythm, normal heart sounds and intact distal pulses.  Exam reveals no gallop and no friction rub.    No murmur heard.  Pulmonary/Chest: No respiratory distress. She has no wheezes. She exhibits no tenderness.   Abdominal: Soft. Bowel sounds are normal. She exhibits no distension, no abdominal bruit, no ascites and no mass. There is no hepatosplenomegaly. There is no tenderness. There is no rebound, no guarding and no CVA tenderness.   Musculoskeletal: Normal range of motion. She exhibits no edema or tenderness.   Lymphadenopathy:     She has no cervical adenopathy.   Neurological: No cranial nerve deficit.   Skin: Skin is warm and intact. No rash noted. No erythema. No pallor.   Psychiatric: Judgment normal.       Significant Labs:   BMP  Lab Results   Component Value Date     07/29/2017    K 4.0 07/29/2017     07/29/2017    CO2 26 07/29/2017    BUN 29 (H) 07/29/2017    CREATININE 1.2 07/29/2017    CALCIUM 8.5 (L) 07/29/2017    ANIONGAP 6 (L) 07/29/2017    ESTGFRAFRICA >60 07/29/2017    EGFRNONAA 60 07/29/2017     Lab Results   Component Value Date    WBC 4.66 07/31/2017    HGB 9.6 (L) 07/31/2017    HCT 29.0 (L) 07/31/2017    MCV 92 07/31/2017     07/31/2017

## 2017-07-31 NOTE — PROGRESS NOTES
[facility]  Infectious Disease  Progress Note    Patient Name: Wang Kebede  MRN: [id number]  Admission Date: 7/20/2017  Length of Stay: 10 days  Attending Physician: Marcus García MD  Primary Care Provider: Levi Moncada MD    Isolation Status: No active isolations  Assessment/Plan:      Thrombocytopenia associated with AIDS    Continue HAART ,will continue to monitor         HIV (human immunodeficiency virus infection)    Continue Darunavir -cobicistat and tivicay    Will continue Mepron for PCP prophylaxis.    She is tolerating medication   Please stop descovy .  I will suggest writing down the names of her new medications and giving it to her and the boyfriend to avoid confusion.  Descovy was stopped due to worsening renal failure and tenofovir could be one of the culprits.  She can be safely discharged from ID perspective.            Anticipated Disposition:     Thank you for your consult. I will follow-up with patient. Please contact us if you have any additional questions.    Hubert Mayo MD  Infectious Disease  [facility]    Subjective:     Principal Problem:Severe sepsis    HPI:   31 year old woman with AIDs well known to me . She has long standing history of poor compliance with medications .However during he rlast admission, after much discussion, she agreed to get a PEG tube inserted and to get her HAART therapy through the PEG tube. She had recent lab work done on 07/10 and cd4-245 ,cd%-10.6 ,  Previous lab data-a month ago-cd4 -4, cd4% 2.4  HIV viral load -1271 -decreased from 180,773 a month ago .  She now presented with fever -T max 103 . She denies any history of cough or headache. No abdominal pain .  Since admission, cultures are still pending . Chest x-ray did not show any acute abnormality. UA is normal .    Interval History: 31 year old woman with AIDS, respiratory failure and metabolic acidosis. She feels better and wants to know about  discharge.  Review of Systems   Constitutional: Negative for chills, fatigue and fever.   HENT: Negative for congestion, ear pain, facial swelling, sinus pressure and sore throat.    Eyes: Negative for pain.   Respiratory: Negative for apnea, chest tightness, shortness of breath and stridor.    Cardiovascular: Negative for chest pain, palpitations and leg swelling.   Gastrointestinal: Negative for abdominal distention, abdominal pain, diarrhea and nausea.   Endocrine: Negative for polydipsia and polyphagia.   Genitourinary: Negative for decreased urine volume, difficulty urinating, frequency and genital sores.   Musculoskeletal: Negative for arthralgias and gait problem.   Neurological: Negative for light-headedness and headaches.   Hematological: Negative for adenopathy.   Psychiatric/Behavioral: Negative for agitation, confusion and decreased concentration.     Objective:     Vital Signs (Most Recent):  Temp: 98.3 °F (36.8 °C) (07/31/17 0745)  Pulse: 78 (07/31/17 0745)  Resp: 18 (07/31/17 0745)  BP: (!) 145/93 (07/31/17 0745)  SpO2: 99 % (07/31/17 0745) Vital Signs (24h Range):  Temp:  [97.6 °F (36.4 °C)-98.3 °F (36.8 °C)] 98.3 °F (36.8 °C)  Pulse:  [72-84] 78  Resp:  [18-20] 18  SpO2:  [97 %-100 %] 99 %  BP: (122-152)/(75-93) 145/93     Weight: 50.7 kg (111 lb 12.8 oz)  Body mass index is 21.83 kg/m².    Estimated Creatinine Clearance: 48.3 mL/min (based on Cr of 1.2).    Physical Exam   Constitutional: She is oriented to person, place, and time. Vital signs are normal. She appears well-developed.   HENT:   Head: Normocephalic and atraumatic.   Eyes: Conjunctivae and EOM are normal. Pupils are equal, round, and reactive to light.   Neck: Normal range of motion. Neck supple. No thyroid mass and no thyromegaly present.   Cardiovascular: Normal rate, normal heart sounds and intact distal pulses.    Pulmonary/Chest: Effort normal and breath sounds normal. No accessory muscle usage. No respiratory distress.    Abdominal: Soft. Bowel sounds are normal. She exhibits no mass. There is no tenderness.   Peg TUBE NOTED   Musculoskeletal: Normal range of motion.   Neurological: She is alert and oriented to person, place, and time.   Skin: Skin is intact. No rash noted.   Psychiatric: She has a normal mood and affect.   Nursing note and vitals reviewed.      Significant Labs:   Blood Culture:     Recent Labs  Lab 05/21/17  1445 07/20/17  2000 07/20/17  2027 07/23/17  1600 07/23/17  2238   LABBLOO No growth after 5 days. No growth after 5 days. No growth after 5 days. No growth after 5 days. No growth after 5 days.     BMP:     Recent Labs  Lab 07/31/17  0720   MG 1.5*     CBC:     Recent Labs  Lab 07/30/17  0804 07/31/17  0720   WBC 5.26 4.66   HGB 9.6* 9.6*   HCT 28.7* 29.0*    186     All pertinent labs within the past 24 hours have been reviewed.    Significant Imaging: I have reviewed all pertinent imaging results/findings within the past 24 hours.

## 2017-07-31 NOTE — PLAN OF CARE
07/31/17 1215   Medicare Message   Important Message from Medicare regarding Discharge Appeal Rights Given to patient/caregiver;Signed/date by patient/caregiver;Explained to patient/caregiver   Date IMM was signed 07/31/17   Time IMM was signed 1219

## 2017-07-31 NOTE — ASSESSMENT & PLAN NOTE
Continue Darunavir -cobicistat and tivicay    Will continue Mepron for PCP prophylaxis.    She is tolerating medication   Please stop descovy .  I will suggest writing down the names of her new medications and giving it to her and the boyfriend to avoid confusion.  Descovy was stopped due to worsening renal failure and tenofovir could be one of the culprits.  She can be safely discharged from ID perspective.

## 2017-07-31 NOTE — PT/OT/SLP PROGRESS
Physical Therapy  Treatment    Wang Kebede   MRN: [id number]   Admitting Diagnosis: Severe sepsis    PT Received On: 07/31/17  PT Start Time: 1015     PT Stop Time: 1030    PT Total Time (min): 15 min       Billable Minutes:  Gait [id number] MINS    Treatment Type: Treatment  PT/PTA: PT     PTA Visit Number: 2       General Precautions: Standard, fall  Orthopedic Precautions: N/A   Braces: N/A         Subjective:  Communicated with EPIC and nurse (Ricarda) prior to session.  Patient agreeable to ambulating with therapy.    Pain/Comfort  Pain Rating 1: 0/10    Objective:   Patient found with: peripheral IV, telemetry    Functional Mobility:  Bed Mobility:   Rolling/Turning to Left: Modified independent  Scooting/Bridging: Modified Independent  Supine to Sit: Modified Independent    Transfers:  Sit <> Stand Assistance: Stand By Assistance  Sit <> Stand Assistive Device: No Assistive Device  Bed <> Chair Technique: Stand Pivot  Bed <> Chair Assistance: Contact Guard Assistance  Bed <> Chair Assistive Device: No Assistive Device    Gait:   Gait Distance: Patient amb 200' without AD with CGA; unsteady, losing balance with environmental distractions and head turns.  Assistance 1: Contact Guard Assistance  Gait Assistive Device: No device  Gait Pattern: swing-through gait  Gait Deviation(s): decreased alba, other (see comments) (unsteady)    Balance:   Static Sit: GOOD: Takes MODERATE challenges from all directions  Dynamic Sit: GOOD-: Maintains balance through MODERATE excursions of active trunk movement,     Static Stand: FAIR+: Takes MINIMAL challenges from all directions  Dynamic stand: FAIR: Needs CONTACT GUARD during gait     Therapeutic Activities and Exercises:  Patient donned socks while sitting edge of bed.  Downed second gown as robe in static standing.     AM-PAC 6 CLICK MOBILITY  How much help from another person does this patient currently need?   1 = Unable, Total/Dependent Assistance  2 = A  lot, Maximum/Moderate Assistance  3 = A little, Minimum/Contact Guard/Supervision  4 = None, Modified [place]/Independent    Turning over in bed (including adjusting bedclothes, sheets and blankets)?: 4  Sitting down on and standing up from a chair with arms (e.g., wheelchair, bedside commode, etc.): 4  Moving from lying on back to sitting on the side of the bed?: 4  Moving to and from a bed to a chair (including a wheelchair)?: 3  Need to walk in hospital room?: 3  Climbing 3-5 steps with a railing?: 1  Total Score: 19    AM-PAC Raw Score CMS G-Code Modifier Level of Impairment Assistance   6 % Total / Unable   7 - 9 CM 80 - 100% Maximal Assist   10 - 14 CL 60 - 80% Moderate Assist   15 - 19 CK 40 - 60% Moderate Assist   20 - 22 CJ 20 - 40% Minimal Assist   23 CI 1-20% SBA / CGA   24 CH 0% Independent/ Mod I     Patient left up in chair with all lines intact, call button in reach, nurse (Ricarda) notified and friend present.  Educated patient on fall risk and need to call nurse's station for assistance when wanting to get up in room.    Assessment:  Patient participated in gait training without AD today.  Unsteady.  Several LOBs, especially when distracted or attempting to turn head or body to look at things in environment.     Rehab identified problem list/impairments: Rehab identified problem list/impairments: weakness, gait instability, impaired balance    Rehab potential is good.    Activity tolerance: Good    Discharge recommendations: Discharge Facility/Level Of Care Needs: home health PT     Barriers to discharge: Barriers to Discharge: None    Equipment recommendations: Equipment Needed After Discharge: walker, rolling     GOALS:    Physical Therapy Goals        Problem: Physical Therapy Goal    Goal Priority Disciplines Outcome Goal Variances Interventions   Physical Therapy Goal     PT/OT, PT Ongoing (interventions implemented as appropriate)     Description:  PT WILL BE SEEN FOR P.T. FOR A  MIN OF 5 OUT OF 7 DAYS A WEEK  LT/3/17  1. PT WILL COMPLETE BED MOBILITY WITH MIN A.   2. PT WILL T/F TO CHAIR WITH MOD A.  3. PT WILL STAND WITH RW WITH MOD A.  4. PT WILL GT TRAIN X 20' WITH RW AND MAX A  5. PT WILL COMPLETE TE X 20 REPS FOR STRENGTHENING.                    PLAN:    Patient to be seen 5 x/week  to address the above listed problems via gait training, therapeutic activities, therapeutic exercises  Plan of Care expires: 17  Plan of Care reviewed with: patient, spouse         Vernell Asael, PT, DPT  2017

## 2017-08-01 ENCOUNTER — TELEPHONE (OUTPATIENT)
Dept: INTERNAL MEDICINE | Facility: CLINIC | Age: 32
End: 2017-08-01

## 2017-08-01 RX ORDER — LACOSAMIDE 50 MG/1
TABLET, FILM COATED ORAL
Qty: 120 TABLET | Refills: 0 | Status: ON HOLD | OUTPATIENT
Start: 2017-08-01 | End: 2018-12-31 | Stop reason: HOSPADM

## 2017-08-01 RX ORDER — ONDANSETRON 4 MG/1
4 TABLET, ORALLY DISINTEGRATING ORAL EVERY 8 HOURS PRN
Qty: 21 TABLET | Refills: 0 | Status: SHIPPED | OUTPATIENT
Start: 2017-08-01 | End: 2017-08-15 | Stop reason: ALTCHOICE

## 2017-08-01 RX ORDER — CITALOPRAM 20 MG/1
TABLET, FILM COATED ORAL
Qty: 30 TABLET | Refills: 0 | Status: SHIPPED | OUTPATIENT
Start: 2017-08-01 | End: 2017-08-15 | Stop reason: ALTCHOICE

## 2017-08-01 NOTE — TELEPHONE ENCOUNTER
Patient was just discharged from the hospital. She is very nauseated today and would like something called into [employer].

## 2017-08-02 ENCOUNTER — PATIENT OUTREACH (OUTPATIENT)
Dept: ADMINISTRATIVE | Facility: CLINIC | Age: 32
End: 2017-08-02

## 2017-08-02 NOTE — PROGRESS NOTES
Lexii Luis RN attempted to contact patient. The following occurred:   C3 nurse attempted to contact Wang Kebede for a TCC post hospital discharge follow up call. The patient is unable to conduct the call @ this time. The patient requested a callback.    The patient has a scheduled HEM/ONC FU appointment with Dr. Yen on 8/15/17 @ [id number]. Message not sent to Physician staff.

## 2017-08-03 NOTE — PATIENT INSTRUCTIONS
Sepsis  Sepsis happens when your body responds with widespread inflammation to a bad infection or bacteremia--the presence of bacteria in your bloodstream. Sepsis can be deadly. Blood pressure may drop and the lungs and kidneys may start to fail. Emergency care for sepsis is crucial.    Risk factors  Those most at risk for sepsis are:  · Infants or older adults  · People who have an illness that weakens their immune system, such as cancer, AIDS, or diabetes  · People being treated with chemotherapy medicines or radiation, which weakens the immune system  · People who have had a transplant  · People with a very severe infection such as pneumonia, meningitis, or a urinary tract infection  When to go to the emergency department (ED)  Sepsis is an emergency. Go to the nearest ED if you have a fever with any of these symptoms:  · Chills and shaking  · Rapid heartbeat and breathing  · Trouble breathing  · Severe nausea or uncontrolled vomiting  · Confusion, disorientation, drowsiness, or dizziness  · Decreased urination  · Severe pain, including in the back or joints   What to expect in the ED  · Blood and urine tests are done to look for bacteria. They also check for organ failure.  · Blood, urine, or sputum cultures may be taken. The samples are sent to a lab. They are placed in a special container. Any bacteria should grow in 24 hours.  · X-rays or other imaging tests may be done.  A person with sepsis will be admitted to the hospital and treated with antibiotics. Treatment may also include oxygen and intravenous fluids.  Date Last Reviewed: 10/1/2016  © 0815-4827 [employer] [address] [place] [address], [place] [address]. All rights reserved. This information is not intended as a substitute for professional medical care. Always follow your healthcare professional's instructions.

## 2017-08-04 LAB — HIV RT+PR MUT TESTED ISLT: NORMAL

## 2017-08-07 NOTE — PHYSICIAN QUERY
PT Name: Wang Kebede  MR #: [id number]    Physician Query Form -Present on Admission (POA) Diagnosis Clarification     CDS/: Loni Ayon RN CCDS          Contact information:  [phone]    This form is a permanent document in the medical record.     Query Date: August 7, 2017    By submitting this query, we are merely seeking further clarification of documentation. Please utilize your independent clinical judgment when addressing the question(s) below.       The Medical record contains the following:    Diagnosis      Supporting Clinical Information   Location in Medical Record   DTI's noted to bilateral heels                      DTI noted behind right ear.  Wound measures 3x1 cm with purple discoloration, soft tissue indentation also noted to head under elastic edge of cap    Risk factors:activity;mobility;nutrition;shear/friction;moisture    Skin is warm and dry. Capillary refill takes less than 2 seconds. No rash noted. She is not diaphoretic. No erythema. No pallor.     Patient has been wearing a spandex skull cap hat from home.  Upon removal, sDTI noted behind right ear.  Wound measures 3x1 cm with purple discoloration, soft tissue indentation also noted to head under elastic edge of cap.   Wound care consult note on 7/26 and per nursing on 7/24      H&P 7/21              Wound care note on 7/27           Doctor, Please specify Present On Admission (POA) status of ________DTI's to bilateral heels and DTI to right ear___.    [x  ] Present on Admission   [  ] Not Present on Admission  [  ] Clinically undetermined

## 2017-08-14 ENCOUNTER — TELEPHONE (OUTPATIENT)
Dept: INTERNAL MEDICINE | Facility: CLINIC | Age: 32
End: 2017-08-14

## 2017-08-14 ENCOUNTER — OUTPATIENT CASE MANAGEMENT (OUTPATIENT)
Dept: ADMINISTRATIVE | Facility: OTHER | Age: 32
End: 2017-08-14

## 2017-08-14 NOTE — TELEPHONE ENCOUNTER
----- Message from Lexii Bryan RN sent at 8/14/2017  2:46 PM CDT -----  Dr. Moncada,     Pt is requested a referral to a nutritionist. She has an appt with you tomorrow. Please refer if you feel appropriate.     Thanks,     Lexii Bryan, RN, [facility]  Outpatient Case Management  [facility]

## 2017-08-14 NOTE — PROGRESS NOTES
Assessment completed with pt. Upcoming appts reviewed. Pt has appt with PCP tomorrow. Pt reports she would like to see a nutritionist. Pt has a peg tube and is receiving Boost via PEG when she cannot eat. Pt has been noncompliant with medications. Pt had recent hospitalization for pneumonia.     Interventions performed:   Message PCP about nutrition referral  Mail education materials.  Med rec  Plan:   Follow up on education materials.

## 2017-08-14 NOTE — PATIENT INSTRUCTIONS
Pneumonia (Adult)  Pneumonia is an infection deep within the lungs. It is in the small air sacs (alveoli). Pneumonia may be caused by a virus or bacteria. Pneumonia caused by bacteria is usually treated with an antibiotic. Severe cases may need to be treated in the hospital. Milder cases can be treated at home. Symptoms usually start to get better during the first 2 days of treatment.    Home care  Follow these guidelines when caring for yourself at home:  · Rest at home for the first 2 to 3 days, or until you feel stronger. Dont let yourself get overly tired when you go back to your activities.  · Stay away from cigarette smoke - yours or other peoples.  · You may use acetaminophen or ibuprofen to control fever or pain, unless another medicine was prescribed. If you have chronic liver or kidney disease, talk with your health care provider before using these medicines. Also talk with your provider if youve had a stomach ulcer or GI bleeding. Dont give aspirin to anyone younger than 18 years of age who is ill with a fever. It may cause severe liver damage.  · Your appetite may be poor, so a light diet is fine.  · Drink 6 to 8 glasses of fluids every day to make sure you are getting enough fluids. Beverages can include water, sport drinks, sodas without caffeine, juices, tea, or soup. Fluids will help loosen secretions in the lung. This will make it easier for you to cough up the phlegm (sputum). If you also have heart or kidney disease, check with your health care provider before you drink extra fluids.  · Take antibiotic medicine prescribed until it is all gone, even if you are feeling better after a few days.  Follow-up care  Follow up with your health care provider in the next 2 to 3 days, or as advised. This is to be sure the medicine is helping you get better.  If you are 65 or older, you should get a pneumococcal vaccine and a yearly flu (influenza) shot. You should also get these vaccines if you have  chronic lung disease like asthma, emphysema, or COPD. Ask your provider about this.  When to seek medical advice  Call your health care provider right away if any of these occur:  · You dont get better within the first 48 hours of treatment  · Shortness of breath gets worse  · Rapid breathing (more than 25 breaths per minute)  · Coughing up blood  · Chest pain gets worse with breathing  · Fever of 102°F (38°C) or higher that doesnt get better with fever medicine  · Weakness, dizziness, or fainting that gets worse  · Thirst or dry mouth that gets worse  · Sinus pain, headache, or a stiff neck  · Chest pain not caused by coughing  Date Last Reviewed: 12/23/2014  © 4458-2297 [employer]. [address] [place] [address], [place] [address]. All rights reserved. This information is not intended as a substitute for professional medical care. Always follow your healthcare professional's instructions.

## 2017-08-14 NOTE — LETTER
August 14, 2017    Wang Kebede  [address] [place] [address]             [facility] [address] [place] [address] Dear Ms. Kebede:    I have enclosed the information we discussed during our recent phone conversation.       If you have any questions or concerns, please don't hesitate to call. I can be reached at [phone].    Sincerely,        Lexii Bryan RN

## 2017-08-15 ENCOUNTER — LAB VISIT (OUTPATIENT)
Dept: LAB | Facility: HOSPITAL | Age: 32
End: 2017-08-15
Attending: FAMILY MEDICINE
Payer: MEDICARE

## 2017-08-15 ENCOUNTER — OFFICE VISIT (OUTPATIENT)
Dept: HEMATOLOGY/ONCOLOGY | Facility: CLINIC | Age: 32
End: 2017-08-15
Payer: MEDICARE

## 2017-08-15 ENCOUNTER — OFFICE VISIT (OUTPATIENT)
Dept: INTERNAL MEDICINE | Facility: CLINIC | Age: 32
End: 2017-08-15
Payer: MEDICARE

## 2017-08-15 ENCOUNTER — TELEPHONE (OUTPATIENT)
Dept: INTERNAL MEDICINE | Facility: CLINIC | Age: 32
End: 2017-08-15

## 2017-08-15 VITALS
OXYGEN SATURATION: 100 % | TEMPERATURE: 97 F | BODY MASS INDEX: 20.12 KG/M2 | HEIGHT: 60 IN | WEIGHT: 102.5 LBS | DIASTOLIC BLOOD PRESSURE: 66 MMHG | HEART RATE: 90 BPM | SYSTOLIC BLOOD PRESSURE: 96 MMHG | RESPIRATION RATE: 16 BRPM

## 2017-08-15 VITALS
SYSTOLIC BLOOD PRESSURE: 102 MMHG | TEMPERATURE: 97 F | WEIGHT: 103.19 LBS | DIASTOLIC BLOOD PRESSURE: 74 MMHG | BODY MASS INDEX: 20.26 KG/M2 | HEIGHT: 60 IN | HEART RATE: 97 BPM

## 2017-08-15 DIAGNOSIS — B20 HIV (HUMAN IMMUNODEFICIENCY VIRUS INFECTION): Primary | Chronic | ICD-10-CM

## 2017-08-15 DIAGNOSIS — B20 AIDS (ACQUIRED IMMUNODEFICIENCY SYNDROME), CD4 <=200: ICD-10-CM

## 2017-08-15 DIAGNOSIS — R65.20 SEVERE SEPSIS: Primary | ICD-10-CM

## 2017-08-15 DIAGNOSIS — Z93.1 S/P PERCUTANEOUS ENDOSCOPIC GASTROSTOMY (PEG) TUBE PLACEMENT: ICD-10-CM

## 2017-08-15 DIAGNOSIS — R64 CACHECTIC: ICD-10-CM

## 2017-08-15 DIAGNOSIS — D63.8 ANEMIA OF OTHER CHRONIC DISEASE: ICD-10-CM

## 2017-08-15 DIAGNOSIS — B20 HIV (HUMAN IMMUNODEFICIENCY VIRUS INFECTION): Chronic | ICD-10-CM

## 2017-08-15 DIAGNOSIS — D64.9 NORMOCYTIC ANEMIA, NOT DUE TO BLOOD LOSS: ICD-10-CM

## 2017-08-15 DIAGNOSIS — A41.9 SEVERE SEPSIS: Primary | ICD-10-CM

## 2017-08-15 LAB
BASOPHILS # BLD AUTO: 0.04 K/UL
BASOPHILS NFR BLD: 0.6 %
DIFFERENTIAL METHOD: ABNORMAL
EOSINOPHIL # BLD AUTO: 0 K/UL
EOSINOPHIL NFR BLD: 0.1 %
ERYTHROCYTE [DISTWIDTH] IN BLOOD BY AUTOMATED COUNT: 17.7 %
HCT VFR BLD AUTO: 38.5 %
HGB BLD-MCNC: 12.3 G/DL
LYMPHOCYTES # BLD AUTO: 2.9 K/UL
LYMPHOCYTES NFR BLD: 42.2 %
MCH RBC QN AUTO: 31.4 PG
MCHC RBC AUTO-ENTMCNC: 31.9 G/DL
MCV RBC AUTO: 98 FL
MONOCYTES # BLD AUTO: 0.6 K/UL
MONOCYTES NFR BLD: 9.5 %
NEUTROPHILS # BLD AUTO: 3.2 K/UL
NEUTROPHILS NFR BLD: 47.6 %
PLATELET # BLD AUTO: 206 K/UL
PMV BLD AUTO: 10.9 FL
RBC # BLD AUTO: 3.92 M/UL
WBC # BLD AUTO: 6.75 K/UL

## 2017-08-15 PROCEDURE — 99999 PR PBB SHADOW E&M-EST. PATIENT-LVL IV: CPT | Mod: PBBFAC,,, | Performed by: INTERNAL MEDICINE

## 2017-08-15 PROCEDURE — 3008F BODY MASS INDEX DOCD: CPT | Mod: S$GLB,,, | Performed by: FAMILY MEDICINE

## 2017-08-15 PROCEDURE — 99214 OFFICE O/P EST MOD 30 MIN: CPT | Mod: S$GLB,,, | Performed by: INTERNAL MEDICINE

## 2017-08-15 PROCEDURE — 99214 OFFICE O/P EST MOD 30 MIN: CPT | Mod: S$GLB,,, | Performed by: FAMILY MEDICINE

## 2017-08-15 PROCEDURE — 99499 UNLISTED E&M SERVICE: CPT | Mod: S$GLB,,, | Performed by: FAMILY MEDICINE

## 2017-08-15 PROCEDURE — 3008F BODY MASS INDEX DOCD: CPT | Mod: S$GLB,,, | Performed by: INTERNAL MEDICINE

## 2017-08-15 PROCEDURE — 99999 PR PBB SHADOW E&M-EST. PATIENT-LVL III: CPT | Mod: PBBFAC,,, | Performed by: FAMILY MEDICINE

## 2017-08-15 NOTE — PROGRESS NOTES
Subjective:       Patient ID: Wang Kebede is a 31 y.o. female.    Chief Complaint: Follow-up    HPI Wang Kebede is a 31 y.o. female former patietn of dr Virginia Olmedo whom I am seeign today for the first time. She comes for follow up of her chrnic anemia felt to be multifactorial  with HIV/AIDS being the most likely cause.  She was  admitted with fevers and developed neutropenia. She was admitted  7/20/17 with fever 103 and had been having fevers 3 days prior to admission.  She stayed in the ICU, was treated for sepsis. She recived a total of 3 unis of blood during that admission     I         Review of patient's allergies indicates:   Allergen Reactions    Iodine and iodide containing products Anaphylaxis       Pt states she coded last time she was administered contrast    Pneumococcal 23-chitra ps vaccine Anaphylaxis       Potential iodine containing    Bactrim [sulfamethoxazole-trimethoprim] Hives    Morphine Itching       PAST MEDICAL HISTORY:   1.  HIV/AIDS  2.  Vulvar cancer currently on surveillance with follow-up with gynecologic oncology  3.  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  Anxiety/depression  5.  Sickle cell disease  6.  Condyloma acuminatum  7.  AIN grade 3     SURGICAL HISTORY:   1.  Bilateral salpingo-oophorectomy with the resultant excision of symptomatic left ovarian cyst  2.  Vulvectomy  3.  AICD placement for sudden death   4-PEG tube placement  FAMILY HISTORY: She reports that her mother was treated for cervical cancer in her 40s.  Her mother has a sickle cell trait.  Her brother has sickle cell disease.     SOCIAL HISTORY: She has never smoked cigarettes. She does not drink alcohol.  She has never used any recreational drugs.  She works as a manager at [employer].  She has 3 children.  She lives in [place].     ALLERGIES: Reviewed on medication card.     Medications:       See med card   Review of Systems   Constitutional: Negative.     HENT: Negative.    Eyes: Negative.    Respiratory: Negative.  Negative for cough and wheezing.    Cardiovascular: Negative.  Negative for chest pain.   Gastrointestinal: Negative.    Genitourinary: Negative.    Neurological: Negative.    Psychiatric/Behavioral: Negative.        Objective:      Physical Exam   Constitutional: She is oriented to person, place, and time. She appears well-developed. No distress.   HENT:   Head: Normocephalic.   Right Ear: Tympanic membrane, external ear and ear canal normal.   Left Ear: Tympanic membrane, external ear and ear canal normal.   Nose: Nose normal. Right sinus exhibits no maxillary sinus tenderness and no frontal sinus tenderness. Left sinus exhibits no maxillary sinus tenderness and no frontal sinus tenderness.   Mouth/Throat: Oropharynx is clear and moist and mucous membranes are normal.   Teeth normal.  Gums normal.   Eyes: Conjunctivae and lids are normal. Pupils are equal, round, and reactive to light.   Neck: Normal carotid pulses, no hepatojugular reflux and no JVD present. Carotid bruit is not present. No tracheal deviation present. No thyroid mass and no thyromegaly present.   Cardiovascular: Normal rate, regular rhythm, S1 normal, S2 normal, normal heart sounds and intact distal pulses.  Exam reveals no gallop and no friction rub.    No murmur heard.  Carotid exam normal   Pulmonary/Chest: Effort normal and breath sounds normal. No accessory muscle usage. No respiratory distress. She has no wheezes. She has no rales. She exhibits no tenderness.   Abdominal: Soft. Normal appearance. She exhibits no distension and no mass. There is no splenomegaly or hepatomegaly. There is no tenderness. There is no rebound and no guarding.   Musculoskeletal: Normal range of motion. She exhibits no edema or tenderness.        Right hand: Normal.        Left hand: Normal.       Lymphadenopathy:     She has no cervical adenopathy.     She has no axillary adenopathy.        Right: No  inguinal and no supraclavicular adenopathy present.        Left: No inguinal and no supraclavicular adenopathy present.   Neurological: She is alert and oriented to person, place, and time. She has normal strength. No cranial nerve deficit. Coordination normal.   Skin: Skin is warm and dry. No rash noted. She is not diaphoretic. No cyanosis or erythema. No pallor. Nails show no clubbing.   Psychiatric: She has a normal mood and affect. Her behavior is normal. Judgment and thought content normal.       Wt Readings from Last 3 Encounters:   08/15/17 46.5 kg (102 lb 8.2 oz)   08/15/17 46.8 kg (103 lb 2.8 oz)   07/31/17 50.7 kg (111 lb 12.8 oz)     Temp Readings from Last 3 Encounters:   08/15/17 97.4 °F (36.3 °C) (Oral)   08/15/17 96.5 °F (35.8 °C) (Tympanic)   07/31/17 97.4 °F (36.3 °C) (Oral)     BP Readings from Last 3 Encounters:   08/15/17 96/66   08/15/17 102/74   07/31/17 (!) 155/94     Pulse Readings from Last 3 Encounters:   08/15/17 90   08/15/17 97   07/31/17 (!) 53       Assessment:       1. HIV (human immunodeficiency virus infection)    2. Anemia of other chronic disease        Plan:       Lab Results   Component Value Date    WBC 6.75 08/15/2017    HGB 12.3 08/15/2017    HCT 38.5 08/15/2017    MCV 98 08/15/2017     08/15/2017     Lab Results   Component Value Date    CREATININE 1.2 07/29/2017     Lab Results   Component Value Date    ALT 70 (H) 07/29/2017     (H) 07/29/2017     (H) 04/19/2017    ALKPHOS 615 (H) 07/29/2017    BILITOT 1.0 07/29/2017       Her hemoglobin has gone up after recent transfusions.  contineu care with dr patel of ID  See me in 2 months with a cbc and a cmp

## 2017-08-15 NOTE — TELEPHONE ENCOUNTER
----- Message from Mariam De León sent at 8/15/2017 12:35 PM CDT -----  Contact: Pt  Pt is requesting to speak to the nurse regarding her nutrition referral. Pls call pt back at [phone].

## 2017-08-15 NOTE — PROGRESS NOTES
Subjective:       Patient ID: Wang Kebede is a 31 y.o. female.    Chief Complaint: Follow-up    F/U;       Pt is a 31 year old who was recently in the hospital for severe sepsis and pt was also found to be in LELAND due to dehydration. Pt has Sickle Cell and is follow-up with Hematology today. Pt is weak and has headache post infection. Pt is also complaining of sinus congestion and pressure. Pt has  PEG tube but only use when not eating.       Review of Systems   Constitutional: Positive for appetite change, fatigue and unexpected weight change.   HENT: Positive for postnasal drip, rhinorrhea and sinus pressure.    Respiratory: Negative.  Negative for cough.    Cardiovascular: Negative.    Genitourinary: Negative.    Musculoskeletal: Negative.    Neurological: Negative for dizziness, tremors, seizures, syncope, speech difficulty and light-headedness.       Objective:      Physical Exam   Constitutional: She is oriented to person, place, and time. She appears well-developed and well-nourished.   HENT:   Right Ear: Tympanic membrane is erythematous. No middle ear effusion.   Left Ear: Tympanic membrane is erythematous.  No middle ear effusion.   Nose: Mucosal edema and rhinorrhea present.   Mouth/Throat: Posterior oropharyngeal erythema present.   Cardiovascular: Normal rate and regular rhythm.    Pulmonary/Chest: Effort normal and breath sounds normal.   Abdominal: Soft. Bowel sounds are normal.   Neurological: She is alert and oriented to person, place, and time.       Assessment:       1. Severe sepsis    2. S/P percutaneous endoscopic gastrostomy (PEG) tube placement    3. Cachectic    4. AIDS (acquired immunodeficiency syndrome), CD4 <=200    5. HIV (human immunodeficiency virus infection)        Plan:       Severe sepsis  Comments:  Pt no fever today. Continue to watch for fever and signs of inection  Orders:  -     Comprehensive metabolic panel; Future; Expected date: 08/15/2017  -     Magnesium;  Future; Expected date: 08/15/2017    S/P percutaneous endoscopic gastrostomy (PEG) tube placement  Comments:  I agree with current plan that PEG tube stay in until at least stablized of 6 month in low 100 teens (110-115)    Cachectic  Comments:  Will refer to Nutrionist for help with calorie intake  Orders:  -     Ambulatory referral to Nutrition Services    AIDS (acquired immunodeficiency syndrome), CD4 <=200  Comments:  last CD was 245. Continue to see ID  Orders:  -     Ambulatory referral to Nutrition Services    HIV (human immunodeficiency virus infection)  Comments:  Continue to see ID  Orders:  -     Ambulatory referral to Nutrition Services

## 2017-08-17 ENCOUNTER — TELEPHONE (OUTPATIENT)
Dept: INTERNAL MEDICINE | Facility: CLINIC | Age: 32
End: 2017-08-17

## 2017-08-17 NOTE — TELEPHONE ENCOUNTER
----- Message from Jessica Osorio sent at 8/17/2017  9:01 AM CDT -----  Contact: patient  Calling to get explanation of lab results. Please call patient ASAP today @ [phone]. Thanks, garth.

## 2017-08-18 DIAGNOSIS — R74.8 ELEVATED LIVER ENZYMES: Primary | ICD-10-CM

## 2017-08-18 NOTE — TELEPHONE ENCOUNTER
----- Message from Jayro Yen MD sent at 8/17/2017  4:39 PM CDT -----   pt 2 days ago. Labs show very similar picture to Hospital. Her alkaline phosphatase if extremely high with elevated liver enzymes. I am trying to follow the progression of work up in the hospital with so far: normal liver, no gallbladder and does not seem to have any bone involvement. Your thoughts? I dont know if GI needs to be involved. (Routing comment)       I see her LFts are all up, including transaminases, so the elevation is most likely due to   The liver fraction.  A Ct did not sow any lesions   She has not  had   hepatitis tests and that is something  to start with.  I think it would be reasonable to that and repeat the LFts in 2 weeks. If still high, ask Gi to see  DR YEN

## 2017-08-21 ENCOUNTER — OUTPATIENT CASE MANAGEMENT (OUTPATIENT)
Dept: ADMINISTRATIVE | Facility: OTHER | Age: 32
End: 2017-08-21

## 2017-08-21 RX ORDER — ATOVAQUONE 750 MG/5ML
SUSPENSION ORAL
Qty: 140 ML | Refills: 0 | OUTPATIENT
Start: 2017-08-21

## 2017-08-23 LAB — FUNGUS BLD CULT: NORMAL

## 2017-08-25 ENCOUNTER — OUTPATIENT CASE MANAGEMENT (OUTPATIENT)
Dept: ADMINISTRATIVE | Facility: OTHER | Age: 32
End: 2017-08-25

## 2017-08-28 RX ORDER — CITALOPRAM 20 MG/1
TABLET, FILM COATED ORAL
Qty: 30 TABLET | Refills: 0 | Status: SHIPPED | OUTPATIENT
Start: 2017-08-28 | End: 2017-08-29 | Stop reason: SDUPTHER

## 2017-08-29 ENCOUNTER — OFFICE VISIT (OUTPATIENT)
Dept: INTERNAL MEDICINE | Facility: CLINIC | Age: 32
End: 2017-08-29
Payer: MEDICARE

## 2017-08-29 ENCOUNTER — LAB VISIT (OUTPATIENT)
Dept: LAB | Facility: HOSPITAL | Age: 32
End: 2017-08-29
Attending: FAMILY MEDICINE
Payer: MEDICARE

## 2017-08-29 ENCOUNTER — CLINICAL SUPPORT (OUTPATIENT)
Dept: DIABETES | Facility: CLINIC | Age: 32
End: 2017-08-29
Payer: MEDICARE

## 2017-08-29 VITALS
WEIGHT: 105.81 LBS | TEMPERATURE: 96 F | HEART RATE: 95 BPM | SYSTOLIC BLOOD PRESSURE: 98 MMHG | DIASTOLIC BLOOD PRESSURE: 64 MMHG | HEIGHT: 60 IN | BODY MASS INDEX: 20.77 KG/M2

## 2017-08-29 VITALS — BODY MASS INDEX: 20.77 KG/M2 | HEIGHT: 60 IN | WEIGHT: 105.81 LBS

## 2017-08-29 DIAGNOSIS — N30.00 ACUTE CYSTITIS WITHOUT HEMATURIA: Primary | ICD-10-CM

## 2017-08-29 DIAGNOSIS — Z93.1 GASTROSTOMY IN PLACE: ICD-10-CM

## 2017-08-29 DIAGNOSIS — F32.A DEPRESSION, UNSPECIFIED DEPRESSION TYPE: ICD-10-CM

## 2017-08-29 DIAGNOSIS — Z21 ASYMPTOMATIC HUMAN IMMUNODEFICIENCY VIRUS (HIV) INFECTION STATUS: ICD-10-CM

## 2017-08-29 DIAGNOSIS — N17.9 AKI (ACUTE KIDNEY INJURY): ICD-10-CM

## 2017-08-29 DIAGNOSIS — B20 HUMAN IMMUNODEFICIENCY VIRUS (HIV) DISEASE: ICD-10-CM

## 2017-08-29 DIAGNOSIS — R64 CACHEXIA: Primary | ICD-10-CM

## 2017-08-29 DIAGNOSIS — B20 AIDS (ACQUIRED IMMUNODEFICIENCY SYNDROME), CD4 <=200: ICD-10-CM

## 2017-08-29 DIAGNOSIS — R74.8 ELEVATED LIVER ENZYMES: ICD-10-CM

## 2017-08-29 DIAGNOSIS — B20 HIV (HUMAN IMMUNODEFICIENCY VIRUS INFECTION): Chronic | ICD-10-CM

## 2017-08-29 LAB
ALT SERPL W/O P-5'-P-CCNC: 98 U/L
AST SERPL-CCNC: 83 U/L
GGT SERPL-CCNC: 396 U/L

## 2017-08-29 PROCEDURE — 84460 ALANINE AMINO (ALT) (SGPT): CPT

## 2017-08-29 PROCEDURE — 99999 PR PBB SHADOW E&M-EST. PATIENT-LVL III: CPT | Mod: PBBFAC,,, | Performed by: FAMILY MEDICINE

## 2017-08-29 PROCEDURE — 99214 OFFICE O/P EST MOD 30 MIN: CPT | Mod: S$GLB,,, | Performed by: FAMILY MEDICINE

## 2017-08-29 PROCEDURE — 97802 MEDICAL NUTRITION INDIV IN: CPT | Mod: S$GLB,,, | Performed by: DIETITIAN, REGISTERED

## 2017-08-29 PROCEDURE — 84450 TRANSFERASE (AST) (SGOT): CPT

## 2017-08-29 PROCEDURE — 99499 UNLISTED E&M SERVICE: CPT | Mod: S$GLB,,, | Performed by: FAMILY MEDICINE

## 2017-08-29 PROCEDURE — 99999 PR PBB SHADOW E&M-EST. PATIENT-LVL III: CPT | Mod: PBBFAC,,, | Performed by: DIETITIAN, REGISTERED

## 2017-08-29 PROCEDURE — 36415 COLL VENOUS BLD VENIPUNCTURE: CPT | Mod: PO

## 2017-08-29 PROCEDURE — 80074 ACUTE HEPATITIS PANEL: CPT

## 2017-08-29 PROCEDURE — 82977 ASSAY OF GGT: CPT

## 2017-08-29 PROCEDURE — 3008F BODY MASS INDEX DOCD: CPT | Mod: S$GLB,,, | Performed by: FAMILY MEDICINE

## 2017-08-29 RX ORDER — ATOVAQUONE 750 MG/5ML
5 SUSPENSION ORAL EVERY 12 HOURS
COMMUNITY
End: 2017-10-23

## 2017-08-29 RX ORDER — CITALOPRAM 20 MG/1
TABLET, FILM COATED ORAL
Qty: 90 TABLET | Refills: 3 | Status: SHIPPED | OUTPATIENT
Start: 2017-08-29 | End: 2017-10-23

## 2017-08-29 NOTE — LETTER
August 29, 2017        Levi Moncada MD  [address] [facility] [place] [address]             [facility] - Diabetes Management  [address] [facility] [place] [address]  Phone: [phone]  Fax: [phone]   Patient: Wang Kebede   MR Number: [id number]   YOB: 1985   Date of Visit: 8/29/2017       Dear Dr. Moncada:    Thank you for referring Wang Kebede to me for evaluation. Below are the relevant portions of my assessment and plan of care.    If you have questions, please do not hesitate to call me. I look forward to following Wang along with you.    Sincerely,      Luz Tracy, ELEUTERIO, CDE           CC  No Recipients

## 2017-08-29 NOTE — PROGRESS NOTES
Subjective:       Patient ID: Wang Kebede is a 31 y.o. female.    Chief Complaint: Follow-up and Medication Refill    F/U:      Pt is a 31 year old who has several complicated medical issues. Pt was recently hospitalized and is being followed post hospitalization. Pt has had some irregular blood work to include elevated liver enzymes and Alk Phos (no gallbladder). Pt is being actively treated for HIV. Pt has a PEG tube due to malnutrition. Pt is seeing Nephrology, ID and Hematology.     UTI:      Pt reports x 2 weeks urinary discomfort without frequency or hesitancy. Pt does reports some increase urgency.       Medication Refill   This is a chronic problem. The current episode started more than 1 year ago. The problem occurs constantly. The problem has been unchanged. Associated symptoms include urinary symptoms. Pertinent negatives include no abdominal pain, anorexia, fever, headaches, joint swelling or myalgias. She has tried nothing for the symptoms. The treatment provided mild relief.     Review of Systems   Constitutional: Negative.  Negative for fever.   Respiratory: Negative.    Cardiovascular: Negative.    Gastrointestinal: Negative for abdominal pain and anorexia.   Musculoskeletal: Negative for joint swelling and myalgias.   Neurological: Negative for headaches.       Objective:      Physical Exam   Constitutional: She is oriented to person, place, and time. She appears well-developed and well-nourished.   Cardiovascular: Normal rate and regular rhythm.    Pulmonary/Chest: Effort normal and breath sounds normal.   Abdominal: Soft. Normal appearance and bowel sounds are normal. There is no splenomegaly or hepatomegaly. There is tenderness in the suprapubic area. There is CVA tenderness. Hernia confirmed negative in the ventral area and confirmed negative in the right inguinal area.   Neurological: She is alert and oriented to person, place, and time.   Skin: Skin is warm and dry.    Psychiatric: She has a normal mood and affect. Her behavior is normal.       Assessment:       1. Acute cystitis without hematuria    2. Elevated liver enzymes    3. Gastrostomy in place    4. Depression, unspecified depression type    5. LELAND (acute kidney injury)        Plan:       Acute cystitis without hematuria  Comments:  Will get a urine and Urine cultures  Orders:  -     Urinalysis; Future; Expected date: 08/29/2017  -     Urine culture; Future; Expected date: 08/29/2017    Elevated liver enzymes  Comments:  Repeating her AST/ALT and GGT today with acute hep panel.     Gastrostomy in place  Comments:  pt has a peg but only using it as needed    Depression, unspecified depression type  Comments:  Will continue pt on Celexa 20 mg    LELAND (acute kidney injury)  Comments:  Seems to be correcting at this point. Recheck BMP in 2-4 weeks    Other orders  -     citalopram (CELEXA) 20 MG tablet; TAKE 1 TABLET(20 MG) BY MOUTH EVERY DAY  Dispense: 90 tablet; Refill: 3

## 2017-08-29 NOTE — PROGRESS NOTES
"PCP: Levi Moncada MD   REFERRING PROVIDER: Levi Moncada MD     HISTORY OF PRESENT ILLNESS: 31 y.o. female patient is in clinic today for cachexia, HIV (R64, B20). Currently has peg tube, using 2 boost suppplements daily (providing 720 cals, 28 grm pro). Pt reports intolerance to boost orally and receives these from cancer services.    VITAL SIGNS:  Height: 5' (152.4 cm)   Weight: 48 kg (105 lb 13.1 oz)   IBW: 100 lbs +/-10%    ALLERGIES & MEDICATIONS: Reviewed and Reconciled  MEDICAL/SURGICAL & FAMILY HISTORY: Reviewed and Reconciled    LABORATORY: Reviewed Diabetes Management Flowsheet and Results Review.    SELF-MONITORING: Food - none are avail    ACTIVITY LEVEL: Aerobic - none. Resistance - none.    NUTRITION INTAKE: Meal patterns irregular with intake ~1200cals/d, inadequate oral intake. Snacking pattern is grazing and may be influencing ability for pt to complete meals or maintain appetite.   B - orange juice (20oz), eggs (2), toast (1) OR egg, rice   S - none  L - humphrey cakes, chips (coke)   S - none   D - white beans 1/3 c, bkd chix 1 leg, rice 2/3c, green beans, cornbread 2" sq -(water)  S - white beans 1/3 c, bkd chix 1 leg, rice 2/3c, green beans, cornbread 2" sq -(water)    PSYCHOSOCIAL: Stage of change - preparation  Barriers to change - none.    MNT ASSESSMENT:   1800 calories, 80-90 grams protein daily  45-75 grams carb/meal, 15-30 grams carb/snack  150 min physical activity per week, moderate intensity, as tolerated    PLAN:   Reviewed MNT guidelines for cachexia, HIV (R64, B20).   Encouraged daily self-monitoring of food and activity patterns, return records to clinic.   Provided meal-planning instruction via foodlists, plate method, food models, food labels and/or ADA booklet. Reviewed micro/macronutrient effect on health management. Reviewed principles of energy metabolism to assist weight and health management. Discussed food safety precautions related to reduced immune system. "    Discussed importance of daily physical activity with review of benefits, methods, guidelines and precautions.   Discussed behavioral strategies for improving social and environmental support of lifestyle changes.    GOALS: Self-monitoring: Daily food & activity journal. Meal Plan: 90% Accuracy. Activity:150 min/wk.    Visit Time Spent:  60 minutes  Thank you for the opportunity to work with your patient.

## 2017-08-30 LAB
HAV IGM SERPL QL IA: NEGATIVE
HBV CORE IGM SERPL QL IA: NEGATIVE
HBV SURFACE AG SERPL QL IA: NEGATIVE
HCV AB SERPL QL IA: NEGATIVE

## 2017-08-31 ENCOUNTER — OUTPATIENT CASE MANAGEMENT (OUTPATIENT)
Dept: ADMINISTRATIVE | Facility: OTHER | Age: 32
End: 2017-08-31

## 2017-09-01 DIAGNOSIS — R74.8 ELEVATED LIVER ENZYMES: Primary | ICD-10-CM

## 2017-09-01 DIAGNOSIS — R74.8 ELEVATED ALKALINE PHOSPHATASE LEVEL: ICD-10-CM

## 2017-09-01 NOTE — PROGRESS NOTES
Attempted to contact pt for follow up. Family member states pt not able to come to the phone and requests call back next week.

## 2017-09-08 ENCOUNTER — OUTPATIENT CASE MANAGEMENT (OUTPATIENT)
Dept: ADMINISTRATIVE | Facility: OTHER | Age: 32
End: 2017-09-08

## 2017-09-12 DIAGNOSIS — B20 HIV (HUMAN IMMUNODEFICIENCY VIRUS INFECTION): Primary | ICD-10-CM

## 2017-09-18 ENCOUNTER — LAB VISIT (OUTPATIENT)
Dept: LAB | Facility: HOSPITAL | Age: 32
End: 2017-09-18
Attending: INTERNAL MEDICINE
Payer: MEDICARE

## 2017-09-18 DIAGNOSIS — B20 HIV (HUMAN IMMUNODEFICIENCY VIRUS INFECTION): ICD-10-CM

## 2017-09-18 LAB
ALBUMIN SERPL BCP-MCNC: 2.9 G/DL
ALP SERPL-CCNC: 200 U/L
ALT SERPL W/O P-5'-P-CCNC: 180 U/L
ANION GAP SERPL CALC-SCNC: 8 MMOL/L
AST SERPL-CCNC: 139 U/L
BASOPHILS # BLD AUTO: 0.02 K/UL
BASOPHILS NFR BLD: 0.5 %
BILIRUB SERPL-MCNC: 0.3 MG/DL
BUN SERPL-MCNC: 27 MG/DL
CALCIUM SERPL-MCNC: 9.1 MG/DL
CHLORIDE SERPL-SCNC: 107 MMOL/L
CO2 SERPL-SCNC: 22 MMOL/L
CREAT SERPL-MCNC: 1.2 MG/DL
DIFFERENTIAL METHOD: ABNORMAL
EOSINOPHIL # BLD AUTO: 0.1 K/UL
EOSINOPHIL NFR BLD: 2.7 %
ERYTHROCYTE [DISTWIDTH] IN BLOOD BY AUTOMATED COUNT: 15.5 %
EST. GFR  (AFRICAN AMERICAN): >60 ML/MIN/1.73 M^2
EST. GFR  (NON AFRICAN AMERICAN): 59.9 ML/MIN/1.73 M^2
GLUCOSE SERPL-MCNC: 60 MG/DL
HCT VFR BLD AUTO: 28.2 %
HGB BLD-MCNC: 9.3 G/DL
LYMPHOCYTES # BLD AUTO: 1.5 K/UL
LYMPHOCYTES NFR BLD: 37.8 %
MCH RBC QN AUTO: 31.3 PG
MCHC RBC AUTO-ENTMCNC: 33 G/DL
MCV RBC AUTO: 95 FL
MONOCYTES # BLD AUTO: 0.4 K/UL
MONOCYTES NFR BLD: 10.2 %
NEUTROPHILS # BLD AUTO: 2 K/UL
NEUTROPHILS NFR BLD: 48.6 %
PLATELET # BLD AUTO: 183 K/UL
PMV BLD AUTO: 11.9 FL
POTASSIUM SERPL-SCNC: 3.7 MMOL/L
PROT SERPL-MCNC: 9.6 G/DL
RBC # BLD AUTO: 2.97 M/UL
SODIUM SERPL-SCNC: 137 MMOL/L
WBC # BLD AUTO: 4.02 K/UL

## 2017-09-18 PROCEDURE — 86361 T CELL ABSOLUTE COUNT: CPT

## 2017-09-18 PROCEDURE — 85025 COMPLETE CBC W/AUTO DIFF WBC: CPT

## 2017-09-18 PROCEDURE — 36415 COLL VENOUS BLD VENIPUNCTURE: CPT

## 2017-09-18 PROCEDURE — 87536 HIV-1 QUANT&REVRSE TRNSCRPJ: CPT

## 2017-09-18 PROCEDURE — 80053 COMPREHEN METABOLIC PANEL: CPT

## 2017-09-19 ENCOUNTER — CLINICAL SUPPORT (OUTPATIENT)
Dept: CARDIOLOGY | Facility: CLINIC | Age: 32
End: 2017-09-19
Payer: MEDICARE

## 2017-09-19 ENCOUNTER — NUTRITION (OUTPATIENT)
Dept: DIABETES | Facility: CLINIC | Age: 32
End: 2017-09-19
Payer: MEDICARE

## 2017-09-19 VITALS — BODY MASS INDEX: 21.9 KG/M2 | WEIGHT: 111.56 LBS | HEIGHT: 60 IN

## 2017-09-19 DIAGNOSIS — I42.9 CARDIOMYOPATHY, UNSPECIFIED TYPE: ICD-10-CM

## 2017-09-19 DIAGNOSIS — R64 CACHEXIA: Primary | ICD-10-CM

## 2017-09-19 DIAGNOSIS — I47.20 VT (VENTRICULAR TACHYCARDIA): ICD-10-CM

## 2017-09-19 DIAGNOSIS — B20 AIDS (ACQUIRED IMMUNODEFICIENCY SYNDROME), CD4 <=200: ICD-10-CM

## 2017-09-19 DIAGNOSIS — Z95.810 ICD (IMPLANTABLE CARDIOVERTER-DEFIBRILLATOR), SINGLE, IN SITU: ICD-10-CM

## 2017-09-19 DIAGNOSIS — B20 HUMAN IMMUNODEFICIENCY VIRUS (HIV) DISEASE: ICD-10-CM

## 2017-09-19 PROCEDURE — 99999 PR PBB SHADOW E&M-EST. PATIENT-LVL III: CPT | Mod: PBBFAC,,, | Performed by: DIETITIAN, REGISTERED

## 2017-09-19 PROCEDURE — 93290 INTERROG DEV EVAL ICPMS IP: CPT | Mod: S$GLB,,, | Performed by: INTERNAL MEDICINE

## 2017-09-19 PROCEDURE — 93283 PRGRMG EVAL IMPLANTABLE DFB: CPT | Mod: S$GLB,,, | Performed by: INTERNAL MEDICINE

## 2017-09-19 PROCEDURE — 97803 MED NUTRITION INDIV SUBSEQ: CPT | Mod: S$GLB,,, | Performed by: DIETITIAN, REGISTERED

## 2017-09-19 NOTE — PROGRESS NOTES
PCP: Levi Moncada MD   REFERRING PROVIDER: Levi Moncada MD     HISTORY OF PRESENT ILLNESS: 31 y.o. female patient is in clinic today w/ brother for fu cachexia, HIV (R64, B20). Currently has peg tube but is no longer using nutritional supplements; however, she does continue taking one of her medications via peg.    VITAL SIGNS:  Height: 5' (152.4 cm)   Weight: 50.6 kg (111 lb 8.8 oz)   IBW: 100 lbs +/-10%    ALLERGIES & MEDICATIONS: Reviewed and Reconciled  MEDICAL/SURGICAL & FAMILY HISTORY: Reviewed and Reconciled    LABORATORY: Reviewed Diabetes Management Flowsheet and Results Review.    SELF-MONITORING: Food - none are avail    ACTIVITY LEVEL: Aerobic - none. Resistance - none.    NUTRITION INTAKE: Meal patterns irregular with intake ~1800-2200cals/d, adequate oral intake.   B - egg, rice OR grits, egg, garnica - orange juice (20oz)   L - 2 pc chix, rice, okra, biscuit, pepper - coke  D - sandwich, bologna   S - whopper - coke  S - chips, snickers    PSYCHOSOCIAL: Stage of change - action; Barriers to change - none.    MNT ASSESSMENT:   1800 calories, 80-90 grams protein daily  45-75 grams carb/meal, 15-30 grams carb/snack  150 min physical activity per week, moderate intensity, as tolerated    PLAN:   Reviewed MNT guidelines for cachexia, HIV (R64, B20).   Encouraged daily self-monitoring of food and activity patterns, return records to clinic.   Provided meal-planning instruction via foodlists, plate method, food models, food labels and/or ADA booklet. Reviewed micro/macronutrient effect on health management. Reviewed principles of energy metabolism to assist weight and health management. Discussed food safety precautions related to reduced immune system.    Discussed importance of daily physical activity with review of benefits, methods, guidelines and precautions.   Discussed behavioral strategies for improving social and environmental support of lifestyle changes.    GOALS: Self-monitoring: Daily  food & activity journal. Meal Plan: 90% Accuracy. Activity:150 min/wk.    FU: Will fu once more to determine consistency in progress. Pt is encouraged by increase appetite and wants PEG removed.  Visit Time Spent:  30 min  Thank you for the opportunity to work with your patient.

## 2017-09-19 NOTE — LETTER
September 19, 2017        Levi Moncada MD  [address] [facility] [place] [address]             [facility] - Diabetes Management  [address] [facility] [place] [address]  Phone: [phone]  Fax: [phone]   Patient: Wang Kebede   MR Number: [id number]   YOB: 1985   Date of Visit: 9/19/2017       Dear Dr. Moncada:    Thank you for referring Wang Kebede to me for evaluation. Below are the relevant portions of my assessment and plan of care.    If you have questions, please do not hesitate to call me. I look forward to following Wang along with you.    Sincerely,      Luz Tracy, ELEUTERIO, CDE           CC  No Recipients

## 2017-09-20 ENCOUNTER — OUTPATIENT CASE MANAGEMENT (OUTPATIENT)
Dept: ADMINISTRATIVE | Facility: OTHER | Age: 32
End: 2017-09-20

## 2017-09-20 LAB
CD3+CD4+ CELLS # BLD: 245 CELLS/UL (ref 300–1400)
CD3+CD4+ CELLS NFR BLD: 16.3 % (ref 28–57)
HIV UQ DATE RECEIVED: ABNORMAL
HIV UQ DATE REPORTED: ABNORMAL
HIV1 RNA # SERPL NAA+PROBE: 56 COPIES/ML
HIV1 RNA SERPL NAA+PROBE-LOG#: 1.75 LOG (10) COPIES/ML
HIV1 RNA SERPL QL NAA+PROBE: DETECTED

## 2017-09-20 RX ORDER — ATOVAQUONE 750 MG/5ML
5 SUSPENSION ORAL EVERY 12 HOURS
Status: CANCELLED | OUTPATIENT
Start: 2017-09-20

## 2017-09-20 RX ORDER — ATOVAQUONE 750 MG/5ML
5 SUSPENSION ORAL EVERY 12 HOURS
OUTPATIENT
Start: 2017-09-20

## 2017-09-20 NOTE — PROGRESS NOTES
Please note this patient was mailed an Outpatient Case Management Patient Satisfaction Discharge Survey on 9/20/2017.    Please contact [facility] at ext. [phone] with any questions.    Thank you,  Stephani Costa

## 2017-09-22 DIAGNOSIS — B20 HIV (HUMAN IMMUNODEFICIENCY VIRUS INFECTION): Primary | ICD-10-CM

## 2017-09-26 ENCOUNTER — OFFICE VISIT (OUTPATIENT)
Dept: INTERNAL MEDICINE | Facility: CLINIC | Age: 32
End: 2017-09-26
Payer: MEDICARE

## 2017-09-26 VITALS
HEART RATE: 110 BPM | DIASTOLIC BLOOD PRESSURE: 68 MMHG | TEMPERATURE: 98 F | SYSTOLIC BLOOD PRESSURE: 102 MMHG | HEIGHT: 60 IN | WEIGHT: 112.44 LBS | BODY MASS INDEX: 22.07 KG/M2

## 2017-09-26 DIAGNOSIS — R74.8 ELEVATED LIVER ENZYMES: ICD-10-CM

## 2017-09-26 DIAGNOSIS — B20 HIV (HUMAN IMMUNODEFICIENCY VIRUS INFECTION): Primary | Chronic | ICD-10-CM

## 2017-09-26 DIAGNOSIS — Z93.1 S/P PERCUTANEOUS ENDOSCOPIC GASTROSTOMY (PEG) TUBE PLACEMENT: ICD-10-CM

## 2017-09-26 PROCEDURE — 99499 UNLISTED E&M SERVICE: CPT | Mod: S$GLB,,, | Performed by: FAMILY MEDICINE

## 2017-09-26 PROCEDURE — 99213 OFFICE O/P EST LOW 20 MIN: CPT | Mod: S$GLB,,, | Performed by: FAMILY MEDICINE

## 2017-09-26 PROCEDURE — 99999 PR PBB SHADOW E&M-EST. PATIENT-LVL III: CPT | Mod: PBBFAC,,, | Performed by: FAMILY MEDICINE

## 2017-09-26 PROCEDURE — 3008F BODY MASS INDEX DOCD: CPT | Mod: S$GLB,,, | Performed by: FAMILY MEDICINE

## 2017-09-26 NOTE — PROGRESS NOTES
Subjective:       Patient ID: Wang Kebede is a 31 y.o. female.    Chief Complaint: Follow-up    Follow-up:      Pt is a 31 year old with chronic medical issues of HIV pos, Cardiovascular disease. Pt is feeling better with improvement in weight. Liver enzymes have been an issue with CT scan normal, U/S normal but elevated AST/ALT and GGT. Pt will see GI in 1 week.      Review of Systems   Constitutional: Negative.    Respiratory: Negative.    Cardiovascular: Negative.    Genitourinary: Negative.    Neurological: Negative.    Hematological: Negative.    Psychiatric/Behavioral: Negative.        Objective:      Physical Exam   Constitutional: She appears well-developed and well-nourished.   Cardiovascular: Normal rate and regular rhythm.    Pulmonary/Chest: Effort normal and breath sounds normal.   Abdominal: Soft. Bowel sounds are normal.   Skin: Skin is warm and dry.   Psychiatric: She has a normal mood and affect. Her behavior is normal.       Assessment:       1. HIV (human immunodeficiency virus infection)    2. Elevated liver enzymes    3. S/P percutaneous endoscopic gastrostomy (PEG) tube placement        Plan:       HIV (human immunodeficiency virus infection)  Comments:  last CD4 was 245. Pt continues to see Dr. Gramajo    Elevated liver enzymes  Comments:  Likely second to medication. Would still recommend see GI    S/P percutaneous endoscopic gastrostomy (PEG) tube placement  Comments:  Pt gained about 5 lbs and eating well. Ideal body weight 220's. Like for pt to be stable weight for 3-6 months than could consider pulling the PEG

## 2017-09-28 LAB — MYCOBACTERIUM SPEC QL CULT: NORMAL

## 2017-09-29 ENCOUNTER — OFFICE VISIT (OUTPATIENT)
Dept: GASTROENTEROLOGY | Facility: CLINIC | Age: 32
End: 2017-09-29
Payer: MEDICARE

## 2017-09-29 ENCOUNTER — LAB VISIT (OUTPATIENT)
Dept: LAB | Facility: HOSPITAL | Age: 32
End: 2017-09-29
Attending: INTERNAL MEDICINE
Payer: MEDICARE

## 2017-09-29 VITALS
HEART RATE: 80 BPM | HEIGHT: 60 IN | WEIGHT: 110.25 LBS | SYSTOLIC BLOOD PRESSURE: 100 MMHG | DIASTOLIC BLOOD PRESSURE: 68 MMHG | BODY MASS INDEX: 21.65 KG/M2

## 2017-09-29 DIAGNOSIS — R79.89 ABNORMAL LFTS: ICD-10-CM

## 2017-09-29 DIAGNOSIS — R79.89 ABNORMAL LFTS: Primary | ICD-10-CM

## 2017-09-29 PROBLEM — N17.9 AKI (ACUTE KIDNEY INJURY): Status: RESOLVED | Noted: 2017-04-16 | Resolved: 2017-09-29

## 2017-09-29 LAB
A1AT SERPL-MCNC: 136 MG/DL
CERULOPLASMIN SERPL-MCNC: 43 MG/DL
FERRITIN SERPL-MCNC: 2407 NG/ML
IRON SERPL-MCNC: 42 UG/DL
SATURATED IRON: 16 %
TOTAL IRON BINDING CAPACITY: 256 UG/DL
TRANSFERRIN SERPL-MCNC: 173 MG/DL

## 2017-09-29 PROCEDURE — 3008F BODY MASS INDEX DOCD: CPT | Mod: S$GLB,,, | Performed by: INTERNAL MEDICINE

## 2017-09-29 PROCEDURE — 36415 COLL VENOUS BLD VENIPUNCTURE: CPT | Mod: PO

## 2017-09-29 PROCEDURE — 82103 ALPHA-1-ANTITRYPSIN TOTAL: CPT

## 2017-09-29 PROCEDURE — 86694 HERPES SIMPLEX NES ANTBDY: CPT

## 2017-09-29 PROCEDURE — 99214 OFFICE O/P EST MOD 30 MIN: CPT | Mod: S$GLB,,, | Performed by: INTERNAL MEDICINE

## 2017-09-29 PROCEDURE — 86038 ANTINUCLEAR ANTIBODIES: CPT

## 2017-09-29 PROCEDURE — 83540 ASSAY OF IRON: CPT

## 2017-09-29 PROCEDURE — 82784 ASSAY IGA/IGD/IGG/IGM EACH: CPT

## 2017-09-29 PROCEDURE — 82728 ASSAY OF FERRITIN: CPT

## 2017-09-29 PROCEDURE — 82390 ASSAY OF CERULOPLASMIN: CPT

## 2017-09-29 PROCEDURE — 87799 DETECT AGENT NOS DNA QUANT: CPT

## 2017-09-29 PROCEDURE — 86256 FLUORESCENT ANTIBODY TITER: CPT | Mod: 91

## 2017-09-29 PROCEDURE — 99499 UNLISTED E&M SERVICE: CPT | Mod: S$GLB,,, | Performed by: INTERNAL MEDICINE

## 2017-09-29 PROCEDURE — 86235 NUCLEAR ANTIGEN ANTIBODY: CPT

## 2017-09-29 PROCEDURE — 99999 PR PBB SHADOW E&M-EST. PATIENT-LVL III: CPT | Mod: PBBFAC,,, | Performed by: INTERNAL MEDICINE

## 2017-09-29 NOTE — PROGRESS NOTES
Subjective:     Wang Kebede is here for evaluation of Abnormal LFT (referred by Dr. Moncada)      HPI  Wang Kebede has HIV/AIDS with most recent CD4 count of around 250.  Viral load is low but still present.  She had a recent admission in July 4 sepsis.  Since July she has also had increases in her liver function tests.  Initially ALT was 70 now is up to 180.  The alkaline phosphatase was higher at 473 and is now down to 200.  During that time she was given antibiotics for the sepsis but those have been off.  The only new medication she reports around that time was the dolutegravir.  She denies any known history of liver disease.    She does have a PEG tube in place although she reports not using it for the last 2-3 months.  She reports she is eating well and tolerating her pills by mouth.  She does have a history of seizures but none recently.  She reports compliance with her medications.    Overall she has been feeling well.  Only that she has notices some slight left lower quadrant abdominal pain at the end of urination.  She recently had a urinary analysis which was unremarkable.  She reports she will be seeing her OB/GYN next month.  She denies any issues with constipation.    Review of Systems   Constitutional: Negative.    HENT: Negative.    Eyes: Negative.    Respiratory: Negative.    Cardiovascular: Negative.    Gastrointestinal: Positive for abdominal pain (llq episodic).   Genitourinary: Negative.    Musculoskeletal: Negative.    Skin: Negative.    Neurological: Negative.    Psychiatric/Behavioral: Negative.        Objective:     Physical Exam   Constitutional: She is oriented to person, place, and time. She appears well-developed and well-nourished. No distress.   HENT:   Head: Normocephalic and atraumatic.   Mouth/Throat: Oropharynx is clear and moist. No oropharyngeal exudate.   Eyes: Conjunctivae are normal. Pupils are equal, round, and reactive to light. Right eye exhibits  no discharge. Left eye exhibits no discharge. No scleral icterus.   Pulmonary/Chest: Effort normal and breath sounds normal. No respiratory distress. She has no wheezes.   Abdominal: Soft. She exhibits no distension. There is no tenderness.   PEG in left upper quadrant without significant erythema or discharge   Musculoskeletal: She exhibits no edema.   Neurological: She is alert and oriented to person, place, and time.   Psychiatric: She has a normal mood and affect. Her behavior is normal.   Vitals reviewed.      US 7/2017   Small right pleural effusion.  Prior cholecystectomy.  Chronic right renal cortical thinning and increased echogenicity suggesting chronic medical renal disease.  Otherwise unremarkable exam.      MELD-Na score: 25 at 7/25/2017  3:00 AM  MELD score: 21 at 7/25/2017  3:00 AM  Calculated from:  Serum Creatinine: 3.9 mg/dL at 7/25/2017  3:00 AM  Serum Sodium: 131 mmol/L at 7/25/2017  3:00 AM  Total Bilirubin: 0.8 mg/dL (Rounded to 1) at 7/24/2017  2:40 AM  INR(ratio): 1.1 at 7/24/2017  9:57 AM  Age: 31 years    WBC   Date Value Ref Range Status   09/18/2017 4.02 3.90 - 12.70 K/uL Final     Hemoglobin   Date Value Ref Range Status   09/18/2017 9.3 (L) 12.0 - 16.0 g/dL Final     POC Hematocrit   Date Value Ref Range Status   07/25/2017 24 (L) 36 - 54 %PCV Final     Hematocrit   Date Value Ref Range Status   09/18/2017 28.2 (L) 37.0 - 48.5 % Final     Platelets   Date Value Ref Range Status   09/18/2017 183 150 - 350 K/uL Final     BUN, Bld   Date Value Ref Range Status   09/18/2017 27 (H) 6 - 20 mg/dL Final     Creatinine   Date Value Ref Range Status   09/18/2017 1.2 0.5 - 1.4 mg/dL Final     Glucose   Date Value Ref Range Status   09/18/2017 60 (L) 70 - 110 mg/dL Final     Calcium   Date Value Ref Range Status   09/18/2017 9.1 8.7 - 10.5 mg/dL Final     Sodium   Date Value Ref Range Status   09/18/2017 137 136 - 145 mmol/L Final     Potassium   Date Value Ref Range Status   09/18/2017 3.7 3.5 -  5.1 mmol/L Final     Chloride   Date Value Ref Range Status   09/18/2017 107 95 - 110 mmol/L Final     Magnesium   Date Value Ref Range Status   08/15/2017 1.7 1.6 - 2.6 mg/dL Final     AST   Date Value Ref Range Status   09/18/2017 139 (H) 10 - 40 U/L Final     ALT   Date Value Ref Range Status   09/18/2017 180 (H) 10 - 44 U/L Final     Alkaline Phosphatase   Date Value Ref Range Status   09/18/2017 200 (H) 55 - 135 U/L Final     Total Bilirubin   Date Value Ref Range Status   09/18/2017 0.3 0.1 - 1.0 mg/dL Final     Comment:     For infants and newborns, interpretation of results should be based  on gestational age, weight and in agreement with clinical  observations.  Premature Infant recommended reference ranges:  Up to 24 hours.............<8.0 mg/dL  Up to 48 hours............<12.0 mg/dL  3-5 days..................<15.0 mg/dL  6-29 days.................<15.0 mg/dL       Albumin   Date Value Ref Range Status   09/18/2017 2.9 (L) 3.5 - 5.2 g/dL Final     INR   Date Value Ref Range Status   07/24/2017 1.1 0.8 - 1.2 Final     Comment:     Coumadin Therapy:  2.0 - 3.0 for INR for all indicators except mechanical heart valves  and antiphospholipid syndromes which should use 2.5 - 3.5.           Assessment/Plan:     1. Abnormal LFTs      Wang Kebede is a 31 y.o. female withAbnormal LFT (referred by Dr. Moncada)    Abnormal LFTs-exact etiology of abnormal liver function tests is unclear at this point.  Will perform evaluation for abnormal LFTs.  Consideration has to be given to medications.  I did look at her medications at livertox.nih.gov and the medicine that could most likely cause a significant LFT abnormality is the Prezcobix even though the patient reports being on this for a while before the labs became abnormal. The other meds have a lower likelihood of causing liver injury.  Before switching meds would complete lab evaluation and may also have to consider liver biopsy based on labs trend.  -      CMV DNA, quantitative, PCR; Future; Expected date: 09/29/2017  -     Diamante-Barr virus DNA, quantitative; Future; Expected date: 09/29/2017  -     Anti-smooth muscle antibody; Future; Expected date: 09/29/2017  -     KAYLA; Future; Expected date: 09/29/2017  -     Iron and TIBC; Future; Expected date: 09/29/2017  -     Ferritin; Future; Expected date: 09/29/2017  -     Antimitochondrial antibody; Future; Expected date: 09/29/2017  -     Alpha-1-antitrypsin; Future; Expected date: 09/29/2017  -     Immunoglobulins (IgG, IgA, IgM) Quantitative; Future; Expected date: 09/29/2017  -     Ceruloplasmin; Future; Expected date: 09/29/2017  -     Herpes simplex type 1 & 2 IgM,Herpes IgM; Future; Expected date: 09/29/2017    RTC in 3 months      Nevaeh Armando MD

## 2017-09-30 LAB
IGA SERPL-MCNC: 734 MG/DL
IGG SERPL-MCNC: 3561 MG/DL
IGM SERPL-MCNC: 347 MG/DL

## 2017-10-02 LAB
ANA SER QL IF: NORMAL
CMV DNA SERPL NAA+PROBE-ACNC: NORMAL IU/ML
EBV DNA # BLD NAA+PROBE: <2000 COPIES/ML
EBV DNA BY PCR: DETECTED
HSV AB, IGM BY EIA: NEGATIVE
LOWER 95% CONFIDENCE INTERVAL: ABNORMAL
MITOCHONDRIA AB TITR SER IF: NORMAL {TITER}
SMOOTH MUSCLE AB TITR SER IF: NORMAL {TITER}
UPPER 95% CONFIDENCE INTERVAL: ABNORMAL

## 2017-10-09 ENCOUNTER — DOCUMENTATION ONLY (OUTPATIENT)
Dept: GASTROENTEROLOGY | Facility: CLINIC | Age: 32
End: 2017-10-09

## 2017-10-09 DIAGNOSIS — B20 HIV (HUMAN IMMUNODEFICIENCY VIRUS INFECTION): Primary | ICD-10-CM

## 2017-10-09 NOTE — PROGRESS NOTES
Dr. Mayo would like to have this patient see one of the GI docs for PEG tube removal. It was placed by our department in May.

## 2017-10-10 ENCOUNTER — OFFICE VISIT (OUTPATIENT)
Dept: GASTROENTEROLOGY | Facility: CLINIC | Age: 32
End: 2017-10-10
Payer: MEDICARE

## 2017-10-10 VITALS
DIASTOLIC BLOOD PRESSURE: 62 MMHG | WEIGHT: 116.88 LBS | SYSTOLIC BLOOD PRESSURE: 108 MMHG | HEIGHT: 60 IN | HEART RATE: 78 BPM | BODY MASS INDEX: 22.95 KG/M2

## 2017-10-10 DIAGNOSIS — R13.10 DYSPHAGIA, UNSPECIFIED TYPE: Primary | ICD-10-CM

## 2017-10-10 DIAGNOSIS — B20 HIV (HUMAN IMMUNODEFICIENCY VIRUS INFECTION): ICD-10-CM

## 2017-10-10 DIAGNOSIS — R79.89 ABNORMAL LFTS: ICD-10-CM

## 2017-10-10 DIAGNOSIS — Z43.1 PEG (PERCUTANEOUS ENDOSCOPIC GASTROSTOMY) ADJUSTMENT/REPLACEMENT/REMOVAL: ICD-10-CM

## 2017-10-10 PROCEDURE — 99212 OFFICE O/P EST SF 10 MIN: CPT | Mod: S$GLB,,, | Performed by: INTERNAL MEDICINE

## 2017-10-10 PROCEDURE — 99999 PR PBB SHADOW E&M-EST. PATIENT-LVL III: CPT | Mod: PBBFAC,,, | Performed by: INTERNAL MEDICINE

## 2017-10-10 PROCEDURE — 99499 UNLISTED E&M SERVICE: CPT | Mod: S$GLB,,, | Performed by: INTERNAL MEDICINE

## 2017-10-10 NOTE — PROGRESS NOTES
Subjective:       Patient ID: Wang Kebede is a 32 y.o. female.    Chief Complaint: Other (Peg tube removal )    The patient is a 32-year-old female who is known to our service from previous encounters.  She is an HIV patient who had a sudden severe episode of exacerbation with sepsis in May of this year and associated dysphagia.  Because of this, and her inability to tolerate her meds orally for HIV therapy, a PEG tubes placed.  She is here today to consider removal of her PEG tube.    The patient was recently seen by our hepatologist on September 28, and it was documented at that time that the patient had not been using her PEG due to for some 2-3 months.  She is apparently tolerating meals and medications well.  There is no reason for her the continued presence of the PEG tube.  As documented at the time of her hepatology visit, the PEG tube is still without evidence of inflammation including no redness, no warmth, no edema, and no discharge.  She meets criteria for PEG tube removal today.      Review of Systems   Constitutional: Negative for activity change, appetite change, chills, diaphoresis, fatigue, fever and unexpected weight change.   HENT: Negative for congestion, ear discharge, ear pain, hearing loss, nosebleeds, postnasal drip and tinnitus.    Eyes: Negative for photophobia and visual disturbance.   Respiratory: Negative for apnea, cough, choking, chest tightness, shortness of breath and wheezing.    Cardiovascular: Negative for chest pain, palpitations and leg swelling.   Gastrointestinal: Negative for abdominal distention, abdominal pain, anal bleeding, blood in stool, constipation, diarrhea, nausea, rectal pain and vomiting.   Genitourinary: Negative for difficulty urinating, dyspareunia, dysuria, flank pain, frequency, hematuria, menstrual problem, pelvic pain, urgency, vaginal bleeding and vaginal discharge.   Musculoskeletal: Negative for arthralgias, back pain, gait problem, joint  swelling, myalgias and neck stiffness.   Skin: Negative for pallor and rash.   Neurological: Negative for dizziness, tremors, seizures, syncope, speech difficulty, weakness, numbness and headaches.   Hematological: Negative for adenopathy.   Psychiatric/Behavioral: Negative for agitation, confusion, hallucinations, sleep disturbance and suicidal ideas.       Objective:      Physical Exam   Abdominal: Soft. Bowel sounds are normal.   PEG tube in left upper quadrant of the epigastrium as described in history of present illness.       Assessment:     Dysphagia          How resolved    PEG tube in place    Abnormal LFTs           Addressed by hepatologist    HIV  No diagnosis found.    Plan:     The patient was placed in the supine position, and the PEG was grasped by the tubing.  With a ron and steady tug of the tubing the sialic rubber button was pulled through the mucous fistula out of the abdominal cavity.  This was accomplished with minimal discomfort to the patient.  There was slight oozing of blood at the site which stopped promptly with gauze pressure for brief period.  The site was then cleaned with peroxide and bandaged.  Instructions were given to keep this bandaged for the next 72 hours which would be long enough to allow closure of the mucous fistula and discontinuance of drainage.  It is okay to change the bandaged daily and clean the site with warm soap and water.

## 2017-10-23 ENCOUNTER — OFFICE VISIT (OUTPATIENT)
Dept: HEMATOLOGY/ONCOLOGY | Facility: CLINIC | Age: 32
End: 2017-10-23
Payer: MEDICARE

## 2017-10-23 ENCOUNTER — LAB VISIT (OUTPATIENT)
Dept: LAB | Facility: HOSPITAL | Age: 32
End: 2017-10-23
Attending: INTERNAL MEDICINE
Payer: MEDICARE

## 2017-10-23 VITALS
SYSTOLIC BLOOD PRESSURE: 110 MMHG | HEART RATE: 93 BPM | HEIGHT: 60 IN | DIASTOLIC BLOOD PRESSURE: 64 MMHG | WEIGHT: 117.5 LBS | BODY MASS INDEX: 23.07 KG/M2 | TEMPERATURE: 98 F | OXYGEN SATURATION: 97 %

## 2017-10-23 DIAGNOSIS — D63.8 ANEMIA OF CHRONIC DISEASE: ICD-10-CM

## 2017-10-23 DIAGNOSIS — B20 AIDS (ACQUIRED IMMUNODEFICIENCY SYNDROME), CD4 <=200: Primary | ICD-10-CM

## 2017-10-23 DIAGNOSIS — D53.9 MACROCYTIC ANEMIA: ICD-10-CM

## 2017-10-23 DIAGNOSIS — B20 HIV (HUMAN IMMUNODEFICIENCY VIRUS INFECTION): Chronic | ICD-10-CM

## 2017-10-23 LAB
ALBUMIN SERPL BCP-MCNC: 3.3 G/DL
ALP SERPL-CCNC: 210 U/L
ALT SERPL W/O P-5'-P-CCNC: 84 U/L
ANION GAP SERPL CALC-SCNC: 7 MMOL/L
AST SERPL-CCNC: 72 U/L
BASOPHILS # BLD AUTO: 0.02 K/UL
BASOPHILS NFR BLD: 0.5 %
BILIRUB SERPL-MCNC: 0.3 MG/DL
BUN SERPL-MCNC: 35 MG/DL
CALCIUM SERPL-MCNC: 9.6 MG/DL
CHLORIDE SERPL-SCNC: 109 MMOL/L
CO2 SERPL-SCNC: 23 MMOL/L
CREAT SERPL-MCNC: 1.5 MG/DL
DIFFERENTIAL METHOD: ABNORMAL
EOSINOPHIL # BLD AUTO: 0.1 K/UL
EOSINOPHIL NFR BLD: 3.3 %
ERYTHROCYTE [DISTWIDTH] IN BLOOD BY AUTOMATED COUNT: 13.9 %
EST. GFR  (AFRICAN AMERICAN): 52 ML/MIN/1.73 M^2
EST. GFR  (NON AFRICAN AMERICAN): 45 ML/MIN/1.73 M^2
GLUCOSE SERPL-MCNC: 74 MG/DL
HCT VFR BLD AUTO: 28.6 %
HGB BLD-MCNC: 9.4 G/DL
LYMPHOCYTES # BLD AUTO: 1.5 K/UL
LYMPHOCYTES NFR BLD: 35 %
MCH RBC QN AUTO: 32.8 PG
MCHC RBC AUTO-ENTMCNC: 32.9 G/DL
MCV RBC AUTO: 100 FL
MONOCYTES # BLD AUTO: 0.2 K/UL
MONOCYTES NFR BLD: 5.6 %
NEUTROPHILS # BLD AUTO: 2.4 K/UL
NEUTROPHILS NFR BLD: 55.6 %
PLATELET # BLD AUTO: 251 K/UL
PMV BLD AUTO: 10.4 FL
POTASSIUM SERPL-SCNC: 3.8 MMOL/L
PROT SERPL-MCNC: 9.9 G/DL
RBC # BLD AUTO: 2.87 M/UL
SODIUM SERPL-SCNC: 139 MMOL/L
WBC # BLD AUTO: 4.28 K/UL

## 2017-10-23 PROCEDURE — 99999 PR PBB SHADOW E&M-EST. PATIENT-LVL III: CPT | Mod: PBBFAC,,, | Performed by: INTERNAL MEDICINE

## 2017-10-23 PROCEDURE — 36415 COLL VENOUS BLD VENIPUNCTURE: CPT | Mod: PO

## 2017-10-23 PROCEDURE — 99499 UNLISTED E&M SERVICE: CPT | Mod: S$GLB,,, | Performed by: INTERNAL MEDICINE

## 2017-10-23 PROCEDURE — 80053 COMPREHEN METABOLIC PANEL: CPT | Mod: PO

## 2017-10-23 PROCEDURE — 99214 OFFICE O/P EST MOD 30 MIN: CPT | Mod: S$GLB,,, | Performed by: INTERNAL MEDICINE

## 2017-10-23 PROCEDURE — 85025 COMPLETE CBC W/AUTO DIFF WBC: CPT | Mod: PO

## 2017-10-23 NOTE — PROGRESS NOTES
Subjective:       Patient ID: Wang Kebede is a 32 y.o. female.    Chief Complaint: Follow-up    HPI  Wang Kebede is a 32 y.o. female former patient of dr Virginia Olmedo whom I am seeign today for the first time. She comes for follow up of her chrnic anemia felt to be multifactorial  with HIV/AIDS being the most likely cause.   . She was admitted  7/20/17 with fever 103 and had been having fevers 3 days prior to admission.  She stayed in the ICU, was treated for sepsis. She recived a total of 3 unis of blood during that admission  She is now back top baseline. Dr Mayo follows her HIV  Sa y  she has  genealized aches and pains     I             Review of patient's allergies indicates:   Allergen Reactions    Iodine and iodide containing products Anaphylaxis       Pt states she coded last time she was administered contrast    Pneumococcal 23-chitra ps vaccine Anaphylaxis       Potential iodine containing    Bactrim [sulfamethoxazole-trimethoprim] Hives    Morphine Itching       PAST MEDICAL HISTORY:   1.  HIV/AIDS  2.  Vulvar cancer currently on surveillance with follow-up with gynecologic oncology  3.  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  Anxiety/depression  5.  Sickle cell disease  6.  Condyloma acuminatum  7.  AIN grade 3     SURGICAL HISTORY:   1.  Bilateral salpingo-oophorectomy with the resultant excision of symptomatic left ovarian cyst  2.  Vulvectomy  3.  AICD placement for sudden death   4-PEG tube placement  FAMILY HISTORY: She reports that her mother was treated for cervical cancer in her 40s.  Her mother has a sickle cell trait.  Her brother has sickle cell disease.     SOCIAL HISTORY: She has never smoked cigarettes. She does not drink alcohol.  She has never used any recreational drugs.  She works as a manager at [employer].  She has 3 children.  She lives in [place].     ALLERGIES: Reviewed on medication card.     Medications:        Review of  Systems   Constitutional: Negative.    HENT: Negative.    Eyes: Negative.    Respiratory: Negative.  Negative for cough and wheezing.    Cardiovascular: Negative.  Negative for chest pain.   Gastrointestinal: Negative.    Genitourinary: Negative.    Musculoskeletal:        Generalized aches   Neurological: Negative.    Psychiatric/Behavioral: Negative.        Objective:      Physical Exam   Constitutional: She is oriented to person, place, and time. She appears well-developed. No distress.   HENT:   Head: Normocephalic.   Right Ear: Tympanic membrane, external ear and ear canal normal.   Left Ear: Tympanic membrane, external ear and ear canal normal.   Nose: Nose normal. Right sinus exhibits no maxillary sinus tenderness and no frontal sinus tenderness. Left sinus exhibits no maxillary sinus tenderness and no frontal sinus tenderness.   Mouth/Throat: Oropharynx is clear and moist and mucous membranes are normal.   Teeth normal.  Gums normal.   Eyes: Conjunctivae and lids are normal. Pupils are equal, round, and reactive to light.   Neck: Normal carotid pulses, no hepatojugular reflux and no JVD present. Carotid bruit is not present. No tracheal deviation present. No thyroid mass and no thyromegaly present.   Cardiovascular: Normal rate, regular rhythm, S1 normal, S2 normal, normal heart sounds and intact distal pulses.  Exam reveals no gallop and no friction rub.    No murmur heard.  Carotid exam normal   Pulmonary/Chest: Effort normal and breath sounds normal. No accessory muscle usage. No respiratory distress. She has no wheezes. She has no rales. She exhibits no tenderness.   Abdominal: Soft. Normal appearance. She exhibits no distension and no mass. There is no splenomegaly or hepatomegaly. There is no tenderness. There is no rebound and no guarding.   Musculoskeletal: Normal range of motion. She exhibits no edema or tenderness.        Right hand: Normal.        Left hand: Normal.       Lymphadenopathy:     She  has no cervical adenopathy.     She has no axillary adenopathy.        Right: No inguinal and no supraclavicular adenopathy present.        Left: No inguinal and no supraclavicular adenopathy present.   Neurological: She is alert and oriented to person, place, and time. She has normal strength. No cranial nerve deficit. Coordination normal.   Skin: Skin is warm and dry. No rash noted. She is not diaphoretic. No cyanosis or erythema. No pallor. Nails show no clubbing.   Psychiatric: She has a normal mood and affect. Her behavior is normal. Judgment and thought content normal.       Wt Readings from Last 3 Encounters:   10/23/17 53.3 kg (117 lb 8.1 oz)   10/10/17 53 kg (116 lb 13.5 oz)   09/29/17 50 kg (110 lb 3.7 oz)     Temp Readings from Last 3 Encounters:   10/23/17 97.6 °F (36.4 °C) (Oral)   09/26/17 97.9 °F (36.6 °C) (Tympanic)   08/29/17 96.2 °F (35.7 °C) (Tympanic)     BP Readings from Last 3 Encounters:   10/23/17 110/64   10/10/17 108/62   09/29/17 100/68     Pulse Readings from Last 3 Encounters:   10/23/17 93   10/10/17 78   09/29/17 80   \    Assessment:       1. AIDS (acquired immunodeficiency syndrome), CD4 <=200    2. Macrocytic anemia        Plan:       Lab Results   Component Value Date    WBC 4.28 10/23/2017    HGB 9.4 (L) 10/23/2017    HCT 28.6 (L) 10/23/2017     (H) 10/23/2017     10/23/2017       Counts are about the same. I will draw labs today for HGB lectrophoresis, ferritin, tibc, folate and b12.  See me in 2 months with repeat CBC  Continue follow up with dr patel regarding her HIV

## 2017-10-24 ENCOUNTER — TELEPHONE (OUTPATIENT)
Dept: INTERNAL MEDICINE | Facility: CLINIC | Age: 32
End: 2017-10-24

## 2017-10-24 ENCOUNTER — LAB VISIT (OUTPATIENT)
Dept: LAB | Facility: HOSPITAL | Age: 32
End: 2017-10-24
Attending: INTERNAL MEDICINE
Payer: MEDICARE

## 2017-10-24 ENCOUNTER — OFFICE VISIT (OUTPATIENT)
Dept: INTERNAL MEDICINE | Facility: CLINIC | Age: 32
End: 2017-10-24
Payer: MEDICARE

## 2017-10-24 VITALS
BODY MASS INDEX: 22.9 KG/M2 | SYSTOLIC BLOOD PRESSURE: 104 MMHG | WEIGHT: 116.63 LBS | HEART RATE: 91 BPM | HEIGHT: 60 IN | TEMPERATURE: 98 F | DIASTOLIC BLOOD PRESSURE: 70 MMHG

## 2017-10-24 DIAGNOSIS — M79.10 GENERALIZED MUSCLE ACHE: Primary | ICD-10-CM

## 2017-10-24 DIAGNOSIS — R10.2 SUPRAPUBIC PAIN: ICD-10-CM

## 2017-10-24 DIAGNOSIS — B20 AIDS (ACQUIRED IMMUNODEFICIENCY SYNDROME), CD4 <=200: ICD-10-CM

## 2017-10-24 DIAGNOSIS — D53.9 MACROCYTIC ANEMIA: ICD-10-CM

## 2017-10-24 LAB
ALBUMIN SERPL BCP-MCNC: 3.4 G/DL
ALP SERPL-CCNC: 224 U/L
ALT SERPL W/O P-5'-P-CCNC: 82 U/L
ANION GAP SERPL CALC-SCNC: 9 MMOL/L
AST SERPL-CCNC: 57 U/L
BILIRUB SERPL-MCNC: 0.3 MG/DL
BUN SERPL-MCNC: 25 MG/DL
CALCIUM SERPL-MCNC: 10 MG/DL
CHLORIDE SERPL-SCNC: 109 MMOL/L
CO2 SERPL-SCNC: 21 MMOL/L
CREAT SERPL-MCNC: 1.2 MG/DL
EST. GFR  (AFRICAN AMERICAN): >60 ML/MIN/1.73 M^2
EST. GFR  (NON AFRICAN AMERICAN): 59.5 ML/MIN/1.73 M^2
FLUAV AG SPEC QL IA: NEGATIVE
FLUBV AG SPEC QL IA: NEGATIVE
FOLATE SERPL-MCNC: 15.3 NG/ML
GLUCOSE SERPL-MCNC: 64 MG/DL
IRON SERPL-MCNC: 47 UG/DL
POTASSIUM SERPL-SCNC: 3.6 MMOL/L
PROT SERPL-MCNC: 10.3 G/DL
SATURATED IRON: 17 %
SODIUM SERPL-SCNC: 139 MMOL/L
SPECIMEN SOURCE: NORMAL
TOTAL IRON BINDING CAPACITY: 278 UG/DL
TRANSFERRIN SERPL-MCNC: 188 MG/DL
VIT B12 SERPL-MCNC: 402 PG/ML

## 2017-10-24 PROCEDURE — 83021 HEMOGLOBIN CHROMOTOGRAPHY: CPT

## 2017-10-24 PROCEDURE — 83540 ASSAY OF IRON: CPT

## 2017-10-24 PROCEDURE — 99999 PR PBB SHADOW E&M-EST. PATIENT-LVL III: CPT | Mod: PBBFAC,,, | Performed by: FAMILY MEDICINE

## 2017-10-24 PROCEDURE — 82746 ASSAY OF FOLIC ACID SERUM: CPT

## 2017-10-24 PROCEDURE — 82728 ASSAY OF FERRITIN: CPT

## 2017-10-24 PROCEDURE — 87400 INFLUENZA A/B EACH AG IA: CPT | Mod: 59,PO

## 2017-10-24 PROCEDURE — 80053 COMPREHEN METABOLIC PANEL: CPT

## 2017-10-24 PROCEDURE — 82607 VITAMIN B-12: CPT

## 2017-10-24 PROCEDURE — 99214 OFFICE O/P EST MOD 30 MIN: CPT | Mod: S$GLB,,, | Performed by: FAMILY MEDICINE

## 2017-10-24 PROCEDURE — 83020 HEMOGLOBIN ELECTROPHORESIS: CPT

## 2017-10-24 RX ORDER — AMOXICILLIN AND CLAVULANATE POTASSIUM 875; 125 MG/1; MG/1
1 TABLET, FILM COATED ORAL EVERY 12 HOURS
Qty: 20 TABLET | Refills: 0 | Status: SHIPPED | OUTPATIENT
Start: 2017-10-24 | End: 2017-12-04

## 2017-10-24 NOTE — PROGRESS NOTES
Subjective:       Patient ID: Wang Kebede is a 32 y.o. female.    Chief Complaint: Follow-up    Aches:      Pt reports for about 1 week some general aches. Pt has not had any cough but some sinus pressure with no drainage. She is also complaining of some suprapubic pain without urinary frequency or urgency. No vaginal discharger and no dysuria.       Review of Systems   Constitutional: Positive for fatigue. Negative for chills.   HENT: Positive for sinus pressure.    Respiratory: Negative.    Cardiovascular: Negative.    Gastrointestinal: Negative.    Genitourinary: Positive for flank pain. Negative for difficulty urinating, dysuria, menstrual problem and pelvic pain.       Objective:      Physical Exam   Constitutional: She is oriented to person, place, and time. She appears well-developed and well-nourished.   HENT:   Right Ear: Tympanic membrane is erythematous. No middle ear effusion.   Left Ear: Tympanic membrane is erythematous.  No middle ear effusion.   Nose: Mucosal edema and rhinorrhea present. Right sinus exhibits frontal sinus tenderness. Right sinus exhibits no maxillary sinus tenderness. Left sinus exhibits frontal sinus tenderness. Left sinus exhibits no maxillary sinus tenderness.   Mouth/Throat: Posterior oropharyngeal erythema present.   Cardiovascular: Normal rate and regular rhythm.  Exam reveals no friction rub.    No murmur heard.  Pulmonary/Chest: Effort normal and breath sounds normal. She has no wheezes. She has no rales.   Abdominal: Soft. Bowel sounds are normal. She exhibits no mass. There is tenderness in the suprapubic area. There is no rebound.   Neurological: She is alert and oriented to person, place, and time.   Psychiatric: She has a normal mood and affect.       Assessment:       1. Generalized muscle ache    2. Suprapubic pain        Plan:       Generalized muscle ache  Comments:  Flu was negative  Orders:  -     Influenza antigen Nasopharyngeal Swab    Suprapubic  pain  Comments:  Urine had WBC. Will treat with Augmentin and see what cultures show  Orders:  -     Urine culture; Future; Expected date: 10/24/2017  -     Urinalysis; Future; Expected date: 10/24/2017

## 2017-10-25 LAB
FERRITIN SERPL-MCNC: 1753 NG/ML
HGB A2 MFR BLD HPLC: 2.8 %
HGB FRACT BLD ELPH-IMP: NORMAL
HGB FRACT BLD ELPH-IMP: NORMAL

## 2017-12-04 ENCOUNTER — LAB VISIT (OUTPATIENT)
Dept: LAB | Facility: HOSPITAL | Age: 32
End: 2017-12-04
Attending: INTERNAL MEDICINE
Payer: MEDICARE

## 2017-12-04 ENCOUNTER — CLINICAL SUPPORT (OUTPATIENT)
Dept: CARDIOLOGY | Facility: CLINIC | Age: 32
End: 2017-12-04
Payer: MEDICARE

## 2017-12-04 ENCOUNTER — OFFICE VISIT (OUTPATIENT)
Dept: HEMATOLOGY/ONCOLOGY | Facility: CLINIC | Age: 32
End: 2017-12-04
Payer: MEDICARE

## 2017-12-04 VITALS
DIASTOLIC BLOOD PRESSURE: 72 MMHG | HEART RATE: 75 BPM | SYSTOLIC BLOOD PRESSURE: 110 MMHG | WEIGHT: 127 LBS | RESPIRATION RATE: 20 BRPM | OXYGEN SATURATION: 99 % | TEMPERATURE: 99 F | HEIGHT: 60 IN | BODY MASS INDEX: 24.94 KG/M2

## 2017-12-04 DIAGNOSIS — R79.89 ELEVATED LFTS: ICD-10-CM

## 2017-12-04 DIAGNOSIS — R59.9 ENLARGED LYMPH NODES: ICD-10-CM

## 2017-12-04 DIAGNOSIS — D53.9 MACROCYTIC ANEMIA: ICD-10-CM

## 2017-12-04 DIAGNOSIS — N17.9 AKI (ACUTE KIDNEY INJURY): ICD-10-CM

## 2017-12-04 LAB
ALBUMIN SERPL BCP-MCNC: 3.2 G/DL
ANION GAP SERPL CALC-SCNC: 4 MMOL/L
BASOPHILS # BLD AUTO: 0.01 K/UL
BASOPHILS NFR BLD: 0.2 %
BUN SERPL-MCNC: 16 MG/DL
CALCIUM SERPL-MCNC: 9.2 MG/DL
CHLORIDE SERPL-SCNC: 105 MMOL/L
CO2 SERPL-SCNC: 25 MMOL/L
CREAT SERPL-MCNC: 1.3 MG/DL
DIFFERENTIAL METHOD: ABNORMAL
EOSINOPHIL # BLD AUTO: 0.1 K/UL
EOSINOPHIL NFR BLD: 1.2 %
ERYTHROCYTE [DISTWIDTH] IN BLOOD BY AUTOMATED COUNT: 12.2 %
EST. GFR  (AFRICAN AMERICAN): >60 ML/MIN/1.73 M^2
EST. GFR  (NON AFRICAN AMERICAN): 54 ML/MIN/1.73 M^2
GLUCOSE SERPL-MCNC: 76 MG/DL
HCT VFR BLD AUTO: 31 %
HGB BLD-MCNC: 10.3 G/DL
LYMPHOCYTES # BLD AUTO: 1.3 K/UL
LYMPHOCYTES NFR BLD: 25.8 %
MCH RBC QN AUTO: 32.4 PG
MCHC RBC AUTO-ENTMCNC: 33.2 G/DL
MCV RBC AUTO: 98 FL
MONOCYTES # BLD AUTO: 0.5 K/UL
MONOCYTES NFR BLD: 9.4 %
NEUTROPHILS # BLD AUTO: 3.3 K/UL
NEUTROPHILS NFR BLD: 63.4 %
PHOSPHATE SERPL-MCNC: 2.8 MG/DL
PLATELET # BLD AUTO: 205 K/UL
PMV BLD AUTO: 10.6 FL
POTASSIUM SERPL-SCNC: 4.4 MMOL/L
RBC # BLD AUTO: 3.18 M/UL
SODIUM SERPL-SCNC: 134 MMOL/L
WBC # BLD AUTO: 5.2 K/UL

## 2017-12-04 PROCEDURE — 80069 RENAL FUNCTION PANEL: CPT

## 2017-12-04 PROCEDURE — 85025 COMPLETE CBC W/AUTO DIFF WBC: CPT | Mod: PO

## 2017-12-04 PROCEDURE — 36415 COLL VENOUS BLD VENIPUNCTURE: CPT | Mod: PO

## 2017-12-04 PROCEDURE — 99215 OFFICE O/P EST HI 40 MIN: CPT | Mod: S$GLB,,, | Performed by: INTERNAL MEDICINE

## 2017-12-04 PROCEDURE — 99999 PR PBB SHADOW E&M-EST. PATIENT-LVL III: CPT | Mod: PBBFAC,,, | Performed by: INTERNAL MEDICINE

## 2017-12-04 NOTE — PROGRESS NOTES
Home monitor paired today, initial transmission completed.  Begin remote monitoring January 2018.

## 2017-12-04 NOTE — PROGRESS NOTES
Subjective:       Patient ID: Wang Kebede is a 32 y.o. female.    Chief Complaint: No chief complaint on file.    HPI Wang Kebede is a 32 y.o. female former patient of dr Virginia Olmedo who  comes for follow up of her chrnic anemia felt to be multifactorial  with HIV/AIDS being the most likely cause.   . She was admitted  7/20/17 with fever 103 and had been having fevers 3 days prior to admission.  She stayed in the ICU, was treated for sepsis. She recived a total of 3 unis of blood during that admission  Review of the information showed that a CT of the abdomen/pelvisdone 7/21/2017 showed intra-abdominal adenopathies.  She comes for follow up. Says she has been having some abdominal discomfort.  She is now back top baseline. Dr Mayo follows her HIV    She ahs elevated LFts with negative Hep/C serology  Sa y  she has  genealized aches and pains     I             Review of patient's allergies indicates:   Allergen Reactions    Iodine and iodide containing products Anaphylaxis       Pt states she coded last time she was administered contrast    Pneumococcal 23-chitra ps vaccine Anaphylaxis       Potential iodine containing    Bactrim [sulfamethoxazole-trimethoprim] Hives    Morphine Itching       PAST MEDICAL HISTORY:   1.  HIV/AIDS  2.  Vulvar cancer currently on surveillance with follow-up with gynecologic oncology  3.  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  Anxiety/depression  5.  Sickle cell disease  6.  Condyloma acuminatum  7.  AIN grade 3     SURGICAL HISTORY:   1.  Bilateral salpingo-oophorectomy with the resultant excision of symptomatic left ovarian cyst  2.  Vulvectomy  3.  AICD placement for sudden death   4-PEG tube placement  FAMILY HISTORY: She reports that her mother was treated for cervical cancer in her 40s.  Her mother has a sickle cell trait.  Her brother has sickle cell disease.     SOCIAL HISTORY: She has never smoked cigarettes. She does  not drink alcohol.  She has never used any recreational drugs.  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  She lives in [place].       Review of Systems   Constitutional: Negative.    HENT: Negative.    Eyes: Negative.    Respiratory: Negative.  Negative for cough and wheezing.    Cardiovascular: Negative.  Negative for chest pain.   Gastrointestinal: Negative.    Genitourinary: Negative.    Neurological: Negative.    Psychiatric/Behavioral: Negative.        Objective:      Physical Exam   Constitutional: She is oriented to person, place, and time. She appears well-developed. No distress.   HENT:   Head: Normocephalic.   Right Ear: Tympanic membrane, external ear and ear canal normal.   Left Ear: Tympanic membrane, external ear and ear canal normal.   Nose: Nose normal. Right sinus exhibits no maxillary sinus tenderness and no frontal sinus tenderness. Left sinus exhibits no maxillary sinus tenderness and no frontal sinus tenderness.   Mouth/Throat: Oropharynx is clear and moist and mucous membranes are normal.   Teeth normal.  Gums normal.   Eyes: Conjunctivae and lids are normal. Pupils are equal, round, and reactive to light.   Neck: Normal carotid pulses, no hepatojugular reflux and no JVD present. Carotid bruit is not present. No tracheal deviation present. No thyroid mass and no thyromegaly present.   Cardiovascular: Normal rate, regular rhythm, S1 normal, S2 normal, normal heart sounds and intact distal pulses.  Exam reveals no gallop and no friction rub.    No murmur heard.  Carotid exam normal   Pulmonary/Chest: Effort normal and breath sounds normal. No accessory muscle usage. No respiratory distress. She has no wheezes. She has no rales. She exhibits no tenderness.   Abdominal: Soft. Normal appearance. She exhibits no distension and no mass. There is no splenomegaly or hepatomegaly. There is no tenderness. There is no rebound and no guarding.   Musculoskeletal: Normal range of motion.  She exhibits no edema or tenderness.        Right hand: Normal.        Left hand: Normal.       Lymphadenopathy:     She has no cervical adenopathy.     She has no axillary adenopathy.        Right: No inguinal and no supraclavicular adenopathy present.        Left: No inguinal and no supraclavicular adenopathy present.   Neurological: She is alert and oriented to person, place, and time. She has normal strength. No cranial nerve deficit. Coordination normal.   Skin: Skin is warm and dry. No rash noted. She is not diaphoretic. No cyanosis or erythema. No pallor. Nails show no clubbing.   Psychiatric: She has a normal mood and affect. Her behavior is normal. Judgment and thought content normal.       Wt Readings from Last 3 Encounters:   12/04/17 57.6 kg (126 lb 15.8 oz)   11/01/17 54.8 kg (120 lb 13 oz)   10/24/17 52.9 kg (116 lb 10 oz)     Temp Readings from Last 3 Encounters:   12/04/17 98.8 °F (37.1 °C) (Oral)   10/24/17 97.5 °F (36.4 °C) (Tympanic)   10/23/17 97.6 °F (36.4 °C) (Oral)     BP Readings from Last 3 Encounters:   12/04/17 110/72   11/01/17 118/78   10/24/17 104/70     Pulse Readings from Last 3 Encounters:   12/04/17 75   11/01/17 80   10/24/17 91       Assessment:       1. Elevated LFTs    2. Enlarged lymph nodes        Plan:       Lab Results   Component Value Date    WBC 5.20 12/04/2017    HGB 10.3 (L) 12/04/2017    HCT 31.0 (L) 12/04/2017    MCV 98 12/04/2017     12/04/2017       Lab Results   Component Value Date    CREATININE 1.2 10/24/2017    CREATININE 1.3 10/24/2017     Lab Results   Component Value Date    IRON 47 10/24/2017    TIBC 278 10/24/2017    FERRITIN 1,753 (H) 10/24/2017       Lab Results   Component Value Date    ALT 82 (H) 10/24/2017    AST 57 (H) 10/24/2017     (H) 08/29/2017    ALKPHOS 224 (H) 10/24/2017    BILITOT 0.3 10/24/2017          we will order a Ct of the abdomen/pelvis without IV contrast to check on the previous finding of enalrhed abdominal nodes and  ehr elevated LFts  See me after the tests

## 2017-12-05 ENCOUNTER — LAB VISIT (OUTPATIENT)
Dept: LAB | Facility: HOSPITAL | Age: 32
End: 2017-12-05
Attending: INTERNAL MEDICINE
Payer: MEDICARE

## 2017-12-05 DIAGNOSIS — B20 HIV (HUMAN IMMUNODEFICIENCY VIRUS INFECTION): ICD-10-CM

## 2017-12-05 LAB
ALBUMIN SERPL BCP-MCNC: 3.5 G/DL
ALP SERPL-CCNC: 118 U/L
ALT SERPL W/O P-5'-P-CCNC: 23 U/L
ANION GAP SERPL CALC-SCNC: 7 MMOL/L
AST SERPL-CCNC: 25 U/L
BASOPHILS # BLD AUTO: 0.02 K/UL
BASOPHILS NFR BLD: 0.4 %
BILIRUB SERPL-MCNC: 0.2 MG/DL
BUN SERPL-MCNC: 16 MG/DL
CALCIUM SERPL-MCNC: 9.9 MG/DL
CHLORIDE SERPL-SCNC: 104 MMOL/L
CO2 SERPL-SCNC: 25 MMOL/L
CREAT SERPL-MCNC: 1.3 MG/DL
DIFFERENTIAL METHOD: ABNORMAL
EOSINOPHIL # BLD AUTO: 0.1 K/UL
EOSINOPHIL NFR BLD: 1.1 %
ERYTHROCYTE [DISTWIDTH] IN BLOOD BY AUTOMATED COUNT: 12.2 %
EST. GFR  (AFRICAN AMERICAN): >60 ML/MIN/1.73 M^2
EST. GFR  (NON AFRICAN AMERICAN): 54 ML/MIN/1.73 M^2
GLUCOSE SERPL-MCNC: 88 MG/DL
HCT VFR BLD AUTO: 34.9 %
HGB BLD-MCNC: 11.2 G/DL
IMM GRANULOCYTES # BLD AUTO: 0.01 K/UL
IMM GRANULOCYTES NFR BLD AUTO: 0.2 %
LYMPHOCYTES # BLD AUTO: 0.8 K/UL
LYMPHOCYTES NFR BLD: 15.3 %
MCH RBC QN AUTO: 31.8 PG
MCHC RBC AUTO-ENTMCNC: 32.1 G/DL
MCV RBC AUTO: 99 FL
MONOCYTES # BLD AUTO: 0.4 K/UL
MONOCYTES NFR BLD: 7.4 %
NEUTROPHILS # BLD AUTO: 4.1 K/UL
NEUTROPHILS NFR BLD: 75.6 %
NRBC BLD-RTO: 0 /100 WBC
PLATELET # BLD AUTO: 187 K/UL
PMV BLD AUTO: 11.4 FL
POTASSIUM SERPL-SCNC: 4.2 MMOL/L
PROT SERPL-MCNC: 10.3 G/DL
RBC # BLD AUTO: 3.52 M/UL
SODIUM SERPL-SCNC: 136 MMOL/L
WBC # BLD AUTO: 5.41 K/UL

## 2017-12-05 PROCEDURE — 85025 COMPLETE CBC W/AUTO DIFF WBC: CPT

## 2017-12-05 PROCEDURE — 87536 HIV-1 QUANT&REVRSE TRNSCRPJ: CPT

## 2017-12-05 PROCEDURE — 80053 COMPREHEN METABOLIC PANEL: CPT

## 2017-12-05 PROCEDURE — 86361 T CELL ABSOLUTE COUNT: CPT

## 2017-12-05 PROCEDURE — 36415 COLL VENOUS BLD VENIPUNCTURE: CPT

## 2017-12-06 ENCOUNTER — TELEPHONE (OUTPATIENT)
Dept: RADIOLOGY | Facility: HOSPITAL | Age: 32
End: 2017-12-06

## 2017-12-06 LAB
CD3+CD4+ CELLS # BLD: 85 CELLS/UL (ref 300–1400)
CD3+CD4+ CELLS NFR BLD: 11.3 % (ref 28–57)

## 2017-12-08 LAB
HIV UQ DATE RECEIVED: ABNORMAL
HIV UQ DATE REPORTED: ABNORMAL
HIV1 RNA # SERPL NAA+PROBE: 142 COPIES/ML
HIV1 RNA SERPL NAA+PROBE-LOG#: 2.15 LOG (10) COPIES/ML
HIV1 RNA SERPL QL NAA+PROBE: DETECTED

## 2017-12-12 ENCOUNTER — TELEPHONE (OUTPATIENT)
Dept: RADIOLOGY | Facility: HOSPITAL | Age: 32
End: 2017-12-12

## 2017-12-13 ENCOUNTER — HOSPITAL ENCOUNTER (OUTPATIENT)
Dept: RADIOLOGY | Facility: HOSPITAL | Age: 32
Discharge: HOME | End: 2017-12-13
Attending: INTERNAL MEDICINE
Payer: MEDICARE

## 2017-12-13 DIAGNOSIS — R79.89 ELEVATED LFTS: ICD-10-CM

## 2017-12-13 DIAGNOSIS — R59.9 ENLARGED LYMPH NODES: ICD-10-CM

## 2017-12-13 PROCEDURE — 74176 CT ABD & PELVIS W/O CONTRAST: CPT | Mod: 26,,, | Performed by: RADIOLOGY

## 2017-12-13 PROCEDURE — 74176 CT ABD & PELVIS W/O CONTRAST: CPT | Mod: TC,PO

## 2017-12-14 ENCOUNTER — TELEPHONE (OUTPATIENT)
Dept: HEMATOLOGY/ONCOLOGY | Facility: CLINIC | Age: 32
End: 2017-12-14

## 2017-12-15 DIAGNOSIS — Z95.810 ICD (IMPLANTABLE CARDIOVERTER-DEFIBRILLATOR) IN PLACE: Primary | ICD-10-CM

## 2017-12-15 DIAGNOSIS — I42.9 CARDIOMYOPATHY, UNSPECIFIED TYPE: ICD-10-CM

## 2017-12-19 DIAGNOSIS — B20 HIV (HUMAN IMMUNODEFICIENCY VIRUS INFECTION): Primary | ICD-10-CM

## 2018-01-02 ENCOUNTER — OFFICE VISIT (OUTPATIENT)
Dept: OBSTETRICS AND GYNECOLOGY | Facility: CLINIC | Age: 33
End: 2018-01-02
Payer: MEDICARE

## 2018-01-02 VITALS
HEIGHT: 60 IN | BODY MASS INDEX: 26.75 KG/M2 | WEIGHT: 136.25 LBS | SYSTOLIC BLOOD PRESSURE: 120 MMHG | DIASTOLIC BLOOD PRESSURE: 78 MMHG

## 2018-01-02 DIAGNOSIS — A63.0 CONDYLOMA ACUMINATA: Primary | ICD-10-CM

## 2018-01-02 PROCEDURE — 99999 PR PBB SHADOW E&M-EST. PATIENT-LVL III: CPT | Mod: PBBFAC,,, | Performed by: NURSE PRACTITIONER

## 2018-01-02 PROCEDURE — 99213 OFFICE O/P EST LOW 20 MIN: CPT | Mod: S$GLB,,, | Performed by: NURSE PRACTITIONER
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
## 2018-01-03 PROBLEM — E43 SEVERE PROTEIN-CALORIE MALNUTRITION: Status: RESOLVED | Noted: 2017-03-21 | Resolved: 2018-01-03

## 2018-01-03 PROBLEM — Z93.1 S/P PERCUTANEOUS ENDOSCOPIC GASTROSTOMY (PEG) TUBE PLACEMENT: Status: RESOLVED | Noted: 2017-07-21 | Resolved: 2018-01-03

## 2018-01-03 PROBLEM — F32.A DEPRESSION: Chronic | Status: ACTIVE | Noted: 2017-05-26

## 2018-01-03 PROBLEM — Z86.79 HISTORY OF VENTRICULAR FIBRILLATION: Status: ACTIVE | Noted: 2018-01-03

## 2018-01-03 PROBLEM — A63.0 CONDYLOMA ACUMINATA: Status: ACTIVE | Noted: 2018-01-03

## 2018-01-03 PROBLEM — Z93.1 S/P PERCUTANEOUS ENDOSCOPIC GASTROSTOMY (PEG) TUBE PLACEMENT: Chronic | Status: ACTIVE | Noted: 2017-07-21

## 2018-01-03 PROBLEM — B20 AIDS (ACQUIRED IMMUNODEFICIENCY SYNDROME), CD4 <=200: Chronic | Status: ACTIVE | Noted: 2017-04-15

## 2018-01-03 PROBLEM — D69.59 THROMBOCYTOPENIA ASSOCIATED WITH AIDS: Status: RESOLVED | Noted: 2017-07-23 | Resolved: 2018-01-03

## 2018-01-03 PROBLEM — D63.8 ANEMIA OF OTHER CHRONIC DISEASE: Chronic | Status: ACTIVE | Noted: 2017-08-15

## 2018-01-03 PROBLEM — B20 THROMBOCYTOPENIA ASSOCIATED WITH AIDS: Status: RESOLVED | Noted: 2017-07-23 | Resolved: 2018-01-03

## 2018-01-05 ENCOUNTER — OFFICE VISIT (OUTPATIENT)
Dept: OBSTETRICS AND GYNECOLOGY | Facility: CLINIC | Age: 33
End: 2018-01-05
Payer: MEDICARE

## 2018-01-05 VITALS
SYSTOLIC BLOOD PRESSURE: 114 MMHG | DIASTOLIC BLOOD PRESSURE: 70 MMHG | HEIGHT: 60 IN | WEIGHT: 134.5 LBS | BODY MASS INDEX: 26.41 KG/M2

## 2018-01-05 DIAGNOSIS — A63.0 CONDYLOMA ACUMINATA: ICD-10-CM

## 2018-01-05 DIAGNOSIS — B20 HIV (HUMAN IMMUNODEFICIENCY VIRUS INFECTION): Primary | Chronic | ICD-10-CM

## 2018-01-05 PROBLEM — Z91.199 H/O NONCOMPLIANCE WITH MEDICAL TREATMENT, PRESENTING HAZARDS TO HEALTH: Status: RESOLVED | Noted: 2017-03-21 | Resolved: 2018-01-05

## 2018-01-05 PROBLEM — Z90.710 STATUS POST LAPAROSCOPIC HYSTERECTOMY: Status: RESOLVED | Noted: 2017-04-15 | Resolved: 2018-01-05

## 2018-01-05 PROBLEM — Z98.890 S/P EXPLORATORY LAPAROTOMY: Status: RESOLVED | Noted: 2017-04-19 | Resolved: 2018-01-05

## 2018-01-05 PROBLEM — N30.00 ACUTE CYSTITIS WITHOUT HEMATURIA: Status: RESOLVED | Noted: 2017-08-29 | Resolved: 2018-01-05

## 2018-01-05 PROBLEM — R79.89 ELEVATED LFTS: Status: RESOLVED | Noted: 2017-12-04 | Resolved: 2018-01-05

## 2018-01-05 PROCEDURE — 99213 OFFICE O/P EST LOW 20 MIN: CPT | Mod: S$GLB,,, | Performed by: OBSTETRICS & GYNECOLOGY

## 2018-01-05 PROCEDURE — 99999 PR PBB SHADOW E&M-EST. PATIENT-LVL III: CPT | Mod: PBBFAC,,, | Performed by: OBSTETRICS & GYNECOLOGY

## 2018-01-05 PROCEDURE — 99499 UNLISTED E&M SERVICE: CPT | Mod: S$GLB,,, | Performed by: OBSTETRICS & GYNECOLOGY

## 2018-01-05 NOTE — PROGRESS NOTES
Subjective:       Patient ID: Wang Kebede is a 32 y.o. female.    Chief Complaint:  Vaginal Itching  Lesions on the vulva      History of Present Illness  HPI  Referred by NP in Gyn for recurrent vulva HPV  Review of history is significant for known AIDS, on antiviral medication and followed by ID.   Hysterectomy in recent past for KEVON 3 .   Vulva resection in the past with Dr Lou for KARLY.   Has had HPV lesions in the past.   No longer followed by Dr Lou.     GYN & OB History  Patient's last menstrual period was 2017.   Date of Last Pap: 2016    OB History    Para Term  AB Living   4 3 3   1 3   SAB TAB Ectopic Multiple Live Births   1              # Outcome Date GA Lbr Reynaldo/2nd Weight Sex Delivery Anes PTL Lv   4 SAB            3 Term      Vag-Spont      2 Term      Vag-Spont      1 Term      Vag-Spont             Review of Systems  Review of Systems        Objective:    Physical Exam:               Genitourinary: There is no lesion on the right labia. There is no lesion on the left labia. Labial bartholins normal.  Genitourinary Comments: Multiple small and large condyloma across right and left labia, above the clitoris, perianal   Lesion noted at the vaginal cuff.   Scarring of the left side of the vaginal entrance with hypopigmentation .                           Assessment:        1. HIV (human immunodeficiency virus infection)    2. Condyloma acuminata       History of KARLY with resection  History of KEVON          Plan:      Refer to Gyn Oncology for evaluation and plan

## 2018-01-10 ENCOUNTER — CLINICAL SUPPORT (OUTPATIENT)
Dept: CARDIOLOGY | Facility: CLINIC | Age: 33
End: 2018-01-10
Attending: INTERNAL MEDICINE
Payer: MEDICARE

## 2018-01-10 DIAGNOSIS — I42.9 CARDIOMYOPATHY, UNSPECIFIED TYPE: ICD-10-CM

## 2018-01-10 DIAGNOSIS — Z95.810 ICD (IMPLANTABLE CARDIOVERTER-DEFIBRILLATOR) IN PLACE: ICD-10-CM

## 2018-01-10 PROCEDURE — 93297 REM INTERROG DEV EVAL ICPMS: CPT | Mod: S$GLB,,, | Performed by: INTERNAL MEDICINE

## 2018-01-10 PROCEDURE — 93296 REM INTERROG EVL PM/IDS: CPT | Mod: S$GLB,,, | Performed by: INTERNAL MEDICINE

## 2018-01-10 PROCEDURE — 93295 DEV INTERROG REMOTE 1/2/MLT: CPT | Mod: S$GLB,,, | Performed by: INTERNAL MEDICINE

## 2018-02-06 ENCOUNTER — PATIENT OUTREACH (OUTPATIENT)
Dept: ADMINISTRATIVE | Facility: HOSPITAL | Age: 33
End: 2018-02-06

## 2018-02-06 NOTE — PROGRESS NOTES
Last documented 2017 DOS 06/14/2017.  Medication reconciliation Post d/c has been sent to [facility] as attestation documentation for MEDICATION RECONCILITION. Measure has been met.

## 2018-02-15 ENCOUNTER — LAB VISIT (OUTPATIENT)
Dept: LAB | Facility: HOSPITAL | Age: 33
End: 2018-02-15
Attending: INTERNAL MEDICINE
Payer: MEDICARE

## 2018-02-15 DIAGNOSIS — B20 HIV (HUMAN IMMUNODEFICIENCY VIRUS INFECTION): ICD-10-CM

## 2018-02-15 LAB
ALBUMIN SERPL BCP-MCNC: 3.3 G/DL
ALP SERPL-CCNC: 120 U/L
ALT SERPL W/O P-5'-P-CCNC: 21 U/L
ANION GAP SERPL CALC-SCNC: 9 MMOL/L
AST SERPL-CCNC: 22 U/L
BASOPHILS # BLD AUTO: 0.05 K/UL
BASOPHILS NFR BLD: 1.3 %
BILIRUB SERPL-MCNC: 0.3 MG/DL
BUN SERPL-MCNC: 23 MG/DL
CALCIUM SERPL-MCNC: 9.4 MG/DL
CHLORIDE SERPL-SCNC: 109 MMOL/L
CO2 SERPL-SCNC: 21 MMOL/L
CREAT SERPL-MCNC: 1.5 MG/DL
DIFFERENTIAL METHOD: ABNORMAL
EOSINOPHIL # BLD AUTO: 0.2 K/UL
EOSINOPHIL NFR BLD: 4.8 %
ERYTHROCYTE [DISTWIDTH] IN BLOOD BY AUTOMATED COUNT: 12.6 %
EST. GFR  (AFRICAN AMERICAN): 52.4 ML/MIN/1.73 M^2
EST. GFR  (NON AFRICAN AMERICAN): 45.4 ML/MIN/1.73 M^2
GLUCOSE SERPL-MCNC: 82 MG/DL
HCT VFR BLD AUTO: 30.1 %
HGB BLD-MCNC: 9.7 G/DL
IMM GRANULOCYTES # BLD AUTO: 0.01 K/UL
IMM GRANULOCYTES NFR BLD AUTO: 0.3 %
LYMPHOCYTES # BLD AUTO: 1.1 K/UL
LYMPHOCYTES NFR BLD: 28.6 %
MCH RBC QN AUTO: 31.5 PG
MCHC RBC AUTO-ENTMCNC: 32.2 G/DL
MCV RBC AUTO: 98 FL
MONOCYTES # BLD AUTO: 0.3 K/UL
MONOCYTES NFR BLD: 8.6 %
NEUTROPHILS # BLD AUTO: 2.2 K/UL
NEUTROPHILS NFR BLD: 56.4 %
NRBC BLD-RTO: 0 /100 WBC
PLATELET # BLD AUTO: 183 K/UL
PMV BLD AUTO: 11.4 FL
POTASSIUM SERPL-SCNC: 4.3 MMOL/L
PROT SERPL-MCNC: 9 G/DL
RBC # BLD AUTO: 3.08 M/UL
SODIUM SERPL-SCNC: 139 MMOL/L
WBC # BLD AUTO: 3.95 K/UL

## 2018-02-15 PROCEDURE — 80053 COMPREHEN METABOLIC PANEL: CPT

## 2018-02-15 PROCEDURE — 85025 COMPLETE CBC W/AUTO DIFF WBC: CPT

## 2018-02-15 PROCEDURE — 86361 T CELL ABSOLUTE COUNT: CPT

## 2018-02-15 PROCEDURE — 36415 COLL VENOUS BLD VENIPUNCTURE: CPT

## 2018-02-15 PROCEDURE — 87536 HIV-1 QUANT&REVRSE TRNSCRPJ: CPT

## 2018-02-16 LAB
CD3+CD4+ CELLS # BLD: 135 CELLS/UL (ref 300–1400)
CD3+CD4+ CELLS NFR BLD: 11.2 % (ref 28–57)

## 2018-02-19 DIAGNOSIS — B20 HIV (HUMAN IMMUNODEFICIENCY VIRUS INFECTION): Primary | ICD-10-CM

## 2018-02-19 LAB
HIV UQ DATE RECEIVED: ABNORMAL
HIV UQ DATE REPORTED: ABNORMAL
HIV1 RNA # SERPL NAA+PROBE: <40 COPIES/ML
HIV1 RNA SERPL NAA+PROBE-LOG#: <1.6 LOG (10) COPIES/ML
HIV1 RNA SERPL QL NAA+PROBE: DETECTED

## 2018-03-06 DIAGNOSIS — D53.9 MACROCYTIC ANEMIA: Primary | ICD-10-CM

## 2018-03-07 ENCOUNTER — OFFICE VISIT (OUTPATIENT)
Dept: GYNECOLOGIC ONCOLOGY | Facility: CLINIC | Age: 33
End: 2018-03-07
Payer: MEDICARE

## 2018-03-07 VITALS
BODY MASS INDEX: 25.79 KG/M2 | DIASTOLIC BLOOD PRESSURE: 83 MMHG | SYSTOLIC BLOOD PRESSURE: 121 MMHG | WEIGHT: 131.38 LBS | HEIGHT: 60 IN | HEART RATE: 74 BPM

## 2018-03-07 DIAGNOSIS — C51.9 VULVAR CANCER, CARCINOMA: Primary | ICD-10-CM

## 2018-03-07 DIAGNOSIS — B20 AIDS (ACQUIRED IMMUNODEFICIENCY SYNDROME), CD4 <=200: Chronic | ICD-10-CM

## 2018-03-07 DIAGNOSIS — A63.0 CONDYLOMA ACUMINATA: ICD-10-CM

## 2018-03-07 PROCEDURE — 99999 PR PBB SHADOW E&M-EST. PATIENT-LVL III: CPT | Mod: PBBFAC,,, | Performed by: OBSTETRICS & GYNECOLOGY

## 2018-03-07 PROCEDURE — 99205 OFFICE O/P NEW HI 60 MIN: CPT | Mod: S$GLB,,, | Performed by: OBSTETRICS & GYNECOLOGY

## 2018-03-07 PROCEDURE — 99499 UNLISTED E&M SERVICE: CPT | Mod: S$GLB,,, | Performed by: OBSTETRICS & GYNECOLOGY

## 2018-03-07 NOTE — LETTER
March 9, 2018      TINA Mo MD  [address]  [facility] [address]           [place] - GYN Oncology  [address] [place] [id number]  Phone: [phone]  Fax: [phone]          Patient: Wang Kebede   MR Number: [id number]   YOB: 1985   Date of Visit: 3/7/2018       Dear Dr. TINA Mo:    Thank you for referring Wang Kebede to me for evaluation. Attached you will find relevant portions of my assessment and plan of care.    If you have questions, please do not hesitate to call me. I look forward to following Wang Kebede along with you.    Sincerely,    Delano Bo MD    Enclosure  CC:  No Recipients    If you would like to receive this communication electronically, please contact [email or web address] or [phone] to request more information on [facility] access.    For providers and/or their staff who would like to refer a patient to [facility], please contact us through our one-stop-shop provider referral line, [facility], at [phone].    If you feel you have received this communication in error or would no longer like to receive these types of communications, please e-mail [email or web address]
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
## 2018-03-20 ENCOUNTER — ANESTHESIA EVENT (OUTPATIENT)
Dept: SURGERY | Facility: HOSPITAL | Age: 33
End: 2018-03-20
Payer: MEDICARE

## 2018-03-20 RX ORDER — DIPHENHYDRAMINE HCL 25 MG
25 CAPSULE ORAL EVERY 6 HOURS PRN
COMMUNITY
End: 2018-06-05

## 2018-03-20 NOTE — PRE ADMISSION SCREENING
Pre op instructions reviewed with patient's boyfriend, Ashutosh, per phone:  Pt does not have a working cell phone    To confirm, Your surgeon has instructed you:  Surgery is scheduled 03/21/18 at 0900.      [facility] main lobby by 0730.   Pre admit office to call later today only if arrival time changes      INSTRUCTIONS IMPORTANT!!!  ¨ Do not eat, drink, or smoke after 12 midnight-including water. OK to brush teeth, no gum, candy or mints!    ¨ Take only these medicines with a small swallow of water-morning of surgery.  N/A        ____  Do not wear makeup, including mascara.  ____  No powder, lotions or creams to surgical area.  ____  Please remove all jewelry, including piercings and leave at home.  ____  No money or valuables needed. Please leave at home.  ____  Please bring identification and insurance information to hospital.  ____  If going home the same day, arrange for a ride home. You will not be able to   drive if Anesthesia was used.  ____  Children, under 12 years old, must remain in the waiting room with an adult.  They are not allowed in patient areas.  ____  Wear loose fitting clothing. Allow for dressings, bandages.  ____  Stop Aspirin, Ibuprofen, Motrin and Aleve at least 5-7 days before surgery, unless otherwise instructed by your doctor, or the nurse.   You MAY use Tylenol/acetaminophen until day of surgery.  ____  If you take diabetic medication, do not take am of surgery unless instructed by   Doctor.  ____ Stop taking any Fish Oil supplement or any Vitamins that contain Vitamin E at least 5 days prior to surgery.          Bathing Instructions-- The night before surgery and the morning prior to coming to the hospital:   -Do not shave the surgical area.   -Shower and wash your hair and body as usual with anti-bacterial  soap and shampoo.   -Rinse your hair and body completely.   -Use one packet of hibiclens to wash the surgical site (using your  hand) gently for 5 minutes.  Do not scrub you skin too hard.   -Do not use hibiclens on your head, face, or genitals.   -Do not wash with anti-bacterial soap after you use the hibiclens.   -Rinse your body thoroughly.   -Dry with clean, soft towel.  Do not use lotion, cream, deodorant, or powders on   the surgical site.    Use antibacterial soap in place of hibiclens if your surgery is on the head, face or genitals.         Surgical Site Infection    Prevention of surgical site infections:     -Keep incisions clean and dry.   -Do not soak/submerge incisions in water until completely healed.   -Do not apply lotions, powders, creams, or deodorants to site.   -Always make sure hands are cleaned with antibacterial soap/ alcohol-based   prior to touching the surgical site.  (This includes doctors, nurses, staff, and yourself.)    Signs and symptoms:   -Redness and pain around the area where you had surgery   -Drainage of cloudy fluid from your surgical wound   -Fever over 100.4  I have read or had read and explained to me, and understand the above information.

## 2018-03-21 ENCOUNTER — HOSPITAL ENCOUNTER (OUTPATIENT)
Facility: HOSPITAL | Age: 33
LOS: 1 days | Discharge: HOME | End: 2018-03-21
Attending: OBSTETRICS & GYNECOLOGY | Admitting: OBSTETRICS & GYNECOLOGY
Payer: MEDICARE

## 2018-03-21 ENCOUNTER — ANESTHESIA (OUTPATIENT)
Dept: SURGERY | Facility: HOSPITAL | Age: 33
End: 2018-03-21
Payer: MEDICARE

## 2018-03-21 DIAGNOSIS — C51.9 VULVAR CANCER, CARCINOMA: Primary | ICD-10-CM

## 2018-03-21 DIAGNOSIS — D07.1 SEVERE VULVAR DYSPLASIA: ICD-10-CM

## 2018-03-21 DIAGNOSIS — R11.2 NAUSEA WITH VOMITING: ICD-10-CM

## 2018-03-21 PROCEDURE — 25000003 PHARM REV CODE 250: Performed by: OBSTETRICS & GYNECOLOGY

## 2018-03-21 PROCEDURE — 88305 TISSUE EXAM BY PATHOLOGIST: CPT | Performed by: PATHOLOGY

## 2018-03-21 PROCEDURE — 88309 TISSUE EXAM BY PATHOLOGIST: CPT | Mod: 26,,, | Performed by: PATHOLOGY

## 2018-03-21 PROCEDURE — 25000003 PHARM REV CODE 250: Performed by: ANESTHESIOLOGY

## 2018-03-21 PROCEDURE — 71000015 HC POSTOP RECOV 1ST HR: Performed by: OBSTETRICS & GYNECOLOGY

## 2018-03-21 PROCEDURE — 63600175 PHARM REV CODE 636 W HCPCS: Performed by: NURSE ANESTHETIST, CERTIFIED REGISTERED

## 2018-03-21 PROCEDURE — 37000009 HC ANESTHESIA EA ADD 15 MINS: Performed by: OBSTETRICS & GYNECOLOGY

## 2018-03-21 PROCEDURE — 56515 DESTROY VULVA LESION/S COMPL: CPT | Mod: 51,,, | Performed by: OBSTETRICS & GYNECOLOGY

## 2018-03-21 PROCEDURE — 63600175 PHARM REV CODE 636 W HCPCS: Performed by: OBSTETRICS & GYNECOLOGY

## 2018-03-21 PROCEDURE — 37000008 HC ANESTHESIA 1ST 15 MINUTES: Performed by: OBSTETRICS & GYNECOLOGY

## 2018-03-21 PROCEDURE — 25000003 PHARM REV CODE 250: Performed by: NURSE ANESTHETIST, CERTIFIED REGISTERED

## 2018-03-21 PROCEDURE — 63600175 PHARM REV CODE 636 W HCPCS: Performed by: ANESTHESIOLOGY

## 2018-03-21 PROCEDURE — 36000706: Performed by: OBSTETRICS & GYNECOLOGY

## 2018-03-21 PROCEDURE — 71000039 HC RECOVERY, EACH ADD'L HOUR: Performed by: OBSTETRICS & GYNECOLOGY

## 2018-03-21 PROCEDURE — 88305 TISSUE EXAM BY PATHOLOGIST: CPT | Mod: 26,,, | Performed by: PATHOLOGY

## 2018-03-21 PROCEDURE — 56620 VULVECTOMY SIMPLE PARTIAL: CPT | Mod: ,,, | Performed by: OBSTETRICS & GYNECOLOGY

## 2018-03-21 PROCEDURE — 46924 DESTRUCTION ANAL LESION(S): CPT | Mod: 51,,, | Performed by: OBSTETRICS & GYNECOLOGY

## 2018-03-21 PROCEDURE — 71000033 HC RECOVERY, INTIAL HOUR: Performed by: OBSTETRICS & GYNECOLOGY

## 2018-03-21 PROCEDURE — 36000707: Performed by: OBSTETRICS & GYNECOLOGY

## 2018-03-21 RX ORDER — HYDROCODONE BITARTRATE AND ACETAMINOPHEN 5; 325 MG/1; MG/1
1 TABLET ORAL EVERY 4 HOURS PRN
Status: DISCONTINUED | OUTPATIENT
Start: 2018-03-21 | End: 2018-03-21 | Stop reason: HOSPADM

## 2018-03-21 RX ORDER — OXYCODONE HYDROCHLORIDE 5 MG/1
10 TABLET ORAL ONCE AS NEEDED
Status: COMPLETED | OUTPATIENT
Start: 2018-03-21 | End: 2018-03-21

## 2018-03-21 RX ORDER — MIDAZOLAM HYDROCHLORIDE 1 MG/ML
INJECTION, SOLUTION INTRAMUSCULAR; INTRAVENOUS
Status: DISCONTINUED | OUTPATIENT
Start: 2018-03-21 | End: 2018-03-21

## 2018-03-21 RX ORDER — DIPHENHYDRAMINE HCL 25 MG
25 CAPSULE ORAL EVERY 4 HOURS PRN
Status: DISCONTINUED | OUTPATIENT
Start: 2018-03-21 | End: 2018-03-21 | Stop reason: HOSPADM

## 2018-03-21 RX ORDER — ONDANSETRON 2 MG/ML
INJECTION INTRAMUSCULAR; INTRAVENOUS
Status: DISCONTINUED | OUTPATIENT
Start: 2018-03-21 | End: 2018-03-21

## 2018-03-21 RX ORDER — CHLORHEXIDINE GLUCONATE ORAL RINSE 1.2 MG/ML
10 SOLUTION DENTAL
Status: DISCONTINUED | OUTPATIENT
Start: 2018-03-21 | End: 2018-03-21 | Stop reason: HOSPADM

## 2018-03-21 RX ORDER — CEFAZOLIN SODIUM 2 G/50ML
2 SOLUTION INTRAVENOUS
Status: COMPLETED | OUTPATIENT
Start: 2018-03-21 | End: 2018-03-21

## 2018-03-21 RX ORDER — ROCURONIUM BROMIDE 10 MG/ML
INJECTION, SOLUTION INTRAVENOUS
Status: DISCONTINUED | OUTPATIENT
Start: 2018-03-21 | End: 2018-03-21

## 2018-03-21 RX ORDER — SODIUM CHLORIDE, SODIUM LACTATE, POTASSIUM CHLORIDE, CALCIUM CHLORIDE 600; 310; 30; 20 MG/100ML; MG/100ML; MG/100ML; MG/100ML
INJECTION, SOLUTION INTRAVENOUS CONTINUOUS
Status: DISCONTINUED | OUTPATIENT
Start: 2018-03-21 | End: 2018-03-21 | Stop reason: HOSPADM

## 2018-03-21 RX ORDER — NEOSTIGMINE METHYLSULFATE 1 MG/ML
INJECTION, SOLUTION INTRAVENOUS
Status: DISCONTINUED | OUTPATIENT
Start: 2018-03-21 | End: 2018-03-21

## 2018-03-21 RX ORDER — FENTANYL CITRATE 50 UG/ML
INJECTION, SOLUTION INTRAMUSCULAR; INTRAVENOUS
Status: DISCONTINUED | OUTPATIENT
Start: 2018-03-21 | End: 2018-03-21

## 2018-03-21 RX ORDER — LIDOCAINE HYDROCHLORIDE 10 MG/ML
1 INJECTION, SOLUTION EPIDURAL; INFILTRATION; INTRACAUDAL; PERINEURAL ONCE
Status: DISCONTINUED | OUTPATIENT
Start: 2018-03-21 | End: 2018-03-21

## 2018-03-21 RX ORDER — LIDOCAINE HYDROCHLORIDE 20 MG/ML
INJECTION, SOLUTION EPIDURAL; INFILTRATION; INTRACAUDAL; PERINEURAL
Status: DISCONTINUED | OUTPATIENT
Start: 2018-03-21 | End: 2018-03-21

## 2018-03-21 RX ORDER — LIDOCAINE HYDROCHLORIDE 10 MG/ML
1 INJECTION, SOLUTION EPIDURAL; INFILTRATION; INTRACAUDAL; PERINEURAL ONCE
Status: DISCONTINUED | OUTPATIENT
Start: 2018-03-21 | End: 2018-03-21 | Stop reason: HOSPADM

## 2018-03-21 RX ORDER — GLYCOPYRROLATE 0.2 MG/ML
INJECTION INTRAMUSCULAR; INTRAVENOUS
Status: DISCONTINUED | OUTPATIENT
Start: 2018-03-21 | End: 2018-03-21

## 2018-03-21 RX ORDER — FENTANYL CITRATE 50 UG/ML
25 INJECTION, SOLUTION INTRAMUSCULAR; INTRAVENOUS EVERY 5 MIN PRN
Status: DISCONTINUED | OUTPATIENT
Start: 2018-03-21 | End: 2018-03-21 | Stop reason: HOSPADM

## 2018-03-21 RX ORDER — DIPHENHYDRAMINE HYDROCHLORIDE 50 MG/ML
25 INJECTION INTRAMUSCULAR; INTRAVENOUS EVERY 4 HOURS PRN
Status: DISCONTINUED | OUTPATIENT
Start: 2018-03-21 | End: 2018-03-21 | Stop reason: HOSPADM

## 2018-03-21 RX ORDER — ACETAMINOPHEN 10 MG/ML
1000 INJECTION, SOLUTION INTRAVENOUS ONCE
Status: COMPLETED | OUTPATIENT
Start: 2018-03-21 | End: 2018-03-21

## 2018-03-21 RX ORDER — SILVER SULFADIAZINE 10 G/1000G
CREAM TOPICAL DAILY
Status: DISCONTINUED | OUTPATIENT
Start: 2018-03-21 | End: 2018-03-21 | Stop reason: HOSPADM

## 2018-03-21 RX ORDER — HYDROCODONE BITARTRATE AND ACETAMINOPHEN 5; 325 MG/1; MG/1
1 TABLET ORAL EVERY 4 HOURS PRN
Qty: 20 TABLET | Refills: 0 | Status: SHIPPED | OUTPATIENT
Start: 2018-03-21 | End: 2018-03-26 | Stop reason: DRUGHIGH

## 2018-03-21 RX ORDER — LIDOCAINE HYDROCHLORIDE 20 MG/ML
JELLY TOPICAL
Status: DISCONTINUED | OUTPATIENT
Start: 2018-03-21 | End: 2018-03-21

## 2018-03-21 RX ORDER — ONDANSETRON 8 MG/1
8 TABLET, ORALLY DISINTEGRATING ORAL EVERY 8 HOURS PRN
Status: DISCONTINUED | OUTPATIENT
Start: 2018-03-21 | End: 2018-03-21 | Stop reason: HOSPADM

## 2018-03-21 RX ORDER — PROPOFOL 10 MG/ML
VIAL (ML) INTRAVENOUS
Status: DISCONTINUED | OUTPATIENT
Start: 2018-03-21 | End: 2018-03-21

## 2018-03-21 RX ORDER — MEPERIDINE HYDROCHLORIDE 50 MG/ML
12.5 INJECTION INTRAMUSCULAR; INTRAVENOUS; SUBCUTANEOUS ONCE AS NEEDED
Status: DISCONTINUED | OUTPATIENT
Start: 2018-03-21 | End: 2018-03-21 | Stop reason: HOSPADM

## 2018-03-21 RX ORDER — ONDANSETRON 2 MG/ML
4 INJECTION INTRAMUSCULAR; INTRAVENOUS DAILY PRN
Status: DISCONTINUED | OUTPATIENT
Start: 2018-03-21 | End: 2018-03-21 | Stop reason: HOSPADM

## 2018-03-21 RX ADMIN — FENTANYL CITRATE 50 MCG: 50 INJECTION, SOLUTION INTRAMUSCULAR; INTRAVENOUS at 09:03

## 2018-03-21 RX ADMIN — ACETAMINOPHEN 1000 MG: 10 INJECTION, SOLUTION INTRAVENOUS at 11:03

## 2018-03-21 RX ADMIN — NEOSTIGMINE METHYLSULFATE 3 MG: 1 INJECTION INTRAVENOUS at 10:03

## 2018-03-21 RX ADMIN — SODIUM CHLORIDE, SODIUM LACTATE, POTASSIUM CHLORIDE, AND CALCIUM CHLORIDE: 600; 310; 30; 20 INJECTION, SOLUTION INTRAVENOUS at 10:03

## 2018-03-21 RX ADMIN — ROCURONIUM BROMIDE 35 MG: 10 INJECTION, SOLUTION INTRAVENOUS at 09:03

## 2018-03-21 RX ADMIN — DIPHENHYDRAMINE HYDROCHLORIDE 25 MG: 50 INJECTION, SOLUTION INTRAMUSCULAR; INTRAVENOUS at 11:03

## 2018-03-21 RX ADMIN — SODIUM CHLORIDE, SODIUM LACTATE, POTASSIUM CHLORIDE, AND CALCIUM CHLORIDE: 600; 310; 30; 20 INJECTION, SOLUTION INTRAVENOUS at 09:03

## 2018-03-21 RX ADMIN — LIDOCAINE HYDROCHLORIDE 1 EACH: 20 JELLY TOPICAL at 09:03

## 2018-03-21 RX ADMIN — ROBINUL 0.4 MG: 0.2 INJECTION INTRAMUSCULAR; INTRAVENOUS at 10:03

## 2018-03-21 RX ADMIN — CEFAZOLIN SODIUM 2 G: 2 SOLUTION INTRAVENOUS at 09:03

## 2018-03-21 RX ADMIN — FENTANYL CITRATE 50 MCG: 50 INJECTION, SOLUTION INTRAMUSCULAR; INTRAVENOUS at 10:03

## 2018-03-21 RX ADMIN — MIDAZOLAM 2 MG: 1 INJECTION INTRAMUSCULAR; INTRAVENOUS at 09:03

## 2018-03-21 RX ADMIN — ONDANSETRON 4 MG: 2 INJECTION, SOLUTION INTRAMUSCULAR; INTRAVENOUS at 10:03

## 2018-03-21 RX ADMIN — LIDOCAINE HYDROCHLORIDE 40 MG: 20 INJECTION, SOLUTION EPIDURAL; INFILTRATION; INTRACAUDAL; PERINEURAL at 09:03

## 2018-03-21 RX ADMIN — PROPOFOL 150 MG: 10 INJECTION, EMULSION INTRAVENOUS at 09:03

## 2018-03-21 RX ADMIN — OXYCODONE HYDROCHLORIDE 10 MG: 5 TABLET ORAL at 11:03

## 2018-03-21 RX ADMIN — FENTANYL CITRATE 25 MCG: 50 INJECTION, SOLUTION INTRAMUSCULAR; INTRAVENOUS at 11:03

## 2018-03-21 NOTE — INTERVAL H&P NOTE
The patient has been examined and the H&P has been reviewed:    I concur with the findings and no changes have occurred since H&P was written.    Anesthesia/Surgery risks, benefits and alternative options discussed and understood by patient/family.          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

## 2018-03-21 NOTE — OP NOTE
DATE OF PROCEDURE:  03/21/2018    SURGEON:  Delano Bo M.D.    ASSISTANT:  None.    PREOPERATIVE DIAGNOSES:  1.  History of vulvar cancer, status post right radical hemivulvectomy in the   past by Dr. Ortega.  2.  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  3.  Age.    POSTOPERATIVE DIAGNOSES:  1.  History of vulvar cancer, status post right radical hemivulvectomy in the   past by Dr. Ortega.  2.  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  3.  Age.    OPERATIVE PROCEDURE:  Left vulvectomy, excision of multiple periclitoral,   labial, perineal, and anal condyloma, and extensive laser to vulva, clitoris and   perineum and anus.    COMPLICATIONS:  None.    ESTIMATED BLOOD LOSS:  Less than 10 mL.    ANESTHESIA:  General.    INTRAOPERATIVE FINDINGS:  Included condylomas of the clitoral koch as well as   multiple condylomas of the anus.  The patient had condylomas of the left labia   and multiple flat dysplastic hyperpigmented areas.  At the conclusion of the   procedure, all abnormal areas have been excised.    PROCEDURE IN DETAIL:  After informed consent was obtained, the patient was taken   to the Operating Suite.  General anesthesia was administered.  Once it was felt   to be adequate, she was placed in dorsal lithotomy position.  Perineum and   vagina were prepped and draped in the usual sterile fashion.  In and out   catheterization of the bladder was performed.  The above stated findings were   noted.  Attention was first turned to the anal condyloma.  These were elevated   and excised using monopolar cautery.  The defects were made hemostatic with   monopolar cautery.  The periclitoral and clitoral koch lesions were also   elevated as these were on a stalk and completely excised.  There were multiple   other pedunculated condyloma that were also removed on the left labia.  The   dysplastic  hyperpigmented left labia was then seen and an elliptical incision   was made so as to perform removal of the entire labia minora and approximately   50% of the left labia majora.  This was all accomplished using monopolar cautery   and taken down to the subcutaneous tissues.  These were made hemostatic with   electrocautery.  All defects were then closed with interrupted 2-0 Vicryl   sutures.  Once the anatomical defects had been closed, the drapes were removed   and moist towels were placed on the perineum.  Extensive laser ablation of   approximately 70% of the vulva, perineum and anus were then performed using the   CO2 laser set at 10 lopez continuous setting.  Once all abnormal areas had been   completely ablated, Silvadene cream was applied and the patient was awoken and   taken to Recovery Room in stable condition.  Of note, I was present for and   performed all key aspects of the procedure.      KALLI  dd: 03/21/2018 10:46:34 (CDT)  td: 03/21/2018 13:23:09 (CDT)  Doc ID   #[id number]  Job ID #[id number]    CC:

## 2018-03-21 NOTE — OP NOTE
This note has been moved to another encounter. If you have any questions, please contact HIM Chart Correction at [phone].

## 2018-03-21 NOTE — ANESTHESIA POSTPROCEDURE EVALUATION
"Anesthesia Post Evaluation    Patient: Wang Kebede    Procedure(s) Performed: Procedure(s) (LRB):  EXAM UNDER ANESTHESIA (Left)  VULVECTOMY (Left)  LASER OF VAGINAL CONDYLOMA (N/A)  RESECTION (N/A)    Final Anesthesia Type: general  Patient location during evaluation: PACU  Patient participation: Yes- Able to Participate  Level of consciousness: awake and alert  Post-procedure vital signs: reviewed and stable  Airway patency: patent  PONV status at discharge: No PONV  Anesthetic complications: no      Cardiovascular status: stable  Respiratory status: unassisted  Hydration status: euvolemic  Follow-up not needed.        Visit Vitals  /60   Pulse 83   Temp 36.7 °C (98.1 °F) (Temporal)   Resp 17   Ht 5' 2" (1.575 m)   Wt 62.1 kg (136 lb 12.7 oz)   LMP 04/11/2017   SpO2 99%   Breastfeeding? No   BMI 25.02 kg/m²       Pain/Tano Score: Pain Assessment Performed: Yes (3/21/2018 12:20 PM)  Presence of Pain: complains of pain/discomfort (3/21/2018 12:20 PM)  Pain Rating Prior to Med Admin: 6 (3/21/2018 11:50 AM)  Tano Score: 10 (3/21/2018 12:20 PM)      "

## 2018-03-21 NOTE — DISCHARGE INSTRUCTIONS
General Information:    1. Do not drink alcoholic beverages including beer for 24 hours or as long as you are on pain medication..  2. Do not drive a motor vehicle, operate machinery or power tools, or signs legal papers for 24 hours or as long as you are on pain medication.   3. You may experience light-headedness, dizziness, and sleepiness following surgery. Please do not stay alone. A responsible adult should be with you for this 24 hour period.  4. Go home and rest.  5. Progress slowly to a normal diet unless instructed.  Otherwise, begin with liquids such as soft drinks, then soup and crackers working up to solid foods. Drink plenty of nonalcoholic fluids.  6. Certain anesthetics and pain medications produce nausea and vomiting in certain individuals. If nausea becomes a problem at home, call you doctor.  7. A nurse will be calling you sometime after surgery. Do not be alarmed. This is our way of finding out how you are doing.  8. Several times every hour while you are awake, take 2-3 deep breaths and cough. If you had stomach surgery hold a pillow or rolled towel firmly against your stomach before you cough. This will help with any pain the cough might cause.  9. Several times every hour while you are awake, pump and flex your feet 5-6 times and do foot circles. This will help prevent blood clots.  10. Call your doctor for severe pain, bleeding, fever, or signs or symptoms of infection (pain, swelling, redness, foul odor, drainage).  11. You can contact your doctor anytime by [phone] for the [facility] (at [facility]) or [phone] for the [facility] on [facility].   [email or web address] is another way to contact your doctor if you are an active participant online with [facility].  12. Continue Nozin provided at discharge twice daily for 7 days or until the incision is healed.  See pamphlet or box for instructions.

## 2018-03-21 NOTE — TRANSFER OF CARE
"Anesthesia Transfer of Care Note    Patient: Wang Kebede    Procedure(s) Performed: Procedure(s) (LRB):  EXAM UNDER ANESTHESIA (Left)  VULVECTOMY (Left)  LASER OF VAGINAL CONDYLOMA (N/A)  RESECTION (N/A)    Patient location: PACU    Anesthesia Type: general    Transport from OR: Transported from OR on room air with adequate spontaneous ventilation    Post pain: adequate analgesia    Post assessment: no apparent anesthetic complications    Post vital signs: stable    Level of consciousness: awake, alert and oriented    Nausea/Vomiting: no nausea/vomiting    Complications: none    Transfer of care protocol was followed      Last vitals:   Visit Vitals  BP (!) 119/57 (BP Location: Right arm, Patient Position: Sitting)   Pulse 67   Temp 37.2 °C (99 °F) (Temporal)   Resp 18   Ht 5' 2" (1.575 m)   Wt 62.1 kg (136 lb 12.7 oz)   LMP 04/11/2017   SpO2 100%   Breastfeeding? No   BMI 25.02 kg/m²     "

## 2018-03-21 NOTE — BRIEF OP NOTE
[facility] -   Brief Operative Note    SUMMARY     Surgery Date: 3/21/2018     Surgeon(s) and Role:     * Delano Bo MD - Primary    Assisting Surgeon: None    Pre-op Diagnosis:  Condyloma acuminata [A63.0]  Vulvar cancer, carcinoma [C51.9]  AIDS (acquired immunodeficiency syndrome), CD4 <=200 [B20]    Post-op Diagnosis:  Post-Op Diagnosis Codes:     * Condyloma acuminata [A63.0]     * Vulvar cancer, carcinoma [C51.9]     * AIDS (acquired immunodeficiency syndrome), CD4 <=200 [B20]    Procedure(s) (LRB):  EXAM UNDER ANESTHESIA (Left)  VULVECTOMY (Left)  LASER OF VAGINAL CONDYLOMA (N/A)  RESECTION (N/A)    Anesthesia: General    Description of Procedure: Left vulvectomy, excision of vulvar and anal condyloma, extensive laser to vulva, perineum and anus     Description of the findings of the procedure: Condyloma of clitoris, labia, perineum, and anus    Estimated Blood Loss: * No values recorded between 3/21/2018  9:45 AM and 3/21/2018 10:41 AM *         Specimens:   Specimen (12h ago through future)    Start     Ordered    03/21/18 0959  Specimen to Pathology - Surgery  Once     Comments:  1.  Anal condylomas2.  Vulvar condylomas3.  Left vulvaDx.  Vulvar cancer, condyloma      03/21/18 1000
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
## 2018-03-21 NOTE — DISCHARGE SUMMARY
[facility] -   Brief Operative Note     SUMMARY     Discharge Note    SUMMARY     Admit Date: 3/21/2018    Discharge Date and Time:  03/21/2018 10:40 AM    Hospital Course (synopsis of major diagnoses, care, treatment, and services provided during the course of the hospital stay): Patient admitted for surgery and tolerated well.  Was discharged home.     Final Diagnosis: Post-Op Diagnosis Codes:     * Condyloma acuminata [A63.0]     * Vulvar cancer, carcinoma [C51.9]     * AIDS (acquired immunodeficiency syndrome), CD4 <=200 [B20]    Disposition: Home or Self Care    Follow Up/Patient Instructions:     Medications:  Reconciled Home Medications:   Current Discharge Medication List      CONTINUE these medications which have NOT CHANGED    Details   b complex vitamins capsule Take 1 capsule by mouth once daily.      darunavir-cobicistat (PREZCOBIX) 800-150 mg-mg Tab Take 800 mg by mouth every evening.  Qty: 30 tablet, Refills: 1      diphenhydrAMINE (BENADRYL) 25 mg capsule Take 25 mg by mouth every 6 (six) hours as needed for Itching.      dolutegravir 50 mg Tab Take 1 tablet (50 mg total) by mouth once daily.  Qty: 30 tablet, Refills: 4      folic acid (FOLVITE) 1 MG tablet Take 1 tablet (1 mg total) by mouth once daily.  Qty: 90 tablet, Refills: 3    Associated Diagnoses: Drug-induced folate deficiency anemia      calcium carbonate (TUMS) 200 mg calcium (500 mg) chewable tablet Take 1 tablet (500 mg total) by mouth 3 (three) times daily.      hydrocodone-acetaminophen 5-325mg (NORCO) 5-325 mg per tablet Take 1 tablet by mouth every 4 (four) hours as needed.  Qty: 20 tablet, Refills: 0    Associated Diagnoses: Vulvar cancer, carcinoma      VIMPAT 50 mg Tab TAKE 2 TABLETS BY MOUTH TWICE DAILY  Qty: 120 tablet, Refills: 0             Discharge Procedure Orders  Diet general     Activity as tolerated     Call MD for:  temperature >100.4     Call MD for:  persistent nausea and vomiting     Call MD for:   severe uncontrolled pain     Call MD for:  difficulty breathing, headache or visual disturbances     Call MD for:  redness, tenderness, or signs of infection (pain, swelling, redness, odor or green/yellow discharge around incision site)     Call MD for:  hives     Call MD for:  persistent dizziness or light-headedness     Call MD for:  extreme fatigue     No dressing needed       Follow-up Information     Delano Bo MD In 1 month.    Specialties:  Obstetrics, Gynecology, Gynecologic Oncology  Why:  For wound re-check  Contact information:  [address]  [facility] [id number] [phone]

## 2018-03-22 ENCOUNTER — TELEPHONE (OUTPATIENT)
Dept: HEMATOLOGY/ONCOLOGY | Facility: CLINIC | Age: 33
End: 2018-03-22

## 2018-03-22 NOTE — TELEPHONE ENCOUNTER
Patient needs a work excuse to return to work on Monday.  Cream was given at the hospital at time of discharge.

## 2018-03-22 NOTE — TELEPHONE ENCOUNTER
----- Message from Delano Bo MD sent at 3/21/2018  9:31 PM CDT -----  Contact: pt  She should have been sent home with the cream from the hospital.  I am fine for her to be off until Monday  ----- Message -----  From: Isela Benavides LPN  Sent: 3/21/2018   2:02 PM  To: Delano Bo MD        ----- Message -----  From: Eric Acosta MA  Sent: 3/21/2018   1:55 PM  To: Isela Benavides LPN        ----- Message -----  From: Marcella Sands  Sent: 3/21/2018   1:40 PM  To: Anupam Wick Staff    She's calling stating that she was supposed to have a cream sent to the pharmacy along with her pain medication but she doesn't have it, also stated that she needed a doctors excuse for work, please advise [phone] (home) [phone] (work)

## 2018-03-26 ENCOUNTER — TELEPHONE (OUTPATIENT)
Dept: GYNECOLOGIC ONCOLOGY | Facility: CLINIC | Age: 33
End: 2018-03-26

## 2018-03-26 ENCOUNTER — HOSPITAL ENCOUNTER (EMERGENCY)
Facility: HOSPITAL | Age: 33
Discharge: HOME | End: 2018-03-26
Attending: EMERGENCY MEDICINE
Payer: MEDICARE

## 2018-03-26 VITALS
DIASTOLIC BLOOD PRESSURE: 57 MMHG | HEIGHT: 60 IN | HEART RATE: 78 BPM | OXYGEN SATURATION: 100 % | RESPIRATION RATE: 18 BRPM | BODY MASS INDEX: 26.51 KG/M2 | WEIGHT: 135.06 LBS | TEMPERATURE: 98 F | SYSTOLIC BLOOD PRESSURE: 106 MMHG

## 2018-03-26 DIAGNOSIS — K59.00 CONSTIPATION, UNSPECIFIED CONSTIPATION TYPE: ICD-10-CM

## 2018-03-26 DIAGNOSIS — G89.18 POSTOPERATIVE PAIN: ICD-10-CM

## 2018-03-26 DIAGNOSIS — R10.9 ABDOMINAL PAIN, UNSPECIFIED ABDOMINAL LOCATION: Primary | ICD-10-CM

## 2018-03-26 LAB
ALBUMIN SERPL BCP-MCNC: 3.5 G/DL
ALP SERPL-CCNC: 134 U/L
ALT SERPL W/O P-5'-P-CCNC: 34 U/L
ANION GAP SERPL CALC-SCNC: 8 MMOL/L
AST SERPL-CCNC: 27 U/L
BACTERIA #/AREA URNS HPF: ABNORMAL /HPF
BASOPHILS # BLD AUTO: 0.02 K/UL
BASOPHILS NFR BLD: 0.3 %
BILIRUB SERPL-MCNC: 0.3 MG/DL
BILIRUB UR QL STRIP: NEGATIVE
BUN SERPL-MCNC: 20 MG/DL
CALCIUM SERPL-MCNC: 9.6 MG/DL
CHLORIDE SERPL-SCNC: 104 MMOL/L
CLARITY UR: CLEAR
CO2 SERPL-SCNC: 24 MMOL/L
COLOR UR: YELLOW
CREAT SERPL-MCNC: 1.2 MG/DL
DIFFERENTIAL METHOD: ABNORMAL
EOSINOPHIL # BLD AUTO: 0.4 K/UL
EOSINOPHIL NFR BLD: 5 %
ERYTHROCYTE [DISTWIDTH] IN BLOOD BY AUTOMATED COUNT: 13.1 %
EST. GFR  (AFRICAN AMERICAN): >60 ML/MIN/1.73 M^2
EST. GFR  (NON AFRICAN AMERICAN): 60 ML/MIN/1.73 M^2
GLUCOSE SERPL-MCNC: 89 MG/DL
GLUCOSE UR QL STRIP: NEGATIVE
HCT VFR BLD AUTO: 31.1 %
HGB BLD-MCNC: 10.2 G/DL
HGB UR QL STRIP: ABNORMAL
HYALINE CASTS #/AREA URNS LPF: 0 /LPF
KETONES UR QL STRIP: NEGATIVE
LEUKOCYTE ESTERASE UR QL STRIP: NEGATIVE
LYMPHOCYTES # BLD AUTO: 1.3 K/UL
LYMPHOCYTES NFR BLD: 17.9 %
MCH RBC QN AUTO: 31.8 PG
MCHC RBC AUTO-ENTMCNC: 32.8 G/DL
MCV RBC AUTO: 97 FL
MICROSCOPIC COMMENT: ABNORMAL
MONOCYTES # BLD AUTO: 0.4 K/UL
MONOCYTES NFR BLD: 5.1 %
NEUTROPHILS # BLD AUTO: 5.3 K/UL
NEUTROPHILS NFR BLD: 71.7 %
NITRITE UR QL STRIP: NEGATIVE
PH UR STRIP: 7 [PH] (ref 5–8)
PLATELET # BLD AUTO: 278 K/UL
PMV BLD AUTO: 10.4 FL
POTASSIUM SERPL-SCNC: 4.2 MMOL/L
PROCALCITONIN SERPL IA-MCNC: 0.03 NG/ML
PROT SERPL-MCNC: 9.7 G/DL
PROT UR QL STRIP: ABNORMAL
RBC # BLD AUTO: 3.21 M/UL
RBC #/AREA URNS HPF: 4 /HPF (ref 0–4)
SODIUM SERPL-SCNC: 136 MMOL/L
SP GR UR STRIP: 1.02 (ref 1–1.03)
URN SPEC COLLECT METH UR: ABNORMAL
UROBILINOGEN UR STRIP-ACNC: NEGATIVE EU/DL
WBC # BLD AUTO: 7.41 K/UL
WBC #/AREA URNS HPF: 12 /HPF (ref 0–5)
YEAST URNS QL MICRO: ABNORMAL

## 2018-03-26 PROCEDURE — 81000 URINALYSIS NONAUTO W/SCOPE: CPT

## 2018-03-26 PROCEDURE — 25000003 PHARM REV CODE 250: Performed by: EMERGENCY MEDICINE

## 2018-03-26 PROCEDURE — 36415 COLL VENOUS BLD VENIPUNCTURE: CPT

## 2018-03-26 PROCEDURE — 96374 THER/PROPH/DIAG INJ IV PUSH: CPT

## 2018-03-26 PROCEDURE — 85025 COMPLETE CBC W/AUTO DIFF WBC: CPT

## 2018-03-26 PROCEDURE — 84145 PROCALCITONIN (PCT): CPT

## 2018-03-26 PROCEDURE — 80053 COMPREHEN METABOLIC PANEL: CPT

## 2018-03-26 PROCEDURE — 99285 EMERGENCY DEPT VISIT HI MDM: CPT | Mod: 25

## 2018-03-26 PROCEDURE — 63600175 PHARM REV CODE 636 W HCPCS: Performed by: EMERGENCY MEDICINE

## 2018-03-26 RX ORDER — DOCUSATE SODIUM 100 MG/1
100 CAPSULE, LIQUID FILLED ORAL 2 TIMES DAILY
Qty: 60 CAPSULE | Refills: 0 | Status: SHIPPED | OUTPATIENT
Start: 2018-03-26 | End: 2018-06-05

## 2018-03-26 RX ORDER — CLINDAMYCIN HYDROCHLORIDE 150 MG/1
300 CAPSULE ORAL
Status: COMPLETED | OUTPATIENT
Start: 2018-03-26 | End: 2018-03-26

## 2018-03-26 RX ORDER — HYDROCODONE BITARTRATE AND ACETAMINOPHEN 5; 325 MG/1; MG/1
1 TABLET ORAL
Status: DISCONTINUED | OUTPATIENT
Start: 2018-03-26 | End: 2018-03-26 | Stop reason: HOSPADM

## 2018-03-26 RX ORDER — HYDROCODONE BITARTRATE AND ACETAMINOPHEN 5; 325 MG/1; MG/1
1 TABLET ORAL EVERY 6 HOURS PRN
Qty: 12 TABLET | Refills: 0 | Status: SHIPPED | OUTPATIENT
Start: 2018-03-26 | End: 2018-06-05

## 2018-03-26 RX ORDER — CLINDAMYCIN HYDROCHLORIDE 300 MG/1
300 CAPSULE ORAL EVERY 8 HOURS
Qty: 21 CAPSULE | Refills: 0 | Status: SHIPPED | OUTPATIENT
Start: 2018-03-26 | End: 2018-06-05

## 2018-03-26 RX ORDER — ONDANSETRON 2 MG/ML
4 INJECTION INTRAMUSCULAR; INTRAVENOUS ONCE
Status: COMPLETED | OUTPATIENT
Start: 2018-03-26 | End: 2018-03-26

## 2018-03-26 RX ORDER — ONDANSETRON 4 MG/1
4 TABLET, ORALLY DISINTEGRATING ORAL EVERY 6 HOURS PRN
Qty: 12 TABLET | Refills: 1 | Status: SHIPPED | OUTPATIENT
Start: 2018-03-26 | End: 2018-06-05

## 2018-03-26 RX ADMIN — CLINDAMYCIN HYDROCHLORIDE 300 MG: 150 CAPSULE ORAL at 07:03

## 2018-03-26 RX ADMIN — ONDANSETRON 4 MG: 2 INJECTION INTRAMUSCULAR; INTRAVENOUS at 05:03

## 2018-03-26 NOTE — TELEPHONE ENCOUNTER
----- Message from Lisa London sent at 3/26/2018 11:31 AM CDT -----  Ms Wang Kebede is in pain/pain meds are not helping/pt can be reached at          [facility]# [phone]

## 2018-03-26 NOTE — ED NOTES
Pt refused Lortab stating it would make her nauseous - explained she had nausea medication, 2. She had not eaten - I would bring her something to eat - 3. I'll just wait until I get home. -- Lortab returned.

## 2018-03-26 NOTE — TELEPHONE ENCOUNTER
----- Message from Mary Piedra sent at 3/26/2018  7:03 AM CDT -----  Contact: pt  Please call pt @ [phone], pt states she had surgery on 3/21, pt believe stitches has burst, states pain medication is working, pt need to know what to do, pt is concerned.
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
## 2018-03-26 NOTE — TELEPHONE ENCOUNTER
03/26/18 rec'd pc from pt with c/o severe vulvar pain from surg. Pt is unable to walk and surgery area is swollen. Advised pt per Dr. Crook, go to Ed as she may need to be evaluated. Pt verbalized understanding. REYNALDO/MA

## 2018-03-27 NOTE — ED NOTES
Instructions / teaching concerning 1. Constipation prevention, 2. Care of surgical site, 3. Medications, 4. Pain mng. Pt verbalized understanding and ambulated from Er. Work excuse given.

## 2018-04-02 ENCOUNTER — TELEPHONE (OUTPATIENT)
Dept: GYNECOLOGIC ONCOLOGY | Facility: CLINIC | Age: 33
End: 2018-04-02

## 2018-04-02 VITALS
DIASTOLIC BLOOD PRESSURE: 60 MMHG | WEIGHT: 136.81 LBS | OXYGEN SATURATION: 99 % | HEIGHT: 62 IN | HEART RATE: 83 BPM | SYSTOLIC BLOOD PRESSURE: 128 MMHG | BODY MASS INDEX: 25.18 KG/M2 | RESPIRATION RATE: 17 BRPM | TEMPERATURE: 98 F

## 2018-04-02 NOTE — TELEPHONE ENCOUNTER
Received call from pre-op nurse,Awais, in [place] stating pt was complaining of bleeding, pain and had questions regarding post op directions. Contacted pt and pt informed me she was saturating one pad per hour on yesterday, but currently reports it is now taking her >5 hrs to saturate a pad. Pt reports pain 7.5/10.  Dr. Bo notified and waiting to receive call back regarding pt need to be seen earlier or keep scheduled post op appt on 5/2/18. Pt informed to go to ED if she experiences heavy bleeding that saturates a pad in 1-2 hrs or has redness, swelling, foul discharge near surgical site, fever or chills. Pt currently denies all these and expressed verbal understanding. Pt also informed to remain on pelvic rest until post op eval, limit lifting to </= to 10 lbs.Pt provided office contact info, if she has non-urgent questions. Informed pt I would contact her regarding further information after I receive a reply from Dr. Bo.

## 2018-04-03 ENCOUNTER — TELEPHONE (OUTPATIENT)
Dept: GYNECOLOGIC ONCOLOGY | Facility: CLINIC | Age: 33
End: 2018-04-03

## 2018-04-04 ENCOUNTER — OFFICE VISIT (OUTPATIENT)
Dept: GYNECOLOGIC ONCOLOGY | Facility: CLINIC | Age: 33
End: 2018-04-04
Payer: MEDICARE

## 2018-04-04 VITALS
HEIGHT: 60 IN | BODY MASS INDEX: 26.32 KG/M2 | WEIGHT: 134.06 LBS | HEART RATE: 84 BPM | DIASTOLIC BLOOD PRESSURE: 71 MMHG | SYSTOLIC BLOOD PRESSURE: 119 MMHG

## 2018-04-04 DIAGNOSIS — B20 AIDS (ACQUIRED IMMUNODEFICIENCY SYNDROME), CD4 <=200: Chronic | ICD-10-CM

## 2018-04-04 DIAGNOSIS — D07.1 SEVERE VULVAR DYSPLASIA: ICD-10-CM

## 2018-04-04 DIAGNOSIS — A63.0 CONDYLOMA ACUMINATA: ICD-10-CM

## 2018-04-04 DIAGNOSIS — C51.9 VULVAR CANCER, CARCINOMA: Primary | ICD-10-CM

## 2018-04-04 PROCEDURE — 99024 POSTOP FOLLOW-UP VISIT: CPT | Mod: S$GLB,,, | Performed by: OBSTETRICS & GYNECOLOGY

## 2018-04-04 PROCEDURE — 99999 PR PBB SHADOW E&M-EST. PATIENT-LVL II: CPT | Mod: PBBFAC,,, | Performed by: OBSTETRICS & GYNECOLOGY

## 2018-04-04 PROCEDURE — 99499 UNLISTED E&M SERVICE: CPT | Mod: S$GLB,,, | Performed by: OBSTETRICS & GYNECOLOGY

## 2018-04-04 NOTE — PROGRESS NOTES
Subjective:      Patient ID: Wang Kebede is a 32 y.o. female.    Chief Complaint: Vulvar Cancer      HPI  Here today for post-op check.  Underwent Left vulvectomy, excision of multiple periclitoral, labial, perineal, and anal condyloma, and extensive laser to vulva, clitoris and perineum and anus on 3/21/18. Has had some bleeding at her surgical sites.  Was seen in ED for dehydration symptoms and wound looked good.  Says she soaked through her clothes on Easter day.  Still using Silvadene  Review of Systems   Constitutional: Negative for activity change, appetite change, chills, fatigue and fever.   HENT: Negative for hearing loss, mouth sores, nosebleeds, sore throat and tinnitus.    Eyes: Negative for visual disturbance.   Respiratory: Negative for cough, chest tightness, shortness of breath and wheezing.    Cardiovascular: Negative for chest pain and leg swelling.   Gastrointestinal: Negative for abdominal distention, abdominal pain, blood in stool, constipation, diarrhea, nausea and vomiting.   Genitourinary: Positive for pelvic pain and vaginal bleeding. Negative for dysuria, flank pain, frequency, hematuria, vaginal discharge and vaginal pain.   Musculoskeletal: Negative for arthralgias and back pain.   Skin: Negative for rash.   Neurological: Negative for dizziness, seizures, syncope, weakness and numbness.   Hematological: Does not bruise/bleed easily.   Psychiatric/Behavioral: Negative for confusion and sleep disturbance. The patient is not nervous/anxious.        Objective:   Physical Exam:   Constitutional: She appears well-developed and well-nourished. No distress.    HENT:   Head: Normocephalic and atraumatic.    Eyes: No scleral icterus.    Neck: Normal range of motion. Neck supple.    Cardiovascular: Normal rate and intact distal pulses.  Exam reveals no cyanosis and no edema.     Pulmonary/Chest: Effort normal. No respiratory distress. She exhibits no tenderness.        Abdominal: Soft.  She exhibits no distension, no fluid wave, no ascites and no mass. There is no tenderness. There is no rebound and no guarding. No hernia.     Genitourinary: Rectum normal and vagina normal.       Pelvic exam was performed with patient supine. There is no rash, tenderness or lesion on the right labia. There is tenderness on the left labia. There is no rash or lesion on the left labia. Uterus is absent. There is an absent adnexa. Right adnexum displays no mass, no tenderness and no fullness. Left adnexum displays no mass, no tenderness and no fullness. No bleeding or unspecified prolapse of vaginal walls in the vagina. No vaginal discharge found. Vaginal cuff normal.Labial bartholins normal.Cervix exhibits absence.   Genitourinary Comments: Partial dehiscence of resection bed, no infection or erythema              Lymphadenopathy:     She has no cervical adenopathy.        Right: No inguinal adenopathy present.        Left: No inguinal adenopathy present.     Skin: No cyanosis.        Assessment:     1. Vulvar cancer, carcinoma    2. Severe vulvar dysplasia    3. AIDS (acquired immunodeficiency syndrome), CD4 <=200    4. Condyloma acuminata        Plan:       Healing well with slight wound separation and no s/o infection.  Reassurance given.  Continue wound care.  RTC as scheduled.

## 2018-04-06 ENCOUNTER — TELEPHONE (OUTPATIENT)
Dept: HEMATOLOGY/ONCOLOGY | Facility: CLINIC | Age: 33
End: 2018-04-06

## 2018-04-17 ENCOUNTER — TELEPHONE (OUTPATIENT)
Dept: CARDIOLOGY | Facility: CLINIC | Age: 33
End: 2018-04-17

## 2018-04-17 NOTE — TELEPHONE ENCOUNTER
I have attempted without success to contact this patient by phone to discuss her [employer] home monitor.  Pt was supposed to send manual transmission on Monday, April 16th.  Home monitor currently disconnected.  No answer, unable to leave message.

## 2018-04-17 NOTE — TELEPHONE ENCOUNTER
Pt states her [employer] monitor has not worked since she moved to new [facility].  Scheduled clinic check for Wed, Apr 25th, 1:30pm at '[facility].  Requested pt bring home monitor with her so it can be reset.  Pt verbalized understanding, written slip mailed.

## 2018-04-17 NOTE — TELEPHONE ENCOUNTER
----- Message from Lolly Mckenzie sent at 4/17/2018  3:41 PM CDT -----  Contact: pt  Calling about home monitor and please advise [phone] ([place])

## 2018-04-25 ENCOUNTER — TELEPHONE (OUTPATIENT)
Dept: CARDIOLOGY | Facility: CLINIC | Age: 33
End: 2018-04-25

## 2018-04-25 ENCOUNTER — CLINICAL SUPPORT (OUTPATIENT)
Dept: CARDIOLOGY | Facility: CLINIC | Age: 33
End: 2018-04-25
Attending: INTERNAL MEDICINE
Payer: MEDICARE

## 2018-04-25 DIAGNOSIS — Z95.810 ICD (IMPLANTABLE CARDIOVERTER-DEFIBRILLATOR), SINGLE, IN SITU: ICD-10-CM

## 2018-04-25 DIAGNOSIS — I42.9 CARDIOMYOPATHY, UNSPECIFIED TYPE: ICD-10-CM

## 2018-04-25 DIAGNOSIS — I47.20 VT (VENTRICULAR TACHYCARDIA): ICD-10-CM

## 2018-04-25 PROCEDURE — 93283 PRGRMG EVAL IMPLANTABLE DFB: CPT | Mod: S$GLB,,, | Performed by: INTERNAL MEDICINE

## 2018-04-25 PROCEDURE — 93290 INTERROG DEV EVAL ICPMS IP: CPT | Mod: S$GLB,,, | Performed by: INTERNAL MEDICINE

## 2018-04-25 NOTE — TELEPHONE ENCOUNTER
Rec'd message that pt had called to reschedule her routine [employer] defibrillator check.  Returned call, no answer, left message for pt to return call to [phone].

## 2018-04-25 NOTE — TELEPHONE ENCOUNTER
Pt called back, will keep appt today if it can be moved to late morning.  Was able to work patient in for 11:30am today at [facility].

## 2018-04-26 ENCOUNTER — TELEPHONE (OUTPATIENT)
Dept: CARDIOLOGY | Facility: CLINIC | Age: 33
End: 2018-04-26

## 2018-04-26 NOTE — TELEPHONE ENCOUNTER
----- Message from Mario Elizalde sent at 4/26/2018  8:42 AM CDT -----  Contact: pt  She's calling in regards to a missed call, [phone] (home)

## 2018-04-26 NOTE — TELEPHONE ENCOUNTER
----- Message from Eric Acosta MA sent at 4/26/2018  2:28 PM CDT -----  Contact: pt      ----- Message -----  From: Mario Elizalde  Sent: 4/26/2018   8:42 AM  To: Valente Torre Staff, Anupam Wick Staff    She's calling in regards to a missed call, [phone] (home)

## 2018-05-24 ENCOUNTER — HOSPITAL ENCOUNTER (EMERGENCY)
Facility: HOSPITAL | Age: 33
Discharge: HOME | End: 2018-05-24
Attending: EMERGENCY MEDICINE
Payer: MEDICARE

## 2018-05-24 VITALS
TEMPERATURE: 99 F | RESPIRATION RATE: 19 BRPM | OXYGEN SATURATION: 99 % | HEIGHT: 60 IN | HEART RATE: 91 BPM | DIASTOLIC BLOOD PRESSURE: 67 MMHG | SYSTOLIC BLOOD PRESSURE: 122 MMHG | BODY MASS INDEX: 26.53 KG/M2 | WEIGHT: 135.13 LBS

## 2018-05-24 DIAGNOSIS — L30.9 DERMATITIS: Primary | ICD-10-CM

## 2018-05-24 PROCEDURE — 99283 EMERGENCY DEPT VISIT LOW MDM: CPT

## 2018-05-24 RX ORDER — HYDROXYZINE PAMOATE 50 MG/1
50 CAPSULE ORAL EVERY 6 HOURS PRN
Qty: 30 CAPSULE | Refills: 0 | Status: SHIPPED | OUTPATIENT
Start: 2018-05-24 | End: 2018-06-05

## 2018-05-24 RX ORDER — FLUOCINONIDE 0.5 MG/G
CREAM TOPICAL 2 TIMES DAILY
Qty: 60 G | Refills: 0 | Status: SHIPPED | OUTPATIENT
Start: 2018-05-24 | End: 2018-07-09
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
## 2018-05-25 ENCOUNTER — OUTPATIENT CASE MANAGEMENT (OUTPATIENT)
Dept: ADMINISTRATIVE | Facility: OTHER | Age: 33
End: 2018-05-25

## 2018-05-25 NOTE — PROGRESS NOTES
Thank you for the referral. The following patient has been assigned to Viviana Vazquez RN with Outpatient Complex Care Management for high risk screening.    Reason for referral: : Recently discharged    Please contact [facility] at [phone] with any questions.    Thank you,    Tori Lujan, [facility]  [facility] Mg[facility]  Ext [phone]

## 2018-06-01 ENCOUNTER — OUTPATIENT CASE MANAGEMENT (OUTPATIENT)
Dept: ADMINISTRATIVE | Facility: OTHER | Age: 33
End: 2018-06-01

## 2018-06-01 NOTE — PROGRESS NOTES
6/1/18 - 1st Attempt made today to complete Initial Screen for OPCM with no answer and no voice mail available. CM will attempt to contact again at a later time. Viviana Vazquez RN

## 2018-06-04 ENCOUNTER — HOSPITAL ENCOUNTER (EMERGENCY)
Facility: HOSPITAL | Age: 33
Discharge: HOME | End: 2018-06-04
Payer: MEDICARE

## 2018-06-04 ENCOUNTER — OUTPATIENT CASE MANAGEMENT (OUTPATIENT)
Dept: ADMINISTRATIVE | Facility: OTHER | Age: 33
End: 2018-06-04

## 2018-06-04 VITALS
RESPIRATION RATE: 18 BRPM | HEART RATE: 79 BPM | OXYGEN SATURATION: 98 % | SYSTOLIC BLOOD PRESSURE: 120 MMHG | DIASTOLIC BLOOD PRESSURE: 67 MMHG | HEIGHT: 60 IN | WEIGHT: 134.06 LBS | BODY MASS INDEX: 26.32 KG/M2 | TEMPERATURE: 98 F

## 2018-06-04 DIAGNOSIS — N89.8 VAGINAL DISCHARGE: ICD-10-CM

## 2018-06-04 DIAGNOSIS — R30.0 DYSURIA: ICD-10-CM

## 2018-06-04 DIAGNOSIS — Z91.89 AT HIGH RISK FOR ELOPEMENT: Primary | ICD-10-CM

## 2018-06-04 LAB
B-HCG UR QL: NEGATIVE
BACTERIA #/AREA URNS HPF: ABNORMAL /HPF
BILIRUB UR QL STRIP: NEGATIVE
CLARITY UR: CLEAR
COLOR UR: YELLOW
GLUCOSE UR QL STRIP: NEGATIVE
HGB UR QL STRIP: NEGATIVE
HYALINE CASTS #/AREA URNS LPF: 1 /LPF
KETONES UR QL STRIP: NEGATIVE
LEUKOCYTE ESTERASE UR QL STRIP: ABNORMAL
MICROSCOPIC COMMENT: ABNORMAL
NITRITE UR QL STRIP: NEGATIVE
PH UR STRIP: 6 [PH] (ref 5–8)
PROT UR QL STRIP: ABNORMAL
RBC #/AREA URNS HPF: 1 /HPF (ref 0–4)
SP GR UR STRIP: 1.02 (ref 1–1.03)
SQUAMOUS #/AREA URNS HPF: 10 /HPF
URN SPEC COLLECT METH UR: ABNORMAL
UROBILINOGEN UR STRIP-ACNC: NEGATIVE EU/DL
WBC #/AREA URNS HPF: 25 /HPF (ref 0–5)

## 2018-06-04 PROCEDURE — 81000 URINALYSIS NONAUTO W/SCOPE: CPT

## 2018-06-04 PROCEDURE — 81025 URINE PREGNANCY TEST: CPT

## 2018-06-04 PROCEDURE — 99283 EMERGENCY DEPT VISIT LOW MDM: CPT

## 2018-06-04 NOTE — PROGRESS NOTES
6/4/18 - 2nd Attempt to complete Initial Screen for Outpatient Care Management with no answer and no available voice mail on either number on chart. Will attempt to contact again at a later time. Viviana Vazquez RN

## 2018-06-05 ENCOUNTER — LAB VISIT (OUTPATIENT)
Dept: LAB | Facility: HOSPITAL | Age: 33
End: 2018-06-05
Attending: NURSE PRACTITIONER
Payer: MEDICARE

## 2018-06-05 ENCOUNTER — OFFICE VISIT (OUTPATIENT)
Dept: OBSTETRICS AND GYNECOLOGY | Facility: CLINIC | Age: 33
End: 2018-06-05
Payer: MEDICARE

## 2018-06-05 ENCOUNTER — OUTPATIENT CASE MANAGEMENT (OUTPATIENT)
Dept: ADMINISTRATIVE | Facility: OTHER | Age: 33
End: 2018-06-05

## 2018-06-05 VITALS
HEIGHT: 60 IN | WEIGHT: 133.63 LBS | BODY MASS INDEX: 26.23 KG/M2 | DIASTOLIC BLOOD PRESSURE: 64 MMHG | SYSTOLIC BLOOD PRESSURE: 110 MMHG

## 2018-06-05 DIAGNOSIS — R30.0 DYSURIA: Primary | ICD-10-CM

## 2018-06-05 DIAGNOSIS — Z11.3 SCREENING EXAMINATION FOR STD (SEXUALLY TRANSMITTED DISEASE): ICD-10-CM

## 2018-06-05 DIAGNOSIS — N34.1 NONSPECIFIC URETHRITIS: ICD-10-CM

## 2018-06-05 PROCEDURE — 86696 HERPES SIMPLEX TYPE 2 TEST: CPT

## 2018-06-05 PROCEDURE — 86694 HERPES SIMPLEX NES ANTBDY: CPT

## 2018-06-05 PROCEDURE — 87510 GARDNER VAG DNA DIR PROBE: CPT

## 2018-06-05 PROCEDURE — 3008F BODY MASS INDEX DOCD: CPT | Mod: CPTII,S$GLB,, | Performed by: NURSE PRACTITIONER

## 2018-06-05 PROCEDURE — 87480 CANDIDA DNA DIR PROBE: CPT

## 2018-06-05 PROCEDURE — 87491 CHLMYD TRACH DNA AMP PROBE: CPT

## 2018-06-05 PROCEDURE — 86592 SYPHILIS TEST NON-TREP QUAL: CPT

## 2018-06-05 PROCEDURE — 99999 PR PBB SHADOW E&M-EST. PATIENT-LVL III: CPT | Mod: PBBFAC,,, | Performed by: NURSE PRACTITIONER

## 2018-06-05 PROCEDURE — 87086 URINE CULTURE/COLONY COUNT: CPT

## 2018-06-05 PROCEDURE — 99213 OFFICE O/P EST LOW 20 MIN: CPT | Mod: 25,S$GLB,, | Performed by: NURSE PRACTITIONER

## 2018-06-05 RX ORDER — NITROFURANTOIN 25; 75 MG/1; MG/1
100 CAPSULE ORAL 2 TIMES DAILY
Qty: 14 CAPSULE | Refills: 0 | Status: SHIPPED | OUTPATIENT
Start: 2018-06-05 | End: 2018-06-12
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
## 2018-06-05 NOTE — LETTER
June 5, 2018    Wang Kebede  [address]  [place] [address]             [facility] [address] [place] [address] Dear Deana,     I work with [facility]'s Outpatient Case Management Department. We received a referral to call you to discuss your medical history.These services are free of charge and are offered to [facility] patients who have recently been discharged from any of our facilities or who have complex medical conditions that may require the skill of a nurse to assist with management.             I am a Registered Nurse who specializes in connecting patients with available medical and financial resources as well as addressing any educational needs that may be indicated.      I attempted to reach you by telephone, but I was unsuccessful. Please call our department so that we can go over some questions with you regarding your health.    The Outpatient Case Management Department can be reached at [phone] from 8:00AM to 4:30 PM on Monday thru Friday. [facility] also has a program where a nurse is available 24/7 to answer questions or provide medical advice. Their number is [phone].      Thank you,      Viviana Ball RN  Outpatient Case Management  [phone]

## 2018-06-05 NOTE — PROGRESS NOTES
6/5/18 - 3rd Attempt to complete Initial Assessment for Outpatient Case Management with no answer and no voice mail available. CM will mail a letter with my contact information and will make no further attempts to contact pt by phone. Viviana Vazquez RN

## 2018-06-06 LAB
C TRACH DNA SPEC QL NAA+PROBE: NOT DETECTED
CANDIDA RRNA VAG QL PROBE: NEGATIVE
G VAGINALIS RRNA GENITAL QL PROBE: POSITIVE
N GONORRHOEA DNA SPEC QL NAA+PROBE: NOT DETECTED
RPR SER QL: NORMAL
T VAGINALIS RRNA GENITAL QL PROBE: NEGATIVE

## 2018-06-06 RX ORDER — METRONIDAZOLE 7.5 MG/G
1 GEL VAGINAL NIGHTLY
Qty: 70 G | Refills: 0 | Status: SHIPPED | OUTPATIENT
Start: 2018-06-06 | End: 2018-06-11

## 2018-06-07 LAB
BACTERIA UR CULT: NORMAL
BACTERIA UR CULT: NORMAL
HSV AB, IGM BY EIA: NEGATIVE

## 2018-06-08 LAB
HSV1 IGG SERPL QL IA: POSITIVE
HSV2 IGG SERPL QL IA: POSITIVE

## 2018-06-12 ENCOUNTER — TELEPHONE (OUTPATIENT)
Dept: OBSTETRICS AND GYNECOLOGY | Facility: CLINIC | Age: 33
End: 2018-06-12

## 2018-06-12 NOTE — TELEPHONE ENCOUNTER
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 She can be treated with daily meds. She did not have lesions at the time of exam.

## 2018-06-12 NOTE — PROGRESS NOTES
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 She can be treated with daily meds. She did not have lesions at the time of exam.

## 2018-06-14 ENCOUNTER — TELEPHONE (OUTPATIENT)
Dept: OBSTETRICS AND GYNECOLOGY | Facility: CLINIC | Age: 33
End: 2018-06-14

## 2018-07-09 ENCOUNTER — OFFICE VISIT (OUTPATIENT)
Dept: OBSTETRICS AND GYNECOLOGY | Facility: CLINIC | Age: 33
End: 2018-07-09
Payer: MEDICARE

## 2018-07-09 VITALS
SYSTOLIC BLOOD PRESSURE: 110 MMHG | DIASTOLIC BLOOD PRESSURE: 62 MMHG | WEIGHT: 135.38 LBS | HEIGHT: 60 IN | BODY MASS INDEX: 26.58 KG/M2

## 2018-07-09 DIAGNOSIS — Z32.00 POSSIBLE PREGNANCY, NOT CONFIRMED: Primary | ICD-10-CM

## 2018-07-09 DIAGNOSIS — Z11.3 SCREEN FOR STD (SEXUALLY TRANSMITTED DISEASE): ICD-10-CM

## 2018-07-09 DIAGNOSIS — N34.1 NONSPECIFIC URETHRITIS: ICD-10-CM

## 2018-07-09 LAB
B-HCG UR QL: NEGATIVE
CTP QC/QA: YES

## 2018-07-09 PROCEDURE — 87088 URINE BACTERIA CULTURE: CPT

## 2018-07-09 PROCEDURE — 87480 CANDIDA DNA DIR PROBE: CPT

## 2018-07-09 PROCEDURE — 99213 OFFICE O/P EST LOW 20 MIN: CPT | Mod: S$GLB,,, | Performed by: NURSE PRACTITIONER

## 2018-07-09 PROCEDURE — 99999 PR PBB SHADOW E&M-EST. PATIENT-LVL III: CPT | Mod: PBBFAC,,, | Performed by: NURSE PRACTITIONER

## 2018-07-09 PROCEDURE — 81025 URINE PREGNANCY TEST: CPT | Mod: S$GLB,,, | Performed by: NURSE PRACTITIONER

## 2018-07-09 PROCEDURE — 87510 GARDNER VAG DNA DIR PROBE: CPT

## 2018-07-09 PROCEDURE — 87077 CULTURE AEROBIC IDENTIFY: CPT

## 2018-07-09 PROCEDURE — 3008F BODY MASS INDEX DOCD: CPT | Mod: CPTII,S$GLB,, | Performed by: NURSE PRACTITIONER

## 2018-07-09 PROCEDURE — 87086 URINE CULTURE/COLONY COUNT: CPT

## 2018-07-09 PROCEDURE — 87491 CHLMYD TRACH DNA AMP PROBE: CPT

## 2018-07-09 PROCEDURE — 87186 SC STD MICRODIL/AGAR DIL: CPT
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
## 2018-07-10 ENCOUNTER — TELEPHONE (OUTPATIENT)
Dept: OBSTETRICS AND GYNECOLOGY | Facility: CLINIC | Age: 33
End: 2018-07-10

## 2018-07-10 DIAGNOSIS — B20 HIV (HUMAN IMMUNODEFICIENCY VIRUS INFECTION): Primary | ICD-10-CM

## 2018-07-10 LAB
C TRACH DNA SPEC QL NAA+PROBE: NOT DETECTED
CANDIDA RRNA VAG QL PROBE: NEGATIVE
G VAGINALIS RRNA GENITAL QL PROBE: POSITIVE
N GONORRHOEA DNA SPEC QL NAA+PROBE: NOT DETECTED
T VAGINALIS RRNA GENITAL QL PROBE: NEGATIVE

## 2018-07-10 RX ORDER — METRONIDAZOLE 7.5 MG/G
1 GEL VAGINAL NIGHTLY
Qty: 70 G | Refills: 0 | Status: SHIPPED | OUTPATIENT
Start: 2018-07-10 | End: 2018-07-15

## 2018-07-10 NOTE — TELEPHONE ENCOUNTER
----- Message from Lexii James NP sent at 7/10/2018  8:01 AM CDT -----  Please call patient and inform her that cx indicated BV. I called in Metrogel.

## 2018-07-10 NOTE — TELEPHONE ENCOUNTER
Spoke with pt and informed that vag cx detected bv, and metrogel has been called in to pharmacy. Informed that michelle/chlam cx was negative, and urine cx was still in process.

## 2018-07-12 ENCOUNTER — TELEPHONE (OUTPATIENT)
Dept: OBSTETRICS AND GYNECOLOGY | Facility: CLINIC | Age: 33
End: 2018-07-12

## 2018-07-12 LAB — BACTERIA UR CULT: NORMAL

## 2018-07-12 RX ORDER — NITROFURANTOIN 25; 75 MG/1; MG/1
100 CAPSULE ORAL 2 TIMES DAILY
Qty: 14 CAPSULE | Refills: 0 | Status: SHIPPED | OUTPATIENT
Start: 2018-07-12 | End: 2018-07-19

## 2018-07-12 NOTE — TELEPHONE ENCOUNTER
----- Message from Lexii James NP sent at 7/12/2018 12:58 PM CDT -----  Please call patient and inform her that urine cx indicated e-coli. I called in Macrobid.

## 2018-07-13 NOTE — TELEPHONE ENCOUNTER
Spoke with pt and attempted to give urine cx results. Pt stated that we have been calling her 3 days in a row to give results. Apologized and informed pt that I still had the msg in my box,and I wanted to make sure  That she still got the message. Pt voiced understanding.

## 2018-07-24 NOTE — PROGRESS NOTES
[facility] Hospital Medicine  Progress Note    Patient Name: Wang Kebede  MRN: [id number]  Patient Class: IP- Inpatient   Admission Date: 4/14/2017  Length of Stay: 5 days  Attending Physician: Mara Cabello MD  Primary Care Provider: Primary Doctor No        Subjective:     Principal Problem:Sepsis    HPI:  Ms. Kebede is a 30 y/o AA female with h/o HIV, cervical cancer, s/p hysterectomy last week by , presents to the ED last night c/o generalized weakness, abdominal pain and SOB. CXR and VQ scan were unremarkable. CT chest could not be done due to contrast allergy. Patient continues to have high fever upto 102.1, tachycardia. Admitted for sepsis of likely intra-abdominal etiology, however CT abdomen/pelvis not done due to contrast allergy. Patient is very tender in the abdomen to attempt ultrasound. Started empirically on IV antibiotics for sepsis.    Hospital Course:  Presented with acute abdomen approximately 3 days after robotic assisted laparoscopic hysterectomy.  Severe sepsis progressed to septic shock.  CT abdomen/pelvis had to be done without contrast.  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  CT was unrevealing.  OB/GYN and general surgery evaluated the patient and were concerned for post-op complication but the evidence was dubious.  They recommended a consult to gastroenterology for a diagnostic colonoscopy given the uncertainty of her source of abdominal pain and diarrhea.  She developed abdominal pain shortly after the procedure and had watery diarrhea the day before admission.  Colonoscopy did not show evidence of colitis so the surgical team took her to the operating room and found a small segment of dehiscence to the vaginal cuff.  There was very little evidence of inflammation or contamination in the abdomen or pelvis.  She continued to have high fevers but her blood pressure and heart rate improved  partially post-op.  She was successfully extubated 16 April but continued to require Vasopressin for blood pressure support and her labwork showed acute kidney injury.  She had evidence of a metabolic acidosis but did not have an elevated lactic acid on multiple measurements nor did she have a leukocytosis proportionate to the severity of her symptoms.  Optimized broad spectrum antibiotics and added anti-fungal.  Her CD4 count has been in steady decline over the last year while under treatment.  Three weeks ago it was 53.    Interval History: patient is feeling better today and she didn't tolerate the metoprolol so I stopped and started low dose Coreg which she is tolerating. She is still weak and is asking about going home.    Review of Systems   HENT: Negative.    Cardiovascular: Negative.    Gastrointestinal: Negative.    Genitourinary: Negative.    Neurological: Positive for weakness.     Objective:     Vital Signs (Most Recent):  Temp: 97.7 °F (36.5 °C) (04/20/17 0737)  Pulse: 86 (04/20/17 0737)  Resp: 17 (04/20/17 0737)  BP: (!) 128/94 (04/20/17 0737)  SpO2: 99 % (04/20/17 0737) Vital Signs (24h Range):  Temp:  [97.4 °F (36.3 °C)-98.7 °F (37.1 °C)] 97.7 °F (36.5 °C)  Pulse:  [] 86  Resp:  [17-20] 17  SpO2:  [95 %-100 %] 99 %  BP: (128-140)/() 128/94     Weight: 53.1 kg (117 lb)  Body mass index is 22.85 kg/(m^2).    Intake/Output Summary (Last 24 hours) at 04/20/17 1008  Last data filed at 04/20/17 0600   Gross per 24 hour   Intake              720 ml   Output              350 ml   Net              370 ml      Physical Exam   Constitutional: Vital signs are normal. She has a sickly appearance. No distress.   HENT:   Head: Normocephalic and atraumatic.   Nose: Nose normal.   Eyes: EOM are normal. Pupils are equal, round, and reactive to light.   Neck: Normal range of motion. Neck supple.   Abdominal: Soft. There is tenderness.   Tenderness at incision site.   Musculoskeletal: Normal range of motion.    Neurological: She is alert.   Skin: Skin is warm and dry.   Psychiatric: She has a normal mood and affect.       Significant Labs:   CBC:   Recent Labs  Lab 04/19/17  0511 04/20/17  0522   WBC 2.18* 2.96*   HGB 8.1* 8.8*   HCT 24.3* 26.3*   PLT 54* 47*     CMP:   Recent Labs  Lab 04/19/17  0511 04/20/17  0522    140   K 3.6 3.4*    110   CO2 21* 24   GLU 63* 65*   BUN 31* 27*   CREATININE 2.3* 1.9*   CALCIUM 7.6* 7.6*   PROT 7.1 7.2   ALBUMIN 2.1* 2.1*   BILITOT 0.4 0.4   ALKPHOS 206* 192*   AST 66* 53*   ALT 23 19   ANIONGAP 8 6*   EGFRNONAA 27* 34*       Significant Imaging: I have reviewed and interpreted all pertinent imaging results/findings within the past 24 hours.    Assessment/Plan:      * Sepsis  Resolved, BC are negative, agree with stopping IV abx and oral Avelox    Resolved may be able to stop antibiotics as cultures have been negative and afebrile      HIV (human immunodeficiency virus infection)  Resume HIV medications when appropriate.  Infectious disease familiar with the patient and aware.    Seen by ID and appreciate his recommendations. Resume her HAART medications    Symptomatic anemia  She is experiencing SANTOS and fatigue. Also she is tachycardia so I will transfuse her 1 unit of PRBC, repeat H/H    Better after transfusion as she likely has anemia of chronic disease secondary to HIV    VTE Risk Mitigation         Ordered     Medium Risk of VTE  Once      04/16/17 0322     Place sequential compression device  Until discontinued      04/15/17 2052          Mara Cabello MD  Department of Hospital Medicine   [facility]   no concerns

## 2018-07-25 ENCOUNTER — CLINICAL SUPPORT (OUTPATIENT)
Dept: CARDIOLOGY | Facility: CLINIC | Age: 33
End: 2018-07-25
Attending: INTERNAL MEDICINE
Payer: MEDICARE

## 2018-07-25 ENCOUNTER — CLINICAL SUPPORT (OUTPATIENT)
Dept: CARDIOLOGY | Facility: CLINIC | Age: 33
End: 2018-07-25
Payer: MEDICARE

## 2018-07-25 DIAGNOSIS — I42.9 CARDIOMYOPATHY, UNSPECIFIED TYPE: Primary | ICD-10-CM

## 2018-07-25 DIAGNOSIS — Z45.02 ICD (IMPLANTABLE CARDIOVERTER-DEFIBRILLATOR) BATTERY DEPLETION: ICD-10-CM

## 2018-07-25 DIAGNOSIS — Z45.02 ENCOUNTER FOR SERVICING OF IMPLANTABLE CARDIOVERTER-DEFIBRILLATOR (ICD) AT END OF BATTERY LIFE: ICD-10-CM

## 2018-07-25 DIAGNOSIS — R06.02 SHORTNESS OF BREATH: ICD-10-CM

## 2018-07-25 DIAGNOSIS — Z86.79 HISTORY OF VENTRICULAR FIBRILLATION: ICD-10-CM

## 2018-07-25 DIAGNOSIS — Z95.810 ICD (IMPLANTABLE CARDIOVERTER-DEFIBRILLATOR), SINGLE, IN SITU: ICD-10-CM

## 2018-07-25 DIAGNOSIS — I47.20 VT (VENTRICULAR TACHYCARDIA): ICD-10-CM

## 2018-07-25 DIAGNOSIS — Z01.818 PREOP TESTING: ICD-10-CM

## 2018-07-25 DIAGNOSIS — Z45.02 ENCOUNTER FOR SERVICING OF IMPLANTABLE CARDIOVERTER-DEFIBRILLATOR (ICD) AT END OF BATTERY LIFE: Primary | ICD-10-CM

## 2018-07-25 DIAGNOSIS — I42.9 CARDIOMYOPATHY, UNSPECIFIED TYPE: ICD-10-CM

## 2018-07-25 DIAGNOSIS — Z01.818 PREOPERATIVE TESTING: ICD-10-CM

## 2018-07-25 DIAGNOSIS — D64.9 ANEMIA, UNSPECIFIED TYPE: ICD-10-CM

## 2018-07-25 PROCEDURE — 93283 PRGRMG EVAL IMPLANTABLE DFB: CPT | Mod: ,,, | Performed by: INTERNAL MEDICINE

## 2018-07-25 PROCEDURE — 93290 INTERROG DEV EVAL ICPMS IP: CPT | Mod: ,,, | Performed by: INTERNAL MEDICINE

## 2018-07-25 PROCEDURE — 93000 ELECTROCARDIOGRAM COMPLETE: CPT | Mod: S$GLB,,, | Performed by: INTERNAL MEDICINE

## 2018-07-30 ENCOUNTER — PATIENT OUTREACH (OUTPATIENT)
Dept: ADMINISTRATIVE | Facility: HOSPITAL | Age: 33
End: 2018-07-30

## 2018-07-30 ENCOUNTER — TELEPHONE (OUTPATIENT)
Dept: OBSTETRICS AND GYNECOLOGY | Facility: CLINIC | Age: 33
End: 2018-07-30

## 2018-07-30 NOTE — TELEPHONE ENCOUNTER
----- Message from Lexii James NP sent at 7/30/2018  8:04 AM CDT -----  Contact: pt  Did this patient receive her results?  ----- Message -----  From: Pietro Garcia RN  Sent: 7/26/2018   2:14 PM  To: Lexii James NP    Message sent by error to cardiology.Routing to OB/GYN.Needs call back about test results.  Thanks,  Melina Garcia RN  ----- Message -----  From: Mario Elizalde  Sent: 7/26/2018   2:04 PM  To: Federico SCHAFER Staff    She's calling in regards to her STD, pls advise, [phone] (home)

## 2018-07-30 NOTE — TELEPHONE ENCOUNTER
Spoke with pt states she has already results. Also pt has completed Macrobid prescription. Notified pt that message was received for Cardiology. Patient verbalized understanding

## 2018-08-02 ENCOUNTER — TELEPHONE (OUTPATIENT)
Dept: CARDIOLOGY | Facility: CLINIC | Age: 33
End: 2018-08-02

## 2018-08-02 ENCOUNTER — PATIENT MESSAGE (OUTPATIENT)
Dept: CARDIOLOGY | Facility: CLINIC | Age: 33
End: 2018-08-02

## 2018-08-02 NOTE — TELEPHONE ENCOUNTER
----- Message from Edmund Caballero MD sent at 8/2/2018 11:57 AM CDT -----  Please postpone AICD generator change,she needs to see EP first because she may not need AICD anymore,evrything normalize, EF is normal

## 2018-08-02 NOTE — TELEPHONE ENCOUNTER
Telephoned patient to advise of postponing ICD generator change until after she sees Dr. Shetty on Thursday 8/9/18 for 3PM  Spoke with patient's shalini' and given direct call back number

## 2018-08-08 ENCOUNTER — HOSPITAL ENCOUNTER (EMERGENCY)
Facility: HOSPITAL | Age: 33
Discharge: HOME | End: 2018-08-09
Attending: EMERGENCY MEDICINE
Payer: MEDICARE

## 2018-08-08 DIAGNOSIS — D84.9 IMMUNOCOMPROMISED PATIENT: ICD-10-CM

## 2018-08-08 DIAGNOSIS — R05.9 COUGH: ICD-10-CM

## 2018-08-08 DIAGNOSIS — J20.8 ACUTE BACTERIAL BRONCHITIS: Primary | ICD-10-CM

## 2018-08-08 DIAGNOSIS — B96.89 ACUTE BACTERIAL BRONCHITIS: Primary | ICD-10-CM

## 2018-08-08 PROCEDURE — 99284 EMERGENCY DEPT VISIT MOD MDM: CPT | Mod: 25

## 2018-08-08 RX ORDER — METHYLPREDNISOLONE 4 MG/1
TABLET ORAL
Qty: 1 PACKAGE | Refills: 0 | Status: SHIPPED | OUTPATIENT
Start: 2018-08-08 | End: 2018-08-29

## 2018-08-08 RX ORDER — PROMETHAZINE HYDROCHLORIDE AND DEXTROMETHORPHAN HYDROBROMIDE 6.25; 15 MG/5ML; MG/5ML
5 SYRUP ORAL 3 TIMES DAILY PRN
Qty: 120 ML | Refills: 0 | Status: SHIPPED | OUTPATIENT
Start: 2018-08-08 | End: 2018-08-18

## 2018-08-08 RX ORDER — DOXYCYCLINE 100 MG/1
100 CAPSULE ORAL 2 TIMES DAILY
Qty: 14 CAPSULE | Refills: 0 | Status: SHIPPED | OUTPATIENT
Start: 2018-08-08 | End: 2018-08-15

## 2018-08-08 RX ORDER — PROMETHAZINE HYDROCHLORIDE AND CODEINE PHOSPHATE 6.25; 1 MG/5ML; MG/5ML
5 SOLUTION ORAL
Status: COMPLETED | OUTPATIENT
Start: 2018-08-08 | End: 2018-08-09

## 2018-08-09 ENCOUNTER — INITIAL CONSULT (OUTPATIENT)
Dept: CARDIOLOGY | Facility: CLINIC | Age: 33
End: 2018-08-09
Payer: MEDICARE

## 2018-08-09 VITALS
HEIGHT: 60 IN | BODY MASS INDEX: 26.7 KG/M2 | SYSTOLIC BLOOD PRESSURE: 110 MMHG | DIASTOLIC BLOOD PRESSURE: 80 MMHG | WEIGHT: 136 LBS | HEART RATE: 80 BPM | RESPIRATION RATE: 20 BRPM

## 2018-08-09 VITALS
HEART RATE: 73 BPM | BODY MASS INDEX: 21.93 KG/M2 | HEIGHT: 66 IN | RESPIRATION RATE: 16 BRPM | DIASTOLIC BLOOD PRESSURE: 62 MMHG | SYSTOLIC BLOOD PRESSURE: 133 MMHG | TEMPERATURE: 99 F | WEIGHT: 136.44 LBS | OXYGEN SATURATION: 99 %

## 2018-08-09 DIAGNOSIS — Z95.810 S/P IMPLANTATION OF AUTOMATIC CARDIOVERTER/DEFIBRILLATOR (AICD): Primary | Chronic | ICD-10-CM

## 2018-08-09 DIAGNOSIS — Z86.79 HISTORY OF VENTRICULAR FIBRILLATION: ICD-10-CM

## 2018-08-09 PROCEDURE — 25000003 PHARM REV CODE 250: Performed by: NURSE PRACTITIONER

## 2018-08-09 PROCEDURE — 99205 OFFICE O/P NEW HI 60 MIN: CPT | Mod: S$GLB,,, | Performed by: INTERNAL MEDICINE

## 2018-08-09 PROCEDURE — 99999 PR PBB SHADOW E&M-EST. PATIENT-LVL III: CPT | Mod: PBBFAC,,, | Performed by: INTERNAL MEDICINE

## 2018-08-09 PROCEDURE — 3008F BODY MASS INDEX DOCD: CPT | Mod: CPTII,S$GLB,, | Performed by: INTERNAL MEDICINE

## 2018-08-09 RX ADMIN — PROMETHAZINE HYDROCHLORIDE AND CODEINE PHOSPHATE 5 ML: 6.25; 1 SYRUP ORAL at 12:08
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
## 2018-08-09 NOTE — LETTER
August 9, 2018      Edmund Caballero MD  [address]  [place] [address]           [facility]  [id number] [place] [address] , [address]  [place] [address]  Phone: [phone]  Fax: [phone]          Patient: Wang Kebede   MR Number: [id number]   YOB: 1985   Date of Visit: 8/9/2018       Dear Dr. Edmund Caballero:    Thank you for referring Wang Kebede to me for evaluation. Attached you will find relevant portions of my assessment and plan of care.    If you have questions, please do not hesitate to call me. I look forward to following Wang Kebede along with you.    Sincerely,    Emanuel Shetty MD    Enclosure  CC:  No Recipients    If you would like to receive this communication electronically, please contact [email or web address] or [phone] to request more information on [employer] Link access.    For providers and/or their staff who would like to refer a patient to [facility], please contact us through our one-stop-shop provider referral line, [facility], at [phone].    If you feel you have received this communication in error or would no longer like to receive these types of communications, please e-mail [email or web address]

## 2018-08-10 PROBLEM — E78.1 HIGH TRIGLYCERIDES: Status: ACTIVE | Noted: 2018-08-10

## 2018-08-13 ENCOUNTER — TELEPHONE (OUTPATIENT)
Dept: OBSTETRICS AND GYNECOLOGY | Facility: CLINIC | Age: 33
End: 2018-08-13

## 2018-08-13 ENCOUNTER — TELEPHONE (OUTPATIENT)
Dept: CARDIOLOGY | Facility: CLINIC | Age: 33
End: 2018-08-13

## 2018-08-13 RX ORDER — METRONIDAZOLE 500 MG/1
500 TABLET ORAL EVERY 12 HOURS
Qty: 14 TABLET | Refills: 0 | Status: SHIPPED | OUTPATIENT
Start: 2018-08-13 | End: 2018-08-20

## 2018-08-13 NOTE — TELEPHONE ENCOUNTER
Spoke with pt  Notified of new prescription. Medication directions given. Patient verbalized understanding

## 2018-08-13 NOTE — TELEPHONE ENCOUNTER
Telephoned patient to review instructions and time of arrival on Friday.  Npo status and 11am arrival time

## 2018-08-13 NOTE — TELEPHONE ENCOUNTER
----- Message from Evangelina [place] sent at 8/13/2018  6:46 AM CDT -----  Contact: Patient  Patients states that she is having a vaginal outbreak and would like to have something called in, please call her back at  [phone]. Thank you

## 2018-08-13 NOTE — TELEPHONE ENCOUNTER
Spoke with pt regarding experiencing a vaginal breakout, and burning sensation. She would like to have a prescription called in to the pharmacy. Please advise.

## 2018-08-13 NOTE — TELEPHONE ENCOUNTER
----- Message from Edmund Caballero MD sent at 8/10/2018 11:19 AM CDT -----  Next Friday      ----- Message -----  From: Emanuel Shetty MD  Sent: 8/9/2018   6:12 PM  To: Sandy Reyna LPN    Her ICD was implanted as secondary prevention. Generator change is indicated. MB

## 2018-08-17 ENCOUNTER — HOSPITAL ENCOUNTER (OUTPATIENT)
Facility: HOSPITAL | Age: 33
Discharge: HOME | End: 2018-08-17
Attending: INTERNAL MEDICINE | Admitting: INTERNAL MEDICINE
Payer: MEDICARE

## 2018-08-17 VITALS
HEART RATE: 94 BPM | DIASTOLIC BLOOD PRESSURE: 70 MMHG | HEIGHT: 60 IN | BODY MASS INDEX: 26.5 KG/M2 | RESPIRATION RATE: 17 BRPM | WEIGHT: 135 LBS | OXYGEN SATURATION: 99 % | SYSTOLIC BLOOD PRESSURE: 120 MMHG | TEMPERATURE: 98 F

## 2018-08-17 DIAGNOSIS — Z45.02 IMPLANTABLE CARDIOVERTER-DEFIBRILLATOR (ICD) GENERATOR END OF LIFE: ICD-10-CM

## 2018-08-17 DIAGNOSIS — I49.01 VENTRICULAR FIBRILLATION: ICD-10-CM

## 2018-08-17 DIAGNOSIS — Z45.02 AICD AT END OF BATTERY LIFE: Primary | ICD-10-CM

## 2018-08-17 LAB
ALBUMIN SERPL BCP-MCNC: 3.6 G/DL
ALP SERPL-CCNC: 99 U/L
ALT SERPL W/O P-5'-P-CCNC: 17 U/L
ANION GAP SERPL CALC-SCNC: 7 MMOL/L
APTT BLDCRRT: 28 SEC
AST SERPL-CCNC: 25 U/L
BASOPHILS # BLD AUTO: 0.03 K/UL
BASOPHILS NFR BLD: 0.5 %
BILIRUB SERPL-MCNC: 0.4 MG/DL
BUN SERPL-MCNC: 15 MG/DL
CALCIUM SERPL-MCNC: 9.3 MG/DL
CHLORIDE SERPL-SCNC: 107 MMOL/L
CO2 SERPL-SCNC: 23 MMOL/L
CREAT SERPL-MCNC: 0.9 MG/DL
DIFFERENTIAL METHOD: ABNORMAL
EOSINOPHIL # BLD AUTO: 0.4 K/UL
EOSINOPHIL NFR BLD: 5.9 %
ERYTHROCYTE [DISTWIDTH] IN BLOOD BY AUTOMATED COUNT: 13.5 %
EST. GFR  (AFRICAN AMERICAN): >60 ML/MIN/1.73 M^2
EST. GFR  (NON AFRICAN AMERICAN): >60 ML/MIN/1.73 M^2
GLUCOSE SERPL-MCNC: 75 MG/DL
HCT VFR BLD AUTO: 31.6 %
HGB BLD-MCNC: 10.5 G/DL
INR PPP: 1
LYMPHOCYTES # BLD AUTO: 3.2 K/UL
LYMPHOCYTES NFR BLD: 52.1 %
MCH RBC QN AUTO: 31.5 PG
MCHC RBC AUTO-ENTMCNC: 33.2 G/DL
MCV RBC AUTO: 95 FL
MONOCYTES # BLD AUTO: 0.5 K/UL
MONOCYTES NFR BLD: 7.7 %
NEUTROPHILS # BLD AUTO: 2.1 K/UL
NEUTROPHILS NFR BLD: 33.8 %
PLATELET # BLD AUTO: 244 K/UL
PMV BLD AUTO: 9.8 FL
POTASSIUM SERPL-SCNC: 4.3 MMOL/L
PROT SERPL-MCNC: 9.9 G/DL
PROTHROMBIN TIME: 10.5 SEC
RBC # BLD AUTO: 3.33 M/UL
SODIUM SERPL-SCNC: 137 MMOL/L
WBC # BLD AUTO: 6.14 K/UL

## 2018-08-17 PROCEDURE — 63600175 PHARM REV CODE 636 W HCPCS

## 2018-08-17 PROCEDURE — 25000003 PHARM REV CODE 250

## 2018-08-17 PROCEDURE — 25000003 PHARM REV CODE 250: Performed by: INTERNAL MEDICINE

## 2018-08-17 PROCEDURE — 80053 COMPREHEN METABOLIC PANEL: CPT

## 2018-08-17 PROCEDURE — 85610 PROTHROMBIN TIME: CPT

## 2018-08-17 PROCEDURE — 93005 ELECTROCARDIOGRAM TRACING: CPT | Mod: 59

## 2018-08-17 PROCEDURE — 85025 COMPLETE CBC W/AUTO DIFF WBC: CPT

## 2018-08-17 PROCEDURE — C1721 AICD, DUAL CHAMBER: HCPCS

## 2018-08-17 PROCEDURE — 85730 THROMBOPLASTIN TIME PARTIAL: CPT

## 2018-08-17 PROCEDURE — 33263 RMVL & RPLCMT DFB GEN 2 LEAD: CPT | Mod: ,,, | Performed by: INTERNAL MEDICINE

## 2018-08-17 PROCEDURE — 93010 ELECTROCARDIOGRAM REPORT: CPT | Mod: ,,, | Performed by: INTERNAL MEDICINE

## 2018-08-17 PROCEDURE — 63600175 PHARM REV CODE 636 W HCPCS: Performed by: INTERNAL MEDICINE

## 2018-08-17 PROCEDURE — 99152 MOD SED SAME PHYS/QHP 5/>YRS: CPT | Mod: ,,, | Performed by: INTERNAL MEDICINE

## 2018-08-17 RX ORDER — CEFAZOLIN SODIUM 1 G/50ML
1 SOLUTION INTRAVENOUS ONCE
Status: DISCONTINUED | OUTPATIENT
Start: 2018-08-17 | End: 2018-08-17

## 2018-08-17 RX ORDER — CEPHALEXIN 500 MG/1
500 CAPSULE ORAL ONCE
Status: COMPLETED | OUTPATIENT
Start: 2018-08-17 | End: 2018-08-17

## 2018-08-17 RX ORDER — CEFAZOLIN SODIUM 2 G/50ML
2 SOLUTION INTRAVENOUS
Status: COMPLETED | OUTPATIENT
Start: 2018-08-17 | End: 2018-08-17

## 2018-08-17 RX ORDER — DIPHENHYDRAMINE HYDROCHLORIDE 50 MG/ML
25 INJECTION INTRAMUSCULAR; INTRAVENOUS ONCE
Status: COMPLETED | OUTPATIENT
Start: 2018-08-17 | End: 2018-08-17

## 2018-08-17 RX ORDER — DIPHENHYDRAMINE HCL 25 MG
25 CAPSULE ORAL ONCE
Status: COMPLETED | OUTPATIENT
Start: 2018-08-17 | End: 2018-08-17

## 2018-08-17 RX ORDER — CEPHALEXIN 500 MG/1
500 CAPSULE ORAL EVERY 12 HOURS
Qty: 10 CAPSULE | Refills: 0 | Status: SHIPPED | OUTPATIENT
Start: 2018-08-17 | End: 2018-08-22

## 2018-08-17 RX ORDER — ACETAMINOPHEN 325 MG/1
650 TABLET ORAL EVERY 4 HOURS PRN
Status: DISCONTINUED | OUTPATIENT
Start: 2018-08-17 | End: 2018-08-17 | Stop reason: HOSPADM

## 2018-08-17 RX ADMIN — CEFAZOLIN SODIUM 2 G: 2 SOLUTION INTRAVENOUS at 01:08

## 2018-08-17 RX ADMIN — DIPHENHYDRAMINE HYDROCHLORIDE 25 MG: 50 INJECTION INTRAMUSCULAR; INTRAVENOUS at 03:08

## 2018-08-17 RX ADMIN — CEPHALEXIN 500 MG: 500 CAPSULE ORAL at 04:08

## 2018-08-17 RX ADMIN — Medication 25 MG: at 04:08
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
## 2018-08-17 NOTE — NURSING
L WRIST IV NONPATENT, PT SCRATCHING AND RESTLESS, IV REMOVED. ORDER FOR ANCEF 1 GM IVP POST OP CHANGED TO KEFLEX 500 MG PO.  1630 BENADRYL 25 MG PO GIVEN, KEFLEX 500 MG PO GIVEN AFTER IV NONPATENT.

## 2018-08-17 NOTE — DISCHARGE INSTRUCTIONS
ACTIVITY: Avoid heavy lifting or straining for 4 weeks          You may shower after 2 days          No tub bath until incision site is healed                     No reaching movements above the affected shoulder for 7 days                     Wear sling and swathe for ___5_ days.                      WOUND CARE: It is not unusual to have tenderness at the incision site. Remove the dressing as instructed by your physician    DISCOMFORT: For general discomfort at the incision site, ou may take over the counter acetaminophen as directed on the bottle.    SIGNS AND SYMPTOMS TO REPORT: Call your physician for the following:          Fever greater than 101          Pain not relieved by medication          Swelling, drainage, warmth, or redness at incision site          Shortness of breath                     Hiccoughs within the first 48 hours that are persistent                     Increased fatigue with normal activity          Drowsiness that doesn't get better                 Weakness or dizziness that doesn't get better                 Repeated vomiting      WHEN TO SEEK EMERGENCY ASSISTANCE                      AFTER ICD CALL 911 FOR THE FOLLOWING:                     If you still have symptoms after shock                     If you have 3 or more shocks in a row                     If you have symptoms, but don't feel a shock, or if you feel faint or dizzy or                      Pass out, someone should call 911    IF YOU RECEIVED SEDATION TODAY, PLEASE FOLLOW THE  INSTRUCTIONS BELOW:     · For the next 8 hours, you should be watched by a responsible adult. This person should make sure your condition is not getting worse.  · Don't drink any alcohol for the next 24 hours.  · Don't drive, operate dangerous machinery, or make important business or personal decisions during the next 24 hours.  · Your healthcare provider may tell you not to take any medicine by mouth for pain or sleep in the next 4 hours. These  medicines may react with the medicines you were given in the hospital. This could cause a much stronger response than usual.

## 2018-08-17 NOTE — BRIEF OP NOTE
<[facility]  Surgery Department  Operative Note    SUMMARY     Date of Procedure: 8/17/2018     Procedure: Procedure(s) (LRB):  REPLACEMENT, ICD GENERATOR (Left)     Surgeon(s) and Role:     * Edmund Caballero MD - Primary    Assisting Surgeon: None    Pre-Operative Diagnosis: Implantable cardioverter-defibrillator (ICD) generator end of life [Z45.02]  VF (ventricular fibrillation) [I49.01]    Post-Operative Diagnosis: Post-Op Diagnosis Codes:     * Implantable cardioverter-defibrillator (ICD) generator end of life [Z45.02]     * VF (ventricular fibrillation) [I49.01]    Anesthesia: RN IV Sedation    Technical Procedures Used: AICD GENERATOR CHANGE    Description of the Findings of the Procedure: AICD GENERATOR L SUBCLAVIAN    Significant Surgical Tasks Conducted by the Assistant(s), if Applicable: NONE    Complications: No    Estimated Blood Loss (EBL): < 50 cc           Implants: * No implants in log *    Specimens:   Specimen (12h ago, onward)    None                  Condition: Good    Disposition: PACU - hemodynamically stable.    Attestation: I was present and scrubbed for the entire procedure.

## 2018-08-20 RX ORDER — TRAMADOL HYDROCHLORIDE 50 MG/1
50 TABLET ORAL EVERY 6 HOURS PRN
Qty: 15 TABLET | Refills: 0 | Status: SHIPPED | OUTPATIENT
Start: 2018-08-20 | End: 2018-08-30

## 2018-08-20 NOTE — TELEPHONE ENCOUNTER
Per Dr Caballero may have trazadone 50 every 6 hours as needed. Rx sent for his signature. Pt notified.   Spoke with pt and she tells me she did not get instructions on how to take care of there  Site. Told her need to change with sterile guaze and tape it in place. She tells me she can not do that, does not have it and she can not  her arm because it hurts. Tells me she was sent home on an antibiotic and something to use inside her nose. Needs something for pain, she has tried advil, tylenol, extra strength tylenol and benadryl Nothing is working. Told her to leave dressing in place and keep her appt on Wed for site check. She agreed. Will rtc once rev'd with Dr Caballero. Cm  ----- Message from Evangelina Evans sent at 8/20/2018 10:25 AM CDT -----  Contact: Patient  Patient states she was not given any instructions on changing her dressing and also inquiring about pain medication, please call her back at [phone]. Thank you

## 2018-08-20 NOTE — DISCHARGE SUMMARY
[facility] -   Cardiology  Discharge Summary      Patient Name: Wang Kebede  MRN: [id number]  Admission Date: 8/17/2018  Hospital Length of Stay: 0 days  Discharge Date and Time: 8/17/2018  5:10 PM  Attending Physician: No att. providers found  Discharging Provider: Edmund Caballero MD  Primary Care Physician: Levi Moncada MD    HPI: Pt presents for AICD generator change after POWER on inerogation.    Procedure(s) (LRB):  REPLACEMENT, ICD GENERATOR (Left)     Indwelling Lines/Drains at time of discharge:  Lines/Drains/Airways     Drain                 Gastrostomy/Enterostomy 05/26/17 1540 Percutaneous endoscopic gastrostomy (PEG)  days          Pressure Ulcer                 Pressure Ulcer Right lateral other (see comments) suspected deep tissue injury -- days         Pressure Ulcer 07/24/17 0702 Left lower heel suspected deep tissue injury 392 days         Pressure Ulcer 07/24/17 0702 Right lower heel suspected deep tissue injury 392 days                Hospital Course (synopsis of major diagnoses, care, treatment, and services provided during the course of the hospital stay): AICD generator change via L subclavian pocket without complications.[employer] device.    Consults:     Significant Diagnostic Studies: Labs: All labs within the past 24 hours have been reviewed    Pending Diagnostic Studies:     None          Final Active Diagnoses:    Diagnosis Date Noted POA    AICD at end of battery life [Z45.02] 08/17/2018 Not Applicable    S/P implantation of automatic cardioverter/defibrillator (AICD) [Z95.810] 12/29/2016 Yes     Chronic      Problems Resolved During this Admission:       Discharged Condition: good    Follow Up:  Follow-up Information     Follow up In 1 week.    Why:  For wound re-check               Patient Instructions:   No discharge procedures on file.  Medications:  Reconciled Home Medications:      Medication List      START taking these medications     cephALEXin 500 MG capsule  Commonly known as:  KEFLEX  Take 1 capsule (500 mg total) by mouth every 12 (twelve) hours. for 5 days        CHANGE how you take these medications    dolutegravir 50 mg Tab  Commonly known as:  TIVICAY  Take 1 tablet (50 mg total) by mouth once daily.  What changed:  when to take this        CONTINUE taking these medications    b complex vitamins capsule  Take 1 capsule by mouth once daily.     darunavir-cobicistat 800-150 mg-mg Tab  Commonly known as:  PREZCOBIX  Take 800 mg by mouth every evening.     folic acid 1 MG tablet  Commonly known as:  FOLVITE  Take 1 tablet (1 mg total) by mouth once daily.     methylPREDNISolone 4 mg tablet  Commonly known as:  MEDROL DOSEPACK  As directed     metroNIDAZOLE 500 MG tablet  Commonly known as:  FLAGYL  Take 1 tablet (500 mg total) by mouth every 12 (twelve) hours. for 7 days     VIMPAT 50 mg Tab  Generic drug:  lacosamide  TAKE 2 TABLETS BY MOUTH TWICE DAILY        ASK your doctor about these medications    promethazine-dextromethorphan 6.25-15 mg/5 mL Syrp  Commonly known as:  PROMETHAZINE-DM  Take 5 mLs by mouth 3 (three) times daily as needed.  Ask about: Should I take this medication?            Time spent on the discharge of patient: 30 minutes    Edmund Caballero MD  Cardiology  [facility]

## 2018-08-21 ENCOUNTER — TELEPHONE (OUTPATIENT)
Dept: CARDIOLOGY | Facility: CLINIC | Age: 33
End: 2018-08-21

## 2018-08-21 ENCOUNTER — TELEPHONE (OUTPATIENT)
Dept: OBSTETRICS AND GYNECOLOGY | Facility: CLINIC | Age: 33
End: 2018-08-21

## 2018-08-21 RX ORDER — CLINDAMYCIN HYDROCHLORIDE 150 MG/1
150 CAPSULE ORAL EVERY 8 HOURS
Qty: 21 CAPSULE | Refills: 0 | Status: SHIPPED | OUTPATIENT
Start: 2018-08-21 | End: 2018-08-28

## 2018-08-21 NOTE — TELEPHONE ENCOUNTER
Spoke with pt, informed that prescription for Clindamycin has been called in to pharmacy. Verified pharmacy with pt, pt voiced understanding.

## 2018-08-21 NOTE — TELEPHONE ENCOUNTER
I rtc and left v/m that I would ask him and then call her back.   ----- Message from Yuri Jane sent at 8/21/2018  8:23 AM CDT -----  Contact: [phone]   Pt would like to consult with the nurse about Pain, Pt states that the tramadol does not work, pt states that percet is the only pain medication that works for her.  Please call back at [phone].  x-           Pt Uses:  .  [id number] - [place] - [address] AT [facility]  [address]  [place] [address]  Phone: [phone] Fax: [phone]

## 2018-08-21 NOTE — TELEPHONE ENCOUNTER
Spoke with pt, pt stated that the flagyl pills are not working. She is experiencing burning from the front to the back, and irritation. Pt would like a prescription called in to pharmacy. Please advise.

## 2018-08-21 NOTE — TELEPHONE ENCOUNTER
----- Message from Kika Tee sent at 8/21/2018 12:49 PM CDT -----  Contact: pt  The pt states her Metranidazole meds are not working, the pt wants to be advised and can be reached at [phone]///[id number]

## 2018-08-23 ENCOUNTER — CLINICAL SUPPORT (OUTPATIENT)
Dept: CARDIOLOGY | Facility: CLINIC | Age: 33
End: 2018-08-23
Payer: MEDICARE

## 2018-08-23 DIAGNOSIS — I47.20 VT (VENTRICULAR TACHYCARDIA): ICD-10-CM

## 2018-08-23 DIAGNOSIS — Z95.810 ICD (IMPLANTABLE CARDIOVERTER-DEFIBRILLATOR), SINGLE, IN SITU: ICD-10-CM

## 2018-08-23 DIAGNOSIS — I42.9 CARDIOMYOPATHY, UNSPECIFIED TYPE: ICD-10-CM

## 2018-08-23 PROCEDURE — 93290 INTERROG DEV EVAL ICPMS IP: CPT | Mod: S$GLB,,, | Performed by: INTERNAL MEDICINE

## 2018-08-23 PROCEDURE — 93283 PRGRMG EVAL IMPLANTABLE DFB: CPT | Mod: S$GLB,,, | Performed by: INTERNAL MEDICINE

## 2018-08-24 ENCOUNTER — TELEPHONE (OUTPATIENT)
Dept: INTERNAL MEDICINE | Facility: CLINIC | Age: 33
End: 2018-08-24

## 2018-08-24 NOTE — TELEPHONE ENCOUNTER
----- Message from Praveen Gibson sent at 8/23/2018  3:47 PM CDT -----  Contact: self [phone]  Would like to consult with nurse regarding pain management referral.   Please call back at [phone]. Md Aquilino

## 2018-10-11 NOTE — PROGRESS NOTES
Hematology/Oncology Consult F/U    Reason for consultation: Neutropenia    Chief Complaint:  Septic shock, fever    HPI:    Wang Kebede is a 31 y.o. female with HIV/AIDS admitted with fevers and developed neutropenia. She was admitted on 7/20/17 with fever 103 and had been having fevers 3 days prior to admission. WBC on admission was 3.82, Hgb 8.8, . The patient has been reportedly compliant with her HIV medications, which she receives through her PEG tube. Upon admission, blood cultures were drawn. She was continued on her home HAART medications and started on Vanc, Zosyn. Since admission her counts have declined on 7/22 to Hgb 6.6. She received 1 Unit of PRBCs. Labs on 7/23/17 showed WBC 1.73, ANC ANC 0.4. Repeat CBC shows WBC 3.05. Last fever was 110.9 at 3am on 7/22.    Interval History:  Patient is alert and awake this morning, speaking clearly. She is afebrile and off of vasopressors. She denies any cough, fevers/chills, n/v/d, abdom pain.    Review of patient's allergies indicates:   Allergen Reactions    Iodine and iodide containing products Anaphylaxis     Pt states she coded last time she was administered contrast    Pneumococcal 23-chitra ps vaccine Anaphylaxis     Potential iodine containing    Bactrim [sulfamethoxazole-trimethoprim] Hives    Morphine Itching       Medications:      Current Facility-Administered Medications   Medication    0.9%  NaCl infusion (for blood administration)    acetaminophen tablet 325 mg    atovaquone suspension 750 mg    bisacodyl suppository 10 mg    clarithromycin tablet 500 mg    darunavir-cobicistat 800-150 mg-mg Tab 800 mg    diphenhydrAMINE injection 12.5 mg    docusate 50 mg/5 mL liquid 100 mg    dolutegravir Tab 50 mg    ethambutol tablet 800 mg    famotidine tablet 20 mg    folic acid tablet 1 mg    lacosamide tablet 100 mg    metoprolol tartrate (LOPRESSOR) split tablet 12.5 mg    moxifloxacin 400 mg/250 mL IVPB 400 mg     norepinephrine 4 mg in dextrose 5% 250 mL infusion (premix) (titrating)    ondansetron injection 4 mg    promethazine (PHENERGAN) 6.25 mg in dextrose 5 % 50 mL IVPB       VITALS (Last 24H):  Temp:  [97.7 °F (36.5 °C)-98.6 °F (37 °C)]   Pulse:  []   Resp:  [22-37]   BP: ()/(36-97)   SpO2:  [93 %-100 %]     INTAKE & OUTPUT (Last 24H):    Intake/Output Summary (Last 24 hours) at 07/28/17 0714  Last data filed at 07/28/17 0305   Gross per 24 hour   Intake          1791.09 ml   Output             1905 ml   Net          -113.91 ml       REVIEW OF SYSTEMS:        Review of Systems   Constitutional: Negative.    HENT: Negative.    Eyes: Negative.    Respiratory: Negative.    Cardiovascular: Negative.    Gastrointestinal: Negative.    Genitourinary: Negative.    Musculoskeletal: Negative.    Skin: Negative.    Neurological: Negative.    Endo/Heme/Allergies: Negative.    Psychiatric/Behavioral: Negative.      +generalized weakness    PHYSICAL EXAM:    Constitutional: Appears well-developed, cachectic, alert today.   HEENT: Normocephalic and atraumatic. No maxillary sinus tenderness. External auditory canals clear and TMs intact without lesions. Nasal and oral mucosal membranes moist. Normal dentition and gums.   Neck: Neck supple no mass.   Cardiovascular: Normal rate and regular rhythm.  S1 S2 appreciated by ascultation  Pulmonary/Chest: BCTA. No respiratory distress.   Abdomen: Soft. Non-tender, nondistended, PEG in RLQ. Bowel sounds are normal.   Neurological: Moves all extremities.  : Tinsley in place draining yellow urine  Skin: Warm and dry. No lesions.  Extremities: No clubbing cyanosis or edema.  PICC: Located in R upper arm - Clean, dry, intact, with no signs of infection    Laboratory Data:    Lab Results   Component Value Date    WBC 6.70 07/28/2017    HGB 10.1 (L) 07/28/2017    HCT 30.0 (L) 07/28/2017    MCV 92 07/28/2017    PLT 70 (L) 07/28/2017         Recent Labs  Lab 07/28/17  0405   GLU 97   NA  143   K 3.8   *   CO2 28   BUN 44*   CREATININE 1.4   CALCIUM 8.4*   MG 2.0       Lab Results   Component Value Date    ALT 26 07/24/2017    AST 47 (H) 07/24/2017     (H) 04/19/2017    ALKPHOS 142 (H) 07/24/2017    BILITOT 0.8 07/24/2017       Lab Results   Component Value Date    IRON 57 07/05/2017    TIBC 253 07/05/2017    FERRITIN 2,026 (H) 07/05/2017     Lab Results   Component Value Date    WUVQSQJK15 419 07/05/2017     Lab Results   Component Value Date    FOLATE 3.9 (L) 07/05/2017     Lab Results   Component Value Date    TSH 0.941 07/20/2017     Lab Results   Component Value Date    APTT 38.0 (H) 07/20/2017       Radiology:  Reviewed and noted in plan where applicable. Please see chart for full radiology data.      ASSESSMENT/PLAN:   30 y/o female with HIV/AIDS admitted with fevers and pancytopenia.     1. Pancytopenia: Most likely due to sepsis. No suspicion for malignancy at this time.   -- No transfusions needed at this time. Will continue to monitor.  -- Will need to ensure patient was compliant with her folic acid at home given recent low folate level.  -- We will sign off. [facility] not [facility].     2. Fevers/sepsis: Negative blood cultures, but abd/pelvis CT showed possible LLL infiltrate. Pt may have aspirated as well. Another reason for fevers could be IRIS / immune reconstitution. CT shows possible pyelonephritis, but UA negative. Urine culture showed multiple organisms. Repeat urine culture negative.   -- On Vanc and Moxifloxacin.      3. Respiratory failure: Was due to sepsis, possible pneumonia with or without aspiration as well as pulmonary edema. Extubated and clinically improving.     4. Acute renal failure: Due to intravascular volume depletion, possible ATN from medications and possibly worsened with Lasix. Cr improved to 1.4 today.   -- Nephrology following     5. HIV/AIDS: Compliant with HAART and prophylactic antibiotics for the  past few months.   -- ID following.     Thank you for allowing us to participate in the care of this patent.      Virginia Olmedo M.D.  Hematology Oncology   Private car

## 2018-11-21 PROBLEM — Z45.02 AICD AT END OF BATTERY LIFE: Status: RESOLVED | Noted: 2018-08-17 | Resolved: 2018-11-21

## 2018-11-21 PROBLEM — E78.1 HIGH TRIGLYCERIDES: Status: RESOLVED | Noted: 2018-08-10 | Resolved: 2018-11-21

## 2018-12-24 ENCOUNTER — HOSPITAL ENCOUNTER (INPATIENT)
Facility: HOSPITAL | Age: 33
LOS: 7 days | Discharge: HOME | DRG: 974 | End: 2018-12-31
Attending: FAMILY MEDICINE | Admitting: FAMILY MEDICINE
Payer: MEDICARE

## 2018-12-24 DIAGNOSIS — E87.8 ELECTROLYTE DISTURBANCE: ICD-10-CM

## 2018-12-24 DIAGNOSIS — B20 AIDS (ACQUIRED IMMUNODEFICIENCY SYNDROME), CD4 <=200: Chronic | ICD-10-CM

## 2018-12-24 DIAGNOSIS — A41.9 SEPSIS: ICD-10-CM

## 2018-12-24 DIAGNOSIS — F32.A DEPRESSION, UNSPECIFIED DEPRESSION TYPE: Chronic | ICD-10-CM

## 2018-12-24 DIAGNOSIS — M62.82 NON-TRAUMATIC RHABDOMYOLYSIS: ICD-10-CM

## 2018-12-24 DIAGNOSIS — B20 HIV (HUMAN IMMUNODEFICIENCY VIRUS INFECTION): Chronic | ICD-10-CM

## 2018-12-24 DIAGNOSIS — I95.9 HYPOTENSION, UNSPECIFIED HYPOTENSION TYPE: ICD-10-CM

## 2018-12-24 DIAGNOSIS — G40.909 SEIZURE DISORDER: Chronic | ICD-10-CM

## 2018-12-24 DIAGNOSIS — Z95.810 S/P IMPLANTATION OF AUTOMATIC CARDIOVERTER/DEFIBRILLATOR (AICD): Chronic | ICD-10-CM

## 2018-12-24 DIAGNOSIS — H93.299: ICD-10-CM

## 2018-12-24 DIAGNOSIS — N17.9 AKI (ACUTE KIDNEY INJURY): ICD-10-CM

## 2018-12-24 DIAGNOSIS — D64.9 ANEMIA, UNSPECIFIED TYPE: Chronic | ICD-10-CM

## 2018-12-24 DIAGNOSIS — N17.9 ACUTE RENAL FAILURE, UNSPECIFIED ACUTE RENAL FAILURE TYPE: ICD-10-CM

## 2018-12-24 DIAGNOSIS — R91.8 PULMONARY INFILTRATE PRESENT ON COMPUTED TOMOGRAPHY: ICD-10-CM

## 2018-12-24 DIAGNOSIS — R57.9 SHOCK CIRCULATORY: Primary | ICD-10-CM

## 2018-12-24 DIAGNOSIS — D69.6 THROMBOCYTOPENIA: Chronic | ICD-10-CM

## 2018-12-24 PROCEDURE — 96367 TX/PROPH/DG ADDL SEQ IV INF: CPT | Mod: HCNC

## 2018-12-24 PROCEDURE — 96368 THER/DIAG CONCURRENT INF: CPT | Mod: HCNC

## 2018-12-24 PROCEDURE — 96361 HYDRATE IV INFUSION ADD-ON: CPT | Mod: HCNC

## 2018-12-24 PROCEDURE — 93005 ELECTROCARDIOGRAM TRACING: CPT | Mod: HCNC

## 2018-12-24 PROCEDURE — 96366 THER/PROPH/DIAG IV INF ADDON: CPT | Mod: HCNC

## 2018-12-24 PROCEDURE — 11000001 HC ACUTE MED/SURG PRIVATE ROOM: Mod: HCNC

## 2018-12-24 PROCEDURE — 99291 CRITICAL CARE FIRST HOUR: CPT | Mod: 25,HCNC

## 2018-12-24 PROCEDURE — 36000 PLACE NEEDLE IN VEIN: CPT | Mod: HCNC

## 2018-12-24 PROCEDURE — 96365 THER/PROPH/DIAG IV INF INIT: CPT | Mod: HCNC

## 2018-12-25 PROBLEM — E87.8 ELECTROLYTE DISTURBANCE: Status: ACTIVE | Noted: 2018-12-25

## 2018-12-25 PROBLEM — A41.9 SEPSIS: Status: ACTIVE | Noted: 2018-12-25

## 2018-12-25 PROBLEM — N17.9 AKI (ACUTE KIDNEY INJURY): Status: ACTIVE | Noted: 2018-12-25

## 2018-12-25 PROBLEM — G93.40 ACUTE ENCEPHALOPATHY: Status: ACTIVE | Noted: 2018-12-25

## 2018-12-25 PROBLEM — D69.6 THROMBOCYTOPENIA: Chronic | Status: ACTIVE | Noted: 2018-12-25

## 2018-12-25 PROBLEM — G40.909 SEIZURE DISORDER: Chronic | Status: ACTIVE | Noted: 2018-12-25

## 2018-12-25 PROBLEM — R65.10 SIRS (SYSTEMIC INFLAMMATORY RESPONSE SYNDROME): Status: ACTIVE | Noted: 2018-12-25

## 2018-12-25 PROBLEM — M62.82 NON-TRAUMATIC RHABDOMYOLYSIS: Status: ACTIVE | Noted: 2018-12-25

## 2018-12-25 PROBLEM — E87.1 HYPONATREMIA: Status: ACTIVE | Noted: 2018-12-25

## 2018-12-25 PROBLEM — R57.9 SHOCK CIRCULATORY: Status: ACTIVE | Noted: 2018-12-25

## 2018-12-25 PROBLEM — R74.8 ELEVATED LIVER ENZYMES: Chronic | Status: ACTIVE | Noted: 2017-08-29

## 2018-12-25 PROBLEM — E87.20 METABOLIC ACIDOSIS: Status: ACTIVE | Noted: 2018-12-25

## 2018-12-25 LAB
ABO + RH BLD: NORMAL
ALBUMIN SERPL BCP-MCNC: 1.8 G/DL
ALBUMIN SERPL BCP-MCNC: 1.8 G/DL
ALBUMIN SERPL BCP-MCNC: 2.2 G/DL
ALBUMIN SERPL BCP-MCNC: 3.1 G/DL
ALLENS TEST: ABNORMAL
ALP SERPL-CCNC: 105 U/L
ALP SERPL-CCNC: 162 U/L
ALP SERPL-CCNC: 81 U/L
ALT SERPL W/O P-5'-P-CCNC: 110 U/L
ALT SERPL W/O P-5'-P-CCNC: 174 U/L
ALT SERPL W/O P-5'-P-CCNC: 84 U/L
AMORPH CRY URNS QL MICRO: NORMAL
ANION GAP SERPL CALC-SCNC: 16 MMOL/L
ANION GAP SERPL CALC-SCNC: 7 MMOL/L
ANION GAP SERPL CALC-SCNC: 9 MMOL/L
ANION GAP SERPL CALC-SCNC: 9 MMOL/L
APAP SERPL-MCNC: <3 UG/ML
APTT BLDCRRT: 27.4 SEC
AST SERPL-CCNC: 131 U/L
AST SERPL-CCNC: 137 U/L
AST SERPL-CCNC: 214 U/L
B-HCG UR QL: NEGATIVE
BACTERIA #/AREA URNS HPF: NORMAL /HPF
BASOPHILS # BLD AUTO: 0 K/UL
BASOPHILS # BLD AUTO: 0.01 K/UL
BASOPHILS NFR BLD: 0 %
BASOPHILS NFR BLD: 0.1 %
BILIRUB SERPL-MCNC: 0.5 MG/DL
BILIRUB SERPL-MCNC: 0.6 MG/DL
BILIRUB SERPL-MCNC: 0.8 MG/DL
BILIRUB UR QL STRIP: NEGATIVE
BLD GP AB SCN CELLS X3 SERPL QL: NORMAL
BNP SERPL-MCNC: 66 PG/ML
BUN SERPL-MCNC: 49 MG/DL
BUN SERPL-MCNC: 52 MG/DL
BUN SERPL-MCNC: 54 MG/DL
BUN SERPL-MCNC: 54 MG/DL
C DIFF GDH STL QL: NEGATIVE
C DIFF TOX A+B STL QL IA: NEGATIVE
CALCIUM SERPL-MCNC: 6.1 MG/DL
CALCIUM SERPL-MCNC: 6.6 MG/DL
CALCIUM SERPL-MCNC: 6.8 MG/DL
CALCIUM SERPL-MCNC: 8.6 MG/DL
CHLORIDE SERPL-SCNC: 100 MMOL/L
CHLORIDE SERPL-SCNC: 108 MMOL/L
CHLORIDE SERPL-SCNC: 108 MMOL/L
CHLORIDE SERPL-SCNC: 111 MMOL/L
CK SERPL-CCNC: 2172 U/L
CK SERPL-CCNC: 3608 U/L
CK SERPL-CCNC: 5672 U/L
CLARITY UR: ABNORMAL
CO2 SERPL-SCNC: 13 MMOL/L
CO2 SERPL-SCNC: 14 MMOL/L
CO2 SERPL-SCNC: 14 MMOL/L
CO2 SERPL-SCNC: 15 MMOL/L
COLOR UR: YELLOW
CORTIS SERPL-MCNC: 32.3 UG/DL
CREAT SERPL-MCNC: 3 MG/DL
CREAT SERPL-MCNC: 3.7 MG/DL
CREAT SERPL-MCNC: 4.4 MG/DL
CREAT SERPL-MCNC: 5.2 MG/DL
DELSYS: ABNORMAL
DIFFERENTIAL METHOD: ABNORMAL
DIFFERENTIAL METHOD: ABNORMAL
EOSINOPHIL # BLD AUTO: 0 K/UL
EOSINOPHIL # BLD AUTO: 0 K/UL
EOSINOPHIL NFR BLD: 0 %
EOSINOPHIL NFR BLD: 0.1 %
ERYTHROCYTE [DISTWIDTH] IN BLOOD BY AUTOMATED COUNT: 13.3 %
ERYTHROCYTE [DISTWIDTH] IN BLOOD BY AUTOMATED COUNT: 13.4 %
EST. GFR  (AFRICAN AMERICAN): 12 ML/MIN/1.73 M^2
EST. GFR  (AFRICAN AMERICAN): 14 ML/MIN/1.73 M^2
EST. GFR  (AFRICAN AMERICAN): 17 ML/MIN/1.73 M^2
EST. GFR  (AFRICAN AMERICAN): 23 ML/MIN/1.73 M^2
EST. GFR  (NON AFRICAN AMERICAN): 10 ML/MIN/1.73 M^2
EST. GFR  (NON AFRICAN AMERICAN): 12 ML/MIN/1.73 M^2
EST. GFR  (NON AFRICAN AMERICAN): 15 ML/MIN/1.73 M^2
EST. GFR  (NON AFRICAN AMERICAN): 20 ML/MIN/1.73 M^2
ETHANOL SERPL-MCNC: <10 MG/DL
FIO2: 21
FIO2: 21
GLUCOSE SERPL-MCNC: 109 MG/DL
GLUCOSE SERPL-MCNC: 112 MG/DL (ref 70–110)
GLUCOSE SERPL-MCNC: 113 MG/DL
GLUCOSE SERPL-MCNC: 117 MG/DL
GLUCOSE SERPL-MCNC: 128 MG/DL (ref 70–110)
GLUCOSE SERPL-MCNC: 153 MG/DL
GLUCOSE UR QL STRIP: NEGATIVE
HCO3 UR-SCNC: 12.7 MMOL/L (ref 24–28)
HCO3 UR-SCNC: 13.1 MMOL/L (ref 24–28)
HCO3 UR-SCNC: 16.4 MMOL/L (ref 24–28)
HCT VFR BLD AUTO: 27.9 %
HCT VFR BLD AUTO: 36.3 %
HCT VFR BLD CALC: 24 %PCV (ref 36–54)
HCT VFR BLD CALC: 25 %PCV (ref 36–54)
HGB BLD-MCNC: 12.1 G/DL
HGB BLD-MCNC: 9.4 G/DL
HGB UR QL STRIP: ABNORMAL
HYALINE CASTS #/AREA URNS LPF: 0 /LPF
INR PPP: 1.2
KETONES UR QL STRIP: ABNORMAL
LACTATE SERPL-SCNC: 1.3 MMOL/L
LACTATE SERPL-SCNC: 2 MMOL/L
LEUKOCYTE ESTERASE UR QL STRIP: NEGATIVE
LIPASE SERPL-CCNC: 23 U/L
LYMPHOCYTES # BLD AUTO: 0.7 K/UL
LYMPHOCYTES # BLD AUTO: 0.8 K/UL
LYMPHOCYTES NFR BLD: 7.4 %
LYMPHOCYTES NFR BLD: 9.5 %
MAGNESIUM SERPL-MCNC: 1.3 MG/DL
MAGNESIUM SERPL-MCNC: 1.7 MG/DL
MCH RBC QN AUTO: 31.2 PG
MCH RBC QN AUTO: 31.3 PG
MCHC RBC AUTO-ENTMCNC: 33.3 G/DL
MCHC RBC AUTO-ENTMCNC: 33.7 G/DL
MCV RBC AUTO: 93 FL
MCV RBC AUTO: 94 FL
MICROSCOPIC COMMENT: NORMAL
MODE: ABNORMAL
MODE: ABNORMAL
MONOCYTES # BLD AUTO: 0.1 K/UL
MONOCYTES # BLD AUTO: 0.3 K/UL
MONOCYTES NFR BLD: 0.8 %
MONOCYTES NFR BLD: 3.6 %
NEUTROPHILS # BLD AUTO: 7.4 K/UL
NEUTROPHILS # BLD AUTO: 8.1 K/UL
NEUTROPHILS NFR BLD: 86.7 %
NEUTROPHILS NFR BLD: 92.4 %
NITRITE UR QL STRIP: NEGATIVE
NON-SQ EPI CELLS #/AREA URNS HPF: 0 /HPF
PCO2 BLDA: 22.4 MMHG (ref 35–45)
PCO2 BLDA: 25.2 MMHG (ref 35–45)
PCO2 BLDA: 32.2 MMHG (ref 35–45)
PH SMN: 7.31 [PH] (ref 7.35–7.45)
PH SMN: 7.33 [PH] (ref 7.35–7.45)
PH SMN: 7.36 [PH] (ref 7.35–7.45)
PH UR STRIP: 5 [PH] (ref 5–8)
PHOSPHATE SERPL-MCNC: 1.1 MG/DL
PHOSPHATE SERPL-MCNC: 1.8 MG/DL
PHOSPHATE SERPL-MCNC: 2.9 MG/DL
PLATELET # BLD AUTO: 115 K/UL
PLATELET # BLD AUTO: 89 K/UL
PLATELET BLD QL SMEAR: ABNORMAL
PMV BLD AUTO: 10.5 FL
PMV BLD AUTO: 11.1 FL
PO2 BLDA: 14 MMHG (ref 40–60)
PO2 BLDA: 70 MMHG (ref 80–100)
PO2 BLDA: 82 MMHG (ref 80–100)
POC BE: -10 MMOL/L
POC BE: -13 MMOL/L
POC BE: -13 MMOL/L
POC IONIZED CALCIUM: 0.94 MMOL/L (ref 1.06–1.42)
POC IONIZED CALCIUM: 1.09 MMOL/L (ref 1.06–1.42)
POC SATURATED O2: 15 % (ref 95–100)
POC SATURATED O2: 94 % (ref 95–100)
POC SATURATED O2: 95 % (ref 95–100)
POTASSIUM BLD-SCNC: 2.8 MMOL/L (ref 3.5–5.1)
POTASSIUM BLD-SCNC: 3.1 MMOL/L (ref 3.5–5.1)
POTASSIUM SERPL-SCNC: 3 MMOL/L
POTASSIUM SERPL-SCNC: 3 MMOL/L
POTASSIUM SERPL-SCNC: 3.7 MMOL/L
POTASSIUM SERPL-SCNC: 5.2 MMOL/L
PROCALCITONIN SERPL IA-MCNC: 81.57 NG/ML
PROLACTIN SERPL IA-MCNC: 10.1 NG/ML
PROT SERPL-MCNC: 10.4 G/DL
PROT SERPL-MCNC: 5.8 G/DL
PROT SERPL-MCNC: 7.3 G/DL
PROT UR QL STRIP: ABNORMAL
PROTHROMBIN TIME: 12.5 SEC
RBC # BLD AUTO: 3.01 M/UL
RBC # BLD AUTO: 3.86 M/UL
RBC #/AREA URNS HPF: 0 /HPF (ref 0–4)
SALICYLATES SERPL-MCNC: <5 MG/DL
SAMPLE: ABNORMAL
SITE: ABNORMAL
SODIUM BLD-SCNC: 136 MMOL/L (ref 136–145)
SODIUM BLD-SCNC: 136 MMOL/L (ref 136–145)
SODIUM SERPL-SCNC: 130 MMOL/L
SODIUM SERPL-SCNC: 131 MMOL/L
SODIUM SERPL-SCNC: 131 MMOL/L
SODIUM SERPL-SCNC: 132 MMOL/L
SP GR UR STRIP: 1.02 (ref 1–1.03)
SQUAMOUS #/AREA URNS HPF: 9 /HPF
TROPONIN I SERPL DL<=0.01 NG/ML-MCNC: 0.03 NG/ML
URN SPEC COLLECT METH UR: ABNORMAL
UROBILINOGEN UR STRIP-ACNC: NEGATIVE EU/DL
WBC # BLD AUTO: 8.57 K/UL
WBC # BLD AUTO: 8.79 K/UL
WBC #/AREA STL HPF: NORMAL /[HPF]
WBC #/AREA URNS HPF: 1 /HPF (ref 0–5)

## 2018-12-25 PROCEDURE — 80320 DRUG SCREEN QUANTALCOHOLS: CPT | Mod: HCNC

## 2018-12-25 PROCEDURE — 87040 BLOOD CULTURE FOR BACTERIA: CPT | Mod: 59,HCNC

## 2018-12-25 PROCEDURE — 93010 ELECTROCARDIOGRAM REPORT: CPT | Mod: HCNC,,, | Performed by: INTERNAL MEDICINE

## 2018-12-25 PROCEDURE — 83605 ASSAY OF LACTIC ACID: CPT | Mod: HCNC

## 2018-12-25 PROCEDURE — 86901 BLOOD TYPING SEROLOGIC RH(D): CPT | Mod: HCNC

## 2018-12-25 PROCEDURE — 84295 ASSAY OF SERUM SODIUM: CPT | Mod: HCNC

## 2018-12-25 PROCEDURE — C1751 CATH, INF, PER/CENT/MIDLINE: HCPCS | Mod: HCNC

## 2018-12-25 PROCEDURE — 25000003 PHARM REV CODE 250: Mod: HCNC | Performed by: INTERNAL MEDICINE

## 2018-12-25 PROCEDURE — 51702 INSERT TEMP BLADDER CATH: CPT | Mod: HCNC

## 2018-12-25 PROCEDURE — 63600175 PHARM REV CODE 636 W HCPCS: Mod: HCNC | Performed by: NURSE PRACTITIONER

## 2018-12-25 PROCEDURE — 36569 PR INSERT PICC W/O SUB-Q PORT >5Y/O: ICD-10-PCS | Mod: HCNC,,, | Performed by: NURSE PRACTITIONER

## 2018-12-25 PROCEDURE — 25000003 PHARM REV CODE 250: Mod: HCNC | Performed by: NURSE PRACTITIONER

## 2018-12-25 PROCEDURE — 99291 CRITICAL CARE FIRST HOUR: CPT | Mod: 25,HCNC,, | Performed by: NURSE PRACTITIONER

## 2018-12-25 PROCEDURE — 82550 ASSAY OF CK (CPK): CPT | Mod: 91,HCNC

## 2018-12-25 PROCEDURE — 36569 INSJ PICC 5 YR+ W/O IMAGING: CPT | Mod: HCNC

## 2018-12-25 PROCEDURE — 25000003 PHARM REV CODE 250: Mod: HCNC | Performed by: FAMILY MEDICINE

## 2018-12-25 PROCEDURE — 80053 COMPREHEN METABOLIC PANEL: CPT | Mod: 91,HCNC

## 2018-12-25 PROCEDURE — 82085 ASSAY OF ALDOLASE: CPT | Mod: HCNC

## 2018-12-25 PROCEDURE — 83735 ASSAY OF MAGNESIUM: CPT | Mod: HCNC

## 2018-12-25 PROCEDURE — 84146 ASSAY OF PROLACTIN: CPT | Mod: HCNC

## 2018-12-25 PROCEDURE — 36569 INSJ PICC 5 YR+ W/O IMAGING: CPT | Mod: HCNC,,, | Performed by: NURSE PRACTITIONER

## 2018-12-25 PROCEDURE — 85014 HEMATOCRIT: CPT | Mod: HCNC

## 2018-12-25 PROCEDURE — 84100 ASSAY OF PHOSPHORUS: CPT | Mod: 91,HCNC

## 2018-12-25 PROCEDURE — 83690 ASSAY OF LIPASE: CPT | Mod: HCNC

## 2018-12-25 PROCEDURE — 80053 COMPREHEN METABOLIC PANEL: CPT | Mod: HCNC

## 2018-12-25 PROCEDURE — 87046 STOOL CULTR AEROBIC BACT EA: CPT | Mod: 59,HCNC

## 2018-12-25 PROCEDURE — 63600175 PHARM REV CODE 636 W HCPCS: Mod: HCNC | Performed by: INTERNAL MEDICINE

## 2018-12-25 PROCEDURE — 99223 1ST HOSP IP/OBS HIGH 75: CPT | Mod: HCNC,,, | Performed by: INTERNAL MEDICINE

## 2018-12-25 PROCEDURE — 80307 DRUG TEST PRSMV CHEM ANLYZR: CPT | Mod: HCNC

## 2018-12-25 PROCEDURE — 27000221 HC OXYGEN, UP TO 24 HOURS: Mod: HCNC

## 2018-12-25 PROCEDURE — 36620 INSERTION CATHETER ARTERY: CPT | Mod: HCNC

## 2018-12-25 PROCEDURE — 84132 ASSAY OF SERUM POTASSIUM: CPT | Mod: HCNC

## 2018-12-25 PROCEDURE — S0028 INJECTION, FAMOTIDINE, 20 MG: HCPCS | Mod: HCNC | Performed by: FAMILY MEDICINE

## 2018-12-25 PROCEDURE — 20000000 HC ICU ROOM: Mod: HCNC

## 2018-12-25 PROCEDURE — 82550 ASSAY OF CK (CPK): CPT | Mod: HCNC

## 2018-12-25 PROCEDURE — 87536 HIV-1 QUANT&REVRSE TRNSCRPJ: CPT | Mod: HCNC

## 2018-12-25 PROCEDURE — 87427 SHIGA-LIKE TOXIN AG IA: CPT | Mod: 59,HCNC

## 2018-12-25 PROCEDURE — 85730 THROMBOPLASTIN TIME PARTIAL: CPT | Mod: HCNC

## 2018-12-25 PROCEDURE — 63600175 PHARM REV CODE 636 W HCPCS: Mod: HCNC | Performed by: FAMILY MEDICINE

## 2018-12-25 PROCEDURE — 89055 LEUKOCYTE ASSESSMENT FECAL: CPT | Mod: HCNC

## 2018-12-25 PROCEDURE — 99292 PR CRITICAL CARE, ADDL 30 MIN: ICD-10-PCS | Mod: 25,HCNC,, | Performed by: NURSE PRACTITIONER

## 2018-12-25 PROCEDURE — 84100 ASSAY OF PHOSPHORUS: CPT | Mod: HCNC

## 2018-12-25 PROCEDURE — 36620 ARTERIAL LINE: ICD-10-PCS | Mod: 59,HCNC,, | Performed by: NURSE PRACTITIONER

## 2018-12-25 PROCEDURE — 37799 UNLISTED PX VASCULAR SURGERY: CPT | Mod: HCNC

## 2018-12-25 PROCEDURE — 36620 INSERTION CATHETER ARTERY: CPT | Mod: 59,HCNC,, | Performed by: NURSE PRACTITIONER

## 2018-12-25 PROCEDURE — 99292 CRITICAL CARE ADDL 30 MIN: CPT | Mod: 25,HCNC,, | Performed by: NURSE PRACTITIONER

## 2018-12-25 PROCEDURE — 84145 PROCALCITONIN (PCT): CPT | Mod: HCNC

## 2018-12-25 PROCEDURE — 87209 SMEAR COMPLEX STAIN: CPT | Mod: HCNC

## 2018-12-25 PROCEDURE — 87329 GIARDIA AG IA: CPT | Mod: HCNC

## 2018-12-25 PROCEDURE — 93010 EKG 12-LEAD: ICD-10-PCS | Mod: HCNC,,, | Performed by: INTERNAL MEDICINE

## 2018-12-25 PROCEDURE — 85025 COMPLETE CBC W/AUTO DIFF WBC: CPT | Mod: 91,HCNC

## 2018-12-25 PROCEDURE — 87449 NOS EACH ORGANISM AG IA: CPT | Mod: HCNC

## 2018-12-25 PROCEDURE — 81025 URINE PREGNANCY TEST: CPT | Mod: HCNC

## 2018-12-25 PROCEDURE — 83735 ASSAY OF MAGNESIUM: CPT | Mod: 91,HCNC

## 2018-12-25 PROCEDURE — 99900035 HC TECH TIME PER 15 MIN (STAT): Mod: HCNC

## 2018-12-25 PROCEDURE — 84484 ASSAY OF TROPONIN QUANT: CPT | Mod: HCNC

## 2018-12-25 PROCEDURE — 99223 PR INITIAL HOSPITAL CARE,LEVL III: ICD-10-PCS | Mod: HCNC,,, | Performed by: INTERNAL MEDICINE

## 2018-12-25 PROCEDURE — 82330 ASSAY OF CALCIUM: CPT | Mod: HCNC

## 2018-12-25 PROCEDURE — 99291 PR CRITICAL CARE, E/M 30-74 MINUTES: ICD-10-PCS | Mod: 25,HCNC,, | Performed by: NURSE PRACTITIONER

## 2018-12-25 PROCEDURE — 82533 TOTAL CORTISOL: CPT | Mod: HCNC

## 2018-12-25 PROCEDURE — 87045 FECES CULTURE AEROBIC BACT: CPT | Mod: HCNC

## 2018-12-25 PROCEDURE — 82803 BLOOD GASES ANY COMBINATION: CPT | Mod: HCNC

## 2018-12-25 PROCEDURE — 81000 URINALYSIS NONAUTO W/SCOPE: CPT | Mod: HCNC

## 2018-12-25 PROCEDURE — 85610 PROTHROMBIN TIME: CPT | Mod: HCNC

## 2018-12-25 PROCEDURE — 86361 T CELL ABSOLUTE COUNT: CPT | Mod: HCNC

## 2018-12-25 PROCEDURE — 80074 ACUTE HEPATITIS PANEL: CPT | Mod: HCNC

## 2018-12-25 PROCEDURE — 83880 ASSAY OF NATRIURETIC PEPTIDE: CPT | Mod: HCNC

## 2018-12-25 PROCEDURE — 80329 ANALGESICS NON-OPIOID 1 OR 2: CPT | Mod: HCNC

## 2018-12-25 PROCEDURE — 80069 RENAL FUNCTION PANEL: CPT | Mod: HCNC

## 2018-12-25 PROCEDURE — 36415 COLL VENOUS BLD VENIPUNCTURE: CPT | Mod: HCNC

## 2018-12-25 RX ORDER — NOREPINEPHRINE BITARTRATE/D5W 4MG/250ML
0.02 PLASTIC BAG, INJECTION (ML) INTRAVENOUS CONTINUOUS
Status: DISCONTINUED | OUTPATIENT
Start: 2018-12-25 | End: 2018-12-26

## 2018-12-25 RX ORDER — FAMOTIDINE 20 MG/50ML
20 INJECTION, SOLUTION INTRAVENOUS DAILY
Status: DISCONTINUED | OUTPATIENT
Start: 2018-12-25 | End: 2018-12-25

## 2018-12-25 RX ORDER — LEVETIRACETAM 5 MG/ML
500 INJECTION INTRAVASCULAR EVERY 12 HOURS
Status: DISCONTINUED | OUTPATIENT
Start: 2018-12-25 | End: 2018-12-27

## 2018-12-25 RX ORDER — NOREPINEPHRINE BITARTRATE/D5W 4MG/250ML
0.02 PLASTIC BAG, INJECTION (ML) INTRAVENOUS CONTINUOUS
Status: DISCONTINUED | OUTPATIENT
Start: 2018-12-25 | End: 2018-12-25

## 2018-12-25 RX ORDER — LEVALBUTEROL INHALATION SOLUTION 0.63 MG/3ML
0.63 SOLUTION RESPIRATORY (INHALATION) EVERY 6 HOURS PRN
Status: DISCONTINUED | OUTPATIENT
Start: 2018-12-25 | End: 2018-12-31 | Stop reason: HOSPADM

## 2018-12-25 RX ORDER — SODIUM CHLORIDE 9 MG/ML
INJECTION, SOLUTION INTRAVENOUS CONTINUOUS
Status: DISCONTINUED | OUTPATIENT
Start: 2018-12-25 | End: 2018-12-25

## 2018-12-25 RX ORDER — ACETAMINOPHEN 650 MG/1
650 SUPPOSITORY RECTAL EVERY 8 HOURS PRN
Status: DISCONTINUED | OUTPATIENT
Start: 2018-12-25 | End: 2018-12-31 | Stop reason: HOSPADM

## 2018-12-25 RX ORDER — LIDOCAINE HYDROCHLORIDE 10 MG/ML
2 INJECTION, SOLUTION EPIDURAL; INFILTRATION; INTRACAUDAL; PERINEURAL ONCE
Status: COMPLETED | OUTPATIENT
Start: 2018-12-25 | End: 2018-12-25

## 2018-12-25 RX ORDER — POTASSIUM CHLORIDE 29.8 MG/ML
40 INJECTION INTRAVENOUS ONCE
Status: COMPLETED | OUTPATIENT
Start: 2018-12-25 | End: 2018-12-25

## 2018-12-25 RX ORDER — CALCIUM CHLORIDE IN 0.9 % NACL 1 G/100 ML
1 INTRAVENOUS SOLUTION, PIGGYBACK (ML) INTRAVENOUS ONCE
Status: COMPLETED | OUTPATIENT
Start: 2018-12-25 | End: 2018-12-25

## 2018-12-25 RX ORDER — FAMOTIDINE 10 MG/ML
20 INJECTION INTRAVENOUS DAILY
Status: DISCONTINUED | OUTPATIENT
Start: 2018-12-25 | End: 2018-12-27

## 2018-12-25 RX ORDER — MAGNESIUM SULFATE HEPTAHYDRATE 40 MG/ML
4 INJECTION, SOLUTION INTRAVENOUS ONCE
Status: COMPLETED | OUTPATIENT
Start: 2018-12-25 | End: 2018-12-25

## 2018-12-25 RX ORDER — VANCOMYCIN HCL IN 5 % DEXTROSE 1G/250ML
1000 PLASTIC BAG, INJECTION (ML) INTRAVENOUS
Status: DISCONTINUED | OUTPATIENT
Start: 2018-12-30 | End: 2018-12-28

## 2018-12-25 RX ORDER — POTASSIUM CHLORIDE 7.45 MG/ML
20 INJECTION INTRAVENOUS ONCE
Status: COMPLETED | OUTPATIENT
Start: 2018-12-25 | End: 2018-12-25

## 2018-12-25 RX ORDER — CALCIUM GLUCONATE 98 MG/ML
1 INJECTION, SOLUTION INTRAVENOUS ONCE
Status: DISCONTINUED | OUTPATIENT
Start: 2018-12-25 | End: 2018-12-25 | Stop reason: RX

## 2018-12-25 RX ORDER — ONDANSETRON 2 MG/ML
4 INJECTION INTRAMUSCULAR; INTRAVENOUS EVERY 6 HOURS PRN
Status: DISCONTINUED | OUTPATIENT
Start: 2018-12-25 | End: 2018-12-31 | Stop reason: HOSPADM

## 2018-12-25 RX ORDER — POTASSIUM CHLORIDE 20 MEQ/1
20 TABLET, EXTENDED RELEASE ORAL 3 TIMES DAILY
Status: DISCONTINUED | OUTPATIENT
Start: 2018-12-25 | End: 2018-12-26

## 2018-12-25 RX ORDER — CEFEPIME HYDROCHLORIDE 2 G/50ML
2 INJECTION, SOLUTION INTRAVENOUS
Status: COMPLETED | OUTPATIENT
Start: 2018-12-25 | End: 2018-12-25

## 2018-12-25 RX ORDER — HEPARIN SODIUM 5000 [USP'U]/ML
5000 INJECTION, SOLUTION INTRAVENOUS; SUBCUTANEOUS EVERY 8 HOURS
Status: DISCONTINUED | OUTPATIENT
Start: 2018-12-25 | End: 2018-12-26

## 2018-12-25 RX ORDER — ACETAMINOPHEN 325 MG/1
650 TABLET ORAL
Status: COMPLETED | OUTPATIENT
Start: 2018-12-25 | End: 2018-12-25

## 2018-12-25 RX ADMIN — POTASSIUM PHOSPHATE, MONOBASIC AND POTASSIUM PHOSPHATE, DIBASIC 30 MMOL: 224; 236 INJECTION, SOLUTION, CONCENTRATE INTRAVENOUS at 03:12

## 2018-12-25 RX ADMIN — CEFEPIME HYDROCHLORIDE 2 G: 2 INJECTION, SOLUTION INTRAVENOUS at 01:12

## 2018-12-25 RX ADMIN — ACETAMINOPHEN 650 MG: 325 TABLET ORAL at 04:12

## 2018-12-25 RX ADMIN — CALCIUM GLUCONATE 1 G: 94 INJECTION, SOLUTION INTRAVENOUS at 08:12

## 2018-12-25 RX ADMIN — Medication 0.02 MCG/KG/MIN: at 08:12

## 2018-12-25 RX ADMIN — LIDOCAINE HYDROCHLORIDE 20 MG: 10 INJECTION, SOLUTION EPIDURAL; INFILTRATION; INTRACAUDAL; PERINEURAL at 08:12

## 2018-12-25 RX ADMIN — SODIUM BICARBONATE: 84 INJECTION, SOLUTION INTRAVENOUS at 12:12

## 2018-12-25 RX ADMIN — SODIUM CHLORIDE 1770 ML: 0.9 INJECTION, SOLUTION INTRAVENOUS at 12:12

## 2018-12-25 RX ADMIN — POTASSIUM CHLORIDE 40 MEQ: 29.8 INJECTION, SOLUTION INTRAVENOUS at 11:12

## 2018-12-25 RX ADMIN — POTASSIUM CHLORIDE 20 MEQ: 1500 TABLET, EXTENDED RELEASE ORAL at 08:12

## 2018-12-25 RX ADMIN — HEPARIN SODIUM 5000 UNITS: 5000 INJECTION, SOLUTION INTRAVENOUS; SUBCUTANEOUS at 11:12

## 2018-12-25 RX ADMIN — LEVETIRACETAM INJECTION 500 MG: 5 INJECTION INTRAVENOUS at 10:12

## 2018-12-25 RX ADMIN — SODIUM CHLORIDE, SODIUM LACTATE, POTASSIUM CHLORIDE, AND CALCIUM CHLORIDE 1000 ML: .6; .31; .03; .02 INJECTION, SOLUTION INTRAVENOUS at 10:12

## 2018-12-25 RX ADMIN — VANCOMYCIN HYDROCHLORIDE 1250 MG: 10 INJECTION, POWDER, LYOPHILIZED, FOR SOLUTION INTRAVENOUS at 01:12

## 2018-12-25 RX ADMIN — ACETAMINOPHEN 650 MG: 650 SUPPOSITORY RECTAL at 01:12

## 2018-12-25 RX ADMIN — POTASSIUM CHLORIDE 20 MEQ: 7.46 INJECTION, SOLUTION INTRAVENOUS at 05:12

## 2018-12-25 RX ADMIN — FAMOTIDINE 20 MG: 10 INJECTION INTRAVENOUS at 10:12

## 2018-12-25 RX ADMIN — SODIUM CHLORIDE, SODIUM LACTATE, POTASSIUM CHLORIDE, AND CALCIUM CHLORIDE 1000 ML: .6; .31; .03; .02 INJECTION, SOLUTION INTRAVENOUS at 05:12

## 2018-12-25 RX ADMIN — LEVETIRACETAM INJECTION 500 MG: 5 INJECTION INTRAVENOUS at 08:12

## 2018-12-25 RX ADMIN — SODIUM BICARBONATE: 84 INJECTION, SOLUTION INTRAVENOUS at 08:12

## 2018-12-25 RX ADMIN — SODIUM CHLORIDE 1000 ML: 0.9 INJECTION, SOLUTION INTRAVENOUS at 07:12

## 2018-12-25 RX ADMIN — MAGNESIUM SULFATE HEPTAHYDRATE 4 G: 40 INJECTION, SOLUTION INTRAVENOUS at 11:12

## 2018-12-25 RX ADMIN — CALCIUM CHLORIDE 1 G: 100 INJECTION, SOLUTION INTRAVENOUS at 11:12

## 2018-12-25 RX ADMIN — ACETAMINOPHEN 650 MG: 650 SUPPOSITORY RECTAL at 09:12

## 2018-12-25 RX ADMIN — SODIUM CHLORIDE, SODIUM LACTATE, POTASSIUM CHLORIDE, AND CALCIUM CHLORIDE 1000 ML: .6; .31; .03; .02 INJECTION, SOLUTION INTRAVENOUS at 08:12

## 2018-12-25 RX ADMIN — CEFTRIAXONE 1 G: 1 INJECTION, SOLUTION INTRAVENOUS at 08:12

## 2018-12-25 RX ADMIN — ACETAMINOPHEN 650 MG: 650 SUPPOSITORY RECTAL at 04:12

## 2018-12-25 NOTE — ASSESSMENT & PLAN NOTE
- On HAART, unknown if compliant at present.  Patient with long-standing h/o noncompliance.  - Will check CD4, viral load.  - Hold HAART for now.  Will consult ID for recs.

## 2018-12-25 NOTE — ED PROVIDER NOTES
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 the vital signs and test results, which are outlined above.     ED Discussion     12:39 AM: Re-evaluated pt. Pt is resting comfortably and is in no acute distress. Boyfriend at bedside. Patient reports being 6 weeks pregnant.    1:10 AM: Re-evaluated pt. Pt is resting comfortably and is in no acute distress. Patient's BP and HR are improving.     1:45 AM: Re-evaluated pt. Pt is resting comfortably and is in no acute distress. Patient has no IV access, will place EJ.    2:05 AM: Patient's UPT is negative.    2:20 AM: Dr. Meneses discussed the pt's case with Nataliia Medina NP (Hospital Medicine) who recommends interrogate ICD.    3:30 AM: Discussed case with Dr. Cabello ([facility]) who agrees with current care and management of pt and accepts admission.   Admitting Service: Hospital medicine   Admitting Physician: Dr. Cabello  Admit to: ICU    3:42 AM: Re-evaluated pt. I have discussed test results, shared treatment plan, and the need for admission with patient and family at bedside. Pt and family express understanding at this time and agree with all information. All questions answered. Pt and family have no further questions or concerns at this time. Pt is ready for admit.      ED Medication(s):  Medications   acetaminophen suppository 650 mg (650 mg Rectal Given 12/25/18 2145)   ondansetron injection 4 mg (not administered)   heparin (porcine) injection 5,000  Units (5,000 Units Subcutaneous Given 12/25/18 2300)   lorazepam (ATIVAN) injection 2 mg (not administered)   levalbuterol nebulizer solution 0.63 mg (not administered)   famotidine (PF) injection 20 mg (20 mg Intravenous Given 12/25/18 1040)   vancomycin 1gm/D5W 250ml PLACEHOLDERE DOSE (1,000 mg Intravenous Random Level Due 12/26/18 0430)   cefTRIAXone (ROCEPHIN) 1 g in dextrose 5 % 50 mL IVPB (1 g Intravenous New Bag 12/25/18 0835)   levETIRAcetam in NaCl (iso-os) IVPB 500 mg (500 mg Intravenous New Bag 12/25/18 2033)   norepinephrine 4 mg in dextrose 5% 250 mL infusion (premix) (titrating) (0 mcg/kg/min × 59 kg Intravenous Stopped 12/25/18 2000)   sodium chloride 0.45% 1,000 mL with sodium bicarbonate 1 mEq/mL (8.4 %) 75 mEq infusion ( Intravenous Verify Only 12/26/18 0200)   potassium chloride SA CR tablet 20 mEq (20 mEq Oral Given 12/25/18 2042)   sodium chloride 0.9% bolus 1,770 mL (0 mL/kg × 59 kg Intravenous Stopped 12/25/18 0331)   ceFEPIme in dextrose 5% 2 gram/50 mL IVPB 2 g (0 g Intravenous Stopped 12/25/18 0310)   vancomycin (VANCOCIN) 1,250 mg in dextrose 5 % 250 mL IVPB (0 mg/kg × 59 kg Intravenous Stopped 12/25/18 0310)   potassium phosphate 30 mmol in dextrose 5 % 500 mL infusion (30 mmol Intravenous New Bag 12/25/18 0313)   acetaminophen tablet 650 mg (650 mg Oral Given 12/25/18 0409)   sodium chloride 0.9% bolus 1,000 mL (1,000 mLs Intravenous New Bag 12/25/18 0730)   lidocaine (PF) 10 mg/ml (1%) injection 20 mg (20 mg Other Given by Other 12/25/18 0830)   lactated ringers bolus 1,000 mL (1,000 mLs Intravenous New Bag 12/25/18 0850)   lactated ringers bolus 1,000 mL (1,000 mLs Intravenous New Bag 12/25/18 1029)   potassium chloride 40 mEq in 100 mL IVPB (FOR CENTRAL LINE ADMINISTRATION ONLY) (40 mEq Intravenous New Bag 12/25/18 1154)   magnesium sulfate 2g in water 50mL IVPB (premix) (4 g Intravenous New Bag 12/25/18 1154)   calcium chloride 1g in sodium chloride 0.9% 100mL (ready to mix system)  (1 g Intravenous Given 12/25/18 1154)   potassium chloride 10 mEq in 100 mL IVPB (20 mEq Intravenous New Bag 12/25/18 1731)   lactated ringers bolus 1,000 mL (1,000 mLs Intravenous New Bag 12/25/18 1732)   calcium gluconate 1g in dextrose 5% 100mL (ready to mix system) (1 g Intravenous New Bag 12/25/18 2042)   potassium chloride 40 mEq in 100 mL IVPB (FOR CENTRAL LINE ADMINISTRATION ONLY) (40 mEq Intravenous New Bag 12/26/18 0051)                  Medical Decision Making     Medical Decision Making:   Clinical Tests:   Lab Tests: Ordered and Reviewed  Radiological Study: Ordered and Reviewed  Medical Tests: Ordered and Reviewed  ED Management:  This is a 33-year-old  female who presented to the emergency department via EMS due to altered mental status and hypotension.  The patient was found by family member at home sitting in her own feces and patient was not alert or oriented. It is reported that patient had URI type symptoms for the past week.  Patient was started on fluids per EMS as her blood pressure was around 50 systolic and tachycardic in the 170s.  In the emergency department patient was started on a septic workup.  She had responded to fluids and eventually normalized her blood pressure.  Broad-spectrum antibiotics were started.  As per my workup patient did not meet certain criteria for sepsis and the source at this time seemed more to be of GI etiology.  However the differential in this clinical scenario is broad.  Patient was stable for admission to the ICU.              Scribe Attestation:   Scribe #1: I performed the above scribed service and the documentation accurately describes the services I performed. I attest to the accuracy of the note.     Attending:   Physician Attestation Statement for Scribe #1: I, Genet Meneses MD, personally performed the services described in this documentation, as scribed by Vidya Day, in my presence, and it is both accurate and complete.            Clinical Impression       ICD-10-CM ICD-9-CM   1. Circulatory Shock R57.9 785.50   2. Altered auditory perception H93.299 388.40   3. Sepsis A41.9 038.9     995.91   4. Acute renal failure, unspecified acute renal failure type N17.9 584.9   5. Hypotension, unspecified hypotension type I95.9 458.9   6. AIDS (acquired immunodeficiency syndrome), CD4 <=200 B20 042   7. LELAND (acute kidney injury) N17.9 584.9   8. Electrolyte disturbance E87.8 276.9   9. Non-traumatic rhabdomyolysis M62.82 728.88   10. Seizure disorder G40.909 345.90       Disposition:   Disposition: Admitted (ICU)  Condition: Serious         Genet Meneses MD  12/26/18 2271

## 2018-12-25 NOTE — ASSESSMENT & PLAN NOTE
- No clear source of infection identified.  CXR and UA unremarkable.  WBC 8.79.  Patient became febrile in ED (TMax 103.3).  - Blood, urine, and stool cultures pending.  - Will get STAT CT of ABD/Pelvis to r/o intraabdominal etiology.  - Empiric IV vanc and Rocephin.  Pharmacy consult for vanc dosing.  - Continue IV hydration.  - Patient had similar presentation on previous admission in 7/2017.  After an extensive workup AIDS itself was suspected to be the source, HAART was initiated and patient responded well.    - Will consult ID for assistance.

## 2018-12-25 NOTE — ASSESSMENT & PLAN NOTE
Initial labs indicate severe dehydration; also with markers for sepsis x no lactate elevation  Continue aggressive IV hydration  Trend BUN/Creatinine  Renally dose medications  No indication for acute RRT

## 2018-12-25 NOTE — NURSING
0450 report received from Bret MENDES in ER, nurse stated pt has , I questioned treatment for elevated HR and he could not answer question. Could not report uop or proper pt history., Pt received into icu at 0500 with temp 103.3, lfa22g sl iv in place and rej 18g sl in lplace, no blood return from either, right ej flushes with difficulty, left fa flushes well, k phos connected to left fa, pt with brief in place, pt unable to answer anything other than her name and skyler correctly, unable to verbalize she is in the hospital. 0515 eicu md contacted for orders regarding navarrete, bp and hr - no orders received. 0530 Pt boyfriend at bs requesting info regarding pt upt results - [facility] [place] to update him, 0545 Pt bp continues to decrease, temp 102.8, no orders received from [facility] or eicu, both teams again contacted. 0600 requested MD at BS for eval of pt, still with no orders for bp, hr, ivf orders for ns at 125 received, requested tlc, eicu called with orders for renal consult and renal us, 0640 Dr. Cabello at bs to eval pt, awaiting ER to insert TLC, pt more alert at this time, pt able to answer more questions and give more infor regarding pt history. 0650 ER MD at bs attempting tlc insertion.

## 2018-12-25 NOTE — HPI
Wang Kebede is a 33 y.o.  AA woman  with history of HIV/AIDS, vulvar cancer, anemia, sickle cell disease, cervical cancer, chronic abdominal pain was admitted to hospital yesterday   for fever , malaise, blood pressure was significantly low on presentation, cultures were ordered and pending, she started on broad-spectrum antibiotics, about 2 days ago she was seen in the emergency room with similar complaints, she was diagnosed with bronchitis and was discharged home on oral antibiotics, her serum creatinine on presentation  was about 5 mg/dL yesterday, creatinine was normal about 4 months ago, patient had episodes of acute kidney injury in the past with similar presentations, improved with IV fluids,

## 2018-12-25 NOTE — HOSPITAL COURSE
12/25 - Admitted to ICU after BP responded to IVF bolus in ED; after arrival BP falling, required placement of CL for pressor; remains AAOx3 without helpful insight to presenting problem, only reports malaise, myalgias, diarrhea  12/26 - weaned off pressor after aggressive IVF resuscitation and HR trend down, now 110-120, creatinine trends down, CPK continues rising, now 62443; afebrile since 11pm; complains of increased generalized weakness today  12/27 - Shock and Encephalopathy resolved.  Awake, alert and responsive upright in chair this AM still weak but VSS.

## 2018-12-25 NOTE — HOSPITAL COURSE
Admitted to ICU for evaluation and treatment acute encephalopathy and circulatory shock presumably from sepsis.  She is also at risk for opportunistic infections due to AIDS.  Serial lactic acid measurements normal but procalcitonin highly elevated at 80 with hypotension.  Empirically started Vancomycin and Ceftriaxone.  She also has a history of seizure and medication non-compliance and may have had a seizure.  Intermittent fevers up to 104 which responded to [place] Acetaminophen.  Procalcitonin decreased to 28 but CPK is trending up as high as 14,000.  Successfully weaned Levophed.  Remains lethargic some of the time but alert and oriented intermittently.  CPK continues to rise to 29,000 with renal function and urine output improving.  Remained hemodynamically stable and afebrile.  Transferred to telemetry.  Infectious Disease following.  12/29/18 Pt was seen and examined at bedside . The kidney function  Is back to normal . The Pt was informed  That her boyfriend need to be informed  About her HIV status  . She states  Will let him know today . She was informed  The Infectious disease department  will informed the appropriate state department   To follow his boyfriend . 12/30/18 - Advised need for informing boyfriend of HIV status. Pt verbalized understanding. She is ambulatory and states she was ready for discharge. Diflucan started for vaginal yeast. CPK trending down. Rocephin in progress. 12/31/18 - CPK has continued to trend down. Pt was seen and examined and determined to be safe and stable for discharge. She was prescribed Levaquin and fluconazole. Pt was advised to call Dr. Mayo for follow up appointment in 2 weeks.

## 2018-12-25 NOTE — HPI
"Unable to obtain history from patient due to mental status and clinical condition.  HPI obtained from boyfriend at bedside, ER records, and past medical record.  Ms. 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  He reports finding the patient covered in her own feces and confused.  He reports "She is really out of it and I don't know what's going on".  Associated generalized myalgias, cough, N/V/D for the past 1 week.  She was seen in ED on 12/23 with c/o myalgias and N/V/D, and was diagnosed with acute bronchitis.  She was sent home on Ceftin and brompheniramine-pseudoeph-DM.  In ED, patient oriented to person, but confused and unable to tell the date, where she is, or what occurred this afternoon.  She has a h/o seizures, and has been unable to keep any of her medications down for the past 2-3 days.  Work-up in ED resulted WBC 8.57, plt 115, Na 131, bicarb 15, BUN 54, cr. 5.2, , , lipase 23, CPK 2172, lactic 2, procalcitonin 81.6.  VBG with pH 7.314.  UA negative for infectious process.  CXR unremarkable.  CT head negative for acute process.  Upon arrival to ED, patient significantly hypotensive with BP 55/32, , Temp 98.2.  IV fluid resuscitation given with adequate response in BP (111/62).  Blood/urine cultures drawn, and empiric IV abx given.  Hospital Medicine was called for admission.  Patient remains confused, hemodynamically stable.  "

## 2018-12-25 NOTE — ASSESSMENT & PLAN NOTE
- Possibly secondary to above +/- questionable seizure.  - CT head negative for acute process.  - Neuro checks.  - Fall, aspiration, and seizure precautions.

## 2018-12-25 NOTE — ASSESSMENT & PLAN NOTE
- Possibly shock liver secondary to above +/- HAART therapy.  - Avoid nephrotoxic agents.  - Follow serial labs.  Will complete further w/u if no improvement in LFTs with above interventions.

## 2018-12-25 NOTE — CONSULTS
Wang Kebede [id number] is a 33 y.o. female who has been consulted for vancomycin dosing.  Dx: oppurtunistic infection prophylaxis / goal trough 10-15  The patient has the following labs:     Date Creatinine (mg/dl)    BUN WBC Count   12/25/2018 Estimated Creatinine Clearance: 13.2 mL/min (A) (based on SCr of 5.2 mg/dL (H)). Lab Results   Component Value Date    BUN 54 (H) 12/25/2018     Lab Results   Component Value Date    WBC 8.57 12/25/2018      which calculates to an Estimated Creatinine Clearance: 13.2 mL/min (A) (based on SCr of 5.2 mg/dL (H))..       Current weight is 59 kg (130 lb 1.1 oz)  Initial dose in ER of 1500mg  The patient will be started on vancomycin at a dose of 1000 mg pulse dosed. Patient will be followed by pharmacy for changes in renal function, toxicity, and efficacy.  The vancomycin random has been ordered with am labs at 04:30     Thank you for allowing us to participate in this patient's care.     Kleber Ornelas

## 2018-12-25 NOTE — ASSESSMENT & PLAN NOTE
Possibly secondary to sepsis vs. IRIS.  Admit to ICU.  BP initially responded well to IV fluid resuscitation in ED.  Currently, BP progressively decreasing despite further IVF bolus.  Will place central line and start pressors to keep MAP >60.  Supportive care.

## 2018-12-25 NOTE — ASSESSMENT & PLAN NOTE
On vimpat  Was alone then found altered and having had incontinence  Elevated CPK, Cannot rule out seizure with AMS being post ictal  Continue vimpat  Check prolactin

## 2018-12-25 NOTE — ASSESSMENT & PLAN NOTE
- Possibly secondary to sepsis vs. IRIS.  Admit to ICU.  - BP initially responded well to IV fluid resuscitation in ED.  Currently, BP progressively decreasing despite further IVF bolus.  Will place central line and start pressors to keep MAP >60.  - Supportive care.

## 2018-12-25 NOTE — ASSESSMENT & PLAN NOTE
Very high suspicion for sepsis but lactate normal and no clear source  with unreliable historian, extensive differential, and non specific markers + symptoms we will provide broad spectrum coverage and aggressive supportive care while exploring for source vs alternative diagnoses  Check stool culture, wbc, c diff; check prolactin; trend lactate, wbc, procalcitonin; follow temp and culture data  Continue aggressive hydration

## 2018-12-25 NOTE — HPI
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
## 2018-12-25 NOTE — ED NOTES
In bed resting,aaox2 patient reoriented at this time,skin warm dry to touch patient appearing flushed in face,equal bilateral chest rise and fall, RR at 24,side rails up x 2,bed locked and in low position,C-monitor on and recording 145,instructed to call with any needs.

## 2018-12-25 NOTE — ASSESSMENT & PLAN NOTE
1. LELAND :  Acute kidney injury likely a combination of dehydration from diarrhea, low blood pressure.  Will monitor urine output, no urgent indication for hemodialysis, continue IV fluids, pressor support,     Mild elevation in CK total noted, likely not the cause of acute kidney injury,    2.  Hypotension - blood pressure improved with IV fluids and pressors,    3.  Chronic diarrhea - replete lytes and fluids    4.  Metabolic acidosis - replete bicarb,    5.  Replete potassium and phosphorus , hyponatremia due to acute kidney injury,    6. HIV / AIDS - per primary team     7. ? Sepsis - on broad-spectrum antibiotics, cultures ordered and pending,

## 2018-12-25 NOTE — ED NOTES
In bed resting,aaox2,skin warm dry to touch,equal bilateral chest rise and fall,side rails up x 2,bed locked and in low position,C-monitor on and recording 160 ,instructed to call with any needs.
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
## 2018-12-25 NOTE — PROCEDURES
"Wang Kebede is a 33 y.o. female patient.    Temp: (!) 103.3 °F (39.6 °C) (12/25/18 0455)  Pulse: (!) 140 (12/25/18 0723)  Resp: (!) 37 (12/25/18 0723)  BP: (!) 62/24 (12/25/18 0600)  SpO2: 100 % (12/25/18 0723)  Weight: 59 kg (130 lb 1.1 oz) (12/24/18 5537)  Height: 5' 4" (162.6 cm) (12/24/18 2357)       Arterial Line  Date/Time: 12/25/2018 10:24 AM  Location procedure was performed: [facility] INTENSIVE CARE UNIT  Performed by: Kylie To NP  Authorized by: Kylie To NP   Pre-op Diagnosis: shock  Post-operative diagnosis: shock  Consent Done: Not Needed  Preparation: Patient was prepped and draped in the usual sterile fashion.  Indications: hemodynamic monitoring  Location: right radial  Anesthesia: local infiltration    Anesthesia:  Local Anesthetic: lidocaine 1% without epinephrine  Anesthetic total: 1 mL  Patient sedated: no  Yousuf's test normal: yes  Needle gauge: 20  Seldinger technique: Seldinger technique used  Number of attempts: 1  Complications: No  Specimens: No  Implants: No  Post-procedure: dressing applied (secured with steri strips, mastisol; biopatch and clear occlusive dressing applied)  Post-procedure CMS: normal and unchanged  Patient tolerance: Patient tolerated the procedure well with no immediate complications          Kylie To  12/25/2018  "

## 2018-12-25 NOTE — PROGRESS NOTES
"[facility] Hospital Medicine  Progress Note    Patient Name: Wang Kebede  MRN: [id number]  Patient Class: IP- Inpatient   Admission Date: 12/24/2018  Length of Stay: 0 days  Attending Physician: Gerardo Mccauley MD  Primary Care Provider: Levi Moncada MD        Subjective:     Principal Problem:Shock circulatory    HPI:  Unable to obtain history from patient due to mental status and clinical condition.  HPI obtained from boyfriend at bedside, ER records, and past medical record.  Ms. 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  He reports finding the patient covered in her own feces and confused.  He reports "She is really out of it and I don't know what's going on".  Associated generalized myalgias, cough, N/V/D for the past 1 week.  She was seen in ED on 12/23 with c/o myalgias and N/V/D, and was diagnosed with acute bronchitis.  She was sent home on Ceftin and brompheniramine-pseudoeph-DM.  In ED, patient oriented to person, but confused and unable to tell the date, where she is, or what occurred this afternoon.  She has a h/o seizures, and has been unable to keep any of her medications down for the past 2-3 days.  Work-up in ED resulted WBC 8.57, plt 115, Na 131, bicarb 15, BUN 54, cr. 5.2, , , lipase 23, CPK 2172, lactic 2, procalcitonin 81.6.  VBG with pH 7.314.  UA negative for infectious process.  CXR unremarkable.  CT head negative for acute process.  Upon arrival to ED, patient significantly hypotensive with BP 55/32, , Temp 98.2.  IV fluid resuscitation given with adequate response in BP (111/62).  Blood/urine cultures drawn, and empiric IV abx given.  Hospital Medicine was called " for admission.  Patient remains confused, hemodynamically stable.    Hospital Course:  Admitted to ICU for evaluation and treatment acute encephalopathy and circulatory shock presumably from sepsis.  She is also at risk for opportunistic infections due to AIDS.  Serial lactic acid measurements normal but procalcitonin highly elevated at 80 with hypotension.  Empirically started Vancomycin and Ceftriaxone.  She also has a history of seizure and medication non-compliance and may have had a seizure.    Interval History:  More alert.  Recently had problem with diarrhea but no abdominal pain or vomiting.    Review of Systems   Constitutional: Positive for fatigue. Negative for chills and fever.   HENT: Negative for congestion and sore throat.    Eyes: Negative for visual disturbance.   Respiratory: Negative for cough, shortness of breath and wheezing.    Cardiovascular: Negative for chest pain, palpitations and leg swelling.   Gastrointestinal: Positive for diarrhea. Negative for abdominal pain, blood in stool, constipation, nausea and vomiting.   Genitourinary: Negative for dysuria and hematuria.   Musculoskeletal: Negative for arthralgias and back pain.   Skin: Negative for rash and wound.   Neurological: Positive for weakness. Negative for dizziness, light-headedness and numbness.   Hematological: Negative for adenopathy.     Objective:     Vital Signs (Most Recent):  Temp: 98.5 °F (36.9 °C) (12/25/18 1130)  Pulse: (!) 149 (12/25/18 1230)  Resp: (!) 35 (12/25/18 1230)  BP: (!) 103/36 (12/25/18 1215)  SpO2: 99 % (12/25/18 1230) Vital Signs (24h Range):  Temp:  [98.2 °F (36.8 °C)-103.3 °F (39.6 °C)] 98.5 °F (36.9 °C)  Pulse:  [134-157] 149  Resp:  [22-37] 35  SpO2:  [67 %-100 %] 99 %  BP: ()/() 103/36     Weight: 59 kg (130 lb 1.1 oz)  Body mass index is 22.33 kg/m².  No intake or output data in the 24 hours ending 12/25/18 1234   Physical Exam   Constitutional: She is oriented to person, place, and time.  She appears well-developed and well-nourished. No distress.   HENT:   Head: Normocephalic and atraumatic.   Mouth/Throat: Oropharynx is clear and moist.   Eyes: Conjunctivae and EOM are normal. Pupils are equal, round, and reactive to light.   Neck: Neck supple. No JVD present. No thyromegaly present.   Cardiovascular: Normal rate and regular rhythm. Exam reveals no gallop and no friction rub.   No murmur heard.  Pulmonary/Chest: Effort normal and breath sounds normal. She has no wheezes. She has no rales.   Abdominal: Soft. Bowel sounds are normal. She exhibits distension. There is no tenderness. There is no rebound and no guarding.   Distended but soft without guarding.   Musculoskeletal: Normal range of motion. She exhibits no edema or deformity.   Lymphadenopathy:     She has no cervical adenopathy.   Neurological: She is alert and oriented to person, place, and time. She has normal reflexes.   Somnolent but interacting appropriately.   Skin: Skin is warm and dry. No rash noted.   Psychiatric: She has a normal mood and affect. Her behavior is normal. Judgment and thought content normal.   Nursing note and vitals reviewed.      Significant Labs: All pertinent labs within the past 24 hours have been reviewed.    Significant Imaging: I have reviewed all pertinent imaging results/findings within the past 24 hours.    Assessment/Plan:      * Circulatory Shock    Possibly secondary to sepsis vs. IRIS.  Admit to ICU.  BP initially responded well to IV fluid resuscitation in ED.  Currently, BP progressively decreasing despite further IVF bolus.  Will place central line and start pressors to keep MAP >60.  Supportive care.     HIV (human immunodeficiency virus infection)    - On HAART, unknown if compliant at present.  Patient with long-standing h/o noncompliance.  - Will check CD4, viral load.  - Hold HAART for now.  Will consult ID for recs.     History of VT/VF s/p implantation of automatic cardioverter/defibrillator  (AICD)    - Will interrogate device to r/o recent arrhythmias or shocks.     Seizure disorder    - Patient with questionable seizure PTA.  - Will use IV Keppra Q 12 hours until patient is able to tolerate home Vimpat.   - Seizure precautions.  IV Ativan PRN.     Acute encephalopathy    - Possibly secondary to above +/- questionable seizure.  - CT head negative for acute process.  - Neuro checks.  - Fall, aspiration, and seizure precautions.     Thrombocytopenia    - Initial plt 115.  No evidence of active bleeding.  - Follow CBC and transfuse if needed.     LELAND (acute kidney injury)    - Initial cr. 5.2, likely secondary to above.  Potassium stable.  - Continue IV fluid resuscitation.  - Strict I&O's.  - Avoid nephrotoxic agents.  - Follow labs.  - Nephrology consult.     SIRS (systemic inflammatory response syndrome)    - No clear source of infection identified.  CXR and UA unremarkable.  WBC 8.79.  Patient became febrile in ED (TMax 103.3).  - Blood, urine, and stool cultures pending.  - Will get STAT CT of ABD/Pelvis to r/o intraabdominal etiology.  - Empiric IV vanc and Rocephin.  Pharmacy consult for vanc dosing.  - Continue IV hydration.  - Patient had similar presentation on previous admission in 7/2017.  After an extensive workup AIDS itself was suspected to be the source, HAART was initiated and patient responded well.    - Will consult ID for assistance.      Elevated liver enzymes    - Possibly shock liver secondary to above +/- HAART therapy.  - Avoid nephrotoxic agents.  - Follow serial labs.  Will complete further w/u if no improvement in LFTs with above interventions.        VTE Risk Mitigation (From admission, onward)        Ordered     heparin (porcine) injection 5,000 Units  Every 8 hours      12/25/18 0546     Place sequential compression device  Until discontinued      12/25/18 0546          Critical care time spent on the evaluation and treatment of severe organ dysfunction, review of pertinent  labs and imaging studies, discussions with consulting providers and discussions with patient/family: 30 minutes.    Gerardo Mccauley MD  Department of Hospital Medicine   [facility]
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
## 2018-12-25 NOTE — EICU
eICU Note : New Admit :notified by the [facility]:    Brief HPI: 32 y/o F admitted with gradual onset of generalised weakness and Myalgia found to have a temp of 103 F     Vital Signs :  Vitals:    12/25/18 0455   BP: (!) 103/42   Pulse: (!) 155   Resp: (!) 25   Temp: (!) 103.3 °F (39.6 °C)     Camera Assessment : Pt lying in bed with fever     Data: WBC 8.57, hemoglobin 12.1, Hematocrit 36.3, Platelets 115,   Sodium 131, potassium 3.7, chloride 100, CO2 15, BUN 54, creatinine 5.2, Glucose 117,calcium 8.6, phosphorus 1.1, magnesium 1.7, alkaline phosphatase 162, total protein 10.4, albumin 3.1, , , lipase 23, CPK 2172, Lactate 2.0 , Pro calcitonin 81.57   Urine Tox screen : Negative , Last CD4 count 02/15/2018  Impression and recommendations:   1. Fever with Body aches and Myalgia , CPK 2172 , Viral Myositis vs other causes   2. H/o HIV since age 17: Check CD4 count last done in Feb 2018  3. LELAND : Creat 5.2 , Bicarb 15 , Na 131 , K 3.7 , chloride 100 , Phos 1.1 , likely drug induced , Nephrology consult , renal sono, drug induced Fanconi , hypophosphatemia vs other causes with Rhabdomyolysis   4.  Metabolic Acidosis , Bicarb 15 .  5. Increased LFT : / ? Secondary to Rhabdo  6. PUD, DVT prophylaxis       Ashley Regan MULTANI  eICU Physician
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
## 2018-12-25 NOTE — ASSESSMENT & PLAN NOTE
Ionized calcium consistent with hypocalcemia, replace  Severe hypokalemia post fluid resuscitation, replace and monitor for response  Replace magnesium
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
## 2018-12-25 NOTE — SUBJECTIVE & OBJECTIVE
Interval History:  More alert.  Recently had problem with diarrhea but no abdominal pain or vomiting.    Review of Systems   Constitutional: Positive for fatigue. Negative for chills and fever.   HENT: Negative for congestion and sore throat.    Eyes: Negative for visual disturbance.   Respiratory: Negative for cough, shortness of breath and wheezing.    Cardiovascular: Negative for chest pain, palpitations and leg swelling.   Gastrointestinal: Positive for diarrhea. Negative for abdominal pain, blood in stool, constipation, nausea and vomiting.   Genitourinary: Negative for dysuria and hematuria.   Musculoskeletal: Negative for arthralgias and back pain.   Skin: Negative for rash and wound.   Neurological: Positive for weakness. Negative for dizziness, light-headedness and numbness.   Hematological: Negative for adenopathy.     Objective:     Vital Signs (Most Recent):  Temp: 98.5 °F (36.9 °C) (12/25/18 1130)  Pulse: (!) 149 (12/25/18 1230)  Resp: (!) 35 (12/25/18 1230)  BP: (!) 103/36 (12/25/18 1215)  SpO2: 99 % (12/25/18 1230) Vital Signs (24h Range):  Temp:  [98.2 °F (36.8 °C)-103.3 °F (39.6 °C)] 98.5 °F (36.9 °C)  Pulse:  [134-157] 149  Resp:  [22-37] 35  SpO2:  [67 %-100 %] 99 %  BP: ()/() 103/36     Weight: 59 kg (130 lb 1.1 oz)  Body mass index is 22.33 kg/m².  No intake or output data in the 24 hours ending 12/25/18 1234   Physical Exam   Constitutional: She is oriented to person, place, and time. She appears well-developed and well-nourished. No distress.   HENT:   Head: Normocephalic and atraumatic.   Mouth/Throat: Oropharynx is clear and moist.   Eyes: Conjunctivae and EOM are normal. Pupils are equal, round, and reactive to light.   Neck: Neck supple. No JVD present. No thyromegaly present.   Cardiovascular: Normal rate and regular rhythm. Exam reveals no gallop and no friction rub.   No murmur heard.  Pulmonary/Chest: Effort normal and breath sounds normal. She has no wheezes. She has no  rales.   Abdominal: Soft. Bowel sounds are normal. She exhibits distension. There is no tenderness. There is no rebound and no guarding.   Distended but soft without guarding.   Musculoskeletal: Normal range of motion. She exhibits no edema or deformity.   Lymphadenopathy:     She has no cervical adenopathy.   Neurological: She is alert and oriented to person, place, and time. She has normal reflexes.   Somnolent but interacting appropriately.   Skin: Skin is warm and dry. No rash noted.   Psychiatric: She has a normal mood and affect. Her behavior is normal. Judgment and thought content normal.   Nursing note and vitals reviewed.      Significant Labs: All pertinent labs within the past 24 hours have been reviewed.    Significant Imaging: I have reviewed all pertinent imaging results/findings within the past 24 hours.

## 2018-12-25 NOTE — ED NOTES
In bed resting,aaox3,skin warm dry to touch,equal bilateral chest rise and fall with respirations at 24,side rails up x 2,bed locked and in low position,C-monitor on and recording heart  Rate 140,instructed to call with any needs.

## 2018-12-25 NOTE — H&P
Tissue Perfusion Assessment        Vital signs reviewed. A focused perfusion assessment was completed.      Nataliia Medina

## 2018-12-25 NOTE — ASSESSMENT & PLAN NOTE
Elevated CPK without known trauma or prolonged downtime raises suspicion for seizure, check prolactin  Continue aggressive hydration  Trend CPK, creatinine

## 2018-12-25 NOTE — PROVIDER PROGRESS NOTES - EMERGENCY DEPT.
"6:16 AM: Pt is hypotensive in ICU after being admitted for sepsis secondary to acute kidney injury and AMS. Requesting central line be placed.     Central Line  Date/Time: 12/25/2018 7:05 AM  Performed by: Sonam Yanez MD  Consent Done: Emergent Situation  Time out: Immediately prior to procedure a "time out" was called to verify the correct patient, procedure, equipment, support staff and site/side marked as required.  Indications: med administration and vascular access  Anesthesia: local infiltration    Anesthesia:  Local Anesthetic: lidocaine 1% with epinephrine  Preparation: skin prepped with ChloraPrep  Skin prep agent dried: skin prep agent completely dried prior to procedure  Sterile barriers: all five maximum sterile barriers used - cap, mask, sterile gown, sterile gloves, and large sterile sheet  Hand hygiene: hand hygiene performed prior to central venous catheter insertion  Location details: left internal jugular  Catheter type: triple lumen  Catheter size: 7.5 Fr  Ultrasound guidance: yes  Vessel Caliber: medium, patent, compressibility normal  Vascular Doppler: not done  Needle advanced into vessel with real time Ultrasound guidance.  Sterile sheath used.  Manometry: No   Number of attempts: 5 or more  Complications: Yes  Comments: Unable to guide wire secondary to pacemaker wiring in the same area. Kylie Banegas NP (Hospital Medicine) at bedside states she will order a PICC line be placed.               "

## 2018-12-25 NOTE — PROCEDURES
"Wang Kebede is a 33 y.o. female patient.    Temp: (!) 103.3 °F (39.6 °C) (12/25/18 0455)  Pulse: (!) 140 (12/25/18 0723)  Resp: (!) 37 (12/25/18 0723)  BP: (!) 62/24 (12/25/18 0600)  SpO2: 100 % (12/25/18 0723)  Weight: 59 kg (130 lb 1.1 oz) (12/24/18 3507)  Height: 5' 4" (162.6 cm) (12/24/18 2557)       PICC Line Insertion  Date/Time: 12/25/2018 8:00 AM  Location procedure was performed: [facility] INTENSIVE CARE UNIT  Performed by: Kylie To NP  Pre-operative Diagnosis: shock  Post-operative diagnosis: shock  Consent Done: Yes  Time out: Immediately prior to procedure a "time out" was called to verify the correct patient, procedure, equipment, support staff and site/side marked as required.  Indications: med administration  Anesthesia: local infiltration    Anesthesia:  Local Anesthetic: lidocaine 1% without epinephrine  Anesthetic total: 3 mL  Preparation: skin prepped with ChloraPrep  Skin prep agent dried: skin prep agent completely dried prior to procedure  Sterile barriers: all five maximum sterile barriers used - cap, mask, sterile gown, sterile gloves, and large sterile sheet  Hand hygiene: hand hygiene performed prior to central venous catheter insertion  Location details: right basilic  Catheter type: double lumen  Catheter size: 5 Fr  Catheter Length: 32cm    Ultrasound guidance: yes  Vessel Caliber: medium, compressibility normal  Needle advanced into vessel with real time Ultrasound guidance.  Guidewire confirmed in vessel.  Sterile sheath used.  Manometry: Yes  Number of attempts: 1  Assessment: placement verified by x-ray,  no pneumothorax on x-ray and successful placement  Complications: none  Specimens: No  Implants: No  Post-procedure: chlorhexidine patch,  sterile dressing applied and blood return through all ports (secured with stat lock device)  Complications: No          Kylie To  12/25/2018  "

## 2018-12-25 NOTE — ASSESSMENT & PLAN NOTE
From severe dehydration vs sepsis; unclear  Continue aggressive hydration  Pressor to keep MAP > 65

## 2018-12-25 NOTE — ASSESSMENT & PLAN NOTE
-Await current CD4 and viral load; last 135/<40 respectively  -Reports taking antiretrovirals but very unreliable source and cannot verify as to out knowledge her SO is unaware of her status  -Makes differential for septic source extensive

## 2018-12-25 NOTE — PROGRESS NOTES
Pharmacist Renal Dose Adjustment Note    Wang Kebede is a 33 y.o. female being treated with the medication famotidine    Patient Data:    Vital Signs (Most Recent):  Temp: (!) 103.3 °F (39.6 °C) (12/25/18 0455)  Pulse: (!) 155 (12/25/18 0455)  Resp: (!) 25 (12/25/18 0455)  BP: (!) 103/42 (12/25/18 0455)  SpO2: 100 % (12/25/18 0455)   Vital Signs (72h Range):  Temp:  [98.2 °F (36.8 °C)-103.3 °F (39.6 °C)]   Pulse:  []   Resp:  [14-26]   BP: ()/(32-76)   SpO2:  [67 %-100 %]      Recent Labs   Lab 12/25/18 0017   CREATININE 5.2*     Serum creatinine: 5.2 mg/dL (H) 12/25/18 0017  Estimated creatinine clearance: 13.2 mL/min (A)    Medication: famotidine dose: 20mg frequency : twice a day will be changed to medication: famotidine dose: 20mg frequency: daily    Pharmacist's Name: Kleber Ornelas  Pharmacist's Extension: [phone]
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
## 2018-12-25 NOTE — ASSESSMENT & PLAN NOTE
- Patient with questionable seizure PTA.  - Will use IV Keppra Q 12 hours until patient is able to tolerate home Vimpat.   - Seizure precautions.  IV Ativan PRN.

## 2018-12-26 LAB
ALBUMIN SERPL BCP-MCNC: 1.7 G/DL
ALDOLASE SERPL-CCNC: 22.5 U/L
ALP SERPL-CCNC: 71 U/L
ALP SERPL-CCNC: 74 U/L
ALP SERPL-CCNC: 77 U/L
ALP SERPL-CCNC: 78 U/L
ALT SERPL W/O P-5'-P-CCNC: 83 U/L
ALT SERPL W/O P-5'-P-CCNC: 86 U/L
ALT SERPL W/O P-5'-P-CCNC: 88 U/L
ALT SERPL W/O P-5'-P-CCNC: 94 U/L
ANION GAP SERPL CALC-SCNC: 6 MMOL/L
ANION GAP SERPL CALC-SCNC: 8 MMOL/L
ANION GAP SERPL CALC-SCNC: 8 MMOL/L
ANION GAP SERPL CALC-SCNC: 9 MMOL/L
ANION GAP SERPL CALC-SCNC: 9 MMOL/L
AST SERPL-CCNC: 161 U/L
AST SERPL-CCNC: 220 U/L
AST SERPL-CCNC: 229 U/L
AST SERPL-CCNC: 304 U/L
BASOPHILS # BLD AUTO: 0.02 K/UL
BASOPHILS NFR BLD: 0.3 %
BILIRUB SERPL-MCNC: 0.3 MG/DL
BILIRUB SERPL-MCNC: 0.4 MG/DL
BILIRUB SERPL-MCNC: 0.5 MG/DL
BILIRUB SERPL-MCNC: 0.5 MG/DL
BUN SERPL-MCNC: 33 MG/DL
BUN SERPL-MCNC: 36 MG/DL
BUN SERPL-MCNC: 38 MG/DL
BUN SERPL-MCNC: 42 MG/DL
BUN SERPL-MCNC: 45 MG/DL
CALCIUM SERPL-MCNC: 6.1 MG/DL
CALCIUM SERPL-MCNC: 6.4 MG/DL
CALCIUM SERPL-MCNC: 6.5 MG/DL
CALCIUM SERPL-MCNC: 6.6 MG/DL
CALCIUM SERPL-MCNC: 6.9 MG/DL
CD3+CD4+ CELLS # BLD: 29 CELLS/UL (ref 300–1400)
CD3+CD4+ CELLS NFR BLD: 11.8 % (ref 28–57)
CHLORIDE SERPL-SCNC: 106 MMOL/L
CHLORIDE SERPL-SCNC: 112 MMOL/L
CHLORIDE SERPL-SCNC: 113 MMOL/L
CK SERPL-CCNC: 8629 U/L
CK SERPL-CCNC: ABNORMAL U/L
CO2 SERPL-SCNC: 15 MMOL/L
CO2 SERPL-SCNC: 16 MMOL/L
CO2 SERPL-SCNC: 16 MMOL/L
CO2 SERPL-SCNC: 17 MMOL/L
CO2 SERPL-SCNC: 20 MMOL/L
CREAT SERPL-MCNC: 1.6 MG/DL
CREAT SERPL-MCNC: 2 MG/DL
CREAT SERPL-MCNC: 2.1 MG/DL
CREAT SERPL-MCNC: 2.3 MG/DL
CREAT SERPL-MCNC: 2.7 MG/DL
CRYPTOSP AG STL QL IA: NEGATIVE
DIFFERENTIAL METHOD: ABNORMAL
E COLI SXT1 STL QL IA: NEGATIVE
E COLI SXT2 STL QL IA: NEGATIVE
EOSINOPHIL # BLD AUTO: 0 K/UL
EOSINOPHIL NFR BLD: 0.5 %
ERYTHROCYTE [DISTWIDTH] IN BLOOD BY AUTOMATED COUNT: 13.6 %
EST. GFR  (AFRICAN AMERICAN): 26 ML/MIN/1.73 M^2
EST. GFR  (AFRICAN AMERICAN): 31 ML/MIN/1.73 M^2
EST. GFR  (AFRICAN AMERICAN): 35 ML/MIN/1.73 M^2
EST. GFR  (AFRICAN AMERICAN): 37 ML/MIN/1.73 M^2
EST. GFR  (AFRICAN AMERICAN): 48 ML/MIN/1.73 M^2
EST. GFR  (NON AFRICAN AMERICAN): 22 ML/MIN/1.73 M^2
EST. GFR  (NON AFRICAN AMERICAN): 27 ML/MIN/1.73 M^2
EST. GFR  (NON AFRICAN AMERICAN): 30 ML/MIN/1.73 M^2
EST. GFR  (NON AFRICAN AMERICAN): 32 ML/MIN/1.73 M^2
EST. GFR  (NON AFRICAN AMERICAN): 42 ML/MIN/1.73 M^2
G LAMBLIA AG STL QL IA: NEGATIVE
GLUCOSE SERPL-MCNC: 271 MG/DL
GLUCOSE SERPL-MCNC: 78 MG/DL
GLUCOSE SERPL-MCNC: 81 MG/DL
GLUCOSE SERPL-MCNC: 89 MG/DL
GLUCOSE SERPL-MCNC: 98 MG/DL
HAV IGM SERPL QL IA: NEGATIVE
HBV CORE IGM SERPL QL IA: NEGATIVE
HBV SURFACE AG SERPL QL IA: NEGATIVE
HCT VFR BLD AUTO: 28.4 %
HCV AB SERPL QL IA: NEGATIVE
HGB BLD-MCNC: 9.8 G/DL
LDH SERPL L TO P-CCNC: 767 U/L
LYMPHOCYTES # BLD AUTO: 0.9 K/UL
LYMPHOCYTES NFR BLD: 14.8 %
MAGNESIUM SERPL-MCNC: 1.4 MG/DL
MAGNESIUM SERPL-MCNC: 2.1 MG/DL
MCH RBC QN AUTO: 31.3 PG
MCHC RBC AUTO-ENTMCNC: 34.5 G/DL
MCV RBC AUTO: 91 FL
MONOCYTES # BLD AUTO: 0.1 K/UL
MONOCYTES NFR BLD: 1.7 %
NEUTROPHILS # BLD AUTO: 4.7 K/UL
NEUTROPHILS NFR BLD: 82.7 %
O+P STL TRI STN: NORMAL
PHOSPHATE SERPL-MCNC: 3.5 MG/DL
PLATELET # BLD AUTO: 76 K/UL
PMV BLD AUTO: 11.1 FL
POTASSIUM SERPL-SCNC: 3 MMOL/L
POTASSIUM SERPL-SCNC: 3.3 MMOL/L
POTASSIUM SERPL-SCNC: 3.7 MMOL/L
POTASSIUM SERPL-SCNC: 3.8 MMOL/L
POTASSIUM SERPL-SCNC: 3.9 MMOL/L
PROCALCITONIN SERPL IA-MCNC: 28.18 NG/ML
PROT SERPL-MCNC: 5.2 G/DL
PROT SERPL-MCNC: 5.3 G/DL
PROT SERPL-MCNC: 5.7 G/DL
PROT SERPL-MCNC: 5.7 G/DL
RBC # BLD AUTO: 3.13 M/UL
SODIUM SERPL-SCNC: 129 MMOL/L
SODIUM SERPL-SCNC: 131 MMOL/L
SODIUM SERPL-SCNC: 135 MMOL/L
SODIUM SERPL-SCNC: 135 MMOL/L
SODIUM SERPL-SCNC: 137 MMOL/L
VANCOMYCIN SERPL-MCNC: 13.2 UG/ML
WBC # BLD AUTO: 5.74 K/UL

## 2018-12-26 PROCEDURE — 83615 LACTATE (LD) (LDH) ENZYME: CPT | Mod: HCNC

## 2018-12-26 PROCEDURE — 25000003 PHARM REV CODE 250: Mod: HCNC | Performed by: NURSE PRACTITIONER

## 2018-12-26 PROCEDURE — 99233 PR SUBSEQUENT HOSPITAL CARE,LEVL III: ICD-10-PCS | Mod: HCNC,,, | Performed by: INTERNAL MEDICINE

## 2018-12-26 PROCEDURE — 83874 ASSAY OF MYOGLOBIN: CPT | Mod: HCNC

## 2018-12-26 PROCEDURE — 87070 CULTURE OTHR SPECIMN AEROBIC: CPT | Mod: HCNC

## 2018-12-26 PROCEDURE — 80053 COMPREHEN METABOLIC PANEL: CPT | Mod: HCNC

## 2018-12-26 PROCEDURE — 80048 BASIC METABOLIC PNL TOTAL CA: CPT | Mod: HCNC

## 2018-12-26 PROCEDURE — 25000003 PHARM REV CODE 250: Mod: HCNC | Performed by: FAMILY MEDICINE

## 2018-12-26 PROCEDURE — 84145 PROCALCITONIN (PCT): CPT | Mod: HCNC

## 2018-12-26 PROCEDURE — 87081 CULTURE SCREEN ONLY: CPT | Mod: HCNC

## 2018-12-26 PROCEDURE — 82550 ASSAY OF CK (CPK): CPT | Mod: HCNC

## 2018-12-26 PROCEDURE — S0028 INJECTION, FAMOTIDINE, 20 MG: HCPCS | Mod: HCNC | Performed by: FAMILY MEDICINE

## 2018-12-26 PROCEDURE — 87205 SMEAR GRAM STAIN: CPT | Mod: HCNC

## 2018-12-26 PROCEDURE — 99292 PR CRITICAL CARE, ADDL 30 MIN: ICD-10-PCS | Mod: HCNC,,, | Performed by: NURSE PRACTITIONER

## 2018-12-26 PROCEDURE — 63600175 PHARM REV CODE 636 W HCPCS: Mod: HCNC | Performed by: NURSE PRACTITIONER

## 2018-12-26 PROCEDURE — 25000003 PHARM REV CODE 250: Mod: HCNC | Performed by: INTERNAL MEDICINE

## 2018-12-26 PROCEDURE — 83735 ASSAY OF MAGNESIUM: CPT | Mod: 91,HCNC

## 2018-12-26 PROCEDURE — 99291 PR CRITICAL CARE, E/M 30-74 MINUTES: ICD-10-PCS | Mod: HCNC,,, | Performed by: NURSE PRACTITIONER

## 2018-12-26 PROCEDURE — 99900038 HC OT GENERIC THERAPY SCREENING (STAT): Mod: HCNC

## 2018-12-26 PROCEDURE — 20000000 HC ICU ROOM: Mod: HCNC

## 2018-12-26 PROCEDURE — 80202 ASSAY OF VANCOMYCIN: CPT | Mod: HCNC

## 2018-12-26 PROCEDURE — 87102 FUNGUS ISOLATION CULTURE: CPT | Mod: HCNC

## 2018-12-26 PROCEDURE — 85025 COMPLETE CBC W/AUTO DIFF WBC: CPT | Mod: HCNC

## 2018-12-26 PROCEDURE — 63600175 PHARM REV CODE 636 W HCPCS: Mod: HCNC | Performed by: INTERNAL MEDICINE

## 2018-12-26 PROCEDURE — 83516 IMMUNOASSAY NONANTIBODY: CPT | Mod: 59,HCNC

## 2018-12-26 PROCEDURE — 86038 ANTINUCLEAR ANTIBODIES: CPT | Mod: HCNC

## 2018-12-26 PROCEDURE — 99233 SBSQ HOSP IP/OBS HIGH 50: CPT | Mod: HCNC,,, | Performed by: INTERNAL MEDICINE

## 2018-12-26 PROCEDURE — 84100 ASSAY OF PHOSPHORUS: CPT | Mod: HCNC

## 2018-12-26 PROCEDURE — 99900037 HC PT THERAPY SCREENING (STAT): Mod: HCNC

## 2018-12-26 PROCEDURE — 99291 CRITICAL CARE FIRST HOUR: CPT | Mod: HCNC,,, | Performed by: NURSE PRACTITIONER

## 2018-12-26 PROCEDURE — 63600175 PHARM REV CODE 636 W HCPCS: Mod: HCNC | Performed by: FAMILY MEDICINE

## 2018-12-26 PROCEDURE — 99292 CRITICAL CARE ADDL 30 MIN: CPT | Mod: HCNC,,, | Performed by: NURSE PRACTITIONER

## 2018-12-26 RX ORDER — MAGNESIUM SULFATE HEPTAHYDRATE 40 MG/ML
2 INJECTION, SOLUTION INTRAVENOUS ONCE
Status: COMPLETED | OUTPATIENT
Start: 2018-12-26 | End: 2018-12-26

## 2018-12-26 RX ORDER — POTASSIUM CHLORIDE 29.8 MG/ML
40 INJECTION INTRAVENOUS ONCE
Status: COMPLETED | OUTPATIENT
Start: 2018-12-26 | End: 2018-12-26

## 2018-12-26 RX ORDER — HYDROCODONE BITARTRATE AND ACETAMINOPHEN 7.5; 325 MG/15ML; MG/15ML
5 SOLUTION ORAL EVERY 6 HOURS PRN
Status: DISCONTINUED | OUTPATIENT
Start: 2018-12-26 | End: 2018-12-28

## 2018-12-26 RX ORDER — SODIUM CHLORIDE, SODIUM LACTATE, POTASSIUM CHLORIDE, CALCIUM CHLORIDE 600; 310; 30; 20 MG/100ML; MG/100ML; MG/100ML; MG/100ML
INJECTION, SOLUTION INTRAVENOUS CONTINUOUS
Status: DISCONTINUED | OUTPATIENT
Start: 2018-12-26 | End: 2018-12-30

## 2018-12-26 RX ORDER — POTASSIUM CHLORIDE 20 MEQ/15ML
40 SOLUTION ORAL ONCE
Status: COMPLETED | OUTPATIENT
Start: 2018-12-26 | End: 2018-12-26

## 2018-12-26 RX ORDER — VANCOMYCIN HCL IN 5 % DEXTROSE 1G/250ML
1000 PLASTIC BAG, INJECTION (ML) INTRAVENOUS ONCE
Status: COMPLETED | OUTPATIENT
Start: 2018-12-26 | End: 2018-12-26

## 2018-12-26 RX ORDER — MAGNESIUM SULFATE 1 G/100ML
1 INJECTION INTRAVENOUS ONCE
Status: COMPLETED | OUTPATIENT
Start: 2018-12-26 | End: 2018-12-26

## 2018-12-26 RX ADMIN — HYDROCODONE BITARTRATE AND ACETAMINOPHEN 5 ML: 7.5; 325 SOLUTION ORAL at 07:12

## 2018-12-26 RX ADMIN — FAMOTIDINE 20 MG: 10 INJECTION INTRAVENOUS at 08:12

## 2018-12-26 RX ADMIN — SODIUM BICARBONATE: 84 INJECTION, SOLUTION INTRAVENOUS at 03:12

## 2018-12-26 RX ADMIN — LEVETIRACETAM INJECTION 500 MG: 5 INJECTION INTRAVENOUS at 09:12

## 2018-12-26 RX ADMIN — MAGNESIUM SULFATE HEPTAHYDRATE 2 G: 40 INJECTION, SOLUTION INTRAVENOUS at 05:12

## 2018-12-26 RX ADMIN — LEVETIRACETAM INJECTION 500 MG: 5 INJECTION INTRAVENOUS at 08:12

## 2018-12-26 RX ADMIN — SODIUM CHLORIDE, SODIUM LACTATE, POTASSIUM CHLORIDE, AND CALCIUM CHLORIDE: .6; .31; .03; .02 INJECTION, SOLUTION INTRAVENOUS at 05:12

## 2018-12-26 RX ADMIN — HEPARIN SODIUM 5000 UNITS: 5000 INJECTION, SOLUTION INTRAVENOUS; SUBCUTANEOUS at 05:12

## 2018-12-26 RX ADMIN — VANCOMYCIN 1000 MG: 1 INJECTION, SOLUTION INTRAVENOUS at 11:12

## 2018-12-26 RX ADMIN — MAGNESIUM SULFATE HEPTAHYDRATE 1 G: 1 INJECTION, SOLUTION INTRAVENOUS at 09:12

## 2018-12-26 RX ADMIN — SODIUM BICARBONATE: 84 INJECTION, SOLUTION INTRAVENOUS at 05:12

## 2018-12-26 RX ADMIN — POTASSIUM CHLORIDE 40 MEQ: 29.8 INJECTION, SOLUTION INTRAVENOUS at 12:12

## 2018-12-26 RX ADMIN — CALCIUM GLUCONATE 1 G: 94 INJECTION, SOLUTION INTRAVENOUS at 09:12

## 2018-12-26 RX ADMIN — POTASSIUM CHLORIDE 20 MEQ: 1500 TABLET, EXTENDED RELEASE ORAL at 08:12

## 2018-12-26 RX ADMIN — CEFTRIAXONE 1 G: 1 INJECTION, SOLUTION INTRAVENOUS at 08:12

## 2018-12-26 RX ADMIN — SODIUM CHLORIDE, SODIUM LACTATE, POTASSIUM CHLORIDE, AND CALCIUM CHLORIDE 1000 ML: .6; .31; .03; .02 INJECTION, SOLUTION INTRAVENOUS at 08:12

## 2018-12-26 RX ADMIN — POTASSIUM CHLORIDE 40 MEQ: 20 SOLUTION ORAL at 01:12

## 2018-12-26 NOTE — PT/OT/SLP PROGRESS
Physical Therapy      Patient Name:  Wang Kebede   MRN:  [id number]    Eval initiated per chart review in Epic and conversation in RN. RN requested to hold PT eval until tomorrow secondary to patient just got back in bed and is tired. Will follow-up on next session.    Chun Colbert, PT

## 2018-12-26 NOTE — NURSING
[facility] contacted, critical lab of calcium 6.9, last drawn 6.6. Calcium replacement this shift. K of 3.0. Potassium replacement this shift. CPK stready rising. MD notified. Will replace Potassium per orders. Patient Sinus tach on monitor. Cont fluids per orders. Will cont to monitor

## 2018-12-26 NOTE — PROGRESS NOTES
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; last CD4 135, viral load < 40 in 2/2018; reports sickle cell disease but labs do not confirm, also reports pregnancy despite having had total hysterectomy  Presented to ED after found by SO with confusion, stool incontinence on 12/24; of note had come in to ED on 12/23 with complaints of myalgias, vomiting, generalized illness x 1 week and given rocephin and decadron injections for bronchitis with negative flu swab  ED eval on 12/24 revealed dehydration, LELAND, hypotension, elevated CPK, procalcitonin 81.6 with clear CXR and no overt septic source    Hospital/ICU Course:  12/25 - Admitted to ICU after BP responded to IVF bolus in ED; after arrival BP falling, required placement of CL for pressor; remains AAOx3 without helpful insight to presenting problem, only reports malaise, myalgias, diarrhea  12/26 - weaned off pressor after aggressive IVF resuscitation and HR trend down, now 110-120, creatinine trends down, CPK continues rising, now 01830; afebrile since 11pm; complains of increased generalized weakness today    Review of Systems   Constitutional: Positive for malaise/fatigue. Negative for fever.   Respiratory: Positive for cough (dry intermittent).    Cardiovascular: Negative for chest pain and palpitations.   Gastrointestinal: Positive for diarrhea. Negative for nausea and vomiting.    Musculoskeletal: Positive for myalgias.   Neurological: Positive for weakness. Negative for focal weakness.       Objective:     Vital Signs (Most Recent):  Temp: 98.6 °F (37 °C) (12/26/18 0715)  Pulse: 110 (12/26/18 0945)  Resp: (!) 23 (12/26/18 0945)  BP: (!) 110/51 (12/26/18 0900)  SpO2: 97 % (12/26/18 0945) Vital Signs (24h Range):  Temp:  [98.5 °F (36.9 °C)-103.7 °F (39.8 °C)] 98.6 °F (37 °C)  Pulse:  [] 110  Resp:  [17-37] 23  SpO2:  [64 %-100 %] 97 %  BP: ()/(32-77) 110/51     Weight: 59 kg (130 lb 1.1 oz)  Body mass index is 22.33 kg/m².      Intake/Output Summary (Last 24 hours) at 12/26/2018 1129  Last data filed at 12/26/2018 0600  Gross per 24 hour   Intake 4286.3 ml   Output 2080 ml   Net 2206.3 ml       Physical Exam   Constitutional: She is oriented to person, place, and time. She appears well-nourished. She is cooperative. She appears ill.   Ill appearing, NAD   HENT:   Head: Atraumatic.   Eyes: Conjunctivae and EOM are normal. Pupils are equal, round, and reactive to light.   Neck: No JVD present. No tracheal deviation present.   Cardiovascular: Regular rhythm. Tachycardia present.   No murmur heard.  Pulses:       Radial pulses are 1+ on the right side, and 1+ on the left side.        Dorsalis pedis pulses are 1+ on the right side, and 1+ on the left side.   Pulmonary/Chest: Effort normal and breath sounds normal. No tachypnea. No respiratory distress.   Abdominal: Soft. She exhibits no distension. There is tenderness in the left lower quadrant. There is no rigidity and no guarding.   Musculoskeletal: She exhibits no edema (generalized non pitting edema).   Generalized weakness, noted more to large muscles on physical exam as hand /ankle flexion remain strong  Increased generalized tenderness on exam today most noted to thighs   Lymphadenopathy:     She has no cervical adenopathy.   Neurological: She is alert and oriented to person, place, and time. No cranial nerve deficit. GCS  eye subscore is 4. GCS verbal subscore is 5. GCS motor subscore is 6.   Skin: Skin is warm and dry. Capillary refill takes less than 2 seconds. No cyanosis.   Psychiatric: Her affect is blunt. She is withdrawn. She is inattentive.       Vents:  Oxygen Concentration (%): 28 (12/25/18 0723)    Lines/Drains/Airways     Peripherally Inserted Central Catheter Line                 PICC Double Lumen 12/25/18 0800 right brachial 1 day          Drain                 Gastrostomy/Enterostomy 05/26/17 1540 Percutaneous endoscopic gastrostomy (PEG)  days         Urethral Catheter 12/25/18 1000 Latex 1 day          Arterial Line                 Arterial Line 12/25/18 1010 Right Radial 1 day          Pressure Ulcer                 Pressure Ulcer Right lateral other (see comments) suspected deep tissue injury -- days         Pressure Ulcer 07/24/17 0702 Left lower heel suspected deep tissue injury 520 days         Pressure Ulcer 07/24/17 0702 Right lower heel suspected deep tissue injury 520 days          Peripheral Intravenous Line                 Peripheral IV - Single Lumen 12/25/18 0150 Right Other 1 day                Significant Labs:    CBC/Anemia Profile:  Recent Labs   Lab 12/25/18  0017 12/25/18  0637 12/25/18  1051 12/25/18  1450 12/26/18  0404   WBC 8.57 8.79  --   --  5.74   HGB 12.1 9.4*  --   --  9.8*   HCT 36.3* 27.9* 25* 24* 28.4*   * 89*  --   --  76*   MCV 94 93  --   --  91   RDW 13.4 13.3  --   --  13.6        Chemistries:  Recent Labs   Lab 12/25/18  0017 12/25/18  0633  12/25/18  1056  12/25/18  2317 12/26/18  0404 12/26/18  0552 12/26/18  0755 12/26/18  1003   *  --    < > 130*   < > 135*  --  137 129* 131*   K 3.7  --    < > 3.0*   < > 3.0*  --  3.8 3.9 3.3*     --    < > 108   < > 112*  --  113* 106 106   CO2 15*  --    < > 13*   < > 15*  --  16* 17* 16*   BUN 54*  --    < > 52*   < > 45*  --  42* 38* 36*   CREATININE 5.2*  --    < > 3.7*   < > 2.7*  --  2.3* 2.1* 2.0*   CALCIUM  8.6*  --    < > 6.1*   < > 6.9*  --  6.6* 6.4* 6.5*   ALBUMIN 3.1*  --    < > 1.8*   < > 1.7*  --  1.7* 1.7*  --    PROT 10.4*  --    < >  --    < > 5.7*  --  5.7* 5.3*  --    BILITOT 0.8  --    < >  --    < > 0.5  --  0.5 0.4  --    ALKPHOS 162*  --    < >  --    < > 78  --  77 74  --    *  --    < >  --    < > 83*  --  88* 86*  --    *  --    < >  --    < > 161*  --  220* 229*  --    MG 1.7 1.3*  --   --   --   --  1.4*  --   --   --    PHOS 1.1* 1.8*  --  2.9  --   --  3.5  --   --   --     < > = values in this interval not displayed.     All pertinent labs within the past 24 hours have been reviewed.    Significant Imaging:  I have reviewed all pertinent imaging results/findings within the past 24 hours.        ABG  Recent Labs   Lab 12/25/18  1450   PH 7.362   PO2 70*   PCO2 22.4*   HCO3 12.7*   BE -13     Assessment/Plan:     Neuro   Seizure disorder    On vimpat  Was alone then found altered and having had incontinence  Elevated CPK, Cannot rule out seizure with AMS being post ictal -- prolactin nl on admit making seizure less likely  Continue vimpat     Acute encephalopathy    Suspect s/t sepsis; resolved     Renal/   Electrolyte disturbance    Calcium improved with replacement, monitor  Continue potassium replacement  Replace magnesium  Monitor phos     LELAND (acute kidney injury)    Continue aggressive IV hydration  Trend BUN/Creatinine  Renally dose medications  Nephrology following  12/26 creatinine trending down with hydration     ID   SIRS (systemic inflammatory response syndrome)    Very high suspicion for sepsis but lactate normal; CT chest shows small bilateral lower lobe infiltrates but without significant pulmonary symptomology this finding seems out of proportion to the degree of dehydration/LELAND; low threshold for bronchoscopy per pulmonologist given AIDS status  with unreliable historian, extensive differential, and non specific markers + symptoms we will provide broad spectrum  coverage and aggressive supportive care  Await stool studies  Rising CPK and increased muscle weakness/tenderness along with elevated LDH, aldolase -- would not rule polymyositis/infectious myositis out; send KAYLA, Myositis AssessR Plus Anastasia-1  Await stool studies, check urine myoglobin  Sputum obtained and sent for culture along with MRSA, throat swab, and fungal culture     AIDS (acquired immunodeficiency syndrome), CD4 <=200    -Await current CD4 and viral load; last 135/<40 respectively  -Reports taking antiretrovirals but very unreliable source   -Makes differential for septic source extensive     Hematology   Thrombocytopenia    No signs of bleeding  Will hold prophylaxis anticoagulation  Conservative transfusion protocol  Monitor      Orthopedic   Non-traumatic rhabdomyolysis    Elevated CPK without known trauma or prolonged downtime raises suspicion for seizure but nl prolactin  Continued rise in CPK along with significant LDH and aldolase elevation; increase concern for a myositis, autoimmune or infectious  Continue aggressive hydration     Other   * Circulatory Shock    Resolved  Continue ICU hemodynamic monitoring and IV hydration        Critical Care Daily Checklist:    A: Awake: RASS Goal/Actual Goal:    Actual: Suarez Agitation Sedation Scale (RASS): Alert and calm   B: Spontaneous Breathing Trial Performed?     C: SAT & SBT Coordinated?  n/a                      D: Delirium: CAM-ICU Overall CAM-ICU: Negative   E: Early Mobility Performed? Yes   F: Feeding Goal:    Status:     Current Diet Order   Procedures    Diet clear liquid      AS: Analgesia/Sedation prn   T: Thromboembolic Prophylaxis Hold heparin for thrombocytopenia; SCDs   H: HOB > 300 Yes   U: Stress Ulcer Prophylaxis (if needed) pepcid   G: Glucose Control monitoring   B: Bowel Function Stool Occurrence: 1   I: Indwelling Catheter (Lines & Tinsley) Necessity reviewed   D: De-escalation of Antimicrobials/Pharmacotherapies reviewed    Plan for  the day/ETD As above    Code Status:  Family/Goals of Care: Full Code  Home on discharge   I have discussed case and plan of care in detail with Dr Alvarado and Dr Mccauley; Status and plan of care were discussed with team on multidisciplinary rounds.    Critical Care Time: 96 minutes; Critical care was time spent personally by me on the following activities: development of treatment plan with patient or surrogate and bedside caregivers, discussions with consultants, evaluation of patient's response to treatment, examination of patient, ordering and performing treatments and interventions, ordering and review of laboratory studies, ordering and review of radiographic studies, pulse oximetry, re-evaluation of patient's condition. This critical care time did not overlap with that of any other provider or involve time for any procedures.     Kylie To NP  Critical Care Medicine  [facility]

## 2018-12-26 NOTE — PROGRESS NOTES
[facility]  Nephrology  Progress Note    Patient Name: Wang Kebede  MRN: [id number]  Admission Date: 12/24/2018  Hospital Length of Stay: 1 days  Attending Provider: Gerardo Mccauley MD   Primary Care Physician: Levi Moncada MD  Principal Problem:Shock circulatory    Subjective:     HPI: Wang Kebede is a 33 y.o.  AA woman  with history of HIV/AIDS, vulvar cancer, anemia, sickle cell disease, cervical cancer, chronic abdominal pain was admitted to hospital yesterday   for fever , malaise, blood pressure was significantly low on presentation, cultures were ordered and pending, she started on broad-spectrum antibiotics, about 2 days ago she was seen in the emergency room with similar complaints, she was diagnosed with bronchitis and was discharged home on oral antibiotics, her serum creatinine on presentation  was about 5 mg/dL yesterday, creatinine was normal about 4 months ago, patient had episodes of acute kidney injury in the past with similar presentations, improved with IV fluids,          Interval History:     12/26/18 - no acute events , more alert today but still weak,     Review of patient's allergies indicates:   Allergen Reactions    Iodine and iodide containing products Anaphylaxis     Pt states she coded last time she was administered contrast    Pneumococcal 23-chitra ps vaccine Anaphylaxis     Potential iodine containing    Dilaudid [hydromorphone] Hives and Itching    Bactrim [sulfamethoxazole-trimethoprim] Hives    Morphine Itching     Tolerates norco 2018     Current Facility-Administered Medications   Medication Frequency    acetaminophen suppository 650 mg Q8H PRN    cefTRIAXone (ROCEPHIN) 1 g in dextrose 5 % 50 mL IVPB Q24H    famotidine (PF) injection 20 mg Daily    heparin (porcine) injection 5,000 Units Q8H    hydrocodone-apap 7.5-325 MG/15 ML oral solution 5 mL Q6H PRN    levalbuterol nebulizer solution 0.63 mg Q6H PRN    levETIRAcetam  in NaCl (iso-os) IVPB 500 mg Q12H    lorazepam (ATIVAN) injection 2 mg Q4H PRN    magnesium sulfate in dextrose IVPB (premix) 1 g Once    ondansetron injection 4 mg Q6H PRN    potassium chloride SA CR tablet 20 mEq TID    sodium chloride 0.45% 1,000 mL with sodium bicarbonate 1 mEq/mL (8.4 %) 75 mEq infusion Continuous    vancomycin 1 g in dextrose 5 % 250 mL IVPB (ready to mix system) Once    [START ON 12/28/2018] vancomycin 1gm/D5W 250ml PLACEHOLDERE DOSE Q72H       Objective:     Vital Signs (Most Recent):  Temp: 98.6 °F (37 °C) (12/26/18 0715)  Pulse: 110 (12/26/18 0945)  Resp: (!) 23 (12/26/18 0945)  BP: (!) 110/51 (12/26/18 0900)  SpO2: 97 % (12/26/18 0945)  O2 Device (Oxygen Therapy): room air (12/26/18 0900) Vital Signs (24h Range):  Temp:  [98.5 °F (36.9 °C)-103.7 °F (39.8 °C)] 98.6 °F (37 °C)  Pulse:  [] 110  Resp:  [17-37] 23  SpO2:  [64 %-100 %] 97 %  BP: ()/() 110/51     Weight: 59 kg (130 lb 1.1 oz) (12/24/18 2357)  Body mass index is 22.33 kg/m².  Body surface area is 1.63 meters squared.    I/O last 3 completed shifts:  In: 4286.3 [I.V.:2686.3; IV Piggyback:1600]  Out: 2445 [Urine:2445]    Physical Exam   Constitutional: She is oriented to person, place, and time. She appears well-developed. No distress.   Frail looking    HENT:   Head: Normocephalic and atraumatic.   Mouth/Throat: Oropharynx is clear and moist. No oropharyngeal exudate.   Eyes: Conjunctivae and EOM are normal. Pupils are equal, round, and reactive to light.   Neck: Normal range of motion. Neck supple. No JVD present. Carotid bruit is not present. No tracheal deviation present. No thyroid mass and no thyromegaly present.   Cardiovascular: Regular rhythm, normal heart sounds and intact distal pulses. Exam reveals no gallop and no friction rub.   No murmur heard.  tachycardia    Pulmonary/Chest: No respiratory distress. She has wheezes. She has rales. She exhibits no tenderness.   Abdominal: Soft. Bowel sounds  are normal. She exhibits no distension, no abdominal bruit, no ascites and no mass. There is no hepatosplenomegaly. There is no tenderness. There is no rebound, no guarding and no CVA tenderness.   Musculoskeletal: She exhibits no edema or tenderness.   Lymphadenopathy:     She has no cervical adenopathy.   Neurological: She is alert and oriented to person, place, and time. No cranial nerve deficit. She exhibits normal muscle tone. Coordination normal.   Skin: Skin is warm and intact. No rash noted. No erythema. No pallor.   Psychiatric: She has a normal mood and affect. Her behavior is normal.       Significant Labs:    Cardiac Markers: No results for input(s): CKMB, TROPONINT, MYOGLOBIN in the last 168 hours.  CBC:   Recent Labs   Lab 12/26/18  0404   WBC 5.74   RBC 3.13*   HGB 9.8*   HCT 28.4*   PLT 76*   MCV 91   MCH 31.3*   MCHC 34.5     CMP:   Recent Labs   Lab 12/26/18  0755   GLU 78   CALCIUM 6.4*   ALBUMIN 1.7*   PROT 5.3*   *   K 3.9   CO2 17*      BUN 38*   CREATININE 2.1*   ALKPHOS 74   ALT 86*   *   BILITOT 0.4     Coagulation:   Recent Labs   Lab 12/25/18  0017   INR 1.2   APTT 27.4     All labs within the past 24 hours have been reviewed.     Significant Imaging:  Reviewed    Lab Results   Component Value Date    ALBUMIN 1.7 (L) 12/26/2018       Lab Results   Component Value Date    CALCIUM 6.4 (LL) 12/26/2018    PHOS 3.5 12/26/2018         Assessment/Plan:     LELAND (acute kidney injury)     1. LELAND :  Acute kidney injury likely a combination of dehydration and rhabdomyolysis ,  low blood pressure on presentation .      Renal function improving , continue IV fluids, good UO ,       elevation in CK total noted, likely not the cause of acute kidney injury,       2.  Hypotension - blood pressure improved with IV fluids and pressors,     3.  Chronic diarrhea - replete lytes and fluids     4.  Metabolic acidosis - replete bicarb,     5.  Replete potassium and phosphorus , hyponatremia due  to acute kidney injury,     6. HIV / AIDS - per primary team      7. PNA / Sepsis - on broad-spectrum antibiotics, cultures ordered and pending,     8. Rhabdomyolysis : cont IV fluids     9. Hyponatremia - follow sodium , repeat labs     10. Metabolic acidosis , non-gap, likely due to diarrhea, cont bicarb with IV fluids till her serum bicarb is 20 ,             I will follow-up with patient. Please contact us if you have any additional questions.     Total time spent 40 minutes including time needed to review the records,  patient  evaluation, documentation, face-to-face discussion with the patient,  primary team,   more than 50% of the time was spent on coordination of care and counseling.       Marlon Monterroso MD  Nephrology  [facility]

## 2018-12-26 NOTE — ASSESSMENT & PLAN NOTE
Elevated CPK without known trauma or prolonged downtime raises suspicion for seizure but nl prolactin  Continued rise in CPK along with significant LDH and aldolase elevation; increase concern for a myositis, autoimmune or infectious  Continue aggressive hydration

## 2018-12-26 NOTE — PLAN OF CARE
"Assessment completed.  Met with the patient/ family. CM explained and left info in blue transition of care folder regarding Advance Directives, Living Will ( FULL CODE) ,  Pamphlet on D/C planning on admission and Pharmacy bedside delivery.  The role of CM explained for ICU transitions of care/ discharge planning. Per EMR,   HPI: Unable to obtain history from patient due to mental status and clinical condition.  HPI obtained from boyfriend at bedside, ER records, and past medical record.  Ms. Kebede is a 32 yo female with  a PMHx of HIV/AIDS, anemia, chronically elevated LFTs, and h/o VT/VF after receiving iodine containing contrast s/p ICD implantation, seizure disorder, and h/o vulvar CA, who was brought to the ED by her boyfriend with c/o AMS that he noticed after coming home from work this evening.  He reports finding the patient covered in her own feces and confused.  He reports "She is really out of it and I don't know what's going on".  Associated generalized myalgias, cough, N/V/D for the past 1 week.  She was seen in ED on 12/23 with c/o myalgias and N/V/D, and was diagnosed with acute bronchitis.  She was sent home on Ceftin and brompheniramine-pseudoeph-DM.  In ED, patient oriented to person, but confused and unable to tell the date, where she is, or what occurred this afternoon.  She has a h/o seizures, and has been unable to keep any of her medications down for the past 2-3 days. Upon arrival to ED, patient significantly hypotensive with BP 55/32, , Temp 98.2.  IV fluid resuscitation given with adequate response in BP (111/62).  Patient has family support of her kids and family. .  Patient is not compliant with meds or clinic appointments.  Patient has [employer] insurance. Patient has no needs at this time. CM to f/u for safe transition    Levi Moncada MD       [id number] - [place] - [address] AT [facility]  [address]  [place] " [id number]  Phone: [phone] Fax: [phone]         12/26/18 1401   Discharge Assessment   Assessment Type Discharge Planning Assessment   Confirmed/corrected address and phone number on facesheet? Yes   Assessment information obtained from? Patient;Medical Record   Expected Length of Stay (days) (TBD)   Communicated expected length of stay with patient/caregiver no   Prior to hospitilization cognitive status: Alert/Oriented   Prior to hospitalization functional status: Assistive Equipment   Current cognitive status: Alert/Oriented   Current Functional Status: Needs Assistance;Assistive Equipment   Lives With child(nalini), adult;child(nalini), dependent   Able to Return to Prior Arrangements yes   Is patient able to care for self after discharge? Yes   Patient's perception of discharge disposition home or selfcare   Readmission Within the Last 30 Days no previous admission in last 30 days   Patient currently being followed by outpatient case management? No   Patient currently receives any other outside agency services? No   Equipment Currently Used at Home walker, rolling;cane, straight   Do you have any problems affording any of your prescribed medications? No   Is the patient taking medications as prescribed? yes   Does the patient have transportation home? Yes   Transportation Anticipated family or friend will provide;car, drives self   Does the patient receive services at the [facility]? No   Discharge Plan A Home with family;Home Health   Discharge Plan B Home with family   Patient/Family in Agreement with Plan yes

## 2018-12-26 NOTE — ASSESSMENT & PLAN NOTE
-Await current CD4 and viral load; last 135/<40 respectively  -Reports taking antiretrovirals but very unreliable source   -Makes differential for septic source extensive

## 2018-12-26 NOTE — ASSESSMENT & PLAN NOTE
1. LELAND :  Acute kidney injury likely a combination of dehydration and rhabdomyolysis ,  low blood pressure on presentation .      Renal function improving , continue IV fluids, good UO ,       elevation in CK total noted, likely not the cause of acute kidney injury,       2.  Hypotension - blood pressure improved with IV fluids and pressors,     3.  Chronic diarrhea - replete lytes and fluids     4.  Metabolic acidosis - replete bicarb,     5.  Replete potassium and phosphorus , hyponatremia due to acute kidney injury,     6. HIV / AIDS - per primary team      7. PNA / Sepsis - on broad-spectrum antibiotics, cultures ordered and pending,     8. Rhabdomyolysis : cont IV fluids     9. Hyponatremia - follow sodium , repeat labs     10. Metabolic acidosis , non-gap, likely due to diarrhea, cont bicarb with IV fluids till her serum bicarb is 20 ,

## 2018-12-26 NOTE — SUBJECTIVE & OBJECTIVE
Review of Systems   Constitutional: Positive for malaise/fatigue. Negative for fever.   Respiratory: Positive for cough (dry intermittent).    Cardiovascular: Negative for chest pain and palpitations.   Gastrointestinal: Positive for diarrhea. Negative for nausea and vomiting.   Musculoskeletal: Positive for myalgias.   Neurological: Positive for weakness. Negative for focal weakness.       Objective:     Vital Signs (Most Recent):  Temp: 98.6 °F (37 °C) (12/26/18 0715)  Pulse: 110 (12/26/18 0945)  Resp: (!) 23 (12/26/18 0945)  BP: (!) 110/51 (12/26/18 0900)  SpO2: 97 % (12/26/18 0945) Vital Signs (24h Range):  Temp:  [98.5 °F (36.9 °C)-103.7 °F (39.8 °C)] 98.6 °F (37 °C)  Pulse:  [] 110  Resp:  [17-37] 23  SpO2:  [64 %-100 %] 97 %  BP: ()/(32-77) 110/51     Weight: 59 kg (130 lb 1.1 oz)  Body mass index is 22.33 kg/m².      Intake/Output Summary (Last 24 hours) at 12/26/2018 1129  Last data filed at 12/26/2018 0600  Gross per 24 hour   Intake 4286.3 ml   Output 2080 ml   Net 2206.3 ml       Physical Exam   Constitutional: She is oriented to person, place, and time. She appears well-nourished. She is cooperative. She appears ill.   Ill appearing, NAD   HENT:   Head: Atraumatic.   Eyes: Conjunctivae and EOM are normal. Pupils are equal, round, and reactive to light.   Neck: No JVD present. No tracheal deviation present.   Cardiovascular: Regular rhythm. Tachycardia present.   No murmur heard.  Pulses:       Radial pulses are 1+ on the right side, and 1+ on the left side.        Dorsalis pedis pulses are 1+ on the right side, and 1+ on the left side.   Pulmonary/Chest: Effort normal and breath sounds normal. No tachypnea. No respiratory distress.   Abdominal: Soft. She exhibits no distension. There is tenderness in the left lower quadrant. There is no rigidity and no guarding.   Musculoskeletal: She exhibits no edema (generalized non pitting edema).   Generalized weakness, noted more to large muscles on  physical exam as hand /ankle flexion remain strong  Increased generalized tenderness on exam today most noted to thighs   Lymphadenopathy:     She has no cervical adenopathy.   Neurological: She is alert and oriented to person, place, and time. No cranial nerve deficit. GCS eye subscore is 4. GCS verbal subscore is 5. GCS motor subscore is 6.   Skin: Skin is warm and dry. Capillary refill takes less than 2 seconds. No cyanosis.   Psychiatric: Her affect is blunt. She is withdrawn. She is inattentive.       Vents:  Oxygen Concentration (%): 28 (12/25/18 0723)    Lines/Drains/Airways     Peripherally Inserted Central Catheter Line                 PICC Double Lumen 12/25/18 0800 right brachial 1 day          Drain                 Gastrostomy/Enterostomy 05/26/17 1540 Percutaneous endoscopic gastrostomy (PEG)  days         Urethral Catheter 12/25/18 1000 Latex 1 day          Arterial Line                 Arterial Line 12/25/18 1010 Right Radial 1 day          Pressure Ulcer                 Pressure Ulcer Right lateral other (see comments) suspected deep tissue injury -- days         Pressure Ulcer 07/24/17 0702 Left lower heel suspected deep tissue injury 520 days         Pressure Ulcer 07/24/17 0702 Right lower heel suspected deep tissue injury 520 days          Peripheral Intravenous Line                 Peripheral IV - Single Lumen 12/25/18 0150 Right Other 1 day                Significant Labs:    CBC/Anemia Profile:  Recent Labs   Lab 12/25/18  0017 12/25/18  0637 12/25/18  1051 12/25/18  1450 12/26/18  0404   WBC 8.57 8.79  --   --  5.74   HGB 12.1 9.4*  --   --  9.8*   HCT 36.3* 27.9* 25* 24* 28.4*   * 89*  --   --  76*   MCV 94 93  --   --  91   RDW 13.4 13.3  --   --  13.6        Chemistries:  Recent Labs   Lab 12/25/18  0017 12/25/18  0633  12/25/18  1056  12/25/18  2317 12/26/18  0404 12/26/18  0552 12/26/18  0755 12/26/18  1003   *  --    < > 130*   < > 135*  --  137 129* 131*   K  3.7  --    < > 3.0*   < > 3.0*  --  3.8 3.9 3.3*     --    < > 108   < > 112*  --  113* 106 106   CO2 15*  --    < > 13*   < > 15*  --  16* 17* 16*   BUN 54*  --    < > 52*   < > 45*  --  42* 38* 36*   CREATININE 5.2*  --    < > 3.7*   < > 2.7*  --  2.3* 2.1* 2.0*   CALCIUM 8.6*  --    < > 6.1*   < > 6.9*  --  6.6* 6.4* 6.5*   ALBUMIN 3.1*  --    < > 1.8*   < > 1.7*  --  1.7* 1.7*  --    PROT 10.4*  --    < >  --    < > 5.7*  --  5.7* 5.3*  --    BILITOT 0.8  --    < >  --    < > 0.5  --  0.5 0.4  --    ALKPHOS 162*  --    < >  --    < > 78  --  77 74  --    *  --    < >  --    < > 83*  --  88* 86*  --    *  --    < >  --    < > 161*  --  220* 229*  --    MG 1.7 1.3*  --   --   --   --  1.4*  --   --   --    PHOS 1.1* 1.8*  --  2.9  --   --  3.5  --   --   --     < > = values in this interval not displayed.     All pertinent labs within the past 24 hours have been reviewed.    Significant Imaging:  I have reviewed all pertinent imaging results/findings within the past 24 hours.

## 2018-12-26 NOTE — ASSESSMENT & PLAN NOTE
On vimpat  Was alone then found altered and having had incontinence  Elevated CPK, Cannot rule out seizure with AMS being post ictal -- prolactin nl on admit making seizure less likely  Continue vimpat

## 2018-12-26 NOTE — ASSESSMENT & PLAN NOTE
Continue aggressive IV hydration  Trend BUN/Creatinine  Renally dose medications  Nephrology following  12/26 creatinine trending down with hydration

## 2018-12-26 NOTE — SUBJECTIVE & OBJECTIVE
Interval History:     12/26/18 - no acute events , more alert today but still weak,     Review of patient's allergies indicates:   Allergen Reactions    Iodine and iodide containing products Anaphylaxis     Pt states she coded last time she was administered contrast    Pneumococcal 23-chitra ps vaccine Anaphylaxis     Potential iodine containing    Dilaudid [hydromorphone] Hives and Itching    Bactrim [sulfamethoxazole-trimethoprim] Hives    Morphine Itching     Tolerates norco 2018     Current Facility-Administered Medications   Medication Frequency    acetaminophen suppository 650 mg Q8H PRN    cefTRIAXone (ROCEPHIN) 1 g in dextrose 5 % 50 mL IVPB Q24H    famotidine (PF) injection 20 mg Daily    heparin (porcine) injection 5,000 Units Q8H    hydrocodone-apap 7.5-325 MG/15 ML oral solution 5 mL Q6H PRN    levalbuterol nebulizer solution 0.63 mg Q6H PRN    levETIRAcetam in NaCl (iso-os) IVPB 500 mg Q12H    lorazepam (ATIVAN) injection 2 mg Q4H PRN    magnesium sulfate in dextrose IVPB (premix) 1 g Once    ondansetron injection 4 mg Q6H PRN    potassium chloride SA CR tablet 20 mEq TID    sodium chloride 0.45% 1,000 mL with sodium bicarbonate 1 mEq/mL (8.4 %) 75 mEq infusion Continuous    vancomycin 1 g in dextrose 5 % 250 mL IVPB (ready to mix system) Once    [START ON 12/28/2018] vancomycin 1gm/D5W 250ml PLACEHOLDERE DOSE Q72H       Objective:     Vital Signs (Most Recent):  Temp: 98.6 °F (37 °C) (12/26/18 0715)  Pulse: 110 (12/26/18 0945)  Resp: (!) 23 (12/26/18 0945)  BP: (!) 110/51 (12/26/18 0900)  SpO2: 97 % (12/26/18 0945)  O2 Device (Oxygen Therapy): room air (12/26/18 0900) Vital Signs (24h Range):  Temp:  [98.5 °F (36.9 °C)-103.7 °F (39.8 °C)] 98.6 °F (37 °C)  Pulse:  [] 110  Resp:  [17-37] 23  SpO2:  [64 %-100 %] 97 %  BP: ()/() 110/51     Weight: 59 kg (130 lb 1.1 oz) (12/24/18 2367)  Body mass index is 22.33 kg/m².  Body surface area is 1.63 meters squared.    I/O last 3  completed shifts:  In: 4286.3 [I.V.:2686.3; IV Piggyback:1600]  Out: 2445 [Urine:2445]    Physical Exam   Constitutional: She is oriented to person, place, and time. She appears well-developed. No distress.   Frail looking    HENT:   Head: Normocephalic and atraumatic.   Mouth/Throat: Oropharynx is clear and moist. No oropharyngeal exudate.   Eyes: Conjunctivae and EOM are normal. Pupils are equal, round, and reactive to light.   Neck: Normal range of motion. Neck supple. No JVD present. Carotid bruit is not present. No tracheal deviation present. No thyroid mass and no thyromegaly present.   Cardiovascular: Regular rhythm, normal heart sounds and intact distal pulses. Exam reveals no gallop and no friction rub.   No murmur heard.  tachycardia    Pulmonary/Chest: No respiratory distress. She has wheezes. She has rales. She exhibits no tenderness.   Abdominal: Soft. Bowel sounds are normal. She exhibits no distension, no abdominal bruit, no ascites and no mass. There is no hepatosplenomegaly. There is no tenderness. There is no rebound, no guarding and no CVA tenderness.   Musculoskeletal: She exhibits no edema or tenderness.   Lymphadenopathy:     She has no cervical adenopathy.   Neurological: She is alert and oriented to person, place, and time. No cranial nerve deficit. She exhibits normal muscle tone. Coordination normal.   Skin: Skin is warm and intact. No rash noted. No erythema. No pallor.   Psychiatric: She has a normal mood and affect. Her behavior is normal.       Significant Labs:    Cardiac Markers: No results for input(s): CKMB, TROPONINT, MYOGLOBIN in the last 168 hours.  CBC:   Recent Labs   Lab 12/26/18  0404   WBC 5.74   RBC 3.13*   HGB 9.8*   HCT 28.4*   PLT 76*   MCV 91   MCH 31.3*   MCHC 34.5     CMP:   Recent Labs   Lab 12/26/18  0755   GLU 78   CALCIUM 6.4*   ALBUMIN 1.7*   PROT 5.3*   *   K 3.9   CO2 17*      BUN 38*   CREATININE 2.1*   ALKPHOS 74   ALT 86*   *   BILITOT 0.4      Coagulation:   Recent Labs   Lab 12/25/18  0017   INR 1.2   APTT 27.4     All labs within the past 24 hours have been reviewed.     Significant Imaging:  Reviewed    Lab Results   Component Value Date    ALBUMIN 1.7 (L) 12/26/2018       Lab Results   Component Value Date    CALCIUM 6.4 (LL) 12/26/2018    PHOS 3.5 12/26/2018

## 2018-12-26 NOTE — EICU
eICU Note :    Called by the [facility]:    Problem: K + 3.0     Pertinent History and labs reviewed :  Problem List:  2018-12: SIRS (systemic inflammatory response syndrome)  2018-12: Circulatory Shock  2018-12: LELAND (acute kidney injury)  2018-12: Thrombocytopenia  2018-12: Acute encephalopathy  2018-12: Seizure disorder  2018-12: Non-traumatic rhabdomyolysis  2018-12: Electrolyte disturbance      Treatment /Intervention given: KCL 40 meq ivMaikel Spears M.D  eICU Physician

## 2018-12-26 NOTE — NURSING
Pt brought to CT. No signs of distress. No complaints of pain. VSS. Weaned off of levophed. Calcium replacement. Criticore in place. Febrile. PRN tylenol given. Will cont to monitor

## 2018-12-26 NOTE — PLAN OF CARE
Problem: Adult Inpatient Plan of Care  Goal: Plan of Care Review  Outcome: Ongoing (interventions implemented as appropriate)  Patient remained stable throughout the shift. PICC line and Art line placed at bedside by PETROS Espinal. Patient has required pressor therapy and fluid resuscitation during the shift. Pressors was able to be weaned down to 0.03 before the end of my shift. All vitals and urine has been adequate. Patient spiked a temperature during the shift, tylenol suppository administered. Patient encouraged to turn q2.

## 2018-12-26 NOTE — ASSESSMENT & PLAN NOTE
Very high suspicion for sepsis but lactate normal; CT chest shows small bilateral lower lobe infiltrates but without significant pulmonary symptomology this finding seems out of proportion to the degree of dehydration/LELAND; low threshold for bronchoscopy per pulmonologist given AIDS status  with unreliable historian, extensive differential, and non specific markers + symptoms we will provide broad spectrum coverage and aggressive supportive care  Await stool studies  Rising CPK and increased muscle weakness/tenderness along with elevated LDH, aldolase -- would not rule polymyositis/infectious myositis out; send KAYLA, Myositis AssessR Plus Anastasia-1  Await stool studies, check urine myoglobin  Sputum obtained and sent for culture along with MRSA, throat swab, and fungal culture

## 2018-12-26 NOTE — PLAN OF CARE
Problem: Adult Inpatient Plan of Care  Goal: Plan of Care Review  Outcome: Ongoing (interventions implemented as appropriate)  Patient remained stable throughout the shift with stable vitals signs without pressor support, adequate urine output and remained afebrile. Patient weaker today than yesterday requiring assistance with turning and BADL's. Diet started patient tolerated without nausea. PT/OT orders placed. Electrolytes replaced with repeat levels. ART, PICC and Tinsley still in place.

## 2018-12-26 NOTE — ASSESSMENT & PLAN NOTE
Calcium improved with replacement, monitor  Continue potassium replacement  Replace magnesium  Monitor phos

## 2018-12-26 NOTE — ASSESSMENT & PLAN NOTE
No signs of bleeding  Will hold prophylaxis anticoagulation  Conservative transfusion protocol  Monitor

## 2018-12-26 NOTE — PLAN OF CARE
12/26/18 1417   Medicare Message   Important Message from Medicare regarding Discharge Appeal Rights Given to patient/caregiver;Explained to patient/caregiver;Signed/date by patient/caregiver   Date IMM was signed 12/26/18   Time IMM was signed [id number]

## 2018-12-26 NOTE — PT/OT/SLP PROGRESS
Occupational Therapy      Patient Name:  Wang Kebede   MRN:  [id number]  Eval initiated per chart review in Epic and conversation in RN. RN requested to hold OT eval until tomorrow secondary to patient just got back in bed and is tired. Will follow-up on next session.    Jennifer Onofre OT  12/26/2018   2899

## 2018-12-26 NOTE — PROGRESS NOTES
Vancomycin Consult Follow-up Note:  Indication: empiric for HIV patient with possible infiltrate; Goal 10-15 (closer to 15)  Other antimicrobials: Ceftriaxone (day 2)  Blood cultures: NGTD  Sputum cultures: ordered, if available to produce  LELAND (pt dehydrated upon admit SCr 4.4) -SCr 2.1 this morning (Resolving)  Pulse dosing  Only received Vancomycin 1250mg x1 12/25 @ 0150  Vancomycin random level this AM = 13.2 @0402 (~ 26 hrs post dose)    Plan:  1. Continue vancomycin empirically and try to obatin sputum cultures  2. Give 1gram dose now   3. Random level due tomorrow with AM labs  4. Continue to monitor LELAND resolution and necessity for antibiotics     Thank you for allowing us to participate in this patient's care!    Teri Crowder, PharmD  [phone]

## 2018-12-26 NOTE — PLAN OF CARE
Problem: Adult Inpatient Plan of Care  Goal: Plan of Care Review  Outcome: Ongoing (interventions implemented as appropriate)  Plan of care reviewed with patient. AAOx3. Delayed responses. Flat affect. Follow commands. Sinus tach on monitor 110-120s. Room air oxygen saturations  percent. R Radial art line in place. Dressing clean dry and intact. R brachial picc dressing clean dry and intact. Cont fluids per orders. Levophed weaned off to maintain MAP above 65. Weaned per art line pressure. Tinsley to gravity. Febrile Tmax 101.7. PRN Tylenol given. IV antibiotics. Fecal incontinence. Brief in place. Pt brought to CT per orders. Potassium replacement. Calcium replacement. Turn every two hours to prevent skin breakdown. Call light within reach. Will cont to monitor.

## 2018-12-27 PROBLEM — R91.8 PULMONARY INFILTRATE PRESENT ON COMPUTED TOMOGRAPHY: Status: ACTIVE | Noted: 2018-12-27

## 2018-12-27 PROBLEM — R57.9 SHOCK CIRCULATORY: Status: RESOLVED | Noted: 2018-12-25 | Resolved: 2018-12-27

## 2018-12-27 PROBLEM — B20 THROMBOCYTOPENIA ASSOCIATED WITH AIDS: Chronic | Status: ACTIVE | Noted: 2017-07-23

## 2018-12-27 PROBLEM — B20 AIDS: Chronic | Status: ACTIVE | Noted: 2017-08-15

## 2018-12-27 PROBLEM — D69.59 THROMBOCYTOPENIA ASSOCIATED WITH AIDS: Chronic | Status: ACTIVE | Noted: 2017-07-23

## 2018-12-27 PROBLEM — D64.9 ANEMIA, UNSPECIFIED: Chronic | Status: ACTIVE | Noted: 2017-08-15

## 2018-12-27 LAB
ALBUMIN SERPL BCP-MCNC: 1.7 G/DL
ALP SERPL-CCNC: 70 U/L
ALP SERPL-CCNC: 73 U/L
ALP SERPL-CCNC: 75 U/L
ALT SERPL W/O P-5'-P-CCNC: 101 U/L
ALT SERPL W/O P-5'-P-CCNC: 107 U/L
ALT SERPL W/O P-5'-P-CCNC: 118 U/L
ANA SER QL IF: NORMAL
ANION GAP SERPL CALC-SCNC: 6 MMOL/L
ANION GAP SERPL CALC-SCNC: 7 MMOL/L
ANION GAP SERPL CALC-SCNC: 7 MMOL/L
AST SERPL-CCNC: 376 U/L
AST SERPL-CCNC: 443 U/L
AST SERPL-CCNC: 491 U/L
BASOPHILS # BLD AUTO: 0.08 K/UL
BASOPHILS NFR BLD: 1.1 %
BILIRUB SERPL-MCNC: 0.4 MG/DL
BILIRUB SERPL-MCNC: 0.4 MG/DL
BILIRUB SERPL-MCNC: 0.5 MG/DL
BUN SERPL-MCNC: 28 MG/DL
BUN SERPL-MCNC: 31 MG/DL
BUN SERPL-MCNC: 32 MG/DL
CALCIUM SERPL-MCNC: 6.1 MG/DL
CALCIUM SERPL-MCNC: 6.3 MG/DL
CALCIUM SERPL-MCNC: 6.4 MG/DL
CHLORIDE SERPL-SCNC: 109 MMOL/L
CHLORIDE SERPL-SCNC: 109 MMOL/L
CHLORIDE SERPL-SCNC: 110 MMOL/L
CK SERPL-CCNC: ABNORMAL U/L
CK SERPL-CCNC: ABNORMAL U/L
CO2 SERPL-SCNC: 20 MMOL/L
CO2 SERPL-SCNC: 20 MMOL/L
CO2 SERPL-SCNC: 22 MMOL/L
CREAT SERPL-MCNC: 1.3 MG/DL
CREAT SERPL-MCNC: 1.4 MG/DL
CREAT SERPL-MCNC: 1.6 MG/DL
DIFFERENTIAL METHOD: ABNORMAL
EOSINOPHIL # BLD AUTO: 0.3 K/UL
EOSINOPHIL NFR BLD: 4.5 %
ERYTHROCYTE [DISTWIDTH] IN BLOOD BY AUTOMATED COUNT: 13.6 %
EST. GFR  (AFRICAN AMERICAN): 48 ML/MIN/1.73 M^2
EST. GFR  (AFRICAN AMERICAN): 57 ML/MIN/1.73 M^2
EST. GFR  (AFRICAN AMERICAN): >60 ML/MIN/1.73 M^2
EST. GFR  (NON AFRICAN AMERICAN): 42 ML/MIN/1.73 M^2
EST. GFR  (NON AFRICAN AMERICAN): 49 ML/MIN/1.73 M^2
EST. GFR  (NON AFRICAN AMERICAN): 54 ML/MIN/1.73 M^2
GLUCOSE SERPL-MCNC: 77 MG/DL
GLUCOSE SERPL-MCNC: 82 MG/DL
GLUCOSE SERPL-MCNC: 90 MG/DL
HCT VFR BLD AUTO: 27.4 %
HGB BLD-MCNC: 9.2 G/DL
LYMPHOCYTES # BLD AUTO: 1.4 K/UL
LYMPHOCYTES NFR BLD: 19.6 %
MAGNESIUM SERPL-MCNC: 2.2 MG/DL
MCH RBC QN AUTO: 30.4 PG
MCHC RBC AUTO-ENTMCNC: 33.6 G/DL
MCV RBC AUTO: 90 FL
MONOCYTES # BLD AUTO: 0.3 K/UL
MONOCYTES NFR BLD: 4.3 %
NEUTROPHILS # BLD AUTO: 5 K/UL
NEUTROPHILS NFR BLD: 72.1 %
PHOSPHATE SERPL-MCNC: 3 MG/DL
PLATELET # BLD AUTO: 55 K/UL
PLATELET BLD QL SMEAR: ABNORMAL
PMV BLD AUTO: 11.4 FL
POCT GLUCOSE: 90 MG/DL (ref 70–110)
POTASSIUM SERPL-SCNC: 4 MMOL/L
POTASSIUM SERPL-SCNC: 4.1 MMOL/L
POTASSIUM SERPL-SCNC: 4.1 MMOL/L
PROCALCITONIN SERPL IA-MCNC: 11.46 NG/ML
PROT SERPL-MCNC: 5.2 G/DL
PROT SERPL-MCNC: 5.4 G/DL
PROT SERPL-MCNC: 5.5 G/DL
RBC # BLD AUTO: 3.03 M/UL
SODIUM SERPL-SCNC: 136 MMOL/L
SODIUM SERPL-SCNC: 136 MMOL/L
SODIUM SERPL-SCNC: 138 MMOL/L
URATE SERPL-MCNC: 6.3 MG/DL
VANCOMYCIN SERPL-MCNC: 17.2 UG/ML
WBC # BLD AUTO: 7.04 K/UL

## 2018-12-27 PROCEDURE — 85025 COMPLETE CBC W/AUTO DIFF WBC: CPT | Mod: HCNC

## 2018-12-27 PROCEDURE — 25000003 PHARM REV CODE 250: Mod: HCNC | Performed by: NURSE PRACTITIONER

## 2018-12-27 PROCEDURE — 99291 CRITICAL CARE FIRST HOUR: CPT | Mod: HCNC,,, | Performed by: NURSE PRACTITIONER

## 2018-12-27 PROCEDURE — 25000003 PHARM REV CODE 250: Mod: HCNC | Performed by: INTERNAL MEDICINE

## 2018-12-27 PROCEDURE — 97530 THERAPEUTIC ACTIVITIES: CPT | Mod: HCNC

## 2018-12-27 PROCEDURE — G8978 MOBILITY CURRENT STATUS: HCPCS | Mod: CM,HCNC

## 2018-12-27 PROCEDURE — 99233 SBSQ HOSP IP/OBS HIGH 50: CPT | Mod: HCNC,,, | Performed by: INTERNAL MEDICINE

## 2018-12-27 PROCEDURE — 99233 PR SUBSEQUENT HOSPITAL CARE,LEVL III: ICD-10-PCS | Mod: HCNC,,, | Performed by: INTERNAL MEDICINE

## 2018-12-27 PROCEDURE — 99291 PR CRITICAL CARE, E/M 30-74 MINUTES: ICD-10-PCS | Mod: HCNC,,, | Performed by: NURSE PRACTITIONER

## 2018-12-27 PROCEDURE — 84100 ASSAY OF PHOSPHORUS: CPT | Mod: HCNC

## 2018-12-27 PROCEDURE — S0028 INJECTION, FAMOTIDINE, 20 MG: HCPCS | Mod: HCNC | Performed by: FAMILY MEDICINE

## 2018-12-27 PROCEDURE — G8979 MOBILITY GOAL STATUS: HCPCS | Mod: CK,HCNC

## 2018-12-27 PROCEDURE — 20000000 HC ICU ROOM: Mod: HCNC

## 2018-12-27 PROCEDURE — 63600175 PHARM REV CODE 636 W HCPCS: Mod: HCNC | Performed by: NURSE PRACTITIONER

## 2018-12-27 PROCEDURE — 97163 PT EVAL HIGH COMPLEX 45 MIN: CPT | Mod: HCNC

## 2018-12-27 PROCEDURE — 84550 ASSAY OF BLOOD/URIC ACID: CPT | Mod: HCNC

## 2018-12-27 PROCEDURE — 84145 PROCALCITONIN (PCT): CPT | Mod: HCNC

## 2018-12-27 PROCEDURE — 80053 COMPREHEN METABOLIC PANEL: CPT | Mod: 91,HCNC

## 2018-12-27 PROCEDURE — 82550 ASSAY OF CK (CPK): CPT | Mod: HCNC

## 2018-12-27 PROCEDURE — 83735 ASSAY OF MAGNESIUM: CPT | Mod: HCNC

## 2018-12-27 PROCEDURE — 25000003 PHARM REV CODE 250: Mod: HCNC | Performed by: FAMILY MEDICINE

## 2018-12-27 PROCEDURE — 97167 OT EVAL HIGH COMPLEX 60 MIN: CPT | Mod: HCNC

## 2018-12-27 PROCEDURE — 63600175 PHARM REV CODE 636 W HCPCS: Mod: HCNC | Performed by: INTERNAL MEDICINE

## 2018-12-27 PROCEDURE — 82550 ASSAY OF CK (CPK): CPT | Mod: 91,HCNC

## 2018-12-27 PROCEDURE — 80202 ASSAY OF VANCOMYCIN: CPT | Mod: HCNC

## 2018-12-27 RX ORDER — FAMOTIDINE 20 MG/1
20 TABLET, FILM COATED ORAL 2 TIMES DAILY
Status: DISCONTINUED | OUTPATIENT
Start: 2018-12-27 | End: 2018-12-31 | Stop reason: HOSPADM

## 2018-12-27 RX ORDER — BISACODYL 10 MG
10 SUPPOSITORY, RECTAL RECTAL DAILY PRN
Status: DISCONTINUED | OUTPATIENT
Start: 2018-12-27 | End: 2018-12-31 | Stop reason: HOSPADM

## 2018-12-27 RX ORDER — DOCUSATE SODIUM 100 MG/1
100 CAPSULE, LIQUID FILLED ORAL DAILY
Status: DISCONTINUED | OUTPATIENT
Start: 2018-12-27 | End: 2018-12-31 | Stop reason: HOSPADM

## 2018-12-27 RX ORDER — LEVETIRACETAM 500 MG/1
500 TABLET ORAL 2 TIMES DAILY
Status: DISCONTINUED | OUTPATIENT
Start: 2018-12-27 | End: 2018-12-31 | Stop reason: HOSPADM

## 2018-12-27 RX ORDER — VANCOMYCIN HCL IN 5 % DEXTROSE 1G/250ML
1000 PLASTIC BAG, INJECTION (ML) INTRAVENOUS ONCE
Status: COMPLETED | OUTPATIENT
Start: 2018-12-27 | End: 2018-12-27

## 2018-12-27 RX ORDER — ATOVAQUONE 750 MG/5ML
1500 SUSPENSION ORAL
Status: DISCONTINUED | OUTPATIENT
Start: 2018-12-27 | End: 2018-12-31 | Stop reason: HOSPADM

## 2018-12-27 RX ADMIN — LEVETIRACETAM 500 MG: 500 TABLET, FILM COATED ORAL at 08:12

## 2018-12-27 RX ADMIN — SODIUM CHLORIDE, SODIUM LACTATE, POTASSIUM CHLORIDE, AND CALCIUM CHLORIDE: .6; .31; .03; .02 INJECTION, SOLUTION INTRAVENOUS at 01:12

## 2018-12-27 RX ADMIN — CEFTRIAXONE 1 G: 1 INJECTION, SOLUTION INTRAVENOUS at 06:12

## 2018-12-27 RX ADMIN — FAMOTIDINE 20 MG: 10 INJECTION INTRAVENOUS at 08:12

## 2018-12-27 RX ADMIN — FAMOTIDINE 20 MG: 20 TABLET ORAL at 08:12

## 2018-12-27 RX ADMIN — SODIUM CHLORIDE, SODIUM LACTATE, POTASSIUM CHLORIDE, AND CALCIUM CHLORIDE: .6; .31; .03; .02 INJECTION, SOLUTION INTRAVENOUS at 08:12

## 2018-12-27 RX ADMIN — ATOVAQUONE 1500 MG: 750 SUSPENSION ORAL at 12:12

## 2018-12-27 RX ADMIN — SODIUM BICARBONATE: 84 INJECTION, SOLUTION INTRAVENOUS at 06:12

## 2018-12-27 RX ADMIN — LEVETIRACETAM INJECTION 500 MG: 5 INJECTION INTRAVENOUS at 08:12

## 2018-12-27 RX ADMIN — SODIUM CHLORIDE, SODIUM LACTATE, POTASSIUM CHLORIDE, AND CALCIUM CHLORIDE: .6; .31; .03; .02 INJECTION, SOLUTION INTRAVENOUS at 03:12

## 2018-12-27 RX ADMIN — VANCOMYCIN 1000 MG: 1 INJECTION, SOLUTION INTRAVENOUS at 08:12

## 2018-12-27 NOTE — ASSESSMENT & PLAN NOTE
Afeb with normal WBC  Given sputum + GNRs and GPC with AIDS  Will cont Atovaquone, Vanc and Rocephin  Encourage OOB, D&DB  CXR in AM
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
## 2018-12-27 NOTE — ASSESSMENT & PLAN NOTE
1. LELAND :  Acute kidney injury from a combination of dehydration and rhabdomyolysis ,  low blood pressure on presentation .      Renal function improving , continue IV fluids, good UO ,       elevation in CK total noted, still not peaked yet ,      2.  Hypotension - blood pressure improved with IV fluids and pressors,      3.  Chronic diarrhea - replete lytes and fluids     4.  Metabolic acidosis - replete bicarb, better ,      5.  Replete potassium and phosphorus , hyponatremia due to acute kidney injury,     6. HIV / AIDS - per primary team      7. PNA / Sepsis - on broad-spectrum antibiotics, cultures ordered and pending,     8. Rhabdomyolysis : cont IV fluids     9. Hyponatremia - follow sodium , repeat labs     10. Metabolic acidosis , non-gap, likely due to diarrhea, cont bicarb with IV fluids till her serum bicarb is 20 ,     11. HIV / AIDs per medicine team,

## 2018-12-27 NOTE — CONSULTS
Pharmacy Vancomycin Consult Note    WBC=7.04  Tmax=99.1  CrCl=52.7ml/min Scr=1.3 (improved from 2.1)    Cultures: respiratory- gram (-) rods, gram (+) cocci  Dx. Empiric for HIV patient    Patient is currently being pulse dosed. We can aim for a lower goal of 10-15. Patient was given a  Vancomycin 1 gram dose this am. I will draw a vancomycin random 24 hour post dose and use that to schedule patient's vancomycin tomorrow if antibiotics are continued.   Vancomycin random level due 12/28 @ 0800  Vancomycin 1 gram Q72 is a placeholder dose only and should not be given.    Thank you for allowing us to participate in this patient's care.  Lucrecia Frausto, Pharm D 12/27/2018 11:52 AM
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
## 2018-12-27 NOTE — ASSESSMENT & PLAN NOTE
CD-4 29  Admits to not taking meds since incarcerated 3 months ago and did not resume when released 3 weeks ago.  ID consulted  Viral load pending

## 2018-12-27 NOTE — SUBJECTIVE & OBJECTIVE
Interval History:     12/26/18 - no acute events , more alert today but still weak,     12/27/18 - no acute events, denies complaints, feels better, sitting up and eating lunch ,     Review of patient's allergies indicates:   Allergen Reactions    Iodine and iodide containing products Anaphylaxis     Pt states she coded last time she was administered contrast    Pneumococcal 23-chitra ps vaccine Anaphylaxis     Potential iodine containing    Dilaudid [hydromorphone] Hives and Itching    Bactrim [sulfamethoxazole-trimethoprim] Hives    Morphine Itching     Tolerates norco 2018     Current Facility-Administered Medications   Medication Frequency    acetaminophen suppository 650 mg Q8H PRN    atovaquone suspension 1,500 mg with lunch    cefTRIAXone (ROCEPHIN) 1 g in dextrose 5 % 50 mL IVPB Q24H    famotidine (PF) injection 20 mg Daily    hydrocodone-apap 7.5-325 MG/15 ML oral solution 5 mL Q6H PRN    lactated ringers infusion Continuous    levalbuterol nebulizer solution 0.63 mg Q6H PRN    levETIRAcetam in NaCl (iso-os) IVPB 500 mg Q12H    lorazepam (ATIVAN) injection 2 mg Q4H PRN    ondansetron injection 4 mg Q6H PRN    [START ON 12/30/2018] vancomycin 1gm/D5W 250ml PLACEHOLDERE DOSE Q72H       Objective:     Vital Signs (Most Recent):  Temp: 99.1 °F (37.3 °C) (12/27/18 0305)  Pulse: 98 (12/27/18 0754)  Resp: (!) 22 (12/27/18 0754)  BP: 119/83 (12/27/18 0600)  SpO2: 99 % (12/27/18 0754)  O2 Device (Oxygen Therapy): room air (12/27/18 0754) Vital Signs (24h Range):  Temp:  [98.8 °F (37.1 °C)-99.1 °F (37.3 °C)] 99.1 °F (37.3 °C)  Pulse:  [] 98  Resp:  [20-34] 22  SpO2:  [88 %-100 %] 99 %  BP: ()/(37-83) 119/83     Weight: 59 kg (130 lb 1.1 oz) (12/24/18 2357)  Body mass index is 22.33 kg/m².  Body surface area is 1.63 meters squared.    I/O last 3 completed shifts:  In: 5969.7 [I.V.:5019.7; IV Piggyback:950]  Out: 3821 [Urine:3821]    Physical Exam   Constitutional: She is oriented to person,  place, and time. She appears well-developed. No distress.   Frail looking    HENT:   Head: Normocephalic and atraumatic.   Mouth/Throat: Oropharynx is clear and moist. No oropharyngeal exudate.   Eyes: Conjunctivae and EOM are normal. Pupils are equal, round, and reactive to light.   Neck: Normal range of motion. Neck supple. No JVD present. Carotid bruit is not present. No tracheal deviation present. No thyroid mass and no thyromegaly present.   Cardiovascular: Regular rhythm, normal heart sounds and intact distal pulses. Exam reveals no gallop and no friction rub.   No murmur heard.  tachycardia    Pulmonary/Chest: No respiratory distress. She has wheezes. She has rales. She exhibits no tenderness.   Abdominal: Soft. Bowel sounds are normal. She exhibits no distension, no abdominal bruit, no ascites and no mass. There is no hepatosplenomegaly. There is no tenderness. There is no rebound, no guarding and no CVA tenderness.   Musculoskeletal: She exhibits no edema or tenderness.   Lymphadenopathy:     She has no cervical adenopathy.   Neurological: She is alert and oriented to person, place, and time. No cranial nerve deficit. She exhibits normal muscle tone. Coordination normal.   Skin: Skin is warm and intact. No rash noted. No erythema. No pallor.   Psychiatric: She has a normal mood and affect. Her behavior is normal.       Significant Labs:    Cardiac Markers: No results for input(s): CKMB, TROPONINT, MYOGLOBIN in the last 168 hours.  CBC:   Recent Labs   Lab 12/27/18  0343   WBC 7.04   RBC 3.03*   HGB 9.2*   HCT 27.4*   PLT 55*   MCV 90   MCH 30.4   MCHC 33.6     CMP:   Recent Labs   Lab 12/27/18  0829   GLU 77   CALCIUM 6.4*   ALBUMIN 1.7*   PROT 5.5*      K 4.1   CO2 22*      BUN 28*   CREATININE 1.3   ALKPHOS 75   *   *   BILITOT 0.4     Coagulation:   Recent Labs   Lab 12/25/18  0017   INR 1.2   APTT 27.4     All labs within the past 24 hours have been reviewed.     Significant  Imaging:  Reviewed    Lab Results   Component Value Date    ALBUMIN 1.7 (L) 12/27/2018       Lab Results   Component Value Date    CALCIUM 6.4 (LL) 12/27/2018    PHOS 3.0 12/27/2018

## 2018-12-27 NOTE — PROGRESS NOTES
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; last CD4 135, viral load < 40 in 2/2018; reports sickle cell disease but labs do not confirm, also reports pregnancy despite having had total hysterectomy  Presented to ED after found by SO with confusion, stool incontinence on 12/24; of note had come in to ED on 12/23 with complaints of myalgias, vomiting, generalized illness x 1 week and given rocephin and decadron injections for bronchitis with negative flu swab  ED eval on 12/24 revealed dehydration, LELAND, hypotension, elevated CPK, procalcitonin 81.6 with clear CXR and no overt septic source    Hospital/ICU Course:  12/25 - Admitted to ICU after BP responded to IVF bolus in ED; after arrival BP falling, required placement of CL for pressor; remains AAOx3 without helpful insight to presenting problem, only reports malaise, myalgias, diarrhea  12/26 - weaned off pressor after aggressive IVF resuscitation and HR trend down, now 110-120, creatinine trends down, CPK continues rising, now [id number]; afebrile since 11pm; complains of increased generalized weakness today  12/27 - Shock and Encephalopathy resolved.  Awake, alert and responsive upright in chair this AM still weak but VSS.      Review of Systems   Constitutional: Positive for malaise/fatigue and weight loss. Negative for chills and fever.   HENT: Negative for congestion.    Eyes:  Negative for blurred vision.   Respiratory: Positive for cough. Negative for sputum production and shortness of breath.    Cardiovascular: Positive for leg swelling. Negative for chest pain.   Gastrointestinal: Negative for abdominal pain, nausea and vomiting.   Genitourinary: Negative for dysuria.   Musculoskeletal: Negative for myalgias.   Skin: Negative for rash.   Neurological: Positive for weakness. Negative for dizziness, focal weakness and headaches.   Endo/Heme/Allergies: Does not bruise/bleed easily.   Psychiatric/Behavioral: Positive for depression. The patient is not nervous/anxious.        Objective:     Vital Signs (Most Recent):  Temp: 99.1 °F (37.3 °C) (12/27/18 0305)  Pulse: 98 (12/27/18 0754)  Resp: (!) 22 (12/27/18 0754)  BP: 119/83 (12/27/18 0600)  SpO2: 99 % (12/27/18 0754) Vital Signs (24h Range):  Temp:  [98.8 °F (37.1 °C)-99.1 °F (37.3 °C)] 99.1 °F (37.3 °C)  Pulse:  [] 98  Resp:  [20-34] 22  SpO2:  [88 %-100 %] 99 %  BP: ()/(37-83) 119/83     Weight: 59 kg (130 lb 1.1 oz)  Body mass index is 22.33 kg/m².      Intake/Output Summary (Last 24 hours) at 12/27/2018 1236  Last data filed at 12/27/2018 0622  Gross per 24 hour   Intake 3467.5 ml   Output 2230 ml   Net 1237.5 ml       Physical Exam   Constitutional: She is oriented to person, place, and time. Vital signs are normal. She appears well-developed. She is cooperative. She has a sickly appearance. She appears ill.   Ill appearing, NAD   HENT:   Head: Atraumatic.   Mouth/Throat: Oropharynx is clear and moist and mucous membranes are normal.   Eyes: Conjunctivae and EOM are normal. Pupils are equal, round, and reactive to light.   Neck: Full passive range of motion without pain. No JVD present. No tracheal deviation present.   Cardiovascular: Regular rhythm. Tachycardia present.   No murmur heard.  Pulses:       Radial pulses are 2+ on the right side, and 2+ on the left side.        Dorsalis pedis pulses are 1+ on the right side,  and 1+ on the left side.   Pulmonary/Chest: Effort normal. No accessory muscle usage. No tachypnea. No respiratory distress. She has decreased breath sounds in the right lower field and the left lower field.   Abdominal: Soft. She exhibits no distension. Bowel sounds are decreased. There is no tenderness.   Genitourinary:   Genitourinary Comments: Tinsley in place   Musculoskeletal: Normal range of motion. She exhibits no edema (generalized non pitting edema).   Generalized weakness, noted more to large muscles on physical exam as hand /ankle flexion remain strong.   Lymphadenopathy:     She has no cervical adenopathy.   Neurological: She is alert and oriented to person, place, and time.   Skin: Skin is warm and dry. Capillary refill takes less than 2 seconds. No cyanosis.        Psychiatric: Her speech is normal. Judgment and thought content normal. She is slowed and withdrawn. Cognition and memory are normal. She exhibits a depressed mood.       Vents:  Oxygen Concentration (%): 28 (12/25/18 0723)    Lines/Drains/Airways     Peripherally Inserted Central Catheter Line                 PICC Double Lumen 12/25/18 0800 right brachial 2 days          Drain             579 days         Urethral Catheter 12/25/18 1000 Latex 2 days          Arterial Line                 Arterial Line 12/25/18 1010 Right Radial 2 days          Peripheral Intravenous Line                 Peripheral IV - Single Lumen 12/25/18 0150 Right Other 2 days                Significant Labs:    CBC/Anemia Profile:  Recent Labs   Lab 12/25/18  1450 12/26/18  0404 12/27/18  0343   WBC  --  5.74 7.04   HGB  --  9.8* 9.2*   HCT 24* 28.4* 27.4*   PLT  --  76* 55*   MCV  --  91 90   RDW  --  13.6 13.6        Chemistries:  Recent Labs   Lab 12/26/18  0404  12/26/18  1702 12/26/18  2300 12/27/18  0343 12/27/18  0829   NA  --    < >  --  136 136 138   K  --    < >  --  4.0 4.1 4.1   CL  --    < >  --  109 110 109   CO2  --    < >  --  20* 20* 22*   BUN  --     < >  --  32* 31* 28*   CREATININE  --    < >  --  1.6* 1.4 1.3   CALCIUM  --    < >  --  6.1* 6.3* 6.4*   ALBUMIN  --    < >  --  1.7* 1.7* 1.7*   PROT  --    < >  --  5.2* 5.4* 5.5*   BILITOT  --    < >  --  0.4 0.5 0.4   ALKPHOS  --    < >  --  70 73 75   ALT  --    < >  --  101* 107* 118*   AST  --    < >  --  376* 443* 491*   MG 1.4*  --  2.1  --  2.2  --    PHOS 3.5  --   --   --  3.0  --     < > = values in this interval not displayed.       All pertinent labs within the past 24 hours have been reviewed.  CPK 29,424    Significant Imaging:  CT chest: I have reviewed all pertinent results/findings within the past 24 hours and my personal findings are:  bibasal infiltrates with small pleural effusions        ABG  Recent Labs   Lab 12/25/18  1450   PH 7.362   PO2 70*   PCO2 22.4*   HCO3 12.7*   BE -13     Assessment/Plan:     Neuro          Seizure disorder    Cont Keppra  Was alone then found altered and having had incontinence  Elevated CPK, Cannot rule out seizure with AMS being post ictal -- prolactin nl on admit making seizure less likely.  No seizure activity since admit     Pulmonary   Pulmonary infiltrate present on computed tomography    Afeb with normal WBC  Given sputum + GNRs and GPC with AIDS  Will cont Atovaquone, Vanc and Rocephin  Encourage OOB, D&DB  CXR in AM     Cardiac/Vascular   History of VT/VF s/p implantation of automatic cardioverter/defibrillator (AICD)    Cont ICU cardiac monitoring     Renal/          LELAND (acute kidney injury)    Nephrology following  Improving daily with IVFs - I>>O  BMP in AM  Daily weights and accurate I/Os     ID          AIDS (acquired immunodeficiency syndrome), CD4 <=200    CD-4 29  Admits to not taking meds since incarcerated 3 months ago and did not resume when released 3 weeks ago.  ID consulted  Viral load pending       Hematology   Thrombocytopenia associated with AIDS    No signs of bleeding  Will hold prophylaxis anticoagulation  Conservative  transfusion protocol  Monitor CBC     Oncology   Anemia, unspecified    No active bleeding   Conservative transfusion protocol  CBC daily     Orthopedic   * Non-traumatic rhabdomyolysis    Elevated CPK without known trauma or prolonged downtime raises suspicion for seizure but nl prolactin  Continued rise in CPK along with significant LDH and aldolase elevation; increase concern for a myositis, autoimmune or infectious  Continue aggressive hydration       Preventive Measures and Monitoring:   Stress Ulcer: Pepcid  Nutrition: regular diet  Glucose control: stable  Bowel prophylaxis: Colace and PRN Dulcolax  DVT prophylaxis: SCDs  Hx CAD on B-Blocker: no hx CAD  Head of Bed/Reposition: Elevate HOB and turn Q1-2 hours   Early Mobility: OOB in chair today  Central Line RUE PICC Day: #3  Tinsley Day: #3  Right Radial Arterial Line Day: #3  IVAB Day: #3  Code Status: Full  Pneumonia Vaccine: defer to ID  Flu Vaccine: defer to ID    Counseling/Consultation:I have discussed the care of this patient in detail with the bedside nursing staff and Dr. Mtz and Dr. Mccauley    Critical Care Time: 46 minutes  Critical secondary to Patient has a condition that poses threat to life and bodily function: Acute Renal Failure      Critical care was time spent personally by me on the following activities: development of treatment plan with patient or surrogate and bedside caregivers, discussions with consultants, evaluation of patient's response to treatment, examination of patient, ordering and performing treatments and interventions, ordering and review of laboratory studies, ordering and review of radiographic studies, pulse oximetry, re-evaluation of patient's condition. This critical care time did not overlap with that of any other provider or involve time for any procedures.     Hubert Ramirez NP  Critical Care Medicine  [facility] -

## 2018-12-27 NOTE — ASSESSMENT & PLAN NOTE
On HAART, unknown if compliant at present.  Patient with long-standing h/o noncompliance.  Absolute CD4 count 29 and viral load pending.  Hold HAART for now.  Will consult ID for recs.

## 2018-12-27 NOTE — SUBJECTIVE & OBJECTIVE
Interval History:  Intermittent fevers up to 104 which responded to [place] Acetaminophen.  Procalcitonin decreased to 28 but CPK is trending up as high as 14,000.  Successfully weaned Levophed.  Remains lethargic some of the time but alert and oriented intermittently.    Review of Systems   Constitutional: Positive for fatigue. Negative for chills and fever.   HENT: Negative for congestion and sore throat.    Eyes: Negative for visual disturbance.   Respiratory: Negative for cough, shortness of breath and wheezing.    Cardiovascular: Negative for chest pain, palpitations and leg swelling.   Gastrointestinal: Positive for diarrhea. Negative for abdominal pain, blood in stool, constipation, nausea and vomiting.   Genitourinary: Negative for dysuria and hematuria.   Musculoskeletal: Negative for arthralgias and back pain.   Skin: Negative for rash and wound.   Neurological: Positive for weakness. Negative for dizziness, light-headedness and numbness.   Hematological: Negative for adenopathy.     Objective:     Vital Signs (Most Recent):  Temp: 98.8 °F (37.1 °C) (12/26/18 1530)  Pulse: 109 (12/26/18 1815)  Resp: (!) 21 (12/26/18 1815)  BP: (!) 91/49 (12/26/18 1800)  SpO2: 100 % (12/26/18 1815) Vital Signs (24h Range):  Temp:  [98.4 °F (36.9 °C)-101.7 °F (38.7 °C)] 98.8 °F (37.1 °C)  Pulse:  [] 109  Resp:  [10-37] 21  SpO2:  [74 %-100 %] 100 %  BP: ()/(34-82) 91/49     Weight: 59 kg (130 lb 1.1 oz)  Body mass index is 22.33 kg/m².    Intake/Output Summary (Last 24 hours) at 12/26/2018 1859  Last data filed at 12/26/2018 1800  Gross per 24 hour   Intake 4936.3 ml   Output 2369 ml   Net 2567.3 ml      Physical Exam   Constitutional: She is oriented to person, place, and time. She appears well-developed and well-nourished. No distress.   HENT:   Head: Normocephalic and atraumatic.   Mouth/Throat: Oropharynx is clear and moist.   Eyes: Conjunctivae and EOM are normal. Pupils are equal, round, and reactive to light.    Neck: Neck supple. No JVD present. No thyromegaly present.   Cardiovascular: Normal rate and regular rhythm. Exam reveals no gallop and no friction rub.   No murmur heard.  Pulmonary/Chest: Effort normal and breath sounds normal. She has no wheezes. She has no rales.   Abdominal: Soft. Bowel sounds are normal. She exhibits distension. There is no tenderness. There is no rebound and no guarding.   Distended but soft without guarding.   Musculoskeletal: Normal range of motion. She exhibits no edema or deformity.   Lymphadenopathy:     She has no cervical adenopathy.   Neurological: She is alert and oriented to person, place, and time. She has normal reflexes.   Somnolent but interacting appropriately.   Skin: Skin is warm and dry. No rash noted.   Psychiatric: She has a normal mood and affect. Her behavior is normal. Judgment and thought content normal.   Nursing note and vitals reviewed.      Significant Labs: All pertinent labs within the past 24 hours have been reviewed.    Significant Imaging: I have reviewed all pertinent imaging results/findings within the past 24 hours.

## 2018-12-27 NOTE — PROGRESS NOTES
"[facility] Hospital Medicine  Progress Note    Patient Name: Wang Kebede  MRN: [id number]  Patient Class: IP- Inpatient   Admission Date: 12/24/2018  Length of Stay: 2 days  Attending Physician: Gerardo Mccauley MD  Primary Care Provider: Levi Moncada MD        Subjective:     Principal Problem:Non-traumatic rhabdomyolysis    HPI:  Unable to obtain history from patient due to mental status and clinical condition.  HPI obtained from boyfriend at bedside, ER records, and past medical record.  Ms. Kebede is a 34 yo female with  a PMHx of HIV/AIDS, anemia, chronically elevated LFTs, and h/o VT/VF after receiving iodine containing contrast s/p ICD implantation, seizure disorder, and h/o vulvar CA, who was brought to the ED by her boyfriend with c/o AMS that he noticed after coming home from work this evening.  He reports finding the patient covered in her own feces and confused.  He reports "She is really out of it and I don't know what's going on".  Associated generalized myalgias, cough, N/V/D for the past 1 week.  She was seen in ED on 12/23 with c/o myalgias and N/V/D, and was diagnosed with acute bronchitis.  She was sent home on Ceftin and brompheniramine-pseudoeph-DM.  In ED, patient oriented to person, but confused and unable to tell the date, where she is, or what occurred this afternoon.  She has a h/o seizures, and has been unable to keep any of her medications down for the past 2-3 days.  Work-up in ED resulted WBC 8.57, plt 115, Na 131, bicarb 15, BUN 54, cr. 5.2, , , lipase 23, CPK 2172, lactic 2, procalcitonin 81.6.  VBG with pH 7.314.  UA negative for infectious process.  CXR unremarkable.  CT head negative for acute process.  Upon arrival to ED, patient significantly hypotensive with BP 55/32, , Temp 98.2.  IV fluid resuscitation given with adequate response in BP (111/62).  Blood/urine cultures drawn, and empiric IV abx given.  Hospital Medicine " was called for admission.  Patient remains confused, hemodynamically stable.    Hospital Course:  Admitted to ICU for evaluation and treatment acute encephalopathy and circulatory shock presumably from sepsis.  She is also at risk for opportunistic infections due to AIDS.  Serial lactic acid measurements normal but procalcitonin highly elevated at 80 with hypotension.  Empirically started Vancomycin and Ceftriaxone.  She also has a history of seizure and medication non-compliance and may have had a seizure.  Intermittent fevers up to 104 which responded to [place] Acetaminophen.  Procalcitonin decreased to 28 but CPK is trending up as high as 14,000.  Successfully weaned Levophed.  Remains lethargic some of the time but alert and oriented intermittently.  CPK continues to rise to 29,000 with renal function and urine output improving.    Interval History:  Intermittent fevers up to 104 which responded to IA Acetaminophen.  Procalcitonin decreased to 28 but CPK is trending up as high as 29,000.  More alert and aware.    Review of Systems   Constitutional: Positive for fatigue. Negative for chills and fever.   HENT: Negative for congestion and sore throat.    Eyes: Negative for visual disturbance.   Respiratory: Negative for cough, shortness of breath and wheezing.    Cardiovascular: Negative for chest pain, palpitations and leg swelling.   Gastrointestinal: Positive for diarrhea. Negative for abdominal pain, blood in stool, constipation, nausea and vomiting.   Genitourinary: Negative for dysuria and hematuria.   Musculoskeletal: Negative for arthralgias and back pain.   Skin: Negative for rash and wound.   Neurological: Positive for weakness. Negative for dizziness, light-headedness and numbness.   Hematological: Negative for adenopathy.     Objective:     Vital Signs (Most Recent):  Temp: 99.1 °F (37.3 °C) (12/27/18 0305)  Pulse: 98 (12/27/18 0754)  Resp: (!) 22 (12/27/18 0754)  BP: 119/83 (12/27/18 0600)  SpO2: 99 %  (12/27/18 0754) Vital Signs (24h Range):  Temp:  [98.8 °F (37.1 °C)-99.1 °F (37.3 °C)] 99.1 °F (37.3 °C)  Pulse:  [] 98  Resp:  [20-34] 22  SpO2:  [88 %-100 %] 99 %  BP: ()/(37-83) 119/83     Weight: 59 kg (130 lb 1.1 oz)  Body mass index is 22.33 kg/m².    Intake/Output Summary (Last 24 hours) at 12/27/2018 1416  Last data filed at 12/27/2018 0622  Gross per 24 hour   Intake 3467.5 ml   Output 1980 ml   Net 1487.5 ml      Physical Exam   Constitutional: She is oriented to person, place, and time. She appears well-developed and well-nourished. No distress.   HENT:   Head: Normocephalic and atraumatic.   Mouth/Throat: Oropharynx is clear and moist.   Eyes: Conjunctivae and EOM are normal. Pupils are equal, round, and reactive to light.   Neck: Neck supple. No JVD present. No thyromegaly present.   Cardiovascular: Normal rate and regular rhythm. Exam reveals no gallop and no friction rub.   No murmur heard.  Pulmonary/Chest: Effort normal and breath sounds normal. She has no wheezes. She has no rales.   Abdominal: Soft. Bowel sounds are normal. She exhibits distension. There is no tenderness. There is no rebound and no guarding.   Distended but soft without guarding.   Musculoskeletal: Normal range of motion. She exhibits no edema or deformity.   Lymphadenopathy:     She has no cervical adenopathy.   Neurological: She is alert and oriented to person, place, and time. She has normal reflexes.   Somnolent but interacting appropriately.   Skin: Skin is warm and dry. No rash noted.   Psychiatric: She has a normal mood and affect. Her behavior is normal. Judgment and thought content normal.   Nursing note and vitals reviewed.      Significant Labs: All pertinent labs within the past 24 hours have been reviewed.    Significant Imaging: I have reviewed all pertinent imaging results/findings within the past 24 hours.    Assessment/Plan:      * Non-traumatic rhabdomyolysis    Uncertain etiology but she was down for  an unspecified time, may have seized, and some of her anti-retrovirals can cause myositis.  Continue to trend.     HIV (human immunodeficiency virus infection)    On HAART, unknown if compliant at present.  Patient with long-standing h/o noncompliance.  Absolute CD4 count 29 and viral load pending.  Hold HAART for now.  Will consult ID for recs.     AIDS (acquired immunodeficiency syndrome), CD4 <=200    Absolute CD4 count 29.  Consider opportunistic infections.     History of VT/VF s/p implantation of automatic cardioverter/defibrillator (AICD)    - Will interrogate device to r/o recent arrhythmias or shocks.     Seizure disorder    - Patient with questionable seizure PTA.  - Will use IV Keppra Q 12 hours until patient is able to tolerate home Vimpat.   - Seizure precautions.  IV Ativan PRN.     Acute encephalopathy    - Possibly secondary to above +/- questionable seizure.  - CT head negative for acute process.  - Neuro checks.  - Fall, aspiration, and seizure precautions.     Thrombocytopenia associated with AIDS    - Initial plt 115.  No evidence of active bleeding.  - Follow CBC and transfuse if needed.     LELAND (acute kidney injury)    - Initial cr. 5.2, likely secondary to above.  Potassium stable.  - Continue IV fluid resuscitation.  - Strict I&O's.  - Avoid nephrotoxic agents.  - Follow labs.  - Nephrology consult.     SIRS (systemic inflammatory response syndrome)    - No clear source of infection identified.  CXR and UA unremarkable.  WBC 8.79.  Patient became febrile in ED (TMax 103.3).  - Blood, urine, and stool cultures pending.  - Will get STAT CT of ABD/Pelvis to r/o intraabdominal etiology.  - Empiric IV vanc and Rocephin.  Pharmacy consult for vanc dosing.  - Continue IV hydration.  - Patient had similar presentation on previous admission in 7/2017.  After an extensive workup AIDS itself was suspected to be the source, HAART was initiated and patient responded well.    - Will consult ID for  assistance.      Elevated liver enzymes    - Possibly shock liver secondary to above +/- HAART therapy.  - Avoid nephrotoxic agents.  - Follow serial labs.  Will complete further w/u if no improvement in LFTs with above interventions.        VTE Risk Mitigation (From admission, onward)        Ordered     IP VTE HIGH RISK PATIENT  Once      12/25/18 1817     Place sequential compression device  Until discontinued      12/25/18 1817     Place sequential compression device  Until discontinued      12/25/18 0546          Critical care time spent on the evaluation and treatment of severe organ dysfunction, review of pertinent labs and imaging studies, discussions with consulting providers and discussions with patient/family: 30 minutes.    Gerardo Mccauley MD  Department of Hospital Medicine   [facility] -

## 2018-12-27 NOTE — ASSESSMENT & PLAN NOTE
No signs of bleeding  Will hold prophylaxis anticoagulation  Conservative transfusion protocol  Monitor CBC

## 2018-12-27 NOTE — ASSESSMENT & PLAN NOTE
Cont Keppra  Was alone then found altered and having had incontinence  Elevated CPK, Cannot rule out seizure with AMS being post ictal -- prolactin nl on admit making seizure less likely.  No seizure activity since admit

## 2018-12-27 NOTE — EICU
Bed side RN discussed about:  Worsening non traumatic Rhabdo with improving creatinine.  UOP ok.  VS stable.  Off of bicarb from 25 th.  CPK steadily going up. Nephro and Pulmonary notes, labs, meds seen.  K ok.      Plan:  - Start Bicarb in D5 water ( Bg 84) at 50 ml/hr for now.  On LR at 150 ml/hr.  Watch for fluid overload  - Notify Nephrologist about elevated cPK.

## 2018-12-27 NOTE — ASSESSMENT & PLAN NOTE
Uncertain etiology but she was down for an unspecified time, may have seized, and some of her anti-retrovirals can cause myositis.  Continue to trend.
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
## 2018-12-27 NOTE — ASSESSMENT & PLAN NOTE
Nephrology following  Improving daily with IVFs - I>>O  BMP in AM  Daily weights and accurate I/Os

## 2018-12-27 NOTE — PROGRESS NOTES
"[facility] Hospital Medicine  Progress Note    Patient Name: Wang Kebede  MRN: [id number]  Patient Class: IP- Inpatient   Admission Date: 12/24/2018  Length of Stay: 1 days  Attending Physician: Gerardo Mccauley MD  Primary Care Provider: Levi Moncada MD        Subjective:     Principal Problem:Shock circulatory    HPI:  Unable to obtain history from patient due to mental status and clinical condition.  HPI obtained from boyfriend at bedside, ER records, and past medical record.  Ms. 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  He reports finding the patient covered in her own feces and confused.  He reports "She is really out of it and I don't know what's going on".  Associated generalized myalgias, cough, N/V/D for the past 1 week.  She was seen in ED on 12/23 with c/o myalgias and N/V/D, and was diagnosed with acute bronchitis.  She was sent home on Ceftin and brompheniramine-pseudoeph-DM.  In ED, patient oriented to person, but confused and unable to tell the date, where she is, or what occurred this afternoon.  She has a h/o seizures, and has been unable to keep any of her medications down for the past 2-3 days.  Work-up in ED resulted WBC 8.57, plt 115, Na 131, bicarb 15, BUN 54, cr. 5.2, , , lipase 23, CPK 2172, lactic 2, procalcitonin 81.6.  VBG with pH 7.314.  UA negative for infectious process.  CXR unremarkable.  CT head negative for acute process.  Upon arrival to ED, patient significantly hypotensive with BP 55/32, , Temp 98.2.  IV fluid resuscitation given with adequate response in BP (111/62).  Blood/urine cultures drawn, and empiric IV abx given.  Hospital Medicine was called " for admission.  Patient remains confused, hemodynamically stable.    Hospital Course:  Admitted to ICU for evaluation and treatment acute encephalopathy and circulatory shock presumably from sepsis.  She is also at risk for opportunistic infections due to AIDS.  Serial lactic acid measurements normal but procalcitonin highly elevated at 80 with hypotension.  Empirically started Vancomycin and Ceftriaxone.  She also has a history of seizure and medication non-compliance and may have had a seizure.  Intermittent fevers up to 104 which responded to NH Acetaminophen.  Procalcitonin decreased to 28 but CPK is trending up as high as 14,000.  Successfully weaned Levophed.  Remains lethargic some of the time but alert and oriented intermittently.    Interval History:  Intermittent fevers up to 104 which responded to NH Acetaminophen.  Procalcitonin decreased to 28 but CPK is trending up as high as 14,000.  Successfully weaned Levophed.  Remains lethargic some of the time but alert and oriented intermittently.    Review of Systems   Constitutional: Positive for fatigue. Negative for chills and fever.   HENT: Negative for congestion and sore throat.    Eyes: Negative for visual disturbance.   Respiratory: Negative for cough, shortness of breath and wheezing.    Cardiovascular: Negative for chest pain, palpitations and leg swelling.   Gastrointestinal: Positive for diarrhea. Negative for abdominal pain, blood in stool, constipation, nausea and vomiting.   Genitourinary: Negative for dysuria and hematuria.   Musculoskeletal: Negative for arthralgias and back pain.   Skin: Negative for rash and wound.   Neurological: Positive for weakness. Negative for dizziness, light-headedness and numbness.   Hematological: Negative for adenopathy.     Objective:     Vital Signs (Most Recent):  Temp: 98.8 °F (37.1 °C) (12/26/18 1530)  Pulse: 109 (12/26/18 1815)  Resp: (!) 21 (12/26/18 1815)  BP: (!) 91/49 (12/26/18 1800)  SpO2: 100 %  (12/26/18 1815) Vital Signs (24h Range):  Temp:  [98.4 °F (36.9 °C)-101.7 °F (38.7 °C)] 98.8 °F (37.1 °C)  Pulse:  [] 109  Resp:  [10-37] 21  SpO2:  [74 %-100 %] 100 %  BP: ()/(34-82) 91/49     Weight: 59 kg (130 lb 1.1 oz)  Body mass index is 22.33 kg/m².    Intake/Output Summary (Last 24 hours) at 12/26/2018 1859  Last data filed at 12/26/2018 1800  Gross per 24 hour   Intake 4936.3 ml   Output 2369 ml   Net 2567.3 ml      Physical Exam   Constitutional: She is oriented to person, place, and time. She appears well-developed and well-nourished. No distress.   HENT:   Head: Normocephalic and atraumatic.   Mouth/Throat: Oropharynx is clear and moist.   Eyes: Conjunctivae and EOM are normal. Pupils are equal, round, and reactive to light.   Neck: Neck supple. No JVD present. No thyromegaly present.   Cardiovascular: Normal rate and regular rhythm. Exam reveals no gallop and no friction rub.   No murmur heard.  Pulmonary/Chest: Effort normal and breath sounds normal. She has no wheezes. She has no rales.   Abdominal: Soft. Bowel sounds are normal. She exhibits distension. There is no tenderness. There is no rebound and no guarding.   Distended but soft without guarding.   Musculoskeletal: Normal range of motion. She exhibits no edema or deformity.   Lymphadenopathy:     She has no cervical adenopathy.   Neurological: She is alert and oriented to person, place, and time. She has normal reflexes.   Somnolent but interacting appropriately.   Skin: Skin is warm and dry. No rash noted.   Psychiatric: She has a normal mood and affect. Her behavior is normal. Judgment and thought content normal.   Nursing note and vitals reviewed.      Significant Labs: All pertinent labs within the past 24 hours have been reviewed.    Significant Imaging: I have reviewed all pertinent imaging results/findings within the past 24 hours.    Assessment/Plan:      * Circulatory Shock    Possibly secondary to sepsis vs. IRIS.  Admit to  ICU.  BP initially responded well to IV fluid resuscitation in ED.  Currently, BP progressively decreasing despite further IVF bolus.  Placed central line and started pressors to keep MAP >60.  Successfully weaned pressors and tachycardia improved.     HIV (human immunodeficiency virus infection)    On HAART, unknown if compliant at present.  Patient with long-standing h/o noncompliance.  Absolute CD4 count 29 and viral load pending.  Hold HAART for now.  Will consult ID for recs.     AIDS (acquired immunodeficiency syndrome), CD4 <=200    Absolute CD4 count 29.  Consider opportunistic infections.     History of VT/VF s/p implantation of automatic cardioverter/defibrillator (AICD)    - Will interrogate device to r/o recent arrhythmias or shocks.     Non-traumatic rhabdomyolysis    Uncertain etiology but she was down for an unspecified time, may have seized, and some of her anti-retrovirals can cause myositis.  Continue to trend.     Seizure disorder    - Patient with questionable seizure PTA.  - Will use IV Keppra Q 12 hours until patient is able to tolerate home Vimpat.   - Seizure precautions.  IV Ativan PRN.     Acute encephalopathy    - Possibly secondary to above +/- questionable seizure.  - CT head negative for acute process.  - Neuro checks.  - Fall, aspiration, and seizure precautions.     Thrombocytopenia    - Initial plt 115.  No evidence of active bleeding.  - Follow CBC and transfuse if needed.     LELAND (acute kidney injury)    - Initial cr. 5.2, likely secondary to above.  Potassium stable.  - Continue IV fluid resuscitation.  - Strict I&O's.  - Avoid nephrotoxic agents.  - Follow labs.  - Nephrology consult.     SIRS (systemic inflammatory response syndrome)    - No clear source of infection identified.  CXR and UA unremarkable.  WBC 8.79.  Patient became febrile in ED (TMax 103.3).  - Blood, urine, and stool cultures pending.  - Will get STAT CT of ABD/Pelvis to r/o intraabdominal etiology.  - Empiric  IV vanc and Rocephin.  Pharmacy consult for vanc dosing.  - Continue IV hydration.  - Patient had similar presentation on previous admission in 7/2017.  After an extensive workup AIDS itself was suspected to be the source, HAART was initiated and patient responded well.    - Will consult ID for assistance.      Elevated liver enzymes    - Possibly shock liver secondary to above +/- HAART therapy.  - Avoid nephrotoxic agents.  - Follow serial labs.  Will complete further w/u if no improvement in LFTs with above interventions.        VTE Risk Mitigation (From admission, onward)        Ordered     IP VTE HIGH RISK PATIENT  Once      12/25/18 1817     Place sequential compression device  Until discontinued      12/25/18 1817     Place sequential compression device  Until discontinued      12/25/18 0546          Critical care time spent on the evaluation and treatment of severe organ dysfunction, review of pertinent labs and imaging studies, discussions with consulting providers and discussions with patient/family: 30 minutes.    Gerardo Mccauley MD  Department of Hospital Medicine   [facility] -

## 2018-12-27 NOTE — PROGRESS NOTES
[facility] -   Nephrology  Progress Note    Patient Name: Wang Kebede  MRN: [id number]  Admission Date: 12/24/2018  Hospital Length of Stay: 2 days  Attending Provider: Gerardo Mccauley MD   Primary Care Physician: Levi Moncada MD  Principal Problem:Non-traumatic rhabdomyolysis    Subjective:     HPI: Wang Kebede is a 33 y.o.  AA woman  with history of HIV/AIDS, vulvar cancer, anemia, sickle cell disease, cervical cancer, chronic abdominal pain was admitted to hospital yesterday   for fever , malaise, blood pressure was significantly low on presentation, cultures were ordered and pending, she started on broad-spectrum antibiotics, about 2 days ago she was seen in the emergency room with similar complaints, she was diagnosed with bronchitis and was discharged home on oral antibiotics, her serum creatinine on presentation  was about 5 mg/dL yesterday, creatinine was normal about 4 months ago, patient had episodes of acute kidney injury in the past with similar presentations, improved with IV fluids,          Interval History:     12/26/18 - no acute events , more alert today but still weak,     12/27/18 - no acute events, denies complaints, feels better, sitting up and eating lunch ,     Review of patient's allergies indicates:   Allergen Reactions    Iodine and iodide containing products Anaphylaxis     Pt states she coded last time she was administered contrast    Pneumococcal 23-chitra ps vaccine Anaphylaxis     Potential iodine containing    Dilaudid [hydromorphone] Hives and Itching    Bactrim [sulfamethoxazole-trimethoprim] Hives    Morphine Itching     Tolerates norco 2018     Current Facility-Administered Medications   Medication Frequency    acetaminophen suppository 650 mg Q8H PRN    atovaquone suspension 1,500 mg with lunch    cefTRIAXone (ROCEPHIN) 1 g in dextrose 5 % 50 mL IVPB Q24H    famotidine (PF) injection 20 mg Daily    hydrocodone-apap 7.5-325  MG/15 ML oral solution 5 mL Q6H PRN    lactated ringers infusion Continuous    levalbuterol nebulizer solution 0.63 mg Q6H PRN    levETIRAcetam in NaCl (iso-os) IVPB 500 mg Q12H    lorazepam (ATIVAN) injection 2 mg Q4H PRN    ondansetron injection 4 mg Q6H PRN    [START ON 12/30/2018] vancomycin 1gm/D5W 250ml PLACEHOLDERE DOSE Q72H       Objective:     Vital Signs (Most Recent):  Temp: 99.1 °F (37.3 °C) (12/27/18 0305)  Pulse: 98 (12/27/18 0754)  Resp: (!) 22 (12/27/18 0754)  BP: 119/83 (12/27/18 0600)  SpO2: 99 % (12/27/18 0754)  O2 Device (Oxygen Therapy): room air (12/27/18 0754) Vital Signs (24h Range):  Temp:  [98.8 °F (37.1 °C)-99.1 °F (37.3 °C)] 99.1 °F (37.3 °C)  Pulse:  [] 98  Resp:  [20-34] 22  SpO2:  [88 %-100 %] 99 %  BP: ()/(37-83) 119/83     Weight: 59 kg (130 lb 1.1 oz) (12/24/18 2357)  Body mass index is 22.33 kg/m².  Body surface area is 1.63 meters squared.    I/O last 3 completed shifts:  In: 5969.7 [I.V.:5019.7; IV Piggyback:950]  Out: 3821 [Urine:3821]    Physical Exam   Constitutional: She is oriented to person, place, and time. She appears well-developed. No distress.   Frail looking    HENT:   Head: Normocephalic and atraumatic.   Mouth/Throat: Oropharynx is clear and moist. No oropharyngeal exudate.   Eyes: Conjunctivae and EOM are normal. Pupils are equal, round, and reactive to light.   Neck: Normal range of motion. Neck supple. No JVD present. Carotid bruit is not present. No tracheal deviation present. No thyroid mass and no thyromegaly present.   Cardiovascular: Regular rhythm, normal heart sounds and intact distal pulses. Exam reveals no gallop and no friction rub.   No murmur heard.  tachycardia    Pulmonary/Chest: No respiratory distress. She has wheezes. She has rales. She exhibits no tenderness.   Abdominal: Soft. Bowel sounds are normal. She exhibits no distension, no abdominal bruit, no ascites and no mass. There is no hepatosplenomegaly. There is no tenderness.  There is no rebound, no guarding and no CVA tenderness.   Musculoskeletal: She exhibits no edema or tenderness.   Lymphadenopathy:     She has no cervical adenopathy.   Neurological: She is alert and oriented to person, place, and time. No cranial nerve deficit. She exhibits normal muscle tone. Coordination normal.   Skin: Skin is warm and intact. No rash noted. No erythema. No pallor.   Psychiatric: She has a normal mood and affect. Her behavior is normal.       Significant Labs:    Cardiac Markers: No results for input(s): CKMB, TROPONINT, MYOGLOBIN in the last 168 hours.  CBC:   Recent Labs   Lab 12/27/18  0343   WBC 7.04   RBC 3.03*   HGB 9.2*   HCT 27.4*   PLT 55*   MCV 90   MCH 30.4   MCHC 33.6     CMP:   Recent Labs   Lab 12/27/18  0829   GLU 77   CALCIUM 6.4*   ALBUMIN 1.7*   PROT 5.5*      K 4.1   CO2 22*      BUN 28*   CREATININE 1.3   ALKPHOS 75   *   *   BILITOT 0.4     Coagulation:   Recent Labs   Lab 12/25/18  0017   INR 1.2   APTT 27.4     All labs within the past 24 hours have been reviewed.     Significant Imaging:  Reviewed    Lab Results   Component Value Date    ALBUMIN 1.7 (L) 12/27/2018       Lab Results   Component Value Date    CALCIUM 6.4 (LL) 12/27/2018    PHOS 3.0 12/27/2018         Assessment/Plan:     LELAND (acute kidney injury)     1. LELAND :  Acute kidney injury from a combination of dehydration and rhabdomyolysis ,  low blood pressure on presentation .      Renal function improving , continue IV fluids, good UO ,       elevation in CK total noted, still not peaked yet ,      2.  Hypotension - blood pressure improved with IV fluids and pressors,      3.  Chronic diarrhea - replete lytes and fluids     4.  Metabolic acidosis - replete bicarb, better ,      5.  Replete potassium and phosphorus , hyponatremia due to acute kidney injury,     6. HIV / AIDS - per primary team      7. PNA / Sepsis - on broad-spectrum antibiotics, cultures ordered and pending,     8.  Rhabdomyolysis : cont IV fluids     9. Hyponatremia - follow sodium , repeat labs     10. Metabolic acidosis , non-gap, likely due to diarrhea, cont bicarb with IV fluids till her serum bicarb is 20 ,     11. HIV / AIDs per medicine team,             I will follow-up with patient. Please contact us if you have any additional questions.     Total time spent 40 minutes including time needed to review the records,  patient  evaluation, documentation, face-to-face discussion with the patient, family, primary team,    more than 50% of the time was spent on coordination of care and counseling.       Marlon Monterroso MD  Nephrology  [facility] -

## 2018-12-27 NOTE — NURSING
EICU notified of worsening CPK from 58278 to 53647. LR at 150 ml/hr. Adequate UOP. Nephrology notified. Will cont to monitor.

## 2018-12-27 NOTE — SUBJECTIVE & OBJECTIVE
Review of Systems   Constitutional: Positive for malaise/fatigue and weight loss. Negative for chills and fever.   HENT: Negative for congestion.    Eyes: Negative for blurred vision.   Respiratory: Positive for cough. Negative for sputum production and shortness of breath.    Cardiovascular: Positive for leg swelling. Negative for chest pain.   Gastrointestinal: Negative for abdominal pain, nausea and vomiting.   Genitourinary: Negative for dysuria.   Musculoskeletal: Negative for myalgias.   Skin: Negative for rash.   Neurological: Positive for weakness. Negative for dizziness, focal weakness and headaches.   Endo/Heme/Allergies: Does not bruise/bleed easily.   Psychiatric/Behavioral: Positive for depression. The patient is not nervous/anxious.        Objective:     Vital Signs (Most Recent):  Temp: 99.1 °F (37.3 °C) (12/27/18 0305)  Pulse: 98 (12/27/18 0754)  Resp: (!) 22 (12/27/18 0754)  BP: 119/83 (12/27/18 0600)  SpO2: 99 % (12/27/18 0754) Vital Signs (24h Range):  Temp:  [98.8 °F (37.1 °C)-99.1 °F (37.3 °C)] 99.1 °F (37.3 °C)  Pulse:  [] 98  Resp:  [20-34] 22  SpO2:  [88 %-100 %] 99 %  BP: ()/(37-83) 119/83     Weight: 59 kg (130 lb 1.1 oz)  Body mass index is 22.33 kg/m².      Intake/Output Summary (Last 24 hours) at 12/27/2018 1236  Last data filed at 12/27/2018 0622  Gross per 24 hour   Intake 3467.5 ml   Output 2230 ml   Net 1237.5 ml       Physical Exam   Constitutional: She is oriented to person, place, and time. Vital signs are normal. She appears well-developed. She is cooperative. She has a sickly appearance. She appears ill.   Ill appearing, NAD   HENT:   Head: Atraumatic.   Mouth/Throat: Oropharynx is clear and moist and mucous membranes are normal.   Eyes: Conjunctivae and EOM are normal. Pupils are equal, round, and reactive to light.   Neck: Full passive range of motion without pain. No JVD present. No tracheal deviation present.   Cardiovascular: Regular rhythm. Tachycardia present.    No murmur heard.  Pulses:       Radial pulses are 2+ on the right side, and 2+ on the left side.        Dorsalis pedis pulses are 1+ on the right side, and 1+ on the left side.   Pulmonary/Chest: Effort normal. No accessory muscle usage. No tachypnea. No respiratory distress. She has decreased breath sounds in the right lower field and the left lower field.   Abdominal: Soft. She exhibits no distension. Bowel sounds are decreased. There is no tenderness.   Genitourinary:   Genitourinary Comments: Tinsley in place   Musculoskeletal: Normal range of motion. She exhibits no edema (generalized non pitting edema).   Generalized weakness, noted more to large muscles on physical exam as hand /ankle flexion remain strong.   Lymphadenopathy:     She has no cervical adenopathy.   Neurological: She is alert and oriented to person, place, and time.   Skin: Skin is warm and dry. Capillary refill takes less than 2 seconds. No cyanosis.        Psychiatric: Her speech is normal. Judgment and thought content normal. She is slowed and withdrawn. Cognition and memory are normal. She exhibits a depressed mood.       Vents:  Oxygen Concentration (%): 28 (12/25/18 0723)    Lines/Drains/Airways     Peripherally Inserted Central Catheter Line                 PICC Double Lumen 12/25/18 0800 right brachial 2 days          Drain                 Gastrostomy/Enterostomy 05/26/17 1540 Percutaneous endoscopic gastrostomy (PEG)  days         Urethral Catheter 12/25/18 1000 Latex 2 days          Arterial Line                 Arterial Line 12/25/18 1010 Right Radial 2 days          Peripheral Intravenous Line                 Peripheral IV - Single Lumen 12/25/18 0150 Right Other 2 days                Significant Labs:    CBC/Anemia Profile:  Recent Labs   Lab 12/25/18  1450 12/26/18  0404 12/27/18  0343   WBC  --  5.74 7.04   HGB  --  9.8* 9.2*   HCT 24* 28.4* 27.4*   PLT  --  76* 55*   MCV  --  91 90   RDW  --  13.6 13.6         Chemistries:  Recent Labs   Lab 12/26/18  0404  12/26/18  1702 12/26/18  2300 12/27/18  0343 12/27/18  0829   NA  --    < >  --  136 136 138   K  --    < >  --  4.0 4.1 4.1   CL  --    < >  --  109 110 109   CO2  --    < >  --  20* 20* 22*   BUN  --    < >  --  32* 31* 28*   CREATININE  --    < >  --  1.6* 1.4 1.3   CALCIUM  --    < >  --  6.1* 6.3* 6.4*   ALBUMIN  --    < >  --  1.7* 1.7* 1.7*   PROT  --    < >  --  5.2* 5.4* 5.5*   BILITOT  --    < >  --  0.4 0.5 0.4   ALKPHOS  --    < >  --  70 73 75   ALT  --    < >  --  101* 107* 118*   AST  --    < >  --  376* 443* 491*   MG 1.4*  --  2.1  --  2.2  --    PHOS 3.5  --   --   --  3.0  --     < > = values in this interval not displayed.       All pertinent labs within the past 24 hours have been reviewed.  CPK 29,424    Significant Imaging:  CT chest: I have reviewed all pertinent results/findings within the past 24 hours and my personal findings are:  bibasal infiltrates with small pleural effusions

## 2018-12-27 NOTE — PLAN OF CARE
Problem: Adult Inpatient Plan of Care  Goal: Plan of Care Review  Outcome: Ongoing (interventions implemented as appropriate)  Plan of care reviewed with patient. AAOx3. Delayed responses. Flat affect. Follow commands. Sinus tach on monitor 100-110  Room air oxygen saturations  percent. General Weakness.  R Radial art line in place. Dressing clean dry and intact. R brachial picc dressing clean dry and intact. Cont fluids per orders. Tinsley to gravity, with adequate UOP. Afebrile. IV antibiotics. Fecal incontinence. Brief in place.Turn every two hours to prevent skin breakdown. Call light within reach. Will cont to monitor.

## 2018-12-27 NOTE — PROGRESS NOTES
"   12/27/18 0942   Wound Care Screen Assessment   Mobility Requires Assistance   Continent of Bowel No   Continent of Bladder No   Contractures No   Previous History of pressure ulcers Yes   Sensory Perception 3-->slightly limited   Moisture 3-->occasionally moist   Activity 2-->chairfast   Mobility 3-->slightly limited   Nutrition 2-->probably inadequate   Friction and Shear 2-->potential problem   Giuliano Score 15   Feeding Tube No   S/P Flap or Graft Surgery No   Skin Condition scar tissue, IAD, intact   Wound(s) Present No   Wound acquired during current hospitalization No   Evaluation by Wound Care Team required? Yes     Wound Care Screen completed due to documented Giuliano Scale score <=15.  Skin assessment completed at this time. Patient know to wound care service, on past hospitalization, she had multiple DTIs. She is awake and alert, but unwilling to reposition, move self. She has generalized edema. Bilateral heels assessed with pink scat tissue noted. Painted with cavilon and floated with pillows.  She may need heel boots if unwilling to keep heels offloaded on pillows.  Patient then turned to right side with max assist.  Brief in place, removed to reveal mild iveth rectal IAD. Painted with cavilon. Recommend barrier paste BID and prn to site. Discussed findings with primary nurse VAUGHN Hernandez.  On last admission, patient had DTIs to head/behind ears caused by "skull cap" that she was wearing. She is not currently wearing this, and has full hair noted.  Unsure of condition of skin underneath hair at this time.    Spoke with primary nurse and discussed importance of documenting the indicated / performed skin interventions in EPIC flow sheet (ie: turning q 2 hours with actual position documented, heels floated, skin moisturizer utilized, all incontinence care, mattress surface, incontinence pad utilized, moisture barriers utilized, preventive dressings, etc.). Nurse verbalizes understanding.  Wound care " "recommends the following for Pressure Injury Prevention:  Skin Care Precautions / Pressure Injury Prevention:  1. Follow "Guidelines for Prevention of Pressure Ulcers in At Risk Patients"  These guidelines can be found on the [facility] Intranet by searching "Wound Care / Ostomy Resources"  2. Document wound assessment in [facility] using guidelines in Bobby's "Assessment : Wound" procedure  3. Limit the amount of linen/underpad between patient and mattress surface to ONE fitted sheet and ONE covidien underpad - NO draw sheet/briefs.  4. Obtain Easi Cleans Foam Wipes for providing iveth care - avoid the use of wash cloths to areas affected by IAD.  5. Apply Clear Barrier Ointment to perineal / perirectal areas in a thin even layer to clean dry skin BID and after each episode of pericare  6. Apply sween 24 moisturizer cream to all dry skin after daily bath and prn  7. Obtain foam wedge from materials management to assist with maintaining proper position changes at least q 2hours and document actual position in EPIC q 2hours  8. Elevate heels off mattress on 2 separate pillows placed lengthwise under each leg supporting the leg from knee to ankle.  Document in EPIC flow sheet every 2 hours.  9. .Do NOT elevate HOB greater than 30 degrees unless contraindicated.  10. Remove SCD/Plexi Pulses/KALA's every 12 hours for 30 minutes and assess skin underneath these devices for breakdown              "

## 2018-12-28 PROBLEM — E44.0 MODERATE MALNUTRITION: Status: ACTIVE | Noted: 2017-03-21

## 2018-12-28 PROBLEM — H93.299: Status: ACTIVE | Noted: 2018-12-28

## 2018-12-28 LAB
ALBUMIN SERPL BCP-MCNC: 1.7 G/DL
ALP SERPL-CCNC: 78 U/L
ALT SERPL W/O P-5'-P-CCNC: 135 U/L
ANION GAP SERPL CALC-SCNC: 6 MMOL/L
AST SERPL-CCNC: 601 U/L
BACTERIA SPEC AEROBE CULT: NORMAL
BACTERIA STL CULT: NORMAL
BASOPHILS # BLD AUTO: 0.08 K/UL
BASOPHILS NFR BLD: 1.3 %
BILIRUB SERPL-MCNC: 0.6 MG/DL
BUN SERPL-MCNC: 21 MG/DL
CALCIUM SERPL-MCNC: 6.6 MG/DL
CHLORIDE SERPL-SCNC: 108 MMOL/L
CK SERPL-CCNC: ABNORMAL U/L
CO2 SERPL-SCNC: 24 MMOL/L
CREAT SERPL-MCNC: 1.1 MG/DL
DIFFERENTIAL METHOD: ABNORMAL
EOSINOPHIL # BLD AUTO: 0.4 K/UL
EOSINOPHIL NFR BLD: 6.7 %
ERYTHROCYTE [DISTWIDTH] IN BLOOD BY AUTOMATED COUNT: 13.7 %
EST. GFR  (AFRICAN AMERICAN): >60 ML/MIN/1.73 M^2
EST. GFR  (NON AFRICAN AMERICAN): >60 ML/MIN/1.73 M^2
GLUCOSE SERPL-MCNC: 77 MG/DL
GRAM STN SPEC: NORMAL
HCT VFR BLD AUTO: 27.6 %
HGB BLD-MCNC: 9.2 G/DL
HIV UQ DATE RECEIVED: ABNORMAL
HIV UQ DATE REPORTED: ABNORMAL
HIV1 RNA # SERPL NAA+PROBE: ABNORMAL COPIES/ML
HIV1 RNA SERPL NAA+PROBE-LOG#: 5.32 LOG (10) COPIES/ML
HIV1 RNA SERPL QL NAA+PROBE: DETECTED
LYMPHOCYTES # BLD AUTO: 1.9 K/UL
LYMPHOCYTES NFR BLD: 30.2 %
MAGNESIUM SERPL-MCNC: 1.8 MG/DL
MCH RBC QN AUTO: 30.7 PG
MCHC RBC AUTO-ENTMCNC: 33.3 G/DL
MCV RBC AUTO: 92 FL
MONOCYTES # BLD AUTO: 0.3 K/UL
MONOCYTES NFR BLD: 5.4 %
MRSA SPEC QL CULT: NORMAL
MYOGLOBIN 24H UR-MRATE: >5000 MCG/L
NEUTROPHILS # BLD AUTO: 3.6 K/UL
NEUTROPHILS NFR BLD: 57.8 %
PHOSPHATE SERPL-MCNC: 2.6 MG/DL
PLATELET # BLD AUTO: 38 K/UL
PLATELET BLD QL SMEAR: ABNORMAL
PMV BLD AUTO: 11.1 FL
POTASSIUM SERPL-SCNC: 4.3 MMOL/L
PROCALCITONIN SERPL IA-MCNC: 5 NG/ML
PROT SERPL-MCNC: 5.6 G/DL
RBC # BLD AUTO: 3 M/UL
SODIUM SERPL-SCNC: 138 MMOL/L
VANCOMYCIN SERPL-MCNC: 12.4 UG/ML
WBC # BLD AUTO: 6.3 K/UL

## 2018-12-28 PROCEDURE — 97530 THERAPEUTIC ACTIVITIES: CPT | Mod: HCNC

## 2018-12-28 PROCEDURE — 25000003 PHARM REV CODE 250: Mod: HCNC | Performed by: NURSE PRACTITIONER

## 2018-12-28 PROCEDURE — 21400001 HC TELEMETRY ROOM: Mod: HCNC

## 2018-12-28 PROCEDURE — 87901 NFCT AGT GNTYP ALYS HIV1 REV: CPT | Mod: HCNC

## 2018-12-28 PROCEDURE — 63600175 PHARM REV CODE 636 W HCPCS: Mod: HCNC | Performed by: NURSE PRACTITIONER

## 2018-12-28 PROCEDURE — 85025 COMPLETE CBC W/AUTO DIFF WBC: CPT | Mod: HCNC

## 2018-12-28 PROCEDURE — 99291 CRITICAL CARE FIRST HOUR: CPT | Mod: HCNC,,, | Performed by: INTERNAL MEDICINE

## 2018-12-28 PROCEDURE — 25000003 PHARM REV CODE 250: Mod: HCNC | Performed by: INTERNAL MEDICINE

## 2018-12-28 PROCEDURE — 83735 ASSAY OF MAGNESIUM: CPT | Mod: HCNC

## 2018-12-28 PROCEDURE — 97802 MEDICAL NUTRITION INDIV IN: CPT | Mod: HCNC

## 2018-12-28 PROCEDURE — 99291 PR CRITICAL CARE, E/M 30-74 MINUTES: ICD-10-PCS | Mod: HCNC,,, | Performed by: INTERNAL MEDICINE

## 2018-12-28 PROCEDURE — 80053 COMPREHEN METABOLIC PANEL: CPT | Mod: HCNC

## 2018-12-28 PROCEDURE — 84145 PROCALCITONIN (PCT): CPT | Mod: HCNC

## 2018-12-28 PROCEDURE — 94760 N-INVAS EAR/PLS OXIMETRY 1: CPT | Mod: HCNC

## 2018-12-28 PROCEDURE — 82550 ASSAY OF CK (CPK): CPT | Mod: HCNC

## 2018-12-28 PROCEDURE — 97116 GAIT TRAINING THERAPY: CPT | Mod: HCNC

## 2018-12-28 PROCEDURE — 84100 ASSAY OF PHOSPHORUS: CPT | Mod: HCNC

## 2018-12-28 PROCEDURE — 80202 ASSAY OF VANCOMYCIN: CPT | Mod: HCNC

## 2018-12-28 RX ORDER — OXYCODONE HYDROCHLORIDE 5 MG/1
5 TABLET ORAL EVERY 6 HOURS PRN
Status: DISCONTINUED | OUTPATIENT
Start: 2018-12-28 | End: 2018-12-31 | Stop reason: HOSPADM

## 2018-12-28 RX ORDER — DIPHENHYDRAMINE HCL 25 MG
25 CAPSULE ORAL EVERY 6 HOURS PRN
Status: DISCONTINUED | OUTPATIENT
Start: 2018-12-28 | End: 2018-12-29

## 2018-12-28 RX ORDER — DIPHENHYDRAMINE HCL 25 MG
25 CAPSULE ORAL ONCE
Status: COMPLETED | OUTPATIENT
Start: 2018-12-28 | End: 2018-12-28

## 2018-12-28 RX ORDER — AZITHROMYCIN 250 MG/1
500 TABLET, FILM COATED ORAL DAILY
Status: COMPLETED | OUTPATIENT
Start: 2018-12-28 | End: 2018-12-30

## 2018-12-28 RX ADMIN — ONDANSETRON 4 MG: 2 INJECTION, SOLUTION INTRAMUSCULAR; INTRAVENOUS at 01:12

## 2018-12-28 RX ADMIN — FAMOTIDINE 20 MG: 20 TABLET ORAL at 08:12

## 2018-12-28 RX ADMIN — DIPHENHYDRAMINE HYDROCHLORIDE 25 MG: 25 CAPSULE ORAL at 02:12

## 2018-12-28 RX ADMIN — ATOVAQUONE 1500 MG: 750 SUSPENSION ORAL at 10:12

## 2018-12-28 RX ADMIN — CEFTRIAXONE 1 G: 1 INJECTION, SOLUTION INTRAVENOUS at 06:12

## 2018-12-28 RX ADMIN — DOCUSATE SODIUM 100 MG: 100 CAPSULE, LIQUID FILLED ORAL at 09:12

## 2018-12-28 RX ADMIN — AZITHROMYCIN 500 MG: 250 TABLET, FILM COATED ORAL at 01:12

## 2018-12-28 RX ADMIN — HYDROCODONE BITARTRATE AND ACETAMINOPHEN 5 ML: 7.5; 325 SOLUTION ORAL at 12:12

## 2018-12-28 RX ADMIN — FAMOTIDINE 20 MG: 20 TABLET ORAL at 09:12

## 2018-12-28 RX ADMIN — LEVETIRACETAM 500 MG: 500 TABLET, FILM COATED ORAL at 08:12

## 2018-12-28 RX ADMIN — LEVETIRACETAM 500 MG: 500 TABLET, FILM COATED ORAL at 09:12
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
## 2018-12-28 NOTE — PLAN OF CARE
Problem: Physical Therapy Goal  Goal: Physical Therapy Goal  LTG'S TO BE MET IN 7 DAYS (1-3-19)  1. PT WILL REQUIRE MODA FOR BED MOBILITY  2. PT WILL REQUIRE MODA FOR TF'S  3. PT WILL ' WITH RW AND MODA  4. PT WILL DEMO P+ DYNAMIC BALANCE DURING GAIT   Outcome: Ongoing (interventions implemented as appropriate)  PT WITH IMPROVED FUNCTIONAL MOBILITY, TI FOR BED MOBILITY AND TF'S

## 2018-12-28 NOTE — PROGRESS NOTES
"[facility] Hospital Medicine  Progress Note    Patient Name: Wang Kebede  MRN: [id number]  Patient Class: IP- Inpatient   Admission Date: 12/24/2018  Length of Stay: 3 days  Attending Physician: Gerardo Mccauley MD  Primary Care Provider: Levi Moncada MD        Subjective:     Principal Problem:Non-traumatic rhabdomyolysis    HPI:  Unable to obtain history from patient due to mental status and clinical condition.  HPI obtained from boyfriend at bedside, ER records, and past medical record.  Ms. 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  He reports finding the patient covered in her own feces and confused.  He reports "She is really out of it and I don't know what's going on".  Associated generalized myalgias, cough, N/V/D for the past 1 week.  She was seen in ED on 12/23 with c/o myalgias and N/V/D, and was diagnosed with acute bronchitis.  She was sent home on Ceftin and brompheniramine-pseudoeph-DM.  In ED, patient oriented to person, but confused and unable to tell the date, where she is, or what occurred this afternoon.  She has a h/o seizures, and has been unable to keep any of her medications down for the past 2-3 days.  Work-up in ED resulted WBC 8.57, plt 115, Na 131, bicarb 15, BUN 54, cr. 5.2, , , lipase 23, CPK 2172, lactic 2, procalcitonin 81.6.  VBG with pH 7.314.  UA negative for infectious process.  CXR unremarkable.  CT head negative for acute process.  Upon arrival to ED, patient significantly hypotensive with BP 55/32, , Temp 98.2.  IV fluid resuscitation given with adequate response in BP (111/62).  Blood/urine cultures drawn, and empiric IV abx given.  Hospital Medicine " was called for admission.  Patient remains confused, hemodynamically stable.    Hospital Course:  Admitted to ICU for evaluation and treatment acute encephalopathy and circulatory shock presumably from sepsis.  She is also at risk for opportunistic infections due to AIDS.  Serial lactic acid measurements normal but procalcitonin highly elevated at 80 with hypotension.  Empirically started Vancomycin and Ceftriaxone.  She also has a history of seizure and medication non-compliance and may have had a seizure.  Intermittent fevers up to 104 which responded to MS Acetaminophen.  Procalcitonin decreased to 28 but CPK is trending up as high as 14,000.  Successfully weaned Levophed.  Remains lethargic some of the time but alert and oriented intermittently.  CPK continues to rise to 29,000 with renal function and urine output improving.  Remained hemodynamically stable and afebrile.  Transferred to telemetry.  Infectious Disease following.    Interval History:  Remained afebrile last 24 hours and myalgias improving.  Procalcitonin continues to trend toward normal.  Hemodynamically stable off vasopressors.  Transfer to floor.    Review of Systems   Constitutional: Positive for fatigue. Negative for chills and fever.   HENT: Negative for congestion and sore throat.    Eyes: Negative for visual disturbance.   Respiratory: Negative for cough, shortness of breath and wheezing.    Cardiovascular: Negative for chest pain, palpitations and leg swelling.   Gastrointestinal: Positive for diarrhea. Negative for abdominal pain, blood in stool, constipation, nausea and vomiting.   Genitourinary: Negative for dysuria and hematuria.   Musculoskeletal: Negative for arthralgias and back pain.   Skin: Negative for rash and wound.   Neurological: Positive for weakness. Negative for dizziness, light-headedness and numbness.   Hematological: Negative for adenopathy.     Objective:     Vital Signs (Most Recent):  Temp: 98.5 °F (36.9 °C)  (12/28/18 1527)  Pulse: 107 (12/28/18 1527)  Resp: 18 (12/28/18 1527)  BP: 121/61 (12/28/18 1527)  SpO2: 97 % (12/28/18 1527) Vital Signs (24h Range):  Temp:  [97.8 °F (36.6 °C)-98.9 °F (37.2 °C)] 98.5 °F (36.9 °C)  Pulse:  [] 107  Resp:  [16-31] 18  SpO2:  [95 %-100 %] 97 %  BP: (111-132)/(61-81) 121/61     Weight: 65.9 kg (145 lb 4.5 oz)  Body mass index is 28.37 kg/m².    Intake/Output Summary (Last 24 hours) at 12/28/2018 1723  Last data filed at 12/28/2018 1500  Gross per 24 hour   Intake 2917.5 ml   Output 4625 ml   Net -1707.5 ml      Physical Exam   Constitutional: She is oriented to person, place, and time. She appears well-developed and well-nourished. No distress.   HENT:   Head: Normocephalic and atraumatic.   Mouth/Throat: Oropharynx is clear and moist.   Eyes: Conjunctivae and EOM are normal. Pupils are equal, round, and reactive to light.   Neck: Neck supple. No JVD present. No thyromegaly present.   Cardiovascular: Normal rate and regular rhythm. Exam reveals no gallop and no friction rub.   No murmur heard.  Pulmonary/Chest: Effort normal and breath sounds normal. She has no wheezes. She has no rales.   Abdominal: Soft. Bowel sounds are normal. She exhibits distension. There is no tenderness. There is no rebound and no guarding.   Distended but soft without guarding.   Musculoskeletal: Normal range of motion. She exhibits no edema or deformity.   Lymphadenopathy:     She has no cervical adenopathy.   Neurological: She is alert and oriented to person, place, and time. She has normal reflexes.   Somnolent but interacting appropriately.   Skin: Skin is warm and dry. No rash noted.   Psychiatric: She has a normal mood and affect. Her behavior is normal. Judgment and thought content normal.   Nursing note and vitals reviewed.      Significant Labs: All pertinent labs within the past 24 hours have been reviewed.    Significant Imaging: I have reviewed all pertinent imaging results/findings within  the past 24 hours.    Assessment/Plan:      * Non-traumatic rhabdomyolysis    Uncertain etiology but she was down for an unspecified time, may have seized, and some of her anti-retrovirals can cause myositis.  Peak level 31,000 and now decreasing.  Continue to trend.     SIRS (systemic inflammatory response syndrome)    - No clear source of infection identified.  CXR and UA unremarkable.  WBC 8.79.  Patient became febrile in ED (TMax 103.3).  - Blood, urine, and stool cultures pending.  - Will get STAT CT of ABD/Pelvis to r/o intraabdominal etiology.  - Empiric IV vanc and Rocephin.  Pharmacy consult for vanc dosing.  - Continue IV hydration.  - Patient had similar presentation on previous admission in 7/2017.  After an extensive workup AIDS itself was suspected to be the source, HAART was initiated and patient responded well.    Infectious Disease following.  On Ceftriaxone and Azithromycin.  Respiratory cultures pending.     HIV (human immunodeficiency virus infection)    On HAART, unknown if compliant at present.  Patient with long-standing h/o noncompliance.  Absolute CD4 count 29 and viral load pending.  Hold HAART for now.  Infectious Disease following.     AIDS (acquired immunodeficiency syndrome), CD4 <=200    Absolute CD4 count 29.  Consider opportunistic infections.     History of VT/VF s/p implantation of automatic cardioverter/defibrillator (AICD)    - Will interrogate device to r/o recent arrhythmias or shocks.     Seizure disorder    - Patient with questionable seizure PTA.  - Will use IV Keppra Q 12 hours until patient is able to tolerate home Vimpat.   - Seizure precautions.  IV Ativan PRN.     Acute encephalopathy    - Possibly secondary to above +/- questionable seizure.  - CT head negative for acute process.  - Neuro checks.  - Fall, aspiration, and seizure precautions.     Thrombocytopenia associated with AIDS    - Initial plt 115.  No evidence of active bleeding.  - Follow CBC and transfuse if  needed.     LELAND (acute kidney injury)    - Initial cr. 5.2, likely secondary to above.  Potassium stable.  - Continue IV fluid resuscitation.  - Strict I&O's.  - Avoid nephrotoxic agents.  - Follow labs.  - Nephrology consult.     Elevated liver enzymes    - Possibly shock liver secondary to above +/- HAART therapy.  - Avoid nephrotoxic agents.  - Follow serial labs.  Will complete further w/u if no improvement in LFTs with above interventions.        VTE Risk Mitigation (From admission, onward)        Ordered     IP VTE HIGH RISK PATIENT  Once      12/25/18 1817     Place sequential compression device  Until discontinued      12/25/18 1817     Place sequential compression device  Until discontinued      12/25/18 7319              Gerardo Mccauley MD  Department of Hospital Medicine   [facility]

## 2018-12-28 NOTE — SUBJECTIVE & OBJECTIVE
Interval History:  Remained afebrile last 24 hours and myalgias improving.  Procalcitonin continues to trend toward normal.  Hemodynamically stable off vasopressors.  Transfer to floor.    Review of Systems   Constitutional: Positive for fatigue. Negative for chills and fever.   HENT: Negative for congestion and sore throat.    Eyes: Negative for visual disturbance.   Respiratory: Negative for cough, shortness of breath and wheezing.    Cardiovascular: Negative for chest pain, palpitations and leg swelling.   Gastrointestinal: Positive for diarrhea. Negative for abdominal pain, blood in stool, constipation, nausea and vomiting.   Genitourinary: Negative for dysuria and hematuria.   Musculoskeletal: Negative for arthralgias and back pain.   Skin: Negative for rash and wound.   Neurological: Positive for weakness. Negative for dizziness, light-headedness and numbness.   Hematological: Negative for adenopathy.     Objective:     Vital Signs (Most Recent):  Temp: 98.5 °F (36.9 °C) (12/28/18 1527)  Pulse: 107 (12/28/18 1527)  Resp: 18 (12/28/18 1527)  BP: 121/61 (12/28/18 1527)  SpO2: 97 % (12/28/18 1527) Vital Signs (24h Range):  Temp:  [97.8 °F (36.6 °C)-98.9 °F (37.2 °C)] 98.5 °F (36.9 °C)  Pulse:  [] 107  Resp:  [16-31] 18  SpO2:  [95 %-100 %] 97 %  BP: (111-132)/(61-81) 121/61     Weight: 65.9 kg (145 lb 4.5 oz)  Body mass index is 28.37 kg/m².    Intake/Output Summary (Last 24 hours) at 12/28/2018 1723  Last data filed at 12/28/2018 1500  Gross per 24 hour   Intake 2917.5 ml   Output 4625 ml   Net -1707.5 ml      Physical Exam   Constitutional: She is oriented to person, place, and time. She appears well-developed and well-nourished. No distress.   HENT:   Head: Normocephalic and atraumatic.   Mouth/Throat: Oropharynx is clear and moist.   Eyes: Conjunctivae and EOM are normal. Pupils are equal, round, and reactive to light.   Neck: Neck supple. No JVD present. No thyromegaly present.   Cardiovascular: Normal  rate and regular rhythm. Exam reveals no gallop and no friction rub.   No murmur heard.  Pulmonary/Chest: Effort normal and breath sounds normal. She has no wheezes. She has no rales.   Abdominal: Soft. Bowel sounds are normal. She exhibits distension. There is no tenderness. There is no rebound and no guarding.   Distended but soft without guarding.   Musculoskeletal: Normal range of motion. She exhibits no edema or deformity.   Lymphadenopathy:     She has no cervical adenopathy.   Neurological: She is alert and oriented to person, place, and time. She has normal reflexes.   Somnolent but interacting appropriately.   Skin: Skin is warm and dry. No rash noted.   Psychiatric: She has a normal mood and affect. Her behavior is normal. Judgment and thought content normal.   Nursing note and vitals reviewed.      Significant Labs: All pertinent labs within the past 24 hours have been reviewed.    Significant Imaging: I have reviewed all pertinent imaging results/findings within the past 24 hours.

## 2018-12-28 NOTE — PT/OT/SLP PROGRESS
Occupational Therapy  Treatment    Wang Kebede   MRN: [id number]   Admitting Diagnosis: Non-traumatic rhabdomyolysis    OT Date of Treatment: 12/28/18   OT Start Time: 0920  OT Stop Time: 0945  OT Total Time (min): 25 min    Billable Minutes:  Therapeutic Activity 25 minutes    General Precautions: Standard, fall, seizure  Orthopedic Precautions: N/A  Braces: N/A         Subjective:  Communicated with nurse and epic chart review prior to session.    Pain/Comfort  Pain Rating 1: 5/10  Location - Side 1: Bilateral  Location - Orientation 1: generalized    Objective:  Patient found with: arterial line, blood pressure cuff, pulse ox (continuous), oxygen, telemetry, peripheral IV     Functional Mobility:  Bed Mobility:  Min a     Transfers:   min a  Functional Ambulation: pt ambulated 5 feet forward and backward x 3 trials hand held assist/ min a     Activities of Daily Living:     Feeding adaptive equipment: na     UE adaptive equipment: min a     LE adaptive equipment: na                    Bathing adaptive equipment: na    Balance:   Static Sit: FAIR+: Able to take MINIMAL challenges from all directions  Dynamic Sit: FAIR+: Maintains balance through MINIMAL excursions of active trunk motion  Static Stand: POOR+: Needs MINIMAL assist to maintain  Dynamic stand: POOR+: Needs MIN (minimal ) assist during gait    Therapeutic Activities and Exercises:  pt seen in room supine in bed with hob elevated. Pt req min a with rolling l>r;  And min a with supine>sit as well as forward scooting. Pt req min a with sit<>stand t/f's and min a bed> bed side chair. Pt educated on hep. Pt verbalized understanding. Pt continues left sitting in bed side chair with all needs met and call button in reach.    AM-PAC 6 CLICK ADL   How much help from another person does this patient currently need?   1 = Unable, Total/Dependent Assistance  2 = A lot, Maximum/Moderate Assistance  3 = A little, Minimum/Contact Guard/Supervision  4 =  "None, Modified [place]/Independent    Putting on and taking off regular lower body clothing? : 2  Bathing (including washing, rinsing, drying)?: 2  Toileting, which includes using toilet, bedpan, or urinal? : 2  Putting on and taking off regular upper body clothing?: 2  Taking care of personal grooming such as brushing teeth?: 2  Eating meals?: 2  Daily Activity Total Score: 12     AM-PAC Raw Score CMS "G-Code Modifier Level of Impairment Assistance   6 % Total / Unable   7 - 8 CM 80 - 100% Maximal Assist   9-13 CL 60 - 80% Moderate Assist   14 - 19 CK 40 - 60% Moderate Assist   20 - 22 CJ 20 - 40% Minimal Assist   23 CI 1-20% SBA / CGA   24 CH 0% Independent/ Mod I       Patient left up in chair with all lines intact, call button in reach, nurse notified and boyfriend present    ASSESSMENT:  Wang Kebede is a 33 y.o. female with a medical diagnosis of Non-traumatic rhabdomyolysis and presents with debility and generalized weakness. Pt will continue to benefit from skilled O.T.    Rehab identified problem list/impairments: Rehab identified problem list/impairments: weakness, impaired self care skills, impaired balance, decreased safety awareness, impaired endurance, impaired functional mobilty, decreased upper extremity function, gait instability, abnormal tone    Rehab potential is good.    Activity tolerance: Good    Discharge recommendations: Discharge Facility/Level of Care Needs: rehabilitation facility, nursing facility, skilled     Barriers to discharge: Barriers to Discharge: None    Equipment recommendations: none     GOALS:   Multidisciplinary Problems     Occupational Therapy Goals        Problem: Occupational Therapy Goal    Goal Priority Disciplines Outcome Interventions   Occupational Therapy Goal     OT, PT/OT Ongoing (interventions implemented as appropriate)    Description:  OT GOALS TO BE MET BY 1-3-18  MIN A WITH UE DRESSING  MIN A WITH LE DRESSING  PT WILL TOLERATE 1 SET " X 10 REPS B UE ROM EXERCISE  MNIN A WITH TOILET T/F'S                    Plan:  Patient to be seen 3 x/week to address the above listed problems via self-care/home management, therapeutic activities, therapeutic exercises  Plan of Care expires: 01/03/19  Plan of Care reviewed with: patient         Jennifer Astorgazier, OT  12/28/2018
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
## 2018-12-28 NOTE — HPI
"  33 year old woman with history of HIV/AIDS with poor compliance to therapy. She reports history of recent incarceration for 4 months and has not been seen in clinic for sometime.  Her latest HIV labs showed-cd4 count -29 ( was 135 -10 months ago).  She was admitted at this time with history of AMS after he returned home from work .  He reports finding the patient covered in her own feces and confused.  He reports "She is really out of it and I don't know what's going on".  Associated generalized myalgias, cough, N/V/D for the past 1 week.  She was seen in ED on 12/23 with c/o myalgias and N/V/D, and was diagnosed with acute bronchitis.  She was sent home on Ceftin and brompheniramine-pseudoeph-DM.    Work-up in ED resulted WBC 8.57, plt 115, Na 131, bicarb 15, BUN 54, cr. 5.2, , , lipase 23, CPK 2172, lactic 2, procalcitonin 81.6.  VBG with pH 7.314.   Since admission, she as found to be hypotensive and was transferred to the ICU . Her CPK has been steadily increasing and is [address] today .  CT scan of the abdomen,pelvis and chest showed-  Small bilateral pleural effusions. Consolidation posterior lung bases.    Fluid noted in the colon rectum suggesting impending diarrheal illness.   Abdominal retroperitoneal lymphadenopathy-left periaortic node measuring 1.6 x 1.2 cm .  T max is 100.4  "

## 2018-12-28 NOTE — SUBJECTIVE & OBJECTIVE
"Interval History: Pt was seen and examined this am. H/o was reviewed. Pt is a 32 y/o female with h/o of HIV/AIDS who was admitted with change in MS. Pt had acute kidney injury with s Cr 5.2 [place] 14073. Exact source of rhabdomyolysis not clear. Outpt meds do not include statins or colchicine. Pt had "fallen asleep" in her bed, not on a hard wood floor. Denies drug use (cocaine can cause rhabdo). Noted drug tox screen was ordered twice by ER on admit, but was not nacho or did not result. Pt currently id feeling "better", no new c/o's, no CP, no abd pain, no fever. Feels thirsty.    Review of patient's allergies indicates:   Allergen Reactions    Iodine and iodide containing products Anaphylaxis     Pt states she coded last time she was administered contrast    Pneumococcal 23-chitra ps vaccine Anaphylaxis     Potential iodine containing    Dilaudid [hydromorphone] Hives and Itching    Bactrim [sulfamethoxazole-trimethoprim] Hives    Morphine Itching     Tolerates norco 2018     Current Facility-Administered Medications   Medication Frequency    acetaminophen suppository 650 mg Q8H PRN    atovaquone suspension 1,500 mg with lunch    azithromycin tablet 500 mg Daily    bisacodyl suppository 10 mg Daily PRN    cefTRIAXone (ROCEPHIN) 1 g in dextrose 5 % 50 mL IVPB Q24H    docusate sodium capsule 100 mg Daily    famotidine tablet 20 mg BID    hydrocodone-apap 7.5-325 MG/15 ML oral solution 5 mL Q6H PRN    lactated ringers infusion Continuous    levalbuterol nebulizer solution 0.63 mg Q6H PRN    levETIRAcetam tablet 500 mg BID    lorazepam (ATIVAN) injection 2 mg Q4H PRN    ondansetron injection 4 mg Q6H PRN       Objective:     Vital Signs (Most Recent):  Temp: 98.9 °F (37.2 °C) (12/28/18 1051)  Pulse: 91 (12/28/18 1051)  Resp: 16 (12/28/18 1051)  BP: 120/69 (12/28/18 1051)  SpO2: 97 % (12/28/18 1051)  O2 Device (Oxygen Therapy): room air (12/28/18 1051) Vital Signs (24h Range):  Temp:  [97.8 °F (36.6 " °C)-98.9 °F (37.2 °C)] 98.9 °F (37.2 °C)  Pulse:  [] 91  Resp:  [16-31] 16  SpO2:  [95 %-100 %] 97 %  BP: ()/(57-92) 120/69     Weight: 65.9 kg (145 lb 4.5 oz) (12/28/18 0548)  Body mass index is 24.94 kg/m².  Body surface area is 1.73 meters squared.    I/O last 3 completed shifts:  In: 6302.5 [P.O.:420; I.V.:5582.5; IV Piggyback:300]  Out: 7050 [Urine:7050]    Physical Exam   Constitutional: She is oriented to person, place, and time. She appears well-nourished. No distress.   Neck: No JVD present.   Cardiovascular: Normal rate, regular rhythm and normal heart sounds. Exam reveals no friction rub.   Pulmonary/Chest: Effort normal and breath sounds normal.   Abdominal: Soft.   Musculoskeletal: She exhibits no edema.   Neurological: She is alert and oriented to person, place, and time.   Skin: Skin is warm and dry.   Psychiatric: She has a normal mood and affect. Her behavior is normal.   Nursing note reviewed.      Significant Labs:   Reviewed  BMP  Lab Results   Component Value Date     12/28/2018    K 4.3 12/28/2018     12/28/2018    CO2 24 12/28/2018    BUN 21 (H) 12/28/2018    CREATININE 1.1 12/28/2018    CALCIUM 6.6 (LL) 12/28/2018    ANIONGAP 6 (L) 12/28/2018    ESTGFRAFRICA >60 12/28/2018    EGFRNONAA >60 12/28/2018     Lab Results   Component Value Date    WBC 6.30 12/28/2018    HGB 9.2 (L) 12/28/2018    HCT 27.6 (L) 12/28/2018    MCV 92 12/28/2018    PLT 38 (LL) 12/28/2018         Significant Imaging: reviewed

## 2018-12-28 NOTE — PT/OT/SLP PROGRESS
Physical Therapy  Treatment    Wang Kebede   MRN: [id number]   Admitting Diagnosis: Non-traumatic rhabdomyolysis    PT Received On: 12/28/18  PT Start Time: 0915     PT Stop Time: 0940    PT Total Time (min): 25 min       Billable Minutes:  Gait Training 10 and Therapeutic Activity 15    Treatment Type: Treatment  PT/PTA: PT        General Precautions: Standard, fall, seizure(HIV/AIDS)  Orthopedic Precautions: N/A   Braces: N/A    Subjective:  Communicated with NURSE DODGE prior to session.  Pain/Comfort  Pain Rating 1: 0/10    Objective:   Patient found with: arterial line, pulse ox (continuous), telemetry, peripheral IV, blood pressure cuff    Functional Mobility:  Therapeutic Activities and Exercises:  PT FOUND SUPINE IN BED UPON ARRIVAL, TI FOR SUP>SIT TF AND SEATED SCOOT TOWARD EOB, PT PRACTICE SIT<>STAND TF 2 TRIALS WITH TI/HHA, PT AMB 5' FWD AND BWD X 3 TRIALS WITH MIN/HHA, LIMITED BY ART. LINE, PT MILDLY UNSTEADY BUT NO GROSS LOB, CUES FOR UPRIGHT POSTURE AND DEEP BREATHING, PT TF TO BEDSIDE CHAIR WITH TI, REVIEW BLE THEREX TO PERFORM WHILE SEATED IN CHAIR    AM-PAC 6 CLICK MOBILITY  How much help from another person does this patient currently need?   1 = Unable, Total/Dependent Assistance  2 = A lot, Maximum/Moderate Assistance  3 = A little, Minimum/Contact Guard/Supervision  4 = None, Modified [place]/Independent    Turning over in bed (including adjusting bedclothes, sheets and blankets)?: 3  Sitting down on and standing up from a chair with arms (e.g., wheelchair, bedside commode, etc.): 3  Moving from lying on back to sitting on the side of the bed?: 3  Moving to and from a bed to a chair (including a wheelchair)?: 3  Need to walk in hospital room?: 3  Climbing 3-5 steps with a railing?: 1  Basic Mobility Total Score: 16    AM-PAC Raw Score CMS G-Code Modifier Level of Impairment Assistance   6 % Total / Unable   7 - 9 CM 80 - 100% Maximal Assist   10 - 14 CL 60 - 80%  Moderate Assist   15 - 19 CK 40 - 60% Moderate Assist   20 - 22 CJ 20 - 40% Minimal Assist   23 CI 1-20% SBA / CGA   24 CH 0% Independent/ Mod I     Patient left up in chair with all lines intact, call button in reach, NURSE notified and SO present.    Assessment:  Wang Kebede is a 33 y.o. female with a medical diagnosis of Non-traumatic rhabdomyolysis and presents with IMPAIRED FUNCTIONAL MOBILITY. PT WILL BENEFIT FROM CONT. SKILLED P.T. TO ADDRESS IMPAIRMENTS    Rehab identified problem list/impairments: Rehab identified problem list/impairments: weakness, impaired endurance, impaired functional mobilty, gait instability, impaired balance, decreased coordination, decreased safety awareness    Rehab potential is good.    Activity tolerance: Good    Discharge recommendations: Discharge Facility/Level of Care Needs: rehabilitation facility, other (see comments)(REHAB VS HHPT DEPENDING ON PROGRESS WITH POC)     Barriers to discharge:      Equipment recommendations: Equipment Needed After Discharge: none     GOALS:   Multidisciplinary Problems     Physical Therapy Goals        Problem: Physical Therapy Goal    Goal Priority Disciplines Outcome Goal Variances Interventions   Physical Therapy Goal     PT, PT/OT Ongoing (interventions implemented as appropriate)     Description:  LTG'S TO BE MET IN 7 DAYS (1-4-19)  1. PT WILL REQUIRE CGA FOR BED MOBILITY  2. PT WILL REQUIRE CGA FOR TF'S  3. PT WILL ' WITH RW AND CGA  4. PT WILL DEMO F+ DYNAMIC BALANCE DURING GAIT                     PLAN:    Patient to be seen 5 x/week  to address the above listed problems via gait training, therapeutic activities, therapeutic exercises  Plan of Care expires: 01/04/19  Plan of Care reviewed with: patient, significant other    PT ENCOURAGED TO CALL FOR ASSISTANCE WITH ALL NEEDS DUE TO FALL RISK STATUS, PT AGREEABLE    Deana Cole, PT  12/28/2018

## 2018-12-28 NOTE — NURSING
Pt up in recliner; able to ambulate with asst of walker to wheelchair; pt transferred to room 226 via wheelchair with personal belongings, including cell phone and ; bedside report given to Kerry Ludwig RN; pt able to ambulate from wheelchair to bed x few steps; VSS; ANN.

## 2018-12-28 NOTE — PROGRESS NOTES
"[facility]  Nephrology  Progress Note    Patient Name: Wang Kebede  MRN: [id number]  Admission Date: 12/24/2018  Hospital Length of Stay: 3 days  Attending Provider: Gerardo Mccauley MD   Primary Care Physician: Levi Moncada MD  Principal Problem:Non-traumatic rhabdomyolysis    Subjective:     HPI: Wang Kebede is a 33 y.o.  AA woman  with history of HIV/AIDS, vulvar cancer, anemia, sickle cell disease, cervical cancer, chronic abdominal pain was admitted to hospital yesterday   for fever , malaise, blood pressure was significantly low on presentation, cultures were ordered and pending, she started on broad-spectrum antibiotics, about 2 days ago she was seen in the emergency room with similar complaints, she was diagnosed with bronchitis and was discharged home on oral antibiotics, her serum creatinine on presentation  was about 5 mg/dL yesterday, creatinine was normal about 4 months ago, patient had episodes of acute kidney injury in the past with similar presentations, improved with IV fluids,          Interval History: Pt was seen and examined this am. H/o was reviewed. Pt is a 32 y/o female with h/o of HIV/AIDS who was admitted with change in MS. Pt had acute kidney injury [place] 5.2 [place] [address]. Exact source of rhabdomyolysis not clear. Outpt meds do not include statins or colchicine. Pt had "fallen asleep" in her bed, not on a hard wood floor. Denies drug use (cocaine can cause rhabdo). Noted drug tox screen was ordered twice by ER on admit, but was not nacho or did not result. Pt currently id feeling "better", no new c/o's, no CP, no abd pain, no fever. Feels thirsty.    Review of patient's allergies indicates:   Allergen Reactions    Iodine and iodide containing products Anaphylaxis     Pt states she coded last time she was administered contrast    Pneumococcal 23-chitra ps vaccine Anaphylaxis     Potential iodine containing    Dilaudid [hydromorphone] " Hives and Itching    Bactrim [sulfamethoxazole-trimethoprim] Hives    Morphine Itching     Tolerates norco 2018     Current Facility-Administered Medications   Medication Frequency    acetaminophen suppository 650 mg Q8H PRN    atovaquone suspension 1,500 mg with lunch    azithromycin tablet 500 mg Daily    bisacodyl suppository 10 mg Daily PRN    cefTRIAXone (ROCEPHIN) 1 g in dextrose 5 % 50 mL IVPB Q24H    docusate sodium capsule 100 mg Daily    famotidine tablet 20 mg BID    hydrocodone-apap 7.5-325 MG/15 ML oral solution 5 mL Q6H PRN    lactated ringers infusion Continuous    levalbuterol nebulizer solution 0.63 mg Q6H PRN    levETIRAcetam tablet 500 mg BID    lorazepam (ATIVAN) injection 2 mg Q4H PRN    ondansetron injection 4 mg Q6H PRN       Objective:     Vital Signs (Most Recent):  Temp: 98.9 °F (37.2 °C) (12/28/18 1051)  Pulse: 91 (12/28/18 1051)  Resp: 16 (12/28/18 1051)  BP: 120/69 (12/28/18 1051)  SpO2: 97 % (12/28/18 1051)  O2 Device (Oxygen Therapy): room air (12/28/18 1051) Vital Signs (24h Range):  Temp:  [97.8 °F (36.6 °C)-98.9 °F (37.2 °C)] 98.9 °F (37.2 °C)  Pulse:  [] 91  Resp:  [16-31] 16  SpO2:  [95 %-100 %] 97 %  BP: ()/(57-92) 120/69     Weight: 65.9 kg (145 lb 4.5 oz) (12/28/18 0548)  Body mass index is 24.94 kg/m².  Body surface area is 1.73 meters squared.    I/O last 3 completed shifts:  In: 6302.5 [P.O.:420; I.V.:5582.5; IV Piggyback:300]  Out: 7050 [Urine:7050]    Physical Exam   Constitutional: She is oriented to person, place, and time. She appears well-nourished. No distress.   Neck: No JVD present.   Cardiovascular: Normal rate, regular rhythm and normal heart sounds. Exam reveals no friction rub.   Pulmonary/Chest: Effort normal and breath sounds normal.   Abdominal: Soft.   Musculoskeletal: She exhibits no edema.   Neurological: She is alert and oriented to person, place, and time.   Skin: Skin is warm and dry.   Psychiatric: She has a normal mood and  affect. Her behavior is normal.   Nursing note reviewed.      Significant Labs:   Reviewed  BMP  Lab Results   Component Value Date     12/28/2018    K 4.3 12/28/2018     12/28/2018    CO2 24 12/28/2018    BUN 21 (H) 12/28/2018    CREATININE 1.1 12/28/2018    CALCIUM 6.6 (LL) 12/28/2018    ANIONGAP 6 (L) 12/28/2018    ESTGFRAFRICA >60 12/28/2018    EGFRNONAA >60 12/28/2018     Lab Results   Component Value Date    WBC 6.30 12/28/2018    HGB 9.2 (L) 12/28/2018    HCT 27.6 (L) 12/28/2018    MCV 92 12/28/2018    PLT 38 (LL) 12/28/2018         Significant Imaging: reviewed    Assessment/Plan:   32 y/o female presented with LELAND secondary to rhabdomyolysis:    LELAND (acute kidney injury)    LELAND secondary to rhabdomyolysis  Has now recovered from acute injury  s Cr normal now  Did not require dialysis  K normal  Normal acid base  O2 sat is good  BP controlled    Cause of rhabdomyolysis remains unclear  Not on statins of colchicine  Had not passed out on a hard wood floor  Drug tox was not done     AIDS (acquired immunodeficiency syndrome), CD4 <=200    ID: HIV/AIDS  Low CD4 count  ID note was reviewed  Will defer mgmt.         Plans and recommendations:  As discussed above  Total critical care time spent 40 minutes including time needed to review the records, the   patient evaluation, documentation, face-to-face discussion with the patient,   more than 50% of the time was spent on coordination of care and counseling.    Level V visit.    Allyson Brady MD  Nephrology  [facility]

## 2018-12-28 NOTE — ASSESSMENT & PLAN NOTE
LELAND secondary to rhabdomyolysis  Has now recovered from acute injury  s Cr normal now  Did not require dialysis  K normal    Cause of rhabdomyolysis remains unclear  Not on statins of colchicine  Had not passed out on a hard wood floor  Drug tox was not done

## 2018-12-28 NOTE — PLAN OF CARE
Problem: Adult Inpatient Plan of Care  Goal: Plan of Care Review  Outcome: Ongoing (interventions implemented as appropriate)  Pt OOB to chair with PT/OT. Assist x2 back to bed. Tolerated well. VSS. NAD noted. CPK level continues to rise. IVFs infusing. Diet advanced per pt request. Encouraged po intake throughout shift. Family also encouraged. Attempts to minimally assist with repositioning. Call light in reach. Bed control and phone with in reach. Will continue to monitor.

## 2018-12-28 NOTE — ASSESSMENT & PLAN NOTE
On HAART, unknown if compliant at present.  Patient with long-standing h/o noncompliance.  Absolute CD4 count 29 and viral load pending.  Hold HAART for now.  Infectious Disease following.

## 2018-12-28 NOTE — ASSESSMENT & PLAN NOTE
12/27- will check HIV genotype prior to restarting therapy .    Her last clinic visit was -02/2018.  She is on Tivicay and Prezcoxib    The fever can also be from HIV itself.  Will plan to do LP if she developed headache as she is at risk of oppurtunistic infections including cryptococcus with her degree of immunosuppression.

## 2018-12-28 NOTE — ASSESSMENT & PLAN NOTE
Uncertain etiology but she was down for an unspecified time, may have seized, and some of her anti-retrovirals can cause myositis.  Peak level 31,000 and now decreasing.  Continue to trend.

## 2018-12-28 NOTE — PLAN OF CARE
Problem: Adult Inpatient Plan of Care  Goal: Plan of Care Review  Outcome: Ongoing (interventions implemented as appropriate)  AAOx4.  PRN pain medicine administered. One time dose of benadryl administered for itching. No complaints of CP or SOB.  BP stable. NSR on monitor.  PRN Zofran administered for nausea.  No episodes of diarrhea.   Adequate UOP noted to navarrete.  IV fluids continued. Repositioning frequently in bed to prevent skin breakdown.  Bed alarm activated.  No falls on shift.  Boyle encouraged.  POC reviewed with patient.

## 2018-12-28 NOTE — PROGRESS NOTES
Notified MD Solis of critical platelet count of 38.  No new orders received.  Will continue to monitor

## 2018-12-28 NOTE — PLAN OF CARE
Problem: Adult Inpatient Plan of Care  Goal: Plan of Care Review  Outcome: Ongoing (interventions implemented as appropriate)  Recommendations     Recommendation/Intervention: 1. Continue current diet & ONS. 2. Will continue to monitor.   Intervention: ONS  Goals: Meet > 85 % EEN/EPN while admitted  Nutrition Goal Status: new  Communication of RD Recs: Reviewed w/ NP

## 2018-12-28 NOTE — ASSESSMENT & PLAN NOTE
Malnutrition in the context of Chronic Illness/Injury    Related to (etiology):  Inadequate energy intake      Signs and Symptoms (as evidenced by):  Energy Intake: <50% of estimated energy requirement for 1 week  Body Fat Depletion: mild depletion of orbitals   Muscle Mass Depletion: mild depletion of temples and clavicle region     Interventions/Recommendations (treatment strategy):  See above     Nutrition Diagnosis Status:  New

## 2018-12-28 NOTE — ASSESSMENT & PLAN NOTE
- No clear source of infection identified.  CXR and UA unremarkable.  WBC 8.79.  Patient became febrile in ED (TMax 103.3).  - Blood, urine, and stool cultures pending.  - Will get STAT CT of ABD/Pelvis to r/o intraabdominal etiology.  - Empiric IV vanc and Rocephin.  Pharmacy consult for vanc dosing.  - Continue IV hydration.  - Patient had similar presentation on previous admission in 7/2017.  After an extensive workup AIDS itself was suspected to be the source, HAART was initiated and patient responded well.    Infectious Disease following.  On Ceftriaxone and Azithromycin.  Respiratory cultures pending.

## 2018-12-28 NOTE — EICU
Thrombocytopenia in HIV patient, slowly coming down.  No active bleeding. Hg stable. Multifactorial reasons.     Continue care  Not on Heparin sq or LMWH.  Consider hematology eval.

## 2018-12-29 LAB
ALBUMIN SERPL BCP-MCNC: 1.6 G/DL
ALP SERPL-CCNC: 77 U/L
ALT SERPL W/O P-5'-P-CCNC: 146 U/L
ANION GAP SERPL CALC-SCNC: 4 MMOL/L
AST SERPL-CCNC: 593 U/L
BASOPHILS # BLD AUTO: 0.1 K/UL
BASOPHILS NFR BLD: 1.4 %
BILIRUB SERPL-MCNC: 0.4 MG/DL
BUN SERPL-MCNC: 13 MG/DL
CALCIUM SERPL-MCNC: 6.7 MG/DL
CHLORIDE SERPL-SCNC: 110 MMOL/L
CK SERPL-CCNC: ABNORMAL U/L
CO2 SERPL-SCNC: 25 MMOL/L
CREAT SERPL-MCNC: 0.8 MG/DL
DIFFERENTIAL METHOD: ABNORMAL
EOSINOPHIL # BLD AUTO: 0.3 K/UL
EOSINOPHIL NFR BLD: 4.6 %
ERYTHROCYTE [DISTWIDTH] IN BLOOD BY AUTOMATED COUNT: 13.5 %
EST. GFR  (AFRICAN AMERICAN): >60 ML/MIN/1.73 M^2
EST. GFR  (NON AFRICAN AMERICAN): >60 ML/MIN/1.73 M^2
GLUCOSE SERPL-MCNC: 72 MG/DL
HCT VFR BLD AUTO: 24.2 %
HGB BLD-MCNC: 8 G/DL
LYMPHOCYTES # BLD AUTO: 3.5 K/UL
LYMPHOCYTES NFR BLD: 49 %
MAGNESIUM SERPL-MCNC: 1.4 MG/DL
MCH RBC QN AUTO: 33.1 PG
MCHC RBC AUTO-ENTMCNC: 33.1 G/DL
MCV RBC AUTO: 100 FL
MONOCYTES # BLD AUTO: 0.1 K/UL
MONOCYTES NFR BLD: 1.8 %
NEUTROPHILS # BLD AUTO: 3.1 K/UL
NEUTROPHILS NFR BLD: 43.6 %
PHOSPHATE SERPL-MCNC: 1.8 MG/DL
PLATELET # BLD AUTO: 40 K/UL
PLATELET BLD QL SMEAR: ABNORMAL
PMV BLD AUTO: 12 FL
POTASSIUM SERPL-SCNC: 4 MMOL/L
PROT SERPL-MCNC: 5.2 G/DL
RBC # BLD AUTO: 2.42 M/UL
SODIUM SERPL-SCNC: 139 MMOL/L
WBC # BLD AUTO: 7.18 K/UL

## 2018-12-29 PROCEDURE — 25000003 PHARM REV CODE 250: Mod: HCNC | Performed by: INTERNAL MEDICINE

## 2018-12-29 PROCEDURE — 25000003 PHARM REV CODE 250: Mod: HCNC | Performed by: NURSE PRACTITIONER

## 2018-12-29 PROCEDURE — 63600175 PHARM REV CODE 636 W HCPCS: Mod: HCNC | Performed by: NURSE PRACTITIONER

## 2018-12-29 PROCEDURE — 21400001 HC TELEMETRY ROOM: Mod: HCNC

## 2018-12-29 PROCEDURE — 99233 PR SUBSEQUENT HOSPITAL CARE,LEVL III: ICD-10-PCS | Mod: HCNC,,, | Performed by: INTERNAL MEDICINE

## 2018-12-29 PROCEDURE — 63600175 PHARM REV CODE 636 W HCPCS: Mod: HCNC | Performed by: HOSPITALIST

## 2018-12-29 PROCEDURE — 83735 ASSAY OF MAGNESIUM: CPT | Mod: HCNC

## 2018-12-29 PROCEDURE — 99233 SBSQ HOSP IP/OBS HIGH 50: CPT | Mod: HCNC,,, | Performed by: INTERNAL MEDICINE

## 2018-12-29 PROCEDURE — 94761 N-INVAS EAR/PLS OXIMETRY MLT: CPT | Mod: HCNC

## 2018-12-29 PROCEDURE — 97116 GAIT TRAINING THERAPY: CPT | Mod: HCNC

## 2018-12-29 PROCEDURE — 97530 THERAPEUTIC ACTIVITIES: CPT | Mod: HCNC

## 2018-12-29 PROCEDURE — 85025 COMPLETE CBC W/AUTO DIFF WBC: CPT | Mod: HCNC

## 2018-12-29 PROCEDURE — G8988 SELF CARE GOAL STATUS: HCPCS | Mod: CK,HCNC

## 2018-12-29 PROCEDURE — 80053 COMPREHEN METABOLIC PANEL: CPT | Mod: HCNC

## 2018-12-29 PROCEDURE — 84100 ASSAY OF PHOSPHORUS: CPT | Mod: HCNC

## 2018-12-29 PROCEDURE — 82550 ASSAY OF CK (CPK): CPT | Mod: HCNC

## 2018-12-29 PROCEDURE — G8987 SELF CARE CURRENT STATUS: HCPCS | Mod: CL,HCNC

## 2018-12-29 RX ORDER — LACOSAMIDE 50 MG/1
2 TABLET ORAL 2 TIMES DAILY
Status: DISCONTINUED | OUTPATIENT
Start: 2018-12-29 | End: 2018-12-31 | Stop reason: HOSPADM

## 2018-12-29 RX ORDER — DIPHENHYDRAMINE HYDROCHLORIDE 50 MG/ML
25 INJECTION INTRAMUSCULAR; INTRAVENOUS EVERY 6 HOURS PRN
Status: DISCONTINUED | OUTPATIENT
Start: 2018-12-29 | End: 2018-12-31 | Stop reason: HOSPADM

## 2018-12-29 RX ORDER — FLUCONAZOLE 100 MG/1
100 TABLET ORAL DAILY
Status: DISCONTINUED | OUTPATIENT
Start: 2018-12-29 | End: 2018-12-31 | Stop reason: HOSPADM

## 2018-12-29 RX ADMIN — DIPHENHYDRAMINE HYDROCHLORIDE 25 MG: 25 CAPSULE ORAL at 06:12

## 2018-12-29 RX ADMIN — DOCUSATE SODIUM 100 MG: 100 CAPSULE, LIQUID FILLED ORAL at 09:12

## 2018-12-29 RX ADMIN — LEVETIRACETAM 500 MG: 500 TABLET, FILM COATED ORAL at 09:12

## 2018-12-29 RX ADMIN — AZITHROMYCIN 500 MG: 250 TABLET, FILM COATED ORAL at 09:12

## 2018-12-29 RX ADMIN — ATOVAQUONE 1500 MG: 750 SUSPENSION ORAL at 11:12

## 2018-12-29 RX ADMIN — FAMOTIDINE 20 MG: 20 TABLET ORAL at 10:12

## 2018-12-29 RX ADMIN — LEVETIRACETAM 500 MG: 500 TABLET, FILM COATED ORAL at 10:12

## 2018-12-29 RX ADMIN — FAMOTIDINE 20 MG: 20 TABLET ORAL at 09:12

## 2018-12-29 RX ADMIN — SODIUM CHLORIDE, SODIUM LACTATE, POTASSIUM CHLORIDE, AND CALCIUM CHLORIDE: .6; .31; .03; .02 INJECTION, SOLUTION INTRAVENOUS at 02:12

## 2018-12-29 RX ADMIN — LACOSAMIDE 100 MG: 50 TABLET, FILM COATED ORAL at 09:12

## 2018-12-29 RX ADMIN — FLUCONAZOLE 100 MG: 100 TABLET ORAL at 06:12

## 2018-12-29 RX ADMIN — DIPHENHYDRAMINE HYDROCHLORIDE 25 MG: 50 INJECTION, SOLUTION INTRAMUSCULAR; INTRAVENOUS at 11:12

## 2018-12-29 RX ADMIN — CEFTRIAXONE 1 G: 1 INJECTION, SOLUTION INTRAVENOUS at 09:12

## 2018-12-29 NOTE — PLAN OF CARE
Pt in bed AAOx4.   Plan of Care reviewed, Verbalized understanding.  Patient remained free from falls, fall precautions in place.   Pt is NSR-ST on monitor. VSS.   PIV CDI with LR running at 150ml/hr.   Call bell and personal belongings within reach.   Hourly rounding complete. Reminded to call for assistance.   No complaints at this time.   Will continue to monitor.

## 2018-12-29 NOTE — PT/OT/SLP PROGRESS
Physical Therapy  Treatment    Wang Kebede   MRN: [id number]   Admitting Diagnosis: Non-traumatic rhabdomyolysis    PT Received On: 12/29/18  PT Start Time: 0951     PT Stop Time: 1006    PT Total Time (min): 15 min       Billable Minutes:  Gait Training 15 minutes    Treatment Type: Treatment  PT/PTA: PTA     PTA Visit Number: 1       General Precautions: Standard, fall  Orthopedic Precautions: N/A   Braces:           Subjective:  Communicated with epic and nurse Kerry prior to session.      Pain/Comfort  Pain Rating 1: 0/10    Objective:   Patient found with: telemetry, peripheral IV, navarrete catheter    Functional Mobility:  Bed Mobility: min assist       Transfers:min assist       Gait: min assist with RW       Stairs:n/a          Balance:   Static Sit: GOOD-: Takes MODERATE challenges from all directions but inconsistently  Dynamic Sit: FAIR+: Maintains balance through MINIMAL excursions of active trunk motion  Static Stand: FAIR+: Takes MINIMAL challenges from all directions  Dynamic stand: POOR+: Needs MIN (minimal ) assist during gait     Therapeutic Activities and Exercises:  Min assist with transfer from sit to stand. Gait training received with RW, min assist. Ambulated ~ 30 feet in room with slow alba and decreased B step length noted.     AM-PAC 6 CLICK MOBILITY  How much help from another person does this patient currently need?   1 = Unable, Total/Dependent Assistance  2 = A lot, Maximum/Moderate Assistance  3 = A little, Minimum/Contact Guard/Supervision  4 = None, Modified [place]/Independent    Sitting down on and standing up from a chair with arms (e.g., wheelchair, bedside commode, etc.): 3  Moving to and from a bed to a chair (including a wheelchair)?: 3  Need to walk in hospital room?: 3  Climbing 3-5 steps with a railing?: 1    AM-PAC Raw Score CMS G-Code Modifier Level of Impairment Assistance   6 % Total / Unable   7 - 9 CM 80 - 100% Maximal Assist   10 - 14 CL 60  - 80% Moderate Assist   15 - 19 CK 40 - 60% Moderate Assist   20 - 22 CJ 20 - 40% Minimal Assist   23 CI 1-20% SBA / CGA   24 CH 0% Independent/ Mod I     Patient left up in chair with all lines intact, call button in reach and nurse present.    Assessment:  Wang Kebede is a 33 y.o. female with a medical diagnosis of Non-traumatic rhabdomyolysis.    Rehab identified problem list/impairments: Rehab identified problem list/impairments: weakness, impaired endurance, decreased safety awareness, decreased lower extremity function, impaired balance    Rehab potential is fair.    Activity tolerance: Good    Discharge recommendations: Discharge Facility/Level of Care Needs: other (see comments), rehabilitation facility     Barriers to discharge:      Equipment recommendations:       GOALS:   Multidisciplinary Problems     Physical Therapy Goals        Problem: Physical Therapy Goal    Goal Priority Disciplines Outcome Goal Variances Interventions   Physical Therapy Goal     PT, PT/OT Ongoing (interventions implemented as appropriate)     Description:  LTG'S TO BE MET IN 7 DAYS (1-4-19)  1. PT WILL REQUIRE CGA FOR BED MOBILITY  2. PT WILL REQUIRE CGA FOR TF'S  3. PT WILL ' WITH RW AND CGA  4. PT WILL DEMO F+ DYNAMIC BALANCE DURING GAIT                     PLAN:    Patient to be seen 5 x/week  to address the above listed problems via gait training, therapeutic activities, therapeutic exercises  Plan of Care expires: (HHPT depending on progress)  Plan of Care reviewed with: patient         Jairo Gonzales, PTA  12/29/2018

## 2018-12-29 NOTE — PT/OT/SLP PROGRESS
"Occupational Therapy  Treatment    Wang Kebede   MRN: [id number]   Admitting Diagnosis: Non-traumatic rhabdomyolysis    OT Date of Treatment: 12/29/18   OT Start Time: 1000  OT Stop Time: 1015  OT Total Time (min): 15 min    Billable Minutes:  Therapeutic Activity 15    General Precautions: Standard, fall, seizure(HIV/AIDS)  Orthopedic Precautions: N/A  Braces: N/A         Subjective:  Communicated with NURSE GAMA prior to session.    Pain/Comfort  Pain Rating 1: 0/10  Pain Rating Post-Intervention 1: 0/10    Objective:  Patient found with: telemetry, peripheral IV, navarrete catheter     Functional Mobility:  Bed Mobility:       Transfers:        Functional Ambulation: AMBULATED TO DOOR AND BACK X2 TRIALS WITH RW, MIN A     Balance:   Static Sit: GOOD: Takes MODERATE challenges from all directions  Dynamic Sit: GOOD-: Incosistently Maintains balance through MODERATE excursions of active trunk movement,     Static Stand: POOR+: Needs MINIMAL assist to maintain  Dynamic stand: POOR+: Needs MIN (minimal ) assist during gait    Therapeutic Activities and Exercises:  PT SEATED EOB AT START OF SESSION. PT REPORTS INCREASED FATIGUE FOLLOWING SPONGE BATH BY HOSPITAL STAFF. PT STOOD MIN A WITH RW. AMBULATED TO DOOR AND BACK X2 TRIALS WITH RW, MIN A AND VERBAL CUES FOR SAFETY. PT TRANSFERRED BACK TO BEDSIDE CHAIR WITH MIN A. ALL LINES INTACT. PT DECLINED FURTHER TREATMENT DUE TO REPORTS OF INCREASED FATIGUE, STATING "IT FEELS LIKE I JUST WALKED DOWN THE ENTIRE HALLWAY." NURSING PRESENT.    AM-PAC 6 CLICK ADL   How much help from another person does this patient currently need?   1 = Unable, Total/Dependent Assistance  2 = A lot, Maximum/Moderate Assistance  3 = A little, Minimum/Contact Guard/Supervision  4 = None, Modified [place]/Independent    Putting on and taking off regular lower body clothing? : 2  Bathing (including washing, rinsing, drying)?: 2  Toileting, which includes using toilet, bedpan, or " "urinal? : 2  Putting on and taking off regular upper body clothing?: 3  Taking care of personal grooming such as brushing teeth?: 3  Eating meals?: 3  Daily Activity Total Score: 15     AM-PAC Raw Score CMS "G-Code Modifier Level of Impairment Assistance   6 % Total / Unable   7 - 8 CM 80 - 100% Maximal Assist   9-13 CL 60 - 80% Moderate Assist   14 - 19 CK 40 - 60% Moderate Assist   20 - 22 CJ 20 - 40% Minimal Assist   23 CI 1-20% SBA / CGA   24 CH 0% Independent/ Mod I       Patient left up in chair with all lines intact, call button in reach and NURSING present    ASSESSMENT:  Wang Kebede is a 33 y.o. female with a medical diagnosis of Non-traumatic rhabdomyolysis and presents with DEBILITY, IMPAIRED BALANCE, ADLS AND SAFETY AWARENESS. PT WILL BENEFIT FROM CONTINUED SKILLED OT SERVICES TO INCREASE FUNCTIONAL INDEPENDENCE AND SAFETY AWARENESS.    Rehab identified problem list/impairments: Rehab identified problem list/impairments: weakness, impaired endurance, impaired self care skills, impaired functional mobilty, decreased coordination, impaired balance, gait instability, decreased safety awareness    Rehab potential is good.    Activity tolerance: Fair    Discharge recommendations: Discharge Facility/Level of Care Needs: rehabilitation facility(VS  DEPENDING ON PROGRESS DURING HOSPITAL STAY)     Barriers to discharge: Barriers to Discharge: None    Equipment recommendations: walker, rolling, shower chair     GOALS:   Multidisciplinary Problems     Occupational Therapy Goals        Problem: Occupational Therapy Goal    Goal Priority Disciplines Outcome Interventions   Occupational Therapy Goal     OT, PT/OT Ongoing (interventions implemented as appropriate)    Description:  OT GOALS TO BE MET BY 1-3-18  MIN A WITH UE DRESSING  MIN A WITH LE DRESSING  PT WILL TOLERATE 1 SET X 10 REPS B UE ROM EXERCISE  MNIN A WITH TOILET T/F'S                    Plan:  Patient to be seen 3 x/week to address " the above listed problems via self-care/home management, therapeutic activities, therapeutic exercises  Plan of Care expires: 01/03/19  Plan of Care reviewed with: patient, significant other    OT G-codes  Functional Assessment Tool Used: [place] AM-PAC  Score: 15  Functional Limitation: Self care  Self Care Current Status (): CL  Self Care Goal Status (): DIANE Slater OT  12/29/2018

## 2018-12-29 NOTE — PROGRESS NOTES
"[facility]  Infectious Disease  Progress Note    Patient Name: Wang Kebede  MRN: [id number]  Admission Date: 12/24/2018  Length of Stay: 4 days  Attending Physician: Gerardo Mccauley MD  Primary Care Provider: Levi Moncada MD    Isolation Status: No active isolations  Assessment/Plan:      * Non-traumatic rhabdomyolysis    12/27- nephrology follow up, will continue to monitor closely and trend CPK  12/28- will continue IVF, closely monitor CPK      Pulmonary infiltrate present on computed tomography    Will continue Rocephin/zithromax    12/28- discussed with pharmacy , will complete 5 days of therapy      Acute encephalopathy    12/28- now resolved     Thrombocytopenia associated with AIDS    Related to AIDS-no evidence of bleeding -should improve with HAART  12/28- will monitor for bleeding            Anticipated Disposition:     Thank you for your consult. I will follow-up with patient. Please contact us if you have any additional questions.    Hubert Mayo MD  Infectious Disease  [facility]    Subjective:     Principal Problem:Non-traumatic rhabdomyolysis    HPI:   33 year old woman with history of HIV/AIDS with poor compliance to therapy. She reports history of recent incarceration for 4 months and has not been seen in clinic for sometime.  Her latest HIV labs showed-cd4 count -29 ( was 135 -10 months ago).  She was admitted at this time with history of AMS after he returned home from work .  He reports finding the patient covered in her own feces and confused.  He reports "She is really out of it and I don't know what's going on".  Associated generalized myalgias, cough, N/V/D for the past 1 week.  She was seen in ED on 12/23 with c/o myalgias and N/V/D, and was diagnosed with acute bronchitis.  She was sent home on Ceftin and brompheniramine-pseudoeph-DM.    Work-up in ED resulted WBC 8.57, plt 115, Na 131, bicarb 15, BUN 54, cr. 5.2, , , " lipase 23, CPK 2172, lactic 2, procalcitonin 81.6.  VBG with pH 7.314.   Since admission, she as found to be hypotensive and was transferred to the ICU . Her CPK has been steadily increasing and is [address]70 today .  CT scan of the abdomen,pelvis and chest showed-  Small bilateral pleural effusions. Consolidation posterior lung bases.    Fluid noted in the colon rectum suggesting impending diarrheal illness.   Abdominal retroperitoneal lymphadenopathy-left periaortic node measuring 1.6 x 1.2 cm .  T max is 100.4  Interval History:  33 year old woman with AIDS -poorly compliant with therapy admitted with AMS and rhabdomyolysis   BP has remained stable ,all cultures are negative   [place] is [address]46  Review of Systems   Constitutional: Positive for fatigue. Negative for chills and fever.   HENT: Negative for congestion and sore throat.    Eyes: Negative for visual disturbance.   Respiratory: Negative for cough, shortness of breath and wheezing.    Cardiovascular: Negative for chest pain, palpitations and leg swelling.   Gastrointestinal: Positive for diarrhea. Negative for abdominal pain, blood in stool, constipation, nausea and vomiting.   Genitourinary: Negative for dysuria and hematuria.   Musculoskeletal: Negative for arthralgias and back pain.   Skin: Negative for rash and wound.   Neurological: Positive for weakness. Negative for dizziness, light-headedness and numbness.   Hematological: Negative for adenopathy.     Objective:     Vital Signs (Most Recent):  Temp: 98.1 °F (36.7 °C) (12/29/18 0357)  Pulse: 95 (12/29/18 0517)  Resp: 18 (12/29/18 0357)  BP: (!) 107/59 (12/29/18 0357)  SpO2: (!) 94 % (12/29/18 0357) Vital Signs (24h Range):  Temp:  [97.8 °F (36.6 °C)-98.9 °F (37.2 °C)] 98.1 °F (36.7 °C)  Pulse:  [] 95  Resp:  [16-26] 18  SpO2:  [94 %-100 %] 94 %  BP: (107-121)/(58-73) 107/59     Weight: 83 kg (182 lb 15.7 oz)  Body mass index is 35.74 kg/m².    Estimated Creatinine Clearance: 68.8 mL/min (based on SCr of  1.1 mg/dL).    Physical Exam   Constitutional: She is oriented to person, place, and time. She appears well-developed and well-nourished. No distress.   HENT:   Head: Normocephalic and atraumatic.   Mouth/Throat: Oropharynx is clear and moist.   Eyes: Conjunctivae and EOM are normal. Pupils are equal, round, and reactive to light.   Neck: Neck supple. No JVD present. No thyromegaly present.   Cardiovascular: Normal rate and regular rhythm. Exam reveals no gallop and no friction rub.   No murmur heard.  Pulmonary/Chest: Effort normal and breath sounds normal. She has no wheezes. She has no rales.   Abdominal: Soft. Bowel sounds are normal. She exhibits distension. There is no tenderness. There is no rebound and no guarding.   Distended but soft without guarding.   Musculoskeletal: Normal range of motion. She exhibits no edema or deformity.   Lymphadenopathy:     She has no cervical adenopathy.   Neurological: She is alert and oriented to person, place, and time. She has normal reflexes.   -   Skin: Skin is warm and dry. No rash noted.   Psychiatric: She has a normal mood and affect. Her behavior is normal. Judgment and thought content normal.   Nursing note and vitals reviewed.      Significant Labs:   BMP:   Recent Labs   Lab 12/28/18  0340   GLU 77      K 4.3      CO2 24   BUN 21*   CREATININE 1.1   CALCIUM 6.6*   MG 1.8     CBC:   Recent Labs   Lab 12/28/18  0340   WBC 6.30   HGB 9.2*   HCT 27.6*   PLT 38*     CMP:   Recent Labs   Lab 12/27/18  0829 12/28/18  0340    138   K 4.1 4.3    108   CO2 22* 24   GLU 77 77   BUN 28* 21*   CREATININE 1.3 1.1   CALCIUM 6.4* 6.6*   PROT 5.5* 5.6*   ALBUMIN 1.7* 1.7*   BILITOT 0.4 0.6   ALKPHOS 75 78   * 601*   * 135*   ANIONGAP 7* 6*   EGFRNONAA 54* >60     All pertinent labs within the past 24 hours have been reviewed.    Significant Imaging: I have reviewed all pertinent imaging results/findings within the past 24 hours.

## 2018-12-29 NOTE — NURSING
Pt called nurse's station c/o her navarrete coming out. Upon entering room, navarrete was still in place. Pt had some moderate moisture present on gown and sheet. Checked navarrete at vaginal area, appears to still be intact. Assessed front and back (vaginal area to sacral area) to determine if any anasarca present. There was none to be seen. I did visualize what appear to be thick white discharge from vagina to anus. Informed PETROS Webster.

## 2018-12-29 NOTE — PROGRESS NOTES
"[facility] Hospital Medicine  Progress Note    Patient Name: Wang Kebede  MRN: [id number]  Patient Class: IP- Inpatient   Admission Date: 12/24/2018  Length of Stay: 4 days  Attending Physician: Eddie Payne, *  Primary Care Provider: Levi Moncada MD        Subjective:     Principal Problem:Non-traumatic rhabdomyolysis    HPI:  Unable to obtain history from patient due to mental status and clinical condition.  HPI obtained from boyfriend at bedside, ER records, and past medical record.  Ms. 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  He reports finding the patient covered in her own feces and confused.  He reports "She is really out of it and I don't know what's going on".  Associated generalized myalgias, cough, N/V/D for the past 1 week.  She was seen in ED on 12/23 with c/o myalgias and N/V/D, and was diagnosed with acute bronchitis.  She was sent home on Ceftin and brompheniramine-pseudoeph-DM.  In ED, patient oriented to person, but confused and unable to tell the date, where she is, or what occurred this afternoon.  She has a h/o seizures, and has been unable to keep any of her medications down for the past 2-3 days.  Work-up in ED resulted WBC 8.57, plt 115, Na 131, bicarb 15, BUN 54, cr. 5.2, , , lipase 23, CPK 2172, lactic 2, procalcitonin 81.6.  VBG with pH 7.314.  UA negative for infectious process.  CXR unremarkable.  CT head negative for acute process.  Upon arrival to ED, patient significantly hypotensive with BP 55/32, , Temp 98.2.  IV fluid resuscitation given with adequate response in BP (111/62).  Blood/urine cultures drawn, and empiric IV abx given.  Hospital " Medicine was called for admission.  Patient remains confused, hemodynamically stable.    Hospital Course:  Admitted to ICU for evaluation and treatment acute encephalopathy and circulatory shock presumably from sepsis.  She is also at risk for opportunistic infections due to AIDS.  Serial lactic acid measurements normal but procalcitonin highly elevated at 80 with hypotension.  Empirically started Vancomycin and Ceftriaxone.  She also has a history of seizure and medication non-compliance and may have had a seizure.  Intermittent fevers up to 104 which responded to MD Acetaminophen.  Procalcitonin decreased to 28 but CPK is trending up as high as 14,000.  Successfully weaned Levophed.  Remains lethargic some of the time but alert and oriented intermittently.  CPK continues to rise to 29,000 with renal function and urine output improving.  Remained hemodynamically stable and afebrile.  Transferred to telemetry.  Infectious Disease following.  12/29/18 Pt was seen and examined at bedside . The kidney function  Is back to normal . The Pt was informed  That her boyfriend need to be informed  About her HIV status  . She states  Will let him know today . She was informed  The Infectious disease department  will informed the appropriate state department   To follow his boyfriend .    Interval History:     Review of Systems   Constitutional: Positive for fatigue. Negative for chills and fever.   HENT: Negative for congestion and sore throat.    Eyes: Negative for visual disturbance.   Respiratory: Negative for cough, shortness of breath and wheezing.    Cardiovascular: Negative for chest pain, palpitations and leg swelling.   Gastrointestinal: Positive for diarrhea. Negative for abdominal pain, blood in stool, constipation, nausea and vomiting.   Genitourinary: Negative for dysuria and hematuria.   Musculoskeletal: Negative for arthralgias and back pain.   Skin: Negative for rash and wound.   Neurological: Positive for  weakness. Negative for dizziness, light-headedness and numbness.   Hematological: Negative for adenopathy.     Objective:     Vital Signs (Most Recent):  Temp: 98.4 °F (36.9 °C) (12/29/18 1211)  Pulse: 107 (12/29/18 1513)  Resp: 18 (12/29/18 1211)  BP: 111/60 (12/29/18 1211)  SpO2: 96 % (12/29/18 1211) Vital Signs (24h Range):  Temp:  [98.1 °F (36.7 °C)-98.8 °F (37.1 °C)] 98.4 °F (36.9 °C)  Pulse:  [] 107  Resp:  [18] 18  SpO2:  [94 %-99 %] 96 %  BP: (107-121)/(58-73) 111/60     Weight: 83 kg (182 lb 15.7 oz)  Body mass index is 35.74 kg/m².    Intake/Output Summary (Last 24 hours) at 12/29/2018 1525  Last data filed at 12/29/2018 1515  Gross per 24 hour   Intake 3850 ml   Output 2500 ml   Net 1350 ml      Physical Exam   Constitutional: She is oriented to person, place, and time. She appears well-nourished. No distress.   Neck: No JVD present.   Cardiovascular: Normal rate, regular rhythm and normal heart sounds. Exam reveals no friction rub.   Pulmonary/Chest: Effort normal and breath sounds normal.   Abdominal: Soft.   Musculoskeletal: She exhibits no edema.   Neurological: She is alert and oriented to person, place, and time.   Skin: Skin is warm and dry.   Psychiatric: She has a normal mood and affect. Her behavior is normal.   Nursing note reviewed.      Significant Labs: All pertinent labs within the past 24 hours have been reviewed.    Significant Imaging: I have reviewed all pertinent imaging results/findings within the past 24 hours.    Assessment/Plan:      * Non-traumatic rhabdomyolysis    Uncertain etiology but she was down for an unspecified time, may have seized, and some of her anti-retrovirals can cause myositis.  Peak level 31,000 and now decreasing.  Continue to trend.  Improving      Seizure disorder    - Patient with questionable seizure PTA.  - Cont PO Keppra Q 12 hours  And resume home Vimpat.   - Seizure precautions.  IV Ativan PRN.     Acute encephalopathy    - Possibly secondary to  above +/- questionable seizure.  - CT head negative for acute process.  - Neuro checks.  - Fall, aspiration, and seizure precautions.  -Resolved      Thrombocytopenia associated with AIDS    - No evidence of active bleeding.  - Follow CBC and transfuse if needed.     LELAND (acute kidney injury)    - Initial cr. 5.2, likely secondary to above.  Potassium stable.  - Continue IV fluid resuscitation.  - Strict I&O's.  - Avoid nephrotoxic agents.  - Follow labs.  - Nephrology consult.  -Resolved      SIRS (systemic inflammatory response syndrome)    - No clear source of infection identified.  CXR and UA unremarkable.  WBC 8.79.  Patient became febrile in ED (TMax 103.3).  - Blood, urine, and stool cultures pending.  - Will get STAT CT of ABD/Pelvis to r/o intraabdominal etiology.  - Empiric IV vanc and Rocephin.  Pharmacy consult for vanc dosing.  - Continue IV hydration.  - Patient had similar presentation on previous admission in 7/2017.  After an extensive workup AIDS itself was suspected to be the source, HAART was initiated and patient responded well.    Infectious Disease following.  On Ceftriaxone and Azithromycin.  Respiratory cultures pending.     Elevated liver enzymes    - Improving\  -2/2 to rhabdomyolysis       Anemia, unspecified    2/2 to chronic ds  No sign of active bleeding   Cont monitor        Depression    No SI/VA/AH       AIDS (acquired immunodeficiency syndrome), CD4 <=200    Absolute CD4 count 29.    ID on board      History of VT/VF s/p implantation of automatic cardioverter/defibrillator (AICD)    -  Cont current tx      HIV (human immunodeficiency virus infection)    -On HAART, unknown if compliant at present.  Patient with long-standing h/o noncompliance.  -Absolute CD4 count 29 and viral load  208 k   -Infectious Disease following.       VTE Risk Mitigation (From admission, onward)        Ordered     IP VTE HIGH RISK PATIENT  Once      12/25/18 1817     Place sequential compression device   Until discontinued      12/25/18 1817     Place sequential compression device  Until discontinued      12/25/18 0546              Eddie Payne MD  Department of Hospital Medicine   [facility]

## 2018-12-29 NOTE — ASSESSMENT & PLAN NOTE
- Patient with questionable seizure PTA.  - Cont PO Keppra Q 12 hours  And resume home Vimpat.   - Seizure precautions.  IV Ativan PRN.

## 2018-12-29 NOTE — SUBJECTIVE & OBJECTIVE
Interval History:  33 year old woman with AIDS -poorly compliant with therapy admitted with AMS and rhabdomyolysis   BP has remained stable ,all cultures are negative   [place] is [address]  Review of Systems   Constitutional: Positive for fatigue. Negative for chills and fever.   HENT: Negative for congestion and sore throat.    Eyes: Negative for visual disturbance.   Respiratory: Negative for cough, shortness of breath and wheezing.    Cardiovascular: Negative for chest pain, palpitations and leg swelling.   Gastrointestinal: Positive for diarrhea. Negative for abdominal pain, blood in stool, constipation, nausea and vomiting.   Genitourinary: Negative for dysuria and hematuria.   Musculoskeletal: Negative for arthralgias and back pain.   Skin: Negative for rash and wound.   Neurological: Positive for weakness. Negative for dizziness, light-headedness and numbness.   Hematological: Negative for adenopathy.     Objective:     Vital Signs (Most Recent):  Temp: 98.1 °F (36.7 °C) (12/29/18 0357)  Pulse: 95 (12/29/18 0517)  Resp: 18 (12/29/18 0357)  BP: (!) 107/59 (12/29/18 0357)  SpO2: (!) 94 % (12/29/18 0357) Vital Signs (24h Range):  Temp:  [97.8 °F (36.6 °C)-98.9 °F (37.2 °C)] 98.1 °F (36.7 °C)  Pulse:  [] 95  Resp:  [16-26] 18  SpO2:  [94 %-100 %] 94 %  BP: (107-121)/(58-73) 107/59     Weight: 83 kg (182 lb 15.7 oz)  Body mass index is 35.74 kg/m².    Estimated Creatinine Clearance: 68.8 mL/min (based on SCr of 1.1 mg/dL).    Physical Exam   Constitutional: She is oriented to person, place, and time. She appears well-developed and well-nourished. No distress.   HENT:   Head: Normocephalic and atraumatic.   Mouth/Throat: Oropharynx is clear and moist.   Eyes: Conjunctivae and EOM are normal. Pupils are equal, round, and reactive to light.   Neck: Neck supple. No JVD present. No thyromegaly present.   Cardiovascular: Normal rate and regular rhythm. Exam reveals no gallop and no friction rub.   No murmur  heard.  Pulmonary/Chest: Effort normal and breath sounds normal. She has no wheezes. She has no rales.   Abdominal: Soft. Bowel sounds are normal. She exhibits distension. There is no tenderness. There is no rebound and no guarding.   Distended but soft without guarding.   Musculoskeletal: Normal range of motion. She exhibits no edema or deformity.   Lymphadenopathy:     She has no cervical adenopathy.   Neurological: She is alert and oriented to person, place, and time. She has normal reflexes.   -   Skin: Skin is warm and dry. No rash noted.   Psychiatric: She has a normal mood and affect. Her behavior is normal. Judgment and thought content normal.   Nursing note and vitals reviewed.      Significant Labs:   BMP:   Recent Labs   Lab 12/28/18  0340   GLU 77      K 4.3      CO2 24   BUN 21*   CREATININE 1.1   CALCIUM 6.6*   MG 1.8     CBC:   Recent Labs   Lab 12/28/18  0340   WBC 6.30   HGB 9.2*   HCT 27.6*   PLT 38*     CMP:   Recent Labs   Lab 12/27/18  0829 12/28/18  0340    138   K 4.1 4.3    108   CO2 22* 24   GLU 77 77   BUN 28* 21*   CREATININE 1.3 1.1   CALCIUM 6.4* 6.6*   PROT 5.5* 5.6*   ALBUMIN 1.7* 1.7*   BILITOT 0.4 0.6   ALKPHOS 75 78   * 601*   * 135*   ANIONGAP 7* 6*   EGFRNONAA 54* >60     All pertinent labs within the past 24 hours have been reviewed.    Significant Imaging: I have reviewed all pertinent imaging results/findings within the past 24 hours.

## 2018-12-29 NOTE — ASSESSMENT & PLAN NOTE
Will continue Rocephin/zithromax    12/28- discussed with pharmacy , will complete 5 days of therapy

## 2018-12-29 NOTE — SUBJECTIVE & OBJECTIVE
Interval History: Pt was seen and examined. No new c/o's, no SOB, no discomfort.    Review of patient's allergies indicates:   Allergen Reactions    Iodine and iodide containing products Anaphylaxis     Pt states she coded last time she was administered contrast    Pneumococcal 23-chitra ps vaccine Anaphylaxis     Potential iodine containing    Dilaudid [hydromorphone] Hives and Itching    Bactrim [sulfamethoxazole-trimethoprim] Hives    Morphine Itching     Tolerates norco 2018     Current Facility-Administered Medications   Medication Frequency    acetaminophen suppository 650 mg Q8H PRN    atovaquone suspension 1,500 mg with lunch    azithromycin tablet 500 mg Daily    bisacodyl suppository 10 mg Daily PRN    cefTRIAXone (ROCEPHIN) 1 g in dextrose 5 % 50 mL IVPB Q24H    diphenhydrAMINE capsule 25 mg Q6H PRN    docusate sodium capsule 100 mg Daily    famotidine tablet 20 mg BID    lactated ringers infusion Continuous    levalbuterol nebulizer solution 0.63 mg Q6H PRN    levETIRAcetam tablet 500 mg BID    lorazepam (ATIVAN) injection 2 mg Q4H PRN    ondansetron injection 4 mg Q6H PRN    oxyCODONE immediate release tablet 5 mg Q6H PRN       Objective:     Vital Signs (Most Recent):  Temp: 98.4 °F (36.9 °C) (12/29/18 1211)  Pulse: 106 (12/29/18 1257)  Resp: 18 (12/29/18 1211)  BP: 111/60 (12/29/18 1211)  SpO2: 96 % (12/29/18 1211)  O2 Device (Oxygen Therapy): room air (12/29/18 0734) Vital Signs (24h Range):  Temp:  [98.1 °F (36.7 °C)-98.8 °F (37.1 °C)] 98.4 °F (36.9 °C)  Pulse:  [] 106  Resp:  [18] 18  SpO2:  [94 %-99 %] 96 %  BP: (107-121)/(58-73) 111/60     Weight: 83 kg (182 lb 15.7 oz) (12/29/18 0357)  Body mass index is 35.74 kg/m².  Body surface area is 1.87 meters squared.    I/O last 3 completed shifts:  In: 6060 [P.O.:760; I.V.:5250; IV Piggyback:50]  Out: 5175 [Urine:5175]    Physical Exam   Constitutional: She is oriented to person, place, and time. She appears well-nourished. No  distress.   Neck: No JVD present.   Cardiovascular: Normal rate, regular rhythm and normal heart sounds. Exam reveals no friction rub.   Pulmonary/Chest: Effort normal and breath sounds normal.   Abdominal: Soft.   Musculoskeletal: She exhibits no edema.   Neurological: She is alert and oriented to person, place, and time.   Skin: Skin is warm and dry.   Psychiatric: She has a normal mood and affect. Her behavior is normal.   Nursing note reviewed.      Significant Labs: reviewed  BMP  Lab Results   Component Value Date     12/29/2018    K 4.0 12/29/2018     12/29/2018    CO2 25 12/29/2018    BUN 13 12/29/2018    CREATININE 0.8 12/29/2018    CALCIUM 6.7 (LL) 12/29/2018    ANIONGAP 4 (L) 12/29/2018    ESTGFRAFRICA >60 12/29/2018    EGFRNONAA >60 12/29/2018     Lab Results   Component Value Date    WBC 7.18 12/29/2018    HGB 8.0 (L) 12/29/2018    HCT 24.2 (L) 12/29/2018     (H) 12/29/2018    PLT 40 (L) 12/29/2018     CK 15257    Significant Imaging: reviewed

## 2018-12-29 NOTE — ASSESSMENT & PLAN NOTE
-On HAART, unknown if compliant at present.  Patient with long-standing h/o noncompliance.  -Absolute CD4 count 29 and viral load  208 k   -Infectious Disease following.

## 2018-12-29 NOTE — PLAN OF CARE
"Problem: Occupational Therapy Goal  Goal: Occupational Therapy Goal  OT GOALS TO BE MET BY 1-3-18  MIN A WITH UE DRESSING  MIN A WITH LE DRESSING  PT WILL TOLERATE 1 SET X 10 REPS B UE ROM EXERCISE  MNIN A WITH TOILET T/F'S   Outcome: Ongoing (interventions implemented as appropriate)  PT SEATED EOB AT START OF SESSION. PT REPORTS INCREASED FATIGUE FOLLOWING SPONGE BATH BY HOSPITAL STAFF. PT STOOD MIN A WITH RW. AMBULATED TO DOOR AND BACK X2 TRIALS WITH RW, MIN A AND VERBAL CUES FOR SAFETY. PT TRANSFERRED BACK TO BEDSIDE CHAIR WITH MIN A. ALL LINES INTACT. PT DECLINED FURTHER TREATMENT DUE TO REPORTS OF INCREASED FATIGUE, STATING "IT FEELS LIKE I JUST WALKED DOWN THE ENTIRE HALLWAY." NURSING PRESENT.      "

## 2018-12-29 NOTE — ASSESSMENT & PLAN NOTE
Related to AIDS-no evidence of bleeding -should improve with HAART  12/28- will monitor for bleeding

## 2018-12-29 NOTE — PROGRESS NOTES
[facility] -   Nephrology  Progress Note    Patient Name: Wang Kebede  MRN: [id number]  Admission Date: 12/24/2018  Hospital Length of Stay: 4 days  Attending Provider: Eddie Payne, *   Primary Care Physician: Levi Moncada MD  Principal Problem:Non-traumatic rhabdomyolysis    Subjective:     HPI: Wang Kebede is a 33 y.o.  AA woman  with history of HIV/AIDS, vulvar cancer, anemia, sickle cell disease, cervical cancer, chronic abdominal pain was admitted to hospital yesterday   for fever , malaise, blood pressure was significantly low on presentation, cultures were ordered and pending, she started on broad-spectrum antibiotics, about 2 days ago she was seen in the emergency room with similar complaints, she was diagnosed with bronchitis and was discharged home on oral antibiotics, her serum creatinine on presentation  was about 5 mg/dL yesterday, creatinine was normal about 4 months ago, patient had episodes of acute kidney injury in the past with similar presentations, improved with IV fluids,          Interval History: Pt was seen and examined. No new c/o's, no SOB, no discomfort.    Review of patient's allergies indicates:   Allergen Reactions    Iodine and iodide containing products Anaphylaxis     Pt states she coded last time she was administered contrast    Pneumococcal 23-chitra ps vaccine Anaphylaxis     Potential iodine containing    Dilaudid [hydromorphone] Hives and Itching    Bactrim [sulfamethoxazole-trimethoprim] Hives    Morphine Itching     Tolerates norco 2018     Current Facility-Administered Medications   Medication Frequency    acetaminophen suppository 650 mg Q8H PRN    atovaquone suspension 1,500 mg with lunch    azithromycin tablet 500 mg Daily    bisacodyl suppository 10 mg Daily PRN    cefTRIAXone (ROCEPHIN) 1 g in dextrose 5 % 50 mL IVPB Q24H    diphenhydrAMINE capsule 25 mg Q6H PRN    docusate sodium capsule 100 mg Daily     famotidine tablet 20 mg BID    lactated ringers infusion Continuous    levalbuterol nebulizer solution 0.63 mg Q6H PRN    levETIRAcetam tablet 500 mg BID    lorazepam (ATIVAN) injection 2 mg Q4H PRN    ondansetron injection 4 mg Q6H PRN    oxyCODONE immediate release tablet 5 mg Q6H PRN       Objective:     Vital Signs (Most Recent):  Temp: 98.4 °F (36.9 °C) (12/29/18 1211)  Pulse: 106 (12/29/18 1257)  Resp: 18 (12/29/18 1211)  BP: 111/60 (12/29/18 1211)  SpO2: 96 % (12/29/18 1211)  O2 Device (Oxygen Therapy): room air (12/29/18 0734) Vital Signs (24h Range):  Temp:  [98.1 °F (36.7 °C)-98.8 °F (37.1 °C)] 98.4 °F (36.9 °C)  Pulse:  [] 106  Resp:  [18] 18  SpO2:  [94 %-99 %] 96 %  BP: (107-121)/(58-73) 111/60     Weight: 83 kg (182 lb 15.7 oz) (12/29/18 0357)  Body mass index is 35.74 kg/m².  Body surface area is 1.87 meters squared.    I/O last 3 completed shifts:  In: 6060 [P.O.:760; I.V.:5250; IV Piggyback:50]  Out: 5175 [Urine:5175]    Physical Exam   Constitutional: She is oriented to person, place, and time. She appears well-nourished. No distress.   Neck: No JVD present.   Cardiovascular: Normal rate, regular rhythm and normal heart sounds. Exam reveals no friction rub.   Pulmonary/Chest: Effort normal and breath sounds normal.   Abdominal: Soft.   Musculoskeletal: She exhibits no edema.   Neurological: She is alert and oriented to person, place, and time.   Skin: Skin is warm and dry.   Psychiatric: She has a normal mood and affect. Her behavior is normal.   Nursing note reviewed.      Significant Labs: reviewed  BMP  Lab Results   Component Value Date     12/29/2018    K 4.0 12/29/2018     12/29/2018    CO2 25 12/29/2018    BUN 13 12/29/2018    CREATININE 0.8 12/29/2018    CALCIUM 6.7 (LL) 12/29/2018    ANIONGAP 4 (L) 12/29/2018    ESTGFRAFRICA >60 12/29/2018    EGFRNONAA >60 12/29/2018     Lab Results   Component Value Date    WBC 7.18 12/29/2018    HGB 8.0 (L) 12/29/2018    HCT 24.2  (L) 12/29/2018     (H) 12/29/2018    PLT 40 (L) 12/29/2018     CK 68903    Significant Imaging: reviewed    Assessment/Plan:         32 y/o female presented with LELAND secondary to rhabdomyolysis:         LELAND (acute kidney injury)     LELAND resolved, s Cr further improved  Secondary to rhabdomyolysis  Did not require dialysis  K normal  Normal acid base  O2 sat is good  BP controlled     Cause of rhabdomyolysis remains unclear  Not on statins of colchicine  Had not passed out on a hard wood floor  Drug tox was not done  Denies cocaine      AIDS (acquired immunodeficiency syndrome), CD4 <=200     ID: HIV/AIDS  Low CD4 count  ID note was reviewed  Will defer mgmt.            Plans and recommendations:  As discussed above  Information and education provided  Reduce LR IVF rate to 75 ml/hr             Allyson Brady MD  Nephrology  [facility]

## 2018-12-29 NOTE — SUBJECTIVE & OBJECTIVE
Interval History:     Review of Systems   Constitutional: Positive for fatigue. Negative for chills and fever.   HENT: Negative for congestion and sore throat.    Eyes: Negative for visual disturbance.   Respiratory: Negative for cough, shortness of breath and wheezing.    Cardiovascular: Negative for chest pain, palpitations and leg swelling.   Gastrointestinal: Positive for diarrhea. Negative for abdominal pain, blood in stool, constipation, nausea and vomiting.   Genitourinary: Negative for dysuria and hematuria.   Musculoskeletal: Negative for arthralgias and back pain.   Skin: Negative for rash and wound.   Neurological: Positive for weakness. Negative for dizziness, light-headedness and numbness.   Hematological: Negative for adenopathy.     Objective:     Vital Signs (Most Recent):  Temp: 98.4 °F (36.9 °C) (12/29/18 1211)  Pulse: 107 (12/29/18 1513)  Resp: 18 (12/29/18 1211)  BP: 111/60 (12/29/18 1211)  SpO2: 96 % (12/29/18 1211) Vital Signs (24h Range):  Temp:  [98.1 °F (36.7 °C)-98.8 °F (37.1 °C)] 98.4 °F (36.9 °C)  Pulse:  [] 107  Resp:  [18] 18  SpO2:  [94 %-99 %] 96 %  BP: (107-121)/(58-73) 111/60     Weight: 83 kg (182 lb 15.7 oz)  Body mass index is 35.74 kg/m².    Intake/Output Summary (Last 24 hours) at 12/29/2018 1525  Last data filed at 12/29/2018 1515  Gross per 24 hour   Intake 3850 ml   Output 2500 ml   Net 1350 ml      Physical Exam   Constitutional: She is oriented to person, place, and time. She appears well-nourished. No distress.   Neck: No JVD present.   Cardiovascular: Normal rate, regular rhythm and normal heart sounds. Exam reveals no friction rub.   Pulmonary/Chest: Effort normal and breath sounds normal.   Abdominal: Soft.   Musculoskeletal: She exhibits no edema.   Neurological: She is alert and oriented to person, place, and time.   Skin: Skin is warm and dry.   Psychiatric: She has a normal mood and affect. Her behavior is normal.   Nursing note reviewed.      Significant  Labs: All pertinent labs within the past 24 hours have been reviewed.    Significant Imaging: I have reviewed all pertinent imaging results/findings within the past 24 hours.

## 2018-12-29 NOTE — PLAN OF CARE
Problem: Physical Therapy Goal  Goal: Physical Therapy Goal  LTG'S TO BE MET IN 7 DAYS (1-4-19)  1. PT WILL REQUIRE CGA FOR BED MOBILITY  2. PT WILL REQUIRE CGA FOR TF'S  3. PT WILL ' WITH RW AND CGA  4. PT WILL DEMO F+ DYNAMIC BALANCE DURING GAIT    Outcome: Ongoing (interventions implemented as appropriate)  Gait training with RW, min assist ambulating ~ 30 feet with slow alba and decreased step length.

## 2018-12-29 NOTE — ASSESSMENT & PLAN NOTE
12/27- nephrology follow up, will continue to monitor closely and trend CPK  12/28- will continue IVF, closely monitor CPK

## 2018-12-29 NOTE — ASSESSMENT & PLAN NOTE
Uncertain etiology but she was down for an unspecified time, may have seized, and some of her anti-retrovirals can cause myositis.  Peak level 31,000 and now decreasing.  Continue to trend.  Improving

## 2018-12-29 NOTE — ASSESSMENT & PLAN NOTE
- Initial cr. 5.2, likely secondary to above.  Potassium stable.  - Continue IV fluid resuscitation.  - Strict I&O's.  - Avoid nephrotoxic agents.  - Follow labs.  - Nephrology consult.  -Resolved

## 2018-12-29 NOTE — PLAN OF CARE
Problem: Adult Inpatient Plan of Care  Goal: Plan of Care Review  Outcome: Ongoing (interventions implemented as appropriate)  POC reviewed with pt, verbalized understanding. Pt remained free from falls, fall precautions in place. Pt is nsr-tachy on monitor, 90-100s. VS monitored. Pt on specialty bed. Ambulated with pt/ot. Sat up in chair for almost an hour today.  Pt IV intact, infusing LR. Antibiotics given. PRN pain medication refused. No other c/o at this time. Call bell and personal belongings within reach. Hourly rounding complete. Reminded to call for assistance. Will continue to monitor.

## 2018-12-29 NOTE — ASSESSMENT & PLAN NOTE
32 y/o female presented with LELAND secondary to rhabdomyolysis:         LELAND (acute kidney injury)     LELAND secondary to rhabdomyolysis  Has now recovered from acute injury  s Cr normal now  Did not require dialysis  K normal  Normal acid base  O2 sat is good  BP controlled     Cause of rhabdomyolysis remains unclear  Not on statins of colchicine  Had not passed out on a hard wood floor  Drug tox was not done      AIDS (acquired immunodeficiency syndrome), CD4 <=200     ID: HIV/AIDS  Low CD4 count  ID note was reviewed  Will defer mgmt.            Plans and recommendations:  As discussed above

## 2018-12-30 PROBLEM — G93.40 ACUTE ENCEPHALOPATHY: Status: RESOLVED | Noted: 2018-12-25 | Resolved: 2018-12-30

## 2018-12-30 PROBLEM — N17.9 AKI (ACUTE KIDNEY INJURY): Status: RESOLVED | Noted: 2018-12-25 | Resolved: 2018-12-30

## 2018-12-30 LAB
ALBUMIN SERPL BCP-MCNC: 1.6 G/DL
ALP SERPL-CCNC: 75 U/L
ALT SERPL W/O P-5'-P-CCNC: 156 U/L
ANION GAP SERPL CALC-SCNC: 3 MMOL/L
AST SERPL-CCNC: 478 U/L
BACTERIA BLD CULT: NORMAL
BACTERIA BLD CULT: NORMAL
BASOPHILS # BLD AUTO: 0.07 K/UL
BASOPHILS NFR BLD: 0.7 %
BILIRUB SERPL-MCNC: 0.3 MG/DL
BUN SERPL-MCNC: 15 MG/DL
CALCIUM SERPL-MCNC: 7.1 MG/DL
CHLORIDE SERPL-SCNC: 106 MMOL/L
CK SERPL-CCNC: ABNORMAL U/L
CO2 SERPL-SCNC: 28 MMOL/L
CREAT SERPL-MCNC: 0.9 MG/DL
DIFFERENTIAL METHOD: ABNORMAL
EOSINOPHIL # BLD AUTO: 0.5 K/UL
EOSINOPHIL NFR BLD: 4.3 %
ERYTHROCYTE [DISTWIDTH] IN BLOOD BY AUTOMATED COUNT: 13.7 %
EST. GFR  (AFRICAN AMERICAN): >60 ML/MIN/1.73 M^2
EST. GFR  (NON AFRICAN AMERICAN): >60 ML/MIN/1.73 M^2
GLUCOSE SERPL-MCNC: 74 MG/DL
HCT VFR BLD AUTO: 24 %
HGB BLD-MCNC: 8 G/DL
LYMPHOCYTES # BLD AUTO: 5.1 K/UL
LYMPHOCYTES NFR BLD: 47.5 %
MAGNESIUM SERPL-MCNC: 1.4 MG/DL
MCH RBC QN AUTO: 32.7 PG
MCHC RBC AUTO-ENTMCNC: 33.3 G/DL
MCV RBC AUTO: 98 FL
MONOCYTES # BLD AUTO: 0.2 K/UL
MONOCYTES NFR BLD: 1.6 %
NEUTROPHILS # BLD AUTO: 4.9 K/UL
NEUTROPHILS NFR BLD: 46.7 %
PHOSPHATE SERPL-MCNC: 1.7 MG/DL
PLATELET # BLD AUTO: 55 K/UL
PLATELET BLD QL SMEAR: ABNORMAL
PMV BLD AUTO: 11.4 FL
POTASSIUM SERPL-SCNC: 4.1 MMOL/L
PROT SERPL-MCNC: 5.6 G/DL
RBC # BLD AUTO: 2.45 M/UL
SODIUM SERPL-SCNC: 137 MMOL/L
STOMATOCYTES BLD QL SMEAR: PRESENT
WBC # BLD AUTO: 10.63 K/UL

## 2018-12-30 PROCEDURE — 94761 N-INVAS EAR/PLS OXIMETRY MLT: CPT | Mod: HCNC

## 2018-12-30 PROCEDURE — 25000003 PHARM REV CODE 250: Mod: HCNC | Performed by: NURSE PRACTITIONER

## 2018-12-30 PROCEDURE — 83735 ASSAY OF MAGNESIUM: CPT | Mod: HCNC

## 2018-12-30 PROCEDURE — 21400001 HC TELEMETRY ROOM: Mod: HCNC

## 2018-12-30 PROCEDURE — 99233 PR SUBSEQUENT HOSPITAL CARE,LEVL III: ICD-10-PCS | Mod: HCNC,,, | Performed by: INTERNAL MEDICINE

## 2018-12-30 PROCEDURE — 97116 GAIT TRAINING THERAPY: CPT | Mod: HCNC

## 2018-12-30 PROCEDURE — 99233 SBSQ HOSP IP/OBS HIGH 50: CPT | Mod: HCNC,,, | Performed by: INTERNAL MEDICINE

## 2018-12-30 PROCEDURE — 25000003 PHARM REV CODE 250: Mod: HCNC | Performed by: INTERNAL MEDICINE

## 2018-12-30 PROCEDURE — 84100 ASSAY OF PHOSPHORUS: CPT | Mod: HCNC

## 2018-12-30 PROCEDURE — 85025 COMPLETE CBC W/AUTO DIFF WBC: CPT | Mod: HCNC

## 2018-12-30 PROCEDURE — 63600175 PHARM REV CODE 636 W HCPCS: Mod: HCNC | Performed by: HOSPITALIST

## 2018-12-30 PROCEDURE — 82550 ASSAY OF CK (CPK): CPT | Mod: HCNC

## 2018-12-30 PROCEDURE — 80053 COMPREHEN METABOLIC PANEL: CPT | Mod: HCNC

## 2018-12-30 PROCEDURE — 63600175 PHARM REV CODE 636 W HCPCS: Mod: HCNC | Performed by: NURSE PRACTITIONER

## 2018-12-30 RX ORDER — LANOLIN ALCOHOL/MO/W.PET/CERES
400 CREAM (GRAM) TOPICAL 2 TIMES DAILY
Status: DISCONTINUED | OUTPATIENT
Start: 2018-12-30 | End: 2018-12-31 | Stop reason: HOSPADM

## 2018-12-30 RX ORDER — SODIUM,POTASSIUM PHOSPHATES 280-250MG
2 POWDER IN PACKET (EA) ORAL
Status: DISCONTINUED | OUTPATIENT
Start: 2018-12-30 | End: 2018-12-31 | Stop reason: HOSPADM

## 2018-12-30 RX ADMIN — AZITHROMYCIN 500 MG: 250 TABLET, FILM COATED ORAL at 08:12

## 2018-12-30 RX ADMIN — LACOSAMIDE 100 MG: 50 TABLET, FILM COATED ORAL at 08:12

## 2018-12-30 RX ADMIN — CEFTRIAXONE 1 G: 1 INJECTION, SOLUTION INTRAVENOUS at 08:12

## 2018-12-30 RX ADMIN — POTASSIUM & SODIUM PHOSPHATES POWDER PACK 280-160-250 MG 2 PACKET: 280-160-250 PACK at 05:12

## 2018-12-30 RX ADMIN — ATOVAQUONE 1500 MG: 750 SUSPENSION ORAL at 12:12

## 2018-12-30 RX ADMIN — MAGNESIUM OXIDE TAB 400 MG (241.3 MG ELEMENTAL MG) 400 MG: 400 (241.3 MG) TAB at 10:12

## 2018-12-30 RX ADMIN — MAGNESIUM OXIDE TAB 400 MG (241.3 MG ELEMENTAL MG) 400 MG: 400 (241.3 MG) TAB at 08:12

## 2018-12-30 RX ADMIN — FAMOTIDINE 20 MG: 20 TABLET ORAL at 08:12

## 2018-12-30 RX ADMIN — DOCUSATE SODIUM 100 MG: 100 CAPSULE, LIQUID FILLED ORAL at 08:12

## 2018-12-30 RX ADMIN — DIPHENHYDRAMINE HYDROCHLORIDE 25 MG: 50 INJECTION, SOLUTION INTRAMUSCULAR; INTRAVENOUS at 12:12

## 2018-12-30 RX ADMIN — LEVETIRACETAM 500 MG: 500 TABLET, FILM COATED ORAL at 08:12

## 2018-12-30 RX ADMIN — DIPHENHYDRAMINE HYDROCHLORIDE 25 MG: 50 INJECTION, SOLUTION INTRAMUSCULAR; INTRAVENOUS at 08:12

## 2018-12-30 RX ADMIN — FLUCONAZOLE 100 MG: 100 TABLET ORAL at 08:12

## 2018-12-30 NOTE — ASSESSMENT & PLAN NOTE
- No clear source of infection identified.  CXR and UA unremarkable.  WBC 8.79.  Patient became febrile in ED (TMax 103.3).  - Blood, urine, and stool cultures pending.  - Will get STAT CT of ABD/Pelvis to r/o intraabdominal etiology.  - Empiric IV vanc and Rocephin.  Pharmacy consult for vanc dosing.  - Continue IV hydration.  - Patient had similar presentation on previous admission in 7/2017.  After an extensive workup AIDS itself was suspected to be the source, HAART was initiated and patient responded well.    Infectious Disease following.  On Ceftriaxone and Azithromycin.  Respiratory cultures - normal christophe

## 2018-12-30 NOTE — PROGRESS NOTES
[facility] -   Nephrology  Progress Note    Patient Name: Wang Kebede  MRN: [id number]  Admission Date: 12/24/2018  Hospital Length of Stay: 5 days  Attending Provider: Eddie Payne, *   Primary Care Physician: Levi Moncada MD  Principal Problem:Non-traumatic rhabdomyolysis    Subjective:     HPI: Wang Kebede is a 33 y.o.  AA woman  with history of HIV/AIDS, vulvar cancer, anemia, sickle cell disease, cervical cancer, chronic abdominal pain was admitted to hospital yesterday   for fever , malaise, blood pressure was significantly low on presentation, cultures were ordered and pending, she started on broad-spectrum antibiotics, about 2 days ago she was seen in the emergency room with similar complaints, she was diagnosed with bronchitis and was discharged home on oral antibiotics, her serum creatinine on presentation  was about 5 mg/dL yesterday, creatinine was normal about 4 months ago, patient had episodes of acute kidney injury in the past with similar presentations, improved with IV fluids,          Interval History: Pt was seen and examined. No new c/o's, no SOB. Labs reviewed with pt. Questions answered.    Review of patient's allergies indicates:   Allergen Reactions    Iodine and iodide containing products Anaphylaxis     Pt states she coded last time she was administered contrast    Pneumococcal 23-chitra ps vaccine Anaphylaxis     Potential iodine containing    Dilaudid [hydromorphone] Hives and Itching    Bactrim [sulfamethoxazole-trimethoprim] Hives    Morphine Itching     Tolerates norco 2018     Current Facility-Administered Medications   Medication Frequency    acetaminophen suppository 650 mg Q8H PRN    atovaquone suspension 1,500 mg with lunch    bisacodyl suppository 10 mg Daily PRN    cefTRIAXone (ROCEPHIN) 1 g in dextrose 5 % 50 mL IVPB Q24H    diphenhydrAMINE injection 25 mg Q6H PRN    docusate sodium capsule 100 mg Daily    famotidine  tablet 20 mg BID    fluconazole tablet 100 mg Daily    lacosamide tablet 100 mg BID    lactated ringers infusion Continuous    levalbuterol nebulizer solution 0.63 mg Q6H PRN    levETIRAcetam tablet 500 mg BID    lorazepam (ATIVAN) injection 2 mg Q4H PRN    magnesium oxide tablet 400 mg BID    ondansetron injection 4 mg Q6H PRN    oxyCODONE immediate release tablet 5 mg Q6H PRN    potassium, sodium phosphates 280-160-250 mg packet 2 packet QID (AC & HS)       Objective:     Vital Signs (Most Recent):  Temp: 99.3 °F (37.4 °C) (12/30/18 1205)  Pulse: 98 (12/30/18 1205)  Resp: 18 (12/30/18 1205)  BP: 115/60 (12/30/18 1205)  SpO2: 100 % (12/30/18 1205)  O2 Device (Oxygen Therapy): room air (12/30/18 1205) Vital Signs (24h Range):  Temp:  [97.3 °F (36.3 °C)-99.3 °F (37.4 °C)] 99.3 °F (37.4 °C)  Pulse:  [] 98  Resp:  [16-18] 18  SpO2:  [96 %-100 %] 100 %  BP: (100-124)/(56-70) 115/60     Weight: 84 kg (185 lb 3 oz) (12/30/18 0408)  Body mass index is 36.17 kg/m².  Body surface area is 1.89 meters squared.    I/O last 3 completed shifts:  In: 4850 [P.O.:2000; I.V.:2850]  Out: 3200 [Urine:3200]    Physical Exam   Constitutional: She is oriented to person, place, and time. She appears well-nourished. No distress.   Neck: No JVD present.   Cardiovascular: Normal rate, regular rhythm and normal heart sounds. Exam reveals no friction rub.   Pulmonary/Chest: Effort normal and breath sounds normal.   Abdominal: Soft.   Musculoskeletal: She exhibits no edema.   Neurological: She is alert and oriented to person, place, and time.   Skin: Skin is warm and dry.   Psychiatric: She has a normal mood and affect. Her behavior is normal.   Nursing note reviewed.      Significant Labs: reviewed  BMP  Lab Results   Component Value Date     12/30/2018    K 4.1 12/30/2018     12/30/2018    CO2 28 12/30/2018    BUN 15 12/30/2018    CREATININE 0.9 12/30/2018    CALCIUM 7.1 (L) 12/30/2018    ANIONGAP 3 (L) 12/30/2018     ESTGFRAFRICA >60 12/30/2018    EGFRNONAA >60 12/30/2018     CK 06057      Assessment/Plan:         34 y/o female presented with LELAND secondary to rhabdomyolysis:           LELAND (acute kidney injury)     LELAND remains resolved, s Cr normal now  Secondary to rhabdomyolysis  CK improving  Did not require dialysis  K normal  Normal acid base  O2 sat is good  BP controlled     Cause of rhabdomyolysis remains unclear  Not on statins of colchicine  Had not passed out on a hard wood floor  Drug tox was not done  Denies cocaine      AIDS (acquired immunodeficiency syndrome), CD4 <=200     ID: HIV/AIDS  Low CD4 count  ID note was reviewed  Will defer mgmt.            Plans and recommendations:  As discussed above  Information and education provided  Reduced rate of LR IVF's to 75 ml/hr yesterday. Pt has adequate PO intake. May d/c LR IVF's.               Allyson Brady MD  Nephrology  [facility]

## 2018-12-30 NOTE — ASSESSMENT & PLAN NOTE
Uncertain etiology but she was down for an unspecified time, may have seized, and some of her anti-retrovirals can cause myositis.  Peak level 31,000 and now decreasing.  Continue to trend.  Improving   CK 10,000 12/30/18 - likely discharge 12/31/18

## 2018-12-30 NOTE — SUBJECTIVE & OBJECTIVE
Interval History: Pt was seen and examined. No new c/o's, no SOB. Labs reviewed with pt. Questions answered.    Review of patient's allergies indicates:   Allergen Reactions    Iodine and iodide containing products Anaphylaxis     Pt states she coded last time she was administered contrast    Pneumococcal 23-chitra ps vaccine Anaphylaxis     Potential iodine containing    Dilaudid [hydromorphone] Hives and Itching    Bactrim [sulfamethoxazole-trimethoprim] Hives    Morphine Itching     Tolerates norco 2018     Current Facility-Administered Medications   Medication Frequency    acetaminophen suppository 650 mg Q8H PRN    atovaquone suspension 1,500 mg with lunch    bisacodyl suppository 10 mg Daily PRN    cefTRIAXone (ROCEPHIN) 1 g in dextrose 5 % 50 mL IVPB Q24H    diphenhydrAMINE injection 25 mg Q6H PRN    docusate sodium capsule 100 mg Daily    famotidine tablet 20 mg BID    fluconazole tablet 100 mg Daily    lacosamide tablet 100 mg BID    lactated ringers infusion Continuous    levalbuterol nebulizer solution 0.63 mg Q6H PRN    levETIRAcetam tablet 500 mg BID    lorazepam (ATIVAN) injection 2 mg Q4H PRN    magnesium oxide tablet 400 mg BID    ondansetron injection 4 mg Q6H PRN    oxyCODONE immediate release tablet 5 mg Q6H PRN    potassium, sodium phosphates 280-160-250 mg packet 2 packet QID (AC & HS)       Objective:     Vital Signs (Most Recent):  Temp: 99.3 °F (37.4 °C) (12/30/18 1205)  Pulse: 98 (12/30/18 1205)  Resp: 18 (12/30/18 1205)  BP: 115/60 (12/30/18 1205)  SpO2: 100 % (12/30/18 1205)  O2 Device (Oxygen Therapy): room air (12/30/18 1205) Vital Signs (24h Range):  Temp:  [97.3 °F (36.3 °C)-99.3 °F (37.4 °C)] 99.3 °F (37.4 °C)  Pulse:  [] 98  Resp:  [16-18] 18  SpO2:  [96 %-100 %] 100 %  BP: (100-124)/(56-70) 115/60     Weight: 84 kg (185 lb 3 oz) (12/30/18 0408)  Body mass index is 36.17 kg/m².  Body surface area is 1.89 meters squared.    I/O last 3 completed shifts:  In:  4850 [P.O.:2000; I.V.:2850]  Out: 3200 [Urine:3200]    Physical Exam   Constitutional: She is oriented to person, place, and time. She appears well-nourished. No distress.   Neck: No JVD present.   Cardiovascular: Normal rate, regular rhythm and normal heart sounds. Exam reveals no friction rub.   Pulmonary/Chest: Effort normal and breath sounds normal.   Abdominal: Soft.   Musculoskeletal: She exhibits no edema.   Neurological: She is alert and oriented to person, place, and time.   Skin: Skin is warm and dry.   Psychiatric: She has a normal mood and affect. Her behavior is normal.   Nursing note reviewed.      Significant Labs: reviewed  BMP  Lab Results   Component Value Date     12/30/2018    K 4.1 12/30/2018     12/30/2018    CO2 28 12/30/2018    BUN 15 12/30/2018    CREATININE 0.9 12/30/2018    CALCIUM 7.1 (L) 12/30/2018    ANIONGAP 3 (L) 12/30/2018    ESTGFRAFRICA >60 12/30/2018    EGFRNONAA >60 12/30/2018     CK 65834

## 2018-12-30 NOTE — PLAN OF CARE
Problem: Adult Inpatient Plan of Care  Goal: Plan of Care Review  Outcome: Ongoing (interventions implemented as appropriate)  Pt free of falls during shift. VSS. Peripheral IV intact. Fluids infusing. Tele ordered pt on the monitor. Pt on RA no shortness of breath noted. Pain controlled. Call light in reach. Hourly rounds done. No Family present at bedside. Will continue to monitor

## 2018-12-30 NOTE — SUBJECTIVE & OBJECTIVE
Interval History: Sitting in chair at bedside.  No complaints, states she is ready for discharge. Discussed plan for discharge if CK continues to decline. Urinating well.     Review of Systems   Constitutional: Positive for fatigue. Negative for chills and fever.   HENT: Negative for congestion and sore throat.    Eyes: Negative for visual disturbance.   Respiratory: Negative for cough, shortness of breath and wheezing.    Cardiovascular: Negative for chest pain, palpitations and leg swelling.   Gastrointestinal: Positive for diarrhea. Negative for abdominal pain, blood in stool, constipation, nausea and vomiting.   Genitourinary: Negative for dysuria and hematuria.   Musculoskeletal: Negative for arthralgias and back pain.   Skin: Negative for rash and wound.   Neurological: Positive for weakness. Negative for dizziness, light-headedness and numbness.   Hematological: Negative for adenopathy.     Objective:     Vital Signs (Most Recent):  Temp: 99.3 °F (37.4 °C) (12/30/18 1205)  Pulse: 98 (12/30/18 1205)  Resp: 18 (12/30/18 1205)  BP: 115/60 (12/30/18 1205)  SpO2: 100 % (12/30/18 1205) Vital Signs (24h Range):  Temp:  [97.3 °F (36.3 °C)-99.3 °F (37.4 °C)] 99.3 °F (37.4 °C)  Pulse:  [] 98  Resp:  [16-18] 18  SpO2:  [96 %-100 %] 100 %  BP: (100-124)/(56-70) 115/60     Weight: 84 kg (185 lb 3 oz)  Body mass index is 36.17 kg/m².    Intake/Output Summary (Last 24 hours) at 12/30/2018 0410  Last data filed at 12/30/2018 1200  Gross per 24 hour   Intake 1600 ml   Output 1250 ml   Net 350 ml      Physical Exam   Constitutional: She is oriented to person, place, and time. She appears well-developed and well-nourished. No distress.   HENT:   Head: Normocephalic and atraumatic.   Eyes: Conjunctivae are normal.   Neck: Neck supple. No JVD present.   Cardiovascular: Normal rate, regular rhythm and normal heart sounds. Exam reveals no friction rub.   Pulmonary/Chest: Effort normal and breath sounds normal.   Abdominal:  Soft. Bowel sounds are normal.   Musculoskeletal: Normal range of motion. She exhibits no edema.   Neurological: She is alert and oriented to person, place, and time.   Skin: Skin is warm and dry.   Psychiatric: She has a normal mood and affect. Her behavior is normal.   Nursing note and vitals reviewed.      Significant Labs:   CBC:   Recent Labs   Lab 12/29/18  0612 12/30/18  0530   WBC 7.18 10.63   HGB 8.0* 8.0*   HCT 24.2* 24.0*   PLT 40* 55*     CMP:   Recent Labs   Lab 12/29/18  0612 12/30/18  0530    137   K 4.0 4.1    106   CO2 25 28   GLU 72 74   BUN 13 15   CREATININE 0.8 0.9   CALCIUM 6.7* 7.1*   PROT 5.2* 5.6*   ALBUMIN 1.6* 1.6*   BILITOT 0.4 0.3   ALKPHOS 77 75   * 478*   * 156*   ANIONGAP 4* 3*   EGFRNONAA >60 >60     All pertinent labs within the past 24 hours have been reviewed.    Significant Imaging: I have reviewed all pertinent imaging results/findings within the past 24 hours.

## 2018-12-30 NOTE — PROGRESS NOTES
"[facility] Hospital Medicine  Progress Note    Patient Name: Wang Kebede  MRN: [id number]  Patient Class: IP- Inpatient   Admission Date: 12/24/2018  Length of Stay: 5 days  Attending Physician: Eddie Payne, *  Primary Care Provider: Levi Moncada MD        Subjective:     Principal Problem:Non-traumatic rhabdomyolysis    HPI:  Unable to obtain history from patient due to mental status and clinical condition.  HPI obtained from boyfriend at bedside, ER records, and past medical record.  Ms. 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  He reports finding the patient covered in her own feces and confused.  He reports "She is really out of it and I don't know what's going on".  Associated generalized myalgias, cough, N/V/D for the past 1 week.  She was seen in ED on 12/23 with c/o myalgias and N/V/D, and was diagnosed with acute bronchitis.  She was sent home on Ceftin and brompheniramine-pseudoeph-DM.  In ED, patient oriented to person, but confused and unable to tell the date, where she is, or what occurred this afternoon.  She has a h/o seizures, and has been unable to keep any of her medications down for the past 2-3 days.  Work-up in ED resulted WBC 8.57, plt 115, Na 131, bicarb 15, BUN 54, cr. 5.2, , , lipase 23, CPK 2172, lactic 2, procalcitonin 81.6.  VBG with pH 7.314.  UA negative for infectious process.  CXR unremarkable.  CT head negative for acute process.  Upon arrival to ED, patient significantly hypotensive with BP 55/32, , Temp 98.2.  IV fluid resuscitation given with adequate response in BP (111/62).  Blood/urine cultures drawn, and empiric IV abx given.  Hospital " Medicine was called for admission.  Patient remains confused, hemodynamically stable.    Hospital Course:  Admitted to ICU for evaluation and treatment acute encephalopathy and circulatory shock presumably from sepsis.  She is also at risk for opportunistic infections due to AIDS.  Serial lactic acid measurements normal but procalcitonin highly elevated at 80 with hypotension.  Empirically started Vancomycin and Ceftriaxone.  She also has a history of seizure and medication non-compliance and may have had a seizure.  Intermittent fevers up to 104 which responded to ND Acetaminophen.  Procalcitonin decreased to 28 but CPK is trending up as high as 14,000.  Successfully weaned Levophed.  Remains lethargic some of the time but alert and oriented intermittently.  CPK continues to rise to 29,000 with renal function and urine output improving.  Remained hemodynamically stable and afebrile.  Transferred to telemetry.  Infectious Disease following.  12/29/18 Pt was seen and examined at bedside . The kidney function  Is back to normal . The Pt was informed  That her boyfriend need to be informed  About her HIV status  . She states  Will let him know today . She was informed  The Infectious disease department  will informed the appropriate state department   To follow his boyfriend . 12/30/18 - Advised need for informing boyfriend of HIV status. Pt verbalized understanding. She is ambulatory and states she is ready for discharge. Diflucan started for vaginal yeast. CPK trending down. Rocephin in progress.     Interval History: Sitting in chair at bedside.  No complaints, states she is ready for discharge. Discussed plan for discharge if CK continues to decline. Urinating well.     Review of Systems   Constitutional: Positive for fatigue. Negative for chills and fever.   HENT: Negative for congestion and sore throat.    Eyes: Negative for visual disturbance.   Respiratory: Negative for cough, shortness of breath and wheezing.     Cardiovascular: Negative for chest pain, palpitations and leg swelling.   Gastrointestinal: Positive for diarrhea. Negative for abdominal pain, blood in stool, constipation, nausea and vomiting.   Genitourinary: Negative for dysuria and hematuria.   Musculoskeletal: Negative for arthralgias and back pain.   Skin: Negative for rash and wound.   Neurological: Positive for weakness. Negative for dizziness, light-headedness and numbness.   Hematological: Negative for adenopathy.     Objective:     Vital Signs (Most Recent):  Temp: 99.3 °F (37.4 °C) (12/30/18 1205)  Pulse: 98 (12/30/18 1205)  Resp: 18 (12/30/18 1205)  BP: 115/60 (12/30/18 1205)  SpO2: 100 % (12/30/18 1205) Vital Signs (24h Range):  Temp:  [97.3 °F (36.3 °C)-99.3 °F (37.4 °C)] 99.3 °F (37.4 °C)  Pulse:  [] 98  Resp:  [16-18] 18  SpO2:  [96 %-100 %] 100 %  BP: (100-124)/(56-70) 115/60     Weight: 84 kg (185 lb 3 oz)  Body mass index is 36.17 kg/m².    Intake/Output Summary (Last 24 hours) at 12/30/2018 1455  Last data filed at 12/30/2018 1200  Gross per 24 hour   Intake 1600 ml   Output 1250 ml   Net 350 ml      Physical Exam   Constitutional: She is oriented to person, place, and time. She appears well-developed and well-nourished. No distress.   HENT:   Head: Normocephalic and atraumatic.   Eyes: Conjunctivae are normal.   Neck: Neck supple. No JVD present.   Cardiovascular: Normal rate, regular rhythm and normal heart sounds. Exam reveals no friction rub.   Pulmonary/Chest: Effort normal and breath sounds normal.   Abdominal: Soft. Bowel sounds are normal.   Musculoskeletal: Normal range of motion. She exhibits no edema.   Neurological: She is alert and oriented to person, place, and time.   Skin: Skin is warm and dry.   Psychiatric: She has a normal mood and affect. Her behavior is normal.   Nursing note and vitals reviewed.      Significant Labs:   CBC:   Recent Labs   Lab 12/29/18  0612 12/30/18  0530   WBC 7.18 10.63   HGB 8.0* 8.0*   HCT  24.2* 24.0*   PLT 40* 55*     CMP:   Recent Labs   Lab 12/29/18  0612 12/30/18  0530    137   K 4.0 4.1    106   CO2 25 28   GLU 72 74   BUN 13 15   CREATININE 0.8 0.9   CALCIUM 6.7* 7.1*   PROT 5.2* 5.6*   ALBUMIN 1.6* 1.6*   BILITOT 0.4 0.3   ALKPHOS 77 75   * 478*   * 156*   ANIONGAP 4* 3*   EGFRNONAA >60 >60     All pertinent labs within the past 24 hours have been reviewed.    Significant Imaging: I have reviewed all pertinent imaging results/findings within the past 24 hours.    Assessment/Plan:      * Non-traumatic rhabdomyolysis    Uncertain etiology but she was down for an unspecified time, may have seized, and some of her anti-retrovirals can cause myositis.  Peak level 31,000 and now decreasing.  Continue to trend.  Improving   CK 10,000 12/30/18 - likely discharge 12/31/18     Pulmonary infiltrate present on computed tomography           Electrolyte disturbance    Replete magnesium and phosphorous       Seizure disorder    - Patient with questionable seizure PTA.  - Cont PO Keppra Q 12 hours  And resume home Vimpat.   - Seizure precautions.  IV Ativan PRN.     Thrombocytopenia associated with AIDS    - No evidence of active bleeding.  - Follow CBC and transfuse if needed.     SIRS (systemic inflammatory response syndrome)    - No clear source of infection identified.  CXR and UA unremarkable.  WBC 8.79.  Patient became febrile in ED (TMax 103.3).  - Blood, urine, and stool cultures pending.  - Will get STAT CT of ABD/Pelvis to r/o intraabdominal etiology.  - Empiric IV vanc and Rocephin.  Pharmacy consult for vanc dosing.  - Continue IV hydration.  - Patient had similar presentation on previous admission in 7/2017.  After an extensive workup AIDS itself was suspected to be the source, HAART was initiated and patient responded well.    Infectious Disease following.  On Ceftriaxone and Azithromycin.  Respiratory cultures - normal christophe     Elevated liver enzymes    -  Improving\  -2/2 to rhabdomyolysis       Anemia, unspecified    2/2 to chronic ds  No sign of active bleeding   Cont monitor        Depression    No SI/VA/AH       AIDS (acquired immunodeficiency syndrome), CD4 <=200    Absolute CD4 count 29.    ID on board      Moderate malnutrition           History of VT/VF s/p implantation of automatic cardioverter/defibrillator (AICD)    -  Cont current tx      HIV (human immunodeficiency virus infection)    -On HAART, unknown if compliant at present.  Patient with long-standing h/o noncompliance.  -Absolute CD4 count 29 and viral load  208 k   -Infectious Disease following.       VTE Risk Mitigation (From admission, onward)        Ordered     IP VTE HIGH RISK PATIENT  Once      12/25/18 1817     Place sequential compression device  Until discontinued      12/25/18 1817     Place sequential compression device  Until discontinued      12/25/18 0546              Eleanor Willis NP  Department of Hospital Medicine   [facility]

## 2018-12-30 NOTE — PLAN OF CARE
Problem: Adult Inpatient Plan of Care  Goal: Plan of Care Review  Outcome: Ongoing (interventions implemented as appropriate)  POC reviewed, verbalized understanding. Pt remained free from falls, fall precautions in place. Pt is sinus rhythm on monitor. VSS. No other c/o at this time. PIV intact. Call bell and personal belongings within reach. Hourly rounding complete. Reminded to call for assistance. Will continue to monitor.

## 2018-12-30 NOTE — ASSESSMENT & PLAN NOTE
34 y/o female presented with LELAND secondary to rhabdomyolysis:           LELAND (acute kidney injury)     LELAND resolved, s Cr further improved  Secondary to rhabdomyolysis  Did not require dialysis  K normal  Normal acid base  O2 sat is good  BP controlled     Cause of rhabdomyolysis remains unclear  Not on statins of colchicine  Had not passed out on a hard wood floor  Drug tox was not done  Denies cocaine      AIDS (acquired immunodeficiency syndrome), CD4 <=200     ID: HIV/AIDS  Low CD4 count  ID note was reviewed  Will defer mgmt.            Plans and recommendations:  As discussed above  Information and education provided  Reduce LR IVF rate to 75 ml/hr

## 2018-12-30 NOTE — PLAN OF CARE
Problem: Physical Therapy Goal  Goal: Physical Therapy Goal  LTG'S TO BE MET IN 7 DAYS (1-4-19)  1. PT WILL REQUIRE CGA FOR BED MOBILITY  2. PT WILL REQUIRE CGA FOR TF'S  3. PT WILL ' WITH RW AND CGA  4. PT WILL DEMO F+ DYNAMIC BALANCE DURING GAIT    Outcome: Ongoing (interventions implemented as appropriate)  Increased ambulating distance to 60 feet x 2 trials with no rest break in between. HHA and patient used arm rail instead of AD.

## 2018-12-30 NOTE — PT/OT/SLP PROGRESS
Physical Therapy  Treatment    Wang Kebede   MRN: [id number]   Admitting Diagnosis: Non-traumatic rhabdomyolysis    PT Received On: 12/30/18  PT Start Time: 1049     PT Stop Time: 1104    PT Total Time (min): 15 min       Billable Minutes:  Gait Training 15 minutes    Treatment Type: Treatment  PT/PTA: PTA     PTA Visit Number: 2       General Precautions: Standard, fall  Orthopedic Precautions: N/A   Braces:           Subjective:  Communicated with epic and nurse Collado prior to session.      Pain/Comfort  Pain Rating 1: 0/10    Objective:   Patient found with: telemetry, peripheral IV    Functional Mobility:  Bed Mobility: SBA       Transfers:min-mod assist       Gait: min assist, HHA       Stairs:n/a          Balance:   Static Sit: GOOD: Takes MODERATE challenges from all directions  Dynamic Sit: GOOD-: Incosistently Maintains balance through MODERATE excursions of active trunk movement,     Static Stand: FAIR+: Takes MINIMAL challenges from all directions  Dynamic stand: POOR+: Needs MIN (minimal ) assist during gait     Therapeutic Activities and Exercises:  Gait trianing with HHA/min-mod assist. Patient used hand rail while ambulating ~ 60 feet x 2 with slow alba and decreased step length.     AM-PAC 6 CLICK MOBILITY  How much help from another person does this patient currently need?   1 = Unable, Total/Dependent Assistance  2 = A lot, Maximum/Moderate Assistance  3 = A little, Minimum/Contact Guard/Supervision  4 = None, Modified [place]/Independent    Turning over in bed (including adjusting bedclothes, sheets and blankets)?: 4  Sitting down on and standing up from a chair with arms (e.g., wheelchair, bedside commode, etc.): 2  Moving from lying on back to sitting on the side of the bed?: 3  Moving to and from a bed to a chair (including a wheelchair)?: 3  Need to walk in hospital room?: 2  Climbing 3-5 steps with a railing?: 1  Basic Mobility Total Score: 15    AM-PAC Raw Score CMS  G-Code Modifier Level of Impairment Assistance   6 % Total / Unable   7 - 9 CM 80 - 100% Maximal Assist   10 - 14 CL 60 - 80% Moderate Assist   15 - 19 CK 40 - 60% Moderate Assist   20 - 22 CJ 20 - 40% Minimal Assist   23 CI 1-20% SBA / CGA   24 CH 0% Independent/ Mod I     Patient left supine with all lines intact, call button in reach and nursing notified.    Assessment:  Wang Kebede is a 33 y.o. female with a medical diagnosis of Non-traumatic rhabdomyolysis.    Rehab identified problem list/impairments: Rehab identified problem list/impairments: weakness, impaired endurance    Rehab potential is fair.    Activity tolerance: Good    Discharge recommendations: Discharge Facility/Level of Care Needs: other (see comments)(HHPT)     Barriers to discharge:      Equipment recommendations:       GOALS:   Multidisciplinary Problems     Physical Therapy Goals        Problem: Physical Therapy Goal    Goal Priority Disciplines Outcome Goal Variances Interventions   Physical Therapy Goal     PT, PT/OT Ongoing (interventions implemented as appropriate)     Description:  LTG'S TO BE MET IN 7 DAYS (1-4-19)  1. PT WILL REQUIRE CGA FOR BED MOBILITY  2. PT WILL REQUIRE CGA FOR TF'S  3. PT WILL ' WITH RW AND CGA  4. PT WILL DEMO F+ DYNAMIC BALANCE DURING GAIT                     PLAN:    Patient to be seen 5 x/week  to address the above listed problems via therapeutic activities, therapeutic exercises, gait training  Plan of Care expires: (HHPT depending on progress)  Plan of Care reviewed with: patient         Jairo Gonzales, PTA  12/30/2018

## 2018-12-30 NOTE — ASSESSMENT & PLAN NOTE
-On HAART, unknown if compliant at present.  Patient with long-standing h/o noncompliance.  -Absolute CD4 count 29 and viral load  208 k   -Infectious Disease following.   abnormal/expand...

## 2018-12-31 VITALS
TEMPERATURE: 98 F | RESPIRATION RATE: 17 BRPM | HEIGHT: 60 IN | SYSTOLIC BLOOD PRESSURE: 122 MMHG | DIASTOLIC BLOOD PRESSURE: 72 MMHG | OXYGEN SATURATION: 96 % | WEIGHT: 185.19 LBS | HEART RATE: 102 BPM | BODY MASS INDEX: 36.36 KG/M2

## 2018-12-31 PROBLEM — N17.9 AKI (ACUTE KIDNEY INJURY): Status: RESOLVED | Noted: 2018-12-25 | Resolved: 2018-12-31

## 2018-12-31 PROBLEM — H93.299: Status: RESOLVED | Noted: 2018-12-28 | Resolved: 2018-12-31

## 2018-12-31 LAB
ALBUMIN SERPL BCP-MCNC: 1.7 G/DL
ALP SERPL-CCNC: 70 U/L
ALT SERPL W/O P-5'-P-CCNC: 153 U/L
ANION GAP SERPL CALC-SCNC: 5 MMOL/L
ANISOCYTOSIS BLD QL SMEAR: SLIGHT
AST SERPL-CCNC: 320 U/L
BASOPHILS # BLD AUTO: 0.03 K/UL
BASOPHILS NFR BLD: 0.3 %
BILIRUB SERPL-MCNC: 0.3 MG/DL
BUN SERPL-MCNC: 14 MG/DL
CALCIUM SERPL-MCNC: 7.8 MG/DL
CHLORIDE SERPL-SCNC: 105 MMOL/L
CK SERPL-CCNC: 4279 U/L
CO2 SERPL-SCNC: 28 MMOL/L
CREAT SERPL-MCNC: 0.8 MG/DL
DIFFERENTIAL METHOD: ABNORMAL
EOSINOPHIL # BLD AUTO: 0.4 K/UL
EOSINOPHIL NFR BLD: 3.8 %
ERYTHROCYTE [DISTWIDTH] IN BLOOD BY AUTOMATED COUNT: 13.7 %
EST. GFR  (AFRICAN AMERICAN): >60 ML/MIN/1.73 M^2
EST. GFR  (NON AFRICAN AMERICAN): >60 ML/MIN/1.73 M^2
GLUCOSE SERPL-MCNC: 74 MG/DL
HCT VFR BLD AUTO: 25.1 %
HGB BLD-MCNC: 8.3 G/DL
LYMPHOCYTES # BLD AUTO: 4.6 K/UL
LYMPHOCYTES NFR BLD: 46.2 %
MAGNESIUM SERPL-MCNC: 1.4 MG/DL
MCH RBC QN AUTO: 33.1 PG
MCHC RBC AUTO-ENTMCNC: 33.1 G/DL
MCV RBC AUTO: 100 FL
MONOCYTES # BLD AUTO: 0.5 K/UL
MONOCYTES NFR BLD: 4.5 %
NEUTROPHILS # BLD AUTO: 4.5 K/UL
NEUTROPHILS NFR BLD: 46.8 %
OVALOCYTES BLD QL SMEAR: ABNORMAL
PHOSPHATE SERPL-MCNC: 2.2 MG/DL
PLATELET # BLD AUTO: 98 K/UL
PLATELET BLD QL SMEAR: ABNORMAL
PMV BLD AUTO: 11.7 FL
POTASSIUM SERPL-SCNC: 4.1 MMOL/L
PROT SERPL-MCNC: 6 G/DL
RBC # BLD AUTO: 2.51 M/UL
SODIUM SERPL-SCNC: 138 MMOL/L
STOMATOCYTES BLD QL SMEAR: PRESENT
WBC # BLD AUTO: 9.93 K/UL

## 2018-12-31 PROCEDURE — 85025 COMPLETE CBC W/AUTO DIFF WBC: CPT | Mod: HCNC

## 2018-12-31 PROCEDURE — 25000003 PHARM REV CODE 250: Mod: HCNC | Performed by: NURSE PRACTITIONER

## 2018-12-31 PROCEDURE — 84100 ASSAY OF PHOSPHORUS: CPT | Mod: HCNC

## 2018-12-31 PROCEDURE — 99233 PR SUBSEQUENT HOSPITAL CARE,LEVL III: ICD-10-PCS | Mod: HCNC,,, | Performed by: INTERNAL MEDICINE

## 2018-12-31 PROCEDURE — 87906 NFCT AGT GNTYP ALYS HIV1: CPT | Mod: HCNC

## 2018-12-31 PROCEDURE — 83735 ASSAY OF MAGNESIUM: CPT | Mod: HCNC

## 2018-12-31 PROCEDURE — 80053 COMPREHEN METABOLIC PANEL: CPT | Mod: HCNC

## 2018-12-31 PROCEDURE — 25000003 PHARM REV CODE 250: Mod: HCNC | Performed by: INTERNAL MEDICINE

## 2018-12-31 PROCEDURE — 82550 ASSAY OF CK (CPK): CPT | Mod: HCNC

## 2018-12-31 PROCEDURE — 97110 THERAPEUTIC EXERCISES: CPT | Mod: HCNC

## 2018-12-31 PROCEDURE — 63600175 PHARM REV CODE 636 W HCPCS: Mod: HCNC | Performed by: NURSE PRACTITIONER

## 2018-12-31 PROCEDURE — 97116 GAIT TRAINING THERAPY: CPT | Mod: HCNC

## 2018-12-31 PROCEDURE — 99233 SBSQ HOSP IP/OBS HIGH 50: CPT | Mod: HCNC,,, | Performed by: INTERNAL MEDICINE

## 2018-12-31 RX ORDER — FLUCONAZOLE 100 MG/1
100 TABLET ORAL DAILY
Qty: 4 TABLET | Refills: 0 | Status: SHIPPED | OUTPATIENT
Start: 2019-01-01 | End: 2019-01-05

## 2018-12-31 RX ORDER — LACOSAMIDE 50 MG/1
2 TABLET ORAL 2 TIMES DAILY
Qty: 120 TABLET | Refills: 11 | Status: SHIPPED | OUTPATIENT
Start: 2018-12-31 | End: 2019-12-16

## 2018-12-31 RX ORDER — LEVETIRACETAM 500 MG/1
500 TABLET ORAL 2 TIMES DAILY
Qty: 60 TABLET | Refills: 1 | Status: SHIPPED | OUTPATIENT
Start: 2018-12-31 | End: 2019-03-24 | Stop reason: CLARIF

## 2018-12-31 RX ORDER — SODIUM,POTASSIUM PHOSPHATES 280-250MG
2 POWDER IN PACKET (EA) ORAL
Qty: 40 PACKET | Refills: 0 | Status: SHIPPED | OUTPATIENT
Start: 2018-12-31 | End: 2019-01-05

## 2018-12-31 RX ORDER — LANOLIN ALCOHOL/MO/W.PET/CERES
400 CREAM (GRAM) TOPICAL 2 TIMES DAILY
Qty: 10 TABLET | Refills: 0 | Status: SHIPPED | OUTPATIENT
Start: 2018-12-31 | End: 2019-01-05

## 2018-12-31 RX ORDER — ATOVAQUONE 750 MG/5ML
1500 SUSPENSION ORAL
Qty: 100 ML | Refills: 0 | Status: SHIPPED | OUTPATIENT
Start: 2018-12-31 | End: 2019-01-10

## 2018-12-31 RX ADMIN — MAGNESIUM OXIDE TAB 400 MG (241.3 MG ELEMENTAL MG) 400 MG: 400 (241.3 MG) TAB at 09:12

## 2018-12-31 RX ADMIN — LACOSAMIDE 100 MG: 50 TABLET, FILM COATED ORAL at 09:12

## 2018-12-31 RX ADMIN — DOCUSATE SODIUM 100 MG: 100 CAPSULE, LIQUID FILLED ORAL at 09:12

## 2018-12-31 RX ADMIN — FLUCONAZOLE 100 MG: 100 TABLET ORAL at 09:12

## 2018-12-31 RX ADMIN — LEVETIRACETAM 500 MG: 500 TABLET, FILM COATED ORAL at 09:12

## 2018-12-31 RX ADMIN — FAMOTIDINE 20 MG: 20 TABLET ORAL at 09:12

## 2018-12-31 RX ADMIN — CEFTRIAXONE 1 G: 1 INJECTION, SOLUTION INTRAVENOUS at 06:12

## 2018-12-31 NOTE — PROGRESS NOTES
PICC line removed with cannula intact. Discharge instructions given. Pt verbalized understanding.Pt transported out via w/c. No noted distress.

## 2018-12-31 NOTE — PLAN OF CARE
Patient discussed d/c plans and refused and care post d/c. Patient plans to f/u with MD appt     12/31/18 3120   Final Note   Assessment Type Final Discharge Note   Anticipated Discharge Disposition Home   Hospital Follow Up  Appt(s) scheduled? Yes   Discharge plans and expectations educations in teach back method with documentation complete? Yes   Right Care Referral Info   Post Acute Recommendation No Care

## 2018-12-31 NOTE — PLAN OF CARE
Problem: Adult Inpatient Plan of Care  Goal: Plan of Care Review  Outcome: Ongoing (interventions implemented as appropriate)  POC reviewed, verbalized understanding. Pt remained free from falls, fall precautions in place. Pt is SR on monitor. VSS. No other c/o at this time. PIV intact. Call bell and personal belongings within reach. Hourly rounding complete. Reminded to call for assistance. Will continue to monitor.

## 2018-12-31 NOTE — PLAN OF CARE
Problem: Physical Therapy Goal  Goal: Physical Therapy Goal  LTG'S TO BE MET IN 7 DAYS (1-4-19)  1. PT WILL REQUIRE CGA FOR BED MOBILITY  2. PT WILL REQUIRE CGA FOR TF'S  3. PT WILL ' WITH RW AND CGA  4. PT WILL DEMO F+ DYNAMIC BALANCE DURING GAIT    Outcome: Outcome(s) achieved Date Met: 12/31/18  D/C PT FROM P[facility] SERVICES TO [facility]

## 2018-12-31 NOTE — PT/OT/SLP PROGRESS
Physical Therapy  Treatment    Wang Kebede   MRN: [id number]   Admitting Diagnosis: Non-traumatic rhabdomyolysis    PT Received On: 12/31/18  PT Start Time: 0930     PT Stop Time: 0955    PT Total Time (min): 25 min       Billable Minutes:  Gait Training 15 and Therapeutic Exercise 10    Treatment Type: Treatment  PT/PTA: PT         General Precautions: Standard  Orthopedic Precautions: N/A   Braces: N/A    Subjective:  Communicated with NURSE MEJIA prior to session.  Pain/Comfort  Pain Rating 1: 0/10    Objective:   Patient found with: telemetry    Functional Mobility:  Therapeutic Activities and Exercises:  PT FOUND SUPINE IN BED UPON ARRIVAL, SPV FOR SUP<>SIT TF, SBA FOR SIT<>STAND TF, PT DONNED ROBE WITH SET UP, PT ' NO AD INTERMITTENTLY HOLDING ONTO RAIL IN HALLWAY, INCREASED LATERAL SWAY BUT NO GROSS LOB, F+ DYNAMIC BALANCE, PT RETURN TO ROOM TO BEDSIDE CHAIR, PT EDUCATED IN AND PERFORMED BLE THEREX X 15 REPS AROM WITH REST    AM-PAC 6 CLICK MOBILITY  How much help from another person does this patient currently need?   1 = Unable, Total/Dependent Assistance  2 = A lot, Maximum/Moderate Assistance  3 = A little, Minimum/Contact Guard/Supervision  4 = None, Modified [place]/Independent    Turning over in bed (including adjusting bedclothes, sheets and blankets)?: 4  Sitting down on and standing up from a chair with arms (e.g., wheelchair, bedside commode, etc.): 4  Moving from lying on back to sitting on the side of the bed?: 4  Moving to and from a bed to a chair (including a wheelchair)?: 4  Need to walk in hospital room?: 4  Climbing 3-5 steps with a railing?: 1  Basic Mobility Total Score: 21    AM-PAC Raw Score CMS G-Code Modifier Level of Impairment Assistance   6 % Total / Unable   7 - 9 CM 80 - 100% Maximal Assist   10 - 14 CL 60 - 80% Moderate Assist   15 - 19 CK 40 - 60% Moderate Assist   20 - 22 CJ 20 - 40% Minimal Assist   23 CI 1-20% SBA / CGA   24 CH 0%  Independent/ Mod I     Patient left up in chair with all lines intact, call button in reach, NURSE notified and MOTHER present.    Assessment:  Wang Kebede is a 33 y.o. female with a medical diagnosis of Non-traumatic rhabdomyolysis   PT IS NO LONGER A CANDIDATE FOR INPATIENT SKILLED P.T. DUE TO HIGH LEVEL OF FUNCTION, PT WILL BENEFIT FROM [facility] FOR CONT. GAIT/TE    Rehab identified problem list/impairments:     Rehab potential is     Activity tolerance:     Discharge recommendations: Discharge Facility/Level of Care Needs: other (see comments)(HHPT)     Barriers to discharge:     Equipment recommendations: Equipment Needed After Discharge: none     GOALS:   Multidisciplinary Problems     Physical Therapy Goals     Not on file          Multidisciplinary Problems (Resolved)        Problem: Physical Therapy Goal    Goal Priority Disciplines Outcome Goal Variances Interventions   Physical Therapy Goal   (Resolved)     PT, PT/OT Outcome(s) achieved     Description:  LTG'S TO BE MET IN 7 DAYS (1-4-19)  1. PT WILL REQUIRE CGA FOR BED MOBILITY  2. PT WILL REQUIRE CGA FOR TF'S  3. PT WILL ' WITH RW AND CGA  4. PT WILL DEMO F+ DYNAMIC BALANCE DURING GAIT                     PLAN:    D/C PT FROM [facility] TO [facility]    Deana Cole, PT  12/31/2018

## 2018-12-31 NOTE — PROGRESS NOTES
[facility] -   Nephrology  Progress Note    Patient Name: Wang Kebede  MRN: [id number]  Admission Date: 12/24/2018  Hospital Length of Stay: 6 days  Attending Provider: No att. providers found   Primary Care Physician: Levi Moncada MD  Principal Problem:Non-traumatic rhabdomyolysis, LELAND    Subjective:     HPI: Wang Kebede is a 33 y.o.  AA woman  with history of HIV/AIDS, vulvar cancer, anemia, sickle cell disease, cervical cancer, chronic abdominal pain was admitted to hospital yesterday   for fever , malaise, blood pressure was significantly low on presentation, cultures were ordered and pending, she started on broad-spectrum antibiotics, about 2 days ago she was seen in the emergency room with similar complaints, she was diagnosed with bronchitis and was discharged home on oral antibiotics, her serum creatinine on presentation  was about 5 mg/dL yesterday, creatinine was normal about 4 months ago, patient had episodes of acute kidney injury in the past with similar presentations, improved with IV fluids,          Interval History: Pt was seen and examined this am before d/c, no new c/o's, stable last pm, no SOB, no CP. Discussed with pt and her mother pt's medical progress.    Review of patient's allergies indicates:   Allergen Reactions    Iodine and iodide containing products Anaphylaxis     Pt states she coded last time she was administered contrast    Pneumococcal 23-chitra ps vaccine Anaphylaxis     Potential iodine containing    Dilaudid [hydromorphone] Hives and Itching    Bactrim [sulfamethoxazole-trimethoprim] Hives    Morphine Itching     Tolerates norco 2018     Current Facility-Administered Medications   Medication Frequency    acetaminophen suppository 650 mg Q8H PRN    atovaquone suspension 1,500 mg with lunch    bisacodyl suppository 10 mg Daily PRN    cefTRIAXone (ROCEPHIN) 1 g in dextrose 5 % 50 mL IVPB Q24H    diphenhydrAMINE injection 25 mg Q6H  PRN    docusate sodium capsule 100 mg Daily    famotidine tablet 20 mg BID    fluconazole tablet 100 mg Daily    lacosamide tablet 100 mg BID    levalbuterol nebulizer solution 0.63 mg Q6H PRN    levETIRAcetam tablet 500 mg BID    lorazepam (ATIVAN) injection 2 mg Q4H PRN    magnesium oxide tablet 400 mg BID    ondansetron injection 4 mg Q6H PRN    oxyCODONE immediate release tablet 5 mg Q6H PRN    potassium, sodium phosphates 280-160-250 mg packet 2 packet QID (AC & HS)     Current Outpatient Medications   Medication    albuterol (PROVENTIL/VENTOLIN HFA) 90 mcg/actuation inhaler    atovaquone (MEPRON) 750 mg/5 mL Susp    b complex vitamins capsule    brompheniramine-pseudoeph-DM (BROMFED DM) 2-30-10 mg/5 mL Syrp    darunavir-cobicistat (PREZCOBIX) 800-150 mg-mg Tab    dolutegravir 50 mg Tab    [START ON 1/1/2019] fluconazole (DIFLUCAN) 100 MG tablet    folic acid (FOLVITE) 1 MG tablet    lacosamide (VIMPAT) 50 mg Tab    levETIRAcetam (KEPPRA) 500 MG Tab    magnesium oxide (MAG-OX) 400 mg (241.3 mg magnesium) tablet    potassium, sodium phosphates (PHOS-NAK) 280-160-250 mg PwPk       Objective:     Vital Signs (Most Recent):  Temp: 98.1 °F (36.7 °C) (12/31/18 0728)  Pulse: 102 (12/31/18 1107)  Resp: 17 (12/31/18 0728)  BP: 122/72 (12/31/18 0728)  SpO2: 96 % (12/31/18 0728)  O2 Device (Oxygen Therapy): room air (12/31/18 0459) Vital Signs (24h Range):  Temp:  [98 °F (36.7 °C)-99 °F (37.2 °C)] 98.1 °F (36.7 °C)  Pulse:  [] 102  Resp:  [17-18] 17  SpO2:  [95 %-98 %] 96 %  BP: (107-122)/(58-72) 122/72     Weight: 84 kg (185 lb 3 oz) (12/30/18 0408)  Body mass index is 36.17 kg/m².  Body surface area is 1.89 meters squared.    I/O last 3 completed shifts:  In: 1140 [P.O.:1090; IV Piggyback:50]  Out: -     Physical Exam   Constitutional: She is oriented to person, place, and time. She appears well-nourished. No distress.   Neck: No JVD present.   Cardiovascular: Normal rate, regular rhythm and  normal heart sounds. Exam reveals no friction rub.   Pulmonary/Chest: Effort normal and breath sounds normal.   Abdominal: Soft.   Musculoskeletal: She exhibits no edema.   Neurological: She is alert and oriented to person, place, and time.   Skin: Skin is warm and dry.   Psychiatric: She has a normal mood and affect. Her behavior is normal.   Nursing note reviewed.      Significant Labs: reviewed  BMP  Lab Results   Component Value Date     12/31/2018    K 4.1 12/31/2018     12/31/2018    CO2 28 12/31/2018    BUN 14 12/31/2018    CREATININE 0.8 12/31/2018    CALCIUM 7.8 (L) 12/31/2018    ANIONGAP 5 (L) 12/31/2018    ESTGFRAFRICA >60 12/31/2018    EGFRNONAA >60 12/31/2018     CK 4000    Assessment/Plan:         32 y/o female presented with LELAND secondary to rhabdomyolysis:           LELAND (acute kidney injury)     LELNAD resolved, s Cr normal  Secondary to rhabdomyolysis, resolving, CK progressively lower  Did not require dialysis  K normal  Normal acid base  O2 sat is good  BP controlled     Cause of rhabdomyolysis unclear  Not on statins of colchicine  Had not passed out on a hard wood floor  Drug tox was not done  Denies cocaine      AIDS (acquired immunodeficiency syndrome), CD4 <=200     ID: HIV/AIDS  Low CD4 count  ID note was reviewed  Will defer mgmt.            Plans and recommendations:  As discussed above  Information and education provided  OK to d/c pt home             Allyson Brady MD  Nephrology  [facility] -

## 2018-12-31 NOTE — ASSESSMENT & PLAN NOTE
34 y/o female presented with LELAND secondary to rhabdomyolysis:           LELAND (acute kidney injury)     LELAND remains resolved, s Cr normal now  Secondary to rhabdomyolysis  CK improving  Did not require dialysis  K normal  Normal acid base  O2 sat is good  BP controlled     Cause of rhabdomyolysis remains unclear  Not on statins of colchicine  Had not passed out on a hard wood floor  Drug tox was not done  Denies cocaine      AIDS (acquired immunodeficiency syndrome), CD4 <=200     ID: HIV/AIDS  Low CD4 count  ID note was reviewed  Will defer mgmt.            Plans and recommendations:  As discussed above  Information and education provided  Reduced rate of LR IVF's to 75 ml/hr yesterday. Pt has adequate PO intake. May d/c LR IVF's.

## 2018-12-31 NOTE — SUBJECTIVE & OBJECTIVE
Interval History: Pt was seen and examined this am before d/c, no new c/o's, stable last pm, no SOB, no CP. Discussed with pt and her mother pt's medical progress.    Review of patient's allergies indicates:   Allergen Reactions    Iodine and iodide containing products Anaphylaxis     Pt states she coded last time she was administered contrast    Pneumococcal 23-chitra ps vaccine Anaphylaxis     Potential iodine containing    Dilaudid [hydromorphone] Hives and Itching    Bactrim [sulfamethoxazole-trimethoprim] Hives    Morphine Itching     Tolerates norco 2018     Current Facility-Administered Medications   Medication Frequency    acetaminophen suppository 650 mg Q8H PRN    atovaquone suspension 1,500 mg with lunch    bisacodyl suppository 10 mg Daily PRN    cefTRIAXone (ROCEPHIN) 1 g in dextrose 5 % 50 mL IVPB Q24H    diphenhydrAMINE injection 25 mg Q6H PRN    docusate sodium capsule 100 mg Daily    famotidine tablet 20 mg BID    fluconazole tablet 100 mg Daily    lacosamide tablet 100 mg BID    levalbuterol nebulizer solution 0.63 mg Q6H PRN    levETIRAcetam tablet 500 mg BID    lorazepam (ATIVAN) injection 2 mg Q4H PRN    magnesium oxide tablet 400 mg BID    ondansetron injection 4 mg Q6H PRN    oxyCODONE immediate release tablet 5 mg Q6H PRN    potassium, sodium phosphates 280-160-250 mg packet 2 packet QID (AC & HS)     Current Outpatient Medications   Medication    albuterol (PROVENTIL/VENTOLIN HFA) 90 mcg/actuation inhaler    atovaquone (MEPRON) 750 mg/5 mL Susp    b complex vitamins capsule    brompheniramine-pseudoeph-DM (BROMFED DM) 2-30-10 mg/5 mL Syrp    darunavir-cobicistat (PREZCOBIX) 800-150 mg-mg Tab    dolutegravir 50 mg Tab    [START ON 1/1/2019] fluconazole (DIFLUCAN) 100 MG tablet    folic acid (FOLVITE) 1 MG tablet    lacosamide (VIMPAT) 50 mg Tab    levETIRAcetam (KEPPRA) 500 MG Tab    magnesium oxide (MAG-OX) 400 mg (241.3 mg magnesium) tablet    potassium, sodium  phosphates (PHOS-NAK) 280-160-250 mg PwPk       Objective:     Vital Signs (Most Recent):  Temp: 98.1 °F (36.7 °C) (12/31/18 0728)  Pulse: 102 (12/31/18 1107)  Resp: 17 (12/31/18 0728)  BP: 122/72 (12/31/18 0728)  SpO2: 96 % (12/31/18 0728)  O2 Device (Oxygen Therapy): room air (12/31/18 0459) Vital Signs (24h Range):  Temp:  [98 °F (36.7 °C)-99 °F (37.2 °C)] 98.1 °F (36.7 °C)  Pulse:  [] 102  Resp:  [17-18] 17  SpO2:  [95 %-98 %] 96 %  BP: (107-122)/(58-72) 122/72     Weight: 84 kg (185 lb 3 oz) (12/30/18 0408)  Body mass index is 36.17 kg/m².  Body surface area is 1.89 meters squared.    I/O last 3 completed shifts:  In: 1140 [P.O.:1090; IV Piggyback:50]  Out: -     Physical Exam   Constitutional: She is oriented to person, place, and time. She appears well-nourished. No distress.   Neck: No JVD present.   Cardiovascular: Normal rate, regular rhythm and normal heart sounds. Exam reveals no friction rub.   Pulmonary/Chest: Effort normal and breath sounds normal.   Abdominal: Soft.   Musculoskeletal: She exhibits no edema.   Neurological: She is alert and oriented to person, place, and time.   Skin: Skin is warm and dry.   Psychiatric: She has a normal mood and affect. Her behavior is normal.   Nursing note reviewed.      Significant Labs: reviewed  BMP  Lab Results   Component Value Date     12/31/2018    K 4.1 12/31/2018     12/31/2018    CO2 28 12/31/2018    BUN 14 12/31/2018    CREATININE 0.8 12/31/2018    CALCIUM 7.8 (L) 12/31/2018    ANIONGAP 5 (L) 12/31/2018    ESTGFRAFRICA >60 12/31/2018    EGFRNONAA >60 12/31/2018     CK 4000

## 2019-01-01 NOTE — DISCHARGE SUMMARY
"[facility] Hospital Medicine  Discharge Summary      Patient Name: Wang Kebede  MRN: [id number]  Admission Date: 12/24/2018  Hospital Length of Stay: 6 days  Discharge Date and Time: 12/31/2018  2:11 PM  Attending Physician: Hubert Mayo MD  Discharging Provider: Eleanor Willis NP  Primary Care Provider: Levi Moncada MD      HPI:   Unable to obtain history from patient due to mental status and clinical condition.  HPI obtained from boyfriend at bedside, ER records, and past medical record.  Ms. Kebede is a 34 yo female with  a PMHx of HIV/AIDS, anemia, chronically elevated LFTs, and h/o VT/VF after receiving iodine containing contrast s/p ICD implantation, seizure disorder, and h/o vulvar CA, who was brought to the ED by her boyfriend with c/o AMS that he noticed after coming home from work this evening.  He reports finding the patient covered in her own feces and confused.  He reports "She is really out of it and I don't know what's going on".  Associated generalized myalgias, cough, N/V/D for the past 1 week.  She was seen in ED on 12/23 with c/o myalgias and N/V/D, and was diagnosed with acute bronchitis.  She was sent home on Ceftin and brompheniramine-pseudoeph-DM.  In ED, patient oriented to person, but confused and unable to tell the date, where she is, or what occurred this afternoon.  She has a h/o seizures, and has been unable to keep any of her medications down for the past 2-3 days.  Work-up in ED resulted WBC 8.57, plt 115, Na 131, bicarb 15, BUN 54, cr. 5.2, , , lipase 23, CPK 2172, lactic 2, procalcitonin 81.6.  VBG with pH 7.314.  UA negative for infectious process.  CXR unremarkable.  CT head negative for acute process.  Upon arrival to ED, patient significantly hypotensive with BP 55/32, , Temp 98.2.  IV fluid resuscitation given with adequate response in BP (111/62).  Blood/urine cultures drawn, and empiric IV abx given.  Hospital Medicine " was called for admission.  Patient remains confused, hemodynamically stable.    * No surgery found *      Hospital Course:   Admitted to ICU for evaluation and treatment acute encephalopathy and circulatory shock presumably from sepsis.  She is also at risk for opportunistic infections due to AIDS.  Serial lactic acid measurements normal but procalcitonin highly elevated at 80 with hypotension.  Empirically started Vancomycin and Ceftriaxone.  She also has a history of seizure and medication non-compliance and may have had a seizure.  Intermittent fevers up to 104 which responded to [place] Acetaminophen.  Procalcitonin decreased to 28 but CPK is trending up as high as 14,000.  Successfully weaned Levophed.  Remains lethargic some of the time but alert and oriented intermittently.  CPK continues to rise to 29,000 with renal function and urine output improving.  Remained hemodynamically stable and afebrile.  Transferred to telemetry.  Infectious Disease following.  12/29/18 Pt was seen and examined at bedside . The kidney function  Is back to normal . The Pt was informed  That her boyfriend need to be informed  About her HIV status  . She states  Will let him know today . She was informed  The Infectious disease department  will informed the appropriate state department   To follow his boyfriend . 12/30/18 - Advised need for informing boyfriend of HIV status. Pt verbalized understanding. She is ambulatory and states she was ready for discharge. Diflucan started for vaginal yeast. CPK trending down. Rocephin in progress. 12/31/18 - CPK has continued to trend down. Pt was seen and examined and determined to be safe and stable for discharge. She was prescribed Levaquin and fluconazole. Pt was advised to call Dr. Mayo for follow up appointment in 2 weeks.      Consults:   Consults (From admission, onward)        Status Ordering Provider     Inpatient consult to Nephrology  Once     Provider:  (Not yet assigned)    Completed  GALO ROMAN     Inpatient consult to Pulmonology  Once     Provider:  Kylie To NP    Completed RANDAL SMITH          No new Assessment & Plan notes have been filed under this hospital service since the last note was generated.  Service: Hospital Medicine    Final Active Diagnoses:    Diagnosis Date Noted POA    PRINCIPAL PROBLEM:  Non-traumatic rhabdomyolysis [M62.82] 12/25/2018 Yes    Pulmonary infiltrate present on computed tomography [R91.8] 12/27/2018 Yes    SIRS (systemic inflammatory response syndrome) [R65.10] 12/25/2018 Yes    Thrombocytopenia associated with AIDS [D69.6] 12/25/2018 Yes     Chronic    Seizure disorder [G40.909] 12/25/2018 Yes     Chronic    Electrolyte disturbance [E87.8] 12/25/2018 Yes    Elevated liver enzymes [R74.8] 08/29/2017 Yes    Anemia, unspecified [D64.9] 08/15/2017 Yes     Chronic    Depression [F32.9] 05/26/2017 Yes     Chronic    AIDS (acquired immunodeficiency syndrome), CD4 <=200 [B20] 04/15/2017 Yes     Chronic    Moderate malnutrition [E44.0] 03/21/2017 Yes    History of VT/VF s/p implantation of automatic cardioverter/defibrillator (AICD) [Z95.810] 12/29/2016 Yes     Chronic    HIV (human immunodeficiency virus infection) [B20]  Yes     Chronic      Problems Resolved During this Admission:    Diagnosis Date Noted Date Resolved POA    Altered auditory perception [H93.299] 12/28/2018 12/31/2018 Yes    Circulatory Shock [R57.9] 12/25/2018 12/27/2018 Yes    LELAND (acute kidney injury) [N17.9] 12/25/2018 12/31/2018 Unknown    Acute encephalopathy [G93.40] 12/25/2018 12/30/2018 Yes       Discharged Condition: stable    Disposition: Home or Self Care    Follow Up:  Follow-up Information     Hubert Mayo MD In 2 weeks.    Specialty:  Infectious Diseases  Why:  Hospital Follow Up - Pneumonia, Infectious Ds  Contact information:  [address] [facility] [address] [phone]                 Patient Instructions:      Notify your health  care provider if you experience any of the following:  temperature >100.4     Notify your health care provider if you experience any of the following:  persistent nausea and vomiting or diarrhea     Notify your health care provider if you experience any of the following:  difficulty breathing or increased cough     Notify your health care provider if you experience any of the following:  temperature >100.4     Notify your health care provider if you experience any of the following:  persistent nausea and vomiting or diarrhea     Notify your health care provider if you experience any of the following:  difficulty breathing or increased cough     Activity as tolerated     Activity as tolerated       Significant Diagnostic Studies: Labs:   CMP   Recent Labs   Lab 12/31/18  0500      K 4.1      CO2 28   GLU 74   BUN 14   CREATININE 0.8   CALCIUM 7.8*   PROT 6.0   ALBUMIN 1.7*   BILITOT 0.3   ALKPHOS 70   *   *   ANIONGAP 5*   ESTGFRAFRICA >60   EGFRNONAA >60    and CBC   Recent Labs   Lab 12/31/18  0500   WBC 9.93   HGB 8.3*   HCT 25.1*   PLT 98*       Pending Diagnostic Studies:     Procedure Component Value Units Date/Time    HIV-1 GenotypR PLUS [[id number]] Collected:  12/28/18 0615    Order Status:  Sent Lab Status:  In process Updated:  12/28/18 1836    Specimen:  Blood     HIV-1 INTEGRASE GENOTYPE [[id number]] Collected:  12/31/18 0500    Order Status:  Sent Lab Status:  In process Updated:  12/31/18 1544    Specimen:  Blood          Medications:  Reconciled Home Medications:      Medication List      START taking these medications    atovaquone 750 mg/5 mL Susp  Commonly known as:  MEPRON  Take 10 mLs (1,500 mg total) by mouth with lunch. for 10 days     fluconazole 100 MG tablet  Commonly known as:  DIFLUCAN  Take 1 tablet (100 mg total) by mouth once daily. for 4 days     levETIRAcetam 500 MG Tab  Commonly known as:  KEPPRA  Take 1 tablet (500 mg total) by mouth 2 (two) times daily.      magnesium oxide 400 mg (241.3 mg magnesium) tablet  Commonly known as:  MAG-OX  Take 1 tablet (400 mg total) by mouth 2 (two) times daily. for 5 days     potassium, sodium phosphates 280-160-250 mg Pwpk  Commonly known as:  PHOS-NAK  Take 2 packets by mouth 4 (four) times daily before meals and nightly. for 5 days        CHANGE how you take these medications    dolutegravir 50 mg Tab  Commonly known as:  TIVICAY  Take 1 tablet (50 mg total) by mouth once daily.  What changed:  when to take this     lacosamide 50 mg Tab  Commonly known as:  VIMPAT  Take 2 tablets (100 mg total) by mouth 2 (two) times daily.  What changed:  how much to take        CONTINUE taking these medications    albuterol 90 mcg/actuation inhaler  Commonly known as:  PROVENTIL/VENTOLIN HFA  Inhale 2 puffs into the lungs every 6 (six) hours as needed for Wheezing.     b complex vitamins capsule  Take 1 capsule by mouth once daily.     brompheniramine-pseudoeph-DM 2-30-10 mg/5 mL Syrp  Commonly known as:  BROMFED DM  Take 5 mLs by mouth 3 (three) times daily as needed.     darunavir-cobicistat 800-150 mg-mg Tab  Commonly known as:  PREZCOBIX  Take 800 mg by mouth every evening.     folic acid 1 MG tablet  Commonly known as:  FOLVITE  Take 1 tablet (1 mg total) by mouth once daily.        ASK your doctor about these medications    cefUROXime 500 MG tablet  Commonly known as:  CEFTIN  Take 1 tablet (500 mg total) by mouth 2 (two) times daily. for 7 days  Ask about: Should I take this medication?            Indwelling Lines/Drains at time of discharge:   Lines/Drains/Airways     Peripherally Inserted Central Catheter Line                 PICC Double Lumen 12/25/18 0800 right brachial 7 days                Time spent on the discharge of patient: > 30 minutes  Patient was seen and examined on the date of discharge and determined to be suitable for discharge.         Eleanor Willis NP  Department of Hospital Medicine  [facility]

## 2019-01-02 NOTE — PHYSICIAN QUERY
PT Name: Wang Kebede  MR #: [id number]    Physician Query Form - Cause and Effect Relationship Clarification      CDS/: Elva Cisneros RN            Contact information:[email or web address]    This form is a permanent document in the medical record.     Query Date: January 2, 2019    By submitting this query, we are merely seeking further clarification of documentation. Please utilize your independent clinical judgment when addressing the question(s) below.    The Medical record contains the following:  Supporting Clinical Findings   Location in record     Ms. Kebede is a 32 yo female with  a PMHx of HIV/AIDS, anemia, chronically elevated LFTs, and h/o VT/VF after receiving iodine containing contrast s/p ICD implantation, seizure disorder, and h/o vulvar CA, who was brought to the ED by her boyfriend with c/o AMS that he noticed after coming home from work this evening                                                                                                                                                                                      D/c summary    Admitted to ICU for evaluation and treatment acute encephalopathy and circulatory shock presumably from sepsis.   Serial lactic acid measurements normal but procalcitonin highly elevated at 80 with hypotension.  Empirically started Vancomycin and Ceftriaxone.  She also has a history of seizure and medication non-compliance and may have had a seizure.  Intermittent fevers up to 104 which responded to [place] Acetaminophen.  Procalcitonin decreased to 28 but CPK is trending up as high as 14,000.  Successfully weaned Levophed.  Remains lethargic some of the time but alert and oriented intermittently.  CPK continues to rise to 29,000 with renal function and urine output improving       HIV (human immunodeficiency virus infection)    -On HAART, unknown if compliant at present.  Patient with long-standing h/o noncompliance.  -Absolute CD4  count 29 and viral load  208 k   -Infectious Disease following.                                                                                                                                                                               D/c summary                                            [facility] medicine 12/30         Provider, please clarify if there is any correlation between __Sepsis___ and __AIDS________________.           Are the conditions:      [ x ] Due to or associated with each other   [  ] Unrelated to each other   [  ] Neither Sepsis is ruled out   [  ] Other (Please Specify): _________________________   [  ] Clinically Undetermined

## 2019-01-04 LAB
DOLUTEGRAVIR ISLT GENOTYP: NORMAL
ELVITEGRAVIR ISLT GENOTYP: NORMAL
HIV-1 GENOTYPIC INTEGRASE RESIST.: NORMAL
INTEGRASE MUTATIONS: NORMAL
RALTEGRAVIR ISLT GENOTYP: NORMAL

## 2019-01-08 LAB — HIV GENTYP ISLT: DETECTED

## 2019-01-09 ENCOUNTER — PATIENT MESSAGE (OUTPATIENT)
Dept: ADMINISTRATIVE | Facility: HOSPITAL | Age: 34
End: 2019-01-09

## 2019-01-17 ENCOUNTER — TELEPHONE (OUTPATIENT)
Dept: INTERNAL MEDICINE | Facility: CLINIC | Age: 34
End: 2019-01-17

## 2019-01-17 NOTE — TELEPHONE ENCOUNTER
----- Message from Kika Tee sent at 1/17/2019  1:26 PM CST -----  Contact: pt  The pt states she is returning a missed call, no additional info given, can be reched at [phone]///[id number]

## 2019-01-23 ENCOUNTER — HOSPITAL ENCOUNTER (EMERGENCY)
Facility: HOSPITAL | Age: 34
Discharge: HOME | End: 2019-01-24
Attending: EMERGENCY MEDICINE
Payer: MEDICARE

## 2019-01-23 VITALS
OXYGEN SATURATION: 100 % | SYSTOLIC BLOOD PRESSURE: 128 MMHG | HEIGHT: 60 IN | BODY MASS INDEX: 26.19 KG/M2 | TEMPERATURE: 98 F | WEIGHT: 133.38 LBS | RESPIRATION RATE: 18 BRPM | DIASTOLIC BLOOD PRESSURE: 67 MMHG | HEART RATE: 100 BPM

## 2019-01-23 DIAGNOSIS — R53.81 MALAISE: ICD-10-CM

## 2019-01-23 DIAGNOSIS — D64.9 ANEMIA, UNSPECIFIED TYPE: Primary | ICD-10-CM

## 2019-01-23 DIAGNOSIS — R07.9 CHEST PAIN: ICD-10-CM

## 2019-01-23 DIAGNOSIS — R05.9 COUGH: ICD-10-CM

## 2019-01-23 LAB
BACTERIA #/AREA URNS HPF: NORMAL /HPF
BILIRUB UR QL STRIP: NEGATIVE
CLARITY UR: CLEAR
COLOR UR: YELLOW
GLUCOSE UR QL STRIP: NEGATIVE
HGB UR QL STRIP: ABNORMAL
HYALINE CASTS #/AREA URNS LPF: 0 /LPF
KETONES UR QL STRIP: NEGATIVE
LEUKOCYTE ESTERASE UR QL STRIP: NEGATIVE
MICROSCOPIC COMMENT: NORMAL
NITRITE UR QL STRIP: NEGATIVE
PH UR STRIP: 7 [PH] (ref 5–8)
PROT UR QL STRIP: ABNORMAL
RBC #/AREA URNS HPF: 0 /HPF (ref 0–4)
SP GR UR STRIP: 1.02 (ref 1–1.03)
SQUAMOUS #/AREA URNS HPF: 2 /HPF
URN SPEC COLLECT METH UR: ABNORMAL
UROBILINOGEN UR STRIP-ACNC: NEGATIVE EU/DL
WBC #/AREA URNS HPF: 1 /HPF (ref 0–5)

## 2019-01-23 PROCEDURE — 80053 COMPREHEN METABOLIC PANEL: CPT | Mod: HCNC

## 2019-01-23 PROCEDURE — 93010 EKG 12-LEAD: ICD-10-PCS | Mod: HCNC,,, | Performed by: INTERNAL MEDICINE

## 2019-01-23 PROCEDURE — 85730 THROMBOPLASTIN TIME PARTIAL: CPT | Mod: HCNC

## 2019-01-23 PROCEDURE — 25000003 PHARM REV CODE 250: Mod: HCNC | Performed by: EMERGENCY MEDICINE

## 2019-01-23 PROCEDURE — 96360 HYDRATION IV INFUSION INIT: CPT | Mod: HCNC

## 2019-01-23 PROCEDURE — 85610 PROTHROMBIN TIME: CPT | Mod: HCNC

## 2019-01-23 PROCEDURE — 83690 ASSAY OF LIPASE: CPT | Mod: HCNC

## 2019-01-23 PROCEDURE — 83605 ASSAY OF LACTIC ACID: CPT | Mod: HCNC

## 2019-01-23 PROCEDURE — 85025 COMPLETE CBC W/AUTO DIFF WBC: CPT | Mod: HCNC

## 2019-01-23 PROCEDURE — 81000 URINALYSIS NONAUTO W/SCOPE: CPT | Mod: HCNC

## 2019-01-23 PROCEDURE — 93010 ELECTROCARDIOGRAM REPORT: CPT | Mod: HCNC,,, | Performed by: INTERNAL MEDICINE

## 2019-01-23 PROCEDURE — 82550 ASSAY OF CK (CPK): CPT | Mod: HCNC

## 2019-01-23 PROCEDURE — 84145 PROCALCITONIN (PCT): CPT | Mod: HCNC

## 2019-01-23 PROCEDURE — 99284 EMERGENCY DEPT VISIT MOD MDM: CPT | Mod: 25,HCNC

## 2019-01-23 PROCEDURE — 87502 INFLUENZA DNA AMP PROBE: CPT | Mod: HCNC

## 2019-01-23 RX ORDER — ONDANSETRON 4 MG/1
4 TABLET, ORALLY DISINTEGRATING ORAL
Status: COMPLETED | OUTPATIENT
Start: 2019-01-23 | End: 2019-01-23

## 2019-01-23 RX ADMIN — ONDANSETRON 4 MG: 4 TABLET, ORALLY DISINTEGRATING ORAL at 11:01

## 2019-01-23 RX ADMIN — SODIUM CHLORIDE 1000 ML: 0.9 INJECTION, SOLUTION INTRAVENOUS at 11:01

## 2019-01-24 LAB
ALBUMIN SERPL BCP-MCNC: 2.8 G/DL
ALP SERPL-CCNC: 59 U/L
ALT SERPL W/O P-5'-P-CCNC: 10 U/L
ANION GAP SERPL CALC-SCNC: 6 MMOL/L
APTT BLDCRRT: 33.1 SEC
AST SERPL-CCNC: 22 U/L
BASOPHILS # BLD AUTO: 0.01 K/UL
BASOPHILS NFR BLD: 0.3 %
BILIRUB SERPL-MCNC: 0.2 MG/DL
BUN SERPL-MCNC: 17 MG/DL
CALCIUM SERPL-MCNC: 8.2 MG/DL
CHLORIDE SERPL-SCNC: 110 MMOL/L
CK SERPL-CCNC: 84 U/L
CO2 SERPL-SCNC: 21 MMOL/L
CREAT SERPL-MCNC: 1 MG/DL
DIFFERENTIAL METHOD: ABNORMAL
EOSINOPHIL # BLD AUTO: 0.1 K/UL
EOSINOPHIL NFR BLD: 2.3 %
ERYTHROCYTE [DISTWIDTH] IN BLOOD BY AUTOMATED COUNT: 14.5 %
EST. GFR  (AFRICAN AMERICAN): >60 ML/MIN/1.73 M^2
EST. GFR  (NON AFRICAN AMERICAN): >60 ML/MIN/1.73 M^2
GLUCOSE SERPL-MCNC: 80 MG/DL
HCT VFR BLD AUTO: 26.3 %
HGB BLD-MCNC: 8.6 G/DL
INFLUENZA A, MOLECULAR: NEGATIVE
INFLUENZA B, MOLECULAR: NEGATIVE
INR PPP: 0.9
LACTATE SERPL-SCNC: 0.6 MMOL/L
LIPASE SERPL-CCNC: 66 U/L
LYMPHOCYTES # BLD AUTO: 1.7 K/UL
LYMPHOCYTES NFR BLD: 44 %
MCH RBC QN AUTO: 32.1 PG
MCHC RBC AUTO-ENTMCNC: 32.7 G/DL
MCV RBC AUTO: 98 FL
MONOCYTES # BLD AUTO: 0.5 K/UL
MONOCYTES NFR BLD: 11.5 %
NEUTROPHILS # BLD AUTO: 1.7 K/UL
NEUTROPHILS NFR BLD: 42.2 %
PLATELET # BLD AUTO: 207 K/UL
PMV BLD AUTO: 10.3 FL
POTASSIUM SERPL-SCNC: 4.2 MMOL/L
PROCALCITONIN SERPL IA-MCNC: 0.07 NG/ML
PROT SERPL-MCNC: 9.1 G/DL
PROTHROMBIN TIME: 9.8 SEC
RBC # BLD AUTO: 2.68 M/UL
SODIUM SERPL-SCNC: 137 MMOL/L
SPECIMEN SOURCE: NORMAL
WBC # BLD AUTO: 3.93 K/UL

## 2019-01-24 PROCEDURE — 36415 COLL VENOUS BLD VENIPUNCTURE: CPT | Mod: HCNC

## 2019-01-24 PROCEDURE — 87040 BLOOD CULTURE FOR BACTERIA: CPT | Mod: HCNC
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
## 2019-01-29 LAB
BACTERIA BLD CULT: NORMAL
BACTERIA BLD CULT: NORMAL
FUNGUS SPEC CULT: NORMAL

## 2019-01-31 LAB
EJ AB SER QL: NOT DETECTED
ENA JO1 AB SER IA-ACNC: <1 INDEX
KU AB SER QL: NOT DETECTED
MI2 AB SER QL: NOT DETECTED
OJ AB SER QL: NOT DETECTED
PL12 AB SER QL: NOT DETECTED
PL7 AB SER QL: NOT DETECTED
SRP AB SERPL QL: NOT DETECTED

## 2019-02-18 ENCOUNTER — LAB VISIT (OUTPATIENT)
Dept: LAB | Facility: HOSPITAL | Age: 34
End: 2019-02-18
Attending: INTERNAL MEDICINE
Payer: MEDICARE

## 2019-02-18 DIAGNOSIS — B20 AIDS-RELATED COMPLEX: ICD-10-CM

## 2019-02-18 LAB
ALBUMIN SERPL BCP-MCNC: 3.4 G/DL
ALP SERPL-CCNC: 66 U/L
ALT SERPL W/O P-5'-P-CCNC: 15 U/L
ANION GAP SERPL CALC-SCNC: 9 MMOL/L
AST SERPL-CCNC: 23 U/L
BASOPHILS # BLD AUTO: 0.01 K/UL
BASOPHILS NFR BLD: 0.4 %
BILIRUB SERPL-MCNC: 0.3 MG/DL
BUN SERPL-MCNC: 20 MG/DL
CALCIUM SERPL-MCNC: 8.7 MG/DL
CHLORIDE SERPL-SCNC: 115 MMOL/L
CO2 SERPL-SCNC: 20 MMOL/L
CREAT SERPL-MCNC: 1.4 MG/DL
DIFFERENTIAL METHOD: ABNORMAL
EOSINOPHIL # BLD AUTO: 0.1 K/UL
EOSINOPHIL NFR BLD: 4.7 %
ERYTHROCYTE [DISTWIDTH] IN BLOOD BY AUTOMATED COUNT: 14 %
EST. GFR  (AFRICAN AMERICAN): 56.6 ML/MIN/1.73 M^2
EST. GFR  (NON AFRICAN AMERICAN): 49.1 ML/MIN/1.73 M^2
GLUCOSE SERPL-MCNC: 68 MG/DL
HCT VFR BLD AUTO: 28.4 %
HGB BLD-MCNC: 9.5 G/DL
IMM GRANULOCYTES # BLD AUTO: 0.01 K/UL
IMM GRANULOCYTES NFR BLD AUTO: 0.4 %
LYMPHOCYTES # BLD AUTO: 1.1 K/UL
LYMPHOCYTES NFR BLD: 43.7 %
MCH RBC QN AUTO: 34.2 PG
MCHC RBC AUTO-ENTMCNC: 33.5 G/DL
MCV RBC AUTO: 102 FL
MONOCYTES # BLD AUTO: 0.2 K/UL
MONOCYTES NFR BLD: 8.7 %
NEUTROPHILS # BLD AUTO: 1.1 K/UL
NEUTROPHILS NFR BLD: 42.1 %
NRBC BLD-RTO: 0 /100 WBC
PLATELET # BLD AUTO: 130 K/UL
PMV BLD AUTO: 11.6 FL
POTASSIUM SERPL-SCNC: 3.9 MMOL/L
PROT SERPL-MCNC: 9.7 G/DL
RBC # BLD AUTO: 2.78 M/UL
SODIUM SERPL-SCNC: 144 MMOL/L
WBC # BLD AUTO: 2.54 K/UL

## 2019-02-18 PROCEDURE — 80053 COMPREHEN METABOLIC PANEL: CPT

## 2019-02-18 PROCEDURE — 86361 T CELL ABSOLUTE COUNT: CPT

## 2019-02-18 PROCEDURE — 85025 COMPLETE CBC W/AUTO DIFF WBC: CPT

## 2019-02-18 PROCEDURE — 87536 HIV-1 QUANT&REVRSE TRNSCRPJ: CPT

## 2019-02-19 LAB
CD3+CD4+ CELLS # BLD: 39 CELLS/UL (ref 300–1400)
CD3+CD4+ CELLS NFR BLD: 4.4 % (ref 28–57)

## 2019-02-20 LAB
HIV UQ DATE RECEIVED: ABNORMAL
HIV UQ DATE REPORTED: ABNORMAL
HIV1 RNA # SERPL NAA+PROBE: ABNORMAL COPIES/ML
HIV1 RNA SERPL NAA+PROBE-LOG#: 4.6 LOG (10) COPIES/ML
HIV1 RNA SERPL QL NAA+PROBE: DETECTED

## 2019-02-22 ENCOUNTER — PATIENT OUTREACH (OUTPATIENT)
Dept: ADMINISTRATIVE | Facility: HOSPITAL | Age: 34
End: 2019-02-22

## 2019-03-24 ENCOUNTER — HOSPITAL ENCOUNTER (EMERGENCY)
Facility: HOSPITAL | Age: 34
Discharge: HOME | End: 2019-03-24
Attending: EMERGENCY MEDICINE
Payer: MEDICARE

## 2019-03-24 VITALS
WEIGHT: 125.25 LBS | DIASTOLIC BLOOD PRESSURE: 65 MMHG | RESPIRATION RATE: 24 BRPM | SYSTOLIC BLOOD PRESSURE: 117 MMHG | TEMPERATURE: 99 F | BODY MASS INDEX: 24.59 KG/M2 | OXYGEN SATURATION: 99 % | HEART RATE: 110 BPM | HEIGHT: 60 IN

## 2019-03-24 DIAGNOSIS — R11.2 NON-INTRACTABLE VOMITING WITH NAUSEA, UNSPECIFIED VOMITING TYPE: ICD-10-CM

## 2019-03-24 DIAGNOSIS — J06.9 URI WITH COUGH AND CONGESTION: Primary | ICD-10-CM

## 2019-03-24 DIAGNOSIS — R05.9 COUGH: ICD-10-CM

## 2019-03-24 LAB
BACTERIA #/AREA URNS HPF: NORMAL /HPF
BILIRUB UR QL STRIP: NEGATIVE
CLARITY UR: CLEAR
COLOR UR: YELLOW
GLUCOSE UR QL STRIP: NEGATIVE
HGB UR QL STRIP: ABNORMAL
HYALINE CASTS #/AREA URNS LPF: 1 /LPF
INFLUENZA A, MOLECULAR: NEGATIVE
INFLUENZA B, MOLECULAR: NEGATIVE
KETONES UR QL STRIP: NEGATIVE
LEUKOCYTE ESTERASE UR QL STRIP: NEGATIVE
MICROSCOPIC COMMENT: NORMAL
NITRITE UR QL STRIP: NEGATIVE
PH UR STRIP: 6 [PH] (ref 5–8)
PROT UR QL STRIP: ABNORMAL
RBC #/AREA URNS HPF: 0 /HPF (ref 0–4)
SP GR UR STRIP: 1.02 (ref 1–1.03)
SPECIMEN SOURCE: NORMAL
SQUAMOUS #/AREA URNS HPF: 10 /HPF
URN SPEC COLLECT METH UR: ABNORMAL
UROBILINOGEN UR STRIP-ACNC: NEGATIVE EU/DL
WBC #/AREA URNS HPF: 1 /HPF (ref 0–5)

## 2019-03-24 PROCEDURE — 87502 INFLUENZA DNA AMP PROBE: CPT | Mod: HCNC

## 2019-03-24 PROCEDURE — 81000 URINALYSIS NONAUTO W/SCOPE: CPT | Mod: HCNC

## 2019-03-24 PROCEDURE — 99284 EMERGENCY DEPT VISIT MOD MDM: CPT | Mod: 25,HCNC

## 2019-03-24 PROCEDURE — 25000003 PHARM REV CODE 250: Mod: HCNC | Performed by: PHYSICIAN ASSISTANT

## 2019-03-24 RX ORDER — ONDANSETRON 4 MG/1
4 TABLET, ORALLY DISINTEGRATING ORAL
Status: COMPLETED | OUTPATIENT
Start: 2019-03-24 | End: 2019-03-24

## 2019-03-24 RX ORDER — PROMETHAZINE HYDROCHLORIDE AND DEXTROMETHORPHAN HYDROBROMIDE 6.25; 15 MG/5ML; MG/5ML
5 SYRUP ORAL NIGHTLY PRN
Qty: 120 ML | Refills: 0 | Status: SHIPPED | OUTPATIENT
Start: 2019-03-24 | End: 2019-12-16

## 2019-03-24 RX ADMIN — ONDANSETRON 4 MG: 4 TABLET, ORALLY DISINTEGRATING ORAL at 07:03
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
## 2019-06-03 ENCOUNTER — HOSPITAL ENCOUNTER (INPATIENT)
Facility: HOSPITAL | Age: 34
LOS: 9 days | Discharge: LEFT BEFORE DISCHARGE | DRG: 974 | End: 2019-06-13
Attending: EMERGENCY MEDICINE | Admitting: HOSPITALIST
Payer: MEDICAID

## 2019-06-03 DIAGNOSIS — B20 AIDS: ICD-10-CM

## 2019-06-03 DIAGNOSIS — J01.10 ACUTE NON-RECURRENT FRONTAL SINUSITIS: ICD-10-CM

## 2019-06-03 DIAGNOSIS — R05.9 COUGH: ICD-10-CM

## 2019-06-03 DIAGNOSIS — R65.20 SEVERE SEPSIS: ICD-10-CM

## 2019-06-03 DIAGNOSIS — N17.9 AKI (ACUTE KIDNEY INJURY): ICD-10-CM

## 2019-06-03 DIAGNOSIS — R00.0 TACHYCARDIA: ICD-10-CM

## 2019-06-03 DIAGNOSIS — A41.9 SEPSIS, DUE TO UNSPECIFIED ORGANISM: ICD-10-CM

## 2019-06-03 DIAGNOSIS — J96.01 ACUTE RESPIRATORY FAILURE WITH HYPOXIA: ICD-10-CM

## 2019-06-03 DIAGNOSIS — A41.9 SEPSIS: ICD-10-CM

## 2019-06-03 DIAGNOSIS — A41.9 SEVERE SEPSIS: ICD-10-CM

## 2019-06-03 DIAGNOSIS — J01.90 ACUTE SINUSITIS, RECURRENCE NOT SPECIFIED, UNSPECIFIED LOCATION: Primary | ICD-10-CM

## 2019-06-03 LAB
ALBUMIN SERPL BCP-MCNC: 3.3 G/DL (ref 3.5–5.2)
ALP SERPL-CCNC: 53 U/L (ref 55–135)
ALT SERPL W/O P-5'-P-CCNC: 11 U/L (ref 10–44)
ANION GAP SERPL CALC-SCNC: 11 MMOL/L (ref 8–16)
APTT BLDCRRT: 29.4 SEC (ref 21–32)
AST SERPL-CCNC: 25 U/L (ref 10–40)
B-HCG UR QL: NEGATIVE
BACTERIA #/AREA URNS HPF: ABNORMAL /HPF
BASOPHILS # BLD AUTO: 0 K/UL (ref 0–0.2)
BASOPHILS NFR BLD: 0 % (ref 0–1.9)
BILIRUB SERPL-MCNC: 0.3 MG/DL (ref 0.1–1)
BILIRUB UR QL STRIP: NEGATIVE
BUN SERPL-MCNC: 27 MG/DL (ref 6–20)
CALCIUM SERPL-MCNC: 9.2 MG/DL (ref 8.7–10.5)
CHLORIDE SERPL-SCNC: 108 MMOL/L (ref 95–110)
CLARITY CSF: CLEAR
CLARITY UR: CLEAR
CO2 SERPL-SCNC: 19 MMOL/L (ref 23–29)
COLOR CSF: COLORLESS
COLOR UR: YELLOW
CREAT SERPL-MCNC: 1.3 MG/DL (ref 0.5–1.4)
DIFFERENTIAL METHOD: ABNORMAL
EOSINOPHIL # BLD AUTO: 0 K/UL (ref 0–0.5)
EOSINOPHIL NFR BLD: 0.7 % (ref 0–8)
ERYTHROCYTE [DISTWIDTH] IN BLOOD BY AUTOMATED COUNT: 15 % (ref 11.5–14.5)
EST. GFR  (AFRICAN AMERICAN): >60 ML/MIN/1.73 M^2
EST. GFR  (NON AFRICAN AMERICAN): 54 ML/MIN/1.73 M^2
GLUCOSE CSF-MCNC: 44 MG/DL (ref 40–70)
GLUCOSE SERPL-MCNC: 73 MG/DL (ref 70–110)
GLUCOSE UR QL STRIP: NEGATIVE
HCT VFR BLD AUTO: 28.8 % (ref 37–48.5)
HGB BLD-MCNC: 9.2 G/DL (ref 12–16)
HGB UR QL STRIP: ABNORMAL
HYALINE CASTS #/AREA URNS LPF: 1 /LPF
INR PPP: 0.9 (ref 0.8–1.2)
KETONES UR QL STRIP: NEGATIVE
LACTATE SERPL-SCNC: 1 MMOL/L (ref 0.5–2.2)
LEUKOCYTE ESTERASE UR QL STRIP: NEGATIVE
LYMPHOCYTES # BLD AUTO: 2.3 K/UL (ref 1–4.8)
LYMPHOCYTES NFR BLD: 49.5 % (ref 18–48)
MAGNESIUM SERPL-MCNC: 2 MG/DL (ref 1.6–2.6)
MCH RBC QN AUTO: 31.1 PG (ref 27–31)
MCHC RBC AUTO-ENTMCNC: 31.9 G/DL (ref 32–36)
MCV RBC AUTO: 97 FL (ref 82–98)
MICROSCOPIC COMMENT: ABNORMAL
MONOCYTES # BLD AUTO: 0.4 K/UL (ref 0.3–1)
MONOCYTES NFR BLD: 9 % (ref 4–15)
NEUTROPHILS # BLD AUTO: 1.9 K/UL (ref 1.8–7.7)
NEUTROPHILS NFR BLD: 40.8 % (ref 38–73)
NITRITE UR QL STRIP: POSITIVE
PH UR STRIP: 6 [PH] (ref 5–8)
PHOSPHATE SERPL-MCNC: 3.7 MG/DL (ref 2.7–4.5)
PLATELET # BLD AUTO: 148 K/UL (ref 150–350)
PMV BLD AUTO: 11.1 FL (ref 9.2–12.9)
POIKILOCYTOSIS BLD QL SMEAR: SLIGHT
POTASSIUM SERPL-SCNC: 4.5 MMOL/L (ref 3.5–5.1)
PROCALCITONIN SERPL IA-MCNC: 0.09 NG/ML
PROT CSF-MCNC: 20 MG/DL (ref 15–40)
PROT SERPL-MCNC: 10.7 G/DL (ref 6–8.4)
PROT UR QL STRIP: ABNORMAL
PROTHROMBIN TIME: 9.9 SEC (ref 9–12.5)
RBC # BLD AUTO: 2.96 M/UL (ref 4–5.4)
RBC # CSF: 0 /CU MM
RBC #/AREA URNS HPF: 0 /HPF (ref 0–4)
SODIUM SERPL-SCNC: 138 MMOL/L (ref 136–145)
SP GR UR STRIP: 1.02 (ref 1–1.03)
SPECIMEN VOL CSF: 4 ML
SPHEROCYTES BLD QL SMEAR: ABNORMAL
SQUAMOUS #/AREA URNS HPF: 2 /HPF
URN SPEC COLLECT METH UR: ABNORMAL
UROBILINOGEN UR STRIP-ACNC: NEGATIVE EU/DL
WBC # BLD AUTO: 4.55 K/UL (ref 3.9–12.7)
WBC # CSF: 0 /CU MM (ref 0–5)
WBC #/AREA URNS HPF: 5 /HPF (ref 0–5)

## 2019-06-03 PROCEDURE — 87252 VIRUS INOCULATION TISSUE: CPT

## 2019-06-03 PROCEDURE — 83735 ASSAY OF MAGNESIUM: CPT

## 2019-06-03 PROCEDURE — 63600175 PHARM REV CODE 636 W HCPCS: Performed by: NURSE PRACTITIONER

## 2019-06-03 PROCEDURE — 85610 PROTHROMBIN TIME: CPT

## 2019-06-03 PROCEDURE — 96367 TX/PROPH/DG ADDL SEQ IV INF: CPT

## 2019-06-03 PROCEDURE — 87040 BLOOD CULTURE FOR BACTERIA: CPT

## 2019-06-03 PROCEDURE — 87116 MYCOBACTERIA CULTURE: CPT

## 2019-06-03 PROCEDURE — 80053 COMPREHEN METABOLIC PANEL: CPT

## 2019-06-03 PROCEDURE — 84100 ASSAY OF PHOSPHORUS: CPT

## 2019-06-03 PROCEDURE — 87798 DETECT AGENT NOS DNA AMP: CPT

## 2019-06-03 PROCEDURE — 86403 PARTICLE AGGLUT ANTBDY SCRN: CPT

## 2019-06-03 PROCEDURE — 87498 ENTEROVIRUS PROBE&REVRS TRNS: CPT

## 2019-06-03 PROCEDURE — 82945 GLUCOSE OTHER FLUID: CPT

## 2019-06-03 PROCEDURE — 93010 ELECTROCARDIOGRAM REPORT: CPT | Mod: ,,, | Performed by: INTERNAL MEDICINE

## 2019-06-03 PROCEDURE — 63600175 PHARM REV CODE 636 W HCPCS: Performed by: PHYSICIAN ASSISTANT

## 2019-06-03 PROCEDURE — 86592 SYPHILIS TEST NON-TREP QUAL: CPT

## 2019-06-03 PROCEDURE — 83605 ASSAY OF LACTIC ACID: CPT

## 2019-06-03 PROCEDURE — 25000003 PHARM REV CODE 250: Performed by: EMERGENCY MEDICINE

## 2019-06-03 PROCEDURE — 96374 THER/PROPH/DIAG INJ IV PUSH: CPT | Mod: 59

## 2019-06-03 PROCEDURE — 96361 HYDRATE IV INFUSION ADD-ON: CPT

## 2019-06-03 PROCEDURE — 36415 COLL VENOUS BLD VENIPUNCTURE: CPT

## 2019-06-03 PROCEDURE — 25000003 PHARM REV CODE 250: Performed by: NURSE PRACTITIONER

## 2019-06-03 PROCEDURE — 87529 HSV DNA AMP PROBE: CPT

## 2019-06-03 PROCEDURE — 87798 DETECT AGENT NOS DNA AMP: CPT | Mod: 91

## 2019-06-03 PROCEDURE — 93010 EKG 12-LEAD: ICD-10-PCS | Mod: ,,, | Performed by: INTERNAL MEDICINE

## 2019-06-03 PROCEDURE — 96372 THER/PROPH/DIAG INJ SC/IM: CPT | Mod: 59

## 2019-06-03 PROCEDURE — 87556 M.TUBERCULO DNA AMP PROBE: CPT

## 2019-06-03 PROCEDURE — 89051 BODY FLUID CELL COUNT: CPT

## 2019-06-03 PROCEDURE — 87206 SMEAR FLUORESCENT/ACID STAI: CPT

## 2019-06-03 PROCEDURE — 81025 URINE PREGNANCY TEST: CPT

## 2019-06-03 PROCEDURE — 87102 FUNGUS ISOLATION CULTURE: CPT

## 2019-06-03 PROCEDURE — 81000 URINALYSIS NONAUTO W/SCOPE: CPT

## 2019-06-03 PROCEDURE — 84157 ASSAY OF PROTEIN OTHER: CPT

## 2019-06-03 PROCEDURE — 85730 THROMBOPLASTIN TIME PARTIAL: CPT

## 2019-06-03 PROCEDURE — G0378 HOSPITAL OBSERVATION PER HR: HCPCS

## 2019-06-03 PROCEDURE — 99291 CRITICAL CARE FIRST HOUR: CPT | Mod: 25

## 2019-06-03 PROCEDURE — 85025 COMPLETE CBC W/AUTO DIFF WBC: CPT

## 2019-06-03 PROCEDURE — 99000 SPECIMEN HANDLING OFFICE-LAB: CPT

## 2019-06-03 PROCEDURE — 87496 CYTOMEG DNA AMP PROBE: CPT

## 2019-06-03 PROCEDURE — 84145 PROCALCITONIN (PCT): CPT

## 2019-06-03 PROCEDURE — 87205 SMEAR GRAM STAIN: CPT

## 2019-06-03 PROCEDURE — 93005 ELECTROCARDIOGRAM TRACING: CPT

## 2019-06-03 PROCEDURE — 36410 VNPNXR 3YR/> PHY/QHP DX/THER: CPT

## 2019-06-03 PROCEDURE — 96365 THER/PROPH/DIAG IV INF INIT: CPT

## 2019-06-03 PROCEDURE — 87070 CULTURE OTHR SPECIMN AEROBIC: CPT

## 2019-06-03 PROCEDURE — 25000003 PHARM REV CODE 250: Performed by: PHYSICIAN ASSISTANT

## 2019-06-03 RX ORDER — SODIUM CHLORIDE 9 MG/ML
INJECTION, SOLUTION INTRAVENOUS CONTINUOUS
Status: DISCONTINUED | OUTPATIENT
Start: 2019-06-03 | End: 2019-06-04

## 2019-06-03 RX ORDER — ONDANSETRON 4 MG/1
4 TABLET, ORALLY DISINTEGRATING ORAL ONCE
Status: DISCONTINUED | OUTPATIENT
Start: 2019-06-03 | End: 2019-06-08 | Stop reason: SDUPTHER

## 2019-06-03 RX ORDER — ONDANSETRON 2 MG/ML
4 INJECTION INTRAMUSCULAR; INTRAVENOUS EVERY 8 HOURS PRN
Status: DISCONTINUED | OUTPATIENT
Start: 2019-06-03 | End: 2019-06-13 | Stop reason: HOSPADM

## 2019-06-03 RX ORDER — ACETAMINOPHEN 500 MG
1000 TABLET ORAL
Status: COMPLETED | OUTPATIENT
Start: 2019-06-03 | End: 2019-06-03

## 2019-06-03 RX ORDER — LACOSAMIDE 50 MG/1
2 TABLET ORAL 2 TIMES DAILY
Status: DISCONTINUED | OUTPATIENT
Start: 2019-06-03 | End: 2019-06-13 | Stop reason: HOSPADM

## 2019-06-03 RX ORDER — ENOXAPARIN SODIUM 100 MG/ML
40 INJECTION SUBCUTANEOUS EVERY 24 HOURS
Status: DISCONTINUED | OUTPATIENT
Start: 2019-06-03 | End: 2019-06-04

## 2019-06-03 RX ORDER — KETOROLAC TROMETHAMINE 30 MG/ML
15 INJECTION, SOLUTION INTRAMUSCULAR; INTRAVENOUS
Status: COMPLETED | OUTPATIENT
Start: 2019-06-03 | End: 2019-06-03

## 2019-06-03 RX ORDER — LEVOFLOXACIN 500 MG/1
500 TABLET, FILM COATED ORAL DAILY
Qty: 10 TABLET | Refills: 0 | Status: SHIPPED | OUTPATIENT
Start: 2019-06-03 | End: 2019-06-13

## 2019-06-03 RX ORDER — PANTOPRAZOLE SODIUM 40 MG/1
40 TABLET, DELAYED RELEASE ORAL DAILY
Status: DISCONTINUED | OUTPATIENT
Start: 2019-06-04 | End: 2019-06-10

## 2019-06-03 RX ORDER — PROMETHAZINE HYDROCHLORIDE 25 MG/1
25 TABLET ORAL EVERY 6 HOURS PRN
Qty: 15 TABLET | Refills: 0 | Status: SHIPPED | OUTPATIENT
Start: 2019-06-03 | End: 2019-12-16

## 2019-06-03 RX ADMIN — ACETAMINOPHEN 1000 MG: 500 TABLET ORAL at 08:06

## 2019-06-03 RX ADMIN — SODIUM CHLORIDE 500 ML: 0.9 INJECTION, SOLUTION INTRAVENOUS at 09:06

## 2019-06-03 RX ADMIN — PROMETHAZINE HYDROCHLORIDE 12.5 MG: 25 INJECTION INTRAMUSCULAR; INTRAVENOUS at 05:06

## 2019-06-03 RX ADMIN — CEFTRIAXONE 1 G: 1 INJECTION, SOLUTION INTRAVENOUS at 03:06

## 2019-06-03 RX ADMIN — SODIUM CHLORIDE 1000 ML: 0.9 INJECTION, SOLUTION INTRAVENOUS at 11:06

## 2019-06-03 RX ADMIN — ENOXAPARIN SODIUM 40 MG: 100 INJECTION SUBCUTANEOUS at 08:06

## 2019-06-03 RX ADMIN — SODIUM CHLORIDE: 0.9 INJECTION, SOLUTION INTRAVENOUS at 10:06

## 2019-06-03 RX ADMIN — SODIUM CHLORIDE 1000 ML: 0.9 INJECTION, SOLUTION INTRAVENOUS at 06:06

## 2019-06-03 RX ADMIN — KETOROLAC TROMETHAMINE 15 MG: 30 INJECTION, SOLUTION INTRAMUSCULAR; INTRAVENOUS at 10:06
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
## 2019-06-03 NOTE — OP NOTE
Procedure was performed Dexter Chirinos.  Sterile technique was performed in the lower back, lidocaine was used as a local anesthetic.  OP= 16 cmH20  CP = 10 cmh2O. Removed 10 cc of clear CSF.  Pt tolerated the procedure well without immediate complications.  Please see radiologist report for details. F/u with PCP and/or ordering physician.
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
## 2019-06-03 NOTE — ED NOTES
While taking patients vitals to discharge her, Pt's BP is 64/46 and HR is 124. Mariam GIVENS notified. NP, Nahun and Dr. Flor notified for patient to be re-evaluated. Patients vitals have been stable while in the ED prior. Pt started having episodes of emesis and diarrhea. Phenergan given. RN at bedside. Waiting for MD to re-evaluate patient.

## 2019-06-03 NOTE — ED NOTES
KIN Brewer notified of patients vitals. (see flowsheet) pt is resting comfortably in bed in no acute distress. Ongoing monitoring continued. Will continue to monitor.

## 2019-06-03 NOTE — ED NOTES
Patient getting LP done at this time. Lab came to draw cultures from patient and was not able to at this time. Will call when patient returns for BC's.

## 2019-06-04 LAB
ANION GAP SERPL CALC-SCNC: 9 MMOL/L (ref 8–16)
BASOPHILS # BLD AUTO: 0 K/UL (ref 0–0.2)
BASOPHILS NFR BLD: 0 % (ref 0–1.9)
BUN SERPL-MCNC: 31 MG/DL (ref 6–20)
CALCIUM SERPL-MCNC: 6.3 MG/DL (ref 8.7–10.5)
CHLORIDE SERPL-SCNC: 116 MMOL/L (ref 95–110)
CO2 SERPL-SCNC: 12 MMOL/L (ref 23–29)
CREAT SERPL-MCNC: 2.2 MG/DL (ref 0.5–1.4)
CRYPTOC AG CSF QL LA: NEGATIVE
DIFFERENTIAL METHOD: ABNORMAL
EOSINOPHIL # BLD AUTO: 0.1 K/UL (ref 0–0.5)
EOSINOPHIL NFR BLD: 0.5 % (ref 0–8)
ERYTHROCYTE [DISTWIDTH] IN BLOOD BY AUTOMATED COUNT: 16.1 % (ref 11.5–14.5)
EST. GFR  (AFRICAN AMERICAN): 33 ML/MIN/1.73 M^2
EST. GFR  (NON AFRICAN AMERICAN): 28 ML/MIN/1.73 M^2
GLUCOSE SERPL-MCNC: 155 MG/DL (ref 70–110)
HCT VFR BLD AUTO: 22.2 % (ref 37–48.5)
HGB BLD-MCNC: 7.3 G/DL (ref 12–16)
LYMPHOCYTES # BLD AUTO: 0.2 K/UL (ref 1–4.8)
LYMPHOCYTES NFR BLD: 2.3 % (ref 18–48)
MAGNESIUM SERPL-MCNC: 1 MG/DL (ref 1.6–2.6)
MCH RBC QN AUTO: 30.9 PG (ref 27–31)
MCHC RBC AUTO-ENTMCNC: 32.9 G/DL (ref 32–36)
MCV RBC AUTO: 94 FL (ref 82–98)
MONOCYTES # BLD AUTO: 0.1 K/UL (ref 0.3–1)
MONOCYTES NFR BLD: 1.5 % (ref 4–15)
NEUTROPHILS # BLD AUTO: 9.2 K/UL (ref 1.8–7.7)
NEUTROPHILS NFR BLD: 96.1 % (ref 38–73)
PATH INTERP FLD-IMP: NORMAL
PHOSPHATE SERPL-MCNC: 2.6 MG/DL (ref 2.7–4.5)
PLATELET # BLD AUTO: 129 K/UL (ref 150–350)
PMV BLD AUTO: 10.5 FL (ref 9.2–12.9)
POIKILOCYTOSIS BLD QL SMEAR: SLIGHT
POTASSIUM SERPL-SCNC: 3.8 MMOL/L (ref 3.5–5.1)
RBC # BLD AUTO: 2.36 M/UL (ref 4–5.4)
SODIUM SERPL-SCNC: 137 MMOL/L (ref 136–145)
SPHEROCYTES BLD QL SMEAR: ABNORMAL
WBC # BLD AUTO: 9.63 K/UL (ref 3.9–12.7)

## 2019-06-04 PROCEDURE — 25000003 PHARM REV CODE 250: Performed by: INTERNAL MEDICINE

## 2019-06-04 PROCEDURE — 25000003 PHARM REV CODE 250: Performed by: NURSE PRACTITIONER

## 2019-06-04 PROCEDURE — 63600175 PHARM REV CODE 636 W HCPCS: Performed by: INTERNAL MEDICINE

## 2019-06-04 PROCEDURE — 96375 TX/PRO/DX INJ NEW DRUG ADDON: CPT

## 2019-06-04 PROCEDURE — C1751 CATH, INF, PER/CENT/MIDLINE: HCPCS

## 2019-06-04 PROCEDURE — 63600175 PHARM REV CODE 636 W HCPCS: Performed by: NURSE PRACTITIONER

## 2019-06-04 PROCEDURE — 25000003 PHARM REV CODE 250: Performed by: CLINIC/CENTER

## 2019-06-04 PROCEDURE — 85025 COMPLETE CBC W/AUTO DIFF WBC: CPT

## 2019-06-04 PROCEDURE — 83735 ASSAY OF MAGNESIUM: CPT

## 2019-06-04 PROCEDURE — 84100 ASSAY OF PHOSPHORUS: CPT

## 2019-06-04 PROCEDURE — 36569 INSJ PICC 5 YR+ W/O IMAGING: CPT

## 2019-06-04 PROCEDURE — 25000003 PHARM REV CODE 250: Performed by: PHYSICIAN ASSISTANT

## 2019-06-04 PROCEDURE — 20000000 HC ICU ROOM

## 2019-06-04 PROCEDURE — 25000003 PHARM REV CODE 250: Performed by: EMERGENCY MEDICINE

## 2019-06-04 PROCEDURE — 99291 CRITICAL CARE FIRST HOUR: CPT | Mod: ,,, | Performed by: INTERNAL MEDICINE

## 2019-06-04 PROCEDURE — 80048 BASIC METABOLIC PNL TOTAL CA: CPT

## 2019-06-04 PROCEDURE — 99291 PR CRITICAL CARE, E/M 30-74 MINUTES: ICD-10-PCS | Mod: ,,, | Performed by: INTERNAL MEDICINE

## 2019-06-04 PROCEDURE — 96361 HYDRATE IV INFUSION ADD-ON: CPT

## 2019-06-04 RX ORDER — NOREPINEPHRINE BITARTRATE/D5W 4MG/250ML
0.02 PLASTIC BAG, INJECTION (ML) INTRAVENOUS CONTINUOUS PRN
Status: DISCONTINUED | OUTPATIENT
Start: 2019-06-04 | End: 2019-06-04

## 2019-06-04 RX ORDER — NOREPINEPHRINE BITARTRATE/D5W 4MG/250ML
0.4 PLASTIC BAG, INJECTION (ML) INTRAVENOUS CONTINUOUS PRN
Status: DISCONTINUED | OUTPATIENT
Start: 2019-06-04 | End: 2019-06-08

## 2019-06-04 RX ORDER — SODIUM CHLORIDE 9 MG/ML
INJECTION, SOLUTION INTRAVENOUS CONTINUOUS
Status: DISCONTINUED | OUTPATIENT
Start: 2019-06-04 | End: 2019-06-06

## 2019-06-04 RX ORDER — DAPSONE 25 MG/1
100 TABLET ORAL DAILY
Status: DISCONTINUED | OUTPATIENT
Start: 2019-06-04 | End: 2019-06-05

## 2019-06-04 RX ORDER — ACETAMINOPHEN 325 MG/1
650 TABLET ORAL EVERY 6 HOURS PRN
Status: DISCONTINUED | OUTPATIENT
Start: 2019-06-04 | End: 2019-06-04

## 2019-06-04 RX ORDER — DIPHENHYDRAMINE HCL 25 MG
25 CAPSULE ORAL EVERY 6 HOURS PRN
Status: DISCONTINUED | OUTPATIENT
Start: 2019-06-04 | End: 2019-06-13 | Stop reason: HOSPADM

## 2019-06-04 RX ORDER — GUAIFENESIN 600 MG/1
600 TABLET, EXTENDED RELEASE ORAL 2 TIMES DAILY
Status: DISCONTINUED | OUTPATIENT
Start: 2019-06-04 | End: 2019-06-06 | Stop reason: SDUPTHER

## 2019-06-04 RX ORDER — ENOXAPARIN SODIUM 100 MG/ML
30 INJECTION SUBCUTANEOUS EVERY 24 HOURS
Status: DISCONTINUED | OUTPATIENT
Start: 2019-06-04 | End: 2019-06-08 | Stop reason: ALTCHOICE

## 2019-06-04 RX ORDER — OXYMETAZOLINE HCL 0.05 %
2 SPRAY, NON-AEROSOL (ML) NASAL 2 TIMES DAILY
Status: DISCONTINUED | OUTPATIENT
Start: 2019-06-04 | End: 2019-06-07

## 2019-06-04 RX ORDER — ACETAMINOPHEN 650 MG/20.3ML
650 LIQUID ORAL EVERY 6 HOURS PRN
Status: DISCONTINUED | OUTPATIENT
Start: 2019-06-04 | End: 2019-06-13 | Stop reason: HOSPADM

## 2019-06-04 RX ORDER — MAGNESIUM SULFATE 1 G/100ML
1 INJECTION INTRAVENOUS
Status: COMPLETED | OUTPATIENT
Start: 2019-06-04 | End: 2019-06-04

## 2019-06-04 RX ADMIN — SODIUM CHLORIDE: 0.9 INJECTION, SOLUTION INTRAVENOUS at 02:06

## 2019-06-04 RX ADMIN — DIPHENHYDRAMINE HYDROCHLORIDE 25 MG: 25 CAPSULE ORAL at 09:06

## 2019-06-04 RX ADMIN — ENOXAPARIN SODIUM 30 MG: 100 INJECTION SUBCUTANEOUS at 05:06

## 2019-06-04 RX ADMIN — MAGNESIUM SULFATE 1 G: 1 INJECTION INTRAVENOUS at 09:06

## 2019-06-04 RX ADMIN — ACETAMINOPHEN 650 MG: 650 SOLUTION ORAL at 04:06

## 2019-06-04 RX ADMIN — Medication 0.4 MCG/KG/MIN: at 08:06

## 2019-06-04 RX ADMIN — SODIUM CHLORIDE 1000 ML: 0.9 INJECTION, SOLUTION INTRAVENOUS at 12:06

## 2019-06-04 RX ADMIN — SODIUM CHLORIDE: 0.9 INJECTION, SOLUTION INTRAVENOUS at 06:06

## 2019-06-04 RX ADMIN — SODIUM CHLORIDE: 0.9 INJECTION, SOLUTION INTRAVENOUS at 03:06

## 2019-06-04 RX ADMIN — LACOSAMIDE 100 MG: 50 TABLET, FILM COATED ORAL at 08:06

## 2019-06-04 RX ADMIN — MAGNESIUM SULFATE 1 G: 1 INJECTION INTRAVENOUS at 08:06

## 2019-06-04 RX ADMIN — Medication 0.2 MCG/KG/MIN: at 11:06

## 2019-06-04 RX ADMIN — Medication 0.4 MCG/KG/MIN: at 06:06

## 2019-06-04 RX ADMIN — MAGNESIUM SULFATE 1 G: 1 INJECTION INTRAVENOUS at 06:06

## 2019-06-04 RX ADMIN — ONDANSETRON 4 MG: 2 INJECTION INTRAMUSCULAR; INTRAVENOUS at 12:06

## 2019-06-04 RX ADMIN — SODIUM CHLORIDE 1000 ML: 0.9 INJECTION, SOLUTION INTRAVENOUS at 02:06

## 2019-06-04 RX ADMIN — DAPSONE 100 MG: 25 TABLET ORAL at 06:06

## 2019-06-04 RX ADMIN — ACETAMINOPHEN 650 MG: 650 SOLUTION ORAL at 07:06

## 2019-06-04 RX ADMIN — Medication 0.02 MCG/KG/MIN: at 03:06

## 2019-06-04 RX ADMIN — AZITHROMYCIN MONOHYDRATE 500 MG: 500 INJECTION, POWDER, LYOPHILIZED, FOR SOLUTION INTRAVENOUS at 05:06

## 2019-06-04 RX ADMIN — GUAIFENESIN 600 MG: 600 TABLET, EXTENDED RELEASE ORAL at 08:06

## 2019-06-04 RX ADMIN — Medication 2 SPRAY: at 08:06

## 2019-06-04 NOTE — PROCEDURES
Wang Kebede is a 33 y.o. female patient.    Temp: (!) 101.5 °F (38.6 °C) (06/04/19 0727)  Pulse: (!) 138 (06/04/19 0815)  Resp: (!) 28 (06/04/19 0815)  BP: (!) 108/54 (06/04/19 0815)  SpO2: 97 % (06/04/19 0815)  Weight: 52.2 kg (115 lb 1.3 oz) (06/03/19 0944)  Height: 5' (152.4 cm) (06/03/19 0944)    PICC  Date/Time: 6/4/2019 8:19 AM  Performed by: Jaswinder Phoenix RN  Supervising provider: Jerica Rojas RN  Consent Done: Yes  Time out: Immediately prior to procedure a time out was called to verify the correct patient, procedure, equipment, support staff and site/side marked as required  Indications: vascular access and med administration  Anesthesia: local infiltration  Local anesthetic: lidocaine 1% without epinephrine  Anesthetic Total (mL): 3  Preparation: skin prepped with ChloraPrep  Skin prep agent dried: skin prep agent completely dried prior to procedure  Sterile barriers: all five maximum sterile barriers used - cap, mask, sterile gown, sterile gloves, and large sterile sheet  Hand hygiene: hand hygiene performed prior to central venous catheter insertion  Location details: right basilic  Catheter type: double lumen  Catheter size: 5 Fr  Catheter Length: 32cm    Ultrasound guidance: yes  Vessel Caliber: medium and patent, compressibility normal  Vascular Doppler: not done  Needle advanced into vessel with real time Ultrasound guidance.  Guidewire confirmed in vessel.  Sterile sheath used.  no esophageal manometryNumber of attempts: 1  Post-procedure: blood return through all ports, chlorhexidine patch and sterile dressing applied  Estimated blood loss (mL): 0  Specimens: No  Implants: No  Assessment: placement verified by x-ray  Tip Termination Explanation: see rad. report  Complications: none  Comments: Do not use until placement verified by MD Jaswinder Phoenix  6/4/2019

## 2019-06-04 NOTE — NURSING
Pt rec'd to room via ICU bed from ER with padded side rails x 4; on portable cardiac monitor; placed on ICU monitor; continued levo gtt at 0.4mcg; pt AAOx4; denies pain or SOB; pt's boyfriend in waiting room demanding he be allowed to see pt; [facility] nurse informed him we are settling pt and will let him know when he will be allowed back; pt informed that no one is to be given any of her medical info, with the exception of her mother.

## 2019-06-04 NOTE — ED NOTES
Called pharmacy for update on medication Lacosamide tablet 100 mg. Pharmacy states they will bring it shortly.

## 2019-06-04 NOTE — HPI
34 y/o with HIV/AIDS, noncompliant with medications and low CD4 count presents with 5-6 day history of headaches and fever. Headaches are midfrontal and persistent with associated sinus congestions and sputum production. No loss of hearing. Noted to be tachycardic and febrile and admitted for IV antibiotics.  History of VT/VF s/p implantation of automatic cardioverter/defibrillator.

## 2019-06-04 NOTE — ED NOTES
Dr Hsu discussed central line placement with pt. Pt verbalized understanding and signed consent. Dr Hsu and I signed consent.

## 2019-06-04 NOTE — ED NOTES
PT report rec'd.  Pt lying in bed, ill appearing.  Denies any needs at present, states has some nausea.

## 2019-06-04 NOTE — ED NOTES
The pt was moved to room 7 for continuous cardiac monitoring. Pt in no apparent distress and has no complaints other than cold at this time

## 2019-06-04 NOTE — PROVIDER PROGRESS NOTES - EMERGENCY DEPT.
"Encounter Date: 6/3/2019    ED Physician Progress Notes       SCRIBE NOTE: IJovita, am scribing for, and in the presence of,  El Hsu Jr., MD.  Physician Statement: El VILLASEÑOR Jr., MD, personally performed the services described in this documentation as scribed by Jovita Branham in my presence, and it is both accurate and complete.          Central Line  Date/Time: 6/4/2019 4:30 AM  Performed by: El Hsu Jr., MD  Consent Done: Yes  Time out: Immediately prior to procedure a "time out" was called to verify the correct patient, procedure, equipment, support staff and site/side marked as required.  Indications: vascular access, hemodynamic monitoring and med administration  Anesthesia: local infiltration    Anesthesia:  Local Anesthetic: lidocaine 1% without epinephrine  Anesthetic total: 3 mL  Preparation: skin prepped with ChloraPrep  Skin prep agent dried: skin prep agent completely dried prior to procedure  Sterile barriers: all five maximum sterile barriers used - cap, mask, sterile gown, sterile gloves, and large sterile sheet  Hand hygiene: hand hygiene performed prior to central venous catheter insertion  Location details: left internal jugular  Catheter type: triple lumen  Catheter size: 7 Fr  Ultrasound guidance: yes  Vessel Caliber: small, patent, compressibility poor  Needle advanced into vessel with real time Ultrasound guidance.  Number of attempts: 3  Complications: local hematoma  Estimated blood loss (mL): minimal.  Complications: Yes (Unable to thread guidewire in vessels secondary to patient's movement, after 3rd attempt procedure was aborted)        "

## 2019-06-04 NOTE — HPI
33-year-old female with significant medical history including AIDS who has history of noncompliance with medical plan of care presents today for complaint of malaise over the past week.  Modifying factors none.  Associated symptoms:  Cough, headache.  Prior treatment not effective with home care.  Patient was evaluated emergency room.  Significant findings and workup include sinusitis findings on CT.  Cultures pending.  WBC and lactic both negative.  Patient was started on IV antibiotics and given IV fluids.  During ER care patient became more tachycardic and developed fever.  Infectious Disease was consulted by ED who recommended observation.  Hospital Medicine was consulted patient admitted to observation.  To note patient's last CD4 count 39  three months ago.
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
## 2019-06-04 NOTE — ED NOTES
Dr Hsu unable to obtain central line. [facility] medicine notified. To call out PICC team.
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
## 2019-06-04 NOTE — ED NOTES
"Messaged hospital medicine call team: "Patient has been given 500 mL bolus and pulse has been maintained at 130 bpm. Beginning fluids at 100 mL/hr. Reassessed pts temperature after being given 1,000 mg acetaminophen and it was 101.2. Called pharmacy for update on lacosamide - pharmacy states it will be delivered shortly."  "

## 2019-06-04 NOTE — ED NOTES
Pt states she is unable to swallow pills very well and is unable to take the tylenol ordered. [facility] medicine, Theo, notified.

## 2019-06-04 NOTE — ASSESSMENT & PLAN NOTE
Continue patient's home medication  Seizure precautions  .Further evaluation/diagnostics/interventions/consults pending course

## 2019-06-04 NOTE — ASSESSMENT & PLAN NOTE
Related sinusitis  Patient received IV fluids and was started on IV antibiotics.  Lactic negative.  Continue IV hydration and IV antibiotics.  Monitor labs  Antipyretics. Montior tachycardia  Monitor close and Further evaluation/diagnostics/interventions/consults pending course

## 2019-06-04 NOTE — PLAN OF CARE
Problem: Adult Inpatient Plan of Care  Goal: Plan of Care Review  Outcome: Ongoing (interventions implemented as appropriate)  Pt has refused all meds today with the exception of oral tylenol for temp=103.1 orally; multiple watery, diarrhea stools throughout the day; green, then light brown in color; moderate to large amounts; pt now off levo gtt.
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
## 2019-06-04 NOTE — ED NOTES
Pt requesting that medical discussions concerning her HIV should only be talked about in front of her mother, as she is the only person who knows of her HIV diagnosis.

## 2019-06-04 NOTE — NURSING
"Pt has refused all meds, stating she wants to eat before she takes them.  Lunch was delivered, but she refused it, stating, "I can't eat that.  It's nasty."  I asked her what she would like to eat and she said a hamburger and fries.  Once it was delivered, she refused it, stated she didn't want to eat it and cannot take her meds until she does.    Pt did drink 120ml of apple juice.      "

## 2019-06-04 NOTE — ED NOTES
"Messaged hospital medicine: "Toradol given. Patient refused Lacosamide - states she feels like throwing up and does not want to take it."  "

## 2019-06-04 NOTE — NURSING
"Pt refuses to eat lunch, stating "Its nasty;" this RN called dietary and will bring pt burger and fries; pt stated she will take her meds after.  "

## 2019-06-04 NOTE — PROGRESS NOTES
Pharmacist Renal Dose Adjustment Note    Wang Kebede is a 33 y.o. female being treated with the medication enoxaparin    Patient Data:    Vital Signs (Most Recent):  Temp: (!) 102.6 °F (39.2 °C) (06/04/19 0830)  Pulse: (!) 132 (06/04/19 0830)  Resp: (!) 28 (06/04/19 0830)  BP: (!) 94/47 (06/04/19 0830)  SpO2: 97 % (06/04/19 0830)   Vital Signs (72h Range):  Temp:  [98.4 °F (36.9 °C)-102.6 °F (39.2 °C)]   Pulse:  []   Resp:  [12-36]   BP: ()/(37-74)   SpO2:  [89 %-100 %]      Recent Labs   Lab 06/03/19  1039 06/04/19 0627   CREATININE 1.3 2.2*     Serum creatinine: 2.2 mg/dL (H) 06/04/19 0627  Estimated creatinine clearance: 25.9 mL/min (A)    Medication Enoxaparin 40 mg SQ daily will be changed to Enoxaparin 30 mg SQ daily per pharmacy renal dose adjustment protocol for patients with CrCl 10-30 mL/min.    Pharmacist's Name: Kendal Bonner PharmD  Pharmacist's Extension: [phone]    Thank you for allowing us to participate in this patient's care.     Kendal Bonner PharmD 06/04/2019 4:16 PM

## 2019-06-04 NOTE — ED NOTES
Attempted to call report to ICU. Receiving nurse is currently in a room starting an IV and will call back.

## 2019-06-04 NOTE — HOSPITAL COURSE
6/4 Hypotensive in Emergency Room , admitted to ICU for closer monitoring. Hx of low blood p[ressure in past encounters. Responding to IV fluid bolus  6/5 Placed on pressors to support blood pressure, feels poorly  6/6 Still febrile and tachycardic, weaned off pressors but respiratory status has deteriorated. New right upper lobe iniltrate- suggests aspiration  6/7 worsening respiratory distress - very little oral intake  6/8 Intubated yesterday evening, developed renal failure and vas cath placed - planned dialysis (continuous renal replacent therapy (CRRT) ?)  6/9 Respiratory status improved with ventilator. Underwent bronchoscopy  6/10 seen and examined.  Status post bronchoscopy and sampling yesterday.  Intubated.  ABG and chest x-ray reviewed.  Past SBT.  Extubated at 12:00 p.m..  6/11 seen and examined. Afebrile , extubated yesterday, no distress, c/o nausea. O2 sat 100% on 2 lpm O2.   6/12 seen and examined. Nausea, diarrhea. Afebrile , cultures negative, sp PRBC tx yesterday , HD  Yesterday. On 2 lpm O2 , O2 sat 100%.

## 2019-06-04 NOTE — ASSESSMENT & PLAN NOTE
Resume patient's HAART therapy  Consult Infectious Disease  Patient being treated with Rocephin for sinusitis will add on azithromycin and dapsone for further prophylaxis given patient's immunocompromised state  Further evaluation/diagnostics/interventions/consults pending course

## 2019-06-04 NOTE — ED NOTES
Dr Rios spoke with pt about getting a PICC line. Pt verbalized understanding and signed consent. Dr Rios and I signed consent.

## 2019-06-04 NOTE — PLAN OF CARE
Met with patient initial assessment completed. Patient is independent with adls and iadls.  Patient lives with her 3 sons, ages 20, 19, and 16. Patient lost her medicaid recently and stated that she has not gone to her md appointments due the high co pay. Message sent to Priscila STORY to assess patient for medicaid.  Patient denies any post hospital needs or services at this time.CM to follow.  Updated white board with 's name and number. Transitional Care Folder, Discharge Planning Begins on Admission pamphlet, [facility], Advance Directive information given to patient along with the contact information given.Instructed patient or family to call with any questions or concerns.    Discussed accessing the [facility] via the [facility] Instant Activation. Patient has active account.    Follow-up Information     Levi Moncada MD. Go in 2 days.    Specialty:  Family Medicine  Contact information:  [address]  [place] [id number] [phone]                   [facility] [address] [place] [address] - [address] AT [facility]  [address]  [facility] [address]  Phone: [phone] Fax: [phone]    Levi Moncada MD  Payor: MEDICARE / Plan: MEDICARE A ONLY / Product Type: [facility] /             06/04/19 1548   Discharge Assessment   Assessment Type Discharge Planning Assessment   Confirmed/corrected address and phone number on facesheet? Yes   Assessment information obtained from? Patient;Medical Record   Expected Length of Stay (days)   (tbd)   Communicated expected length of stay with patient/caregiver yes   Prior to hospitilization cognitive status: Alert/Oriented   Prior to hospitalization functional status: Independent   Current cognitive status: Alert/Oriented   Current Functional Status: Independent   Facility Arrived From: home   Lives With child(nalini), adult;child(nalini), dependent   Able to Return to Prior Arrangements yes    Is patient able to care for self after discharge? Yes   Who are your caregiver(s) and their phone number(s)? Lyssa Kebede ( mother) [phone]   Patient's perception of discharge disposition home or selfcare   Readmission Within the Last 30 Days no previous admission in last 30 days   Patient currently being followed by outpatient case management? No   Patient currently receives any other outside agency services? No   Equipment Currently Used at Home none   Do you have any problems affording any of your prescribed medications? TBD   Is the patient taking medications as prescribed? yes   Does the patient have transportation home? Yes   Discharge Plan A Home;Home with family   Discharge Plan B Home   DME Needed Upon Discharge  none   Patient/Family in Agreement with Plan yes

## 2019-06-04 NOTE — ED NOTES
Pt requesting that IV in wrist be removed, stating that it was hurting and she just wants it out. IV removed at this time.
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
## 2019-06-05 PROBLEM — R65.20 SEVERE SEPSIS: Status: ACTIVE | Noted: 2018-12-25

## 2019-06-05 LAB
ALBUMIN SERPL BCP-MCNC: 1.8 G/DL (ref 3.5–5.2)
ALP SERPL-CCNC: 51 U/L (ref 55–135)
ALT SERPL W/O P-5'-P-CCNC: 13 U/L (ref 10–44)
ANION GAP SERPL CALC-SCNC: 7 MMOL/L (ref 8–16)
ANION GAP SERPL CALC-SCNC: 8 MMOL/L (ref 8–16)
AST SERPL-CCNC: 32 U/L (ref 10–40)
BASOPHILS # BLD AUTO: 0 K/UL (ref 0–0.2)
BASOPHILS NFR BLD: 0 % (ref 0–1.9)
BILIRUB SERPL-MCNC: 0.2 MG/DL (ref 0.1–1)
BUN SERPL-MCNC: 26 MG/DL (ref 6–20)
BUN SERPL-MCNC: 29 MG/DL (ref 6–20)
CALCIUM SERPL-MCNC: 6 MG/DL (ref 8.7–10.5)
CALCIUM SERPL-MCNC: 6.3 MG/DL (ref 8.7–10.5)
CHLORIDE SERPL-SCNC: 116 MMOL/L (ref 95–110)
CHLORIDE SERPL-SCNC: 117 MMOL/L (ref 95–110)
CMV DNA SPEC QL NAA+PROBE: NOT DETECTED
CO2 SERPL-SCNC: 11 MMOL/L (ref 23–29)
CO2 SERPL-SCNC: 13 MMOL/L (ref 23–29)
CREAT SERPL-MCNC: 2.1 MG/DL (ref 0.5–1.4)
CREAT SERPL-MCNC: 2.2 MG/DL (ref 0.5–1.4)
DACRYOCYTES BLD QL SMEAR: ABNORMAL
DIFFERENTIAL METHOD: ABNORMAL
EOSINOPHIL # BLD AUTO: 0.1 K/UL (ref 0–0.5)
EOSINOPHIL NFR BLD: 1.2 % (ref 0–8)
ERYTHROCYTE [DISTWIDTH] IN BLOOD BY AUTOMATED COUNT: 15.6 % (ref 11.5–14.5)
EST. GFR  (AFRICAN AMERICAN): 33 ML/MIN/1.73 M^2
EST. GFR  (AFRICAN AMERICAN): 35 ML/MIN/1.73 M^2
EST. GFR  (NON AFRICAN AMERICAN): 28 ML/MIN/1.73 M^2
EST. GFR  (NON AFRICAN AMERICAN): 30 ML/MIN/1.73 M^2
GLUCOSE SERPL-MCNC: 111 MG/DL (ref 70–110)
GLUCOSE SERPL-MCNC: 163 MG/DL (ref 70–110)
HCT VFR BLD AUTO: 22.4 % (ref 37–48.5)
HGB BLD-MCNC: 7.4 G/DL (ref 12–16)
HSV1, PCR, CSF: NEGATIVE
HSV2, PCR, CSF: NEGATIVE
LYMPHOCYTES # BLD AUTO: 0.7 K/UL (ref 1–4.8)
LYMPHOCYTES NFR BLD: 11.4 % (ref 18–48)
MAGNESIUM SERPL-MCNC: 1.7 MG/DL (ref 1.6–2.6)
MCH RBC QN AUTO: 30.3 PG (ref 27–31)
MCHC RBC AUTO-ENTMCNC: 33 G/DL (ref 32–36)
MCV RBC AUTO: 92 FL (ref 82–98)
MONOCYTES # BLD AUTO: 0.1 K/UL (ref 0.3–1)
MONOCYTES NFR BLD: 1.7 % (ref 4–15)
NEUTROPHILS # BLD AUTO: 5 K/UL (ref 1.8–7.7)
NEUTROPHILS NFR BLD: 86.2 % (ref 38–73)
PLATELET # BLD AUTO: 117 K/UL (ref 150–350)
PMV BLD AUTO: 10.7 FL (ref 9.2–12.9)
POIKILOCYTOSIS BLD QL SMEAR: SLIGHT
POLYCHROMASIA BLD QL SMEAR: ABNORMAL
POTASSIUM SERPL-SCNC: 3.9 MMOL/L (ref 3.5–5.1)
POTASSIUM SERPL-SCNC: 4 MMOL/L (ref 3.5–5.1)
PROT SERPL-MCNC: 6.3 G/DL (ref 6–8.4)
RBC # BLD AUTO: 2.44 M/UL (ref 4–5.4)
SODIUM SERPL-SCNC: 135 MMOL/L (ref 136–145)
SODIUM SERPL-SCNC: 137 MMOL/L (ref 136–145)
SPECIMEN SOURCE: NORMAL
SPHEROCYTES BLD QL SMEAR: ABNORMAL
VDRL CSF QL: NORMAL
WBC # BLD AUTO: 5.79 K/UL (ref 3.9–12.7)

## 2019-06-05 PROCEDURE — 82955 ASSAY OF G6PD ENZYME: CPT

## 2019-06-05 PROCEDURE — 93041 RHYTHM ECG TRACING: CPT

## 2019-06-05 PROCEDURE — 63600175 PHARM REV CODE 636 W HCPCS: Performed by: INTERNAL MEDICINE

## 2019-06-05 PROCEDURE — 87536 HIV-1 QUANT&REVRSE TRNSCRPJ: CPT

## 2019-06-05 PROCEDURE — 87103 BLOOD FUNGUS CULTURE: CPT

## 2019-06-05 PROCEDURE — 25000003 PHARM REV CODE 250: Performed by: CLINIC/CENTER

## 2019-06-05 PROCEDURE — 25000003 PHARM REV CODE 250: Performed by: INTERNAL MEDICINE

## 2019-06-05 PROCEDURE — 25000003 PHARM REV CODE 250: Performed by: NURSE PRACTITIONER

## 2019-06-05 PROCEDURE — 25000003 PHARM REV CODE 250: Performed by: PHYSICIAN ASSISTANT

## 2019-06-05 PROCEDURE — 80053 COMPREHEN METABOLIC PANEL: CPT

## 2019-06-05 PROCEDURE — 63600175 PHARM REV CODE 636 W HCPCS: Performed by: NURSE PRACTITIONER

## 2019-06-05 PROCEDURE — 20000000 HC ICU ROOM

## 2019-06-05 PROCEDURE — 85025 COMPLETE CBC W/AUTO DIFF WBC: CPT

## 2019-06-05 PROCEDURE — 94799 UNLISTED PULMONARY SVC/PX: CPT

## 2019-06-05 PROCEDURE — 87385 HISTOPLASMA CAPSUL AG IA: CPT

## 2019-06-05 PROCEDURE — 86361 T CELL ABSOLUTE COUNT: CPT

## 2019-06-05 PROCEDURE — 80048 BASIC METABOLIC PNL TOTAL CA: CPT

## 2019-06-05 PROCEDURE — 99291 PR CRITICAL CARE, E/M 30-74 MINUTES: ICD-10-PCS | Mod: ,,, | Performed by: INTERNAL MEDICINE

## 2019-06-05 PROCEDURE — 93005 ELECTROCARDIOGRAM TRACING: CPT

## 2019-06-05 PROCEDURE — 93010 ELECTROCARDIOGRAM REPORT: CPT | Mod: ,,, | Performed by: INTERNAL MEDICINE

## 2019-06-05 PROCEDURE — 93010 EKG 12-LEAD: ICD-10-PCS | Mod: ,,, | Performed by: INTERNAL MEDICINE

## 2019-06-05 PROCEDURE — 99900035 HC TECH TIME PER 15 MIN (STAT)

## 2019-06-05 PROCEDURE — 87449 NOS EACH ORGANISM AG IA: CPT

## 2019-06-05 PROCEDURE — 25500020 PHARM REV CODE 255: Performed by: INTERNAL MEDICINE

## 2019-06-05 PROCEDURE — 36415 COLL VENOUS BLD VENIPUNCTURE: CPT

## 2019-06-05 PROCEDURE — 83735 ASSAY OF MAGNESIUM: CPT

## 2019-06-05 PROCEDURE — 99291 CRITICAL CARE FIRST HOUR: CPT | Mod: ,,, | Performed by: INTERNAL MEDICINE

## 2019-06-05 RX ORDER — METOPROLOL TARTRATE 1 MG/ML
5 INJECTION, SOLUTION INTRAVENOUS ONCE
Status: COMPLETED | OUTPATIENT
Start: 2019-06-05 | End: 2019-06-05

## 2019-06-05 RX ORDER — VORICONAZOLE 40 MG/ML
200 POWDER, FOR SUSPENSION ORAL EVERY 12 HOURS
Status: DISCONTINUED | OUTPATIENT
Start: 2019-06-05 | End: 2019-06-11

## 2019-06-05 RX ORDER — ATOVAQUONE 750 MG/5ML
750 SUSPENSION ORAL 2 TIMES DAILY WITH MEALS
Status: DISCONTINUED | OUTPATIENT
Start: 2019-06-05 | End: 2019-06-07

## 2019-06-05 RX ORDER — VANCOMYCIN HCL IN 5 % DEXTROSE 1.5G/250ML
1500 PLASTIC BAG, INJECTION (ML) INTRAVENOUS ONCE
Status: COMPLETED | OUTPATIENT
Start: 2019-06-05 | End: 2019-06-05

## 2019-06-05 RX ORDER — MAGNESIUM SULFATE 1 G/100ML
1 INJECTION INTRAVENOUS ONCE
Status: COMPLETED | OUTPATIENT
Start: 2019-06-05 | End: 2019-06-05

## 2019-06-05 RX ADMIN — PANTOPRAZOLE SODIUM 40 MG: 40 TABLET, DELAYED RELEASE ORAL at 09:06

## 2019-06-05 RX ADMIN — Medication 2 SPRAY: at 09:06

## 2019-06-05 RX ADMIN — SODIUM CHLORIDE: 0.9 INJECTION, SOLUTION INTRAVENOUS at 09:06

## 2019-06-05 RX ADMIN — SODIUM CHLORIDE 500 ML: 0.9 INJECTION, SOLUTION INTRAVENOUS at 05:06

## 2019-06-05 RX ADMIN — PIPERACILLIN SODIUM AND TAZOBACTAM SODIUM 4.5 G: 4; .5 INJECTION, POWDER, LYOPHILIZED, FOR SOLUTION INTRAVENOUS at 02:06

## 2019-06-05 RX ADMIN — VORICONAZOLE 200 MG: 40 POWDER, FOR SUSPENSION ORAL at 01:06

## 2019-06-05 RX ADMIN — DAPSONE 100 MG: 25 TABLET ORAL at 09:06

## 2019-06-05 RX ADMIN — PIPERACILLIN SODIUM AND TAZOBACTAM SODIUM 4.5 G: 4; .5 INJECTION, POWDER, LYOPHILIZED, FOR SOLUTION INTRAVENOUS at 04:06

## 2019-06-05 RX ADMIN — Medication 0.14 MCG/KG/MIN: at 02:06

## 2019-06-05 RX ADMIN — SODIUM CHLORIDE: 0.9 INJECTION, SOLUTION INTRAVENOUS at 10:06

## 2019-06-05 RX ADMIN — AZITHROMYCIN MONOHYDRATE 500 MG: 500 INJECTION, POWDER, LYOPHILIZED, FOR SOLUTION INTRAVENOUS at 04:06

## 2019-06-05 RX ADMIN — LACOSAMIDE 100 MG: 50 TABLET, FILM COATED ORAL at 10:06

## 2019-06-05 RX ADMIN — PIPERACILLIN SODIUM AND TAZOBACTAM SODIUM 4.5 G: 4; .5 INJECTION, POWDER, LYOPHILIZED, FOR SOLUTION INTRAVENOUS at 10:06

## 2019-06-05 RX ADMIN — SODIUM CHLORIDE: 0.9 INJECTION, SOLUTION INTRAVENOUS at 04:06

## 2019-06-05 RX ADMIN — METOPROLOL TARTRATE 5 MG: 5 INJECTION, SOLUTION INTRAVENOUS at 02:06

## 2019-06-05 RX ADMIN — ACETAMINOPHEN 650 MG: 650 SOLUTION ORAL at 05:06

## 2019-06-05 RX ADMIN — VORICONAZOLE 200 MG: 40 POWDER, FOR SUSPENSION ORAL at 09:06

## 2019-06-05 RX ADMIN — VANCOMYCIN HYDROCHLORIDE 1500 MG: 100 INJECTION, POWDER, LYOPHILIZED, FOR SOLUTION INTRAVENOUS at 02:06

## 2019-06-05 RX ADMIN — ACETAMINOPHEN 650 MG: 650 SOLUTION ORAL at 03:06

## 2019-06-05 RX ADMIN — IOHEXOL 50 ML: 350 INJECTION, SOLUTION INTRAVENOUS at 11:06

## 2019-06-05 RX ADMIN — VORICONAZOLE 200 MG: 40 POWDER, FOR SUSPENSION ORAL at 08:06

## 2019-06-05 RX ADMIN — ELVITEGRAVIR, COBICISTAT, EMTRICITABINE, AND TENOFOVIR DISOPROXIL FUMARATE 1 TABLET: 150; 150; 200; 300 TABLET, FILM COATED ORAL at 09:06

## 2019-06-05 RX ADMIN — ACETAMINOPHEN 650 MG: 650 SOLUTION ORAL at 10:06

## 2019-06-05 RX ADMIN — CALCIUM GLUCONATE 2000 MG: 98 INJECTION, SOLUTION INTRAVENOUS at 04:06

## 2019-06-05 RX ADMIN — ENOXAPARIN SODIUM 30 MG: 100 INJECTION SUBCUTANEOUS at 03:06

## 2019-06-05 RX ADMIN — ACETAMINOPHEN 650 MG: 650 SOLUTION ORAL at 04:06

## 2019-06-05 RX ADMIN — GUAIFENESIN 600 MG: 600 TABLET, EXTENDED RELEASE ORAL at 09:06

## 2019-06-05 RX ADMIN — MAGNESIUM SULFATE 1 G: 1 INJECTION INTRAVENOUS at 10:06

## 2019-06-05 NOTE — ASSESSMENT & PLAN NOTE
ill continue empiric broad spectrum therapy with Zosyn, will follow fever curve.  On Voriconazole- PO not IV due to renal impairment  , continue Vanco.  Monitor closely

## 2019-06-05 NOTE — PLAN OF CARE
Problem: Adult Inpatient Plan of Care  Goal: Plan of Care Review  Outcome: Ongoing (interventions implemented as appropriate)  POC discussed w/patient, verbalized understanding. ST 110s-120s on monitor. Levo gtt restarted to control BPs; currently @ 0.12mcg/kg/min. IVF inc to 200mL/hr. Compliant w/meds w/encouragement. Difficulty controlling temps; Tylenol given twice; temps 100.3-103F. Watery to loose diarrheas x 3. Patient turns independently in bed. Fall precautions in place, bed alarm on.

## 2019-06-05 NOTE — ASSESSMENT & PLAN NOTE
Continue patient's home medication  Seizure precautions  .Further evaluation/diagnostics/interventions/consults pending course   06/04- will continue lacosamide and will monitor closely

## 2019-06-05 NOTE — ASSESSMENT & PLAN NOTE
Resume patient's HAART therapy  Consult Infectious Disease  Patient being treated with Rocephin for sinusitis will add on azithromycin and dapsone for further prophylaxis given patient's immunocompromised state  Further evaluation/diagnostics/interventions/consults pending course     06/04- she has poor compliance to therapy and follow up .  Will check cd4 count , HIV viral load     Will continue Striblid.  OI prophylaxis with dapsone and zithromax

## 2019-06-05 NOTE — PLAN OF CARE
"Problem: Adult Inpatient Plan of Care  Goal: Plan of Care Review  Outcome: Ongoing (interventions implemented as appropriate)  Pt with T max 104.1; after no success with tylenol or ice bags, pt placed on cooling blanket; normothermia achieved approx 1430.  Levo gtt off 1330.  Pt with elevated HR 130s-150s; Dr. Mccauley and Dr. Riggs aware; after achieving normothermia and levo off, elevated HR continued.  EKG ordered and showed SVT.  Lopressor 5mg IVP admin.  HR now 120s-130s. Dr. Mccauley aware.  Pt without appetite.  Able to drink approx 240ml throughout day.  Pt with only 2 large stools and voids, throwing pad with feces on floor this morning, stating she "wanted to get up and go the the bathroom."  Pt AAOx4.      "

## 2019-06-05 NOTE — ASSESSMENT & PLAN NOTE
Continue patient's home medication  Seizure precautions.  will continue lacosamide and will monitor closely

## 2019-06-05 NOTE — HOSPITAL COURSE
33 year old woman with AIDS-poorly compliant with HAART -last cd4 count -was 39 (02/2019).  She was admitted at this time for febrile illness hemodynamically unstable requiring vasopressors to maintain blood pressure.  Infectious Disease evaluated and assisting management.  Started broad empiric antibiotic treatment.  Continued to run intermittent high fevers.  Hemodynamics improved and weaned off vasopressors.  Remained tachycardic to varying degrees.  EKG consistently showed sinus tachycardia or SVT.  Rate controlled with as needed Metoprolol IV.  Hypoxemic respiratory failure on room air corrected with continuous BiPAP.  Continued respiratory distress, decided for intubation 07 June.  Worsening renal function, started CRRT 08 June in ICU on ventilator.  Diagnostic bronchoscopy on 09 June and tolerating CRRT on Norepinephrine.  Transition to intermittent hemodialysis.  Transferred out of ICU.  Morning of 13 June she expressed a desire to leave the hospital and forego any further treatment.  I discussed the risk of her becoming ill again very soon if she did not complete treatment and that she was severely anemic and we prepared a transfusion for her and that she is currently dependent on dialysis.  Even so, she elected to have her dialysis catheter removed and left against medical advice.

## 2019-06-05 NOTE — ASSESSMENT & PLAN NOTE
Potentially related to sinusitis.  Patient received IV fluids and was started on IV antibiotics.  Lactic negative.  She is an immunocompromised host . Will start broad spectrum antimicrobial therapy including antifungal therapy -will use vancomycin, zosyn and empiric Voriconazole.  Will send blood cultures, fungal cultures, fungitell assay , AFB cultures.

## 2019-06-05 NOTE — ASSESSMENT & PLAN NOTE
IV antibiotics.  Further evaluation/diagnostics/interventions/consults pending course       06/04- will continue empiric broad spectrum therapy with Zosyn, will follow fever curve.  On Voriconazole- PO not IV due to renal impairment  , continue Vanco.  Monitor closely

## 2019-06-05 NOTE — SUBJECTIVE & OBJECTIVE
Interval History:  Fevers and chills.  Multiple episodes of diarrhea.  Back and leg muscle aches.  Regular tachycardia.    Review of Systems   Constitutional: Positive for activity change and fever. Negative for chills, diaphoresis and fatigue.   HENT: Positive for congestion and sinus pressure. Negative for sore throat and voice change.    Eyes: Negative for photophobia and visual disturbance.   Respiratory: Negative for cough, shortness of breath, wheezing and stridor.    Cardiovascular: Negative for chest pain and leg swelling.   Gastrointestinal: Negative for abdominal distention, abdominal pain, constipation, diarrhea, nausea and vomiting.   Endocrine: Negative for polydipsia, polyphagia and polyuria.   Genitourinary: Negative for difficulty urinating, dysuria, flank pain, pelvic pain, urgency and vaginal discharge.   Musculoskeletal: Negative for back pain, joint swelling, neck pain and neck stiffness.   Skin: Negative for color change and rash.   Allergic/Immunologic: Negative for immunocompromised state.   Neurological: Positive for headaches. Negative for dizziness, syncope, weakness and numbness.   Hematological: Does not bruise/bleed easily.   Psychiatric/Behavioral: Negative for agitation, behavioral problems and confusion.     Objective:     Vital Signs (Most Recent):  Temp: 99 °F (37.2 °C) (06/05/19 1500)  Pulse: (!) 127 (06/05/19 1505)  Resp: (!) 33 (06/05/19 1505)  BP: (!) 93/40 (06/05/19 1500)  SpO2: 96 % (06/05/19 1505) Vital Signs (24h Range):  Temp:  [98.9 °F (37.2 °C)-104 °F (40 °C)] 99 °F (37.2 °C)  Pulse:  [] 127  Resp:  [19-39] 33  SpO2:  [15 %-100 %] 96 %  BP: ()/() 93/40     Weight: 52.2 kg (115 lb 1.3 oz)  Body mass index is 22.48 kg/m².    Intake/Output Summary (Last 24 hours) at 6/5/2019 1511  Last data filed at 6/5/2019 1500  Gross per 24 hour   Intake 8468.01 ml   Output --   Net 8468.01 ml      Physical Exam   Constitutional: She is oriented to person, place, and  time. She appears well-developed and well-nourished. No distress.   Acutely ill-appearing female   HENT:   Head: Normocephalic and atraumatic.   Nose: Nose normal.   Positive frontal and maxillary sinus tenderness   Eyes: Pupils are equal, round, and reactive to light. Conjunctivae and EOM are normal. No scleral icterus.   Neck: Normal range of motion. Neck supple. No tracheal deviation present.   Cardiovascular: Regular rhythm, normal heart sounds and intact distal pulses.   No murmur heard.  Tachycardia   Pulmonary/Chest: Effort normal and breath sounds normal. No stridor. No respiratory distress. She has no wheezes. She has no rales.   Abdominal: Soft. Bowel sounds are normal. She exhibits no distension. There is no tenderness. There is no guarding.   Genitourinary:   Genitourinary Comments: -   Musculoskeletal: Normal range of motion. She exhibits no edema or deformity.   Neurological: She is alert and oriented to person, place, and time. No cranial nerve deficit.   Skin: Skin is warm and dry. Capillary refill takes less than 2 seconds. No rash noted. She is not diaphoretic.   Psychiatric: She has a normal mood and affect. Her behavior is normal. Judgment and thought content normal.   Nursing note and vitals reviewed.      Significant Labs: All pertinent labs within the past 24 hours have been reviewed.    Significant Imaging: I have reviewed and interpreted all pertinent imaging results/findings within the past 24 hours.

## 2019-06-05 NOTE — PROGRESS NOTES
Pt w/ 103F; PRN Tylenol administered; pt still 103F. Pt also continuing to have watery to loose stools frequently, requesting antidiarrheal. eICU Dr. Milner notified.  Per MD ok to give PRN dose of Tylenol now. Also inc IVF rate to 200 and ordered 500mL bolus. MD asked about c.diff testing; none ordered this admit, messaged Dr. Mayo asking if he wanted to order c.diff.   Will continue to monitor.
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
## 2019-06-05 NOTE — PROGRESS NOTES
[facility] Hospital Medicine  Progress Note    Patient Name: Wang Kebede  MRN: [id number]  Patient Class: IP- Inpatient   Admission Date: 6/3/2019  Length of Stay: 1 days  Attending Physician: Gerardo Mccauley MD  Primary Care Provider: Levi Moncada MD        Subjective:     Principal Problem:Sepsis    HPI:  33-year-old female with significant medical history including AIDS who has history of noncompliance with medical plan of care presents today for complaint of malaise over the past week.  Modifying factors none.  Associated symptoms:  Cough, headache.  Prior treatment not effective with home care.  Patient was evaluated emergency room.  Significant findings and workup include sinusitis findings on CT.  Cultures pending.  WBC and lactic both negative.  Patient was started on IV antibiotics and given IV fluids.  During ER care patient became more tachycardic and developed fever.  Infectious Disease was consulted by ED who recommended observation.  Hospital Medicine was consulted patient admitted to observation.  To note patient's last CD4 count 39  three months ago.    Hospital Course:  06/04- 33 year old woman with AIDS-poorly compliant with HAART -last cd4 count -was 39 (02/2019).  She was admitted at this time for febrile illness and was noted to be hypotensive.  She is now in the ICU .  She is now on vasopressor support     Interval History:  Fevers and chills.  Multiple episodes of diarrhea.  Back and leg muscle aches.  Regular tachycardia.    Review of Systems   Constitutional: Positive for activity change and fever. Negative for chills, diaphoresis and fatigue.   HENT: Positive for congestion and sinus pressure. Negative for sore throat and voice change.    Eyes: Negative for photophobia and visual disturbance.   Respiratory: Negative for cough, shortness of breath, wheezing and stridor.    Cardiovascular: Negative for chest pain and leg swelling.   Gastrointestinal:  Negative for abdominal distention, abdominal pain, constipation, diarrhea, nausea and vomiting.   Endocrine: Negative for polydipsia, polyphagia and polyuria.   Genitourinary: Negative for difficulty urinating, dysuria, flank pain, pelvic pain, urgency and vaginal discharge.   Musculoskeletal: Negative for back pain, joint swelling, neck pain and neck stiffness.   Skin: Negative for color change and rash.   Allergic/Immunologic: Negative for immunocompromised state.   Neurological: Positive for headaches. Negative for dizziness, syncope, weakness and numbness.   Hematological: Does not bruise/bleed easily.   Psychiatric/Behavioral: Negative for agitation, behavioral problems and confusion.     Objective:     Vital Signs (Most Recent):  Temp: 99 °F (37.2 °C) (06/05/19 1500)  Pulse: (!) 127 (06/05/19 1505)  Resp: (!) 33 (06/05/19 1505)  BP: (!) 93/40 (06/05/19 1500)  SpO2: 96 % (06/05/19 1505) Vital Signs (24h Range):  Temp:  [98.9 °F (37.2 °C)-104 °F (40 °C)] 99 °F (37.2 °C)  Pulse:  [] 127  Resp:  [19-39] 33  SpO2:  [15 %-100 %] 96 %  BP: ()/() 93/40     Weight: 52.2 kg (115 lb 1.3 oz)  Body mass index is 22.48 kg/m².    Intake/Output Summary (Last 24 hours) at 6/5/2019 1511  Last data filed at 6/5/2019 1500  Gross per 24 hour   Intake 8468.01 ml   Output --   Net 8468.01 ml      Physical Exam   Constitutional: She is oriented to person, place, and time. She appears well-developed and well-nourished. No distress.   Acutely ill-appearing female   HENT:   Head: Normocephalic and atraumatic.   Nose: Nose normal.   Positive frontal and maxillary sinus tenderness   Eyes: Pupils are equal, round, and reactive to light. Conjunctivae and EOM are normal. No scleral icterus.   Neck: Normal range of motion. Neck supple. No tracheal deviation present.   Cardiovascular: Regular rhythm, normal heart sounds and intact distal pulses.   No murmur heard.  Tachycardia   Pulmonary/Chest: Effort normal and breath  sounds normal. No stridor. No respiratory distress. She has no wheezes. She has no rales.   Abdominal: Soft. Bowel sounds are normal. She exhibits no distension. There is no tenderness. There is no guarding.   Genitourinary:   Genitourinary Comments: -   Musculoskeletal: Normal range of motion. She exhibits no edema or deformity.   Neurological: She is alert and oriented to person, place, and time. No cranial nerve deficit.   Skin: Skin is warm and dry. Capillary refill takes less than 2 seconds. No rash noted. She is not diaphoretic.   Psychiatric: She has a normal mood and affect. Her behavior is normal. Judgment and thought content normal.   Nursing note and vitals reviewed.      Significant Labs: All pertinent labs within the past 24 hours have been reviewed.    Significant Imaging: I have reviewed and interpreted all pertinent imaging results/findings within the past 24 hours.    Assessment/Plan:      * Sepsis  Potentially related to sinusitis.  Patient received IV fluids and was started on IV antibiotics.  Lactic negative.  She is an immunocompromised host . Will start broad spectrum antimicrobial therapy including antifungal therapy -will use vancomycin, zosyn and empiric Voriconazole.  Will send blood cultures, fungal cultures, fungitell assay , AFB cultures.    Acute sinusitis  ill continue empiric broad spectrum therapy with Zosyn, will follow fever curve.  On Voriconazole- PO not IV due to renal impairment  , continue Vanco.  Monitor closely     AIDS (acquired immunodeficiency syndrome), CD4 <=200  Resume patient's HAART therapy.  Patient being treated with Rocephin for sinusitis will add on azithromycin and dapsone for further prophylaxis given patient's immunocompromised state.  She has poor compliance to therapy and follow up.  Will check cd4 count , HIV viral load.  Will continue Striblid.  OI prophylaxis with Dapsone and Azithromycin.  Infectious Disease following.    History of VT/VF s/p  implantation of automatic cardioverter/defibrillator (AICD)  Cardiac monitoring    Seizure disorder  Continue patient's home medication  Seizure precautions.  will continue lacosamide and will monitor closely      VTE Risk Mitigation (From admission, onward)        Ordered     enoxaparin injection 30 mg  Daily      06/04/19 1615     Place sequential compression device  Until discontinued      06/03/19 2020     Place KALA hose  Until discontinued      06/03/19 2020          Critical care time spent on the evaluation and treatment of severe organ dysfunction, review of pertinent labs and imaging studies, discussions with consulting providers and discussions with patient/family: 30 minutes.    Gerardo Mccauley MD  Department of Hospital Medicine   [facility]

## 2019-06-05 NOTE — ASSESSMENT & PLAN NOTE
Resume patient's HAART therapy.  Patient being treated with Rocephin for sinusitis will add on azithromycin and dapsone for further prophylaxis given patient's immunocompromised state.  She has poor compliance to therapy and follow up.  Will check cd4 count , HIV viral load.  Will continue Striblid.  OI prophylaxis with Dapsone and Azithromycin.  Infectious Disease following.

## 2019-06-05 NOTE — NURSING
Dr. Mccauley informed pt's HR 130s and temp 103.9; pt given 650 mg Tylenol liquid and placed back on cooling blanket.

## 2019-06-05 NOTE — NURSING
Pt -140; temp 102.3 temporal; pt rec'd tylenol 650mg syrup at 0305 and 650mg syrup at 0500; iced pt with multi ice bags; notified Dr. Mayo and Dr. Mccauley.

## 2019-06-05 NOTE — PROGRESS NOTES
Pharmacokinetic Initial Assessment: IV Vancomycin    Assessment/Plan:    Initiate intravenous vancomycin with loading dose of 1500 mg once followed by a maintenance dose of vancomycin 500 mg IV every 24 hours  Desired empiric serum trough concentration is 10 to 20 mcg/mL.  Draw vancomycin trough level 30 min prior to third dose on 6/07/19 at approximately 0230   Pharmacy will continue to follow and monitor vancomycin.      Please contact pharmacy at extension [phone] with any questions regarding this assessment.     Thank you for the consult,   Ghanshyam Nayak     Patient brief summary:  Wang Kebede is a 33 y.o. female initiated on antimicrobial therapy with IV Vancomycin for treatment of suspected Fever     Drug Allergies:   Review of patient's allergies indicates:   Allergen Reactions    Iodine and iodide containing products Anaphylaxis     Pt states she coded last time she was administered contrast    Pneumococcal 23-chitra ps vaccine Anaphylaxis     Potential iodine containing    Dilaudid [hydromorphone] Hives and Itching    Bactrim [sulfamethoxazole-trimethoprim] Hives    Morphine Itching     Tolerates norco 2018       Actual Body Weight:   52.2 kg    Renal Function:   Estimated Creatinine Clearance: 25.9 mL/min (A) (based on SCr of 2.2 mg/dL (H)).,     Dialysis Method (if applicable):  N/A     CBC (last 72 hours):  Recent Labs   Lab Result Units 06/03/19  1039 06/04/19  0627   WBC K/uL 4.55 9.63   Hemoglobin g/dL 9.2* 7.3*   Hematocrit % 28.8* 22.2*   Platelets K/uL 148* 129*   Gran% % 40.8 96.1*   Lymph% % 49.5* 2.3*   Mono% % 9.0 1.5*   Eosinophil% % 0.7 0.5   Basophil% % 0.0 0.0   Differential Method  Automated Automated       Metabolic Panel (last 72 hours):  Recent Labs   Lab Result Units 06/03/19  1039 06/03/19  1221 06/03/19  1353 06/04/19  0627   Sodium mmol/L 138  --   --  137   Potassium mmol/L 4.5  --   --  3.8   Chloride mmol/L 108  --   --  116*   CO2 mmol/L 19*  --   --  12*   Glucose  mg/dL 73  --   --  155*   Glucose, CSF mg/dL  --   --  44  --    Glucose, UA   --  Negative  --   --    BUN, Bld mg/dL 27*  --   --  31*   Creatinine mg/dL 1.3  --   --  2.2*   Albumin g/dL 3.3*  --   --   --    Total Bilirubin mg/dL 0.3  --   --   --    Alkaline Phosphatase U/L 53*  --   --   --    AST U/L 25  --   --   --    ALT U/L 11  --   --   --    Magnesium mg/dL 2.0  --   --  1.0*   Phosphorus mg/dL 3.7  --   --  2.6*       Drug levels (last 3 results):  No results for input(s): VANCOMYCINRA, VANCOMYCINPE, VANCOMYCINTR in the last 72 hours.    Microbiologic Results:  Microbiology Results (last 7 days)       Procedure Component Value Units Date/Time    Fungus Culture, Blood or Bone Marrow [[id number]] Collected:  06/05/19 0344    Order Status:  Sent Specimen:  Blood Updated:  06/05/19 0344    AFB Culture & Smear [[id number]]     Order Status:  Sent Specimen:  Blood     Blood culture #1 **CANNOT BE ORDERED STAT** [[id number]] Collected:  06/03/19 1500    Order Status:  Completed Specimen:  Blood from Peripheral, Forearm, Left Updated:  06/04/19 2212     Blood Culture, Routine No Growth to date     Blood Culture, Routine No Growth to date    Blood culture #2 **CANNOT BE ORDERED STAT** [[id number]] Collected:  06/03/19 1530    Order Status:  Completed Specimen:  Blood from Peripheral, Wrist, Right Updated:  06/04/19 2212     Blood Culture, Routine No Growth to date     Blood Culture, Routine No Growth to date    AFB Culture & Smear (Tube 3) [[id number]] Collected:  06/03/19 1353    Order Status:  Completed Specimen:  CSF (Spinal Fluid) from CSF Tap, Tube 2 Updated:  06/04/19 1434     AFB CULTURE STAIN No acid fast bacilli seen.    Cryptococcal antigen, CSF (Tube 3) [[id number]] Collected:  06/03/19 1353    Order Status:  Completed Specimen:  CSF (Spinal Fluid) from CSF Tap, Tube 2 Updated:  06/04/19 1106     Crypto Ag, CSF Negative    Narrative:       On which sequentially labeled tube should this analysis  be  performed?->3    Fungus culture (Tube 3) [[id number]] Collected:  06/03/19 1353    Order Status:  Completed Specimen:  CSF (Spinal Fluid) from CSF Tap, Tube 2 Updated:  06/04/19 1013     Fungus (Mycology) Culture Culture in progress    CSF culture and Gram Stain (Tube 2) [[id number]] Collected:  06/03/19 1353    Order Status:  Completed Specimen:  CSF (Spinal Fluid) from CSF Tap, Tube 2 Updated:  06/04/19 0742     CSF CULTURE No Growth to date     Gram Stain Result Cytospin indicates:      No organisms seen      No WBC's    Narrative:       On which sequentially labeled tube should this analysis be  performed?->2

## 2019-06-05 NOTE — SUBJECTIVE & OBJECTIVE
Interval History:   06/04- 33 year old woman with AIDS-poorly compliant with HAART -last cd4 count -was 39 (02/2019).  She was admitted at this time for febrile illness and was noted to be hypotensive.  She is now in the ICU .  She is now on vasopressor support       Review of Systems   Constitutional: Positive for activity change and fever. Negative for chills, diaphoresis and fatigue.   HENT: Positive for congestion and sinus pressure. Negative for sore throat and voice change.    Eyes: Negative for photophobia and visual disturbance.   Respiratory: Negative for cough, shortness of breath, wheezing and stridor.    Cardiovascular: Negative for chest pain and leg swelling.   Gastrointestinal: Negative for abdominal distention, abdominal pain, constipation, diarrhea, nausea and vomiting.   Endocrine: Negative for polydipsia, polyphagia and polyuria.   Genitourinary: Negative for difficulty urinating, dysuria, flank pain, pelvic pain, urgency and vaginal discharge.   Musculoskeletal: Negative for back pain, joint swelling, neck pain and neck stiffness.   Skin: Negative for color change and rash.   Allergic/Immunologic: Negative for immunocompromised state.   Neurological: Positive for headaches. Negative for dizziness, syncope, weakness and numbness.   Hematological: Does not bruise/bleed easily.   Psychiatric/Behavioral: Negative for agitation, behavioral problems and confusion.     Objective:     Vital Signs (Most Recent):  Temp: (!) 100.5 °F (38.1 °C) (06/04/19 2315)  Pulse: (!) 116 (06/05/19 0030)  Resp: (!) 22 (06/05/19 0030)  BP: (!) 115/47 (06/05/19 0030)  SpO2: 98 % (06/05/19 0030) Vital Signs (24h Range):  Temp:  [99 °F (37.2 °C)-103.1 °F (39.5 °C)] 100.5 °F (38.1 °C)  Pulse:  [] 116  Resp:  [12-41] 22  SpO2:  [69 %-100 %] 98 %  BP: ()/(37-69) 115/47     Weight: 52.2 kg (115 lb 1.3 oz)  Body mass index is 22.48 kg/m².    Intake/Output Summary (Last 24 hours) at 6/5/2019 0041  Last data filed at  6/4/2019 1800  Gross per 24 hour   Intake 5386.32 ml   Output --   Net 5386.32 ml      Physical Exam   Constitutional: She is oriented to person, place, and time. She appears well-developed and well-nourished. No distress.   Acutely ill-appearing female   HENT:   Head: Normocephalic and atraumatic.   Nose: Nose normal.   Positive frontal and maxillary sinus tenderness   Eyes: Pupils are equal, round, and reactive to light. Conjunctivae and EOM are normal. No scleral icterus.   Neck: Normal range of motion. Neck supple. No tracheal deviation present.   Cardiovascular: Normal rate, regular rhythm, normal heart sounds and intact distal pulses.   No murmur heard.  Pulmonary/Chest: Effort normal and breath sounds normal. No stridor. No respiratory distress. She has no wheezes. She has no rales.   Abdominal: Soft. Bowel sounds are normal. She exhibits no distension. There is no tenderness. There is no guarding.   Genitourinary:   Genitourinary Comments: -   Musculoskeletal: Normal range of motion. She exhibits no edema or deformity.   Neurological: She is alert and oriented to person, place, and time. No cranial nerve deficit.   Skin: Skin is warm and dry. Capillary refill takes less than 2 seconds. No rash noted. She is not diaphoretic.   Psychiatric: She has a normal mood and affect. Her behavior is normal. Judgment and thought content normal.   Nursing note and vitals reviewed.      Significant Labs: All pertinent labs within the past 24 hours have been reviewed.    Significant Imaging: I have reviewed and interpreted all pertinent imaging results/findings within the past 24 hours.

## 2019-06-05 NOTE — ASSESSMENT & PLAN NOTE
Related sinusitis  Patient received IV fluids and was started on IV antibiotics.  Lactic negative.  Continue IV hydration and IV antibiotics.  Monitor labs  Antipyretics. Montior tachycardia  Monitor close and Further evaluation/diagnostics/interventions/consults pending course     06/04- she is an immunocompromised host . Will start broad spectrum antimicrobial therapy including antifungal therapy -will use vancomycin, zosyn and empiric Voriconazole .  Will send blood cultures, fungal cultures, fungitell assay , AFB cultures.  Will need close monitoring .

## 2019-06-05 NOTE — PROGRESS NOTES
[facility] Hospital Medicine  Progress Note    Patient Name: Wang Kebede  MRN: [id number]  Patient Class: IP- Inpatient   Admission Date: 6/3/2019  Length of Stay: 1 days  Attending Physician: Hubert Mayo MD  Primary Care Provider: Levi Moncada MD        Subjective:     Principal Problem:Sepsis    HPI:  33-year-old female with significant medical history including AIDS who has history of noncompliance with medical plan of care presents today for complaint of malaise over the past week.  Modifying factors none.  Associated symptoms:  Cough, headache.  Prior treatment not effective with home care.  Patient was evaluated emergency room.  Significant findings and workup include sinusitis findings on CT.  Cultures pending.  WBC and lactic both negative.  Patient was started on IV antibiotics and given IV fluids.  During ER care patient became more tachycardic and developed fever.  Infectious Disease was consulted by ED who recommended observation.  Hospital Medicine was consulted patient admitted to observation.  To note patient's last CD4 count 39  three months ago.    Hospital Course:  06/04- 33 year old woman with AIDS-poorly compliant with HAART -last cd4 count -was 39 (02/2019).  She was admitted at this time for febrile illness and was noted to be hypotensive.  She is now in the ICU .  She is now on vasopressor support     Interval History:   06/04- 33 year old woman with AIDS-poorly compliant with HAART -last cd4 count -was 39 (02/2019).  She was admitted at this time for febrile illness and was noted to be hypotensive.  She is now in the ICU .  She is now on vasopressor support       Review of Systems   Constitutional: Positive for activity change and fever. Negative for chills, diaphoresis and fatigue.   HENT: Positive for congestion and sinus pressure. Negative for sore throat and voice change.    Eyes: Negative for photophobia and visual disturbance.   Respiratory: Negative  for cough, shortness of breath, wheezing and stridor.    Cardiovascular: Negative for chest pain and leg swelling.   Gastrointestinal: Negative for abdominal distention, abdominal pain, constipation, diarrhea, nausea and vomiting.   Endocrine: Negative for polydipsia, polyphagia and polyuria.   Genitourinary: Negative for difficulty urinating, dysuria, flank pain, pelvic pain, urgency and vaginal discharge.   Musculoskeletal: Negative for back pain, joint swelling, neck pain and neck stiffness.   Skin: Negative for color change and rash.   Allergic/Immunologic: Negative for immunocompromised state.   Neurological: Positive for headaches. Negative for dizziness, syncope, weakness and numbness.   Hematological: Does not bruise/bleed easily.   Psychiatric/Behavioral: Negative for agitation, behavioral problems and confusion.     Objective:     Vital Signs (Most Recent):  Temp: (!) 100.5 °F (38.1 °C) (06/04/19 2315)  Pulse: (!) 116 (06/05/19 0030)  Resp: (!) 22 (06/05/19 0030)  BP: (!) 115/47 (06/05/19 0030)  SpO2: 98 % (06/05/19 0030) Vital Signs (24h Range):  Temp:  [99 °F (37.2 °C)-103.1 °F (39.5 °C)] 100.5 °F (38.1 °C)  Pulse:  [] 116  Resp:  [12-41] 22  SpO2:  [69 %-100 %] 98 %  BP: ()/(37-69) 115/47     Weight: 52.2 kg (115 lb 1.3 oz)  Body mass index is 22.48 kg/m².    Intake/Output Summary (Last 24 hours) at 6/5/2019 0041  Last data filed at 6/4/2019 1800  Gross per 24 hour   Intake 5386.32 ml   Output --   Net 5386.32 ml      Physical Exam   Constitutional: She is oriented to person, place, and time. She appears well-developed and well-nourished. No distress.   Acutely ill-appearing female   HENT:   Head: Normocephalic and atraumatic.   Nose: Nose normal.   Positive frontal and maxillary sinus tenderness   Eyes: Pupils are equal, round, and reactive to light. Conjunctivae and EOM are normal. No scleral icterus.   Neck: Normal range of motion. Neck supple. No tracheal deviation present.    Cardiovascular: Normal rate, regular rhythm, normal heart sounds and intact distal pulses.   No murmur heard.  Pulmonary/Chest: Effort normal and breath sounds normal. No stridor. No respiratory distress. She has no wheezes. She has no rales.   Abdominal: Soft. Bowel sounds are normal. She exhibits no distension. There is no tenderness. There is no guarding.   Genitourinary:   Genitourinary Comments: -   Musculoskeletal: Normal range of motion. She exhibits no edema or deformity.   Neurological: She is alert and oriented to person, place, and time. No cranial nerve deficit.   Skin: Skin is warm and dry. Capillary refill takes less than 2 seconds. No rash noted. She is not diaphoretic.   Psychiatric: She has a normal mood and affect. Her behavior is normal. Judgment and thought content normal.   Nursing note and vitals reviewed.      Significant Labs: All pertinent labs within the past 24 hours have been reviewed.    Significant Imaging: I have reviewed and interpreted all pertinent imaging results/findings within the past 24 hours.    Assessment/Plan:      * Sepsis  Related sinusitis  Patient received IV fluids and was started on IV antibiotics.  Lactic negative.  Continue IV hydration and IV antibiotics.  Monitor labs  Antipyretics. Montior tachycardia  Monitor close and Further evaluation/diagnostics/interventions/consults pending course     06/04- she is an immunocompromised host . Will start broad spectrum antimicrobial therapy including antifungal therapy -will use vancomycin, zosyn and empiric Voriconazole .  Will send blood cultures, fungal cultures, fungitell assay , AFB cultures.  Will need close monitoring .          Acute sinusitis  IV antibiotics.  Further evaluation/diagnostics/interventions/consults pending course       06/04- will continue empiric broad spectrum therapy with Zosyn, will follow fever curve.  On Voriconazole- PO not IV due to renal impairment  , continue Vanco.  Monitor closely        Seizure disorder  Continue patient's home medication  Seizure precautions  .Further evaluation/diagnostics/interventions/consults pending course   06/04- will continue lacosamide and will monitor closely        AIDS (acquired immunodeficiency syndrome), CD4 <=200  Resume patient's HAART therapy  Consult Infectious Disease  Patient being treated with Rocephin for sinusitis will add on azithromycin and dapsone for further prophylaxis given patient's immunocompromised state  Further evaluation/diagnostics/interventions/consults pending course     06/04- she has poor compliance to therapy and follow up .  Will check cd4 count , HIV viral load     Will continue Striblid.  OI prophylaxis with dapsone and zithromax      VTE Risk Mitigation (From admission, onward)        Ordered     enoxaparin injection 30 mg  Daily      06/04/19 1615     Place sequential compression device  Until discontinued      06/03/19 2020     Place KALA hose  Until discontinued      06/03/19 2020          Critical care time spent on the evaluation and treatment of severe organ dysfunction, review of pertinent labs and imaging studies, discussions with consulting providers and discussions with patient/family:  45 minutes.    Hubert Mayo MD  Department of Hospital Medicine   [facility]
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
## 2019-06-06 LAB
ALBUMIN SERPL BCP-MCNC: 1.4 G/DL (ref 3.5–5.2)
ALLENS TEST: ABNORMAL
ALLENS TEST: ABNORMAL
ALP SERPL-CCNC: 58 U/L (ref 55–135)
ALT SERPL W/O P-5'-P-CCNC: 19 U/L (ref 10–44)
ANION GAP SERPL CALC-SCNC: 9 MMOL/L (ref 8–16)
AST SERPL-CCNC: 54 U/L (ref 10–40)
BASOPHILS # BLD AUTO: 0.01 K/UL (ref 0–0.2)
BASOPHILS NFR BLD: 0.1 % (ref 0–1.9)
BILIRUB SERPL-MCNC: 0.3 MG/DL (ref 0.1–1)
BUN SERPL-MCNC: 30 MG/DL (ref 6–20)
CALCIUM SERPL-MCNC: 6 MG/DL (ref 8.7–10.5)
CD3+CD4+ CELLS # BLD: 32 CELLS/UL (ref 300–1400)
CD3+CD4+ CELLS NFR BLD: 8.8 % (ref 28–57)
CHLORIDE SERPL-SCNC: 121 MMOL/L (ref 95–110)
CO2 SERPL-SCNC: 11 MMOL/L (ref 23–29)
CREAT SERPL-MCNC: 2.6 MG/DL (ref 0.5–1.4)
DELSYS: ABNORMAL
DELSYS: ABNORMAL
DIFFERENTIAL METHOD: ABNORMAL
EBV DNA CSF QL NAA+PROBE: NEGATIVE
EOSINOPHIL # BLD AUTO: 0 K/UL (ref 0–0.5)
EOSINOPHIL NFR BLD: 0.4 % (ref 0–8)
EP: 6
ERYTHROCYTE [DISTWIDTH] IN BLOOD BY AUTOMATED COUNT: 15.5 % (ref 11.5–14.5)
ERYTHROCYTE [SEDIMENTATION RATE] IN BLOOD BY WESTERGREN METHOD: 10 MM/H
EST. GFR  (AFRICAN AMERICAN): 27 ML/MIN/1.73 M^2
EST. GFR  (NON AFRICAN AMERICAN): 23 ML/MIN/1.73 M^2
EV RNA SPEC QL NAA+PROBE: NEGATIVE
FIO2: 40
FIO2: 40
FLOW: 5
GLUCOSE SERPL-MCNC: 84 MG/DL (ref 70–110)
HCO3 UR-SCNC: 10.1 MMOL/L (ref 24–28)
HCO3 UR-SCNC: 8.5 MMOL/L (ref 24–28)
HCT VFR BLD AUTO: 21.6 % (ref 37–48.5)
HGB BLD-MCNC: 7.3 G/DL (ref 12–16)
IP: 12
LYMPHOCYTES # BLD AUTO: 0.5 K/UL (ref 1–4.8)
LYMPHOCYTES NFR BLD: 5.5 % (ref 18–48)
M TB CMPLX DNA SPEC QL NAA+PROBE: NEGATIVE
MAGNESIUM SERPL-MCNC: 1.6 MG/DL (ref 1.6–2.6)
MCH RBC QN AUTO: 30.7 PG (ref 27–31)
MCHC RBC AUTO-ENTMCNC: 33.8 G/DL (ref 32–36)
MCV RBC AUTO: 91 FL (ref 82–98)
MODE: ABNORMAL
MODE: ABNORMAL
MONOCYTES # BLD AUTO: 0.1 K/UL (ref 0.3–1)
MONOCYTES NFR BLD: 1.5 % (ref 4–15)
NEUTROPHILS # BLD AUTO: 8.6 K/UL (ref 1.8–7.7)
NEUTROPHILS NFR BLD: 94.2 % (ref 38–73)
PCO2 BLDA: 21.5 MMHG (ref 35–45)
PCO2 BLDA: 23.9 MMHG (ref 35–45)
PH SMN: 7.2 [PH] (ref 7.35–7.45)
PH SMN: 7.24 [PH] (ref 7.35–7.45)
PLATELET # BLD AUTO: 95 K/UL (ref 150–350)
PMV BLD AUTO: 10.6 FL (ref 9.2–12.9)
PO2 BLDA: 61 MMHG (ref 80–100)
PO2 BLDA: 74 MMHG (ref 80–100)
POC BE: -17 MMOL/L
POC BE: -19 MMOL/L
POC SATURATED O2: 86 % (ref 95–100)
POC SATURATED O2: 92 % (ref 95–100)
POTASSIUM SERPL-SCNC: 4.1 MMOL/L (ref 3.5–5.1)
PROT SERPL-MCNC: 5.5 G/DL (ref 6–8.4)
RBC # BLD AUTO: 2.38 M/UL (ref 4–5.4)
SAMPLE: ABNORMAL
SAMPLE: ABNORMAL
SITE: ABNORMAL
SITE: ABNORMAL
SODIUM SERPL-SCNC: 141 MMOL/L (ref 136–145)
SODIUM UR-SCNC: 56 MMOL/L (ref 20–250)
SP02: 100
SPECIMEN SOURCE: NORMAL
SPONT RATE: 51
T GONDII DNA CSF QL NAA+PROBE: NEGATIVE
VARICELLA ZOSTER BY PCR RESULT: NEGATIVE
WBC # BLD AUTO: 9.27 K/UL (ref 3.9–12.7)
WNV RNA CSF QL NAA+PROBE: NEGATIVE

## 2019-06-06 PROCEDURE — 51702 INSERT TEMP BLADDER CATH: CPT

## 2019-06-06 PROCEDURE — 63600175 PHARM REV CODE 636 W HCPCS: Performed by: NURSE PRACTITIONER

## 2019-06-06 PROCEDURE — 94640 AIRWAY INHALATION TREATMENT: CPT

## 2019-06-06 PROCEDURE — 25000242 PHARM REV CODE 250 ALT 637 W/ HCPCS: Performed by: INTERNAL MEDICINE

## 2019-06-06 PROCEDURE — 20000000 HC ICU ROOM

## 2019-06-06 PROCEDURE — 99233 PR SUBSEQUENT HOSPITAL CARE,LEVL III: ICD-10-PCS | Mod: ,,, | Performed by: INTERNAL MEDICINE

## 2019-06-06 PROCEDURE — 82803 BLOOD GASES ANY COMBINATION: CPT

## 2019-06-06 PROCEDURE — 25000003 PHARM REV CODE 250: Performed by: PHYSICIAN ASSISTANT

## 2019-06-06 PROCEDURE — 63600175 PHARM REV CODE 636 W HCPCS: Performed by: INTERNAL MEDICINE

## 2019-06-06 PROCEDURE — 99233 SBSQ HOSP IP/OBS HIGH 50: CPT | Mod: ,,, | Performed by: INTERNAL MEDICINE

## 2019-06-06 PROCEDURE — 25000003 PHARM REV CODE 250: Performed by: INTERNAL MEDICINE

## 2019-06-06 PROCEDURE — 99291 CRITICAL CARE FIRST HOUR: CPT | Mod: ,,, | Performed by: INTERNAL MEDICINE

## 2019-06-06 PROCEDURE — 36600 WITHDRAWAL OF ARTERIAL BLOOD: CPT

## 2019-06-06 PROCEDURE — 99291 PR CRITICAL CARE, E/M 30-74 MINUTES: ICD-10-PCS | Mod: ,,, | Performed by: INTERNAL MEDICINE

## 2019-06-06 PROCEDURE — 25000003 PHARM REV CODE 250: Performed by: NURSE PRACTITIONER

## 2019-06-06 PROCEDURE — 25000003 PHARM REV CODE 250: Performed by: CLINIC/CENTER

## 2019-06-06 PROCEDURE — 27000221 HC OXYGEN, UP TO 24 HOURS

## 2019-06-06 PROCEDURE — 87116 MYCOBACTERIA CULTURE: CPT

## 2019-06-06 PROCEDURE — 83735 ASSAY OF MAGNESIUM: CPT

## 2019-06-06 PROCEDURE — S0077 INJECTION, CLINDAMYCIN PHOSP: HCPCS | Performed by: INTERNAL MEDICINE

## 2019-06-06 PROCEDURE — 94799 UNLISTED PULMONARY SVC/PX: CPT

## 2019-06-06 PROCEDURE — 99900035 HC TECH TIME PER 15 MIN (STAT)

## 2019-06-06 PROCEDURE — 27000190 HC CPAP FULL FACE MASK W/VALVE

## 2019-06-06 PROCEDURE — 84300 ASSAY OF URINE SODIUM: CPT

## 2019-06-06 PROCEDURE — 85025 COMPLETE CBC W/AUTO DIFF WBC: CPT

## 2019-06-06 PROCEDURE — 80053 COMPREHEN METABOLIC PANEL: CPT

## 2019-06-06 RX ORDER — CLINDAMYCIN PHOSPHATE 900 MG/50ML
900 INJECTION, SOLUTION INTRAVENOUS
Status: DISCONTINUED | OUTPATIENT
Start: 2019-06-06 | End: 2019-06-08

## 2019-06-06 RX ORDER — GUAIFENESIN 100 MG/5ML
200 SOLUTION ORAL EVERY 4 HOURS
Status: DISCONTINUED | OUTPATIENT
Start: 2019-06-06 | End: 2019-06-08

## 2019-06-06 RX ORDER — SODIUM BICARBONATE 650 MG/1
1300 TABLET ORAL 3 TIMES DAILY
Status: DISCONTINUED | OUTPATIENT
Start: 2019-06-06 | End: 2019-06-13 | Stop reason: HOSPADM

## 2019-06-06 RX ORDER — PRIMAQUINE PHOSPHATE 15 MG/1
52.6 TABLET ORAL DAILY
Status: DISCONTINUED | OUTPATIENT
Start: 2019-06-07 | End: 2019-06-06

## 2019-06-06 RX ORDER — LEVALBUTEROL INHALATION SOLUTION 0.63 MG/3ML
0.63 SOLUTION RESPIRATORY (INHALATION) EVERY 4 HOURS PRN
Status: DISCONTINUED | OUTPATIENT
Start: 2019-06-06 | End: 2019-06-13 | Stop reason: HOSPADM

## 2019-06-06 RX ORDER — METOPROLOL TARTRATE 1 MG/ML
5 INJECTION, SOLUTION INTRAVENOUS ONCE
Status: COMPLETED | OUTPATIENT
Start: 2019-06-06 | End: 2019-06-06

## 2019-06-06 RX ORDER — IPRATROPIUM BROMIDE AND ALBUTEROL SULFATE 2.5; .5 MG/3ML; MG/3ML
3 SOLUTION RESPIRATORY (INHALATION) EVERY 6 HOURS
Status: DISCONTINUED | OUTPATIENT
Start: 2019-06-06 | End: 2019-06-13 | Stop reason: HOSPADM

## 2019-06-06 RX ORDER — FUROSEMIDE 10 MG/ML
40 INJECTION INTRAMUSCULAR; INTRAVENOUS ONCE
Status: COMPLETED | OUTPATIENT
Start: 2019-06-06 | End: 2019-06-06

## 2019-06-06 RX ORDER — MAGNESIUM SULFATE HEPTAHYDRATE 40 MG/ML
2 INJECTION, SOLUTION INTRAVENOUS ONCE
Status: COMPLETED | OUTPATIENT
Start: 2019-06-06 | End: 2019-06-06

## 2019-06-06 RX ADMIN — VORICONAZOLE 200 MG: 40 POWDER, FOR SUSPENSION ORAL at 08:06

## 2019-06-06 RX ADMIN — Medication 2 SPRAY: at 08:06

## 2019-06-06 RX ADMIN — LACOSAMIDE 100 MG: 50 TABLET, FILM COATED ORAL at 09:06

## 2019-06-06 RX ADMIN — ACETAMINOPHEN 650 MG: 650 SOLUTION ORAL at 03:06

## 2019-06-06 RX ADMIN — LEVALBUTEROL 0.63 MG: 0.63 SOLUTION RESPIRATORY (INHALATION) at 01:06

## 2019-06-06 RX ADMIN — VORICONAZOLE 200 MG: 40 POWDER, FOR SUSPENSION ORAL at 09:06

## 2019-06-06 RX ADMIN — MAGNESIUM SULFATE IN WATER 2 G: 40 INJECTION, SOLUTION INTRAVENOUS at 10:06

## 2019-06-06 RX ADMIN — PIPERACILLIN SODIUM AND TAZOBACTAM SODIUM 4.5 G: 4; .5 INJECTION, POWDER, LYOPHILIZED, FOR SOLUTION INTRAVENOUS at 12:06

## 2019-06-06 RX ADMIN — IPRATROPIUM BROMIDE AND ALBUTEROL SULFATE 3 ML: .5; 3 SOLUTION RESPIRATORY (INHALATION) at 07:06

## 2019-06-06 RX ADMIN — ACETAMINOPHEN 650 MG: 650 SOLUTION ORAL at 06:06

## 2019-06-06 RX ADMIN — AZITHROMYCIN MONOHYDRATE 500 MG: 500 INJECTION, POWDER, LYOPHILIZED, FOR SOLUTION INTRAVENOUS at 04:06

## 2019-06-06 RX ADMIN — PIPERACILLIN SODIUM AND TAZOBACTAM SODIUM 4.5 G: 4; .5 INJECTION, POWDER, LYOPHILIZED, FOR SOLUTION INTRAVENOUS at 06:06

## 2019-06-06 RX ADMIN — CALCIUM GLUCONATE 1000 MG: 94 INJECTION, SOLUTION INTRAVENOUS at 09:06

## 2019-06-06 RX ADMIN — SODIUM CHLORIDE: 0.9 INJECTION, SOLUTION INTRAVENOUS at 02:06

## 2019-06-06 RX ADMIN — FUROSEMIDE 40 MG: 10 INJECTION, SOLUTION INTRAMUSCULAR; INTRAVENOUS at 10:06

## 2019-06-06 RX ADMIN — VANCOMYCIN HYDROCHLORIDE 500 MG: 500 INJECTION, POWDER, LYOPHILIZED, FOR SOLUTION INTRAVENOUS at 02:06

## 2019-06-06 RX ADMIN — ENOXAPARIN SODIUM 30 MG: 100 INJECTION SUBCUTANEOUS at 05:06

## 2019-06-06 RX ADMIN — PANTOPRAZOLE SODIUM 40 MG: 40 TABLET, DELAYED RELEASE ORAL at 09:06

## 2019-06-06 RX ADMIN — GUAIFENESIN 200 MG: 200 SOLUTION ORAL at 08:06

## 2019-06-06 RX ADMIN — Medication 2 SPRAY: at 09:06

## 2019-06-06 RX ADMIN — LEVALBUTEROL 0.63 MG: 0.63 SOLUTION RESPIRATORY (INHALATION) at 05:06

## 2019-06-06 RX ADMIN — CLINDAMYCIN IN 5 PERCENT DEXTROSE 900 MG: 18 INJECTION, SOLUTION INTRAVENOUS at 09:06

## 2019-06-06 RX ADMIN — ELVITEGRAVIR, COBICISTAT, EMTRICITABINE, AND TENOFOVIR DISOPROXIL FUMARATE 1 TABLET: 150; 150; 200; 300 TABLET, FILM COATED ORAL at 09:06

## 2019-06-06 RX ADMIN — METOPROLOL TARTRATE 5 MG: 5 INJECTION, SOLUTION INTRAVENOUS at 12:06

## 2019-06-06 RX ADMIN — SODIUM BICARBONATE 650 MG TABLET 1300 MG: at 03:06

## 2019-06-06 RX ADMIN — SODIUM ACETATE: 164 INJECTION, SOLUTION, CONCENTRATE INTRAVENOUS at 03:06

## 2019-06-06 RX ADMIN — SODIUM BICARBONATE 650 MG TABLET 1300 MG: at 08:06

## 2019-06-06 RX ADMIN — GUAIFENESIN 600 MG: 600 TABLET, EXTENDED RELEASE ORAL at 09:06

## 2019-06-06 RX ADMIN — PIPERACILLIN SODIUM AND TAZOBACTAM SODIUM 4.5 G: 4; .5 INJECTION, POWDER, LYOPHILIZED, FOR SOLUTION INTRAVENOUS at 10:06

## 2019-06-06 RX ADMIN — METHYLPREDNISOLONE SODIUM SUCCINATE 40 MG: 40 INJECTION, POWDER, FOR SOLUTION INTRAMUSCULAR; INTRAVENOUS at 08:06

## 2019-06-06 NOTE — EICU
eICU Note :    Called by the [facility] eRN:    Problem: Tachycardia persistent, received Lopressor earlier    Pertinent History and labs reviewed :  Problem List:  2019-06: Acute sinusitis    Treatment /Intervention given:Lopressor 5 mg IVP x1        Ashley Spears M.D  eICCATHI Physician  5:32 AM  Shortness of breath and wheezing  ,started on Xopenex 0.63 q6

## 2019-06-06 NOTE — PLAN OF CARE
Problem: Adult Inpatient Plan of Care  Goal: Plan of Care Review  Outcome: Ongoing (interventions implemented as appropriate)  Pt back and forth today from NC to BiPap; RR 30s-50s; Ca 6.0; admin 1 gm CaGluconate; inserted navarrete with permission of pt, as she was urinating on self from inability to hold it very long; due to pt's SOB, started on breathing txs; nephrology consulted; Dr. Monterroso saw pt; IVF changed to Sodium Acetate; pt temp @ 1550 101.0; admin tylenol 650mg PO; pt expressed fright this afternoon, 2nd to SOB and decreased kidney function; this morning pt got OOB while sister was visiting; pt educated on importance of staying in bed and calling for assistance, as she is weak and could possibly fall; pt moved from room 13 to room 4 for better visibility of pt; pt now NPO pending swallow eval.

## 2019-06-06 NOTE — PROGRESS NOTES
Pharmacokinetic Assessment Follow Up: IV Vancomycin    Switching patient to pulse dosing based off of declining renal function and concomitant zosyn.     Vancomycin Regimen Plan:    Re-dose when the random level is less than 20 mcg/mL, next level to be drawn at 0230 on 06/7/2019     Pharmacy will continue to follow and monitor vancomycin.    Please contact pharmacy at extension [phone] for questions regarding this assessment.    Thank you for the consult,   DELL HASTINGS     Patient brief summary:  Wang Kebede is a 33 y.o. female initiated on antimicrobial therapy with IV Vancomycin for treatment of suspected lower respiratory infection    The patient received a loading dose, followed by the current treatment regimen: random levels with plan to redose when level is less than 20 mcg/mL    Drug Allergies:   Review of patient's allergies indicates:   Allergen Reactions    Iodine and iodide containing products Anaphylaxis     Pt states she coded last time she was administered contrast    Pneumococcal 23-chitra ps vaccine Anaphylaxis     Potential iodine containing    Dilaudid [hydromorphone] Hives and Itching    Bactrim [sulfamethoxazole-trimethoprim] Hives    Morphine Itching     Tolerates norco 2018       Actual Body Weight:   52.2kg    Renal Function:   Estimated Creatinine Clearance: 21.9 mL/min (A) (based on SCr of 2.6 mg/dL (H)).,     Dialysis Method (if applicable):  N/A     CBC (last 72 hours):  Recent Labs   Lab Result Units 06/04/19  0627 06/05/19  0344 06/06/19  0600   WBC K/uL 9.63 5.79 9.27   Hemoglobin g/dL 7.3* 7.4* 7.3*   Hematocrit % 22.2* 22.4* 21.6*   Platelets K/uL 129* 117* 95*   Gran% % 96.1* 86.2* 94.2*   Lymph% % 2.3* 11.4* 5.5*   Mono% % 1.5* 1.7* 1.5*   Eosinophil% % 0.5 1.2 0.4   Basophil% % 0.0 0.0 0.1   Differential Method  Automated Automated Automated       Metabolic Panel (last 72 hours):  Recent Labs   Lab Result Units 06/04/19  0627 06/05/19  0344 06/05/19  1416  06/06/19  0600   Sodium mmol/L 137 137 135* 141   Potassium mmol/L 3.8 3.9 4.0 4.1   Chloride mmol/L 116* 117* 116* 121*   CO2 mmol/L 12* 13* 11* 11*   Glucose mg/dL 155* 111* 163* 84   BUN, Bld mg/dL 31* 29* 26* 30*   Creatinine mg/dL 2.2* 2.1* 2.2* 2.6*   Albumin g/dL  --  1.8*  --  1.4*   Total Bilirubin mg/dL  --  0.2  --  0.3   Alkaline Phosphatase U/L  --  51*  --  58   AST U/L  --  32  --  54*   ALT U/L  --  13  --  19   Magnesium mg/dL 1.0* 1.7  --  1.6   Phosphorus mg/dL 2.6*  --   --   --        Vancomycin Administrations:  vancomycin given in the last 96 hours                     vancomycin 500 mg in dextrose 5 % 100 mL IVPB (ready to mix system) (mg) 500 mg New Bag 06/06/19 0246    vancomycin 1.5 g in 5 % dextrose 250 mL IVPB (mg) 1,500 mg New Bag 06/05/19 0259                      Drug levels (last 3 results):  No results for input(s): VANCOMYCINRA, VANCOMYCINPE, VANCOMYCINTR, VANCOTROUGH in the last 72 hours.    Microbiologic Results:  Microbiology Results (last 7 days)       Procedure Component Value Units Date/Time    AFB Culture & Smear [[id number]] Collected:  06/06/19 0425    Order Status:  Sent Specimen:  Blood Updated:  06/06/19 1004    CSF culture and Gram Stain (Tube 2) [[id number]] Collected:  06/03/19 1353    Order Status:  Completed Specimen:  CSF (Spinal Fluid) from CSF Tap, Tube 2 Updated:  06/06/19 0726     CSF CULTURE No Growth to date     Gram Stain Result Cytospin indicates:      No organisms seen      No WBC's    Narrative:       On which sequentially labeled tube should this analysis be  performed?->2    Blood culture #1 **CANNOT BE ORDERED STAT** [[id number]] Collected:  06/03/19 1500    Order Status:  Completed Specimen:  Blood from Peripheral, Forearm, Left Updated:  06/05/19 2212     Blood Culture, Routine No Growth to date     Blood Culture, Routine No Growth to date     Blood Culture, Routine No Growth to date    Blood culture #2 **CANNOT BE ORDERED STAT** [[id number]] Collected:   06/03/19 1530    Order Status:  Completed Specimen:  Blood from Peripheral, Wrist, Right Updated:  06/05/19 2212     Blood Culture, Routine No Growth to date     Blood Culture, Routine No Growth to date     Blood Culture, Routine No Growth to date    Clostridium difficile EIA [[id number]]     Order Status:  Canceled Specimen:  Stool     Fungus Culture, Blood or Bone Marrow [[id number]] Collected:  06/05/19 0344    Order Status:  Sent Specimen:  Blood Updated:  06/05/19 0930    AFB Culture & Smear (Tube 3) [[id number]] Collected:  06/03/19 1353    Order Status:  Completed Specimen:  CSF (Spinal Fluid) from CSF Tap, Tube 2 Updated:  06/05/19 0927     AFB Culture & Smear Culture in progress     AFB CULTURE STAIN No acid fast bacilli seen.    AFB Culture & Smear [[id number]]     Order Status:  Sent Specimen:  Blood     Cryptococcal antigen, CSF (Tube 3) [[id number]] Collected:  06/03/19 1353    Order Status:  Completed Specimen:  CSF (Spinal Fluid) from CSF Tap, Tube 2 Updated:  06/04/19 1106     Crypto Ag, CSF Negative    Narrative:       On which sequentially labeled tube should this analysis be  performed?->3    Fungus culture (Tube 3) [[id number]] Collected:  06/03/19 1353    Order Status:  Completed Specimen:  CSF (Spinal Fluid) from CSF Tap, Tube 2 Updated:  06/04/19 1013     Fungus (Mycology) Culture Culture in progress

## 2019-06-06 NOTE — PROGRESS NOTES
0418: Pt c/o SOB, O2 sat 87-89%; placed on 2L O2 and sat up to 97%.    0520: Pt tachypneic, RR 30s-40s, c/o SOB, cough, feelings of congestion. O2 sat 90%. Noted lung bases now sound diminished, no crackles. eICU Dr Jamil notified. New orders for breathing tx. Will continue to monitor.

## 2019-06-06 NOTE — NURSING
Pt refusing to eat breakfast, states its too hard; requested grits; this RN called dietary and requested grits be brought to pt.

## 2019-06-06 NOTE — PROGRESS NOTES
Patient found off NIV mask; Patient states she took off mask to spit. Patient placed on NC 5L. Patient wishes to remain off of NIV mask at this time.

## 2019-06-06 NOTE — PROGRESS NOTES
"[facility]  Infectious Disease  Progress Note    Patient Name: Wang Kebede  MRN: [id number]  Admission Date: 6/3/2019  Length of Stay: 2 days  Attending Physician: Gerardo Mccauley MD  Primary Care Provider: Levi Moncada MD    Isolation Status: No active isolations  Assessment/Plan:      * Severe sepsis  06/05- she remains  Febrile..  Will send fungal, bacterial and mycobacterial cultures .  The fever can also be from AIDS itself.  Will do Pan Ct scan of the chest, abdomen and pelvis.  Will continue Vanco,zosyn, Zithromax  Add mepron for PCP      AIDS (acquired immunodeficiency syndrome), CD4 <=200  06/04- she has advanced AIDS -will continue Striblid.    I asked her-"what can we do to make you take the medications "  Her response- I am trying to do that .  She is not trying well enough .  She has very poor immunologic control.  The risk of death was carefully explained to her.      HIV (human immunodeficiency virus infection)  06/04- will continue Striblid   Monitor closely         Anticipated Disposition:     Thank you for your consult. I will follow-up with patient. Please contact us if you have any additional questions.   Late entry note    Hubert Mayo MD  Infectious Disease  [facility]    Subjective:     Principal Problem:Severe sepsis    HPI: No notes on file  Interval History: 33 year old woman with AIDS ,septic shock and persistent high grade fever .  All cultures are negative til date     Review of Systems   Constitutional: Positive for activity change and fever. Negative for chills, diaphoresis and fatigue.   HENT: Positive for congestion and sinus pressure. Negative for sore throat and voice change.    Eyes: Negative for photophobia and visual disturbance.   Respiratory: Negative for cough, shortness of breath, wheezing and stridor.    Cardiovascular: Negative for chest pain and leg swelling.   Gastrointestinal: Negative for abdominal distention, " abdominal pain, constipation, diarrhea, nausea and vomiting.   Endocrine: Negative for polydipsia, polyphagia and polyuria.   Genitourinary: Negative for difficulty urinating, dysuria, flank pain, pelvic pain, urgency and vaginal discharge.   Musculoskeletal: Negative for back pain, joint swelling, neck pain and neck stiffness.   Skin: Negative for color change and rash.   Allergic/Immunologic: Negative for immunocompromised state.   Neurological: Positive for headaches. Negative for dizziness, syncope, weakness and numbness.   Hematological: Does not bruise/bleed easily.   Psychiatric/Behavioral: Negative for agitation, behavioral problems and confusion.     Objective:     Vital Signs (Most Recent):  Temp: 99.6 °F (37.6 °C)(cooling blanket turned off, pt refusing) (06/06/19 0615)  Pulse: (!) 132 (06/06/19 0700)  Resp: (!) 30 (06/06/19 0700)  BP: 122/65 (06/06/19 0630)  SpO2: (!) 92 % (06/06/19 0700) Vital Signs (24h Range):  Temp:  [98.6 °F (37 °C)-104 °F (40 °C)] 99.6 °F (37.6 °C)  Pulse:  [117-155] 132  Resp:  [20-49] 30  SpO2:  [15 %-100 %] 92 %  BP: ()/() 122/65     Weight: 52.2 kg (115 lb 1.3 oz)  Body mass index is 22.48 kg/m².    Estimated Creatinine Clearance: 25.9 mL/min (A) (based on SCr of 2.2 mg/dL (H)).    Physical Exam   Constitutional: She is oriented to person, place, and time. She appears well-developed and well-nourished. No distress.   Acutely ill-appearing female   HENT:   Head: Normocephalic and atraumatic.   Nose: Nose normal.   Positive frontal and maxillary sinus tenderness   Eyes: Pupils are equal, round, and reactive to light. Conjunctivae and EOM are normal. No scleral icterus.   Neck: Normal range of motion. Neck supple. No tracheal deviation present.   Cardiovascular: Regular rhythm, normal heart sounds and intact distal pulses.   No murmur heard.  Tachycardia   Pulmonary/Chest: Effort normal and breath sounds normal. No stridor. No respiratory distress. She has no wheezes.  She has no rales.   Abdominal: Soft. Bowel sounds are normal. She exhibits no distension. There is no tenderness. There is no guarding.   Genitourinary:   Genitourinary Comments: -   Musculoskeletal: Normal range of motion. She exhibits no edema or deformity.   Neurological: She is alert and oriented to person, place, and time. No cranial nerve deficit.   Skin: Skin is warm and dry. Capillary refill takes less than 2 seconds. No rash noted. She is not diaphoretic.   Psychiatric: She has a normal mood and affect. Her behavior is normal. Judgment and thought content normal.   Nursing note and vitals reviewed.      Significant Labs:   Blood Culture:   Recent Labs   Lab 12/25/18  0039 01/24/19  0000 01/24/19  0015 06/03/19  1500 06/03/19  1530   LABBLOO No growth after 5 days. No growth after 5 days. No growth after 5 days. No Growth to date  No Growth to date  No Growth to date No Growth to date  No Growth to date  No Growth to date     BMP:   Recent Labs   Lab 06/05/19  0344 06/05/19  1416   * 163*    135*   K 3.9 4.0   * 116*   CO2 13* 11*   BUN 29* 26*   CREATININE 2.1* 2.2*   CALCIUM 6.3* 6.0*   MG 1.7  --      CBC:   Recent Labs   Lab 06/05/19  0344   WBC 5.79   HGB 7.4*   HCT 22.4*   *     Wound Culture: No results for input(s): LABAERO in the last 4320 hours.  All pertinent labs within the past 24 hours have been reviewed.    Significant Imaging: I have reviewed all pertinent imaging results/findings within the past 24 hours.

## 2019-06-06 NOTE — ASSESSMENT & PLAN NOTE
"06/04- she has advanced AIDS -will continue Striblid.    I asked her-"what can we do to make you take the medications "  Her response- I am trying to do that .  She is not trying well enough .  She has very poor immunologic control.  The risk of death was carefully explained to her.    "

## 2019-06-06 NOTE — PROGRESS NOTES
Upon entering room patient off of NIV mask and NC on floor. Patient refusing to wean O2 and NIV mask. Patient educated on the use of O2 and NIV mask with family at bs. Patient placed back on NC 5L. Will continue to monitor.
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
## 2019-06-06 NOTE — PROGRESS NOTES
Patient breathing RR 30s with complaints of sob; O2 sats 87-97% on 5L NC. RN Tarsha at bs. ABG drawn and results shown to NP. Pt placed on NIV BIPAP for increase wob at this time.

## 2019-06-06 NOTE — ASSESSMENT & PLAN NOTE
1. LELAND on CKD stage 3 :  Baseline creatinine about 1.5 mg/dL, creatinine increased to 2.6 today, can be due to hemodynamic reasons due to low blood pressure.  Will monitor renal function, no acute indication for renal replacement therapy, low urine output noted, check urine sodium, continue gentle hydration at this time, will continue to monitor urine output closely, discussed with patient at the bedside, if renal function deteriorates may need renal replacement therapy,    2.  Metabolic acidosis - respiratory compensation noted, likely she has shortness of breath secondary to severe metabolic acidosis, start sodium bicarbonate supplements,    3.  Hypotension - continue gentle hydration, consider pressor support if required,    4.  Possible pneumonia/sepsis - currently on broad-spectrum antibiotics, closely followed by critical care medicine,    5.  HIV/aids - poor compliance with meds prior to admission to the hospital    6.  Guarded prognosis.

## 2019-06-06 NOTE — HPI
Wang Kebede is a 33-year-old  woman with history of HIV/aids, noncompliance with treatment, vulvar carcinoma, sickle cell disease, was admitted to the hospital about 2 days ago for some shortness of breath and cough.  Her serum creatinine gradually worsened from about 1.5 mg/dL to 2.6 mg/dL today, also his urine output has dropped, also severe metabolic acidosis noted on her labs, we were consulted for acute on chronic kidney failure and metabolic acidosis.

## 2019-06-06 NOTE — ASSESSMENT & PLAN NOTE
06/05- she remains  Febrile..  Will send fungal, bacterial and mycobacterial cultures .  The fever can also be from AIDS itself.  Will do Pan Ct scan of the chest, abdomen and pelvis.  Will continue Vanco,zosyn, Zithromax  Add mepron for PCP

## 2019-06-06 NOTE — PLAN OF CARE
Problem: Adult Inpatient Plan of Care  Goal: Plan of Care Review  Outcome: Ongoing (interventions implemented as appropriate)  POC discussed w/patient, verbalized understanding. ST 120s-130s; 1 time 5mg metoprolol IVP given for HR 140s w/good results. Tmax 102.1F; Tylenol given x2 this shift; pt on cooling blanket most of shift, turned off this AM when pt reached 98.6. CTs of chest/abd/pelvis completed. Diarrhea slowing down; less watery with more mucus. IVF and IV abx as ordered. Pt SOB this AM, placed on 2L O2 via NC and breathing tx started. Patient turns independently in bed. Fall precautions in place, bed alarm on.
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
## 2019-06-06 NOTE — SUBJECTIVE & OBJECTIVE
Interval History: 33 year old woman with AIDS ,septic shock and persistent high grade fever .  All cultures are negative til date     Review of Systems   Constitutional: Positive for activity change and fever. Negative for chills, diaphoresis and fatigue.   HENT: Positive for congestion and sinus pressure. Negative for sore throat and voice change.    Eyes: Negative for photophobia and visual disturbance.   Respiratory: Negative for cough, shortness of breath, wheezing and stridor.    Cardiovascular: Negative for chest pain and leg swelling.   Gastrointestinal: Negative for abdominal distention, abdominal pain, constipation, diarrhea, nausea and vomiting.   Endocrine: Negative for polydipsia, polyphagia and polyuria.   Genitourinary: Negative for difficulty urinating, dysuria, flank pain, pelvic pain, urgency and vaginal discharge.   Musculoskeletal: Negative for back pain, joint swelling, neck pain and neck stiffness.   Skin: Negative for color change and rash.   Allergic/Immunologic: Negative for immunocompromised state.   Neurological: Positive for headaches. Negative for dizziness, syncope, weakness and numbness.   Hematological: Does not bruise/bleed easily.   Psychiatric/Behavioral: Negative for agitation, behavioral problems and confusion.     Objective:     Vital Signs (Most Recent):  Temp: 99.6 °F (37.6 °C)(cooling blanket turned off, pt refusing) (06/06/19 0615)  Pulse: (!) 132 (06/06/19 0700)  Resp: (!) 30 (06/06/19 0700)  BP: 122/65 (06/06/19 0630)  SpO2: (!) 92 % (06/06/19 0700) Vital Signs (24h Range):  Temp:  [98.6 °F (37 °C)-104 °F (40 °C)] 99.6 °F (37.6 °C)  Pulse:  [117-155] 132  Resp:  [20-49] 30  SpO2:  [15 %-100 %] 92 %  BP: ()/() 122/65     Weight: 52.2 kg (115 lb 1.3 oz)  Body mass index is 22.48 kg/m².    Estimated Creatinine Clearance: 25.9 mL/min (A) (based on SCr of 2.2 mg/dL (H)).    Physical Exam   Constitutional: She is oriented to person, place, and time. She appears  well-developed and well-nourished. No distress.   Acutely ill-appearing female   HENT:   Head: Normocephalic and atraumatic.   Nose: Nose normal.   Positive frontal and maxillary sinus tenderness   Eyes: Pupils are equal, round, and reactive to light. Conjunctivae and EOM are normal. No scleral icterus.   Neck: Normal range of motion. Neck supple. No tracheal deviation present.   Cardiovascular: Regular rhythm, normal heart sounds and intact distal pulses.   No murmur heard.  Tachycardia   Pulmonary/Chest: Effort normal and breath sounds normal. No stridor. No respiratory distress. She has no wheezes. She has no rales.   Abdominal: Soft. Bowel sounds are normal. She exhibits no distension. There is no tenderness. There is no guarding.   Genitourinary:   Genitourinary Comments: -   Musculoskeletal: Normal range of motion. She exhibits no edema or deformity.   Neurological: She is alert and oriented to person, place, and time. No cranial nerve deficit.   Skin: Skin is warm and dry. Capillary refill takes less than 2 seconds. No rash noted. She is not diaphoretic.   Psychiatric: She has a normal mood and affect. Her behavior is normal. Judgment and thought content normal.   Nursing note and vitals reviewed.      Significant Labs:   Blood Culture:   Recent Labs   Lab 12/25/18  0039 01/24/19  0000 01/24/19  0015 06/03/19  1500 06/03/19  1530   LABBLOO No growth after 5 days. No growth after 5 days. No growth after 5 days. No Growth to date  No Growth to date  No Growth to date No Growth to date  No Growth to date  No Growth to date     BMP:   Recent Labs   Lab 06/05/19  0344 06/05/19  1416   * 163*    135*   K 3.9 4.0   * 116*   CO2 13* 11*   BUN 29* 26*   CREATININE 2.1* 2.2*   CALCIUM 6.3* 6.0*   MG 1.7  --      CBC:   Recent Labs   Lab 06/05/19  0344   WBC 5.79   HGB 7.4*   HCT 22.4*   *     Wound Culture: No results for input(s): LABAERO in the last 4320 hours.  All pertinent labs within  the past 24 hours have been reviewed.    Significant Imaging: I have reviewed all pertinent imaging results/findings within the past 24 hours.

## 2019-06-06 NOTE — PROGRESS NOTES
eICU notified of HR now in 140s. Prev 120s-130s. Pt given Tylenol for temp 102F @2300, however now still 101.9. Pt on cooling blanket. /51. Advised MD pt given 5mg metoprolol during day shift w/good results. 1 time dose of 5mg metoprolol IVP ordered. Will continue to monitor.
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
## 2019-06-07 LAB
1,3 BETA GLUCAN SPEC-MCNC: <31 PG/ML
ALBUMIN SERPL BCP-MCNC: 1.2 G/DL (ref 3.5–5.2)
ALBUMIN SERPL BCP-MCNC: 1.2 G/DL (ref 3.5–5.2)
ALLENS TEST: ABNORMAL
ALP SERPL-CCNC: 70 U/L (ref 55–135)
ALT SERPL W/O P-5'-P-CCNC: 17 U/L (ref 10–44)
ANION GAP SERPL CALC-SCNC: 12 MMOL/L (ref 8–16)
ANION GAP SERPL CALC-SCNC: 12 MMOL/L (ref 8–16)
ANISOCYTOSIS BLD QL SMEAR: SLIGHT
AST SERPL-CCNC: 40 U/L (ref 10–40)
BASOPHILS # BLD AUTO: 0.01 K/UL (ref 0–0.2)
BASOPHILS NFR BLD: 0.2 % (ref 0–1.9)
BILIRUB SERPL-MCNC: 0.3 MG/DL (ref 0.1–1)
BUN SERPL-MCNC: 40 MG/DL (ref 6–20)
BUN SERPL-MCNC: 40 MG/DL (ref 6–20)
CALCIUM SERPL-MCNC: 5.8 MG/DL (ref 8.7–10.5)
CALCIUM SERPL-MCNC: 5.8 MG/DL (ref 8.7–10.5)
CHLORIDE SERPL-SCNC: 118 MMOL/L (ref 95–110)
CHLORIDE SERPL-SCNC: 118 MMOL/L (ref 95–110)
CO2 SERPL-SCNC: 12 MMOL/L (ref 23–29)
CO2 SERPL-SCNC: 12 MMOL/L (ref 23–29)
CREAT SERPL-MCNC: 3.2 MG/DL (ref 0.5–1.4)
CREAT SERPL-MCNC: 3.2 MG/DL (ref 0.5–1.4)
DACRYOCYTES BLD QL SMEAR: ABNORMAL
DELSYS: ABNORMAL
DIFFERENTIAL METHOD: ABNORMAL
EOSINOPHIL # BLD AUTO: 0 K/UL (ref 0–0.5)
EOSINOPHIL NFR BLD: 0.2 % (ref 0–8)
ERYTHROCYTE [DISTWIDTH] IN BLOOD BY AUTOMATED COUNT: 15.3 % (ref 11.5–14.5)
ERYTHROCYTE [SEDIMENTATION RATE] IN BLOOD BY WESTERGREN METHOD: 28 MM/H
EST. GFR  (AFRICAN AMERICAN): 21 ML/MIN/1.73 M^2
EST. GFR  (AFRICAN AMERICAN): 21 ML/MIN/1.73 M^2
EST. GFR  (NON AFRICAN AMERICAN): 18 ML/MIN/1.73 M^2
EST. GFR  (NON AFRICAN AMERICAN): 18 ML/MIN/1.73 M^2
FIO2: 75
GLUCOSE SERPL-MCNC: 151 MG/DL (ref 70–110)
GLUCOSE SERPL-MCNC: 151 MG/DL (ref 70–110)
HCO3 UR-SCNC: 15.8 MMOL/L (ref 24–28)
HCT VFR BLD AUTO: 20.2 % (ref 37–48.5)
HGB BLD-MCNC: 7 G/DL (ref 12–16)
HISTOPLASMA AG VALUE: 0 NG/ML
HISTOPLASMA AG VALUE: 0 NG/ML
HISTOPLASMA ANTIGEN URINE: NEGATIVE
HISTOPLASMA ANTIGEN URINE: NEGATIVE
HYPOCHROMIA BLD QL SMEAR: ABNORMAL
LYMPHOCYTES # BLD AUTO: 1.1 K/UL (ref 1–4.8)
LYMPHOCYTES NFR BLD: 16.1 % (ref 18–48)
MAGNESIUM SERPL-MCNC: 2.1 MG/DL (ref 1.6–2.6)
MCH RBC QN AUTO: 30.7 PG (ref 27–31)
MCHC RBC AUTO-ENTMCNC: 34.7 G/DL (ref 32–36)
MCV RBC AUTO: 89 FL (ref 82–98)
MODE: ABNORMAL
MONOCYTES # BLD AUTO: 0.1 K/UL (ref 0.3–1)
MONOCYTES NFR BLD: 0.9 % (ref 4–15)
NEUTROPHILS # BLD AUTO: 5.5 K/UL (ref 1.8–7.7)
NEUTROPHILS NFR BLD: 84 % (ref 38–73)
PCO2 BLDA: 30 MMHG (ref 35–45)
PEEP: 5
PH SMN: 7.33 [PH] (ref 7.35–7.45)
PHOSPHATE SERPL-MCNC: 4.2 MG/DL (ref 2.7–4.5)
PLATELET # BLD AUTO: 77 K/UL (ref 150–350)
PLATELET BLD QL SMEAR: ABNORMAL
PMV BLD AUTO: 10.7 FL (ref 9.2–12.9)
PO2 BLDA: 61 MMHG (ref 80–100)
POC BE: -10 MMOL/L
POC SATURATED O2: 90 % (ref 95–100)
POCT GLUCOSE: 169 MG/DL (ref 70–110)
POIKILOCYTOSIS BLD QL SMEAR: SLIGHT
POTASSIUM SERPL-SCNC: 3.4 MMOL/L (ref 3.5–5.1)
POTASSIUM SERPL-SCNC: 3.4 MMOL/L (ref 3.5–5.1)
PROT SERPL-MCNC: 5.1 G/DL (ref 6–8.4)
RBC # BLD AUTO: 2.28 M/UL (ref 4–5.4)
SAMPLE: ABNORMAL
SITE: ABNORMAL
SODIUM SERPL-SCNC: 142 MMOL/L (ref 136–145)
SODIUM SERPL-SCNC: 142 MMOL/L (ref 136–145)
SPHEROCYTES BLD QL SMEAR: ABNORMAL
VANCOMYCIN TROUGH SERPL-MCNC: 25.4 UG/ML (ref 10–22)
VT: 340
WBC # BLD AUTO: 6.66 K/UL (ref 3.9–12.7)

## 2019-06-07 PROCEDURE — 99291 PR CRITICAL CARE, E/M 30-74 MINUTES: ICD-10-PCS | Mod: ,,, | Performed by: INTERNAL MEDICINE

## 2019-06-07 PROCEDURE — 25000242 PHARM REV CODE 250 ALT 637 W/ HCPCS: Performed by: INTERNAL MEDICINE

## 2019-06-07 PROCEDURE — 99232 PR SUBSEQUENT HOSPITAL CARE,LEVL II: ICD-10-PCS | Mod: ,,, | Performed by: INTERNAL MEDICINE

## 2019-06-07 PROCEDURE — 31500 INSERT EMERGENCY AIRWAY: CPT | Mod: ,,, | Performed by: NURSE PRACTITIONER

## 2019-06-07 PROCEDURE — 31500 PR INSERT, EMERGENCY ENDOTRACH AIRWAY: ICD-10-PCS | Mod: ,,, | Performed by: NURSE PRACTITIONER

## 2019-06-07 PROCEDURE — 80053 COMPREHEN METABOLIC PANEL: CPT

## 2019-06-07 PROCEDURE — 94660 CPAP INITIATION&MGMT: CPT

## 2019-06-07 PROCEDURE — 94002 VENT MGMT INPAT INIT DAY: CPT

## 2019-06-07 PROCEDURE — 92610 EVALUATE SWALLOWING FUNCTION: CPT

## 2019-06-07 PROCEDURE — 25000003 PHARM REV CODE 250

## 2019-06-07 PROCEDURE — 25000003 PHARM REV CODE 250: Performed by: INTERNAL MEDICINE

## 2019-06-07 PROCEDURE — 99232 SBSQ HOSP IP/OBS MODERATE 35: CPT | Mod: ,,, | Performed by: INTERNAL MEDICINE

## 2019-06-07 PROCEDURE — 80069 RENAL FUNCTION PANEL: CPT

## 2019-06-07 PROCEDURE — 27100171 HC OXYGEN HIGH FLOW UP TO 24 HOURS

## 2019-06-07 PROCEDURE — 87901 NFCT AGT GNTYP ALYS HIV1 REV: CPT

## 2019-06-07 PROCEDURE — 82803 BLOOD GASES ANY COMBINATION: CPT

## 2019-06-07 PROCEDURE — 36600 WITHDRAWAL OF ARTERIAL BLOOD: CPT

## 2019-06-07 PROCEDURE — 63600175 PHARM REV CODE 636 W HCPCS: Performed by: INTERNAL MEDICINE

## 2019-06-07 PROCEDURE — 25000003 PHARM REV CODE 250: Performed by: NURSE PRACTITIONER

## 2019-06-07 PROCEDURE — 94640 AIRWAY INHALATION TREATMENT: CPT

## 2019-06-07 PROCEDURE — 83735 ASSAY OF MAGNESIUM: CPT

## 2019-06-07 PROCEDURE — S0080 INJECTION, PENTAMIDINE ISETH: HCPCS | Performed by: INTERNAL MEDICINE

## 2019-06-07 PROCEDURE — 85025 COMPLETE CBC W/AUTO DIFF WBC: CPT

## 2019-06-07 PROCEDURE — 99291 CRITICAL CARE FIRST HOUR: CPT | Mod: ,,, | Performed by: INTERNAL MEDICINE

## 2019-06-07 PROCEDURE — 63600175 PHARM REV CODE 636 W HCPCS

## 2019-06-07 PROCEDURE — 27100092 HC HIGH FLOW DELIVERY CANNULA

## 2019-06-07 PROCEDURE — S0077 INJECTION, CLINDAMYCIN PHOSP: HCPCS | Performed by: INTERNAL MEDICINE

## 2019-06-07 PROCEDURE — 63600175 PHARM REV CODE 636 W HCPCS: Performed by: NURSE PRACTITIONER

## 2019-06-07 PROCEDURE — 20000000 HC ICU ROOM

## 2019-06-07 PROCEDURE — 80202 ASSAY OF VANCOMYCIN: CPT

## 2019-06-07 PROCEDURE — 99900035 HC TECH TIME PER 15 MIN (STAT)

## 2019-06-07 RX ORDER — LAMIVUDINE 10 MG/ML
100 SOLUTION ORAL ONCE
Status: DISCONTINUED | OUTPATIENT
Start: 2019-06-07 | End: 2019-06-07

## 2019-06-07 RX ORDER — FENTANYL CITRAT/DEXTROSE 5%/PF 100 MCG/10
PATIENT CONTROLLED ANALGESIA SYRINGE INTRAVENOUS CONTINUOUS
Status: DISCONTINUED | OUTPATIENT
Start: 2019-06-07 | End: 2019-06-11

## 2019-06-07 RX ORDER — LAMIVUDINE 10 MG/ML
100 SOLUTION ORAL DAILY
Status: DISCONTINUED | OUTPATIENT
Start: 2019-06-08 | End: 2019-06-13 | Stop reason: HOSPADM

## 2019-06-07 RX ORDER — DEXMEDETOMIDINE HYDROCHLORIDE 4 UG/ML
0.2 INJECTION, SOLUTION INTRAVENOUS CONTINUOUS
Status: DISCONTINUED | OUTPATIENT
Start: 2019-06-07 | End: 2019-06-09

## 2019-06-07 RX ORDER — CHLORHEXIDINE GLUCONATE ORAL RINSE 1.2 MG/ML
15 SOLUTION DENTAL 2 TIMES DAILY
Status: DISCONTINUED | OUTPATIENT
Start: 2019-06-07 | End: 2019-06-13 | Stop reason: HOSPADM

## 2019-06-07 RX ORDER — NYSTATIN 100000 [USP'U]/ML
500000 SUSPENSION ORAL 4 TIMES DAILY
Status: DISCONTINUED | OUTPATIENT
Start: 2019-06-07 | End: 2019-06-13 | Stop reason: HOSPADM

## 2019-06-07 RX ORDER — LAMIVUDINE 10 MG/ML
150 SOLUTION ORAL ONCE
Status: COMPLETED | OUTPATIENT
Start: 2019-06-07 | End: 2019-06-07

## 2019-06-07 RX ORDER — TENOFOVIR DISOPROXIL FUMARATE 300 MG/1
300 TABLET, FILM COATED ORAL
Status: DISCONTINUED | OUTPATIENT
Start: 2019-06-07 | End: 2019-06-13 | Stop reason: HOSPADM

## 2019-06-07 RX ADMIN — IPRATROPIUM BROMIDE AND ALBUTEROL SULFATE 3 ML: .5; 3 SOLUTION RESPIRATORY (INHALATION) at 12:06

## 2019-06-07 RX ADMIN — CLINDAMYCIN IN 5 PERCENT DEXTROSE 900 MG: 18 INJECTION, SOLUTION INTRAVENOUS at 04:06

## 2019-06-07 RX ADMIN — NYSTATIN 500000 UNITS: 100000 SUSPENSION ORAL at 09:06

## 2019-06-07 RX ADMIN — DOLUTEGRAVIR SODIUM 50 MG: 50 TABLET, FILM COATED ORAL at 12:06

## 2019-06-07 RX ADMIN — CLINDAMYCIN IN 5 PERCENT DEXTROSE 900 MG: 18 INJECTION, SOLUTION INTRAVENOUS at 12:06

## 2019-06-07 RX ADMIN — TENOFOVIR DISOPROXIL FUMARATE 300 MG: 300 TABLET, COATED ORAL at 12:06

## 2019-06-07 RX ADMIN — METHYLPREDNISOLONE SODIUM SUCCINATE 40 MG: 40 INJECTION, POWDER, FOR SOLUTION INTRAMUSCULAR; INTRAVENOUS at 09:06

## 2019-06-07 RX ADMIN — PANTOPRAZOLE SODIUM 40 MG: 40 TABLET, DELAYED RELEASE ORAL at 12:06

## 2019-06-07 RX ADMIN — PENTAMIDINE ISETHIONATE 240 MG: 300 INJECTION, POWDER, LYOPHILIZED, FOR SOLUTION INTRAMUSCULAR; INTRAVENOUS at 09:06

## 2019-06-07 RX ADMIN — NYSTATIN 500000 UNITS: 100000 SUSPENSION ORAL at 12:06

## 2019-06-07 RX ADMIN — SODIUM ACETATE: 164 INJECTION, SOLUTION, CONCENTRATE INTRAVENOUS at 04:06

## 2019-06-07 RX ADMIN — IPRATROPIUM BROMIDE AND ALBUTEROL SULFATE 3 ML: .5; 3 SOLUTION RESPIRATORY (INHALATION) at 07:06

## 2019-06-07 RX ADMIN — LAMIVUDINE 150 MG: 10 SOLUTION ORAL at 12:06

## 2019-06-07 RX ADMIN — CHLORHEXIDINE GLUCONATE 0.12% ORAL RINSE 15 ML: 1.2 LIQUID ORAL at 09:06

## 2019-06-07 RX ADMIN — CLINDAMYCIN IN 5 PERCENT DEXTROSE 900 MG: 18 INJECTION, SOLUTION INTRAVENOUS at 10:06

## 2019-06-07 RX ADMIN — PIPERACILLIN SODIUM AND TAZOBACTAM SODIUM 4.5 G: 4; .5 INJECTION, POWDER, LYOPHILIZED, FOR SOLUTION INTRAVENOUS at 12:06

## 2019-06-07 RX ADMIN — SODIUM BICARBONATE 650 MG TABLET 1300 MG: at 09:06

## 2019-06-07 RX ADMIN — ATOVAQUONE 750 MG: 750 SUSPENSION ORAL at 12:06

## 2019-06-07 RX ADMIN — SODIUM BICARBONATE 650 MG TABLET 1300 MG: at 12:06

## 2019-06-07 RX ADMIN — GUAIFENESIN 200 MG: 200 SOLUTION ORAL at 10:06

## 2019-06-07 RX ADMIN — Medication 0.38 MCG/KG/MIN: at 11:06

## 2019-06-07 RX ADMIN — DEXMEDETOMIDINE HYDROCHLORIDE 0.2 MCG/KG/HR: 400 INJECTION INTRAVENOUS at 05:06

## 2019-06-07 RX ADMIN — LACOSAMIDE 100 MG: 50 TABLET, FILM COATED ORAL at 12:06

## 2019-06-07 RX ADMIN — AZITHROMYCIN MONOHYDRATE 500 MG: 500 INJECTION, POWDER, LYOPHILIZED, FOR SOLUTION INTRAVENOUS at 03:06

## 2019-06-07 RX ADMIN — GUAIFENESIN 200 MG: 200 SOLUTION ORAL at 12:06

## 2019-06-07 RX ADMIN — LACOSAMIDE 100 MG: 50 TABLET, FILM COATED ORAL at 09:06

## 2019-06-07 RX ADMIN — PIPERACILLIN SODIUM AND TAZOBACTAM SODIUM 4.5 G: 4; .5 INJECTION, POWDER, LYOPHILIZED, FOR SOLUTION INTRAVENOUS at 01:06

## 2019-06-07 RX ADMIN — VORICONAZOLE 200 MG: 40 POWDER, FOR SUSPENSION ORAL at 09:06

## 2019-06-07 RX ADMIN — NYSTATIN 500000 UNITS: 100000 SUSPENSION ORAL at 06:06

## 2019-06-07 RX ADMIN — Medication 0.4 MCG/KG/MIN: at 08:06

## 2019-06-07 RX ADMIN — VORICONAZOLE 200 MG: 40 POWDER, FOR SUSPENSION ORAL at 11:06

## 2019-06-07 RX ADMIN — ATOVAQUONE 750 MG: 750 SUSPENSION ORAL at 06:06

## 2019-06-07 RX ADMIN — ENOXAPARIN SODIUM 30 MG: 100 INJECTION SUBCUTANEOUS at 06:06

## 2019-06-07 RX ADMIN — GUAIFENESIN 200 MG: 200 SOLUTION ORAL at 06:06

## 2019-06-07 RX ADMIN — Medication 0.02 MCG/KG/MIN: at 05:06

## 2019-06-07 RX ADMIN — Medication 50 MCG: at 05:06
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
## 2019-06-07 NOTE — PROGRESS NOTES
[facility] -   Nephrology  Progress Note    Patient Name: Wang Kebede  MRN: [id number]  Admission Date: 6/3/2019  Hospital Length of Stay: 3 days  Attending Provider: Gerardo Mccauley MD   Primary Care Physician: Levi Moncada MD  Principal Problem:Severe sepsis    Subjective:     HPI: Wang Kebede is a 33-year-old  woman with history of HIV/aids, noncompliance with treatment, vulvar carcinoma, sickle cell disease, was admitted to the hospital about 2 days ago for some shortness of breath and cough.  Her serum creatinine gradually worsened from about 1.5 mg/dL to 2.6 mg/dL today, also his urine output has dropped, also severe metabolic acidosis noted on her labs, we were consulted for acute on chronic kidney failure and metabolic acidosis.    Interval History:  Acute kidney injury slightly worse.  Patient is more short of breath.  Responded to IV Lasix with a urine output of 1.5 L. patient is critically ill.  May require ventilation.  No need for renal replacement therapy as of now    Review of patient's allergies indicates:   Allergen Reactions    Iodine and iodide containing products Anaphylaxis     Pt states she coded last time she was administered contrast    Pneumococcal 23-chitra ps vaccine Anaphylaxis     Potential iodine containing    Dilaudid [hydromorphone] Hives and Itching    Bactrim [sulfamethoxazole-trimethoprim] Hives    Morphine Itching     Tolerates norco 2018     Current Facility-Administered Medications   Medication Frequency    acetaminophen oral solution 650 mg Q6H PRN    albuterol-ipratropium 2.5 mg-0.5 mg/3 mL nebulizer solution 3 mL Q6H    atovaquone suspension 750 mg BID WM    azithromycin 500 mg in dextrose 5 % 250 mL IVPB (ready to mix system) Q24H    clindamycin 900 MG/50 ML D5W 900 mg/50 mL IVPB 900 mg Q8H    dextrose 5 % 1,000 mL with sodium acetate 150 mEq infusion Continuous    diphenhydrAMINE capsule 25 mg Q6H PRN     dolutegravir Tab 50 mg Daily    enoxaparin injection 30 mg Daily    guaifenesin 100 mg/5 ml syrup 200 mg Q4H    lacosamide tablet 100 mg BID    [START ON 6/8/2019] lamiVUDine 10 mg/mL solution 100 mg Daily    levalbuterol nebulizer solution 0.63 mg Q4H PRN    methylPREDNISolone sodium succinate injection 40 mg BID    norepinephrine 4 mg in dextrose 5% 250 mL infusion (premix) (titrating) Continuous PRN    nystatin 100,000 unit/mL suspension 500,000 Units QID    ondansetron disintegrating tablet 4 mg Once    ondansetron injection 4 mg Q8H PRN    pantoprazole EC tablet 40 mg Daily    piperacillin-tazobactam 4.5 g in dextrose 5 % 100 mL IVPB (ready to mix system) Q12H    sodium bicarbonate tablet 1,300 mg TID    tenofovir tablet 300 mg Q72H    [START ON 6/12/2019] vancomycin 500 mg PLACEHOLDER DOSE ONLY in dextrose 5 % 100 mL IVPB (ready to mix system) Q72H    voriconazole 200 mg/5 mL (40 mg/mL) suspension 200 mg Q12H       Objective:     Vital Signs (Most Recent):  Temp: 99.1 °F (37.3 °C) (06/07/19 1130)  Pulse: (!) 115 (06/07/19 1130)  Resp: (!) 32 (06/07/19 1130)  BP: 105/65 (06/07/19 1130)  SpO2: 98 % (06/07/19 1130)  O2 Device (Oxygen Therapy): (S) Vapotherm (06/07/19 1101) Vital Signs (24h Range):  Temp:  [98.6 °F (37 °C)-101.2 °F (38.4 °C)] 99.1 °F (37.3 °C)  Pulse:  [106-137] 115  Resp:  [26-52] 32  SpO2:  [89 %-100 %] 98 %  BP: ()/(32-84) 105/65     Weight: 59.8 kg (131 lb 13.4 oz) (06/07/19 0500)  Body mass index is 25.75 kg/m².  Body surface area is 1.59 meters squared.    I/O last 3 completed shifts:  In: 5537.1 [I.V.:4437.1; IV Piggyback:1100]  Out: 1726 [Urine:1726]    Physical Exam   Constitutional: She is oriented to person, place, and time. She appears well-developed and well-nourished. She appears distressed.   Acutely ill-appearing female   HENT:   Head: Normocephalic and atraumatic.   Nose: Nose normal.   Eyes: Pupils are equal, round, and reactive to light. Conjunctivae  and EOM are normal. No scleral icterus.   Neck: Normal range of motion. Neck supple. No tracheal deviation present.   Cardiovascular: Regular rhythm, normal heart sounds and intact distal pulses.   No murmur heard.  Tachycardia   Pulmonary/Chest: No stridor. She is in respiratory distress. She has wheezes. She has rales.   Tachypnoea, on bipap   Abdominal: Soft. Bowel sounds are normal. She exhibits no distension. There is no tenderness. There is no guarding.   Genitourinary:   Genitourinary Comments: -   Musculoskeletal: Normal range of motion. She exhibits edema. She exhibits no deformity.   Neurological: She is alert and oriented to person, place, and time. No cranial nerve deficit.   Skin: Skin is warm. Capillary refill takes less than 2 seconds. No rash noted. She is diaphoretic.   Psychiatric: She has a normal mood and affect. Her behavior is normal. Judgment and thought content normal.   Nursing note and vitals reviewed.      Significant Labs:  All labs within the past 24 hours have been reviewed.     Significant Imaging:  Labs: Reviewed  X-Ray: Reviewed    Assessment/Plan:     LELAND (acute kidney injury)  Acute kidney injury :  Baseline creatinine about 1.5 mg/dL, creatinine increased to 3.2 today.  Patient was given 1 dose of Lasix due to bilateral lung infiltrates.  Patient is more short of breath today.  Most likely she will require ventilatory support.  Decision per Dr. Riggs.  Right now patient has good urine output on diuretics.  I would avoid further diuretics at this point.  No renal replacement therapy is needed at this time.  Etiology seems to be prerenal due to sepsis.  Will follow very closely for renal replacement therapy needs.        Thank you for your consult.     Sonam Yanez MD  Nephrology  [facility] -

## 2019-06-07 NOTE — SUBJECTIVE & OBJECTIVE
Interval History:  Diarrhea improved but not resolved.  Continued to run intermittent high fevers.  Hemodynamics improved and weaned off vasopressors.  Remained tachycardic to varying degrees.  EKG consistently showed sinus tachycardia or SVT.  Rate controlled with as needed Metoprolol IV.  Hypoxemic respiratory failure on room air corrected with continuous BiPAP.    Review of Systems   Constitutional: Positive for activity change and fever. Negative for chills, diaphoresis and fatigue.   HENT: Positive for congestion and sinus pressure. Negative for sore throat and voice change.    Eyes: Negative for photophobia and visual disturbance.   Respiratory: Negative for cough, shortness of breath, wheezing and stridor.    Cardiovascular: Negative for chest pain and leg swelling.   Gastrointestinal: Negative for abdominal distention, abdominal pain, constipation, diarrhea, nausea and vomiting.   Endocrine: Negative for polydipsia, polyphagia and polyuria.   Genitourinary: Negative for difficulty urinating, dysuria, flank pain, pelvic pain, urgency and vaginal discharge.   Musculoskeletal: Negative for back pain, joint swelling, neck pain and neck stiffness.   Skin: Negative for color change and rash.   Allergic/Immunologic: Negative for immunocompromised state.   Neurological: Positive for headaches. Negative for dizziness, syncope, weakness and numbness.   Hematological: Does not bruise/bleed easily.   Psychiatric/Behavioral: Negative for agitation, behavioral problems and confusion.     Objective:     Vital Signs (Most Recent):  Temp: (!) 101.2 °F (38.4 °C) (06/06/19 1700)  Pulse: (!) 125 (06/06/19 1918)  Resp: (!) 43 (06/06/19 1918)  BP: (!) 93/43 (06/06/19 1800)  SpO2: 100 % (06/06/19 1918) Vital Signs (24h Range):  Temp:  [98.6 °F (37 °C)-102.1 °F (38.9 °C)] 101.2 °F (38.4 °C)  Pulse:  [113-148] 125  Resp:  [20-52] 43  SpO2:  [83 %-100 %] 100 %  BP: ()/(31-65) 93/43     Weight: 52.2 kg (115 lb 1.3 oz)  Body mass  index is 22.48 kg/m².    Intake/Output Summary (Last 24 hours) at 6/6/2019 2130  Last data filed at 6/6/2019 1809  Gross per 24 hour   Intake 3856.67 ml   Output 462 ml   Net 3394.67 ml      Physical Exam   Constitutional: She is oriented to person, place, and time. She appears well-developed and well-nourished. No distress.   Acutely ill-appearing female   HENT:   Head: Normocephalic and atraumatic.   Nose: Nose normal.   Positive frontal and maxillary sinus tenderness   Eyes: Pupils are equal, round, and reactive to light. Conjunctivae and EOM are normal. No scleral icterus.   Neck: Normal range of motion. Neck supple. No tracheal deviation present.   Cardiovascular: Regular rhythm, normal heart sounds and intact distal pulses.   No murmur heard.  Tachycardia   Pulmonary/Chest: Effort normal and breath sounds normal. No stridor. No respiratory distress. She has no wheezes. She has no rales.   Abdominal: Soft. Bowel sounds are normal. She exhibits no distension. There is no tenderness. There is no guarding.   Genitourinary:   Genitourinary Comments: -   Musculoskeletal: Normal range of motion. She exhibits no edema or deformity.   Neurological: She is alert and oriented to person, place, and time. No cranial nerve deficit.   Skin: Skin is warm and dry. Capillary refill takes less than 2 seconds. No rash noted. She is not diaphoretic.   Psychiatric: She has a normal mood and affect. Her behavior is normal. Judgment and thought content normal.   Nursing note and vitals reviewed.      Significant Labs: All pertinent labs within the past 24 hours have been reviewed.    Significant Imaging: I have reviewed and interpreted all pertinent imaging results/findings within the past 24 hours.

## 2019-06-07 NOTE — PROCEDURES
Wang Kebede is a 33 y.o. female patient.    Temp: 99.3 °F (37.4 °C) (06/07/19 1430)  Pulse: (!) 122 (06/07/19 1745)  Resp: (!) 45 (06/07/19 1745)  BP: (!) 78/41 (06/07/19 1745)  SpO2: 100 % (06/07/19 1745)  Weight: 59.8 kg (131 lb 13.4 oz) (06/07/19 0500)  Height: 5' (152.4 cm) (06/03/19 0944)       Intubation  Date/Time: 6/7/2019 5:47 PM  Location procedure was performed: [facility] INTENSIVE CARE UNIT  Performed by: Kylie To NP  Authorized by: yKlie To NP   Pre-operative diagnosis: acute hypoxemic respiratory failure  Post-operative diagnosis: acute hypoxemic respiratory failure  Consent Done: Emergent Situation  Indications: respiratory distress  Intubation method: video-assisted  Patient status: sedated  Preoxygenation: BVM  Pretreatment medications: none  Sedatives: etomidate and propofol (20mg etomidate and 100mg propofol given IVP by me for emergent intubation)  Paralytic: none  Laryngoscope size: c mac.  Tube size: 8.5 mm  Tube type: cuffed  Number of attempts: 1  Cricoid pressure: yes  Cords visualized: yes  Post-procedure assessment: CO2 detector  Breath sounds: equal and absent over the epigastrium  Cuff inflated: yes  ETT to teeth: 21 cm  Tube secured with: ETT clarke  Chest x-ray interpreted by me and radiologist.  Chest x-ray findings: endotracheal tube in appropriate position  Patient tolerance: Patient tolerated the procedure well with no immediate complications  Complications: No  Specimens: No  Implants: No          Kylie To  6/7/2019

## 2019-06-07 NOTE — PROGRESS NOTES
Report received and care assumed.  On BiPap 12/6, 40% FiO2.  Tachypneic with resp rate 40-55, even when asleep.  BBS coarse and diminished at bases.  Patient keeps taking BiPap mask off, unable to maintain oxygen saturation levels, drops into high 70's-low 80's. Has been educated numerous times regarding importance of wearing the mask & if her resp status declines any further, she may require intubation.  Verbalizes understanding but then immediately begins to remove mask to use the telephone. Core bladder temp at 101.1, cool cloths applied and excess blankets removed.  Dr. Mayo at bedside, requesting ABG's.  Respiratory notified.

## 2019-06-07 NOTE — SUBJECTIVE & OBJECTIVE
Interval History:  She remains febrile, she is in respiratory distress and on bipap  CT chest -There is a pattern of extensive increasing patchy consolidation within the lungs.  Alveolar type filling consolidation is seen in the central portions of the upper lobes as well as in the lung bases bilaterally.  The upper lobe infiltrates are a new pattern.   Review of Systems   Unable to perform ROS: Acuity of condition     Objective:     Vital Signs (Most Recent):  Temp: 98.6 °F (37 °C) (06/07/19 0330)  Pulse: 109 (06/07/19 0712)  Resp: (!) 40 (06/07/19 0712)  BP: (!) 104/48 (06/07/19 0500)  SpO2: 98 % (06/07/19 0712) Vital Signs (24h Range):  Temp:  [98.6 °F (37 °C)-101.2 °F (38.4 °C)] 98.6 °F (37 °C)  Pulse:  [109-137] 109  Resp:  [26-52] 40  SpO2:  [88 %-100 %] 98 %  BP: ()/(32-84) 104/48     Weight: 59.8 kg (131 lb 13.4 oz)  Body mass index is 25.75 kg/m².    Estimated Creatinine Clearance: 20 mL/min (A) (based on SCr of 3.2 mg/dL (H)).    Physical Exam   Constitutional: She is oriented to person, place, and time. She appears well-developed and well-nourished. No distress.   Acutely ill-appearing female   HENT:   Head: Normocephalic and atraumatic.   Nose: Nose normal.   Positive frontal and maxillary sinus tenderness   Eyes: Pupils are equal, round, and reactive to light. Conjunctivae and EOM are normal. No scleral icterus.   Neck: Normal range of motion. Neck supple. No tracheal deviation present.   Cardiovascular: Regular rhythm, normal heart sounds and intact distal pulses.   No murmur heard.  Tachycardia   Pulmonary/Chest: No stridor. No respiratory distress. She has no wheezes. She has no rales.   Tachypnoea, on bipap   Abdominal: Soft. Bowel sounds are normal. She exhibits no distension. There is no tenderness. There is no guarding.   Genitourinary:   Genitourinary Comments: -   Musculoskeletal: Normal range of motion. She exhibits no edema or deformity.   Neurological: She is alert and oriented to  person, place, and time. No cranial nerve deficit.   Skin: Skin is warm and dry. Capillary refill takes less than 2 seconds. No rash noted. She is not diaphoretic.   Psychiatric: She has a normal mood and affect. Her behavior is normal. Judgment and thought content normal.   Nursing note and vitals reviewed.      Significant Labs:   Blood Culture:   Recent Labs   Lab 12/25/18  0039 01/24/19  0000 01/24/19  0015 06/03/19  1500 06/03/19  1530   LABBLOO No growth after 5 days. No growth after 5 days. No growth after 5 days. No Growth to date  No Growth to date  No Growth to date  No Growth to date No Growth to date  No Growth to date  No Growth to date  No Growth to date     CBC:   Recent Labs   Lab 06/06/19  0600 06/07/19  0330   WBC 9.27 6.66   HGB 7.3* 7.0*   HCT 21.6* 20.2*   PLT 95* 77*     CMP:   Recent Labs   Lab 06/05/19  1416 06/06/19  0600 06/07/19  0330   * 141 142  142   K 4.0 4.1 3.4*  3.4*   * 121* 118*  118*   CO2 11* 11* 12*  12*   * 84 151*  151*   BUN 26* 30* 40*  40*   CREATININE 2.2* 2.6* 3.2*  3.2*   CALCIUM 6.0* 6.0* 5.8*  5.8*   PROT  --  5.5* 5.1*   ALBUMIN  --  1.4* 1.2*  1.2*   BILITOT  --  0.3 0.3   ALKPHOS  --  58 70   AST  --  54* 40   ALT  --  19 17   ANIONGAP 8 9 12  12   EGFRNONAA 28* 23* 18*  18*     All pertinent labs within the past 24 hours have been reviewed.    Significant Imaging: I have reviewed all pertinent imaging results/findings within the past 24 hours.
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
## 2019-06-07 NOTE — PLAN OF CARE
Problem: Spiritual Distress Risk or Actual  Goal: Spiritual Wellbeing    Intervention: Promote Spiritual Wellbeing  Provided ministries of listening, presence and prayer and I will continue to follow up while pt is here in the hospital.    Chaplain Armaan Joseph M.Div., BCC

## 2019-06-07 NOTE — PROGRESS NOTES
[facility] Hospital Medicine  Progress Note    Patient Name: Wang Kebede  MRN: [id number]  Patient Class: IP- Inpatient   Admission Date: 6/3/2019  Length of Stay: 2 days  Attending Physician: Gerardo Mccauley MD  Primary Care Provider: Levi Moncada MD        Subjective:     Principal Problem:Severe sepsis    HPI:  33-year-old female with significant medical history including AIDS who has history of noncompliance with medical plan of care presents today for complaint of malaise over the past week.  Modifying factors none.  Associated symptoms:  Cough, headache.  Prior treatment not effective with home care.  Patient was evaluated emergency room.  Significant findings and workup include sinusitis findings on CT.  Cultures pending.  WBC and lactic both negative.  Patient was started on IV antibiotics and given IV fluids.  During ER care patient became more tachycardic and developed fever.  Infectious Disease was consulted by ED who recommended observation.  Hospital Medicine was consulted patient admitted to observation.  To note patient's last CD4 count 39  three months ago.    Hospital Course:  33 year old woman with AIDS-poorly compliant with HAART -last cd4 count -was 39 (02/2019).  She was admitted at this time for febrile illness hemodynamically unstable requiring vasopressors to maintain blood pressure.  Infectious Disease evaluated and assisting management.  Started broad empiric antibiotic treatment.  Continued to run intermittent high fevers.  Hemodynamics improved and weaned off vasopressors.  Remained tachycardic to varying degrees.  EKG consistently showed sinus tachycardia or SVT.  Rate controlled with as needed Metoprolol IV.  Hypoxemic respiratory failure on room air corrected with continuous BiPAP.    Interval History:  Diarrhea improved but not resolved.  Continued to run intermittent high fevers.  Hemodynamics improved and weaned off vasopressors.  Remained  tachycardic to varying degrees.  EKG consistently showed sinus tachycardia or SVT.  Rate controlled with as needed Metoprolol IV.  Hypoxemic respiratory failure on room air corrected with continuous BiPAP.    Review of Systems   Constitutional: Positive for activity change and fever. Negative for chills, diaphoresis and fatigue.   HENT: Positive for congestion and sinus pressure. Negative for sore throat and voice change.    Eyes: Negative for photophobia and visual disturbance.   Respiratory: Negative for cough, shortness of breath, wheezing and stridor.    Cardiovascular: Negative for chest pain and leg swelling.   Gastrointestinal: Negative for abdominal distention, abdominal pain, constipation, diarrhea, nausea and vomiting.   Endocrine: Negative for polydipsia, polyphagia and polyuria.   Genitourinary: Negative for difficulty urinating, dysuria, flank pain, pelvic pain, urgency and vaginal discharge.   Musculoskeletal: Negative for back pain, joint swelling, neck pain and neck stiffness.   Skin: Negative for color change and rash.   Allergic/Immunologic: Negative for immunocompromised state.   Neurological: Positive for headaches. Negative for dizziness, syncope, weakness and numbness.   Hematological: Does not bruise/bleed easily.   Psychiatric/Behavioral: Negative for agitation, behavioral problems and confusion.     Objective:     Vital Signs (Most Recent):  Temp: (!) 101.2 °F (38.4 °C) (06/06/19 1700)  Pulse: (!) 125 (06/06/19 1918)  Resp: (!) 43 (06/06/19 1918)  BP: (!) 93/43 (06/06/19 1800)  SpO2: 100 % (06/06/19 1918) Vital Signs (24h Range):  Temp:  [98.6 °F (37 °C)-102.1 °F (38.9 °C)] 101.2 °F (38.4 °C)  Pulse:  [113-148] 125  Resp:  [20-52] 43  SpO2:  [83 %-100 %] 100 %  BP: ()/(31-65) 93/43     Weight: 52.2 kg (115 lb 1.3 oz)  Body mass index is 22.48 kg/m².    Intake/Output Summary (Last 24 hours) at 6/6/2019 2130  Last data filed at 6/6/2019 1809  Gross per 24 hour   Intake 3856.67 ml    Output 462 ml   Net 3394.67 ml      Physical Exam   Constitutional: She is oriented to person, place, and time. She appears well-developed and well-nourished. No distress.   Acutely ill-appearing female   HENT:   Head: Normocephalic and atraumatic.   Nose: Nose normal.   Positive frontal and maxillary sinus tenderness   Eyes: Pupils are equal, round, and reactive to light. Conjunctivae and EOM are normal. No scleral icterus.   Neck: Normal range of motion. Neck supple. No tracheal deviation present.   Cardiovascular: Regular rhythm, normal heart sounds and intact distal pulses.   No murmur heard.  Tachycardia   Pulmonary/Chest: Effort normal and breath sounds normal. No stridor. No respiratory distress. She has no wheezes. She has no rales.   Abdominal: Soft. Bowel sounds are normal. She exhibits no distension. There is no tenderness. There is no guarding.   Genitourinary:   Genitourinary Comments: -   Musculoskeletal: Normal range of motion. She exhibits no edema or deformity.   Neurological: She is alert and oriented to person, place, and time. No cranial nerve deficit.   Skin: Skin is warm and dry. Capillary refill takes less than 2 seconds. No rash noted. She is not diaphoretic.   Psychiatric: She has a normal mood and affect. Her behavior is normal. Judgment and thought content normal.   Nursing note and vitals reviewed.      Significant Labs: All pertinent labs within the past 24 hours have been reviewed.    Significant Imaging: I have reviewed and interpreted all pertinent imaging results/findings within the past 24 hours.    Assessment/Plan:      * Severe sepsis  Potentially related to sinusitis.  Patient received IV fluids and was started on IV antibiotics.  Lactic negative.  She is an immunocompromised host . Will start broad spectrum antimicrobial therapy including antifungal therapy -will use vancomycin, zosyn and empiric Voriconazole.  Will send blood cultures, fungal cultures, fungitell assay , AFB  cultures.  Infectious Disease assisting with management.    Acute sinusitis  ill continue empiric broad spectrum therapy with Zosyn, will follow fever curve.  On Voriconazole- PO not IV due to renal impairment  , continue Vanco.  Monitor closely     AIDS (acquired immunodeficiency syndrome), CD4 <=200  Resume patient's HAART therapy.  Patient being treated with Rocephin for sinusitis will add on azithromycin and dapsone for further prophylaxis given patient's immunocompromised state.  She has poor compliance to therapy and follow up.  Will check cd4 count , HIV viral load.  Will continue Striblid.  OI prophylaxis with Dapsone and Azithromycin.  Infectious Disease following.    History of VT/VF s/p implantation of automatic cardioverter/defibrillator (AICD)  Cardiac monitoring    Seizure disorder  Continue patient's home medication  Seizure precautions.  will continue lacosamide and will monitor closely      VTE Risk Mitigation (From admission, onward)        Ordered     enoxaparin injection 30 mg  Daily      06/04/19 1615     Place sequential compression device  Until discontinued      06/03/19 2020          Critical care time spent on the evaluation and treatment of severe organ dysfunction, review of pertinent labs and imaging studies, discussions with consulting providers and discussions with patient/family: 30 minutes.    Gerardo Mccauley MD  Department of Hospital Medicine   [facility]

## 2019-06-07 NOTE — ASSESSMENT & PLAN NOTE
6/6 Suspect aspiration - will evaluate with speech therapy  6/7 worsening - will check Chest X Ray may need intubation

## 2019-06-07 NOTE — ASSESSMENT & PLAN NOTE
Acute kidney injury :  Baseline creatinine about 1.5 mg/dL, creatinine increased to 3.2 today.  Patient was given 1 dose of Lasix due to bilateral lung infiltrates.  Patient is more short of breath today.  Most likely she will require ventilatory support.  Decision per Dr. Riggs.  Right now patient has good urine output on diuretics.  I would avoid further diuretics at this point.  No renal replacement therapy is needed at this time.  Etiology seems to be prerenal due to sepsis.  Will follow very closely for renal replacement therapy needs.

## 2019-06-07 NOTE — SUBJECTIVE & OBJECTIVE
Interval History:  Acute kidney injury slightly worse.  Patient is more short of breath.  Responded to IV Lasix with a urine output of 1.5 L. patient is critically ill.  May require ventilation.  No need for renal replacement therapy as of now    Review of patient's allergies indicates:   Allergen Reactions    Iodine and iodide containing products Anaphylaxis     Pt states she coded last time she was administered contrast    Pneumococcal 23-chitra ps vaccine Anaphylaxis     Potential iodine containing    Dilaudid [hydromorphone] Hives and Itching    Bactrim [sulfamethoxazole-trimethoprim] Hives    Morphine Itching     Tolerates norco 2018     Current Facility-Administered Medications   Medication Frequency    acetaminophen oral solution 650 mg Q6H PRN    albuterol-ipratropium 2.5 mg-0.5 mg/3 mL nebulizer solution 3 mL Q6H    atovaquone suspension 750 mg BID WM    azithromycin 500 mg in dextrose 5 % 250 mL IVPB (ready to mix system) Q24H    clindamycin 900 MG/50 ML D5W 900 mg/50 mL IVPB 900 mg Q8H    dextrose 5 % 1,000 mL with sodium acetate 150 mEq infusion Continuous    diphenhydrAMINE capsule 25 mg Q6H PRN    dolutegravir Tab 50 mg Daily    enoxaparin injection 30 mg Daily    guaifenesin 100 mg/5 ml syrup 200 mg Q4H    lacosamide tablet 100 mg BID    [START ON 6/8/2019] lamiVUDine 10 mg/mL solution 100 mg Daily    levalbuterol nebulizer solution 0.63 mg Q4H PRN    methylPREDNISolone sodium succinate injection 40 mg BID    norepinephrine 4 mg in dextrose 5% 250 mL infusion (premix) (titrating) Continuous PRN    nystatin 100,000 unit/mL suspension 500,000 Units QID    ondansetron disintegrating tablet 4 mg Once    ondansetron injection 4 mg Q8H PRN    pantoprazole EC tablet 40 mg Daily    piperacillin-tazobactam 4.5 g in dextrose 5 % 100 mL IVPB (ready to mix system) Q12H    sodium bicarbonate tablet 1,300 mg TID    tenofovir tablet 300 mg Q72H    [START ON 6/12/2019] vancomycin 500 mg  PLACEHOLDER DOSE ONLY in dextrose 5 % 100 mL IVPB (ready to mix system) Q72H    voriconazole 200 mg/5 mL (40 mg/mL) suspension 200 mg Q12H       Objective:     Vital Signs (Most Recent):  Temp: 99.1 °F (37.3 °C) (06/07/19 1130)  Pulse: (!) 115 (06/07/19 1130)  Resp: (!) 32 (06/07/19 1130)  BP: 105/65 (06/07/19 1130)  SpO2: 98 % (06/07/19 1130)  O2 Device (Oxygen Therapy): (S) Vapotherm (06/07/19 1101) Vital Signs (24h Range):  Temp:  [98.6 °F (37 °C)-101.2 °F (38.4 °C)] 99.1 °F (37.3 °C)  Pulse:  [106-137] 115  Resp:  [26-52] 32  SpO2:  [89 %-100 %] 98 %  BP: ()/(32-84) 105/65     Weight: 59.8 kg (131 lb 13.4 oz) (06/07/19 0500)  Body mass index is 25.75 kg/m².  Body surface area is 1.59 meters squared.    I/O last 3 completed shifts:  In: 5537.1 [I.V.:4437.1; IV Piggyback:1100]  Out: 1726 [Urine:1726]    Physical Exam   Constitutional: She is oriented to person, place, and time. She appears well-developed and well-nourished. She appears distressed.   Acutely ill-appearing female   HENT:   Head: Normocephalic and atraumatic.   Nose: Nose normal.   Eyes: Pupils are equal, round, and reactive to light. Conjunctivae and EOM are normal. No scleral icterus.   Neck: Normal range of motion. Neck supple. No tracheal deviation present.   Cardiovascular: Regular rhythm, normal heart sounds and intact distal pulses.   No murmur heard.  Tachycardia   Pulmonary/Chest: No stridor. She is in respiratory distress. She has wheezes. She has rales.   Tachypnoea, on bipap   Abdominal: Soft. Bowel sounds are normal. She exhibits no distension. There is no tenderness. There is no guarding.   Genitourinary:   Genitourinary Comments: -   Musculoskeletal: Normal range of motion. She exhibits edema. She exhibits no deformity.   Neurological: She is alert and oriented to person, place, and time. No cranial nerve deficit.   Skin: Skin is warm. Capillary refill takes less than 2 seconds. No rash noted. She is diaphoretic.   Psychiatric:  She has a normal mood and affect. Her behavior is normal. Judgment and thought content normal.   Nursing note and vitals reviewed.      Significant Labs:  All labs within the past 24 hours have been reviewed.     Significant Imaging:  Labs: Reviewed  X-Ray: Reviewed

## 2019-06-07 NOTE — PROGRESS NOTES
"[facility]  Infectious Disease  Progress Note    Patient Name: Wang Kebede  MRN: [id number]  Admission Date: 6/3/2019  Length of Stay: 3 days  Attending Physician: Gerardo Mccauley MD  Primary Care Provider: Levi Moncada MD    Isolation Status: No active isolations  Assessment/Plan:      * Severe sepsis  06/05- she remains  Febrile..  Will send fungal, bacterial and mycobacterial cultures .  The fever can also be from AIDS itself.  Will do Pan Ct scan of the chest, abdomen and pelvis.  Will continue Vanco,zosyn, Zithromax  Add mepron for PCP    06/06-with advanced AIDS, PCP is of concern, will discuss with pharmacy and will use IV pentamidine ,she might need intubation .  Will continue voriconazole,vancomycin, zosyn   Clindamycin/primaquin is an option for PCP   Will add steroids for suspected severe PCP    Acute respiratory failure with hypoxia  06/06- will do stat ABG, might need intubation    AIDS (acquired immunodeficiency syndrome), CD4 <=200  06/04- she has advanced AIDS -will continue Striblid.    I asked her-"what can we do to make you take the medications "  Her response- I am trying to do that .  She is not trying well enough .  She has very poor immunologic control.  The risk of death was carefully explained to her.    06/06- will adjust medication due to renal impairment, will send HIV genotype,  Will use Tivicay 50mg daily, tenofovir one tablet every 72 hours  and lamivudine  100 mg daily         Anticipated Disposition:     Thank you for your consult. I will follow-up with patient. Please contact us if you have any additional questions.  Late entry note   Hubert Mayo MD  Infectious Disease  [facility]    Subjective:     Principal Problem:Severe sepsis    HPI: No notes on file  Interval History:  She remains febrile, she is in respiratory distress and on bipap  CT chest -There is a pattern of extensive increasing patchy consolidation within the " lungs.  Alveolar type filling consolidation is seen in the central portions of the upper lobes as well as in the lung bases bilaterally.  The upper lobe infiltrates are a new pattern.   Review of Systems   Unable to perform ROS: Acuity of condition     Objective:     Vital Signs (Most Recent):  Temp: 98.6 °F (37 °C) (06/07/19 0330)  Pulse: 109 (06/07/19 0712)  Resp: (!) 40 (06/07/19 0712)  BP: (!) 104/48 (06/07/19 0500)  SpO2: 98 % (06/07/19 0712) Vital Signs (24h Range):  Temp:  [98.6 °F (37 °C)-101.2 °F (38.4 °C)] 98.6 °F (37 °C)  Pulse:  [109-137] 109  Resp:  [26-52] 40  SpO2:  [88 %-100 %] 98 %  BP: ()/(32-84) 104/48     Weight: 59.8 kg (131 lb 13.4 oz)  Body mass index is 25.75 kg/m².    Estimated Creatinine Clearance: 20 mL/min (A) (based on SCr of 3.2 mg/dL (H)).    Physical Exam   Constitutional: She is oriented to person, place, and time. She appears well-developed and well-nourished. No distress.   Acutely ill-appearing female   HENT:   Head: Normocephalic and atraumatic.   Nose: Nose normal.   Positive frontal and maxillary sinus tenderness   Eyes: Pupils are equal, round, and reactive to light. Conjunctivae and EOM are normal. No scleral icterus.   Neck: Normal range of motion. Neck supple. No tracheal deviation present.   Cardiovascular: Regular rhythm, normal heart sounds and intact distal pulses.   No murmur heard.  Tachycardia   Pulmonary/Chest: No stridor. No respiratory distress. She has no wheezes. She has no rales.   Tachypnoea, on bipap   Abdominal: Soft. Bowel sounds are normal. She exhibits no distension. There is no tenderness. There is no guarding.   Genitourinary:   Genitourinary Comments: -   Musculoskeletal: Normal range of motion. She exhibits no edema or deformity.   Neurological: She is alert and oriented to person, place, and time. No cranial nerve deficit.   Skin: Skin is warm and dry. Capillary refill takes less than 2 seconds. No rash noted. She is not diaphoretic.    Psychiatric: She has a normal mood and affect. Her behavior is normal. Judgment and thought content normal.   Nursing note and vitals reviewed.      Significant Labs:   Blood Culture:   Recent Labs   Lab 12/25/18  0039 01/24/19  0000 01/24/19  0015 06/03/19  1500 06/03/19  1530   LABBLOO No growth after 5 days. No growth after 5 days. No growth after 5 days. No Growth to date  No Growth to date  No Growth to date  No Growth to date No Growth to date  No Growth to date  No Growth to date  No Growth to date     CBC:   Recent Labs   Lab 06/06/19  0600 06/07/19  0330   WBC 9.27 6.66   HGB 7.3* 7.0*   HCT 21.6* 20.2*   PLT 95* 77*     CMP:   Recent Labs   Lab 06/05/19  1416 06/06/19  0600 06/07/19  0330   * 141 142  142   K 4.0 4.1 3.4*  3.4*   * 121* 118*  118*   CO2 11* 11* 12*  12*   * 84 151*  151*   BUN 26* 30* 40*  40*   CREATININE 2.2* 2.6* 3.2*  3.2*   CALCIUM 6.0* 6.0* 5.8*  5.8*   PROT  --  5.5* 5.1*   ALBUMIN  --  1.4* 1.2*  1.2*   BILITOT  --  0.3 0.3   ALKPHOS  --  58 70   AST  --  54* 40   ALT  --  19 17   ANIONGAP 8 9 12  12   EGFRNONAA 28* 23* 18*  18*     All pertinent labs within the past 24 hours have been reviewed.    Significant Imaging: I have reviewed all pertinent imaging results/findings within the past 24 hours.
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
## 2019-06-07 NOTE — ASSESSMENT & PLAN NOTE
06/05- she remains  Febrile..  Will send fungal, bacterial and mycobacterial cultures .  The fever can also be from AIDS itself.  Will do Pan Ct scan of the chest, abdomen and pelvis.  Will continue Vanco,zosyn, Zithromax  Add mepron for PCP    06/06-with advanced AIDS, PCP is of concern, will discuss with pharmacy and will use IV pentamidine ,she might need intubation .  Will continue voriconazole,vancomycin, zosyn   Clindamycin/primaquin is an option for PCP   Will add steroids for suspected severe PCP

## 2019-06-07 NOTE — ASSESSMENT & PLAN NOTE
6/6 supplemental oxygen, jet nebs, NPO over concern for aspiration, may need intubation  6/7 not improved

## 2019-06-07 NOTE — PROGRESS NOTES
Pharmacokinetic Assessment Follow Up: IV Vancomycin    Vancomycin serum concentration assessment(s):    Vancomycin random level: 25.4 mcg/ml  (~ 25 hour level)   The random level was drawn correctly and can be used to guide therapy at this time.      Vancomycin Regimen Plan:    Re-dose when the random level is less than 20 mcg/mL, next level to be drawn at 0430 on 06/08     Pharmacy will continue to follow and monitor vancomycin.    Please contact pharmacy at extension [phone] for questions regarding this assessment.    Thank you for the consult,   Nathalie Ken     Patient brief summary:  Wang Kebede is a 33 y.o. female initiated on antimicrobial therapy with IV Vancomycin for treatment of suspected lower respiratory infection    The patient received a loading dose, followed by the current treatment regimen: random levels with plan to redose when level is less than 20 mcg/mL    Drug Allergies:   Review of patient's allergies indicates:   Allergen Reactions    Iodine and iodide containing products Anaphylaxis     Pt states she coded last time she was administered contrast    Pneumococcal 23-chitra ps vaccine Anaphylaxis     Potential iodine containing    Dilaudid [hydromorphone] Hives and Itching    Bactrim [sulfamethoxazole-trimethoprim] Hives    Morphine Itching     Tolerates norco 2018       Actual Body Weight:   59.8 kg    Renal Function:   Estimated Creatinine Clearance: 20 mL/min (A) (based on SCr of 3.2 mg/dL (H)).,     Dialysis Method (if applicable):  N/A     CBC (last 72 hours):  Recent Labs   Lab Result Units 06/04/19  0627 06/05/19  0344 06/06/19  0600 06/07/19  0330   WBC K/uL 9.63 5.79 9.27 6.66   Hemoglobin g/dL 7.3* 7.4* 7.3* 7.0*   Hematocrit % 22.2* 22.4* 21.6* 20.2*   Platelets K/uL 129* 117* 95* 77*   Gran% % 96.1* 86.2* 94.2* 84.0*   Lymph% % 2.3* 11.4* 5.5* 16.1*   Mono% % 1.5* 1.7* 1.5* 0.9*   Eosinophil% % 0.5 1.2 0.4 0.2   Basophil% % 0.0 0.0 0.1 0.2   Differential Method   Automated Automated Automated Automated       Metabolic Panel (last 72 hours):  Recent Labs   Lab Result Units 06/04/19  0627 06/05/19  0344 06/05/19  1416 06/06/19  0600 06/06/19  1714 06/07/19  0330   Sodium mmol/L 137 137 135* 141  --  142  142   Sodium Urine Random mmol/L  --   --   --   --  56  --    Potassium mmol/L 3.8 3.9 4.0 4.1  --  3.4*  3.4*   Chloride mmol/L 116* 117* 116* 121*  --  118*  118*   CO2 mmol/L 12* 13* 11* 11*  --  12*  12*   Glucose mg/dL 155* 111* 163* 84  --  151*  151*   BUN, Bld mg/dL 31* 29* 26* 30*  --  40*  40*   Creatinine mg/dL 2.2* 2.1* 2.2* 2.6*  --  3.2*  3.2*   Albumin g/dL  --  1.8*  --  1.4*  --  1.2*  1.2*   Total Bilirubin mg/dL  --  0.2  --  0.3  --  0.3   Alkaline Phosphatase U/L  --  51*  --  58  --  70   AST U/L  --  32  --  54*  --  40   ALT U/L  --  13  --  19  --  17   Magnesium mg/dL 1.0* 1.7  --  1.6  --  2.1   Phosphorus mg/dL 2.6*  --   --   --   --  4.2       Vancomycin Administrations:  vancomycin given in the last 96 hours                     vancomycin 500 mg in dextrose 5 % 100 mL IVPB (ready to mix system) (mg) 500 mg New Bag 06/06/19 0246                      Drug levels (last 3 results):  Recent Labs   Lab Result Units 06/07/19  0330   Vancomycin-Trough ug/mL 25.4*       Microbiologic Results:  Microbiology Results (last 7 days)       Procedure Component Value Units Date/Time    Blood culture #1 **CANNOT BE ORDERED STAT** [[id number]] Collected:  06/03/19 1500    Order Status:  Completed Specimen:  Blood from Peripheral, Forearm, Left Updated:  06/06/19 2212     Blood Culture, Routine No Growth to date     Blood Culture, Routine No Growth to date     Blood Culture, Routine No Growth to date     Blood Culture, Routine No Growth to date    Blood culture #2 **CANNOT BE ORDERED STAT** [[id number]] Collected:  06/03/19 1530    Order Status:  Completed Specimen:  Blood from Peripheral, Wrist, Right Updated:  06/06/19 2212     Blood Culture, Routine No  Growth to date     Blood Culture, Routine No Growth to date     Blood Culture, Routine No Growth to date     Blood Culture, Routine No Growth to date    AFB Culture & Smear [[id number]] Collected:  06/06/19 0425    Order Status:  Sent Specimen:  Blood Updated:  06/06/19 1004    CSF culture and Gram Stain (Tube 2) [[id number]] Collected:  06/03/19 1353    Order Status:  Completed Specimen:  CSF (Spinal Fluid) from CSF Tap, Tube 2 Updated:  06/06/19 0726     CSF CULTURE No Growth to date     Gram Stain Result Cytospin indicates:      No organisms seen      No WBC's    Narrative:       On which sequentially labeled tube should this analysis be  performed?->2    Clostridium difficile EIA [[id number]]     Order Status:  Canceled Specimen:  Stool     Fungus Culture, Blood or Bone Marrow [[id number]] Collected:  06/05/19 0344    Order Status:  Sent Specimen:  Blood Updated:  06/05/19 0930    AFB Culture & Smear (Tube 3) [[id number]] Collected:  06/03/19 1353    Order Status:  Completed Specimen:  CSF (Spinal Fluid) from CSF Tap, Tube 2 Updated:  06/05/19 0927     AFB Culture & Smear Culture in progress     AFB CULTURE STAIN No acid fast bacilli seen.    AFB Culture & Smear [[id number]]     Order Status:  Sent Specimen:  Blood     Cryptococcal antigen, CSF (Tube 3) [[id number]] Collected:  06/03/19 1353    Order Status:  Completed Specimen:  CSF (Spinal Fluid) from CSF Tap, Tube 2 Updated:  06/04/19 1106     Crypto Ag, CSF Negative    Narrative:       On which sequentially labeled tube should this analysis be  performed?->3    Fungus culture (Tube 3) [[id number]] Collected:  06/03/19 1353    Order Status:  Completed Specimen:  CSF (Spinal Fluid) from CSF Tap, Tube 2 Updated:  06/04/19 1013     Fungus (Mycology) Culture Culture in progress

## 2019-06-07 NOTE — ASSESSMENT & PLAN NOTE
Potentially related to sinusitis.  Patient received IV fluids and was started on IV antibiotics.  Lactic negative.  She is an immunocompromised host . Will start broad spectrum antimicrobial therapy including antifungal therapy -will use vancomycin, zosyn and empiric Voriconazole.  Will send blood cultures, fungal cultures, fungitell assay , AFB cultures.  Infectious Disease assisting with management.

## 2019-06-07 NOTE — ASSESSMENT & PLAN NOTE
"06/04- she has advanced AIDS -will continue Striblid.    I asked her-"what can we do to make you take the medications "  Her response- I am trying to do that .  She is not trying well enough .  She has very poor immunologic control.  The risk of death was carefully explained to her.    06/06- will adjust medication due to renal impairment, will send HIV genotype,  Will use Tivicay 50mg daily, tenofovir one tablet every 72 hours  and lamivudine  100 mg daily   "

## 2019-06-07 NOTE — PLAN OF CARE
Problem: Adult Inpatient Plan of Care  Goal: Plan of Care Review  Outcome: Ongoing (interventions implemented as appropriate)  POC reviewed with patient and mother, both verbalize understanding.  Patient asking repetitive questions throughout shift.  Remains tachypneic but work of breathing has improved with continuous BiPaP mask.  Patient compliant with wearing BiPaP all shift.  Oxygen saturation levels decompensate quickly into low 80's-high 70's when off BiPap. BBS coarse with diminished bases.  Productive cough noted.  ST on monitor, rate 120-130's but does drop into one teens when asleep.  Na Acetate infusing at 75ml/hr to PICC.  IV antibiotics given per MD orders. NPO except for meds @ this time.  C/O throat burning when swallowing and experiencing difficulty swallowing thin liquids due to c/o throat pain & burning.  To have SLP eval today/  Meds crushedLasix 40mg IVP x 1 dose given with moderate results; UOP > 100ml/hr x 4 hours. Incontinent of liquid, mucus stools x 2.  Excoriation noted to perineum, barrier cream applied. T max this shift 101.1, able to decrease temp with cool cloths only, afebrile at end of shift. Safety maintained throughout shift, patient turning self ad gloria in bed.

## 2019-06-07 NOTE — PROGRESS NOTES
Pharmacist Renal Dose Adjustment Note    Wang Kebede is a 33 y.o. female being treated with the medication Zosyn    Patient Data:    Vital Signs (Most Recent):  Temp: 98.6 °F (37 °C) (06/07/19 0330)  Pulse: 109 (06/07/19 0712)  Resp: (!) 40 (06/07/19 0712)  BP: (!) 104/48 (06/07/19 0500)  SpO2: 98 % (06/07/19 0712)   Vital Signs (72h Range):  Temp:  [98.6 °F (37 °C)-104 °F (40 °C)]   Pulse:  []   Resp:  [19-52]   BP: ()/()   SpO2:  [15 %-100 %]      Recent Labs   Lab 06/05/19  1416 06/06/19  0600 06/07/19  0330   CREATININE 2.2* 2.6* 3.2*  3.2*     Serum creatinine: 3.2 mg/dL (H) 06/07/19 0330  Estimated creatinine clearance: 20 mL/min (A)    Medication:Zosyn dose: 4.5g frequency q8h will be changed to medication:Zosyn dose:4.5g frequency:q12h    Pharmacist's Name: DELL HASTINGS  Pharmacist's Extension: [phone]

## 2019-06-08 PROBLEM — R73.9 HYPERGLYCEMIA: Status: ACTIVE | Noted: 2019-06-08

## 2019-06-08 LAB
ALBUMIN SERPL BCP-MCNC: 1.2 G/DL (ref 3.5–5.2)
ALBUMIN SERPL BCP-MCNC: 1.2 G/DL (ref 3.5–5.2)
ALBUMIN SERPL BCP-MCNC: 1.4 G/DL (ref 3.5–5.2)
ALLENS TEST: ABNORMAL
ALP SERPL-CCNC: 88 U/L (ref 55–135)
ALT SERPL W/O P-5'-P-CCNC: 17 U/L (ref 10–44)
ANION GAP SERPL CALC-SCNC: 10 MMOL/L (ref 8–16)
ANION GAP SERPL CALC-SCNC: 12 MMOL/L (ref 8–16)
ANION GAP SERPL CALC-SCNC: 15 MMOL/L (ref 8–16)
ANION GAP SERPL CALC-SCNC: 15 MMOL/L (ref 8–16)
ANISOCYTOSIS BLD QL SMEAR: SLIGHT
AST SERPL-CCNC: 36 U/L (ref 10–40)
BACTERIA BLD CULT: NORMAL
BACTERIA BLD CULT: NORMAL
BACTERIA CSF CULT: NO GROWTH
BASOPHILS # BLD AUTO: 0.05 K/UL (ref 0–0.2)
BASOPHILS NFR BLD: 0.3 % (ref 0–1.9)
BILIRUB SERPL-MCNC: 0.3 MG/DL (ref 0.1–1)
BUN SERPL-MCNC: 48 MG/DL (ref 6–20)
BUN SERPL-MCNC: 61 MG/DL (ref 6–20)
BUN SERPL-MCNC: 61 MG/DL (ref 6–20)
BUN SERPL-MCNC: 64 MG/DL (ref 6–20)
CALCIUM SERPL-MCNC: 5 MG/DL (ref 8.7–10.5)
CALCIUM SERPL-MCNC: 5.4 MG/DL (ref 8.7–10.5)
CALCIUM SERPL-MCNC: 5.4 MG/DL (ref 8.7–10.5)
CALCIUM SERPL-MCNC: 6.4 MG/DL (ref 8.7–10.5)
CHLORIDE SERPL-SCNC: 101 MMOL/L (ref 95–110)
CHLORIDE SERPL-SCNC: 103 MMOL/L (ref 95–110)
CHLORIDE SERPL-SCNC: 109 MMOL/L (ref 95–110)
CHLORIDE SERPL-SCNC: 109 MMOL/L (ref 95–110)
CO2 SERPL-SCNC: 13 MMOL/L (ref 23–29)
CO2 SERPL-SCNC: 14 MMOL/L (ref 23–29)
CO2 SERPL-SCNC: 14 MMOL/L (ref 23–29)
CO2 SERPL-SCNC: 18 MMOL/L (ref 23–29)
CREAT SERPL-MCNC: 3.2 MG/DL (ref 0.5–1.4)
CREAT SERPL-MCNC: 4.3 MG/DL (ref 0.5–1.4)
CREAT SERPL-MCNC: 4.3 MG/DL (ref 0.5–1.4)
CREAT SERPL-MCNC: 4.4 MG/DL (ref 0.5–1.4)
DACRYOCYTES BLD QL SMEAR: ABNORMAL
DELSYS: ABNORMAL
DELSYS: ABNORMAL
DIFFERENTIAL METHOD: ABNORMAL
EOSINOPHIL # BLD AUTO: 0 K/UL (ref 0–0.5)
EOSINOPHIL NFR BLD: 0.1 % (ref 0–8)
ERYTHROCYTE [DISTWIDTH] IN BLOOD BY AUTOMATED COUNT: 16 % (ref 11.5–14.5)
ERYTHROCYTE [SEDIMENTATION RATE] IN BLOOD BY WESTERGREN METHOD: 28 MM/H
EST. GFR  (AFRICAN AMERICAN): 14 ML/MIN/1.73 M^2
EST. GFR  (AFRICAN AMERICAN): 15 ML/MIN/1.73 M^2
EST. GFR  (AFRICAN AMERICAN): 15 ML/MIN/1.73 M^2
EST. GFR  (AFRICAN AMERICAN): 21 ML/MIN/1.73 M^2
EST. GFR  (NON AFRICAN AMERICAN): 12 ML/MIN/1.73 M^2
EST. GFR  (NON AFRICAN AMERICAN): 13 ML/MIN/1.73 M^2
EST. GFR  (NON AFRICAN AMERICAN): 13 ML/MIN/1.73 M^2
EST. GFR  (NON AFRICAN AMERICAN): 18 ML/MIN/1.73 M^2
FIO2: 75
GLUCOSE SERPL-MCNC: 136 MG/DL (ref 70–110)
GLUCOSE SERPL-MCNC: 151 MG/DL (ref 70–110)
GLUCOSE SERPL-MCNC: 193 MG/DL (ref 70–110)
GLUCOSE SERPL-MCNC: 243 MG/DL (ref 70–110)
GLUCOSE SERPL-MCNC: 243 MG/DL (ref 70–110)
GLUCOSE SERPL-MCNC: 438 MG/DL (ref 70–110)
GRAM STN SPEC: NORMAL
HCO3 UR-SCNC: 16.1 MMOL/L (ref 24–28)
HCO3 UR-SCNC: 16.8 MMOL/L (ref 24–28)
HCO3 UR-SCNC: 16.8 MMOL/L (ref 24–28)
HCT VFR BLD AUTO: 20.8 % (ref 37–48.5)
HCT VFR BLD CALC: 22 %PCV (ref 36–54)
HCT VFR BLD CALC: 23 %PCV (ref 36–54)
HGB BLD-MCNC: 7.2 G/DL (ref 12–16)
LYMPHOCYTES # BLD AUTO: 2.8 K/UL (ref 1–4.8)
LYMPHOCYTES NFR BLD: 17.9 % (ref 18–48)
MAGNESIUM SERPL-MCNC: 2.1 MG/DL (ref 1.6–2.6)
MAGNESIUM SERPL-MCNC: 2.2 MG/DL (ref 1.6–2.6)
MAGNESIUM SERPL-MCNC: 2.2 MG/DL (ref 1.6–2.6)
MCH RBC QN AUTO: 30.4 PG (ref 27–31)
MCHC RBC AUTO-ENTMCNC: 34.6 G/DL (ref 32–36)
MCV RBC AUTO: 88 FL (ref 82–98)
MODE: ABNORMAL
MODE: ABNORMAL
MONOCYTES # BLD AUTO: 0.5 K/UL (ref 0.3–1)
MONOCYTES NFR BLD: 3.2 % (ref 4–15)
NEUTROPHILS # BLD AUTO: 12.1 K/UL (ref 1.8–7.7)
NEUTROPHILS NFR BLD: 79.2 % (ref 38–73)
PCO2 BLDA: 39.3 MMHG (ref 35–45)
PCO2 BLDA: 39.5 MMHG (ref 35–45)
PCO2 BLDA: 42.4 MMHG (ref 35–45)
PEEP: 5
PH SMN: 7.21 [PH] (ref 7.35–7.45)
PH SMN: 7.22 [PH] (ref 7.35–7.45)
PH SMN: 7.24 [PH] (ref 7.35–7.45)
PHOSPHATE SERPL-MCNC: 4.1 MG/DL (ref 2.7–4.5)
PHOSPHATE SERPL-MCNC: 5 MG/DL (ref 2.7–4.5)
PHOSPHATE SERPL-MCNC: 6.5 MG/DL (ref 2.7–4.5)
PLATELET # BLD AUTO: 120 K/UL (ref 150–350)
PLATELET BLD QL SMEAR: ABNORMAL
PMV BLD AUTO: 11.1 FL (ref 9.2–12.9)
PO2 BLDA: 115 MMHG (ref 80–100)
PO2 BLDA: 128 MMHG (ref 80–100)
PO2 BLDA: 213 MMHG (ref 80–100)
POC BE: -11 MMOL/L
POC BE: -11 MMOL/L
POC BE: -12 MMOL/L
POC IONIZED CALCIUM: 0.78 MMOL/L (ref 1.06–1.42)
POC IONIZED CALCIUM: 0.89 MMOL/L (ref 1.06–1.42)
POC SATURATED O2: 100 % (ref 95–100)
POC SATURATED O2: 97 % (ref 95–100)
POC SATURATED O2: 98 % (ref 95–100)
POCT GLUCOSE: 143 MG/DL (ref 70–110)
POCT GLUCOSE: 224 MG/DL (ref 70–110)
POCT GLUCOSE: 256 MG/DL (ref 70–110)
POCT GLUCOSE: 262 MG/DL (ref 70–110)
POCT GLUCOSE: 273 MG/DL (ref 70–110)
POCT GLUCOSE: 288 MG/DL (ref 70–110)
POTASSIUM BLD-SCNC: 3.9 MMOL/L (ref 3.5–5.1)
POTASSIUM BLD-SCNC: 4.1 MMOL/L (ref 3.5–5.1)
POTASSIUM SERPL-SCNC: 3.4 MMOL/L (ref 3.5–5.1)
POTASSIUM SERPL-SCNC: 4.1 MMOL/L (ref 3.5–5.1)
PROT SERPL-MCNC: 5.5 G/DL (ref 6–8.4)
RBC # BLD AUTO: 2.37 M/UL (ref 4–5.4)
SAMPLE: ABNORMAL
SITE: ABNORMAL
SODIUM BLD-SCNC: 134 MMOL/L (ref 136–145)
SODIUM BLD-SCNC: 135 MMOL/L (ref 136–145)
SODIUM SERPL-SCNC: 126 MMOL/L (ref 136–145)
SODIUM SERPL-SCNC: 131 MMOL/L (ref 136–145)
SODIUM SERPL-SCNC: 138 MMOL/L (ref 136–145)
SODIUM SERPL-SCNC: 138 MMOL/L (ref 136–145)
SPHEROCYTES BLD QL SMEAR: ABNORMAL
VANCOMYCIN SERPL-MCNC: 20.2 UG/ML
VT: 340
WBC # BLD AUTO: 15.37 K/UL (ref 3.9–12.7)

## 2019-06-08 PROCEDURE — 80202 ASSAY OF VANCOMYCIN: CPT

## 2019-06-08 PROCEDURE — 84443 ASSAY THYROID STIM HORMONE: CPT

## 2019-06-08 PROCEDURE — 83735 ASSAY OF MAGNESIUM: CPT

## 2019-06-08 PROCEDURE — 94640 AIRWAY INHALATION TREATMENT: CPT

## 2019-06-08 PROCEDURE — 99291 PR CRITICAL CARE, E/M 30-74 MINUTES: ICD-10-PCS | Mod: ,,, | Performed by: INTERNAL MEDICINE

## 2019-06-08 PROCEDURE — 25000003 PHARM REV CODE 250: Performed by: NURSE PRACTITIONER

## 2019-06-08 PROCEDURE — 36556 INSERT NON-TUNNEL CV CATH: CPT

## 2019-06-08 PROCEDURE — 83735 ASSAY OF MAGNESIUM: CPT | Mod: 91

## 2019-06-08 PROCEDURE — 80053 COMPREHEN METABOLIC PANEL: CPT

## 2019-06-08 PROCEDURE — 80069 RENAL FUNCTION PANEL: CPT

## 2019-06-08 PROCEDURE — 63600175 PHARM REV CODE 636 W HCPCS: Performed by: NURSE PRACTITIONER

## 2019-06-08 PROCEDURE — 25000003 PHARM REV CODE 250: Performed by: INTERNAL MEDICINE

## 2019-06-08 PROCEDURE — 84100 ASSAY OF PHOSPHORUS: CPT

## 2019-06-08 PROCEDURE — 87205 SMEAR GRAM STAIN: CPT

## 2019-06-08 PROCEDURE — 36600 WITHDRAWAL OF ARTERIAL BLOOD: CPT

## 2019-06-08 PROCEDURE — 99291 CRITICAL CARE FIRST HOUR: CPT | Mod: ,,, | Performed by: INTERNAL MEDICINE

## 2019-06-08 PROCEDURE — 86704 HEP B CORE ANTIBODY TOTAL: CPT

## 2019-06-08 PROCEDURE — 80074 ACUTE HEPATITIS PANEL: CPT

## 2019-06-08 PROCEDURE — 25000242 PHARM REV CODE 250 ALT 637 W/ HCPCS: Performed by: INTERNAL MEDICINE

## 2019-06-08 PROCEDURE — 63600175 PHARM REV CODE 636 W HCPCS: Performed by: INTERNAL MEDICINE

## 2019-06-08 PROCEDURE — 86706 HEP B SURFACE ANTIBODY: CPT

## 2019-06-08 PROCEDURE — 87070 CULTURE OTHR SPECIMN AEROBIC: CPT

## 2019-06-08 PROCEDURE — 94003 VENT MGMT INPAT SUBQ DAY: CPT

## 2019-06-08 PROCEDURE — 80048 BASIC METABOLIC PNL TOTAL CA: CPT

## 2019-06-08 PROCEDURE — 82330 ASSAY OF CALCIUM: CPT

## 2019-06-08 PROCEDURE — 82803 BLOOD GASES ANY COMBINATION: CPT

## 2019-06-08 PROCEDURE — 85025 COMPLETE CBC W/AUTO DIFF WBC: CPT

## 2019-06-08 PROCEDURE — 87102 FUNGUS ISOLATION CULTURE: CPT

## 2019-06-08 PROCEDURE — 84295 ASSAY OF SERUM SODIUM: CPT

## 2019-06-08 PROCEDURE — S0077 INJECTION, CLINDAMYCIN PHOSP: HCPCS | Performed by: INTERNAL MEDICINE

## 2019-06-08 PROCEDURE — 80069 RENAL FUNCTION PANEL: CPT | Mod: 91

## 2019-06-08 PROCEDURE — S0080 INJECTION, PENTAMIDINE ISETH: HCPCS | Performed by: INTERNAL MEDICINE

## 2019-06-08 PROCEDURE — 85014 HEMATOCRIT: CPT

## 2019-06-08 PROCEDURE — 90945 DIALYSIS ONE EVALUATION: CPT

## 2019-06-08 PROCEDURE — 99900035 HC TECH TIME PER 15 MIN (STAT)

## 2019-06-08 PROCEDURE — 20000000 HC ICU ROOM

## 2019-06-08 PROCEDURE — 84132 ASSAY OF SERUM POTASSIUM: CPT

## 2019-06-08 RX ORDER — POTASSIUM CHLORIDE 14.9 MG/ML
20 INJECTION INTRAVENOUS ONCE
Status: COMPLETED | OUTPATIENT
Start: 2019-06-08 | End: 2019-06-08

## 2019-06-08 RX ORDER — HEPARIN SODIUM 1000 [USP'U]/ML
5000 INJECTION, SOLUTION INTRAVENOUS; SUBCUTANEOUS EVERY 8 HOURS
Status: DISCONTINUED | OUTPATIENT
Start: 2019-06-08 | End: 2019-06-08

## 2019-06-08 RX ORDER — POTASSIUM CHLORIDE 29.8 MG/ML
40 INJECTION INTRAVENOUS ONCE
Status: DISCONTINUED | OUTPATIENT
Start: 2019-06-08 | End: 2019-06-08

## 2019-06-08 RX ORDER — MAGNESIUM SULFATE HEPTAHYDRATE 40 MG/ML
2 INJECTION, SOLUTION INTRAVENOUS
Status: DISPENSED | OUTPATIENT
Start: 2019-06-08 | End: 2019-06-09

## 2019-06-08 RX ORDER — INSULIN ASPART 100 [IU]/ML
0-5 INJECTION, SOLUTION INTRAVENOUS; SUBCUTANEOUS EVERY 6 HOURS PRN
Status: DISCONTINUED | OUTPATIENT
Start: 2019-06-08 | End: 2019-06-13 | Stop reason: HOSPADM

## 2019-06-08 RX ORDER — HEPARIN SODIUM 1000 [USP'U]/ML
4000 INJECTION, SOLUTION INTRAVENOUS; SUBCUTANEOUS
Status: DISCONTINUED | OUTPATIENT
Start: 2019-06-08 | End: 2019-06-13 | Stop reason: HOSPADM

## 2019-06-08 RX ORDER — HEPARIN SODIUM 10000 [USP'U]/100ML
17 INJECTION, SOLUTION INTRAVENOUS CONTINUOUS
Status: DISCONTINUED | OUTPATIENT
Start: 2019-06-08 | End: 2019-06-09

## 2019-06-08 RX ORDER — GLUCAGON 1 MG
1 KIT INJECTION
Status: DISCONTINUED | OUTPATIENT
Start: 2019-06-08 | End: 2019-06-13 | Stop reason: HOSPADM

## 2019-06-08 RX ORDER — POTASSIUM CHLORIDE 7.45 MG/ML
10 INJECTION INTRAVENOUS ONCE
Status: COMPLETED | OUTPATIENT
Start: 2019-06-09 | End: 2019-06-08

## 2019-06-08 RX ORDER — POTASSIUM CHLORIDE 14.9 MG/ML
20 INJECTION INTRAVENOUS ONCE
Status: DISCONTINUED | OUTPATIENT
Start: 2019-06-08 | End: 2019-06-08

## 2019-06-08 RX ADMIN — SODIUM BICARBONATE 650 MG TABLET 1300 MG: at 08:06

## 2019-06-08 RX ADMIN — AZITHROMYCIN MONOHYDRATE 500 MG: 500 INJECTION, POWDER, LYOPHILIZED, FOR SOLUTION INTRAVENOUS at 04:06

## 2019-06-08 RX ADMIN — NYSTATIN 500000 UNITS: 100000 SUSPENSION ORAL at 02:06

## 2019-06-08 RX ADMIN — Medication 150 MCG/HR: at 10:06

## 2019-06-08 RX ADMIN — PIPERACILLIN SODIUM AND TAZOBACTAM SODIUM 4.5 G: 4; .5 INJECTION, POWDER, LYOPHILIZED, FOR SOLUTION INTRAVENOUS at 02:06

## 2019-06-08 RX ADMIN — IPRATROPIUM BROMIDE AND ALBUTEROL SULFATE 3 ML: .5; 3 SOLUTION RESPIRATORY (INHALATION) at 07:06

## 2019-06-08 RX ADMIN — METHYLPREDNISOLONE SODIUM SUCCINATE 40 MG: 40 INJECTION, POWDER, FOR SOLUTION INTRAMUSCULAR; INTRAVENOUS at 08:06

## 2019-06-08 RX ADMIN — HEPARIN SODIUM AND DEXTROSE 17 UNITS/KG/HR: 10000; 5 INJECTION INTRAVENOUS at 03:06

## 2019-06-08 RX ADMIN — NYSTATIN 500000 UNITS: 100000 SUSPENSION ORAL at 04:06

## 2019-06-08 RX ADMIN — HEPARIN SODIUM 4000 UNITS: 1000 INJECTION, SOLUTION INTRAVENOUS; SUBCUTANEOUS at 12:06

## 2019-06-08 RX ADMIN — GUAIFENESIN 200 MG: 200 SOLUTION ORAL at 02:06

## 2019-06-08 RX ADMIN — DOLUTEGRAVIR SODIUM 50 MG: 50 TABLET, FILM COATED ORAL at 08:06

## 2019-06-08 RX ADMIN — CALCIUM GLUCONATE 2000 MG: 98 INJECTION, SOLUTION INTRAVENOUS at 04:06

## 2019-06-08 RX ADMIN — VORICONAZOLE 200 MG: 40 POWDER, FOR SUSPENSION ORAL at 08:06

## 2019-06-08 RX ADMIN — INSULIN ASPART 3 UNITS: 100 INJECTION, SOLUTION INTRAVENOUS; SUBCUTANEOUS at 04:06

## 2019-06-08 RX ADMIN — PIPERACILLIN SODIUM AND TAZOBACTAM SODIUM 4.5 G: 4; .5 INJECTION, POWDER, LYOPHILIZED, FOR SOLUTION INTRAVENOUS at 10:06

## 2019-06-08 RX ADMIN — LACOSAMIDE 100 MG: 50 TABLET, FILM COATED ORAL at 08:06

## 2019-06-08 RX ADMIN — DEXMEDETOMIDINE HYDROCHLORIDE 1.2 MCG/KG/HR: 400 INJECTION INTRAVENOUS at 08:06

## 2019-06-08 RX ADMIN — NYSTATIN 500000 UNITS: 100000 SUSPENSION ORAL at 08:06

## 2019-06-08 RX ADMIN — POTASSIUM CHLORIDE 10 MEQ: 7.46 INJECTION, SOLUTION INTRAVENOUS at 11:06

## 2019-06-08 RX ADMIN — GUAIFENESIN 200 MG: 200 SOLUTION ORAL at 09:06

## 2019-06-08 RX ADMIN — NOREPINEPHRINE BITARTRATE 0.4 MCG/KG/MIN: 1 INJECTION, SOLUTION, CONCENTRATE INTRAVENOUS at 03:06

## 2019-06-08 RX ADMIN — Medication 0.34 MCG/KG/MIN: at 06:06

## 2019-06-08 RX ADMIN — PANTOPRAZOLE SODIUM 40 MG: 40 TABLET, DELAYED RELEASE ORAL at 08:06

## 2019-06-08 RX ADMIN — Medication 0.36 MCG/KG/MIN: at 10:06

## 2019-06-08 RX ADMIN — ENOXAPARIN SODIUM 30 MG: 100 INJECTION SUBCUTANEOUS at 04:06

## 2019-06-08 RX ADMIN — Medication 200 MCG/HR: at 10:06

## 2019-06-08 RX ADMIN — Medication 0.3 MCG/KG/MIN: at 04:06

## 2019-06-08 RX ADMIN — LAMIVUDINE 100 MG: 10 SOLUTION ORAL at 08:06

## 2019-06-08 RX ADMIN — INSULIN ASPART 2 UNITS: 100 INJECTION, SOLUTION INTRAVENOUS; SUBCUTANEOUS at 11:06

## 2019-06-08 RX ADMIN — CHLORHEXIDINE GLUCONATE 0.12% ORAL RINSE 15 ML: 1.2 LIQUID ORAL at 08:06

## 2019-06-08 RX ADMIN — DEXMEDETOMIDINE HYDROCHLORIDE 0.8 MCG/KG/HR: 400 INJECTION INTRAVENOUS at 04:06

## 2019-06-08 RX ADMIN — DEXMEDETOMIDINE HYDROCHLORIDE 0.9 MCG/KG/HR: 400 INJECTION INTRAVENOUS at 12:06

## 2019-06-08 RX ADMIN — GUAIFENESIN 200 MG: 200 SOLUTION ORAL at 05:06

## 2019-06-08 RX ADMIN — PENTAMIDINE ISETHIONATE 240 MG: 300 INJECTION, POWDER, LYOPHILIZED, FOR SOLUTION INTRAMUSCULAR; INTRAVENOUS at 08:06

## 2019-06-08 RX ADMIN — IPRATROPIUM BROMIDE AND ALBUTEROL SULFATE 3 ML: .5; 3 SOLUTION RESPIRATORY (INHALATION) at 01:06

## 2019-06-08 RX ADMIN — INSULIN ASPART 3 UNITS: 100 INJECTION, SOLUTION INTRAVENOUS; SUBCUTANEOUS at 07:06

## 2019-06-08 RX ADMIN — CALCIUM GLUCONATE 1000 MG: 94 INJECTION, SOLUTION INTRAVENOUS at 11:06

## 2019-06-08 RX ADMIN — SODIUM BICARBONATE 650 MG TABLET 1300 MG: at 02:06

## 2019-06-08 RX ADMIN — CLINDAMYCIN IN 5 PERCENT DEXTROSE 900 MG: 18 INJECTION, SOLUTION INTRAVENOUS at 05:06

## 2019-06-08 RX ADMIN — POTASSIUM CHLORIDE 20 MEQ: 200 INJECTION, SOLUTION INTRAVENOUS at 07:06

## 2019-06-08 RX ADMIN — CALCIUM GLUCONATE 2000 MG: 98 INJECTION, SOLUTION INTRAVENOUS at 01:06

## 2019-06-08 RX ADMIN — VANCOMYCIN HYDROCHLORIDE 500 MG: 500 INJECTION, POWDER, LYOPHILIZED, FOR SOLUTION INTRAVENOUS at 06:06

## 2019-06-08 RX ADMIN — LACOSAMIDE 100 MG: 50 TABLET, FILM COATED ORAL at 09:06

## 2019-06-08 NOTE — OP NOTE
DATE OF PROCEDURE:  06/08/2019    PREOPERATIVE DIAGNOSIS:  Acute renal failure.    POSTOPERATIVE DIAGNOSIS:  Acute renal failure.    PROCEDURE PERFORMED:  Insertion of right femoral Vas-Cath catheter.    SURGEON:  Gerardo Mckeon M.D.    ANESTHESIA:  Local.    DESCRIPTION OF PROCEDURE:  With the patient in supine position and intubated, I   prepped and draped the right groin.  I anesthetized the area in the right groin   with Xylocaine.  Using ultrasound guidance, I placed a needle into the right   femoral vein, which a wire was placed.  I placed a dilator wire.  I then placed   a peel-away sheath over the wire, removed the introducer, inserted the Vas-Cath   through the sheath.  I then peeled away the sheath.  I placed the cuff beneath   the skin.  There was good blood return in both ports.  It was flushed with   heparinized saline solution and then secured the catheter to the skin with 2-0   Prolene suture.  A sterile dressing was applied.  The patient tolerated the   procedure well.      AJWAGNER/IN  dd: 06/08/2019 11:22:55 (CDT)  td: 06/08/2019 12:37:06 (JAYNA)  Doc ID   #[id number]  Job ID #[id number]    CC:

## 2019-06-08 NOTE — PROGRESS NOTES
[facility] Hospital Medicine  Progress Note    Patient Name: Wang Kebede  MRN: [id number]  Patient Class: IP- Inpatient   Admission Date: 6/3/2019  Length of Stay: 3 days  Attending Physician: Gerardo Mccauley MD  Primary Care Provider: Levi Moncada MD        Subjective:     Principal Problem:Severe sepsis    HPI:  33-year-old female with significant medical history including AIDS who has history of noncompliance with medical plan of care presents today for complaint of malaise over the past week.  Modifying factors none.  Associated symptoms:  Cough, headache.  Prior treatment not effective with home care.  Patient was evaluated emergency room.  Significant findings and workup include sinusitis findings on CT.  Cultures pending.  WBC and lactic both negative.  Patient was started on IV antibiotics and given IV fluids.  During ER care patient became more tachycardic and developed fever.  Infectious Disease was consulted by ED who recommended observation.  Hospital Medicine was consulted patient admitted to observation.  To note patient's last CD4 count 39  three months ago.    Hospital Course:  33 year old woman with AIDS-poorly compliant with HAART -last cd4 count -was 39 (02/2019).  She was admitted at this time for febrile illness hemodynamically unstable requiring vasopressors to maintain blood pressure.  Infectious Disease evaluated and assisting management.  Started broad empiric antibiotic treatment.  Continued to run intermittent high fevers.  Hemodynamics improved and weaned off vasopressors.  Remained tachycardic to varying degrees.  EKG consistently showed sinus tachycardia or SVT.  Rate controlled with as needed Metoprolol IV.  Hypoxemic respiratory failure on room air corrected with continuous BiPAP.    Interval History:  Heart rate remains fast.  Maintaining oxygenation by continuous BiPAP but increasingly tachypneic.  Intermittent fevers continue but to a lower  grade.    Review of Systems   Constitutional: Positive for activity change and fever. Negative for chills, diaphoresis and fatigue.   HENT: Positive for congestion and sinus pressure. Negative for sore throat and voice change.    Eyes: Negative for photophobia and visual disturbance.   Respiratory: Negative for cough, shortness of breath, wheezing and stridor.    Cardiovascular: Negative for chest pain and leg swelling.   Gastrointestinal: Negative for abdominal distention, abdominal pain, constipation, diarrhea, nausea and vomiting.   Endocrine: Negative for polydipsia, polyphagia and polyuria.   Genitourinary: Negative for difficulty urinating, dysuria, flank pain, pelvic pain, urgency and vaginal discharge.   Musculoskeletal: Negative for back pain, joint swelling, neck pain and neck stiffness.   Skin: Negative for color change and rash.   Allergic/Immunologic: Negative for immunocompromised state.   Neurological: Positive for headaches. Negative for dizziness, syncope, weakness and numbness.   Hematological: Does not bruise/bleed easily.   Psychiatric/Behavioral: Negative for agitation, behavioral problems and confusion.     Objective:     Vital Signs (Most Recent):  Temp: 99.6 °F (37.6 °C) (06/07/19 1905)  Pulse: 93 (06/07/19 1950)  Resp: (!) 38 (06/07/19 1950)  BP: (!) 112/57 (06/07/19 1950)  SpO2: 100 % (06/07/19 1950) Vital Signs (24h Range):  Temp:  [98.6 °F (37 °C)-99.6 °F (37.6 °C)] 99.6 °F (37.6 °C)  Pulse:  [] 93  Resp:  [28-55] 38  SpO2:  [92 %-100 %] 100 %  BP: ()/(18-91) 112/57     Weight: 59.8 kg (131 lb 13.4 oz)  Body mass index is 25.75 kg/m².    Intake/Output Summary (Last 24 hours) at 6/7/2019 2019  Last data filed at 6/7/2019 1900  Gross per 24 hour   Intake 2257.45 ml   Output 1341 ml   Net 916.45 ml      Physical Exam   Constitutional: She is oriented to person, place, and time. She appears well-developed and well-nourished. No distress.   Acutely ill-appearing female   HENT:    Head: Normocephalic and atraumatic.   Nose: Nose normal.   Positive frontal and maxillary sinus tenderness   Eyes: Pupils are equal, round, and reactive to light. Conjunctivae and EOM are normal. No scleral icterus.   Neck: Normal range of motion. Neck supple. No tracheal deviation present.   Cardiovascular: Regular rhythm, normal heart sounds and intact distal pulses.   No murmur heard.  Tachycardia   Pulmonary/Chest: Effort normal and breath sounds normal. No stridor. No respiratory distress. She has no wheezes. She has no rales.   Abdominal: Soft. Bowel sounds are normal. She exhibits no distension. There is no tenderness. There is no guarding.   Genitourinary:   Genitourinary Comments: -   Musculoskeletal: Normal range of motion. She exhibits no edema or deformity.   Neurological: She is alert and oriented to person, place, and time. No cranial nerve deficit.   Skin: Skin is warm and dry. Capillary refill takes less than 2 seconds. No rash noted. She is not diaphoretic.   Psychiatric: She has a normal mood and affect. Her behavior is normal. Judgment and thought content normal.   Nursing note and vitals reviewed.      Significant Labs: All pertinent labs within the past 24 hours have been reviewed.    Significant Imaging: I have reviewed and interpreted all pertinent imaging results/findings within the past 24 hours.    Assessment/Plan:      * Severe sepsis  Potentially related to sinusitis.  Patient received IV fluids and was started on IV antibiotics.  Lactic negative.  She is an immunocompromised host . Will start broad spectrum antimicrobial therapy including antifungal therapy -will use vancomycin, zosyn and empiric Voriconazole.  Will send blood cultures, fungal cultures, fungitell assay , AFB cultures.  Infectious Disease assisting with management.    Acute sinusitis  ill continue empiric broad spectrum therapy with Zosyn, will follow fever curve.  On Voriconazole- PO not IV due to renal impairment  ,  continue Vanco.  Monitor closely     Acute respiratory failure with hypoxia  Maintaining oxygenation via continuous BiPAP.  Increasing respiratory distress anticipate she will require intubation.    AIDS (acquired immunodeficiency syndrome), CD4 <=200  Resume patient's HAART therapy.  Patient being treated with Rocephin for sinusitis will add on azithromycin and dapsone for further prophylaxis given patient's immunocompromised state.  She has poor compliance to therapy and follow up.  Will check cd4 count , HIV viral load.  Will continue Striblid.  OI prophylaxis with Dapsone and Azithromycin.  Infectious Disease following.    History of VT/VF s/p implantation of automatic cardioverter/defibrillator (AICD)  Cardiac monitoring    Seizure disorder  Continue patient's home medication  Seizure precautions.  will continue lacosamide and will monitor closely      VTE Risk Mitigation (From admission, onward)        Ordered     enoxaparin injection 30 mg  Daily      06/04/19 1615     Place sequential compression device  Until discontinued      06/03/19 2020          Critical care time spent on the evaluation and treatment of severe organ dysfunction, review of pertinent labs and imaging studies, discussions with consulting providers and discussions with patient/family: 30 minutes.    Gerardo Mccauley MD  Department of Hospital Medicine   [facility]

## 2019-06-08 NOTE — ASSESSMENT & PLAN NOTE
06/05- she remains  Febrile..  Will send fungal, bacterial and mycobacterial cultures .  The fever can also be from AIDS itself.  Will do Pan Ct scan of the chest, abdomen and pelvis.  Will continue Vanco,zosyn, Zithromax  Add mepron for PCP    06/06-with advanced AIDS, PCP is of concern, will discuss with pharmacy and will use IV pentamidine ,she might need intubation .  Will continue voriconazole,vancomycin, zosyn   Clindamycin/primaquin is an option for PCP   Will add steroids for suspected severe PCP  06/07-  She is now intubated.    Will continue empiric vanco/zosyn -send cultures to guide therapy.  Will add pentamidine for severe PCP  Continue steroids  Continue empiric Voriconazole and Zithromax.

## 2019-06-08 NOTE — PROGRESS NOTES
Pharmacokinetic Assessment Follow Up: IV Vancomycin    Vancomycin serum concentration assessment(s):    Vancomycin random level = 20.2 mcg/ml (~50 hour level)   The random level was drawn correctly and can be used to guide therapy at this time.    Vancomycin Regimen Plan:    Re-dose when the random level is less than 20 mcg/mL, next level to be drawn at 0430 on 06/09     Pharmacy will continue to follow and monitor vancomycin.    Please contact pharmacy at extension [phone] for questions regarding this assessment.    Thank you for the consult,   Nathalie Ken     Patient brief summary:  Wang Kebede is a 33 y.o. female initiated on antimicrobial therapy with IV Vancomycin for treatment of suspected severe sepsis    The patient received a loading dose, followed by the current treatment regimen: random levels with plan to redose when level is less than 20 mcg/mL    Drug Allergies:   Review of patient's allergies indicates:   Allergen Reactions    Iodine and iodide containing products Anaphylaxis     Pt states she coded last time she was administered contrast    Pneumococcal 23-chitra ps vaccine Anaphylaxis     Potential iodine containing    Dilaudid [hydromorphone] Hives and Itching    Bactrim [sulfamethoxazole-trimethoprim] Hives    Morphine Itching     Tolerates norco 2018       Actual Body Weight:   59.8 kg    Renal Function:   Estimated Creatinine Clearance: 14.9 mL/min (A) (based on SCr of 4.3 mg/dL (H)).,     Dialysis Method (if applicable):  N/A     CBC (last 72 hours):  Recent Labs   Lab Result Units 06/06/19  0600 06/07/19  0330 06/08/19  0435   WBC K/uL 9.27 6.66 15.37*   Hemoglobin g/dL 7.3* 7.0* 7.2*   Hematocrit % 21.6* 20.2* 20.8*   Platelets K/uL 95* 77* 120*   Gran% % 94.2* 84.0*  --    Lymph% % 5.5* 16.1*  --    Mono% % 1.5* 0.9*  --    Eosinophil% % 0.4 0.2  --    Basophil% % 0.1 0.2  --    Differential Method  Automated Automated  --        Metabolic Panel (last 72 hours):  Recent  Labs   Lab Result Units 06/05/19  1416 06/06/19  0600 06/06/19  1714 06/07/19  0330 06/08/19  0435   Sodium mmol/L 135* 141  --  142  142 138  138   Sodium Urine Random mmol/L  --   --  56  --   --    Potassium mmol/L 4.0 4.1  --  3.4*  3.4* 3.4*  3.4*   Chloride mmol/L 116* 121*  --  118*  118* 109  109   CO2 mmol/L 11* 11*  --  12*  12* 14*  14*   Glucose mg/dL 163* 84  --  151*  151* 243*  243*   BUN, Bld mg/dL 26* 30*  --  40*  40* 61*  61*   Creatinine mg/dL 2.2* 2.6*  --  3.2*  3.2* 4.3*  4.3*   Albumin g/dL  --  1.4*  --  1.2*  1.2* 1.2*  1.2*   Total Bilirubin mg/dL  --  0.3  --  0.3 0.3   Alkaline Phosphatase U/L  --  58  --  70 88   AST U/L  --  54*  --  40 36   ALT U/L  --  19  --  17 17   Magnesium mg/dL  --  1.6  --  2.1 2.2   Phosphorus mg/dL  --   --   --  4.2 6.5*       Vancomycin Administrations:  vancomycin given in the last 96 hours                     vancomycin 500 mg in dextrose 5 % 100 mL IVPB (ready to mix system) (mg) 500 mg New Bag 06/06/19 0246                      Drug levels (last 3 results):  Recent Labs   Lab Result Units 06/07/19  0330 06/08/19  0436   Vancomycin, Random ug/mL  --  20.2   Vancomycin-Trough ug/mL 25.4*  --        Microbiologic Results:  Microbiology Results (last 7 days)       Procedure Component Value Units Date/Time    Blood culture #1 **CANNOT BE ORDERED STAT** [[id number]] Collected:  06/03/19 1500    Order Status:  Completed Specimen:  Blood from Peripheral, Forearm, Left Updated:  06/07/19 2212     Blood Culture, Routine No Growth to date     Blood Culture, Routine No Growth to date     Blood Culture, Routine No Growth to date     Blood Culture, Routine No Growth to date     Blood Culture, Routine No Growth to date    Blood culture #2 **CANNOT BE ORDERED STAT** [[id number]] Collected:  06/03/19 1530    Order Status:  Completed Specimen:  Blood from Peripheral, Wrist, Right Updated:  06/07/19 2212     Blood Culture, Routine No Growth to date      Blood Culture, Routine No Growth to date     Blood Culture, Routine No Growth to date     Blood Culture, Routine No Growth to date     Blood Culture, Routine No Growth to date    AFB Culture & Smear [[id number]] Collected:  06/06/19 0425    Order Status:  Completed Specimen:  Blood Updated:  06/07/19 2127     AFB Culture & Smear Culture in progress    CSF culture and Gram Stain (Tube 2) [[id number]] Collected:  06/03/19 1353    Order Status:  Completed Specimen:  CSF (Spinal Fluid) from CSF Tap, Tube 2 Updated:  06/07/19 0751     CSF CULTURE No Growth to date     Gram Stain Result Cytospin indicates:      No organisms seen      No WBC's    Narrative:       On which sequentially labeled tube should this analysis be  performed?->2    Clostridium difficile EIA [[id number]]     Order Status:  Canceled Specimen:  Stool     Fungus Culture, Blood or Bone Marrow [[id number]] Collected:  06/05/19 0344    Order Status:  Sent Specimen:  Blood Updated:  06/05/19 0930    AFB Culture & Smear (Tube 3) [[id number]] Collected:  06/03/19 1353    Order Status:  Completed Specimen:  CSF (Spinal Fluid) from CSF Tap, Tube 2 Updated:  06/05/19 0927     AFB Culture & Smear Culture in progress     AFB CULTURE STAIN No acid fast bacilli seen.    AFB Culture & Smear [[id number]]     Order Status:  Sent Specimen:  Blood     Cryptococcal antigen, CSF (Tube 3) [[id number]] Collected:  06/03/19 1353    Order Status:  Completed Specimen:  CSF (Spinal Fluid) from CSF Tap, Tube 2 Updated:  06/04/19 1106     Crypto Ag, CSF Negative    Narrative:       On which sequentially labeled tube should this analysis be  performed?->3    Fungus culture (Tube 3) [[id number]] Collected:  06/03/19 1353    Order Status:  Completed Specimen:  CSF (Spinal Fluid) from CSF Tap, Tube 2 Updated:  06/04/19 1013     Fungus (Mycology) Culture Culture in progress

## 2019-06-08 NOTE — PROGRESS NOTES
Ca 5.0, Kylie NP notified and stated to get ABG with lytes.  RT notified of needed ABG with lytes.

## 2019-06-08 NOTE — SUBJECTIVE & OBJECTIVE
Interval History:  Patient has been intubated and now on the ventilator sedated.  Overall acute kidney injury has worsened with minimal urine output.  Proceed with CRRT.  Vascular surgery consulted.  Consent obtained.    Review of patient's allergies indicates:   Allergen Reactions    Iodine and iodide containing products Anaphylaxis     Pt states she coded last time she was administered contrast    Pneumococcal 23-chitra ps vaccine Anaphylaxis     Potential iodine containing    Dilaudid [hydromorphone] Hives and Itching    Bactrim [sulfamethoxazole-trimethoprim] Hives    Morphine Itching     Tolerates norco 2018     Current Facility-Administered Medications   Medication Frequency    acetaminophen oral solution 650 mg Q6H PRN    albuterol-ipratropium 2.5 mg-0.5 mg/3 mL nebulizer solution 3 mL Q6H    azithromycin 500 mg in dextrose 5 % 250 mL IVPB (ready to mix system) Q24H    chlorhexidine 0.12 % solution 15 mL BID    dexmedetomidine (PRECEDEX) 400mcg/100mL 0.9% NaCL infusion Continuous    dextrose 50% injection 12.5 g PRN    diphenhydrAMINE capsule 25 mg Q6H PRN    dolutegravir Tab 50 mg Daily    enoxaparin injection 30 mg Daily    fentaNYL 2500 mcg in D5W 250 mL infusion premix (titrating) (conc: 10 mcg/mL) Continuous    glucagon (human recombinant) injection 1 mg PRN    guaifenesin 100 mg/5 ml syrup 200 mg Q4H    heparin (porcine) injection 4,000 Units PRN    insulin aspart U-100 pen 0-5 Units Q6H PRN    lacosamide tablet 100 mg BID    lamiVUDine 10 mg/mL solution 100 mg Daily    levalbuterol nebulizer solution 0.63 mg Q4H PRN    methylPREDNISolone sodium succinate injection 40 mg BID    norepinephrine 4 mg in dextrose 5% 250 mL infusion (premix) (titrating) Continuous PRN    nystatin 100,000 unit/mL suspension 500,000 Units QID    ondansetron disintegrating tablet 4 mg Once    ondansetron injection 4 mg Q8H PRN    pantoprazole EC tablet 40 mg Daily    pentamidine (PENTAM) 240 mg in  dextrose 5 % 250 mL IVPB Q24H    piperacillin-tazobactam 4.5 g in dextrose 5 % 100 mL IVPB (ready to mix system) Q12H    sodium bicarbonate tablet 1,300 mg TID    tenofovir tablet 300 mg Q72H    [START ON 6/12/2019] vancomycin 500 mg PLACEHOLDER DOSE ONLY in dextrose 5 % 100 mL IVPB (ready to mix system) Q72H    voriconazole 200 mg/5 mL (40 mg/mL) suspension 200 mg Q12H       Objective:     Vital Signs (Most Recent):  Temp: 100.1 °F (37.8 °C) (06/08/19 1115)  Pulse: 82 (06/08/19 1306)  Resp: (!) 28 (06/08/19 1306)  BP: 107/63 (06/08/19 1300)  SpO2: 100 % (06/08/19 1306)  O2 Device (Oxygen Therapy): ventilator (06/08/19 1306) Vital Signs (24h Range):  Temp:  [98.9 °F (37.2 °C)-100.1 °F (37.8 °C)] 100.1 °F (37.8 °C)  Pulse:  [] 82  Resp:  [0-55] 28  SpO2:  [92 %-100 %] 100 %  BP: ()/(18-91) 107/63     Weight: 59.8 kg (131 lb 13.4 oz) (06/07/19 0500)  Body mass index is 25.75 kg/m².  Body surface area is 1.59 meters squared.    I/O last 3 completed shifts:  In: 3817.2 [I.V.:2747.2; NG/GT:220; IV Piggyback:850]  Out: 1774 [Urine:1474; Drains:300]    Physical Exam   Constitutional:   Intubated ,sedated   HENT:   Head: Normocephalic and atraumatic.   Eyes: Pupils are equal, round, and reactive to light. EOM are normal.   Neck: Normal range of motion. Neck supple.   Cardiovascular: Normal heart sounds. Exam reveals no friction rub.   No murmur heard.  Pulmonary/Chest: Effort normal. No stridor. No respiratory distress.   Intubated,sedated   Abdominal: Soft. Bowel sounds are normal. She exhibits no distension. There is no tenderness. There is no guarding.   Musculoskeletal: She exhibits no edema or deformity.   Neurological:   intubated.   Skin: Skin is warm and dry. No erythema. No pallor.   Nursing note and vitals reviewed.      Significant Labs:  All labs within the past 24 hours have been reviewed.     Significant Imaging:  Labs: Reviewed  X-Ray: Reviewed

## 2019-06-08 NOTE — PROGRESS NOTES
Pt mother at bedside and instructed to take ALL of pt personal belongings home, including phone and two chargers. Pt mother verbalized understanding.

## 2019-06-08 NOTE — PLAN OF CARE
Problem: Adult Inpatient Plan of Care  Goal: Plan of Care Review  Outcome: Ongoing (interventions implemented as appropriate)  Hypotensive early in shift. Levophed increased as ordered. Fentanyl and Precedex also titrated up. Pt arouses to voice and follows commands. Tolerating ventilator. VSS. Afebrile. UO ~5 cc/hr. MD Vickie aware. OG to LI suction. CBGs >250. Dr. Johnston informed. Will pass on to day shift. No complaints of pain. Pt turned Q2H with pressure points protected. POC reviewed with pt and family. All questions and concerns addressed.

## 2019-06-08 NOTE — ASSESSMENT & PLAN NOTE
6/6 Suspect aspiration - will evaluate with speech therapy  6/7 worsening - will check Chest X Ray may need intubation  6/8 Intubated , dialysis today. Will try to get consent for bronchoscopy

## 2019-06-08 NOTE — ASSESSMENT & PLAN NOTE
Maintaining oxygenation via continuous BiPAP.  Increasing respiratory distress anticipate she will require intubation.  Intubated 07 June.

## 2019-06-08 NOTE — PROGRESS NOTES
Pharmacist Renal Dose Adjustment Note    Wang Kebede is a 33 y.o. female being treated with the medication Zosyn    Patient Data:    Vital Signs (Most Recent):  Temp: 99.2 °F (37.3 °C) (06/08/19 1515)  Pulse: 72 (06/08/19 1700)  Resp: (!) 27 (06/08/19 1700)  BP: (!) 139/56 (06/08/19 1700)  SpO2: 100 % (06/08/19 1700)   Vital Signs (72h Range):  Temp:  [98.6 °F (37 °C)-102.1 °F (38.9 °C)]   Pulse:  []   Resp:  [0-55]   BP: ()/(18-91)   SpO2:  [83 %-100 %]      Recent Labs   Lab 06/07/19  0330 06/08/19  0435 06/08/19  1013   CREATININE 3.2*  3.2* 4.3*  4.3* 4.4*     Serum creatinine: 4.4 mg/dL (H) 06/08/19 1013  Estimated creatinine clearance: 14.6 mL/min (A)    Medication: Zosyn 4.5 g IV (extended infusion) every 12 hours will be increased to Zosyn 4.5 g IV (extended infusion) every 8 hours due to initiation of CRRT     Pharmacist's Name: Tarsha Lopez

## 2019-06-08 NOTE — PROGRESS NOTES
1000-Vas cath and CRRT consent obtained by Beau GIRALDO, MD called pt mother and reviewed risk vs benefits.  This RN verified that mother understood and gave consent to proceed.      1115- Right groin vas cath placed by Mike GIRALDO with no complications.  Site CDI.  Will monitor.

## 2019-06-08 NOTE — PLAN OF CARE
Problem: Adult Inpatient Plan of Care  Goal: Plan of Care Review  Outcome: Ongoing (interventions implemented as appropriate)  Pt remains intubated and sedated.  Pt has vas cath placed and started on CRRT.  Pt tolerating well.  Pt also placed on speciality bed this shift.  Pt remains on precedex, fentanyl, and levo, triturating as tolerates.  Pt started on heparin for CRRT, non triturating.  4g calcium gluconate replaced.  Pt is NSR/ST on the heart monitor.  GI: OG tube in place with output matching intake.  : criticore navarrete in place with no urine output.  Pt afebrile this shift.  Pt turned and repositioned with use of pillows and wedge.  PICC intact with no redness, swelling or drainage.  Bed low, wheels locked, sammy soft wrist restraints in place for pt safety.  Plan of care reviewed with pt mother.  Pt mother spoke with MD via phone about planned bronch for tomorrow, mother gives consent for bronch.  Will continue to monitor.

## 2019-06-08 NOTE — PROGRESS NOTES
"[facility]  Infectious Disease  Progress Note    Patient Name: Wang Kebede  MRN: [id number]  Admission Date: 6/3/2019  Length of Stay: 4 days  Attending Physician: Gerardo Mccauley MD  Primary Care Provider: Levi Moncada MD    Isolation Status: No active isolations  Assessment/Plan:      * Severe sepsis  06/05- she remains  Febrile..  Will send fungal, bacterial and mycobacterial cultures .  The fever can also be from AIDS itself.  Will do Pan Ct scan of the chest, abdomen and pelvis.  Will continue Vanco,zosyn, Zithromax  Add mepron for PCP    06/06-with advanced AIDS, PCP is of concern, will discuss with pharmacy and will use IV pentamidine ,she might need intubation .  Will continue voriconazole,vancomycin, zosyn   Clindamycin/primaquin is an option for PCP   Will add steroids for suspected severe PCP  06/07-  She is now intubated.    Will continue empiric vanco/zosyn -send cultures to guide therapy.  Will add pentamidine for severe PCP  Continue steroids  Continue empiric Voriconazole and Zithromax.    Pneumonia due to infectious organism  06/07- will continue empiric Vanco/zosyn, will send tracheal cultures.  Also send cytology for PCP      Acute respiratory failure with hypoxia  06/06- will do stat ABG, might need intubation  06/07- will continue ventilatory support and wean as tolerated.    AIDS (acquired immunodeficiency syndrome), CD4 <=200  06/04- she has advanced AIDS -will continue Striblid.    I asked her-"what can we do to make you take the medications "  Her response- I am trying to do that .  She is not trying well enough .  She has very poor immunologic control.  The risk of death was carefully explained to her.    06/06- will adjust medication due to renal impairment, will send HIV genotype,  Will use Tivicay 50mg daily, tenofovir one tablet every 72 hours  and lamivudine  100 mg daily         Anticipated Disposition:     Thank you for your consult. I will " follow-up with patient. Please contact us if you have any additional questions.    Hubert Mayo MD  Infectious Disease  [facility] -     Subjective:     Principal Problem:Severe sepsis    HPI: No notes on file  Interval History: 33 year old woman with AIDS-she is now intubated.    Review of Systems   Unable to perform ROS: Intubated     Objective:     Vital Signs (Most Recent):  Temp: 99.4 °F (37.4 °C) (06/08/19 0305)  Pulse: 84 (06/08/19 0635)  Resp: (!) 28 (06/08/19 0635)  BP: (!) 99/57 (06/08/19 0635)  SpO2: 100 % (06/08/19 0635) Vital Signs (24h Range):  Temp:  [98.8 °F (37.1 °C)-99.6 °F (37.6 °C)] 99.4 °F (37.4 °C)  Pulse:  [] 84  Resp:  [26-55] 28  SpO2:  [92 %-100 %] 100 %  BP: ()/(18-91) 99/57     Weight: 59.8 kg (131 lb 13.4 oz)  Body mass index is 25.75 kg/m².    Estimated Creatinine Clearance: 14.9 mL/min (A) (based on SCr of 4.3 mg/dL (H)).    Physical Exam   Constitutional:   Intubated ,sedated   HENT:   Head: Normocephalic and atraumatic.   Eyes: Pupils are equal, round, and reactive to light. EOM are normal.   Neck: Normal range of motion. Neck supple.   Cardiovascular: Normal heart sounds. Exam reveals no friction rub.   No murmur heard.  Pulmonary/Chest: Effort normal. No stridor. No respiratory distress.   Intubated,sedated   Abdominal: Soft. Bowel sounds are normal. She exhibits no distension. There is no tenderness. There is no guarding.   Musculoskeletal: She exhibits no edema or deformity.   Neurological:   intubated.   Skin: Skin is warm and dry. No erythema. No pallor.   Nursing note and vitals reviewed.      Significant Labs:   Blood Culture:   Recent Labs   Lab 12/25/18  0039 01/24/19  0000 01/24/19  0015 06/03/19  1500 06/03/19  1530   LABBLOO No growth after 5 days. No growth after 5 days. No growth after 5 days. No Growth to date  No Growth to date  No Growth to date  No Growth to date  No Growth to date No Growth to date  No Growth to date  No Growth to  date  No Growth to date  No Growth to date     BMP:   Recent Labs   Lab 06/08/19  0435   *  243*     138   K 3.4*  3.4*     109   CO2 14*  14*   BUN 61*  61*   CREATININE 4.3*  4.3*   CALCIUM 5.4*  5.4*   MG 2.2     All pertinent labs within the past 24 hours have been reviewed.    Significant Imaging: I have reviewed all pertinent imaging results/findings within the past 24 hours.

## 2019-06-08 NOTE — SUBJECTIVE & OBJECTIVE
Interval History:  Intubated and sedated.  Tachycardia and intermittent fevers.  Worsening renal function.  Placing vascular catheter to begin CRRT.    Review of Systems   Unable to perform ROS: Intubated   Constitutional: Positive for activity change and fever. Negative for chills, diaphoresis and fatigue.   HENT: Positive for congestion and sinus pressure. Negative for sore throat and voice change.    Eyes: Negative for photophobia and visual disturbance.   Respiratory: Negative for cough, shortness of breath, wheezing and stridor.    Cardiovascular: Negative for chest pain and leg swelling.   Gastrointestinal: Negative for abdominal distention, abdominal pain, constipation, diarrhea, nausea and vomiting.   Endocrine: Negative for polydipsia, polyphagia and polyuria.   Genitourinary: Negative for difficulty urinating, dysuria, flank pain, pelvic pain, urgency and vaginal discharge.   Musculoskeletal: Negative for back pain, joint swelling, neck pain and neck stiffness.   Skin: Negative for color change and rash.   Allergic/Immunologic: Negative for immunocompromised state.   Neurological: Positive for headaches. Negative for dizziness, syncope, weakness and numbness.   Hematological: Does not bruise/bleed easily.   Psychiatric/Behavioral: Negative for agitation, behavioral problems and confusion.     Objective:     Vital Signs (Most Recent):  Temp: 99.2 °F (37.3 °C) (06/08/19 1515)  Pulse: 72 (06/08/19 1700)  Resp: (!) 27 (06/08/19 1700)  BP: (!) 139/56 (06/08/19 1700)  SpO2: 100 % (06/08/19 1700) Vital Signs (24h Range):  Temp:  [98.9 °F (37.2 °C)-100.1 °F (37.8 °C)] 99.2 °F (37.3 °C)  Pulse:  [] 72  Resp:  [0-47] 27  SpO2:  [93 %-100 %] 100 %  BP: ()/(18-91) 139/56     Weight: 59.8 kg (131 lb 13.4 oz)  Body mass index is 25.75 kg/m².    Intake/Output Summary (Last 24 hours) at 6/8/2019 1739  Last data filed at 6/8/2019 1700  Gross per 24 hour   Intake 3091 ml   Output 547 ml   Net 2544 ml       Physical Exam   Constitutional: She is oriented to person, place, and time. She appears well-developed and well-nourished. No distress.   Acutely ill-appearing female   HENT:   Head: Normocephalic and atraumatic.   Nose: Nose normal.   Positive frontal and maxillary sinus tenderness   Eyes: Pupils are equal, round, and reactive to light. Conjunctivae and EOM are normal. No scleral icterus.   Neck: Normal range of motion. Neck supple. No tracheal deviation present.   Cardiovascular: Regular rhythm, normal heart sounds and intact distal pulses.   No murmur heard.  Tachycardia   Pulmonary/Chest: Effort normal and breath sounds normal. No stridor. No respiratory distress. She has no wheezes. She has no rales.   Abdominal: Soft. Bowel sounds are normal. She exhibits no distension. There is no tenderness. There is no guarding.   Genitourinary:   Genitourinary Comments: -   Musculoskeletal: Normal range of motion. She exhibits no edema or deformity.   Neurological: She is alert and oriented to person, place, and time. No cranial nerve deficit.   Skin: Skin is warm and dry. Capillary refill takes less than 2 seconds. No rash noted. She is not diaphoretic.   Psychiatric: She has a normal mood and affect. Her behavior is normal. Judgment and thought content normal.   Nursing note and vitals reviewed.      Significant Labs: All pertinent labs within the past 24 hours have been reviewed.    Significant Imaging: I have reviewed and interpreted all pertinent imaging results/findings within the past 24 hours.

## 2019-06-08 NOTE — PLAN OF CARE
CRRT started per p/p and Dr. Yanez orders. See orders. Tolerating. Lines secure. Supplies at bs. See crrt flow sheet. Reported to primary nurse.

## 2019-06-08 NOTE — PROGRESS NOTES
[facility] -   Nephrology  Progress Note    Patient Name: Wang Kebede  MRN: [id number]  Admission Date: 6/3/2019  Hospital Length of Stay: 4 days  Attending Provider: Gerardo Mccauley MD   Primary Care Physician: Levi Moncada MD  Principal Problem:Severe sepsis    Subjective:     HPI: Wang Kebede is a 33-year-old  woman with history of HIV/aids, noncompliance with treatment, vulvar carcinoma, sickle cell disease, was admitted to the hospital about 2 days ago for some shortness of breath and cough.  Her serum creatinine gradually worsened from about 1.5 mg/dL to 2.6 mg/dL today, also his urine output has dropped, also severe metabolic acidosis noted on her labs, we were consulted for acute on chronic kidney failure and metabolic acidosis.    Interval History:  Patient has been intubated and now on the ventilator sedated.  Overall acute kidney injury has worsened with minimal urine output.  Proceed with CRRT.  Vascular surgery consulted.  Consent obtained.    Review of patient's allergies indicates:   Allergen Reactions    Iodine and iodide containing products Anaphylaxis     Pt states she coded last time she was administered contrast    Pneumococcal 23-chitra ps vaccine Anaphylaxis     Potential iodine containing    Dilaudid [hydromorphone] Hives and Itching    Bactrim [sulfamethoxazole-trimethoprim] Hives    Morphine Itching     Tolerates norco 2018     Current Facility-Administered Medications   Medication Frequency    acetaminophen oral solution 650 mg Q6H PRN    albuterol-ipratropium 2.5 mg-0.5 mg/3 mL nebulizer solution 3 mL Q6H    azithromycin 500 mg in dextrose 5 % 250 mL IVPB (ready to mix system) Q24H    chlorhexidine 0.12 % solution 15 mL BID    dexmedetomidine (PRECEDEX) 400mcg/100mL 0.9% NaCL infusion Continuous    dextrose 50% injection 12.5 g PRN    diphenhydrAMINE capsule 25 mg Q6H PRN    dolutegravir Tab 50 mg Daily     enoxaparin injection 30 mg Daily    fentaNYL 2500 mcg in D5W 250 mL infusion premix (titrating) (conc: 10 mcg/mL) Continuous    glucagon (human recombinant) injection 1 mg PRN    guaifenesin 100 mg/5 ml syrup 200 mg Q4H    heparin (porcine) injection 4,000 Units PRN    insulin aspart U-100 pen 0-5 Units Q6H PRN    lacosamide tablet 100 mg BID    lamiVUDine 10 mg/mL solution 100 mg Daily    levalbuterol nebulizer solution 0.63 mg Q4H PRN    methylPREDNISolone sodium succinate injection 40 mg BID    norepinephrine 4 mg in dextrose 5% 250 mL infusion (premix) (titrating) Continuous PRN    nystatin 100,000 unit/mL suspension 500,000 Units QID    ondansetron disintegrating tablet 4 mg Once    ondansetron injection 4 mg Q8H PRN    pantoprazole EC tablet 40 mg Daily    pentamidine (PENTAM) 240 mg in dextrose 5 % 250 mL IVPB Q24H    piperacillin-tazobactam 4.5 g in dextrose 5 % 100 mL IVPB (ready to mix system) Q12H    sodium bicarbonate tablet 1,300 mg TID    tenofovir tablet 300 mg Q72H    [START ON 6/12/2019] vancomycin 500 mg PLACEHOLDER DOSE ONLY in dextrose 5 % 100 mL IVPB (ready to mix system) Q72H    voriconazole 200 mg/5 mL (40 mg/mL) suspension 200 mg Q12H       Objective:     Vital Signs (Most Recent):  Temp: 100.1 °F (37.8 °C) (06/08/19 1115)  Pulse: 82 (06/08/19 1306)  Resp: (!) 28 (06/08/19 1306)  BP: 107/63 (06/08/19 1300)  SpO2: 100 % (06/08/19 1306)  O2 Device (Oxygen Therapy): ventilator (06/08/19 1306) Vital Signs (24h Range):  Temp:  [98.9 °F (37.2 °C)-100.1 °F (37.8 °C)] 100.1 °F (37.8 °C)  Pulse:  [] 82  Resp:  [0-55] 28  SpO2:  [92 %-100 %] 100 %  BP: ()/(18-91) 107/63     Weight: 59.8 kg (131 lb 13.4 oz) (06/07/19 0500)  Body mass index is 25.75 kg/m².  Body surface area is 1.59 meters squared.    I/O last 3 completed shifts:  In: 3817.2 [I.V.:2747.2; NG/GT:220; IV Piggyback:850]  Out: 1774 [Urine:1474; Drains:300]    Physical Exam   Constitutional:   Intubated  ,sedated   HENT:   Head: Normocephalic and atraumatic.   Eyes: Pupils are equal, round, and reactive to light. EOM are normal.   Neck: Normal range of motion. Neck supple.   Cardiovascular: Normal heart sounds. Exam reveals no friction rub.   No murmur heard.  Pulmonary/Chest: Effort normal. No stridor. No respiratory distress.   Intubated,sedated   Abdominal: Soft. Bowel sounds are normal. She exhibits no distension. There is no tenderness. There is no guarding.   Musculoskeletal: She exhibits no edema or deformity.   Neurological:   intubated.   Skin: Skin is warm and dry. No erythema. No pallor.   Nursing note and vitals reviewed.      Significant Labs:  All labs within the past 24 hours have been reviewed.     Significant Imaging:  Labs: Reviewed  X-Ray: Reviewed    Assessment/Plan:     [place] (acute kidney injury)  Acute kidney injury is most likely due to septic shock.  Patient is now intubated.  Relatively low blood pressure noted.  Case discussed in detail with ICU team-CHNEG Joe and presented the whole case scenario to the mother.  Mother has been brief that the patient is critically ill with multiorgan failure needing dialysis.  Consent obtained from the mother for Vas-Cath placement as well as CRRT.  All risks and benefits explained in detail.  Called vascular surgery  who placed the Vas-Cath and CRRT has been ordered.  Critical care time spent with the patient and multiple visits is about 35 min in addition to the regular time required for follow-up visit.        Thank you for your consult.     Sonam Yanez MD  Nephrology  [facility] -

## 2019-06-08 NOTE — SUBJECTIVE & OBJECTIVE
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 Labs   Lab 06/07/19  0330 06/08/19  0435 06/08/19  1155   WBC 6.66 15.37*  --    HGB 7.0* 7.2*  --    HCT 20.2* 20.8* 23*   PLT 77* 120*  --    MCV 89 88  --    RDW 15.3* 16.0*  --         Chemistries:  Recent Labs   Lab 06/07/19  0330 06/08/19 0435 06/08/19  1013     142 138  138 126*   K 3.4*  3.4* 3.4*  3.4* 4.1   *  118* 109  109 101   CO2 12*  12* 14*  14* 13*   BUN 40*  40* 61*  61* 64*   CREATININE 3.2*  3.2* 4.3*  4.3* 4.4*   CALCIUM 5.8*  5.8* 5.4*  5.4* 5.0*   ALBUMIN 1.2*  1.2* 1.2*  1.2*  --    PROT 5.1* 5.5*  --    BILITOT 0.3 0.3  --    ALKPHOS 70 88  --    ALT 17 17  --    AST 40 36  --    MG 2.1 2.2  --    PHOS 4.2 6.5*  --        Blood Culture:   No results for input(s): LABBLOO in the last 48 hours.  BMP:   Recent Labs   Lab 06/08/19 0435 06/08/19  1013   *  243* 438*     138 126*   K 3.4*  3.4* 4.1     109 101   CO2 14*  14* 13*   BUN 61*  61* 64*   CREATININE 4.3*  4.3* 4.4*   CALCIUM 5.4*  5.4* 5.0*   MG 2.2  --      CMP:   Recent Labs   Lab 06/07/19  0330 06/08/19  0435 06/08/19  1013     142 138  138 126*   K 3.4*  3.4* 3.4*  3.4* 4.1   *  118* 109  109 101   CO2 12*  12* 14*  14* 13*   *  151* 243*  243* 438*   BUN 40*  40* 61*  61* 64*   CREATININE 3.2*  3.2* 4.3*  4.3*  4.4*   CALCIUM 5.8*  5.8* 5.4*  5.4* 5.0*   PROT 5.1* 5.5*  --    ALBUMIN 1.2*  1.2* 1.2*  1.2*  --    BILITOT 0.3 0.3  --    ALKPHOS 70 88  --    AST 40 36  --    ALT 17 17  --    ANIONGAP 12  12 15  15 12   EGFRNONAA 18*  18* 13*  13* 12*     Lactic Acid:   No results for input(s): LACTATE in the last 48 hours.  POCT Glucose:   Recent Labs   Lab 06/08/19  0412 06/08/19  0652 06/08/19  1140   POCTGLUCOSE 273* 288* 224*     Urine Studies:   No results for input(s): COLORU, APPEARANCEUA, PHUR, SPECGRAV, PROTEINUA, GLUCUA, KETONESU, BILIRUBINUA, OCCULTUA, NITRITE, UROBILINOGEN, LEUKOCYTESUR, RBCUA, WBCUA, BACTERIA, SQUAMEPITHEL, HYALINECASTS in the last 48 hours.    Invalid input(s): WRIGHTSUR    Significant Imaging:   I have reviewed all pertinent imaging results/findings within the past 24 hours.  I have reviewed and interpreted all pertinent imaging results/findings within the past 24 hours.Interval History: intubated    Objective:     Vital Signs (Most Recent):  Temp: 100.1 °F (37.8 °C) (06/08/19 1115)  Pulse: 87 (06/08/19 1203)  Resp: (!) 28 (06/08/19 1203)  BP: (!) 92/54 (06/08/19 1203)  SpO2: 100 % (06/08/19 1203) Vital Signs (24h Range):  Temp:  [98.9 °F (37.2 °C)-100.1 °F (37.8 °C)] 100.1 °F (37.8 °C)  Pulse:  [] 87  Resp:  [0-55] 28  SpO2:  [92 %-100 %] 100 %  BP: ()/(18-91) 92/54     Weight: 59.8 kg (131 lb 13.4 oz)  Body mass index is 25.75 kg/m².      Intake/Output Summary (Last 24 hours) at 6/8/2019 1217  Last data filed at 6/8/2019 1208  Gross per 24 hour   Intake 2709.28 ml   Output 449 ml   Net 2260.28 ml       Physical Exam   Constitutional: She appears distressed.   Chronically ill appearing  INtubated   HENT:   Head: Normocephalic and atraumatic.   Eyes: Pupils are equal, round, and reactive to light. Conjunctivae are normal.   Neck: Neck supple. No JVD present. No tracheal deviation present. No thyromegaly present.   Cardiovascular: Regular rhythm and normal heart sounds.  Tachycardia present.   Pulmonary/Chest: She is in respiratory distress. She has no wheezes. She has rhonchi in the right lower field and the left lower field. She has rales in the right lower field and the left lower field. She exhibits no tenderness.   Abdominal: Soft. Bowel sounds are normal.   Musculoskeletal: Normal range of motion. She exhibits edema.   Lymphadenopathy:     She has no cervical adenopathy.   Neurological:   Sedated on ventilator   Skin: Skin is warm. She is diaphoretic.   Nursing note and vitals reviewed.      Vents:  Vent Mode: A/C (06/08/19 1203)  Ventilator Initiated: Yes (06/07/19 1745)  Set Rate: 28 bmp (06/08/19 1203)  Vt Set: 340 mL (06/08/19 1203)  Pressure Support: 0 cmH20 (06/08/19 1203)  PEEP/CPAP: 5 cmH20 (06/08/19 1203)  Oxygen Concentration (%): 50 (06/08/19 1203)  Peak Airway Pressure: 28 cmH2O (06/08/19 1203)  Plateau Pressure: 0 cmH20 (06/08/19 1203)  Total Ve: 9.92 mL (06/08/19 1203)  F/VT Ratio<105 (RSBI): (!) 78.87 (06/08/19 1203)    Lines/Drains/Airways     Peripherally Inserted Central Catheter Line                 PICC Double Lumen 06/04/19 0819 right basilic 4 days          Central Venous Catheter Line                 Hemodialysis Catheter 06/08/19 1100 right femoral less than 1 day          Drain                 Urethral Catheter 06/06/19 1030 Latex 2 days         NG/OG Tube 06/07/19 1745 [facility] mouth less than 1 day          Airway                 Airway - Non-Surgical 06/07/19 1740 Endotracheal Tube less than 1 day                Significant Labs:    CBC/Anemia Profile:  Recent Labs   Lab 06/07/19  0330 06/08/19  0435 06/08/19  1155   WBC 6.66 15.37*  --    HGB 7.0* 7.2*  --    HCT 20.2* 20.8* 23*   PLT 77* 120*  --    MCV 89 88  --    RDW 15.3* 16.0*  --         Chemistries:  Recent Labs   Lab 06/07/19  0330 06/08/19  0435 06/08/19  1013     142 138  138 126*   K 3.4*  3.4* 3.4*  3.4* 4.1   *  118* 109  109 101   CO2 12*  12* 14*  14*  13*   BUN 40*  40* 61*  61* 64*   CREATININE 3.2*  3.2* 4.3*  4.3* 4.4*   CALCIUM 5.8*  5.8* 5.4*  5.4* 5.0*   ALBUMIN 1.2*  1.2* 1.2*  1.2*  --    PROT 5.1* 5.5*  --    BILITOT 0.3 0.3  --    ALKPHOS 70 88  --    ALT 17 17  --    AST 40 36  --    MG 2.1 2.2  --    PHOS 4.2 6.5*  --        ABGs:   Recent Labs   Lab 06/08/19  1155   PH 7.219*   PCO2 39.5   HCO3 16.1*   POCSATURATED 97   BE -12     BMP:   Recent Labs   Lab 06/08/19  0435 06/08/19  1013   *  243* 438*     138 126*   K 3.4*  3.4* 4.1     109 101   CO2 14*  14* 13*   BUN 61*  61* 64*   CREATININE 4.3*  4.3* 4.4*   CALCIUM 5.4*  5.4* 5.0*   MG 2.2  --      CMP:   Recent Labs   Lab 06/07/19  0330 06/08/19  0435 06/08/19  1013     142 138  138 126*   K 3.4*  3.4* 3.4*  3.4* 4.1   *  118* 109  109 101   CO2 12*  12* 14*  14* 13*   *  151* 243*  243* 438*   BUN 40*  40* 61*  61* 64*   CREATININE 3.2*  3.2* 4.3*  4.3* 4.4*   CALCIUM 5.8*  5.8* 5.4*  5.4* 5.0*   PROT 5.1* 5.5*  --    ALBUMIN 1.2*  1.2* 1.2*  1.2*  --    BILITOT 0.3 0.3  --    ALKPHOS 70 88  --    AST 40 36  --    ALT 17 17  --    ANIONGAP 12  12 15  15 12   EGFRNONAA 18*  18* 13*  13* 12*     All pertinent labs within the past 24 hours have been reviewed.    Significant Imaging:  I have reviewed all pertinent imaging results/findings within the past 24 hours.  I have reviewed and interpreted all pertinent imaging results/findings within the past 24 hours.

## 2019-06-08 NOTE — PROGRESS NOTES
Vancomycin Plan Update:     CRRT initiated   Vancomycin random 6/8 at 0436 = 20.2 (approximately 13 hours ago)  To maintain therapeutic trough of 15-20, give 500 mg dose at 1800   Vancomycin random level due 6/9 at 0430   Placeholder dose of 500 mg every 72 hours to be given/adjusted pending random level.     Pharmacy will continue to closely monitor patient and make adjustments as necessary.   Thank you for allowing us to participate in this patient's care,   Tarsha Lopez 6/8/2019 5:47 PM

## 2019-06-08 NOTE — ASSESSMENT & PLAN NOTE
Maintaining oxygenation via continuous BiPAP.  Increasing respiratory distress anticipate she will require intubation.

## 2019-06-08 NOTE — ASSESSMENT & PLAN NOTE
06/06- will do stat ABG, might need intubation  06/07- will continue ventilatory support and wean as tolerated.

## 2019-06-08 NOTE — PLAN OF CARE
Problem: Adult Inpatient Plan of Care  Goal: Plan of Care Review  Outcome: Ongoing (interventions implemented as appropriate)  Vitals signs closely monitored. Fall precautions in place. Patient turned self. Pain assessed every two hours. Safety promoted. Infection risks reduced.     Pt c/o of mouth pain. Thrush noted on assessment. Nystatin ordered. Pt has marginal bp and low uop. MD Yanez notified. Pt tolerated vapotherm for a while. She breathing faster ( rr in the 50's). Placed back on bipap. Still having increased work of breathing. Intubated. Fentanyl and precedex for sedation. Levo fo bp support. Labile blood pressures. Very sensitive to titration. Restraints for safety. Mother updated

## 2019-06-08 NOTE — PROGRESS NOTES
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 She has rales. She exhibits no tenderness.   Abdominal: Soft. Bowel sounds are normal.   Musculoskeletal: Normal range of motion. She exhibits no edema.   Lymphadenopathy:     She has no cervical adenopathy.   Neurological: She is alert and oriented to person, place, and time.   Skin: Skin is warm. She is diaphoretic.   Nursing note and vitals reviewed.      Vents:  Vent Mode: A/C (06/08/19 1203)  Ventilator Initiated: Yes (06/07/19 1745)  Set Rate: 28 bmp (06/08/19 1203)  Vt Set: 340 mL (06/08/19 1203)  Pressure Support: 0 cmH20 (06/08/19 1203)  PEEP/CPAP: 5 cmH20 (06/08/19 1203)  Oxygen Concentration (%): 50 (06/08/19 1203)  Peak Airway Pressure: 28 cmH2O (06/08/19 1203)  Plateau Pressure: 0 cmH20 (06/08/19 1203)  Total Ve: 9.92 mL (06/08/19 1203)  F/VT Ratio<105 (RSBI): (!) 78.87 (06/08/19 1203)    Lines/Drains/Airways     Peripherally Inserted Central Catheter Line                 PICC Double Lumen 06/04/19 0819 right basilic 4 days          Central Venous Catheter Line                 Hemodialysis Catheter 06/08/19 1100 right femoral less than 1 day          Drain                 Urethral Catheter 06/06/19 1030 Latex 2 days         NG/OG Tube 06/07/19 1745 [facility] mouth less than 1 day          Airway                 Airway - Non-Surgical 06/07/19 1740 Endotracheal Tube less than 1 day                Significant  Labs:    CBC/Anemia Profile:  Recent Labs   Lab 06/07/19  0330 06/08/19  0435 06/08/19  1155   WBC 6.66 15.37*  --    HGB 7.0* 7.2*  --    HCT 20.2* 20.8* 23*   PLT 77* 120*  --    MCV 89 88  --    RDW 15.3* 16.0*  --         Chemistries:  Recent Labs   Lab 06/07/19  0330 06/08/19  0435 06/08/19  1013     142 138  138 126*   K 3.4*  3.4* 3.4*  3.4* 4.1   *  118* 109  109 101   CO2 12*  12* 14*  14* 13*   BUN 40*  40* 61*  61* 64*   CREATININE 3.2*  3.2* 4.3*  4.3* 4.4*   CALCIUM 5.8*  5.8* 5.4*  5.4* 5.0*   ALBUMIN 1.2*  1.2* 1.2*  1.2*  --    PROT 5.1* 5.5*  --    BILITOT 0.3 0.3  --    ALKPHOS 70 88  --    ALT 17 17  --    AST 40 36  --    MG 2.1 2.2  --    PHOS 4.2 6.5*  --        Blood Culture:   No results for input(s): LABBLOO in the last 48 hours.  BMP:   Recent Labs   Lab 06/08/19 0435 06/08/19  1013   *  243* 438*     138 126*   K 3.4*  3.4* 4.1     109 101   CO2 14*  14* 13*   BUN 61*  61* 64*   CREATININE 4.3*  4.3* 4.4*   CALCIUM 5.4*  5.4* 5.0*   MG 2.2  --      CMP:   Recent Labs   Lab 06/07/19  0330 06/08/19 0435 06/08/19  1013     142 138  138 126*   K 3.4*  3.4* 3.4*  3.4* 4.1   *  118* 109  109 101   CO2 12*  12* 14*  14* 13*   *  151* 243*  243* 438*   BUN 40*  40* 61*  61* 64*   CREATININE 3.2*  3.2* 4.3*  4.3* 4.4*   CALCIUM 5.8*  5.8* 5.4*  5.4* 5.0*   PROT 5.1* 5.5*  --    ALBUMIN 1.2*  1.2* 1.2*  1.2*  --    BILITOT 0.3 0.3  --    ALKPHOS 70 88  --    AST 40 36  --    ALT 17 17  --    ANIONGAP 12  12 15  15 12   EGFRNONAA 18*  18* 13*  13* 12*     Lactic Acid:   No results for input(s): LACTATE in the last 48 hours.  POCT Glucose:   Recent Labs   Lab 06/08/19  0412 06/08/19  0652 06/08/19  1140   POCTGLUCOSE 273* 288* 224*     Urine Studies:   No results for input(s): COLORU, APPEARANCEUA, PHUR, SPECGRAV, PROTEINUA, GLUCUA, KETONESU, BILIRUBINUA, OCCULTUA, NITRITE, UROBILINOGEN, LEUKOCYTESUR,  RBCUA, WBCUA, BACTERIA, SQUAMEPITHEL, HYALINECASTS in the last 48 hours.    Invalid input(s): LYN    Significant Imaging:   I have reviewed all pertinent imaging results/findings within the past 24 hours.  I have reviewed and interpreted all pertinent imaging results/findings within the past 24 hours.Interval History: intubated    Objective:     Vital Signs (Most Recent):  Temp: 100.1 °F (37.8 °C) (06/08/19 1115)  Pulse: 87 (06/08/19 1203)  Resp: (!) 28 (06/08/19 1203)  BP: (!) 92/54 (06/08/19 1203)  SpO2: 100 % (06/08/19 1203) Vital Signs (24h Range):  Temp:  [98.9 °F (37.2 °C)-100.1 °F (37.8 °C)] 100.1 °F (37.8 °C)  Pulse:  [] 87  Resp:  [0-55] 28  SpO2:  [92 %-100 %] 100 %  BP: ()/(18-91) 92/54     Weight: 59.8 kg (131 lb 13.4 oz)  Body mass index is 25.75 kg/m².      Intake/Output Summary (Last 24 hours) at 6/8/2019 1217  Last data filed at 6/8/2019 1208  Gross per 24 hour   Intake 2709.28 ml   Output 449 ml   Net 2260.28 ml       Physical Exam   Constitutional: She appears distressed.   Chronically ill appearing  INtubated   HENT:   Head: Normocephalic and atraumatic.   Eyes: Pupils are equal, round, and reactive to light. Conjunctivae are normal.   Neck: Neck supple. No JVD present. No tracheal deviation present. No thyromegaly present.   Cardiovascular: Regular rhythm and normal heart sounds. Tachycardia present.   Pulmonary/Chest: She is in respiratory distress. She has no wheezes. She has rhonchi in the right lower field and the left lower field. She has rales in the right lower field and the left lower field. She exhibits no tenderness.   Abdominal: Soft. Bowel sounds are normal.   Musculoskeletal: Normal range of motion. She exhibits edema.   Lymphadenopathy:     She has no cervical adenopathy.   Neurological:   Sedated on ventilator   Skin: Skin is warm. She is diaphoretic.   Nursing note and vitals reviewed.      Vents:  Vent Mode: A/C (06/08/19 1203)  Ventilator Initiated: Yes  (06/07/19 1745)  Set Rate: 28 bmp (06/08/19 1203)  Vt Set: 340 mL (06/08/19 1203)  Pressure Support: 0 cmH20 (06/08/19 1203)  PEEP/CPAP: 5 cmH20 (06/08/19 1203)  Oxygen Concentration (%): 50 (06/08/19 1203)  Peak Airway Pressure: 28 cmH2O (06/08/19 1203)  Plateau Pressure: 0 cmH20 (06/08/19 1203)  Total Ve: 9.92 mL (06/08/19 1203)  F/VT Ratio<105 (RSBI): (!) 78.87 (06/08/19 1203)    Lines/Drains/Airways     Peripherally Inserted Central Catheter Line                 PICC Double Lumen 06/04/19 0819 right basilic 4 days          Central Venous Catheter Line                 Hemodialysis Catheter 06/08/19 1100 right femoral less than 1 day          Drain                 Urethral Catheter 06/06/19 1030 Latex 2 days         NG/OG Tube 06/07/19 1745 [facility] mouth less than 1 day          Airway                 Airway - Non-Surgical 06/07/19 1740 Endotracheal Tube less than 1 day                Significant Labs:    CBC/Anemia Profile:  Recent Labs   Lab 06/07/19  0330 06/08/19  0435 06/08/19  1155   WBC 6.66 15.37*  --    HGB 7.0* 7.2*  --    HCT 20.2* 20.8* 23*   PLT 77* 120*  --    MCV 89 88  --    RDW 15.3* 16.0*  --         Chemistries:  Recent Labs   Lab 06/07/19  0330 06/08/19  0435 06/08/19  1013     142 138  138 126*   K 3.4*  3.4* 3.4*  3.4* 4.1   *  118* 109  109 101   CO2 12*  12* 14*  14* 13*   BUN 40*  40* 61*  61* 64*   CREATININE 3.2*  3.2* 4.3*  4.3* 4.4*   CALCIUM 5.8*  5.8* 5.4*  5.4* 5.0*   ALBUMIN 1.2*  1.2* 1.2*  1.2*  --    PROT 5.1* 5.5*  --    BILITOT 0.3 0.3  --    ALKPHOS 70 88  --    ALT 17 17  --    AST 40 36  --    MG 2.1 2.2  --    PHOS 4.2 6.5*  --        ABGs:   Recent Labs   Lab 06/08/19  1155   PH 7.219*   PCO2 39.5   HCO3 16.1*   POCSATURATED 97   BE -12     BMP:   Recent Labs   Lab 06/08/19  0435 06/08/19  1013   *  243* 438*     138 126*   K 3.4*  3.4* 4.1     109 101   CO2 14*  14* 13*   BUN 61*  61* 64*   CREATININE 4.3*   4.3* 4.4*   CALCIUM 5.4*  5.4* 5.0*   MG 2.2  --      CMP:   Recent Labs   Lab 06/07/19  0330 06/08/19  0435 06/08/19  1013     142 138  138 126*   K 3.4*  3.4* 3.4*  3.4* 4.1   *  118* 109  109 101   CO2 12*  12* 14*  14* 13*   *  151* 243*  243* 438*   BUN 40*  40* 61*  61* 64*   CREATININE 3.2*  3.2* 4.3*  4.3* 4.4*   CALCIUM 5.8*  5.8* 5.4*  5.4* 5.0*   PROT 5.1* 5.5*  --    ALBUMIN 1.2*  1.2* 1.2*  1.2*  --    BILITOT 0.3 0.3  --    ALKPHOS 70 88  --    AST 40 36  --    ALT 17 17  --    ANIONGAP 12  12 15  15 12   EGFRNONAA 18*  18* 13*  13* 12*     All pertinent labs within the past 24 hours have been reviewed.    Significant Imaging:  I have reviewed all pertinent imaging results/findings within the past 24 hours.  I have reviewed and interpreted all pertinent imaging results/findings within the past 24 hours.      ABG  Recent Labs   Lab 06/08/19  1155   PH 7.219*   PO2 115*   PCO2 39.5   HCO3 16.1*   BE -12     Assessment/Plan:     Neuro  Seizure disorder  6/4 monitor in ICU    ENT  Acute sinusitis  6/4 IV antibiotics and sinus rinse  6/5 symptomatically improved    Pulmonary  Pneumonia due to infectious organism  6/6 Suspect aspiration - will evaluate with speech therapy  6/7 worsening - will check Chest X Ray may need intubation  6/8 Intubated , dialysis today. Will try to get consent for bronchoscopy    Acute respiratory failure with hypoxia  6/6 supplemental oxygen, jet nebs, NPO over concern for aspiration, may need intubation  6/7 not improved    Cardiac/Vascular  History of VT/VF s/p implantation of automatic cardioverter/defibrillator (AICD)  Monitor in ICU      ID  * Severe sepsis  6/4 IV fluids and volume expansion  6/5 started on pressors    AIDS (acquired immunodeficiency syndrome), CD4 <=200  6/4 Noncompliant with meds    Endocrine  Hyperglycemia  recheck       Critical Care Daily Checklist:    A: Awake: RASS Goal/Actual Goal: RASS Goal: -3-->moderate  sedation  Actual: Suarez Agitation Sedation Scale (RASS): Moderate sedation   B: Spontaneous Breathing Trial Performed?     C: SAT & SBT Coordinated?  no                     D: Delirium: CAM-ICU Overall CAM-ICU: Positive   E: Early Mobility Performed? No   F: Feeding Goal:    Status:     Current Diet Order   Procedures    Diet NPO      AS: Analgesia/Sedation adeqaute   T: Thromboembolic Prophylaxis y   H: HOB > 300 Yes   U: Stress Ulcer Prophylaxis (if needed) y   G: Glucose Control Poor control   B: Bowel Function Stool Occurrence: 1   I: Indwelling Catheter (Lines & Tinsley) Necessity needed   D: De-escalation of Antimicrobials/Pharmacotherapies na    Plan for the day/ETD Mechanical Ventilation, dialysis      Code Status:  Family/Goals of Care: Full Code  discussed     Critical Care Time: 45 minutes  Critical secondary to Patient has a condition that poses threat to life and bodily function: Severe Respiratory Distress      Critical care was time spent personally by me on the following activities: development of treatment plan with patient or surrogate and bedside caregivers, discussions with consultants, evaluation of patient's response to treatment, examination of patient, ordering and performing treatments and interventions, ordering and review of laboratory studies, ordering and review of radiographic studies, pulse oximetry, re-evaluation of patient's condition. This critical care time did not overlap with that of any other provider or involve time for any procedures.     Anuj Riggs MD  Critical Care Medicine  [facility]

## 2019-06-08 NOTE — ASSESSMENT & PLAN NOTE
Acute kidney injury is most likely due to septic shock.  Patient is now intubated.  Relatively low blood pressure noted.  Case discussed in detail with ICU team-CHENG Joe and presented the whole case scenario to the mother.  Mother has been brief that the patient is critically ill with multiorgan failure needing dialysis.  Consent obtained from the mother for Vas-Cath placement as well as CRRT.  All risks and benefits explained in detail.  Called vascular surgery  who placed the Vas-Cath and CRRT has been ordered.  Critical care time spent with the patient and multiple visits is about 35 min in addition to the regular time required for follow-up visit.

## 2019-06-08 NOTE — PROGRESS NOTES
[facility] Hospital Medicine  Progress Note    Patient Name: Wang Kebede  MRN: [id number]  Patient Class: IP- Inpatient   Admission Date: 6/3/2019  Length of Stay: 4 days  Attending Physician: Gerardo Mccauley MD  Primary Care Provider: Levi Moncada MD        Subjective:     Principal Problem:Severe sepsis    HPI:  33-year-old female with significant medical history including AIDS who has history of noncompliance with medical plan of care presents today for complaint of malaise over the past week.  Modifying factors none.  Associated symptoms:  Cough, headache.  Prior treatment not effective with home care.  Patient was evaluated emergency room.  Significant findings and workup include sinusitis findings on CT.  Cultures pending.  WBC and lactic both negative.  Patient was started on IV antibiotics and given IV fluids.  During ER care patient became more tachycardic and developed fever.  Infectious Disease was consulted by ED who recommended observation.  Hospital Medicine was consulted patient admitted to observation.  To note patient's last CD4 count 39  three months ago.    Hospital Course:  33 year old woman with AIDS-poorly compliant with HAART -last cd4 count -was 39 (02/2019).  She was admitted at this time for febrile illness hemodynamically unstable requiring vasopressors to maintain blood pressure.  Infectious Disease evaluated and assisting management.  Started broad empiric antibiotic treatment.  Continued to run intermittent high fevers.  Hemodynamics improved and weaned off vasopressors.  Remained tachycardic to varying degrees.  EKG consistently showed sinus tachycardia or SVT.  Rate controlled with as needed Metoprolol IV.  Hypoxemic respiratory failure on room air corrected with continuous BiPAP.  Continued respiratory distress, decided for intubation 07 June.  Worsening renal function, started CRRT 08 June in ICU on ventilator.    Interval History:  Intubated  and sedated.  Tachycardia and intermittent fevers.  Worsening renal function.  Placing vascular catheter to begin CRRT.    Review of Systems   Unable to perform ROS: Intubated   Constitutional: Positive for activity change and fever. Negative for chills, diaphoresis and fatigue.   HENT: Positive for congestion and sinus pressure. Negative for sore throat and voice change.    Eyes: Negative for photophobia and visual disturbance.   Respiratory: Negative for cough, shortness of breath, wheezing and stridor.    Cardiovascular: Negative for chest pain and leg swelling.   Gastrointestinal: Negative for abdominal distention, abdominal pain, constipation, diarrhea, nausea and vomiting.   Endocrine: Negative for polydipsia, polyphagia and polyuria.   Genitourinary: Negative for difficulty urinating, dysuria, flank pain, pelvic pain, urgency and vaginal discharge.   Musculoskeletal: Negative for back pain, joint swelling, neck pain and neck stiffness.   Skin: Negative for color change and rash.   Allergic/Immunologic: Negative for immunocompromised state.   Neurological: Positive for headaches. Negative for dizziness, syncope, weakness and numbness.   Hematological: Does not bruise/bleed easily.   Psychiatric/Behavioral: Negative for agitation, behavioral problems and confusion.     Objective:     Vital Signs (Most Recent):  Temp: 99.2 °F (37.3 °C) (06/08/19 1515)  Pulse: 72 (06/08/19 1700)  Resp: (!) 27 (06/08/19 1700)  BP: (!) 139/56 (06/08/19 1700)  SpO2: 100 % (06/08/19 1700) Vital Signs (24h Range):  Temp:  [98.9 °F (37.2 °C)-100.1 °F (37.8 °C)] 99.2 °F (37.3 °C)  Pulse:  [] 72  Resp:  [0-47] 27  SpO2:  [93 %-100 %] 100 %  BP: ()/(18-91) 139/56     Weight: 59.8 kg (131 lb 13.4 oz)  Body mass index is 25.75 kg/m².    Intake/Output Summary (Last 24 hours) at 6/8/2019 1739  Last data filed at 6/8/2019 1700  Gross per 24 hour   Intake 3091 ml   Output 547 ml   Net 2544 ml      Physical Exam   Constitutional: She  is oriented to person, place, and time. She appears well-developed and well-nourished. No distress.   Acutely ill-appearing female   HENT:   Head: Normocephalic and atraumatic.   Nose: Nose normal.   Positive frontal and maxillary sinus tenderness   Eyes: Pupils are equal, round, and reactive to light. Conjunctivae and EOM are normal. No scleral icterus.   Neck: Normal range of motion. Neck supple. No tracheal deviation present.   Cardiovascular: Regular rhythm, normal heart sounds and intact distal pulses.   No murmur heard.  Tachycardia   Pulmonary/Chest: Effort normal and breath sounds normal. No stridor. No respiratory distress. She has no wheezes. She has no rales.   Abdominal: Soft. Bowel sounds are normal. She exhibits no distension. There is no tenderness. There is no guarding.   Genitourinary:   Genitourinary Comments: -   Musculoskeletal: Normal range of motion. She exhibits no edema or deformity.   Neurological: She is alert and oriented to person, place, and time. No cranial nerve deficit.   Skin: Skin is warm and dry. Capillary refill takes less than 2 seconds. No rash noted. She is not diaphoretic.   Psychiatric: She has a normal mood and affect. Her behavior is normal. Judgment and thought content normal.   Nursing note and vitals reviewed.      Significant Labs: All pertinent labs within the past 24 hours have been reviewed.    Significant Imaging: I have reviewed and interpreted all pertinent imaging results/findings within the past 24 hours.    Assessment/Plan:      * Severe sepsis  Potentially related to sinusitis.  Patient received IV fluids and was started on IV antibiotics.  Lactic negative.  She is an immunocompromised host . Will start broad spectrum antimicrobial therapy including antifungal therapy -will use vancomycin, zosyn and empiric Voriconazole.  Will send blood cultures, fungal cultures, fungitell assay , AFB cultures.  Infectious Disease assisting with management.    [place] (acute  kidney injury)  Worsening renal function.  Placed vascular catheter and started CRRT 08 June.    Acute respiratory failure with hypoxia  Maintaining oxygenation via continuous BiPAP.  Increasing respiratory distress anticipate she will require intubation.  Intubated 07 June.    Acute sinusitis  ill continue empiric broad spectrum therapy with Zosyn, will follow fever curve.  On Voriconazole- PO not IV due to renal impairment  , continue Vanco.  Monitor closely     AIDS (acquired immunodeficiency syndrome), CD4 <=200  Resume patient's HAART therapy.  Patient being treated with Rocephin for sinusitis will add on azithromycin and dapsone for further prophylaxis given patient's immunocompromised state.  She has poor compliance to therapy and follow up.  Will check cd4 count , HIV viral load.  Will continue Striblid.  OI prophylaxis with Dapsone and Azithromycin.  Infectious Disease following.    History of VT/VF s/p implantation of automatic cardioverter/defibrillator (AICD)  Cardiac monitoring    Seizure disorder  Continue patient's home medication  Seizure precautions.  will continue lacosamide and will monitor closely      VTE Risk Mitigation (From admission, onward)        Ordered     heparin (porcine) injection 5,000 Units  Every 8 hours      06/08/19 1733     heparin 25,000 units in dextrose 5% 250 mL (100 units/mL) infusion (heparin infusion)  Continuous      06/08/19 1425     heparin (porcine) injection 4,000 Units  As needed (PRN)      06/08/19 1013     Place sequential compression device  Until discontinued      06/03/19 2020          Critical care time spent on the evaluation and treatment of severe organ dysfunction, review of pertinent labs and imaging studies, discussions with consulting providers and discussions with patient/family: 30 minutes.    Gerardo Mccauley MD  Department of Hospital Medicine   [facility]

## 2019-06-08 NOTE — SUBJECTIVE & OBJECTIVE
Interval History: 33 year old woman with AIDS-she is now intubated.    Review of Systems   Unable to perform ROS: Intubated     Objective:     Vital Signs (Most Recent):  Temp: 99.4 °F (37.4 °C) (06/08/19 0305)  Pulse: 84 (06/08/19 0635)  Resp: (!) 28 (06/08/19 0635)  BP: (!) 99/57 (06/08/19 0635)  SpO2: 100 % (06/08/19 0635) Vital Signs (24h Range):  Temp:  [98.8 °F (37.1 °C)-99.6 °F (37.6 °C)] 99.4 °F (37.4 °C)  Pulse:  [] 84  Resp:  [26-55] 28  SpO2:  [92 %-100 %] 100 %  BP: ()/(18-91) 99/57     Weight: 59.8 kg (131 lb 13.4 oz)  Body mass index is 25.75 kg/m².    Estimated Creatinine Clearance: 14.9 mL/min (A) (based on SCr of 4.3 mg/dL (H)).    Physical Exam   Constitutional:   Intubated ,sedated   HENT:   Head: Normocephalic and atraumatic.   Eyes: Pupils are equal, round, and reactive to light. EOM are normal.   Neck: Normal range of motion. Neck supple.   Cardiovascular: Normal heart sounds. Exam reveals no friction rub.   No murmur heard.  Pulmonary/Chest: Effort normal. No stridor. No respiratory distress.   Intubated,sedated   Abdominal: Soft. Bowel sounds are normal. She exhibits no distension. There is no tenderness. There is no guarding.   Musculoskeletal: She exhibits no edema or deformity.   Neurological:   intubated.   Skin: Skin is warm and dry. No erythema. No pallor.   Nursing note and vitals reviewed.      Significant Labs:   Blood Culture:   Recent Labs   Lab 12/25/18  0039 01/24/19  0000 01/24/19  0015 06/03/19  1500 06/03/19  1530   LABBLOO No growth after 5 days. No growth after 5 days. No growth after 5 days. No Growth to date  No Growth to date  No Growth to date  No Growth to date  No Growth to date No Growth to date  No Growth to date  No Growth to date  No Growth to date  No Growth to date     BMP:   Recent Labs   Lab 06/08/19  0435   *  243*     138   K 3.4*  3.4*     109   CO2 14*  14*   BUN 61*  61*   CREATININE 4.3*  4.3*   CALCIUM 5.4*   5.4*   MG 2.2     All pertinent labs within the past 24 hours have been reviewed.    Significant Imaging: I have reviewed all pertinent imaging results/findings within the past 24 hours.

## 2019-06-08 NOTE — SUBJECTIVE & OBJECTIVE
Interval History:  Heart rate remains fast.  Maintaining oxygenation by continuous BiPAP but increasingly tachypneic.  Intermittent fevers continue but to a lower grade.    Review of Systems   Constitutional: Positive for activity change and fever. Negative for chills, diaphoresis and fatigue.   HENT: Positive for congestion and sinus pressure. Negative for sore throat and voice change.    Eyes: Negative for photophobia and visual disturbance.   Respiratory: Negative for cough, shortness of breath, wheezing and stridor.    Cardiovascular: Negative for chest pain and leg swelling.   Gastrointestinal: Negative for abdominal distention, abdominal pain, constipation, diarrhea, nausea and vomiting.   Endocrine: Negative for polydipsia, polyphagia and polyuria.   Genitourinary: Negative for difficulty urinating, dysuria, flank pain, pelvic pain, urgency and vaginal discharge.   Musculoskeletal: Negative for back pain, joint swelling, neck pain and neck stiffness.   Skin: Negative for color change and rash.   Allergic/Immunologic: Negative for immunocompromised state.   Neurological: Positive for headaches. Negative for dizziness, syncope, weakness and numbness.   Hematological: Does not bruise/bleed easily.   Psychiatric/Behavioral: Negative for agitation, behavioral problems and confusion.     Objective:     Vital Signs (Most Recent):  Temp: 99.6 °F (37.6 °C) (06/07/19 1905)  Pulse: 93 (06/07/19 1950)  Resp: (!) 38 (06/07/19 1950)  BP: (!) 112/57 (06/07/19 1950)  SpO2: 100 % (06/07/19 1950) Vital Signs (24h Range):  Temp:  [98.6 °F (37 °C)-99.6 °F (37.6 °C)] 99.6 °F (37.6 °C)  Pulse:  [] 93  Resp:  [28-55] 38  SpO2:  [92 %-100 %] 100 %  BP: ()/(18-91) 112/57     Weight: 59.8 kg (131 lb 13.4 oz)  Body mass index is 25.75 kg/m².    Intake/Output Summary (Last 24 hours) at 6/7/2019 2019  Last data filed at 6/7/2019 1900  Gross per 24 hour   Intake 2257.45 ml   Output 1341 ml   Net 916.45 ml      Physical Exam    Constitutional: She is oriented to person, place, and time. She appears well-developed and well-nourished. No distress.   Acutely ill-appearing female   HENT:   Head: Normocephalic and atraumatic.   Nose: Nose normal.   Positive frontal and maxillary sinus tenderness   Eyes: Pupils are equal, round, and reactive to light. Conjunctivae and EOM are normal. No scleral icterus.   Neck: Normal range of motion. Neck supple. No tracheal deviation present.   Cardiovascular: Regular rhythm, normal heart sounds and intact distal pulses.   No murmur heard.  Tachycardia   Pulmonary/Chest: Effort normal and breath sounds normal. No stridor. No respiratory distress. She has no wheezes. She has no rales.   Abdominal: Soft. Bowel sounds are normal. She exhibits no distension. There is no tenderness. There is no guarding.   Genitourinary:   Genitourinary Comments: -   Musculoskeletal: Normal range of motion. She exhibits no edema or deformity.   Neurological: She is alert and oriented to person, place, and time. No cranial nerve deficit.   Skin: Skin is warm and dry. Capillary refill takes less than 2 seconds. No rash noted. She is not diaphoretic.   Psychiatric: She has a normal mood and affect. Her behavior is normal. Judgment and thought content normal.   Nursing note and vitals reviewed.      Significant Labs: All pertinent labs within the past 24 hours have been reviewed.    Significant Imaging: I have reviewed and interpreted all pertinent imaging results/findings within the past 24 hours.

## 2019-06-08 NOTE — PROGRESS NOTES
[facility] -   General Surgery  Operative Note    SUMMARY     Date of Procedure:      Procedure: * No surgery found *     Right femoral vas cath  * Surgery not found *    Assisting Surgeon: None    Pre-Operative Diagnosis: * No surgery found * acute renal failure    Post-Operative Diagnosis: * No surgery found *    Anesthesia: * No surgery found *    Technical Procedures Used: U/S guidance    Description of the Findings of the Procedure: placed with local anesthesia    Significant Surgical Tasks Conducted by the Assistant(s), if Applicable: none    Complications: No    Estimated Blood Loss (EBL): * No surgery found none           Implants: * No surgery found *    Specimens:   Specimen (12h ago, onward)    None                  Condition: Critical    Disposition: ICU - intubated and critically ill.    Attestation: I was present and scrubbed for the entire procedure.

## 2019-06-08 NOTE — ASSESSMENT & PLAN NOTE
06/07- will continue empiric Vanco/zosyn, will send tracheal cultures.  Also send cytology for PCP

## 2019-06-09 LAB
ALBUMIN SERPL BCP-MCNC: 1.5 G/DL (ref 3.5–5.2)
ALBUMIN SERPL BCP-MCNC: 1.6 G/DL (ref 3.5–5.2)
ALBUMIN SERPL BCP-MCNC: 1.6 G/DL (ref 3.5–5.2)
ALLENS TEST: ABNORMAL
ALP SERPL-CCNC: 104 U/L (ref 55–135)
ALT SERPL W/O P-5'-P-CCNC: 23 U/L (ref 10–44)
ANION GAP SERPL CALC-SCNC: 10 MMOL/L (ref 8–16)
ANION GAP SERPL CALC-SCNC: 10 MMOL/L (ref 8–16)
ANION GAP SERPL CALC-SCNC: 12 MMOL/L (ref 8–16)
ANISOCYTOSIS BLD QL SMEAR: SLIGHT
APTT BLDCRRT: 70.3 SEC (ref 21–32)
AST SERPL-CCNC: 44 U/L (ref 10–40)
BASOPHILS # BLD AUTO: 0.02 K/UL (ref 0–0.2)
BASOPHILS NFR BLD: 0.1 % (ref 0–1.9)
BILIRUB SERPL-MCNC: 0.4 MG/DL (ref 0.1–1)
BUN SERPL-MCNC: 22 MG/DL (ref 6–20)
BUN SERPL-MCNC: 28 MG/DL (ref 6–20)
BUN SERPL-MCNC: 39 MG/DL (ref 6–20)
BURR CELLS BLD QL SMEAR: ABNORMAL
CALCIUM SERPL-MCNC: 6.6 MG/DL (ref 8.7–10.5)
CALCIUM SERPL-MCNC: 6.9 MG/DL (ref 8.7–10.5)
CALCIUM SERPL-MCNC: 7.2 MG/DL (ref 8.7–10.5)
CHLORIDE SERPL-SCNC: 100 MMOL/L (ref 95–110)
CHLORIDE SERPL-SCNC: 100 MMOL/L (ref 95–110)
CHLORIDE SERPL-SCNC: 102 MMOL/L (ref 95–110)
CO2 SERPL-SCNC: 17 MMOL/L (ref 23–29)
CO2 SERPL-SCNC: 21 MMOL/L (ref 23–29)
CO2 SERPL-SCNC: 23 MMOL/L (ref 23–29)
CREAT SERPL-MCNC: 1.6 MG/DL (ref 0.5–1.4)
CREAT SERPL-MCNC: 2 MG/DL (ref 0.5–1.4)
CREAT SERPL-MCNC: 2.6 MG/DL (ref 0.5–1.4)
DELSYS: ABNORMAL
DIFFERENTIAL METHOD: ABNORMAL
EOSINOPHIL # BLD AUTO: 0 K/UL (ref 0–0.5)
EOSINOPHIL NFR BLD: 0 % (ref 0–8)
ERYTHROCYTE [DISTWIDTH] IN BLOOD BY AUTOMATED COUNT: 15.5 % (ref 11.5–14.5)
ERYTHROCYTE [SEDIMENTATION RATE] IN BLOOD BY WESTERGREN METHOD: 28 MM/H
ERYTHROCYTE [SEDIMENTATION RATE] IN BLOOD BY WESTERGREN METHOD: 28 MM/H
EST. GFR  (AFRICAN AMERICAN): 27 ML/MIN/1.73 M^2
EST. GFR  (AFRICAN AMERICAN): 37 ML/MIN/1.73 M^2
EST. GFR  (AFRICAN AMERICAN): 48 ML/MIN/1.73 M^2
EST. GFR  (NON AFRICAN AMERICAN): 23 ML/MIN/1.73 M^2
EST. GFR  (NON AFRICAN AMERICAN): 32 ML/MIN/1.73 M^2
EST. GFR  (NON AFRICAN AMERICAN): 42 ML/MIN/1.73 M^2
FIO2: 60
FIO2: 60
GLUCOSE SERPL-MCNC: 121 MG/DL (ref 70–110)
GLUCOSE SERPL-MCNC: 123 MG/DL (ref 70–110)
GLUCOSE SERPL-MCNC: 168 MG/DL (ref 70–110)
GLUCOSE SERPL-MCNC: 179 MG/DL (ref 70–110)
GLUCOSE SERPL-MCNC: 182 MG/DL (ref 70–110)
HCO3 UR-SCNC: 23 MMOL/L (ref 24–28)
HCO3 UR-SCNC: 23.2 MMOL/L (ref 24–28)
HCO3 UR-SCNC: 23.4 MMOL/L (ref 24–28)
HCT VFR BLD AUTO: 22.9 % (ref 37–48.5)
HCT VFR BLD CALC: 19 %PCV (ref 36–54)
HCT VFR BLD CALC: 24 %PCV (ref 36–54)
HGB BLD-MCNC: 7.9 G/DL (ref 12–16)
HYPOCHROMIA BLD QL SMEAR: ABNORMAL
KOH PREP SPEC: NORMAL
KOH PREP SPEC: NORMAL
LYMPHOCYTES # BLD AUTO: 2.6 K/UL (ref 1–4.8)
LYMPHOCYTES NFR BLD: 18.3 % (ref 18–48)
MAGNESIUM SERPL-MCNC: 1.9 MG/DL (ref 1.6–2.6)
MAGNESIUM SERPL-MCNC: 2.2 MG/DL (ref 1.6–2.6)
MAGNESIUM SERPL-MCNC: 2.5 MG/DL (ref 1.6–2.6)
MCH RBC QN AUTO: 29.7 PG (ref 27–31)
MCHC RBC AUTO-ENTMCNC: 34.5 G/DL (ref 32–36)
MCV RBC AUTO: 86 FL (ref 82–98)
MODE: ABNORMAL
MONOCYTES # BLD AUTO: 0.3 K/UL (ref 0.3–1)
MONOCYTES NFR BLD: 2 % (ref 4–15)
NEUTROPHILS # BLD AUTO: 11.2 K/UL (ref 1.8–7.7)
NEUTROPHILS NFR BLD: 80.2 % (ref 38–73)
PCO2 BLDA: 34.7 MMHG (ref 35–45)
PCO2 BLDA: 43.9 MMHG (ref 35–45)
PCO2 BLDA: 44.5 MMHG (ref 35–45)
PEEP: 5
PEEP: 5
PH SMN: 7.33 [PH] (ref 7.35–7.45)
PH SMN: 7.33 [PH] (ref 7.35–7.45)
PH SMN: 7.43 [PH] (ref 7.35–7.45)
PHOSPHATE SERPL-MCNC: 2.3 MG/DL (ref 2.7–4.5)
PHOSPHATE SERPL-MCNC: 3.4 MG/DL (ref 2.7–4.5)
PHOSPHATE SERPL-MCNC: 3.6 MG/DL (ref 2.7–4.5)
PHOSPHATE SERPL-MCNC: 3.6 MG/DL (ref 2.7–4.5)
PLATELET # BLD AUTO: 71 K/UL (ref 150–350)
PLATELET BLD QL SMEAR: ABNORMAL
PMV BLD AUTO: 10.9 FL (ref 9.2–12.9)
PO2 BLDA: 139 MMHG (ref 80–100)
PO2 BLDA: 201 MMHG (ref 80–100)
PO2 BLDA: 206 MMHG (ref 80–100)
POC BE: -1 MMOL/L
POC BE: -3 MMOL/L
POC BE: -3 MMOL/L
POC IONIZED CALCIUM: 0.99 MMOL/L (ref 1.06–1.42)
POC IONIZED CALCIUM: 0.99 MMOL/L (ref 1.06–1.42)
POC SATURATED O2: 100 % (ref 95–100)
POC SATURATED O2: 100 % (ref 95–100)
POC SATURATED O2: 99 % (ref 95–100)
POCT GLUCOSE: 135 MG/DL (ref 70–110)
POCT GLUCOSE: 154 MG/DL (ref 70–110)
POCT GLUCOSE: 162 MG/DL (ref 70–110)
POCT GLUCOSE: 193 MG/DL (ref 70–110)
POIKILOCYTOSIS BLD QL SMEAR: SLIGHT
POTASSIUM BLD-SCNC: 3.7 MMOL/L (ref 3.5–5.1)
POTASSIUM BLD-SCNC: 3.8 MMOL/L (ref 3.5–5.1)
POTASSIUM SERPL-SCNC: 3.6 MMOL/L (ref 3.5–5.1)
POTASSIUM SERPL-SCNC: 3.9 MMOL/L (ref 3.5–5.1)
PROT SERPL-MCNC: 6.2 G/DL (ref 6–8.4)
RBC # BLD AUTO: 2.66 M/UL (ref 4–5.4)
SAMPLE: ABNORMAL
SITE: ABNORMAL
SODIUM BLD-SCNC: 132 MMOL/L (ref 136–145)
SODIUM BLD-SCNC: 132 MMOL/L (ref 136–145)
SODIUM SERPL-SCNC: 131 MMOL/L (ref 136–145)
SODIUM SERPL-SCNC: 133 MMOL/L (ref 136–145)
SPHEROCYTES BLD QL SMEAR: ABNORMAL
STOMATOCYTES BLD QL SMEAR: PRESENT
TSH SERPL DL<=0.005 MIU/L-ACNC: 0.8 UIU/ML (ref 0.4–4)
VANCOMYCIN SERPL-MCNC: 17 UG/ML
VT: 340
VT: 340
WBC # BLD AUTO: 14.05 K/UL (ref 3.9–12.7)

## 2019-06-09 PROCEDURE — 82800 BLOOD PH: CPT

## 2019-06-09 PROCEDURE — 25000003 PHARM REV CODE 250: Performed by: INTERNAL MEDICINE

## 2019-06-09 PROCEDURE — 80202 ASSAY OF VANCOMYCIN: CPT

## 2019-06-09 PROCEDURE — 31623 DX BRONCHOSCOPE/BRUSH: CPT | Mod: 51,RT | Performed by: INTERNAL MEDICINE

## 2019-06-09 PROCEDURE — 31623 PR BRONCHOSCOPY,DIAGNOSTIC W BRUSH: ICD-10-PCS | Mod: 51,RT,, | Performed by: INTERNAL MEDICINE

## 2019-06-09 PROCEDURE — 88305 TISSUE EXAM BY PATHOLOGIST: CPT | Mod: 26,,, | Performed by: PATHOLOGY

## 2019-06-09 PROCEDURE — 25000003 PHARM REV CODE 250: Performed by: NURSE PRACTITIONER

## 2019-06-09 PROCEDURE — 88312 SPECIAL STAINS GROUP 1: CPT | Mod: 26,,, | Performed by: PATHOLOGY

## 2019-06-09 PROCEDURE — 88112 CYTOLOGY SPECIMEN- MEDICAL CYTOLOGY (FLUID/WASH/BRUSH): ICD-10-PCS | Mod: 26,,, | Performed by: PATHOLOGY

## 2019-06-09 PROCEDURE — 99291 CRITICAL CARE FIRST HOUR: CPT | Mod: 25,,, | Performed by: INTERNAL MEDICINE

## 2019-06-09 PROCEDURE — 84295 ASSAY OF SERUM SODIUM: CPT

## 2019-06-09 PROCEDURE — 31624 DX BRONCHOSCOPE/LAVAGE: CPT | Mod: RT,,, | Performed by: INTERNAL MEDICINE

## 2019-06-09 PROCEDURE — 88112 CYTOPATH CELL ENHANCE TECH: CPT | Mod: 26,,, | Performed by: PATHOLOGY

## 2019-06-09 PROCEDURE — 87116 MYCOBACTERIA CULTURE: CPT | Mod: 59

## 2019-06-09 PROCEDURE — 85014 HEMATOCRIT: CPT

## 2019-06-09 PROCEDURE — 99900035 HC TECH TIME PER 15 MIN (STAT)

## 2019-06-09 PROCEDURE — 87210 SMEAR WET MOUNT SALINE/INK: CPT

## 2019-06-09 PROCEDURE — 80100008 HC CRRT DAILY MAINTENANCE

## 2019-06-09 PROCEDURE — 87015 SPECIMEN INFECT AGNT CONCNTJ: CPT

## 2019-06-09 PROCEDURE — 31623 DX BRONCHOSCOPE/BRUSH: CPT | Mod: 51,RT,, | Performed by: INTERNAL MEDICINE

## 2019-06-09 PROCEDURE — 63600175 PHARM REV CODE 636 W HCPCS: Performed by: INTERNAL MEDICINE

## 2019-06-09 PROCEDURE — 88312 CYTOLOGY SPECIMEN- PULMONARY MEDICAL CYTOLOGY: ICD-10-PCS | Mod: 26,,, | Performed by: PATHOLOGY

## 2019-06-09 PROCEDURE — 88305 TISSUE EXAM BY PATHOLOGIST: CPT | Performed by: PATHOLOGY

## 2019-06-09 PROCEDURE — 80069 RENAL FUNCTION PANEL: CPT

## 2019-06-09 PROCEDURE — 88305 CYTOLOGY SPECIMEN- PULMONARY MEDICAL CYTOLOGY: ICD-10-PCS | Mod: 26,59,, | Performed by: PATHOLOGY

## 2019-06-09 PROCEDURE — 36415 COLL VENOUS BLD VENIPUNCTURE: CPT

## 2019-06-09 PROCEDURE — 31624 PR BRONCHOSCOPY,DIAG2STIC W LAVAGE: ICD-10-PCS | Mod: RT,,, | Performed by: INTERNAL MEDICINE

## 2019-06-09 PROCEDURE — 82330 ASSAY OF CALCIUM: CPT

## 2019-06-09 PROCEDURE — 99291 PR CRITICAL CARE, E/M 30-74 MINUTES: ICD-10-PCS | Mod: 25,,, | Performed by: INTERNAL MEDICINE

## 2019-06-09 PROCEDURE — 87206 SMEAR FLUORESCENT/ACID STAI: CPT | Mod: 91

## 2019-06-09 PROCEDURE — 85025 COMPLETE CBC W/AUTO DIFF WBC: CPT

## 2019-06-09 PROCEDURE — 87107 FUNGI IDENTIFICATION MOLD: CPT

## 2019-06-09 PROCEDURE — 88305 CYTOLOGY SPECIMEN- MEDICAL CYTOLOGY (FLUID/WASH/BRUSH): ICD-10-PCS | Mod: 26,,, | Performed by: PATHOLOGY

## 2019-06-09 PROCEDURE — 36600 WITHDRAWAL OF ARTERIAL BLOOD: CPT

## 2019-06-09 PROCEDURE — 63600175 PHARM REV CODE 636 W HCPCS: Performed by: NURSE PRACTITIONER

## 2019-06-09 PROCEDURE — 20000000 HC ICU ROOM

## 2019-06-09 PROCEDURE — 84132 ASSAY OF SERUM POTASSIUM: CPT

## 2019-06-09 PROCEDURE — 90945 DIALYSIS ONE EVALUATION: CPT

## 2019-06-09 PROCEDURE — 88312 SPECIAL STAINS GROUP 1: CPT | Performed by: PATHOLOGY

## 2019-06-09 PROCEDURE — 88112 CYTOLOGY SPECIMEN- PULMONARY MEDICAL CYTOLOGY: ICD-10-PCS | Mod: 26,59,, | Performed by: PATHOLOGY

## 2019-06-09 PROCEDURE — 88112 CYTOPATH CELL ENHANCE TECH: CPT | Mod: 26,59,, | Performed by: PATHOLOGY

## 2019-06-09 PROCEDURE — 80069 RENAL FUNCTION PANEL: CPT | Mod: 91

## 2019-06-09 PROCEDURE — 80053 COMPREHEN METABOLIC PANEL: CPT

## 2019-06-09 PROCEDURE — 90945 DIALYSIS ONE EVALUATION: CPT | Mod: ,,, | Performed by: INTERNAL MEDICINE

## 2019-06-09 PROCEDURE — 83735 ASSAY OF MAGNESIUM: CPT | Mod: 91

## 2019-06-09 PROCEDURE — 25000242 PHARM REV CODE 250 ALT 637 W/ HCPCS: Performed by: INTERNAL MEDICINE

## 2019-06-09 PROCEDURE — 31624 DX BRONCHOSCOPE/LAVAGE: CPT | Mod: RT | Performed by: INTERNAL MEDICINE

## 2019-06-09 PROCEDURE — 89051 BODY FLUID CELL COUNT: CPT

## 2019-06-09 PROCEDURE — 90945 PR DIALYSIS, NOT HEMO, 1 EVAL: ICD-10-PCS | Mod: ,,, | Performed by: INTERNAL MEDICINE

## 2019-06-09 PROCEDURE — 94003 VENT MGMT INPAT SUBQ DAY: CPT

## 2019-06-09 PROCEDURE — 87070 CULTURE OTHR SPECIMN AEROBIC: CPT | Mod: 59

## 2019-06-09 PROCEDURE — 82803 BLOOD GASES ANY COMBINATION: CPT

## 2019-06-09 PROCEDURE — 85730 THROMBOPLASTIN TIME PARTIAL: CPT

## 2019-06-09 PROCEDURE — 87205 SMEAR GRAM STAIN: CPT

## 2019-06-09 PROCEDURE — 87102 FUNGUS ISOLATION CULTURE: CPT

## 2019-06-09 PROCEDURE — 87252 VIRUS INOCULATION TISSUE: CPT

## 2019-06-09 PROCEDURE — 94640 AIRWAY INHALATION TREATMENT: CPT

## 2019-06-09 RX ORDER — PROPOFOL 10 MG/ML
5 INJECTION, EMULSION INTRAVENOUS CONTINUOUS
Status: DISCONTINUED | OUTPATIENT
Start: 2019-06-09 | End: 2019-06-11

## 2019-06-09 RX ORDER — LIDOCAINE HYDROCHLORIDE 40 MG/ML
4 INJECTION, SOLUTION RETROBULBAR ONCE
Status: DISCONTINUED | OUTPATIENT
Start: 2019-06-09 | End: 2019-06-09

## 2019-06-09 RX ORDER — TRIMETHOPRIM 100 MG/1
200 TABLET ORAL
Status: DISCONTINUED | OUTPATIENT
Start: 2019-06-09 | End: 2019-06-09

## 2019-06-09 RX ORDER — DAPSONE 25 MG/1
100 TABLET ORAL
Status: DISCONTINUED | OUTPATIENT
Start: 2019-06-09 | End: 2019-06-12

## 2019-06-09 RX ORDER — LIDOCAINE HYDROCHLORIDE 40 MG/ML
4 SOLUTION TOPICAL ONCE
Status: DISCONTINUED | OUTPATIENT
Start: 2019-06-09 | End: 2019-06-09

## 2019-06-09 RX ORDER — LIDOCAINE HYDROCHLORIDE 20 MG/ML
4 SOLUTION OROPHARYNGEAL EVERY 6 HOURS
Status: DISCONTINUED | OUTPATIENT
Start: 2019-06-09 | End: 2019-06-09

## 2019-06-09 RX ORDER — DAPSONE 25 MG/1
100 TABLET ORAL
Status: DISCONTINUED | OUTPATIENT
Start: 2019-06-09 | End: 2019-06-09

## 2019-06-09 RX ORDER — HEPARIN SODIUM 10000 [USP'U]/100ML
17 INJECTION, SOLUTION INTRAVENOUS CONTINUOUS
Status: DISCONTINUED | OUTPATIENT
Start: 2019-06-09 | End: 2019-06-09

## 2019-06-09 RX ORDER — LIDOCAINE HYDROCHLORIDE 20 MG/ML
5 SOLUTION OROPHARYNGEAL ONCE
Status: DISCONTINUED | OUTPATIENT
Start: 2019-06-09 | End: 2019-06-12

## 2019-06-09 RX ORDER — HEPARIN SODIUM 10000 [USP'U]/100ML
800 INJECTION, SOLUTION INTRAVENOUS CONTINUOUS
Status: DISCONTINUED | OUTPATIENT
Start: 2019-06-09 | End: 2019-06-10

## 2019-06-09 RX ORDER — TRIMETHOPRIM 100 MG/1
200 TABLET ORAL
Status: DISCONTINUED | OUTPATIENT
Start: 2019-06-09 | End: 2019-06-12

## 2019-06-09 RX ORDER — MAGNESIUM SULFATE HEPTAHYDRATE 40 MG/ML
2 INJECTION, SOLUTION INTRAVENOUS
Status: DISCONTINUED | OUTPATIENT
Start: 2019-06-10 | End: 2019-06-10

## 2019-06-09 RX ORDER — SODIUM CHLORIDE, SODIUM LACTATE, POTASSIUM CHLORIDE, CALCIUM CHLORIDE 600; 310; 30; 20 MG/100ML; MG/100ML; MG/100ML; MG/100ML
INJECTION, SOLUTION INTRAVENOUS CONTINUOUS
Status: DISCONTINUED | OUTPATIENT
Start: 2019-06-09 | End: 2019-06-09

## 2019-06-09 RX ADMIN — VORICONAZOLE 200 MG: 40 POWDER, FOR SUSPENSION ORAL at 10:06

## 2019-06-09 RX ADMIN — PANTOPRAZOLE SODIUM 40 MG: 40 TABLET, DELAYED RELEASE ORAL at 10:06

## 2019-06-09 RX ADMIN — SODIUM BICARBONATE 650 MG TABLET 1300 MG: at 10:06

## 2019-06-09 RX ADMIN — MAGNESIUM SULFATE IN WATER 2 G: 40 INJECTION, SOLUTION INTRAVENOUS at 07:06

## 2019-06-09 RX ADMIN — CHLORHEXIDINE GLUCONATE 0.12% ORAL RINSE 15 ML: 1.2 LIQUID ORAL at 10:06

## 2019-06-09 RX ADMIN — DEXMEDETOMIDINE HYDROCHLORIDE 1.5 MCG/KG/HR: 400 INJECTION INTRAVENOUS at 01:06

## 2019-06-09 RX ADMIN — HEPARIN SODIUM AND DEXTROSE 800 UNITS/HR: 10000; 5 INJECTION INTRAVENOUS at 03:06

## 2019-06-09 RX ADMIN — PIPERACILLIN SODIUM AND TAZOBACTAM SODIUM 4.5 G: 4; .5 INJECTION, POWDER, LYOPHILIZED, FOR SOLUTION INTRAVENOUS at 11:06

## 2019-06-09 RX ADMIN — AZITHROMYCIN MONOHYDRATE 500 MG: 500 INJECTION, POWDER, LYOPHILIZED, FOR SOLUTION INTRAVENOUS at 04:06

## 2019-06-09 RX ADMIN — DEXMEDETOMIDINE HYDROCHLORIDE 1.5 MCG/KG/HR: 400 INJECTION INTRAVENOUS at 06:06

## 2019-06-09 RX ADMIN — VORICONAZOLE 200 MG: 40 POWDER, FOR SUSPENSION ORAL at 09:06

## 2019-06-09 RX ADMIN — LACOSAMIDE 100 MG: 50 TABLET, FILM COATED ORAL at 11:06

## 2019-06-09 RX ADMIN — NYSTATIN 500000 UNITS: 100000 SUSPENSION ORAL at 09:06

## 2019-06-09 RX ADMIN — DOLUTEGRAVIR SODIUM 50 MG: 50 TABLET, FILM COATED ORAL at 10:06

## 2019-06-09 RX ADMIN — METHYLPREDNISOLONE SODIUM SUCCINATE 40 MG: 40 INJECTION, POWDER, FOR SOLUTION INTRAMUSCULAR; INTRAVENOUS at 09:06

## 2019-06-09 RX ADMIN — CALCIUM GLUCONATE 1000 MG: 98 INJECTION, SOLUTION INTRAVENOUS at 06:06

## 2019-06-09 RX ADMIN — PIPERACILLIN SODIUM AND TAZOBACTAM SODIUM 4.5 G: 4; .5 INJECTION, POWDER, LYOPHILIZED, FOR SOLUTION INTRAVENOUS at 06:06

## 2019-06-09 RX ADMIN — SODIUM BICARBONATE 650 MG TABLET 1300 MG: at 03:06

## 2019-06-09 RX ADMIN — NYSTATIN 500000 UNITS: 100000 SUSPENSION ORAL at 05:06

## 2019-06-09 RX ADMIN — LACOSAMIDE 100 MG: 50 TABLET, FILM COATED ORAL at 09:06

## 2019-06-09 RX ADMIN — VANCOMYCIN HYDROCHLORIDE 750 MG: 750 INJECTION, POWDER, LYOPHILIZED, FOR SOLUTION INTRAVENOUS at 10:06

## 2019-06-09 RX ADMIN — IPRATROPIUM BROMIDE AND ALBUTEROL SULFATE 3 ML: .5; 3 SOLUTION RESPIRATORY (INHALATION) at 07:06

## 2019-06-09 RX ADMIN — METHYLPREDNISOLONE SODIUM SUCCINATE 40 MG: 40 INJECTION, POWDER, FOR SOLUTION INTRAMUSCULAR; INTRAVENOUS at 10:06

## 2019-06-09 RX ADMIN — PROPOFOL 5 MCG/KG/MIN: 10 INJECTION, EMULSION INTRAVENOUS at 09:06

## 2019-06-09 RX ADMIN — LAMIVUDINE 100 MG: 10 SOLUTION ORAL at 10:06

## 2019-06-09 RX ADMIN — CALCIUM GLUCONATE 1000 MG: 94 INJECTION, SOLUTION INTRAVENOUS at 02:06

## 2019-06-09 RX ADMIN — TRIMETHOPRIM 200 MG: 100 TABLET ORAL at 10:06

## 2019-06-09 RX ADMIN — CHLORHEXIDINE GLUCONATE 0.12% ORAL RINSE 15 ML: 1.2 LIQUID ORAL at 09:06

## 2019-06-09 RX ADMIN — HEPARIN SODIUM AND DEXTROSE 15.62 UNITS/KG/HR: 10000; 5 INJECTION INTRAVENOUS at 08:06

## 2019-06-09 RX ADMIN — IPRATROPIUM BROMIDE AND ALBUTEROL SULFATE 3 ML: .5; 3 SOLUTION RESPIRATORY (INHALATION) at 12:06

## 2019-06-09 RX ADMIN — PIPERACILLIN SODIUM AND TAZOBACTAM SODIUM 4.5 G: 4; .5 INJECTION, POWDER, LYOPHILIZED, FOR SOLUTION INTRAVENOUS at 03:06

## 2019-06-09 RX ADMIN — Medication 200 MCG/HR: at 08:06

## 2019-06-09 RX ADMIN — SODIUM BICARBONATE 650 MG TABLET 1300 MG: at 09:06

## 2019-06-09 RX ADMIN — NYSTATIN 500000 UNITS: 100000 SUSPENSION ORAL at 02:06

## 2019-06-09 RX ADMIN — PROPOFOL 20 MCG/KG/MIN: 10 INJECTION, EMULSION INTRAVENOUS at 09:06

## 2019-06-09 RX ADMIN — DEXMEDETOMIDINE HYDROCHLORIDE 1.5 MCG/KG/HR: 400 INJECTION INTRAVENOUS at 08:06

## 2019-06-09 RX ADMIN — Medication 200 MCG/HR: at 09:06

## 2019-06-09 RX ADMIN — DAPSONE 100 MG: 25 TABLET ORAL at 10:06

## 2019-06-09 NOTE — ASSESSMENT & PLAN NOTE
6/6 Suspect aspiration - will evaluate with speech therapy  6/7 worsening - will check Chest X Ray may need intubation  6/8 Intubated , dialysis today. Will try to get consent for bronchoscopy  6/9 review cultures from bronchoscopy

## 2019-06-09 NOTE — PLAN OF CARE
Patient intubated and no family at bedside to speak with about procedure. Spoke with Yves RN, ICU nurse and Dr. Mccauley.

## 2019-06-09 NOTE — SUBJECTIVE & OBJECTIVE
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 Initiated: Yes (06/07/19 1745)  Set Rate: 28 bmp (06/09/19 1131)  Vt Set: 340 mL (06/09/19 1131)  Pressure Support: 0 cmH20 (06/09/19 1131)  PEEP/CPAP: 5 cmH20 (06/09/19 1131)  Oxygen Concentration (%): 50 (06/09/19 1131)  Peak Airway Pressure: 26 cmH2O (06/09/19 1131)  Plateau Pressure: 0 cmH20 (06/09/19 1131)  Total Ve: 9.87 mL (06/09/19 1131)  F/VT Ratio<105 (RSBI): (!) 79.32 (06/09/19 1131)    Lines/Drains/Airways     Peripherally Inserted Central Catheter Line                 PICC Double Lumen 06/04/19 0819 right basilic 5 days          Central Venous Catheter Line                 Hemodialysis Catheter 06/08/19 1100 right femoral 1 day          Drain                 Urethral Catheter 06/06/19 1030 Latex 3 days         NG/OG Tube 06/07/19 1745 [facility] mouth 1 day          Airway                 Airway - Non-Surgical 06/07/19 1740 Endotracheal Tube 1 day                Significant Labs:    CBC/Anemia Profile:  Recent Labs   Lab 06/08/19  0435  06/08/19  1459 06/09/19  0415 06/09/19  0536   WBC 15.37*  --   --  14.05*  --    HGB 7.2*  --   --  7.9*  --    HCT 20.8*   < > 22* 22.9* 19*   *  --   --  71*  --    MCV 88  --   --  86  --    RDW 16.0*  --   --  15.5*  --     < > = values in this interval not displayed.        Chemistries:  Recent Labs   Lab 06/08/19  0435 06/08/19  1013 06/08/19  1658  06/08/19 2200 06/09/19  0415     138 126*  --  131* 131*  131*  131*   K 3.4*  3.4* 4.1  --  3.4* 3.9  3.9  3.9     109 101  --  103 102  102  102   CO2 14*  14* 13*  --  18* 17*  17*  17*   BUN 61*  61* 64*  --  48* 39*  39*  39*   CREATININE 4.3*  4.3* 4.4*  --  3.2* 2.6*  2.6*  2.6*   CALCIUM 5.4*  5.4* 5.0*  --  6.4* 6.6*  6.6*  6.6*   ALBUMIN 1.2*  1.2*  --   --  1.4* 1.5*  1.5*  1.5*   PROT 5.5*  --   --   --  6.2   BILITOT 0.3  --   --   --  0.4   ALKPHOS 88  --   --   --  104   ALT 17  --   --   --  23   AST 36  --   --   --  44*   MG 2.2  --  2.2 2.1 1.9   PHOS 6.5*  --  5.0* 4.1 3.6  3.6       Blood Culture:   No results for input(s): LABBLOO in the last 48 hours.  BMP:   Recent Labs   Lab 06/09/19 0415   *  123*  123*   *  131*  131*   K 3.9  3.9  3.9     102  102   CO2 17*  17*  17*   BUN 39*  39*  39*   CREATININE 2.6*  2.6*  2.6*   CALCIUM 6.6*  6.6*  6.6*   MG 1.9     CMP:   Recent Labs   Lab 06/08/19  0435 06/08/19  1013 06/08/19 2200 06/09/19 0415     138 126* 131* 131*  131*  131*   K 3.4*  3.4* 4.1 3.4* 3.9  3.9  3.9     109 101 103 102  102  102   CO2 14*  14* 13* 18* 17*  17*  17*   *  243* 438* 136* 123*  123*  123*   BUN 61*  61* 64* 48* 39*  39*  39*   CREATININE 4.3*  4.3* 4.4* 3.2* 2.6*  2.6*  2.6*   CALCIUM 5.4*  5.4* 5.0* 6.4* 6.6*  6.6*  6.6*   PROT 5.5*  --   --  6.2   ALBUMIN 1.2*  1.2*  --  1.4* 1.5*  1.5*  1.5*   BILITOT 0.3  --   --  0.4   ALKPHOS 88  --   --  104   AST 36  --   --  44*   ALT 17  --   --  23   ANIONGAP 15  15 12 10 12  12  12   EGFRNONAA 13*  13* 12* 18* 23*  23*  23*     Lactic Acid:   No results for input(s): LACTATE in the last 48 hours.  POCT Glucose:   Recent Labs   Lab 06/08/19  2046 06/09/19  0535 06/09/19  1119   POCTGLUCOSE 143* 162* 193*     Urine Studies:   No results for input(s): COLORU, APPEARANCEUA, PHUR, SPECGRAV, PROTEINUA,  GLUCUA, KETONESU, BILIRUBINUA, OCCULTUA, NITRITE, UROBILINOGEN, LEUKOCYTESUR, RBCUA, WBCUA, BACTERIA, SQUAMEPITHEL, HYALINECASTS in the last 48 hours.    Invalid input(s): LYN    Significant Imaging:   I have reviewed all pertinent imaging results/findings within the past 24 hours.  I have reviewed and interpreted all pertinent imaging results/findings within the past 24 hours.Interval History: intubated    Objective:     Vital Signs (Most Recent):  Temp: 99.1 °F (37.3 °C) (06/09/19 0715)  Pulse: 82 (06/09/19 1131)  Resp: (!) 28 (06/09/19 1131)  BP: 112/64 (06/09/19 0843)  SpO2: 100 % (06/09/19 1131) Vital Signs (24h Range):  Temp:  [94.1 °F (34.5 °C)-99.2 °F (37.3 °C)] 99.1 °F (37.3 °C)  Pulse:  [] 82  Resp:  [21-29] 28  SpO2:  [93 %-100 %] 100 %  BP: ()/() 112/64     Weight: 62 kg (136 lb 11 oz)  Body mass index is 26.14 kg/m².      Intake/Output Summary (Last 24 hours) at 6/9/2019 1201  Last data filed at 6/9/2019 0800  Gross per 24 hour   Intake 2848.64 ml   Output 5245 ml   Net -2396.36 ml       Physical Exam   Constitutional: She appears distressed.   Chronically ill appearing  INtubated   HENT:   Head: Normocephalic and atraumatic.   Eyes: Pupils are equal, round, and reactive to light. Conjunctivae are normal.   Neck: Neck supple. No JVD present. No tracheal deviation present. No thyromegaly present.   Cardiovascular: Regular rhythm and normal heart sounds. Tachycardia present.   Pulmonary/Chest: She is in respiratory distress. She has no wheezes. She has rhonchi in the right lower field and the left lower field. She has rales in the right lower field and the left lower field. She exhibits no tenderness.   Abdominal: Soft. Bowel sounds are normal.   Musculoskeletal: Normal range of motion. She exhibits edema.   Lymphadenopathy:     She has no cervical adenopathy.   Neurological:   Sedated on ventilator   Skin: Skin is warm. She is diaphoretic.   Nursing note and vitals  reviewed.      Vents:  Vent Mode: A/C (06/09/19 1131)  Ventilator Initiated: Yes (06/07/19 1745)  Set Rate: 28 bmp (06/09/19 1131)  Vt Set: 340 mL (06/09/19 1131)  Pressure Support: 0 cmH20 (06/09/19 1131)  PEEP/CPAP: 5 cmH20 (06/09/19 1131)  Oxygen Concentration (%): 50 (06/09/19 1131)  Peak Airway Pressure: 26 cmH2O (06/09/19 1131)  Plateau Pressure: 0 cmH20 (06/09/19 1131)  Total Ve: 9.87 mL (06/09/19 1131)  F/VT Ratio<105 (RSBI): (!) 79.32 (06/09/19 1131)    Lines/Drains/Airways     Peripherally Inserted Central Catheter Line                 PICC Double Lumen 06/04/19 0819 right basilic 5 days          Central Venous Catheter Line                 Hemodialysis Catheter 06/08/19 1100 right femoral 1 day          Drain                 Urethral Catheter 06/06/19 1030 Latex 3 days         NG/OG Tube 06/07/19 1745 [facility] mouth 1 day          Airway                 Airway - Non-Surgical 06/07/19 1740 Endotracheal Tube 1 day                Significant Labs:    CBC/Anemia Profile:  Recent Labs   Lab 06/08/19  0435  06/08/19  1459 06/09/19  0415 06/09/19  0536   WBC 15.37*  --   --  14.05*  --    HGB 7.2*  --   --  7.9*  --    HCT 20.8*   < > 22* 22.9* 19*   *  --   --  71*  --    MCV 88  --   --  86  --    RDW 16.0*  --   --  15.5*  --     < > = values in this interval not displayed.        Chemistries:  Recent Labs   Lab 06/08/19  0435 06/08/19  1013 06/08/19  1650 06/08/19  2200 06/09/19  0415     138 126*  --  131* 131*  131*  131*   K 3.4*  3.4* 4.1  --  3.4* 3.9  3.9  3.9     109 101  --  103 102  102  102   CO2 14*  14* 13*  --  18* 17*  17*  17*   BUN 61*  61* 64*  --  48* 39*  39*  39*   CREATININE 4.3*  4.3* 4.4*  --  3.2* 2.6*  2.6*  2.6*   CALCIUM 5.4*  5.4* 5.0*  --  6.4* 6.6*  6.6*  6.6*   ALBUMIN 1.2*  1.2*  --   --  1.4* 1.5*  1.5*  1.5*   PROT 5.5*  --   --   --  6.2   BILITOT 0.3  --   --   --  0.4   ALKPHOS 88  --   --   --  104   ALT 17  --   --    --  23   AST 36  --   --   --  44*   MG 2.2  --  2.2 2.1 1.9   PHOS 6.5*  --  5.0* 4.1 3.6  3.6       ABGs:   Recent Labs   Lab 06/09/19  0536   PH 7.330*   PCO2 44.5   HCO3 23.4*   POCSATURATED 100   BE -3     BMP:   Recent Labs   Lab 06/09/19  0415   *  123*  123*   *  131*  131*   K 3.9  3.9  3.9     102  102   CO2 17*  17*  17*   BUN 39*  39*  39*   CREATININE 2.6*  2.6*  2.6*   CALCIUM 6.6*  6.6*  6.6*   MG 1.9     CMP:   Recent Labs   Lab 06/08/19  0435 06/08/19  1013 06/08/19  2200 06/09/19  0415     138 126* 131* 131*  131*  131*   K 3.4*  3.4* 4.1 3.4* 3.9  3.9  3.9     109 101 103 102  102  102   CO2 14*  14* 13* 18* 17*  17*  17*   *  243* 438* 136* 123*  123*  123*   BUN 61*  61* 64* 48* 39*  39*  39*   CREATININE 4.3*  4.3* 4.4* 3.2* 2.6*  2.6*  2.6*   CALCIUM 5.4*  5.4* 5.0* 6.4* 6.6*  6.6*  6.6*   PROT 5.5*  --   --  6.2   ALBUMIN 1.2*  1.2*  --  1.4* 1.5*  1.5*  1.5*   BILITOT 0.3  --   --  0.4   ALKPHOS 88  --   --  104   AST 36  --   --  44*   ALT 17  --   --  23   ANIONGAP 15  15 12 10 12  12  12   EGFRNONAA 13*  13* 12* 18* 23*  23*  23*     All pertinent labs within the past 24 hours have been reviewed.    Significant Imaging:  I have reviewed all pertinent imaging results/findings within the past 24 hours.  I have reviewed and interpreted all pertinent imaging results/findings within the past 24 hours.

## 2019-06-09 NOTE — PROCEDURES
"Wang Kebede is a 33 y.o. female patient.    Temp: 99.1 °F (37.3 °C) (06/09/19 0715)  Pulse: 82 (06/09/19 1131)  Resp: (!) 28 (06/09/19 1131)  BP: 112/64 (06/09/19 0843)  SpO2: 100 % (06/09/19 1131)  Weight: 62 kg (136 lb 11 oz) (06/09/19 0843)  Height: 5' 0.63" (154 cm) (06/09/19 0843)       Prepare patient for dialysis  Date/Time: 6/9/2019 12:35 PM  Performed by: Sonam Yanez MD  Authorized by: Sonam Yanez MD         Patient Seen on rounds for CRRT  ; HD is for [facility]; Anuria and Acidosis s ;CRRT orders placed  and reviewed with HD nurse and nursing staff ;   Access is functioning well ; heparin adjusted   Parameters for Hypotension outlined in orders and communications with nurses patient is on pressors               Sonam Yanez  6/9/2019  "

## 2019-06-09 NOTE — PLAN OF CARE
Problem: Adult Inpatient Plan of Care  Goal: Plan of Care Review  Outcome: Ongoing (interventions implemented as appropriate)  Pt with increased agitation and hypothermia (93.8). Sedation increased and warming blanket applied. Arousing to voice and following commands with episodes of dazed confusion. Tolerating ventilator. BP dropped once normothermic. Levophed titrated per order. Ca and K+ replaced. UO <5 cc/hr. CRRT running without complications. . CBGs WNL. No complaints of pain. Pt turned Q2H with pressure points protected. POC reviewed with pt and family. All questions and concerns addressed.

## 2019-06-09 NOTE — PROGRESS NOTES
[facility] Hospital Medicine  Progress Note    Patient Name: Wang Kebede  MRN: [id number]  Patient Class: IP- Inpatient   Admission Date: 6/3/2019  Length of Stay: 5 days  Attending Physician: Gerardo Mccauley MD  Primary Care Provider: Levi Moncada MD        Subjective:     Principal Problem:Severe sepsis    HPI:  33-year-old female with significant medical history including AIDS who has history of noncompliance with medical plan of care presents today for complaint of malaise over the past week.  Modifying factors none.  Associated symptoms:  Cough, headache.  Prior treatment not effective with home care.  Patient was evaluated emergency room.  Significant findings and workup include sinusitis findings on CT.  Cultures pending.  WBC and lactic both negative.  Patient was started on IV antibiotics and given IV fluids.  During ER care patient became more tachycardic and developed fever.  Infectious Disease was consulted by ED who recommended observation.  Hospital Medicine was consulted patient admitted to observation.  To note patient's last CD4 count 39  three months ago.    Hospital Course:  33 year old woman with AIDS-poorly compliant with HAART -last cd4 count -was 39 (02/2019).  She was admitted at this time for febrile illness hemodynamically unstable requiring vasopressors to maintain blood pressure.  Infectious Disease evaluated and assisting management.  Started broad empiric antibiotic treatment.  Continued to run intermittent high fevers.  Hemodynamics improved and weaned off vasopressors.  Remained tachycardic to varying degrees.  EKG consistently showed sinus tachycardia or SVT.  Rate controlled with as needed Metoprolol IV.  Hypoxemic respiratory failure on room air corrected with continuous BiPAP.  Continued respiratory distress, decided for intubation 07 June.  Worsening renal function, started CRRT 08 June in ICU on ventilator.  Diagnostic bronchoscopy on 09  June and tolerating CRRT without vasopressors.    Interval History:  Intubated and sedated.  Hemodynamically stable not requiring vasopressors.  Intermittent mild hypothermia.  Tolerating CRRT and plan for diagnostic bronchoscopy.    Review of Systems   Unable to perform ROS: Intubated   Constitutional: Positive for activity change and fever. Negative for chills, diaphoresis and fatigue.   HENT: Positive for sinus pressure. Negative for congestion, sore throat and voice change.    Eyes: Negative for photophobia and visual disturbance.   Respiratory: Negative for cough, shortness of breath, wheezing and stridor.    Cardiovascular: Negative for chest pain and leg swelling.   Gastrointestinal: Negative for abdominal distention, abdominal pain, constipation, diarrhea, nausea and vomiting.   Endocrine: Negative for polydipsia, polyphagia and polyuria.   Genitourinary: Negative for difficulty urinating, dysuria, flank pain, pelvic pain, urgency and vaginal discharge.   Musculoskeletal: Negative for back pain, joint swelling, neck pain and neck stiffness.   Skin: Negative for color change and rash.   Allergic/Immunologic: Negative for immunocompromised state.   Neurological: Positive for headaches. Negative for dizziness, syncope, weakness and numbness.   Hematological: Does not bruise/bleed easily.   Psychiatric/Behavioral: Negative for agitation, behavioral problems and confusion.     Objective:     Vital Signs (Most Recent):  Temp: 99.1 °F (37.3 °C) (06/09/19 0715)  Pulse: 94 (06/09/19 1058)  Resp: (!) 27 (06/09/19 1058)  BP: 112/64 (06/09/19 0843)  SpO2: 100 % (06/09/19 1058) Vital Signs (24h Range):  Temp:  [94.1 °F (34.5 °C)-100.1 °F (37.8 °C)] 99.1 °F (37.3 °C)  Pulse:  [] 94  Resp:  [0-29] 27  SpO2:  [93 %-100 %] 100 %  BP: ()/() 112/64     Weight: 62 kg (136 lb 11 oz)  Body mass index is 26.14 kg/m².    Intake/Output Summary (Last 24 hours) at 6/9/2019 1107  Last data filed at 6/9/2019  0800  Gross per 24 hour   Intake 2848.64 ml   Output 5245 ml   Net -2396.36 ml      Physical Exam   Constitutional: She is oriented to person, place, and time. She appears well-developed and well-nourished. No distress.   Acutely ill-appearing female   HENT:   Head: Normocephalic and atraumatic.   Nose: Nose normal.   Positive frontal and maxillary sinus tenderness   Eyes: Pupils are equal, round, and reactive to light. Conjunctivae and EOM are normal. No scleral icterus.   Neck: Normal range of motion. Neck supple. No tracheal deviation present.   Cardiovascular: Regular rhythm, normal heart sounds and intact distal pulses.   No murmur heard.  Tachycardia   Pulmonary/Chest: Effort normal and breath sounds normal. No stridor. No respiratory distress. She has no wheezes. She has no rales.   Abdominal: Soft. Bowel sounds are normal. She exhibits no distension. There is no tenderness. There is no guarding.   Genitourinary:   Genitourinary Comments: -   Musculoskeletal: Normal range of motion. She exhibits no edema or deformity.   Neurological: She is alert and oriented to person, place, and time. No cranial nerve deficit.   Skin: Skin is warm and dry. Capillary refill takes less than 2 seconds. No rash noted. She is not diaphoretic.   Psychiatric: She has a normal mood and affect. Her behavior is normal. Judgment and thought content normal.   Nursing note and vitals reviewed.      Significant Labs: All pertinent labs within the past 24 hours have been reviewed.    Significant Imaging: I have reviewed and interpreted all pertinent imaging results/findings within the past 24 hours.    Assessment/Plan:      * Severe sepsis  Potentially related to sinusitis.  Patient received IV fluids and was started on IV antibiotics.  Lactic negative.  She is an immunocompromised host . Will start broad spectrum antimicrobial therapy including antifungal therapy -will use vancomycin, zosyn and empiric Voriconazole.  Will send blood  cultures, fungal cultures, fungitell assay , AFB cultures.  Infectious Disease assisting with management.  Diagnostic bronchoscopy 09 June.    LELAND (acute kidney injury)  Worsening renal function.  Placed vascular catheter and started CRRT 08 June.  Tolerating without vasopressors.    Acute respiratory failure with hypoxia  Maintaining oxygenation via continuous BiPAP.  Increasing respiratory distress anticipate she will require intubation.  Intubated 07 June.    Acute sinusitis  ill continue empiric broad spectrum therapy with Zosyn, will follow fever curve.  On Voriconazole- PO not IV due to renal impairment  , continue Vanco.  Monitor closely     AIDS (acquired immunodeficiency syndrome), CD4 <=200  Resume patient's HAART therapy.  Patient being treated with Rocephin for sinusitis will add on azithromycin and dapsone for further prophylaxis given patient's immunocompromised state.  She has poor compliance to therapy and follow up.  Will check cd4 count , HIV viral load.  Will continue Striblid.  OI prophylaxis with Dapsone and Azithromycin.  Infectious Disease following.    History of VT/VF s/p implantation of automatic cardioverter/defibrillator (AICD)  Cardiac monitoring    Seizure disorder  Continue patient's home medication  Seizure precautions.  will continue lacosamide and will monitor closely      VTE Risk Mitigation (From admission, onward)        Ordered     heparin 25,000 units in dextrose 5% 250 mL (100 units/mL) infusion (heparin infusion)  Continuous      06/08/19 1425     heparin (porcine) injection 4,000 Units  As needed (PRN)      06/08/19 1013     Place sequential compression device  Until discontinued      06/03/19 2020          Critical care time spent on the evaluation and treatment of severe organ dysfunction, review of pertinent labs and imaging studies, discussions with consulting providers and discussions with patient/family: 45 minutes.    Gerardo Mccauley MD  Department of Hospital  Medicine   [facility]

## 2019-06-09 NOTE — PROGRESS NOTES
"Preoperative  evaluation. History and physical exam reviewed.  No change in medical status from above note.  Radiographic studies reviewed.  Plan for sedation reviewed.  Consent reviewed with patient"s mother. Risks and benefits of procedure reviewed. Questions asked and answered. Consent signed.    "

## 2019-06-09 NOTE — SUBJECTIVE & OBJECTIVE
Interval History:  Intubated and sedated.  Hemodynamically stable not requiring vasopressors.  Intermittent mild hypothermia.  Tolerating CRRT and plan for diagnostic bronchoscopy.    Review of Systems   Unable to perform ROS: Intubated   Constitutional: Positive for activity change and fever. Negative for chills, diaphoresis and fatigue.   HENT: Positive for sinus pressure. Negative for congestion, sore throat and voice change.    Eyes: Negative for photophobia and visual disturbance.   Respiratory: Negative for cough, shortness of breath, wheezing and stridor.    Cardiovascular: Negative for chest pain and leg swelling.   Gastrointestinal: Negative for abdominal distention, abdominal pain, constipation, diarrhea, nausea and vomiting.   Endocrine: Negative for polydipsia, polyphagia and polyuria.   Genitourinary: Negative for difficulty urinating, dysuria, flank pain, pelvic pain, urgency and vaginal discharge.   Musculoskeletal: Negative for back pain, joint swelling, neck pain and neck stiffness.   Skin: Negative for color change and rash.   Allergic/Immunologic: Negative for immunocompromised state.   Neurological: Positive for headaches. Negative for dizziness, syncope, weakness and numbness.   Hematological: Does not bruise/bleed easily.   Psychiatric/Behavioral: Negative for agitation, behavioral problems and confusion.     Objective:     Vital Signs (Most Recent):  Temp: 99.1 °F (37.3 °C) (06/09/19 0715)  Pulse: 94 (06/09/19 1058)  Resp: (!) 27 (06/09/19 1058)  BP: 112/64 (06/09/19 0843)  SpO2: 100 % (06/09/19 1058) Vital Signs (24h Range):  Temp:  [94.1 °F (34.5 °C)-100.1 °F (37.8 °C)] 99.1 °F (37.3 °C)  Pulse:  [] 94  Resp:  [0-29] 27  SpO2:  [93 %-100 %] 100 %  BP: ()/() 112/64     Weight: 62 kg (136 lb 11 oz)  Body mass index is 26.14 kg/m².    Intake/Output Summary (Last 24 hours) at 6/9/2019 1107  Last data filed at 6/9/2019 0800  Gross per 24 hour   Intake 2848.64 ml   Output 5245 ml    Net -2396.36 ml      Physical Exam   Constitutional: She is oriented to person, place, and time. She appears well-developed and well-nourished. No distress.   Acutely ill-appearing female   HENT:   Head: Normocephalic and atraumatic.   Nose: Nose normal.   Positive frontal and maxillary sinus tenderness   Eyes: Pupils are equal, round, and reactive to light. Conjunctivae and EOM are normal. No scleral icterus.   Neck: Normal range of motion. Neck supple. No tracheal deviation present.   Cardiovascular: Regular rhythm, normal heart sounds and intact distal pulses.   No murmur heard.  Tachycardia   Pulmonary/Chest: Effort normal and breath sounds normal. No stridor. No respiratory distress. She has no wheezes. She has no rales.   Abdominal: Soft. Bowel sounds are normal. She exhibits no distension. There is no tenderness. There is no guarding.   Genitourinary:   Genitourinary Comments: -   Musculoskeletal: Normal range of motion. She exhibits no edema or deformity.   Neurological: She is alert and oriented to person, place, and time. No cranial nerve deficit.   Skin: Skin is warm and dry. Capillary refill takes less than 2 seconds. No rash noted. She is not diaphoretic.   Psychiatric: She has a normal mood and affect. Her behavior is normal. Judgment and thought content normal.   Nursing note and vitals reviewed.      Significant Labs: All pertinent labs within the past 24 hours have been reviewed.    Significant Imaging: I have reviewed and interpreted all pertinent imaging results/findings within the past 24 hours.

## 2019-06-09 NOTE — ASSESSMENT & PLAN NOTE
Potentially related to sinusitis.  Patient received IV fluids and was started on IV antibiotics.  Lactic negative.  She is an immunocompromised host . Will start broad spectrum antimicrobial therapy including antifungal therapy -will use vancomycin, zosyn and empiric Voriconazole.  Will send blood cultures, fungal cultures, fungitell assay , AFB cultures.  Infectious Disease assisting with management.  Diagnostic bronchoscopy 09 June.

## 2019-06-09 NOTE — ASSESSMENT & PLAN NOTE
6/6 supplemental oxygen, jet nebs, NPO over concern for aspiration, may need intubation  6/7 not improved  6/9 stabilized on mechanical ventilation

## 2019-06-09 NOTE — PROGRESS NOTES
Pharmacokinetic Assessment Follow Up: IV Vancomycin    Vancomycin serum concentration assessment(s):    The random level was drawned correctly and can be used to guide therapy at this time.   PULSE DOSE   Give 750 mg dose 6/9 at 1000     Vancomycin Regimen Plan:    Re-dose when the random level is less than 20 mcg/mL, next level to be drawn at 0430 on 6/10    Pharmacy will continue to follow and monitor vancomycin.    Please contact pharmacy at extension [phone] for questions regarding this assessment.    Thank you for the consult,   Tarsha Lopez     Patient brief summary:  Wang Kebede is a 33 y.o. female initiated on antimicrobial therapy with IV Vancomycin for treatment of suspected sepsis    Drug Allergies:   Review of patient's allergies indicates:   Allergen Reactions    Iodine and iodide containing products Anaphylaxis     Pt states she coded last time she was administered contrast    Pneumococcal 23-chitra ps vaccine Anaphylaxis     Potential iodine containing    Dilaudid [hydromorphone] Hives and Itching    Bactrim [sulfamethoxazole-trimethoprim] Hives    Morphine Itching     Tolerates norco 2018       Actual Body Weight:   62 kg     Renal Function:   Estimated Creatinine Clearance: 25.5 mL/min (A) (based on SCr of 2.6 mg/dL (H)).,     Dialysis Method (if applicable):  CRRT    CBC (last 72 hours):  Recent Labs   Lab Result Units 06/07/19  0330 06/08/19  0435 06/09/19  0415   WBC K/uL 6.66 15.37* 14.05*   Hemoglobin g/dL 7.0* 7.2* 7.9*   Hematocrit % 20.2* 20.8* 22.9*   Platelets K/uL 77* 120* 71*   Gran% % 84.0* 79.2* 80.2*   Lymph% % 16.1* 17.9* 18.3   Mono% % 0.9* 3.2* 2.0*   Eosinophil% % 0.2 0.1 0.0   Basophil% % 0.2 0.3 0.1   Differential Method  Automated Automated Automated       Metabolic Panel (last 72 hours):  Recent Labs   Lab Result Units 06/06/19  1714 06/07/19  0330 06/08/19  0435 06/08/19  1013 06/08/19  1650 06/08/19  2200 06/09/19  0415   Sodium mmol/L  --  142  142 138   138 126*  --  131* 131*  131*  131*   Sodium Urine Random mmol/L 56  --   --   --   --   --   --    Potassium mmol/L  --  3.4*  3.4* 3.4*  3.4* 4.1  --  3.4* 3.9  3.9  3.9   Chloride mmol/L  --  118*  118* 109  109 101  --  103 102  102  102   CO2 mmol/L  --  12*  12* 14*  14* 13*  --  18* 17*  17*  17*   Glucose mg/dL  --  151*  151* 243*  243* 438*  --  136* 123*  123*  123*   BUN, Bld mg/dL  --  40*  40* 61*  61* 64*  --  48* 39*  39*  39*   Creatinine mg/dL  --  3.2*  3.2* 4.3*  4.3* 4.4*  --  3.2* 2.6*  2.6*  2.6*   Albumin g/dL  --  1.2*  1.2* 1.2*  1.2*  --   --  1.4* 1.5*  1.5*  1.5*   Total Bilirubin mg/dL  --  0.3 0.3  --   --   --  0.4   Alkaline Phosphatase U/L  --  70 88  --   --   --  104   AST U/L  --  40 36  --   --   --  44*   ALT U/L  --  17 17  --   --   --  23   Magnesium mg/dL  --  2.1 2.2  --  2.2 2.1 1.9   Phosphorus mg/dL  --  4.2 6.5*  --  5.0* 4.1 3.6  3.6       Vancomycin Administrations:  vancomycin given in the last 96 hours                   vancomycin 750 mg in dextrose 5 % 250 mL IVPB (ready to mix system) (mg) 750 mg New Bag 06/09/19 1058    vancomycin 500 mg in dextrose 5 % 100 mL IVPB (ready to mix system) (mg) 500 mg New Bag 06/08/19 1852                Drug levels (last 3 results):  Recent Labs   Lab Result Units 06/07/19  0330 06/08/19  0436 06/09/19  0415   Vancomycin, Random ug/mL  --  20.2 17.0   Vancomycin-Trough ug/mL 25.4*  --   --        Microbiologic Results:  Microbiology Results (last 7 days)     Procedure Component Value Units Date/Time    Culture, Respiratory with Gram Stain [[id number]] Collected:  06/08/19 0732    Order Status:  Completed Specimen:  Respiratory from Endotracheal Aspirate Updated:  06/09/19 1025     Respiratory Culture No Growth     Gram Stain (Respiratory) <10 epithelial cells per low power field.     Gram Stain (Respiratory) Rare WBC's     Gram Stain (Respiratory) No organisms seen    KOH prep [[id number]] Collected:   06/09/19 1008    Order Status:  Sent Specimen:  Bronchial Alveolar Lavage from Bronchial Wash, RLL Updated:  06/09/19 1008    Culture, Respiratory with Gram Stain [[id number]] Collected:  06/09/19 1008    Order Status:  Sent Specimen:  Bronchial Alveolar Lavage from Bronchial Wash, RLL Updated:  06/09/19 1008    AFB Culture & Smear [[id number]] Collected:  06/09/19 1008    Order Status:  Sent Specimen:  Bronchial Alveolar Lavage from Bronchial Wash, RLL Updated:  06/09/19 1008    Fungus culture [[id number]] Collected:  06/09/19 1008    Order Status:  Sent Specimen:  Bronchial Alveolar Lavage from Bronchial Wash, RLL Updated:  06/09/19 1008    Culture, Respiratory with Gram Stain [[id number]] Collected:  06/09/19 1003    Order Status:  Sent Specimen:  Bronchial Brush Updated:  06/09/19 1004    AFB Culture & Smear [[id number]] Collected:  06/09/19 0959    Order Status:  Sent Specimen:  Bronchial Wash Updated:  06/09/19 1000    Fungus culture [[id number]] Collected:  06/09/19 0959    Order Status:  Sent Specimen:  Bronchial Wash Updated:  06/09/19 1000    KOH prep [[id number]] Collected:  06/09/19 0959    Order Status:  Sent Specimen:  Bronchial Wash Updated:  06/09/19 1000    Blood culture #1 **CANNOT BE ORDERED STAT** [[id number]] Collected:  06/03/19 1500    Order Status:  Completed Specimen:  Blood from Peripheral, Forearm, Left Updated:  06/08/19 2212     Blood Culture, Routine No growth after 5 days.    Blood culture #2 **CANNOT BE ORDERED STAT** [[id number]] Collected:  06/03/19 1530    Order Status:  Completed Specimen:  Blood from Peripheral, Wrist, Right Updated:  06/08/19 2212     Blood Culture, Routine No growth after 5 days.    Fungus culture [[id number]] Collected:  06/08/19 0748    Order Status:  Sent Specimen:  Respiratory from Endotracheal Aspirate Updated:  06/08/19 1324    CSF culture and Gram Stain (Tube 2) [[id number]] Collected:  06/03/19 1353    Order Status:  Completed Specimen:  CSF (Spinal Fluid) from  CSF Tap, Tube 2 Updated:  06/08/19 0713     CSF CULTURE No Growth     Gram Stain Result Cytospin indicates:      No organisms seen      No WBC's    Narrative:       On which sequentially labeled tube should this analysis be  performed?->2    AFB Culture & Smear [[id number]] Collected:  06/06/19 0425    Order Status:  Completed Specimen:  Blood Updated:  06/07/19 2127     AFB Culture & Smear Culture in progress    Clostridium difficile EIA [[id number]]     Order Status:  Canceled Specimen:  Stool     Fungus Culture, Blood or Bone Marrow [[id number]] Collected:  06/05/19 0344    Order Status:  Sent Specimen:  Blood Updated:  06/05/19 0930    AFB Culture & Smear (Tube 3) [[id number]] Collected:  06/03/19 1353    Order Status:  Completed Specimen:  CSF (Spinal Fluid) from CSF Tap, Tube 2 Updated:  06/05/19 0927     AFB Culture & Smear Culture in progress     AFB CULTURE STAIN No acid fast bacilli seen.    AFB Culture & Smear [[id number]]     Order Status:  Sent Specimen:  Blood     Cryptococcal antigen, CSF (Tube 3) [[id number]] Collected:  06/03/19 1353    Order Status:  Completed Specimen:  CSF (Spinal Fluid) from CSF Tap, Tube 2 Updated:  06/04/19 1106     Crypto Ag, CSF Negative    Narrative:       On which sequentially labeled tube should this analysis be  performed?->3    Fungus culture (Tube 3) [[id number]] Collected:  06/03/19 1353    Order Status:  Completed Specimen:  CSF (Spinal Fluid) from CSF Tap, Tube 2 Updated:  06/04/19 1013     Fungus (Mycology) Culture Culture in progress

## 2019-06-09 NOTE — PROGRESS NOTES
Repeat PTT after being held for 1 hour is 70.3.  Beau GIRALDO stated to restart if level normal at 800units/hr.  Heparin gtt restarted at 15.63 units/kg/hr which equals 8ml/hr (800units/hr).

## 2019-06-09 NOTE — PROGRESS NOTES
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     Weight: 62 kg (136 lb 11 oz)  Body mass index is 26.14 kg/m².      Intake/Output Summary (Last 24 hours) at 6/9/2019 1201  Last data filed at 6/9/2019 0800  Gross per 24 hour   Intake 2848.64 ml   Output 5245 ml   Net -2396.36 ml       Physical Exam   Constitutional: She is oriented to person, place, and time. She appears distressed.   Chronically ill appearing     HENT:   Head: Normocephalic and atraumatic.   Mouth/Throat: Oropharyngeal exudate present.   Eyes: Pupils are equal, round, and reactive to light. Conjunctivae are normal.   Neck: Neck supple. No JVD present. No tracheal deviation present. No thyromegaly present.   Cardiovascular: Regular rhythm and normal heart sounds. Tachycardia present.   Pulmonary/Chest: She is in respiratory distress. She has decreased breath sounds. She has wheezes in the right lower field and the left lower field. She has rhonchi in the right upper field and the right middle field. She has rales. She exhibits no tenderness.   Abdominal: Soft. Bowel sounds are normal.   Musculoskeletal: Normal range of motion. She exhibits no edema.   Lymphadenopathy:     She has no cervical adenopathy.   Neurological: She is alert and oriented to person, place, and time.   Skin: Skin is warm. She is  diaphoretic.   Nursing note and vitals reviewed.      Vents:  Vent Mode: A/C (06/09/19 1131)  Ventilator Initiated: Yes (06/07/19 1745)  Set Rate: 28 bmp (06/09/19 1131)  Vt Set: 340 mL (06/09/19 1131)  Pressure Support: 0 cmH20 (06/09/19 1131)  PEEP/CPAP: 5 cmH20 (06/09/19 1131)  Oxygen Concentration (%): 50 (06/09/19 1131)  Peak Airway Pressure: 26 cmH2O (06/09/19 1131)  Plateau Pressure: 0 cmH20 (06/09/19 1131)  Total Ve: 9.87 mL (06/09/19 1131)  F/VT Ratio<105 (RSBI): (!) 79.32 (06/09/19 1131)    Lines/Drains/Airways     Peripherally Inserted Central Catheter Line                 PICC Double Lumen 06/04/19 0819 right basilic 5 days          Central Venous Catheter Line                 Hemodialysis Catheter 06/08/19 1100 right femoral 1 day          Drain                 Urethral Catheter 06/06/19 1030 Latex 3 days         NG/OG Tube 06/07/19 1745 [facility] mouth 1 day          Airway                 Airway - Non-Surgical 06/07/19 1740 Endotracheal Tube 1 day                Significant Labs:    CBC/Anemia Profile:  Recent Labs   Lab 06/08/19  0435  06/08/19  1459 06/09/19  0415 06/09/19  0536   WBC 15.37*  --   --  14.05*  --    HGB 7.2*  --   --  7.9*  --    HCT 20.8*   < > 22* 22.9* 19*   *  --   --  71*  --    MCV 88  --   --  86  --    RDW 16.0*  --   --  15.5*  --     < > = values in this interval not displayed.        Chemistries:  Recent Labs   Lab 06/08/19  0435 06/08/19  1013 06/08/19  1650 06/08/19  2200 06/09/19  0415     138 126*  --  131* 131*  131*  131*   K 3.4*  3.4* 4.1  --  3.4* 3.9  3.9  3.9     109 101  --  103 102  102  102   CO2 14*  14* 13*  --  18* 17*  17*  17*   BUN 61*  61* 64*  --  48* 39*  39*  39*   CREATININE 4.3*  4.3* 4.4*  --  3.2* 2.6*  2.6*  2.6*   CALCIUM 5.4*  5.4* 5.0*  --  6.4* 6.6*  6.6*  6.6*   ALBUMIN 1.2*  1.2*  --   --  1.4* 1.5*  1.5*  1.5*   PROT 5.5*  --   --   --  6.2   BILITOT 0.3  --   --   --  0.4   ALKPHOS 88   --   --   --  104   ALT 17  --   --   --  23   AST 36  --   --   --  44*   MG 2.2  --  2.2 2.1 1.9   PHOS 6.5*  --  5.0* 4.1 3.6  3.6       Blood Culture:   No results for input(s): LABBLOO in the last 48 hours.  BMP:   Recent Labs   Lab 06/09/19  0415   *  123*  123*   *  131*  131*   K 3.9  3.9  3.9     102  102   CO2 17*  17*  17*   BUN 39*  39*  39*   CREATININE 2.6*  2.6*  2.6*   CALCIUM 6.6*  6.6*  6.6*   MG 1.9     CMP:   Recent Labs   Lab 06/08/19  0435 06/08/19  1013 06/08/19  2200 06/09/19  0415     138 126* 131* 131*  131*  131*   K 3.4*  3.4* 4.1 3.4* 3.9  3.9  3.9     109 101 103 102  102  102   CO2 14*  14* 13* 18* 17*  17*  17*   *  243* 438* 136* 123*  123*  123*   BUN 61*  61* 64* 48* 39*  39*  39*   CREATININE 4.3*  4.3* 4.4* 3.2* 2.6*  2.6*  2.6*   CALCIUM 5.4*  5.4* 5.0* 6.4* 6.6*  6.6*  6.6*   PROT 5.5*  --   --  6.2   ALBUMIN 1.2*  1.2*  --  1.4* 1.5*  1.5*  1.5*   BILITOT 0.3  --   --  0.4   ALKPHOS 88  --   --  104   AST 36  --   --  44*   ALT 17  --   --  23   ANIONGAP 15  15 12 10 12  12  12   EGFRNONAA 13*  13* 12* 18* 23*  23*  23*     Lactic Acid:   No results for input(s): LACTATE in the last 48 hours.  POCT Glucose:   Recent Labs   Lab 06/08/19  2046 06/09/19  0535 06/09/19  1119   POCTGLUCOSE 143* 162* 193*     Urine Studies:   No results for input(s): COLORU, APPEARANCEUA, PHUR, SPECGRAV, PROTEINUA, GLUCUA, KETONESU, BILIRUBINUA, OCCULTUA, NITRITE, UROBILINOGEN, LEUKOCYTESUR, RBCUA, WBCUA, BACTERIA, SQUAMEPITHEL, HYALINECASTS in the last 48 hours.    Invalid input(s): WRIGHTSUR    Significant Imaging:   I have reviewed all pertinent imaging results/findings within the past 24 hours.  I have reviewed and interpreted all pertinent imaging results/findings within the past 24 hours.Interval History: intubated    Objective:     Vital Signs (Most Recent):  Temp: 99.1 °F (37.3 °C) (06/09/19 0715)  Pulse:  82 (06/09/19 1131)  Resp: (!) 28 (06/09/19 1131)  BP: 112/64 (06/09/19 0843)  SpO2: 100 % (06/09/19 1131) Vital Signs (24h Range):  Temp:  [94.1 °F (34.5 °C)-99.2 °F (37.3 °C)] 99.1 °F (37.3 °C)  Pulse:  [] 82  Resp:  [21-29] 28  SpO2:  [93 %-100 %] 100 %  BP: ()/() 112/64     Weight: 62 kg (136 lb 11 oz)  Body mass index is 26.14 kg/m².      Intake/Output Summary (Last 24 hours) at 6/9/2019 1201  Last data filed at 6/9/2019 0800  Gross per 24 hour   Intake 2848.64 ml   Output 5245 ml   Net -2396.36 ml       Physical Exam   Constitutional: She appears distressed.   Chronically ill appearing  INtubated   HENT:   Head: Normocephalic and atraumatic.   Eyes: Pupils are equal, round, and reactive to light. Conjunctivae are normal.   Neck: Neck supple. No JVD present. No tracheal deviation present. No thyromegaly present.   Cardiovascular: Regular rhythm and normal heart sounds. Tachycardia present.   Pulmonary/Chest: She is in respiratory distress. She has no wheezes. She has rhonchi in the right lower field and the left lower field. She has rales in the right lower field and the left lower field. She exhibits no tenderness.   Abdominal: Soft. Bowel sounds are normal.   Musculoskeletal: Normal range of motion. She exhibits edema.   Lymphadenopathy:     She has no cervical adenopathy.   Neurological:   Sedated on ventilator   Skin: Skin is warm. She is diaphoretic.   Nursing note and vitals reviewed.      Vents:  Vent Mode: A/C (06/09/19 1131)  Ventilator Initiated: Yes (06/07/19 1745)  Set Rate: 28 bmp (06/09/19 1131)  Vt Set: 340 mL (06/09/19 1131)  Pressure Support: 0 cmH20 (06/09/19 1131)  PEEP/CPAP: 5 cmH20 (06/09/19 1131)  Oxygen Concentration (%): 50 (06/09/19 1131)  Peak Airway Pressure: 26 cmH2O (06/09/19 1131)  Plateau Pressure: 0 cmH20 (06/09/19 1131)  Total Ve: 9.87 mL (06/09/19 1131)  F/VT Ratio<105 (RSBI): (!) 79.32 (06/09/19 1131)    Lines/Drains/Airways     Peripherally Inserted Central  Catheter Line                 PICC Double Lumen 06/04/19 0819 right basilic 5 days          Central Venous Catheter Line                 Hemodialysis Catheter 06/08/19 1100 right femoral 1 day          Drain                 Urethral Catheter 06/06/19 1030 Latex 3 days         NG/OG Tube 06/07/19 1745 [facility] mouth 1 day          Airway                 Airway - Non-Surgical 06/07/19 1740 Endotracheal Tube 1 day                Significant Labs:    CBC/Anemia Profile:  Recent Labs   Lab 06/08/19  0435  06/08/19  1459 06/09/19  0415 06/09/19  0536   WBC 15.37*  --   --  14.05*  --    HGB 7.2*  --   --  7.9*  --    HCT 20.8*   < > 22* 22.9* 19*   *  --   --  71*  --    MCV 88  --   --  86  --    RDW 16.0*  --   --  15.5*  --     < > = values in this interval not displayed.        Chemistries:  Recent Labs   Lab 06/08/19  0435 06/08/19  1013 06/08/19  1650 06/08/19  2200 06/09/19  0415     138 126*  --  131* 131*  131*  131*   K 3.4*  3.4* 4.1  --  3.4* 3.9  3.9  3.9     109 101  --  103 102  102  102   CO2 14*  14* 13*  --  18* 17*  17*  17*   BUN 61*  61* 64*  --  48* 39*  39*  39*   CREATININE 4.3*  4.3* 4.4*  --  3.2* 2.6*  2.6*  2.6*   CALCIUM 5.4*  5.4* 5.0*  --  6.4* 6.6*  6.6*  6.6*   ALBUMIN 1.2*  1.2*  --   --  1.4* 1.5*  1.5*  1.5*   PROT 5.5*  --   --   --  6.2   BILITOT 0.3  --   --   --  0.4   ALKPHOS 88  --   --   --  104   ALT 17  --   --   --  23   AST 36  --   --   --  44*   MG 2.2  --  2.2 2.1 1.9   PHOS 6.5*  --  5.0* 4.1 3.6  3.6       ABGs:   Recent Labs   Lab 06/09/19  0536   PH 7.330*   PCO2 44.5   HCO3 23.4*   POCSATURATED 100   BE -3     BMP:   Recent Labs   Lab 06/09/19  0415   *  123*  123*   *  131*  131*   K 3.9  3.9  3.9     102  102   CO2 17*  17*  17*   BUN 39*  39*  39*   CREATININE 2.6*  2.6*  2.6*   CALCIUM 6.6*  6.6*  6.6*   MG 1.9     CMP:   Recent Labs   Lab 06/08/19  0435 06/08/19  1013  06/08/19  2200 06/09/19  0415     138 126* 131* 131*  131*  131*   K 3.4*  3.4* 4.1 3.4* 3.9  3.9  3.9     109 101 103 102  102  102   CO2 14*  14* 13* 18* 17*  17*  17*   *  243* 438* 136* 123*  123*  123*   BUN 61*  61* 64* 48* 39*  39*  39*   CREATININE 4.3*  4.3* 4.4* 3.2* 2.6*  2.6*  2.6*   CALCIUM 5.4*  5.4* 5.0* 6.4* 6.6*  6.6*  6.6*   PROT 5.5*  --   --  6.2   ALBUMIN 1.2*  1.2*  --  1.4* 1.5*  1.5*  1.5*   BILITOT 0.3  --   --  0.4   ALKPHOS 88  --   --  104   AST 36  --   --  44*   ALT 17  --   --  23   ANIONGAP 15  15 12 10 12  12  12   EGFRNONAA 13*  13* 12* 18* 23*  23*  23*     All pertinent labs within the past 24 hours have been reviewed.    Significant Imaging:  I have reviewed all pertinent imaging results/findings within the past 24 hours.  I have reviewed and interpreted all pertinent imaging results/findings within the past 24 hours.      ABG  Recent Labs   Lab 06/09/19  0536   PH 7.330*   PO2 201*   PCO2 44.5   HCO3 23.4*   BE -3     Assessment/Plan:     Neuro  Seizure disorder  6/4 monitor in ICU    ENT  Acute sinusitis  6/4 IV antibiotics and sinus rinse  6/5 symptomatically improved    Pulmonary  Pneumonia due to infectious organism  6/6 Suspect aspiration - will evaluate with speech therapy  6/7 worsening - will check Chest X Ray may need intubation  6/8 Intubated , dialysis today. Will try to get consent for bronchoscopy  6/9 review cultures from bronchoscopy    Acute respiratory failure with hypoxia  6/6 supplemental oxygen, jet nebs, NPO over concern for aspiration, may need intubation  6/7 not improved  6/9 stabilized on mechanical ventilation    Cardiac/Vascular  History of VT/VF s/p implantation of automatic cardioverter/defibrillator (AICD)  Monitor in ICU      ID  * Severe sepsis  6/4 IV fluids and volume expansion  6/5 started on pressors    AIDS (acquired immunodeficiency syndrome), CD4 <=200  6/4 Noncompliant with  meds    Endocrine  Hyperglycemia  recheck       Critical Care Daily Checklist:    A: Awake: RASS Goal/Actual Goal: RASS Goal: -3-->moderate sedation  Actual: Suarez Agitation Sedation Scale (RASS): Moderate sedation   B: Spontaneous Breathing Trial Performed? Spon. Breathing Trial Initiated?: Not initiated (06/09/19 0730)   C: SAT & SBT Coordinated?  na                      D: Delirium: CAM-ICU Overall CAM-ICU: Positive   E: Early Mobility Performed? No   F: Feeding Goal:    Status:     Current Diet Order   Procedures    Diet NPO      AS: Analgesia/Sedation adequate   T: Thromboembolic Prophylaxis y   H: HOB > 300 Yes   U: Stress Ulcer Prophylaxis (if needed) y   G: Glucose Control poor   B: Bowel Function Stool Occurrence: 1   I: Indwelling Catheter (Lines & Tinsley) Necessity needed   D: De-escalation of Antimicrobials/Pharmacotherapies na    Plan for the day/ETD Bronchoscopy, ventialtory support    Code Status:  Family/Goals of Care: Full Code  dicused     Critical Care Time: 50 minutes  Critical secondary to Patient has a condition that poses threat to life and bodily function: Severe Respiratory Distress      Critical care was time spent personally by me on the following activities: development of treatment plan with patient or surrogate and bedside caregivers, discussions with consultants, evaluation of patient's response to treatment, examination of patient, ordering and performing treatments and interventions, ordering and review of laboratory studies, ordering and review of radiographic studies, pulse oximetry, re-evaluation of patient's condition. This critical care time did not overlap with that of any other provider or involve time for any procedures.     Anuj Riggs MD  Critical Care Medicine  [facility]

## 2019-06-09 NOTE — PLAN OF CARE
Problem: Adult Inpatient Plan of Care  Goal: Plan of Care Review  Outcome: Ongoing (interventions implemented as appropriate)  Pt remains intubated and sedated.  Pt had bronch today, pt tolerating well.  Pt remains on propofol, fentanyl, and levo, triturating as tolerates.  Pt tolerating CRRT with net , heparin gtt non triturating pre filter.  Calcium gluconate and Mag replaced replaced.  Pt is NSR/ST on the heart monitor.  GI: OG tube in place with output matching intake.  : criticore navarrete in place with no urine output.  Pt temp low 95, warming blanket applied, criticore not matching oral temp at times.  Pt turned and repositioned with use of pillows and wedge.  PICC intact with no redness, swelling or drainage.  Bed low, wheels locked, sammy soft wrist restraints in place for pt safety.  Plan of care reviewed with pt mother.  Will continue to monitor.

## 2019-06-09 NOTE — ASSESSMENT & PLAN NOTE
Worsening renal function.  Placed vascular catheter and started CRRT 08 June.  Tolerating without vasopressors.

## 2019-06-10 LAB
ALBUMIN SERPL BCP-MCNC: 1.7 G/DL (ref 3.5–5.2)
ALLENS TEST: ABNORMAL
ALP SERPL-CCNC: 122 U/L (ref 55–135)
ALT SERPL W/O P-5'-P-CCNC: 21 U/L (ref 10–44)
ANION GAP SERPL CALC-SCNC: 11 MMOL/L (ref 8–16)
ANION GAP SERPL CALC-SCNC: 12 MMOL/L (ref 8–16)
APPEARANCE FLD: CLEAR
APTT BLDCRRT: 68 SEC (ref 21–32)
AST SERPL-CCNC: 28 U/L (ref 10–40)
BACTERIA SPEC AEROBE CULT: NO GROWTH
BASOPHILS # BLD AUTO: 0.01 K/UL (ref 0–0.2)
BASOPHILS NFR BLD: 0.1 % (ref 0–1.9)
BILIRUB SERPL-MCNC: 0.5 MG/DL (ref 0.1–1)
BODY FLD TYPE: NORMAL
BUN SERPL-MCNC: 19 MG/DL (ref 6–20)
BUN SERPL-MCNC: 22 MG/DL (ref 6–20)
CALCIUM SERPL-MCNC: 7 MG/DL (ref 8.7–10.5)
CALCIUM SERPL-MCNC: 7.3 MG/DL (ref 8.7–10.5)
CHLORIDE SERPL-SCNC: 98 MMOL/L (ref 95–110)
CO2 SERPL-SCNC: 22 MMOL/L (ref 23–29)
CO2 SERPL-SCNC: 23 MMOL/L (ref 23–29)
COLOR FLD: COLORLESS
CREAT SERPL-MCNC: 1.4 MG/DL (ref 0.5–1.4)
CREAT SERPL-MCNC: 1.8 MG/DL (ref 0.5–1.4)
DELSYS: ABNORMAL
DIFFERENTIAL METHOD: ABNORMAL
EOSINOPHIL # BLD AUTO: 0 K/UL (ref 0–0.5)
EOSINOPHIL NFR BLD: 0 % (ref 0–8)
ERYTHROCYTE [DISTWIDTH] IN BLOOD BY AUTOMATED COUNT: 15.3 % (ref 11.5–14.5)
ERYTHROCYTE [SEDIMENTATION RATE] IN BLOOD BY WESTERGREN METHOD: 28 MM/H
EST. GFR  (AFRICAN AMERICAN): 42 ML/MIN/1.73 M^2
EST. GFR  (AFRICAN AMERICAN): 57 ML/MIN/1.73 M^2
EST. GFR  (NON AFRICAN AMERICAN): 36 ML/MIN/1.73 M^2
EST. GFR  (NON AFRICAN AMERICAN): 49 ML/MIN/1.73 M^2
FIO2: 45
G6PD RBC-CCNT: 15.5 U/G HGB (ref 7–20.5)
GLUCOSE SERPL-MCNC: 126 MG/DL (ref 70–110)
GLUCOSE SERPL-MCNC: 157 MG/DL (ref 70–110)
GRAM STN SPEC: NORMAL
HAV IGM SERPL QL IA: NEGATIVE
HBV CORE AB SERPL QL IA: NEGATIVE
HBV CORE IGM SERPL QL IA: NEGATIVE
HBV SURFACE AG SERPL QL IA: NEGATIVE
HCO3 UR-SCNC: 29.6 MMOL/L (ref 24–28)
HCT VFR BLD AUTO: 21.8 % (ref 37–48.5)
HCT VFR BLD CALC: 21 %PCV (ref 36–54)
HCV AB SERPL QL IA: NEGATIVE
HGB BLD-MCNC: 7.7 G/DL (ref 12–16)
HIV UQ DATE RECEIVED: ABNORMAL
HIV UQ DATE REPORTED: ABNORMAL
HIV1 RNA # SERPL NAA+PROBE: ABNORMAL COPIES/ML
HIV1 RNA SERPL NAA+PROBE-LOG#: 4.83 LOG (10) COPIES/ML
HIV1 RNA SERPL QL NAA+PROBE: DETECTED
LYMPHOCYTES # BLD AUTO: 3.1 K/UL (ref 1–4.8)
LYMPHOCYTES NFR BLD: 30.5 % (ref 18–48)
LYMPHOCYTES NFR FLD MANUAL: 63 %
MAGNESIUM SERPL-MCNC: 2.1 MG/DL (ref 1.6–2.6)
MAGNESIUM SERPL-MCNC: 2.2 MG/DL (ref 1.6–2.6)
MCH RBC QN AUTO: 30.4 PG (ref 27–31)
MCHC RBC AUTO-ENTMCNC: 35.3 G/DL (ref 32–36)
MCV RBC AUTO: 86 FL (ref 82–98)
MESOTHL CELL NFR FLD MANUAL: NORMAL %
MODE: ABNORMAL
MONOCYTES # BLD AUTO: 0.3 K/UL (ref 0.3–1)
MONOCYTES NFR BLD: 2.9 % (ref 4–15)
MONOS+MACROS NFR FLD MANUAL: 37 %
NEUTROPHILS # BLD AUTO: 6.6 K/UL (ref 1.8–7.7)
NEUTROPHILS NFR BLD: 67 % (ref 38–73)
PCO2 BLDA: 39.8 MMHG (ref 35–45)
PEEP: 5
PH SMN: 7.48 [PH] (ref 7.35–7.45)
PHOSPHATE SERPL-MCNC: 2.9 MG/DL (ref 2.7–4.5)
PHOSPHATE SERPL-MCNC: 4.3 MG/DL (ref 2.7–4.5)
PHOSPHATE SERPL-MCNC: 4.3 MG/DL (ref 2.7–4.5)
PLATELET # BLD AUTO: 71 K/UL (ref 150–350)
PMV BLD AUTO: 13 FL (ref 9.2–12.9)
PO2 BLDA: 244 MMHG (ref 80–100)
POC BE: 6 MMOL/L
POC IONIZED CALCIUM: 0.96 MMOL/L (ref 1.06–1.42)
POC SATURATED O2: 100 % (ref 95–100)
POCT GLUCOSE: 130 MG/DL (ref 70–110)
POCT GLUCOSE: 135 MG/DL (ref 70–110)
POCT GLUCOSE: 141 MG/DL (ref 70–110)
POCT GLUCOSE: 159 MG/DL (ref 70–110)
POTASSIUM BLD-SCNC: 4.1 MMOL/L (ref 3.5–5.1)
POTASSIUM SERPL-SCNC: 3.9 MMOL/L (ref 3.5–5.1)
PROT SERPL-MCNC: 6.7 G/DL (ref 6–8.4)
RBC # BLD AUTO: 2.53 M/UL (ref 4–5.4)
SAMPLE: ABNORMAL
SITE: ABNORMAL
SODIUM BLD-SCNC: 132 MMOL/L (ref 136–145)
SODIUM SERPL-SCNC: 131 MMOL/L (ref 136–145)
SODIUM SERPL-SCNC: 133 MMOL/L (ref 136–145)
VANCOMYCIN SERPL-MCNC: 15.1 UG/ML
VT: 340
WBC # BLD AUTO: 9.99 K/UL (ref 3.9–12.7)
WBC # FLD: 422 /CU MM

## 2019-06-10 PROCEDURE — 99291 CRITICAL CARE FIRST HOUR: CPT | Mod: ,,, | Performed by: INTERNAL MEDICINE

## 2019-06-10 PROCEDURE — 25000003 PHARM REV CODE 250: Performed by: NURSE PRACTITIONER

## 2019-06-10 PROCEDURE — 83735 ASSAY OF MAGNESIUM: CPT

## 2019-06-10 PROCEDURE — 94003 VENT MGMT INPAT SUBQ DAY: CPT

## 2019-06-10 PROCEDURE — 90945 DIALYSIS ONE EVALUATION: CPT

## 2019-06-10 PROCEDURE — 82800 BLOOD PH: CPT

## 2019-06-10 PROCEDURE — 27000221 HC OXYGEN, UP TO 24 HOURS

## 2019-06-10 PROCEDURE — 25000242 PHARM REV CODE 250 ALT 637 W/ HCPCS: Performed by: INTERNAL MEDICINE

## 2019-06-10 PROCEDURE — 80053 COMPREHEN METABOLIC PANEL: CPT

## 2019-06-10 PROCEDURE — 84295 ASSAY OF SERUM SODIUM: CPT

## 2019-06-10 PROCEDURE — 20000000 HC ICU ROOM

## 2019-06-10 PROCEDURE — 99291 PR CRITICAL CARE, E/M 30-74 MINUTES: ICD-10-PCS | Mod: ,,, | Performed by: INTERNAL MEDICINE

## 2019-06-10 PROCEDURE — 84132 ASSAY OF SERUM POTASSIUM: CPT

## 2019-06-10 PROCEDURE — 25000003 PHARM REV CODE 250: Performed by: INTERNAL MEDICINE

## 2019-06-10 PROCEDURE — 82330 ASSAY OF CALCIUM: CPT

## 2019-06-10 PROCEDURE — 94640 AIRWAY INHALATION TREATMENT: CPT

## 2019-06-10 PROCEDURE — 99900035 HC TECH TIME PER 15 MIN (STAT)

## 2019-06-10 PROCEDURE — 80069 RENAL FUNCTION PANEL: CPT

## 2019-06-10 PROCEDURE — 97802 MEDICAL NUTRITION INDIV IN: CPT

## 2019-06-10 PROCEDURE — 90945 PR DIALYSIS, NOT HEMO, 1 EVAL: ICD-10-PCS | Mod: ,,, | Performed by: INTERNAL MEDICINE

## 2019-06-10 PROCEDURE — 85730 THROMBOPLASTIN TIME PARTIAL: CPT

## 2019-06-10 PROCEDURE — 85025 COMPLETE CBC W/AUTO DIFF WBC: CPT

## 2019-06-10 PROCEDURE — 63600175 PHARM REV CODE 636 W HCPCS: Performed by: INTERNAL MEDICINE

## 2019-06-10 PROCEDURE — 82803 BLOOD GASES ANY COMBINATION: CPT

## 2019-06-10 PROCEDURE — 90945 DIALYSIS ONE EVALUATION: CPT | Mod: ,,, | Performed by: INTERNAL MEDICINE

## 2019-06-10 PROCEDURE — 80100008 HC CRRT DAILY MAINTENANCE

## 2019-06-10 PROCEDURE — 80069 RENAL FUNCTION PANEL: CPT | Mod: 91

## 2019-06-10 PROCEDURE — 63600175 PHARM REV CODE 636 W HCPCS: Performed by: NURSE PRACTITIONER

## 2019-06-10 PROCEDURE — 85014 HEMATOCRIT: CPT

## 2019-06-10 PROCEDURE — 36600 WITHDRAWAL OF ARTERIAL BLOOD: CPT

## 2019-06-10 PROCEDURE — 80202 ASSAY OF VANCOMYCIN: CPT

## 2019-06-10 RX ORDER — PANTOPRAZOLE SODIUM 40 MG/1
40 FOR SUSPENSION ORAL DAILY
Status: DISCONTINUED | OUTPATIENT
Start: 2019-06-10 | End: 2019-06-11

## 2019-06-10 RX ADMIN — METHYLPREDNISOLONE SODIUM SUCCINATE 40 MG: 40 INJECTION, POWDER, FOR SOLUTION INTRAMUSCULAR; INTRAVENOUS at 08:06

## 2019-06-10 RX ADMIN — LACOSAMIDE 100 MG: 50 TABLET, FILM COATED ORAL at 09:06

## 2019-06-10 RX ADMIN — AZITHROMYCIN MONOHYDRATE 500 MG: 500 INJECTION, POWDER, LYOPHILIZED, FOR SOLUTION INTRAVENOUS at 04:06

## 2019-06-10 RX ADMIN — PIPERACILLIN SODIUM AND TAZOBACTAM SODIUM 4.5 G: 4; .5 INJECTION, POWDER, LYOPHILIZED, FOR SOLUTION INTRAVENOUS at 06:06

## 2019-06-10 RX ADMIN — PIPERACILLIN SODIUM AND TAZOBACTAM SODIUM 4.5 G: 4; .5 INJECTION, POWDER, LYOPHILIZED, FOR SOLUTION INTRAVENOUS at 03:06

## 2019-06-10 RX ADMIN — PIPERACILLIN SODIUM AND TAZOBACTAM SODIUM 4.5 G: 4; .5 INJECTION, POWDER, LYOPHILIZED, FOR SOLUTION INTRAVENOUS at 11:06

## 2019-06-10 RX ADMIN — SODIUM PHOSPHATE, MONOBASIC, MONOHYDRATE 30 MMOL: 276; 142 INJECTION, SOLUTION INTRAVENOUS at 12:06

## 2019-06-10 RX ADMIN — ONDANSETRON 4 MG: 2 INJECTION INTRAMUSCULAR; INTRAVENOUS at 11:06

## 2019-06-10 RX ADMIN — VORICONAZOLE 200 MG: 40 POWDER, FOR SUSPENSION ORAL at 08:06

## 2019-06-10 RX ADMIN — NYSTATIN 500000 UNITS: 100000 SUSPENSION ORAL at 04:06

## 2019-06-10 RX ADMIN — PROMETHAZINE HYDROCHLORIDE 12.5 MG: 25 INJECTION INTRAMUSCULAR; INTRAVENOUS at 11:06

## 2019-06-10 RX ADMIN — CHLORHEXIDINE GLUCONATE 0.12% ORAL RINSE 15 ML: 1.2 LIQUID ORAL at 08:06

## 2019-06-10 RX ADMIN — PANTOPRAZOLE SODIUM 40 MG: 40 GRANULE, DELAYED RELEASE ORAL at 09:06

## 2019-06-10 RX ADMIN — PROMETHAZINE HYDROCHLORIDE 12.5 MG: 25 INJECTION INTRAMUSCULAR; INTRAVENOUS at 04:06

## 2019-06-10 RX ADMIN — TENOFOVIR DISOPROXIL FUMARATE 300 MG: 300 TABLET, COATED ORAL at 08:06

## 2019-06-10 RX ADMIN — DOLUTEGRAVIR SODIUM 50 MG: 50 TABLET, FILM COATED ORAL at 08:06

## 2019-06-10 RX ADMIN — IPRATROPIUM BROMIDE AND ALBUTEROL SULFATE 3 ML: .5; 3 SOLUTION RESPIRATORY (INHALATION) at 07:06

## 2019-06-10 RX ADMIN — TRIMETHOPRIM 200 MG: 100 TABLET ORAL at 04:06

## 2019-06-10 RX ADMIN — VANCOMYCIN HYDROCHLORIDE 750 MG: 750 INJECTION, POWDER, LYOPHILIZED, FOR SOLUTION INTRAVENOUS at 08:06

## 2019-06-10 RX ADMIN — PROPOFOL 25 MCG/KG/MIN: 10 INJECTION, EMULSION INTRAVENOUS at 06:06

## 2019-06-10 RX ADMIN — NYSTATIN 500000 UNITS: 100000 SUSPENSION ORAL at 08:06

## 2019-06-10 RX ADMIN — DAPSONE 100 MG: 25 TABLET ORAL at 04:06

## 2019-06-10 RX ADMIN — SODIUM BICARBONATE 650 MG TABLET 1300 MG: at 08:06

## 2019-06-10 RX ADMIN — ALTEPLASE 2 MG: 2.2 INJECTION, POWDER, LYOPHILIZED, FOR SOLUTION INTRAVENOUS at 05:06

## 2019-06-10 RX ADMIN — ONDANSETRON 4 MG: 2 INJECTION INTRAMUSCULAR; INTRAVENOUS at 08:06

## 2019-06-10 RX ADMIN — LAMIVUDINE 100 MG: 10 SOLUTION ORAL at 08:06

## 2019-06-10 RX ADMIN — NYSTATIN 500000 UNITS: 100000 SUSPENSION ORAL at 01:06

## 2019-06-10 RX ADMIN — IPRATROPIUM BROMIDE AND ALBUTEROL SULFATE 3 ML: .5; 3 SOLUTION RESPIRATORY (INHALATION) at 12:06

## 2019-06-10 RX ADMIN — CALCIUM GLUCONATE 1000 MG: 98 INJECTION, SOLUTION INTRAVENOUS at 09:06

## 2019-06-10 RX ADMIN — IPRATROPIUM BROMIDE AND ALBUTEROL SULFATE 3 ML: .5; 3 SOLUTION RESPIRATORY (INHALATION) at 01:06

## 2019-06-10 NOTE — PROCEDURES
Wang Kebede is a 33 y.o. female patient.    Temp: 98.5 °F (36.9 °C) (06/10/19 0715)  Pulse: 88 (06/10/19 0908)  Resp: (!) 28 (06/10/19 0908)  BP: 123/83 (06/10/19 0830)  SpO2: 100 % (06/10/19 0908)  Weight: 61 kg (134 lb 7.7 oz) (06/10/19 0833)  Height: 5' (152.4 cm) (06/10/19 0833)       Procedures      Patient Seen on rounds for CRRT  ; HD is for [facility]; Anuria and Acidosis   ;CRRT orders placed  and reviewed with HD nurse and nursing staff ;   Access is functioning well ; still on my small dose of pressors.  Chest x-ray is cleared up.  Plans for extubation noted.  Will stop CRRT today          Sonam Yanez  6/10/2019

## 2019-06-10 NOTE — ASSESSMENT & PLAN NOTE
6/6 Suspect aspiration - will evaluate with speech therapy  6/7 worsening - will check Chest X Ray may need intubation  6/8 Intubated , dialysis today. Will try to get consent for bronchoscopy  6/9 review cultures from bronchoscopy  6/10 status post bronchial sampling.  Broad-spectrum antibiotic.  Antifungal.

## 2019-06-10 NOTE — PROGRESS NOTES
[facility] Hospital Medicine  Progress Note    Patient Name: Wang Kebede  MRN: [id number]  Patient Class: IP- Inpatient   Admission Date: 6/3/2019  Length of Stay: 6 days  Attending Physician: Gerardo Mccauley MD  Primary Care Provider: Levi Moncada MD        Subjective:     Principal Problem:Sepsis    HPI:  33-year-old female with significant medical history including AIDS who has history of noncompliance with medical plan of care presents today for complaint of malaise over the past week.  Modifying factors none.  Associated symptoms:  Cough, headache.  Prior treatment not effective with home care.  Patient was evaluated emergency room.  Significant findings and workup include sinusitis findings on CT.  Cultures pending.  WBC and lactic both negative.  Patient was started on IV antibiotics and given IV fluids.  During ER care patient became more tachycardic and developed fever.  Infectious Disease was consulted by ED who recommended observation.  Hospital Medicine was consulted patient admitted to observation.  To note patient's last CD4 count 39  three months ago.    Hospital Course:  33 year old woman with AIDS-poorly compliant with HAART -last cd4 count -was 39 (02/2019).  She was admitted at this time for febrile illness hemodynamically unstable requiring vasopressors to maintain blood pressure.  Infectious Disease evaluated and assisting management.  Started broad empiric antibiotic treatment.  Continued to run intermittent high fevers.  Hemodynamics improved and weaned off vasopressors.  Remained tachycardic to varying degrees.  EKG consistently showed sinus tachycardia or SVT.  Rate controlled with as needed Metoprolol IV.  Hypoxemic respiratory failure on room air corrected with continuous BiPAP.  Continued respiratory distress, decided for intubation 07 June.  Worsening renal function, started CRRT 08 June in ICU on ventilator.  Diagnostic bronchoscopy on 09 June and  tolerating CRRT on Norepinephrine.    Interval History:  Intubated and sedated.  Hemodynamically unstable on Norepinephrine infusion. Intermittent mild hypothermia.  Tolerating CRRT.  Following commands on ventilator.  Breathing trials for extubation.    Review of Systems   Unable to perform ROS: Intubated   Constitutional: Positive for activity change and fever. Negative for chills, diaphoresis and fatigue.   HENT: Positive for sinus pressure. Negative for congestion, sore throat and voice change.    Eyes: Negative for photophobia and visual disturbance.   Respiratory: Negative for cough, shortness of breath, wheezing and stridor.    Cardiovascular: Negative for chest pain and leg swelling.   Gastrointestinal: Negative for abdominal distention, abdominal pain, constipation, diarrhea, nausea and vomiting.   Endocrine: Negative for polydipsia, polyphagia and polyuria.   Genitourinary: Negative for difficulty urinating, dysuria, flank pain, pelvic pain, urgency and vaginal discharge.   Musculoskeletal: Negative for back pain, joint swelling, neck pain and neck stiffness.   Skin: Negative for color change and rash.   Allergic/Immunologic: Negative for immunocompromised state.   Neurological: Positive for headaches. Negative for dizziness, syncope, weakness and numbness.   Hematological: Does not bruise/bleed easily.   Psychiatric/Behavioral: Negative for agitation, behavioral problems and confusion.     Objective:     Vital Signs (Most Recent):  Temp: 99.8 °F (37.7 °C) (06/10/19 1115)  Pulse: (!) 136 (06/10/19 1152)  Resp: (!) 21 (06/10/19 1152)  BP: (!) 150/105 (06/10/19 1145)  SpO2: 100 % (06/10/19 1152) Vital Signs (24h Range):  Temp:  [95 °F (35 °C)-99.8 °F (37.7 °C)] 99.8 °F (37.7 °C)  Pulse:  [] 136  Resp:  [8-35] 21  SpO2:  [99 %-100 %] 100 %  BP: ()/() 150/105     Weight: 61 kg (134 lb 7.7 oz)  Body mass index is 26.26 kg/m².    Intake/Output Summary (Last 24 hours) at 6/10/2019 1215  Last  data filed at 6/10/2019 1105  Gross per 24 hour   Intake 2469.85 ml   Output 7782 ml   Net -5312.15 ml      Physical Exam   Constitutional: She is oriented to person, place, and time. She appears well-developed and well-nourished. No distress.   Acutely ill-appearing female   HENT:   Head: Normocephalic and atraumatic.   Nose: Nose normal.   Positive frontal and maxillary sinus tenderness   Eyes: Pupils are equal, round, and reactive to light. Conjunctivae and EOM are normal. No scleral icterus.   Neck: Normal range of motion. Neck supple. No tracheal deviation present.   Cardiovascular: Regular rhythm, normal heart sounds and intact distal pulses.   No murmur heard.  Tachycardia   Pulmonary/Chest: Effort normal and breath sounds normal. No stridor. No respiratory distress. She has no wheezes. She has no rales.   Abdominal: Soft. Bowel sounds are normal. She exhibits no distension. There is no tenderness. There is no guarding.   Genitourinary:   Genitourinary Comments: -   Musculoskeletal: Normal range of motion. She exhibits no edema or deformity.   Neurological: She is alert and oriented to person, place, and time. No cranial nerve deficit.   Skin: Skin is warm and dry. Capillary refill takes less than 2 seconds. No rash noted. She is not diaphoretic.   Psychiatric: She has a normal mood and affect. Her behavior is normal. Judgment and thought content normal.   Nursing note and vitals reviewed.      Significant Labs: All pertinent labs within the past 24 hours have been reviewed.    Significant Imaging: I have reviewed and interpreted all pertinent imaging results/findings within the past 24 hours.    Assessment/Plan:      * Sepsis  Potentially related to sinusitis.  Patient received IV fluids and was started on IV antibiotics.  Lactic negative.  She is an immunocompromised host . Will start broad spectrum antimicrobial therapy including antifungal therapy -will use vancomycin, zosyn and empiric Voriconazole.   Will send blood cultures, fungal cultures, fungitell assay, AFB cultures.  Infectious Disease assisting with management.  Diagnostic bronchoscopy 09 June.      LELAND (acute kidney injury)  Worsening renal function.  Placed vascular catheter and started CRRT 08 June.  Tolerating without vasopressors.    Acute respiratory failure with hypoxia  Maintaining oxygenation via continuous BiPAP.  Increasing respiratory distress anticipate she will require intubation.  Intubated 07 June.  Breathing trials for extubation 10 Mckenna.    Acute sinusitis  ill continue empiric broad spectrum therapy with Zosyn, will follow fever curve.  On Voriconazole- PO not IV due to renal impairment  , continue Vanco.  Monitor closely     AIDS (acquired immunodeficiency syndrome), CD4 <=200  Resume patient's HAART therapy.  Patient being treated with Rocephin for sinusitis will add on azithromycin and dapsone for further prophylaxis given patient's immunocompromised state.  She has poor compliance to therapy and follow up.  Will check cd4 count , HIV viral load.  Will continue Striblid.  OI prophylaxis with Dapsone and Azithromycin.  Infectious Disease following.    History of VT/VF s/p implantation of automatic cardioverter/defibrillator (AICD)  Cardiac monitoring    Seizure disorder  Continue patient's home medication  Seizure precautions.  will continue lacosamide and will monitor closely      VTE Risk Mitigation (From admission, onward)        Ordered     heparin 25,000 units in dextrose 5% 250 mL (100 units/mL) infusion (heparin infusion)  Continuous      06/09/19 1451     heparin (porcine) injection 4,000 Units  As needed (PRN)      06/08/19 1013     Place sequential compression device  Until discontinued      06/03/19 2020          Critical care time spent on the evaluation and treatment of severe organ dysfunction, review of pertinent labs and imaging studies, discussions with consulting providers and discussions with patient/family: 30  minutes.    Gerardo Mccauley MD  Department of Hospital Medicine   [facility]

## 2019-06-10 NOTE — PLAN OF CARE
Problem: Adult Inpatient Plan of Care  Goal: Plan of Care Review  Outcome: Ongoing (interventions implemented as appropriate)  Recommendations     Recommendation: 1. When medically able, rec increase current TF to 40 mL/hr to better meet needs. With current propofol infusion provides: 1484 kcal, 65g pro, 737 mL free water w/ flushes per MD/NP.  2 Will continue to monitor  Intervention: Enteral nutrition therapy  Goals: Meet >85% EEN/EPN while admitted  Nutrition Goal Status: new  Communication of RD Recs: POC, sticky note

## 2019-06-10 NOTE — PROGRESS NOTES
[facility] -   Adult Nutrition  Progress Note    SUMMARY       Recommendations    Recommendation: 1. When medically able, rec increase current TF to 40 mL/hr to better meet needs. With current propofol infusion provides: 1484 kcal, 65g pro, 737 mL free water w/ flushes per MD/NP.  2 Will continue to monitor  Intervention: Enteral nutrition therapy  Goals: Meet >85% EEN/EPN while admitted  Nutrition Goal Status: new  Communication of RD Recs: POC, sticky note    Reason for Assessment    Reason For Assessment: new tube feeding  Diagnosis: Acute sinusitis, recurrence not specified, unspecified location  Relevant Medical History: HIV, Vulvar cancer  Interdisciplinary Rounds: attended  General Information Comments: Pt currently intubated & sedated in ICU. Pt currently on Peptamen 1.5 @ 30mL/hr. No family at bedside at time of visit. NFPE performed 6.10.19: no muscle or fat wasting.   Nutrition Discharge Planning: pending medical course    Nutrition Risk Screen    Nutrition Risk Screen: no indicators present    Nutrition/Diet History    Spiritual, Cultural Beliefs, Anabaptist Practices, Values that Affect Care: no  Factors Affecting Nutritional Intake: NPO, on mechanical ventilation    Anthropometrics    Temp: 98.5 °F (36.9 °C)  Height Method: Stated  Height: 5' (152.4 cm)  Height (inches): 60 in  Weight Method: Bed Scale  Weight: 61 kg (134 lb 7.7 oz)  Weight (lb): 134.48 lb  Ideal Body Weight (IBW), Female: 100 lb  % Ideal Body Weight, Female (lb): 134.48 lb  BMI (Calculated): 26.3  BMI Grade: 25 - 29.9 - overweight       Lab/Procedures/Meds    Pertinent Labs Reviewed: reviewed  BMP  Lab Results   Component Value Date     (L) 06/10/2019     (L) 06/10/2019     (L) 06/10/2019    K 3.9 06/10/2019    K 3.9 06/10/2019    K 3.9 06/10/2019    CL 98 06/10/2019    CL 98 06/10/2019    CL 98 06/10/2019    CO2 23 06/10/2019    CO2 23 06/10/2019    CO2 23 06/10/2019    BUN 19 06/10/2019    BUN 19  06/10/2019    BUN 19 06/10/2019    CREATININE 1.4 06/10/2019    CREATININE 1.4 06/10/2019    CREATININE 1.4 06/10/2019    CALCIUM 7.0 (L) 06/10/2019    CALCIUM 7.0 (L) 06/10/2019    CALCIUM 7.0 (L) 06/10/2019    ANIONGAP 12 06/10/2019    ANIONGAP 12 06/10/2019    ANIONGAP 12 06/10/2019    ESTGFRAFRICA 57 (A) 06/10/2019    ESTGFRAFRICA 57 (A) 06/10/2019    ESTGFRAFRICA 57 (A) 06/10/2019    EGFRNONAA 49 (A) 06/10/2019    EGFRNONAA 49 (A) 06/10/2019    EGFRNONAA 49 (A) 06/10/2019     Lab Results   Component Value Date    CALCIUM 7.0 (L) 06/10/2019    CALCIUM 7.0 (L) 06/10/2019    CALCIUM 7.0 (L) 06/10/2019    PHOS 4.3 06/10/2019    PHOS 4.3 06/10/2019     No results found for: LABA1C, HGBA1C  Recent Labs   Lab 06/10/19  0606   POCTGLUCOSE 159*     Lab Results   Component Value Date    ALBUMIN 1.7 (L) 06/10/2019    ALBUMIN 1.7 (L) 06/10/2019    ALBUMIN 1.7 (L) 06/10/2019       Pertinent Medications Reviewed: reviewed    Physical Findings/Assessment  Wound: dermatitis anus  Estimated/Assessed Needs    Weight Used For Calorie Calculations: 61 kg (134 lb 7.7 oz)  Energy Calorie Requirements (kcal): 1442  Energy Need Method: [facility]  Protein Requirements: 61-73g  Weight Used For Protein Calculations: 61 kg (134 lb 7.7 oz)     Estimated Fluid Requirement Method: RDA Method(or per MD)  RDA Method (mL): 1442         Nutrition Prescription Ordered    Current Diet Order: NPO  Current Nutrition Support Formula Ordered: Peptamen 1.5 w/Prebio  Current Nutrition Support Rate Ordered: 30 (ml)  Current Nutrition Support Frequency Ordered: continuous     Evaluation of Received Nutrient/Fluid Intake    Enteral Calories (kcal): 1080  Enteral Protein (gm): 49  Enteral (Free Water) Fluid (mL): 553  % Kcal Needs: 75  % Protein Needs: 80  Energy Calories Required: not meeting needs  Protein Required: not meeting needs  % Intake of Estimated Energy Needs: 50 - 75 %  % Meal Intake: NPO    Nutrition Risk  [id number]    Assessment and Plan    Nutrition Problem  Inadequate oral intake     Related to (etiology):   NPO Status, no alternative means of nutrition     Signs and Symptoms (as evidenced by):   Meeting <85% EEN/EPN     Interventions/Recommendations (treatment strategy):  See above     Nutrition Diagnosis Status:   New    Monitor and Evaluation  Food and Nutrient Intake: enteral nutrition intake, energy intake, food and beverage intake  Food and Nutrient Adminstration: enteral and parenteral nutrition administration, diet order  Anthropometric Measurements: weight change  Biochemical Data, Medical Tests and Procedures: electrolyte and renal panel, inflammatory profile, gastrointestinal profile, lipid profile, glucose/endocrine profile  Nutrition-Focused Physical Findings: overall appearance, skin     Malnutrition Assessment  Orbital Region (Subcutaneous Fat Loss): well nourished  Upper Arm Region (Subcutaneous Fat Loss): well nourished  Thoracic and Lumbar Region: well nourished   [place] Region (Muscle Loss): well nourished  Clavicle Bone Region (Muscle Loss): well nourished  Clavicle and Acromion Bone Region (Muscle Loss): well nourished  Anterior Thigh Region (Muscle Loss): well nourished  Posterior Calf Region (Muscle Loss): well nourished       Subcutaneous Fat Loss (Final Summary): well nourished  Muscle Loss Evaluation (Final Summary): well nourished         Nutrition Follow-Up    RD Follow-up?: Yes

## 2019-06-10 NOTE — SUBJECTIVE & OBJECTIVE
Interval History:     Review of Systems   Unable to perform ROS: Critical illness       Objective:     Vital Signs (Most Recent):  Temp: 99.8 °F (37.7 °C) (06/10/19 1115)  Pulse: (!) 112 (06/10/19 1315)  Resp: (!) 23 (06/10/19 1315)  BP: 134/84 (06/10/19 1315)  SpO2: 100 % (06/10/19 1315) Vital Signs (24h Range):  Temp:  [95 °F (35 °C)-99.8 °F (37.7 °C)] 99.8 °F (37.7 °C)  Pulse:  [] 112  Resp:  [8-35] 23  SpO2:  [99 %-100 %] 100 %  BP: ()/() 134/84     Weight: 61 kg (134 lb 7.7 oz)  Body mass index is 26.26 kg/m².      Intake/Output Summary (Last 24 hours) at 6/10/2019 1440  Last data filed at 6/10/2019 1300  Gross per 24 hour   Intake 2251.05 ml   Output 7090 ml   Net -4838.95 ml       Physical Exam   Constitutional: She appears well-developed.   Ill appearing   HENT:   Head: Atraumatic.   Eyes: EOM are normal.   Neck: Neck supple.   Cardiovascular: Regular rhythm and normal heart sounds.   Tachycardia   Pulmonary/Chest: No respiratory distress. She has no wheezes.   Tachypneic   Abdominal: Soft.   Genitourinary:   Genitourinary Comments: [place]   Musculoskeletal: She exhibits deformity.   Right upper extremity PICC   Neurological: She is alert.   Following commands moving 4 extremities   Skin: Skin is warm. There is pallor.   Psychiatric:   Anxious       Vents:  Vent Mode: Spont (06/10/19 1120)  Ventilator Initiated: Yes (06/07/19 1745)  Set Rate: 0 bmp (06/10/19 1120)  Vt Set: 340 mL (06/10/19 1120)  Pressure Support: 10 cmH20 (06/10/19 1120)  PEEP/CPAP: 5 cmH20 (06/10/19 1120)  Oxygen Concentration (%): 30 (06/10/19 1145)  Peak Airway Pressure: 16 cmH2O (06/10/19 1120)  Plateau Pressure: 0 cmH20 (06/10/19 1120)  Total Ve: 12.8 mL (06/10/19 1120)  F/VT Ratio<105 (RSBI): (!) 44.2 (06/10/19 1120)    Lines/Drains/Airways     Peripherally Inserted Central Catheter Line                 PICC Double Lumen 06/04/19 0819 right basilic 6 days          Central Venous Catheter Line                  Hemodialysis Catheter 06/08/19 1100 right femoral 2 days          Drain                 Urethral Catheter 06/06/19 1030 Latex 4 days                Significant Labs:    CBC/Anemia Profile:  Recent Labs   Lab 06/09/19  0415  06/09/19  1256 06/10/19  0451 06/10/19  0538   WBC 14.05*  --   --  9.99  --    HGB 7.9*  --   --  7.7*  --    HCT 22.9*   < > 24* 21.8* 21*   PLT 71*  --   --  71*  --    MCV 86  --   --  86  --    RDW 15.5*  --   --  15.3*  --     < > = values in this interval not displayed.        Chemistries:  Recent Labs   Lab 06/09/19  0415 06/09/19  1235 06/09/19  2200 06/10/19  0451   *  131*  131* 131* 133* 133*  133*  133*   K 3.9  3.9  3.9 3.6 3.9 3.9  3.9  3.9     102  102 100 100 98  98  98   CO2 17*  17*  17* 21* 23 23  23  23   BUN 39*  39*  39* 28* 22* 19  19  19   CREATININE 2.6*  2.6*  2.6* 2.0* 1.6* 1.4  1.4  1.4   CALCIUM 6.6*  6.6*  6.6* 6.9* 7.2* 7.0*  7.0*  7.0*   ALBUMIN 1.5*  1.5*  1.5* 1.6* 1.6* 1.7*  1.7*  1.7*   PROT 6.2  --   --  6.7   BILITOT 0.4  --   --  0.5   ALKPHOS 104  --   --  122   ALT 23  --   --  21   AST 44*  --   --  28   MG 1.9 2.5 2.2 2.1   PHOS 3.6  3.6 3.4 2.3* 4.3  4.3     Echo July 2017    1 - No wall motion abnormalities.     2 - Normal left ventricular systolic function (EF 60-65%).     3 - Normal left ventricular diastolic function.     4 - Normal right ventricular systolic function .     5 - The estimated PA systolic pressure is greater than 30 mmHg.     6 - Mild tricuspid regurgitation      Significant Imaging:  CXR: I have reviewed all pertinent results/findings within the past 24 hours and my personal findings are:  Clear lungs.  ICD noted.  Right PICC noted.  CT scan of the chest reviewed bilateral multifocal alveolar opacities

## 2019-06-10 NOTE — ASSESSMENT & PLAN NOTE
Maintaining oxygenation via continuous BiPAP.  Increasing respiratory distress anticipate she will require intubation.  Intubated 07 June.  Breathing trials for extubation 10 Mckenna.

## 2019-06-10 NOTE — ASSESSMENT & PLAN NOTE
6/6 supplemental oxygen, jet nebs, NPO over concern for aspiration, may need intubation  6/7 not improved  6/9 stabilized on mechanical ventilation  6/10 past SBT.  Extubated.  O2 keep sat above 90%.

## 2019-06-10 NOTE — PROCEDURES
Bronchoscopy Procedure Note    Location: [facility]    Date of Operation : 6/9/2019    Pre-op Diagnosis   : pneumonia in immunosuppressed patient    Post-op Diagnosis: same    Surgeon: Anuj Riggs MD    Assistants: none    Anesthesia : Physician administered at bedside    Operation: Flexible fiberoptic bronchoscopy, diagnostic Bronchial washings, collected from both sides,   Microbiology protected transbronchial brushings from right lower lobe and bronchoalveolar lavage from the right lower lobe.     Findings: mild acute bronchitis    Specimen: diagnostic Bronchial washings, collected from both sides sent for AFB, fungal , virus and PCP,   Microbiology protected transbronchial brushings from right lower lobe sent for routine culture and bronchoalveolar lavage from the right lower lobe sent for cell count, AFB, fungal , virus and PCP      Estimated Blood Loss: Minimal     Drains: none  Complications: none    Indications and History:    The patient is a 33 y.o. female with immunosuppression, pneumonia and respiratory failure in the ICU on mechanical ventilation.  The risks, benefits, complications, treatment options and expected outcomes were discussed with the  family members.  The possibilities of reaction to medication, pulmonary aspiration, perforation of a viscus, bleeding, failure to diagnose a condition and creating a complication requiring transfusion or operation were discussed with the family members - mother - who freely signed the consent.      Description of Procedure:  The patient was seen in the ICU and the site of surgery properly noted/marked.  The patient was  identified as Asherjorge luis Deana Delio and the procedure verified as Flexible Fiberoptic Bronchoscopy.  A Time Out was held and the above information confirmed.     Additional sedation provided during procedure included the following:  Propofol drip was started immediately prior to procedure.      The patient was positioned  supine and the bronchoscope was passed through the endotracheal tube.  The scope was then passed into the trachea.  2% plain plain 2 ml was used topically on the reanna.  Careful inspection of the tacheal lumen was accomplished. The scope was sequentially passed into the left main and then left upper and lower bronchi and segmental bronchi. .    The scope was then withdrawn and advanced into the right main bronchus and then into the RUL, RML, and RLL bronchi and segmental bronchi.Diagnostic Bronchial washings, collected from both sides,   Microbiology protected transbronchial brushings from right lower lobe and bronchoalveolar lavage from the right lower lobe were obtained.    Bronchoalveolar lavage was performed with instillation of 180 cc of normal saline via 3 60 cc aliquants was instilled into the RLL . Lavage fluid was removed in the standard fashion by manual suction on syringe. The return specimen of 60 cc was adequate and was submitted for lab analysis        Endobronchial findings : no lesions, minimal acute bronchitis.  Trachea: Normal mucosa  Reanna: Normal mucosa  Right main bronchus: Normal mucosa  Right upper lobe bronchus: Normal mucosa  Right middle lobe bronchus: Normal mucosa  Right lower lobe bronchus: Normal mucosa  Left main bronchus: Normal mucosa  Left upper lobe bronchus: Normal mucosa  Left lingula lobe bronchus: Normal mucosa  Left lower lobe bronchus: Normal mucosa    The Patient was placed back on mechanical ventilation settings in satisfactory condition.    Attestation: I performed the procedure.    Anuj Riggs

## 2019-06-10 NOTE — ASSESSMENT & PLAN NOTE
Potentially related to sinusitis.  Patient received IV fluids and was started on IV antibiotics.  Lactic negative.  She is an immunocompromised host . Will start broad spectrum antimicrobial therapy including antifungal therapy -will use vancomycin, zosyn and empiric Voriconazole.  Will send blood cultures, fungal cultures, fungitell assay, AFB cultures.  Infectious Disease assisting with management.  Diagnostic bronchoscopy 09 June.

## 2019-06-10 NOTE — PROGRESS NOTES
Pharmacokinetic Assessment Follow Up: IV Vancomycin    Vancomycin serum concentration assessment(s):    The random level was drawned correctly and can be used to guide therapy at this time.  Random level 15.1 @0430 this AM    Vancomycin Regimen Plan:    Re-dose when the random level is less than 20 mcg/mL, next level to be drawn at 0430 on 6/11/2019     Pharmacy will continue to follow and monitor vancomycin.    Please contact pharmacy at extension [phone] for questions regarding this assessment.    Thank you for the consult,   DELL HASTINGS     Patient brief summary:  Wang Kebede is a 33 y.o. female initiated on antimicrobial therapy with IV Vancomycin for treatment of suspected lower respiratory infection    The patient received a loading dose, followed by the current treatment regimen: random levels with plan to redose when level is less than 20 mcg/mL    Drug Allergies:   Review of patient's allergies indicates:   Allergen Reactions    Iodine and iodide containing products Anaphylaxis     Pt states she coded last time she was administered contrast    Pneumococcal 23-chitra ps vaccine Anaphylaxis     Potential iodine containing    Dilaudid [hydromorphone] Hives and Itching    Bactrim [sulfamethoxazole-trimethoprim] Hives    Morphine Itching     Tolerates norco 2018       Actual Body Weight:   61kg    Renal Function:   Estimated Creatinine Clearance: 46.2 mL/min (based on SCr of 1.4 mg/dL).,     Dialysis Method (if applicable):  CRRT    CBC (last 72 hours):  Recent Labs   Lab Result Units 06/08/19  0435 06/09/19  0415 06/10/19  0451   WBC K/uL 15.37* 14.05* 9.99   Hemoglobin g/dL 7.2* 7.9* 7.7*   Hematocrit % 20.8* 22.9* 21.8*   Platelets K/uL 120* 71* 71*   Gran% % 79.2* 80.2* 67.0   Lymph% % 17.9* 18.3 30.5   Mono% % 3.2* 2.0* 2.9*   Eosinophil% % 0.1 0.0 0.0   Basophil% % 0.3 0.1 0.1   Differential Method  Automated Automated Automated       Metabolic Panel (last 72 hours):  Recent Labs   Lab  Result Units 06/08/19  0435 06/08/19  1013 06/08/19  1650 06/08/19  2200 06/09/19  0415 06/09/19  1235 06/09/19  2200 06/10/19  0451   Sodium mmol/L 138  138 126*  --  131* 131*  131*  131* 131* 133* 133*  133*  133*   Potassium mmol/L 3.4*  3.4* 4.1  --  3.4* 3.9  3.9  3.9 3.6 3.9 3.9  3.9  3.9   Chloride mmol/L 109  109 101  --  103 102  102  102 100 100 98  98  98   CO2 mmol/L 14*  14* 13*  --  18* 17*  17*  17* 21* 23 23  23  23   Glucose mg/dL 243*  243* 438*  --  136* 123*  123*  123* 179* 121* 157*  157*  157*   BUN, Bld mg/dL 61*  61* 64*  --  48* 39*  39*  39* 28* 22* 19  19  19   Creatinine mg/dL 4.3*  4.3* 4.4*  --  3.2* 2.6*  2.6*  2.6* 2.0* 1.6* 1.4  1.4  1.4   Albumin g/dL 1.2*  1.2*  --   --  1.4* 1.5*  1.5*  1.5* 1.6* 1.6* 1.7*  1.7*  1.7*   Total Bilirubin mg/dL 0.3  --   --   --  0.4  --   --  0.5   Alkaline Phosphatase U/L 88  --   --   --  104  --   --  122   AST U/L 36  --   --   --  44*  --   --  28   ALT U/L 17  --   --   --  23  --   --  21   Magnesium mg/dL 2.2  --  2.2 2.1 1.9 2.5 2.2 2.1   Phosphorus mg/dL 6.5*  --  5.0* 4.1 3.6  3.6 3.4 2.3* 4.3  4.3       Vancomycin Administrations:  vancomycin given in the last 96 hours                     vancomycin 750 mg in dextrose 5 % 250 mL IVPB (ready to mix system) (mg) 750 mg New Bag 06/10/19 0857    vancomycin 750 mg in dextrose 5 % 250 mL IVPB (ready to mix system) (mg) 750 mg New Bag 06/09/19 1058    vancomycin 500 mg in dextrose 5 % 100 mL IVPB (ready to mix system) (mg) 500 mg New Bag 06/08/19 1852                      Drug levels (last 3 results):  Recent Labs   Lab Result Units 06/08/19  0436 06/09/19  0415 06/10/19  0452   Vancomycin, Random ug/mL 20.2 17.0 15.1       Microbiologic Results:  Microbiology Results (last 7 days)       Procedure Component Value Units Date/Time    Culture, Respiratory with Gram Stain [[id number]] Collected:  06/08/19 0732    Order Status:  Completed Specimen:   Respiratory from Endotracheal Aspirate Updated:  06/10/19 0827     Respiratory Culture No growth     Gram Stain (Respiratory) <10 epithelial cells per low power field.     Gram Stain (Respiratory) Rare WBC's     Gram Stain (Respiratory) No organisms seen    Culture, Respiratory with Gram Stain [[id number]] Collected:  06/09/19 1003    Order Status:  Completed Specimen:  Bronchial Brush Updated:  06/10/19 0717     Respiratory Culture No Growth     Gram Stain (Respiratory) Rare WBC's     Gram Stain (Respiratory) No organisms seen    Narrative:       Right lower lobe    Culture, Respiratory with Gram Stain [[id number]] Collected:  06/09/19 1008    Order Status:  Completed Specimen:  Bronchial Alveolar Lavage from Bronchial Wash, RLL Updated:  06/10/19 0717     Respiratory Culture No Growth     Gram Stain (Respiratory) Rare WBC's     Gram Stain (Respiratory) No organisms seen    Narrative:       Right lower lobe    KOH prep [[id number]] Collected:  06/09/19 1008    Order Status:  Completed Specimen:  Bronchial Alveolar Lavage from Bronchial Wash, RLL Updated:  06/09/19 1532     KOH Prep No yeast or fungal elements seen    Narrative:       Right lower lobe    KOH prep [[id number]] Collected:  06/09/19 0959    Order Status:  Completed Specimen:  Bronchial Wash Updated:  06/09/19 1532     KOH Prep No yeast or fungal elements seen    Narrative:       collective    AFB Culture & Smear [[id number]] Collected:  06/09/19 1008    Order Status:  Sent Specimen:  Bronchial Alveolar Lavage from Bronchial Wash, RLL Updated:  06/09/19 1503    Fungus culture [[id number]] Collected:  06/09/19 0959    Order Status:  Sent Specimen:  Bronchial Wash Updated:  06/09/19 1345    Fungus culture [[id number]] Collected:  06/09/19 1008    Order Status:  Sent Specimen:  Bronchial Alveolar Lavage from Bronchial Wash, RLL Updated:  06/09/19 1345    AFB Culture & Smear [[id number]] Collected:  06/09/19 0959    Order Status:  Sent Specimen:  Bronchial Wash  Updated:  06/09/19 1345    Gram stain [[id number]]     Order Status:  Canceled Specimen:  Bronchial Wash from Lung, LLL     AFB Culture & Smear [[id number]]     Order Status:  Canceled Specimen:  Respiratory from Bronchial Wash     Fungus culture [[id number]]     Order Status:  Canceled Specimen:  Bronchial Wash     CAROLINA prep [[id number]]     Order Status:  Canceled Specimen:  Bronchial Wash     Culture, Respiratory [[id number]]     Order Status:  Canceled Specimen:  Respiratory from Bronchial Wash     Blood culture #1 **CANNOT BE ORDERED STAT** [[id number]] Collected:  06/03/19 1500    Order Status:  Completed Specimen:  Blood from Peripheral, Forearm, Left Updated:  06/08/19 2212     Blood Culture, Routine No growth after 5 days.    Blood culture #2 **CANNOT BE ORDERED STAT** [[id number]] Collected:  06/03/19 1530    Order Status:  Completed Specimen:  Blood from Peripheral, Wrist, Right Updated:  06/08/19 2212     Blood Culture, Routine No growth after 5 days.    Fungus culture [[id number]] Collected:  06/08/19 0748    Order Status:  Sent Specimen:  Respiratory from Endotracheal Aspirate Updated:  06/08/19 1324    CSF culture and Gram Stain (Tube 2) [[id number]] Collected:  06/03/19 1353    Order Status:  Completed Specimen:  CSF (Spinal Fluid) from CSF Tap, Tube 2 Updated:  06/08/19 0713     CSF CULTURE No Growth     Gram Stain Result Cytospin indicates:      No organisms seen      No WBC's    Narrative:       On which sequentially labeled tube should this analysis be  performed?->2    AFB Culture & Smear [[id number]] Collected:  06/06/19 0425    Order Status:  Completed Specimen:  Blood Updated:  06/07/19 2127     AFB Culture & Smear Culture in progress    Clostridium difficile EIA [[id number]]     Order Status:  Canceled Specimen:  Stool     Fungus Culture, Blood or Bone Marrow [[id number]] Collected:  06/05/19 0344    Order Status:  Sent Specimen:  Blood Updated:  06/05/19 0930    AFB Culture & Smear (Tube 3) [[id number]]  Collected:  06/03/19 1353    Order Status:  Completed Specimen:  CSF (Spinal Fluid) from CSF Tap, Tube 2 Updated:  06/05/19 0927     AFB Culture & Smear Culture in progress     AFB CULTURE STAIN No acid fast bacilli seen.    AFB Culture & Smear [[id number]]     Order Status:  Canceled Specimen:  Blood     Cryptococcal antigen, CSF (Tube 3) [[id number]] Collected:  06/03/19 1353    Order Status:  Completed Specimen:  CSF (Spinal Fluid) from CSF Tap, Tube 2 Updated:  06/04/19 1106     Crypto Ag, CSF Negative    Narrative:       On which sequentially labeled tube should this analysis be  performed?->3    Fungus culture (Tube 3) [[id number]] Collected:  06/03/19 1353    Order Status:  Completed Specimen:  CSF (Spinal Fluid) from CSF Tap, Tube 2 Updated:  06/04/19 1013     Fungus (Mycology) Culture Culture in progress

## 2019-06-10 NOTE — PLAN OF CARE
Problem: Adult Inpatient Plan of Care  Goal: Plan of Care Review  Outcome: Ongoing (interventions implemented as appropriate)  Pt able to follow commands and gesture appropriately this morning. CRRT discontinued. Pt extubated following successful SAT and SBT. Placed on 2L NC.Pt awake, alert, and oriented. C/O drowsiness and nausea, has vomited 3 times since extubation. Zofran and phenergan administered. Able to swallow sips of water without difficulty. No urine output today, MD aware. Family given updates at bedside.

## 2019-06-10 NOTE — PLAN OF CARE
Problem: Adult Inpatient Plan of Care  Goal: Plan of Care Review  Outcome: Ongoing (interventions implemented as appropriate)  No acute events overnight. No change in neuro status. Tolerating ventilator. Unable to wean Levophed. Properly sedated on Fentanyl and Propofol. VSS. Afebrile. Warming blanket remained at 37 degrees for normothermia. <10cc UO all shift. CRRT with net , no issues. CBGs WNL. No complaints of pain. Pt turned Q2H with pressure points protected. POC reviewed with pt and family. All questions and concerns addressed.

## 2019-06-10 NOTE — PROGRESS NOTES
[facility] -   Critical Care Medicine  Progress Note    Patient Name: Wang Kebede  MRN: [id number]  Admission Date: 6/3/2019  Hospital Length of Stay: 6 days  Code Status: Full Code  Attending Provider: Gerardo Mccauley MD  Primary Care Provider: Levi Moncada MD   Principal Problem: Sepsis    Subjective:     HPI:  32 y/o with HIV/AIDS, noncompliant with medications and low CD4 count presents with 5-6 day history of headaches and fever. Headaches are midfrontal and persistent with associated sinus congestions and sputum production. No loss of hearing. Noted to be tachycardic and febrile and admitted for IV antibiotics.  History of VT/VF s/p implantation of automatic cardioverter/defibrillator.    Hospital/ICU Course:  6/4 Hypotensive in Emergency Room , admitted to ICU for closer monitoring. Hx of low blood p[ressure in past encounters. Responding to IV fluid bolus  6/5 Placed on pressors to support blood pressure, feels poorly  6/6 Still febrile and tachycardic, weaned off pressors but respiratory status has deteriorated. New right upper lobe iniltrate- suggests aspiration  6/7 worsening respiratory distress - very little oral intake  6/8 Intubated yesterday evening, developed renal failure and vas cath placed - planned dialysis (continuous renal replacent therapy (CRRT) ?)  6/9 Respiratory status improved with ventilator. Underwent bronchoscopy  6/10 seen and examined.  Status post bronchoscopy and sampling yesterday.  Intubated.  ABG and chest x-ray reviewed.  Past SBT.  Extubated at 12:00 p.m..    Interval History:     Review of Systems   Unable to perform ROS: Critical illness       Objective:     Vital Signs (Most Recent):  Temp: 99.8 °F (37.7 °C) (06/10/19 1115)  Pulse: (!) 112 (06/10/19 1315)  Resp: (!) 23 (06/10/19 1315)  BP: 134/84 (06/10/19 1315)  SpO2: 100 % (06/10/19 1315) Vital Signs (24h Range):  Temp:  [95 °F (35 °C)-99.8 °F (37.7 °C)] 99.8 °F (37.7 °C)  Pulse:   [] 112  Resp:  [8-35] 23  SpO2:  [99 %-100 %] 100 %  BP: ()/() 134/84     Weight: 61 kg (134 lb 7.7 oz)  Body mass index is 26.26 kg/m².      Intake/Output Summary (Last 24 hours) at 6/10/2019 1440  Last data filed at 6/10/2019 1300  Gross per 24 hour   Intake 2251.05 ml   Output 7090 ml   Net -4838.95 ml       Physical Exam   Constitutional: She appears well-developed.   Ill appearing   HENT:   Head: Atraumatic.   Eyes: EOM are normal.   Neck: Neck supple.   Cardiovascular: Regular rhythm and normal heart sounds.   Tachycardia   Pulmonary/Chest: No respiratory distress. She has no wheezes.   Tachypneic   Abdominal: Soft.   Genitourinary:   Genitourinary Comments: Tinsley   Musculoskeletal: She exhibits deformity.   Right upper extremity PICC   Neurological: She is alert.   Following commands moving 4 extremities   Skin: Skin is warm. There is pallor.   Psychiatric:   Anxious       Vents:  Vent Mode: Spont (06/10/19 1120)  Ventilator Initiated: Yes (06/07/19 1745)  Set Rate: 0 bmp (06/10/19 1120)  Vt Set: 340 mL (06/10/19 1120)  Pressure Support: 10 cmH20 (06/10/19 1120)  PEEP/CPAP: 5 cmH20 (06/10/19 1120)  Oxygen Concentration (%): 30 (06/10/19 1145)  Peak Airway Pressure: 16 cmH2O (06/10/19 1120)  Plateau Pressure: 0 cmH20 (06/10/19 1120)  Total Ve: 12.8 mL (06/10/19 1120)  F/VT Ratio<105 (RSBI): (!) 44.2 (06/10/19 1120)    Lines/Drains/Airways     Peripherally Inserted Central Catheter Line                 PICC Double Lumen 06/04/19 0819 right basilic 6 days          Central Venous Catheter Line                 Hemodialysis Catheter 06/08/19 1100 right femoral 2 days          Drain                 Urethral Catheter 06/06/19 1030 Latex 4 days                Significant Labs:    CBC/Anemia Profile:  Recent Labs   Lab 06/09/19  0415  06/09/19  1256 06/10/19  0451 06/10/19  0538   WBC 14.05*  --   --  9.99  --    HGB 7.9*  --   --  7.7*  --    HCT 22.9*   < > 24* 21.8* 21*   PLT 71*  --   --  71*  --     MCV 86  --   --  86  --    RDW 15.5*  --   --  15.3*  --     < > = values in this interval not displayed.        Chemistries:  Recent Labs   Lab 06/09/19  0415 06/09/19  1235 06/09/19  2200 06/10/19  0451   *  131*  131* 131* 133* 133*  133*  133*   K 3.9  3.9  3.9 3.6 3.9 3.9  3.9  3.9     102  102 100 100 98  98  98   CO2 17*  17*  17* 21* 23 23  23  23   BUN 39*  39*  39* 28* 22* 19  19 19   CREATININE 2.6*  2.6*  2.6* 2.0* 1.6* 1.4  1.4  1.4   CALCIUM 6.6*  6.6*  6.6* 6.9* 7.2* 7.0*  7.0*  7.0*   ALBUMIN 1.5*  1.5*  1.5* 1.6* 1.6* 1.7*  1.7*  1.7*   PROT 6.2  --   --  6.7   BILITOT 0.4  --   --  0.5   ALKPHOS 104  --   --  122   ALT 23  --   --  21   AST 44*  --   --  28   MG 1.9 2.5 2.2 2.1   PHOS 3.6  3.6 3.4 2.3* 4.3  4.3     Echo July 2017    1 - No wall motion abnormalities.     2 - Normal left ventricular systolic function (EF 60-65%).     3 - Normal left ventricular diastolic function.     4 - Normal right ventricular systolic function .     5 - The estimated PA systolic pressure is greater than 30 mmHg.     6 - Mild tricuspid regurgitation      Significant Imaging:  CXR: I have reviewed all pertinent results/findings within the past 24 hours and my personal findings are:  Clear lungs.  ICD noted.  Right PICC noted.  CT scan of the chest reviewed bilateral multifocal alveolar opacities      ABG  Recent Labs   Lab 06/10/19  0538   PH 7.479*   PO2 244*   PCO2 39.8   HCO3 29.6*   BE 6     Assessment/Plan:     Neuro  Seizure disorder  6/4 monitor in ICU    ENT  Acute sinusitis  6/4 IV antibiotics and sinus rinse  6/5 symptomatically improved  6/10 on intravenous antibiotic.    Pulmonary  Multifocal pneumonia  6/6 Suspect aspiration - will evaluate with speech therapy  6/7 worsening - will check Chest X Ray may need intubation  6/8 Intubated , dialysis today. Will try to get consent for bronchoscopy  6/9 review cultures from bronchoscopy  6/10 status post  bronchial sampling.  Broad-spectrum antibiotic.  Antifungal.    Acute respiratory failure with hypoxia  6/6 supplemental oxygen, jet nebs, NPO over concern for aspiration, may need intubation  6/7 not improved  6/9 stabilized on mechanical ventilation  6/10 past SBT.  Extubated.  O2 keep sat above 90%.    Cardiac/Vascular  History of VT/VF s/p implantation of automatic cardioverter/defibrillator (AICD)  Monitor in ICU  6/10 cardiac monitoring.  Status post ICD.    ID  * Sepsis  6/4 IV fluids and volume expansion  6/5 started on pressors  6/10 off pressors.  Continue intravenous antibiotic and antifungals.  Follow on BAL cultures    AIDS (acquired immunodeficiency syndrome), CD4 <=200  6/4 Noncompliant with meds  6/10 continue antiretrovirals.    Endocrine  Hyperglycemia  recheck       Critical Care Daily Checklist:    A: Awake: RASS Goal/Actual Goal: RASS Goal: 0-->alert and calm  Actual: Suarez Agitation Sedation Scale (RASS): Alert and calm   B: Spontaneous Breathing Trial Performed? Spon. Breathing Trial Initiated?: Not initiated (06/09/19 0730)   C: SAT & SBT Coordinated?  Y                      D: Delirium: CAM-ICU Overall CAM-ICU: Negative   E: Early Mobility Performed? Yes   F: Feeding Goal: Goals: Meet >85% EEN/EPN while admitted  Status: Nutrition Goal Status: new   Current Diet Order   Procedures    Diet NPO      AS: Analgesia/Sedation Off sedation   T: Thromboembolic Prophylaxis mechanical prophylaxis.   H: HOB > 300 Yes   U: Stress Ulcer Prophylaxis (if needed) PPI   G: Glucose Control Fingerstick q.6 hours.  SSI.   B: Bowel Function Stool Occurrence: 1   I: Indwelling Catheter (Lines & Tinsley) Necessity Y.  Critically ill.   D: De-escalation of Antimicrobials/Pharmacotherapies Continue broad-spectrum antibiotics/antifungal for now.  Follow bronchial sampling.    Plan for the day/ETD Y    Code Status:  Family/Goals of Care: Full Code  Y     Critical Care Time: 40 minutes  Critical secondary to Patient  has a condition that poses threat to life and bodily function:  AIDS / multifocal pneumonia      Critical care was time spent personally by me on the following activities: development of treatment plan with patient or surrogate and bedside caregivers, discussions with consultants, evaluation of patient's response to treatment, examination of patient, ordering and performing treatments and interventions, ordering and review of laboratory studies, ordering and review of radiographic studies, pulse oximetry, re-evaluation of patient's condition. This critical care time did not overlap with that of any other provider or involve time for any procedures.     Feliz Alvarado MD  Critical Care Medicine  [facility]

## 2019-06-10 NOTE — ASSESSMENT & PLAN NOTE
6/4 IV fluids and volume expansion  6/5 started on pressors  6/10 off pressors.  Continue intravenous antibiotic and antifungals.  Follow on BAL cultures

## 2019-06-10 NOTE — SUBJECTIVE & OBJECTIVE
Interval History:  Intubated and sedated.  Hemodynamically unstable on Norepinephrine infusion. Intermittent mild hypothermia.  Tolerating CRRT.  Following commands on ventilator.  Breathing trials for extubation.    Review of Systems   Unable to perform ROS: Intubated   Constitutional: Positive for activity change and fever. Negative for chills, diaphoresis and fatigue.   HENT: Positive for sinus pressure. Negative for congestion, sore throat and voice change.    Eyes: Negative for photophobia and visual disturbance.   Respiratory: Negative for cough, shortness of breath, wheezing and stridor.    Cardiovascular: Negative for chest pain and leg swelling.   Gastrointestinal: Negative for abdominal distention, abdominal pain, constipation, diarrhea, nausea and vomiting.   Endocrine: Negative for polydipsia, polyphagia and polyuria.   Genitourinary: Negative for difficulty urinating, dysuria, flank pain, pelvic pain, urgency and vaginal discharge.   Musculoskeletal: Negative for back pain, joint swelling, neck pain and neck stiffness.   Skin: Negative for color change and rash.   Allergic/Immunologic: Negative for immunocompromised state.   Neurological: Positive for headaches. Negative for dizziness, syncope, weakness and numbness.   Hematological: Does not bruise/bleed easily.   Psychiatric/Behavioral: Negative for agitation, behavioral problems and confusion.     Objective:     Vital Signs (Most Recent):  Temp: 99.8 °F (37.7 °C) (06/10/19 1115)  Pulse: (!) 136 (06/10/19 1152)  Resp: (!) 21 (06/10/19 1152)  BP: (!) 150/105 (06/10/19 1145)  SpO2: 100 % (06/10/19 1152) Vital Signs (24h Range):  Temp:  [95 °F (35 °C)-99.8 °F (37.7 °C)] 99.8 °F (37.7 °C)  Pulse:  [] 136  Resp:  [8-35] 21  SpO2:  [99 %-100 %] 100 %  BP: ()/() 150/105     Weight: 61 kg (134 lb 7.7 oz)  Body mass index is 26.26 kg/m².    Intake/Output Summary (Last 24 hours) at 6/10/2019 1215  Last data filed at 6/10/2019 1105  Gross per  24 hour   Intake 2469.85 ml   Output 7782 ml   Net -5312.15 ml      Physical Exam   Constitutional: She is oriented to person, place, and time. She appears well-developed and well-nourished. No distress.   Acutely ill-appearing female   HENT:   Head: Normocephalic and atraumatic.   Nose: Nose normal.   Positive frontal and maxillary sinus tenderness   Eyes: Pupils are equal, round, and reactive to light. Conjunctivae and EOM are normal. No scleral icterus.   Neck: Normal range of motion. Neck supple. No tracheal deviation present.   Cardiovascular: Regular rhythm, normal heart sounds and intact distal pulses.   No murmur heard.  Tachycardia   Pulmonary/Chest: Effort normal and breath sounds normal. No stridor. No respiratory distress. She has no wheezes. She has no rales.   Abdominal: Soft. Bowel sounds are normal. She exhibits no distension. There is no tenderness. There is no guarding.   Genitourinary:   Genitourinary Comments: -   Musculoskeletal: Normal range of motion. She exhibits no edema or deformity.   Neurological: She is alert and oriented to person, place, and time. No cranial nerve deficit.   Skin: Skin is warm and dry. Capillary refill takes less than 2 seconds. No rash noted. She is not diaphoretic.   Psychiatric: She has a normal mood and affect. Her behavior is normal. Judgment and thought content normal.   Nursing note and vitals reviewed.      Significant Labs: All pertinent labs within the past 24 hours have been reviewed.    Significant Imaging: I have reviewed and interpreted all pertinent imaging results/findings within the past 24 hours.

## 2019-06-11 LAB
ABO + RH BLD: NORMAL
ALBUMIN SERPL BCP-MCNC: 1.8 G/DL (ref 3.5–5.2)
ALBUMIN SERPL BCP-MCNC: 1.8 G/DL (ref 3.5–5.2)
ALP SERPL-CCNC: 117 U/L (ref 55–135)
ALT SERPL W/O P-5'-P-CCNC: 16 U/L (ref 10–44)
ANION GAP SERPL CALC-SCNC: 11 MMOL/L (ref 8–16)
ANION GAP SERPL CALC-SCNC: 11 MMOL/L (ref 8–16)
ANISOCYTOSIS BLD QL SMEAR: SLIGHT
AST SERPL-CCNC: 33 U/L (ref 10–40)
BACTERIA SPEC AEROBE CULT: NO GROWTH
BACTERIA SPEC AEROBE CULT: NORMAL
BACTERIA SPEC AEROBE CULT: NORMAL
BASOPHILS # BLD AUTO: 0.01 K/UL (ref 0–0.2)
BASOPHILS NFR BLD: 0.1 % (ref 0–1.9)
BILIRUB SERPL-MCNC: 0.6 MG/DL (ref 0.1–1)
BLD GP AB SCN CELLS X3 SERPL QL: NORMAL
BLD PROD TYP BPU: NORMAL
BLOOD UNIT EXPIRATION DATE: NORMAL
BLOOD UNIT TYPE CODE: 5100
BLOOD UNIT TYPE: NORMAL
BUN SERPL-MCNC: 37 MG/DL (ref 6–20)
BUN SERPL-MCNC: 37 MG/DL (ref 6–20)
CALCIUM SERPL-MCNC: 7 MG/DL (ref 8.7–10.5)
CALCIUM SERPL-MCNC: 7 MG/DL (ref 8.7–10.5)
CHLORIDE SERPL-SCNC: 97 MMOL/L (ref 95–110)
CHLORIDE SERPL-SCNC: 97 MMOL/L (ref 95–110)
CO2 SERPL-SCNC: 22 MMOL/L (ref 23–29)
CO2 SERPL-SCNC: 22 MMOL/L (ref 23–29)
CODING SYSTEM: NORMAL
CREAT SERPL-MCNC: 3 MG/DL (ref 0.5–1.4)
CREAT SERPL-MCNC: 3 MG/DL (ref 0.5–1.4)
DIFFERENTIAL METHOD: ABNORMAL
DISPENSE STATUS: NORMAL
EOSINOPHIL # BLD AUTO: 0 K/UL (ref 0–0.5)
EOSINOPHIL NFR BLD: 0 % (ref 0–8)
ERYTHROCYTE [DISTWIDTH] IN BLOOD BY AUTOMATED COUNT: 15 % (ref 11.5–14.5)
EST. GFR  (AFRICAN AMERICAN): 23 ML/MIN/1.73 M^2
EST. GFR  (AFRICAN AMERICAN): 23 ML/MIN/1.73 M^2
EST. GFR  (NON AFRICAN AMERICAN): 20 ML/MIN/1.73 M^2
EST. GFR  (NON AFRICAN AMERICAN): 20 ML/MIN/1.73 M^2
GLUCOSE SERPL-MCNC: 126 MG/DL (ref 70–110)
GLUCOSE SERPL-MCNC: 126 MG/DL (ref 70–110)
GRAM STN SPEC: NORMAL
HCT VFR BLD AUTO: 17.9 % (ref 37–48.5)
HCT VFR BLD AUTO: 18.2 % (ref 37–48.5)
HCT VFR BLD AUTO: 21.5 % (ref 37–48.5)
HGB BLD-MCNC: 6.1 G/DL (ref 12–16)
HGB BLD-MCNC: 6.3 G/DL (ref 12–16)
HGB BLD-MCNC: 7.5 G/DL (ref 12–16)
HYPOCHROMIA BLD QL SMEAR: ABNORMAL
LYMPHOCYTES # BLD AUTO: 2.5 K/UL (ref 1–4.8)
LYMPHOCYTES NFR BLD: 25.8 % (ref 18–48)
MAGNESIUM SERPL-MCNC: 2.3 MG/DL (ref 1.6–2.6)
MCH RBC QN AUTO: 30.9 PG (ref 27–31)
MCHC RBC AUTO-ENTMCNC: 34.6 G/DL (ref 32–36)
MCV RBC AUTO: 89 FL (ref 82–98)
MONOCYTES # BLD AUTO: 0.3 K/UL (ref 0.3–1)
MONOCYTES NFR BLD: 3.5 % (ref 4–15)
NEUTROPHILS # BLD AUTO: 6.8 K/UL (ref 1.8–7.7)
NEUTROPHILS NFR BLD: 71 % (ref 38–73)
NUM UNITS TRANS PACKED RBC: NORMAL
PHOSPHATE SERPL-MCNC: 4 MG/DL (ref 2.7–4.5)
PLATELET # BLD AUTO: 77 K/UL (ref 150–350)
PLATELET BLD QL SMEAR: ABNORMAL
PMV BLD AUTO: 12.4 FL (ref 9.2–12.9)
POCT GLUCOSE: 101 MG/DL (ref 70–110)
POCT GLUCOSE: 120 MG/DL (ref 70–110)
POCT GLUCOSE: 125 MG/DL (ref 70–110)
POCT GLUCOSE: 127 MG/DL (ref 70–110)
POIKILOCYTOSIS BLD QL SMEAR: SLIGHT
POTASSIUM SERPL-SCNC: 4.2 MMOL/L (ref 3.5–5.1)
POTASSIUM SERPL-SCNC: 4.2 MMOL/L (ref 3.5–5.1)
PROT SERPL-MCNC: 6.6 G/DL (ref 6–8.4)
RBC # BLD AUTO: 2.04 M/UL (ref 4–5.4)
SODIUM SERPL-SCNC: 130 MMOL/L (ref 136–145)
SODIUM SERPL-SCNC: 130 MMOL/L (ref 136–145)
SPHEROCYTES BLD QL SMEAR: ABNORMAL
STOMATOCYTES BLD QL SMEAR: PRESENT
VANCOMYCIN SERPL-MCNC: 35.1 UG/ML
WBC # BLD AUTO: 9.58 K/UL (ref 3.9–12.7)

## 2019-06-11 PROCEDURE — 99232 SBSQ HOSP IP/OBS MODERATE 35: CPT | Mod: ,,, | Performed by: INTERNAL MEDICINE

## 2019-06-11 PROCEDURE — 80202 ASSAY OF VANCOMYCIN: CPT

## 2019-06-11 PROCEDURE — 85014 HEMATOCRIT: CPT | Mod: 91

## 2019-06-11 PROCEDURE — 25000242 PHARM REV CODE 250 ALT 637 W/ HCPCS: Performed by: INTERNAL MEDICINE

## 2019-06-11 PROCEDURE — 25000003 PHARM REV CODE 250: Performed by: NURSE PRACTITIONER

## 2019-06-11 PROCEDURE — 86901 BLOOD TYPING SEROLOGIC RH(D): CPT

## 2019-06-11 PROCEDURE — 63600175 PHARM REV CODE 636 W HCPCS: Performed by: INTERNAL MEDICINE

## 2019-06-11 PROCEDURE — 99291 CRITICAL CARE FIRST HOUR: CPT | Mod: ,,, | Performed by: INTERNAL MEDICINE

## 2019-06-11 PROCEDURE — 25000003 PHARM REV CODE 250: Performed by: INTERNAL MEDICINE

## 2019-06-11 PROCEDURE — 83735 ASSAY OF MAGNESIUM: CPT

## 2019-06-11 PROCEDURE — 85014 HEMATOCRIT: CPT

## 2019-06-11 PROCEDURE — P9016 RBC LEUKOCYTES REDUCED: HCPCS

## 2019-06-11 PROCEDURE — 63600175 PHARM REV CODE 636 W HCPCS: Performed by: NURSE PRACTITIONER

## 2019-06-11 PROCEDURE — 86920 COMPATIBILITY TEST SPIN: CPT

## 2019-06-11 PROCEDURE — 80100014 HC HEMODIALYSIS 1:1

## 2019-06-11 PROCEDURE — 36430 TRANSFUSION BLD/BLD COMPNT: CPT

## 2019-06-11 PROCEDURE — 85018 HEMOGLOBIN: CPT

## 2019-06-11 PROCEDURE — 80053 COMPREHEN METABOLIC PANEL: CPT

## 2019-06-11 PROCEDURE — 80069 RENAL FUNCTION PANEL: CPT

## 2019-06-11 PROCEDURE — 27000221 HC OXYGEN, UP TO 24 HOURS

## 2019-06-11 PROCEDURE — 85025 COMPLETE CBC W/AUTO DIFF WBC: CPT

## 2019-06-11 PROCEDURE — 94640 AIRWAY INHALATION TREATMENT: CPT

## 2019-06-11 PROCEDURE — 20000000 HC ICU ROOM

## 2019-06-11 PROCEDURE — 99232 PR SUBSEQUENT HOSPITAL CARE,LEVL II: ICD-10-PCS | Mod: ,,, | Performed by: INTERNAL MEDICINE

## 2019-06-11 PROCEDURE — 85018 HEMOGLOBIN: CPT | Mod: 91

## 2019-06-11 PROCEDURE — 99291 PR CRITICAL CARE, E/M 30-74 MINUTES: ICD-10-PCS | Mod: ,,, | Performed by: INTERNAL MEDICINE

## 2019-06-11 RX ORDER — LEVOFLOXACIN 5 MG/ML
750 INJECTION, SOLUTION INTRAVENOUS
Status: DISCONTINUED | OUTPATIENT
Start: 2019-06-11 | End: 2019-06-12

## 2019-06-11 RX ORDER — HYDROCODONE BITARTRATE AND ACETAMINOPHEN 500; 5 MG/1; MG/1
TABLET ORAL
Status: DISCONTINUED | OUTPATIENT
Start: 2019-06-11 | End: 2019-06-13 | Stop reason: HOSPADM

## 2019-06-11 RX ORDER — PANTOPRAZOLE SODIUM 40 MG/1
40 TABLET, DELAYED RELEASE ORAL DAILY
Status: DISCONTINUED | OUTPATIENT
Start: 2019-06-11 | End: 2019-06-13 | Stop reason: HOSPADM

## 2019-06-11 RX ORDER — HYDROCODONE BITARTRATE AND ACETAMINOPHEN 500; 5 MG/1; MG/1
TABLET ORAL
Status: DISCONTINUED | OUTPATIENT
Start: 2019-06-11 | End: 2019-06-11

## 2019-06-11 RX ORDER — DIPHENHYDRAMINE HYDROCHLORIDE 50 MG/ML
12.5 INJECTION INTRAMUSCULAR; INTRAVENOUS ONCE
Status: COMPLETED | OUTPATIENT
Start: 2019-06-11 | End: 2019-06-11

## 2019-06-11 RX ORDER — HEPARIN SODIUM 1000 [USP'U]/ML
3000 INJECTION, SOLUTION INTRAVENOUS; SUBCUTANEOUS ONCE
Status: DISCONTINUED | OUTPATIENT
Start: 2019-06-11 | End: 2019-06-13 | Stop reason: HOSPADM

## 2019-06-11 RX ADMIN — IPRATROPIUM BROMIDE AND ALBUTEROL SULFATE 3 ML: .5; 3 SOLUTION RESPIRATORY (INHALATION) at 12:06

## 2019-06-11 RX ADMIN — PIPERACILLIN SODIUM AND TAZOBACTAM SODIUM 4.5 G: 4; .5 INJECTION, POWDER, LYOPHILIZED, FOR SOLUTION INTRAVENOUS at 06:06

## 2019-06-11 RX ADMIN — ONDANSETRON 4 MG: 2 INJECTION INTRAMUSCULAR; INTRAVENOUS at 07:06

## 2019-06-11 RX ADMIN — METHYLPREDNISOLONE SODIUM SUCCINATE 40 MG: 40 INJECTION, POWDER, FOR SOLUTION INTRAMUSCULAR; INTRAVENOUS at 09:06

## 2019-06-11 RX ADMIN — SODIUM BICARBONATE 650 MG TABLET 1300 MG: at 08:06

## 2019-06-11 RX ADMIN — IPRATROPIUM BROMIDE AND ALBUTEROL SULFATE 3 ML: .5; 3 SOLUTION RESPIRATORY (INHALATION) at 01:06

## 2019-06-11 RX ADMIN — CHLORHEXIDINE GLUCONATE 0.12% ORAL RINSE 15 ML: 1.2 LIQUID ORAL at 09:06

## 2019-06-11 RX ADMIN — NYSTATIN 500000 UNITS: 100000 SUSPENSION ORAL at 09:06

## 2019-06-11 RX ADMIN — DIPHENHYDRAMINE HYDROCHLORIDE 12.5 MG: 50 INJECTION, SOLUTION INTRAMUSCULAR; INTRAVENOUS at 02:06

## 2019-06-11 RX ADMIN — NYSTATIN 500000 UNITS: 100000 SUSPENSION ORAL at 05:06

## 2019-06-11 RX ADMIN — AZITHROMYCIN MONOHYDRATE 500 MG: 500 INJECTION, POWDER, LYOPHILIZED, FOR SOLUTION INTRAVENOUS at 04:06

## 2019-06-11 RX ADMIN — LEVOFLOXACIN 750 MG: 750 INJECTION, SOLUTION INTRAVENOUS at 08:06

## 2019-06-11 RX ADMIN — PROMETHAZINE HYDROCHLORIDE 12.5 MG: 25 INJECTION INTRAMUSCULAR; INTRAVENOUS at 08:06

## 2019-06-11 RX ADMIN — IPRATROPIUM BROMIDE AND ALBUTEROL SULFATE 3 ML: .5; 3 SOLUTION RESPIRATORY (INHALATION) at 07:06

## 2019-06-11 RX ADMIN — LACOSAMIDE 100 MG: 50 TABLET, FILM COATED ORAL at 09:06

## 2019-06-11 RX ADMIN — METHYLPREDNISOLONE SODIUM SUCCINATE 40 MG: 40 INJECTION, POWDER, FOR SOLUTION INTRAMUSCULAR; INTRAVENOUS at 08:06

## 2019-06-11 RX ADMIN — CALCIUM GLUCONATE 1000 MG: 94 INJECTION, SOLUTION INTRAVENOUS at 08:06

## 2019-06-11 RX ADMIN — NYSTATIN 500000 UNITS: 100000 SUSPENSION ORAL at 08:06

## 2019-06-11 RX ADMIN — PROMETHAZINE HYDROCHLORIDE 12.5 MG: 25 INJECTION INTRAMUSCULAR; INTRAVENOUS at 02:06

## 2019-06-11 NOTE — ASSESSMENT & PLAN NOTE
"06/04- she has advanced AIDS -will continue Striblid.    I asked her-"what can we do to make you take the medications "  Her response- I am trying to do that .  She is not trying well enough .  She has very poor immunologic control.  The risk of death was carefully explained to her.    06/06- will adjust medication due to renal impairment, will send HIV genotype,  Will use Tivicay 50mg daily, tenofovir one tablet every 72 hours  and lamivudine  100 mg daily   06/10- will use dapsone/trimetoprim for presumed PCP.  Continue HAART with Tivicay,tenofovir, lamuvidine  "

## 2019-06-11 NOTE — PLAN OF CARE
Problem: Adult Inpatient Plan of Care  Goal: Plan of Care Review  Outcome: Ongoing (interventions implemented as appropriate)  Low H&H this AM. No signs of bleeding or change in hemodynamics. Will redraw along with a type and screen. Pt slept most of the night with episodes of nausea and vomiting. Refused PO medications. VSS. Afebrile. UO ~25cc overnight. CBGs WNL.No complaints of pain. Pt turned independently throughout the night. Plan of care reviewed. All questions and concerns addressed.

## 2019-06-11 NOTE — PROGRESS NOTES
[facility] -   Critical Care Medicine  Progress Note    Patient Name: Wang Kebede  MRN: [id number]  Admission Date: 6/3/2019  Hospital Length of Stay: 7 days  Code Status: Full Code  Attending Provider: Gerardo Mccauley MD  Primary Care Provider: Levi Moncada MD   Principal Problem: Sepsis    Subjective:     HPI:  32 y/o with HIV/AIDS, noncompliant with medications and low CD4 count presents with 5-6 day history of headaches and fever. Headaches are midfrontal and persistent with associated sinus congestions and sputum production. No loss of hearing. Noted to be tachycardic and febrile and admitted for IV antibiotics.  History of VT/VF s/p implantation of automatic cardioverter/defibrillator.    Hospital/ICU Course:  6/4 Hypotensive in Emergency Room , admitted to ICU for closer monitoring. Hx of low blood p[ressure in past encounters. Responding to IV fluid bolus  6/5 Placed on pressors to support blood pressure, feels poorly  6/6 Still febrile and tachycardic, weaned off pressors but respiratory status has deteriorated. New right upper lobe iniltrate- suggests aspiration  6/7 worsening respiratory distress - very little oral intake  6/8 Intubated yesterday evening, developed renal failure and vas cath placed - planned dialysis (continuous renal replacent therapy (CRRT) ?)  6/9 Respiratory status improved with ventilator. Underwent bronchoscopy  6/10 seen and examined.  Status post bronchoscopy and sampling yesterday.  Intubated.  ABG and chest x-ray reviewed.  Past SBT.  Extubated at 12:00 p.m..  6/11 seen and examined. Afebrile , extubated yesterday, no distress, c/o nausea. O2 sat 100% on 2 lpm O2.     Interval History:    Review of Systems   Unable to perform ROS: Critical illness   Constitutional: Positive for malaise/fatigue.   HENT: Negative for hearing loss.    Respiratory: Positive for shortness of breath.    Cardiovascular: Negative for chest pain.   Gastrointestinal:  Positive for nausea and vomiting.   Genitourinary:        Anuric    Musculoskeletal: Positive for myalgias.   Skin: Negative for rash.   Neurological: Positive for weakness. Negative for seizures.   Endo/Heme/Allergies: Bruises/bleeds easily.   Psychiatric/Behavioral: The patient is nervous/anxious.          Objective:     Vital Signs (Most Recent):  Temp: 97.6 °F (36.4 °C) (06/11/19 0705)  Pulse: 89 (06/11/19 0800)  Resp: (!) 22 (06/11/19 0800)  BP: (!) 141/92 (06/11/19 0800)  SpO2: 100 % (06/11/19 0800) Vital Signs (24h Range):  Temp:  [97.6 °F (36.4 °C)-99.8 °F (37.7 °C)] 97.6 °F (36.4 °C)  Pulse:  [] 89  Resp:  [11-35] 22  SpO2:  [99 %-100 %] 100 %  BP: (119-153)/() 141/92     Weight: 61 kg (134 lb 7.7 oz)  Body mass index is 26.26 kg/m².      Intake/Output Summary (Last 24 hours) at 6/11/2019 1010  Last data filed at 6/11/2019 0700  Gross per 24 hour   Intake 696.63 ml   Output 34 ml   Net 662.63 ml       Physical Exam   Constitutional: She is oriented to person, place, and time. She appears well-developed and well-nourished.   Ill appearing   HENT:   Head: Normocephalic and atraumatic.   Eyes: EOM are normal.   Neck: Neck supple.   Cardiovascular: Normal rate, regular rhythm and normal heart sounds.   Pulmonary/Chest: No respiratory distress. She has no wheezes.   Tachypneic   Abdominal: Soft. She exhibits no distension. There is no tenderness.   Genitourinary:   Genitourinary Comments: Tinsley   Musculoskeletal: She exhibits deformity. She exhibits no edema.   Right upper extremity PICC   Neurological: She is alert and oriented to person, place, and time.   No focal weakness     Skin: Skin is warm. There is pallor.   Psychiatric: Her behavior is normal.   Anxious   Nursing note and vitals reviewed.        Lines/Drains/Airways     Peripherally Inserted Central Catheter Line                 PICC Double Lumen 06/04/19 0819 right basilic 7 days          Central Venous Catheter Line                  Hemodialysis Catheter 06/08/19 1100 right femoral 2 days          Drain                 Urethral Catheter 06/06/19 1030 Latex 4 days                Significant Labs:    CBC/Anemia Profile:  Recent Labs   Lab 06/10/19  0451 06/10/19  0538 06/11/19  0415 06/11/19  0818   WBC 9.99  --  9.58  --    HGB 7.7*  --  6.3* 6.1*   HCT 21.8* 21* 18.2* 17.9*   PLT 71*  --  77*  --    MCV 86  --  89  --    RDW 15.3*  --  15.0*  --         Chemistries:  Recent Labs   Lab 06/10/19  0451 06/10/19  1408 06/11/19  0415   *  133*  133* 131* 130*  130*   K 3.9  3.9  3.9 3.9 4.2  4.2   CL 98  98  98 98 97  97   CO2 23  23  23 22* 22*  22*   BUN 19 19 19 22* 37*  37*   CREATININE 1.4  1.4  1.4 1.8* 3.0*  3.0*   CALCIUM 7.0*  7.0*  7.0* 7.3* 7.0*  7.0*   ALBUMIN 1.7*  1.7*  1.7* 1.7* 1.8*  1.8*   PROT 6.7  --  6.6   BILITOT 0.5  --  0.6   ALKPHOS 122  --  117   ALT 21  --  16   AST 28  --  33   MG 2.1 2.2 2.3   PHOS 4.3  4.3 2.9 4.0       BAL microbiology non relevant       Significant Imaging:  CXR: I have reviewed all pertinent results/findings within the past 24 hours and my personal findings are:  6/10 clear lungs\     CT chest 6/6 focal alveolar bilateral opacities .       ABG  Recent Labs   Lab 06/10/19  0538   PH 7.479*   PO2 244*   PCO2 39.8   HCO3 29.6*   BE 6     Assessment/Plan:     Neuro  Seizure disorder  6/4 monitor in ICU    ENT  Acute sinusitis  6/4 IV antibiotics and sinus rinse  6/5 symptomatically improved  6/10 on intravenous antibiotic.    Pulmonary  Multifocal pneumonia  6/6 Suspect aspiration - will evaluate with speech therapy  6/7 worsening - will check Chest X Ray may need intubation  6/8 Intubated , dialysis today. Will try to get consent for bronchoscopy  6/9 review cultures from bronchoscopy  6/10 status post bronchial sampling.  Broad-spectrum antibiotic.  Antifungal.  6/11 doubt opportunistic.on levaquin. Discussed with ID.     Acute respiratory failure with hypoxia  6/6  supplemental oxygen, jet nebs, NPO over concern for aspiration, may need intubation  6/7 not improved  6/9 stabilized on mechanical ventilation  6/10 past SBT.  Extubated.  O2 keep sat above 90%.  6/11 sp extubation. Wean O2 , keep sat > 90%    Cardiac/Vascular  History of VT/VF s/p implantation of automatic cardioverter/defibrillator (AICD)  Monitor in ICU  6/10 cardiac monitoring.  Status post ICD.  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 Nephrology follow up .     ID  * Sepsis  6/4 IV fluids and volume expansion  6/5 started on pressors  6/10 off pressors.  Continue intravenous antibiotic and antifungals.  Follow on BAL cultures    AIDS (acquired immunodeficiency syndrome), CD4 <=200  6/4 Noncompliant with meds  6/10 continue antiretrovirals.  6/11 antiretrovirals, TMP, Dpsone for PCP prophylasis . Sulfa allergy.     Oncology  Anemia, unspecified  6/11 transfuse PRBCs, monitor Hb ,check  coags.     Endocrine  Hyperglycemia  recheck       Critical Care Daily Checklist:    A: Awake: RASS Goal/Actual Goal: RASS Goal: 0-->alert and calm  Actual: Suarez Agitation Sedation Scale (RASS): Alert and calm   B: Spontaneous Breathing Trial Performed? Spon. Breathing Trial Initiated?: Not initiated (06/09/19 0730)   C: SAT & SBT Coordinated?  NA                      D: Delirium: CAM-ICU Overall CAM-ICU: Negative   E: Early Mobility Performed? Yes   F: Feeding Goal: Goals: Meet >85% EEN/EPN while admitted  Status: Nutrition Goal Status: new   Current Diet Order   Procedures    Diet renal      AS: Analgesia/Sedation Not sedated    T: Thromboembolic Prophylaxis Mechanical    H: HOB > 300 Yes   U: Stress Ulcer Prophylaxis (if needed) PPI    G: Glucose Control Y. FS q 6 hrs SSI   B: Bowel Function Stool Occurrence: 1   I: Indwelling Catheter (Lines & Tinsley) Necessity Y. LELAND . Crit ill    D: De-escalation of Antimicrobials/Pharmacotherapies Yes on Levaquin .     Plan for  the day/ETD Y    Code Status:  Family/Goals of Care: Full Code       Critical Care Time: 35 minutes  Critical secondary to Patient has a condition that poses threat to life and bodily function: Acute Renal Failure      Critical care was time spent personally by me on the following activities: development of treatment plan with patient or surrogate and bedside caregivers, discussions with consultants, evaluation of patient's response to treatment, examination of patient, ordering and performing treatments and interventions, ordering and review of laboratory studies, ordering and review of radiographic studies, pulse oximetry, re-evaluation of patient's condition. This critical care time did not overlap with that of any other provider or involve time for any procedures.     Feliz Alvarado MD  Critical Care Medicine  [facility]

## 2019-06-11 NOTE — PROGRESS NOTES
Hemodialysis completed today.  Total treatment time: 3 hrs.  Net UF: 1 L.  Rt fem CVC was accessed and used without difficulties.  No meds were given during HD.  Pt tolerated treatment well.

## 2019-06-11 NOTE — PLAN OF CARE
Problem: Adult Inpatient Plan of Care  Goal: Plan of Care Review  Outcome: Ongoing (interventions implemented as appropriate)  Low H&H this morning, 1 unit PRBCs ordered and transfused. HD done today at bedside. Urine output approx. 60 ml throughout shift. Afebrile, VSS. Pt still complaining of nausea, refusing oral medications. Renal diet ordered today, pt unable to keep lunch down. Zofran and phenergan given. Fecal management system inserted d/t loose liquid stools and femoral vas cath site. Pts mother updated via telephone, plan of care reviewed with patient and mother.

## 2019-06-11 NOTE — PROGRESS NOTES
[facility] -   Nephrology  Progress Note    Patient Name: Wang Kebede  MRN: [id number]  Admission Date: 6/3/2019  Hospital Length of Stay: 7 days  Attending Provider: Gerardo Mccauley MD   Primary Care Physician: Levi Moncada MD  Principal Problem:Sepsis    Subjective:     HPI: Wang Kebede is a 33-year-old  woman with history of HIV/aids, noncompliance with treatment, vulvar carcinoma, sickle cell disease, was admitted to the hospital about 2 days ago for some shortness of breath and cough.  Her serum creatinine gradually worsened from about 1.5 mg/dL to 2.6 mg/dL today, also his urine output has dropped, also severe metabolic acidosis noted on her labs, we were consulted for acute on chronic kidney failure and metabolic acidosis.    Interval History:  Patient extubated but is still dialysis dependent.  Patient is off CRRT.  Will attempt a regular dialysis today    Review of patient's allergies indicates:   Allergen Reactions    Iodine and iodide containing products Anaphylaxis     Pt states she coded last time she was administered contrast    Pneumococcal 23-chitra ps vaccine Anaphylaxis     Potential iodine containing    Dilaudid [hydromorphone] Hives and Itching    Bactrim [sulfamethoxazole-trimethoprim] Hives    Morphine Itching     Tolerates norco 2018     Current Facility-Administered Medications   Medication Frequency    0.9%  NaCl infusion (for blood administration) Q24H PRN    acetaminophen oral solution 650 mg Q6H PRN    albuterol-ipratropium 2.5 mg-0.5 mg/3 mL nebulizer solution 3 mL Q6H    chlorhexidine 0.12 % solution 15 mL BID    dapsone tablet 100 mg Q18H    And    trimethoprim tablet 200 mg Q18H    dextrose 50% injection 12.5 g PRN    diphenhydrAMINE capsule 25 mg Q6H PRN    dolutegravir Tab 50 mg Daily    glucagon (human recombinant) injection 1 mg PRN    heparin (porcine) injection 3,000 Units Once    heparin (porcine)  injection 4,000 Units PRN    insulin aspart U-100 pen 0-5 Units Q6H PRN    lacosamide tablet 100 mg BID    lamiVUDine 10 mg/mL solution 100 mg Daily    levalbuterol nebulizer solution 0.63 mg Q4H PRN    levoFLOXacin 750 mg/150 mL IVPB 750 mg Q48H    lidocaine HCl 2% oral solution 5 mL Once    methylPREDNISolone sodium succinate injection 40 mg BID    nystatin 100,000 unit/mL suspension 500,000 Units QID    ondansetron injection 4 mg Q8H PRN    pantoprazole suspension 40 mg Daily    sodium bicarbonate tablet 1,300 mg TID    tenofovir tablet 300 mg Q72H       Objective:     Vital Signs (Most Recent):  Temp: 97.6 °F (36.4 °C) (06/11/19 0705)  Pulse: 89 (06/11/19 0800)  Resp: (!) 22 (06/11/19 0800)  BP: (!) 141/92 (06/11/19 0800)  SpO2: 100 % (06/11/19 0800)  O2 Device (Oxygen Therapy): nasal cannula (06/11/19 0722) Vital Signs (24h Range):  Temp:  [97.6 °F (36.4 °C)-99.8 °F (37.7 °C)] 97.6 °F (36.4 °C)  Pulse:  [] 89  Resp:  [11-35] 22  SpO2:  [99 %-100 %] 100 %  BP: (119-153)/() 141/92     Weight: 61 kg (134 lb 7.7 oz) (06/10/19 0833)  Body mass index is 26.26 kg/m².  Body surface area is 1.61 meters squared.    I/O last 3 completed shifts:  In: 2576.3 [I.V.:696.3; NG/GT:180; IV Piggyback:1700]  Out: 5397 [Urine:41; Drains:300; Other:5056]    Physical Exam   Constitutional: She is oriented to person, place, and time. She appears well-developed and well-nourished. No distress.   HENT:   Head: Normocephalic and atraumatic.   Nose: Nose normal.   Positive frontal and maxillary sinus tenderness   Eyes: Pupils are equal, round, and reactive to light. Conjunctivae and EOM are normal. No scleral icterus.   Neck: Normal range of motion. Neck supple. No tracheal deviation present.   Cardiovascular: Regular rhythm, normal heart sounds and intact distal pulses.   No murmur heard.  Tachycardia   Pulmonary/Chest: Effort normal and breath sounds normal. No stridor. No respiratory distress. She has no  wheezes. She has no rales.   Abdominal: Soft. Bowel sounds are normal. She exhibits no distension. There is no tenderness. There is no guarding.   Genitourinary:   Genitourinary Comments: -   Musculoskeletal: Normal range of motion. She exhibits no edema or deformity.   Neurological: She is alert and oriented to person, place, and time. No cranial nerve deficit.   Skin: Skin is warm and dry. Capillary refill takes less than 2 seconds. No rash noted. She is not diaphoretic.   Psychiatric: She has a normal mood and affect. Her behavior is normal. Judgment and thought content normal.   Nursing note and vitals reviewed.      Significant Labs:  All labs within the past 24 hours have been reviewed.     Significant Imaging:      Assessment/Plan:     LELAND (acute kidney injury)  Patient is still dialysis dependent with minimal urine output.  Will start on intermittent hemodialysis.  1st hemodialysis session will be today.  Orders have been placed and discussed with dialysis nurse        Thank you for your consult.     Sonam Yanez MD  Nephrology  [facility]

## 2019-06-11 NOTE — SUBJECTIVE & OBJECTIVE
Interval History: 33 year old woman with AIDS and pneumonia .  She is now extubated.  She is afebrile.  Cultures are pending .  Bronchoscopy was done and results are pending     Review of Systems   Constitutional: Positive for activity change and fever. Negative for chills, diaphoresis and fatigue.   HENT: Positive for sinus pressure. Negative for congestion, sore throat and voice change.    Eyes: Negative for photophobia and visual disturbance.   Respiratory: Negative for cough, shortness of breath, wheezing and stridor.    Cardiovascular: Negative for chest pain and leg swelling.   Gastrointestinal: Negative for abdominal distention, abdominal pain, constipation, diarrhea, nausea and vomiting.   Endocrine: Negative for polydipsia, polyphagia and polyuria.   Genitourinary: Negative for difficulty urinating, dysuria, flank pain, pelvic pain, urgency and vaginal discharge.   Musculoskeletal: Negative for back pain, joint swelling, neck pain and neck stiffness.   Skin: Negative for color change and rash.   Allergic/Immunologic: Negative for immunocompromised state.   Neurological: Positive for headaches. Negative for dizziness, syncope, weakness and numbness.   Hematological: Does not bruise/bleed easily.   Psychiatric/Behavioral: Negative for agitation, behavioral problems and confusion.     Objective:     Vital Signs (Most Recent):  Temp: 97.7 °F (36.5 °C) (06/11/19 0305)  Pulse: 91 (06/11/19 0605)  Resp: 18 (06/11/19 0605)  BP: (!) 131/95 (06/11/19 0605)  SpO2: 100 % (06/11/19 0605) Vital Signs (24h Range):  Temp:  [97.7 °F (36.5 °C)-99.8 °F (37.7 °C)] 97.7 °F (36.5 °C)  Pulse:  [] 91  Resp:  [11-35] 18  SpO2:  [99 %-100 %] 100 %  BP: ()/() 131/95     Weight: 61 kg (134 lb 7.7 oz)  Body mass index is 26.26 kg/m².    Estimated Creatinine Clearance: 21.6 mL/min (A) (based on SCr of 3 mg/dL (H)).    Physical Exam   Constitutional: She is oriented to person, place, and time. She appears well-developed  and well-nourished. No distress.   HENT:   Head: Normocephalic and atraumatic.   Nose: Nose normal.   Positive frontal and maxillary sinus tenderness   Eyes: Pupils are equal, round, and reactive to light. Conjunctivae and EOM are normal. No scleral icterus.   Neck: Normal range of motion. Neck supple. No tracheal deviation present.   Cardiovascular: Regular rhythm, normal heart sounds and intact distal pulses.   No murmur heard.  Tachycardia   Pulmonary/Chest: Effort normal and breath sounds normal. No stridor. No respiratory distress. She has no wheezes. She has no rales.   Abdominal: Soft. Bowel sounds are normal. She exhibits no distension. There is no tenderness. There is no guarding.   Genitourinary:   Genitourinary Comments: -   Musculoskeletal: Normal range of motion. She exhibits no edema or deformity.   Neurological: She is alert and oriented to person, place, and time. No cranial nerve deficit.   Skin: Skin is warm and dry. Capillary refill takes less than 2 seconds. No rash noted. She is not diaphoretic.   Psychiatric: She has a normal mood and affect. Her behavior is normal. Judgment and thought content normal.   Nursing note and vitals reviewed.      Significant Labs:   BMP:   Recent Labs   Lab 06/11/19  0415   *  126*   *  130*   K 4.2  4.2   CL 97  97   CO2 22*  22*   BUN 37*  37*   CREATININE 3.0*  3.0*   CALCIUM 7.0*  7.0*   MG 2.3     CBC:   Recent Labs   Lab 06/10/19  0451 06/10/19  0538 06/11/19  0415   WBC 9.99  --  9.58   HGB 7.7*  --  6.3*   HCT 21.8* 21* 18.2*   PLT 71*  --  77*     All pertinent labs within the past 24 hours have been reviewed.    Significant Imaging: I have reviewed all pertinent imaging results/findings within the past 24 hours.

## 2019-06-11 NOTE — PROGRESS NOTES
Therapy with vancomycin complete and/or consult discontinued by provider.  Pharmacy will sign off, please re-consult as needed.  Raine Fuller [facility] 6/11/2019 7:35 AM

## 2019-06-11 NOTE — PROGRESS NOTES
Called for Hgb 6.3, was 7.7 yesterday. No signs/symptoms of bleeding, no change in hemodynamics.  Plan: ordered repeat H/H as well as type and screen for 08:00 (four hours from previous).

## 2019-06-11 NOTE — ASSESSMENT & PLAN NOTE
6/6 Suspect aspiration - will evaluate with speech therapy  6/7 worsening - will check Chest X Ray may need intubation  6/8 Intubated , dialysis today. Will try to get consent for bronchoscopy  6/9 review cultures from bronchoscopy  6/10 status post bronchial sampling.  Broad-spectrum antibiotic.  Antifungal.  6/11 doubt opportunistic.on levaquin. Discussed with ID.

## 2019-06-11 NOTE — ASSESSMENT & PLAN NOTE
Patient is still dialysis dependent with minimal urine output.  Will start on intermittent hemodialysis.  1st hemodialysis session will be today.  Orders have been placed and discussed with dialysis nurse

## 2019-06-11 NOTE — ASSESSMENT & PLAN NOTE
6/6 supplemental oxygen, jet nebs, NPO over concern for aspiration, may need intubation  6/7 not improved  6/9 stabilized on mechanical ventilation  6/10 past SBT.  Extubated.  O2 keep sat above 90%.  6/11 sp extubation. Wean O2 , keep sat > 90%

## 2019-06-11 NOTE — ASSESSMENT & PLAN NOTE
06/06- will do stat ABG, might need intubation  06/07- will continue ventilatory support and wean as tolerated.  06/10- now extubated., will monitor closely

## 2019-06-11 NOTE — PROGRESS NOTES
"[facility]  Infectious Disease  Progress Note    Patient Name: Wang Kebede  MRN: [id number]  Admission Date: 6/3/2019  Length of Stay: 7 days  Attending Physician: Gerardo Mccauley MD  Primary Care Provider: Levi Moncada MD    Isolation Status: No active isolations  Assessment/Plan:      LELAND (acute kidney injury)  06/10- will need close nephrology follow up  ,avoid nephrotoxic meds    Multifocal pneumonia  06/07- will continue empiric Vanco/zosyn, will send tracheal cultures.  Also send cytology for PCP      06/11- will follow cultures to guide therapy, will switch to levaquin , stop vanco,zosyn, zithromax , voriconazole    Acute respiratory failure with hypoxia  06/06- will do stat ABG, might need intubation  06/07- will continue ventilatory support and wean as tolerated.  06/10- now extubated., will monitor closely    AIDS (acquired immunodeficiency syndrome), CD4 <=200  06/04- she has advanced AIDS -will continue Striblid.    I asked her-"what can we do to make you take the medications "  Her response- I am trying to do that .  She is not trying well enough .  She has very poor immunologic control.  The risk of death was carefully explained to her.    06/06- will adjust medication due to renal impairment, will send HIV genotype,  Will use Tivicay 50mg daily, tenofovir one tablet every 72 hours  and lamivudine  100 mg daily   06/10- will use dapsone/trimetoprim for presumed PCP.  Continue HAART with Tivicay,tenofovir, lamuvidine        Anticipated Disposition:     Thank you for your consult. I will follow-up with patient. Please contact us if you have any additional questions.    Hubert Mayo MD  Infectious Disease  [facility]    Subjective:     Principal Problem:Sepsis    HPI: No notes on file  Interval History: 33 year old woman with AIDS and pneumonia .  She is now extubated.  She is afebrile.  Cultures are pending .  Bronchoscopy was done and results are " pending     Review of Systems   Constitutional: Positive for activity change and fever. Negative for chills, diaphoresis and fatigue.   HENT: Positive for sinus pressure. Negative for congestion, sore throat and voice change.    Eyes: Negative for photophobia and visual disturbance.   Respiratory: Negative for cough, shortness of breath, wheezing and stridor.    Cardiovascular: Negative for chest pain and leg swelling.   Gastrointestinal: Negative for abdominal distention, abdominal pain, constipation, diarrhea, nausea and vomiting.   Endocrine: Negative for polydipsia, polyphagia and polyuria.   Genitourinary: Negative for difficulty urinating, dysuria, flank pain, pelvic pain, urgency and vaginal discharge.   Musculoskeletal: Negative for back pain, joint swelling, neck pain and neck stiffness.   Skin: Negative for color change and rash.   Allergic/Immunologic: Negative for immunocompromised state.   Neurological: Positive for headaches. Negative for dizziness, syncope, weakness and numbness.   Hematological: Does not bruise/bleed easily.   Psychiatric/Behavioral: Negative for agitation, behavioral problems and confusion.     Objective:     Vital Signs (Most Recent):  Temp: 97.7 °F (36.5 °C) (06/11/19 0305)  Pulse: 91 (06/11/19 0605)  Resp: 18 (06/11/19 0605)  BP: (!) 131/95 (06/11/19 0605)  SpO2: 100 % (06/11/19 0605) Vital Signs (24h Range):  Temp:  [97.7 °F (36.5 °C)-99.8 °F (37.7 °C)] 97.7 °F (36.5 °C)  Pulse:  [] 91  Resp:  [11-35] 18  SpO2:  [99 %-100 %] 100 %  BP: ()/() 131/95     Weight: 61 kg (134 lb 7.7 oz)  Body mass index is 26.26 kg/m².    Estimated Creatinine Clearance: 21.6 mL/min (A) (based on SCr of 3 mg/dL (H)).    Physical Exam   Constitutional: She is oriented to person, place, and time. She appears well-developed and well-nourished. No distress.   HENT:   Head: Normocephalic and atraumatic.   Nose: Nose normal.   Positive frontal and maxillary sinus tenderness   Eyes: Pupils  are equal, round, and reactive to light. Conjunctivae and EOM are normal. No scleral icterus.   Neck: Normal range of motion. Neck supple. No tracheal deviation present.   Cardiovascular: Regular rhythm, normal heart sounds and intact distal pulses.   No murmur heard.  Tachycardia   Pulmonary/Chest: Effort normal and breath sounds normal. No stridor. No respiratory distress. She has no wheezes. She has no rales.   Abdominal: Soft. Bowel sounds are normal. She exhibits no distension. There is no tenderness. There is no guarding.   Genitourinary:   Genitourinary Comments: -   Musculoskeletal: Normal range of motion. She exhibits no edema or deformity.   Neurological: She is alert and oriented to person, place, and time. No cranial nerve deficit.   Skin: Skin is warm and dry. Capillary refill takes less than 2 seconds. No rash noted. She is not diaphoretic.   Psychiatric: She has a normal mood and affect. Her behavior is normal. Judgment and thought content normal.   Nursing note and vitals reviewed.      Significant Labs:   BMP:   Recent Labs   Lab 06/11/19  0415   *  126*   *  130*   K 4.2  4.2   CL 97  97   CO2 22*  22*   BUN 37*  37*   CREATININE 3.0*  3.0*   CALCIUM 7.0*  7.0*   MG 2.3     CBC:   Recent Labs   Lab 06/10/19  0451 06/10/19  0538 06/11/19  0415   WBC 9.99  --  9.58   HGB 7.7*  --  6.3*   HCT 21.8* 21* 18.2*   PLT 71*  --  77*     All pertinent labs within the past 24 hours have been reviewed.    Significant Imaging: I have reviewed all pertinent imaging results/findings within the past 24 hours.

## 2019-06-11 NOTE — SUBJECTIVE & OBJECTIVE
Interval History:  Patient extubated but is still dialysis dependent.  Patient is off CRRT.  Will attempt a regular dialysis today    Review of patient's allergies indicates:   Allergen Reactions    Iodine and iodide containing products Anaphylaxis     Pt states she coded last time she was administered contrast    Pneumococcal 23-chitra ps vaccine Anaphylaxis     Potential iodine containing    Dilaudid [hydromorphone] Hives and Itching    Bactrim [sulfamethoxazole-trimethoprim] Hives    Morphine Itching     Tolerates norco 2018     Current Facility-Administered Medications   Medication Frequency    0.9%  NaCl infusion (for blood administration) Q24H PRN    acetaminophen oral solution 650 mg Q6H PRN    albuterol-ipratropium 2.5 mg-0.5 mg/3 mL nebulizer solution 3 mL Q6H    chlorhexidine 0.12 % solution 15 mL BID    dapsone tablet 100 mg Q18H    And    trimethoprim tablet 200 mg Q18H    dextrose 50% injection 12.5 g PRN    diphenhydrAMINE capsule 25 mg Q6H PRN    dolutegravir Tab 50 mg Daily    glucagon (human recombinant) injection 1 mg PRN    heparin (porcine) injection 3,000 Units Once    heparin (porcine) injection 4,000 Units PRN    insulin aspart U-100 pen 0-5 Units Q6H PRN    lacosamide tablet 100 mg BID    lamiVUDine 10 mg/mL solution 100 mg Daily    levalbuterol nebulizer solution 0.63 mg Q4H PRN    levoFLOXacin 750 mg/150 mL IVPB 750 mg Q48H    lidocaine HCl 2% oral solution 5 mL Once    methylPREDNISolone sodium succinate injection 40 mg BID    nystatin 100,000 unit/mL suspension 500,000 Units QID    ondansetron injection 4 mg Q8H PRN    pantoprazole suspension 40 mg Daily    sodium bicarbonate tablet 1,300 mg TID    tenofovir tablet 300 mg Q72H       Objective:     Vital Signs (Most Recent):  Temp: 97.6 °F (36.4 °C) (06/11/19 0705)  Pulse: 89 (06/11/19 0800)  Resp: (!) 22 (06/11/19 0800)  BP: (!) 141/92 (06/11/19 0800)  SpO2: 100 % (06/11/19 0800)  O2 Device (Oxygen Therapy):  nasal cannula (06/11/19 0722) Vital Signs (24h Range):  Temp:  [97.6 °F (36.4 °C)-99.8 °F (37.7 °C)] 97.6 °F (36.4 °C)  Pulse:  [] 89  Resp:  [11-35] 22  SpO2:  [99 %-100 %] 100 %  BP: (119-153)/() 141/92     Weight: 61 kg (134 lb 7.7 oz) (06/10/19 0833)  Body mass index is 26.26 kg/m².  Body surface area is 1.61 meters squared.    I/O last 3 completed shifts:  In: 2576.3 [I.V.:696.3; NG/GT:180; IV Piggyback:1700]  Out: 5397 [Urine:41; Drains:300; Other:5056]    Physical Exam   Constitutional: She is oriented to person, place, and time. She appears well-developed and well-nourished. No distress.   HENT:   Head: Normocephalic and atraumatic.   Nose: Nose normal.   Positive frontal and maxillary sinus tenderness   Eyes: Pupils are equal, round, and reactive to light. Conjunctivae and EOM are normal. No scleral icterus.   Neck: Normal range of motion. Neck supple. No tracheal deviation present.   Cardiovascular: Regular rhythm, normal heart sounds and intact distal pulses.   No murmur heard.  Tachycardia   Pulmonary/Chest: Effort normal and breath sounds normal. No stridor. No respiratory distress. She has no wheezes. She has no rales.   Abdominal: Soft. Bowel sounds are normal. She exhibits no distension. There is no tenderness. There is no guarding.   Genitourinary:   Genitourinary Comments: -   Musculoskeletal: Normal range of motion. She exhibits no edema or deformity.   Neurological: She is alert and oriented to person, place, and time. No cranial nerve deficit.   Skin: Skin is warm and dry. Capillary refill takes less than 2 seconds. No rash noted. She is not diaphoretic.   Psychiatric: She has a normal mood and affect. Her behavior is normal. Judgment and thought content normal.   Nursing note and vitals reviewed.      Significant Labs:  All labs within the past 24 hours have been reviewed.     Significant Imaging:

## 2019-06-11 NOTE — ASSESSMENT & PLAN NOTE
6/4 Noncompliant with meds  6/10 continue antiretrovirals.  6/11 antiretrovirals, TMP, Dpsone for PCP prophylasis . Sulfa allergy.

## 2019-06-11 NOTE — SUBJECTIVE & OBJECTIVE
Interval History:    Review of Systems   Unable to perform ROS: Critical illness   Constitutional: Positive for malaise/fatigue.   HENT: Negative for hearing loss.    Respiratory: Positive for shortness of breath.    Cardiovascular: Negative for chest pain.   Gastrointestinal: Positive for nausea and vomiting.   Genitourinary:        Anuric    Musculoskeletal: Positive for myalgias.   Skin: Negative for rash.   Neurological: Positive for weakness. Negative for seizures.   Endo/Heme/Allergies: Bruises/bleeds easily.   Psychiatric/Behavioral: The patient is nervous/anxious.          Objective:     Vital Signs (Most Recent):  Temp: 97.6 °F (36.4 °C) (06/11/19 0705)  Pulse: 89 (06/11/19 0800)  Resp: (!) 22 (06/11/19 0800)  BP: (!) 141/92 (06/11/19 0800)  SpO2: 100 % (06/11/19 0800) Vital Signs (24h Range):  Temp:  [97.6 °F (36.4 °C)-99.8 °F (37.7 °C)] 97.6 °F (36.4 °C)  Pulse:  [] 89  Resp:  [11-35] 22  SpO2:  [99 %-100 %] 100 %  BP: (119-153)/() 141/92     Weight: 61 kg (134 lb 7.7 oz)  Body mass index is 26.26 kg/m².      Intake/Output Summary (Last 24 hours) at 6/11/2019 1010  Last data filed at 6/11/2019 0700  Gross per 24 hour   Intake 696.63 ml   Output 34 ml   Net 662.63 ml       Physical Exam   Constitutional: She is oriented to person, place, and time. She appears well-developed and well-nourished.   Ill appearing   HENT:   Head: Normocephalic and atraumatic.   Eyes: EOM are normal.   Neck: Neck supple.   Cardiovascular: Normal rate, regular rhythm and normal heart sounds.   Pulmonary/Chest: No respiratory distress. She has no wheezes.   Tachypneic   Abdominal: Soft. She exhibits no distension. There is no tenderness.   Genitourinary:   Genitourinary Comments: [place]   Musculoskeletal: She exhibits deformity. She exhibits no edema.   Right upper extremity PICC   Neurological: She is alert and oriented to person, place, and time.   No focal weakness     Skin: Skin is warm. There is pallor.    Psychiatric: Her behavior is normal.   Anxious   Nursing note and vitals reviewed.        Lines/Drains/Airways     Peripherally Inserted Central Catheter Line                 PICC Double Lumen 06/04/19 0819 right basilic 7 days          Central Venous Catheter Line                 Hemodialysis Catheter 06/08/19 1100 right femoral 2 days          Drain                 Urethral Catheter 06/06/19 1030 Latex 4 days                Significant Labs:    CBC/Anemia Profile:  Recent Labs   Lab 06/10/19  0451 06/10/19  0538 06/11/19  0415 06/11/19  0818   WBC 9.99  --  9.58  --    HGB 7.7*  --  6.3* 6.1*   HCT 21.8* 21* 18.2* 17.9*   PLT 71*  --  77*  --    MCV 86  --  89  --    RDW 15.3*  --  15.0*  --         Chemistries:  Recent Labs   Lab 06/10/19  0451 06/10/19  1408 06/11/19  0415   *  133*  133* 131* 130*  130*   K 3.9  3.9  3.9 3.9 4.2  4.2   CL 98  98  98 98 97  97   CO2 23  23  23 22* 22*  22*   BUN 19 19 19 22* 37*  37*   CREATININE 1.4  1.4  1.4 1.8* 3.0*  3.0*   CALCIUM 7.0*  7.0*  7.0* 7.3* 7.0*  7.0*   ALBUMIN 1.7*  1.7*  1.7* 1.7* 1.8*  1.8*   PROT 6.7  --  6.6   BILITOT 0.5  --  0.6   ALKPHOS 122  --  117   ALT 21  --  16   AST 28  --  33   MG 2.1 2.2 2.3   PHOS 4.3  4.3 2.9 4.0       BAL microbiology non relevant       Significant Imaging:  CXR: I have reviewed all pertinent results/findings within the past 24 hours and my personal findings are:  6/10 clear lungs\     CT chest 6/6 focal alveolar bilateral opacities .

## 2019-06-11 NOTE — ASSESSMENT & PLAN NOTE
06/07- will continue empiric Vanco/zosyn, will send tracheal cultures.  Also send cytology for PCP      06/11- will follow cultures to guide therapy, will switch to levaquin , stop vanco,zosyn, zithromax , voriconazole

## 2019-06-12 PROBLEM — E44.1 MILD MALNUTRITION: Status: ACTIVE | Noted: 2019-06-12

## 2019-06-12 PROBLEM — B20 AIDS: Status: ACTIVE | Noted: 2019-06-12

## 2019-06-12 LAB
ALBUMIN SERPL BCP-MCNC: 1.8 G/DL (ref 3.5–5.2)
ALBUMIN SERPL BCP-MCNC: 1.8 G/DL (ref 3.5–5.2)
ALP SERPL-CCNC: 138 U/L (ref 55–135)
ALT SERPL W/O P-5'-P-CCNC: 27 U/L (ref 10–44)
ANION GAP SERPL CALC-SCNC: 9 MMOL/L (ref 8–16)
ANION GAP SERPL CALC-SCNC: 9 MMOL/L (ref 8–16)
AST SERPL-CCNC: 60 U/L (ref 10–40)
BASOPHILS # BLD AUTO: 0 K/UL (ref 0–0.2)
BASOPHILS NFR BLD: 0 % (ref 0–1.9)
BILIRUB SERPL-MCNC: 0.4 MG/DL (ref 0.1–1)
BUN SERPL-MCNC: 28 MG/DL (ref 6–20)
BUN SERPL-MCNC: 28 MG/DL (ref 6–20)
CALCIUM SERPL-MCNC: 7.3 MG/DL (ref 8.7–10.5)
CALCIUM SERPL-MCNC: 7.3 MG/DL (ref 8.7–10.5)
CHLORIDE SERPL-SCNC: 103 MMOL/L (ref 95–110)
CHLORIDE SERPL-SCNC: 103 MMOL/L (ref 95–110)
CO2 SERPL-SCNC: 23 MMOL/L (ref 23–29)
CO2 SERPL-SCNC: 23 MMOL/L (ref 23–29)
CREAT SERPL-MCNC: 2.8 MG/DL (ref 0.5–1.4)
CREAT SERPL-MCNC: 2.8 MG/DL (ref 0.5–1.4)
DIFFERENTIAL METHOD: ABNORMAL
EOSINOPHIL # BLD AUTO: 0 K/UL (ref 0–0.5)
EOSINOPHIL NFR BLD: 0 % (ref 0–8)
ERYTHROCYTE [DISTWIDTH] IN BLOOD BY AUTOMATED COUNT: 16.4 % (ref 11.5–14.5)
EST. GFR  (AFRICAN AMERICAN): 24 ML/MIN/1.73 M^2
EST. GFR  (AFRICAN AMERICAN): 24 ML/MIN/1.73 M^2
EST. GFR  (NON AFRICAN AMERICAN): 21 ML/MIN/1.73 M^2
EST. GFR  (NON AFRICAN AMERICAN): 21 ML/MIN/1.73 M^2
GLUCOSE SERPL-MCNC: 117 MG/DL (ref 70–110)
GLUCOSE SERPL-MCNC: 117 MG/DL (ref 70–110)
HBV SURFACE AB SER QL IA: NEGATIVE
HBV SURFACE AB SERPL IA-ACNC: <3 MIU/ML
HCT VFR BLD AUTO: 20.6 % (ref 37–48.5)
HGB BLD-MCNC: 7 G/DL (ref 12–16)
LYMPHOCYTES # BLD AUTO: 1.9 K/UL (ref 1–4.8)
LYMPHOCYTES NFR BLD: 19.4 % (ref 18–48)
MAGNESIUM SERPL-MCNC: 2 MG/DL (ref 1.6–2.6)
MCH RBC QN AUTO: 30.3 PG (ref 27–31)
MCHC RBC AUTO-ENTMCNC: 34 G/DL (ref 32–36)
MCV RBC AUTO: 89 FL (ref 82–98)
MONOCYTES # BLD AUTO: 0.7 K/UL (ref 0.3–1)
MONOCYTES NFR BLD: 6.9 % (ref 4–15)
NEUTROPHILS # BLD AUTO: 7.1 K/UL (ref 1.8–7.7)
NEUTROPHILS NFR BLD: 73.7 % (ref 38–73)
PHOSPHATE SERPL-MCNC: 3.4 MG/DL (ref 2.7–4.5)
PHOSPHATE SERPL-MCNC: 3.4 MG/DL (ref 2.7–4.5)
PLATELET # BLD AUTO: 112 K/UL (ref 150–350)
PMV BLD AUTO: 11.6 FL (ref 9.2–12.9)
POCT GLUCOSE: 100 MG/DL (ref 70–110)
POCT GLUCOSE: 101 MG/DL (ref 70–110)
POCT GLUCOSE: 123 MG/DL (ref 70–110)
POCT GLUCOSE: 125 MG/DL (ref 70–110)
POTASSIUM SERPL-SCNC: 4 MMOL/L (ref 3.5–5.1)
POTASSIUM SERPL-SCNC: 4 MMOL/L (ref 3.5–5.1)
PROT SERPL-MCNC: 6.4 G/DL (ref 6–8.4)
RBC # BLD AUTO: 2.31 M/UL (ref 4–5.4)
SODIUM SERPL-SCNC: 135 MMOL/L (ref 136–145)
SODIUM SERPL-SCNC: 135 MMOL/L (ref 136–145)
WBC # BLD AUTO: 9.59 K/UL (ref 3.9–12.7)

## 2019-06-12 PROCEDURE — 25000003 PHARM REV CODE 250: Performed by: NURSE PRACTITIONER

## 2019-06-12 PROCEDURE — 94640 AIRWAY INHALATION TREATMENT: CPT

## 2019-06-12 PROCEDURE — 25000003 PHARM REV CODE 250: Performed by: INTERNAL MEDICINE

## 2019-06-12 PROCEDURE — 80069 RENAL FUNCTION PANEL: CPT

## 2019-06-12 PROCEDURE — 25000242 PHARM REV CODE 250 ALT 637 W/ HCPCS: Performed by: INTERNAL MEDICINE

## 2019-06-12 PROCEDURE — 99291 CRITICAL CARE FIRST HOUR: CPT | Mod: ,,, | Performed by: INTERNAL MEDICINE

## 2019-06-12 PROCEDURE — 94760 N-INVAS EAR/PLS OXIMETRY 1: CPT

## 2019-06-12 PROCEDURE — 99291 PR CRITICAL CARE, E/M 30-74 MINUTES: ICD-10-PCS | Mod: ,,, | Performed by: INTERNAL MEDICINE

## 2019-06-12 PROCEDURE — 90935 HEMODIALYSIS ONE EVALUATION: CPT | Mod: ,,, | Performed by: INTERNAL MEDICINE

## 2019-06-12 PROCEDURE — 63600175 PHARM REV CODE 636 W HCPCS: Performed by: NURSE PRACTITIONER

## 2019-06-12 PROCEDURE — 63600175 PHARM REV CODE 636 W HCPCS: Performed by: INTERNAL MEDICINE

## 2019-06-12 PROCEDURE — 21400001 HC TELEMETRY ROOM

## 2019-06-12 PROCEDURE — 90935 PR HEMODIALYSIS, ONE EVALUATION: ICD-10-PCS | Mod: ,,, | Performed by: INTERNAL MEDICINE

## 2019-06-12 PROCEDURE — 83735 ASSAY OF MAGNESIUM: CPT

## 2019-06-12 PROCEDURE — 27000221 HC OXYGEN, UP TO 24 HOURS

## 2019-06-12 PROCEDURE — 80100014 HC HEMODIALYSIS 1:1

## 2019-06-12 PROCEDURE — 85025 COMPLETE CBC W/AUTO DIFF WBC: CPT

## 2019-06-12 PROCEDURE — 97803 MED NUTRITION INDIV SUBSEQ: CPT

## 2019-06-12 PROCEDURE — 94799 UNLISTED PULMONARY SVC/PX: CPT

## 2019-06-12 PROCEDURE — 80053 COMPREHEN METABOLIC PANEL: CPT

## 2019-06-12 RX ORDER — SODIUM CHLORIDE 9 MG/ML
INJECTION, SOLUTION INTRAVENOUS ONCE
Status: DISCONTINUED | OUTPATIENT
Start: 2019-06-12 | End: 2019-06-13 | Stop reason: HOSPADM

## 2019-06-12 RX ORDER — LEVOFLOXACIN 5 MG/ML
750 INJECTION, SOLUTION INTRAVENOUS
Status: DISCONTINUED | OUTPATIENT
Start: 2019-06-13 | End: 2019-06-12

## 2019-06-12 RX ORDER — SODIUM CHLORIDE 9 MG/ML
INJECTION, SOLUTION INTRAVENOUS
Status: DISCONTINUED | OUTPATIENT
Start: 2019-06-12 | End: 2019-06-13 | Stop reason: HOSPADM

## 2019-06-12 RX ORDER — LEVOFLOXACIN 5 MG/ML
750 INJECTION, SOLUTION INTRAVENOUS
Status: DISCONTINUED | OUTPATIENT
Start: 2019-06-13 | End: 2019-06-13 | Stop reason: HOSPADM

## 2019-06-12 RX ORDER — MUPIROCIN 20 MG/G
OINTMENT TOPICAL 2 TIMES DAILY
Status: DISCONTINUED | OUTPATIENT
Start: 2019-06-12 | End: 2019-06-13 | Stop reason: HOSPADM

## 2019-06-12 RX ADMIN — DOLUTEGRAVIR SODIUM 50 MG: 50 TABLET, FILM COATED ORAL at 08:06

## 2019-06-12 RX ADMIN — DAPSONE 100 MG: 25 TABLET ORAL at 09:06

## 2019-06-12 RX ADMIN — PANTOPRAZOLE SODIUM 40 MG: 40 TABLET, DELAYED RELEASE ORAL at 08:06

## 2019-06-12 RX ADMIN — IPRATROPIUM BROMIDE AND ALBUTEROL SULFATE 3 ML: .5; 3 SOLUTION RESPIRATORY (INHALATION) at 07:06

## 2019-06-12 RX ADMIN — NYSTATIN 500000 UNITS: 100000 SUSPENSION ORAL at 08:06

## 2019-06-12 RX ADMIN — NYSTATIN 500000 UNITS: 100000 SUSPENSION ORAL at 01:06

## 2019-06-12 RX ADMIN — IPRATROPIUM BROMIDE AND ALBUTEROL SULFATE 3 ML: .5; 3 SOLUTION RESPIRATORY (INHALATION) at 01:06

## 2019-06-12 RX ADMIN — SODIUM BICARBONATE 650 MG TABLET 1300 MG: at 08:06

## 2019-06-12 RX ADMIN — HEPARIN SODIUM 4000 UNITS: 1000 INJECTION, SOLUTION INTRAVENOUS; SUBCUTANEOUS at 02:06

## 2019-06-12 RX ADMIN — MUPIROCIN: 20 OINTMENT TOPICAL at 01:06

## 2019-06-12 RX ADMIN — PROMETHAZINE HYDROCHLORIDE 12.5 MG: 25 INJECTION INTRAMUSCULAR; INTRAVENOUS at 09:06

## 2019-06-12 RX ADMIN — SODIUM BICARBONATE 650 MG TABLET 1300 MG: at 03:06

## 2019-06-12 RX ADMIN — PROMETHAZINE HYDROCHLORIDE 12.5 MG: 25 INJECTION INTRAMUSCULAR; INTRAVENOUS at 01:06

## 2019-06-12 RX ADMIN — PROMETHAZINE HYDROCHLORIDE 12.5 MG: 25 INJECTION INTRAMUSCULAR; INTRAVENOUS at 08:06

## 2019-06-12 RX ADMIN — LACOSAMIDE 100 MG: 50 TABLET, FILM COATED ORAL at 09:06

## 2019-06-12 RX ADMIN — METHYLPREDNISOLONE SODIUM SUCCINATE 40 MG: 40 INJECTION, POWDER, FOR SOLUTION INTRAMUSCULAR; INTRAVENOUS at 08:06

## 2019-06-12 RX ADMIN — LAMIVUDINE 100 MG: 10 SOLUTION ORAL at 08:06

## 2019-06-12 RX ADMIN — CHLORHEXIDINE GLUCONATE 0.12% ORAL RINSE 15 ML: 1.2 LIQUID ORAL at 08:06

## 2019-06-12 RX ADMIN — TRIMETHOPRIM 200 MG: 100 TABLET ORAL at 09:06

## 2019-06-12 NOTE — SUBJECTIVE & OBJECTIVE
Interval History:  Hemodynamically stable overnight.  Transition to intermittent Hemodialysis as needed.    Review of Systems   Unable to perform ROS: Intubated   Constitutional: Positive for activity change and fever. Negative for chills, diaphoresis and fatigue.   HENT: Positive for sinus pressure. Negative for congestion, sore throat and voice change.    Eyes: Negative for photophobia and visual disturbance.   Respiratory: Negative for cough, shortness of breath, wheezing and stridor.    Cardiovascular: Negative for chest pain and leg swelling.   Gastrointestinal: Negative for abdominal distention, abdominal pain, constipation, diarrhea, nausea and vomiting.   Endocrine: Negative for polydipsia, polyphagia and polyuria.   Genitourinary: Negative for difficulty urinating, dysuria, flank pain, pelvic pain, urgency and vaginal discharge.   Musculoskeletal: Negative for back pain, joint swelling, neck pain and neck stiffness.   Skin: Negative for color change and rash.   Allergic/Immunologic: Negative for immunocompromised state.   Neurological: Positive for headaches. Negative for dizziness, syncope, weakness and numbness.   Hematological: Does not bruise/bleed easily.   Psychiatric/Behavioral: Negative for agitation, behavioral problems and confusion.     Objective:     Vital Signs (Most Recent):  Temp: 98.2 °F (36.8 °C) (06/11/19 2000)  Pulse: 87 (06/11/19 2100)  Resp: 15 (06/11/19 2100)  BP: (!) 142/95 (06/11/19 2100)  SpO2: 100 % (06/11/19 2100) Vital Signs (24h Range):  Temp:  [97.6 °F (36.4 °C)-98.3 °F (36.8 °C)] 98.2 °F (36.8 °C)  Pulse:  [] 87  Resp:  [11-30] 15  SpO2:  [99 %-100 %] 100 %  BP: (107-155)/(67-96) 142/95     Weight: 61 kg (134 lb 7.7 oz)  Body mass index is 26.26 kg/m².    Intake/Output Summary (Last 24 hours) at 6/11/2019 2203  Last data filed at 6/11/2019 2100  Gross per 24 hour   Intake 1170 ml   Output 200 ml   Net 970 ml      Physical Exam   Constitutional: She is oriented to person,  place, and time. She appears well-developed and well-nourished. No distress.   Acutely ill-appearing female   HENT:   Head: Normocephalic and atraumatic.   Nose: Nose normal.   Positive frontal and maxillary sinus tenderness   Eyes: Pupils are equal, round, and reactive to light. Conjunctivae and EOM are normal. No scleral icterus.   Neck: Normal range of motion. Neck supple. No tracheal deviation present.   Cardiovascular: Regular rhythm, normal heart sounds and intact distal pulses.   No murmur heard.  Tachycardia   Pulmonary/Chest: Effort normal and breath sounds normal. No stridor. No respiratory distress. She has no wheezes. She has no rales.   Abdominal: Soft. Bowel sounds are normal. She exhibits no distension. There is no tenderness. There is no guarding.   Genitourinary:   Genitourinary Comments: -   Musculoskeletal: Normal range of motion. She exhibits no edema or deformity.   Neurological: She is alert and oriented to person, place, and time. No cranial nerve deficit.   Skin: Skin is warm and dry. Capillary refill takes less than 2 seconds. No rash noted. She is not diaphoretic.   Psychiatric: She has a normal mood and affect. Her behavior is normal. Judgment and thought content normal.   Nursing note and vitals reviewed.      Significant Labs: All pertinent labs within the past 24 hours have been reviewed.    Significant Imaging: I have reviewed and interpreted all pertinent imaging results/findings within the past 24 hours.

## 2019-06-12 NOTE — PROGRESS NOTES
Pt bath and linen changed. Tinsley cath and fecal incont bag removed. Site wnl. Vas cath to right groin wnl.

## 2019-06-12 NOTE — ASSESSMENT & PLAN NOTE
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 Nephrology follow up .   6/12 monitor I/O's , BMP , HD , no acute need for HD today. Nephrology follow up

## 2019-06-12 NOTE — PROGRESS NOTES
Pt completed 3hr HD tx. Removed 1.5L of fluid per crit line monitor. No complications. No meds given with HD. No complications with HD cath.

## 2019-06-12 NOTE — PROGRESS NOTES
[facility] -   Critical Care Medicine  Progress Note    Patient Name: Wang Kebede  MRN: [id number]  Admission Date: 6/3/2019  Hospital Length of Stay: 8 days  Code Status: Full Code  Attending Provider: Gerardo Mccauley MD  Primary Care Provider: Levi Moncada MD   Principal Problem: Sepsis    Subjective:     HPI:  34 y/o with HIV/AIDS, noncompliant with medications and low CD4 count presents with 5-6 day history of headaches and fever. Headaches are midfrontal and persistent with associated sinus congestions and sputum production. No loss of hearing. Noted to be tachycardic and febrile and admitted for IV antibiotics.  History of VT/VF s/p implantation of automatic cardioverter/defibrillator.    Hospital/ICU Course:  6/4 Hypotensive in Emergency Room , admitted to ICU for closer monitoring. Hx of low blood p[ressure in past encounters. Responding to IV fluid bolus  6/5 Placed on pressors to support blood pressure, feels poorly  6/6 Still febrile and tachycardic, weaned off pressors but respiratory status has deteriorated. New right upper lobe iniltrate- suggests aspiration  6/7 worsening respiratory distress - very little oral intake  6/8 Intubated yesterday evening, developed renal failure and vas cath placed - planned dialysis (continuous renal replacent therapy (CRRT) ?)  6/9 Respiratory status improved with ventilator. Underwent bronchoscopy  6/10 seen and examined.  Status post bronchoscopy and sampling yesterday.  Intubated.  ABG and chest x-ray reviewed.  Past SBT.  Extubated at 12:00 p.m..  6/11 seen and examined. Afebrile , extubated yesterday, no distress, c/o nausea. O2 sat 100% on 2 lpm O2.   6/12 seen and examined. Nausea, diarrhea. Afebrile , cultures negative, sp PRBC tx yesterday , HD  Yesterday. On 2 lpm O2 , O2 sat 100%.     Interval History:     Review of Systems   Unable to perform ROS: Other   Constitutional: Positive for malaise/fatigue.   HENT: Negative for  hearing loss.    Eyes: Negative for discharge.   Respiratory: Positive for shortness of breath.    Cardiovascular: Negative for chest pain and leg swelling.   Gastrointestinal: Positive for diarrhea, nausea and vomiting.   Genitourinary:        Oliguric    Musculoskeletal: Positive for myalgias.   Skin: Negative for rash.   Neurological: Positive for weakness. Negative for seizures.   Endo/Heme/Allergies: Bruises/bleeds easily.   Psychiatric/Behavioral: The patient is nervous/anxious.          Objective:     Vital Signs (Most Recent):  Temp: 96.4 °F (35.8 °C) (06/12/19 0400)  Pulse: 78 (06/12/19 0719)  Resp: 16 (06/12/19 0719)  BP: 132/72 (06/12/19 0400)  SpO2: 100 % (06/12/19 0719) Vital Signs (24h Range):  Temp:  [96.4 °F (35.8 °C)-98.2 °F (36.8 °C)] 96.4 °F (35.8 °C)  Pulse:  [] 78  Resp:  [11-21] 16  SpO2:  [100 %] 100 %  BP: (107-155)/(64-96) 132/72     Weight: 61 kg (134 lb 7.7 oz)  Body mass index is 26.26 kg/m².      Intake/Output Summary (Last 24 hours) at 6/12/2019 0923  Last data filed at 6/12/2019 0400  Gross per 24 hour   Intake 620 ml   Output 169 ml   Net 451 ml       Physical Exam   Constitutional: She is oriented to person, place, and time. She appears well-developed and well-nourished.   Ill appearing   HENT:   Head: Normocephalic and atraumatic.   Eyes: EOM are normal.   Neck: Neck supple.   Cardiovascular: Normal rate, regular rhythm and normal heart sounds.   Pulmonary/Chest: Effort normal and breath sounds normal. No respiratory distress. She has no wheezes.   Abdominal: Soft. She exhibits no distension. There is no tenderness.   Genitourinary:   Genitourinary Comments: Tinsley  Rt fem HD access  Rectal tube      Musculoskeletal: She exhibits deformity. She exhibits no edema.   Right upper extremity PICC   Neurological: She is alert and oriented to person, place, and time.   No focal weakness     Skin: Skin is warm. There is pallor.   Psychiatric: Her behavior is normal.   Anxious   Nursing  note and vitals reviewed.      Lines/Drains/Airways     Peripherally Inserted Central Catheter Line                 PICC Double Lumen 06/04/19 0819 right basilic 8 days          Central Venous Catheter Line                 Hemodialysis Catheter 06/08/19 1100 right femoral 3 days          Drain                 Urethral Catheter 06/06/19 1030 Latex 5 days         Fecal Incontinence  06/11/19 1358 less than 1 day                Significant Labs:    CBC/Anemia Profile:  Recent Labs   Lab 06/11/19  0415 06/11/19  0818 06/11/19  1901 06/12/19  0443   WBC 9.58  --   --  9.59   HGB 6.3* 6.1* 7.5* 7.0*   HCT 18.2* 17.9* 21.5* 20.6*   PLT 77*  --   --  112*   MCV 89  --   --  89   RDW 15.0*  --   --  16.4*        Chemistries:  Recent Labs   Lab 06/10/19  1408 06/11/19  0415 06/12/19  0443   * 130*  130* 135*  135*   K 3.9 4.2  4.2 4.0  4.0   CL 98 97  97 103  103   CO2 22* 22*  22* 23  23   BUN 22* 37*  37* 28*  28*   CREATININE 1.8* 3.0*  3.0* 2.8*  2.8*   CALCIUM 7.3* 7.0*  7.0* 7.3*  7.3*   ALBUMIN 1.7* 1.8*  1.8* 1.8*  1.8*   PROT  --  6.6 6.4   BILITOT  --  0.6 0.4   ALKPHOS  --  117 138*   ALT  --  16 27   AST  --  33 60*   MG 2.2 2.3 2.0   PHOS 2.9 4.0 3.4  3.4     Cultures negative     Significant Imaging:      CXR 6/12 clear lungs     CT chest 6/6 focal alveolar bilateral opacities         ABG  Recent Labs   Lab 06/10/19  0538   PH 7.479*   PO2 244*   PCO2 39.8   HCO3 29.6*   BE 6     Assessment/Plan:     Neuro  Seizure disorder  6/4 monitor in ICU    ENT  Acute sinusitis  6/4 IV antibiotics and sinus rinse  6/5 symptomatically improved  6/10 on intravenous antibiotic.    Pulmonary  Multifocal pneumonia  6/6 Suspect aspiration - will evaluate with speech therapy  6/7 worsening - will check Chest X Ray may need intubation  6/8 Intubated , dialysis today. Will try to get consent for bronchoscopy  6/9 review cultures from bronchoscopy  6/10 status post bronchial sampling.  Broad-spectrum  antibiotic.  Antifungal.  6/11 doubt opportunistic.on levaquin. Discussed with ID.   6/12 1 week of levaquin .     Acute respiratory failure with hypoxia  6/6 supplemental oxygen, jet nebs, NPO over concern for aspiration, may need intubation  6/7 not improved  6/9 stabilized on mechanical ventilation  6/10 past SBT.  Extubated.  O2 keep sat above 90%.  6/11 sp extubation. Wean O2 , keep sat > 90%  6/12 wean of O2     Cardiac/Vascular  History of VT/VF s/p implantation of automatic cardioverter/defibrillator (AICD)  Monitor in ICU  6/10 cardiac monitoring.  Status post ICD.  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 Nephrology follow up .   6/12 monitor I/O's , BMP , HD ,discussed with Nephrology HD with volume contraction today    ID  * Sepsis  6/4 IV fluids and volume expansion  6/5 started on pressors  6/10 off pressors.  Continue intravenous antibiotic and antifungals.  Follow on BAL cultures    AIDS (acquired immunodeficiency syndrome), CD4 <=200  6/4 Noncompliant with meds  6/10 continue antiretrovirals.  6/11 antiretrovirals, TMP, Dapsone for PCP prophylasis . Sulfa allergy.   6/12 same . ID follow up     Oncology  Anemia, unspecified  6/11 transfuse PRBCs, monitor Hb ,check  coags.   6/12 likely anemia of chronic disease , sp 1 PRBC yesterday. Stable Hb    Endocrine  Hyperglycemia  recheck       Critical Care Daily Checklist:    A: Awake: RASS Goal/Actual Goal: RASS Goal: 0-->alert and calm  Actual: Suarez Agitation Sedation Scale (RASS): Alert and calm   B: Spontaneous Breathing Trial Performed? Spon. Breathing Trial Initiated?: Not initiated (06/09/19 0730)   C: SAT & SBT Coordinated?  NA                      D: Delirium: CAM-ICU Overall CAM-ICU: Negative   E: Early Mobility Performed? Yes   F: Feeding Goal: Goals: Meet >85% EEN/EPN while admitted  Status: Nutrition Goal Status: new   Current Diet Order   Procedures    Diet renal      AS:  Analgesia/Sedation No sedation   T: Thromboembolic Prophylaxis Mechanical prophylaxis    H: HOB > 300 Yes   U: Stress Ulcer Prophylaxis (if needed) PPI   G: Glucose Control FS Q 6 hrs SSI    B: Bowel Function Stool Occurrence: 1   I: Indwelling Catheter (Lines & Navarrete) Necessity Dc navarrete    D: De-escalation of Antimicrobials/Pharmacotherapies 1 Week ABX     Plan for the day/ETD Y    Code Status:  Family/Goals of Care: Full Code       Critical Care Time: 35 minutes  Critical secondary to Patient has a condition that poses threat to life and bodily function: Acute Renal Failure      Critical care was time spent personally by me on the following activities: development of treatment plan with patient or surrogate and bedside caregivers, discussions with consultants, evaluation of patient's response to treatment, examination of patient, ordering and performing treatments and interventions, ordering and review of laboratory studies, ordering and review of radiographic studies, pulse oximetry, re-evaluation of patient's condition. This critical care time did not overlap with that of any other provider or involve time for any procedures.     Feliz Alvarado MD  Critical Care Medicine  [facility] -

## 2019-06-12 NOTE — PLAN OF CARE
Problem: Adult Inpatient Plan of Care  Goal: Plan of Care Review  Diet advanced and encouraged. Dialysis today 1.5lliter removed. Tinsley and fecal bag removed. Pt encouraged to increase activity with help.

## 2019-06-12 NOTE — SUBJECTIVE & OBJECTIVE
Interval History: 33 year old woman with AIDS and pneumonia .  She is now extubated.  She is afebrile.  Cultures are pending .  Bronchoscopy was done and results are pending   06/11/19-  She was seen with her cousin -Jasmin in the room -[phone].  Review of Systems   Constitutional: Positive for activity change and fever. Negative for chills, diaphoresis and fatigue.   HENT: Positive for sinus pressure. Negative for congestion, sore throat and voice change.    Eyes: Negative for photophobia and visual disturbance.   Respiratory: Negative for cough, shortness of breath, wheezing and stridor.    Cardiovascular: Negative for chest pain and leg swelling.   Gastrointestinal: Negative for abdominal distention, abdominal pain, constipation, diarrhea, nausea and vomiting.   Endocrine: Negative for polydipsia, polyphagia and polyuria.   Genitourinary: Negative for difficulty urinating, dysuria, flank pain, pelvic pain, urgency and vaginal discharge.   Musculoskeletal: Negative for back pain, joint swelling, neck pain and neck stiffness.   Skin: Negative for color change and rash.   Allergic/Immunologic: Negative for immunocompromised state.   Neurological: Positive for headaches. Negative for dizziness, syncope, weakness and numbness.   Hematological: Does not bruise/bleed easily.   Psychiatric/Behavioral: Negative for agitation, behavioral problems and confusion.     Objective:     Vital Signs (Most Recent):  Temp: 96.4 °F (35.8 °C) (06/12/19 0400)  Pulse: 80 (06/12/19 0400)  Resp: 14 (06/12/19 0400)  BP: 132/72 (06/12/19 0400)  SpO2: 100 % (06/12/19 0400) Vital Signs (24h Range):  Temp:  [96.4 °F (35.8 °C)-98.2 °F (36.8 °C)] 96.4 °F (35.8 °C)  Pulse:  [] 80  Resp:  [11-22] 14  SpO2:  [100 %] 100 %  BP: (107-155)/(64-96) 132/72     Weight: 61 kg (134 lb 7.7 oz)  Body mass index is 26.26 kg/m².    Estimated Creatinine Clearance: 23.1 mL/min (A) (based on SCr of 2.8 mg/dL (H)).    Physical Exam   Constitutional: She  is oriented to person, place, and time. She appears well-developed and well-nourished. No distress.   HENT:   Head: Normocephalic and atraumatic.   Nose: Nose normal.   Positive frontal and maxillary sinus tenderness   Eyes: Pupils are equal, round, and reactive to light. Conjunctivae and EOM are normal. No scleral icterus.   Neck: Normal range of motion. Neck supple. No tracheal deviation present.   Cardiovascular: Regular rhythm, normal heart sounds and intact distal pulses.   No murmur heard.  Tachycardia   Pulmonary/Chest: Effort normal and breath sounds normal. No stridor. No respiratory distress. She has no wheezes. She has no rales.   Abdominal: Soft. Bowel sounds are normal. She exhibits no distension. There is no tenderness. There is no guarding.   Genitourinary:   Genitourinary Comments: -   Musculoskeletal: Normal range of motion. She exhibits no edema or deformity.   Neurological: She is alert and oriented to person, place, and time. No cranial nerve deficit.   Skin: Skin is warm and dry. Capillary refill takes less than 2 seconds. No rash noted. She is not diaphoretic.   Psychiatric: She has a normal mood and affect. Her behavior is normal. Judgment and thought content normal.   Nursing note and vitals reviewed.      Significant Labs:   BMP:   Recent Labs   Lab 06/12/19  0443   *  117*   *  135*   K 4.0  4.0     103   CO2 23  23   BUN 28*  28*   CREATININE 2.8*  2.8*   CALCIUM 7.3*  7.3*   MG 2.0     CBC:   Recent Labs   Lab 06/11/19  0415 06/11/19  0818 06/11/19  1901 06/12/19  0443   WBC 9.58  --   --  9.59   HGB 6.3* 6.1* 7.5* 7.0*   HCT 18.2* 17.9* 21.5* 20.6*   PLT 77*  --   --  112*     All pertinent labs within the past 24 hours have been reviewed.    Significant Imaging: I have reviewed all pertinent imaging results/findings within the past 24 hours.

## 2019-06-12 NOTE — PLAN OF CARE
Problem: Adult Inpatient Plan of Care  Goal: Plan of Care Review  Outcome: Ongoing (interventions implemented as appropriate)  Recommendations     Recommendation: 1. Continue current diet. 2. Rec add Novasource (TID) for extra kcal intake until pt consumes >75% of tray. 3. Will continue to monitor  3. Will continue to monitor  Intervention: Coordination of care  Goals: Meet >85% EEN/EPN while admitted  Nutrition Goal Status: new  Communication of RD Recs: POC, sticky note, reviewed with RN

## 2019-06-12 NOTE — PROGRESS NOTES
[facility] Hospital Medicine  Progress Note    Patient Name: Wang Kebede  MRN: [id number]  Patient Class: IP- Inpatient   Admission Date: 6/3/2019  Length of Stay: 7 days  Attending Physician: Gerardo Mccauley MD  Primary Care Provider: Levi Moncada MD        Subjective:     Principal Problem:Sepsis    HPI:  33-year-old female with significant medical history including AIDS who has history of noncompliance with medical plan of care presents today for complaint of malaise over the past week.  Modifying factors none.  Associated symptoms:  Cough, headache.  Prior treatment not effective with home care.  Patient was evaluated emergency room.  Significant findings and workup include sinusitis findings on CT.  Cultures pending.  WBC and lactic both negative.  Patient was started on IV antibiotics and given IV fluids.  During ER care patient became more tachycardic and developed fever.  Infectious Disease was consulted by ED who recommended observation.  Hospital Medicine was consulted patient admitted to observation.  To note patient's last CD4 count 39  three months ago.    Hospital Course:  33 year old woman with AIDS-poorly compliant with HAART -last cd4 count -was 39 (02/2019).  She was admitted at this time for febrile illness hemodynamically unstable requiring vasopressors to maintain blood pressure.  Infectious Disease evaluated and assisting management.  Started broad empiric antibiotic treatment.  Continued to run intermittent high fevers.  Hemodynamics improved and weaned off vasopressors.  Remained tachycardic to varying degrees.  EKG consistently showed sinus tachycardia or SVT.  Rate controlled with as needed Metoprolol IV.  Hypoxemic respiratory failure on room air corrected with continuous BiPAP.  Continued respiratory distress, decided for intubation 07 June.  Worsening renal function, started CRRT 08 June in ICU on ventilator.  Diagnostic bronchoscopy on 09 June and  tolerating CRRT on Norepinephrine.  Transition to intermittent hemodialysis.    Interval History:  Hemodynamically stable overnight.  Transition to intermittent Hemodialysis as needed.    Review of Systems   Unable to perform ROS: Intubated   Constitutional: Positive for activity change and fever. Negative for chills, diaphoresis and fatigue.   HENT: Positive for sinus pressure. Negative for congestion, sore throat and voice change.    Eyes: Negative for photophobia and visual disturbance.   Respiratory: Negative for cough, shortness of breath, wheezing and stridor.    Cardiovascular: Negative for chest pain and leg swelling.   Gastrointestinal: Negative for abdominal distention, abdominal pain, constipation, diarrhea, nausea and vomiting.   Endocrine: Negative for polydipsia, polyphagia and polyuria.   Genitourinary: Negative for difficulty urinating, dysuria, flank pain, pelvic pain, urgency and vaginal discharge.   Musculoskeletal: Negative for back pain, joint swelling, neck pain and neck stiffness.   Skin: Negative for color change and rash.   Allergic/Immunologic: Negative for immunocompromised state.   Neurological: Positive for headaches. Negative for dizziness, syncope, weakness and numbness.   Hematological: Does not bruise/bleed easily.   Psychiatric/Behavioral: Negative for agitation, behavioral problems and confusion.     Objective:     Vital Signs (Most Recent):  Temp: 98.2 °F (36.8 °C) (06/11/19 2000)  Pulse: 87 (06/11/19 2100)  Resp: 15 (06/11/19 2100)  BP: (!) 142/95 (06/11/19 2100)  SpO2: 100 % (06/11/19 2100) Vital Signs (24h Range):  Temp:  [97.6 °F (36.4 °C)-98.3 °F (36.8 °C)] 98.2 °F (36.8 °C)  Pulse:  [] 87  Resp:  [11-30] 15  SpO2:  [99 %-100 %] 100 %  BP: (107-155)/(67-96) 142/95     Weight: 61 kg (134 lb 7.7 oz)  Body mass index is 26.26 kg/m².    Intake/Output Summary (Last 24 hours) at 6/11/2019 2203  Last data filed at 6/11/2019 2100  Gross per 24 hour   Intake 1170 ml   Output 200  ml   Net 970 ml      Physical Exam   Constitutional: She is oriented to person, place, and time. She appears well-developed and well-nourished. No distress.   Acutely ill-appearing female   HENT:   Head: Normocephalic and atraumatic.   Nose: Nose normal.   Positive frontal and maxillary sinus tenderness   Eyes: Pupils are equal, round, and reactive to light. Conjunctivae and EOM are normal. No scleral icterus.   Neck: Normal range of motion. Neck supple. No tracheal deviation present.   Cardiovascular: Regular rhythm, normal heart sounds and intact distal pulses.   No murmur heard.  Tachycardia   Pulmonary/Chest: Effort normal and breath sounds normal. No stridor. No respiratory distress. She has no wheezes. She has no rales.   Abdominal: Soft. Bowel sounds are normal. She exhibits no distension. There is no tenderness. There is no guarding.   Genitourinary:   Genitourinary Comments: -   Musculoskeletal: Normal range of motion. She exhibits no edema or deformity.   Neurological: She is alert and oriented to person, place, and time. No cranial nerve deficit.   Skin: Skin is warm and dry. Capillary refill takes less than 2 seconds. No rash noted. She is not diaphoretic.   Psychiatric: She has a normal mood and affect. Her behavior is normal. Judgment and thought content normal.   Nursing note and vitals reviewed.      Significant Labs: All pertinent labs within the past 24 hours have been reviewed.    Significant Imaging: I have reviewed and interpreted all pertinent imaging results/findings within the past 24 hours.    Assessment/Plan:      * Sepsis  Potentially related to sinusitis.  Patient received IV fluids and was started on IV antibiotics.  Lactic negative.  She is an immunocompromised host . Will start broad spectrum antimicrobial therapy including antifungal therapy -will use vancomycin, zosyn and empiric Voriconazole.  Will send blood cultures, fungal cultures, fungitell assay, AFB cultures.  Infectious  Disease assisting with management.  Diagnostic bronchoscopy 09 June.      LELAND (acute kidney injury)  Worsening renal function.  Placed vascular catheter and started CRRT 08 June.  Intermittent HD as needed per Nephrology.    Acute respiratory failure with hypoxia  Maintaining oxygenation via continuous BiPAP.  Increasing respiratory distress anticipate she will require intubation.  Intubated 07 June.  Breathing trials for extubation 10 Mckenna.    Acute sinusitis  ill continue empiric broad spectrum therapy with Zosyn, will follow fever curve.  On Voriconazole- PO not IV due to renal impairment  , continue Vanco.  Monitor closely     AIDS (acquired immunodeficiency syndrome), CD4 <=200  Resume patient's HAART therapy.  Patient being treated with Rocephin for sinusitis will add on azithromycin and dapsone for further prophylaxis given patient's immunocompromised state.  She has poor compliance to therapy and follow up.  Will check cd4 count , HIV viral load.  Will continue Striblid.  OI prophylaxis with Dapsone and Azithromycin.  Infectious Disease following.    History of VT/VF s/p implantation of automatic cardioverter/defibrillator (AICD)  Cardiac monitoring    Seizure disorder  Continue patient's home medication  Seizure precautions.  will continue lacosamide and will monitor closely      VTE Risk Mitigation (From admission, onward)        Ordered     heparin (porcine) injection 3,000 Units  Once      06/11/19 0853     heparin (porcine) injection 4,000 Units  As needed (PRN)      06/08/19 1013     Place sequential compression device  Until discontinued      06/03/19 2020          Critical care time spent on the evaluation and treatment of severe organ dysfunction, review of pertinent labs and imaging studies, discussions with consulting providers and discussions with patient/family: 30 minutes.    Gerardo Mccauley MD  Department of Hospital Medicine   [facility]

## 2019-06-12 NOTE — PROGRESS NOTES
"[facility]  Infectious Disease  Progress Note    Patient Name: Wang Kebede  MRN: [id number]  Admission Date: 6/3/2019  Length of Stay: 8 days  Attending Physician: Gerardo Mccauley MD  Primary Care Provider: Levi Moncada MD    Isolation Status: No active isolations  Assessment/Plan:      Thrombocytopenia associated with AIDS  06/11-will monitor closely , related to AIDS.    Anemia, unspecified  06/11- will monitor closely ,transfuse as needed.    Multifocal pneumonia  06/07- will continue empiric Vanco/zosyn, will send tracheal cultures.  Also send cytology for PCP      06/10 will follow cultures to guide therapy, will switch to levaquin , stop vanco,zosyn, zithromax , voriconazole    06/11- will continue Levaquin,  I had extensive discussion with the cousin about the need to be compliant with therapy .    Patient can be transferred to the floor     AIDS (acquired immunodeficiency syndrome), CD4 <=200  06/04- she has advanced AIDS -will continue Striblid.    I asked her-"what can we do to make you take the medications "  Her response- I am trying to do that .  She is not trying well enough .  She has very poor immunologic control.  The risk of death was carefully explained to her.    06/06- will adjust medication due to renal impairment, will send HIV genotype,  Will use Tivicay 50mg daily, tenofovir one tablet every 72 hours  and lamivudine  100 mg daily   06/10- will use dapsone/trimetoprim for presumed PCP.  Continue HAART with Tivicay,tenofovir, lamuvidine          Anticipated Disposition:     Thank you for your consult. I will follow-up with patient. Please contact us if you have any additional questions.    Hubert Mayo MD  Infectious Disease  [facility]    Subjective:     Principal Problem:Sepsis    HPI: No notes on file  Interval History: 33 year old woman with AIDS and pneumonia .  She is now extubated.  She is afebrile.  Cultures are pending " .  Bronchoscopy was done and results are pending   06/11/19-  She was seen with her cousin -Jasmin in the room -[phone].  Review of Systems   Constitutional: Positive for activity change and fever. Negative for chills, diaphoresis and fatigue.   HENT: Positive for sinus pressure. Negative for congestion, sore throat and voice change.    Eyes: Negative for photophobia and visual disturbance.   Respiratory: Negative for cough, shortness of breath, wheezing and stridor.    Cardiovascular: Negative for chest pain and leg swelling.   Gastrointestinal: Negative for abdominal distention, abdominal pain, constipation, diarrhea, nausea and vomiting.   Endocrine: Negative for polydipsia, polyphagia and polyuria.   Genitourinary: Negative for difficulty urinating, dysuria, flank pain, pelvic pain, urgency and vaginal discharge.   Musculoskeletal: Negative for back pain, joint swelling, neck pain and neck stiffness.   Skin: Negative for color change and rash.   Allergic/Immunologic: Negative for immunocompromised state.   Neurological: Positive for headaches. Negative for dizziness, syncope, weakness and numbness.   Hematological: Does not bruise/bleed easily.   Psychiatric/Behavioral: Negative for agitation, behavioral problems and confusion.     Objective:     Vital Signs (Most Recent):  Temp: 96.4 °F (35.8 °C) (06/12/19 0400)  Pulse: 80 (06/12/19 0400)  Resp: 14 (06/12/19 0400)  BP: 132/72 (06/12/19 0400)  SpO2: 100 % (06/12/19 0400) Vital Signs (24h Range):  Temp:  [96.4 °F (35.8 °C)-98.2 °F (36.8 °C)] 96.4 °F (35.8 °C)  Pulse:  [] 80  Resp:  [11-22] 14  SpO2:  [100 %] 100 %  BP: (107-155)/(64-96) 132/72     Weight: 61 kg (134 lb 7.7 oz)  Body mass index is 26.26 kg/m².    Estimated Creatinine Clearance: 23.1 mL/min (A) (based on SCr of 2.8 mg/dL (H)).    Physical Exam   Constitutional: She is oriented to person, place, and time. She appears well-developed and well-nourished. No distress.   HENT:   Head:  Normocephalic and atraumatic.   Nose: Nose normal.   Positive frontal and maxillary sinus tenderness   Eyes: Pupils are equal, round, and reactive to light. Conjunctivae and EOM are normal. No scleral icterus.   Neck: Normal range of motion. Neck supple. No tracheal deviation present.   Cardiovascular: Regular rhythm, normal heart sounds and intact distal pulses.   No murmur heard.  Tachycardia   Pulmonary/Chest: Effort normal and breath sounds normal. No stridor. No respiratory distress. She has no wheezes. She has no rales.   Abdominal: Soft. Bowel sounds are normal. She exhibits no distension. There is no tenderness. There is no guarding.   Genitourinary:   Genitourinary Comments: -   Musculoskeletal: Normal range of motion. She exhibits no edema or deformity.   Neurological: She is alert and oriented to person, place, and time. No cranial nerve deficit.   Skin: Skin is warm and dry. Capillary refill takes less than 2 seconds. No rash noted. She is not diaphoretic.   Psychiatric: She has a normal mood and affect. Her behavior is normal. Judgment and thought content normal.   Nursing note and vitals reviewed.      Significant Labs:   BMP:   Recent Labs   Lab 06/12/19  0443   *  117*   *  135*   K 4.0  4.0     103   CO2 23  23   BUN 28*  28*   CREATININE 2.8*  2.8*   CALCIUM 7.3*  7.3*   MG 2.0     CBC:   Recent Labs   Lab 06/11/19  0415 06/11/19  0818 06/11/19  1901 06/12/19  0443   WBC 9.58  --   --  9.59   HGB 6.3* 6.1* 7.5* 7.0*   HCT 18.2* 17.9* 21.5* 20.6*   PLT 77*  --   --  112*     All pertinent labs within the past 24 hours have been reviewed.    Significant Imaging: I have reviewed all pertinent imaging results/findings within the past 24 hours.

## 2019-06-12 NOTE — ASSESSMENT & PLAN NOTE
6/6 Suspect aspiration - will evaluate with speech therapy  6/7 worsening - will check Chest X Ray may need intubation  6/8 Intubated , dialysis today. Will try to get consent for bronchoscopy  6/9 review cultures from bronchoscopy  6/10 status post bronchial sampling.  Broad-spectrum antibiotic.  Antifungal.  6/11 doubt opportunistic.on levaquin. Discussed with ID.   6/12 1 week of levaquin .

## 2019-06-12 NOTE — ASSESSMENT & PLAN NOTE
6/11 transfuse PRBCs, monitor Hb ,check  coags.   6/12 likely anemia of chronic disease , sp 1 PRBC yesterday. Stable Hb

## 2019-06-12 NOTE — ASSESSMENT & PLAN NOTE
Worsening renal function.  Placed vascular catheter and started CRRT 08 June.  Intermittent HD as needed per Nephrology.

## 2019-06-12 NOTE — PLAN OF CARE
Problem: Adult Inpatient Plan of Care  Goal: Plan of Care Review  Outcome: Ongoing (interventions implemented as appropriate)  POC and interventions discussed with family and patient - VU.  AAO x 4.  Lethargic, sleeps b/w care.  Drowsy - receiving phenergan for nausea.  BBS clear, diminished. Oxygenating well on nasal cannula.  Poor appetite d/t nausea.  Refusing to take pills at times.  Abd soft with + BS x 4 quads.  No vomitting, just wretches at times.  Tinsley with CYU - decreased output - see I/O.  Flexiseal with dark green watery stool noted.  Barrier cream to perineum.  Heart RRR.

## 2019-06-12 NOTE — PROCEDURES
"Wang Kebede is a 33 y.o. female patient.    Temp: 96.4 °F (35.8 °C) (06/12/19 0400)  Pulse: 100 (06/12/19 1215)  Resp: 18 (06/12/19 1215)  BP: 114/62 (06/12/19 1215)  SpO2: 100 % (06/12/19 1215)  Weight: 61 kg (134 lb 7.7 oz) (06/10/19 0833)  Height: 5' (152.4 cm) (06/10/19 0833)       Prepare patient for dialysis  Date/Time: 6/12/2019 12:38 PM  Performed by: Sonam Yanez MD  Authorized by: Gerardo Mccauley MD     Hemodialysis inpatient If "per protocol" is selected for one or more ingredients (K+, Ca++, Na+, Bicarb) for the dialysate bath solution, select the hyperlink for the protocol instructions.  Date/Time: 6/12/2019 12:38 PM  Performed by: Sonam Yanez MD  Authorized by: Gerardo Mccauley MD       Patient Seen on HD ; HD is for [facility] ; HD orders written and reviewed with HD nurse and nursing staff ;   Access is functioning well ; UF Goal is 2-3 litres as tolerated ; Will Give Epogen for anemia ; F-180 dialyzer ;  DFR is 500 ; patient is tolerating HD well ; next HD will be scheduled based on daily rounding and patient's clinical situation     Parameters for Hypotension outlined in orders and communications with nurses       Sonam Yanez  6/12/2019    "

## 2019-06-12 NOTE — NURSING
Received pt from Mayra in ICU. Pt is oriented X4. Vital signs stable. Pt states she is hunger gave her a turkey sandwich. She only at about 1/4.

## 2019-06-12 NOTE — SUBJECTIVE & OBJECTIVE
Interval History:     Review of Systems   Unable to perform ROS: Other   Constitutional: Positive for malaise/fatigue.   HENT: Negative for hearing loss.    Eyes: Negative for discharge.   Respiratory: Positive for shortness of breath.    Cardiovascular: Negative for chest pain and leg swelling.   Gastrointestinal: Positive for diarrhea, nausea and vomiting.   Genitourinary:        Oliguric    Musculoskeletal: Positive for myalgias.   Skin: Negative for rash.   Neurological: Positive for weakness. Negative for seizures.   Endo/Heme/Allergies: Bruises/bleeds easily.   Psychiatric/Behavioral: The patient is nervous/anxious.          Objective:     Vital Signs (Most Recent):  Temp: 96.4 °F (35.8 °C) (06/12/19 0400)  Pulse: 78 (06/12/19 0719)  Resp: 16 (06/12/19 0719)  BP: 132/72 (06/12/19 0400)  SpO2: 100 % (06/12/19 0719) Vital Signs (24h Range):  Temp:  [96.4 °F (35.8 °C)-98.2 °F (36.8 °C)] 96.4 °F (35.8 °C)  Pulse:  [] 78  Resp:  [11-21] 16  SpO2:  [100 %] 100 %  BP: (107-155)/(64-96) 132/72     Weight: 61 kg (134 lb 7.7 oz)  Body mass index is 26.26 kg/m².      Intake/Output Summary (Last 24 hours) at 6/12/2019 0923  Last data filed at 6/12/2019 0400  Gross per 24 hour   Intake 620 ml   Output 169 ml   Net 451 ml       Physical Exam   Constitutional: She is oriented to person, place, and time. She appears well-developed and well-nourished.   Ill appearing   HENT:   Head: Normocephalic and atraumatic.   Eyes: EOM are normal.   Neck: Neck supple.   Cardiovascular: Normal rate, regular rhythm and normal heart sounds.   Pulmonary/Chest: Effort normal and breath sounds normal. No respiratory distress. She has no wheezes.   Abdominal: Soft. She exhibits no distension. There is no tenderness.   Genitourinary:   Genitourinary Comments: Tinsley  Rt fem HD access  Rectal tube      Musculoskeletal: She exhibits deformity. She exhibits no edema.   Right upper extremity PICC   Neurological: She is alert and oriented to  person, place, and time.   No focal weakness     Skin: Skin is warm. There is pallor.   Psychiatric: Her behavior is normal.   Anxious   Nursing note and vitals reviewed.      Lines/Drains/Airways     Peripherally Inserted Central Catheter Line                 PICC Double Lumen 06/04/19 0819 right basilic 8 days          Central Venous Catheter Line                 Hemodialysis Catheter 06/08/19 1100 right femoral 3 days          Drain                 Urethral Catheter 06/06/19 1030 Latex 5 days         Fecal Incontinence  06/11/19 1358 less than 1 day                Significant Labs:    CBC/Anemia Profile:  Recent Labs   Lab 06/11/19  0415 06/11/19  0818 06/11/19  1901 06/12/19  0443   WBC 9.58  --   --  9.59   HGB 6.3* 6.1* 7.5* 7.0*   HCT 18.2* 17.9* 21.5* 20.6*   PLT 77*  --   --  112*   MCV 89  --   --  89   RDW 15.0*  --   --  16.4*        Chemistries:  Recent Labs   Lab 06/10/19  1408 06/11/19  0415 06/12/19  0443   * 130*  130* 135*  135*   K 3.9 4.2  4.2 4.0  4.0   CL 98 97  97 103  103   CO2 22* 22*  22* 23  23   BUN 22* 37*  37* 28*  28*   CREATININE 1.8* 3.0*  3.0* 2.8*  2.8*   CALCIUM 7.3* 7.0*  7.0* 7.3*  7.3*   ALBUMIN 1.7* 1.8*  1.8* 1.8*  1.8*   PROT  --  6.6 6.4   BILITOT  --  0.6 0.4   ALKPHOS  --  117 138*   ALT  --  16 27   AST  --  33 60*   MG 2.2 2.3 2.0   PHOS 2.9 4.0 3.4  3.4     Cultures negative     Significant Imaging:      CXR 6/12 clear lungs     CT chest 6/6 focal alveolar bilateral opacities

## 2019-06-12 NOTE — ASSESSMENT & PLAN NOTE
06/07- will continue empiric Vanco/zosyn, will send tracheal cultures.  Also send cytology for PCP      06/10 will follow cultures to guide therapy, will switch to levaquin , stop vanco,zosyn, zithromax , voriconazole    06/11- will continue Levaquin,  I had extensive discussion with the cousin about the need to be compliant with therapy .    Patient can be transferred to the floor

## 2019-06-12 NOTE — ASSESSMENT & PLAN NOTE
6/6 supplemental oxygen, jet nebs, NPO over concern for aspiration, may need intubation  6/7 not improved  6/9 stabilized on mechanical ventilation  6/10 past SBT.  Extubated.  O2 keep sat above 90%.  6/11 sp extubation. Wean O2 , keep sat > 90%  6/12 wean of O2

## 2019-06-12 NOTE — PROGRESS NOTES
[facility] -   Adult Nutrition  Progress Note    SUMMARY       Recommendations    Recommendation: 1. Continue current diet. 2. Rec add Novasource (TID) for extra kcal intake until pt consumes >75% of tray. 3. Will continue to monitor  3. Will continue to monitor  Intervention: Coordination of care  Goals: Meet >85% EEN/EPN while admitted  Nutrition Goal Status: new  Communication of RD Recs: POC, sticky note, reviewed with RN    Reason for Assessment    Reason For Assessment: RD F/u  Diagnosis: Acute sinusitis, recurrence not specified, unspecified location  Relevant Medical History: HIV, Vulvar cancer  General Information Comments:   6.10.19:Pt currently intubated & sedated in ICU. Pt currently on Peptamen 1.5 @ 30mL/hr. No family at bedside at time of visit. NFPE performed 6.10.19: no muscle or fat wasting.   6.12.19: Pt extubated and to be d/c to floor today. Pt diet advanced to renal (6/11). Pt reported that she had a poor appetite for one week PTA, eating <50%. Pt said her appetite remains poor since admission, eating <50%. Pt reported no recent wt loss.  Nutrition Discharge Planning: Renal diet    Nutrition Risk Screen    Nutrition Risk Screen: no indicators present    Nutrition/Diet History    Spiritual, Cultural Beliefs, Confucianism Practices, Values that Affect Care: no    Anthropometrics    Temp: 96.4 °F (35.8 °C)  Height Method: Stated  Height: 5' (152.4 cm)  Height (inches): 60 in  Weight Method: Bed Scale  Weight: 61 kg (134 lb 7.7 oz)  Weight (lb): 134.48 lb  Ideal Body Weight (IBW), Female: 100 lb  % Ideal Body Weight, Female (lb): 134.48 lb  BMI (Calculated): 26.3  BMI Grade: 25 - 29.9 - overweight       Lab/Procedures/Meds    Pertinent Labs Reviewed: reviewed  BMP  Lab Results   Component Value Date     (L) 06/12/2019     (L) 06/12/2019    K 4.0 06/12/2019    K 4.0 06/12/2019     06/12/2019     06/12/2019    CO2 23 06/12/2019    CO2 23 06/12/2019    BUN 28 (H)  06/12/2019    BUN 28 (H) 06/12/2019    CREATININE 2.8 (H) 06/12/2019    CREATININE 2.8 (H) 06/12/2019    CALCIUM 7.3 (L) 06/12/2019    CALCIUM 7.3 (L) 06/12/2019    ANIONGAP 9 06/12/2019    ANIONGAP 9 06/12/2019    ESTGFRAFRICA 24 (A) 06/12/2019    ESTGFRAFRICA 24 (A) 06/12/2019    EGFRNONAA 21 (A) 06/12/2019    EGFRNONAA 21 (A) 06/12/2019     Lab Results   Component Value Date    CALCIUM 7.3 (L) 06/12/2019    CALCIUM 7.3 (L) 06/12/2019    PHOS 3.4 06/12/2019    PHOS 3.4 06/12/2019     No results found for: LABA1C, HGBA1C  Recent Labs   Lab 06/11/19  2145   POCTGLUCOSE 120*     Lab Results   Component Value Date    ALBUMIN 1.8 (L) 06/12/2019    ALBUMIN 1.8 (L) 06/12/2019       Pertinent Medications Reviewed: reviewed    Physical Findings/Assessment  Wound: dermatitis anus  Estimated/Assessed Needs    Weight Used For Calorie Calculations: 61 kg (134 lb 7.7 oz)  Energy Calorie Requirements (kcal): 1442  Energy Need Method: [facility]  Protein Requirements: 61-73g  Weight Used For Protein Calculations: 61 kg (134 lb 7.7 oz)     Estimated Fluid Requirement Method: RDA Method(or per MD)  RDA Method (mL): 1442         Nutrition Prescription Ordered    Current Diet Order: Renal    Evaluation of Received Nutrient/Fluid Intake    % Intake of Estimated Energy Needs: 25 - 50 %  % Meal Intake: 25 - 50 %    Nutrition Risk  [id number]    Assessment and Plan  Nutrition Problem  Decreased nutrient needs    Related to (etiology):   Medical dx    Signs and Symptoms (as evidenced by):   GFR=21      Interventions/Recommendations (treatment strategy):  See above    Nutrition Diagnosis Status:   New    Nutrition Problem  Inadequate oral intake     Related to (etiology):   NPO Status, no alternative means of nutrition     Signs and Symptoms (as evidenced by):   Meeting <85% EEN/EPN     Interventions/Recommendations (treatment strategy):  See above     Nutrition Diagnosis Status:   Resolved     Monitor and Evaluation  Food and Nutrient  Intake: enteral nutrition intake, energy intake, food and beverage intake  Food and Nutrient Adminstration: enteral and parenteral nutrition administration, diet order  Anthropometric Measurements: weight change  Biochemical Data, Medical Tests and Procedures: electrolyte and renal panel, inflammatory profile, gastrointestinal profile, lipid profile, glucose/endocrine profile  Nutrition-Focused Physical Findings: overall appearance, skin     Malnutrition Assessment  Orbital Region (Subcutaneous Fat Loss): well nourished  Upper Arm Region (Subcutaneous Fat Loss): well nourished  Thoracic and Lumbar Region: well nourished   [place] Region (Muscle Loss): well nourished  Clavicle Bone Region (Muscle Loss): well nourished  Clavicle and Acromion Bone Region (Muscle Loss): well nourished  Anterior Thigh Region (Muscle Loss): well nourished  Posterior Calf Region (Muscle Loss): well nourished       Subcutaneous Fat Loss (Final Summary): well nourished  Muscle Loss Evaluation (Final Summary): well nourished         Nutrition Follow-Up    RD Follow-up?: Yes

## 2019-06-12 NOTE — ASSESSMENT & PLAN NOTE
6/4 Noncompliant with meds  6/10 continue antiretrovirals.  6/11 antiretrovirals, TMP, Dapsone for PCP prophylasis . Sulfa allergy.   6/12 same . ID follow up

## 2019-06-13 VITALS
SYSTOLIC BLOOD PRESSURE: 118 MMHG | WEIGHT: 134.5 LBS | DIASTOLIC BLOOD PRESSURE: 64 MMHG | HEIGHT: 60 IN | HEART RATE: 84 BPM | TEMPERATURE: 97 F | RESPIRATION RATE: 16 BRPM | OXYGEN SATURATION: 100 % | BODY MASS INDEX: 26.41 KG/M2

## 2019-06-13 LAB
ALBUMIN SERPL BCP-MCNC: 1.9 G/DL (ref 3.5–5.2)
ALP SERPL-CCNC: 156 U/L (ref 55–135)
ALT SERPL W/O P-5'-P-CCNC: 50 U/L (ref 10–44)
ANION GAP SERPL CALC-SCNC: 5 MMOL/L (ref 8–16)
AST SERPL-CCNC: 108 U/L (ref 10–40)
BASOPHILS # BLD AUTO: 0.01 K/UL (ref 0–0.2)
BASOPHILS NFR BLD: 0.2 % (ref 0–1.9)
BILIRUB SERPL-MCNC: 0.3 MG/DL (ref 0.1–1)
BUN SERPL-MCNC: 25 MG/DL (ref 6–20)
CALCIUM SERPL-MCNC: 7.1 MG/DL (ref 8.7–10.5)
CHLORIDE SERPL-SCNC: 103 MMOL/L (ref 95–110)
CO2 SERPL-SCNC: 28 MMOL/L (ref 23–29)
CREAT SERPL-MCNC: 3.1 MG/DL (ref 0.5–1.4)
DIFFERENTIAL METHOD: ABNORMAL
EOSINOPHIL # BLD AUTO: 0 K/UL (ref 0–0.5)
EOSINOPHIL NFR BLD: 0 % (ref 0–8)
ERYTHROCYTE [DISTWIDTH] IN BLOOD BY AUTOMATED COUNT: 16.1 % (ref 11.5–14.5)
EST. GFR  (AFRICAN AMERICAN): 22 ML/MIN/1.73 M^2
EST. GFR  (NON AFRICAN AMERICAN): 19 ML/MIN/1.73 M^2
GLUCOSE SERPL-MCNC: 79 MG/DL (ref 70–110)
HCT VFR BLD AUTO: 19.8 % (ref 37–48.5)
HGB BLD-MCNC: 6.8 G/DL (ref 12–16)
LYMPHOCYTES # BLD AUTO: 1.7 K/UL (ref 1–4.8)
LYMPHOCYTES NFR BLD: 27.2 % (ref 18–48)
MAGNESIUM SERPL-MCNC: 1.9 MG/DL (ref 1.6–2.6)
MCH RBC QN AUTO: 31.1 PG (ref 27–31)
MCHC RBC AUTO-ENTMCNC: 34.3 G/DL (ref 32–36)
MCV RBC AUTO: 90 FL (ref 82–98)
MONOCYTES # BLD AUTO: 0.7 K/UL (ref 0.3–1)
MONOCYTES NFR BLD: 11.4 % (ref 4–15)
NEUTROPHILS # BLD AUTO: 3.8 K/UL (ref 1.8–7.7)
NEUTROPHILS NFR BLD: 61.7 % (ref 38–73)
PHOSPHATE SERPL-MCNC: 2.4 MG/DL (ref 2.7–4.5)
PLATELET # BLD AUTO: 126 K/UL (ref 150–350)
PMV BLD AUTO: 10.3 FL (ref 9.2–12.9)
POCT GLUCOSE: 84 MG/DL (ref 70–110)
POTASSIUM SERPL-SCNC: 3.2 MMOL/L (ref 3.5–5.1)
PROT SERPL-MCNC: 6.3 G/DL (ref 6–8.4)
RBC # BLD AUTO: 2.19 M/UL (ref 4–5.4)
SODIUM SERPL-SCNC: 136 MMOL/L (ref 136–145)
WBC # BLD AUTO: 6.25 K/UL (ref 3.9–12.7)

## 2019-06-13 PROCEDURE — 85025 COMPLETE CBC W/AUTO DIFF WBC: CPT

## 2019-06-13 PROCEDURE — 36589 REMOVAL TUNNELED CV CATH: CPT | Mod: 51,,, | Performed by: INTERNAL MEDICINE

## 2019-06-13 PROCEDURE — 84100 ASSAY OF PHOSPHORUS: CPT

## 2019-06-13 PROCEDURE — 36589 PR REMOVAL TUNNELED CV CATH W/O SUBQ PORT OR PUMP: ICD-10-PCS | Mod: 51,,, | Performed by: INTERNAL MEDICINE

## 2019-06-13 PROCEDURE — 80053 COMPREHEN METABOLIC PANEL: CPT

## 2019-06-13 PROCEDURE — 99232 PR SUBSEQUENT HOSPITAL CARE,LEVL II: ICD-10-PCS | Mod: ,,, | Performed by: INTERNAL MEDICINE

## 2019-06-13 PROCEDURE — 83735 ASSAY OF MAGNESIUM: CPT

## 2019-06-13 PROCEDURE — 99232 SBSQ HOSP IP/OBS MODERATE 35: CPT | Mod: ,,, | Performed by: INTERNAL MEDICINE

## 2019-06-13 RX ORDER — HYDROCODONE BITARTRATE AND ACETAMINOPHEN 500; 5 MG/1; MG/1
TABLET ORAL
Status: DISCONTINUED | OUTPATIENT
Start: 2019-06-13 | End: 2019-06-13 | Stop reason: HOSPADM

## 2019-06-13 NOTE — PROGRESS NOTES
[facility] -   Nephrology  Progress Note    Patient Name: Wang Kebede  MRN: [id number]  Admission Date: 6/3/2019  Hospital Length of Stay: 9 days  Attending Provider: No att. providers found   Primary Care Physician: Levi Moncada MD  Principal Problem:Sepsis    Subjective:     HPI: Wang Kebede is a 33-year-old  woman with history of HIV/aids, noncompliance with treatment, vulvar carcinoma, sickle cell disease, was admitted to the hospital about 2 days ago for some shortness of breath and cough.  Her serum creatinine gradually worsened from about 1.5 mg/dL to 2.6 mg/dL today, also his urine output has dropped, also severe metabolic acidosis noted on her labs, we were consulted for acute on chronic kidney failure and metabolic acidosis.    Interval History: minimal UOP ; signing AMA ; risk of death due to LELAND explained     Review of patient's allergies indicates:   Allergen Reactions    Iodine and iodide containing products Anaphylaxis     Pt states she coded last time she was administered contrast    Pneumococcal 23-chitra ps vaccine Anaphylaxis     Potential iodine containing    Dilaudid [hydromorphone] Hives and Itching    Bactrim [sulfamethoxazole-trimethoprim] Hives    Morphine Itching     Tolerates norco 2018     Current Facility-Administered Medications   Medication Frequency    0.9%  NaCl infusion (for blood administration) Q24H PRN    0.9%  NaCl infusion (for blood administration) Q24H PRN    0.9%  NaCl infusion PRN    0.9%  NaCl infusion Once    acetaminophen oral solution 650 mg Q6H PRN    albuterol-ipratropium 2.5 mg-0.5 mg/3 mL nebulizer solution 3 mL Q6H    chlorhexidine 0.12 % solution 15 mL BID    dextrose 50% injection 12.5 g PRN    diphenhydrAMINE capsule 25 mg Q6H PRN    dolutegravir Tab 50 mg Daily    glucagon (human recombinant) injection 1 mg PRN    heparin (porcine) injection 3,000 Units Once    heparin (porcine) injection  4,000 Units PRN    insulin aspart U-100 pen 0-5 Units Q6H PRN    lacosamide tablet 100 mg BID    lamiVUDine 10 mg/mL solution 100 mg Daily    levalbuterol nebulizer solution 0.63 mg Q4H PRN    levoFLOXacin 750 mg/150 mL IVPB 750 mg Q48H    mupirocin 2 % ointment BID    nystatin 100,000 unit/mL suspension 500,000 Units QID    ondansetron injection 4 mg Q8H PRN    pantoprazole EC tablet 40 mg Daily    promethazine (PHENERGAN) 12.5 mg in dextrose 5 % 50 mL IVPB Q6H    sodium bicarbonate tablet 1,300 mg TID    tenofovir tablet 300 mg Q72H     Current Outpatient Medications   Medication    b complex vitamins capsule    bxlyhnwz-bpkusrvr-scfnfp-tenof, STRIBILD, 148-324-781-300 mg (STRIBILD) 163-865-355-300 mg per tablet    folic acid (FOLVITE) 1 MG tablet    lacosamide (VIMPAT) 50 mg Tab    albuterol (PROVENTIL/VENTOLIN HFA) 90 mcg/actuation inhaler    levoFLOXacin (LEVAQUIN) 500 MG tablet    promethazine (PHENERGAN) 25 MG tablet    promethazine-dextromethorphan (PROMETHAZINE-DM) 6.25-15 mg/5 mL Syrp       Objective:     Vital Signs (Most Recent):  Temp: 97.4 °F (36.3 °C) (06/13/19 0757)  Pulse: 84 (06/13/19 0757)  Resp: 16 (06/13/19 0757)  BP: 118/64 (06/13/19 0757)  SpO2: 100 % (06/13/19 0757)  O2 Device (Oxygen Therapy): room air (06/13/19 0757) Vital Signs (24h Range):  Temp:  [97.4 °F (36.3 °C)-99.1 °F (37.3 °C)] 97.4 °F (36.3 °C)  Pulse:  [] 84  Resp:  [11-20] 16  SpO2:  [98 %-100 %] 100 %  BP: (100-122)/(55-79) 118/64     Weight: 61 kg (134 lb 7.7 oz) (06/10/19 0833)  Body mass index is 26.26 kg/m².  Body surface area is 1.61 meters squared.    I/O last 3 completed shifts:  In: 1200 [P.O.:600; Other:500; IV Piggyback:100]  Out: 2108 [Urine:108; Other:2000]    Physical Exam   Constitutional: She is oriented to person, place, and time. She appears well-developed and well-nourished. No distress.   HENT:   Head: Normocephalic and atraumatic.   Nose: Nose normal.   Eyes: Pupils are equal,  round, and reactive to light. Conjunctivae and EOM are normal. No scleral icterus.   Neck: Normal range of motion. Neck supple. No tracheal deviation present.   Cardiovascular: Regular rhythm, normal heart sounds and intact distal pulses.   No murmur heard.  Tachycardia   Pulmonary/Chest: Effort normal and breath sounds normal. No stridor. No respiratory distress. She has no wheezes. She has no rales.   Abdominal: Soft. Bowel sounds are normal. She exhibits no distension. There is no tenderness. There is no guarding.   Genitourinary:   Genitourinary Comments: -   Musculoskeletal: Normal range of motion. She exhibits no edema or deformity.   Neurological: She is alert and oriented to person, place, and time. No cranial nerve deficit.   Skin: Skin is warm and dry. Capillary refill takes less than 2 seconds. No rash noted. She is not diaphoretic.   Psychiatric: She has a normal mood and affect. Her behavior is normal. Judgment and thought content normal.   Nursing note and vitals reviewed.      Significant Labs:  All labs within the past 24 hours have been reviewed.     Significant Imaging:      Assessment/Plan:     LELAND (acute kidney injury)  Patient is still dialysis dependent with minimal urine output.  Vasc cath removed per patient request. Risk of death explained and also d/w dr. Mccauley and nursing staff         Thank you for your consult.     Sonam Yanez MD  Nephrology  [facility] -

## 2019-06-13 NOTE — PROCEDURES
Wang Kebede is a 33 y.o. female patient.    Temp: 97.4 °F (36.3 °C) (06/13/19 0757)  Pulse: 84 (06/13/19 0757)  Resp: 16 (06/13/19 0757)  BP: 118/64 (06/13/19 0757)  SpO2: 100 % (06/13/19 0757)  Weight: 61 kg (134 lb 7.7 oz) (06/10/19 0833)  Height: 5' (152.4 cm) (06/10/19 0833)       Procedures     Tunneled Central Venous Catheter Removal  Nephrology Access Service      Indication:patient wants it out and signing out AMA     Procedure:  The patient was prepped and draped in sterile fashion.   A blunt hemostat was used to dissect the exit sheath and fibrin sheath from the catheter cuff. After the cuff was freed, the catheter was pulled and pressure was applied to the venotomy. Once hemostasis was achieved, a pressure dressing was applied.    Complications/Comments:  none    Estimated Blood Loss: 5 c c    Discharge instructions given:  JIHAN Yanez  6/13/2019

## 2019-06-13 NOTE — SUBJECTIVE & OBJECTIVE
Interval History:  Hemodynamically stable overnight.  Hemodialysis as needed.  Transfer out of ICU.    Review of Systems   Constitutional: Positive for activity change and fever. Negative for chills, diaphoresis and fatigue.   HENT: Positive for sinus pressure. Negative for congestion, sore throat and voice change.    Eyes: Negative for photophobia and visual disturbance.   Respiratory: Negative for cough, shortness of breath, wheezing and stridor.    Cardiovascular: Negative for chest pain and leg swelling.   Gastrointestinal: Negative for abdominal distention, abdominal pain, constipation, diarrhea, nausea and vomiting.   Endocrine: Negative for polydipsia, polyphagia and polyuria.   Genitourinary: Negative for difficulty urinating, dysuria, flank pain, pelvic pain, urgency and vaginal discharge.   Musculoskeletal: Negative for back pain, joint swelling, neck pain and neck stiffness.   Skin: Negative for color change and rash.   Allergic/Immunologic: Negative for immunocompromised state.   Neurological: Positive for headaches. Negative for dizziness, syncope, weakness and numbness.   Hematological: Does not bruise/bleed easily.   Psychiatric/Behavioral: Negative for agitation, behavioral problems and confusion.     Objective:     Vital Signs (Most Recent):  Temp: 97.5 °F (36.4 °C) (06/12/19 1938)  Pulse: 97 (06/12/19 1938)  Resp: 16 (06/12/19 1938)  BP: (!) 108/58 (06/12/19 1938)  SpO2: 99 % (06/12/19 1938) Vital Signs (24h Range):  Temp:  [96.4 °F (35.8 °C)-99.1 °F (37.3 °C)] 97.5 °F (36.4 °C)  Pulse:  [] 97  Resp:  [11-21] 16  SpO2:  [99 %-100 %] 99 %  BP: (104-132)/(57-79) 108/58     Weight: 61 kg (134 lb 7.7 oz)  Body mass index is 26.26 kg/m².    Intake/Output Summary (Last 24 hours) at 6/12/2019 2133  Last data filed at 6/12/2019 1400  Gross per 24 hour   Intake 550 ml   Output 2083 ml   Net -1533 ml      Physical Exam   Constitutional: She is oriented to person, place, and time. She appears  well-developed and well-nourished. No distress.   HENT:   Head: Normocephalic and atraumatic.   Nose: Nose normal.   Eyes: Pupils are equal, round, and reactive to light. Conjunctivae and EOM are normal. No scleral icterus.   Neck: Normal range of motion. Neck supple. No tracheal deviation present.   Cardiovascular: Regular rhythm, normal heart sounds and intact distal pulses.   No murmur heard.  Tachycardia   Pulmonary/Chest: Effort normal and breath sounds normal. No stridor. No respiratory distress. She has no wheezes. She has no rales.   Abdominal: Soft. Bowel sounds are normal. She exhibits no distension. There is no tenderness. There is no guarding.   Genitourinary:   Genitourinary Comments: -   Musculoskeletal: Normal range of motion. She exhibits no edema or deformity.   Neurological: She is alert and oriented to person, place, and time. No cranial nerve deficit.   Skin: Skin is warm and dry. Capillary refill takes less than 2 seconds. No rash noted. She is not diaphoretic.   Psychiatric: She has a normal mood and affect. Her behavior is normal. Judgment and thought content normal.   Nursing note and vitals reviewed.      Significant Labs: All pertinent labs within the past 24 hours have been reviewed.    Significant Imaging: I have reviewed and interpreted all pertinent imaging results/findings within the past 24 hours.

## 2019-06-13 NOTE — PLAN OF CARE
06/13/19 1534   Final Note   Assessment Type Final Discharge Note   Anticipated Discharge Disposition Left Against   Right Care Referral Info   Facility Name left AMA

## 2019-06-13 NOTE — ASSESSMENT & PLAN NOTE
Resume patient's HAART therapy.  Patient being treated with Rocephin for sinusitis will add on azithromycin and dapsone for further prophylaxis given patient's immunocompromised state.  She has poor compliance to therapy and follow up.  CD4 count 32.  Will continue Striblid.  OI prophylaxis with Dapsone and Azithromycin.  Infectious Disease following.

## 2019-06-13 NOTE — SUBJECTIVE & OBJECTIVE
Interval History: minimal UOP ; signing AMA ; risk of death due to LELAND explained     Review of patient's allergies indicates:   Allergen Reactions    Iodine and iodide containing products Anaphylaxis     Pt states she coded last time she was administered contrast    Pneumococcal 23-chitra ps vaccine Anaphylaxis     Potential iodine containing    Dilaudid [hydromorphone] Hives and Itching    Bactrim [sulfamethoxazole-trimethoprim] Hives    Morphine Itching     Tolerates norco 2018     Current Facility-Administered Medications   Medication Frequency    0.9%  NaCl infusion (for blood administration) Q24H PRN    0.9%  NaCl infusion (for blood administration) Q24H PRN    0.9%  NaCl infusion PRN    0.9%  NaCl infusion Once    acetaminophen oral solution 650 mg Q6H PRN    albuterol-ipratropium 2.5 mg-0.5 mg/3 mL nebulizer solution 3 mL Q6H    chlorhexidine 0.12 % solution 15 mL BID    dextrose 50% injection 12.5 g PRN    diphenhydrAMINE capsule 25 mg Q6H PRN    dolutegravir Tab 50 mg Daily    glucagon (human recombinant) injection 1 mg PRN    heparin (porcine) injection 3,000 Units Once    heparin (porcine) injection 4,000 Units PRN    insulin aspart U-100 pen 0-5 Units Q6H PRN    lacosamide tablet 100 mg BID    lamiVUDine 10 mg/mL solution 100 mg Daily    levalbuterol nebulizer solution 0.63 mg Q4H PRN    levoFLOXacin 750 mg/150 mL IVPB 750 mg Q48H    mupirocin 2 % ointment BID    nystatin 100,000 unit/mL suspension 500,000 Units QID    ondansetron injection 4 mg Q8H PRN    pantoprazole EC tablet 40 mg Daily    promethazine (PHENERGAN) 12.5 mg in dextrose 5 % 50 mL IVPB Q6H    sodium bicarbonate tablet 1,300 mg TID    tenofovir tablet 300 mg Q72H     Current Outpatient Medications   Medication    b complex vitamins capsule    ncpdoyin-akurvffj-rxjdms-tenof, STRIBILD, 700-476-870-300 mg (STRIBILD) 465-898-784-300 mg per tablet    folic acid (FOLVITE) 1 MG tablet    lacosamide (VIMPAT) 50 mg  Tab    albuterol (PROVENTIL/VENTOLIN HFA) 90 mcg/actuation inhaler    levoFLOXacin (LEVAQUIN) 500 MG tablet    promethazine (PHENERGAN) 25 MG tablet    promethazine-dextromethorphan (PROMETHAZINE-DM) 6.25-15 mg/5 mL Syrp       Objective:     Vital Signs (Most Recent):  Temp: 97.4 °F (36.3 °C) (06/13/19 0757)  Pulse: 84 (06/13/19 0757)  Resp: 16 (06/13/19 0757)  BP: 118/64 (06/13/19 0757)  SpO2: 100 % (06/13/19 0757)  O2 Device (Oxygen Therapy): room air (06/13/19 0757) Vital Signs (24h Range):  Temp:  [97.4 °F (36.3 °C)-99.1 °F (37.3 °C)] 97.4 °F (36.3 °C)  Pulse:  [] 84  Resp:  [11-20] 16  SpO2:  [98 %-100 %] 100 %  BP: (100-122)/(55-79) 118/64     Weight: 61 kg (134 lb 7.7 oz) (06/10/19 0833)  Body mass index is 26.26 kg/m².  Body surface area is 1.61 meters squared.    I/O last 3 completed shifts:  In: 1200 [P.O.:600; Other:500; IV Piggyback:100]  Out: 2108 [Urine:108; Other:2000]    Physical Exam   Constitutional: She is oriented to person, place, and time. She appears well-developed and well-nourished. No distress.   HENT:   Head: Normocephalic and atraumatic.   Nose: Nose normal.   Eyes: Pupils are equal, round, and reactive to light. Conjunctivae and EOM are normal. No scleral icterus.   Neck: Normal range of motion. Neck supple. No tracheal deviation present.   Cardiovascular: Regular rhythm, normal heart sounds and intact distal pulses.   No murmur heard.  Tachycardia   Pulmonary/Chest: Effort normal and breath sounds normal. No stridor. No respiratory distress. She has no wheezes. She has no rales.   Abdominal: Soft. Bowel sounds are normal. She exhibits no distension. There is no tenderness. There is no guarding.   Genitourinary:   Genitourinary Comments: -   Musculoskeletal: Normal range of motion. She exhibits no edema or deformity.   Neurological: She is alert and oriented to person, place, and time. No cranial nerve deficit.   Skin: Skin is warm and dry. Capillary refill takes less than 2  seconds. No rash noted. She is not diaphoretic.   Psychiatric: She has a normal mood and affect. Her behavior is normal. Judgment and thought content normal.   Nursing note and vitals reviewed.      Significant Labs:  All labs within the past 24 hours have been reviewed.     Significant Imaging:

## 2019-06-13 NOTE — PLAN OF CARE
06/13/19 0934   Medicare Message   Important Message from Medicare regarding Discharge Appeal Rights Other (comments)  (No IMM needed, patient leaving AMA)   Date IMM was signed 06/13/19   Time IMM was signed 0934

## 2019-06-13 NOTE — ASSESSMENT & PLAN NOTE
Patient is still dialysis dependent with minimal urine output.  Vasc cath removed per patient request. Risk of death explained and also d/w dr. Mccauley and nursing staff

## 2019-06-13 NOTE — DISCHARGE SUMMARY
[facility] Hospital Medicine  Discharge Summary      Patient Name: Wang Kebede  MRN: [id number]  Admission Date: 6/3/2019  Hospital Length of Stay: 9 days  Discharge Date and Time: 6/13/2019  9:35 AM  Attending Physician:  Gerardo Mccauley MD  Discharging Provider: Gerardo Mccauley MD  Primary Care Provider: Levi Moncada MD      HPI:   33-year-old female with significant medical history including AIDS who has history of noncompliance with medical plan of care presents today for complaint of malaise over the past week.  Modifying factors none.  Associated symptoms:  Cough, headache.  Prior treatment not effective with home care.  Patient was evaluated emergency room.  Significant findings and workup include sinusitis findings on CT.  Cultures pending.  WBC and lactic both negative.  Patient was started on IV antibiotics and given IV fluids.  During ER care patient became more tachycardic and developed fever.  Infectious Disease was consulted by ED who recommended observation.  Hospital Medicine was consulted patient admitted to observation.  To note patient's last CD4 count 39  three months ago.    Procedure(s) (LRB):  BRONCHOSCOPY (N/A)      Hospital Course:   33 year old woman with AIDS-poorly compliant with HAART -last cd4 count -was 39 (02/2019).  She was admitted at this time for febrile illness hemodynamically unstable requiring vasopressors to maintain blood pressure.  Infectious Disease evaluated and assisting management.  Started broad empiric antibiotic treatment.  Continued to run intermittent high fevers.  Hemodynamics improved and weaned off vasopressors.  Remained tachycardic to varying degrees.  EKG consistently showed sinus tachycardia or SVT.  Rate controlled with as needed Metoprolol IV.  Hypoxemic respiratory failure on room air corrected with continuous BiPAP.  Continued respiratory distress, decided for intubation 07 June.  Worsening renal function, started  CRRT 08 June in ICU on ventilator.  Diagnostic bronchoscopy on 09 June and tolerating CRRT on Norepinephrine.  Transition to intermittent hemodialysis.  Transferred out of ICU.  Morning of 13 June she expressed a desire to leave the hospital and forego any further treatment.  I discussed the risk of her becoming ill again very soon if she did not complete treatment and that she was severely anemic and we prepared a transfusion for her and that she is currently dependent on dialysis.  Even so, she elected to have her dialysis catheter removed and left against medical advice.     Consults:   Consults (From admission, onward)        Status Ordering Provider     Inpatient consult to Infectious Diseases  Once     Provider:  Hubert Mayo MD    Acknowledged PRAVIN BENNETT     Inpatient consult to Nephrology  Once     Provider:  Marlon Monterroso MD    Acknowledged PRAVIN BENNETT     Inpatient consult to Pulmonology  Once     Provider:  (Not yet assigned)    ENOC Gibson          No new Assessment & Plan notes have been filed under this hospital service since the last note was generated.  Service: Hospital Medicine    Final Active Diagnoses:    Diagnosis Date Noted POA    PRINCIPAL PROBLEM:  Sepsis [A41.9] 12/25/2018 Yes    LELAND (acute kidney injury) [N17.9] 12/25/2018 Yes    AIDS (acquired immunodeficiency syndrome), CD4 <=200 [B20] 04/15/2017 Yes     Chronic    Acute sinusitis [J01.90] 06/03/2019 Yes    Acute respiratory failure with hypoxia [J96.01] 07/23/2017 No    HIV (human immunodeficiency virus infection) [B20]  Yes     Chronic    History of VT/VF s/p implantation of automatic cardioverter/defibrillator (AICD) [Z95.810] 12/29/2016 Yes     Chronic    Seizure disorder [G40.909] 12/25/2018 Yes     Chronic    AIDS [B20] 06/12/2019 Yes    Hyperglycemia [R73.9] 06/08/2019 No    Thrombocytopenia associated with AIDS [D69.6] 12/25/2018 Yes     Chronic    Anemia, unspecified [D64.9]  08/15/2017 Yes     Chronic    Multifocal pneumonia [J18.9] 07/23/2017 Yes      Problems Resolved During this Admission:       Discharged Condition: against medical advice    Disposition: Left Against Medical Adv*    Follow Up:  Follow-up Information     Levi Moncada MD. Go in 2 days.    Specialty:  Family Medicine  Contact information:  [address]  [place] [address]  [phone]                 Patient Instructions:   No discharge procedures on file.    Significant Diagnostic Studies: Labs: All labs within the past 24 hours have been reviewed    Pending Diagnostic Studies:     Procedure Component Value Units Date/Time    Cytology Specimen- Pulmonary Medical Cytology [[id number]] Collected:  06/08/19 0749    Order Status:  Sent Lab Status:  In process Updated:  06/10/19 0830    Specimen:  Bronch wash with GMS     Cytology Specimen- Pulmonary Medical Cytology [[id number]] Collected:  06/09/19 1014    Order Status:  Sent Lab Status:  No result     Specimen:  Other specimen location (comment)     Cytology Specimen- Pulmonary Medical Cytology [[id number]] Collected:  06/09/19 1014    Order Status:  Sent Lab Status:  No result     Specimen:  Other specimen location (comment)     Freeze and Hold,  [[id number]] Collected:  06/03/19 1353    Order Status:  Sent Lab Status:  No result     Specimen:  CSF (Spinal Fluid) from Cerebrospinal Fluid     HIV-1 GenotypR PLUS [[id number]] Collected:  06/07/19 0908    Order Status:  Sent Lab Status:  In process Updated:  06/07/19 1531    Specimen:  Blood     Medical Cytology [[id number]] Collected:  06/09/19 1116    Order Status:  Sent Lab Status:  No result     Specimen:  Bronchial Brush          Medications:  Reconciled Home Medications:      Medication List      CONTINUE taking these medications    albuterol 90 mcg/actuation inhaler  Commonly known as:  PROVENTIL/VENTOLIN HFA  Inhale 2 puffs into the lungs every 6 (six) hours as needed for Wheezing.     b complex  vitamins capsule  Take 1 capsule by mouth once daily.     nplglqac-bpwftfkp-fixouf-tenof (STRIBILD) [id number] mg [phone] mg per tablet  Commonly known as:  STRIBILD  Take 1 tablet by mouth once daily.     folic acid 1 MG tablet  Commonly known as:  FOLVITE  Take 1 tablet (1 mg total) by mouth once daily.     lacosamide 50 mg Tab  Commonly known as:  VIMPAT  Take 2 tablets (100 mg total) by mouth 2 (two) times daily.     levoFLOXacin 500 MG tablet  Commonly known as:  LEVAQUIN  Take 1 tablet (500 mg total) by mouth once daily. for 10 days     promethazine 25 MG tablet  Commonly known as:  PHENERGAN  Take 1 tablet (25 mg total) by mouth every 6 (six) hours as needed.     promethazine-dextromethorphan 6.25-15 mg/5 mL Syrp  Commonly known as:  PROMETHAZINE-DM  Take 5 mLs by mouth nightly as needed (cough).            Indwelling Lines/Drains at time of discharge:   Lines/Drains/Airways     Peripherally Inserted Central Catheter Line                 PICC Double Lumen 06/04/19 0819 right basilic 9 days          Central Venous Catheter Line                 Hemodialysis Catheter 06/08/19 1100 right femoral 4 days                Time spent on the discharge of patient: 30 minutes  Patient was seen and examined on the date of discharge and determined to be suitable for discharge.         Gerardo Mccauley MD  Department of Hospital Medicine  [facility]

## 2019-06-13 NOTE — PLAN OF CARE
Problem: Adult Inpatient Plan of Care  Goal: Plan of Care Review  Shift assessment completed.    Patient updated on POC for the day, denies pain, sob. Pt refused all of her nightly meds and requested multiple times to leave AMA  IVF infusing: schedule meds given IV per MD order  Neuro: A&O x4  CVR: Continuous telemetry monitoring maintained, patient SR with HR 80s-90s  Resp: pt on rm air  GI: pt uses the bathroom   : pt ambulates to the restroom  MS: pt ambulates independently.   Skin: no apparent skin issues  Reviewed fall precautions with patient.

## 2019-06-13 NOTE — NURSING
Pt. Has expressed multiple times that she wants to leave the hospital. Pt has been requesting to leave AMA.

## 2019-06-13 NOTE — PROGRESS NOTES
[facility] Hospital Medicine  Progress Note    Patient Name: Wang Kebede  MRN: [id number]  Patient Class: IP- Inpatient   Admission Date: 6/3/2019  Length of Stay: 8 days  Attending Physician: Gerardo Mccauley MD  Primary Care Provider: Levi Moncada MD        Subjective:     Principal Problem:Sepsis    HPI:  33-year-old female with significant medical history including AIDS who has history of noncompliance with medical plan of care presents today for complaint of malaise over the past week.  Modifying factors none.  Associated symptoms:  Cough, headache.  Prior treatment not effective with home care.  Patient was evaluated emergency room.  Significant findings and workup include sinusitis findings on CT.  Cultures pending.  WBC and lactic both negative.  Patient was started on IV antibiotics and given IV fluids.  During ER care patient became more tachycardic and developed fever.  Infectious Disease was consulted by ED who recommended observation.  Hospital Medicine was consulted patient admitted to observation.  To note patient's last CD4 count 39  three months ago.    Hospital Course:  33 year old woman with AIDS-poorly compliant with HAART -last cd4 count -was 39 (02/2019).  She was admitted at this time for febrile illness hemodynamically unstable requiring vasopressors to maintain blood pressure.  Infectious Disease evaluated and assisting management.  Started broad empiric antibiotic treatment.  Continued to run intermittent high fevers.  Hemodynamics improved and weaned off vasopressors.  Remained tachycardic to varying degrees.  EKG consistently showed sinus tachycardia or SVT.  Rate controlled with as needed Metoprolol IV.  Hypoxemic respiratory failure on room air corrected with continuous BiPAP.  Continued respiratory distress, decided for intubation 07 June.  Worsening renal function, started CRRT 08 June in ICU on ventilator.  Diagnostic bronchoscopy on 09 June and  tolerating CRRT on Norepinephrine.  Transition to intermittent hemodialysis.  Transferred out of ICU.    Interval History:  Hemodynamically stable overnight.  Hemodialysis as needed.  Transfer out of ICU.    Review of Systems   Constitutional: Positive for activity change and fever. Negative for chills, diaphoresis and fatigue.   HENT: Positive for sinus pressure. Negative for congestion, sore throat and voice change.    Eyes: Negative for photophobia and visual disturbance.   Respiratory: Negative for cough, shortness of breath, wheezing and stridor.    Cardiovascular: Negative for chest pain and leg swelling.   Gastrointestinal: Negative for abdominal distention, abdominal pain, constipation, diarrhea, nausea and vomiting.   Endocrine: Negative for polydipsia, polyphagia and polyuria.   Genitourinary: Negative for difficulty urinating, dysuria, flank pain, pelvic pain, urgency and vaginal discharge.   Musculoskeletal: Negative for back pain, joint swelling, neck pain and neck stiffness.   Skin: Negative for color change and rash.   Allergic/Immunologic: Negative for immunocompromised state.   Neurological: Positive for headaches. Negative for dizziness, syncope, weakness and numbness.   Hematological: Does not bruise/bleed easily.   Psychiatric/Behavioral: Negative for agitation, behavioral problems and confusion.     Objective:     Vital Signs (Most Recent):  Temp: 97.5 °F (36.4 °C) (06/12/19 1938)  Pulse: 97 (06/12/19 1938)  Resp: 16 (06/12/19 1938)  BP: (!) 108/58 (06/12/19 1938)  SpO2: 99 % (06/12/19 1938) Vital Signs (24h Range):  Temp:  [96.4 °F (35.8 °C)-99.1 °F (37.3 °C)] 97.5 °F (36.4 °C)  Pulse:  [] 97  Resp:  [11-21] 16  SpO2:  [99 %-100 %] 99 %  BP: (104-132)/(57-79) 108/58     Weight: 61 kg (134 lb 7.7 oz)  Body mass index is 26.26 kg/m².    Intake/Output Summary (Last 24 hours) at 6/12/2019 2133  Last data filed at 6/12/2019 1400  Gross per 24 hour   Intake 550 ml   Output 2083 ml   Net -1533  ml      Physical Exam   Constitutional: She is oriented to person, place, and time. She appears well-developed and well-nourished. No distress.   HENT:   Head: Normocephalic and atraumatic.   Nose: Nose normal.   Eyes: Pupils are equal, round, and reactive to light. Conjunctivae and EOM are normal. No scleral icterus.   Neck: Normal range of motion. Neck supple. No tracheal deviation present.   Cardiovascular: Regular rhythm, normal heart sounds and intact distal pulses.   No murmur heard.  Tachycardia   Pulmonary/Chest: Effort normal and breath sounds normal. No stridor. No respiratory distress. She has no wheezes. She has no rales.   Abdominal: Soft. Bowel sounds are normal. She exhibits no distension. There is no tenderness. There is no guarding.   Genitourinary:   Genitourinary Comments: -   Musculoskeletal: Normal range of motion. She exhibits no edema or deformity.   Neurological: She is alert and oriented to person, place, and time. No cranial nerve deficit.   Skin: Skin is warm and dry. Capillary refill takes less than 2 seconds. No rash noted. She is not diaphoretic.   Psychiatric: She has a normal mood and affect. Her behavior is normal. Judgment and thought content normal.   Nursing note and vitals reviewed.      Significant Labs: All pertinent labs within the past 24 hours have been reviewed.    Significant Imaging: I have reviewed and interpreted all pertinent imaging results/findings within the past 24 hours.    Assessment/Plan:      * Sepsis  Potentially related to sinusitis.  Patient received IV fluids and was started on IV antibiotics.  Lactic negative.  She is an immunocompromised host . Will start broad spectrum antimicrobial therapy including antifungal therapy -will use vancomycin, zosyn and empiric Voriconazole.  Will send blood cultures, fungal cultures, fungitell assay, AFB cultures.  Infectious Disease assisting with management.  Diagnostic bronchoscopy 09 June.      LELAND (acute kidney  injury)  Worsening renal function.  Placed vascular catheter and started CRRT 08 June.  Intermittent HD as needed per Nephrology.    AIDS (acquired immunodeficiency syndrome), CD4 <=200  Resume patient's HAART therapy.  Patient being treated with Rocephin for sinusitis will add on azithromycin and dapsone for further prophylaxis given patient's immunocompromised state.  She has poor compliance to therapy and follow up.  CD4 count 32.  Will continue Striblid.  OI prophylaxis with Dapsone and Azithromycin.  Infectious Disease following.    Acute sinusitis  ill continue empiric broad spectrum therapy with Zosyn, will follow fever curve.  On Voriconazole- PO not IV due to renal impairment  , continue Vanco.  Monitor closely     Acute respiratory failure with hypoxia  Maintaining oxygenation via continuous BiPAP.  Increasing respiratory distress anticipate she will require intubation.  Intubated 07 June.  Breathing trials for extubation 10 Mckenna.    History of VT/VF s/p implantation of automatic cardioverter/defibrillator (AICD)  Cardiac monitoring    Seizure disorder  Continue patient's home medication  Seizure precautions.  will continue lacosamide and will monitor closely      VTE Risk Mitigation (From admission, onward)        Ordered     heparin (porcine) injection 3,000 Units  Once      06/11/19 0853     heparin (porcine) injection 4,000 Units  As needed (PRN)      06/08/19 1013     Place sequential compression device  Until discontinued      06/03/19 2020              Gerardo Mccauley MD  Department of Hospital Medicine   [facility]

## 2019-06-15 LAB
BLD PROD TYP BPU: NORMAL
BLOOD UNIT EXPIRATION DATE: NORMAL
BLOOD UNIT TYPE CODE: 5100
BLOOD UNIT TYPE: NORMAL
CODING SYSTEM: NORMAL
DISPENSE STATUS: NORMAL
NUM UNITS TRANS PACKED RBC: NORMAL

## 2019-06-17 LAB — HIV GENTYP ISLT: DETECTED

## 2019-06-21 LAB
SPECIMEN SOURCE: NORMAL
VIRUS SPEC CULT: NORMAL

## 2019-06-21 NOTE — PHYSICIAN QUERY
PT Name: Wang Kebede  MR #: [id number]    Physician Query Form - Cause and Effect Relationship Clarification      CDS/: Elva Cisneros RN            Contact information:[email or web address]    This form is a permanent document in the medical record.     Query Date: June 21, 2019    By submitting this query, we are merely seeking further clarification of documentation. Please utilize your independent clinical judgment when addressing the question(s) below.    The Medical record contains the following:  Supporting Clinical Findings   Location in record     33-year-old female with significant medical history including AIDS who has history of noncompliance with medical plan of care presents today for complaint of malaise over the past week.               Acute sinusitis  ill continue empiric broad spectrum therapy with Zosyn, will follow fever curve.  On Voriconazole- PO not IV due to renal impairment  , continue Vanco.  Monitor closely                                                                                                                                                                                  Discharge summary                [facility] medicine, Dr. Mccauley 6/12      Sepsis  Potentially related to sinusitis.  Patient received IV fluids and was started on IV antibiotics.  Lactic negative.  She is an immunocompromised host . Will start broad spectrum antimicrobial therapy including antifungal therapy -will use vancomycin, zosyn and empiric Voriconazole.  Will send blood cultures, fungal cultures, fungitell assay, AFB cultures.  Infectious Disease assisting with management.  Diagnostic bronchoscopy 09 June.                                                                                                                                                                                          Hospital medicine Dr. Mccauley, 6/12          Provider, please clarify if there is any  correlation between ____HIV__________ and _Sepsis_________________.           Are the conditions:      [ X ] Due to or associated with each other   [  ] Unrelated to each other   [  ] Other (Please Specify): _________________________   [  ] Clinically Undetermined

## 2019-06-24 ENCOUNTER — TELEPHONE (OUTPATIENT)
Dept: OBSTETRICS AND GYNECOLOGY | Facility: CLINIC | Age: 34
End: 2019-06-24

## 2019-06-24 NOTE — TELEPHONE ENCOUNTER
Spoke with patient states she is having a really bad outbreak vaginally and anally. States she was hospitalized 6/3-6/17 and asked for medication, was prescribed a cream but no relief. Please advise.

## 2019-06-24 NOTE — TELEPHONE ENCOUNTER
----- Message from Kika Tee sent at 6/24/2019 10:20 AM CDT -----  Contact: pt  The pt request a return call, no additional info given and can be reached at [phone]///[email or web address]

## 2019-06-24 NOTE — TELEPHONE ENCOUNTER
Per Marcelino OCONNOR's request scheduled pt with MD for 6/25/19 3:30 PM. Patient verbalized understanding

## 2019-07-03 LAB — FUNGUS SPEC CULT: NORMAL

## 2019-07-09 LAB
FUNGUS BLD CULT: NORMAL
FUNGUS SPEC CULT: NORMAL

## 2019-07-10 LAB
SPECIMEN SOURCE: NORMAL
VIRUS SPEC CULT: NORMAL

## 2019-08-06 LAB
ACID FAST MOD KINY STN SPEC: NORMAL
MYCOBACTERIUM SPEC QL CULT: NORMAL

## 2019-08-08 LAB — MYCOBACTERIUM SPEC QL CULT: NORMAL

## 2019-08-11 LAB
ACID FAST MOD KINY STN SPEC: NORMAL
ACID FAST MOD KINY STN SPEC: NORMAL
MYCOBACTERIUM SPEC QL CULT: NORMAL
MYCOBACTERIUM SPEC QL CULT: NORMAL

## 2019-08-13 ENCOUNTER — PES CALL (OUTPATIENT)
Dept: ADMINISTRATIVE | Facility: CLINIC | Age: 34
End: 2019-08-13

## 2019-08-26 ENCOUNTER — PES CALL (OUTPATIENT)
Dept: ADMINISTRATIVE | Facility: CLINIC | Age: 34
End: 2019-08-26

## 2019-08-26 NOTE — PATIENT INSTRUCTIONS
Patient was advised to hydrate with 50-60 ounces of water per day (PEG tube and fluids by mouth together).      Impaired Gait
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
## 2019-09-09 PROBLEM — J01.90 ACUTE SINUSITIS: Status: RESOLVED | Noted: 2019-06-03 | Resolved: 2019-09-09

## 2019-09-11 ENCOUNTER — PES CALL (OUTPATIENT)
Dept: ADMINISTRATIVE | Facility: CLINIC | Age: 34
End: 2019-09-11

## 2019-09-17 ENCOUNTER — PES CALL (OUTPATIENT)
Dept: ADMINISTRATIVE | Facility: CLINIC | Age: 34
End: 2019-09-17

## 2019-10-12 ENCOUNTER — HOSPITAL ENCOUNTER (EMERGENCY)
Facility: HOSPITAL | Age: 34
Discharge: OTHER INSTITUTION | End: 2019-10-12
Attending: EMERGENCY MEDICINE
Payer: MEDICAID

## 2019-10-12 VITALS
BODY MASS INDEX: 21.47 KG/M2 | WEIGHT: 109.38 LBS | TEMPERATURE: 101 F | HEIGHT: 60 IN | OXYGEN SATURATION: 100 % | SYSTOLIC BLOOD PRESSURE: 124 MMHG | DIASTOLIC BLOOD PRESSURE: 66 MMHG | HEART RATE: 103 BPM | RESPIRATION RATE: 20 BRPM

## 2019-10-12 DIAGNOSIS — R41.82 ALTERED MENTAL STATUS: ICD-10-CM

## 2019-10-12 DIAGNOSIS — R56.9 NEW ONSET SEIZURE: ICD-10-CM

## 2019-10-12 DIAGNOSIS — G93.89 MASS OF TEMPOROPARIETAL REGION OF BRAIN: Primary | ICD-10-CM

## 2019-10-12 LAB
ABO + RH BLD: NORMAL
ALBUMIN SERPL BCP-MCNC: 3 G/DL (ref 3.5–5.2)
ALLENS TEST: ABNORMAL
ALP SERPL-CCNC: 58 U/L (ref 55–135)
ALT SERPL W/O P-5'-P-CCNC: 14 U/L (ref 10–44)
AMPHET+METHAMPHET UR QL: NEGATIVE
ANION GAP SERPL CALC-SCNC: 14 MMOL/L (ref 8–16)
ANISOCYTOSIS BLD QL SMEAR: SLIGHT
APTT BLDCRRT: 23.9 SEC (ref 21–32)
AST SERPL-CCNC: 29 U/L (ref 10–40)
BACTERIA #/AREA URNS HPF: ABNORMAL /HPF
BARBITURATES UR QL SCN>200 NG/ML: NEGATIVE
BASOPHILS # BLD AUTO: 0.01 K/UL (ref 0–0.2)
BASOPHILS NFR BLD: 0.2 % (ref 0–1.9)
BENZODIAZ UR QL SCN>200 NG/ML: NEGATIVE
BILIRUB SERPL-MCNC: 0.2 MG/DL (ref 0.1–1)
BILIRUB UR QL STRIP: NEGATIVE
BLD GP AB SCN CELLS X3 SERPL QL: NORMAL
BLD PROD TYP BPU: NORMAL
BLOOD UNIT EXPIRATION DATE: NORMAL
BLOOD UNIT TYPE CODE: 5100
BLOOD UNIT TYPE: NORMAL
BUN SERPL-MCNC: 62 MG/DL (ref 6–20)
BZE UR QL SCN: NEGATIVE
CALCIUM SERPL-MCNC: 8.1 MG/DL (ref 8.7–10.5)
CANNABINOIDS UR QL SCN: NEGATIVE
CHLORIDE SERPL-SCNC: 114 MMOL/L (ref 95–110)
CLARITY UR: CLEAR
CO2 SERPL-SCNC: 11 MMOL/L (ref 23–29)
CODING SYSTEM: NORMAL
COLOR UR: YELLOW
CREAT SERPL-MCNC: 4.2 MG/DL (ref 0.5–1.4)
CREAT UR-MCNC: 62.3 MG/DL (ref 15–325)
DELSYS: ABNORMAL
DIFFERENTIAL METHOD: ABNORMAL
DISPENSE STATUS: NORMAL
EOSINOPHIL # BLD AUTO: 0.1 K/UL (ref 0–0.5)
EOSINOPHIL NFR BLD: 1.9 % (ref 0–8)
ERYTHROCYTE [DISTWIDTH] IN BLOOD BY AUTOMATED COUNT: 16 % (ref 11.5–14.5)
EST. GFR  (AFRICAN AMERICAN): 15 ML/MIN/1.73 M^2
EST. GFR  (NON AFRICAN AMERICAN): 13 ML/MIN/1.73 M^2
GLUCOSE SERPL-MCNC: 102 MG/DL (ref 70–110)
GLUCOSE UR QL STRIP: NEGATIVE
HCO3 UR-SCNC: 12.2 MMOL/L (ref 24–28)
HCT VFR BLD AUTO: 18 % (ref 37–48.5)
HGB BLD-MCNC: 5.8 G/DL (ref 12–16)
HGB UR QL STRIP: ABNORMAL
HYALINE CASTS #/AREA URNS LPF: 1 /LPF
IMM GRANULOCYTES # BLD AUTO: 0.04 K/UL (ref 0–0.04)
IMM GRANULOCYTES NFR BLD AUTO: 0.7 % (ref 0–0.5)
INR PPP: 0.9 (ref 0.8–1.2)
KETONES UR QL STRIP: NEGATIVE
LACTATE SERPL-SCNC: 3.6 MMOL/L (ref 0.5–2.2)
LEUKOCYTE ESTERASE UR QL STRIP: ABNORMAL
LYMPHOCYTES # BLD AUTO: 1 K/UL (ref 1–4.8)
LYMPHOCYTES NFR BLD: 16.8 % (ref 18–48)
MAGNESIUM SERPL-MCNC: 1.7 MG/DL (ref 1.6–2.6)
MCH RBC QN AUTO: 31.9 PG (ref 27–31)
MCHC RBC AUTO-ENTMCNC: 32.2 G/DL (ref 32–36)
MCV RBC AUTO: 99 FL (ref 82–98)
METHADONE UR QL SCN>300 NG/ML: NEGATIVE
MICROSCOPIC COMMENT: ABNORMAL
MODE: ABNORMAL
MONOCYTES # BLD AUTO: 0.3 K/UL (ref 0.3–1)
MONOCYTES NFR BLD: 5.6 % (ref 4–15)
NEUTROPHILS # BLD AUTO: 4.4 K/UL (ref 1.8–7.7)
NEUTROPHILS NFR BLD: 74.8 % (ref 38–73)
NITRITE UR QL STRIP: NEGATIVE
NRBC BLD-RTO: 0 /100 WBC
NUM UNITS TRANS PACKED RBC: NORMAL
OPIATES UR QL SCN: NEGATIVE
PCO2 BLDA: 34.4 MMHG (ref 35–45)
PCP UR QL SCN>25 NG/ML: NEGATIVE
PH SMN: 7.16 [PH] (ref 7.35–7.45)
PH UR STRIP: 6 [PH] (ref 5–8)
PHOSPHATE SERPL-MCNC: 4.6 MG/DL (ref 2.7–4.5)
PLATELET # BLD AUTO: 151 K/UL (ref 150–350)
PMV BLD AUTO: 11.4 FL (ref 9.2–12.9)
PO2 BLDA: 40 MMHG (ref 40–60)
POC BE: -17 MMOL/L
POC SATURATED O2: 60 % (ref 95–100)
POIKILOCYTOSIS BLD QL SMEAR: SLIGHT
POLYCHROMASIA BLD QL SMEAR: ABNORMAL
POTASSIUM SERPL-SCNC: 4.6 MMOL/L (ref 3.5–5.1)
PROT SERPL-MCNC: 8.4 G/DL (ref 6–8.4)
PROT UR QL STRIP: ABNORMAL
PROTHROMBIN TIME: 9.8 SEC (ref 9–12.5)
RBC # BLD AUTO: 1.82 M/UL (ref 4–5.4)
RBC #/AREA URNS HPF: 5 /HPF (ref 0–4)
SAMPLE: ABNORMAL
SITE: ABNORMAL
SODIUM SERPL-SCNC: 139 MMOL/L (ref 136–145)
SP GR UR STRIP: 1.01 (ref 1–1.03)
SP02: 100
SPHEROCYTES BLD QL SMEAR: ABNORMAL
TOXICOLOGY INFORMATION: NORMAL
TROPONIN I SERPL DL<=0.01 NG/ML-MCNC: 0.01 NG/ML (ref 0–0.03)
TSH SERPL DL<=0.005 MIU/L-ACNC: 2.02 UIU/ML (ref 0.4–4)
URN SPEC COLLECT METH UR: ABNORMAL
UROBILINOGEN UR STRIP-ACNC: NEGATIVE EU/DL
WBC # BLD AUTO: 5.91 K/UL (ref 3.9–12.7)
WBC #/AREA URNS HPF: 8 /HPF (ref 0–5)
WBC CLUMPS URNS QL MICRO: ABNORMAL

## 2019-10-12 PROCEDURE — 85730 THROMBOPLASTIN TIME PARTIAL: CPT

## 2019-10-12 PROCEDURE — 96365 THER/PROPH/DIAG IV INF INIT: CPT

## 2019-10-12 PROCEDURE — 86920 COMPATIBILITY TEST SPIN: CPT

## 2019-10-12 PROCEDURE — 80307 DRUG TEST PRSMV CHEM ANLYZR: CPT

## 2019-10-12 PROCEDURE — 25000003 PHARM REV CODE 250: Performed by: EMERGENCY MEDICINE

## 2019-10-12 PROCEDURE — 86901 BLOOD TYPING SEROLOGIC RH(D): CPT

## 2019-10-12 PROCEDURE — 80053 COMPREHEN METABOLIC PANEL: CPT

## 2019-10-12 PROCEDURE — 83735 ASSAY OF MAGNESIUM: CPT

## 2019-10-12 PROCEDURE — 99900035 HC TECH TIME PER 15 MIN (STAT)

## 2019-10-12 PROCEDURE — 84100 ASSAY OF PHOSPHORUS: CPT

## 2019-10-12 PROCEDURE — 96361 HYDRATE IV INFUSION ADD-ON: CPT

## 2019-10-12 PROCEDURE — 36415 COLL VENOUS BLD VENIPUNCTURE: CPT

## 2019-10-12 PROCEDURE — 85025 COMPLETE CBC W/AUTO DIFF WBC: CPT

## 2019-10-12 PROCEDURE — 63600175 PHARM REV CODE 636 W HCPCS: Performed by: EMERGENCY MEDICINE

## 2019-10-12 PROCEDURE — 63600175 PHARM REV CODE 636 W HCPCS

## 2019-10-12 PROCEDURE — 36556 INSERT NON-TUNNEL CV CATH: CPT

## 2019-10-12 PROCEDURE — 81000 URINALYSIS NONAUTO W/SCOPE: CPT | Mod: 59

## 2019-10-12 PROCEDURE — 84484 ASSAY OF TROPONIN QUANT: CPT

## 2019-10-12 PROCEDURE — 36430 TRANSFUSION BLD/BLD COMPNT: CPT

## 2019-10-12 PROCEDURE — 99291 CRITICAL CARE FIRST HOUR: CPT | Mod: 25

## 2019-10-12 PROCEDURE — 83605 ASSAY OF LACTIC ACID: CPT

## 2019-10-12 PROCEDURE — 84443 ASSAY THYROID STIM HORMONE: CPT

## 2019-10-12 PROCEDURE — P9016 RBC LEUKOCYTES REDUCED: HCPCS

## 2019-10-12 PROCEDURE — 85610 PROTHROMBIN TIME: CPT

## 2019-10-12 RX ORDER — LORAZEPAM 2 MG/ML
INJECTION INTRAMUSCULAR
Status: COMPLETED
Start: 2019-10-12 | End: 2019-10-12

## 2019-10-12 RX ORDER — HYDROCODONE BITARTRATE AND ACETAMINOPHEN 500; 5 MG/1; MG/1
TABLET ORAL
Status: DISCONTINUED | OUTPATIENT
Start: 2019-10-12 | End: 2019-10-12 | Stop reason: HOSPADM

## 2019-10-12 RX ORDER — ACETAMINOPHEN 325 MG/1
650 TABLET ORAL
Status: COMPLETED | OUTPATIENT
Start: 2019-10-12 | End: 2019-10-12

## 2019-10-12 RX ORDER — LEVETIRACETAM 10 MG/ML
1000 INJECTION INTRAVASCULAR
Status: COMPLETED | OUTPATIENT
Start: 2019-10-12 | End: 2019-10-12

## 2019-10-12 RX ADMIN — LORAZEPAM 2 MG: 2 INJECTION INTRAMUSCULAR; INTRAVENOUS at 12:10

## 2019-10-12 RX ADMIN — ACETAMINOPHEN 650 MG: 325 TABLET ORAL at 04:10

## 2019-10-12 RX ADMIN — SODIUM CHLORIDE 500 ML: 0.9 INJECTION, SOLUTION INTRAVENOUS at 12:10

## 2019-10-12 RX ADMIN — LEVETIRACETAM INJECTION 1000 MG: 10 INJECTION INTRAVENOUS at 02:10

## 2019-10-12 NOTE — ED NOTES
Notified by Alie at [facility] that patient has been accepted by Dr. Juan Alberto Bee at [facility]. Number for report is [phone].

## 2019-10-12 NOTE — ED NOTES
Witnessed seizure lasting approximately 2.5 minutes. Dr. Aguilar came to bedside and ordered verbal 2 mg Ativan IM since patient does not have IV access at this time. Patient turned to side and mouth suctioned. Foam mixed with a light amount of blood came out of mouth. Witnessed by VAUGHN Redmond; BONESUPPORT, and myself.

## 2019-10-12 NOTE — ED NOTES
Called report to VAUGHN Barron at [facility]. Notified Beatrice that Type and Screen has not yet resulted and told her the strong possibility that blood may not be hung before patient is transferred.

## 2019-10-12 NOTE — ED NOTES
Dr. Davis notified that patient's Type and screen has not yet resulted and ETA of transportation is 30 minutes.
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
## 2019-11-13 ENCOUNTER — TELEPHONE (OUTPATIENT)
Dept: INTERNAL MEDICINE | Facility: CLINIC | Age: 34
End: 2019-11-13

## 2019-11-13 NOTE — TELEPHONE ENCOUNTER
Returned call to Everette.  Informed him that patient has not been seen since 2017 and will need to re establish care with Dr. Moncada.

## 2019-11-13 NOTE — TELEPHONE ENCOUNTER
----- Message from Thao Larkin sent at 11/13/2019  3:10 PM CST -----  Contact: ERROL Gaviria Nurse   Please call to discuss patient  With [facility] at [facility] [phone]

## 2019-12-10 NOTE — ED NOTES
Lab notified that patient is back from procedure. Someone to come draw cultures. Will start antibiotic after cultures are drawn.    No

## 2019-12-16 ENCOUNTER — OFFICE VISIT (OUTPATIENT)
Dept: OBSTETRICS AND GYNECOLOGY | Facility: CLINIC | Age: 34
End: 2019-12-16
Payer: MEDICARE

## 2019-12-16 VITALS
HEIGHT: 60 IN | SYSTOLIC BLOOD PRESSURE: 134 MMHG | WEIGHT: 127.88 LBS | BODY MASS INDEX: 25.11 KG/M2 | DIASTOLIC BLOOD PRESSURE: 82 MMHG

## 2019-12-16 DIAGNOSIS — N89.8 VAGINAL LESION: Primary | ICD-10-CM

## 2019-12-16 PROCEDURE — 99999 PR PBB SHADOW E&M-EST. PATIENT-LVL III: ICD-10-PCS | Mod: PBBFAC,,, | Performed by: NURSE PRACTITIONER

## 2019-12-16 PROCEDURE — 99214 PR OFFICE/OUTPT VISIT, EST, LEVL IV, 30-39 MIN: ICD-10-PCS | Mod: S$PBB,,, | Performed by: NURSE PRACTITIONER

## 2019-12-16 PROCEDURE — 99213 OFFICE O/P EST LOW 20 MIN: CPT | Mod: PBBFAC | Performed by: NURSE PRACTITIONER

## 2019-12-16 PROCEDURE — 99214 OFFICE O/P EST MOD 30 MIN: CPT | Mod: S$PBB,,, | Performed by: NURSE PRACTITIONER

## 2019-12-16 PROCEDURE — 99999 PR PBB SHADOW E&M-EST. PATIENT-LVL III: CPT | Mod: PBBFAC,,, | Performed by: NURSE PRACTITIONER

## 2019-12-16 RX ORDER — LEVETIRACETAM 100 MG/ML
500 SOLUTION ORAL 2 TIMES DAILY
COMMUNITY
Start: 2019-11-01

## 2019-12-17 ENCOUNTER — OFFICE VISIT (OUTPATIENT)
Dept: OBSTETRICS AND GYNECOLOGY | Facility: CLINIC | Age: 34
End: 2019-12-17
Payer: MEDICARE

## 2019-12-17 VITALS
BODY MASS INDEX: 24.49 KG/M2 | HEIGHT: 60 IN | DIASTOLIC BLOOD PRESSURE: 80 MMHG | WEIGHT: 124.75 LBS | SYSTOLIC BLOOD PRESSURE: 138 MMHG

## 2019-12-17 DIAGNOSIS — C51.9 VULVAR CANCER, CARCINOMA: Primary | ICD-10-CM

## 2019-12-17 DIAGNOSIS — K62.6 ANAL ULCER: ICD-10-CM

## 2019-12-17 PROCEDURE — 99213 OFFICE O/P EST LOW 20 MIN: CPT | Mod: S$PBB,,, | Performed by: OBSTETRICS & GYNECOLOGY

## 2019-12-17 PROCEDURE — 99999 PR PBB SHADOW E&M-EST. PATIENT-LVL III: CPT | Mod: PBBFAC,,, | Performed by: OBSTETRICS & GYNECOLOGY

## 2019-12-17 PROCEDURE — 99213 PR OFFICE/OUTPT VISIT, EST, LEVL III, 20-29 MIN: ICD-10-PCS | Mod: S$PBB,,, | Performed by: OBSTETRICS & GYNECOLOGY

## 2019-12-17 PROCEDURE — 99999 PR PBB SHADOW E&M-EST. PATIENT-LVL III: ICD-10-PCS | Mod: PBBFAC,,, | Performed by: OBSTETRICS & GYNECOLOGY

## 2019-12-17 PROCEDURE — 99213 OFFICE O/P EST LOW 20 MIN: CPT | Mod: PBBFAC | Performed by: OBSTETRICS & GYNECOLOGY
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
## 2019-12-17 NOTE — PROGRESS NOTES
Subjective:       Patient ID: Wang Kebede is a 34 y.o. female.    Chief Complaint:  vaginal lesion      History of Present Illness  HPI  Pt reports complaints of worsening vulvar burning/pruritis around her clitoral koch.  Symptoms started soon after receiving her first round of chemotherapy (lymphoma) last week.  Pt has a history of vulvar cancer but has been lost to follow up with Gyn Oncology (Dr. Bo).  Pt is concerned that she may have it again.  Denies known history of Genital HSV.    GYN & OB History  Patient's last menstrual period was 2017.   Date of Last Pap: 2016    OB History    Para Term  AB Living   4 3 3   1 3   SAB TAB Ectopic Multiple Live Births   1       3      # Outcome Date GA Lbr Reynaldo/2nd Weight Sex Delivery Anes PTL Lv   4 SAB            3 Term     M Vag-Spont   GÉNESIS   2 Term     M Vag-Spont   GÉNESIS   1 Term     M Vag-Spont   GÉNESIS       Review of Systems  Review of Systems   Constitutional: Negative for activity change, appetite change, chills, fatigue, fever and unexpected weight change.   Respiratory: Negative for shortness of breath.    Cardiovascular: Negative for chest pain, palpitations and leg swelling.   Gastrointestinal: Negative for abdominal pain, bloating, blood in stool, constipation, diarrhea, nausea and vomiting.   Genitourinary: Positive for genital sores. Negative for dysuria, flank pain, frequency, hematuria, hot flashes, pelvic pain, urgency, vaginal bleeding, vaginal discharge, vaginal pain, urinary incontinence, vaginal dryness and vaginal odor.   Musculoskeletal: Negative for back pain.   Integumentary:  Negative for breast mass, nipple discharge, breast skin changes and breast tenderness.   Neurological: Negative for syncope and headaches.   Breast: Negative for asymmetry, lump, mass, mastodynia, nipple discharge, skin changes and tenderness          Objective:    Physical Exam:   Constitutional: She is oriented to person, place, and  time. She appears well-developed and well-nourished. No distress.       Cardiovascular: Normal rate and regular rhythm.     Pulmonary/Chest: Effort normal and breath sounds normal.        Abdominal: Soft. Bowel sounds are normal. She exhibits no distension. There is no tenderness.     Genitourinary: Vagina normal.       Pelvic exam was performed with patient supine. There is no rash, tenderness, lesion or injury on the right labia. There is no rash, tenderness, lesion or injury on the left labia. Uterus is absent. Right adnexum displays no mass, no tenderness and no fullness. Left adnexum displays no mass, no tenderness and no fullness. No erythema, tenderness or bleeding in the vagina. No foreign body in the vagina. No signs of injury around the vagina. No vaginal discharge found. Vaginal cuff normal.Cervix exhibits absence.           Musculoskeletal: Normal range of motion and moves all extremeties. She exhibits no edema or tenderness.       Neurological: She is alert and oriented to person, place, and time.    Skin: Skin is warm and dry.    Psychiatric: She has a normal mood and affect. Her behavior is normal. Thought content normal.          Assessment:        1. Vulvar cancer, carcinoma    2. Anal ulcer             Plan:      Vulvar cancer, carcinoma  -    Clinical presentation and exam findings are concerning for recurrence/spread.  Pt advised to re-establish care with Gyn Oncology for further evaluation and management.  Nurse will assist with making appointment in [place].    Anal ulcer  -     HSV by Rapid PCR, Non-Blood [facility]; Vagina  -     Pt counseled on possibility of HSV.  Will await results for further recommendations.      Follow up if symptoms worsen or fail to improve.

## 2019-12-19 LAB
HSV1 DNA SPEC QL NAA+PROBE: NEGATIVE
HSV2 DNA SPEC QL NAA+PROBE: POSITIVE
SPECIMEN SOURCE: ABNORMAL

## 2019-12-31 ENCOUNTER — TELEPHONE (OUTPATIENT)
Dept: OBSTETRICS AND GYNECOLOGY | Facility: CLINIC | Age: 34
End: 2019-12-31

## 2019-12-31 DIAGNOSIS — A60.1 HERPES SIMPLEX INFECTION OF PERIANAL SKIN: Primary | ICD-10-CM

## 2019-12-31 RX ORDER — VALACYCLOVIR HYDROCHLORIDE 500 MG/1
500 TABLET, FILM COATED ORAL DAILY
Qty: 30 TABLET | Refills: 11 | Status: SHIPPED | OUTPATIENT
Start: 2019-12-31 | End: 2020-01-30

## 2019-12-31 NOTE — TELEPHONE ENCOUNTER
Called and spoke with pt.  Reviewed + HSV 2 results with pt.  This confirms presence of Genital Herpes.  Pt is past the outbreak at this stage.  Recommend prophylactic daily Valtrex dosing given her immune compromised state.  Pt also counseled on genital HSV.  Rx sent to pharmacy.  Pt voiced understanding.

## 2020-04-03 ENCOUNTER — TELEPHONE (OUTPATIENT)
Dept: INTERNAL MEDICINE | Facility: CLINIC | Age: 35
End: 2020-04-03

## 2020-12-08 NOTE — ASSESSMENT & PLAN NOTE
- Possibly secondary to above +/- questionable seizure.  - CT head negative for acute process.  - Neuro checks.  - Fall, aspiration, and seizure precautions.  -Resolved    X Size Of Lesion In Cm: 0

## 2020-12-10 NOTE — DISCHARGE INSTRUCTIONS
Rest  Drink plenty of clear fluids--at least 64 ounces of water/juice  Normal saline nasal wash to irrigate sinuses and for congestion/runny nose  Cool mist humidifier/vaporizer  Practice good handwashing  Zyrtec or Claritin to help dry mucus and post nasal drip  Flonase to help dry mucus and post nasal drip  Mucinex or Mucinex DM for cough and chest congestion  Tylenol or Ibuprofen for fever, headache and body aches  Warm salt water gargles for throat comfort  Chloraseptic spray or lozenges for throat comfort  See PCP or go to ER if symptoms worsen or fail to improve with treatment.      
Wound Care:   the day AFTER your procedure remove bandage GENTTLY, and clean using  mild soap and gentle warm, water stream, pat dry. leave OPEN to air. YOU MAY SHOWER   DO NOT apply lotions, creams, ointments, powder, parfumes to your incision site  DO NOT SOAK your site for 1 week ( no baths, no pools, no tubs, etc...)  Check  your groin and /or wirst daily.A small amount of bruising, and soarness are normal    ACTIVITY: for 24 hours   - DO NOT DRIVE  - DO NOT make any important decisions or sign legal documents   - DO NOT operate heavy machinaries   - you may resume sexual activity in 48 hours, unless otherwise instructed by your cardiologist     If your procedure was done through the WRIST: for the NEXT 3DAYS:  - avoid pushing, pulling, with that affected wrist   - avoid repeated movement of that hand and wrist ( eg: typing, hammering)  - DO NOT LIFT anything more than 5 lbs     If your procedure was done through the GROIN: for the NEXT 5 DAYS  - Limit climbing stairs, DO NOT soak in bathtub or pool  - no strenous activities, pushing, pulling, straining  - Do not lift anything 10lbs or heavier     MEDICATION:   take your medications as explained ( see discharge paperwork)   If you received a STENT, you will be taking antiplatelet medications to KEEP YOUR STENT OPEN ( eg: Aspirin, Plavix, Brilinta, Effient, etc).  Take as prescribed DO NOT STOP taking them without consulting with your cardiologist first.     Follow heart healthy diet reccomended by your doctor, , if you smoke STOP SMOKING ( may call [phone] for center of tobacco control if you need assistance)     CALL your doctor to make appointment in 2 WEEKS     ***CALL YOUR DOCTOR***  if you experience: fever, chills, body aches, or severe pain, swelling, redness, heat or yellow discharge at incision site  If you experience Bleeding or excruciating pain at the procedural site, sweliing ( golf ball size) at your procedural site  If you experience CHEST PAIN  If you experience extremity numbness, tingling, temperature change ( of your procedural site)   If you are unable to reach your doctor, you may contact:   -Cardiology Office at [facility] at [phone] or   - [facility] [phone] - [facility] [phone]

## 2020-12-27 NOTE — ASSESSMENT & PLAN NOTE
Stool studies pending. Likely due to HIV, severe immunosuppression. ID is seeing the pt so will defer to their recommendations. If further recommendation/evaluation from GI is needed please contact us.   Follow up with your PMD within 1-2 days.   Rest, increase your fluids.   Take Tylenol 650mg every 4-6 hours as needed for temp >99.9  See attached for COVID-19 info / fact sheet.   Take a Multivitamin daily.   Please STAY HOME and quarantine yourself until we get your results back.   We sent a test for the COVID-19 virus which takes up to 2-3 days to result. We will contact you if there are any findings. If positive you will have to stay home for 14 days.   Worsening, continued or ANY new concerning symptoms return to the emergency department.

## 2021-03-03 NOTE — PLAN OF CARE
Discharge plan home with family and possible HH pending medical status discussed with patient.     04/17/17 3557   Discharge Assessment   Assessment Type Discharge Planning Assessment   Confirmed/corrected address and phone number on facesheet? Yes   Assessment information obtained from? Patient;Medical Record   Expected Length of Stay (days) (unable to determine)   Communicated expected length of stay with patient/caregiver yes   Type of Healthcare Directive Received (none)   If Healthcare Directive is received, is it scanned into Epic? no (comment)   Prior to hospitilization cognitive status: Alert/Oriented   Prior to hospitalization functional status: Independent   Current cognitive status: Alert/Oriented   Current Functional Status: Needs Assistance   Arrived From admitted as an inpatient;home or self-care   Lives With other (see comments);child(nalini), dependent   Able to Return to Prior Arrangements yes   Is patient able to care for self after discharge? Unable to determine at this time (comments)   How many people do you have in your home that can help with your care after discharge? 1   Patient's perception of discharge disposition admitted as an inpatient;home or selfcare   Readmission Within The Last 30 Days previous discharge plan unsuccessful   Patient currently being followed by outpatient case management? No   Patient currently receives home health services? No   Does the patient currently use HME? No   Patient currently receives private duty nursing? No   Patient currently receives any other outside agency services? No   Equipment Currently Used at Home none   Do you have any problems affording any of your prescribed medications? No   Is the patient taking medications as prescribed? yes   Do you have any financial concerns preventing you from receiving the healthcare you need? No   Does the patient have transportation to healthcare appointments? Yes   Transportation Available family or friend will  From: Alen Escobar  To: Roman Dreyer  Sent: 3/2/2021 9:12 PM CST  Subject: Other    Good evening Dr Dreyer,     Obviously you have heard from the cardiologist. Great news about the heart has no problems. She said she was going to talk with you about my neck. So is that a route you want to take or do you have something else in mind? Let me know and if you want me to schedule an appointment ASAP I can do this. It just so happens I am off this Monday the  if that’s something you want me to take care of unless you have something else in mind.     Edin RESENDEZ. 1979   provide;car   On Dialysis? No   Does the patient receive services at the [facility]? No   Are there any open cases? No   Discharge Plan A Home with family;Home Health   Discharge Plan B Home with family   Patient/Family In Agreement With Plan yes
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
## 2021-08-06 NOTE — ASSESSMENT & PLAN NOTE
7/29 Signs of recovery; afebrile now,  Continue to monitor hemogram; cultures etc.     Patient is away in [facility] and needs lantus pens, the complete pen sent into a local pharmacy near her at [address]  Please review and advise  Patient has been out for almost 5 days

## 2021-12-09 ENCOUNTER — TELEPHONE (OUTPATIENT)
Dept: CARDIOLOGY | Facility: CLINIC | Age: 36
End: 2021-12-09
Payer: MEDICAID

## 2021-12-27 NOTE — SUBJECTIVE & OBJECTIVE
.. Ongoing SW/CM Assessment/Plan of Care Note     See SW/CM flowsheets for goals and other objective data.    Progress note:  Covid +, finger infection. 02 @ 7 L hi flow NC, POX at 100%. Wean 02 as tolerated. MDIs and nebs. Continue IVF, IV  Decadron 10 mg  q day'. Continue IV Zosyn day 4/5. Remdesivir done.  PO Doxy for finger infection completed. BLE dopplers negative for DVT 12/10. Chest CT neg for PE 12/15. PT= non daily. NEED Orders for HHC. will need 02 walk at discharge.         Current Status  Physical Therapy: Recommends non-daily skilled therapy.  Occupational Therapy: Not ordered.  Nutrition/SLP - Recommendations:    Current Mental Status:    Stressors:       Barriers to discharge:   Medical clearance     Discharge plan:  Plan return home with [facility] for PT (will NEED Orders), will follow for possible home 02 needs  Lovenox copay $5    CM/SW team to continue to follow for discharge needs.     Interval History:  Intermittent fevers up to 104 which responded to MD Acetaminophen.  Procalcitonin decreased to 28 but CPK is trending up as high as 29,000.  More alert and aware.    Review of Systems   Constitutional: Positive for fatigue. Negative for chills and fever.   HENT: Negative for congestion and sore throat.    Eyes: Negative for visual disturbance.   Respiratory: Negative for cough, shortness of breath and wheezing.    Cardiovascular: Negative for chest pain, palpitations and leg swelling.   Gastrointestinal: Positive for diarrhea. Negative for abdominal pain, blood in stool, constipation, nausea and vomiting.   Genitourinary: Negative for dysuria and hematuria.   Musculoskeletal: Negative for arthralgias and back pain.   Skin: Negative for rash and wound.   Neurological: Positive for weakness. Negative for dizziness, light-headedness and numbness.   Hematological: Negative for adenopathy.     Objective:     Vital Signs (Most Recent):  Temp: 99.1 °F (37.3 °C) (12/27/18 0305)  Pulse: 98 (12/27/18 0754)  Resp: (!) 22 (12/27/18 0754)  BP: 119/83 (12/27/18 0600)  SpO2: 99 % (12/27/18 0754) Vital Signs (24h Range):  Temp:  [98.8 °F (37.1 °C)-99.1 °F (37.3 °C)] 99.1 °F (37.3 °C)  Pulse:  [] 98  Resp:  [20-34] 22  SpO2:  [88 %-100 %] 99 %  BP: ()/(37-83) 119/83     Weight: 59 kg (130 lb 1.1 oz)  Body mass index is 22.33 kg/m².    Intake/Output Summary (Last 24 hours) at 12/27/2018 1416  Last data filed at 12/27/2018 0622  Gross per 24 hour   Intake 3467.5 ml   Output 1980 ml   Net 1487.5 ml      Physical Exam   Constitutional: She is oriented to person, place, and time. She appears well-developed and well-nourished. No distress.   HENT:   Head: Normocephalic and atraumatic.   Mouth/Throat: Oropharynx is clear and moist.   Eyes: Conjunctivae and EOM are normal. Pupils are equal, round, and reactive to light.   Neck: Neck supple. No JVD present. No thyromegaly present.   Cardiovascular: Normal rate  and regular rhythm. Exam reveals no gallop and no friction rub.   No murmur heard.  Pulmonary/Chest: Effort normal and breath sounds normal. She has no wheezes. She has no rales.   Abdominal: Soft. Bowel sounds are normal. She exhibits distension. There is no tenderness. There is no rebound and no guarding.   Distended but soft without guarding.   Musculoskeletal: Normal range of motion. She exhibits no edema or deformity.   Lymphadenopathy:     She has no cervical adenopathy.   Neurological: She is alert and oriented to person, place, and time. She has normal reflexes.   Somnolent but interacting appropriately.   Skin: Skin is warm and dry. No rash noted.   Psychiatric: She has a normal mood and affect. Her behavior is normal. Judgment and thought content normal.   Nursing note and vitals reviewed.      Significant Labs: All pertinent labs within the past 24 hours have been reviewed.    Significant Imaging: I have reviewed all pertinent imaging results/findings within the past 24 hours.

## 2023-06-09 NOTE — PROGRESS NOTES
ED MD at bedside for CVL insertion due to multiple unsuccessful attempts for peripheral line insertions.  Risks explained to patient, verbalized understanding.  Consent signed.  CVL successfully inserted.   [FreeTextEntry1] : pT3 N0 M0 ADenocarcinoma SIgmoid colon, in Jun 2021 \par \par - Invasive moderately differentiated adenocarcinoma invading through the muscularis propria into pericolorectal tissue.\par - Tattoo is present.\par - Diverticulosis.\par - 24 lymph nodes, negative for carcinoma (0/24).\par - Margins of resection are not involved.\par \par Resected node-negative (stage II) disease, the benefits of chemotherapy are controversial, as is the relative benefit of an oxaliplatin-based as compared with a non-oxaliplatin-based regimen.\par Benefits of chemotherapy is based on high risk features \par Patient does not have any high risk features, No LVI   NO neural invasion 25 LN examined, NO Perforation \par \par GIven no high risk features no benefit of adjuvant chemotherapy. The patient was started on surveillance. \par \par - Today's Labs with Normal WBC \par - F/u visit with labs cbc/ cmp/ CEA q 3months x 2 y and then q 6 months from Y 3-5 \par - CT CAP q 6 months x 5 years and Y3-5 and consider spacing q 8-12 months\par - 1 year colonoscopy showed no evidence of recurrence. \par - Ct imaging reviewed with patient. No evidence of recurrence noted on imaging from 8/2022 and 9/2022.  \par - Imaging from 4/2023 without evidence of disease; results discussed with patient.  \par - Patient getting annual mammograms with PCP; seeing gynecology and counseled on getting pap smears. Patient colonoscopy scheduled for today. \par - follow up with new MD in 3 months\par \par

## 2023-07-07 ENCOUNTER — PATIENT MESSAGE (OUTPATIENT)
Dept: INFECTIOUS DISEASES | Facility: CLINIC | Age: 38
End: 2023-07-07
Payer: MEDICAID

## 2023-10-30 NOTE — PLAN OF CARE
Patient discharging home today.  Eleanor with [facility] and informed her of patient's discharge today.  Rolling walker delivered to patient's hospital room.      D/C PLAN:  Home with ELOY CHAVIS, PT, OT via [facility] and RW via [facility]       04/21/17 1431   Final Note   Assessment Type Final Discharge Note   Discharge Disposition Home   What phone number can be called within the next 1-3 days to see how you are doing after discharge? [phone]   Hospital Follow Up  Appt(s) scheduled? Yes   Referral to Outpatient Case Management complete? n/a   Referral to / orders for Home Health Complete? Yes   Did you assess the readiness or willingness of the family or caregiver to support self management of care? Yes   Right Care Referral Info   Post Acute Recommendation Home-care   Referral Type ELOY CHAVIS, PT, OT   Facility Name [facility]    [address], [address] [place]  [address]       You can access the FollowMyHealth Patient Portal offered by [facility] by registering at the following website: [email or web address]. By joining [facility]’s FollowMyHealth portal, you will also be able to view your health information using other applications (apps) compatible with our system.

## 2024-08-29 NOTE — SUBJECTIVE & OBJECTIVE
Interval History: 31 year old woman with AIDS ,admitted with fever .  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  . She had an episode of dyspnea and now has worsening renal function.  She was seen in the room with her boyfriend.    Review of Systems   Constitutional: Positive for chills and fever. Negative for fatigue.   HENT: Negative for congestion, ear pain, facial swelling, sinus pressure and sore throat.    Eyes: Negative for pain.   Respiratory: Negative for apnea, chest tightness, shortness of breath and stridor.    Cardiovascular: Negative for chest pain, palpitations and leg swelling.   Gastrointestinal: Negative for abdominal distention, abdominal pain, diarrhea and nausea.   Endocrine: Negative for polydipsia and polyphagia.   Genitourinary: Negative for decreased urine volume, difficulty urinating, frequency and genital sores.   Musculoskeletal: Negative for arthralgias and gait problem.   Neurological: Negative for light-headedness and headaches.   Hematological: Negative for adenopathy.   Psychiatric/Behavioral: Negative for agitation, confusion and decreased concentration.     Objective:     Vital Signs (Most Recent):  Temp: (!) 103 °F (39.4 °C) (07/23/17 1919)  Pulse: 110 (07/23/17 2100)  Resp: (!) 42 (07/23/17 2100)  BP: (!) 84/35 (07/23/17 2100)  SpO2: 100 % (07/23/17 2100) Vital Signs (24h Range):  Temp:  [98.3 °F (36.8 °C)-103 °F (39.4 °C)] 103 °F (39.4 °C)  Pulse:  [107-132] 110  Resp:  [38-60] 42  SpO2:  [94 %-100 %] 100 %  BP: ()/() 84/35     Weight: 46.9 kg (103 lb 4.8 oz)  Body mass index is 20.17 kg/m².    Estimated Creatinine Clearance: 18.7 mL/min (based on Cr of 3.1).    Physical Exam   Constitutional: She is oriented to person, place, and time. Vital signs are normal. She appears well-developed.   HENT:   Head: Normocephalic and atraumatic.   Eyes: Conjunctivae and EOM are normal. Pupils are equal, round, and reactive to light.   Neck:  Patient discahrging to [facility] [phone]   Normal range of motion. Neck supple. No thyroid mass and no thyromegaly present.   Cardiovascular: Normal rate, normal heart sounds and intact distal pulses.    Pulmonary/Chest: Effort normal and breath sounds normal. No accessory muscle usage. No respiratory distress.   Abdominal: Soft. Bowel sounds are normal. She exhibits no mass. There is no tenderness.   Peg TUBE NOTED   Musculoskeletal: Normal range of motion.   Neurological: She is alert and oriented to person, place, and time.   Skin: Skin is intact. No rash noted.   Psychiatric: She has a normal mood and affect.   Nursing note and vitals reviewed.      Significant Labs:   Blood Culture:   Recent Labs  Lab 04/16/17  1615 05/21/17  1440 05/21/17  1445 07/20/17  2000 07/20/17 2027   LABBLOO No growth after 5 days. No growth after 5 days. No growth after 5 days. No Growth to date  No Growth to date  No Growth to date No Growth to date  No Growth to date  No Growth to date     BMP:   Recent Labs  Lab 07/23/17  0534   GLU 77   *   K 4.2   *   CO2 7*   BUN 49*   CREATININE 3.1*   CALCIUM 7.8*     Lactic Acid:   Recent Labs  Lab 07/23/17  0838   LACTATE 1.1     All pertinent labs within the past 24 hours have been reviewed.    Significant Imaging: I have reviewed all pertinent imaging results/findings within the past 24 hours.

## 2024-10-08 NOTE — TELEPHONE ENCOUNTER
----- Message from Gifty Mckenzie sent at 10/25/2017  3:51 PM CDT -----  Contact: pt   Pt returning nurses call,, please call pt back at [phone]  
----- Message from Glenna Dixon sent at 10/24/2017  1:53 PM CDT -----  called rg status of antibiotic..[phone]    [facility] [employer] [facility] - [place]  [address] include [address] beds at the [facility]  [address]  [place] [address]  Phone: [phone] Fax: [phone]      
Patient asking if something is going to be called in for her pain and an antibiotic. She stated that she is trying not to go to the ER due to her pain. Please advise.   
Returned call. No answer, unable to leave message   
[phone]

## 2025-04-30 ENCOUNTER — HOSPITAL ENCOUNTER (INPATIENT)
Facility: HOSPITAL | Age: 40
LOS: 3 days | Discharge: HOME | DRG: 974 | End: 2025-05-03
Attending: EMERGENCY MEDICINE | Admitting: HOSPITALIST
Payer: MEDICARE

## 2025-04-30 DIAGNOSIS — I50.1 PULMONARY EDEMA W/CONGESTIVE HEART FAILURE W/PRESERVED LV FUNCTION: ICD-10-CM

## 2025-04-30 DIAGNOSIS — Z99.2 ESRD ON DIALYSIS: ICD-10-CM

## 2025-04-30 DIAGNOSIS — J96.01 ACUTE HYPOXEMIC RESPIRATORY FAILURE: ICD-10-CM

## 2025-04-30 DIAGNOSIS — A41.9 SEVERE SEPSIS: ICD-10-CM

## 2025-04-30 DIAGNOSIS — J18.9 PNEUMONIA OF BOTH LOWER LOBES DUE TO INFECTIOUS ORGANISM: Primary | ICD-10-CM

## 2025-04-30 DIAGNOSIS — I16.1 HYPERTENSIVE EMERGENCY: ICD-10-CM

## 2025-04-30 DIAGNOSIS — N18.6 ESRD ON DIALYSIS: ICD-10-CM

## 2025-04-30 DIAGNOSIS — B20 AIDS: ICD-10-CM

## 2025-04-30 DIAGNOSIS — B34.8 PARAINFLUENZA VIRUS INFECTION: ICD-10-CM

## 2025-04-30 DIAGNOSIS — R65.20 SEVERE SEPSIS: ICD-10-CM

## 2025-04-30 DIAGNOSIS — D52.1 DRUG-INDUCED FOLATE DEFICIENCY ANEMIA: ICD-10-CM

## 2025-04-30 DIAGNOSIS — J12.2 PARAINFLUENZA VIRUS PNEUMONIA: ICD-10-CM

## 2025-04-30 DIAGNOSIS — Z21 HISTORY OF HIV INFECTION: ICD-10-CM

## 2025-04-30 DIAGNOSIS — I50.33 ACUTE ON CHRONIC HEART FAILURE WITH PRESERVED EJECTION FRACTION: ICD-10-CM

## 2025-04-30 DIAGNOSIS — Z13.6 SCREENING FOR CARDIOVASCULAR CONDITION: ICD-10-CM

## 2025-04-30 DIAGNOSIS — R06.02 SOB (SHORTNESS OF BREATH): ICD-10-CM

## 2025-04-30 DIAGNOSIS — C51.9 VULVAR CANCER, CARCINOMA: ICD-10-CM

## 2025-04-30 DIAGNOSIS — N18.6 ESRD (END STAGE RENAL DISEASE) ON DIALYSIS: Chronic | ICD-10-CM

## 2025-04-30 DIAGNOSIS — Z99.2 ESRD (END STAGE RENAL DISEASE) ON DIALYSIS: Chronic | ICD-10-CM

## 2025-04-30 DIAGNOSIS — R07.9 CHEST PAIN: ICD-10-CM

## 2025-04-30 LAB
ABSOLUTE EOSINOPHIL (OHS): 0.02 K/UL
ABSOLUTE MONOCYTE (OHS): 0.34 K/UL (ref 0.3–1)
ABSOLUTE NEUTROPHIL COUNT (OHS): 4.87 K/UL (ref 1.8–7.7)
ADENOVIRUS: NOT DETECTED
ALBUMIN SERPL BCP-MCNC: 3.7 G/DL (ref 3.5–5.2)
ALP SERPL-CCNC: 58 UNIT/L (ref 40–150)
ALT SERPL W/O P-5'-P-CCNC: 11 UNIT/L (ref 10–44)
AMPHET UR QL SCN: NEGATIVE
ANION GAP (OHS): 14 MMOL/L (ref 8–16)
APTT PPP: 28.8 SECONDS (ref 21–32)
AST SERPL-CCNC: 16 UNIT/L (ref 11–45)
B-HCG UR QL: NEGATIVE
BACTERIA #/AREA URNS AUTO: ABNORMAL /HPF
BARBITURATE SCN PRESENT UR: NEGATIVE
BASOPHILS # BLD AUTO: 0.04 K/UL
BASOPHILS NFR BLD AUTO: 0.7 %
BENZODIAZ UR QL SCN: NEGATIVE
BILIRUB SERPL-MCNC: 0.7 MG/DL (ref 0.1–1)
BILIRUB UR QL STRIP.AUTO: NEGATIVE
BNP SERPL-MCNC: 2695 PG/ML (ref 0–99)
BORDETELLA PARAPERTUSSIS (IS1001): NOT DETECTED
BORDETELLA PERTUSSIS (PTXP): NOT DETECTED
BUN SERPL-MCNC: 19 MG/DL (ref 6–20)
CALCIUM SERPL-MCNC: 9.4 MG/DL (ref 8.7–10.5)
CANNABINOIDS UR QL SCN: NEGATIVE
CHLAMYDIA PNEUMONIAE: NOT DETECTED
CHLORIDE SERPL-SCNC: 93 MMOL/L (ref 95–110)
CLARITY UR: CLEAR
CO2 SERPL-SCNC: 27 MMOL/L (ref 23–29)
COCAINE UR QL SCN: NEGATIVE
COLOR UR AUTO: YELLOW
CORONAVIRUS 229E, COMMON COLD VIRUS: NOT DETECTED
CORONAVIRUS HKU1, COMMON COLD VIRUS: NOT DETECTED
CORONAVIRUS NL63, COMMON COLD VIRUS: NOT DETECTED
CORONAVIRUS OC43, COMMON COLD VIRUS: NOT DETECTED
CREAT SERPL-MCNC: 5.4 MG/DL (ref 0.5–1.4)
CREAT UR-MCNC: 47.5 MG/DL (ref 15–325)
ERYTHROCYTE [DISTWIDTH] IN BLOOD BY AUTOMATED COUNT: 14.1 % (ref 11.5–14.5)
FLUBV RNA NPH QL NAA+NON-PROBE: NOT DETECTED
GFR SERPLBLD CREATININE-BSD FMLA CKD-EPI: 10 ML/MIN/1.73/M2
GLUCOSE SERPL-MCNC: 92 MG/DL (ref 70–110)
GLUCOSE UR QL STRIP: ABNORMAL
HCT VFR BLD AUTO: 29.5 % (ref 37–48.5)
HCV AB SERPL QL IA: NEGATIVE
HGB BLD-MCNC: 9.7 GM/DL (ref 12–16)
HGB UR QL STRIP: NEGATIVE
HOLD SPECIMEN: NORMAL
HPIV1 RNA NPH QL NAA+NON-PROBE: NOT DETECTED
HPIV2 RNA NPH QL NAA+NON-PROBE: NOT DETECTED
HPIV3 RNA NPH QL NAA+NON-PROBE: DETECTED
HPIV4 RNA NPH QL NAA+NON-PROBE: NOT DETECTED
HUMAN METAPNEUMOVIRUS: NOT DETECTED
HYALINE CASTS UR QL AUTO: 0 /LPF (ref 0–1)
IMM GRANULOCYTES # BLD AUTO: 0.01 K/UL (ref 0–0.04)
IMM GRANULOCYTES NFR BLD AUTO: 0.2 % (ref 0–0.5)
INFLUENZA A MOLECULAR (OHS): NEGATIVE
INFLUENZA A: NOT DETECTED
INFLUENZA B MOLECULAR (OHS): NEGATIVE
INR PPP: 1 (ref 0.8–1.2)
KETONES UR QL STRIP: NEGATIVE
LACTATE SERPL-SCNC: 0.9 MMOL/L (ref 0.5–2.2)
LEUKOCYTE ESTERASE UR QL STRIP: NEGATIVE
LYMPHOCYTES # BLD AUTO: 0.57 K/UL (ref 1–4.8)
MAGNESIUM SERPL-MCNC: 2 MG/DL (ref 1.6–2.6)
MCH RBC QN AUTO: 34.8 PG (ref 27–31)
MCHC RBC AUTO-ENTMCNC: 32.9 G/DL (ref 32–36)
MCV RBC AUTO: 106 FL (ref 82–98)
METHADONE UR QL SCN: NEGATIVE
MICROSCOPIC COMMENT: ABNORMAL
MYCOPLASMA PNEUMONIAE: NOT DETECTED
NITRITE UR QL STRIP: NEGATIVE
NUCLEATED RBC (/100WBC) (OHS): 0 /100 WBC
OPIATES UR QL SCN: NEGATIVE
PCP UR QL: NEGATIVE
PH UR STRIP: >8 [PH]
PLATELET # BLD AUTO: 154 K/UL (ref 150–450)
PMV BLD AUTO: 10.5 FL (ref 9.2–12.9)
POTASSIUM SERPL-SCNC: 4 MMOL/L (ref 3.5–5.1)
PROCALCITONIN SERPL-MCNC: 0.82 NG/ML
PROT SERPL-MCNC: 10.1 GM/DL (ref 6–8.4)
PROT UR QL STRIP: ABNORMAL
PROTHROMBIN TIME: 11.6 SECONDS (ref 9–12.5)
RBC # BLD AUTO: 2.79 M/UL (ref 4–5.4)
RBC #/AREA URNS AUTO: 1 /HPF (ref 0–4)
RELATIVE EOSINOPHIL (OHS): 0.3 %
RELATIVE LYMPHOCYTE (OHS): 9.7 % (ref 18–48)
RELATIVE MONOCYTE (OHS): 5.8 % (ref 4–15)
RELATIVE NEUTROPHIL (OHS): 83.3 % (ref 38–73)
RESPIRATORY INFECTION PANEL SOURCE: ABNORMAL
RSV RNA NPH QL NAA+NON-PROBE: NOT DETECTED
RV+EV RNA NPH QL NAA+NON-PROBE: NOT DETECTED
SARS-COV-2 RDRP RESP QL NAA+PROBE: NEGATIVE
SARS-COV-2 RNA RESP QL NAA+PROBE: NOT DETECTED
SODIUM SERPL-SCNC: 134 MMOL/L (ref 136–145)
SP GR UR STRIP: 1.01
SQUAMOUS #/AREA URNS AUTO: 9 /HPF
TROPONIN I SERPL DL<=0.01 NG/ML-MCNC: 0.07 NG/ML
UROBILINOGEN UR STRIP-ACNC: NEGATIVE EU/DL
WBC # BLD AUTO: 5.85 K/UL (ref 3.9–12.7)
WBC #/AREA URNS AUTO: 7 /HPF (ref 0–5)

## 2025-04-30 PROCEDURE — 96365 THER/PROPH/DIAG IV INF INIT: CPT

## 2025-04-30 PROCEDURE — 87040 BLOOD CULTURE FOR BACTERIA: CPT | Performed by: EMERGENCY MEDICINE

## 2025-04-30 PROCEDURE — 84145 PROCALCITONIN (PCT): CPT | Performed by: EMERGENCY MEDICINE

## 2025-04-30 PROCEDURE — 63600175 PHARM REV CODE 636 W HCPCS: Performed by: EMERGENCY MEDICINE

## 2025-04-30 PROCEDURE — 85730 THROMBOPLASTIN TIME PARTIAL: CPT | Performed by: EMERGENCY MEDICINE

## 2025-04-30 PROCEDURE — U0002 COVID-19 LAB TEST NON-CDC: HCPCS | Performed by: EMERGENCY MEDICINE

## 2025-04-30 PROCEDURE — 93010 ELECTROCARDIOGRAM REPORT: CPT | Mod: ,,, | Performed by: INTERNAL MEDICINE

## 2025-04-30 PROCEDURE — 84484 ASSAY OF TROPONIN QUANT: CPT | Performed by: EMERGENCY MEDICINE

## 2025-04-30 PROCEDURE — 63600175 PHARM REV CODE 636 W HCPCS: Performed by: NURSE PRACTITIONER

## 2025-04-30 PROCEDURE — 80053 COMPREHEN METABOLIC PANEL: CPT | Performed by: EMERGENCY MEDICINE

## 2025-04-30 PROCEDURE — 86803 HEPATITIS C AB TEST: CPT | Performed by: EMERGENCY MEDICINE

## 2025-04-30 PROCEDURE — 80307 DRUG TEST PRSMV CHEM ANLYZR: CPT | Performed by: EMERGENCY MEDICINE

## 2025-04-30 PROCEDURE — 87502 INFLUENZA DNA AMP PROBE: CPT | Performed by: EMERGENCY MEDICINE

## 2025-04-30 PROCEDURE — 83605 ASSAY OF LACTIC ACID: CPT | Performed by: EMERGENCY MEDICINE

## 2025-04-30 PROCEDURE — 21400001 HC TELEMETRY ROOM

## 2025-04-30 PROCEDURE — 83880 ASSAY OF NATRIURETIC PEPTIDE: CPT | Performed by: EMERGENCY MEDICINE

## 2025-04-30 PROCEDURE — 99285 EMERGENCY DEPT VISIT HI MDM: CPT | Mod: 25

## 2025-04-30 PROCEDURE — 93005 ELECTROCARDIOGRAM TRACING: CPT

## 2025-04-30 PROCEDURE — 85610 PROTHROMBIN TIME: CPT | Performed by: EMERGENCY MEDICINE

## 2025-04-30 PROCEDURE — 96375 TX/PRO/DX INJ NEW DRUG ADDON: CPT

## 2025-04-30 PROCEDURE — 81025 URINE PREGNANCY TEST: CPT | Performed by: EMERGENCY MEDICINE

## 2025-04-30 PROCEDURE — 85025 COMPLETE CBC W/AUTO DIFF WBC: CPT | Performed by: EMERGENCY MEDICINE

## 2025-04-30 PROCEDURE — 25000003 PHARM REV CODE 250: Performed by: EMERGENCY MEDICINE

## 2025-04-30 PROCEDURE — 0202U NFCT DS 22 TRGT SARS-COV-2: CPT | Performed by: EMERGENCY MEDICINE

## 2025-04-30 PROCEDURE — 83735 ASSAY OF MAGNESIUM: CPT | Performed by: EMERGENCY MEDICINE

## 2025-04-30 PROCEDURE — 81001 URINALYSIS AUTO W/SCOPE: CPT | Mod: XB | Performed by: EMERGENCY MEDICINE

## 2025-04-30 RX ORDER — CEFEPIME HYDROCHLORIDE 2 G/1
2 INJECTION, POWDER, FOR SOLUTION INTRAVENOUS
Status: DISCONTINUED | OUTPATIENT
Start: 2025-04-30 | End: 2025-04-30

## 2025-04-30 RX ORDER — GLUCAGON 1 MG
1 KIT INJECTION
Status: DISCONTINUED | OUTPATIENT
Start: 2025-04-30 | End: 2025-05-03 | Stop reason: HOSPADM

## 2025-04-30 RX ORDER — IBUPROFEN 200 MG
24 TABLET ORAL
Status: DISCONTINUED | OUTPATIENT
Start: 2025-04-30 | End: 2025-05-03 | Stop reason: HOSPADM

## 2025-04-30 RX ORDER — ONDANSETRON HYDROCHLORIDE 2 MG/ML
4 INJECTION, SOLUTION INTRAVENOUS EVERY 8 HOURS PRN
Status: DISCONTINUED | OUTPATIENT
Start: 2025-04-30 | End: 2025-05-03 | Stop reason: HOSPADM

## 2025-04-30 RX ORDER — HYDROCODONE BITARTRATE AND ACETAMINOPHEN 5; 325 MG/1; MG/1
1 TABLET ORAL EVERY 6 HOURS PRN
Refills: 0 | Status: DISCONTINUED | OUTPATIENT
Start: 2025-04-30 | End: 2025-05-01

## 2025-04-30 RX ORDER — DIPHENHYDRAMINE HYDROCHLORIDE 50 MG/ML
25 INJECTION, SOLUTION INTRAMUSCULAR; INTRAVENOUS
Status: DISCONTINUED | OUTPATIENT
Start: 2025-04-30 | End: 2025-04-30

## 2025-04-30 RX ORDER — FUROSEMIDE 10 MG/ML
80 INJECTION INTRAMUSCULAR; INTRAVENOUS
Status: COMPLETED | OUTPATIENT
Start: 2025-04-30 | End: 2025-04-30

## 2025-04-30 RX ORDER — HYDROMORPHONE HYDROCHLORIDE 1 MG/ML
0.5 INJECTION, SOLUTION INTRAMUSCULAR; INTRAVENOUS; SUBCUTANEOUS EVERY 4 HOURS PRN
Status: DISCONTINUED | OUTPATIENT
Start: 2025-04-30 | End: 2025-05-01

## 2025-04-30 RX ORDER — SIMETHICONE 80 MG
1 TABLET,CHEWABLE ORAL 4 TIMES DAILY PRN
Status: DISCONTINUED | OUTPATIENT
Start: 2025-04-30 | End: 2025-05-03 | Stop reason: HOSPADM

## 2025-04-30 RX ORDER — NALOXONE HCL 0.4 MG/ML
0.02 VIAL (ML) INJECTION
Status: DISCONTINUED | OUTPATIENT
Start: 2025-04-30 | End: 2025-05-03 | Stop reason: HOSPADM

## 2025-04-30 RX ORDER — CEFEPIME HYDROCHLORIDE 1 G/1
1 INJECTION, POWDER, FOR SOLUTION INTRAMUSCULAR; INTRAVENOUS
Status: DISCONTINUED | OUTPATIENT
Start: 2025-04-30 | End: 2025-05-03 | Stop reason: HOSPADM

## 2025-04-30 RX ORDER — ONDANSETRON HYDROCHLORIDE 2 MG/ML
4 INJECTION, SOLUTION INTRAVENOUS
Status: COMPLETED | OUTPATIENT
Start: 2025-04-30 | End: 2025-04-30

## 2025-04-30 RX ORDER — ACETAMINOPHEN 325 MG/1
650 TABLET ORAL EVERY 6 HOURS PRN
Status: DISCONTINUED | OUTPATIENT
Start: 2025-04-30 | End: 2025-05-03

## 2025-04-30 RX ORDER — ALUMINUM HYDROXIDE, MAGNESIUM HYDROXIDE, AND SIMETHICONE 1200; 120; 1200 MG/30ML; MG/30ML; MG/30ML
30 SUSPENSION ORAL 4 TIMES DAILY PRN
Status: DISCONTINUED | OUTPATIENT
Start: 2025-04-30 | End: 2025-05-03 | Stop reason: HOSPADM

## 2025-04-30 RX ORDER — ACETAMINOPHEN 650 MG/1
650 SUPPOSITORY RECTAL EVERY 6 HOURS PRN
Status: DISCONTINUED | OUTPATIENT
Start: 2025-04-30 | End: 2025-05-03 | Stop reason: HOSPADM

## 2025-04-30 RX ORDER — HYDROMORPHONE HYDROCHLORIDE 1 MG/ML
0.5 INJECTION, SOLUTION INTRAMUSCULAR; INTRAVENOUS; SUBCUTANEOUS
Status: COMPLETED | OUTPATIENT
Start: 2025-04-30 | End: 2025-04-30

## 2025-04-30 RX ORDER — MORPHINE SULFATE 4 MG/ML
4 INJECTION, SOLUTION INTRAMUSCULAR; INTRAVENOUS
Refills: 0 | Status: DISCONTINUED | OUTPATIENT
Start: 2025-04-30 | End: 2025-04-30

## 2025-04-30 RX ORDER — HEPARIN SODIUM 5000 [USP'U]/ML
5000 INJECTION, SOLUTION INTRAVENOUS; SUBCUTANEOUS EVERY 8 HOURS
Status: DISCONTINUED | OUTPATIENT
Start: 2025-04-30 | End: 2025-05-01

## 2025-04-30 RX ORDER — LABETALOL HYDROCHLORIDE 5 MG/ML
20 INJECTION, SOLUTION INTRAVENOUS
Status: COMPLETED | OUTPATIENT
Start: 2025-04-30 | End: 2025-04-30

## 2025-04-30 RX ORDER — PROMETHAZINE HYDROCHLORIDE 25 MG/1
25 TABLET ORAL EVERY 6 HOURS PRN
Status: DISCONTINUED | OUTPATIENT
Start: 2025-04-30 | End: 2025-05-03 | Stop reason: HOSPADM

## 2025-04-30 RX ORDER — IBUPROFEN 200 MG
16 TABLET ORAL
Status: DISCONTINUED | OUTPATIENT
Start: 2025-04-30 | End: 2025-05-03 | Stop reason: HOSPADM

## 2025-04-30 RX ORDER — TALC
6 POWDER (GRAM) TOPICAL NIGHTLY PRN
Status: DISCONTINUED | OUTPATIENT
Start: 2025-04-30 | End: 2025-05-03 | Stop reason: HOSPADM

## 2025-04-30 RX ORDER — POLYETHYLENE GLYCOL 3350 17 G/17G
17 POWDER, FOR SOLUTION ORAL DAILY PRN
Status: DISCONTINUED | OUTPATIENT
Start: 2025-04-30 | End: 2025-05-03 | Stop reason: HOSPADM

## 2025-04-30 RX ORDER — ACETAMINOPHEN 325 MG/1
650 TABLET ORAL
Status: COMPLETED | OUTPATIENT
Start: 2025-04-30 | End: 2025-04-30

## 2025-04-30 RX ORDER — CARVEDILOL 12.5 MG/1
25 TABLET ORAL 2 TIMES DAILY
Status: DISCONTINUED | OUTPATIENT
Start: 2025-04-30 | End: 2025-05-03 | Stop reason: HOSPADM

## 2025-04-30 RX ORDER — SODIUM CHLORIDE 0.9 % (FLUSH) 0.9 %
3 SYRINGE (ML) INJECTION EVERY 12 HOURS PRN
Status: DISCONTINUED | OUTPATIENT
Start: 2025-04-30 | End: 2025-05-03 | Stop reason: HOSPADM

## 2025-04-30 RX ADMIN — SODIUM ZIRCONIUM CYCLOSILICATE 10 G: 5 POWDER, FOR SUSPENSION ORAL at 07:04

## 2025-04-30 RX ADMIN — CARVEDILOL 25 MG: 12.5 TABLET, FILM COATED ORAL at 07:04

## 2025-04-30 RX ADMIN — ONDANSETRON 4 MG: 2 INJECTION INTRAMUSCULAR; INTRAVENOUS at 10:04

## 2025-04-30 RX ADMIN — ACETAMINOPHEN 650 MG: 325 TABLET ORAL at 07:04

## 2025-04-30 RX ADMIN — HEPARIN SODIUM 5000 UNITS: 5000 INJECTION, SOLUTION INTRAVENOUS; SUBCUTANEOUS at 10:04

## 2025-04-30 RX ADMIN — FUROSEMIDE 80 MG: 10 INJECTION, SOLUTION INTRAMUSCULAR; INTRAVENOUS at 07:04

## 2025-04-30 RX ADMIN — ACETAMINOPHEN 650 MG: 325 TABLET ORAL at 08:04

## 2025-04-30 RX ADMIN — LABETALOL HYDROCHLORIDE 20 MG: 5 INJECTION INTRAVENOUS at 07:04

## 2025-04-30 RX ADMIN — ONDANSETRON 4 MG: 2 INJECTION INTRAMUSCULAR; INTRAVENOUS at 07:04

## 2025-04-30 RX ADMIN — HYDROMORPHONE HYDROCHLORIDE 0.5 MG: 1 INJECTION, SOLUTION INTRAMUSCULAR; INTRAVENOUS; SUBCUTANEOUS at 10:04

## 2025-04-30 RX ADMIN — HYDROMORPHONE HYDROCHLORIDE 0.5 MG: 1 INJECTION, SOLUTION INTRAMUSCULAR; INTRAVENOUS; SUBCUTANEOUS at 07:04

## 2025-04-30 RX ADMIN — CEFEPIME 1 G: 1 INJECTION, POWDER, FOR SOLUTION INTRAMUSCULAR; INTRAVENOUS at 08:04

## 2025-04-30 RX ADMIN — VANCOMYCIN HYDROCHLORIDE 1000 MG: 1 INJECTION, POWDER, LYOPHILIZED, FOR SOLUTION INTRAVENOUS at 07:04

## 2025-04-30 NOTE — ED PROVIDER NOTES
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 BPM  Rhythm: sinus tachycardia  Interpretation: Minimal voltage criteria for LVH, may be normal variant (jarocho product). Anterolateral infarct, age undetermined. No STEMI.           The Emergency Provider reviewed the vital signs and test results, which are outlined above.     ED Discussion     6:05 PM: Pt was moved on the board, but is not currently in the room.    8:21 PM: Consulted Hospital medicine, awaiting response for admission status.    8:56 PM: Discussed case with Dr. Salcido ([facility]). Dr. Salcido agrees with current care and management of pt and accepts admission.   Admitting Service: Hospital Medicine  Admitting Physician: Dr. Salcido  Admit to: Inpatient    8:56 PM: Re-evaluated pt.  I have discussed test results, shared treatment plan, and the need for admission with patient/family/caretaker at bedside. Pt and/or family/caretaker express understanding at this time and agree with all information. All questions answered. Pt/caretaker/family member(s) have no further questions or concerns at this time. Pt is ready for admit.              Medical Decision Making  DDX: 1. Pneumonia 2. Viral infection 3. CHF 4. Sepsis    ECG sinus tachyardia, LVH, no acute ischemic changes, wbc normal, h/h stable, kidney function at baseline flu and covid negative, CXR reviewed and consistent with both pulmonary  edema and concerns for pneumonia, broad spectrum iv antibiotics initiated, lactate normal, trop mildly elevated, BNP over 2,000; patient notably hypertensive improved with iv labetalol and pain meds, Resp viral panel positive for parainfluenza, hospital med to admit, nephrology aware due to need for additional dialysis.     Amount and/or Complexity of Data Reviewed  Labs: ordered. Decision-making details documented in ED Course.  Radiology: ordered and independent interpretation performed. Decision-making details documented in ED Course.  ECG/medicine tests: ordered and independent interpretation performed. Decision-making details documented in ED Course.  Discussion of management or test interpretation with external provider(s): Hospital med and nephrology consulted    Risk  OTC drugs.  Prescription drug management.  Decision regarding hospitalization.  Diagnosis or treatment significantly limited by social determinants of health.       Additional MDM:   Sepsis:   This patient does have evidence of infective focus  My overall impression is sepsis.  Source: Respiratory  Antibiotics given- Antibiotics     Patient Encounter Information Not Found      Latest lactate reviewed- 0.9  Organ dysfunction indicated by Acute respiratory failure    Fluid challenge Contraindicated- Fluid bolus is contraindicated in this patient due to End Stage Renal Disease     Post- resuscitation assessment No - Post resuscitation assessment not needed       Will Not start Pressors- Levophed for MAP of 65  Source control achieved by: IV vanc and cefepime                ED Medication(s):  Medications   acetaminophen tablet 650 mg (650 mg Oral Given 4/30/25 1936)   labetaloL injection 20 mg (20 mg Intravenous Given 4/30/25 1937)   ondansetron injection 4 mg (4 mg Intravenous Given 4/30/25 1940)   sodium zirconium cyclosilicate packet 10 g (10 g Oral Given 4/30/25 1943)   furosemide injection 80 mg (80 mg Intravenous Given 4/30/25 1943)    HYDROmorphone injection 0.5 mg (0.5 mg Intravenous Given 4/30/25 1941)   vancomycin (VANCOCIN) 1,000 mg in 0.9% NaCl 250 mL IVPB (admixture device) (0 mg Intravenous Stopped 4/30/25 2121)   acetaminophen tablet 650 mg (650 mg Oral Given 4/30/25 2043)   ondansetron injection 4 mg (4 mg Intravenous Given 4/30/25 2219)   vancomycin (VANCOCIN) 500 mg in D5W 100 mL IVPB (MB+) (0 mg Intravenous Stopped 5/2/25 1618)       Discharge Medication List as of 5/3/2025 11:28 AM        START taking these medications    Details   apixaban (ELIQUIS) 5 mg Tab Take 1 tablet (5 mg total) by mouth 2 (two) times daily., Starting Sat 5/3/2025, Until Mon 6/2/2025, Normal      benzonatate (TESSALON) 100 MG capsule Take 1 capsule (100 mg total) by mouth 3 (three) times daily. for 10 days, Starting Sat 5/3/2025, Until Tue 5/13/2025, Normal      carvediloL (COREG) 25 MG tablet Take 1 tablet (25 mg total) by mouth 2 (two) times daily., Starting Sat 5/3/2025, Until Mon 6/2/2025, Normal      furosemide (LASIX) 80 MG tablet Take 1 tablet (80 mg total) by mouth once daily., Starting Sun 5/4/2025, Until Tue 6/3/2025, Normal      guaiFENesin (MUCINEX) 600 mg 12 hr tablet Take 1 tablet (600 mg total) by mouth 2 (two) times daily. for 10 days, Starting Sat 5/3/2025, Until Tue 5/13/2025, No Print      hydrALAZINE (APRESOLINE) 50 MG tablet Take 1 tablet (50 mg total) by mouth every 8 (eight) hours., Starting Sat 5/3/2025, Until Mon 6/2/2025, Normal      levoFLOXacin (LEVAQUIN) 750 MG tablet Take 1 tablet (750 mg total) by mouth every other day. for 5 doses, Starting Sat 5/3/2025, Until Mon 5/12/2025, Normal      levothyroxine (SYNTHROID) 25 MCG tablet Take 1 tablet (25 mcg total) by mouth before breakfast., Starting Sun 5/4/2025, Until Tue 6/3/2025, Normal      pantoprazole (PROTONIX) 40 MG tablet Take 1 tablet (40 mg total) by mouth once daily., Starting Sun 5/4/2025, Until Tue 6/3/2025, Normal      sevelamer carbonate (RENVELA) 2.4 gram PwPk Take 1  packet (2.4 g total) by mouth 3 (three) times daily with meals., Starting Sat 5/3/2025, Until Sun 5/3/2026, No Print      atovaquone (MEPRON) 750 mg/5 mL Susp oral liquid Take 5 mLs (750 mg total) by mouth every 12 (twelve) hours. for 21 days, Starting Sat 5/3/2025, Until Sat 5/24/2025, Normal              Follow-up Information       Pcp, No Follow up.    Why: establish care             John Vides MD. Schedule an appointment as soon as possible for a visit in 1 week(s).    Specialty: Infectious Diseases  Contact information:  [address]  [facility] [address] [phone]                                 Scribe Attestation:   Scribe #1: I performed the above scribed service and the documentation accurately describes the services I performed. I attest to the accuracy of the note.     Attending:   Physician Attestation Statement for Scribe #1: I, Genny Bryan MD, personally performed the services described in this documentation, as scribed by Lynette Diaz, in my presence, and it is both accurate and complete.           Clinical Impression       ICD-10-CM ICD-9-CM   1. Pneumonia of both lower lobes due to infectious organism  J18.9 486   2. SOB (shortness of breath)  R06.02 786.05   3. Screening for cardiovascular condition  Z13.6 V81.2   4. Severe sepsis  A41.9 038.9    R65.20 995.92   5. ESRD on dialysis  N18.6 585.6    Z99.2 V45.11   6. Pulmonary edema w/congestive heart failure w/preserved LV function  I50.1 428.0   7. Hypertensive emergency  I16.1 401.9   8. Chest pain  R07.9 786.50   9. Parainfluenza virus infection  B34.8 078.89   10. Acute on chronic heart failure with preserved ejection fraction  I50.33 428.23   11. Vulvar cancer, carcinoma  C51.9 184.4   12. ESRD (end stage renal disease) on dialysis  N18.6 585.6    Z99.2 V45.11   13. AIDS  B20 042   14. Drug-induced folate deficiency anemia  D52.1 281.2     E980.5   15. Acute hypoxemic respiratory failure  J96.01 518.81   16.  Parainfluenza virus pneumonia  J12.2 480.2   17. History of HIV infection  Z21 V08       Disposition:   Disposition: Admitted  Condition: Fair       Genny Bryan MD  05/06/25 1416       Genny Bryan MD  05/06/25 1418

## 2025-05-01 PROBLEM — I50.33 ACUTE ON CHRONIC HEART FAILURE WITH PRESERVED EJECTION FRACTION: Status: ACTIVE | Noted: 2025-05-01

## 2025-05-01 PROBLEM — R06.02 SHORTNESS OF BREATH: Status: ACTIVE | Noted: 2025-05-01

## 2025-05-01 PROBLEM — D64.9 ANEMIA: Chronic | Status: ACTIVE | Noted: 2025-05-01

## 2025-05-01 PROBLEM — B34.8 PARAINFLUENZA VIRUS INFECTION: Status: ACTIVE | Noted: 2025-05-01

## 2025-05-01 PROBLEM — N18.6 ESRD (END STAGE RENAL DISEASE) ON DIALYSIS: Chronic | Status: ACTIVE | Noted: 2025-05-01

## 2025-05-01 PROBLEM — I10 HYPERTENSION: Chronic | Status: ACTIVE | Noted: 2025-05-01

## 2025-05-01 PROBLEM — D64.9 ANEMIA: Status: ACTIVE | Noted: 2025-05-01

## 2025-05-01 PROBLEM — Z86.718 HISTORY OF RECURRENT DEEP VEIN THROMBOSIS (DVT): Chronic | Status: ACTIVE | Noted: 2025-05-01

## 2025-05-01 PROBLEM — I10 HYPERTENSION: Status: ACTIVE | Noted: 2025-05-01

## 2025-05-01 PROBLEM — Z99.2 ESRD (END STAGE RENAL DISEASE) ON DIALYSIS: Status: ACTIVE | Noted: 2025-05-01

## 2025-05-01 PROBLEM — Z86.718 HISTORY OF RECURRENT DEEP VEIN THROMBOSIS (DVT): Status: ACTIVE | Noted: 2025-05-01

## 2025-05-01 PROBLEM — N18.6 ESRD (END STAGE RENAL DISEASE) ON DIALYSIS: Status: ACTIVE | Noted: 2025-05-01

## 2025-05-01 PROBLEM — Z99.2 ESRD (END STAGE RENAL DISEASE) ON DIALYSIS: Chronic | Status: ACTIVE | Noted: 2025-05-01

## 2025-05-01 LAB
ANION GAP (OHS): 18 MMOL/L (ref 8–16)
BUN SERPL-MCNC: 30 MG/DL (ref 6–20)
CALCIUM SERPL-MCNC: 8.8 MG/DL (ref 8.7–10.5)
CHLORIDE SERPL-SCNC: 95 MMOL/L (ref 95–110)
CO2 SERPL-SCNC: 23 MMOL/L (ref 23–29)
CREAT SERPL-MCNC: 7.1 MG/DL (ref 0.5–1.4)
GFR SERPLBLD CREATININE-BSD FMLA CKD-EPI: 7 ML/MIN/1.73/M2
GLUCOSE SERPL-MCNC: 83 MG/DL (ref 70–110)
HOLD SPECIMEN: NORMAL
HOLD SPECIMEN: NORMAL
LACTATE SERPL-SCNC: 1.5 MMOL/L (ref 0.5–2.2)
OHS QRS DURATION: 122 MS
OHS QTC CALCULATION: 503 MS
POTASSIUM SERPL-SCNC: 4.3 MMOL/L (ref 3.5–5.1)
SODIUM SERPL-SCNC: 136 MMOL/L (ref 136–145)

## 2025-05-01 PROCEDURE — 86361 T CELL ABSOLUTE COUNT: CPT | Performed by: HOSPITALIST

## 2025-05-01 PROCEDURE — 63600175 PHARM REV CODE 636 W HCPCS: Performed by: FAMILY MEDICINE

## 2025-05-01 PROCEDURE — 25000003 PHARM REV CODE 250: Performed by: FAMILY MEDICINE

## 2025-05-01 PROCEDURE — 90935 HEMODIALYSIS ONE EVALUATION: CPT

## 2025-05-01 PROCEDURE — 99223 1ST HOSP IP/OBS HIGH 75: CPT | Mod: ,,, | Performed by: INTERNAL MEDICINE

## 2025-05-01 PROCEDURE — 36415 COLL VENOUS BLD VENIPUNCTURE: CPT | Performed by: HOSPITALIST

## 2025-05-01 PROCEDURE — 21400001 HC TELEMETRY ROOM

## 2025-05-01 PROCEDURE — 63600175 PHARM REV CODE 636 W HCPCS: Performed by: HOSPITALIST

## 2025-05-01 PROCEDURE — 99223 1ST HOSP IP/OBS HIGH 75: CPT | Mod: NSCH,,, | Performed by: INTERNAL MEDICINE

## 2025-05-01 PROCEDURE — 25000003 PHARM REV CODE 250: Performed by: HOSPITALIST

## 2025-05-01 PROCEDURE — 63600175 PHARM REV CODE 636 W HCPCS: Performed by: NURSE PRACTITIONER

## 2025-05-01 PROCEDURE — 80048 BASIC METABOLIC PNL TOTAL CA: CPT | Performed by: HOSPITALIST

## 2025-05-01 PROCEDURE — 25000003 PHARM REV CODE 250: Performed by: EMERGENCY MEDICINE

## 2025-05-01 PROCEDURE — 5A1D70Z PERFORMANCE OF URINARY FILTRATION, INTERMITTENT, LESS THAN 6 HOURS PER DAY: ICD-10-PCS | Performed by: INTERNAL MEDICINE

## 2025-05-01 PROCEDURE — 63600175 PHARM REV CODE 636 W HCPCS: Performed by: EMERGENCY MEDICINE

## 2025-05-01 PROCEDURE — 83605 ASSAY OF LACTIC ACID: CPT | Performed by: EMERGENCY MEDICINE

## 2025-05-01 RX ORDER — FUROSEMIDE 40 MG/1
80 TABLET ORAL DAILY
Status: DISCONTINUED | OUTPATIENT
Start: 2025-05-01 | End: 2025-05-03

## 2025-05-01 RX ORDER — HYDRALAZINE HYDROCHLORIDE 50 MG/1
50 TABLET, FILM COATED ORAL EVERY 8 HOURS
Status: DISCONTINUED | OUTPATIENT
Start: 2025-05-01 | End: 2025-05-03 | Stop reason: HOSPADM

## 2025-05-01 RX ORDER — OXYCODONE HYDROCHLORIDE 5 MG/1
5 TABLET ORAL EVERY 6 HOURS PRN
Refills: 0 | Status: DISCONTINUED | OUTPATIENT
Start: 2025-05-01 | End: 2025-05-03

## 2025-05-01 RX ORDER — ALBUTEROL SULFATE 0.83 MG/ML
2.5 SOLUTION RESPIRATORY (INHALATION) EVERY 6 HOURS PRN
Status: DISCONTINUED | OUTPATIENT
Start: 2025-05-01 | End: 2025-05-03 | Stop reason: HOSPADM

## 2025-05-01 RX ORDER — DAPSONE 25 MG/1
50 TABLET ORAL DAILY
Status: DISCONTINUED | OUTPATIENT
Start: 2025-05-01 | End: 2025-05-03

## 2025-05-01 RX ORDER — LEVOTHYROXINE SODIUM 25 UG/1
25 TABLET ORAL
Status: DISCONTINUED | OUTPATIENT
Start: 2025-05-01 | End: 2025-05-03 | Stop reason: HOSPADM

## 2025-05-01 RX ORDER — SEVELAMER CARBONATE 0.8 G/1
2.4 POWDER, FOR SUSPENSION ORAL
Status: DISCONTINUED | OUTPATIENT
Start: 2025-05-01 | End: 2025-05-03 | Stop reason: HOSPADM

## 2025-05-01 RX ORDER — PANTOPRAZOLE SODIUM 40 MG/1
40 TABLET, DELAYED RELEASE ORAL DAILY
Status: DISCONTINUED | OUTPATIENT
Start: 2025-05-01 | End: 2025-05-03 | Stop reason: HOSPADM

## 2025-05-01 RX ORDER — HYDRALAZINE HYDROCHLORIDE 20 MG/ML
10 INJECTION INTRAMUSCULAR; INTRAVENOUS EVERY 6 HOURS PRN
Status: DISCONTINUED | OUTPATIENT
Start: 2025-05-01 | End: 2025-05-03 | Stop reason: HOSPADM

## 2025-05-01 RX ORDER — HYDROMORPHONE HYDROCHLORIDE 1 MG/ML
0.2 INJECTION, SOLUTION INTRAMUSCULAR; INTRAVENOUS; SUBCUTANEOUS EVERY 4 HOURS PRN
Status: DISCONTINUED | OUTPATIENT
Start: 2025-05-01 | End: 2025-05-02

## 2025-05-01 RX ORDER — HYDROCODONE BITARTRATE AND ACETAMINOPHEN 10; 325 MG/1; MG/1
1 TABLET ORAL EVERY 6 HOURS PRN
Refills: 0 | Status: DISCONTINUED | OUTPATIENT
Start: 2025-05-01 | End: 2025-05-03

## 2025-05-01 RX ORDER — LEVETIRACETAM 500 MG/1
500 TABLET ORAL 2 TIMES DAILY
Status: DISCONTINUED | OUTPATIENT
Start: 2025-05-01 | End: 2025-05-03 | Stop reason: HOSPADM

## 2025-05-01 RX ORDER — HYDROCODONE BITARTRATE AND ACETAMINOPHEN 5; 325 MG/1; MG/1
1 TABLET ORAL EVERY 6 HOURS PRN
Refills: 0 | Status: DISCONTINUED | OUTPATIENT
Start: 2025-05-01 | End: 2025-05-01

## 2025-05-01 RX ADMIN — SEVELAMER CARBONATE 2.4 G: 800 POWDER, FOR SUSPENSION ORAL at 12:05

## 2025-05-01 RX ADMIN — CEFEPIME 1 G: 1 INJECTION, POWDER, FOR SOLUTION INTRAMUSCULAR; INTRAVENOUS at 09:05

## 2025-05-01 RX ADMIN — HYDROMORPHONE HYDROCHLORIDE 0.2 MG: 1 INJECTION, SOLUTION INTRAMUSCULAR; INTRAVENOUS; SUBCUTANEOUS at 11:05

## 2025-05-01 RX ADMIN — CARVEDILOL 25 MG: 12.5 TABLET, FILM COATED ORAL at 08:05

## 2025-05-01 RX ADMIN — LEVETIRACETAM 500 MG: 500 TABLET, FILM COATED ORAL at 08:05

## 2025-05-01 RX ADMIN — HYDROMORPHONE HYDROCHLORIDE 0.5 MG: 1 INJECTION, SOLUTION INTRAMUSCULAR; INTRAVENOUS; SUBCUTANEOUS at 05:05

## 2025-05-01 RX ADMIN — SEVELAMER CARBONATE 2.4 G: 800 POWDER, FOR SUSPENSION ORAL at 06:05

## 2025-05-01 RX ADMIN — HYDROCODONE BITARTRATE AND ACETAMINOPHEN 1 TABLET: 10; 325 TABLET ORAL at 09:05

## 2025-05-01 RX ADMIN — SEVELAMER CARBONATE 2.4 G: 800 POWDER, FOR SUSPENSION ORAL at 08:05

## 2025-05-01 RX ADMIN — HYDRALAZINE HYDROCHLORIDE 50 MG: 50 TABLET ORAL at 05:05

## 2025-05-01 RX ADMIN — FUROSEMIDE 80 MG: 40 TABLET ORAL at 08:05

## 2025-05-01 RX ADMIN — LEVETIRACETAM 500 MG: 500 TABLET, FILM COATED ORAL at 09:05

## 2025-05-01 RX ADMIN — HYDRALAZINE HYDROCHLORIDE 10 MG: 20 INJECTION INTRAMUSCULAR; INTRAVENOUS at 12:05

## 2025-05-01 NOTE — ASSESSMENT & PLAN NOTE
Patient presented with worsening dyspnea/shortness a breath likely multifactorial in nature given underlying respiratory panel positive for parainfluenza V3, volume overload in setting of ESRD and CHF, as well as sepsis with possible pneumonia noted on imaging.  Flu/COVID negative.  BNP elevated at 26 95.  Troponin 0.070 in setting of sepsis and volume overload likely due to demand ischemia.  Lactic acid within normal limits.  Procalcitonin 0.82.  Patient initiated on sepsis protocol with broad-spectrum antibiotics, administered supplemental oxygen, duo nebulizer treatments, IV Lasix, and currently awaiting further evaluation/recommendations by Nephrology with presumed HD to follow in the morning.  Plan:  -Telemetry  -Continue treatment as noted below

## 2025-05-01 NOTE — ASSESSMENT & PLAN NOTE
Chronic, uncontrolled.  Latest blood pressure and vitals reviewed-   Temp:  [99.3 °F (37.4 °C)-102.1 °F (38.9 °C)]   Pulse:  []   Resp:  [16-37]   BP: ()/(53-96)   SpO2:  [88 %-99 %] .   Home meds for hypertension were reviewed and noted below.     While in the hospital, will manage blood pressure as follows; Continue home antihypertensive regimen, Nephrology consulted to resume HD inpatient    Will utilize p.r.n. blood pressure medication only if patient's blood pressure greater than  180/110 and she develops symptoms such as worsening chest pain or shortness of breath.

## 2025-05-01 NOTE — NURSING
05/01/25 1730   Post-Hemodialysis Assessment   Blood Volume Processed (Liters) 42 L   Dialyzer Clearance Lightly streaked   Duration of Treatment 120 minutes   Additional Fluid Intake (mL) 500 mL   Total UF (mL) 1500 mL   Net Fluid Removal 1000   Patient Response to Treatment tolerated well   Post-Hemodialysis Comments NET fluid removed 1L, no acute distress

## 2025-05-01 NOTE — PROGRESS NOTES
Pharmacist Renal Dose Adjustment Note    Wang Kebede is a 39 y.o. female being treated with the medication cefepime    Patient Data:    Vital Signs (Most Recent):  Temp: (!) 100.7 °F (38.2 °C) (04/30/25 1936)  Pulse: 105 (04/30/25 2010)  Resp: 20 (04/30/25 2010)  BP: (!) 145/70 (04/30/25 2002)  SpO2: 95 % (04/30/25 2010) Vital Signs (72h Range):  Temp:  [99.3 °F (37.4 °C)-100.7 °F (38.2 °C)]   Pulse:  [101-129]   Resp:  [20-37]   BP: (145-192)/(66-91)   SpO2:  [88 %-99 %]      Recent Labs   Lab 04/30/25 1915   CREATININE 5.4*     Serum creatinine: 5.4 mg/dL (H) 04/30/25 1915  Estimated creatinine clearance: 9.9 mL/min (A)    Medication:Cefepime dose: 2 grams frequency every 8 hours will be changed to medication:Cefepime dose:1 gram frequency:every 24 hours    Pharmacist's Name: Pepper Tam  Pharmacist's Extension: [phone]

## 2025-05-01 NOTE — HPI
Wang Kebede is a 39 y.o. female with a PMH  has a past medical history of Abnormal Pap smear of cervix (2016), Acute encephalopathy (12/25/2018), AICD (automatic cardioverter/defibrillator) present, Anemia, Chronic abdominal pain, Encounter for blood transfusion, History of cardiac arrest, HIV (human immunodeficiency virus infection), Lymphoma, Narcotic bowel syndrome, Splenomegaly, and Vulvar cancer, carcinoma. who presented to the ED for further evaluation of worsening dyspnea/shortness of breath x3 days duration.  Patient reported leaving dialysis earlier today however, was unable to complete full session and was directed to the ED for further evaluation and possible admission.  Patient reports compliance with home medications as well as dietary/fluid restrictions and denied exposure to recent ill contacts.  Associated symptoms included subjective fevers, chills, sweats, productive cough with green sputum, generalized fatigue/weakness, generalized myalgias/body aches, and chest pain upon coughing.  She reported no known alleviating or aggravating factors noted with all other review of systems negative except as noted above.  Prior to onset of symptoms, patient reported being in her usual state of health with no other concerns or complaints.  Initial workup in the ED revealed patient met SIRS criteria for sepsis in addition to signs/symptoms of volume overload meeting further diuresing via dialysis.  Lactic acid within normal limits, procalcitonin elevated at 0.82, flu/COVID negative, respiratory panel positive for parainfluenza V3, UA positive for 7 WBCs, few bacteria.  Chest x-ray positive for cardiomegaly, mild pleural effusion, and basilar opacities concerning for underlying CHF and/or pneumonia.  Patient admitted to Hospital Medicine inpatient for continued medical management.    PCP: Levi Moncada

## 2025-05-01 NOTE — ASSESSMENT & PLAN NOTE
Creatine stable for now. BMP reviewed- noted Estimated Creatinine Clearance: 7.6 mL/min (A) (based on SCr of 7.1 mg/dL (H)). according to latest data. Based on current GFR, CKD stage is end stage.  Monitor UOP and serial BMP and adjust therapy as needed. Renally dose meds. Avoid nephrotoxic medications and procedures. Nephrology consulted to resume HD inpatient.

## 2025-05-01 NOTE — SUBJECTIVE & OBJECTIVE
Interval History: See hospital course for today      Review of Systems   Constitutional:  Positive for activity change, appetite change, fatigue and fever.   Respiratory:  Positive for shortness of breath.    Musculoskeletal:  Positive for myalgias.   Allergic/Immunologic: Positive for immunocompromised state.   Neurological:  Positive for weakness.     Objective:     Vital Signs (Most Recent):  Temp: 99.4 °F (37.4 °C) (05/01/25 1232)  Pulse: 95 (05/01/25 1232)  Resp: 18 (05/01/25 1232)  BP: (!) 99/53 (05/01/25 1232)  SpO2: (!) 93 % (05/01/25 1232) Vital Signs (24h Range):  Temp:  [99.3 °F (37.4 °C)-102.1 °F (38.9 °C)] 99.4 °F (37.4 °C)  Pulse:  [] 95  Resp:  [16-37] 18  SpO2:  [88 %-99 %] 93 %  BP: ()/(53-96) 99/53     Weight: 54.4 kg (120 lb)  Body mass index is 23.44 kg/m².    Intake/Output Summary (Last 24 hours) at 5/1/2025 1242  Last data filed at 4/30/2025 2121  Gross per 24 hour   Intake 250 ml   Output --   Net 250 ml         Physical Exam  Vitals and nursing note reviewed.   Constitutional:       General: She is sleeping. She is not in acute distress.     Appearance: She is ill-appearing. She is not toxic-appearing.      Interventions: Nasal cannula in place.   HENT:      Head: Normocephalic and atraumatic.   Cardiovascular:      Rate and Rhythm: Normal rate.   Pulmonary:      Effort: Pulmonary effort is normal. No respiratory distress.      Breath sounds: Rales present.   Abdominal:      Palpations: Abdomen is soft.      Tenderness: There is no abdominal tenderness.   Musculoskeletal:      Right lower leg: No edema.      Left lower leg: No edema.        Legs:    Skin:     General: Skin is warm.   Neurological:      Mental Status: She is oriented to person, place, and time and easily aroused.      Motor: Weakness present.   Psychiatric:         Behavior: Behavior is cooperative.               Significant Labs: All pertinent labs within the past 24 hours have been reviewed.  Blood Culture:  negative growth to date   CBC:   Recent Labs   Lab 04/30/25 1915   WBC 5.85   HGB 9.7*   HCT 29.5*        CMP:   Recent Labs   Lab 04/30/25 1915 05/01/25  0708   * 136   K 4.0 4.3   CL 93* 95   CO2 27 23   GLU 92 83   BUN 19 30*   CREATININE 5.4* 7.1*   CALCIUM 9.4 8.8   PROT 10.1*  --    ALBUMIN 3.7  --    BILITOT 0.7  --    ALKPHOS 58  --    AST 16  --    ALT 11  --    ANIONGAP 14 18*     Cardiac Markers:   Recent Labs   Lab 04/30/25 1915   BNP 2,695*     Lactic Acid:   Recent Labs   Lab 04/30/25 1915 05/01/25  0708   LACTATE 0.9 1.5     Troponin:   Recent Labs   Lab 04/30/25 1915   TROPONINI 0.070*     Urine Studies:   Recent Labs   Lab 04/30/25  1854   COLORU Yellow   APPEARANCEUA Clear   PHUR >8.0*   SPECGRAV 1.010   PROTEINUA 3+*   GLUCUA 1+*   BILIRUBINUA Negative   OCCULTUA Negative   NITRITE Negative   UROBILINOGEN Negative   LEUKOCYTESUR Negative   RBCUA 1   WBCUA 7*   BACTERIA Few*   HYALINECASTS 0     Respiratory infection panel positive for parainfluenza virus    Significant Imaging: I have reviewed all pertinent imaging results/findings within the past 24 hours.  CXR: I have reviewed all pertinent results/findings within the past 24 hours and my personal findings are:  mild pleural effusions and basilar opacities, pneumonia not excluded

## 2025-05-01 NOTE — ASSESSMENT & PLAN NOTE
Patient remains tachycardic in setting of sepsis and volume overload.   Plan:  -telemetry  -continue home medications  -continue treatment of CHF, ESRD, and sepsis

## 2025-05-01 NOTE — PLAN OF CARE
Patient is in stable condition, no acute distress, remained free from injuries, ni pain reported this shift, on cardiac monitoring (ST), BP managed with PRN but otherwise VSS and all active orders reviewed. 24 hr chart check performed.

## 2025-05-01 NOTE — ASSESSMENT & PLAN NOTE
Remains seizure free.  Plan:  -continue home medications  -seizure precautions  -ativan prn for breakthrough seizures

## 2025-05-01 NOTE — ASSESSMENT & PLAN NOTE
"This patient does have evidence of infective focus  My overall impression is sepsis.  Source: Respiratory  Antibiotics given-   Antibiotics (72h ago, onward)      Start     Stop Route Frequency Ordered    05/01/25 0900  dapsone tablet 50 mg         -- Oral Daily 05/01/25 0450    04/30/25 2115  ceFEPIme injection 1 g         -- IV Every 24 hours (non-standard times) 04/30/25 2014 04/30/25 2009  vancomycin - pharmacy to dose  (vancomycin IVPB (PEDS and ADULTS))        Placed in "And" Linked Group    -- IV pharmacy to manage frequency 04/30/25 1909          Latest lactate reviewed-  Recent Labs   Lab 05/01/25  0708   LACTATE 1.5     Organ dysfunction indicated by Acute respiratory failure    Fluid challenge Contraindicated- Fluid bolus is contraindicated in this patient due to Volume overload due to- CHF and ESRD     Post- resuscitation assessment No - Post resuscitation assessment not needed     Will Not start Pressors- Levophed for MAP of 65    Source control achieved by:  Vancomycin, Cefepime, and Dapsone    "
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
## 2025-05-01 NOTE — PLAN OF CARE
Discussed poc with pt, pt verbalized understanding    Purposeful rounding every 2hours    VS wnl  Cardiac monitoring in use, pt is NSR, tele monitor # [id number]  Fall precautions in place, remains injury free  Pain control- pt refused Norco PRN med, informed pt med orders were changed by MD.     Accurate I&Os - pt receiving dialysis today   Abx given as prescribed  Bed locked at lowest position  Call light within reach    Chart check complete  Will cont with POC
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
## 2025-05-01 NOTE — ASSESSMENT & PLAN NOTE
Tachycardia improving in setting of sepsis and volume overload.   Plan:  -telemetry  -continue home medications  -continue treatment of CHF, ESRD, and sepsis

## 2025-05-01 NOTE — PROGRESS NOTES
[facility] [place] Medicine  Progress Note    Patient Name: Wang Kebede  MRN: [id number]  Patient Class: IP- Inpatient   Admission Date: 4/30/2025  Length of Stay: 1 days  Attending Physician: Shaun Salcido MD  Primary Care Provider: Levi Moncada MD        Subjective     Principal Problem:Parainfluenza virus infection        HPI:  Wang Kebede is a 39 y.o. female with a PMH  has a past medical history of Abnormal Pap smear of cervix (2016), Acute encephalopathy (12/25/2018), AICD (automatic cardioverter/defibrillator) present, Anemia, Chronic abdominal pain, Encounter for blood transfusion, History of cardiac arrest, HIV (human immunodeficiency virus infection), Lymphoma, Narcotic bowel syndrome, Splenomegaly, and Vulvar cancer, carcinoma. who presented to the ED for further evaluation of worsening dyspnea/shortness of breath x3 days duration.  Patient reported leaving dialysis earlier today however, was unable to complete full session and was directed to the ED for further evaluation and possible admission.  Patient reports compliance with home medications as well as dietary/fluid restrictions and denied exposure to recent ill contacts.  Associated symptoms included subjective fevers, chills, sweats, productive cough with green sputum, generalized fatigue/weakness, generalized myalgias/body aches, and chest pain upon coughing.  She reported no known alleviating or aggravating factors noted with all other review of systems negative except as noted above.  Prior to onset of symptoms, patient reported being in her usual state of health with no other concerns or complaints.  Initial workup in the ED revealed patient met SIRS criteria for sepsis in addition to signs/symptoms of volume overload meeting further diuresing via dialysis.  Lactic acid within normal limits, procalcitonin elevated at 0.82, flu/COVID negative, respiratory panel positive for parainfluenza V3, UA  positive for 7 WBCs, few bacteria.  Chest x-ray positive for cardiomegaly, mild pleural effusion, and basilar opacities concerning for underlying CHF and/or pneumonia.  Patient admitted to Hospital Medicine inpatient for continued medical management.    PCP: Levi Moncada      Overview/Hospital Course:  5/1 admitted for acute respiratory hypoxic failure 2/2 parainfluenza virus and volume overload in immunocompromised patient. Denies wearing supplemental oxygen at baseline. Procalcitonin mildly elevated, empirically treated with intravenous antibiotic(s). Nephrology consulted for maintenance dialysis.     Interval History: See hospital course for today      Review of Systems   Constitutional:  Positive for activity change, appetite change, fatigue and fever.   Respiratory:  Positive for shortness of breath.    Musculoskeletal:  Positive for myalgias.   Allergic/Immunologic: Positive for immunocompromised state.   Neurological:  Positive for weakness.     Objective:     Vital Signs (Most Recent):  Temp: 99.4 °F (37.4 °C) (05/01/25 1232)  Pulse: 95 (05/01/25 1232)  Resp: 18 (05/01/25 1232)  BP: (!) 99/53 (05/01/25 1232)  SpO2: (!) 93 % (05/01/25 1232) Vital Signs (24h Range):  Temp:  [99.3 °F (37.4 °C)-102.1 °F (38.9 °C)] 99.4 °F (37.4 °C)  Pulse:  [] 95  Resp:  [16-37] 18  SpO2:  [88 %-99 %] 93 %  BP: ()/(53-96) 99/53     Weight: 54.4 kg (120 lb)  Body mass index is 23.44 kg/m².    Intake/Output Summary (Last 24 hours) at 5/1/2025 1242  Last data filed at 4/30/2025 2121  Gross per 24 hour   Intake 250 ml   Output --   Net 250 ml         Physical Exam  Vitals and nursing note reviewed.   Constitutional:       General: She is sleeping. She is not in acute distress.     Appearance: She is ill-appearing. She is not toxic-appearing.      Interventions: Nasal cannula in place.   HENT:      Head: Normocephalic and atraumatic.   Cardiovascular:      Rate and Rhythm: Normal rate.   Pulmonary:      Effort:  Pulmonary effort is normal. No respiratory distress.      Breath sounds: Rales present.   Abdominal:      Palpations: Abdomen is soft.      Tenderness: There is no abdominal tenderness.   Musculoskeletal:      Right lower leg: No edema.      Left lower leg: No edema.        Legs:    Skin:     General: Skin is warm.   Neurological:      Mental Status: She is oriented to person, place, and time and easily aroused.      Motor: Weakness present.   Psychiatric:         Behavior: Behavior is cooperative.               Significant Labs: All pertinent labs within the past 24 hours have been reviewed.  Blood Culture: negative growth to date   CBC:   Recent Labs   Lab 04/30/25 1915   WBC 5.85   HGB 9.7*   HCT 29.5*        CMP:   Recent Labs   Lab 04/30/25 1915 05/01/25  0708   * 136   K 4.0 4.3   CL 93* 95   CO2 27 23   GLU 92 83   BUN 19 30*   CREATININE 5.4* 7.1*   CALCIUM 9.4 8.8   PROT 10.1*  --    ALBUMIN 3.7  --    BILITOT 0.7  --    ALKPHOS 58  --    AST 16  --    ALT 11  --    ANIONGAP 14 18*     Cardiac Markers:   Recent Labs   Lab 04/30/25 1915   BNP 2,695*     Lactic Acid:   Recent Labs   Lab 04/30/25 1915 05/01/25  0708   LACTATE 0.9 1.5     Troponin:   Recent Labs   Lab 04/30/25 1915   TROPONINI 0.070*     Urine Studies:   Recent Labs   Lab 04/30/25  1854   COLORU Yellow   APPEARANCEUA Clear   PHUR >8.0*   SPECGRAV 1.010   PROTEINUA 3+*   GLUCUA 1+*   BILIRUBINUA Negative   OCCULTUA Negative   NITRITE Negative   UROBILINOGEN Negative   LEUKOCYTESUR Negative   RBCUA 1   WBCUA 7*   BACTERIA Few*   HYALINECASTS 0     Respiratory infection panel positive for parainfluenza virus    Significant Imaging: I have reviewed all pertinent imaging results/findings within the past 24 hours.  CXR: I have reviewed all pertinent results/findings within the past 24 hours and my personal findings are:  mild pleural effusions and basilar opacities, pneumonia not excluded      Assessment & Plan  Shortness of  breath  Patient presented with worsening dyspnea/shortness a breath likely multifactorial in nature given underlying respiratory panel positive for parainfluenza V3, volume overload in setting of ESRD and CHF, as well as sepsis with possible pneumonia noted on imaging.  Flu/COVID negative.  BNP elevated at 26 95.  Troponin 0.070 in setting of sepsis and volume overload likely due to demand ischemia.  Lactic acid within normal limits.  Procalcitonin 0.82.  Patient initiated on sepsis protocol with broad-spectrum antibiotics, administered supplemental oxygen, duo nebulizer treatments, IV Lasix, and currently awaiting further evaluation/recommendations by Nephrology with presumed HD to follow in the morning.  Plan:  -Telemetry  -Continue treatment as noted below  Wean as able  Breathing treatments prn     Acute respiratory failure with hypoxia  Patient with Hypoxic Respiratory failure which is Acute.  she is not on home oxygen. Supplemental oxygen was provided and noted-      Signs/symptoms of respiratory failure include- tachypnea, increased work of breathing, and respiratory distress. Contributing diagnoses includes - CHF, Pleural effusion, and Pneumonia Labs and images were reviewed. Patient Has not had a recent ABG. Will treat underlying causes and adjust management of respiratory failure as follows-  Patient with history of COPD/Asthma currently/not currently on inhalers or home oxygen not presently in acute exacerbation and is with/without signs/symptoms of distress.   Patient currently saturating  % on room air  L/min.   Plan:  -Telemetry  -Continue home medications, titrate as needed  -Titrate oxygen requirements as needed  -Incentive spirometry   -Monitor pulse oximetry  -Cesar prn  -Droplet precautions   -Continue treatment of CHF and Sepsis as noted below    Acute on chronic heart failure with preserved ejection fraction  Patient has  preserved  heart failure that is Acute on chronic. On presentation their  "CHF was decompensated. Evidence of decompensated CHF on presentation includes: orthopnea, paroxysmal nocturnal dyspnea (PND), dyspnea on exertion (SANTOS), and shortness of breath. The etiology of their decompensation is likely increased fluid intake and viral syndrome . Most recent BNP and echo results are listed below.  Recent Labs     04/30/25 1915   BNP 2,695*     Latest ECHO  No results found for this or any previous visit.    Current Heart Failure Medications  carvediloL tablet 25 mg, 2 times daily, Oral  hydrALAZINE injection 10 mg, Every 6 hours PRN, Intravenous  hydrALAZINE tablet 50 mg, Every 8 hours, Oral  furosemide tablet 80 mg, Daily, Oral    Plan  -Monitor strict I&Os and daily weights.    -Place on telemetry  -Low sodium diet  -Place on fluid restriction of 1.5 L.   -Cardiology has not been consulted  -The patient's volume status is stable but not at their baseline as indicated by orthopnea, paroxysmal nocturnal dyspnea (PND), dyspnea on exertion (SANTOS), and shortness of breath  -Continue home medications  -Continue treatment of sepsis  -Nephrology consulted to resume HD inpatient     Sepsis  This patient does have evidence of infective focus  My overall impression is sepsis.  Source: Respiratory  Antibiotics given-   Antibiotics (72h ago, onward)      Start     Stop Route Frequency Ordered    05/01/25 0900  dapsone tablet 50 mg         -- Oral Daily 05/01/25 0450    04/30/25 2115  ceFEPIme injection 1 g         -- IV Every 24 hours (non-standard times) 04/30/25 2014 04/30/25 2009  vancomycin - pharmacy to dose  (vancomycin IVPB (PEDS and ADULTS))        Placed in "And" Linked Group    -- IV pharmacy to manage frequency 04/30/25 1909          Latest lactate reviewed-  Recent Labs   Lab 05/01/25  0708   LACTATE 1.5     Organ dysfunction indicated by Acute respiratory failure    Fluid challenge Contraindicated- Fluid bolus is contraindicated in this patient due to Volume overload due to- CHF and ESRD  "     Post- resuscitation assessment No - Post resuscitation assessment not needed     Will Not start Pressors- Levophed for MAP of 65    Source control achieved by:  Vancomycin, Cefepime, and Dapsone    Hypertension  Chronic, uncontrolled.  Latest blood pressure and vitals reviewed-   Temp:  [99.3 °F (37.4 °C)-102.1 °F (38.9 °C)]   Pulse:  []   Resp:  [16-37]   BP: ()/(53-96)   SpO2:  [88 %-99 %] .   Home meds for hypertension were reviewed and noted below.     While in the hospital, will manage blood pressure as follows; Continue home antihypertensive regimen, Nephrology consulted to resume HD inpatient    Will utilize p.r.n. blood pressure medication only if patient's blood pressure greater than  180/110 and she develops symptoms such as worsening chest pain or shortness of breath.    ESRD (end stage renal disease) on dialysis  Creatine stable for now. BMP reviewed- noted Estimated Creatinine Clearance: 7.6 mL/min (A) (based on SCr of 7.1 mg/dL (H)). according to latest data. Based on current GFR, CKD stage is end stage.  Monitor UOP and serial BMP and adjust therapy as needed. Renally dose meds. Avoid nephrotoxic medications and procedures. Nephrology consulted to resume HD inpatient.     AIDS (acquired immunodeficiency syndrome), CD4 <=200  Patient reports compliance with HIV medications. Last reported CD4 count 32 on 6/5/19.  Plan:  -continue home medications  -continue treatment of sepsis as noted above  -f/u ID outpatient as directed     Seizure disorder  Remains seizure free.  Plan:  -continue home medications  -seizure precautions  -ativan prn for breakthrough seizures     Anemia  Anemia is likely due to chronic disease due to ESRD. Most recent hemoglobin and hematocrit are listed below.  Recent Labs     04/30/25 1915   HGB 9.7*   HCT 29.5*     Plan  -Monitor serial CBC: Daily  -Transfuse PRBC if patient becomes hemodynamically unstable, symptomatic or H/H drops below 7/21.  -Patient has not  received any PRBC transfusions to date  -Patient's anemia is currently stable    History of VT/VF s/p implantation of automatic cardioverter/defibrillator (AICD)  Tachycardia improving in setting of sepsis and volume overload.   Plan:  -telemetry  -continue home medications  -continue treatment of CHF, ESRD, and sepsis      History of recurrent deep vein thrombosis (DVT)  Patient reports no longer taking eliquis    Parainfluenza virus infection  Supportive care      VTE Risk Mitigation (From admission, onward)           Ordered     apixaban tablet 5 mg  2 times daily         05/01/25 0449     IP VTE HIGH RISK PATIENT  Once         04/30/25 2123     Place sequential compression device  Until discontinued         04/30/25 2123                    Discharge Planning   KOFFI:      Code Status: Full Code   Medical Readiness for Discharge Date:   Discharge Plan A: Home                        Von Landers MD  Department of Hospital Medicine   '[facility]

## 2025-05-01 NOTE — H&P
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 Collected: 04/30/25 1854    Order Status: Completed Specimen: Nasopharyngeal Swab Updated: 04/30/25 1932     INFLUENZA A MOLECULAR Negative     INFLUENZA B MOLECULAR  Negative    Blood culture x two cultures. Draw prior to antibiotics. [[id number]] Collected: 04/30/25 1921    Order Status: Sent Specimen: Blood from Peripheral, Hand, Right Updated: 04/30/25 1924    Blood culture x two cultures. Draw prior to antibiotics. [[id number]] Collected: 04/30/25 1912    Order Status: Sent Specimen: Blood from Peripheral, Forearm, Right Updated: 04/30/25 1924    Culture, Respiratory with Gram Stain [[id number]]     Order Status: Sent Specimen: Respiratory                Shaun Salcido MD  Department of Hospital Medicine  '[place] [facility]          "

## 2025-05-01 NOTE — ASSESSMENT & PLAN NOTE
Chronic, uncontrolled.  Latest blood pressure and vitals reviewed-   Temp:  [99.3 °F (37.4 °C)-102.1 °F (38.9 °C)]   Pulse:  [101-132]   Resp:  [16-37]   BP: (145-192)/(66-96)   SpO2:  [88 %-99 %] .   Home meds for hypertension were reviewed and noted below.     While in the hospital, will manage blood pressure as follows; Continue home antihypertensive regimen, Nephrology consulted to resume HD inpatient    Will utilize p.r.n. blood pressure medication only if patient's blood pressure greater than  180/110 and she develops symptoms such as worsening chest pain or shortness of breath.

## 2025-05-01 NOTE — ASSESSMENT & PLAN NOTE
Patient presented with worsening dyspnea/shortness a breath likely multifactorial in nature given underlying respiratory panel positive for parainfluenza V3, volume overload in setting of ESRD and CHF, as well as sepsis with possible pneumonia noted on imaging.  Flu/COVID negative.  BNP elevated at 26 95.  Troponin 0.070 in setting of sepsis and volume overload likely due to demand ischemia.  Lactic acid within normal limits.  Procalcitonin 0.82.  Patient initiated on sepsis protocol with broad-spectrum antibiotics, administered supplemental oxygen, duo nebulizer treatments, IV Lasix, and currently awaiting further evaluation/recommendations by Nephrology with presumed HD to follow in the morning.  Plan:  -Telemetry  -Continue treatment as noted below  Wean as able  Breathing treatments prn

## 2025-05-01 NOTE — ASSESSMENT & PLAN NOTE
Patient with Hypoxic Respiratory failure which is Acute.  she is not on home oxygen. Supplemental oxygen was provided and noted-      Signs/symptoms of respiratory failure include- tachypnea, increased work of breathing, and respiratory distress. Contributing diagnoses includes - CHF, Pleural effusion, and Pneumonia Labs and images were reviewed. Patient Has not had a recent ABG. Will treat underlying causes and adjust management of respiratory failure as follows-  Patient with history of COPD/Asthma currently/not currently on inhalers or home oxygen not presently in acute exacerbation and is with/without signs/symptoms of distress.   Patient currently saturating  % on room air  L/min.   Plan:  -Telemetry  -Continue home medications, titrate as needed  -Titrate oxygen requirements as needed  -Incentive spirometry   -Monitor pulse oximetry  -Cesar quiñonezn  -Droplet precautions   -Continue treatment of CHF and Sepsis as noted below

## 2025-05-01 NOTE — ASSESSMENT & PLAN NOTE
Creatine stable for now. BMP reviewed- noted Estimated Creatinine Clearance: 9.9 mL/min (A) (based on SCr of 5.4 mg/dL (H)). according to latest data. Based on current GFR, CKD stage is end stage.  Monitor UOP and serial BMP and adjust therapy as needed. Renally dose meds. Avoid nephrotoxic medications and procedures. Nephrology consulted to resume HD inpatient.

## 2025-05-01 NOTE — HOSPITAL COURSE
5/1 admitted for acute respiratory hypoxic failure 2/2 parainfluenza virus and volume overload in immunocompromised patient. Denies wearing supplemental oxygen at baseline. Procalcitonin mildly elevated, empirically treated with intravenous antibiotic(s). Nephrology consulted for maintenance dialysis.   Patient continued to complain of cough and panel all over.  She was seen by Nephrology and underwent her usual maintenance hemodialysis.  Her shortness a breath improved she remained on room air  .  Congestive heart failure was treated with hemodialysis, carvedilol, hydralazine and Lasix as she still has some urine output.  Patient was noted to have a CD4 count of 12 she was encouraged to follow up with her infectious disease physician Dr. Vides.  Patient is Respiratory infection panel returned for repair of influenza the treatment for which is supportive care.  As patient's O2 sats were stable and her respiratory status had markedly improved as well as her generalized aches and pains.  She was stable for discharge home.  Patient seen and examined on day of discharge and stable for discharge home.  She will be referred to a PCP.  She has been instructed to follow up with Dr. Vides for treatment of her aids and placed on prophylactic antibiotics atovaquone 750 mg/5 mL oral liquid 750 mg   She was encouraged to follow up at her hemodialysis unit and as she requested transfer to a unit closer to her home Case Management reached out to [place] [facility] to attempt to accommodate her wishes.

## 2025-05-01 NOTE — ASSESSMENT & PLAN NOTE
Anemia is likely due to chronic disease due to ESRD. Most recent hemoglobin and hematocrit are listed below.  Recent Labs     04/30/25  1915   HGB 9.7*   HCT 29.5*     Plan  -Monitor serial CBC: Daily  -Transfuse PRBC if patient becomes hemodynamically unstable, symptomatic or H/H drops below 7/21.  -Patient has not received any PRBC transfusions to date  -Patient's anemia is currently stable

## 2025-05-01 NOTE — PROGRESS NOTES
Pharmacokinetic Initial Assessment: IV Vancomycin    Assessment/Plan:    Initiate intravenous vancomycin with loading dose of 1000 mg once with subsequent doses when random concentrations are less than 20 mcg/mL  Desired empiric serum trough concentration is 15 to 20 mcg/mL  Draw vancomycin random level on 5/1 at 1400.  Pharmacy will continue to follow and monitor vancomycin.      Please contact pharmacy at extension [phone]  with any questions regarding this assessment.     Thank you for the consult,   Pepper Tam       Patient brief summary:  Wang Kebede is a 39 y.o. female initiated on antimicrobial therapy with IV Vancomycin for treatment of suspected lower respiratory infection    Drug Allergies:   Review of patient's allergies indicates:   Allergen Reactions    Iodine and iodide containing products Anaphylaxis     Pt states she coded last time she was administered contrast    Pneumococcal 23-chitra ps vaccine Anaphylaxis     Potential iodine containing    Bactrim [sulfamethoxazole-trimethoprim] Hives    Morphine Itching     Tolerates norco 2018       Actual Body Weight:   54.4 kg    Renal Function:   Estimated Creatinine Clearance: 9.9 mL/min (A) (based on SCr of 5.4 mg/dL (H)).,     Dialysis Method (if applicable):  N/A    CBC (last 72 hours):  Recent Labs   Lab Result Units 04/30/25  1915   WBC K/uL 5.85   HGB gm/dL 9.7*   HCT % 29.5*   Platelet Count K/uL 154   Lymph % % 9.7*   Mono % % 5.8   Eos % % 0.3   Basophil % % 0.7       Metabolic Panel (last 72 hours):  Recent Labs   Lab Result Units 04/30/25  1854 04/30/25  1915   Sodium mmol/L  --  134*   Potassium mmol/L  --  4.0   Chloride mmol/L  --  93*   CO2 mmol/L  --  27   Glucose mg/dL  --  92   Glucose, UA  1+*  --    BUN mg/dL  --  19   Creatinine mg/dL  --  5.4*   Urine Creatinine mg/dL 47.5  --    Albumin g/dL  --  3.7   Bilirubin Total mg/dL  --  0.7   ALP unit/L  --  58   AST unit/L  --  16   ALT unit/L  --  11   Magnesium  mg/dL   "--  2.0       Drug levels (last 3 results):  No results for input(s): "VANCOMYCINRA", "VANCORANDOM", "VANCOMYCINPE", "VANCOPEAK", "VANCOMYCINTR", "VANCOTROUGH" in the last 72 hours.    Microbiologic Results:  Microbiology Results (last 7 days)       Procedure Component Value Units Date/Time    Respiratory Infection Panel (PCR), Nasopharyngeal [[id number]]     Order Status: Sent Specimen: Nasopharyngeal Swab     Influenza A & B by Molecular [[id number]]  (Normal) Collected: 04/30/25 1854    Order Status: Completed Specimen: Nasopharyngeal Swab Updated: 04/30/25 1932     INFLUENZA A MOLECULAR Negative     INFLUENZA B MOLECULAR  Negative    Blood culture x two cultures. Draw prior to antibiotics. [[id number]] Collected: 04/30/25 1921    Order Status: Sent Specimen: Blood from Peripheral, Hand, Right Updated: 04/30/25 1924    Blood culture x two cultures. Draw prior to antibiotics. [[id number]] Collected: 04/30/25 1912    Order Status: Sent Specimen: Blood from Peripheral, Forearm, Right Updated: 04/30/25 1924    Culture, Respiratory with Gram Stain [[id number]]     Order Status: Sent Specimen: Respiratory             "

## 2025-05-01 NOTE — ASSESSMENT & PLAN NOTE
Patient reports compliance with HIV medications. Last reported CD4 count 32 on 6/5/19.  Plan:  -continue home medications  -continue treatment of sepsis as noted above  -f/u ID outpatient as directed

## 2025-05-01 NOTE — ED NOTES
Occupational Therapy  [facility] OCCUPATIONAL THERAPY EVALUATION - ACUTE     NAME: Ciera Lennon  : 3/5/1930 (79 y.o.)  MRN: [id number]  CODE STATUS: DNR-CCA  Room: N064/K032-72    Date of Service: 10/29/2021    Patient Diagnosis(es): Acute respiratory failure ([facility] [J96.00]  Lethargy [R53.83]  Hypoxia [R09.02]  Elevated troponin [R77.8]  AMS (altered mental status) [R41.82]   Chief Complaint   Patient presents with    Altered Mental Status     difficulty responding at home     Patient Active Problem List    Diagnosis Date Noted    Anginal equivalent ([facility]     Lethargy     AMS (altered mental status) 10/27/2021    Acute respiratory failure ([facility] 10/27/2021    Abnormality of gait and mobility 2021    Neurogenic bladder 2021    Bilateral hearing loss 2021    Left hemiparesis ([facility] 2021    Impaired gait and mobility dt R occipital CVA 2021    Aphasia     Acute CVA (cerebrovascular accident) ([facility] 2021    General weakness 09/15/2021    Hyperkalemia 09/15/2021    Anticoagulation adequate with anticoagulant therapy 09/15/2021    Arterial occlusion 2021    Gait abnormality dt PVD--right femoral artery occlusion 2021    CKD (chronic kidney disease) 2021    PVD (peripheral vascular disease) ([facility] 2021    Venous thrombosis of leg 2021    Hyponatremia 2021    Femoral artery occlusion (HCC) 2021    Syncope and collapse 2020    COPD exacerbation ([address] 2020    NSTEMI (non-ST elevated myocardial infarction) ([facility] 2019    Chest pain 2019    Hypotension 2019    SOB (shortness of breath) 2018    Chronic right shoulder pain 2018    COPD with acute exacerbation ([facility] 2018    Bronchitis 2016    CAD (coronary artery disease) 2015    HTN (hypertension)     Dyslipidemia     DM (diabetes mellitus) ([facility]     Hypothyroidism     History of tobacco abuse     Anemia Report received from VAUGHN Mcdaniel   Past Medical History:   Diagnosis Date    Anemia     CAD (coronary artery disease) 4/16/2015    [facility]     Dyslipidemia     History of tobacco abuse     HTN (hypertension)     Hypothyroidism     Non-ST elevation [facility] ([facility]STEMI) [facility]     Pacemaker 4/16/2015     Past Surgical History:   Procedure Laterality Date    CATARACT REMOVAL      MIDDLE EAR SURGERY Left 1998    \"they had to reset the bones\" after an infection        Restrictions  Restrictions/Precautions: Fall Risk     Safety Devices: Safety Devices  Safety Devices in place: Yes  Type of devices: All fall risk precautions in place   Initially in place: No    Subjective  Pre Treatment Pain Screening  Pain at present: 0  Scale Used: Numeric Score  Intervention List: Patient able to continue with treatment    Pain Reassessment:   Pain Assessment  Patient Currently in Pain: Denies  Pain Assessment: 0-10  Pain Level: 0       Prior Level of Function:  Social/Functional History  Lives With: Family (granddaughter)  Type of Home: House  Home Layout: Two level (14 steps)  Home Access: Stairs to enter with rails  Bathroom Shower/Tub: Tub/Shower unit  Home Equipment: Rolling walker  Receives Help From: Family  ADL Assistance: Independent  Homemaking Assistance: Independent  Homemaking Responsibilities: No  Ambulation Assistance: Independent  Transfer Assistance: Independent  Active : No  Occupation: Retired  Leisure & Hobbies: cooking    OBJECTIVE:     Orientation Status:  Orientation  Overall Orientation Status: Impaired  Orientation Level: Oriented to person    Observation:  Observation/Palpation  Posture: Fair  Observation: cooperative, no acute distress, agreeable to OT evaluation    Cognition Status:  Cognition  Overall Cognitive Status: Exceptions  Arousal/Alertness: Delayed responses to stimuli  Following Commands:  Follows one step commands with repetition  Attention Span: Appears intact  Memory: Decreased short term memory, Decreased long term memory  Safety Judgement: Decreased awareness of need for safety, Decreased awareness of need for assistance  Problem Solving: Assistance required to implement solutions, Assistance required to identify errors made, Assistance required to correct errors made, Assistance required to generate solutions  Insights: Decreased awareness of deficits  Initiation: Requires cues for some  Sequencing: Requires cues for some    Perception Status:  Perception  Overall Perceptual Status: WFL    Sensation Status:  Sensation  Overall Sensation Status: [facility]    Vision and Hearing Status:  Vision  Vision: Impaired  Vision Exceptions: Wears glasses at all times  Hearing  Hearing: Exceptions to [facility]  Hearing Exceptions: Hard of hearing/hearing concerns, No hearing aid     ROM:   LUE AROM (degrees)  LUE AROM : WFL  Left Hand AROM (degrees)  Left Hand AROM: WFL  RUE AROM (degrees)  RUE AROM : WFL  Right Hand AROM (degrees)  Right Hand AROM: WFL    Strength:  LUE Strength  Gross LUE Strength: Exceptions to [facility]  L Hand General: 3+/5  LUE Strength Comment: 3+/5 all planes  RUE Strength  Gross RUE Strength: Exceptions to [facility]  R Hand General: 3+/5  RUE Strength Comment: 3+/5 all planes    Coordination, Tone, Quality of Movement:    Tone RUE  RUE Tone: Normotonic  Tone LUE  LUE Tone: Normotonic  Coordination  Movements Are Fluid And Coordinated: No  Coordination and Movement description: Fine motor impairments, Decreased accuracy, Left UE, Right UE    Hand Dominance:  Hand Dominance  Hand Dominance: Right    ADL Status:  ADL  Feeding: Independent  Grooming: Setup  UE Bathing: Supervision  LE Bathing: Supervision  UE Dressing: Supervision  LE Dressing: Supervision  Toileting: Supervision  Additional Comments: Simulated ADLs as above besides toileting, pt completed while standing  Toilet Transfers  Toilet - Technique: Ambulating  Toilet Transfer: Stand by assistance       Therapy key for assistance levels -   Independent = Pt. is able to perform task with no assistance but may require a device   Stand by assistance = Pt. does not perform task at an independent level but does not need physical assistance, requires verbal cues  Minimal, Moderate, Maximal Assistance = Pt. requires physical assistance (25%, 50%, 75% assist from helper) for task but is able to actively participate in task   Dependent = Pt. requires total assistance with task and is not able to actively participate with task completion     Functional Mobility:  Functional Mobility  Functional - Mobility Device: Rolling Walker  Activity: To/from bathroom  Assist Level: Stand by assistance  Transfers  Sit to stand: Supervision  Stand to sit: Supervision    Bed Mobility  Bed mobility  Supine to Sit: Stand by assistance  Sit to Supine: Stand by assistance    Seated and Standing Balance:  Balance  Sitting Balance: Modified independent   Standing Balance: Supervision    Functional Endurance:  Activity Tolerance  Activity Tolerance: Patient Tolerated treatment well    D/C Recommendations:  OT D/C RECOMMENDATIONS  REQUIRES OT FOLLOW UP: Yes    Equipment Recommendations:  OT Equipment Recommendations  Other: continue to assess    OT Education:   OT Education  OT Education: OT Role    OT Follow Up:  OT D/C RECOMMENDATIONS  REQUIRES OT FOLLOW UP: Yes       Assessment/Discharge Disposition:  Assessment: Pt is a 78 y/o male from home with granddaughter who [facility] to [facility] with fatigue and the above deficits which impact his independence and safety during ADLs and IADLs. Pt would benefit from OT services to maximize independence during ADLs and IADLs.   Performance deficits / Impairments: Decreased functional mobility , Decreased strength, Decreased endurance, Decreased ADL status, Decreased balance, Decreased fine motor control, Decreased cognition  Discharge Recommendations: Continue to assess pending progress  Decision Making: Medium Complexity  History: Pt's medical history is complex  Exam: 7 performance deficits  Assistance / Modification: SBA    Six Click Score   How much help for putting on and taking off regular lower body clothing?: A Little  How much help for Bathing?: A Little  How much help for Toileting?: A Little  How much help for putting on and taking off regular upper body clothing?: A Little  How much help for taking care of personal grooming?: A Little  How much help for eating meals?: None  AM-PAC Inpatient Daily Activity Raw Score: 19  AM-PAC Inpatient ADL T-Scale Score : 40.22  ADL Inpatient CMS 0-100% Score: 42.8    Plan:  Plan  Times per week: 1-3x  Plan weeks: length of acute stay  Current Treatment Recommendations: Strengthening, Functional Mobility Training, Cognitive/Perceptual Training, Equipment Evaluation, Education, & procurement, Endurance Training, Balance Training, Neuromuscular Re-education, Self-Care / ADL, Patient/Caregiver Education & Training    Goals:   Patient will:    - Improve functional endurance to tolerate/complete 30 mins of ADL's  - Be Mod I in UB ADLs   - Be Mod I in LB ADLs  - Be Mod I in ADL transfers without LOB  - Be Mod I in toileting tasks  - Improve B hand fine motor coordination to [facility] in order to manage clothing fasteners/self-care containers in a timely manner  - Improve B UE strength and endurance to [facility] in order to participate in self-care activities as projected. - Access appropriate D/C site with as few architectural barriers as possible.   - Sequence self-care tasks with no verbal cues    Patient Goal: Patient goals : \"to go home\"     Discussed and agreed upon: Yes Comments:     Therapy Time:   OT Individual Minutes  Time In: 6658  Time Out: 1430  Minutes: 10    Eval: 10 minutes     Electronically signed by:    Nu Umanzor OT, OTR/L  10/29/2021, 2:44 PM

## 2025-05-01 NOTE — ASSESSMENT & PLAN NOTE
Patient has preserved heart failure that is Acute on chronic. On presentation their CHF was decompensated. Evidence of decompensated CHF on presentation includes: orthopnea, paroxysmal nocturnal dyspnea (PND), dyspnea on exertion (SANTOS), and shortness of breath. The etiology of their decompensation is likely increased fluid intake and viral syndrome. Most recent BNP and echo results are listed below.  Recent Labs     04/30/25 1915   BNP 2,695*     Latest ECHO  No results found for this or any previous visit.    Current Heart Failure Medications  carvediloL tablet 25 mg, 2 times daily, Oral  hydrALAZINE injection 10 mg, Every 6 hours PRN, Intravenous  hydrALAZINE tablet 50 mg, Every 8 hours, Oral  furosemide tablet 80 mg, Daily, Oral    Plan  -Monitor strict I&Os and daily weights.    -Place on telemetry  -Low sodium diet  -Place on fluid restriction of 1.5 L.   -Cardiology has not been consulted  -The patient's volume status is stable but not at their baseline as indicated by orthopnea, paroxysmal nocturnal dyspnea (PND), dyspnea on exertion (SANTOS), and shortness of breath  -Continue home medications  -Continue treatment of sepsis  -Nephrology consulted to resume HD inpatient

## 2025-05-01 NOTE — ASSESSMENT & PLAN NOTE
"This patient does have evidence of infective focus  My overall impression is sepsis.  Source: Respiratory  Antibiotics given-   Antibiotics (72h ago, onward)      Start     Stop Route Frequency Ordered    05/01/25 0900  dapsone tablet 50 mg         -- Oral Daily 05/01/25 0450    04/30/25 2115  ceFEPIme injection 1 g         -- IV Every 24 hours (non-standard times) 04/30/25 2014 04/30/25 2009  vancomycin - pharmacy to dose  (vancomycin IVPB (PEDS and ADULTS))        Placed in "And" Linked Group    -- IV pharmacy to manage frequency 04/30/25 1909          Latest lactate reviewed-  Recent Labs   Lab 04/30/25  1915   LACTATE 0.9     Organ dysfunction indicated by Acute respiratory failure    Fluid challenge Contraindicated- Fluid bolus is contraindicated in this patient due to Volume overload due to- CHF and ESRD     Post- resuscitation assessment No - Post resuscitation assessment not needed     Will Not start Pressors- Levophed for MAP of 65    Source control achieved by:  Vancomycin, Cefepime, and Dapsone    "

## 2025-05-01 NOTE — PLAN OF CARE
Critical access hospital - [facility]  Initial Discharge Assessment       Primary Care Provider: Levi Moncada MD    Admission Diagnosis: Screening for cardiovascular condition [Z13.6]  SOB (shortness of breath) [R06.02]  ESRD on dialysis [N18.6, Z99.2]  Chest pain [R07.9]  Hypertensive emergency [I16.1]  Pulmonary edema w/congestive heart failure w/preserved LV function [I50.1]  Severe sepsis [A41.9, R65.20]  Pneumonia of both lower lobes due to infectious organism [J18.9]    Admission Date: 4/30/2025  Expected Discharge Date: Per Attending     Transition of Care Barriers: None    Payor: [facility] MCARE / Plan: [facility] DUAL COMPLETE HMO SNP / Product Type: Medicare Advantage /     Extended Emergency Contact Information  Primary Emergency Contact: Lyssa Kebede   [place]  Mobile Phone: [phone]  Relation: Mother    Discharge Plan A: Home         [id number] - [place] - [address] AT [facility]  [address]  [place] [address]  Phone: [phone] Fax: [phone]      Initial Assessment (most recent)       Adult Discharge Assessment - 05/01/25 0952          Discharge Assessment    Assessment Type Discharge Planning Assessment     Confirmed/corrected address, phone number and insurance Yes     Confirmed Demographics Correct on Facesheet     Source of Information patient     Communicated KOFFI with patient/caregiver Date not available/Unable to determine     Reason For Admission SOB     People in Home alone     Do you expect to return to your current living situation? Yes     Do you have help at home or someone to help you manage your care at home? Yes     Who are your caregiver(s) and their phone number(s)? family     Prior to hospitilization cognitive status: Alert/Oriented     Current cognitive status: Alert/Oriented     Walking or Climbing Stairs Difficulty no     Dressing/Bathing Difficulty no     Home Layout Able to live on 1st floor      Equipment Currently Used at Home none     Readmission within 30 days? No     Patient currently being followed by outpatient case management? No     Do you currently have service(s) that help you manage your care at home? No     Do you take prescription medications? Yes     Do you have prescription coverage? Yes     Coverage [facility] Medicare     Do you have any problems affording any of your prescribed medications? No     Is the patient taking medications as prescribed? yes     Who is going to help you get home at discharge? self; car in parking lot     How do you get to doctors appointments? car, drives self     Are you on dialysis? Yes     Dialysis Name and Scheduled days [facility] on MWF     Do you take coumadin? No     Discharge Plan A Home     DME Needed Upon Discharge  none     Discharge Plan discussed with: Patient     Transition of Care Barriers None                   Anticipated DC dispo: Home   Prior Level of Function: Independent   People in home:  N/A     Comments:  CM met with patient at bedside to introduce role and discuss discharge planning. CM discharge needs depends on hospital progress. CM will continue following to assist with other needs.

## 2025-05-02 LAB
ABSOLUTE EOSINOPHIL (OHS): 0.28 K/UL
ABSOLUTE MONOCYTE (OHS): 0.41 K/UL (ref 0.3–1)
ABSOLUTE NEUTROPHIL COUNT (OHS): 5.33 K/UL (ref 1.8–7.7)
BASOPHILS # BLD AUTO: 0.02 K/UL
BASOPHILS NFR BLD AUTO: 0.3 %
CD3+CD4+ CELLS # SPEC: 12 CELLS/UL (ref 300–1400)
CD3+CD4+ CELLS NFR BLD: 12.55 % (ref 28–57)
ERYTHROCYTE [DISTWIDTH] IN BLOOD BY AUTOMATED COUNT: 14.5 % (ref 11.5–14.5)
HCT VFR BLD AUTO: 32.3 % (ref 37–48.5)
HGB BLD-MCNC: 10.3 GM/DL (ref 12–16)
IMM GRANULOCYTES # BLD AUTO: 0.03 K/UL (ref 0–0.04)
IMM GRANULOCYTES NFR BLD AUTO: 0.5 % (ref 0–0.5)
LABORATORY COMMENT REPORT: ABNORMAL
LYMPHOCYTES # BLD AUTO: 0.34 K/UL (ref 1–4.8)
MCH RBC QN AUTO: 34.3 PG (ref 27–31)
MCHC RBC AUTO-ENTMCNC: 31.9 G/DL (ref 32–36)
MCV RBC AUTO: 108 FL (ref 82–98)
NUCLEATED RBC (/100WBC) (OHS): 0 /100 WBC
PLATELET # BLD AUTO: 120 K/UL (ref 150–450)
PMV BLD AUTO: 10.9 FL (ref 9.2–12.9)
RBC # BLD AUTO: 3 M/UL (ref 4–5.4)
RELATIVE EOSINOPHIL (OHS): 4.4 %
RELATIVE LYMPHOCYTE (OHS): 5.3 % (ref 18–48)
RELATIVE MONOCYTE (OHS): 6.4 % (ref 4–15)
RELATIVE NEUTROPHIL (OHS): 83.1 % (ref 38–73)
VANCOMYCIN SERPL-MCNC: 20.9 UG/ML (ref ?–80)
WBC # BLD AUTO: 6.41 K/UL (ref 3.9–12.7)

## 2025-05-02 PROCEDURE — 25000003 PHARM REV CODE 250: Performed by: EMERGENCY MEDICINE

## 2025-05-02 PROCEDURE — 21400001 HC TELEMETRY ROOM

## 2025-05-02 PROCEDURE — 25000003 PHARM REV CODE 250: Performed by: NURSE PRACTITIONER

## 2025-05-02 PROCEDURE — 36415 COLL VENOUS BLD VENIPUNCTURE: CPT | Performed by: HOSPITALIST

## 2025-05-02 PROCEDURE — 94761 N-INVAS EAR/PLS OXIMETRY MLT: CPT

## 2025-05-02 PROCEDURE — 63600175 PHARM REV CODE 636 W HCPCS: Performed by: EMERGENCY MEDICINE

## 2025-05-02 PROCEDURE — 25000242 PHARM REV CODE 250 ALT 637 W/ HCPCS: Performed by: INTERNAL MEDICINE

## 2025-05-02 PROCEDURE — 90935 HEMODIALYSIS ONE EVALUATION: CPT

## 2025-05-02 PROCEDURE — 25000003 PHARM REV CODE 250: Performed by: HOSPITALIST

## 2025-05-02 PROCEDURE — 85025 COMPLETE CBC W/AUTO DIFF WBC: CPT | Performed by: HOSPITALIST

## 2025-05-02 PROCEDURE — 25000003 PHARM REV CODE 250: Performed by: INTERNAL MEDICINE

## 2025-05-02 PROCEDURE — 99900035 HC TECH TIME PER 15 MIN (STAT)

## 2025-05-02 PROCEDURE — 99233 SBSQ HOSP IP/OBS HIGH 50: CPT | Mod: NSCH,,, | Performed by: INTERNAL MEDICINE

## 2025-05-02 PROCEDURE — 36415 COLL VENOUS BLD VENIPUNCTURE: CPT | Performed by: INTERNAL MEDICINE

## 2025-05-02 PROCEDURE — 94799 UNLISTED PULMONARY SVC/PX: CPT

## 2025-05-02 PROCEDURE — 80202 ASSAY OF VANCOMYCIN: CPT | Performed by: HOSPITALIST

## 2025-05-02 PROCEDURE — 94640 AIRWAY INHALATION TREATMENT: CPT

## 2025-05-02 PROCEDURE — 27000221 HC OXYGEN, UP TO 24 HOURS

## 2025-05-02 PROCEDURE — 63600175 PHARM REV CODE 636 W HCPCS: Performed by: INTERNAL MEDICINE

## 2025-05-02 PROCEDURE — 27000646 HC AEROBIKA DEVICE

## 2025-05-02 PROCEDURE — 99233 SBSQ HOSP IP/OBS HIGH 50: CPT | Mod: ,,, | Performed by: INTERNAL MEDICINE

## 2025-05-02 PROCEDURE — 25000003 PHARM REV CODE 250: Performed by: FAMILY MEDICINE

## 2025-05-02 PROCEDURE — 87449 NOS EACH ORGANISM AG IA: CPT | Performed by: INTERNAL MEDICINE

## 2025-05-02 PROCEDURE — 94664 DEMO&/EVAL PT USE INHALER: CPT

## 2025-05-02 RX ORDER — PROMETHAZINE HYDROCHLORIDE AND CODEINE PHOSPHATE 6.25; 1 MG/5ML; MG/5ML
5 SOLUTION ORAL EVERY 6 HOURS PRN
Status: DISCONTINUED | OUTPATIENT
Start: 2025-05-02 | End: 2025-05-03 | Stop reason: HOSPADM

## 2025-05-02 RX ORDER — BENZONATATE 100 MG/1
100 CAPSULE ORAL 3 TIMES DAILY
Status: DISCONTINUED | OUTPATIENT
Start: 2025-05-02 | End: 2025-05-03 | Stop reason: HOSPADM

## 2025-05-02 RX ORDER — BUDESONIDE 0.5 MG/2ML
0.5 INHALANT ORAL EVERY 12 HOURS
Status: DISCONTINUED | OUTPATIENT
Start: 2025-05-02 | End: 2025-05-03 | Stop reason: HOSPADM

## 2025-05-02 RX ORDER — GUAIFENESIN 600 MG/1
600 TABLET, EXTENDED RELEASE ORAL 2 TIMES DAILY
Status: DISCONTINUED | OUTPATIENT
Start: 2025-05-02 | End: 2025-05-03 | Stop reason: HOSPADM

## 2025-05-02 RX ADMIN — CARVEDILOL 25 MG: 12.5 TABLET, FILM COATED ORAL at 08:05

## 2025-05-02 RX ADMIN — GUAIFENESIN 600 MG: 600 TABLET, EXTENDED RELEASE ORAL at 12:05

## 2025-05-02 RX ADMIN — BENZONATATE 100 MG: 100 CAPSULE ORAL at 12:05

## 2025-05-02 RX ADMIN — BUDESONIDE INHALATION 0.5 MG: 0.5 SUSPENSION RESPIRATORY (INHALATION) at 09:05

## 2025-05-02 RX ADMIN — SEVELAMER CARBONATE 2.4 G: 800 POWDER, FOR SUSPENSION ORAL at 08:05

## 2025-05-02 RX ADMIN — CEFEPIME 1 G: 1 INJECTION, POWDER, FOR SOLUTION INTRAMUSCULAR; INTRAVENOUS at 09:05

## 2025-05-02 RX ADMIN — VANCOMYCIN HYDROCHLORIDE 500 MG: 500 INJECTION, POWDER, LYOPHILIZED, FOR SOLUTION INTRAVENOUS at 03:05

## 2025-05-02 RX ADMIN — HYDROCODONE BITARTRATE AND ACETAMINOPHEN 1 TABLET: 10; 325 TABLET ORAL at 09:05

## 2025-05-02 RX ADMIN — Medication 6 MG: at 11:05

## 2025-05-02 RX ADMIN — OXYCODONE HYDROCHLORIDE 5 MG: 5 TABLET ORAL at 12:05

## 2025-05-02 RX ADMIN — FUROSEMIDE 80 MG: 40 TABLET ORAL at 08:05

## 2025-05-02 RX ADMIN — PANTOPRAZOLE SODIUM 40 MG: 40 TABLET, DELAYED RELEASE ORAL at 08:05

## 2025-05-02 RX ADMIN — LEVETIRACETAM 500 MG: 500 TABLET, FILM COATED ORAL at 08:05

## 2025-05-02 NOTE — ASSESSMENT & PLAN NOTE
"Patient has a diagnosis of pneumonia. The cause of the pneumonia is viral in etiology due to  parainfluenza. The pneumonia is stable. The patient has the following signs/symptoms of pneumonia: cough. The patient does not have a current oxygen requirement and the patient does not have a home oxygen requirement. I have reviewed the pertinent imaging. The following cultures have been collected: Blood cultures The culture results are listed below.     Current antimicrobial regimen consists of the antibiotics listed below. Will monitor patient closely and continue current treatment plan unchanged.    Antibiotics (From admission, onward)      Start     Stop Route Frequency Ordered    05/01/25 0900  dapsone tablet 50 mg         -- Oral Daily 05/01/25 0450    04/30/25 2115  ceFEPIme injection 1 g         -- IV Every 24 hours (non-standard times) 04/30/25 2014 04/30/25 2009  vancomycin - pharmacy to dose  (vancomycin IVPB (PEDS and ADULTS))        Placed in "And" Linked Group    -- IV pharmacy to manage frequency 04/30/25 1909            Microbiology Results (last 7 days)       Procedure Component Value Units Date/Time    Blood culture x two cultures. Draw prior to antibiotics. [[id number]]  (Normal) Collected: 04/30/25 1912    Order Status: Completed Specimen: Blood from Peripheral, Forearm, Right Updated: 05/02/25 1602     Blood Culture No Growth After 36 Hours    Blood culture x two cultures. Draw prior to antibiotics. [[id number]]  (Normal) Collected: 04/30/25 1921    Order Status: Completed Specimen: Blood from Peripheral, Hand, Right Updated: 05/02/25 1602     Blood Culture No Growth After 36 Hours    Respiratory Infection Panel (PCR), Nasopharyngeal [[id number]]  (Abnormal) Collected: 04/30/25 2031    Order Status: Completed Specimen: Nasopharyngeal Swab Updated: 04/30/25 2130     Respiratory Infection Panel Source Nasopharyngeal Swab     Adenovirus Not Detected     Coronavirus 229E, Common Cold Virus Not Detected "     Coronavirus HKU1, Common Cold Virus Not Detected     Coronavirus NL63, Common Cold Virus Not Detected     Coronavirus OC43, Common Cold Virus Not Detected     SARS-CoV2 (COVID-19) Qualitative PCR Not Detected     Human Metapneumovirus Not Detected     Human Rhinovirus/Enterovirus Not Detected     Influenza A Not Detected     Influenza B Not Detected     Parainfluenza Virus 1 Not Detected     Parainfluenza Virus 2 Not Detected     Parainfluenza Virus 3 Detected     Parainfluenza Virus 4 Not Detected     Respiratory Syncytial Virus Not Detected     Bordetella Parapertussis (DK8908) Not Detected     Bordetella pertussis (ptxP) Not Detected     Chlamydia pneumoniae Not Detected     Mycoplasma pneumoniae Not Detected    Influenza A & B by Molecular [[id number]]  (Normal) Collected: 04/30/25 1854    Order Status: Completed Specimen: Nasopharyngeal Swab Updated: 04/30/25 1932     INFLUENZA A MOLECULAR Negative     INFLUENZA B MOLECULAR  Negative    Culture, Respiratory with Gram Stain [[id number]]     Order Status: Sent Specimen: Respiratory

## 2025-05-02 NOTE — ASSESSMENT & PLAN NOTE
Patient with Hypoxic Respiratory failure which is Acute.  she is not on home oxygen. Supplemental oxygen was provided and noted- Oxygen Concentration (%):  [28] 28    Signs/symptoms of respiratory failure include- tachypnea, increased work of breathing, and respiratory distress. Contributing diagnoses includes - CHF, Pleural effusion, and Pneumonia Labs and images were reviewed. Patient Has not had a recent ABG. Will treat underlying causes and adjust management of respiratory failure as follows-  Patient with history of COPD/Asthma currently/not currently on inhalers or home oxygen not presently in acute exacerbation and is with/without signs/symptoms of distress.   Patient currently on room air    Plan:  -Telemetry  -Continue home medications, titrate as needed  -Titrate oxygen requirements as needed  -Incentive spirometry   -Monitor pulse oximetry  -Cesar prn  -Continue treatment of CHF and Sepsis as noted below

## 2025-05-02 NOTE — ASSESSMENT & PLAN NOTE
History of HIV and AIDS.  Noted very low CD4 count  Doubt patient is taking her HAART meds  Says is taking Juluca under dr. Vides's care

## 2025-05-02 NOTE — PROGRESS NOTES
Pharmacokinetic Assessment Follow Up: IV Vancomycin    Vancomycin serum concentration assessment(s):    The random level was drawn correctly and can be used to guide therapy at this time. The measurement is within the desired definitive target range of 15 to 20 mcg/mL.    Vancomycin Regimen Plan:    Re-dose when the random level is less than 20 mcg/mL, next level to be drawn at 0600 on 5/5/25    Drug levels (last 3 results):  Recent Labs   Lab Result Units 05/02/25  0600   Vancomycin Random ug/ml 20.9       Pharmacy will continue to follow and monitor vancomycin.    Please contact pharmacy at extension [phone] for questions regarding this assessment.    Thank you for the consult,   Raine Fuller       Patient brief summary:  Wang Kebede is a 39 y.o. female initiated on antimicrobial therapy with IV Vancomycin for treatment of pneumonia    The patient's current regimen is pulse dosing    Drug Allergies:   Review of patient's allergies indicates:   Allergen Reactions    Iodine and iodide containing products Anaphylaxis     Pt states she coded last time she was administered contrast    Pneumococcal 23-chitra ps vaccine Anaphylaxis     Potential iodine containing    Bactrim [sulfamethoxazole-trimethoprim] Hives    Morphine Itching     Tolerates norco 2018       Actual Body Weight:   54.4 kg    Renal Function:   Estimated Creatinine Clearance: 7.6 mL/min (A) (based on SCr of 7.1 mg/dL (H)).,     Dialysis Method (if applicable):  intermittent HD on mwf    CBC (last 72 hours):  Recent Labs   Lab Result Units 04/30/25  0000 04/30/25  1915 05/02/25  0600   WBC K/uL  --  5.85 6.41   Hemoglobin g/dL 9.1*  --   --    HGB gm/dL  --  9.7* 10.3*   Hemoglobin x 3 % 27.3*  --   --    HCT %  --  29.5* 32.3*   Platelet Count K/uL  --  154 120*   Lymph % %  --  9.7* 5.3*   Mono % %  --  5.8 6.4   Eos % %  --  0.3 4.4   Basophil % %  --  0.7 0.3       Metabolic Panel (last 72 hours):  Recent Labs   Lab Result Units  04/30/25 1854 04/30/25 1915 05/01/25  0708   Sodium mmol/L  --  134* 136   Potassium mmol/L  --  4.0 4.3   Chloride mmol/L  --  93* 95   CO2 mmol/L  --  27 23   Glucose mg/dL  --  92 83   Glucose, UA  1+*  --   --    BUN mg/dL  --  19 30*   Creatinine mg/dL  --  5.4* 7.1*   Urine Creatinine mg/dL 47.5  --   --    Albumin g/dL  --  3.7  --    Bilirubin Total mg/dL  --  0.7  --    ALP unit/L  --  58  --    AST unit/L  --  16  --    ALT unit/L  --  11  --    Magnesium  mg/dL  --  2.0  --        Vancomycin Administrations:  vancomycin given in the last 96 hours                     vancomycin (VANCOCIN) 1,000 mg in 0.9% NaCl 250 mL IVPB (admixture device) (mg) 1,000 mg New Bag 04/30/25 1951                    Microbiologic Results:  Microbiology Results (last 7 days)       Procedure Component Value Units Date/Time    Blood culture x two cultures. Draw prior to antibiotics. [[id number]]  (Normal) Collected: 04/30/25 1912    Order Status: Completed Specimen: Blood from Peripheral, Forearm, Right Updated: 05/02/25 0417     Blood Culture No Growth After 24 Hours    Blood culture x two cultures. Draw prior to antibiotics. [[id number]]  (Normal) Collected: 04/30/25 1921    Order Status: Completed Specimen: Blood from Peripheral, Hand, Right Updated: 05/02/25 0417     Blood Culture No Growth After 24 Hours    Respiratory Infection Panel (PCR), Nasopharyngeal [[id number]]  (Abnormal) Collected: 04/30/25 2031    Order Status: Completed Specimen: Nasopharyngeal Swab Updated: 04/30/25 2130     Respiratory Infection Panel Source Nasopharyngeal Swab     Adenovirus Not Detected     Coronavirus 229E, Common Cold Virus Not Detected     Coronavirus HKU1, Common Cold Virus Not Detected     Coronavirus NL63, Common Cold Virus Not Detected     Coronavirus OC43, Common Cold Virus Not Detected     SARS-CoV2 (COVID-19) Qualitative PCR Not Detected     Human Metapneumovirus Not Detected     Human Rhinovirus/Enterovirus Not Detected      Influenza A Not Detected     Influenza B Not Detected     Parainfluenza Virus 1 Not Detected     Parainfluenza Virus 2 Not Detected     Parainfluenza Virus 3 Detected     Parainfluenza Virus 4 Not Detected     Respiratory Syncytial Virus Not Detected     Bordetella Parapertussis (PV3841) Not Detected     Bordetella pertussis (ptxP) Not Detected     Chlamydia pneumoniae Not Detected     Mycoplasma pneumoniae Not Detected    Influenza A & B by Molecular [[id number]]  (Normal) Collected: 04/30/25 1854    Order Status: Completed Specimen: Nasopharyngeal Swab Updated: 04/30/25 1932     INFLUENZA A MOLECULAR Negative     INFLUENZA B MOLECULAR  Negative    Culture, Respiratory with Gram Stain [[id number]]     Order Status: Sent Specimen: Respiratory         Raine Fuller [facility] 5/2/2025 8:05 AM
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
## 2025-05-02 NOTE — ASSESSMENT & PLAN NOTE
We will follow urine for strep antigen, Legionella antigen.  On empiric cefepime and vancomycin.  Would check Fungitell.  PJP PCR in sputum

## 2025-05-02 NOTE — ASSESSMENT & PLAN NOTE
ESRD pt, on HD x 2 years, currently at [facility], q MWF  S/p HD well.  Tolerated HD well, continue routine HD  H/o of HTN and HIV  Patient is requesting transfer from [facility] to a HD unit closer to her home in [place]. Will refer to [id number].    Vascular:  Noted has leg AV fistula.  Vascular surgeon is Dr. Petty.  In asking the pt why she does not have a conventional UE AVF, pt responded that she was concerned about the cosmetic effect on the arms, [place].  Increased risk of infections with lower extremity accesses mentioned to the pt.

## 2025-05-02 NOTE — PROGRESS NOTES
[facility] Surg  Nephrology  Progress Note    Patient Name: Wang Kebede  MRN: [id number]  Admission Date: 4/30/2025  Hospital Length of Stay: 2 days  Attending Provider: Rosy Paige MD   Primary Care Physician: Levi Moncada MD  Principal Problem:Parainfluenza virus infection    Subjective:     HPI: noted    Interval History: Pt was seen and examined. Labs and meds reviewed. Discussed with other providers. Pt is a 40 y/o female with h/o of ESRD, on HD since May 2022, currently q MWF at [facility], and h/o of h/o of HIV/AIDS (under Dr. Vides's care), vulvar cancer, sickle cell disease, cervical cancer, chronic pain syndrome, who presented with vague nonspecific symptoms related to muscle and joint pain.  Per ID workup, she has parainfluenza virus 3.  Patient was seen earlier today on HD.  She also received dialysis yesterday.  She tolerated dialysis mostly well, though she asked to be taken off early.    On other issues noted that she lives in [place] but she dialyzes at [facility] which is quite a distance away.  Pt says that she likes the people in [facility], however she also complains that her transplant evaluation has been delayed.  She does not like the distance to [facility].  Discussed possibility of transfer to the on HD unit closer to her home.    Review of patient's allergies indicates:   Allergen Reactions    Iodine and iodide containing products Anaphylaxis     Pt states she coded last time she was administered contrast    Pneumococcal 23-chitra ps vaccine Anaphylaxis     Potential iodine containing    Bactrim [sulfamethoxazole-trimethoprim] Hives    Morphine Itching     Tolerates norco 2018     Current Facility-Administered Medications   Medication Frequency    acetaminophen suppository 650 mg Q6H PRN    acetaminophen tablet 650 mg Q6H PRN    albuterol nebulizer solution 2.5 mg Q6H PRN    aluminum-magnesium hydroxide-simethicone 200-200-20 mg/5 mL suspension 30 mL QID PRN    apixaban tablet 5 mg  BID    benzonatate capsule 100 mg TID    budesonide nebulizer solution 0.5 mg Q12H    carvediloL tablet 25 mg BID    ceFEPIme injection 1 g Q24H    dapsone tablet 50 mg Daily    dextrose 50% injection 12.5 g PRN    dextrose 50% injection 25 g PRN    furosemide tablet 80 mg Daily    glucagon (human recombinant) injection 1 mg PRN    glucose chewable tablet 16 g PRN    glucose chewable tablet 24 g PRN    guaiFENesin 12 hr tablet 600 mg BID    hydrALAZINE injection 10 mg Q6H PRN    hydrALAZINE tablet 50 mg Q8H    HYDROcodone-acetaminophen  mg per tablet 1 tablet Q6H PRN    levETIRAcetam tablet 500 mg BID    levothyroxine tablet 25 mcg Before breakfast    melatonin tablet 6 mg Nightly PRN    naloxone 0.4 mg/mL injection 0.02 mg PRN    ondansetron injection 4 mg Q8H PRN    oxyCODONE immediate release tablet 5 mg Q6H PRN    pantoprazole EC tablet 40 mg Daily    polyethylene glycol packet 17 g Daily PRN    promethazine tablet 25 mg Q6H PRN    promethazine-codeine 6.25-10 mg/5 ml syrup 5 mL Q6H PRN    sevelamer carbonate pwpk 2.4 g TID WM    simethicone chewable tablet 80 mg QID PRN    sodium chloride 0.9% flush 3 mL Q12H PRN    vancomycin (VANCOCIN) 500 mg in D5W 100 mL IVPB (MB+) Once    vancomycin - pharmacy to dose pharmacy to manage frequency       Objective:     Vital Signs (Most Recent):  Temp: 99.1 °F (37.3 °C) (05/02/25 0754)  Pulse: (!) 112 (05/02/25 1548)  Resp: 18 (05/02/25 1544)  BP: (!) 120/58 (05/02/25 1400)  SpO2: (!) 92 % (05/02/25 0947) Vital Signs (24h Range):  Temp:  [98.6 °F (37 °C)-99.3 °F (37.4 °C)] 99.1 °F (37.3 °C)  Pulse:  [] 112  Resp:  [18-20] 18  SpO2:  [91 %-95 %] 92 %  BP: ()/(51-60) 120/58     Weight: 54.4 kg (120 lb) (04/30/25 4685)  Body mass index is 23.44 kg/m².  Body surface area is 1.52 meters squared.    I/O last 3 completed shifts:  In: 750 [Other:500; IV Piggyback:250]  Out: 1500 [Other:1500]     Physical Exam  Vitals and nursing note reviewed.   Constitutional:        Appearance: Normal appearance.   HENT:      Head: Normocephalic and atraumatic.   Cardiovascular:      Rate and Rhythm: Normal rate and regular rhythm.      Pulses: Normal pulses.      Heart sounds: Normal heart sounds.   Pulmonary:      Effort: Pulmonary effort is normal.      Breath sounds: Normal breath sounds.   Abdominal:      Palpations: Abdomen is soft.      Tenderness: There is no abdominal tenderness.   Musculoskeletal:      Right lower leg: No edema.      Left lower leg: No edema.   Neurological:      Mental Status: She is alert and oriented to person, place, and time.   Psychiatric:         Behavior: Behavior normal.          Significant Labs: reviewed  BMP  Lab Results   Component Value Date     05/01/2025    K 4.3 05/01/2025    CL 95 05/01/2025    CO2 23 05/01/2025    BUN 30 (H) 05/01/2025    CREATININE 7.1 (H) 05/01/2025    CALCIUM 8.8 05/01/2025    ANIONGAP 18 (H) 05/01/2025    EGFRNORACEVR 7 (L) 05/01/2025     Lab Results   Component Value Date    WBC 6.41 05/02/2025    HGB 10.3 (L) 05/02/2025    HCT 32.3 (L) 05/02/2025     (H) 05/02/2025     (L) 05/02/2025         Significant Imaging: reviewed      Assessment/Plan:     39-year-old female with history of ESRD on chronic HD presented with generalized symptoms:    ESRD (end stage renal disease) on dialysis  ESRD pt, on HD since May 2022, currently at [facility]  ESRD, 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  Tolerated HD well, continue routine HD  H/o of HTN and HIV  Patient is requesting transfer from [facility] to a HD unit closer to her home in [place], [place]. Will refer to .    K normal  Anemia, mild  BP controlled    Vascular:  Noted has leg AV fistula.  Vascular surgeon is Dr. Petty.  In asking the pt why she does not have a conventional UE AVF, pt responded that she was concerned about the cosmetic effect on the arms, as [place] beach a  [place].  Increased risk of infections with lower extremity accesses mentioned to the pt.    Parainfluenza virus infection  Reviewed the chart.  Likely cause of myalgia and arthralgia  Symptomatic treatment    AIDS (acquired immunodeficiency syndrome), CD4 <=200  History of HIV and AIDS.  Noted very low CD4 count  Doubt patient is taking her HAART meds  Says is taking Juluca under dr. Vides's care  Noted h/o of non-compliance        Thank you for your consult.   Total time spent 40 minutes including time needed to review the records, the   patient evaluation, documentation, face-to-face discussion with the patient,   more than 50% of the time was spent on coordination of care and counseling.    Level V visit.      Allyson Brady MD  Nephrology  [facility]

## 2025-05-02 NOTE — ASSESSMENT & PLAN NOTE
Would need to get more history about her HIV follow-up.  We will continue empiric Dapson at this time.  We will send CD4 count and viral load.

## 2025-05-02 NOTE — ASSESSMENT & PLAN NOTE
Patient reports compliance with HIV medications.  CD4 count 12  Plan:  -continue home medications  -continue treatment of sepsis as noted above  -f/u ID outpatient as directed
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
## 2025-05-02 NOTE — ASSESSMENT & PLAN NOTE
Anemia is likely due to chronic disease due to ESRD. Most recent hemoglobin and hematocrit are listed below.  Recent Labs     04/30/25  0000 04/30/25  1915 05/02/25  0600   HGB 9.1* 9.7* 10.3*   HCT  --  29.5* 32.3*     Plan  -Monitor serial CBC: Daily  -Transfuse PRBC if patient becomes hemodynamically unstable, symptomatic or H/H drops below 7/21.  -Patient has not received any PRBC transfusions to date  -Patient's anemia is currently stable
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
## 2025-05-02 NOTE — ASSESSMENT & PLAN NOTE
Chronic, uncontrolled.  Latest blood pressure and vitals reviewed-   Temp:  [98.6 °F (37 °C)-99.5 °F (37.5 °C)]   Pulse:  []   Resp:  [18-20]   BP: ()/(51-63)   SpO2:  [91 %-95 %] .   Home meds for hypertension were reviewed and noted below.     While in the hospital, will manage blood pressure as follows; Continue home antihypertensive regimen, Nephrology consulted to resume HD inpatient    Will utilize p.r.n. blood pressure medication only if patient's blood pressure greater than  180/110 and she develops symptoms such as worsening chest pain or shortness of breath.

## 2025-05-02 NOTE — PROGRESS NOTES
[facility] Medicine  Progress Note    Patient Name: Wang Kebede  MRN: [id number]  Patient Class: IP- Inpatient   Admission Date: 4/30/2025  Length of Stay: 2 days  Attending Physician: Rosy Paige MD  Primary Care Provider: Levi Moncada MD        Subjective     Principal Problem:Parainfluenza virus infection        HPI:  Wang Kebede is a 39 y.o. female with a PMH  has a past medical history of Abnormal Pap smear of cervix (2016), Acute encephalopathy (12/25/2018), AICD (automatic cardioverter/defibrillator) present, Anemia, Chronic abdominal pain, Encounter for blood transfusion, History of cardiac arrest, HIV (human immunodeficiency virus infection), Lymphoma, Narcotic bowel syndrome, Splenomegaly, and Vulvar cancer, carcinoma. who presented to the ED for further evaluation of worsening dyspnea/shortness of breath x3 days duration.  Patient reported leaving dialysis earlier today however, was unable to complete full session and was directed to the ED for further evaluation and possible admission.  Patient reports compliance with home medications as well as dietary/fluid restrictions and denied exposure to recent ill contacts.  Associated symptoms included subjective fevers, chills, sweats, productive cough with green sputum, generalized fatigue/weakness, generalized myalgias/body aches, and chest pain upon coughing.  She reported no known alleviating or aggravating factors noted with all other review of systems negative except as noted above.  Prior to onset of symptoms, patient reported being in her usual state of health with no other concerns or complaints.  Initial workup in the ED revealed patient met SIRS criteria for sepsis in addition to signs/symptoms of volume overload meeting further diuresing via dialysis.  Lactic acid within normal limits, procalcitonin elevated at 0.82, flu/COVID negative, respiratory panel positive for parainfluenza V3, UA  positive for 7 WBCs, few bacteria.  Chest x-ray positive for cardiomegaly, mild pleural effusion, and basilar opacities concerning for underlying CHF and/or pneumonia.  Patient admitted to Hospital Medicine inpatient for continued medical management.    PCP: Levi Moncada      Overview/Hospital Course:  5/1 admitted for acute respiratory hypoxic failure 2/2 parainfluenza virus and volume overload in immunocompromised patient. Denies wearing supplemental oxygen at baseline. Procalcitonin mildly elevated, empirically treated with intravenous antibiotic(s). Nephrology consulted for maintenance dialysis.     Interval History:  Patient seen and examined at bedside.  Reports generalized pain and malaise primarily due to coughing.  Coughing is minimally productive of yellowish sputum.    Review of Systems   Constitutional:  Positive for activity change, appetite change and fatigue. Negative for fever.   Respiratory:  Negative for cough and shortness of breath.    Cardiovascular:  Negative for chest pain and leg swelling.   Gastrointestinal:  Positive for abdominal pain. Negative for nausea and vomiting.   Musculoskeletal:  Positive for arthralgias and myalgias.   Neurological:  Positive for weakness.   All other systems reviewed and are negative.    Objective:     Vital Signs (Most Recent):  Temp: 99.5 °F (37.5 °C) (05/02/25 1636)  Pulse: 105 (05/02/25 1636)  Resp: 18 (05/02/25 1636)  BP: 133/63 (05/02/25 1636)  SpO2: (!) 93 % (05/02/25 1636) Vital Signs (24h Range):  Temp:  [98.6 °F (37 °C)-99.5 °F (37.5 °C)] 99.5 °F (37.5 °C)  Pulse:  [] 105  Resp:  [18-20] 18  SpO2:  [91 %-95 %] 93 %  BP: ()/(51-63) 133/63     Weight: 54.4 kg (120 lb)  Body mass index is 23.44 kg/m².    Intake/Output Summary (Last 24 hours) at 5/2/2025 1755  Last data filed at 5/2/2025 1459  Gross per 24 hour   Intake 700 ml   Output 1000 ml   Net -300 ml         Physical Exam  Vitals reviewed.   Constitutional:       Appearance: Normal  appearance.   HENT:      Head: Normocephalic and atraumatic.      Mouth/Throat:      Mouth: Mucous membranes are moist.      Pharynx: Oropharynx is clear.   Eyes:      Extraocular Movements: Extraocular movements intact.      Conjunctiva/sclera: Conjunctivae normal.   Cardiovascular:      Rate and Rhythm: Regular rhythm. Tachycardia present.      Pulses: Normal pulses.      Heart sounds: Normal heart sounds.   Pulmonary:      Effort: Pulmonary effort is normal.      Breath sounds: Normal breath sounds.   Abdominal:      General: Bowel sounds are normal.      Palpations: Abdomen is soft.      Tenderness: There is abdominal tenderness. There is no guarding or rebound.   Musculoskeletal:         General: Normal range of motion.      Cervical back: Normal range of motion and neck supple.   Skin:     General: Skin is warm and dry.   Neurological:      General: No focal deficit present.      Mental Status: She is alert and oriented to person, place, and time. Mental status is at baseline.      Motor: Weakness present.   Psychiatric:         Mood and Affect: Mood normal.         Behavior: Behavior normal.         Thought Content: Thought content normal.               Significant Labs: All pertinent labs within the past 24 hours have been reviewed.  CBC:   Recent Labs   Lab 04/30/25 1915 05/02/25  0600   WBC 5.85 6.41   HGB 9.7* 10.3*   HCT 29.5* 32.3*    120*     CMP:   Recent Labs   Lab 04/30/25 1915 05/01/25  0708   * 136   K 4.0 4.3   CL 93* 95   CO2 27 23   GLU 92 83   BUN 19 30*   CREATININE 5.4* 7.1*   CALCIUM 9.4 8.8   PROT 10.1*  --    ALBUMIN 3.7  --    BILITOT 0.7  --    ALKPHOS 58  --    AST 16  --    ALT 11  --    ANIONGAP 14 18*       Significant Imaging: I have reviewed all pertinent imaging results/findings within the past 24 hours.      Assessment & Plan  Shortness of breath  Patient presented with worsening dyspnea/shortness a breath likely multifactorial in nature given underlying  respiratory panel positive for parainfluenza V3, volume overload in setting of ESRD and CHF, as well as sepsis with possible pneumonia noted on imaging.  Flu/COVID negative.  BNP elevated at 26 95.  Troponin 0.070 in setting of sepsis and volume overload likely due to demand ischemia.  Lactic acid within normal limits.  Procalcitonin 0.82.  Patient initiated on sepsis protocol with broad-spectrum antibiotics, administered supplemental oxygen, duo nebulizer treatments, IV Lasix, and currently awaiting further evaluation/recommendations by Nephrology with presumed HD to follow in the morning.  Plan:  -Telemetry  -Continue treatment as noted below  Wean as able  Breathing treatments prn     Acute respiratory failure with hypoxia  Patient with Hypoxic Respiratory failure which is Acute.  she is not on home oxygen. Supplemental oxygen was provided and noted- Oxygen Concentration (%):  [28] 28    Signs/symptoms of respiratory failure include- tachypnea, increased work of breathing, and respiratory distress. Contributing diagnoses includes - CHF, Pleural effusion, and Pneumonia Labs and images were reviewed. Patient Has not had a recent ABG. Will treat underlying causes and adjust management of respiratory failure as follows-  Patient with history of COPD/Asthma currently/not currently on inhalers or home oxygen not presently in acute exacerbation and is with/without signs/symptoms of distress.   Patient currently on room air    Plan:  -Telemetry  -Continue home medications, titrate as needed  -Titrate oxygen requirements as needed  -Incentive spirometry   -Monitor pulse oximetry  -Duonebs prn  -Continue treatment of CHF and Sepsis as noted below    Acute on chronic heart failure with preserved ejection fraction  Patient has preserved heart failure that is Acute on chronic. On presentation their CHF was decompensated. Evidence of decompensated CHF on presentation includes: orthopnea, paroxysmal nocturnal dyspnea (PND),  "dyspnea on exertion (SANTOS), and shortness of breath. The etiology of their decompensation is likely increased fluid intake and viral syndrome. Most recent BNP and echo results are listed below.  Recent Labs     04/30/25 1915   BNP 2,695*     Latest ECHO  No results found for this or any previous visit.    Current Heart Failure Medications  carvediloL tablet 25 mg, 2 times daily, Oral  hydrALAZINE injection 10 mg, Every 6 hours PRN, Intravenous  hydrALAZINE tablet 50 mg, Every 8 hours, Oral  furosemide tablet 80 mg, Daily, Oral    Plan  -Monitor strict I&Os and daily weights.    -Place on telemetry  -Low sodium diet  -Place on fluid restriction of 1.5 L.   -Cardiology has not been consulted  -The patient's volume status is stable but not at their baseline as indicated by orthopnea, paroxysmal nocturnal dyspnea (PND), dyspnea on exertion (SANTOS), and shortness of breath  -Continue home medications  -Continue treatment of sepsis  -Nephrology consulted to resume HD inpatient     Sepsis  This patient does have evidence of infective focus  My overall impression is sepsis.  Source: Respiratory  Antibiotics given-   Antibiotics (72h ago, onward)      Start     Stop Route Frequency Ordered    05/01/25 0900  dapsone tablet 50 mg         -- Oral Daily 05/01/25 0450    04/30/25 2115  ceFEPIme injection 1 g         -- IV Every 24 hours (non-standard times) 04/30/25 2014 04/30/25 2009  vancomycin - pharmacy to dose  (vancomycin IVPB (PEDS and ADULTS))        Placed in "And" Linked Group    -- IV pharmacy to manage frequency 04/30/25 1909          Latest lactate reviewed-  Recent Labs   Lab 05/01/25  0708   LACTATE 1.5     Organ dysfunction indicated by Acute respiratory failure    Fluid challenge Contraindicated- Fluid bolus is contraindicated in this patient due to Volume overload due to- CHF and ESRD     Post- resuscitation assessment No - Post resuscitation assessment not needed     Will Not start Pressors- Levophed for MAP " of 65    Source control achieved by:  Vancomycin, Cefepime, and Dapsone    Hypertension  Chronic, uncontrolled.  Latest blood pressure and vitals reviewed-   Temp:  [98.6 °F (37 °C)-99.5 °F (37.5 °C)]   Pulse:  []   Resp:  [18-20]   BP: ()/(51-63)   SpO2:  [91 %-95 %] .   Home meds for hypertension were reviewed and noted below.     While in the hospital, will manage blood pressure as follows; Continue home antihypertensive regimen, Nephrology consulted to resume HD inpatient    Will utilize p.r.n. blood pressure medication only if patient's blood pressure greater than  180/110 and she develops symptoms such as worsening chest pain or shortness of breath.    ESRD (end stage renal disease) on dialysis  Creatine stable for now. BMP reviewed- noted Estimated Creatinine Clearance: 7.6 mL/min (A) (based on SCr of 7.1 mg/dL (H)). according to latest data. Based on current GFR, CKD stage is end stage.  Monitor UOP and serial BMP and adjust therapy as needed. Renally dose meds. Avoid nephrotoxic medications and procedures. Nephrology consulted to resume HD inpatient.     AIDS (acquired immunodeficiency syndrome), CD4 <=200  Patient reports compliance with HIV medications.  CD4 count 12  Plan:  -continue home medications  -continue treatment of sepsis as noted above  -f/u ID outpatient as directed     Seizure disorder  Remains seizure free.  Plan:  -continue home medications  -seizure precautions  -ativan prn for breakthrough seizures     Anemia  Anemia is likely due to chronic disease due to ESRD. Most recent hemoglobin and hematocrit are listed below.  Recent Labs     04/30/25  0000 04/30/25  1915 05/02/25  0600   HGB 9.1* 9.7* 10.3*   HCT  --  29.5* 32.3*     Plan  -Monitor serial CBC: Daily  -Transfuse PRBC if patient becomes hemodynamically unstable, symptomatic or H/H drops below 7/21.  -Patient has not received any PRBC transfusions to date  -Patient's anemia is currently stable    History of VT/VF s/p  "implantation of automatic cardioverter/defibrillator (AICD)  Tachycardia improving in setting of sepsis and volume overload.   Plan:  -telemetry  -continue home medications  -continue treatment of CHF, ESRD, and sepsis      History of recurrent deep vein thrombosis (DVT)  Patient reports no longer taking eliquis    Parainfluenza virus infection  Supportive care    Multifocal pneumonia  Patient has a diagnosis of pneumonia. The cause of the pneumonia is viral in etiology due to  parainfluenza. The pneumonia is stable. The patient has the following signs/symptoms of pneumonia: cough. The patient does not have a current oxygen requirement and the patient does not have a home oxygen requirement. I have reviewed the pertinent imaging. The following cultures have been collected: Blood cultures The culture results are listed below.     Current antimicrobial regimen consists of the antibiotics listed below. Will monitor patient closely and continue current treatment plan unchanged.    Antibiotics (From admission, onward)      Start     Stop Route Frequency Ordered    05/01/25 0900  dapsone tablet 50 mg         -- Oral Daily 05/01/25 0450    04/30/25 2115  ceFEPIme injection 1 g         -- IV Every 24 hours (non-standard times) 04/30/25 2014 04/30/25 2009  vancomycin - pharmacy to dose  (vancomycin IVPB (PEDS and ADULTS))        Placed in "And" Linked Group    -- IV pharmacy to manage frequency 04/30/25 1909            Microbiology Results (last 7 days)       Procedure Component Value Units Date/Time    Blood culture x two cultures. Draw prior to antibiotics. [[id number]]  (Normal) Collected: 04/30/25 1912    Order Status: Completed Specimen: Blood from Peripheral, Forearm, Right Updated: 05/02/25 1602     Blood Culture No Growth After 36 Hours    Blood culture x two cultures. Draw prior to antibiotics. [[id number]]  (Normal) Collected: 04/30/25 1921    Order Status: Completed Specimen: Blood from Peripheral, Hand, Right " Updated: 05/02/25 1602     Blood Culture No Growth After 36 Hours    Respiratory Infection Panel (PCR), Nasopharyngeal [[id number]]  (Abnormal) Collected: 04/30/25 2031    Order Status: Completed Specimen: Nasopharyngeal Swab Updated: 04/30/25 2130     Respiratory Infection Panel Source Nasopharyngeal Swab     Adenovirus Not Detected     Coronavirus 229E, Common Cold Virus Not Detected     Coronavirus HKU1, Common Cold Virus Not Detected     Coronavirus NL63, Common Cold Virus Not Detected     Coronavirus OC43, Common Cold Virus Not Detected     SARS-CoV2 (COVID-19) Qualitative PCR Not Detected     Human Metapneumovirus Not Detected     Human Rhinovirus/Enterovirus Not Detected     Influenza A Not Detected     Influenza B Not Detected     Parainfluenza Virus 1 Not Detected     Parainfluenza Virus 2 Not Detected     Parainfluenza Virus 3 Detected     Parainfluenza Virus 4 Not Detected     Respiratory Syncytial Virus Not Detected     Bordetella Parapertussis (EZ6553) Not Detected     Bordetella pertussis (ptxP) Not Detected     Chlamydia pneumoniae Not Detected     Mycoplasma pneumoniae Not Detected    Influenza A & B by Molecular [[id number]]  (Normal) Collected: 04/30/25 1854    Order Status: Completed Specimen: Nasopharyngeal Swab Updated: 04/30/25 1932     INFLUENZA A MOLECULAR Negative     INFLUENZA B MOLECULAR  Negative    Culture, Respiratory with Gram Stain [[id number]]     Order Status: Sent Specimen: Respiratory           AIDS  CD4 count 12  Infectious disease following    VTE Risk Mitigation (From admission, onward)           Ordered     apixaban tablet 5 mg  2 times daily         05/01/25 0449     IP VTE HIGH RISK PATIENT  Once         04/30/25 2123     Place sequential compression device  Until discontinued         04/30/25 2123                    Discharge Planning   KOFFI:      Code Status: Full Code   Medical Readiness for Discharge Date:   Discharge Plan A: Home                        Rosy NOVAK  MD Jyoti  Department of Hospital Medicine   '[facility]

## 2025-05-02 NOTE — SUBJECTIVE & OBJECTIVE
Interval History: Pt was seen and examined. Labs and meds reviewed. Discussed with other providers. Pt is a 38 y/o female with h/o of ESRD, on HD x 2 years, currently q MWF at [facility], and h/o of h/o of HIV/AIDS (under Dr. Vides's care), vulvar cancer, sickle cell disease, cervical cancer, chronic pain syndrome, who presented with vague nonspecific symptoms related to muscle and joint pain.  Per ID workup, she has parainfluenza virus 3.  Patient was seen earlier today on HD.  She also received dialysis yesterday.  She tolerated dialysis mostly well, though she asked to be taken off early.    On other issues noted that she lives in [place] but she dialyzes at [facility] which is quite a distance away.  Pt says that she likes the people in [facility], however she also complains that her transplant evaluation has been delayed.  She does not like the distance to [facility].  Discussed possibility of transfer to the on HD unit closer to her home.    Review of patient's allergies indicates:   Allergen Reactions    Iodine and iodide containing products Anaphylaxis     Pt states she coded last time she was administered contrast    Pneumococcal 23-chitra ps vaccine Anaphylaxis     Potential iodine containing    Bactrim [sulfamethoxazole-trimethoprim] Hives    Morphine Itching     Tolerates norco 2018     Current Facility-Administered Medications   Medication Frequency    acetaminophen suppository 650 mg Q6H PRN    acetaminophen tablet 650 mg Q6H PRN    albuterol nebulizer solution 2.5 mg Q6H PRN    aluminum-magnesium hydroxide-simethicone 200-200-20 mg/5 mL suspension 30 mL QID PRN    apixaban tablet 5 mg BID    benzonatate capsule 100 mg TID    budesonide nebulizer solution 0.5 mg Q12H    carvediloL tablet 25 mg BID    ceFEPIme injection 1 g Q24H    dapsone tablet 50 mg Daily    dextrose 50% injection 12.5 g PRN    dextrose 50% injection 25 g PRN    furosemide tablet 80 mg Daily    glucagon (human recombinant) injection 1 mg PRN    glucose  chewable tablet 16 g PRN    glucose chewable tablet 24 g PRN    guaiFENesin 12 hr tablet 600 mg BID    hydrALAZINE injection 10 mg Q6H PRN    hydrALAZINE tablet 50 mg Q8H    HYDROcodone-acetaminophen  mg per tablet 1 tablet Q6H PRN    levETIRAcetam tablet 500 mg BID    levothyroxine tablet 25 mcg Before breakfast    melatonin tablet 6 mg Nightly PRN    naloxone 0.4 mg/mL injection 0.02 mg PRN    ondansetron injection 4 mg Q8H PRN    oxyCODONE immediate release tablet 5 mg Q6H PRN    pantoprazole EC tablet 40 mg Daily    polyethylene glycol packet 17 g Daily PRN    promethazine tablet 25 mg Q6H PRN    promethazine-codeine 6.25-10 mg/5 ml syrup 5 mL Q6H PRN    sevelamer carbonate pwpk 2.4 g TID WM    simethicone chewable tablet 80 mg QID PRN    sodium chloride 0.9% flush 3 mL Q12H PRN    vancomycin (VANCOCIN) 500 mg in D5W 100 mL IVPB (MB+) Once    vancomycin - pharmacy to dose pharmacy to manage frequency       Objective:     Vital Signs (Most Recent):  Temp: 99.1 °F (37.3 °C) (05/02/25 0754)  Pulse: (!) 112 (05/02/25 1548)  Resp: 18 (05/02/25 1544)  BP: (!) 120/58 (05/02/25 1400)  SpO2: (!) 92 % (05/02/25 0947) Vital Signs (24h Range):  Temp:  [98.6 °F (37 °C)-99.3 °F (37.4 °C)] 99.1 °F (37.3 °C)  Pulse:  [] 112  Resp:  [18-20] 18  SpO2:  [91 %-95 %] 92 %  BP: ()/(51-60) 120/58     Weight: 54.4 kg (120 lb) (04/30/25 1755)  Body mass index is 23.44 kg/m².  Body surface area is 1.52 meters squared.    I/O last 3 completed shifts:  In: 750 [Other:500; IV Piggyback:250]  Out: 1500 [Other:1500]     Physical Exam  Vitals and nursing note reviewed.   Constitutional:       Appearance: Normal appearance.   HENT:      Head: Normocephalic and atraumatic.   Cardiovascular:      Rate and Rhythm: Normal rate and regular rhythm.      Pulses: Normal pulses.      Heart sounds: Normal heart sounds.   Pulmonary:      Effort: Pulmonary effort is normal.      Breath sounds: Normal breath sounds.   Abdominal:       Palpations: Abdomen is soft.      Tenderness: There is no abdominal tenderness.   Musculoskeletal:      Right lower leg: No edema.      Left lower leg: No edema.   Neurological:      Mental Status: She is alert and oriented to person, place, and time.   Psychiatric:         Behavior: Behavior normal.          Significant Labs: reviewed  BMP  Lab Results   Component Value Date     05/01/2025    K 4.3 05/01/2025    CL 95 05/01/2025    CO2 23 05/01/2025    BUN 30 (H) 05/01/2025    CREATININE 7.1 (H) 05/01/2025    CALCIUM 8.8 05/01/2025    ANIONGAP 18 (H) 05/01/2025    EGFRNORACEVR 7 (L) 05/01/2025     Lab Results   Component Value Date    WBC 6.41 05/02/2025    HGB 10.3 (L) 05/02/2025    HCT 32.3 (L) 05/02/2025     (H) 05/02/2025     (L) 05/02/2025         Significant Imaging: reviewed

## 2025-05-02 NOTE — HPI
39 y.o. female with a PMH has a past medical history of ) AICD (automatic cardioverter/defibrillator) present, Anemia, Chronic abdominal pain, Encounter for blood transfusion, History of cardiac arrest, HIV (human immunodeficiency virus infection), Lymphoma, Narcotic bowel syndrome, Splenomegaly, and Vulvar cancer, carcinoma  She was admitted with worsening dyspnea for the last 3 days.  There is associated history of fever chills productive cough.  Since admission, labs and imaging studies reviewed  . Lactic acid within normal limits, procalcitonin elevated at 0.82, flu/COVID negative, respiratory panel positive for parainfluenza V3,  Chest x-ray positive for cardiomegaly, mild pleural effusion, and basilar opacities

## 2025-05-02 NOTE — SUBJECTIVE & OBJECTIVE
Interval History:  Patient seen and examined at bedside.  Reports generalized pain and malaise primarily due to coughing.  Coughing is minimally productive of yellowish sputum.    Review of Systems   Constitutional:  Positive for activity change, appetite change and fatigue. Negative for fever.   Respiratory:  Negative for cough and shortness of breath.    Cardiovascular:  Negative for chest pain and leg swelling.   Gastrointestinal:  Positive for abdominal pain. Negative for nausea and vomiting.   Musculoskeletal:  Positive for arthralgias and myalgias.   Neurological:  Positive for weakness.   All other systems reviewed and are negative.    Objective:     Vital Signs (Most Recent):  Temp: 99.5 °F (37.5 °C) (05/02/25 1636)  Pulse: 105 (05/02/25 1636)  Resp: 18 (05/02/25 1636)  BP: 133/63 (05/02/25 1636)  SpO2: (!) 93 % (05/02/25 1636) Vital Signs (24h Range):  Temp:  [98.6 °F (37 °C)-99.5 °F (37.5 °C)] 99.5 °F (37.5 °C)  Pulse:  [] 105  Resp:  [18-20] 18  SpO2:  [91 %-95 %] 93 %  BP: ()/(51-63) 133/63     Weight: 54.4 kg (120 lb)  Body mass index is 23.44 kg/m².    Intake/Output Summary (Last 24 hours) at 5/2/2025 1756  Last data filed at 5/2/2025 1459  Gross per 24 hour   Intake 700 ml   Output 1000 ml   Net -300 ml         Physical Exam  Vitals reviewed.   Constitutional:       Appearance: Normal appearance.   HENT:      Head: Normocephalic and atraumatic.      Mouth/Throat:      Mouth: Mucous membranes are moist.      Pharynx: Oropharynx is clear.   Eyes:      Extraocular Movements: Extraocular movements intact.      Conjunctiva/sclera: Conjunctivae normal.   Cardiovascular:      Rate and Rhythm: Regular rhythm. Tachycardia present.      Pulses: Normal pulses.      Heart sounds: Normal heart sounds.   Pulmonary:      Effort: Pulmonary effort is normal.      Breath sounds: Normal breath sounds.   Abdominal:      General: Bowel sounds are normal.      Palpations: Abdomen is soft.      Tenderness: There  is abdominal tenderness. There is no guarding or rebound.   Musculoskeletal:         General: Normal range of motion.      Cervical back: Normal range of motion and neck supple.   Skin:     General: Skin is warm and dry.   Neurological:      General: No focal deficit present.      Mental Status: She is alert and oriented to person, place, and time. Mental status is at baseline.      Motor: Weakness present.   Psychiatric:         Mood and Affect: Mood normal.         Behavior: Behavior normal.         Thought Content: Thought content normal.               Significant Labs: All pertinent labs within the past 24 hours have been reviewed.  CBC:   Recent Labs   Lab 04/30/25 1915 05/02/25  0600   WBC 5.85 6.41   HGB 9.7* 10.3*   HCT 29.5* 32.3*    120*     CMP:   Recent Labs   Lab 04/30/25 1915 05/01/25  0708   * 136   K 4.0 4.3   CL 93* 95   CO2 27 23   GLU 92 83   BUN 19 30*   CREATININE 5.4* 7.1*   CALCIUM 9.4 8.8   PROT 10.1*  --    ALBUMIN 3.7  --    BILITOT 0.7  --    ALKPHOS 58  --    AST 16  --    ALT 11  --    ANIONGAP 14 18*       Significant Imaging: I have reviewed all pertinent imaging results/findings within the past 24 hours.

## 2025-05-03 ENCOUNTER — HOSPITAL ENCOUNTER (EMERGENCY)
Facility: HOSPITAL | Age: 40
Discharge: HOME | End: 2025-05-04
Attending: EMERGENCY MEDICINE
Payer: MEDICARE

## 2025-05-03 VITALS
HEART RATE: 104 BPM | WEIGHT: 120 LBS | OXYGEN SATURATION: 94 % | TEMPERATURE: 98 F | DIASTOLIC BLOOD PRESSURE: 66 MMHG | SYSTOLIC BLOOD PRESSURE: 144 MMHG | RESPIRATION RATE: 20 BRPM | BODY MASS INDEX: 23.56 KG/M2 | HEIGHT: 60 IN

## 2025-05-03 DIAGNOSIS — R06.02 SHORTNESS OF BREATH: ICD-10-CM

## 2025-05-03 DIAGNOSIS — Z99.2 ESRD (END STAGE RENAL DISEASE) ON DIALYSIS: ICD-10-CM

## 2025-05-03 DIAGNOSIS — R07.9 CHEST PAIN: ICD-10-CM

## 2025-05-03 DIAGNOSIS — B34.8 PARAINFLUENZA VIRUS INFECTION: Primary | ICD-10-CM

## 2025-05-03 DIAGNOSIS — N18.6 ESRD (END STAGE RENAL DISEASE) ON DIALYSIS: ICD-10-CM

## 2025-05-03 PROBLEM — J96.01 ACUTE RESPIRATORY FAILURE WITH HYPOXIA: Status: RESOLVED | Noted: 2017-07-23 | Resolved: 2025-05-03

## 2025-05-03 PROBLEM — I50.33 ACUTE ON CHRONIC HEART FAILURE WITH PRESERVED EJECTION FRACTION: Status: RESOLVED | Noted: 2025-05-01 | Resolved: 2025-05-03

## 2025-05-03 PROBLEM — A41.9 SEPSIS: Status: RESOLVED | Noted: 2018-12-25 | Resolved: 2025-05-03

## 2025-05-03 LAB
ABSOLUTE EOSINOPHIL (OHS): 0.54 K/UL
ABSOLUTE MONOCYTE (OHS): 0.3 K/UL (ref 0.3–1)
ABSOLUTE NEUTROPHIL COUNT (OHS): 2.97 K/UL (ref 1.8–7.7)
ALBUMIN SERPL BCP-MCNC: 2.9 G/DL (ref 3.5–5.2)
ANION GAP (OHS): 12 MMOL/L (ref 8–16)
BASOPHILS # BLD AUTO: 0.01 K/UL
BASOPHILS NFR BLD AUTO: 0.2 %
BNP SERPL-MCNC: 248 PG/ML (ref 0–99)
BUN SERPL-MCNC: 34 MG/DL (ref 6–20)
CALCIUM SERPL-MCNC: 8.7 MG/DL (ref 8.7–10.5)
CHLORIDE SERPL-SCNC: 100 MMOL/L (ref 95–110)
CO2 SERPL-SCNC: 21 MMOL/L (ref 23–29)
CREAT SERPL-MCNC: 6.1 MG/DL (ref 0.5–1.4)
ERYTHROCYTE [DISTWIDTH] IN BLOOD BY AUTOMATED COUNT: 14.5 % (ref 11.5–14.5)
GFR SERPLBLD CREATININE-BSD FMLA CKD-EPI: 8 ML/MIN/1.73/M2
GLUCOSE SERPL-MCNC: 78 MG/DL (ref 70–110)
HCT VFR BLD AUTO: 29.9 % (ref 37–48.5)
HGB BLD-MCNC: 9.8 GM/DL (ref 12–16)
IMM GRANULOCYTES # BLD AUTO: 0.01 K/UL (ref 0–0.04)
IMM GRANULOCYTES NFR BLD AUTO: 0.2 % (ref 0–0.5)
LYMPHOCYTES # BLD AUTO: 0.41 K/UL (ref 1–4.8)
MCH RBC QN AUTO: 34.9 PG (ref 27–31)
MCHC RBC AUTO-ENTMCNC: 32.8 G/DL (ref 32–36)
MCV RBC AUTO: 106 FL (ref 82–98)
NUCLEATED RBC (/100WBC) (OHS): 0 /100 WBC
PHOSPHATE SERPL-MCNC: 4.7 MG/DL (ref 2.7–4.5)
PLATELET # BLD AUTO: 112 K/UL (ref 150–450)
PLATELET BLD QL SMEAR: ABNORMAL
PMV BLD AUTO: 11.2 FL (ref 9.2–12.9)
POTASSIUM SERPL-SCNC: 4.4 MMOL/L (ref 3.5–5.1)
RBC # BLD AUTO: 2.81 M/UL (ref 4–5.4)
RELATIVE EOSINOPHIL (OHS): 12.7 %
RELATIVE LYMPHOCYTE (OHS): 9.7 % (ref 18–48)
RELATIVE MONOCYTE (OHS): 7.1 % (ref 4–15)
RELATIVE NEUTROPHIL (OHS): 70.1 % (ref 38–73)
RETICS/RBC NFR AUTO: 1.9 % (ref 0.5–2.5)
SODIUM SERPL-SCNC: 133 MMOL/L (ref 136–145)
WBC # BLD AUTO: 4.24 K/UL (ref 3.9–12.7)

## 2025-05-03 PROCEDURE — 25000003 PHARM REV CODE 250: Performed by: HOSPITALIST

## 2025-05-03 PROCEDURE — 25000003 PHARM REV CODE 250: Performed by: INTERNAL MEDICINE

## 2025-05-03 PROCEDURE — 80053 COMPREHEN METABOLIC PANEL: CPT | Performed by: INTERNAL MEDICINE

## 2025-05-03 PROCEDURE — 85025 COMPLETE CBC W/AUTO DIFF WBC: CPT | Performed by: INTERNAL MEDICINE

## 2025-05-03 PROCEDURE — 84484 ASSAY OF TROPONIN QUANT: CPT

## 2025-05-03 PROCEDURE — 25000003 PHARM REV CODE 250: Performed by: EMERGENCY MEDICINE

## 2025-05-03 PROCEDURE — 93010 ELECTROCARDIOGRAM REPORT: CPT | Mod: ,,, | Performed by: INTERNAL MEDICINE

## 2025-05-03 PROCEDURE — 83880 ASSAY OF NATRIURETIC PEPTIDE: CPT

## 2025-05-03 PROCEDURE — 96375 TX/PRO/DX INJ NEW DRUG ADDON: CPT

## 2025-05-03 PROCEDURE — 93005 ELECTROCARDIOGRAM TRACING: CPT

## 2025-05-03 PROCEDURE — 99233 SBSQ HOSP IP/OBS HIGH 50: CPT | Mod: ,,, | Performed by: INTERNAL MEDICINE

## 2025-05-03 PROCEDURE — 87081 CULTURE SCREEN ONLY: CPT | Performed by: INTERNAL MEDICINE

## 2025-05-03 PROCEDURE — 99285 EMERGENCY DEPT VISIT HI MDM: CPT | Mod: 25

## 2025-05-03 PROCEDURE — 96374 THER/PROPH/DIAG INJ IV PUSH: CPT

## 2025-05-03 PROCEDURE — 85025 COMPLETE CBC W/AUTO DIFF WBC: CPT

## 2025-05-03 PROCEDURE — 36415 COLL VENOUS BLD VENIPUNCTURE: CPT | Performed by: INTERNAL MEDICINE

## 2025-05-03 PROCEDURE — 85045 AUTOMATED RETICULOCYTE COUNT: CPT

## 2025-05-03 RX ORDER — SEVELAMER CARBONATE 2.4 G/1
2.4 POWDER, FOR SUSPENSION ORAL
Start: 2025-05-03 | End: 2026-05-03

## 2025-05-03 RX ORDER — FOLIC ACID 1 MG/1
1 TABLET ORAL DAILY
Qty: 90 TABLET | Refills: 3 | Status: SHIPPED | OUTPATIENT
Start: 2025-05-03 | End: 2026-05-03

## 2025-05-03 RX ORDER — OXYCODONE HYDROCHLORIDE 5 MG/1
10 TABLET ORAL EVERY 6 HOURS PRN
Refills: 0 | Status: DISCONTINUED | OUTPATIENT
Start: 2025-05-03 | End: 2025-05-03 | Stop reason: HOSPADM

## 2025-05-03 RX ORDER — CARVEDILOL 25 MG/1
25 TABLET ORAL 2 TIMES DAILY
Qty: 60 TABLET | Refills: 0 | Status: SHIPPED | OUTPATIENT
Start: 2025-05-03 | End: 2025-06-02

## 2025-05-03 RX ORDER — HYDROMORPHONE HYDROCHLORIDE 1 MG/ML
1 INJECTION, SOLUTION INTRAMUSCULAR; INTRAVENOUS; SUBCUTANEOUS
Refills: 0 | Status: COMPLETED | OUTPATIENT
Start: 2025-05-04 | End: 2025-05-03

## 2025-05-03 RX ORDER — HYDROCODONE BITARTRATE AND ACETAMINOPHEN 10; 325 MG/1; MG/1
1 TABLET ORAL EVERY 6 HOURS PRN
Refills: 0 | Status: DISCONTINUED | OUTPATIENT
Start: 2025-05-03 | End: 2025-05-03 | Stop reason: HOSPADM

## 2025-05-03 RX ORDER — ATOVAQUONE 750 MG/5ML
750 SUSPENSION ORAL EVERY 12 HOURS
Qty: 210 ML | Refills: 0 | Status: SHIPPED | OUTPATIENT
Start: 2025-05-03 | End: 2025-05-06

## 2025-05-03 RX ORDER — LEVOTHYROXINE SODIUM 25 UG/1
25 TABLET ORAL
Qty: 30 TABLET | Refills: 0 | Status: SHIPPED | OUTPATIENT
Start: 2025-05-04 | End: 2025-06-03

## 2025-05-03 RX ORDER — LEVOFLOXACIN 750 MG/1
750 TABLET, FILM COATED ORAL EVERY OTHER DAY
Qty: 5 TABLET | Refills: 0 | Status: SHIPPED | OUTPATIENT
Start: 2025-05-03 | End: 2025-05-12

## 2025-05-03 RX ORDER — BENZONATATE 100 MG/1
100 CAPSULE ORAL 3 TIMES DAILY
Qty: 30 CAPSULE | Refills: 0 | Status: SHIPPED | OUTPATIENT
Start: 2025-05-03 | End: 2025-05-13

## 2025-05-03 RX ORDER — ATOVAQUONE 750 MG/5ML
750 SUSPENSION ORAL EVERY 12 HOURS
Status: DISCONTINUED | OUTPATIENT
Start: 2025-05-03 | End: 2025-05-03 | Stop reason: HOSPADM

## 2025-05-03 RX ORDER — ACETAMINOPHEN 325 MG/1
650 TABLET ORAL EVERY 6 HOURS PRN
Status: DISCONTINUED | OUTPATIENT
Start: 2025-05-03 | End: 2025-05-03 | Stop reason: HOSPADM

## 2025-05-03 RX ORDER — ONDANSETRON HYDROCHLORIDE 2 MG/ML
4 INJECTION, SOLUTION INTRAVENOUS
Status: COMPLETED | OUTPATIENT
Start: 2025-05-04 | End: 2025-05-03

## 2025-05-03 RX ORDER — PANTOPRAZOLE SODIUM 40 MG/1
40 TABLET, DELAYED RELEASE ORAL DAILY
Qty: 30 TABLET | Refills: 0 | Status: SHIPPED | OUTPATIENT
Start: 2025-05-04 | End: 2025-06-03

## 2025-05-03 RX ORDER — GUAIFENESIN 600 MG/1
600 TABLET, EXTENDED RELEASE ORAL 2 TIMES DAILY
Start: 2025-05-03 | End: 2025-05-13

## 2025-05-03 RX ORDER — HYDRALAZINE HYDROCHLORIDE 50 MG/1
50 TABLET, FILM COATED ORAL EVERY 8 HOURS
Qty: 90 TABLET | Refills: 0 | Status: SHIPPED | OUTPATIENT
Start: 2025-05-03 | End: 2025-06-02

## 2025-05-03 RX ORDER — METHOCARBAMOL 500 MG/1
500 TABLET, FILM COATED ORAL 3 TIMES DAILY PRN
Status: CANCELLED | OUTPATIENT
Start: 2025-05-03

## 2025-05-03 RX ORDER — FUROSEMIDE 80 MG/1
80 TABLET ORAL DAILY
Qty: 30 TABLET | Refills: 0 | Status: SHIPPED | OUTPATIENT
Start: 2025-05-04 | End: 2025-06-03

## 2025-05-03 RX ORDER — MUPIROCIN 20 MG/G
OINTMENT TOPICAL 2 TIMES DAILY
Status: DISCONTINUED | OUTPATIENT
Start: 2025-05-03 | End: 2025-05-03 | Stop reason: HOSPADM

## 2025-05-03 RX ADMIN — HYDRALAZINE HYDROCHLORIDE 50 MG: 50 TABLET ORAL at 06:05

## 2025-05-03 RX ADMIN — BENZONATATE 100 MG: 100 CAPSULE ORAL at 09:05

## 2025-05-03 RX ADMIN — HYDROMORPHONE HYDROCHLORIDE 1 MG: 1 INJECTION, SOLUTION INTRAMUSCULAR; INTRAVENOUS; SUBCUTANEOUS at 11:05

## 2025-05-03 RX ADMIN — ONDANSETRON 4 MG: 2 INJECTION INTRAMUSCULAR; INTRAVENOUS at 11:05

## 2025-05-03 RX ADMIN — LEVETIRACETAM 500 MG: 500 TABLET, FILM COATED ORAL at 09:05

## 2025-05-03 RX ADMIN — LEVOTHYROXINE SODIUM 25 MCG: 0.03 TABLET ORAL at 06:05

## 2025-05-03 RX ADMIN — MUPIROCIN: 20 OINTMENT TOPICAL at 09:05

## 2025-05-03 RX ADMIN — FUROSEMIDE 80 MG: 40 TABLET ORAL at 09:05

## 2025-05-03 RX ADMIN — GUAIFENESIN 600 MG: 600 TABLET, EXTENDED RELEASE ORAL at 09:05

## 2025-05-03 RX ADMIN — CARVEDILOL 25 MG: 12.5 TABLET, FILM COATED ORAL at 09:05

## 2025-05-03 NOTE — NURSING
Staff nurse informed night physicians that the patient was refusing her medication because of the discontinuing of her IV pain medication. All other options was presented to the patient for pain control and he refused. No new orders was placed.

## 2025-05-03 NOTE — DISCHARGE INSTRUCTIONS
Take All medications as prescribed  Continue to use both are incentive spirometer and your CPT device on a regular basis  Continue to take Mucinex 600 mg twice a day  Follow up with your Infectious Disease DrMarcelino   your prescriptions at [facility]  
[facility]

## 2025-05-03 NOTE — ASSESSMENT & PLAN NOTE
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  Tolerated HD well, continue routine HD  H/o of HTN and HIV  Patient is requesting transfer from [facility] to a HD unit closer to her home in [place], [place]. Will refer to .     K normal  Anemia, mild  BP controlled     Vascular:  Noted has leg AV fistula.  Vascular surgeon is Dr. Petty.  In asking the pt why she does not have a conventional UE AVF, pt responded that she was concerned about the cosmetic effect on the arms, as [place] the beach [place] lot.  Increased risk of infections with lower extremity accesses mentioned to the pt.     Parainfluenza virus infection  Reviewed the chart.  Likely cause of myalgia and arthralgia  Symptomatic treatment     AIDS (acquired immunodeficiency syndrome), CD4 <=200  History of HIV and AIDS.  Noted very low CD4 count  Doubt patient is taking her HAART meds  Says is taking Juluca under dr. Vides's care  Noted h/o of non-compliance           Thank you for your consult.   Total time spent 40 minutes including time needed to review the records, the   patient evaluation, documentation, face-to-face discussion with the patient,   more than 50% of the time was spent on coordination of care and counseling.    Level V visit.

## 2025-05-03 NOTE — ASSESSMENT & PLAN NOTE
Would need to get more history about her HIV follow-up.  We will continue empiric Dapson at this time.  We will send CD4 count and viral load.    05/02-  CD4 count is 12.  We will add Mepron for PJP treatment  She needs more counseling  about the need to be compliant with HAART  We will follow PJP PCR and sputum, follow up Fungitell

## 2025-05-03 NOTE — SUBJECTIVE & OBJECTIVE
Interval History: Pt was seen and examined. Labs and meds reviewed. Discussed with other providers. No new events, had no c/o's, feels ready to go home.    Review of patient's allergies indicates:   Allergen Reactions    Iodine and iodide containing products Anaphylaxis     Pt states she coded last time she was administered contrast    Pneumococcal 23-chitra ps vaccine Anaphylaxis     Potential iodine containing    Bactrim [sulfamethoxazole-trimethoprim] Hives    Morphine Itching     Tolerates norco 2018     Current Facility-Administered Medications   Medication Frequency    acetaminophen suppository 650 mg Q6H PRN    acetaminophen tablet 650 mg Q6H PRN    albuterol nebulizer solution 2.5 mg Q6H PRN    aluminum-magnesium hydroxide-simethicone 200-200-20 mg/5 mL suspension 30 mL QID PRN    apixaban tablet 5 mg BID    atovaquone 750 mg/5 mL oral liquid 750 mg Q12H    benzonatate capsule 100 mg TID    budesonide nebulizer solution 0.5 mg Q12H    carvediloL tablet 25 mg BID    ceFEPIme injection 1 g Q24H    dextrose 50% injection 12.5 g PRN    dextrose 50% injection 25 g PRN    glucagon (human recombinant) injection 1 mg PRN    glucose chewable tablet 16 g PRN    glucose chewable tablet 24 g PRN    guaiFENesin 12 hr tablet 600 mg BID    hydrALAZINE injection 10 mg Q6H PRN    hydrALAZINE tablet 50 mg Q8H    HYDROcodone-acetaminophen  mg per tablet 1 tablet Q6H PRN    levETIRAcetam tablet 500 mg BID    levothyroxine tablet 25 mcg Before breakfast    melatonin tablet 6 mg Nightly PRN    mupirocin 2 % ointment BID    naloxone 0.4 mg/mL injection 0.02 mg PRN    ondansetron injection 4 mg Q8H PRN    oxyCODONE immediate release tablet 10 mg Q6H PRN    pantoprazole EC tablet 40 mg Daily    polyethylene glycol packet 17 g Daily PRN    promethazine tablet 25 mg Q6H PRN    promethazine-codeine 6.25-10 mg/5 ml syrup 5 mL Q6H PRN    sevelamer carbonate pwpk 2.4 g TID WM    simethicone chewable tablet 80 mg QID PRN    sodium  chloride 0.9% flush 3 mL Q12H PRN    vancomycin - pharmacy to dose pharmacy to manage frequency     Current Outpatient Medications   Medication    apixaban (ELIQUIS) 5 mg Tab    atovaquone (MEPRON) 750 mg/5 mL Susp oral liquid    b complex vitamins capsule    benzonatate (TESSALON) 100 MG capsule    carvediloL (COREG) 25 MG tablet    folic acid (FOLVITE) 1 MG tablet    [START ON 5/4/2025] furosemide (LASIX) 80 MG tablet    guaiFENesin (MUCINEX) 600 mg 12 hr tablet    hydrALAZINE (APRESOLINE) 50 MG tablet    levetiracetam oral soln 500 mg/5 mL (5 mL) Soln    levoFLOXacin (LEVAQUIN) 750 MG tablet    [START ON 5/4/2025] levothyroxine (SYNTHROID) 25 MCG tablet    [START ON 5/4/2025] pantoprazole (PROTONIX) 40 MG tablet    sevelamer carbonate (RENVELA) 2.4 gram PwPk       Objective:     Vital Signs (Most Recent):  Temp: 98.1 °F (36.7 °C) (05/03/25 0827)  Pulse: 104 (05/03/25 1101)  Resp: 20 (05/03/25 0827)  BP: (!) 144/66 (05/03/25 0827)  SpO2: (!) 94 % (05/03/25 0827) Vital Signs (24h Range):  Temp:  [98.1 °F (36.7 °C)-99.5 °F (37.5 °C)] 98.1 °F (36.7 °C)  Pulse:  [] 104  Resp:  [18-20] 20  SpO2:  [93 %-96 %] 94 %  BP: (112-144)/(56-66) 144/66     Weight: 54.4 kg (120 lb) (04/30/25 1755)  Body mass index is 23.44 kg/m².  Body surface area is 1.52 meters squared.    I/O last 3 completed shifts:  In: 700 [Other:700]  Out: 1000 [Other:1000]     Physical Exam  Constitutional:       Appearance: Normal appearance.   Cardiovascular:      Rate and Rhythm: Normal rate and regular rhythm.      Pulses: Normal pulses.      Heart sounds: Normal heart sounds.   Pulmonary:      Effort: Pulmonary effort is normal.      Breath sounds: Normal breath sounds.   Abdominal:      Palpations: Abdomen is soft.   Musculoskeletal:      Right lower leg: No edema.      Left lower leg: No edema.   Neurological:      Mental Status: She is alert and oriented to person, place, and time.          Significant Labs: reviewed  BMP  Lab Results    Component Value Date     (L) 05/03/2025    K 4.4 05/03/2025     05/03/2025    CO2 21 (L) 05/03/2025    BUN 34 (H) 05/03/2025    CREATININE 6.1 (H) 05/03/2025    CALCIUM 8.7 05/03/2025    ANIONGAP 12 05/03/2025    EGFRNORACEVR 8 (L) 05/03/2025     Lab Results   Component Value Date    WBC 4.24 05/03/2025    HGB 9.8 (L) 05/03/2025    HCT 29.9 (L) 05/03/2025     (H) 05/03/2025     (L) 05/03/2025         Significant Imaging: reviewed

## 2025-05-03 NOTE — PLAN OF CARE
Discussed plan of care with patient and this patient was able to , verbalized understanding.  Patient remains free from injury.  Safety and comfort precautions maintained this shift.   Call light and personal belongings within reach, bed in low position with bed wheels locked.  No s/s of acute distress.   Purposeful rounding continued this shift.  Pain levels are  controlled per MD order. IVF infusing.  Cardiac monitoring in place.  Diet orders continued.  Vital signs continued per order.  Q2 repositioning per self   Patient mobility status remains independent  Chart and orders review completed.   Patient education about care completed.     Problem: Adult Inpatient Plan of Care  Goal: Plan of Care Review  Outcome: Progressing  Goal: Patient-Specific Goal (Individualized)  Outcome: Progressing  Goal: Absence of Hospital-Acquired Illness or Injury  Outcome: Progressing  Goal: Optimal Comfort and Wellbeing  Outcome: Progressing  Goal: Readiness for Transition of Care  Outcome: Progressing     Problem: Sepsis/Septic Shock  Goal: Optimal Coping  Outcome: Progressing  Goal: Absence of Bleeding  Outcome: Progressing  Goal: Blood Glucose Level Within Targeted Range  Outcome: Progressing  Goal: Absence of Infection Signs and Symptoms  Outcome: Progressing  Goal: Optimal Nutrition Intake  Outcome: Progressing     Problem: Acute Kidney Injury/Impairment  Goal: Fluid and Electrolyte Balance  Outcome: Progressing  Goal: Improved Oral Intake  Outcome: Progressing  Goal: Effective Renal Function  Outcome: Progressing     Problem: Pneumonia  Goal: Fluid Balance  Outcome: Progressing  Goal: Resolution of Infection Signs and Symptoms  Outcome: Progressing  Goal: Effective Oxygenation and Ventilation  Outcome: Progressing     Problem: Infection  Goal: Absence of Infection Signs and Symptoms  Outcome: Progressing     Problem: Hemodialysis  Goal: Safe, Effective Therapy Delivery  Outcome: Progressing  Goal: Effective Tissue  Perfusion  Outcome: Progressing  Goal: Absence of Infection Signs and Symptoms  Outcome: Progressing

## 2025-05-03 NOTE — PLAN OF CARE
"Discussed with MD, patient needs to have HD moved from [facility] to [facility].  Multiple follow up appts needed.    Met with patient to discuss needs.  Wishes to switch from [facility] to [facility] due to scheduling issues.  Referral to [facility] to be submitted today. CM explained that patient would need to continue at [facility] until contacted by [facility] with new arrangements.  Patient verbalized understanding and stated that she would continue at [facility] until notified by [facility] to begin treatment at their facility.    Discussed desire to transition to all [facility] physicians. "I like my cardiologist but he can't give me answers, I was with [facility] before and I'd like to start back." Referrals to internal medicine, infectious disease, cardiology, ob-gyn and oupt case management submitted.    "

## 2025-05-03 NOTE — PROGRESS NOTES
[facility] - Med Surg  Infectious Disease  Progress Note    Patient Name: Wang Kebede  MRN: [id number]  Admission Date: 4/30/2025  Length of Stay: 3 days  Attending Physician: Rosy Paige MD  Primary Care Provider: Levi Moncada MD    Isolation Status: No active isolations  Assessment/Plan:      Cardiac/Vascular  History of VT/VF s/p implantation of automatic cardioverter/defibrillator (AICD)  We will follow cardiology team    Renal/  ESRD (end stage renal disease) on dialysis  HD as tolerated.    ID  AIDS  Would need to get more history about her HIV follow-up.  We will continue empiric Dapson at this time.  We will send CD4 count and viral load.    05/02-  CD4 count is 12.  We will add Mepron for PJP treatment  She needs more counseling  about the need to be compliant with HAART  We will follow PJP PCR and sputum, follow up Fungitell          Anticipated Disposition:     Thank you for your consult. I will follow-up with patient. Please contact us if you have any additional questions.    Hubert Mayo MD, [facility]  Infectious Disease  [facility] - Med Surg    Subjective:     Principal Problem:Parainfluenza virus infection    HPI: 39 y.o. female with a PMH has a past medical history of ) AICD (automatic cardioverter/defibrillator) present, Anemia, Chronic abdominal pain, Encounter for blood transfusion, History of cardiac arrest, HIV (human immunodeficiency virus infection), Lymphoma, Narcotic bowel syndrome, Splenomegaly, and Vulvar cancer, carcinoma  She was admitted with worsening dyspnea for the last 3 days.  There is associated history of fever chills productive cough.  Since admission, labs and imaging studies reviewed  . Lactic acid within normal limits, procalcitonin elevated at 0.82, flu/COVID negative, respiratory panel positive for parainfluenza V3,  Chest x-ray positive for cardiomegaly, mild pleural effusion, and basilar opacities    Interval History:   39-year-old woman with AIDS.  Her  "major complaint at this time with generalized body pain  .  CT scan of the chest shows   Bilateral upper lobe ground-glass infiltrates, likely infectious or inflammatory in nature.  Small bilateral pleural effusions with bibasilar atelectasis and or parenchymal scarring.       Review of Systems   Constitutional:  Negative for activity change, appetite change, chills, diaphoresis and fatigue.   Neurological:  Negative for dizziness and facial asymmetry.     Objective:     Vital Signs (Most Recent):  Temp: 98.5 °F (36.9 °C) (05/03/25 0413)  Pulse: 102 (05/03/25 0413)  Resp: 18 (05/03/25 0413)  BP: (!) 122/58 (05/03/25 0413)  SpO2: 96 % (05/03/25 0413) Vital Signs (24h Range):  Temp:  [98.5 °F (36.9 °C)-99.5 °F (37.5 °C)] 98.5 °F (36.9 °C)  Pulse:  [] 102  Resp:  [18-20] 18  SpO2:  [91 %-96 %] 96 %  BP: (112-137)/(56-63) 122/58     Weight: 54.4 kg (120 lb)  Body mass index is 23.44 kg/m².    Estimated Creatinine Clearance: 8.8 mL/min (A) (based on SCr of 6.1 mg/dL (H)).     Physical Exam  Vitals and nursing note reviewed.   HENT:      Head: Normocephalic.   Eyes:      Pupils: Pupils are equal, round, and reactive to light.   Cardiovascular:      Rate and Rhythm: Tachycardia present.   Pulmonary:      Effort: Pulmonary effort is normal.   Neurological:      Mental Status: She is alert. Mental status is at baseline.          Significant Labs: Blood Culture: No results for input(s): "LABBLOO" in the last 4320 hours.  CBC:   Recent Labs   Lab 05/02/25  0600   WBC 6.41   HGB 10.3*   HCT 32.3*   *     CMP:   Recent Labs   Lab 05/01/25  0708 05/03/25  0523    133*   K 4.3 4.4   CL 95 100   CO2 23 21*   GLU 83 78   BUN 30* 34*   CREATININE 7.1* 6.1*   CALCIUM 8.8 8.7   ALBUMIN  --  2.9*   ANIONGAP 18* 12     All pertinent labs within the past 24 hours have been reviewed.    Significant Imaging: I have reviewed all pertinent imaging results/findings within the past 24 hours.  "

## 2025-05-03 NOTE — PROGRESS NOTES
[facility] - [facility] Surg  Nephrology  Progress Note    Patient Name: Wang Kebede  MRN: [id number]  Admission Date: 4/30/2025  Hospital Length of Stay: 3 days  Attending Provider: No att. providers found   Primary Care Physician: Levi Moncada MD  Principal Problem:Parainfluenza virus infection    Subjective:     HPI: Noted    Interval History: Pt was seen and examined. Labs and meds reviewed. Discussed with other providers. No new events, had no c/o's, feels ready to go home.    Review of patient's allergies indicates:   Allergen Reactions    Iodine and iodide containing products Anaphylaxis     Pt states she coded last time she was administered contrast    Pneumococcal 23-chitra ps vaccine Anaphylaxis     Potential iodine containing    Bactrim [sulfamethoxazole-trimethoprim] Hives    Morphine Itching     Tolerates norco 2018     Current Facility-Administered Medications   Medication Frequency    acetaminophen suppository 650 mg Q6H PRN    acetaminophen tablet 650 mg Q6H PRN    albuterol nebulizer solution 2.5 mg Q6H PRN    aluminum-magnesium hydroxide-simethicone 200-200-20 mg/5 mL suspension 30 mL QID PRN    apixaban tablet 5 mg BID    atovaquone 750 mg/5 mL oral liquid 750 mg Q12H    benzonatate capsule 100 mg TID    budesonide nebulizer solution 0.5 mg Q12H    carvediloL tablet 25 mg BID    ceFEPIme injection 1 g Q24H    dextrose 50% injection 12.5 g PRN    dextrose 50% injection 25 g PRN    glucagon (human recombinant) injection 1 mg PRN    glucose chewable tablet 16 g PRN    glucose chewable tablet 24 g PRN    guaiFENesin 12 hr tablet 600 mg BID    hydrALAZINE injection 10 mg Q6H PRN    hydrALAZINE tablet 50 mg Q8H    HYDROcodone-acetaminophen  mg per tablet 1 tablet Q6H PRN    levETIRAcetam tablet 500 mg BID    levothyroxine tablet 25 mcg Before breakfast    melatonin tablet 6 mg Nightly PRN    mupirocin 2 % ointment BID    naloxone 0.4 mg/mL injection 0.02 mg PRN    ondansetron injection 4 mg Q8H  PRN    oxyCODONE immediate release tablet 10 mg Q6H PRN    pantoprazole EC tablet 40 mg Daily    polyethylene glycol packet 17 g Daily PRN    promethazine tablet 25 mg Q6H PRN    promethazine-codeine 6.25-10 mg/5 ml syrup 5 mL Q6H PRN    sevelamer carbonate pwpk 2.4 g TID WM    simethicone chewable tablet 80 mg QID PRN    sodium chloride 0.9% flush 3 mL Q12H PRN    vancomycin - pharmacy to dose pharmacy to manage frequency     Current Outpatient Medications   Medication    apixaban (ELIQUIS) 5 mg Tab    atovaquone (MEPRON) 750 mg/5 mL Susp oral liquid    b complex vitamins capsule    benzonatate (TESSALON) 100 MG capsule    carvediloL (COREG) 25 MG tablet    folic acid (FOLVITE) 1 MG tablet    [START ON 5/4/2025] furosemide (LASIX) 80 MG tablet    guaiFENesin (MUCINEX) 600 mg 12 hr tablet    hydrALAZINE (APRESOLINE) 50 MG tablet    levetiracetam oral soln 500 mg/5 mL (5 mL) Soln    levoFLOXacin (LEVAQUIN) 750 MG tablet    [START ON 5/4/2025] levothyroxine (SYNTHROID) 25 MCG tablet    [START ON 5/4/2025] pantoprazole (PROTONIX) 40 MG tablet    sevelamer carbonate (RENVELA) 2.4 gram PwPk       Objective:     Vital Signs (Most Recent):  Temp: 98.1 °F (36.7 °C) (05/03/25 0827)  Pulse: 104 (05/03/25 1101)  Resp: 20 (05/03/25 0827)  BP: (!) 144/66 (05/03/25 0827)  SpO2: (!) 94 % (05/03/25 0827) Vital Signs (24h Range):  Temp:  [98.1 °F (36.7 °C)-99.5 °F (37.5 °C)] 98.1 °F (36.7 °C)  Pulse:  [] 104  Resp:  [18-20] 20  SpO2:  [93 %-96 %] 94 %  BP: (112-144)/(56-66) 144/66     Weight: 54.4 kg (120 lb) (04/30/25 1755)  Body mass index is 23.44 kg/m².  Body surface area is 1.52 meters squared.    I/O last 3 completed shifts:  In: 700 [Other:700]  Out: 1000 [Other:1000]     Physical Exam  Constitutional:       Appearance: Normal appearance.   Cardiovascular:      Rate and Rhythm: Normal rate and regular rhythm.      Pulses: Normal pulses.      Heart sounds: Normal heart sounds.   Pulmonary:      Effort: Pulmonary effort  is normal.      Breath sounds: Normal breath sounds.   Abdominal:      Palpations: Abdomen is soft.   Musculoskeletal:      Right lower leg: No edema.      Left lower leg: No edema.   Neurological:      Mental Status: She is alert and oriented to person, place, and time.          Significant Labs: reviewed  BMP  Lab Results   Component Value Date     (L) 05/03/2025    K 4.4 05/03/2025     05/03/2025    CO2 21 (L) 05/03/2025    BUN 34 (H) 05/03/2025    CREATININE 6.1 (H) 05/03/2025    CALCIUM 8.7 05/03/2025    ANIONGAP 12 05/03/2025    EGFRNORACEVR 8 (L) 05/03/2025     Lab Results   Component Value Date    WBC 4.24 05/03/2025    HGB 9.8 (L) 05/03/2025    HCT 29.9 (L) 05/03/2025     (H) 05/03/2025     (L) 05/03/2025         Significant Imaging: reviewed    Assessment/Plan:     39-year-old female with history of ESRD on chronic HD presented with generalized symptoms:     ESRD (end stage renal disease) on dialysis  ESRD pt, on HD since May 2022, currently at [facility], q MWF  ESRD, 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 Advised pt to request SW at [facility] to arrange for her.     K normal  Anemia, mild  BP controlled  No indications for HD today     Vascular:  Noted has leg AV fistula.  Vascular surgeon is Dr. Petty.  In asking the pt why she does not have a conventional UE AVF, pt responded that she was concerned about the cosmetic effect on the arms, as [place].  Increased risk of infections with lower extremity accesses mentioned to the pt.     Parainfluenza virus infection  Symptomatically improved  Likely cause of myalgia and arthralgia     AIDS (acquired immunodeficiency syndrome), CD4 <=200  History of HIV and AIDS.  Noted very low CD4 count, non-compliant with  meds  Doubt patient is taking her HAART meds  Says is taking Juluca under dr. Vides's care  Noted h/o of non-compliance           Thank you for your consult.   Total time spent 40 minutes including time needed to review the records, the   patient evaluation, documentation, face-to-face discussion with the patient,   more than 50% of the time was spent on coordination of care and counseling.    Level V visit.           Thank you for your consult.     Allyson Brady MD  Nephrology  [facility] - [facility]

## 2025-05-03 NOTE — PLAN OF CARE
[facility] - Med Surg  Discharge Final Note    Primary Care Provider: Levi Moncada MD    Expected Discharge Date: 5/3/2025    Final Discharge Note (most recent)       Final Note - 05/03/25 1550          Final Note    Assessment Type Final Discharge Note     Anticipated Discharge Disposition Home or Self Care        Post-Acute Status    Discharge Delays None known at this time                     Important Message from Medicare             Contact Info       Pcp, No        Next Steps: Follow up    Instructions: establish care    John Vides MD   Specialty: Infectious Diseases    [address] [address]   Phone: [phone]       Next Steps: Schedule an appointment as soon as possible for a visit in 1 week(s)          Discharge home, no home health or dme orders noted.    Information submitted to Jun per Dr Paige's orders.

## 2025-05-03 NOTE — PLAN OF CARE
Discussed discharge instructions with pt, pt verbalized understanding     Problem: Adult Inpatient Plan of Care  Goal: Plan of Care Review  Outcome: Met  Goal: Patient-Specific Goal (Individualized)  Outcome: Met  Goal: Absence of Hospital-Acquired Illness or Injury  Outcome: Met  Goal: Optimal Comfort and Wellbeing  Outcome: Met  Goal: Readiness for Transition of Care  Outcome: Met     Problem: Sepsis/Septic Shock  Goal: Optimal Coping  Outcome: Met  Goal: Absence of Bleeding  Outcome: Met  Goal: Blood Glucose Level Within Targeted Range  Outcome: Met  Goal: Absence of Infection Signs and Symptoms  Outcome: Met  Goal: Optimal Nutrition Intake  Outcome: Met     Problem: Acute Kidney Injury/Impairment  Goal: Fluid and Electrolyte Balance  Outcome: Met  Goal: Improved Oral Intake  Outcome: Met  Goal: Effective Renal Function  Outcome: Met     Problem: Pneumonia  Goal: Fluid Balance  Outcome: Met  Goal: Resolution of Infection Signs and Symptoms  Outcome: Met  Goal: Effective Oxygenation and Ventilation  Outcome: Met     Problem: Infection  Goal: Absence of Infection Signs and Symptoms  Outcome: Met     Problem: Hemodialysis  Goal: Safe, Effective Therapy Delivery  Outcome: Met  Goal: Effective Tissue Perfusion  Outcome: Met  Goal: Absence of Infection Signs and Symptoms  Outcome: Met

## 2025-05-03 NOTE — SUBJECTIVE & OBJECTIVE
"Interval History:   39-year-old woman with AIDS.  Her major complaint at this time with generalized body pain  .  CT scan of the chest shows   Bilateral upper lobe ground-glass infiltrates, likely infectious or inflammatory in nature.  Small bilateral pleural effusions with bibasilar atelectasis and or parenchymal scarring.       Review of Systems   Constitutional:  Negative for activity change, appetite change, chills, diaphoresis and fatigue.   Neurological:  Negative for dizziness and facial asymmetry.     Objective:     Vital Signs (Most Recent):  Temp: 98.5 °F (36.9 °C) (05/03/25 0413)  Pulse: 102 (05/03/25 0413)  Resp: 18 (05/03/25 0413)  BP: (!) 122/58 (05/03/25 0413)  SpO2: 96 % (05/03/25 0413) Vital Signs (24h Range):  Temp:  [98.5 °F (36.9 °C)-99.5 °F (37.5 °C)] 98.5 °F (36.9 °C)  Pulse:  [] 102  Resp:  [18-20] 18  SpO2:  [91 %-96 %] 96 %  BP: (112-137)/(56-63) 122/58     Weight: 54.4 kg (120 lb)  Body mass index is 23.44 kg/m².    Estimated Creatinine Clearance: 8.8 mL/min (A) (based on SCr of 6.1 mg/dL (H)).     Physical Exam  Vitals and nursing note reviewed.   HENT:      Head: Normocephalic.   Eyes:      Pupils: Pupils are equal, round, and reactive to light.   Cardiovascular:      Rate and Rhythm: Tachycardia present.   Pulmonary:      Effort: Pulmonary effort is normal.   Neurological:      Mental Status: She is alert. Mental status is at baseline.          Significant Labs: Blood Culture: No results for input(s): "LABBLOO" in the last 4320 hours.  CBC:   Recent Labs   Lab 05/02/25  0600   WBC 6.41   HGB 10.3*   HCT 32.3*   *     CMP:   Recent Labs   Lab 05/01/25  0708 05/03/25  0523    133*   K 4.3 4.4   CL 95 100   CO2 23 21*   GLU 83 78   BUN 30* 34*   CREATININE 7.1* 6.1*   CALCIUM 8.8 8.7   ALBUMIN  --  2.9*   ANIONGAP 18* 12     All pertinent labs within the past 24 hours have been reviewed.    Significant Imaging: I have reviewed all pertinent imaging results/findings within " the past 24 hours.

## 2025-05-04 VITALS
TEMPERATURE: 98 F | RESPIRATION RATE: 20 BRPM | OXYGEN SATURATION: 95 % | DIASTOLIC BLOOD PRESSURE: 71 MMHG | HEART RATE: 102 BPM | SYSTOLIC BLOOD PRESSURE: 151 MMHG

## 2025-05-04 LAB
ABSOLUTE EOSINOPHIL (OHS): 0.77 K/UL
ABSOLUTE MONOCYTE (OHS): 0.27 K/UL (ref 0.3–1)
ABSOLUTE NEUTROPHIL COUNT (OHS): 3.39 K/UL (ref 1.8–7.7)
ALBUMIN SERPL BCP-MCNC: 3.6 G/DL (ref 3.5–5.2)
ALP SERPL-CCNC: 55 UNIT/L (ref 40–150)
ALT SERPL W/O P-5'-P-CCNC: 13 UNIT/L (ref 10–44)
ANION GAP (OHS): 17 MMOL/L (ref 8–16)
AST SERPL-CCNC: 35 UNIT/L (ref 11–45)
BASOPHILS # BLD AUTO: 0.03 K/UL
BASOPHILS NFR BLD AUTO: 0.6 %
BILIRUB SERPL-MCNC: 0.6 MG/DL (ref 0.1–1)
BUN SERPL-MCNC: 53 MG/DL (ref 6–20)
CALCIUM SERPL-MCNC: 9.7 MG/DL (ref 8.7–10.5)
CHLORIDE SERPL-SCNC: 98 MMOL/L (ref 95–110)
CO2 SERPL-SCNC: 19 MMOL/L (ref 23–29)
CREAT SERPL-MCNC: 8 MG/DL (ref 0.5–1.4)
ERYTHROCYTE [DISTWIDTH] IN BLOOD BY AUTOMATED COUNT: 14.1 % (ref 11.5–14.5)
GFR SERPLBLD CREATININE-BSD FMLA CKD-EPI: 6 ML/MIN/1.73/M2
GLUCOSE SERPL-MCNC: 86 MG/DL (ref 70–110)
HCT VFR BLD AUTO: 34.6 % (ref 37–48.5)
HGB BLD-MCNC: 11.4 GM/DL (ref 12–16)
HOLD SPECIMEN: NORMAL
HOLD SPECIMEN: NORMAL
IMM GRANULOCYTES # BLD AUTO: 0.02 K/UL (ref 0–0.04)
IMM GRANULOCYTES NFR BLD AUTO: 0.4 % (ref 0–0.5)
LYMPHOCYTES # BLD AUTO: 0.7 K/UL (ref 1–4.8)
MCH RBC QN AUTO: 34.4 PG (ref 27–31)
MCHC RBC AUTO-ENTMCNC: 32.9 G/DL (ref 32–36)
MCV RBC AUTO: 105 FL (ref 82–98)
NUCLEATED RBC (/100WBC) (OHS): 0 /100 WBC
PLATELET # BLD AUTO: 142 K/UL (ref 150–450)
PMV BLD AUTO: 10.9 FL (ref 9.2–12.9)
POTASSIUM SERPL-SCNC: 4.4 MMOL/L (ref 3.5–5.1)
PROT SERPL-MCNC: 11 GM/DL (ref 6–8.4)
RBC # BLD AUTO: 3.31 M/UL (ref 4–5.4)
RELATIVE EOSINOPHIL (OHS): 14.9 %
RELATIVE LYMPHOCYTE (OHS): 13.5 % (ref 18–48)
RELATIVE MONOCYTE (OHS): 5.2 % (ref 4–15)
RELATIVE NEUTROPHIL (OHS): 65.4 % (ref 38–73)
SODIUM SERPL-SCNC: 134 MMOL/L (ref 136–145)
TROPONIN I SERPL DL<=0.01 NG/ML-MCNC: 0.03 NG/ML
WBC # BLD AUTO: 5.18 K/UL (ref 3.9–12.7)

## 2025-05-04 PROCEDURE — 94761 N-INVAS EAR/PLS OXIMETRY MLT: CPT

## 2025-05-04 PROCEDURE — 96376 TX/PRO/DX INJ SAME DRUG ADON: CPT

## 2025-05-04 PROCEDURE — 63600175 PHARM REV CODE 636 W HCPCS: Performed by: EMERGENCY MEDICINE

## 2025-05-04 PROCEDURE — 36415 COLL VENOUS BLD VENIPUNCTURE: CPT | Performed by: EMERGENCY MEDICINE

## 2025-05-04 PROCEDURE — 94640 AIRWAY INHALATION TREATMENT: CPT

## 2025-05-04 PROCEDURE — 25000242 PHARM REV CODE 250 ALT 637 W/ HCPCS: Performed by: EMERGENCY MEDICINE

## 2025-05-04 RX ORDER — HYDROMORPHONE HYDROCHLORIDE 1 MG/ML
0.5 INJECTION, SOLUTION INTRAMUSCULAR; INTRAVENOUS; SUBCUTANEOUS
Refills: 0 | Status: COMPLETED | OUTPATIENT
Start: 2025-05-04 | End: 2025-05-04

## 2025-05-04 RX ORDER — OXYCODONE AND ACETAMINOPHEN 10; 325 MG/1; MG/1
1 TABLET ORAL EVERY 6 HOURS PRN
Qty: 12 TABLET | Refills: 0 | Status: SHIPPED | OUTPATIENT
Start: 2025-05-04 | End: 2025-05-07

## 2025-05-04 RX ORDER — IPRATROPIUM BROMIDE AND ALBUTEROL SULFATE 2.5; .5 MG/3ML; MG/3ML
3 SOLUTION RESPIRATORY (INHALATION)
Status: COMPLETED | OUTPATIENT
Start: 2025-05-04 | End: 2025-05-04

## 2025-05-04 RX ADMIN — HYDROMORPHONE HYDROCHLORIDE 0.5 MG: 1 INJECTION, SOLUTION INTRAMUSCULAR; INTRAVENOUS; SUBCUTANEOUS at 12:05

## 2025-05-04 RX ADMIN — IPRATROPIUM BROMIDE AND ALBUTEROL SULFATE 3 ML: 2.5; .5 SOLUTION RESPIRATORY (INHALATION) at 01:05

## 2025-05-04 NOTE — FIRST PROVIDER EVALUATION
Medical screening examination initiated.  I have conducted a focused provider triage encounter, findings are as follows:    Brief history of present illness:  38 yo female with PMHx significant of sickle cell disease presenting to the ED with c/o shortness of breath and CP. CP is located in the center of the patient's chest; nonradiating; describes it as an aching feeling. Patient was d/c from the hospital today with DX of pneumonia; states her medications at the pharmacy were not ready for her. Also c/o generalized body aches.     Vitals:    05/03/25 2051 05/03/25 2102   BP: (!) 145/71    BP Location: Right arm    Patient Position: Sitting    Pulse: 106    Resp: 16    Temp: 97.8 °F (36.6 °C)    TempSrc: Oral    SpO2:  95%       Pertinent physical exam:  VSS. NAD.  Respirations even and unlabored.    Brief workup plan:  Preliminary workup initiated; this workup will be continued and followed by the physician or advanced practice provider that is assigned to the patient when roomed.
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
## 2025-05-04 NOTE — DISCHARGE INSTRUCTIONS
Follow up at [facility] as previously directed - take meds as directed today when discharged home by the hospital medicine service     Take percocet as directed for  breakthrough pain not relieved with current home meds

## 2025-05-05 ENCOUNTER — TELEPHONE (OUTPATIENT)
Dept: CARDIOLOGY | Facility: CLINIC | Age: 40
End: 2025-05-05
Payer: MEDICARE

## 2025-05-05 ENCOUNTER — TELEPHONE (OUTPATIENT)
Dept: INFECTIOUS DISEASES | Facility: CLINIC | Age: 40
End: 2025-05-05
Payer: MEDICARE

## 2025-05-05 ENCOUNTER — DOCUMENTATION ONLY (OUTPATIENT)
Dept: CARDIOLOGY | Facility: CLINIC | Age: 40
End: 2025-05-05
Payer: MEDICARE

## 2025-05-05 LAB
1,3 BETA GLUCAN SER QL: NEGATIVE
1,3 BETA GLUCAN SER-MCNC: 45 PG/ML
MRSA SPEC QL CULT: NORMAL
OHS QRS DURATION: 70 MS
OHS QTC CALCULATION: 456 MS

## 2025-05-05 NOTE — TELEPHONE ENCOUNTER
Patient appt scheduled for NP AIDS, advised patient that insurance is OON. Per Dr. Mayo, appt is medically necessary. Pt scheduled first available. Patient verbalized understanding and agreed to appt date, time, and location.

## 2025-05-05 NOTE — PROGRESS NOTES
[facility] ([facility]) Team notified of pt referral via Ambulatory Referral to Heart Failure Transitional Care ([id number]).    Patient screened today by provider and HF nurse for enrollment to program.      Pt was deemed not a candidate for enrollment at this time related to patient is currently on hemo-dialysis.    Pt will require additional follow up planning per primary team.

## 2025-05-05 NOTE — TELEPHONE ENCOUNTER
Called- no answer and could not leave message    Scheduled 5/6 @ 1:40 at [facility] with DR Alvarado      ----- Message from Negin sent at 5/5/2025  8:28 AM CDT -----  Contact: BRYANT CORBETT [[id number]]  .Type:  Patient Requesting CallWho Called:BRYANT CORBETT [[id number]]Does the patient know what this is regarding?:Patient requesting a call back to schedule from a referralWould the patient rather a call back or a response via [facility]? Call [facility] Call Back Number:[phone] Information:

## 2025-05-05 NOTE — TELEPHONE ENCOUNTER
----- Message from Negin sent at 5/5/2025  8:30 AM CDT -----  Contact: BRYANT CORBETT [[id number]]  .Type:  Patient Requesting CallWho Called:BRYANT CORBETT [[id number]]Does the patient know what this is regarding?:Patient requesting a call back to schedule a new patient appointment.Would the patient rather a call back or a response via [facility]? Call [facility] Call Back Number:[phone]

## 2025-05-06 ENCOUNTER — TELEPHONE (OUTPATIENT)
Dept: HOME HEALTH SERVICES | Facility: CLINIC | Age: 40
End: 2025-05-06
Payer: MEDICARE

## 2025-05-06 ENCOUNTER — OFFICE VISIT (OUTPATIENT)
Dept: INFECTIOUS DISEASES | Facility: CLINIC | Age: 40
End: 2025-05-06
Payer: MEDICARE

## 2025-05-06 ENCOUNTER — HOSPITAL ENCOUNTER (EMERGENCY)
Facility: HOSPITAL | Age: 40
Discharge: HOME | End: 2025-05-07
Attending: EMERGENCY MEDICINE
Payer: MEDICARE

## 2025-05-06 VITALS
HEART RATE: 96 BPM | SYSTOLIC BLOOD PRESSURE: 127 MMHG | WEIGHT: 119.5 LBS | DIASTOLIC BLOOD PRESSURE: 81 MMHG | BODY MASS INDEX: 23.46 KG/M2 | HEIGHT: 60 IN | OXYGEN SATURATION: 95 % | TEMPERATURE: 98 F

## 2025-05-06 DIAGNOSIS — Z86.79 HISTORY OF VENTRICULAR FIBRILLATION: ICD-10-CM

## 2025-05-06 DIAGNOSIS — R21 RASH: ICD-10-CM

## 2025-05-06 DIAGNOSIS — I10 ELEVATED BLOOD PRESSURE READING WITH DIAGNOSIS OF HYPERTENSION: ICD-10-CM

## 2025-05-06 DIAGNOSIS — R75 INCONCLUSIVE LABORATORY EVIDENCE OF HUMAN IMMUNODEFICIENCY VIRUS (HIV): ICD-10-CM

## 2025-05-06 DIAGNOSIS — N18.6 ESRD (END STAGE RENAL DISEASE) ON DIALYSIS: Chronic | ICD-10-CM

## 2025-05-06 DIAGNOSIS — N18.6 ESRD (END STAGE RENAL DISEASE) ON DIALYSIS: ICD-10-CM

## 2025-05-06 DIAGNOSIS — Z99.2 ESRD (END STAGE RENAL DISEASE) ON DIALYSIS: ICD-10-CM

## 2025-05-06 DIAGNOSIS — Z99.2 ESRD (END STAGE RENAL DISEASE) ON DIALYSIS: Chronic | ICD-10-CM

## 2025-05-06 DIAGNOSIS — J18.9 MULTIFOCAL PNEUMONIA: Primary | ICD-10-CM

## 2025-05-06 DIAGNOSIS — R21 RASH AND NONSPECIFIC SKIN ERUPTION: Primary | ICD-10-CM

## 2025-05-06 DIAGNOSIS — B20 AIDS (ACQUIRED IMMUNODEFICIENCY SYNDROME), CD4 <=200: Chronic | ICD-10-CM

## 2025-05-06 DIAGNOSIS — Z99.2 ANEMIA DUE TO CHRONIC KIDNEY DISEASE, ON CHRONIC DIALYSIS: ICD-10-CM

## 2025-05-06 DIAGNOSIS — D63.1 ANEMIA DUE TO CHRONIC KIDNEY DISEASE, ON CHRONIC DIALYSIS: ICD-10-CM

## 2025-05-06 DIAGNOSIS — N18.6 ANEMIA DUE TO CHRONIC KIDNEY DISEASE, ON CHRONIC DIALYSIS: ICD-10-CM

## 2025-05-06 LAB
BACTERIA BLD CULT: NORMAL
BACTERIA BLD CULT: NORMAL

## 2025-05-06 PROCEDURE — 99284 EMERGENCY DEPT VISIT MOD MDM: CPT | Mod: 25

## 2025-05-06 PROCEDURE — 3066F NEPHROPATHY DOC TX: CPT | Mod: CPTII,S$GLB,, | Performed by: INTERNAL MEDICINE

## 2025-05-06 PROCEDURE — 99999 PR PBB SHADOW E&M-EST. PATIENT-LVL III: CPT | Mod: PBBFAC,,, | Performed by: INTERNAL MEDICINE

## 2025-05-06 PROCEDURE — 99214 OFFICE O/P EST MOD 30 MIN: CPT | Mod: S$GLB,,, | Performed by: INTERNAL MEDICINE

## 2025-05-06 PROCEDURE — 3044F HG A1C LEVEL LT 7.0%: CPT | Mod: CPTII,S$GLB,, | Performed by: INTERNAL MEDICINE

## 2025-05-06 PROCEDURE — 1111F DSCHRG MED/CURRENT MED MERGE: CPT | Mod: CPTII,S$GLB,, | Performed by: INTERNAL MEDICINE

## 2025-05-06 PROCEDURE — 3008F BODY MASS INDEX DOCD: CPT | Mod: CPTII,S$GLB,, | Performed by: INTERNAL MEDICINE

## 2025-05-06 PROCEDURE — 1159F MED LIST DOCD IN RCRD: CPT | Mod: CPTII,S$GLB,, | Performed by: INTERNAL MEDICINE

## 2025-05-06 RX ORDER — DOLUTEGRAVIR SODIUM AND RILPIVIRINE HYDROCHLORIDE 50; 25 MG/1; MG/1
1 TABLET, FILM COATED ORAL DAILY
Qty: 30 TABLET | Refills: 4 | Status: SHIPPED | OUTPATIENT
Start: 2025-05-06 | End: 2025-10-03

## 2025-05-06 RX ORDER — DAPSONE 100 MG/1
100 TABLET ORAL DAILY
Qty: 30 TABLET | Refills: 4 | Status: SHIPPED | OUTPATIENT
Start: 2025-05-06 | End: 2025-10-03

## 2025-05-06 NOTE — ASSESSMENT & PLAN NOTE
05/05  Will continue Juluca,continue Taryn  Will send Juluca-she wants to start Cabenuva- will check Viral load and Gensure

## 2025-05-06 NOTE — TELEPHONE ENCOUNTER
"Contacted pt to schedule an appointment regarding a referral placed for a tcc home visit with a nurse practitioner. Scheduling declined.    Patient stated "I'm really not interested, and I'm back at work".  "

## 2025-05-06 NOTE — Clinical Note
"Wang Cramerjorge luis" Delio was seen and treated in our emergency department on 5/6/2025.  She may return to work on 05/08/2025.       If you have any questions or concerns, please don't hesitate to call.      Lois TolliverRN RN    "

## 2025-05-06 NOTE — PROGRESS NOTES
HIV Follow-up Visit  Wang Kebede is here for follow-up of HIV infection. She is feeling better since her last visit.     Patient is compliant with HIV medications.  She was seen in the hospital and and was managed for presumed PJP  She is on Juluca  Labs-  05/01/25    omponent  Ref Range & Units (hover)    CD4 % 12.55 Low    CD4 Absolute 12 Low      Fungitell is negative  CT chest    Bilateral upper lobe ground-glass infiltrates, likely infectious or inflammatory in nature.  Small bilateral pleural effusions with bibasilar atelectasis and or parenchymal scarring.   She was discharged on Levaquin -05/03/25   She now has a rash in the arms and leg .      Patient does not have side effects with the HIV medications.none    The following portions of the patient's history were reviewed and updated as appropriate: allergies, current medications, past family history, past medical history, past social history, past surgical history, and problem list.    Review of Systems   Constitutional:  Negative for chills, fever, malaise/fatigue and weight loss.   Respiratory:  Negative for cough and hemoptysis.    Skin:  Positive for rash.          Physical Exam  Vitals and nursing note reviewed.   HENT:      Head: Normocephalic.   Cardiovascular:      Rate and Rhythm: Normal rate.   Pulmonary:      Effort: Pulmonary effort is normal.   Musculoskeletal:         General: Normal range of motion.      Cervical back: Normal range of motion.   Skin:     General: Skin is warm.   Neurological:      General: No focal deficit present.      Mental Status: She is alert.          Immunization History   Administered Date(s) Administered    Hib-HbOC 06/26/2000    MMR 01/04/1999    PPD Test 06/17/2000, 06/27/2016    Td (ADULT) 01/04/1999             Assessment:  1. Multifocal pneumonia  HIV RNA, quantitative, PCR    GENOSURE ARCHIVE DNA SEQUENCING    Syphilis Ab, TP-PA      2. History of ventricular fibrillation        3. ESRD (end stage  renal disease) on dialysis        4. AIDS (acquired immunodeficiency syndrome), CD4 <=200        5. Rash        6. Inconclusive laboratory evidence of human immunodeficiency virus (HIV)  HIV RNA, quantitative, PCR          Problem List[1]  Plan:  1. Multifocal pneumonia  Assessment & Plan:  Will complete Levaquin as planned  Will check gold quantiferon     Orders:  -     HIV RNA, quantitative, PCR; Future; Expected date: 05/06/2025  -     GENOSURE ARCHIVE DNA SEQUENCING; Future; Expected date: 05/06/2025  -     Syphilis Ab, TP-PA; Future; Expected date: 05/06/2025    2. History of ventricular fibrillation  Assessment & Plan:  Follow cardiology       3. ESRD (end stage renal disease) on dialysis  Assessment & Plan:  HD as tolerated.      4. AIDS (acquired immunodeficiency syndrome), CD4 <=200  Assessment & Plan:  05/05  Will continue Juluca,continue Dapson  Will send Juluca-she wants to start Cabenuva- will check Viral load and Gensure        5. Rash  Assessment & Plan:  Will do syphillis assay- new medication started was Mepron- will stop for now   Needs follow up      6. Inconclusive laboratory evidence of human immunodeficiency virus (HIV)  -     HIV RNA, quantitative, PCR; Future; Expected date: 05/06/2025         The patient has been counseled regarding the importance of compliance with every dose of HIV medications. Possible side effects have been reviewed and the patients questions have been answered.         [1]   Patient Active Problem List  Diagnosis    Abdominal lymphadenopathy    Pelvic lymphadenopathy    HIV (human immunodeficiency virus infection)    History of VT/VF s/p implantation of automatic cardioverter/defibrillator (AICD)    Moderate malnutrition    AIDS (acquired immunodeficiency syndrome), CD4 <=200    Depression    Multifocal pneumonia    Anemia, unspecified    Elevated liver enzymes    Macrocytic anemia    Enlarged lymph nodes    Condyloma acuminata    History of ventricular fibrillation     Vulvar cancer, carcinoma    Severe vulvar dysplasia    LELAND (acute kidney injury)    Thrombocytopenia associated with AIDS    Seizure disorder    Non-traumatic rhabdomyolysis    Electrolyte disturbance    Pulmonary infiltrate present on computed tomography    Hyperglycemia    AIDS    Anemia    ESRD (end stage renal disease) on dialysis    Hypertension    History of recurrent deep vein thrombosis (DVT)    Parainfluenza virus infection      yes

## 2025-05-07 VITALS
SYSTOLIC BLOOD PRESSURE: 160 MMHG | WEIGHT: 121.25 LBS | BODY MASS INDEX: 23.68 KG/M2 | RESPIRATION RATE: 20 BRPM | OXYGEN SATURATION: 98 % | HEART RATE: 99 BPM | DIASTOLIC BLOOD PRESSURE: 65 MMHG | TEMPERATURE: 99 F

## 2025-05-07 LAB
ABSOLUTE EOSINOPHIL (OHS): 0.53 K/UL
ABSOLUTE MONOCYTE (OHS): 0.27 K/UL (ref 0.3–1)
ABSOLUTE NEUTROPHIL COUNT (OHS): 2.59 K/UL (ref 1.8–7.7)
ALBUMIN SERPL BCP-MCNC: 2.9 G/DL (ref 3.5–5.2)
ALP SERPL-CCNC: 48 UNIT/L (ref 40–150)
ALT SERPL W/O P-5'-P-CCNC: 9 UNIT/L (ref 10–44)
ANION GAP (OHS): 14 MMOL/L (ref 8–16)
AST SERPL-CCNC: 23 UNIT/L (ref 11–45)
BASOPHILS # BLD AUTO: 0.01 K/UL
BASOPHILS NFR BLD AUTO: 0.2 %
BILIRUB SERPL-MCNC: 0.5 MG/DL (ref 0.1–1)
BUN SERPL-MCNC: 73 MG/DL (ref 6–20)
CALCIUM SERPL-MCNC: 8.7 MG/DL (ref 8.7–10.5)
CHLORIDE SERPL-SCNC: 100 MMOL/L (ref 95–110)
CO2 SERPL-SCNC: 21 MMOL/L (ref 23–29)
CREAT SERPL-MCNC: 13 MG/DL (ref 0.5–1.4)
ERYTHROCYTE [DISTWIDTH] IN BLOOD BY AUTOMATED COUNT: 14.3 % (ref 11.5–14.5)
GFR SERPLBLD CREATININE-BSD FMLA CKD-EPI: 3 ML/MIN/1.73/M2
GLUCOSE SERPL-MCNC: 91 MG/DL (ref 70–110)
HCT VFR BLD AUTO: 27.8 % (ref 37–48.5)
HGB BLD-MCNC: 9.6 GM/DL (ref 12–16)
IMM GRANULOCYTES # BLD AUTO: 0.05 K/UL (ref 0–0.04)
IMM GRANULOCYTES NFR BLD AUTO: 1.1 % (ref 0–0.5)
LYMPHOCYTES # BLD AUTO: 1.01 K/UL (ref 1–4.8)
MAGNESIUM SERPL-MCNC: 2.1 MG/DL (ref 1.6–2.6)
MCH RBC QN AUTO: 36.8 PG (ref 27–31)
MCHC RBC AUTO-ENTMCNC: 34.5 G/DL (ref 32–36)
MCV RBC AUTO: 107 FL (ref 82–98)
NUCLEATED RBC (/100WBC) (OHS): 0 /100 WBC
PHOSPHATE SERPL-MCNC: 4.3 MG/DL (ref 2.7–4.5)
PLATELET # BLD AUTO: 151 K/UL (ref 150–450)
PMV BLD AUTO: 11.7 FL (ref 9.2–12.9)
POTASSIUM SERPL-SCNC: 4.2 MMOL/L (ref 3.5–5.1)
PROT SERPL-MCNC: 7.9 GM/DL (ref 6–8.4)
RBC # BLD AUTO: 2.61 M/UL (ref 4–5.4)
RELATIVE EOSINOPHIL (OHS): 11.9 %
RELATIVE LYMPHOCYTE (OHS): 22.6 % (ref 18–48)
RELATIVE MONOCYTE (OHS): 6.1 % (ref 4–15)
RELATIVE NEUTROPHIL (OHS): 58.1 % (ref 38–73)
SODIUM SERPL-SCNC: 135 MMOL/L (ref 136–145)
WBC # BLD AUTO: 4.46 K/UL (ref 3.9–12.7)

## 2025-05-07 PROCEDURE — 84100 ASSAY OF PHOSPHORUS: CPT | Performed by: EMERGENCY MEDICINE

## 2025-05-07 PROCEDURE — 83735 ASSAY OF MAGNESIUM: CPT | Performed by: EMERGENCY MEDICINE

## 2025-05-07 PROCEDURE — 25000003 PHARM REV CODE 250: Performed by: EMERGENCY MEDICINE

## 2025-05-07 PROCEDURE — 80053 COMPREHEN METABOLIC PANEL: CPT | Performed by: EMERGENCY MEDICINE

## 2025-05-07 PROCEDURE — 96374 THER/PROPH/DIAG INJ IV PUSH: CPT

## 2025-05-07 PROCEDURE — 85025 COMPLETE CBC W/AUTO DIFF WBC: CPT | Performed by: EMERGENCY MEDICINE

## 2025-05-07 PROCEDURE — 63600175 PHARM REV CODE 636 W HCPCS: Mod: JZ,TB | Performed by: EMERGENCY MEDICINE

## 2025-05-07 RX ORDER — HYDRALAZINE HYDROCHLORIDE 25 MG/1
50 TABLET, FILM COATED ORAL
Status: DISCONTINUED | OUTPATIENT
Start: 2025-05-07 | End: 2025-05-07 | Stop reason: HOSPADM

## 2025-05-07 RX ORDER — HYDROXYZINE PAMOATE 25 MG/1
100 CAPSULE ORAL
Status: COMPLETED | OUTPATIENT
Start: 2025-05-07 | End: 2025-05-07

## 2025-05-07 RX ORDER — CARVEDILOL 12.5 MG/1
25 TABLET ORAL ONCE
Status: COMPLETED | OUTPATIENT
Start: 2025-05-07 | End: 2025-05-07

## 2025-05-07 RX ORDER — HYDROXYZINE PAMOATE 50 MG/1
50 CAPSULE ORAL EVERY 4 HOURS PRN
Qty: 30 CAPSULE | Refills: 0 | Status: SHIPPED | OUTPATIENT
Start: 2025-05-07

## 2025-05-07 RX ORDER — EPINEPHRINE 0.3 MG/.3ML
1 INJECTION SUBCUTANEOUS
Qty: 2 EACH | Refills: 1 | Status: SHIPPED | OUTPATIENT
Start: 2025-05-07 | End: 2026-05-07

## 2025-05-07 RX ORDER — FAMOTIDINE 10 MG/ML
20 INJECTION, SOLUTION INTRAVENOUS
Status: DISCONTINUED | OUTPATIENT
Start: 2025-05-07 | End: 2025-05-07 | Stop reason: HOSPADM

## 2025-05-07 RX ORDER — FAMOTIDINE 20 MG/1
20 TABLET, FILM COATED ORAL DAILY
Qty: 20 TABLET | Refills: 0 | Status: SHIPPED | OUTPATIENT
Start: 2025-05-07

## 2025-05-07 RX ORDER — PREDNISONE 10 MG/1
TABLET ORAL
Qty: 21 TABLET | Refills: 0 | Status: SHIPPED | OUTPATIENT
Start: 2025-05-07

## 2025-05-07 RX ORDER — METHYLPREDNISOLONE SOD SUCC 125 MG
125 VIAL (EA) INJECTION
Status: COMPLETED | OUTPATIENT
Start: 2025-05-07 | End: 2025-05-07

## 2025-05-07 RX ADMIN — HYDROXYZINE PAMOATE 100 MG: 25 CAPSULE ORAL at 01:05

## 2025-05-07 RX ADMIN — CARVEDILOL 25 MG: 12.5 TABLET, FILM COATED ORAL at 02:05

## 2025-05-07 RX ADMIN — METHYLPREDNISOLONE SODIUM SUCCINATE 125 MG: 125 INJECTION, POWDER, FOR SOLUTION INTRAMUSCULAR; INTRAVENOUS at 12:05
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
## 2025-05-08 ENCOUNTER — PATIENT MESSAGE (OUTPATIENT)
Dept: ADMINISTRATIVE | Facility: OTHER | Age: 40
End: 2025-05-08
Payer: MEDICARE

## 2025-05-08 ENCOUNTER — PATIENT OUTREACH (OUTPATIENT)
Dept: ADMINISTRATIVE | Facility: OTHER | Age: 40
End: 2025-05-08
Payer: MEDICARE

## 2025-05-08 NOTE — ASSESSMENT & PLAN NOTE
Patient reports compliance with HIV medications.  CD4 count 12  add Mepron for PJP treatment   Patient encouraged to follow up with her HIV physician

## 2025-05-08 NOTE — DISCHARGE SUMMARY
[facility] Medicine  Discharge Summary      Patient Name: Wang Kebede  MRN: [id number]  MAG: [id number]  Patient Class: IP- Inpatient  Admission Date: 4/30/2025  Hospital Length of Stay: 3 days  Discharge Date and Time: 5/3/2025 12:46 PM  Attending Physician: No att. providers found   Discharging Provider: Rosy Montes MD  Primary Care Provider: Levi Moncada MD    Primary Care Team: Networked reference to record PCT     HPI:   Wang Kebede is a 39 y.o. female with a PMH  has a past medical history of Abnormal Pap smear of cervix (2016), Acute encephalopathy (12/25/2018), AICD (automatic cardioverter/defibrillator) present, Anemia, Chronic abdominal pain, Encounter for blood transfusion, History of cardiac arrest, HIV (human immunodeficiency virus infection), Lymphoma, Narcotic bowel syndrome, Splenomegaly, and Vulvar cancer, carcinoma. who presented to the ED for further evaluation of worsening dyspnea/shortness of breath x3 days duration.  Patient reported leaving dialysis earlier today however, was unable to complete full session and was directed to the ED for further evaluation and possible admission.  Patient reports compliance with home medications as well as dietary/fluid restrictions and denied exposure to recent ill contacts.  Associated symptoms included subjective fevers, chills, sweats, productive cough with green sputum, generalized fatigue/weakness, generalized myalgias/body aches, and chest pain upon coughing.  She reported no known alleviating or aggravating factors noted with all other review of systems negative except as noted above.  Prior to onset of symptoms, patient reported being in her usual state of health with no other concerns or complaints.  Initial workup in the ED revealed patient met SIRS criteria for sepsis in addition to signs/symptoms of volume overload meeting further diuresing via dialysis.  Lactic acid within normal limits,  procalcitonin elevated at 0.82, flu/COVID negative, respiratory panel positive for parainfluenza V3, UA positive for 7 WBCs, few bacteria.  Chest x-ray positive for cardiomegaly, mild pleural effusion, and basilar opacities concerning for underlying CHF and/or pneumonia.  Patient admitted to Hospital Medicine inpatient for continued medical management.    PCP: Levi Moncada      * No surgery found *      Hospital Course:   5/1 admitted for acute respiratory hypoxic failure 2/2 parainfluenza virus and volume overload in immunocompromised patient. Denies wearing supplemental oxygen at baseline. Procalcitonin mildly elevated, empirically treated with intravenous antibiotic(s). Nephrology consulted for maintenance dialysis.   Patient continued to complain of cough and panel all over.  She was seen by Nephrology and underwent her usual maintenance hemodialysis.  Her shortness a breath improved she remained on room air  .  Congestive heart failure was treated with hemodialysis, carvedilol, hydralazine and Lasix as she still has some urine output.  Patient was noted to have a CD4 count of 12 she was encouraged to follow up with her infectious disease physician Dr. Vides.  Patient is Respiratory infection panel returned for repair of influenza the treatment for which is supportive care.  As patient's O2 sats were stable and her respiratory status had markedly improved as well as her generalized aches and pains.  She was stable for discharge home.  Patient seen and examined on day of discharge and stable for discharge home.  She will be referred to a PCP.  She has been instructed to follow up with Dr. Vides for treatment of her aids and placed on prophylactic antibiotics.  She was encouraged to follow up at her hemodialysis unit and as she requested transfer to a unit closer to her home Case Management reached out to [facility] to attempt to accommodate her wishes.     Goals of Care Treatment Preferences:  Code  Status: Full Code         Consults:   Consults (From admission, onward)          Status Ordering Provider     Inpatient consult to Nephrology  Once        Provider:  Jen Celis MD    Completed MARIALUISA GARCIA            Assessment & Plan  Hypertension      ESRD (end stage renal disease) on dialysis  Patient on chronic maintenance hemodialysis Monday Wednesday Friday    AIDS (acquired immunodeficiency syndrome), CD4 <=200  Patient reports compliance with HIV medications.  CD4 count 12  add Mepron for PJP treatment   Patient encouraged to follow up with her HIV physician    Seizure disorder  Remains seizure free.    Anemia  Anemia is likely due to chronic disease due to ESRD. Most recent hemoglobin and hematocrit are listed below.  Recent Labs     05/07/25  0131   HGB 9.6*   HCT 27.8*       History of VT/VF s/p implantation of automatic cardioverter/defibrillator (AICD)  Tachycardia improving in setting of sepsis and volume overload.         History of recurrent deep vein thrombosis (DVT)  Patient reports no longer taking eliquis    Parainfluenza virus infection  Supportive care    Multifocal pneumonia  Patient has a diagnosis of pneumonia. The cause of the pneumonia is viral in etiology due to  parainfluenza. The pneumonia is stable. The patient has the following signs/symptoms of pneumonia: cough. The patient does not have a current oxygen requirement and the patient does not have a home oxygen requirement. I have reviewed the pertinent imaging. The following cultures have been collected: Blood cultures remained negative.  Patient discharged home on Levaquin for 5 days    Antibiotics (From admission, onward)      None            Microbiology Results (last 7 days)       Procedure Component Value Units Date/Time    Culture, MRSA [[id number]] Collected: 05/03/25 0916    Order Status: Canceled Specimen: Respiratory from Nasal Swab Updated: 05/03/25 0931    Respiratory Infection Panel (PCR), Nasopharyngeal  [[id number]]  (Abnormal) Collected: 04/30/25 2031    Order Status: Completed Specimen: Nasopharyngeal Swab Updated: 04/30/25 2130     Respiratory Infection Panel Source Nasopharyngeal Swab     Adenovirus Not Detected     Coronavirus 229E, Common Cold Virus Not Detected     Coronavirus HKU1, Common Cold Virus Not Detected     Coronavirus NL63, Common Cold Virus Not Detected     Coronavirus OC43, Common Cold Virus Not Detected     SARS-CoV2 (COVID-19) Qualitative PCR Not Detected     Human Metapneumovirus Not Detected     Human Rhinovirus/Enterovirus Not Detected     Influenza A Not Detected     Influenza B Not Detected     Parainfluenza Virus 1 Not Detected     Parainfluenza Virus 2 Not Detected     Parainfluenza Virus 3 Detected     Parainfluenza Virus 4 Not Detected     Respiratory Syncytial Virus Not Detected     Bordetella Parapertussis (RX2229) Not Detected     Bordetella pertussis (ptxP) Not Detected     Chlamydia pneumoniae Not Detected     Mycoplasma pneumoniae Not Detected    Influenza A & B by Molecular [[id number]]  (Normal) Collected: 04/30/25 1854    Order Status: Completed Specimen: Nasopharyngeal Swab Updated: 04/30/25 1932     INFLUENZA A MOLECULAR Negative     INFLUENZA B MOLECULAR  Negative    Culture, Respiratory with Gram Stain [[id number]]     Order Status: Canceled Specimen: Respiratory           AIDS  CD4 count 12  Infectious disease following    Final Active Diagnoses:    Diagnosis Date Noted POA    PRINCIPAL PROBLEM:  Parainfluenza virus infection [B34.8] 05/01/2025 Yes    Anemia [D64.9] 05/01/2025 Yes     Chronic    ESRD (end stage renal disease) on dialysis [N18.6, Z99.2] 05/01/2025 Not Applicable     Chronic    Hypertension [I10] 05/01/2025 Yes     Chronic    History of recurrent deep vein thrombosis (DVT) [Z86.718] 05/01/2025 Not Applicable     Chronic    AIDS [B20] 06/12/2019 Yes    Seizure disorder [G40.909] 12/25/2018 Yes     Chronic    Multifocal pneumonia [J18.9] 07/23/2017 Yes     AIDS (acquired immunodeficiency syndrome), CD4 <=200 [B20] 04/15/2017 Yes     Chronic    History of VT/VF s/p implantation of automatic cardioverter/defibrillator (AICD) [Z95.810] 12/29/2016 Yes     Chronic      Problems Resolved During this Admission:    Diagnosis Date Noted Date Resolved POA    Shortness of breath [R06.02] 05/01/2025 05/03/2025 Yes    Acute on chronic heart failure with preserved ejection fraction [I50.33] 05/01/2025 05/03/2025 Yes    Sepsis [A41.9] 12/25/2018 05/03/2025 Yes    Acute respiratory failure with hypoxia [J96.01] 07/23/2017 05/03/2025 Yes       Discharged Condition: fair    Disposition: Home or Self Care    Follow Up:   Follow-up Information       Pcp, No Follow up.    Why: establish care             John Vides MD. Schedule an appointment as soon as possible for a visit in 1 week(s).    Specialty: Infectious Diseases  Contact information:  [address] [place] [id number] [phone]                           Patient Instructions:      Ambulatory referral/consult to Internal Medicine   Standing Status: Future   Referral Priority: Routine Referral Type: Consultation   Referral Reason: Specialty Services Required   Requested Specialty: Internal Medicine   Number of Visits Requested: 1     Ambulatory referral/consult to Cardiology   Standing Status: Future   Referral Priority: Routine Referral Type: Consultation   Referral Reason: Specialty Services Required   Requested Specialty: Cardiology   Number of Visits Requested: 1     Ambulatory referral/consult to Obstetrics / Gynecology   Standing Status: Future   Referral Priority: Routine Referral Type: Consultation   Referral Reason: Specialty Services Required   Requested Specialty: Obstetrics and Gynecology   Number of Visits Requested: 1     Ambulatory referral/consult to Outpatient Case Management   Referral Priority: Routine Referral Type: Consultation   Referral Reason: Specialty Services Required   Number of Visits  Requested: 1     ACCEPT - Ambulatory referral/consult to [facility]   Standing Status: Future   Referral Priority: Routine Referral Type: Consultation   Referral Reason: Specialty Services Required   Requested Specialty: Cardiology   Number of Visits Requested: 1     Ambulatory referral/consult to [facility]   Standing Status: Future   Referral Priority: Routine Referral Type: Consultation   Referral Reason: Specialty Services Required   Number of Visits Requested: 1     Diet renal     Notify your health care provider if you experience any of the following:  temperature >100.4     Notify your health care provider if you experience any of the following:  difficulty breathing or increased cough     Notify your health care provider if you experience any of the following:  persistent dizziness, light-headedness, or visual disturbances     Activity as tolerated       Significant Diagnostic Studies: Labs: All labs within the past 24 hours have been reviewed    Pending Diagnostic Studies:       None           Medications:  Reconciled Home Medications:      Medication List        START taking these medications      apixaban 5 mg Tab  Commonly known as: ELIQUIS  Take 1 tablet (5 mg total) by mouth 2 (two) times daily.     benzonatate 100 MG capsule  Commonly known as: TESSALON  Take 1 capsule (100 mg total) by mouth 3 (three) times daily. for 10 days     carvediloL 25 MG tablet  Commonly known as: COREG  Take 1 tablet (25 mg total) by mouth 2 (two) times daily.     furosemide 80 MG tablet  Commonly known as: LASIX  Take 1 tablet (80 mg total) by mouth once daily.     guaiFENesin 600 mg 12 hr tablet  Commonly known as: MUCINEX  Take 1 tablet (600 mg total) by mouth 2 (two) times daily. for 10 days     hydrALAZINE 50 MG tablet  Commonly known as: APRESOLINE  Take 1 tablet (50 mg total) by mouth every 8 (eight) hours.     levoFLOXacin 750 MG tablet  Commonly known as: LEVAQUIN  Take 1  tablet (750 mg total) by mouth every other day. for 5 doses     levothyroxine 25 MCG tablet  Commonly known as: SYNTHROID  Take 1 tablet (25 mcg total) by mouth before breakfast.     pantoprazole 40 MG tablet  Commonly known as: PROTONIX  Take 1 tablet (40 mg total) by mouth once daily.     sevelamer carbonate 2.4 gram Pwpk  Commonly known as: RENVELA  Take 1 packet (2.4 g total) by mouth 3 (three) times daily with meals.            CONTINUE taking these medications      b complex vitamins capsule  Take 1 capsule by mouth once daily.     folic acid 1 MG tablet  Commonly known as: FOLVITE  Take 1 tablet (1 mg total) by mouth once daily.     levetiracetam 500 mg/5 mL (5 mL) Soln  Take 500 mg by mouth 2 (two) times daily.            STOP taking these medications      valACYclovir 500 MG tablet  Commonly known as: VALTREX              Indwelling Lines/Drains at time of discharge:   Lines/Drains/Airways       Drain  Duration                  Hemodialysis AV Fistula 04/30/25 2306 Left thigh 7 days                    Time spent on the discharge of patient: 46 minutes         Rosy Montes MD  Department of Hospital Medicine  [facility] - Med Surg

## 2025-05-08 NOTE — ASSESSMENT & PLAN NOTE
Anemia is likely due to chronic disease due to ESRD. Most recent hemoglobin and hematocrit are listed below.  Recent Labs     05/07/25  0131   HGB 9.6*   HCT 27.8*

## 2025-05-08 NOTE — ASSESSMENT & PLAN NOTE
Patient has a diagnosis of pneumonia. The cause of the pneumonia is viral in etiology due to  parainfluenza. The pneumonia is stable. The patient has the following signs/symptoms of pneumonia: cough. The patient does not have a current oxygen requirement and the patient does not have a home oxygen requirement. I have reviewed the pertinent imaging. The following cultures have been collected: Blood cultures remained negative.  Patient discharged home on Levaquin for 5 days    Antibiotics (From admission, onward)      None            Microbiology Results (last 7 days)       Procedure Component Value Units Date/Time    Culture, MRSA [[id number]] Collected: 05/03/25 0916    Order Status: Canceled Specimen: Respiratory from Nasal Swab Updated: 05/03/25 0931    Respiratory Infection Panel (PCR), Nasopharyngeal [[id number]]  (Abnormal) Collected: 04/30/25 2031    Order Status: Completed Specimen: Nasopharyngeal Swab Updated: 04/30/25 2130     Respiratory Infection Panel Source Nasopharyngeal Swab     Adenovirus Not Detected     Coronavirus 229E, Common Cold Virus Not Detected     Coronavirus HKU1, Common Cold Virus Not Detected     Coronavirus NL63, Common Cold Virus Not Detected     Coronavirus OC43, Common Cold Virus Not Detected     SARS-CoV2 (COVID-19) Qualitative PCR Not Detected     Human Metapneumovirus Not Detected     Human Rhinovirus/Enterovirus Not Detected     Influenza A Not Detected     Influenza B Not Detected     Parainfluenza Virus 1 Not Detected     Parainfluenza Virus 2 Not Detected     Parainfluenza Virus 3 Detected     Parainfluenza Virus 4 Not Detected     Respiratory Syncytial Virus Not Detected     Bordetella Parapertussis (NL1477) Not Detected     Bordetella pertussis (ptxP) Not Detected     Chlamydia pneumoniae Not Detected     Mycoplasma pneumoniae Not Detected    Influenza A & B by Molecular [[id number]]  (Normal) Collected: 04/30/25 1854    Order Status: Completed Specimen: Nasopharyngeal Swab  Updated: 04/30/25 1932     INFLUENZA A MOLECULAR Negative     INFLUENZA B MOLECULAR  Negative    Culture, Respiratory with Gram Stain [[id number]]     Order Status: Canceled Specimen: Respiratory

## 2025-05-12 ENCOUNTER — PATIENT MESSAGE (OUTPATIENT)
Dept: CARDIOLOGY | Facility: CLINIC | Age: 40
End: 2025-05-12
Payer: MEDICARE

## 2025-05-13 ENCOUNTER — TELEPHONE (OUTPATIENT)
Dept: OBSTETRICS AND GYNECOLOGY | Facility: CLINIC | Age: 40
End: 2025-05-13
Payer: MEDICARE

## 2025-05-13 ENCOUNTER — PATIENT OUTREACH (OUTPATIENT)
Dept: ADMINISTRATIVE | Facility: OTHER | Age: 40
End: 2025-05-13
Payer: MEDICARE

## 2025-05-13 ENCOUNTER — TELEPHONE (OUTPATIENT)
Dept: CARDIOLOGY | Facility: CLINIC | Age: 40
End: 2025-05-13
Payer: MEDICARE

## 2025-05-13 NOTE — TELEPHONE ENCOUNTER
Contacted the pt today re cardiology referral. Pt stats her insurance will not cover the appt because she is out of network. Gave her the number to [facility] pbr

## 2025-05-13 NOTE — TELEPHONE ENCOUNTER
----- Message from Tia sent at 5/13/2025  9:10 AM CDT -----  Who Called: PtWhat is the request in detail: Requesting call back to discuss scheduling from referral. Please adviseCan the clinic reply by [facility]? Thom Call Back Number: [phone] Information:

## 2025-05-13 NOTE — TELEPHONE ENCOUNTER
Pt called in regards to appt. Pt states she will check with insurance to see if she's in network     Willa NAIR LPN  OB/GYN

## 2025-05-14 NOTE — PROGRESS NOTES
LPN spoke to patient/caregiver as per [facility] referral for: pateint is HIV, on HD, has AICD, cancer survivor, overwhelmed.  referrals to internal med, cards, gyn sent     Does the patient consent to completing the assessment/enrollment: Yes  Does the patient consent for LPN to speak to a caregiver? No    Health Insurance Coverage:     Does the patient have adequate health insurance coverage? Yes  Education provided: Yes    PCP Follow-Up Appointments:    Does the patient have a primary care provider? no  Date of last PCP appointment?   Next PCP appointment: N/A  Was patient provided with education surrounding PCP services/creating a medical home? yes -       Specialist Appointments:     Does the patient have a pending specialist referral? yes - gyn, cardio, IM  Does the patient have an upcoming specialist appointment? yes - cardio, inf disease, palliative care  Is the patient pregnant? No  Does the patient have a mental health provider? no       Home Medications:     Reviewed medication list with patient? No  Is the patient able to afford their medications? Yes      Recent lab results:  Blood Sugar:    Provided education: No  Blood Pressure:   Provided education: No        Social Determinants of Health (SDOH)    Patient's identified areas of need:      Education/Resources provided:          Home Health/DME:    Current Home Health: No  Patient has all healthcare equipment/supplies indicated: yes      Additional Documentation:   Spoke to patient. She states she's been told that her insurance is not in network with [facility]. It is [facility]. Upon chart review, she has been called by gyn and cardio based on referrals, and the note states pt told clinic she is out of network and declined appts. Will look into this and provide other options if needed.  Will f/u.       Follow up:   Patient agrees to scheduled follow up call.

## 2025-05-16 ENCOUNTER — PATIENT OUTREACH (OUTPATIENT)
Dept: ADMINISTRATIVE | Facility: OTHER | Age: 40
End: 2025-05-16
Payer: MEDICARE

## 2025-05-16 NOTE — PROGRESS NOTES
CHW - Case Closure    This LPN spoke to patient via telephone today.   Pt/Caregiver reported: [facility]. Informed pt and gave her number to call and schedule her appts. Asked to call in future if needs.   Pt/Caregiver denied any additional needs at this time and agrees with episode closure at this time.  Provided patient with Community Health Worker's contact information and encouraged him/her to contact this Community Health Worker if additional needs arise.

## 2025-05-18 ENCOUNTER — HOSPITAL ENCOUNTER (INPATIENT)
Facility: HOSPITAL | Age: 40
LOS: 3 days | Discharge: HOME | DRG: 102 | End: 2025-05-21
Attending: EMERGENCY MEDICINE | Admitting: HOSPITALIST
Payer: MEDICARE

## 2025-05-18 DIAGNOSIS — B20 AIDS (ACQUIRED IMMUNODEFICIENCY SYNDROME), CD4 <=200: Chronic | ICD-10-CM

## 2025-05-18 DIAGNOSIS — B45.1 CRYPTOCOCCAL MENINGITIS: ICD-10-CM

## 2025-05-18 DIAGNOSIS — R00.0 TACHYCARDIA: ICD-10-CM

## 2025-05-18 DIAGNOSIS — Z21 HIV INFECTION, UNSPECIFIED SYMPTOM STATUS: Primary | Chronic | ICD-10-CM

## 2025-05-18 DIAGNOSIS — N18.6 ESRD (END STAGE RENAL DISEASE): ICD-10-CM

## 2025-05-18 DIAGNOSIS — B20 AIDS: ICD-10-CM

## 2025-05-18 DIAGNOSIS — R07.9 CHEST PAIN: ICD-10-CM

## 2025-05-18 DIAGNOSIS — Z11.3 ENCOUNTER FOR SCREENING FOR INFECTIONS WITH A PREDOMINANTLY SEXUAL MODE OF TRANSMISSION: ICD-10-CM

## 2025-05-18 DIAGNOSIS — G93.40 ACUTE ENCEPHALOPATHY: ICD-10-CM

## 2025-05-18 DIAGNOSIS — H53.2 DIPLOPIA: ICD-10-CM

## 2025-05-18 PROBLEM — R41.0 CONFUSION: Status: ACTIVE | Noted: 2025-05-18

## 2025-05-18 LAB
ABSOLUTE EOSINOPHIL (OHS): 0.19 K/UL
ABSOLUTE MONOCYTE (OHS): 0.62 K/UL (ref 0.3–1)
ABSOLUTE NEUTROPHIL COUNT (OHS): 3.25 K/UL (ref 1.8–7.7)
ALBUMIN SERPL BCP-MCNC: 3.2 G/DL (ref 3.5–5.2)
ALP SERPL-CCNC: 52 UNIT/L (ref 40–150)
ALT SERPL W/O P-5'-P-CCNC: 19 UNIT/L (ref 10–44)
AMMONIA PLAS-SCNC: 37 UMOL/L (ref 10–50)
AMPHET UR QL SCN: NEGATIVE
ANION GAP (OHS): 16 MMOL/L (ref 8–16)
APTT PPP: 24.9 SECONDS (ref 21–32)
AST SERPL-CCNC: 29 UNIT/L (ref 11–45)
B-OH-BUTYR BLD STRIP-SCNC: 0.1 MMOL/L
BACTERIA #/AREA URNS AUTO: NORMAL /HPF
BARBITURATE SCN PRESENT UR: NEGATIVE
BASOPHILS # BLD AUTO: 0.1 K/UL
BASOPHILS NFR BLD AUTO: 1.8 %
BENZODIAZ UR QL SCN: NEGATIVE
BILIRUB SERPL-MCNC: 0.6 MG/DL (ref 0.1–1)
BILIRUB UR QL STRIP.AUTO: NEGATIVE
BUN SERPL-MCNC: 51 MG/DL (ref 6–20)
CALCIUM SERPL-MCNC: 7.6 MG/DL (ref 8.7–10.5)
CANNABINOIDS UR QL SCN: NEGATIVE
CHLORIDE SERPL-SCNC: 97 MMOL/L (ref 95–110)
CLARITY UR: CLEAR
CO2 SERPL-SCNC: 23 MMOL/L (ref 23–29)
COCAINE UR QL SCN: NEGATIVE
COLOR UR AUTO: COLORLESS
CREAT SERPL-MCNC: 8.6 MG/DL (ref 0.5–1.4)
CREAT UR-MCNC: 21.7 MG/DL (ref 15–325)
ERYTHROCYTE [DISTWIDTH] IN BLOOD BY AUTOMATED COUNT: 15.9 % (ref 11.5–14.5)
ETHANOL SERPL-MCNC: <10 MG/DL
GFR SERPLBLD CREATININE-BSD FMLA CKD-EPI: 6 ML/MIN/1.73/M2
GLUCOSE SERPL-MCNC: 79 MG/DL (ref 70–110)
GLUCOSE UR QL STRIP: ABNORMAL
HCT VFR BLD AUTO: 23.8 % (ref 37–48.5)
HGB BLD-MCNC: 8.4 GM/DL (ref 12–16)
HGB UR QL STRIP: NEGATIVE
HOLD SPECIMEN: NORMAL
HYALINE CASTS UR QL AUTO: 0 /LPF (ref 0–1)
IMM GRANULOCYTES # BLD AUTO: 0.02 K/UL (ref 0–0.04)
IMM GRANULOCYTES NFR BLD AUTO: 0.4 % (ref 0–0.5)
INR PPP: 1 (ref 0.8–1.2)
KETONES UR QL STRIP: NEGATIVE
LEUKOCYTE ESTERASE UR QL STRIP: NEGATIVE
LYMPHOCYTES # BLD AUTO: 1.29 K/UL (ref 1–4.8)
MCH RBC QN AUTO: 39.6 PG (ref 27–31)
MCHC RBC AUTO-ENTMCNC: 35.3 G/DL (ref 32–36)
MCV RBC AUTO: 112 FL (ref 82–98)
METHADONE UR QL SCN: NEGATIVE
MICROSCOPIC COMMENT: NORMAL
NITRITE UR QL STRIP: NEGATIVE
NUCLEATED RBC (/100WBC) (OHS): 0 /100 WBC
OPIATES UR QL SCN: NEGATIVE
PCP UR QL: NEGATIVE
PH UR STRIP: 8 [PH]
PLATELET # BLD AUTO: 133 K/UL (ref 150–450)
PMV BLD AUTO: 10.6 FL (ref 9.2–12.9)
POCT GLUCOSE: 85 MG/DL (ref 70–110)
POCT GLUCOSE: 94 MG/DL (ref 70–110)
POTASSIUM SERPL-SCNC: 5.7 MMOL/L (ref 3.5–5.1)
PROT SERPL-MCNC: 8.4 GM/DL (ref 6–8.4)
PROT UR QL STRIP: ABNORMAL
PROTHROMBIN TIME: 11 SECONDS (ref 9–12.5)
RBC # BLD AUTO: 2.12 M/UL (ref 4–5.4)
RBC #/AREA URNS AUTO: 0 /HPF (ref 0–4)
RELATIVE EOSINOPHIL (OHS): 3.5 %
RELATIVE LYMPHOCYTE (OHS): 23.6 % (ref 18–48)
RELATIVE MONOCYTE (OHS): 11.3 % (ref 4–15)
RELATIVE NEUTROPHIL (OHS): 59.4 % (ref 38–73)
SODIUM SERPL-SCNC: 136 MMOL/L (ref 136–145)
SP GR UR STRIP: 1.01
SQUAMOUS #/AREA URNS AUTO: 2 /HPF
UROBILINOGEN UR STRIP-ACNC: NEGATIVE EU/DL
WBC # BLD AUTO: 5.47 K/UL (ref 3.9–12.7)
WBC #/AREA URNS AUTO: 1 /HPF (ref 0–5)

## 2025-05-18 PROCEDURE — 96375 TX/PRO/DX INJ NEW DRUG ADDON: CPT

## 2025-05-18 PROCEDURE — 85730 THROMBOPLASTIN TIME PARTIAL: CPT | Performed by: EMERGENCY MEDICINE

## 2025-05-18 PROCEDURE — 80307 DRUG TEST PRSMV CHEM ANLYZR: CPT | Performed by: EMERGENCY MEDICINE

## 2025-05-18 PROCEDURE — 82010 KETONE BODYS QUAN: CPT | Performed by: EMERGENCY MEDICINE

## 2025-05-18 PROCEDURE — 80053 COMPREHEN METABOLIC PANEL: CPT | Performed by: EMERGENCY MEDICINE

## 2025-05-18 PROCEDURE — 82077 ASSAY SPEC XCP UR&BREATH IA: CPT | Performed by: EMERGENCY MEDICINE

## 2025-05-18 PROCEDURE — 93005 ELECTROCARDIOGRAM TRACING: CPT

## 2025-05-18 PROCEDURE — 93010 ELECTROCARDIOGRAM REPORT: CPT | Mod: ,,, | Performed by: INTERNAL MEDICINE

## 2025-05-18 PROCEDURE — 99285 EMERGENCY DEPT VISIT HI MDM: CPT | Mod: 25

## 2025-05-18 PROCEDURE — 85610 PROTHROMBIN TIME: CPT | Performed by: EMERGENCY MEDICINE

## 2025-05-18 PROCEDURE — 81003 URINALYSIS AUTO W/O SCOPE: CPT | Performed by: EMERGENCY MEDICINE

## 2025-05-18 PROCEDURE — 82140 ASSAY OF AMMONIA: CPT | Performed by: EMERGENCY MEDICINE

## 2025-05-18 PROCEDURE — 63600175 PHARM REV CODE 636 W HCPCS: Performed by: EMERGENCY MEDICINE

## 2025-05-18 PROCEDURE — 11000001 HC ACUTE MED/SURG PRIVATE ROOM

## 2025-05-18 PROCEDURE — 96374 THER/PROPH/DIAG INJ IV PUSH: CPT

## 2025-05-18 PROCEDURE — 87040 BLOOD CULTURE FOR BACTERIA: CPT | Performed by: EMERGENCY MEDICINE

## 2025-05-18 PROCEDURE — 85025 COMPLETE CBC W/AUTO DIFF WBC: CPT | Performed by: EMERGENCY MEDICINE

## 2025-05-18 PROCEDURE — 82962 GLUCOSE BLOOD TEST: CPT

## 2025-05-18 RX ORDER — HYDROMORPHONE HYDROCHLORIDE 1 MG/ML
1 INJECTION, SOLUTION INTRAMUSCULAR; INTRAVENOUS; SUBCUTANEOUS
Refills: 0 | Status: COMPLETED | OUTPATIENT
Start: 2025-05-18 | End: 2025-05-18

## 2025-05-18 RX ORDER — ONDANSETRON HYDROCHLORIDE 2 MG/ML
4 INJECTION, SOLUTION INTRAVENOUS
Status: COMPLETED | OUTPATIENT
Start: 2025-05-18 | End: 2025-05-18

## 2025-05-18 RX ORDER — LABETALOL HYDROCHLORIDE 5 MG/ML
20 INJECTION, SOLUTION INTRAVENOUS
Status: COMPLETED | OUTPATIENT
Start: 2025-05-18 | End: 2025-05-18

## 2025-05-18 RX ADMIN — ONDANSETRON 4 MG: 2 INJECTION INTRAMUSCULAR; INTRAVENOUS at 07:05

## 2025-05-18 RX ADMIN — HYDROMORPHONE HYDROCHLORIDE 1 MG: 1 INJECTION, SOLUTION INTRAMUSCULAR; INTRAVENOUS; SUBCUTANEOUS at 07:05

## 2025-05-18 RX ADMIN — LABETALOL HYDROCHLORIDE 20 MG: 5 INJECTION INTRAVENOUS at 10:05

## 2025-05-19 PROBLEM — K21.9 GERD (GASTROESOPHAGEAL REFLUX DISEASE): Chronic | Status: ACTIVE | Noted: 2025-05-19

## 2025-05-19 PROBLEM — K21.9 GERD (GASTROESOPHAGEAL REFLUX DISEASE): Status: ACTIVE | Noted: 2025-05-19

## 2025-05-19 PROBLEM — B45.1 CRYPTOCOCCAL MENINGITIS: Status: ACTIVE | Noted: 2025-05-19

## 2025-05-19 PROBLEM — E03.9 HYPOTHYROIDISM: Chronic | Status: ACTIVE | Noted: 2025-05-19

## 2025-05-19 PROBLEM — E03.9 HYPOTHYROIDISM: Status: ACTIVE | Noted: 2025-05-19

## 2025-05-19 LAB
ABSOLUTE EOSINOPHIL (OHS): 0.08 K/UL
ABSOLUTE MONOCYTE (OHS): 0.53 K/UL (ref 0.3–1)
ABSOLUTE NEUTROPHIL COUNT (OHS): 4 K/UL (ref 1.8–7.7)
ALBUMIN SERPL BCP-MCNC: 3.2 G/DL (ref 3.5–5.2)
ALP SERPL-CCNC: 52 UNIT/L (ref 40–150)
ALT SERPL W/O P-5'-P-CCNC: 21 UNIT/L (ref 10–44)
ANION GAP (OHS): 14 MMOL/L (ref 8–16)
ANION GAP (OHS): 15 MMOL/L (ref 8–16)
ANION GAP (OHS): 16 MMOL/L (ref 8–16)
ANION GAP (OHS): 18 MMOL/L (ref 8–16)
ANION GAP (OHS): 20 MMOL/L (ref 8–16)
AST SERPL-CCNC: 35 UNIT/L (ref 11–45)
BASOPHILS # BLD AUTO: 0.1 K/UL
BASOPHILS NFR BLD AUTO: 1.7 %
BILIRUB SERPL-MCNC: 0.6 MG/DL (ref 0.1–1)
BUN SERPL-MCNC: 57 MG/DL (ref 6–20)
BUN SERPL-MCNC: 57 MG/DL (ref 6–20)
BUN SERPL-MCNC: 58 MG/DL (ref 6–20)
BUN SERPL-MCNC: 60 MG/DL (ref 6–20)
BUN SERPL-MCNC: 60 MG/DL (ref 6–20)
CALCIUM SERPL-MCNC: 7.2 MG/DL (ref 8.7–10.5)
CALCIUM SERPL-MCNC: 7.4 MG/DL (ref 8.7–10.5)
CALCIUM SERPL-MCNC: 7.6 MG/DL (ref 8.7–10.5)
CALCIUM SERPL-MCNC: 7.6 MG/DL (ref 8.7–10.5)
CALCIUM SERPL-MCNC: 7.7 MG/DL (ref 8.7–10.5)
CHLORIDE SERPL-SCNC: 95 MMOL/L (ref 95–110)
CHLORIDE SERPL-SCNC: 97 MMOL/L (ref 95–110)
CHLORIDE SERPL-SCNC: 98 MMOL/L (ref 95–110)
CLARITY CSF: CLEAR
CO2 SERPL-SCNC: 17 MMOL/L (ref 23–29)
CO2 SERPL-SCNC: 19 MMOL/L (ref 23–29)
CO2 SERPL-SCNC: 22 MMOL/L (ref 23–29)
CO2 SERPL-SCNC: 24 MMOL/L (ref 23–29)
CO2 SERPL-SCNC: 24 MMOL/L (ref 23–29)
COLOR CSF: COLORLESS
CREAT SERPL-MCNC: 10.2 MG/DL (ref 0.5–1.4)
CREAT SERPL-MCNC: 9.4 MG/DL (ref 0.5–1.4)
CREAT SERPL-MCNC: 9.5 MG/DL (ref 0.5–1.4)
CSF TUBE NUMBER (OHS): 1
CSF TUBE NUMBER (OHS): 1
ERYTHROCYTE [DISTWIDTH] IN BLOOD BY AUTOMATED COUNT: 15.9 % (ref 11.5–14.5)
GFR SERPLBLD CREATININE-BSD FMLA CKD-EPI: 5 ML/MIN/1.73/M2
GLUCOSE CSF-MCNC: 54 MG/DL (ref 40–70)
GLUCOSE SERPL-MCNC: 248 MG/DL (ref 70–110)
GLUCOSE SERPL-MCNC: 77 MG/DL (ref 70–110)
GLUCOSE SERPL-MCNC: 83 MG/DL (ref 70–110)
GLUCOSE SERPL-MCNC: 84 MG/DL (ref 70–110)
GLUCOSE SERPL-MCNC: 91 MG/DL (ref 70–110)
HCT VFR BLD AUTO: 23 % (ref 37–48.5)
HGB BLD-MCNC: 7.6 GM/DL (ref 12–16)
IMM GRANULOCYTES # BLD AUTO: 0.01 K/UL (ref 0–0.04)
IMM GRANULOCYTES NFR BLD AUTO: 0.2 % (ref 0–0.5)
INDIRECT COOMBS: NORMAL
LYMPHOCYTES # BLD AUTO: 1.01 K/UL (ref 1–4.8)
MCH RBC QN AUTO: 37.8 PG (ref 27–31)
MCHC RBC AUTO-ENTMCNC: 33 G/DL (ref 32–36)
MCV RBC AUTO: 114 FL (ref 82–98)
NUCLEATED RBC (/100WBC) (OHS): 0 /100 WBC
OHS QRS DURATION: 74 MS
OHS QTC CALCULATION: 477 MS
PLATELET # BLD AUTO: 130 K/UL (ref 150–450)
PMV BLD AUTO: 10.6 FL (ref 9.2–12.9)
POCT GLUCOSE: 105 MG/DL (ref 70–110)
POCT GLUCOSE: 110 MG/DL (ref 70–110)
POCT GLUCOSE: 132 MG/DL (ref 70–110)
POCT GLUCOSE: 164 MG/DL (ref 70–110)
POCT GLUCOSE: 312 MG/DL (ref 70–110)
POCT GLUCOSE: 37 MG/DL (ref 70–110)
POCT GLUCOSE: 78 MG/DL (ref 70–110)
POCT GLUCOSE: 85 MG/DL (ref 70–110)
POCT GLUCOSE: 88 MG/DL (ref 70–110)
POCT GLUCOSE: 99 MG/DL (ref 70–110)
POTASSIUM SERPL-SCNC: 4.8 MMOL/L (ref 3.5–5.1)
POTASSIUM SERPL-SCNC: 5.1 MMOL/L (ref 3.5–5.1)
POTASSIUM SERPL-SCNC: 5.2 MMOL/L (ref 3.5–5.1)
POTASSIUM SERPL-SCNC: 5.5 MMOL/L (ref 3.5–5.1)
POTASSIUM SERPL-SCNC: 6.3 MMOL/L (ref 3.5–5.1)
PROT CSF-MCNC: 27 MG/DL (ref 15–40)
PROT SERPL-MCNC: 8.6 GM/DL (ref 6–8.4)
RBC # BLD AUTO: 2.01 M/UL (ref 4–5.4)
RBC # CSF: 0 /CU MM
RELATIVE EOSINOPHIL (OHS): 1.4 %
RELATIVE LYMPHOCYTE (OHS): 17.6 % (ref 18–48)
RELATIVE MONOCYTE (OHS): 9.2 % (ref 4–15)
RELATIVE NEUTROPHIL (OHS): 69.9 % (ref 38–73)
RH BLD: NORMAL
SODIUM SERPL-SCNC: 134 MMOL/L (ref 136–145)
SODIUM SERPL-SCNC: 134 MMOL/L (ref 136–145)
SODIUM SERPL-SCNC: 135 MMOL/L (ref 136–145)
SPECIMEN OUTDATE: NORMAL
SPECIMEN VOL CSF: 5.5 ML
T PALLIDUM IGG+IGM SER QL: NORMAL
WBC # BLD AUTO: 5.73 K/UL (ref 3.9–12.7)
WBC # CSF: 4 /CU MM

## 2025-05-19 PROCEDURE — 25000003 PHARM REV CODE 250: Performed by: INTERNAL MEDICINE

## 2025-05-19 PROCEDURE — 63600175 PHARM REV CODE 636 W HCPCS: Performed by: HOSPITALIST

## 2025-05-19 PROCEDURE — 87536 HIV-1 QUANT&REVRSE TRNSCRPJ: CPT | Performed by: EMERGENCY MEDICINE

## 2025-05-19 PROCEDURE — 86902 BLOOD TYPE ANTIGEN DONOR EA: CPT | Performed by: HOSPITALIST

## 2025-05-19 PROCEDURE — 85660 RBC SICKLE CELL TEST: CPT | Performed by: HOSPITALIST

## 2025-05-19 PROCEDURE — 21400001 HC TELEMETRY ROOM

## 2025-05-19 PROCEDURE — 87116 MYCOBACTERIA CULTURE: CPT | Performed by: NURSE PRACTITIONER

## 2025-05-19 PROCEDURE — 89051 BODY FLUID CELL COUNT: CPT | Performed by: NURSE PRACTITIONER

## 2025-05-19 PROCEDURE — 87901 NFCT AGT GNTYP ALYS HIV1 REV: CPT | Performed by: EMERGENCY MEDICINE

## 2025-05-19 PROCEDURE — P9016 RBC LEUKOCYTES REDUCED: HCPCS | Performed by: HOSPITALIST

## 2025-05-19 PROCEDURE — 87483 CNS DNA AMP PROBE TYPE 12-25: CPT | Performed by: NURSE PRACTITIONER

## 2025-05-19 PROCEDURE — 86850 RBC ANTIBODY SCREEN: CPT | Performed by: HOSPITALIST

## 2025-05-19 PROCEDURE — 009U3ZX DRAINAGE OF SPINAL CANAL, PERCUTANEOUS APPROACH, DIAGNOSTIC: ICD-10-PCS | Performed by: STUDENT IN AN ORGANIZED HEALTH CARE EDUCATION/TRAINING PROGRAM

## 2025-05-19 PROCEDURE — 87070 CULTURE OTHR SPECIMN AEROBIC: CPT | Performed by: NURSE PRACTITIONER

## 2025-05-19 PROCEDURE — 99222 1ST HOSP IP/OBS MODERATE 55: CPT | Mod: FS,,, | Performed by: INTERNAL MEDICINE

## 2025-05-19 PROCEDURE — 25000003 PHARM REV CODE 250: Performed by: HOSPITALIST

## 2025-05-19 PROCEDURE — 36415 COLL VENOUS BLD VENIPUNCTURE: CPT | Performed by: HOSPITALIST

## 2025-05-19 PROCEDURE — 84157 ASSAY OF PROTEIN OTHER: CPT | Performed by: NURSE PRACTITIONER

## 2025-05-19 PROCEDURE — 88108 CYTOPATH CONCENTRATE TECH: CPT | Mod: 26,,, | Performed by: STUDENT IN AN ORGANIZED HEALTH CARE EDUCATION/TRAINING PROGRAM

## 2025-05-19 PROCEDURE — 82945 GLUCOSE OTHER FLUID: CPT | Performed by: NURSE PRACTITIONER

## 2025-05-19 PROCEDURE — 86403 PARTICLE AGGLUT ANTBDY SCRN: CPT | Performed by: INTERNAL MEDICINE

## 2025-05-19 PROCEDURE — 90935 HEMODIALYSIS ONE EVALUATION: CPT

## 2025-05-19 PROCEDURE — 83615 LACTATE (LD) (LDH) ENZYME: CPT | Performed by: NURSE PRACTITIONER

## 2025-05-19 PROCEDURE — 80053 COMPREHEN METABOLIC PANEL: CPT | Performed by: HOSPITALIST

## 2025-05-19 PROCEDURE — 88108 CYTOPATH CONCENTRATE TECH: CPT | Mod: TC | Performed by: NURSE PRACTITIONER

## 2025-05-19 PROCEDURE — 86592 SYPHILIS TEST NON-TREP QUAL: CPT | Performed by: INTERNAL MEDICINE

## 2025-05-19 PROCEDURE — 87205 SMEAR GRAM STAIN: CPT | Performed by: INTERNAL MEDICINE

## 2025-05-19 PROCEDURE — 86593 SYPHILIS TEST NON-TREP QUANT: CPT | Performed by: EMERGENCY MEDICINE

## 2025-05-19 PROCEDURE — 86920 COMPATIBILITY TEST SPIN: CPT | Performed by: HOSPITALIST

## 2025-05-19 PROCEDURE — 85025 COMPLETE CBC W/AUTO DIFF WBC: CPT | Performed by: HOSPITALIST

## 2025-05-19 PROCEDURE — 36415 COLL VENOUS BLD VENIPUNCTURE: CPT | Performed by: EMERGENCY MEDICINE

## 2025-05-19 PROCEDURE — 87206 SMEAR FLUORESCENT/ACID STAI: CPT | Performed by: NURSE PRACTITIONER

## 2025-05-19 PROCEDURE — 11000001 HC ACUTE MED/SURG PRIVATE ROOM

## 2025-05-19 PROCEDURE — 86592 SYPHILIS TEST NON-TREP QUAL: CPT | Performed by: EMERGENCY MEDICINE

## 2025-05-19 RX ORDER — HYDROCODONE BITARTRATE AND ACETAMINOPHEN 500; 5 MG/1; MG/1
TABLET ORAL
Status: DISCONTINUED | OUTPATIENT
Start: 2025-05-19 | End: 2025-05-21 | Stop reason: HOSPADM

## 2025-05-19 RX ORDER — LEVETIRACETAM 500 MG/1
500 TABLET ORAL 2 TIMES DAILY
Status: DISCONTINUED | OUTPATIENT
Start: 2025-05-19 | End: 2025-05-21 | Stop reason: HOSPADM

## 2025-05-19 RX ORDER — IPRATROPIUM BROMIDE AND ALBUTEROL SULFATE 2.5; .5 MG/3ML; MG/3ML
3 SOLUTION RESPIRATORY (INHALATION) EVERY 6 HOURS PRN
Status: DISCONTINUED | OUTPATIENT
Start: 2025-05-19 | End: 2025-05-21 | Stop reason: HOSPADM

## 2025-05-19 RX ORDER — HYDRALAZINE HYDROCHLORIDE 50 MG/1
50 TABLET, FILM COATED ORAL EVERY 8 HOURS
Status: DISCONTINUED | OUTPATIENT
Start: 2025-05-19 | End: 2025-05-19

## 2025-05-19 RX ORDER — CALCIUM GLUCONATE 20 MG/ML
1 INJECTION, SOLUTION INTRAVENOUS EVERY 10 MIN PRN
Status: DISCONTINUED | OUTPATIENT
Start: 2025-05-19 | End: 2025-05-21 | Stop reason: HOSPADM

## 2025-05-19 RX ORDER — LOSARTAN POTASSIUM 100 MG/1
100 TABLET ORAL DAILY
COMMUNITY

## 2025-05-19 RX ORDER — HYDRALAZINE HYDROCHLORIDE 50 MG/1
50 TABLET, FILM COATED ORAL EVERY 8 HOURS
Status: DISCONTINUED | OUTPATIENT
Start: 2025-05-19 | End: 2025-05-21 | Stop reason: HOSPADM

## 2025-05-19 RX ORDER — LABETALOL HYDROCHLORIDE 5 MG/ML
10 INJECTION, SOLUTION INTRAVENOUS EVERY 6 HOURS PRN
Status: DISCONTINUED | OUTPATIENT
Start: 2025-05-19 | End: 2025-05-21 | Stop reason: HOSPADM

## 2025-05-19 RX ORDER — ACETAMINOPHEN 650 MG/1
650 SUPPOSITORY RECTAL EVERY 6 HOURS PRN
Status: DISCONTINUED | OUTPATIENT
Start: 2025-05-19 | End: 2025-05-21 | Stop reason: HOSPADM

## 2025-05-19 RX ORDER — DIPHENHYDRAMINE HYDROCHLORIDE 50 MG/ML
12.5 INJECTION, SOLUTION INTRAMUSCULAR; INTRAVENOUS ONCE AS NEEDED
Status: COMPLETED | OUTPATIENT
Start: 2025-05-19 | End: 2025-05-19

## 2025-05-19 RX ORDER — GLUCAGON 1 MG
1 KIT INJECTION
Status: DISCONTINUED | OUTPATIENT
Start: 2025-05-19 | End: 2025-05-21 | Stop reason: HOSPADM

## 2025-05-19 RX ORDER — AMOXICILLIN 250 MG
1 CAPSULE ORAL 2 TIMES DAILY PRN
Status: DISCONTINUED | OUTPATIENT
Start: 2025-05-19 | End: 2025-05-21 | Stop reason: HOSPADM

## 2025-05-19 RX ORDER — MUPIROCIN 20 MG/G
OINTMENT TOPICAL 2 TIMES DAILY
Status: CANCELLED | OUTPATIENT
Start: 2025-05-19 | End: 2025-05-24

## 2025-05-19 RX ORDER — HYDRALAZINE HYDROCHLORIDE 20 MG/ML
10 INJECTION INTRAMUSCULAR; INTRAVENOUS EVERY 6 HOURS PRN
Status: DISCONTINUED | OUTPATIENT
Start: 2025-05-19 | End: 2025-05-21 | Stop reason: HOSPADM

## 2025-05-19 RX ORDER — HYDROMORPHONE HYDROCHLORIDE 1 MG/ML
0.5 INJECTION, SOLUTION INTRAMUSCULAR; INTRAVENOUS; SUBCUTANEOUS EVERY 6 HOURS PRN
Status: DISCONTINUED | OUTPATIENT
Start: 2025-05-19 | End: 2025-05-21 | Stop reason: HOSPADM

## 2025-05-19 RX ORDER — IBUPROFEN 200 MG
16 TABLET ORAL
Status: DISCONTINUED | OUTPATIENT
Start: 2025-05-19 | End: 2025-05-21 | Stop reason: HOSPADM

## 2025-05-19 RX ORDER — DIPHENHYDRAMINE HCL 25 MG
25 CAPSULE ORAL DAILY PRN
Status: DISCONTINUED | OUTPATIENT
Start: 2025-05-19 | End: 2025-05-21 | Stop reason: HOSPADM

## 2025-05-19 RX ORDER — FUROSEMIDE 40 MG/1
80 TABLET ORAL DAILY
Status: DISCONTINUED | OUTPATIENT
Start: 2025-05-19 | End: 2025-05-19

## 2025-05-19 RX ORDER — DIPHENHYDRAMINE HCL 25 MG
25 CAPSULE ORAL DAILY PRN
Status: DISCONTINUED | OUTPATIENT
Start: 2025-05-19 | End: 2025-05-19

## 2025-05-19 RX ORDER — CARVEDILOL 12.5 MG/1
25 TABLET ORAL 2 TIMES DAILY
Status: DISCONTINUED | OUTPATIENT
Start: 2025-05-19 | End: 2025-05-21 | Stop reason: HOSPADM

## 2025-05-19 RX ORDER — LEVOTHYROXINE SODIUM 25 UG/1
25 TABLET ORAL
Status: DISCONTINUED | OUTPATIENT
Start: 2025-05-19 | End: 2025-05-21 | Stop reason: HOSPADM

## 2025-05-19 RX ORDER — FAMOTIDINE 20 MG/1
20 TABLET, FILM COATED ORAL DAILY
Status: DISCONTINUED | OUTPATIENT
Start: 2025-05-19 | End: 2025-05-19

## 2025-05-19 RX ORDER — NALOXONE HCL 0.4 MG/ML
0.02 VIAL (ML) INJECTION
Status: DISCONTINUED | OUTPATIENT
Start: 2025-05-19 | End: 2025-05-21 | Stop reason: HOSPADM

## 2025-05-19 RX ORDER — ONDANSETRON HYDROCHLORIDE 2 MG/ML
4 INJECTION, SOLUTION INTRAVENOUS EVERY 8 HOURS PRN
Status: DISCONTINUED | OUTPATIENT
Start: 2025-05-19 | End: 2025-05-21 | Stop reason: HOSPADM

## 2025-05-19 RX ORDER — ACETAMINOPHEN 325 MG/1
650 TABLET ORAL DAILY PRN
Status: DISCONTINUED | OUTPATIENT
Start: 2025-05-19 | End: 2025-05-21 | Stop reason: HOSPADM

## 2025-05-19 RX ORDER — PROMETHAZINE HYDROCHLORIDE 25 MG/1
25 TABLET ORAL EVERY 6 HOURS PRN
Status: DISCONTINUED | OUTPATIENT
Start: 2025-05-19 | End: 2025-05-21 | Stop reason: HOSPADM

## 2025-05-19 RX ORDER — SODIUM CHLORIDE 0.9 % (FLUSH) 0.9 %
10 SYRINGE (ML) INJECTION EVERY 12 HOURS PRN
Status: DISCONTINUED | OUTPATIENT
Start: 2025-05-19 | End: 2025-05-21 | Stop reason: HOSPADM

## 2025-05-19 RX ORDER — IBUPROFEN 200 MG
24 TABLET ORAL
Status: DISCONTINUED | OUTPATIENT
Start: 2025-05-19 | End: 2025-05-21 | Stop reason: HOSPADM

## 2025-05-19 RX ORDER — FAMOTIDINE 20 MG/1
20 TABLET, FILM COATED ORAL EVERY OTHER DAY
Status: DISCONTINUED | OUTPATIENT
Start: 2025-05-19 | End: 2025-05-21 | Stop reason: HOSPADM

## 2025-05-19 RX ORDER — ALUMINUM HYDROXIDE, MAGNESIUM HYDROXIDE, AND SIMETHICONE 1200; 120; 1200 MG/30ML; MG/30ML; MG/30ML
30 SUSPENSION ORAL 4 TIMES DAILY PRN
Status: DISCONTINUED | OUTPATIENT
Start: 2025-05-19 | End: 2025-05-21 | Stop reason: HOSPADM

## 2025-05-19 RX ORDER — FOLIC ACID 1 MG/1
1 TABLET ORAL DAILY
Status: DISCONTINUED | OUTPATIENT
Start: 2025-05-19 | End: 2025-05-21 | Stop reason: HOSPADM

## 2025-05-19 RX ORDER — DAPSONE 25 MG/1
100 TABLET ORAL DAILY
Status: DISCONTINUED | OUTPATIENT
Start: 2025-05-19 | End: 2025-05-21 | Stop reason: HOSPADM

## 2025-05-19 RX ORDER — CALCIUM GLUCONATE 20 MG/ML
1 INJECTION, SOLUTION INTRAVENOUS ONCE
Status: COMPLETED | OUTPATIENT
Start: 2025-05-19 | End: 2025-05-19

## 2025-05-19 RX ORDER — FUROSEMIDE 40 MG/1
80 TABLET ORAL DAILY
Status: DISCONTINUED | OUTPATIENT
Start: 2025-05-19 | End: 2025-05-21 | Stop reason: HOSPADM

## 2025-05-19 RX ORDER — CARVEDILOL 12.5 MG/1
25 TABLET ORAL 2 TIMES DAILY
Status: DISCONTINUED | OUTPATIENT
Start: 2025-05-19 | End: 2025-05-19

## 2025-05-19 RX ORDER — SEVELAMER CARBONATE 0.8 G/1
2.4 POWDER, FOR SUSPENSION ORAL
Status: DISCONTINUED | OUTPATIENT
Start: 2025-05-19 | End: 2025-05-21 | Stop reason: HOSPADM

## 2025-05-19 RX ORDER — ACETAMINOPHEN 325 MG/1
650 TABLET ORAL EVERY 8 HOURS PRN
Status: DISCONTINUED | OUTPATIENT
Start: 2025-05-19 | End: 2025-05-21 | Stop reason: HOSPADM

## 2025-05-19 RX ADMIN — RILPIVIRINE HYDROCHLORIDE 25 MG: 25 TABLET, FILM COATED ORAL at 08:05

## 2025-05-19 RX ADMIN — DEXTROSE MONOHYDRATE 25 G: 25 INJECTION, SOLUTION INTRAVENOUS at 05:05

## 2025-05-19 RX ADMIN — CARVEDILOL 25 MG: 12.5 TABLET, FILM COATED ORAL at 09:05

## 2025-05-19 RX ADMIN — SEVELAMER CARBONATE 2.4 G: 800 POWDER, FOR SUSPENSION ORAL at 08:05

## 2025-05-19 RX ADMIN — LEVOTHYROXINE SODIUM 25 MCG: 0.03 TABLET ORAL at 07:05

## 2025-05-19 RX ADMIN — ACETAMINOPHEN 650 MG: 325 TABLET ORAL at 09:05

## 2025-05-19 RX ADMIN — FAMOTIDINE 20 MG: 20 TABLET, FILM COATED ORAL at 12:05

## 2025-05-19 RX ADMIN — DAPSONE 100 MG: 25 TABLET ORAL at 08:05

## 2025-05-19 RX ADMIN — DEXTROSE MONOHYDRATE 50 G: 25 INJECTION, SOLUTION INTRAVENOUS at 03:05

## 2025-05-19 RX ADMIN — HYDRALAZINE HYDROCHLORIDE 10 MG: 20 INJECTION INTRAMUSCULAR; INTRAVENOUS at 05:05

## 2025-05-19 RX ADMIN — HUMAN INSULIN 5.59 UNITS: 100 INJECTION, SOLUTION SUBCUTANEOUS at 03:05

## 2025-05-19 RX ADMIN — LEVETIRACETAM 500 MG: 500 TABLET, FILM COATED ORAL at 09:05

## 2025-05-19 RX ADMIN — HYDRALAZINE HYDROCHLORIDE 50 MG: 50 TABLET ORAL at 01:05

## 2025-05-19 RX ADMIN — HYDROMORPHONE HYDROCHLORIDE 0.5 MG: 1 INJECTION, SOLUTION INTRAMUSCULAR; INTRAVENOUS; SUBCUTANEOUS at 03:05

## 2025-05-19 RX ADMIN — SEVELAMER CARBONATE 2.4 G: 800 POWDER, FOR SUSPENSION ORAL at 12:05

## 2025-05-19 RX ADMIN — HYDRALAZINE HYDROCHLORIDE 50 MG: 50 TABLET ORAL at 09:05

## 2025-05-19 RX ADMIN — DIPHENHYDRAMINE HYDROCHLORIDE 12.5 MG: 50 INJECTION, SOLUTION INTRAMUSCULAR; INTRAVENOUS at 06:05

## 2025-05-19 RX ADMIN — CARVEDILOL 25 MG: 12.5 TABLET, FILM COATED ORAL at 07:05

## 2025-05-19 RX ADMIN — DOLUTEGRAVIR SODIUM 50 MG: 50 TABLET, FILM COATED ORAL at 08:05

## 2025-05-19 RX ADMIN — ONDANSETRON 4 MG: 2 INJECTION INTRAMUSCULAR; INTRAVENOUS at 03:05

## 2025-05-19 RX ADMIN — SODIUM ZIRCONIUM CYCLOSILICATE 10 G: 5 POWDER, FOR SUSPENSION ORAL at 03:05

## 2025-05-19 RX ADMIN — DIPHENHYDRAMINE HYDROCHLORIDE 25 MG: 25 CAPSULE ORAL at 09:05

## 2025-05-19 RX ADMIN — LEVETIRACETAM 500 MG: 500 TABLET, FILM COATED ORAL at 08:05

## 2025-05-19 RX ADMIN — FOLIC ACID 1 MG: 1 TABLET ORAL at 08:05

## 2025-05-19 RX ADMIN — CALCIUM GLUCONATE 1 G: 20 INJECTION, SOLUTION INTRAVENOUS at 03:05

## 2025-05-19 RX ADMIN — HYDROMORPHONE HYDROCHLORIDE 0.5 MG: 1 INJECTION, SOLUTION INTRAMUSCULAR; INTRAVENOUS; SUBCUTANEOUS at 10:05

## 2025-05-19 RX ADMIN — FUROSEMIDE 80 MG: 40 TABLET ORAL at 07:05

## 2025-05-20 PROBLEM — G93.40 ACUTE ENCEPHALOPATHY: Status: ACTIVE | Noted: 2025-05-19

## 2025-05-20 LAB
ABSOLUTE EOSINOPHIL (OHS): 0.15 K/UL
ABSOLUTE MONOCYTE (OHS): 0.55 K/UL (ref 0.3–1)
ABSOLUTE NEUTROPHIL COUNT (OHS): 2.88 K/UL (ref 1.8–7.7)
ACID FAST MOD KINY STN SPEC: NORMAL
ALBUMIN SERPL BCP-MCNC: 3 G/DL (ref 3.5–5.2)
ALP SERPL-CCNC: 53 UNIT/L (ref 40–150)
ALT SERPL W/O P-5'-P-CCNC: 17 UNIT/L (ref 10–44)
ANION GAP (OHS): 12 MMOL/L (ref 8–16)
ANISOCYTOSIS BLD QL SMEAR: ABNORMAL
AST SERPL-CCNC: 25 UNIT/L (ref 11–45)
BASOPHILS # BLD AUTO: 0.1 K/UL
BASOPHILS NFR BLD AUTO: 2.2 %
BILIRUB SERPL-MCNC: 0.8 MG/DL (ref 0.1–1)
BUN SERPL-MCNC: 22 MG/DL (ref 6–20)
C GATTII+NEOFOR DNA CSF QL NAA+NON-PROBE: NOT DETECTED
CALCIUM SERPL-MCNC: 7.8 MG/DL (ref 8.7–10.5)
CHLORIDE SERPL-SCNC: 97 MMOL/L (ref 95–110)
CMV DNA CSF QL NAA+NON-PROBE: NOT DETECTED
CO2 SERPL-SCNC: 27 MMOL/L (ref 23–29)
CREAT SERPL-MCNC: 6 MG/DL (ref 0.5–1.4)
CRYPTOC AG CSF QL IA.RAPID: NEGATIVE
E COLI K1 DNA CSF QL NAA+NON-PROBE: NOT DETECTED
ERYTHROCYTE [DISTWIDTH] IN BLOOD BY AUTOMATED COUNT: 23.9 % (ref 11.5–14.5)
EV RNA CSF QL NAA+NON-PROBE: NOT DETECTED
GFR SERPLBLD CREATININE-BSD FMLA CKD-EPI: 9 ML/MIN/1.73/M2
GLUCOSE SERPL-MCNC: 73 MG/DL (ref 70–110)
GP B STREP DNA CSF QL NAA+NON-PROBE: NOT DETECTED
HAEM INFLU DNA CSF QL NAA+NON-PROBE: NOT DETECTED
HCT VFR BLD AUTO: 29.7 % (ref 37–48.5)
HGB BLD-MCNC: 9.8 GM/DL (ref 12–16)
HHV6 DNA CSF QL NAA+NON-PROBE: NOT DETECTED
HIV1 RNA # SERPL NAA+PROBE: 278 COPIES/ML
HIV1 RNA SERPL NAA+PROBE-LOG#: 2.44 LOG COPIES/ML
HIV1 RNA SERPL NAA+PROBE-LOG#: DETECTED {LOG_COPIES}/ML
HSV1 DNA CSF QL NAA+NON-PROBE: NOT DETECTED
HSV2 DNA CSF QL NAA+NON-PROBE: NOT DETECTED
IMM GRANULOCYTES # BLD AUTO: 0.02 K/UL (ref 0–0.04)
IMM GRANULOCYTES NFR BLD AUTO: 0.4 % (ref 0–0.5)
L MONOCYTOG DNA CSF QL NAA+NON-PROBE: NOT DETECTED
LYMPHOCYTES # BLD AUTO: 0.91 K/UL (ref 1–4.8)
MCH RBC QN AUTO: 34 PG (ref 27–31)
MCHC RBC AUTO-ENTMCNC: 33 G/DL (ref 32–36)
MCV RBC AUTO: 103 FL (ref 82–98)
N MEN DNA CSF QL NAA+NON-PROBE: NOT DETECTED
NUCLEATED RBC (/100WBC) (OHS): 0 /100 WBC
PARECHOVIRUS A RNA CSF QL NAA+NON-PROBE: NOT DETECTED
PHOSPHATE SERPL-MCNC: 5.8 MG/DL (ref 2.7–4.5)
PLATELET # BLD AUTO: 131 K/UL (ref 150–450)
PLATELET BLD QL SMEAR: ABNORMAL
PMV BLD AUTO: 10.2 FL (ref 9.2–12.9)
POTASSIUM SERPL-SCNC: 4.6 MMOL/L (ref 3.5–5.1)
PROT SERPL-MCNC: 8.2 GM/DL (ref 6–8.4)
RBC # BLD AUTO: 2.88 M/UL (ref 4–5.4)
RELATIVE EOSINOPHIL (OHS): 3.3 %
RELATIVE LYMPHOCYTE (OHS): 19.7 % (ref 18–48)
RELATIVE MONOCYTE (OHS): 11.9 % (ref 4–15)
RELATIVE NEUTROPHIL (OHS): 62.5 % (ref 38–73)
RPR SER QL: NORMAL
S PNEUM DNA CSF QL NAA+NON-PROBE: NOT DETECTED
SODIUM SERPL-SCNC: 136 MMOL/L (ref 136–145)
STOMATOCYTES BLD QL SMEAR: PRESENT
VZV DNA CSF QL NAA+NON-PROBE: NOT DETECTED
WBC # BLD AUTO: 4.61 K/UL (ref 3.9–12.7)

## 2025-05-20 PROCEDURE — 85025 COMPLETE CBC W/AUTO DIFF WBC: CPT | Performed by: HOSPITALIST

## 2025-05-20 PROCEDURE — 99223 1ST HOSP IP/OBS HIGH 75: CPT | Mod: NSCH,,, | Performed by: INTERNAL MEDICINE

## 2025-05-20 PROCEDURE — 84100 ASSAY OF PHOSPHORUS: CPT | Performed by: NURSE PRACTITIONER

## 2025-05-20 PROCEDURE — 25000003 PHARM REV CODE 250: Performed by: HOSPITALIST

## 2025-05-20 PROCEDURE — 25000003 PHARM REV CODE 250: Performed by: INTERNAL MEDICINE

## 2025-05-20 PROCEDURE — 36415 COLL VENOUS BLD VENIPUNCTURE: CPT | Performed by: HOSPITALIST

## 2025-05-20 PROCEDURE — 25000003 PHARM REV CODE 250: Performed by: NURSE PRACTITIONER

## 2025-05-20 PROCEDURE — 86403 PARTICLE AGGLUT ANTBDY SCRN: CPT | Performed by: HOSPITALIST

## 2025-05-20 PROCEDURE — 21400001 HC TELEMETRY ROOM

## 2025-05-20 PROCEDURE — G0426 INPT/ED TELECONSULT50: HCPCS | Mod: GT,,, | Performed by: PSYCHIATRY & NEUROLOGY

## 2025-05-20 PROCEDURE — 63600175 PHARM REV CODE 636 W HCPCS: Performed by: HOSPITALIST

## 2025-05-20 PROCEDURE — 99232 SBSQ HOSP IP/OBS MODERATE 35: CPT | Mod: FS,,, | Performed by: INTERNAL MEDICINE

## 2025-05-20 PROCEDURE — 80053 COMPREHEN METABOLIC PANEL: CPT | Performed by: HOSPITALIST

## 2025-05-20 PROCEDURE — 63600175 PHARM REV CODE 636 W HCPCS: Mod: JZ,TB | Performed by: HOSPITALIST

## 2025-05-20 RX ORDER — SODIUM CHLORIDE 9 MG/ML
INJECTION, SOLUTION INTRAVENOUS
Status: CANCELLED | OUTPATIENT
Start: 2025-05-20

## 2025-05-20 RX ORDER — MUPIROCIN 20 MG/G
OINTMENT TOPICAL 2 TIMES DAILY
Status: DISCONTINUED | OUTPATIENT
Start: 2025-05-20 | End: 2025-05-21 | Stop reason: HOSPADM

## 2025-05-20 RX ORDER — SODIUM CHLORIDE 9 MG/ML
INJECTION, SOLUTION INTRAVENOUS ONCE
Status: CANCELLED | OUTPATIENT
Start: 2025-05-20 | End: 2025-05-20

## 2025-05-20 RX ADMIN — CARVEDILOL 25 MG: 12.5 TABLET, FILM COATED ORAL at 09:05

## 2025-05-20 RX ADMIN — CARVEDILOL 25 MG: 12.5 TABLET, FILM COATED ORAL at 08:05

## 2025-05-20 RX ADMIN — MUPIROCIN: 20 OINTMENT TOPICAL at 08:05

## 2025-05-20 RX ADMIN — LEVETIRACETAM 500 MG: 500 TABLET, FILM COATED ORAL at 08:05

## 2025-05-20 RX ADMIN — HYDROMORPHONE HYDROCHLORIDE 0.5 MG: 1 INJECTION, SOLUTION INTRAMUSCULAR; INTRAVENOUS; SUBCUTANEOUS at 09:05

## 2025-05-20 RX ADMIN — HYDROMORPHONE HYDROCHLORIDE 0.5 MG: 1 INJECTION, SOLUTION INTRAMUSCULAR; INTRAVENOUS; SUBCUTANEOUS at 04:05

## 2025-05-20 RX ADMIN — DIPHENHYDRAMINE HYDROCHLORIDE 25 MG: 25 CAPSULE ORAL at 10:05

## 2025-05-20 RX ADMIN — FUROSEMIDE 80 MG: 40 TABLET ORAL at 09:05

## 2025-05-20 RX ADMIN — AMPHOTERICIN B 150 MG: 50 INJECTION, POWDER, LYOPHILIZED, FOR SOLUTION INTRAVENOUS at 10:05

## 2025-05-20 RX ADMIN — RILPIVIRINE HYDROCHLORIDE 25 MG: 25 TABLET, FILM COATED ORAL at 09:05

## 2025-05-20 RX ADMIN — DAPSONE 100 MG: 25 TABLET ORAL at 09:05

## 2025-05-20 RX ADMIN — DOLUTEGRAVIR SODIUM 50 MG: 50 TABLET, FILM COATED ORAL at 09:05

## 2025-05-20 RX ADMIN — SEVELAMER CARBONATE 2.4 G: 800 POWDER, FOR SUSPENSION ORAL at 12:05

## 2025-05-20 RX ADMIN — SEVELAMER CARBONATE 2.4 G: 800 POWDER, FOR SUSPENSION ORAL at 09:05

## 2025-05-20 RX ADMIN — FOLIC ACID 1 MG: 1 TABLET ORAL at 09:05

## 2025-05-20 RX ADMIN — MUPIROCIN: 20 OINTMENT TOPICAL at 09:05

## 2025-05-20 RX ADMIN — LEVETIRACETAM 500 MG: 500 TABLET, FILM COATED ORAL at 09:05

## 2025-05-20 RX ADMIN — SEVELAMER CARBONATE 2.4 G: 800 POWDER, FOR SUSPENSION ORAL at 05:05

## 2025-05-20 RX ADMIN — ACETAMINOPHEN 650 MG: 325 TABLET ORAL at 10:05

## 2025-05-20 RX ADMIN — APIXABAN 5 MG: 2.5 TABLET, FILM COATED ORAL at 08:05

## 2025-05-21 VITALS
SYSTOLIC BLOOD PRESSURE: 98 MMHG | TEMPERATURE: 98 F | BODY MASS INDEX: 24.2 KG/M2 | HEIGHT: 60 IN | RESPIRATION RATE: 18 BRPM | DIASTOLIC BLOOD PRESSURE: 65 MMHG | HEART RATE: 80 BPM | WEIGHT: 123.25 LBS | OXYGEN SATURATION: 98 %

## 2025-05-21 LAB
ABSOLUTE EOSINOPHIL (OHS): 0.13 K/UL
ABSOLUTE MONOCYTE (OHS): 0.42 K/UL (ref 0.3–1)
ABSOLUTE NEUTROPHIL COUNT (OHS): 1.92 K/UL (ref 1.8–7.7)
ALBUMIN SERPL BCP-MCNC: 2.8 G/DL (ref 3.5–5.2)
ALP SERPL-CCNC: 49 UNIT/L (ref 40–150)
ALT SERPL W/O P-5'-P-CCNC: 14 UNIT/L (ref 10–44)
ANION GAP (OHS): 15 MMOL/L (ref 8–16)
AST SERPL-CCNC: 18 UNIT/L (ref 11–45)
BASOPHILS # BLD AUTO: 0.09 K/UL
BASOPHILS NFR BLD AUTO: 2.5 %
BILIRUB SERPL-MCNC: 0.5 MG/DL (ref 0.1–1)
BODY FLD TYPE: NORMAL
BUN SERPL-MCNC: 41 MG/DL (ref 6–20)
CALCIUM SERPL-MCNC: 7.4 MG/DL (ref 8.7–10.5)
CHLORIDE SERPL-SCNC: 101 MMOL/L (ref 95–110)
CO2 SERPL-SCNC: 22 MMOL/L (ref 23–29)
CREAT SERPL-MCNC: 9 MG/DL (ref 0.5–1.4)
CRYPTOC AG SER QL IA.RAPID: NEGATIVE
ERYTHROCYTE [DISTWIDTH] IN BLOOD BY AUTOMATED COUNT: 22.9 % (ref 11.5–14.5)
ESTROGEN SERPL-MCNC: NORMAL PG/ML
GFR SERPLBLD CREATININE-BSD FMLA CKD-EPI: 5 ML/MIN/1.73/M2
GLUCOSE SERPL-MCNC: 77 MG/DL (ref 70–110)
HCT VFR BLD AUTO: 29.5 % (ref 37–48.5)
HGB BLD-MCNC: 9.9 GM/DL (ref 12–16)
IMM GRANULOCYTES # BLD AUTO: 0.01 K/UL (ref 0–0.04)
IMM GRANULOCYTES NFR BLD AUTO: 0.3 % (ref 0–0.5)
INSULIN SERPL-ACNC: NORMAL U[IU]/ML
LAB AP GROSS DESCRIPTION: NORMAL
LAB AP NON-GYN INTERPRETATION SPECIMEN 1: NORMAL
LAB AP PERFORMING LOCATION(S): NORMAL
LDH FLD L TO P-CCNC: 13 U/L
LYMPHOCYTES # BLD AUTO: 1.01 K/UL (ref 1–4.8)
MCH RBC QN AUTO: 35.2 PG (ref 27–31)
MCHC RBC AUTO-ENTMCNC: 33.6 G/DL (ref 32–36)
MCV RBC AUTO: 105 FL (ref 82–98)
NUCLEATED RBC (/100WBC) (OHS): 0 /100 WBC
PHOSPHATE SERPL-MCNC: 7.7 MG/DL (ref 2.7–4.5)
PLATELET # BLD AUTO: 116 K/UL (ref 150–450)
PMV BLD AUTO: 10.7 FL (ref 9.2–12.9)
POTASSIUM SERPL-SCNC: 4.6 MMOL/L (ref 3.5–5.1)
PROT SERPL-MCNC: 7.8 GM/DL (ref 6–8.4)
RBC # BLD AUTO: 2.81 M/UL (ref 4–5.4)
RELATIVE EOSINOPHIL (OHS): 3.6 %
RELATIVE LYMPHOCYTE (OHS): 28.2 % (ref 18–48)
RELATIVE MONOCYTE (OHS): 11.7 % (ref 4–15)
RELATIVE NEUTROPHIL (OHS): 53.7 % (ref 38–73)
SODIUM SERPL-SCNC: 138 MMOL/L (ref 136–145)
VDRL CSF QL: NEGATIVE
WBC # BLD AUTO: 3.58 K/UL (ref 3.9–12.7)

## 2025-05-21 PROCEDURE — 80100016 HC MAINTENANCE HEMODIALYSIS

## 2025-05-21 PROCEDURE — 25000003 PHARM REV CODE 250: Performed by: HOSPITALIST

## 2025-05-21 PROCEDURE — 85025 COMPLETE CBC W/AUTO DIFF WBC: CPT | Performed by: HOSPITALIST

## 2025-05-21 PROCEDURE — 80053 COMPREHEN METABOLIC PANEL: CPT | Performed by: HOSPITALIST

## 2025-05-21 PROCEDURE — 84100 ASSAY OF PHOSPHORUS: CPT | Performed by: NURSE PRACTITIONER

## 2025-05-21 PROCEDURE — 36415 COLL VENOUS BLD VENIPUNCTURE: CPT | Performed by: HOSPITALIST

## 2025-05-21 PROCEDURE — 63600175 PHARM REV CODE 636 W HCPCS: Performed by: HOSPITALIST

## 2025-05-21 RX ADMIN — HYDROMORPHONE HYDROCHLORIDE 0.5 MG: 1 INJECTION, SOLUTION INTRAMUSCULAR; INTRAVENOUS; SUBCUTANEOUS at 01:05

## 2025-05-21 RX ADMIN — LEVETIRACETAM 500 MG: 500 TABLET, FILM COATED ORAL at 01:05

## 2025-05-21 RX ADMIN — HYDROMORPHONE HYDROCHLORIDE 0.5 MG: 1 INJECTION, SOLUTION INTRAMUSCULAR; INTRAVENOUS; SUBCUTANEOUS at 02:05

## 2025-05-21 RX ADMIN — FOLIC ACID 1 MG: 1 TABLET ORAL at 01:05

## 2025-05-23 LAB
ABO + RH BLD: NORMAL
ABO + RH BLD: NORMAL
BLD PROD TYP BPU: NORMAL
BLD PROD TYP BPU: NORMAL
BLOOD UNIT EXPIRATION DATE: NORMAL
BLOOD UNIT EXPIRATION DATE: NORMAL
BLOOD UNIT TYPE CODE: 9500
BLOOD UNIT TYPE CODE: 9500
CROSSMATCH INTERPRETATION: NORMAL
CROSSMATCH INTERPRETATION: NORMAL
DISPENSE STATUS: NORMAL
DISPENSE STATUS: NORMAL
UNIT NUMBER: NORMAL
UNIT NUMBER: NORMAL

## 2025-05-24 LAB
BACTERIA BLD CULT: NORMAL
BACTERIA BLD CULT: NORMAL

## 2025-05-25 LAB
BACTERIA CSF CULT: NO GROWTH
GRAM STN SPEC: NORMAL
GRAM STN SPEC: NORMAL

## 2025-06-03 LAB — MYCOBACTERIUM SPEC QL CULT: NORMAL

## (undated) DEVICE — SYR 10CC LUER LOCK

## (undated) DEVICE — Device

## (undated) DEVICE — SEE MEDLINE ITEM 157027

## (undated) DEVICE — SPONGE LAP 18X18 PREWASHED

## (undated) DEVICE — SOL 9P NACL IRR PIC IL

## (undated) DEVICE — GLOVE PROTEXIS HYDROGEL SZ6.5

## (undated) DEVICE — TIP RUMI WHITE DISP UMW676

## (undated) DEVICE — SUT ABS CLIP LAPRA-TY CTD

## (undated) DEVICE — SEAL UNIVERSAL 5MM-8MM XI

## (undated) DEVICE — SUT SILK 3-0 STRANDS 30IN

## (undated) DEVICE — MANIFOLD 4 PORT

## (undated) DEVICE — ELECTRODE BLD EXT 6.50 ST DISP

## (undated) DEVICE — CATH 16FR URETHRL RED RUB

## (undated) DEVICE — SOL NS 1000CC

## (undated) DEVICE — SEE MEDLINE ITEM 152622

## (undated) DEVICE — SUPPORT ULNA NERVE PROTECTOR

## (undated) DEVICE — SUT VICRYL PLUS 3-0 SH 18IN

## (undated) DEVICE — COVER TIP CURVED SCISSORS XI

## (undated) DEVICE — DRAPE ARM DAVINCI XI

## (undated) DEVICE — SEE MEDLINE ITEM 157117

## (undated) DEVICE — SEE MEDLINE ITEM 157181

## (undated) DEVICE — SEE MEDLINE ITEM 157148

## (undated) DEVICE — SUT SILK 2-0 STRANDS 30IN

## (undated) DEVICE — COVER OVERHEAD SURG LT BLUE

## (undated) DEVICE — SUT VICRYL 3-0 27 SH

## (undated) DEVICE — SYR 3CC LUER LOC

## (undated) DEVICE — TUBING HEATED INSUFFLATOR

## (undated) DEVICE — SUT 1 48IN PDS II VIO MONO

## (undated) DEVICE — SEE MEDLINE ITEM 154981

## (undated) DEVICE — UNDERGLOVES BIOGEL PI SZ 7 LF

## (undated) DEVICE — SEE MEDLINE ITEM 146345

## (undated) DEVICE — KIT ANTIFOG

## (undated) DEVICE — STRIP PK IODOFORM CURITY 2X5YD

## (undated) DEVICE — EVACUATOR PENCIL SMOKE NEPTUNE

## (undated) DEVICE — TAPE SURG MEDIPORE 6X72IN

## (undated) DEVICE — ELECTRODE REM PLYHSV RETURN 9

## (undated) DEVICE — DRAPE LAVH LAPAROSCOPY W/FLUID

## (undated) DEVICE — TROCAR ENDOPATH XCEL 5X100MM

## (undated) DEVICE — NDL PNEUMO INSUFFLATI 120MM

## (undated) DEVICE — SUT PDS II 96 1 VIO

## (undated) DEVICE — GLOVE SURG BIOGEL LATEX SZ 7.5

## (undated) DEVICE — NDL SPINAL SPINOCAN 22GX3.5

## (undated) DEVICE — SEE L#152161

## (undated) DEVICE — IRRIGATOR ENDOSCOPY DISP.

## (undated) DEVICE — SUT 1 36IN PDS II VIO MONO

## (undated) DEVICE — PAD ABD 8X10 STERILE

## (undated) DEVICE — OCCLUDER COLPO-PNEUMO STERILE

## (undated) DEVICE — GLOVE SURGICAL LATEX SZ 7

## (undated) DEVICE — DRAPE STERI LONG

## (undated) DEVICE — GLOVE SURG STRL SZ 6.5

## (undated) DEVICE — SYR 30CC LUER LOCK

## (undated) DEVICE — SUT CHR GUT 1 CT 27 INCH

## (undated) DEVICE — OBTURATOR BLADELESS 8MM XI

## (undated) DEVICE — ADHESIVE DERMABOND ADVANCED

## (undated) DEVICE — NDL HYPO SAFETY 22 X 1 1/2

## (undated) DEVICE — WARMER DRAPE STERILE LF

## (undated) DEVICE — SEE MEDLINE ITEM 152739

## (undated) DEVICE — SUT 2/0 30IN SILK BLK BRAI

## (undated) DEVICE — PACK DRAPE PERI/GYN TIBURON

## (undated) DEVICE — POSITIONER HEAD DONUT 9IN FOAM

## (undated) DEVICE — SYR 50CC LL

## (undated) DEVICE — SUT SILK 0 SUTUPAK SA86H

## (undated) DEVICE — CONTAINER SPECIMEN STRL 4OZ

## (undated) DEVICE — EVACUATOR KIT SMOKE PLUME AWAY

## (undated) DEVICE — GAUZE SPONGE 4X4 12PLY

## (undated) DEVICE — SUT CTD VICRYL 0 UND BR SUT

## (undated) DEVICE — SUT SILK 3-0 SH 18IN BLACK

## (undated) DEVICE — SEE MEDLINE ITEM 146292

## (undated) DEVICE — DRAPE COLUMN DAVINCI XI

## (undated) DEVICE — GLOVE PROTEXIS HYDROGEL SZ6

## (undated) DEVICE — APPLICATOR CHLORAPREP ORN 26ML

## (undated) DEVICE — SUT VICRYL ANTIMICRO VIL BR

## (undated) DEVICE — SEE MEDLINE ITEM 146420

## (undated) DEVICE — SOL ELECTROLUBE ANTI-STIC

## (undated) DEVICE — SEE MEDLINE ITEM 146372